# Patient Record
Sex: FEMALE | Race: WHITE | Employment: OTHER | URBAN - METROPOLITAN AREA
[De-identification: names, ages, dates, MRNs, and addresses within clinical notes are randomized per-mention and may not be internally consistent; named-entity substitution may affect disease eponyms.]

---

## 2017-01-24 ENCOUNTER — ANTICOAGULATION THERAPY VISIT (OUTPATIENT)
Dept: ANTICOAGULATION | Facility: CLINIC | Age: 77
End: 2017-01-24
Payer: MEDICARE

## 2017-01-24 DIAGNOSIS — I48.91 ATRIAL FIBRILLATION AND FLUTTER (H): ICD-10-CM

## 2017-01-24 DIAGNOSIS — I48.92 ATRIAL FIBRILLATION AND FLUTTER (H): ICD-10-CM

## 2017-01-24 DIAGNOSIS — Z79.01 LONG-TERM (CURRENT) USE OF ANTICOAGULANTS: Primary | ICD-10-CM

## 2017-01-24 LAB — INR POINT OF CARE: 2.7 (ref 0.86–1.14)

## 2017-01-24 PROCEDURE — 36416 COLLJ CAPILLARY BLOOD SPEC: CPT

## 2017-01-24 PROCEDURE — 99207 ZZC NO CHARGE NURSE ONLY: CPT

## 2017-01-24 PROCEDURE — 85610 PROTHROMBIN TIME: CPT | Mod: QW

## 2017-01-24 NOTE — MR AVS SNAPSHOT
Zunilda Alicea May   1/24/2017 2:00 PM   Anticoagulation Therapy Visit    Description:  76 year old female   Provider:  [facility]   Department:  [facility]           INR as of 1/24/2017     Selected INR 2.7 (1/24/2017)      Anticoagulation Summary as of 1/24/2017     INR goal 2.0-3.0   Selected INR 2.7 (1/24/2017)   Full instructions 5 mg on Tue, Thu, Sat; 2.5 mg all other days   Next INR check 3/7/2017    Indications   Long-term (current) use of anticoagulants [Z79.01] [Z79.01]  Atrial fibrillation and flutter (H) [I48.91  I48.92]         Your next Anticoagulation Clinic appointment(s)     Mar 07, 2017 10:30 AM   [facility]   [facility]    [address] [facility] [address]  [facility] [address] [phone]              Contact Numbers     [facility] Phone Numbers:  Anticoagulation Clinic Appointments : [phone]  Anticoagulation Nurse: [phone]         January 2017 Details    Sun Mon Tue Wed Thu Fri Sat     1               2               3               4               5               6               7                 8               9               10               11               12               13               14                 15               16               17               18               19               20               21                 22               23               24      5 mg   See details      25      2.5 mg         26      5 mg         27      2.5 mg         28      5 mg           29      2.5 mg         30      2.5 mg         31      5 mg              Date Details   01/24 This INR check               How to take your warfarin dose     To take:  2.5 mg Take 1 of the 2.5 mg tablets.    To take:  5 mg Take 2 of the 2.5 mg tablets.           February 2017 Details    Sun Mon Tue Wed Thu Fri Sat        1      2.5 mg         2      5 mg         3      2.5 mg         4       5 mg           5      2.5 mg         6      2.5 mg         7      5 mg         8      2.5 mg         9      5 mg         10      2.5 mg         11      5 mg           12      2.5 mg         13      2.5 mg         14      5 mg         15      2.5 mg         16      5 mg         17      2.5 mg         18      5 mg           19      2.5 mg         20      2.5 mg         21      5 mg         22      2.5 mg         23      5 mg         24      2.5 mg         25      5 mg           26      2.5 mg         27      2.5 mg         28      5 mg              Date Details   No additional details            How to take your warfarin dose     To take:  2.5 mg Take 1 of the 2.5 mg tablets.    To take:  5 mg Take 2 of the 2.5 mg tablets.           March 2017 Details    Sun Mon Tue Wed Thu Fri Sat        1      2.5 mg         2      5 mg         3      2.5 mg         4      5 mg           5      2.5 mg         6      2.5 mg         7            8               9               10               11                 12               13               14               15               16               17               18                 19               20               21               22               23               24               25                 26               27               28               29               30               31                 Date Details   No additional details    Date of next INR:  3/7/2017         How to take your warfarin dose     To take:  2.5 mg Take 1 of the 2.5 mg tablets.    To take:  5 mg Take 2 of the 2.5 mg tablets.

## 2017-01-24 NOTE — PROGRESS NOTES
ANTICOAGULATION FOLLOW-UP CLINIC VISIT    Patient Name:  Zunilda Alicea May  Date:  1/24/2017  Contact Type:  Face to Face    SUBJECTIVE:     Patient Findings     Positives No Problem Findings           OBJECTIVE    INR PROTIME   Date Value Ref Range Status   01/24/2017 2.7* 0.86 - 1.14 Final       ASSESSMENT / PLAN  INR assessment THER    Recheck INR In: 6 WEEKS    INR Location Clinic      Anticoagulation Summary as of 1/24/2017     INR goal 2.0-3.0   Selected INR 2.7 (1/24/2017)   Maintenance plan 5 mg (2.5 mg x 2) on Tue, Thu, Sat; 2.5 mg (2.5 mg x 1) all other days   Full instructions 5 mg on Tue, Thu, Sat; 2.5 mg all other days   Weekly total 25 mg   No change documented Екатерина Mahan RN   Plan last modified Екатерина Mahan RN (4/25/2016)   Next INR check 3/7/2017   Priority INR   Target end date     Indications   Long-term (current) use of anticoagulants [Z79.01] [Z79.01]  Atrial fibrillation and flutter (H) [I48.91  I48.92]         Anticoagulation Episode Summary     INR check location     Preferred lab     Send INR reminders to [facility]    Comments       Anticoagulation Care Providers     Provider Role Specialty Phone number    Yunior Huang MD [facility] Internal Medicine [phone]            See the Encounter Report to view Anticoagulation Flowsheet and Dosing Calendar (Go to Encounters tab in chart review, and find the Anticoagulation Therapy Visit)        Екатерина Mahan RN

## 2017-02-16 DIAGNOSIS — I10 ESSENTIAL HYPERTENSION: ICD-10-CM

## 2017-02-16 RX ORDER — AMLODIPINE AND VALSARTAN 10; 320 MG/1; MG/1
1 TABLET ORAL DAILY
Qty: 90 TABLET | Refills: 3 | Status: SHIPPED | OUTPATIENT
Start: 2017-02-16 | End: 2018-02-19

## 2017-02-16 NOTE — TELEPHONE ENCOUNTER
Received refill request for:  Amlodipine-valsartan  Last OV was: 8/3/2016 with Dr. Das  Labs/EKG: last BMP 11/4/2016  F/U scheduled: orders in [facility] for 8/2017  New script sent to: Yah[facility]a

## 2017-03-06 ENCOUNTER — TRANSFERRED RECORDS (OUTPATIENT)
Dept: HEALTH INFORMATION MANAGEMENT | Facility: CLINIC | Age: 77
End: 2017-03-06

## 2017-03-07 ENCOUNTER — ANTICOAGULATION THERAPY VISIT (OUTPATIENT)
Dept: ANTICOAGULATION | Facility: CLINIC | Age: 77
End: 2017-03-07
Payer: MEDICARE

## 2017-03-07 DIAGNOSIS — I48.91 ATRIAL FIBRILLATION AND FLUTTER (H): ICD-10-CM

## 2017-03-07 DIAGNOSIS — I48.92 ATRIAL FIBRILLATION AND FLUTTER (H): ICD-10-CM

## 2017-03-07 DIAGNOSIS — Z79.01 LONG-TERM (CURRENT) USE OF ANTICOAGULANTS: ICD-10-CM

## 2017-03-07 LAB — INR POINT OF CARE: 2.9 (ref 0.86–1.14)

## 2017-03-07 PROCEDURE — 36416 COLLJ CAPILLARY BLOOD SPEC: CPT

## 2017-03-07 PROCEDURE — 99207 ZZC NO CHARGE NURSE ONLY: CPT

## 2017-03-07 PROCEDURE — 85610 PROTHROMBIN TIME: CPT | Mod: QW

## 2017-03-07 NOTE — MR AVS SNAPSHOT
Zunilda Alicea May   3/7/2017 10:30 AM   Anticoagulation Therapy Visit    Description:  76 year old female   Provider[facility]  [facility]   Department:  [facility]           INR as of 3/7/2017     Today's INR 2.9      Anticoagulation Summary as of 3/7/2017     INR goal 2.0-3.0   Today's INR 2.9   Full instructions 5 mg on Tue, Thu, Sat; 2.5 mg all other days   Next INR check 4/18/2017    Indications   Long-term (current) use of anticoagulants [Z79.01] [Z79.01]  Atrial fibrillation and flutter (H) [I48.91  I48.92]         Your next Anticoagulation Clinic appointment(s)     Apr 18, 2017 10:30 AM CDT   Anticoagulation Visit with [facility]   [facility]    [address] [facility] [address]  [facility] [id number] [phone]              Contact Numbers     [facility] Phone Numbers:  Anticoagulation Clinic Appointments : [phone]  Anticoagulation Nurse: [phone]         March 2017 Details    Sun Mon Tue Wed Thu Fri Sat        1               2               3               4                 5               6               7      5 mg   See details      8      2.5 mg         9      5 mg         10      2.5 mg         11      5 mg           12      2.5 mg         13      2.5 mg         14      5 mg         15      2.5 mg         16      5 mg         17      2.5 mg         18      5 mg           19      2.5 mg         20      2.5 mg         21      5 mg         22      2.5 mg         23      5 mg         24      2.5 mg         25      5 mg           26      2.5 mg         27      2.5 mg         28      5 mg         29      2.5 mg         30      5 mg         31      2.5 mg           Date Details   03/07 This INR check               How to take your warfarin dose     To take:  2.5 mg Take 0.5 of a 5 mg tablet.    To take:  5 mg Take 1 of the 5 mg tablets.           April 2017 Details    Sun Mon Tue Wed Thu Fri Sat           1      5  mg           2      2.5 mg         3      2.5 mg         4      5 mg         5      2.5 mg         6      5 mg         7      2.5 mg         8      5 mg           9      2.5 mg         10      2.5 mg         11      5 mg         12      2.5 mg         13      5 mg         14      2.5 mg         15      5 mg           16      2.5 mg         17      2.5 mg         18            19               20               21               22                 23               24               25               26               27               28               29                 30                      Date Details   No additional details    Date of next INR:  4/18/2017         How to take your warfarin dose     To take:  2.5 mg Take 0.5 of a 5 mg tablet.    To take:  5 mg Take 1 of the 5 mg tablets.

## 2017-03-07 NOTE — PROGRESS NOTES
ANTICOAGULATION FOLLOW-UP CLINIC VISIT    Patient Name:  Zunilda Alicea May  Date:  3/7/2017  Contact Type:  Face to Face    SUBJECTIVE:     Patient Findings     Positives No Problem Findings           OBJECTIVE    INR Protime   Date Value Ref Range Status   03/07/2017 2.9 (A) 0.86 - 1.14 Final       ASSESSMENT / PLAN  INR assessment THER    Recheck INR In: 6 WEEKS    INR Location Clinic      Anticoagulation Summary as of 3/7/2017     INR goal 2.0-3.0   Today's INR 2.9   Maintenance plan 5 mg (5 mg x 1) on Tue, Thu, Sat; 2.5 mg (5 mg x 0.5) all other days   Full instructions 5 mg on Tue, Thu, Sat; 2.5 mg all other days   Weekly total 25 mg   No change documented Simona Shrestha, RN   Plan last modified Екатерина Mahan RN (1/24/2017)   Next INR check 4/18/2017   Priority INR   Target end date     Indications   Long-term (current) use of anticoagulants [Z79.01] [Z79.01]  Atrial fibrillation and flutter (H) [I48.91  I48.92]         Anticoagulation Episode Summary     INR check location     Preferred lab     Send INR reminders to [facility]    Comments       Anticoagulation Care Providers     Provider Role Specialty Phone number    Yunior Huang MD Responsible Internal Medicine [phone]            See the Encounter Report to view Anticoagulation Flowsheet and Dosing Calendar (Go to Encounters tab in chart review, and find the Anticoagulation Therapy Visit)    Dosage adjustment made based on physician directed care plan.    Simona Shrestha RN

## 2017-03-27 ENCOUNTER — ALLIED HEALTH/NURSE VISIT (OUTPATIENT)
Dept: CARDIOLOGY | Facility: CLINIC | Age: 77
End: 2017-03-27
Payer: MEDICARE

## 2017-03-27 DIAGNOSIS — Z95.0 CARDIAC PACEMAKER IN SITU: Primary | ICD-10-CM

## 2017-03-27 PROCEDURE — 93296 REM INTERROG EVL PM/IDS: CPT | Performed by: INTERNAL MEDICINE

## 2017-03-27 PROCEDURE — 93294 REM INTERROG EVL PM/LDLS PM: CPT | Performed by: INTERNAL MEDICINE

## 2017-03-27 NOTE — MR AVS SNAPSHOT
After Visit Summary   3/27/2017    Zunilda Clark    MRN: [id number]           Patient Information     Date Of Birth          1940        Visit Information        Provider Department      3/27/2017 2:30 PM WILLIAMS ANDREA [facility] HEART [facility]        Today's Diagnoses     Cardiac pacemaker in situ    -  1       Follow-ups after your visit        Your next 10 appointments already scheduled     Mar 27, 2017  2:30 PM CDT   Remote PPM Check with KRAMER TECH1   [facility] ([facility])    [address]  [facility] [id number] [phone]           This appointment is for a remote check of your pacemaker.  This is not an appointment at the office.            Apr 05, 2017  3:15 PM CDT   Return Visit with Timothy Das MD   [facility] ([facility])    [address]  [facility] [address] [phone]            Apr 18, 2017 10:30 AM CDT   Anticoagulation Visit with [facility]   [facility]    [address]  [facility] [address] [phone]              Who to contact     If you have questions or need follow up information about today's clinic visit or your schedule please contact [facility] directly at [phone].  Normal or non-critical lab and imaging results will be communicated to you by MyChart, letter or phone within 4 business days after the clinic has received the results. If you do not hear from us within 7 days, please contact the clinic through MyChart or phone. If you have a critical or abnormal lab result, we will notify you by phone as soon as possible.  Submit refill requests through [employer] or call your pharmacy and they will forward the refill request to us. Please allow 3 business days for your refill to  be completed.          Additional Information About Your Visit        [employer] Information     [employer] gives you secure access to your electronic health record. If you see a primary care provider, you can also send messages to your care team and make appointments. If you have questions, please call your primary care clinic.  If you do not have a primary care provider, please call [phone] and they will assist you.        Care EveryWhere ID     This is your Care EveryWhere ID. This could be used by other organizations to access your [facility] medical records  [id number]         Blood Pressure from Last 3 Encounters:   11/15/16 122/60   11/01/16 120/50   08/03/16 125/55    Weight from Last 3 Encounters:   11/15/16 72.6 kg (160 lb)   11/01/16 73.9 kg (163 lb)   08/03/16 73 kg (161 lb)              We Performed the Following     INTERROGATION DEVICE EVAL REMOTE, PACER/ICD (04416)     PM DEVICE INTERROGATE REMOTE (19807)          Today's Medication Changes          These changes are accurate as of: 3/27/17  1:45 PM.  If you have any questions, ask your nurse or doctor.               These medicines have changed or have updated prescriptions.        Dose/Directions    allopurinol 100 MG tablet   Commonly known as:  ZYLOPRIM   This may have changed:  how much to take   Used for:  RA (rheumatoid arthritis) (H), Gout        Dose:  100 mg   Take 1 tablet (100 mg) by mouth daily   Quantity:  90 tablet   Refills:  3                Primary Care Provider Office Phone # Fax #    Yunior Huang [phone]       [place] [address] [place] [address]        Thank you!     Thank you for choosing [facility] PHYSICIANS HEART AT [place]  for your care. Our goal is always to provide you with excellent care. Hearing back from our patients is one way we can continue to improve our services. Please take a few minutes to complete the written survey that you may receive in the  mail after your visit with us. Thank you!             Your Updated Medication List - Protect others around you: Learn how to safely use, store and throw away your medicines at www.disposemymeds.org.          This list is accurate as of: 3/27/17  1:45 PM.  Always use your most recent med list.                   Brand Name Dispense Instructions for use    allopurinol 100 MG tablet    ZYLOPRIM    90 tablet    Take 1 tablet (100 mg) by mouth daily       Amlodipine Besylate-Valsartan  MG Tabs    EXFORGE    90 tablet    Take 1 tablet by mouth daily       chlorthalidone 25 MG tablet    HYGROTON    90 tablet    Take 1 tablet (25 mg) by mouth daily       folic acid 1 MG tablet    FOLVITE     Take 1 mg by mouth daily       levothyroxine 112 MCG tablet    SYNTHROID/LEVOTHROID    90 tablet    Take 1 tablet (112 mcg) by mouth daily       METHOTREXATE SODIUM IJ      Inject 0.8 mLs as directed once a week       metoprolol 100 MG tablet    LOPRESSOR    60 tablet    Take 0.5 tablets (50 mg) by mouth 2 times daily       omeprazole 20 MG CR capsule    priLOSEC    180 capsule    Take 1 capsule (20 mg) by mouth 2 times daily       warfarin 5 MG tablet    COUMADIN    60 tablet    Take 1 tablet (5 mg) Tu, Th, Sat. Take 1/2 tablet (2.5 mg) on Sun, Mon, Wed, Fri

## 2017-03-27 NOTE — PROGRESS NOTES
Medtronic Revo (D) Remote PPM Device Check  AP: 36 % : 43 %  Mode: AAIR<->DDDR        Presenting Rhythm: AP/VS  Heart Rate: Adequate rates per histogram  Sensing: Stable    Pacing Threshold: Stable    Impedance: Stable  Battery Status: 2.97V with OPAL being 2.81V  Atrial Arrhythmia: 153 mode switch episodes comprising 11% of the time. Longest episode lasted >99 hours. Ventricular rates controlled. Taking Warfarin.    Ventricular Arrhythmia: None     Care Plan: F/u PPM Carelink q 3 months. Gave patient results over the phone. JonathanCVT

## 2017-03-31 ENCOUNTER — PRE VISIT (OUTPATIENT)
Dept: CARDIOLOGY | Facility: CLINIC | Age: 77
End: 2017-03-31

## 2017-04-05 ENCOUNTER — OFFICE VISIT (OUTPATIENT)
Dept: CARDIOLOGY | Facility: CLINIC | Age: 77
End: 2017-04-05
Payer: MEDICARE

## 2017-04-05 VITALS
WEIGHT: 160 LBS | DIASTOLIC BLOOD PRESSURE: 62 MMHG | SYSTOLIC BLOOD PRESSURE: 128 MMHG | BODY MASS INDEX: 26.66 KG/M2 | HEART RATE: 76 BPM | HEIGHT: 65 IN

## 2017-04-05 DIAGNOSIS — I48.92 ATRIAL FIBRILLATION AND FLUTTER (H): ICD-10-CM

## 2017-04-05 DIAGNOSIS — I27.20 PULMONARY HTN (H): Primary | ICD-10-CM

## 2017-04-05 DIAGNOSIS — I48.91 ATRIAL FIBRILLATION AND FLUTTER (H): ICD-10-CM

## 2017-04-05 DIAGNOSIS — Z95.0 CARDIAC PACEMAKER IN SITU: ICD-10-CM

## 2017-04-05 DIAGNOSIS — I10 BENIGN ESSENTIAL HYPERTENSION: ICD-10-CM

## 2017-04-05 DIAGNOSIS — I05.9 MITRAL VALVE DISORDER: ICD-10-CM

## 2017-04-05 PROCEDURE — 99214 OFFICE O/P EST MOD 30 MIN: CPT | Performed by: INTERNAL MEDICINE

## 2017-04-05 RX ORDER — IBANDRONATE SODIUM 3 MG/3 ML
3 SYRINGE (ML) INTRAVENOUS
COMMUNITY
End: 2020-01-08

## 2017-04-05 NOTE — MR AVS SNAPSHOT
After Visit Summary   4/5/2017    Zunilda Clark    MRN: [id number]           Patient Information     Date Of Birth          1940        Visit Information        Provider Department      4/5/2017 3:15 PM Timothy Das MD [facility] PHYSICIANS HEART AT [place]        Today's Diagnoses     Pulmonary HTN (H)    -  1    Atrial fibrillation and flutter (H)        Mitral valve disorder        Cardiac pacemaker in situ        Benign essential hypertension           Follow-ups after your visit        Additional Services     Follow-Up with Cardiologist                 Your next 10 appointments already scheduled     Apr 13, 2017  8:30 AM CDT   Ech Complete with [id number] ([facility])    [address]  [facility] [address] [phone]           1.  Please bring or wear a comfortable two-piece outfit. 2.  You may eat, drink and take your normal medicines. 3.  For any questions that cannot be answered, please contact the ordering physician ***Please check-in at the [facility] located in [address] in the [facility] building. When you are finished registering, please go to [address] and have a seat. The technician will call your name for the test.            Apr 18, 2017 10:30 AM CDT   Anticoagulation Visit with [facility]   [facility]    [address]  [facility] [id number] [phone]            Jul 03, 2017 11:30 AM CDT   Remote PPM Check with KRAMER TECH1   [facility] PHYS[facility]ANS HEART AT [place] ([facility])    [address] [place] [address] [address]  [facility] [address] [phone]           This appointment is for a remote check of your pacemaker.  This is not an appointment at the office.              Future tests that were ordered for you today     Open Future Orders         "Priority Expected Expires Ordered    Echocardiogram Routine 4/12/2017 4/5/2018 4/5/2017    Follow-Up with Cardiologist Routine 10/2/2017 4/5/2018 4/5/2017            Who to contact     If you have questions or need follow up information about today's clinic visit or your schedule please contact [facility] PHYSICIANS HEART AT [place] directly at [phone].  Normal or non-critical lab and imaging results will be communicated to you by Telep[employer]art, letter or phone within 4 business days after the clinic has received the results. If you do not hear from us within 7 days, please contact the clinic through [facility] [employer] or phone. If you have a critical or abnormal lab result, we will notify you by phone as soon as possible.  Submit refill requests through [employer] or call your pharmacy and they will forward the refill request to us. Please allow 3 business days for your refill to be completed.          Additional Information About Your Visit        [employer] Information     [employer] gives you secure access to your electronic health record. If you see a primary care provider, you can also send messages to your care team and make appointments. If you have questions, please call your primary care clinic.  If you do not have a primary care provider, please call [phone] and they will assist you.        Care EveryWhere ID     This is your Care EveryWhere ID. This could be used by other organizations to access your [place] medical records  [id number]        Your Vitals Were     Pulse Height BMI (Body Mass Index)             76 1.651 m (5' 5\") 26.63 kg/m2          Blood Pressure from Last 3 Encounters:   04/05/17 128/62   11/15/16 122/60   11/01/16 120/50    Weight from Last 3 Encounters:   04/05/17 72.6 kg (160 lb)   11/15/16 72.6 kg (160 lb)   11/01/16 73.9 kg (163 lb)                 Today's Medication Changes          These changes are accurate as of: 4/5/17  4:08 PM.  If you have any questions, ask your nurse or " doctor.               These medicines have changed or have updated prescriptions.        Dose/Directions    allopurinol 100 MG tablet   Commonly known as:  ZYLOPRIM   This may have changed:  when to take this   Used for:  RA (rheumatoid arthritis) (H), Gout        Dose:  100 mg   Take 1 tablet (100 mg) by mouth daily   Quantity:  90 tablet   Refills:  3                Primary Care Provider Office Phone # Fax #    Yunior Huang [phone]       [facility] [address] [facility] [address] [address]        Thank you!     Thank you for choosing [facility] PHYSICIANS HEART AT [place]  for your care. Our goal is always to provide you with excellent care. Hearing back from our patients is one way we can continue to improve our services. Please take a few minutes to complete the written survey that you may receive in the mail after your visit with us. Thank you!             Your Updated Medication List - Protect others around you: Learn how to safely use, store and throw away your medicines at www.disposemymeds.org.          This list is accurate as of: 4/5/17  4:08 PM.  Always use your most recent med list.                   Brand Name Dispense Instructions for use    allopurinol 100 MG tablet    ZYLOPRIM    90 tablet    Take 1 tablet (100 mg) by mouth daily       Amlodipine Besylate-Valsartan  MG Tabs    EXFORGE    90 tablet    Take 1 tablet by mouth daily       BONIVA 3 MG/3ML   Generic drug:  ibandronate      Inject 3 mg into the vein once       chlorthalidone 25 MG tablet    HYGROTON    90 tablet    Take 1 tablet (25 mg) by mouth daily       folic acid 1 MG tablet    FOLVITE     Take 1 mg by mouth daily       levothyroxine 112 MCG tablet    SYNTHROID/LEVOTHROID    90 tablet    Take 1 tablet (112 mcg) by mouth daily       METHOTREXATE SODIUM IJ      Inject 0.8 mLs as directed once a week       metoprolol 100 MG tablet    LOPRESSOR    60 tablet    Take 0.5 tablets (50  mg) by mouth 2 times daily       omeprazole 20 MG CR capsule    priLOSEC    180 capsule    Take 1 capsule (20 mg) by mouth 2 times daily       warfarin 5 MG tablet    COUMADIN    60 tablet    Take 1 tablet (5 mg) Tu, Th, Sat. Take 1/2 tablet (2.5 mg) on Sun, Mon, Wed, Fri

## 2017-04-05 NOTE — PROGRESS NOTES
HPI and Plan:   See dictation:[id number]    Orders Placed This Encounter   Procedures     Follow-Up with Cardiologist     Echocardiogram       Orders Placed This Encounter   Medications     ibandronate (BONIVA) 3 MG/3ML     Sig: Inject 3 mg into the vein once       There are no discontinued medications.      Encounter Diagnoses   Name Primary?     Pulmonary HTN (H) Yes     Atrial fibrillation and flutter (H)      Mitral valve disorder      Cardiac pacemaker in situ      Benign essential hypertension        CURRENT MEDICATIONS:  Current Outpatient Prescriptions   Medication Sig Dispense Refill     ibandronate (BONIVA) 3 MG/3ML Inject 3 mg into the vein once       Amlodipine Besylate-Valsartan (EXFORGE)  MG TABS Take 1 tablet by mouth daily 90 tablet 3     omeprazole (PRILOSEC) 20 MG CR capsule Take 1 capsule (20 mg) by mouth 2 times daily 180 capsule 3     levothyroxine (SYNTHROID/LEVOTHROID) 112 MCG tablet Take 1 tablet (112 mcg) by mouth daily 90 tablet 3     metoprolol (LOPRESSOR) 100 MG tablet Take 0.5 tablets (50 mg) by mouth 2 times daily 60 tablet 5     warfarin (COUMADIN) 5 MG tablet Take 1 tablet (5 mg) Tu, Th, Sat. Take 1/2 tablet (2.5 mg) on Sun, Mon, Wed, Fri 60 tablet 1     chlorthalidone (HYGROTON) 25 MG tablet Take 1 tablet (25 mg) by mouth daily 90 tablet 2     folic acid (FOLVITE) 1 MG tablet Take 1 mg by mouth daily       METHOTREXATE SODIUM IJ Inject 0.8 mLs as directed once a week       allopurinol (ZYLOPRIM) 100 MG tablet Take 1 tablet (100 mg) by mouth daily (Patient taking differently: Take 100 mg by mouth 2 times daily ) 90 tablet 3       ALLERGIES   No Known Allergies    PAST MEDICAL HISTORY:  Past Medical History:   Diagnosis Date     Acquired hypothyroidism 11/4/2015     Aortic valve insufficiency      Arthritis      Atrial fibrillation (H)      Atrial fibrillation and flutter (H)      Blood clotting disorder (H)      CHF (congestive heart failure) (H)      DVT (deep venous thrombosis)  (H) 2003     Gastro-oesophageal reflux disease      H/O mitral valve replacement with tissue graft june 2014     History of blood transfusion 2014     HTN (hypertension)      Hypothyroidism      Other chronic pain      Pulmonary HTN (H)      Rheumatoid arthritis(714.0)      Seizures (H) 2014     Stroke (H) 2009    left side mild weakness      Stroke (H) 2014     Syncope 2011    [place]     Thrombosis of leg 2003    both legs       PAST SURGICAL HISTORY:  Past Surgical History:   Procedure Laterality Date     ABDOMEN SURGERY      prolapsed bladder     H ABLATION AV NODE  2011    AFlutter with syncope, PPM to follow     IMPLANT PACEMAKER  2011     OPEN REDUCTION INTERNAL FIXATION RODDING INTRAMEDULLARY HUMERUS Right 7/28/2015    Procedure: OPEN REDUCTION INTERNAL FIXATION RODDING INTRAMEDULLARY HUMERUS;  Surgeon: Raymundo Rivera MD;  Location: [facility] OR     REPLACE VALVE MITRAL  2006    Mitral valve replacement with bioprosthetic valve in 2008 for rheumatic disease     SLING BLADDER SUSPENSION WITH FASCIA UMESH         FAMILY HISTORY:  Family History   Problem Relation Age of Onset     Coronary Artery Disease Mother      Coronary Artery Disease Brother      DIABETES Brother      DIABETES Brother      Hypertension Sister      Hyperlipidemia Sister        SOCIAL HISTORY:  Social History     Social History     Marital status:      Spouse name: N/A     Number of children: N/A     Years of education: N/A     Social History Main Topics     Smoking status: Never Smoker     Smokeless tobacco: Never Used     Alcohol use No     Drug use: No     Sexual activity: Yes     Partners: Male     Other Topics Concern     Caffeine Concern Yes     occasionally     Sleep Concern No     Special Diet Yes     lower salt     Back Care No     Exercise Yes     walking ride excercise bike     Social History Narrative       Review of Systems:  Skin:  Positive for bruising;itching itching on occas.  per pt    Eyes:  Positive for  "glasses;visual blurring    ENT:  Negative      Respiratory:  Negative       Cardiovascular:    Positive for;fatigue    Gastroenterology: Positive for reflux;heartburn;diarrhea diarrhea on occasion   Genitourinary:  Positive for nocturia has improved to 1-2 times per pm  Musculoskeletal:  Positive for arthritis;joint pain;joint stiffness;gout Osteo and Rhuematoid , hx of gout ,  both feet feel different - per pt   Neurologic:  Positive for stroke CVA 2009 ,  Mild CVA 2014 - during open heart surgery  Psychiatric:  Positive for sleep disturbances troubles falling asleep  Heme/Lymph/Imm:  Positive for easy bruising    Endocrine:  Positive for thyroid disorder      Physical Exam:  Vitals: /62 (BP Location: Right arm, Cuff Size: Adult Large)  Pulse 76  Ht 1.651 m (5' 5\")  Wt 72.6 kg (160 lb)  BMI 26.63 kg/m2    Constitutional:  cooperative, alert and oriented, well developed, well nourished, in no acute distress        Skin:  warm and dry to the touch, no apparent skin lesions or masses noted        Head:  normocephalic, no masses or lesions        Eyes:  pupils equal and round, conjunctivae and lids unremarkable, sclera white, no xanthalasma, EOMS intact, no nystagmus        ENT:  no pallor or cyanosis, dentition good        Neck:  carotid pulses are full and equal bilaterally, JVP normal, no carotid bruit, no thyromegaly        Chest:  normal breath sounds, clear to auscultation, normal A-P diameter, normal symmetry, normal respiratory excursion, no use of accessory muscles;healed median sternotomy scar   pacemaker incision in the left infraclavicular area was well-healed      Cardiac: regular rhythm;normal S1 and S2;apical impulse not displaced;no S3 or S4 occasional premature beats fixed split S2   systolic murmur;LLSB;grade 1   diastolic murmur;blowing;grade 2;RUSB      Abdomen:  abdomen soft, non-tender, BS normoactive, no mass, no HSM, no bruits        Vascular: pulses full and equal, no bruits " auscultated                                        Extremities and Back:  no edema arthritic deformities of UE            Neurological:  affect appropriate, oriented to time, person and place left sided weakness walks with a walker          CC  No referring provider defined for this encounter.

## 2017-04-05 NOTE — LETTER
4/5/2017    Yunior Huang MD  [facility]   [address] [facility] [address]    RE: Zunilda Clark       Dear Colleague,    I again had the pleasure of seeing your patient, Zunilda Clark, at [facility] for evaluation of pulmonary hypertension and paroxysmal atrial fibrillation.  The patient had been hospitalized in [place] for congestive heart failure in 2014.  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  She has a history of paroxysmal atrial fibrillation and has been treated for essential hypertension.  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  Her mitral valve replacement in 04/2008 was with a bioprosthetic valve.  Unfortunately by 04/2014 she was found to have severe mitral stenosis and the valve was replaced with a magna 27 mm bioprosthetic valve.  She did not have any significant coronary artery disease on either occasion.  Her last echocardiogram performed on 11/10/2015 demonstrated normal left ventricular size and function with moderate to severe concentric left ventricular hypertrophy.  Right ventricular systolic function was moderately reduced and the right ventricle moderately dilated.  There was mild to moderate tricuspid regurgitation.  It should be noted that in 2014 a tricuspid annuloplasty ring was placed.  There was severe pulmonary hypertension.  The bioprosthetic mitral valve was normal with trace mitral regurgitation.  Pulmonary pressures were elevated but lower than previous.  The patient believes she suffered a TIA or stroke at the time of her mitral valve replacement in 05/2014.  She continues to have left-sided weakness.  She fell and broke her right arm which required ORIF of her right humerus last year.  She has not had any  "further significant shortness of breath since her episode of congestion and furosemide use last year.  She rides her stationary bike 5 out of 7 days for 15 minutes.  She is now planning left total knee arthroplasty in the near future.  She needs cardiac surgical clearance.  She denies any bleeding diathesis other than \"occasional vaginal spotting.\"      PHYSICAL EXAMINATION:   VITAL SIGNS:  Current blood pressure is 128/62, pulse is 76 and regular, weight is 160 pounds.   CHEST:  Clear to auscultation.   CARDIAC:  Regular rate and rhythm with occasional ectopic, normal S1 and S2 with fixed splitting of S2.  I do not hear a S3 or S4 gallop.  There is a 1/6 systolic murmur in the left lower sternal border.  There is also a 2/6 diastolic blowing murmur of aortic insufficiency at the right upper sternal border.  There is no JVD or HJR.  Pulses are intact without bruits.  She has a healed midline sternotomy scar.  She has a healed pacemaker scar in the left infraclavicular space.   ABDOMEN:  Benign.   EXTREMITIES:  Show no edema.  She has mild left upper and lower extremity weakness.  She has mild arthritic changes in her hands and uses a walker for ambulation.      Interrogation of her pacemaker has shown 36% atrial pacing and 43% ventricular pacing on 03/27/2017.  She is closing in on end of life for this battery.  She 153 mode switches comprising 11% of the time with the longest greater than 99 hours.  Ventricular rates are controlled.     Outpatient Encounter Prescriptions as of 4/5/2017   Medication Sig Dispense Refill     ibandronate (BONIVA) 3 MG/3ML Inject 3 mg into the vein once       Amlodipine Besylate-Valsartan (EXFORGE)  MG TABS Take 1 tablet by mouth daily 90 tablet 3     omeprazole (PRILOSEC) 20 MG CR capsule Take 1 capsule (20 mg) by mouth 2 times daily 180 capsule 3     levothyroxine (SYNTHROID/LEVOTHROID) 112 MCG tablet Take 1 tablet (112 mcg) by mouth daily 90 tablet 3     metoprolol (LOPRESSOR) " 100 MG tablet Take 0.5 tablets (50 mg) by mouth 2 times daily 60 tablet 5     [DISCONTINUED] warfarin (COUMADIN) 5 MG tablet Take 1 tablet (5 mg) Tu, Th, Sat. Take 1/2 tablet (2.5 mg) on Sun, Mon, Wed, Fri 60 tablet 1     [DISCONTINUED] chlorthalidone (HYGROTON) 25 MG tablet Take 1 tablet (25 mg) by mouth daily 90 tablet 2     folic acid (FOLVITE) 1 MG tablet Take 1 mg by mouth daily       METHOTREXATE SODIUM IJ Inject 0.8 mLs as directed once a week       [DISCONTINUED] allopurinol (ZYLOPRIM) 100 MG tablet Take 1 tablet (100 mg) by mouth daily (Patient taking differently: Take 100 mg by mouth 2 times daily ) 90 tablet 3     No facility-administered encounter medications on file as of 4/5/2017.       ASSESSMENT:   1.  Zunilda Clark is a delightful 76-year-old female status post mitral valve replacement in 05/2014 after previous balloon commissurotomy in 2006 and valve replacement 04/10/2008.  The patient is status post mitral valve replacement and tricuspid annuloplasty ring in 05/2014 while living in [place].  Her echocardiogram shows pulmonary hypertension and we will repeat this to assess the degree of pulmonary hypertension before we consider left total knee arthroplasty.  We will also assess her right heart.  Her neck veins are not distended and hopefully this suggests improved right heart function.   2.  The patient's permanent pacemaker is functioning normally.  We will follow this for possible OPAL in the future.   3.  Paroxysmal atrial fibrillation.  She is anticoagulated and has good control.   4.  Hypertension, currently well controlled.  We will continue to monitor her BMP.                It is my pleasure to assist in the care of Deanne Clark.  I will see her again in 6 months if she remains stable.  We will perform an echocardiogram in the next week.  All of her questions were answered to her satisfaction.      Sincerely,    Timothy Das MD     [facility]

## 2017-04-06 NOTE — PROGRESS NOTES
HISTORY OF PRESENT ILLNESS:  I again had the pleasure of seeing your patient, Zunilda Clark, at [facility] for evaluation of pulmonary hypertension and paroxysmal atrial fibrillation.  The patient had been hospitalized in [place] for congestive heart failure in 2014.  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  She has a history of paroxysmal atrial fibrillation and has been treated for essential hypertension.  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  Her mitral valve replacement in 04/2008 was with a bioprosthetic valve.  Unfortunately by 04/2014 she was found to have severe mitral stenosis and the valve was replaced with a magna 27 mm bioprosthetic valve.  She did not have any significant coronary artery disease on either occasion.  Her last echocardiogram performed on 11/10/2015 demonstrated normal left ventricular size and function with moderate to severe concentric left ventricular hypertrophy.  Right ventricular systolic function was moderately reduced and the right ventricle moderately dilated.  There was mild to moderate tricuspid regurgitation.  It should be noted that in 2014 a tricuspid annuloplasty ring was placed.  There was severe pulmonary hypertension.  The bioprosthetic mitral valve was normal with trace mitral regurgitation.  Pulmonary pressures were elevated but lower than previous.  The patient believes she suffered a TIA or stroke at the time of her mitral valve replacement in 05/2014.  She continues to have left-sided weakness.  She fell and broke her right arm which required ORIF of her right humerus last year.  She has not had any further significant shortness of breath since her episode of congestion and furosemide use last year.  She rides her  "stationary bike 5 out of 7 days for 15 minutes.  She is now planning left total knee arthroplasty in the near future.  She needs cardiac surgical clearance.  She denies any bleeding diathesis other than \"occasional vaginal spotting.\"      PHYSICAL EXAMINATION:   VITAL SIGNS:  Current blood pressure is 128/62, pulse is 76 and regular, weight is 160 pounds.   CHEST:  Clear to auscultation.   CARDIAC:  Regular rate and rhythm with occasional ectopic, normal S1 and S2 with fixed splitting of S2.  I do not hear a S3 or S4 gallop.  There is a 1/6 systolic murmur in the left lower sternal border.  There is also a 2/6 diastolic blowing murmur of aortic insufficiency at the right upper sternal border.  There is no JVD or HJR.  Pulses are intact without bruits.  She has a healed midline sternotomy scar.  She has a healed pacemaker scar in the left infraclavicular space.   ABDOMEN:  Benign.   EXTREMITIES:  Show no edema.  She has mild left upper and lower extremity weakness.  She has mild arthritic changes in her hands and uses a walker for ambulation.      Interrogation of her pacemaker has shown 36% atrial pacing and 43% ventricular pacing on 03/27/2017.  She is closing in on end of life for this battery.  She 153 mode switches comprising 11% of the time with the longest greater than 99 hours.  Ventricular rates are controlled.      ASSESSMENT:   1Meir Clark is a delightful 76-year-old female status post mitral valve replacement in 05/2014 after previous balloon commissurotomy in 2006 and valve replacement 04/10/2008.  The patient is status post mitral valve replacement and tricuspid annuloplasty ring in 05/2014 while living in [place].  Her echocardiogram shows pulmonary hypertension and we will repeat this to assess the degree of pulmonary hypertension before we consider left total knee arthroplasty.  We will also assess her right heart.  Her neck veins are not distended and hopefully this suggests improved right heart " function.   2.  The patient's permanent pacemaker is functioning normally.  We will follow this for possible OPAL in the future.   3.  Paroxysmal atrial fibrillation.  She is anticoagulated and has good control.   4.  Hypertension, currently well controlled.  We will continue to monitor her BMP.      It is my pleasure to assist in the care of Deanne Lewis.  I will see her again in 6 months if she remains stable.  We will perform an echocardiogram in the next week.  All of her questions were answered to her satisfaction.      Marcelino Brooks MD      cc:   Yunior Huang MD   [facility] [address], [address]   [place]  [address]         MARCELINO BROOKS MD, [facility]             D: 2017 16:07   T: 2017 09:33   MT: TY      Name:     NADIYA LEWIS   MRN:      [id number]        Account:      [id number]   :      1940           Service Date: 2017      Document: [id number]

## 2017-04-11 DIAGNOSIS — I48.92 ATRIAL FIBRILLATION AND FLUTTER (H): ICD-10-CM

## 2017-04-11 DIAGNOSIS — I48.91 ATRIAL FIBRILLATION AND FLUTTER (H): ICD-10-CM

## 2017-04-11 RX ORDER — WARFARIN SODIUM 5 MG/1
TABLET ORAL
Qty: 60 TABLET | Refills: 1 | Status: ON HOLD | OUTPATIENT
Start: 2017-04-11 | End: 2017-05-02

## 2017-04-11 NOTE — TELEPHONE ENCOUNTER
Warfarin 5 mg    Last Written Prescription Date: 12/8/16  Last Fill Qty: 60, # refills: 1  Last Office Visit with [facility], [facility] or [facility] prescribing provider: 11/1/16     Date and Result of Last PT/INR:   Lab Results   Component Value Date    INR 2.9 03/07/2017    INR 2.7 01/24/2017    INR 1.22 07/29/2015    INR 1.15 07/28/2015   Prescription approved per [facility] Refill Protocol.  Екатерина Mahan RN

## 2017-04-13 ENCOUNTER — HOSPITAL ENCOUNTER (OUTPATIENT)
Dept: CARDIOLOGY | Facility: CLINIC | Age: 77
Discharge: HOME | End: 2017-04-13
Attending: INTERNAL MEDICINE | Admitting: INTERNAL MEDICINE
Payer: MEDICARE

## 2017-04-13 DIAGNOSIS — I05.9 MITRAL VALVE DISORDER: ICD-10-CM

## 2017-04-13 DIAGNOSIS — I27.20 PULMONARY HTN (H): ICD-10-CM

## 2017-04-13 PROCEDURE — 93306 TTE W/DOPPLER COMPLETE: CPT

## 2017-04-13 PROCEDURE — 93306 TTE W/DOPPLER COMPLETE: CPT | Mod: 26 | Performed by: INTERNAL MEDICINE

## 2017-04-14 ENCOUNTER — TELEPHONE (OUTPATIENT)
Dept: CARDIOLOGY | Facility: CLINIC | Age: 77
End: 2017-04-14

## 2017-04-14 NOTE — TELEPHONE ENCOUNTER
Notes Recorded by Timothy Das MD on 4/13/2017 at 10:41 PM  No significant CAD on heart catheterization in 2014.  Normal left ventricular EF.  Severe pulmonary hypertension.  Mortality risk is likely less than 1% based on heart disease.  Using NSQIP for risk calculation gives a 0.1% risk of death and 2.5% risk of serious complications.  She can move on to her knee surgery with careful management and maintenance of O2 levels and volume by anaesthesiology.  Timothy Das MD, FACC     Spoke to patient about result above. Pt verbalized understanding and thanked writer for calling. No further questions.

## 2017-04-18 ENCOUNTER — ANTICOAGULATION THERAPY VISIT (OUTPATIENT)
Dept: ANTICOAGULATION | Facility: CLINIC | Age: 77
End: 2017-04-18
Payer: MEDICARE

## 2017-04-18 DIAGNOSIS — I48.92 ATRIAL FIBRILLATION AND FLUTTER (H): ICD-10-CM

## 2017-04-18 DIAGNOSIS — I48.91 ATRIAL FIBRILLATION AND FLUTTER (H): ICD-10-CM

## 2017-04-18 DIAGNOSIS — Z79.01 LONG-TERM (CURRENT) USE OF ANTICOAGULANTS: ICD-10-CM

## 2017-04-18 LAB — INR POINT OF CARE: 2.6 (ref 0.86–1.14)

## 2017-04-18 PROCEDURE — 36416 COLLJ CAPILLARY BLOOD SPEC: CPT

## 2017-04-18 PROCEDURE — 85610 PROTHROMBIN TIME: CPT | Mod: QW

## 2017-04-18 PROCEDURE — 99207 ZZC NO CHARGE NURSE ONLY: CPT

## 2017-04-27 ENCOUNTER — APPOINTMENT (OUTPATIENT)
Dept: ULTRASOUND IMAGING | Facility: CLINIC | Age: 77
DRG: 418 | End: 2017-04-27
Attending: EMERGENCY MEDICINE
Payer: MEDICARE

## 2017-04-27 ENCOUNTER — APPOINTMENT (OUTPATIENT)
Dept: CT IMAGING | Facility: CLINIC | Age: 77
DRG: 418 | End: 2017-04-27
Attending: EMERGENCY MEDICINE
Payer: MEDICARE

## 2017-04-27 ENCOUNTER — HOSPITAL ENCOUNTER (INPATIENT)
Facility: CLINIC | Age: 77
LOS: 4 days | Discharge: HOME | DRG: 418 | End: 2017-05-02
Attending: EMERGENCY MEDICINE | Admitting: HOSPITALIST
Payer: MEDICARE

## 2017-04-27 DIAGNOSIS — K81.0 ACUTE CHOLECYSTITIS: ICD-10-CM

## 2017-04-27 DIAGNOSIS — I48.91 ATRIAL FIBRILLATION AND FLUTTER (H): ICD-10-CM

## 2017-04-27 DIAGNOSIS — I48.92 ATRIAL FIBRILLATION AND FLUTTER (H): ICD-10-CM

## 2017-04-27 DIAGNOSIS — Z90.49 S/P LAPAROSCOPIC CHOLECYSTECTOMY: Primary | ICD-10-CM

## 2017-04-27 LAB
ALBUMIN SERPL-MCNC: 3.1 G/DL (ref 3.4–5)
ALBUMIN UR-MCNC: NEGATIVE MG/DL
ALP SERPL-CCNC: 102 U/L (ref 40–150)
ALT SERPL W P-5'-P-CCNC: 19 U/L (ref 0–50)
ANION GAP SERPL CALCULATED.3IONS-SCNC: 8 MMOL/L (ref 3–14)
APPEARANCE UR: CLEAR
AST SERPL W P-5'-P-CCNC: 19 U/L (ref 0–45)
BASOPHILS # BLD AUTO: 0 10E9/L (ref 0–0.2)
BASOPHILS NFR BLD AUTO: 0.2 %
BILIRUB SERPL-MCNC: 0.4 MG/DL (ref 0.2–1.3)
BILIRUB UR QL STRIP: NEGATIVE
BUN SERPL-MCNC: 22 MG/DL (ref 7–30)
CALCIUM SERPL-MCNC: 8.8 MG/DL (ref 8.5–10.1)
CHLORIDE SERPL-SCNC: 101 MMOL/L (ref 94–109)
CO2 BLDCOV-SCNC: 28 MMOL/L (ref 21–28)
CO2 SERPL-SCNC: 28 MMOL/L (ref 20–32)
COLOR UR AUTO: YELLOW
CREAT SERPL-MCNC: 0.85 MG/DL (ref 0.52–1.04)
DIFFERENTIAL METHOD BLD: ABNORMAL
EOSINOPHIL # BLD AUTO: 0 10E9/L (ref 0–0.7)
EOSINOPHIL NFR BLD AUTO: 0.2 %
ERYTHROCYTE [DISTWIDTH] IN BLOOD BY AUTOMATED COUNT: 18.6 % (ref 10–15)
GFR SERPL CREATININE-BSD FRML MDRD: 65 ML/MIN/1.7M2
GLUCOSE SERPL-MCNC: 124 MG/DL (ref 70–99)
GLUCOSE UR STRIP-MCNC: NEGATIVE MG/DL
HCT VFR BLD AUTO: 36.3 % (ref 35–47)
HGB BLD-MCNC: 12.1 G/DL (ref 11.7–15.7)
HGB UR QL STRIP: NEGATIVE
IMM GRANULOCYTES # BLD: 0.1 10E9/L (ref 0–0.4)
IMM GRANULOCYTES NFR BLD: 0.4 %
INR PPP: 2.54 (ref 0.86–1.14)
KETONES UR STRIP-MCNC: NEGATIVE MG/DL
LACTATE BLD-SCNC: 0.9 MMOL/L (ref 0.7–2.1)
LEUKOCYTE ESTERASE UR QL STRIP: ABNORMAL
LIPASE SERPL-CCNC: 183 U/L (ref 73–393)
LYMPHOCYTES # BLD AUTO: 0.3 10E9/L (ref 0.8–5.3)
LYMPHOCYTES NFR BLD AUTO: 2.8 %
MCH RBC QN AUTO: 33 PG (ref 26.5–33)
MCHC RBC AUTO-ENTMCNC: 33.3 G/DL (ref 31.5–36.5)
MCV RBC AUTO: 99 FL (ref 78–100)
MONOCYTES # BLD AUTO: 1.6 10E9/L (ref 0–1.3)
MONOCYTES NFR BLD AUTO: 13.9 %
MUCOUS THREADS #/AREA URNS LPF: PRESENT /LPF
NEUTROPHILS # BLD AUTO: 9.7 10E9/L (ref 1.6–8.3)
NEUTROPHILS NFR BLD AUTO: 82.5 %
NITRATE UR QL: POSITIVE
NRBC # BLD AUTO: 0 10*3/UL
NRBC BLD AUTO-RTO: 0 /100
PCO2 BLDV: 46 MM HG (ref 40–50)
PH BLDV: 7.4 PH (ref 7.32–7.43)
PH UR STRIP: 5 PH (ref 5–7)
PLATELET # BLD AUTO: 284 10E9/L (ref 150–450)
PO2 BLDV: 22 MM HG (ref 25–47)
POTASSIUM SERPL-SCNC: 3.5 MMOL/L (ref 3.4–5.3)
PROT SERPL-MCNC: 7.6 G/DL (ref 6.8–8.8)
RBC # BLD AUTO: 3.67 10E12/L (ref 3.8–5.2)
RBC #/AREA URNS AUTO: 1 /HPF (ref 0–2)
SAO2 % BLDV FROM PO2: 37 %
SODIUM SERPL-SCNC: 137 MMOL/L (ref 133–144)
SP GR UR STRIP: 1.03 (ref 1–1.03)
SQUAMOUS #/AREA URNS AUTO: <1 /HPF (ref 0–1)
URN SPEC COLLECT METH UR: ABNORMAL
UROBILINOGEN UR STRIP-MCNC: 0 MG/DL (ref 0–2)
WBC # BLD AUTO: 11.7 10E9/L (ref 4–11)
WBC #/AREA URNS AUTO: 10 /HPF (ref 0–2)

## 2017-04-27 PROCEDURE — 85610 PROTHROMBIN TIME: CPT | Performed by: EMERGENCY MEDICINE

## 2017-04-27 PROCEDURE — 74177 CT ABD & PELVIS W/CONTRAST: CPT

## 2017-04-27 PROCEDURE — 83690 ASSAY OF LIPASE: CPT | Performed by: EMERGENCY MEDICINE

## 2017-04-27 PROCEDURE — 99285 EMERGENCY DEPT VISIT HI MDM: CPT | Mod: 25

## 2017-04-27 PROCEDURE — 81001 URINALYSIS AUTO W/SCOPE: CPT | Performed by: EMERGENCY MEDICINE

## 2017-04-27 PROCEDURE — 83605 ASSAY OF LACTIC ACID: CPT

## 2017-04-27 PROCEDURE — 96361 HYDRATE IV INFUSION ADD-ON: CPT

## 2017-04-27 PROCEDURE — 25000128 H RX IP 250 OP 636: Performed by: EMERGENCY MEDICINE

## 2017-04-27 PROCEDURE — 82803 BLOOD GASES ANY COMBINATION: CPT

## 2017-04-27 PROCEDURE — 85025 COMPLETE CBC W/AUTO DIFF WBC: CPT | Performed by: EMERGENCY MEDICINE

## 2017-04-27 PROCEDURE — 80053 COMPREHEN METABOLIC PANEL: CPT | Performed by: EMERGENCY MEDICINE

## 2017-04-27 PROCEDURE — 76705 ECHO EXAM OF ABDOMEN: CPT

## 2017-04-27 PROCEDURE — 87088 URINE BACTERIA CULTURE: CPT | Performed by: PHYSICIAN ASSISTANT

## 2017-04-27 PROCEDURE — 25500064 ZZH RX 255 OP 636: Performed by: EMERGENCY MEDICINE

## 2017-04-27 PROCEDURE — 87186 SC STD MICRODIL/AGAR DIL: CPT | Performed by: PHYSICIAN ASSISTANT

## 2017-04-27 PROCEDURE — 87086 URINE CULTURE/COLONY COUNT: CPT | Performed by: PHYSICIAN ASSISTANT

## 2017-04-27 PROCEDURE — 96376 TX/PRO/DX INJ SAME DRUG ADON: CPT

## 2017-04-27 PROCEDURE — 25000125 ZZHC RX 250: Performed by: EMERGENCY MEDICINE

## 2017-04-27 PROCEDURE — 96375 TX/PRO/DX INJ NEW DRUG ADDON: CPT

## 2017-04-27 PROCEDURE — 96365 THER/PROPH/DIAG IV INF INIT: CPT

## 2017-04-27 RX ORDER — MORPHINE SULFATE 2 MG/ML
2 INJECTION, SOLUTION INTRAMUSCULAR; INTRAVENOUS ONCE
Status: COMPLETED | OUTPATIENT
Start: 2017-04-27 | End: 2017-04-27

## 2017-04-27 RX ORDER — MORPHINE SULFATE 4 MG/ML
4 INJECTION, SOLUTION INTRAMUSCULAR; INTRAVENOUS
Status: DISCONTINUED | OUTPATIENT
Start: 2017-04-27 | End: 2017-04-27

## 2017-04-27 RX ORDER — IOPAMIDOL 755 MG/ML
500 INJECTION, SOLUTION INTRAVASCULAR ONCE
Status: COMPLETED | OUTPATIENT
Start: 2017-04-27 | End: 2017-04-27

## 2017-04-27 RX ORDER — MULTIPLE VITAMINS W/ MINERALS TAB 9MG-400MCG
1 TAB ORAL DAILY
COMMUNITY

## 2017-04-27 RX ORDER — IBUPROFEN 200 MG
1 CAPSULE ORAL DAILY
COMMUNITY

## 2017-04-27 RX ORDER — ONDANSETRON 2 MG/ML
4 INJECTION INTRAMUSCULAR; INTRAVENOUS
Status: DISCONTINUED | OUTPATIENT
Start: 2017-04-27 | End: 2017-04-28

## 2017-04-27 RX ORDER — AMPICILLIN AND SULBACTAM 2; 1 G/1; G/1
3 INJECTION, POWDER, FOR SOLUTION INTRAMUSCULAR; INTRAVENOUS EVERY 6 HOURS
Status: DISCONTINUED | OUTPATIENT
Start: 2017-04-27 | End: 2017-04-28

## 2017-04-27 RX ORDER — CHLORAL HYDRATE 500 MG
1 CAPSULE ORAL DAILY
COMMUNITY
End: 2018-11-28

## 2017-04-27 RX ORDER — ALLOPURINOL 100 MG/1
100 TABLET ORAL 2 TIMES DAILY
COMMUNITY
End: 2024-06-12

## 2017-04-27 RX ADMIN — MORPHINE SULFATE 2 MG: 2 INJECTION, SOLUTION INTRAMUSCULAR; INTRAVENOUS at 21:00

## 2017-04-27 RX ADMIN — AMPICILLIN SODIUM AND SULBACTAM SODIUM 3 G: 2; 1 INJECTION, POWDER, FOR SOLUTION INTRAMUSCULAR; INTRAVENOUS at 22:49

## 2017-04-27 RX ADMIN — MORPHINE SULFATE 2 MG: 2 INJECTION, SOLUTION INTRAMUSCULAR; INTRAVENOUS at 23:54

## 2017-04-27 RX ADMIN — ONDANSETRON 4 MG: 2 INJECTION INTRAMUSCULAR; INTRAVENOUS at 20:52

## 2017-04-27 RX ADMIN — SODIUM CHLORIDE 60 ML: 9 INJECTION, SOLUTION INTRAVENOUS at 21:19

## 2017-04-27 RX ADMIN — IOPAMIDOL 81 ML: 755 INJECTION, SOLUTION INTRAVENOUS at 21:19

## 2017-04-27 RX ADMIN — SODIUM CHLORIDE 1000 ML: 9 INJECTION, SOLUTION INTRAVENOUS at 20:52

## 2017-04-27 ASSESSMENT — ENCOUNTER SYMPTOMS
NAUSEA: 1
ABDOMINAL PAIN: 1
VOMITING: 1
FEVER: 0
CHILLS: 0
DIARRHEA: 0

## 2017-04-27 NOTE — IP AVS SNAPSHOT
MRN:[id number]                      After Visit Summary   4/27/2017    Zunilda Clark    MRN: [id number]           Thank you!     Thank you for choosing [facility] for your care. Our goal is always to provide you with excellent care. Hearing back from our patients is one way we can continue to improve our services. Please take a few minutes to complete the written survey that you may receive in the mail after you visit. If you would like to speak to someone directly about your visit please contact Patient Relations at [phone]. Thank you!          Patient Information     Date Of Birth          1940        Designated Caregiver       Most Recent Value    Caregiver    Will someone help with your care after discharge? yes    Name of designated caregiver Sander - spouse    Phone number of caregiver [phone]    Caregiver address [address]  [address]. [place], [place]      About your hospital stay     You were admitted on:  April 28, 2017 You last received care in the:  [facility]    You were discharged on:  May 2, 2017        Reason for your hospital stay       Acute cholecystitis                  Who to Call     For medical emergencies, please call 911.  For non-urgent questions about your medical care, please call your primary care provider or clinic, [phone]  For questions related to your surgery, please call your surgery clinic        Attending Provider     Provider Specialty    Ubaldo Fontanez MD Emergency Medicine    Alex, Jarrett Hanson MD Internal Medicine    Venkat Benton MD Internal Medicine       Primary Care Provider Office Phone # Fax #    Yunior Huang [phone]       [facility] [address] [address]        After Care Instructions     Activity       Your activity upon discharge: activity as tolerated            Diet       Follow this diet upon discharge: Orders  Placed This Encounter      Advance Diet as Tolerated: Regular Diet Adult; Low Saturated Fat Diet            Wound care and dressings       Instructions to care for your wound at home: as directed.                  Follow-up Appointments     Follow-up and recommended labs and tests        Follow up with primary care provider, Yunior Huang, within 7 days for hospital follow- up.    Follow up with Surgery as instructed.  INR check on 05/05 to adjust the coumadin dose, dose decreased as you will be on antibiotics for a week after discharge.                  Your next 10 appointments already scheduled     May 08, 2017  1:40 PM CDT   Office Visit with Yunior Huang MD   [facility]    [address]  [facility] [id number] [phone]           Bring a current list of meds and any records pertaining to this visit.  For Physicals, please bring immunization records and any forms needing to be filled out.  Please arrive 10 minutes early to complete paperwork.            May 23, 2017  2:30 PM CDT   Return Visit with MANJIT Hilliard CNP   [facility] [place] ([facility])    [address]  [facility] [id number] [phone]            Jul 03, 2017 11:30 AM CDT   Remote PPM Check with KRAMER TECH1   [facility] ([facility])    [address]  [facility] [id number] [phone]           This appointment is for a remote check of your pacemaker.  This is not an appointment at the office.              Further instructions from your care team       HOME CARE FOLLOWING LAPAROSCOPIC CHOLECYSTECTOMY  NESTOR Rock E. Gavin, N. Guttormson, D. Maurer, R. O Donnell, L. Thomas    INCISIONAL CARE:  Replace the bandage over your incisions until all drainage stops, or if more comfortable to have in place.  If present, leave the  steri-strips (white paper tapes) in place for 14 days after surgery.  If Dermabond (a type of skin glue) is present, leave in place until it wears/flakes off.     BATHING:  Avoid baths for 1 week after surgery.  Showers are okay.  You may wash your hair at any time.  Gently pat your incisions dry after bathing.    ACTIVITY:  Light Activity -- you may immediately be up and about as tolerated.  Driving -- you may drive when comfortable and off narcotic pain medications.  Light Work -- resume when comfortable off pain medications.  (If you can drive, you probably can work.)  Strenuous Work/Activity -- limit lifting to 20 pounds for 1 week.  Progressively increase with time.  Active Sports (running, biking, etc.) -- cautiously resume after 2 weeks.    DISCOMFORT:  Use pain medications as prescribed by your surgeon.  Take the pain medication with some food, when possible, to minimize side effects.  Intermittent use of ice packs at the incision sites may help during the first 48 hours.  Expect gradual improvement.  You may experience shoulder pain, which is due to the air placed within your abdomen during the procedure.  This is temporary and usually passes within 2 days.    DIET:  Drink plenty of fluids.  While taking pain medications, increase dietary fiber or add a fiber supplementation like Metamucil or Citrucel to help prevent constipation - a possible side effect of pain medications.  It is not uncommon to experience some bowel changes (loose stools or constipation) after surgery.  Your body has to adapt to you no longer having a gall bladder.  To help minimize this side effect, avoid fatty foods for the first week after surgery.  You may then slowly increase the amount of fatty foods in your diet.      NAUSEA:  If nauseated from the anesthetic/pain meds; rest in bed, get up cautiously with assistance, and drink clear liquids (juice, tea, broth).    RETURN APPOINTMENT:  Schedule a follow-up visit 2-3 weeks  post-op.  Office Phone:  [phone]     CONTACT US IF THE FOLLOWING DEVELOPS:   1. A fever that is above 101     2. If there is a large amount of drainage, bleeding, or swelling.   3. Severe pain that is not relieved by your prescription.   4. Drainage that is thick, cloudy, yellow, green or white.   5. Any other questions not answered by  Frequently Asked Questions  sheet.      FREQUENTLY ASKED QUESTIONS:    Q:  How should my incision look?    A:  Normally your incision will appear slightly swollen with light redness directly along the incision itself as it heals.  It may feel like a bump or ridge as the healing/scarring happens, and over time (3-4 months) this bump or ridge feeling should slowly go away.  In general, clear or pink watery drainage can be normal at first as your incision heals, but should decrease over time.    Q:  How do I know if my incision is infected?  A:  Look at your incision for signs of infection, like redness around the incision spreading to surrounding skin, or drainage of cloudy or foul-smelling drainage.  If you feel warm, check your temperature to see if you are running a fever.    **If any of these things occur, please notify the nurse at our office.  We may need you to come into the office for an incision check.      Q:  How do I take care of my incision?  A:  If you have a dressing in place - Starting the day after surgery, replace the dressing 1-2 times a day until there is no further drainage from the incision.  At that time, a dressing is no longer needed.  Try to minimize tape on the skin if irritation is occurring at the tape sites.  If you have significant irritation from tape on the skin, please call the office to discuss other method of dressing your incision.    Small pieces of tape called  steri-strips  may be present directly overlying your incision; these may be removed 10 days after surgery unless otherwise specified by your surgeon.  If these tapes start to loosen at  the ends, you may trim them back until they fall off or are removed.    A:  If you had  Dermabond  tissue glue used as a dressing (this causes your incision to look shiny with a clear covering over it) - This type of dressing wears off with time and does not require more dressings over the top unless it is draining around the glue as it wears off.  Do not apply ointments or lotions over the incisions until the glue has completely worn off.    Q:  There is a piece of tape or a sticky  lead  still on my skin.  Can I remove this?  A:  Sometimes the sticky  leads  used for monitoring during surgery or for evaluation in the emergency department are not all removed while you are in the hospital.  These sometimes have a tab or metal dot on them.  You can easily remove these on your own, like taking off a band-aid.  If there is a gel substance under the  lead , simply wipe/clean it off with a washcloth or paper towel.      Q:  What can I do to minimize constipation (very hard stools, or lack of stools)?  A:  Stay well hydrated.  Increase your dietary fiber intake or take a fiber supplement -with plenty of water.  Walk around frequently.  You may consider an over-the-counter stool-softener.  Your Pharmacist can assist you with choosing one that is stocked at your pharmacy.  Constipation is also one of the most common side effects of pain medication.  If you are using pain medication, be pro-active and try to PREVENT problems with constipation by taking the steps above BEFORE constipation becomes a problem.    Q:  What do I do if I need more pain medications?  A:  Call the office to receive refills.  Be aware that certain pain meds cannot be called into a pharmacy and actually require a paper prescription.  A change may be made in your pain med as you progress thru your recovery period or if you have side effects to certain meds.    --Pain meds are NOT refilled after 5pm on weekdays, and NOT AT ALL on the weekends, so please  look ahead to prevent problems.      Q:  Why am I having a hard time sleeping now that I am at home?  A:  Many medications you receive while you are in the hospital can impact your sleep for a number of days after your surgery/hospitalization.  Decreased level of activity and naps during the day may also make sleeping at night difficult.  Try to minimize day-time naps, and get up frequently during the day to walk around your home during your recovery time.  Sleep aides may be of some help, but are not recommended for long-term use.      Q:  I am having some back discomfort.  What should I do?  A:  This may be related to certain positioning that was required for your surgery, extended periods of time in bed, or other changes in your overall activity level.  You may try ice, heat, acetaminophen, or ibuprofen to treat this temporarily.  Note that many pain medications have acetaminophen in them and would state this on the prescription bottle.  Be sure not to exceed the maximum of 4000mg per day of acetaminophen.     **If the pain you are having does not resolve, is severe, or is a flare of back pain you have had on other occasions prior to surgery, please contact your primary physician for further recommendations or for an appointment to be examined at their office.    Q:  Why am I having headaches?  A:  Headaches can be caused by many things:  caffeine withdrawal, use of pain meds, dehydration, high blood pressure, lack of sleep, over-activity/exhaustion, flare-up of usual migraine headaches.  If you feel this is related to muscle tension (a band-like feeling around the head, or a pressure at the low-back of the head) you may try ice or heat to this area.  You may need to drink more fluids (try electrolyte drink like Gatorade), rest, or take your usual migraine medications.   **If your headaches do not resolve, worsen, are accompanied by other symptoms, or if your blood pressure is high, please call your primary  physician for recommendation and/or examination.    Q:  I am unable to urinate.  What do I do?  A:  A small percentage of people can have difficulty urinating initially after surgery.  This includes being able to urinate only a very small amount at a time and feeling discomfort or pressure in the very low abdomen.  This is called  urinary retention , and is actually an urgent situation.  Proceed to your nearest Emergency department for evaluation (not an Urgent Care Center).  Sometimes the bladder does not work correctly after certain medications you receive during surgery, or related to certain procedures.  You may need to have a catheter placed until your bladder recovers.  When planning to go to an Emergency department, it may help to call the ER to let them know you are coming in for this problem after a surgery.  This may help you get in quicker to be evaluated.  **If you have symptoms of a urinary tract infection, please contact your primary physician for the proper evaluation and treatment.          If you have other questions, please call the office Monday thru Friday between 8am and 5pm to discuss with the nurse or physician assistant.  #[phone]    There is a surgeon ON CALL on weekday evenings and over the weekend in case of urgent need only, and may be contacted at the same number.    If you are having an emergency, call 911 or proceed to your nearest emergency department.          Warfarin Instruction     You have started taking a medicine called warfarin. This is a blood-thinning medicine (anticoagulant). It helps prevent and treat blood clots.      Before leaving the hospital, make sure you know how much to take and how long to take it.      You will need regular blood tests to make sure your blood is clotting safely. It is very important to see your doctor for regular blood tests.    Talk to your doctor before taking any new medicine (this includes over-the-counter drugs and herbal products).  "Many medicines can interact with warfarin. This may cause more bleeding or too much clotting.     Eating a lot of vitamin K--found in green, leafy vegetables--can change the way warfarin works in your body. Do NOT avoid these foods. Instead, try to eat the same amount each day.     Bleeding is the most common side-effect of warfarin. You may notice bleeding gums, a bloody nose, bruises and bleeding longer when you cut yourself. See a doctor at once if:   o You cough up blood  o You find blood in your stool (poop)  o You have a deep cut, or a cut that bleeds longer than 10 minutes   o You have a bad cut, hard fall, accident or hit your head (go to urgent care or the emergency room).    For women who can get pregnant: This medicine can harm an unborn baby. Be very careful not to get pregnant while taking this medicine. If you think you might be pregnant, call your doctor right away.    For more information, read \"Guide to Warfarin Therapy,  the booklet you received in the hospital.        Pending Results     Date and Time Order Name Status Description    4/29/2017 1616 Anaerobic bacterial culture Preliminary             Statement of Approval     Ordered          05/02/17 1332  I have reviewed and agree with all the recommendations and orders detailed in this document.  EFFECTIVE NOW     Approved and electronically signed by:  Venkat Benton MD             Admission Information     Date & Time Provider Department Dept. Phone    4/27/2017 Venkat Benton MD [facility] [phone]      Your Vitals Were     Blood Pressure Pulse Temperature Respirations Pulse Oximetry       145/64 67 97.9  F (36.6  C) (Oral) 18 94%       MyChart Information     [facility] gives you secure access to your electronic health record. If you see a primary care provider, you can also send messages to your care team and make appointments. If you have questions, please call your primary care clinic.  If you do not " have a primary care provider, please call [phone] and they will assist you.        Care EveryWhere ID     This is your Care EveryWhere ID. This could be used by other organizations to access your [facility] medical records  [id number]           Review of your medicines      START taking        Dose / Directions    HYDROcodone-acetaminophen 5-325 MG per tablet   Commonly known as:  NORCO   Used for:  S/P laparoscopic cholecystectomy        Dose:  1-2 tablet   Take 1-2 tablets by mouth every 4 hours as needed for pain   Quantity:  5 tablet   Refills:  0       levofloxacin 500 MG tablet   Commonly known as:  LEVAQUIN   Indication:  Infection Within the Abdomen        Dose:  500 mg   Take 1 tablet (500 mg) by mouth daily for 7 days   Quantity:  7 tablet   Refills:  0         CONTINUE these medicines which may have CHANGED, or have new prescriptions. If we are uncertain of the size of tablets/capsules you have at home, strength may be listed as something that might have changed.        Dose / Directions    warfarin 3 MG tablet   Commonly known as:  COUMADIN   This may have changed:    - medication strength  - how much to take  - how to take this  - when to take this  - additional instructions   Used for:  Atrial fibrillation and flutter (H)        Dose:  3 mg   Take 1 tablet (3 mg) by mouth daily For 3 days, check INR on 05/05 to adjust the dose.   Quantity:  30 tablet   Refills:  0         CONTINUE these medicines which have NOT CHANGED        Dose / Directions    ALLOPURINOL PO        Dose:  100 mg   Take 100 mg by mouth 2 times daily   Refills:  0       Amlodipine Besylate-Valsartan  MG Tabs   Commonly known as:  EXFORGE   Used for:  Essential hypertension        Dose:  1 tablet   Take 1 tablet by mouth daily   Quantity:  90 tablet   Refills:  3       BONIVA 3 MG/3ML   Generic drug:  ibandronate        Dose:  3 mg   Inject 3 mg into the vein once   Refills:  0       calcium citrate 950 MG tablet   Commonly  known as:  CALCITRATE        Dose:  1 tablet   Take 1 tablet by mouth daily   Refills:  0       chlorthalidone 25 MG tablet   Commonly known as:  HYGROTON   Used for:  Essential hypertension with goal blood pressure less than 130/85        Dose:  25 mg   Take 1 tablet (25 mg) by mouth daily   Quantity:  90 tablet   Refills:  2       fish oil-omega-3 fatty acids 1000 MG capsule        Dose:  2 g   Take 2 g by mouth daily   Refills:  0       folic acid 1 MG tablet   Commonly known as:  FOLVITE        Dose:  1 mg   Take 1 mg by mouth daily   Refills:  0       levothyroxine 112 MCG tablet   Commonly known as:  SYNTHROID/LEVOTHROID   Used for:  Hypothyroidism        Dose:  112 mcg   Take 1 tablet (112 mcg) by mouth daily   Quantity:  90 tablet   Refills:  3       METHOTREXATE SODIUM IJ        Dose:  0.8 mL   Inject 0.8 mLs as directed once a week   Refills:  0       metoprolol 100 MG tablet   Commonly known as:  LOPRESSOR   Used for:  Essential hypertension        Dose:  50 mg   Take 0.5 tablets (50 mg) by mouth 2 times daily   Quantity:  60 tablet   Refills:  5       Multi-vitamin Tabs tablet        Dose:  1 tablet   Take 1 tablet by mouth daily   Refills:  0       omeprazole 20 MG CR capsule   Commonly known as:  priLOSEC   Used for:  Esophageal reflux        Dose:  20 mg   Take 1 capsule (20 mg) by mouth 2 times daily   Quantity:  180 capsule   Refills:  3       VITAMIN D (CHOLECALCIFEROL) PO        Dose:  1000 Units   Take 1,000 Units by mouth daily   Refills:  0            Where to get your medicines      These medications [place] [facility] [address] [address] [address]     Phone:  [phone]     levofloxacin 500 MG tablet    warfarin 3 MG tablet         Some of these will need a paper prescription and others can be bought over the counter. Ask your nurse if you have questions.     Bring a paper prescription for each of these  medications     HYDROcodone-acetaminophen 5-325 MG per tablet                Protect others around you: Learn how to safely use, store and throw away your medicines at www.disposemymeds.org.             Medication List: This is a list of all your medications and when to take them. Check marks below indicate your daily home schedule. Keep this list as a reference.      Medications           Morning Afternoon Evening Bedtime As Needed    ALLOPURINOL PO   Take 100 mg by mouth 2 times daily                                Amlodipine Besylate-Valsartan  MG Tabs   Commonly known as:  EXFORGE   Take 1 tablet by mouth daily                                BONIVA 3 MG/3ML   Inject 3 mg into the vein once   Generic drug:  ibandronate                                calcium citrate 950 MG tablet   Commonly known as:  CALCITRATE   Take 1 tablet by mouth daily                                chlorthalidone 25 MG tablet   Commonly known as:  HYGROTON   Take 1 tablet (25 mg) by mouth daily   Last time this was given:  25 mg on 5/2/2017  8:01 AM                                fish oil-omega-3 fatty acids 1000 MG capsule   Take 2 g by mouth daily                                folic acid 1 MG tablet   Commonly known as:  FOLVITE   Take 1 mg by mouth daily                                HYDROcodone-acetaminophen 5-325 MG per tablet   Commonly known as:  NORCO   Take 1-2 tablets by mouth every 4 hours as needed for pain                                levofloxacin 500 MG tablet   Commonly known as:  LEVAQUIN   Take 1 tablet (500 mg) by mouth daily for 7 days   Last time this was given:  500 mg on 5/2/2017  8:00 AM                                levothyroxine 112 MCG tablet   Commonly known as:  SYNTHROID/LEVOTHROID   Take 1 tablet (112 mcg) by mouth daily   Last time this was given:  112 mcg on 5/2/2017  6:13 AM                                METHOTREXATE SODIUM IJ   Inject 0.8 mLs as directed once a week                                 metoprolol 100 MG tablet   Commonly known as:  LOPRESSOR   Take 0.5 tablets (50 mg) by mouth 2 times daily   Last time this was given:  50 mg on 5/2/2017  8:01 AM                                Multi-vitamin Tabs tablet   Take 1 tablet by mouth daily                                omeprazole 20 MG CR capsule   Commonly known as:  priLOSEC   Take 1 capsule (20 mg) by mouth 2 times daily                                VITAMIN D (CHOLECALCIFEROL) PO   Take 1,000 Units by mouth daily                                warfarin 3 MG tablet   Commonly known as:  COUMADIN   Take 1 tablet (3 mg) by mouth daily For 3 days, check INR on 05/05 to adjust the dose.   Last time this was given:  5 mg on 5/1/2017  5:50 PM

## 2017-04-28 PROBLEM — K81.0 ACUTE CHOLECYSTITIS: Status: ACTIVE | Noted: 2017-04-28

## 2017-04-28 LAB
BASOPHILS # BLD AUTO: 0 10E9/L (ref 0–0.2)
BASOPHILS NFR BLD AUTO: 0.1 %
DIFFERENTIAL METHOD BLD: ABNORMAL
EOSINOPHIL # BLD AUTO: 0 10E9/L (ref 0–0.7)
EOSINOPHIL NFR BLD AUTO: 0.1 %
ERYTHROCYTE [DISTWIDTH] IN BLOOD BY AUTOMATED COUNT: 18.6 % (ref 10–15)
HCT VFR BLD AUTO: 36.4 % (ref 35–47)
HGB BLD-MCNC: 11.8 G/DL (ref 11.7–15.7)
IMM GRANULOCYTES # BLD: 0.1 10E9/L (ref 0–0.4)
IMM GRANULOCYTES NFR BLD: 0.5 %
INR PPP: 2.65 (ref 0.86–1.14)
INTERPRETATION ECG - MUSE: NORMAL
LYMPHOCYTES # BLD AUTO: 0.3 10E9/L (ref 0.8–5.3)
LYMPHOCYTES NFR BLD AUTO: 2.4 %
MCH RBC QN AUTO: 32.5 PG (ref 26.5–33)
MCHC RBC AUTO-ENTMCNC: 32.4 G/DL (ref 31.5–36.5)
MCV RBC AUTO: 100 FL (ref 78–100)
MONOCYTES # BLD AUTO: 1.6 10E9/L (ref 0–1.3)
MONOCYTES NFR BLD AUTO: 11.7 %
NEUTROPHILS # BLD AUTO: 11.8 10E9/L (ref 1.6–8.3)
NEUTROPHILS NFR BLD AUTO: 85.2 %
NRBC # BLD AUTO: 0 10*3/UL
NRBC BLD AUTO-RTO: 0 /100
PLATELET # BLD AUTO: 240 10E9/L (ref 150–450)
RBC # BLD AUTO: 3.63 10E12/L (ref 3.8–5.2)
WBC # BLD AUTO: 13.9 10E9/L (ref 4–11)

## 2017-04-28 PROCEDURE — 99221 1ST HOSP IP/OBS SF/LOW 40: CPT | Mod: 57 | Performed by: SURGERY

## 2017-04-28 PROCEDURE — 25000128 H RX IP 250 OP 636: Performed by: PHYSICIAN ASSISTANT

## 2017-04-28 PROCEDURE — 12000000 ZZH R&B MED SURG/OB

## 2017-04-28 PROCEDURE — G0378 HOSPITAL OBSERVATION PER HR: HCPCS

## 2017-04-28 PROCEDURE — A9270 NON-COVERED ITEM OR SERVICE: HCPCS | Performed by: PHYSICIAN ASSISTANT

## 2017-04-28 PROCEDURE — 85610 PROTHROMBIN TIME: CPT | Performed by: HOSPITALIST

## 2017-04-28 PROCEDURE — 25000125 ZZHC RX 250: Performed by: PHYSICIAN ASSISTANT

## 2017-04-28 PROCEDURE — 25000132 ZZH RX MED GY IP 250 OP 250 PS 637: Performed by: PHYSICIAN ASSISTANT

## 2017-04-28 PROCEDURE — 85025 COMPLETE CBC W/AUTO DIFF WBC: CPT | Performed by: HOSPITALIST

## 2017-04-28 PROCEDURE — 99220 ZZC INITIAL OBSERVATION CARE,LEVL III: CPT | Performed by: PHYSICIAN ASSISTANT

## 2017-04-28 PROCEDURE — 40000275 ZZH STATISTIC RCP TIME EA 10 MIN

## 2017-04-28 PROCEDURE — A9270 NON-COVERED ITEM OR SERVICE: HCPCS | Mod: GY | Performed by: HOSPITALIST

## 2017-04-28 PROCEDURE — 93010 ELECTROCARDIOGRAM REPORT: CPT | Performed by: INTERNAL MEDICINE

## 2017-04-28 PROCEDURE — 93005 ELECTROCARDIOGRAM TRACING: CPT

## 2017-04-28 PROCEDURE — 36415 COLL VENOUS BLD VENIPUNCTURE: CPT | Performed by: HOSPITALIST

## 2017-04-28 PROCEDURE — 25000132 ZZH RX MED GY IP 250 OP 250 PS 637: Mod: GY | Performed by: PHYSICIAN ASSISTANT

## 2017-04-28 PROCEDURE — 99233 SBSQ HOSP IP/OBS HIGH 50: CPT | Performed by: HOSPITALIST

## 2017-04-28 PROCEDURE — 25000132 ZZH RX MED GY IP 250 OP 250 PS 637: Mod: GY | Performed by: HOSPITALIST

## 2017-04-28 RX ORDER — CHLORTHALIDONE 25 MG/1
25 TABLET ORAL DAILY
Status: DISCONTINUED | OUTPATIENT
Start: 2017-04-28 | End: 2017-05-02 | Stop reason: HOSPADM

## 2017-04-28 RX ORDER — VALSARTAN 80 MG/1
320 TABLET ORAL DAILY
Status: DISCONTINUED | OUTPATIENT
Start: 2017-04-28 | End: 2017-05-02 | Stop reason: HOSPADM

## 2017-04-28 RX ORDER — AMPICILLIN AND SULBACTAM 2; 1 G/1; G/1
3 INJECTION, POWDER, FOR SOLUTION INTRAMUSCULAR; INTRAVENOUS EVERY 6 HOURS
Status: DISCONTINUED | OUTPATIENT
Start: 2017-04-28 | End: 2017-05-01

## 2017-04-28 RX ORDER — AMLODIPINE BESYLATE 5 MG/1
10 TABLET ORAL DAILY
Status: DISCONTINUED | OUTPATIENT
Start: 2017-04-28 | End: 2017-05-02 | Stop reason: HOSPADM

## 2017-04-28 RX ORDER — AMOXICILLIN 250 MG
1-2 CAPSULE ORAL 2 TIMES DAILY PRN
Status: DISCONTINUED | OUTPATIENT
Start: 2017-04-28 | End: 2017-05-02 | Stop reason: HOSPADM

## 2017-04-28 RX ORDER — LIDOCAINE 40 MG/G
CREAM TOPICAL
Status: DISCONTINUED | OUTPATIENT
Start: 2017-04-28 | End: 2017-05-01 | Stop reason: CLARIF

## 2017-04-28 RX ORDER — SODIUM CHLORIDE 9 MG/ML
INJECTION, SOLUTION INTRAVENOUS CONTINUOUS
Status: DISCONTINUED | OUTPATIENT
Start: 2017-04-28 | End: 2017-04-28

## 2017-04-28 RX ORDER — ACETAMINOPHEN 10 MG/ML
1000 INJECTION, SOLUTION INTRAVENOUS EVERY 6 HOURS PRN
Status: DISCONTINUED | OUTPATIENT
Start: 2017-04-28 | End: 2017-04-30

## 2017-04-28 RX ORDER — SODIUM CHLORIDE AND POTASSIUM CHLORIDE 150; 900 MG/100ML; MG/100ML
INJECTION, SOLUTION INTRAVENOUS CONTINUOUS
Status: DISCONTINUED | OUTPATIENT
Start: 2017-04-28 | End: 2017-04-30

## 2017-04-28 RX ORDER — OXYCODONE AND ACETAMINOPHEN 5; 325 MG/1; MG/1
1-2 TABLET ORAL EVERY 4 HOURS PRN
Status: DISCONTINUED | OUTPATIENT
Start: 2017-04-28 | End: 2017-05-02 | Stop reason: HOSPADM

## 2017-04-28 RX ORDER — PROCHLORPERAZINE 25 MG
12.5 SUPPOSITORY, RECTAL RECTAL EVERY 12 HOURS PRN
Status: DISCONTINUED | OUTPATIENT
Start: 2017-04-28 | End: 2017-05-02 | Stop reason: HOSPADM

## 2017-04-28 RX ORDER — PHYTONADIONE 5 MG/1
5 TABLET ORAL ONCE
Status: DISCONTINUED | OUTPATIENT
Start: 2017-04-28 | End: 2017-04-28

## 2017-04-28 RX ORDER — MORPHINE SULFATE 2 MG/ML
2-4 INJECTION, SOLUTION INTRAMUSCULAR; INTRAVENOUS
Status: DISCONTINUED | OUTPATIENT
Start: 2017-04-28 | End: 2017-05-02 | Stop reason: HOSPADM

## 2017-04-28 RX ORDER — AMLODIPINE AND VALSARTAN 10; 320 MG/1; MG/1
1 TABLET ORAL DAILY
Status: DISCONTINUED | OUTPATIENT
Start: 2017-04-28 | End: 2017-04-28 | Stop reason: CLARIF

## 2017-04-28 RX ORDER — NALOXONE HYDROCHLORIDE 0.4 MG/ML
.1-.4 INJECTION, SOLUTION INTRAMUSCULAR; INTRAVENOUS; SUBCUTANEOUS
Status: DISCONTINUED | OUTPATIENT
Start: 2017-04-28 | End: 2017-04-30 | Stop reason: ALTCHOICE

## 2017-04-28 RX ORDER — METOPROLOL TARTRATE 50 MG
50 TABLET ORAL 2 TIMES DAILY
Status: DISCONTINUED | OUTPATIENT
Start: 2017-04-28 | End: 2017-05-02 | Stop reason: HOSPADM

## 2017-04-28 RX ORDER — PROCHLORPERAZINE MALEATE 5 MG
5 TABLET ORAL EVERY 6 HOURS PRN
Status: DISCONTINUED | OUTPATIENT
Start: 2017-04-28 | End: 2017-05-02 | Stop reason: HOSPADM

## 2017-04-28 RX ORDER — POLYETHYLENE GLYCOL 3350 17 G/17G
17 POWDER, FOR SOLUTION ORAL DAILY PRN
Status: DISCONTINUED | OUTPATIENT
Start: 2017-04-28 | End: 2017-05-02 | Stop reason: HOSPADM

## 2017-04-28 RX ORDER — ONDANSETRON 4 MG/1
4 TABLET, ORALLY DISINTEGRATING ORAL EVERY 6 HOURS PRN
Status: DISCONTINUED | OUTPATIENT
Start: 2017-04-28 | End: 2017-04-30

## 2017-04-28 RX ORDER — LEVOTHYROXINE SODIUM 112 UG/1
112 TABLET ORAL DAILY
Status: DISCONTINUED | OUTPATIENT
Start: 2017-04-28 | End: 2017-05-02 | Stop reason: HOSPADM

## 2017-04-28 RX ORDER — SODIUM CHLORIDE AND POTASSIUM CHLORIDE 150; 900 MG/100ML; MG/100ML
INJECTION, SOLUTION INTRAVENOUS CONTINUOUS
Status: DISCONTINUED | OUTPATIENT
Start: 2017-04-28 | End: 2017-04-28

## 2017-04-28 RX ORDER — ONDANSETRON 2 MG/ML
4 INJECTION INTRAMUSCULAR; INTRAVENOUS EVERY 6 HOURS PRN
Status: DISCONTINUED | OUTPATIENT
Start: 2017-04-28 | End: 2017-04-30

## 2017-04-28 RX ADMIN — PHYTONADIONE 5 MG: 5 TABLET ORAL at 01:48

## 2017-04-28 RX ADMIN — VALSARTAN 320 MG: 80 TABLET, FILM COATED ORAL at 08:48

## 2017-04-28 RX ADMIN — AMPICILLIN SODIUM AND SULBACTAM SODIUM 3 G: 2; 1 INJECTION, POWDER, FOR SOLUTION INTRAMUSCULAR; INTRAVENOUS at 17:06

## 2017-04-28 RX ADMIN — AMPICILLIN SODIUM AND SULBACTAM SODIUM 3 G: 2; 1 INJECTION, POWDER, FOR SOLUTION INTRAMUSCULAR; INTRAVENOUS at 11:30

## 2017-04-28 RX ADMIN — AMLODIPINE BESYLATE 10 MG: 5 TABLET ORAL at 08:48

## 2017-04-28 RX ADMIN — METOPROLOL TARTRATE 50 MG: 50 TABLET, FILM COATED ORAL at 08:48

## 2017-04-28 RX ADMIN — METOPROLOL TARTRATE 50 MG: 50 TABLET, FILM COATED ORAL at 01:48

## 2017-04-28 RX ADMIN — LEVOTHYROXINE SODIUM 112 MCG: 112 TABLET ORAL at 11:30

## 2017-04-28 RX ADMIN — METOPROLOL TARTRATE 50 MG: 50 TABLET, FILM COATED ORAL at 19:46

## 2017-04-28 RX ADMIN — SODIUM CHLORIDE: 9 INJECTION, SOLUTION INTRAVENOUS at 01:48

## 2017-04-28 RX ADMIN — ONDANSETRON 4 MG: 2 INJECTION INTRAMUSCULAR; INTRAVENOUS at 08:56

## 2017-04-28 RX ADMIN — AMPICILLIN SODIUM AND SULBACTAM SODIUM 3 G: 2; 1 INJECTION, POWDER, FOR SOLUTION INTRAMUSCULAR; INTRAVENOUS at 06:05

## 2017-04-28 RX ADMIN — CHLORTHALIDONE 25 MG: 25 TABLET ORAL at 11:30

## 2017-04-28 NOTE — PROGRESS NOTES
ROOM # 207    Living Situation (if not independent, order SW consult): Independent with   Facility name: N/A  : - Norberto or Sister- Agata    Activity level at baseline: Independent with walker  Activity level on admit: SBA with walker      Patient registered to observation; given Patient Bill of Rights; given the opportunity to ask questions about observation status and their plan of care.  Patient has been oriented to the observation room, bathroom and call light is in place.    Discussed discharge goals and expectations with patient/family.

## 2017-04-28 NOTE — PROGRESS NOTES
[facility]    Hospitalist Progress Note    Date of Service (when I saw the patient): 04/28/2017    Assessment & Plan   Zunilda Alicea May is a 76 year old female with parox a fib (on coumadin), bioprosthetic MV replacement in 2008 then replacement again with bioprosthetic valve in 5/2014 (with tricuspid annuloplasty ring) on coumadin, pacemaker, chf, dvt, HTN, stroke with mild left hemiparesis who was admitted on 4/27/2017 with acute cholecystitis    1. Neuro- history of CVA with left hemiparesis. Stable. Pain well controlled with Percocet/morphine  2. CVS- parox a fib on coumadin. Holding for surgery. Has bioprosthetic Mitral valve placed in 2008 then re-done in 2014. Does not needs anticoagulation for this. Is on coumadin for her a fib. I did confirm with Dr. Das (her cardiologist) that she does not need bridging. Her coumadin will be held to let her INR drift down and it can be re-started after surgery. HTN- cont meds  3. Pulm- no issues. Incentive spirometry  4. GI- acute cholecystitis. On IV Unasyn. To be seen by surgery. Plan is to let INR drift down below 1.5 prior to surgery. Will allow clears today. NPO after midnight.  5. ID- acute rey on Unasyn  6. Renal- start NS +KCL when NPO  7. Endo- not diabetic  8. Heme/onc- letting INR drift down. No reversal. Will not need heparin drip. Re-start coumadin after surgery  9. Musculoskel- ambulating in halls. Instructed patient and nurse to ambulate multiple times per day  10. Prophylaxis- currently anticoagulated  11. Dispo- pending surgery  12. Of note, patient is scheduled for knee replacement with Dr. Sheikh on May 17. I did speak to him to let him know patient is here and will be off her coumadin in case he would want to coordinate her knee surgery. I did personally speak with Dr. Nxi, who stated he would be fine with ortho surgery the day after her lap rey as long as it was uncomplicated. Dr. Sheikh will contact medical team if he  decides he can/will coordinate his surgery.  13. Admit to inpatient as patient needs continued IV antibiotics while her INR comes down      Code Status: Full Code    Disposition: Expected discharge unclear.    Jarrett Johnson    Interval History   Patient in room with . No abdo pain currently. No chest pain, sob. Feels well.     -Data reviewed today: I reviewed all new labs and imaging results over the last 24 hours. I personally reviewed no images or EKG's today.    Physical Exam   Temp: 96.9  F (36.1  C) Temp src: Oral BP: 148/59 Pulse: 67 Heart Rate: 63 Resp: 16 SpO2: 97 % O2 Device: Nasal cannula Oxygen Delivery: 2 LPM  There were no vitals filed for this visit.  Vital Signs with Ranges  Temp:  [96.9  F (36.1  C)-99  F (37.2  C)] 96.9  F (36.1  C)  Pulse:  [59-67] 67  Heart Rate:  [63-65] 63  Resp:  [16-18] 16  BP: (133-164)/(37-82) 148/59  SpO2:  [87 %-99 %] 97 %  I/O last 3 completed shifts:  In: -   Out: 200 [Urine:200]    Constitutional: Awake, alert, cooperative, no apparent distress   Respiratory: Clear to auscultation bilaterally, no crackles or wheezing   Cardiovascular: Regular rate and rhythm, normal S1 and S2, and no murmur noted   Abdomen: Normal bowel sounds, soft, non-distended, non-tender   Skin: No rashes, no cyanosis, dry to touch   Neuro: Alert and oriented x3, no weakness, numbness, memory loss   Extremities: No edema, normal range of motion   Other(s):        All other systems: Negative     Medications        metoprolol  50 mg Oral BID     sodium chloride (PF)  3 mL Intracatheter Q8H     ampicillin-sulbactam (UNASYN) IV  3 g Intravenous Q6H     amLODIPine  10 mg Oral Daily    And     valsartan  320 mg Oral Daily     chlorthalidone  25 mg Oral Daily     levothyroxine  112 mcg Oral Daily       Data     Recent Labs  Lab 04/28/17  0910 04/27/17 2012   WBC 13.9* 11.7*   HGB 11.8 12.1    99    284   INR 2.65* 2.54*   NA  --  137   POTASSIUM  --  3.5   CHLORIDE  --  101   CO2   --  28   BUN  --  22   CR  --  0.85   ANIONGAP  --  8   BRICE  --  8.8   GLC  --  124*   ALBUMIN  --  3.1*   PROTTOTAL  --  7.6   BILITOTAL  --  0.4   ALKPHOS  --  102   ALT  --  19   AST  --  19   LIPASE  --  183       Recent Results (from the past 24 hour(s))   CT Abdomen Pelvis w Contrast    Narrative    CT ABDOMEN PELVIS W CONTRAST 4/27/2017 9:28 PM    HISTORY: Diffuse abdominal pain and vomiting.    COMPARISON: None.    TECHNIQUE:  Abdomen and pelvis CT was performed following intravenous  administration of 81 cc Isovue-370.  Radiation dose for this scan was  reduced using automated exposure control, adjustment of the mA and/or  kV according to patient size, or iterative reconstruction technique.    FINDINGS: Moderate cardiomegaly. Mild chronic basilar pulmonary  fibrosis. No pleural effusion.    Large gallstone in the neck of the gallbladder. Gallbladder distention  with surrounding pericholecystic fluid. The appearance suggests  cholecystitis.    Liver, pancreas and adrenal glands are unremarkable. Tiny renal cysts.  Benign splenic calcifications.    Normal caliber bowel. No free air. No free or loculated  intraperitoneal fluid collection. Previous hernia repair. Sigmoid  diverticulosis without evidence of diverticulitis.    Urinary bladder is partially distended with normal wall thickness.  Uterus and adnexa are unremarkable.      Impression    IMPRESSION: Large gallstone in the gallbladder neck with distention of  the gallbladder and surrounding pericholecystic fluid. The appearance  suggests cholecystitis.    ANA ADRIAN MD   US Abdomen Limited    Narrative    US ABDOMEN LIMITED 4/27/2017 10:32 PM    HISTORY: Right upper quadrant pain.    COMPARISON: None.    FINDINGS:    Gallbladder: Large gallstones and sludge. Gallbladder wall is  thickened measuring up to 0.9 cm. There is surrounding pericholecystic  fluid.    Bile ducts:  CHD is normal diameter, measuring 0.6 cm.  No  intrahepatic biliary  dilatation.    Liver:  Normal size and echogenicity.    Pancreas:  Normal where visualized.    Right kidney:  Normal size and echogenicity.    Proximal aorta and IVC are unremarkable.      Impression    IMPRESSION:  Gallstones, gallbladder wall thickening and  pericholecystic fluid. Findings are consistent with cholecystitis.    ANA ADRIAN MD

## 2017-04-28 NOTE — PLAN OF CARE
Problem: Discharge Planning  Goal: Discharge Planning (Adult, OB, Behavioral, Peds)  Outcome: Improving  PRIMARY DIAGNOSIS: BILIARY COLIC/UNCOMPLICATED EARLY ACUTE CHOLECYSTITIS  OUTPATIENT/OBSERVATION GOALS TO BE MET BEFORE DISCHARGE:     1. Pain status: Pain free.  2. Stable vital signs and labs (if performed) at disposition: Yes  3. Tolerating adequate PO diet: NPO  4. Successful cholecystectomy or clear follow up plan with General Surgery team if immediate surgery not performed No- Scheduled for tomorrow  5. ADLs back to baseline?  No- SBA for increased safety  6. Activity and level of assistance: Up with standby assistance.  7. Barriers to discharge noted Yes- Pending surgery consult     A&O x4. VSS. SBA with walker when oob. Pt denies pain/N/V at this time. Surgery consult scheduled for tomorrow. Will continue to monitor.

## 2017-04-28 NOTE — PLAN OF CARE
Problem: Discharge Planning  Goal: Discharge Planning (Adult, OB, Behavioral, Peds)  Outcome: No Change  PRIMARY DIAGNOSIS: BILIARY COLIC/UNCOMPLICATED EARLY ACUTE CHOLECYSTITIS  OUTPATIENT/OBSERVATION GOALS TO BE MET BEFORE DISCHARGE:      1. Pain status: Pain free.  2. Stable vital signs and labs (if performed) at disposition: Yes  3. Tolerating adequate PO diet: NPO, ice and meds  4. Successful cholecystectomy or clear follow up plan with General Surgery team if immediate surgery not performed No- Surgery consult  5. ADLs back to baseline? No- SBA for increased safety  6. Activity and level of assistance: Up with standby assistance.  7. Barriers to discharge noted Yes- Pending surgery consult      VSS, pt up with SBA, A&Ox4 though forgetful at times, NPO w/ice and meds, small emesis after meds given, zofran given, denies pain, switched to inpt, INR 2.65, WBC 13.9,  at bedside, will continue to monitor and provide supportive cares.

## 2017-04-28 NOTE — PHARMACY-ADMISSION MEDICATION HISTORY
Admission medication history interview status for this patient is complete. See [facility] admission navigator for allergy information, prior to admission medications and immunization status.     Medication history interview source(s):Patient and Family  Medication history resources (including written lists, pill bottles, clinic record):written list    Changes made to PTA medication list:  Added: mvi, calcium citrate, vitamin d3, fish oil  Deleted: none  Changed: none    Actions taken by pharmacist (provider contacted, etc):None     Additional medication history information:None    Medication reconciliation/reorder completed by provider prior to medication history? No    For patients on insulin therapy: no (Yes/No)   Lantus/levemir/NPH/Mix 70/30 dose: _____ in AM/PM or twice daily   Sliding scale Novolog Y/N   If Yes, do you have a baseline novolog pre-meal dose: ______units with meals   Patients eat three meals a day: Y/N   Any Barriers to therapy: cost of medications/comfortable with giving injections (if applicable)/ comfortable and confident with current diabetes regimen       Prior to Admission medications    Medication Sig Last Dose Taking? Auth Provider   ALLOPURINOL PO Take 100 mg by mouth 2 times daily 4/27/2017 at am Yes Unknown, Entered By History   multivitamin, therapeutic with minerals (MULTI-VITAMIN) TABS tablet Take 1 tablet by mouth daily 4/27/2017 at am Yes Unknown, Entered By History   calcium citrate (CALCITRATE) 950 MG tablet Take 1 tablet by mouth daily 4/27/2017 at am Yes Unknown, Entered By History   VITAMIN D, CHOLECALCIFEROL, PO Take 1,000 Units by mouth daily 4/27/2017 at am Yes Unknown, Entered By History   fish oil-omega-3 fatty acids 1000 MG capsule Take 2 g by mouth daily 4/27/2017 at am Yes Unknown, Entered By History   warfarin (COUMADIN) 5 MG tablet Take 1 tablet (5 mg) Tu, Th, Sat. Take 1/2 tablet (2.5 mg) on Sun, Mon, Wed, Fri 4/26/2017 at 2.5mg Yes Yunior Huang MD   ibandronate  (BONIVA) 3 MG/3ML Inject 3 mg into the vein once at clinic Yes Reported, Patient   Amlodipine Besylate-Valsartan (EXFORGE)  MG TABS Take 1 tablet by mouth daily 4/27/2017 at am Yes Timothy Das MD   omeprazole (PRILOSEC) 20 MG CR capsule Take 1 capsule (20 mg) by mouth 2 times daily 4/27/2017 at am Yes Yunior Huang MD   levothyroxine (SYNTHROID/LEVOTHROID) 112 MCG tablet Take 1 tablet (112 mcg) by mouth daily 4/27/2017 at am Yes Yunior Huang MD   metoprolol (LOPRESSOR) 100 MG tablet Take 0.5 tablets (50 mg) by mouth 2 times daily 4/27/2017 at am Yes Yunior Huang MD   chlorthalidone (HYGROTON) 25 MG tablet Take 1 tablet (25 mg) by mouth daily 4/27/2017 at am Yes Timothy Das MD   folic acid (FOLVITE) 1 MG tablet Take 1 mg by mouth daily 4/27/2017 at am Yes Reported, Patient   METHOTREXATE SODIUM IJ Inject 0.8 mLs as directed once a week 4/26/2017 at Unknown time Yes Reported, Patient

## 2017-04-28 NOTE — H&P
History and Physical     Zunilda Clark MRN# [id number]   YOB: 1940 Age: 76 year old      Date of Admission:  4/27/2017    Primary care provider: Yunior Huang          Assessment and Plan:   Zunilda Clark is a medically complex 76 year old female with a PMH significant for paroxysmal a fib on warfarin, pacemaker placement, CHF, pulmonary HTN, mitral valve replacement with biprosthetic valve, stroke with mild left hemiparesis, DVT, who presents with RUQ pain and was found to have acute cholecystitis.    ED sign out by Dr. Suarez and chart review performed: Vitals show temp of 99, pulse 67, and pressure 141/78 with spontaneous respirations and hypoxia after narcotics so is on 2 litres. Labs remarkable for Creatinine 0.85, normal electrolytes, WBC 11.7, Hgb 12.1. Normal LFTs, Lipase 183, lactic acid 0.9, glucose 124. INR 2.54. CT scan shows large gallstone in the gallbladder neck with distension of the gallbladder suggesting cholecystitis. RUQ ultrasound shows gallstones and gallbladder wall thickening consistent with cholecystitis.     Chart was reviewed with Dr. Clement who thinks observation is appropriate.    1. Acute cholecystitis  Patient has been struggling with intermittent episodes of RUQ pain but today the pain has lasted all day which is unusual. She was seen by Dr. Nix in 11/2016 and HIDA scan was normal. WBC is elevated but no reports of fever. CT scan shows a stone in the gallbladder neck with distension of the gallbladder suggesting cholecystitis. LFTs are normal. RUQ US is consistent with acute cholecystitis. Patient was given Unasyn in ED.  -Surgery consult  -NPO except blood pressure meds  -Fluids  -IV pain and nausea control  -Continue Unasyn  -Give Vitamin K 5 mg to begin INR reversal which is currently 2.5. She will probably need heparin drip once INR drops below 2 for upcoming surgery (not sure if this will make her inpatient then??)  -Sent UA for culture given  WBC and nitrites    2. Paroxysmal a fib on warfarin  -Hold warfarin given probable surgery  -Obtain ECG    3. Hx of severe pulmonary HTN with some hypoxia here presumably related to narcotics but she did receive 1 fluid bolus  Recent evaluated by Dr. Das from cardiology for clearance for elective knee surgery. Echocardiogram showed normal left ventricular EF and severe pulmonary hypertension. Moderate to severe tricuspid regurgitation, mild aortic regurgitation. He cleared her for surgery with careful mgmt and maintenance of 02 levels which will apply here as well.  -PRN oxygen as needed  -Gentle fluids given NPO status but watch closely to resp status and stop if she appears overloaded. No complaints of cough/SOB and lungs sound clear    4. History of mitral bioprosthetic heart valve replacement x 2, stroke, DVT and paroxysmal a fib  Her mitral valve does not require coumadin but because of hx of stroke, DVT, and a fib she requires lifelong warfarin therapy.   -Will need bridging lovenox therapy after surgery    5. Hx of rheumatoid arthritis  Hold methotrexate for now    6. Hypothyroidism  Plan to resume synthroid after surgery    7. HTN  Stable, continue metoprolol and norvasc. Holding diuretic    8. Hx of gout  Hold allopurinol tonight    9. Hx of pacemaker placement    10. Hx of tricuspid and aortic regurgitation  Recent echo results above.         Social: No concerns  Code: Full code  VTE prophylaxis: On warfarin  Disposition: Observation                    Chief Complaint:   Acute cholecystitis         History of Present Illness:   Zunilda Clark is a 76 year old female who presents with diffuse abdominal pain that developed last night that is worst in the epigastric/RUQ region. The pain has been constant with radiation to her back. This is associated with nausea and vomiting. She has had similar episodes in the past but the pain has never lasted this long. She denies fever, chills, diarrhea, shortness  of breath, cough, recent changes to meds, recent illness, and urinary symptoms.       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 She was admitted to the hospital in 04/2014 with severe shortness of breath and right-sided heart failure. It was found that she had stenosis of her mitral valve and she underwent a second bioprosthetic valve replacement in 06/2014. She has a history of pacemaker insertion in 2011 due to presumably alfredo arrythmia. Also, she has known severe tricuspid regurgitation and aortic insufficiency related to rheumatic heart disease. She has a history of rheumatoid arthritis on methotrexate. She has a history of DVT and probable stroke. She had 2 episodes of stroke-like incidents and seizures after her mitral valve replacement that was felt possibly related to embolic etiology after surgery. Therefore, she has been told she will be on life long coumadin.           Past Medical History:     Past Medical History:   Diagnosis Date     Acquired hypothyroidism 11/4/2015     Aortic valve insufficiency      Arthritis      Atrial fibrillation (H)      Atrial fibrillation and flutter (H)      Blood clotting disorder (H)      CHF (congestive heart failure) (H)      DVT (deep venous thrombosis) (H) 2003     Gastro-oesophageal reflux disease      H/O mitral valve replacement with tissue graft june 2014     History of blood transfusion 2014     HTN (hypertension)      Hypothyroidism      Other chronic pain      Pulmonary HTN (H)      Rheumatoid arthritis(714.0)      Seizures (H) 2014     Stroke (H) 2009    left side mild weakness      Stroke (H) 2014     Syncope 2011    see [place] records     Thrombosis of leg 2003    both legs               Past Surgical History:     Past Surgical History:   Procedure Laterality Date     ABDOMEN SURGERY      prolapsed bladder      H ABLATION AV NODE  2011    AFlutter with syncope, PPM to follow     IMPLANT PACEMAKER  2011     OPEN REDUCTION INTERNAL FIXATION RODDING INTRAMEDULLARY HUMERUS Right 7/28/2015    Procedure: OPEN REDUCTION INTERNAL FIXATION RODDING INTRAMEDULLARY HUMERUS;  Surgeon: Raymundo Rivera MD;  Location: [facility] OR     REPLACE VALVE MITRAL  2006    Mitral valve replacement with bioprosthetic valve in 2008 for rheumatic disease     SLING BLADDER SUSPENSION WITH FASCIA UMESH                 Social History:     Social History     Social History     Marital status:      Spouse name: N/A     Number of children: N/A     Years of education: N/A     Occupational History     Not on file.     Social History Main Topics     Smoking status: Never Smoker     Smokeless tobacco: Never Used     Alcohol use No     Drug use: No     Sexual activity: Yes     Partners: Male     Other Topics Concern     Caffeine Concern Yes     occasionally     Sleep Concern No     Special Diet Yes     lower salt     Back Care No     Exercise Yes     walking ride excercise bike     Social History Narrative               Family History:     Family History   Problem Relation Age of Onset     Coronary Artery Disease Mother      Coronary Artery Disease Brother      DIABETES Brother      DIABETES Brother      Hypertension Sister      Hyperlipidemia Sister               Allergies:    No Known Allergies            Medications:     Prior to Admission medications    Medication Sig Last Dose Taking? Auth Provider   ALLOPURINOL PO Take 100 mg by mouth 2 times daily 4/27/2017 at am Yes Unknown, Entered By History   multivitamin, therapeutic with minerals (MULTI-VITAMIN) TABS tablet Take 1 tablet by mouth daily 4/27/2017 at am Yes Unknown, Entered By History   calcium citrate (CALCITRATE) 950 MG tablet Take 1 tablet by mouth daily 4/27/2017 at am Yes Unknown, Entered By History   VITAMIN D, CHOLECALCIFEROL, PO Take 1,000 Units by mouth daily 4/27/2017 at am Yes Unknown,  Entered By History   fish oil-omega-3 fatty acids 1000 MG capsule Take 2 g by mouth daily 4/27/2017 at am Yes Unknown, Entered By History   warfarin (COUMADIN) 5 MG tablet Take 1 tablet (5 mg) Tu, Th, Sat. Take 1/2 tablet (2.5 mg) on Sun, Mon, Wed, Fri 4/26/2017 at 2.5mg Yes Yunior Huang MD   ibandronate (BONIVA) 3 MG/3ML Inject 3 mg into the vein once at clinic Yes Reported, Patient   Amlodipine Besylate-Valsartan (EXFORGE)  MG TABS Take 1 tablet by mouth daily 4/27/2017 at am Yes Timothy Das MD   omeprazole (PRILOSEC) 20 MG CR capsule Take 1 capsule (20 mg) by mouth 2 times daily 4/27/2017 at am Yes Yunior Huang MD   levothyroxine (SYNTHROID/LEVOTHROID) 112 MCG tablet Take 1 tablet (112 mcg) by mouth daily 4/27/2017 at am Yes Yunior Huang MD   metoprolol (LOPRESSOR) 100 MG tablet Take 0.5 tablets (50 mg) by mouth 2 times daily 4/27/2017 at am Yes Yunior Huang MD   chlorthalidone (HYGROTON) 25 MG tablet Take 1 tablet (25 mg) by mouth daily 4/27/2017 at am Yes Timothy Das MD   folic acid (FOLVITE) 1 MG tablet Take 1 mg by mouth daily 4/27/2017 at am Yes Reported, Patient   METHOTREXATE SODIUM IJ Inject 0.8 mLs as directed once a week 4/26/2017 at Unknown time Yes Reported, Patient              Review of Systems:   A Comprehensive greater than 10 system review of systems was carried out.  Pertinent positives and negatives are noted above.  Otherwise negative for contributory information.            Physical Exam:   Blood pressure 141/62, pulse 67, temperature 99  F (37.2  C), temperature source Oral, resp. rate 16, SpO2 94 %, not currently breastfeeding.  Exam:  GENERAL:  Comfortable.  PSYCH: pleasant, oriented, No acute distress.  HEENT:  PERRLA. Normal conjunctiva, normal hearing, nasal mucosa and Oropharynx are normal. Right sided facial  NECK:  Supple, no neck vein distention, adenopathy or bruits, normal thyroid.  HEART:  Normal S1, S2 with no murmur, no  pericardial rub, gallops or S3 or S4.  LUNGS:  Clear to auscultation, normal Respiratory effort. No wheezing, rales or ronchi.  ABDOMEN:  Soft, no hepatosplenomegaly, normal bowel sounds. Tender all over but worse in RUQ, non distended.   EXTREMITIES:  No pedal edema, +2 pulses bilateral and equal.  SKIN:  Dry to touch, No rash, wound or ulcerations.  NEUROLOGIC:  CN 2-12 grossly intact,  sensation is intact with no focal deficits. Right facial droop.              Data:       Recent Labs  Lab 04/27/17 2012   WBC 11.7*   HGB 12.1   HCT 36.3   MCV 99          Recent Labs  Lab 04/27/17 2012      POTASSIUM 3.5   CHLORIDE 101   CO2 28   ANIONGAP 8   *   BUN 22   CR 0.85   GFRESTIMATED 65   GFRESTBLACK 79   BRICE 8.8   PROTTOTAL 7.6   ALBUMIN 3.1*   BILITOTAL 0.4   ALKPHOS 102   AST 19   ALT 19       Recent Labs  Lab 04/27/17  2210   COLOR Yellow   APPEARANCE Clear   URINEGLC Negative   URINEBILI Negative   URINEKETONE Negative   SG 1.035   UBLD Negative   URINEPH 5.0   PROTEIN Negative   NITRITE Positive*   LEUKEST Trace*   RBCU 1   WBCU 10*         Recent Results (from the past 24 hour(s))   CT Abdomen Pelvis w Contrast    Narrative    CT ABDOMEN PELVIS W CONTRAST 4/27/2017 9:28 PM    HISTORY: Diffuse abdominal pain and vomiting.    COMPARISON: None.    TECHNIQUE:  Abdomen and pelvis CT was performed following intravenous  administration of 81 cc Isovue-370.  Radiation dose for this scan was  reduced using automated exposure control, adjustment of the mA and/or  kV according to patient size, or iterative reconstruction technique.    FINDINGS: Moderate cardiomegaly. Mild chronic basilar pulmonary  fibrosis. No pleural effusion.    Large gallstone in the neck of the gallbladder. Gallbladder distention  with surrounding pericholecystic fluid. The appearance suggests  cholecystitis.    Liver, pancreas and adrenal glands are unremarkable. Tiny renal cysts.  Benign splenic calcifications.    Normal caliber  bowel. No free air. No free or loculated  intraperitoneal fluid collection. Previous hernia repair. Sigmoid  diverticulosis without evidence of diverticulitis.    Urinary bladder is partially distended with normal wall thickness.  Uterus and adnexa are unremarkable.      Impression    IMPRESSION: Large gallstone in the gallbladder neck with distention of  the gallbladder and surrounding pericholecystic fluid. The appearance  suggests cholecystitis.    ANA ADRIAN MD   US Abdomen Limited    Narrative    US ABDOMEN LIMITED 4/27/2017 10:32 PM    HISTORY: Right upper quadrant pain.    COMPARISON: None.    FINDINGS:    Gallbladder: Large gallstones and sludge. Gallbladder wall is  thickened measuring up to 0.9 cm. There is surrounding pericholecystic  fluid.    Bile ducts:  CHD is normal diameter, measuring 0.6 cm.  No  intrahepatic biliary dilatation.    Liver:  Normal size and echogenicity.    Pancreas:  Normal where visualized.    Right kidney:  Normal size and echogenicity.    Proximal aorta and IVC are unremarkable.      Impression    IMPRESSION:  Gallstones, gallbladder wall thickening and  pericholecystic fluid. Findings are consistent with cholecystitis.    MD Sonam SIMPSON PA-C

## 2017-04-28 NOTE — PLAN OF CARE
Problem: Discharge Planning  Goal: Discharge Planning (Adult, OB, Behavioral, Peds)  Outcome: No Change  PRIMARY DIAGNOSIS: BILIARY COLIC/UNCOMPLICATED EARLY ACUTE CHOLECYSTITIS  OUTPATIENT/OBSERVATION GOALS TO BE MET BEFORE DISCHARGE:      1. Pain status: Pain free.  2. Stable vital signs and labs (if performed) at disposition: Yes  3. Tolerating adequate PO diet: clear liquids, NPO at midnight  4. Successful cholecystectomy or clear follow up plan with General Surgery team if immediate surgery not performed No- Surgery consult  5. ADLs back to baseline? No- SBA for increased safety  6. Activity and level of assistance: Up with standby assistance.  7. Barriers to discharge noted Yes- Pending surgery consult      VSS, pt up with SBA, A&Ox4 though forgetful at times, tolerating clears, NPO at midnight, denies pain, ambulated halls x1, IV unasyn, transfer to [facility] today, will continue to monitor and provide supportive cares.

## 2017-04-28 NOTE — ED NOTES
Observation Brochure and Video   Patient and family informed of observation status based on provider's order. Observation Brochure was given and video watched.  Nereida Skinner RN

## 2017-04-28 NOTE — ED NOTES
"Abdominal pain since yesterday. Patient states that she has been \"vomiting a little bit\". Last bm was today, which patient states is normal. Denies chest pain or SOB. ABCDs intact.  "

## 2017-04-28 NOTE — PROGRESS NOTES
Pt transferred from obs at approx 1430. Pt oriented to room, call light and poc. Belongings with pt.

## 2017-04-29 ENCOUNTER — ANESTHESIA EVENT (OUTPATIENT)
Dept: SURGERY | Facility: CLINIC | Age: 77
DRG: 418 | End: 2017-04-29
Payer: MEDICARE

## 2017-04-29 ENCOUNTER — ANESTHESIA (OUTPATIENT)
Dept: SURGERY | Facility: CLINIC | Age: 77
DRG: 418 | End: 2017-04-29
Payer: MEDICARE

## 2017-04-29 ENCOUNTER — APPOINTMENT (OUTPATIENT)
Dept: GENERAL RADIOLOGY | Facility: CLINIC | Age: 77
DRG: 418 | End: 2017-04-29
Attending: HOSPITALIST
Payer: MEDICARE

## 2017-04-29 LAB
ABO + RH BLD: NORMAL
ABO + RH BLD: NORMAL
ALBUMIN SERPL-MCNC: 2.6 G/DL (ref 3.4–5)
ALP SERPL-CCNC: 96 U/L (ref 40–150)
ALT SERPL W P-5'-P-CCNC: 24 U/L (ref 0–50)
ANION GAP SERPL CALCULATED.3IONS-SCNC: 7 MMOL/L (ref 3–14)
AST SERPL W P-5'-P-CCNC: 24 U/L (ref 0–45)
BACTERIA SPEC CULT: ABNORMAL
BILIRUB DIRECT SERPL-MCNC: 0.2 MG/DL (ref 0–0.2)
BILIRUB SERPL-MCNC: 1 MG/DL (ref 0.2–1.3)
BLD GP AB SCN SERPL QL: NORMAL
BLD PROD TYP BPU: NORMAL
BLD PROD TYP BPU: NORMAL
BLD UNIT ID BPU: 0
BLOOD BANK CMNT PATIENT-IMP: NORMAL
BLOOD PRODUCT CODE: NORMAL
BPU ID: NORMAL
BUN SERPL-MCNC: 28 MG/DL (ref 7–30)
CALCIUM SERPL-MCNC: 8.1 MG/DL (ref 8.5–10.1)
CHLORIDE SERPL-SCNC: 104 MMOL/L (ref 94–109)
CO2 SERPL-SCNC: 27 MMOL/L (ref 20–32)
CREAT SERPL-MCNC: 0.95 MG/DL (ref 0.52–1.04)
ERYTHROCYTE [DISTWIDTH] IN BLOOD BY AUTOMATED COUNT: 18.6 % (ref 10–15)
GFR SERPL CREATININE-BSD FRML MDRD: 57 ML/MIN/1.7M2
GLUCOSE SERPL-MCNC: 94 MG/DL (ref 70–99)
GRAM STN SPEC: ABNORMAL
HCT VFR BLD AUTO: 33.2 % (ref 35–47)
HGB BLD-MCNC: 10.9 G/DL (ref 11.7–15.7)
INR PPP: 1.66 (ref 0.86–1.14)
INR PPP: 1.73 (ref 0.86–1.14)
INR PPP: 1.97 (ref 0.86–1.14)
Lab: ABNORMAL
MAGNESIUM SERPL-MCNC: 1.9 MG/DL (ref 1.6–2.3)
MCH RBC QN AUTO: 33 PG (ref 26.5–33)
MCHC RBC AUTO-ENTMCNC: 32.8 G/DL (ref 31.5–36.5)
MCV RBC AUTO: 101 FL (ref 78–100)
MICRO REPORT STATUS: ABNORMAL
MICRO REPORT STATUS: ABNORMAL
MICROORGANISM SPEC CULT: ABNORMAL
NUM BPU REQUESTED: 1
PLATELET # BLD AUTO: 276 10E9/L (ref 150–450)
POTASSIUM SERPL-SCNC: 3.1 MMOL/L (ref 3.4–5.3)
PROT SERPL-MCNC: 6.6 G/DL (ref 6.8–8.8)
RBC # BLD AUTO: 3.3 10E12/L (ref 3.8–5.2)
SODIUM SERPL-SCNC: 138 MMOL/L (ref 133–144)
SPECIMEN EXP DATE BLD: NORMAL
SPECIMEN SOURCE: ABNORMAL
SPECIMEN SOURCE: ABNORMAL
TRANSFUSION STATUS PATIENT QL: NORMAL
TRANSFUSION STATUS PATIENT QL: NORMAL
WBC # BLD AUTO: 20.5 10E9/L (ref 4–11)

## 2017-04-29 PROCEDURE — 25000125 ZZHC RX 250: Performed by: ANESTHESIOLOGY

## 2017-04-29 PROCEDURE — 25000125 ZZHC RX 250: Performed by: INTERNAL MEDICINE

## 2017-04-29 PROCEDURE — 25000125 ZZHC RX 250: Performed by: NURSE ANESTHETIST, CERTIFIED REGISTERED

## 2017-04-29 PROCEDURE — 86901 BLOOD TYPING SEROLOGIC RH(D): CPT | Performed by: SURGERY

## 2017-04-29 PROCEDURE — 71000012 ZZH RECOVERY PHASE 1 LEVEL 1 FIRST HR: Performed by: SURGERY

## 2017-04-29 PROCEDURE — 47563 LAPARO CHOLECYSTECTOMY/GRAPH: CPT | Mod: 22 | Performed by: SURGERY

## 2017-04-29 PROCEDURE — BF131ZZ FLUOROSCOPY OF GALLBLADDER AND BILE DUCTS USING LOW OSMOLAR CONTRAST: ICD-10-PCS | Performed by: SURGERY

## 2017-04-29 PROCEDURE — 25800025 ZZH RX 258: Performed by: ANESTHESIOLOGY

## 2017-04-29 PROCEDURE — 87186 SC STD MICRODIL/AGAR DIL: CPT | Performed by: SURGERY

## 2017-04-29 PROCEDURE — S0020 INJECTION, BUPIVICAINE HYDRO: HCPCS | Performed by: SURGERY

## 2017-04-29 PROCEDURE — 71000013 ZZH RECOVERY PHASE 1 LEVEL 1 EA ADDTL HR: Performed by: SURGERY

## 2017-04-29 PROCEDURE — 40000306 ZZH STATISTIC PRE PROC ASSESS II: Performed by: SURGERY

## 2017-04-29 PROCEDURE — 85610 PROTHROMBIN TIME: CPT | Performed by: SURGERY

## 2017-04-29 PROCEDURE — 25500064 ZZH RX 255 OP 636: Performed by: SURGERY

## 2017-04-29 PROCEDURE — 80053 COMPREHEN METABOLIC PANEL: CPT | Performed by: PHYSICIAN ASSISTANT

## 2017-04-29 PROCEDURE — 25000128 H RX IP 250 OP 636: Performed by: NURSE ANESTHETIST, CERTIFIED REGISTERED

## 2017-04-29 PROCEDURE — 83735 ASSAY OF MAGNESIUM: CPT | Performed by: INTERNAL MEDICINE

## 2017-04-29 PROCEDURE — 47563 LAPARO CHOLECYSTECTOMY/GRAPH: CPT | Mod: AS | Performed by: PHYSICIAN ASSISTANT

## 2017-04-29 PROCEDURE — 85027 COMPLETE CBC AUTOMATED: CPT | Performed by: PHYSICIAN ASSISTANT

## 2017-04-29 PROCEDURE — 25000128 H RX IP 250 OP 636: Performed by: SURGERY

## 2017-04-29 PROCEDURE — 0FT44ZZ RESECTION OF GALLBLADDER, PERCUTANEOUS ENDOSCOPIC APPROACH: ICD-10-PCS | Performed by: SURGERY

## 2017-04-29 PROCEDURE — 36415 COLL VENOUS BLD VENIPUNCTURE: CPT | Performed by: SURGERY

## 2017-04-29 PROCEDURE — 25000125 ZZHC RX 250: Performed by: PHYSICIAN ASSISTANT

## 2017-04-29 PROCEDURE — 37000008 ZZH ANESTHESIA TECHNICAL FEE, 1ST 30 MIN: Performed by: SURGERY

## 2017-04-29 PROCEDURE — 87070 CULTURE OTHR SPECIMN AEROBIC: CPT | Performed by: SURGERY

## 2017-04-29 PROCEDURE — 87205 SMEAR GRAM STAIN: CPT | Performed by: SURGERY

## 2017-04-29 PROCEDURE — 25000132 ZZH RX MED GY IP 250 OP 250 PS 637: Mod: GY | Performed by: PHYSICIAN ASSISTANT

## 2017-04-29 PROCEDURE — 40000344 ZZHCL STATISTIC THAWING COMPONENT: Performed by: HOSPITALIST

## 2017-04-29 PROCEDURE — 25800025 ZZH RX 258: Performed by: SURGERY

## 2017-04-29 PROCEDURE — 87077 CULTURE AEROBIC IDENTIFY: CPT | Performed by: SURGERY

## 2017-04-29 PROCEDURE — 99232 SBSQ HOSP IP/OBS MODERATE 35: CPT | Performed by: INTERNAL MEDICINE

## 2017-04-29 PROCEDURE — 37000009 ZZH ANESTHESIA TECHNICAL FEE, EACH ADDTL 15 MIN: Performed by: SURGERY

## 2017-04-29 PROCEDURE — 36000058 ZZH SURGERY LEVEL 3 EA 15 ADDTL MIN: Performed by: SURGERY

## 2017-04-29 PROCEDURE — 86850 RBC ANTIBODY SCREEN: CPT | Performed by: SURGERY

## 2017-04-29 PROCEDURE — 25000125 ZZHC RX 250: Performed by: HOSPITALIST

## 2017-04-29 PROCEDURE — 25000566 ZZH SEVOFLURANE, EA 15 MIN: Performed by: SURGERY

## 2017-04-29 PROCEDURE — 25000128 H RX IP 250 OP 636: Performed by: ANESTHESIOLOGY

## 2017-04-29 PROCEDURE — 12000000 ZZH R&B MED SURG/OB

## 2017-04-29 PROCEDURE — 82248 BILIRUBIN DIRECT: CPT | Performed by: PHYSICIAN ASSISTANT

## 2017-04-29 PROCEDURE — 40000277 XR SURGERY CARM FLUORO LESS THAN 5 MIN W STILLS

## 2017-04-29 PROCEDURE — 87075 CULTR BACTERIA EXCEPT BLOOD: CPT | Performed by: SURGERY

## 2017-04-29 PROCEDURE — 36415 COLL VENOUS BLD VENIPUNCTURE: CPT | Performed by: PHYSICIAN ASSISTANT

## 2017-04-29 PROCEDURE — 27210995 ZZH RX 272: Performed by: SURGERY

## 2017-04-29 PROCEDURE — 86900 BLOOD TYPING SEROLOGIC ABO: CPT | Performed by: SURGERY

## 2017-04-29 PROCEDURE — 36000060 ZZH SURGERY LEVEL 3 W FLUORO 1ST 30 MIN: Performed by: SURGERY

## 2017-04-29 PROCEDURE — 85610 PROTHROMBIN TIME: CPT | Performed by: PHYSICIAN ASSISTANT

## 2017-04-29 PROCEDURE — 88304 TISSUE EXAM BY PATHOLOGIST: CPT | Performed by: SURGERY

## 2017-04-29 PROCEDURE — 25000125 ZZHC RX 250: Performed by: SURGERY

## 2017-04-29 PROCEDURE — P9059 PLASMA, FRZ BETWEEN 8-24HOUR: HCPCS | Performed by: HOSPITALIST

## 2017-04-29 PROCEDURE — 88304 TISSUE EXAM BY PATHOLOGIST: CPT | Mod: 26 | Performed by: SURGERY

## 2017-04-29 PROCEDURE — 27210794 ZZH OR GENERAL SUPPLY STERILE: Performed by: SURGERY

## 2017-04-29 RX ORDER — SODIUM CHLORIDE 9 MG/ML
INJECTION, SOLUTION INTRAVENOUS CONTINUOUS
Status: DISCONTINUED | OUTPATIENT
Start: 2017-04-29 | End: 2017-05-01 | Stop reason: CLARIF

## 2017-04-29 RX ORDER — POTASSIUM CHLORIDE 29.8 MG/ML
20 INJECTION INTRAVENOUS
Status: DISCONTINUED | OUTPATIENT
Start: 2017-04-29 | End: 2017-05-02 | Stop reason: HOSPADM

## 2017-04-29 RX ORDER — MAGNESIUM SULFATE HEPTAHYDRATE 40 MG/ML
4 INJECTION, SOLUTION INTRAVENOUS EVERY 4 HOURS PRN
Status: DISCONTINUED | OUTPATIENT
Start: 2017-04-29 | End: 2017-05-02 | Stop reason: HOSPADM

## 2017-04-29 RX ORDER — OXYCODONE HYDROCHLORIDE 5 MG/1
5-10 TABLET ORAL EVERY 4 HOURS PRN
Status: DISCONTINUED | OUTPATIENT
Start: 2017-04-29 | End: 2017-05-02 | Stop reason: HOSPADM

## 2017-04-29 RX ORDER — BUPIVACAINE HYDROCHLORIDE 2.5 MG/ML
INJECTION, SOLUTION EPIDURAL; INFILTRATION; INTRACAUDAL PRN
Status: DISCONTINUED | OUTPATIENT
Start: 2017-04-29 | End: 2017-04-29 | Stop reason: HOSPADM

## 2017-04-29 RX ORDER — PROCHLORPERAZINE MALEATE 5 MG
5 TABLET ORAL EVERY 6 HOURS PRN
Status: DISCONTINUED | OUTPATIENT
Start: 2017-04-29 | End: 2017-04-30 | Stop reason: ALTCHOICE

## 2017-04-29 RX ORDER — FENTANYL CITRATE 50 UG/ML
INJECTION, SOLUTION INTRAMUSCULAR; INTRAVENOUS PRN
Status: DISCONTINUED | OUTPATIENT
Start: 2017-04-29 | End: 2017-04-29

## 2017-04-29 RX ORDER — ACETAMINOPHEN 325 MG/1
650 TABLET ORAL EVERY 4 HOURS PRN
Status: DISCONTINUED | OUTPATIENT
Start: 2017-05-02 | End: 2017-05-02 | Stop reason: HOSPADM

## 2017-04-29 RX ORDER — ONDANSETRON 2 MG/ML
4 INJECTION INTRAMUSCULAR; INTRAVENOUS EVERY 30 MIN PRN
Status: DISCONTINUED | OUTPATIENT
Start: 2017-04-29 | End: 2017-04-29 | Stop reason: HOSPADM

## 2017-04-29 RX ORDER — ETOMIDATE 2 MG/ML
INJECTION INTRAVENOUS PRN
Status: DISCONTINUED | OUTPATIENT
Start: 2017-04-29 | End: 2017-04-29

## 2017-04-29 RX ORDER — HYDRALAZINE HYDROCHLORIDE 20 MG/ML
2.5-5 INJECTION INTRAMUSCULAR; INTRAVENOUS EVERY 10 MIN PRN
Status: DISCONTINUED | OUTPATIENT
Start: 2017-04-29 | End: 2017-04-29 | Stop reason: HOSPADM

## 2017-04-29 RX ORDER — NALOXONE HYDROCHLORIDE 0.4 MG/ML
.1-.4 INJECTION, SOLUTION INTRAMUSCULAR; INTRAVENOUS; SUBCUTANEOUS
Status: DISCONTINUED | OUTPATIENT
Start: 2017-04-29 | End: 2017-05-02 | Stop reason: HOSPADM

## 2017-04-29 RX ORDER — LABETALOL HYDROCHLORIDE 5 MG/ML
10 INJECTION, SOLUTION INTRAVENOUS
Status: DISCONTINUED | OUTPATIENT
Start: 2017-04-29 | End: 2017-04-29 | Stop reason: HOSPADM

## 2017-04-29 RX ORDER — ONDANSETRON 4 MG/1
4 TABLET, ORALLY DISINTEGRATING ORAL EVERY 6 HOURS PRN
Status: DISCONTINUED | OUTPATIENT
Start: 2017-04-29 | End: 2017-05-02 | Stop reason: HOSPADM

## 2017-04-29 RX ORDER — DIPHENHYDRAMINE HYDROCHLORIDE 50 MG/ML
12.5 INJECTION INTRAMUSCULAR; INTRAVENOUS EVERY 6 HOURS PRN
Status: DISCONTINUED | OUTPATIENT
Start: 2017-04-29 | End: 2017-05-02 | Stop reason: HOSPADM

## 2017-04-29 RX ORDER — SODIUM CHLORIDE, SODIUM LACTATE, POTASSIUM CHLORIDE, CALCIUM CHLORIDE 600; 310; 30; 20 MG/100ML; MG/100ML; MG/100ML; MG/100ML
INJECTION, SOLUTION INTRAVENOUS CONTINUOUS
Status: DISCONTINUED | OUTPATIENT
Start: 2017-04-29 | End: 2017-04-29 | Stop reason: HOSPADM

## 2017-04-29 RX ORDER — GLYCOPYRROLATE 0.2 MG/ML
INJECTION, SOLUTION INTRAMUSCULAR; INTRAVENOUS PRN
Status: DISCONTINUED | OUTPATIENT
Start: 2017-04-29 | End: 2017-04-29

## 2017-04-29 RX ORDER — ACETAMINOPHEN 10 MG/ML
1000 INJECTION, SOLUTION INTRAVENOUS ONCE
Status: COMPLETED | OUTPATIENT
Start: 2017-04-29 | End: 2017-04-29

## 2017-04-29 RX ORDER — NEOSTIGMINE METHYLSULFATE 1 MG/ML
VIAL (ML) INJECTION PRN
Status: DISCONTINUED | OUTPATIENT
Start: 2017-04-29 | End: 2017-04-29

## 2017-04-29 RX ORDER — LIDOCAINE HYDROCHLORIDE 10 MG/ML
INJECTION, SOLUTION INFILTRATION; PERINEURAL PRN
Status: DISCONTINUED | OUTPATIENT
Start: 2017-04-29 | End: 2017-04-29

## 2017-04-29 RX ORDER — POTASSIUM CL/LIDO/0.9 % NACL 10MEQ/0.1L
10 INTRAVENOUS SOLUTION, PIGGYBACK (ML) INTRAVENOUS
Status: DISCONTINUED | OUTPATIENT
Start: 2017-04-29 | End: 2017-05-02 | Stop reason: HOSPADM

## 2017-04-29 RX ORDER — POTASSIUM CHLORIDE 1500 MG/1
20-40 TABLET, EXTENDED RELEASE ORAL
Status: DISCONTINUED | OUTPATIENT
Start: 2017-04-29 | End: 2017-05-02 | Stop reason: HOSPADM

## 2017-04-29 RX ORDER — DIPHENHYDRAMINE HCL 12.5MG/5ML
12.5 LIQUID (ML) ORAL EVERY 6 HOURS PRN
Status: DISCONTINUED | OUTPATIENT
Start: 2017-04-29 | End: 2017-05-02 | Stop reason: HOSPADM

## 2017-04-29 RX ORDER — FENTANYL CITRATE 50 UG/ML
25-50 INJECTION, SOLUTION INTRAMUSCULAR; INTRAVENOUS
Status: DISCONTINUED | OUTPATIENT
Start: 2017-04-29 | End: 2017-04-29 | Stop reason: HOSPADM

## 2017-04-29 RX ORDER — LIDOCAINE 40 MG/G
CREAM TOPICAL
Status: DISCONTINUED | OUTPATIENT
Start: 2017-04-29 | End: 2017-05-02 | Stop reason: HOSPADM

## 2017-04-29 RX ORDER — ONDANSETRON 2 MG/ML
4 INJECTION INTRAMUSCULAR; INTRAVENOUS EVERY 6 HOURS PRN
Status: DISCONTINUED | OUTPATIENT
Start: 2017-04-29 | End: 2017-05-02 | Stop reason: HOSPADM

## 2017-04-29 RX ORDER — POTASSIUM CHLORIDE 7.45 MG/ML
10 INJECTION INTRAVENOUS
Status: DISCONTINUED | OUTPATIENT
Start: 2017-04-29 | End: 2017-05-02 | Stop reason: HOSPADM

## 2017-04-29 RX ORDER — ONDANSETRON 4 MG/1
4 TABLET, ORALLY DISINTEGRATING ORAL EVERY 30 MIN PRN
Status: DISCONTINUED | OUTPATIENT
Start: 2017-04-29 | End: 2017-04-29 | Stop reason: HOSPADM

## 2017-04-29 RX ORDER — POTASSIUM CHLORIDE 1.5 G/1.58G
20-40 POWDER, FOR SOLUTION ORAL
Status: DISCONTINUED | OUTPATIENT
Start: 2017-04-29 | End: 2017-05-02 | Stop reason: HOSPADM

## 2017-04-29 RX ORDER — ACETAMINOPHEN 325 MG/1
975 TABLET ORAL EVERY 8 HOURS
Status: DISCONTINUED | OUTPATIENT
Start: 2017-04-30 | End: 2017-05-02 | Stop reason: HOSPADM

## 2017-04-29 RX ORDER — HYDROMORPHONE HYDROCHLORIDE 1 MG/ML
.3-.5 INJECTION, SOLUTION INTRAMUSCULAR; INTRAVENOUS; SUBCUTANEOUS EVERY 5 MIN PRN
Status: DISCONTINUED | OUTPATIENT
Start: 2017-04-29 | End: 2017-04-29 | Stop reason: HOSPADM

## 2017-04-29 RX ORDER — LIDOCAINE 40 MG/G
CREAM TOPICAL
Status: DISCONTINUED | OUTPATIENT
Start: 2017-04-29 | End: 2017-04-29 | Stop reason: HOSPADM

## 2017-04-29 RX ORDER — DEXAMETHASONE SODIUM PHOSPHATE 4 MG/ML
INJECTION, SOLUTION INTRA-ARTICULAR; INTRALESIONAL; INTRAMUSCULAR; INTRAVENOUS; SOFT TISSUE PRN
Status: DISCONTINUED | OUTPATIENT
Start: 2017-04-29 | End: 2017-04-29

## 2017-04-29 RX ORDER — HYDROMORPHONE HYDROCHLORIDE 1 MG/ML
.3-.5 INJECTION, SOLUTION INTRAMUSCULAR; INTRAVENOUS; SUBCUTANEOUS
Status: DISCONTINUED | OUTPATIENT
Start: 2017-04-29 | End: 2017-05-02 | Stop reason: HOSPADM

## 2017-04-29 RX ORDER — ONDANSETRON 2 MG/ML
INJECTION INTRAMUSCULAR; INTRAVENOUS PRN
Status: DISCONTINUED | OUTPATIENT
Start: 2017-04-29 | End: 2017-04-29

## 2017-04-29 RX ADMIN — LIDOCAINE HYDROCHLORIDE 20 MG: 10 INJECTION, SOLUTION INFILTRATION; PERINEURAL at 15:52

## 2017-04-29 RX ADMIN — ETOMIDATE 20 MG: 2 INJECTION INTRAVENOUS at 15:52

## 2017-04-29 RX ADMIN — Medication 3 MG: at 17:23

## 2017-04-29 RX ADMIN — FENTANYL CITRATE 50 MCG: 50 INJECTION INTRAMUSCULAR; INTRAVENOUS at 17:53

## 2017-04-29 RX ADMIN — FENTANYL CITRATE 75 MCG: 50 INJECTION, SOLUTION INTRAMUSCULAR; INTRAVENOUS at 15:52

## 2017-04-29 RX ADMIN — ACETAMINOPHEN 1000 MG: 10 INJECTION, SOLUTION INTRAVENOUS at 22:44

## 2017-04-29 RX ADMIN — DEXAMETHASONE SODIUM PHOSPHATE 4 MG: 4 INJECTION, SOLUTION INTRA-ARTICULAR; INTRALESIONAL; INTRAMUSCULAR; INTRAVENOUS; SOFT TISSUE at 15:52

## 2017-04-29 RX ADMIN — AMPICILLIN SODIUM AND SULBACTAM SODIUM 3 G: 2; 1 INJECTION, POWDER, FOR SOLUTION INTRAMUSCULAR; INTRAVENOUS at 17:09

## 2017-04-29 RX ADMIN — PHYTONADIONE 2 MG: 10 INJECTION, EMULSION INTRAMUSCULAR; INTRAVENOUS; SUBCUTANEOUS at 09:15

## 2017-04-29 RX ADMIN — SODIUM CHLORIDE: 9 INJECTION, SOLUTION INTRAVENOUS at 20:38

## 2017-04-29 RX ADMIN — POTASSIUM CHLORIDE 10 MEQ: 14.9 INJECTION, SOLUTION, CONCENTRATE PARENTERAL at 22:16

## 2017-04-29 RX ADMIN — FENTANYL CITRATE 50 MCG: 50 INJECTION INTRAMUSCULAR; INTRAVENOUS at 17:44

## 2017-04-29 RX ADMIN — FENTANYL CITRATE 50 MCG: 50 INJECTION, SOLUTION INTRAMUSCULAR; INTRAVENOUS at 16:19

## 2017-04-29 RX ADMIN — ROCURONIUM BROMIDE 35 MG: 10 INJECTION INTRAVENOUS at 15:52

## 2017-04-29 RX ADMIN — FENTANYL CITRATE 75 MCG: 50 INJECTION, SOLUTION INTRAMUSCULAR; INTRAVENOUS at 16:11

## 2017-04-29 RX ADMIN — ACETAMINOPHEN 1000 MG: 10 INJECTION, SOLUTION INTRAVENOUS at 18:00

## 2017-04-29 RX ADMIN — SODIUM CHLORIDE AND POTASSIUM CHLORIDE: 9; 1.49 INJECTION, SOLUTION INTRAVENOUS at 01:30

## 2017-04-29 RX ADMIN — ETOMIDATE 6 MG: 2 INJECTION INTRAVENOUS at 15:56

## 2017-04-29 RX ADMIN — HYDROMORPHONE HYDROCHLORIDE 0.5 MG: 1 INJECTION, SOLUTION INTRAMUSCULAR; INTRAVENOUS; SUBCUTANEOUS at 18:25

## 2017-04-29 RX ADMIN — SODIUM CHLORIDE AND POTASSIUM CHLORIDE: 9; 1.49 INJECTION, SOLUTION INTRAVENOUS at 13:57

## 2017-04-29 RX ADMIN — GLYCOPYRROLATE 0.2 MG: 0.2 INJECTION, SOLUTION INTRAMUSCULAR; INTRAVENOUS at 15:52

## 2017-04-29 RX ADMIN — POTASSIUM CHLORIDE 10 MEQ: 14.9 INJECTION, SOLUTION, CONCENTRATE PARENTERAL at 14:00

## 2017-04-29 RX ADMIN — ONDANSETRON 4 MG: 2 INJECTION INTRAMUSCULAR; INTRAVENOUS at 17:07

## 2017-04-29 RX ADMIN — POTASSIUM CHLORIDE 10 MEQ: 14.9 INJECTION, SOLUTION, CONCENTRATE PARENTERAL at 20:39

## 2017-04-29 RX ADMIN — METOPROLOL TARTRATE 50 MG: 50 TABLET, FILM COATED ORAL at 08:48

## 2017-04-29 RX ADMIN — FENTANYL CITRATE 50 MCG: 50 INJECTION, SOLUTION INTRAMUSCULAR; INTRAVENOUS at 16:56

## 2017-04-29 RX ADMIN — AMPICILLIN SODIUM AND SULBACTAM SODIUM 3 G: 2; 1 INJECTION, POWDER, FOR SOLUTION INTRAMUSCULAR; INTRAVENOUS at 05:21

## 2017-04-29 RX ADMIN — AMPICILLIN SODIUM AND SULBACTAM SODIUM 3 G: 2; 1 INJECTION, POWDER, FOR SOLUTION INTRAMUSCULAR; INTRAVENOUS at 11:10

## 2017-04-29 RX ADMIN — GLYCOPYRROLATE 0.4 MG: 0.2 INJECTION, SOLUTION INTRAMUSCULAR; INTRAVENOUS at 17:23

## 2017-04-29 RX ADMIN — SODIUM CHLORIDE, POTASSIUM CHLORIDE, SODIUM LACTATE AND CALCIUM CHLORIDE: 600; 310; 30; 20 INJECTION, SOLUTION INTRAVENOUS at 15:47

## 2017-04-29 RX ADMIN — POTASSIUM CHLORIDE 10 MEQ: 14.9 INJECTION, SOLUTION, CONCENTRATE PARENTERAL at 15:09

## 2017-04-29 ASSESSMENT — ENCOUNTER SYMPTOMS
DYSRHYTHMIAS: 1
SEIZURES: 1

## 2017-04-29 NOTE — ANESTHESIA CARE TRANSFER NOTE
Patient: Zunilda Clark    Procedure(s):  LAPAROSCOPIC CHOLECYSTECTOMY WITH CHOLANGIOGRAMS  - Wound Class: IV-Dirty or Infected    Diagnosis: unknown  Diagnosis Additional Information: No value filed.    Anesthesia Type:   General, ETT     Note:  Airway :Face Mask  Patient transferred to:PACU  Comments: To PACU, Awake, VSS, SV, O2, Report to RN      Vitals: (Last set prior to Anesthesia Care Transfer)    CRNA VITALS  4/29/2017 1702 - 4/29/2017 1736      4/29/2017             Pulse: 68    SpO2: 95 %    Resp Rate (observed): 23                Electronically Signed By: Dean Dennis Severson, APRN CRNA  April 29, 2017  5:36 PM

## 2017-04-29 NOTE — PROGRESS NOTES
Gen Surg note    Acute cholecystitis with WBC 20k and fever 101.4.  INR 1.9 this am therefore 2 mg IV vit k ordered per protocol.    abd- soft, mod ttp RUQ, no guarding/rebound    Recommend laparoscopic cholecystectomy with IOC.  She is high risk for open conversion.  We have discussed the indication, alternatives, risks and expected recovery.  Specifically we have discussed incisions, scarring, postoperative infections, anesthesia, bleeding, blood transfusion, open conversion, common bile duct injury, injury to intra-abdominal organs, adhesions that can lead to bowel obstruction, retained common bile duct stone, bile leak, DVT, PE, hernia, post cholecystectomy diarrhea, postoperative dietary restrictions and physical limitations.  We have discussed the recommended interventions and treatments for these complications.  All questions have been answered to the best of my ability.         Recheck INR this afternoon.  If < 1.5 will proceed to OR.    Angeles Mcgill MD

## 2017-04-29 NOTE — PLAN OF CARE
Problem: Goal Outcome Summary  Goal: Goal Outcome Summary  Outcome: No Change  Patient is SBA with walker due to knee pain.  Should have surgery tomorrow.  Per Dr. Nix, it will be Dr. Mcgill but it depends on her INR.  They would like it to be < 1.5.  NPO and fluids to start at midnight.  Voiding spontaneously.  No complaints of pain now.

## 2017-04-29 NOTE — OP NOTE
General Surgery Operative Note      Pre-operative diagnosis: Acute cholecystitis   Post-operative diagnosis: Acute necrotizing cholecystitis and no evidence of choledocholithiasis.    Procedure: laparoscopic cholecystectomy  intraoperative cholangiogram  abdominal lavage  peritoneal drain placement  increased difficulty     Surgeon: Angeles Mcgill MD   Assistant(s): Crystal Cuevas PA-C  The Physician Assistant was medically necessary for their expertise in prepping, camera management, suctioning, suturing and retraction.   Anesthesia: general    Estimated blood loss:   200 cc     Specimens: gallbladder and contents; bile culture     DESCRIPTION OF PROCEDURE: The patient was taken to the operating room and placed on the table in supine position. General endotracheal anesthesia was induced and the abdomen was prepped and draped in standard sterile fashion. An incision was made just above the umbilicus with a blade. The incision was carried down to the fascia. The fascia was incised in the midline with a blade. The peritoneum was entered bluntly with a Carmalt clamp. Two interrupted 0 Vicryl sutures were placed at the extremes of this fascial incision. The Liana trocar was introduced and the abdomen was insufflated with CO2. A 5 mm trocar was placed in the subxiphoid position. A 5 mm trocar was placed in the right upper quadrant, just below the costal margin at the midclavicular line. Another was placed at the anterior axillary line just below the costal margin on the right. The patient was placed in reverse Trendelenburg and right side up. The gallbladder appeared significantly thickened, edematous, erythematous and tensely distended.  We attempted to aspirate the contents and removed only approximately 5 cc due to the thick nature of the bile.  This was sent for culture.  The fundus of the gallbladder was grasped and retracted cephalad.  With grasping the gallbladder tore.  This made manipulating the  gallbladder throughout the case quite challenging.  There was steady oozing from the dissected and torn surfaces.  We therefore elected to give one unit of FFP as the preoperative INR was 1.73.  Thick bile spilled from the lumen and was aspirated.  The infundibulum was grasped and retracted laterally. The peritoneum over the medial and lateral aspects of the triangle of Calot was taken down with the [place] dissector.  The triangle of Calot was skeletonized, thus obtaining the critical view of safety.  The infundibulum of the gallbladder was grasped with the Callahan clamp. Thereby the cholangiogram catheter was introduced and used to canulate the infundibulum of the gallbladder. The cholangiogram revealed a biliary tree without filling defects or abnormal anatomy. The radiologist confirmed these findings. The cholangiogram catheter was removed from the abdomen. The duct was thrice clipped and then transected, leaving two clips on the cystic duct stump. The cystic artery was freed up from surrounding tissues. It was clipped twice proximally, once distally and transected with the hook scissors. A thorough evaluation of the triangle of Calot revealed no aberrant ducts or vessels. The gallbladder was then removed from the liver using the hook electrocautery.  Two additional small vessels were clipped during this process.  There was, again, steady oozing during this portion.  The back wall of the gallbladder was noted to be necrotic.  The gallbladder along with a large stone was passed into an Endocatch bag and removed through the umbilical trocar site in piecemeal fashion. We observed the right upper quadrant carefully for hemostasis. Hemostasis was assured using cautery, FloSeal, and Nu-Knit. We irrigated with copious amounts of sterile saline and aspirated the effluent.  A 15-Luxembourgish Gustavo drain was placed under the liver margin and brought out through the right lateral trocar incision site.  This was secured in place  with a 2-0 nylon suture.  The right upper quadrant trocar sites were anesthetized with local anesthetic. Each of the trocars was removed under direct visualization. There was no bleeding from any of these sites.  The Liana trocar was removed and the abdomen was evacuated of CO2.  Two additional interrupted 0 Vicryl suture was placed in the umbilical trocar site fascia.  Each of the four 0 Vicryl sutures was cinched down and tied. The skin of the umbilical incision was anesthetized with local anesthetic.  All of the incisions were closed with interrupted 4-0 Vicryl subcuticular sutures and Steri-Strips.  The patient tolerated the procedure well.  Sponge and instrument counts were correct.   Operative time was 1 hour 25 minutes, more than 1.5 times average operating time due to the extremely inflamed nature of the gallbladder.  Angeles Mcgill MD

## 2017-04-29 NOTE — PLAN OF CARE
Problem: Goal Outcome Summary  Goal: Goal Outcome Summary  Outcome: No Change  Vss. Low grade temp 101.4 to 99.9 for shift. Lungs clr-O2 2L mid 90s. Hypoactive bs/+gas, no nausea. Luq/Epigastric tender, but soft to palpation. Npo except for ice chips/meds. Last bm 04/27/17. Voiding. Ivf infusing. Skin intact. Denies pain. Pt has pacemaker. Pt is forgetful. Repositioning self. Ambulated to br with sba. No other significant issues noted overnight.

## 2017-04-29 NOTE — PROGRESS NOTES
[facility]    Hospitalist Progress Note    Date of Service (when I saw the patient): 04/29/2017    Assessment & Plan   Zunilda Clark is a 76 year old female with paroxysmal Afib (on coumadin), bioprosthetic MV replacement in 2008 then replacement again with bioprosthetic valve in 5/2014 (with tricuspid annuloplasty ring) on coumadin, pacemaker, CHF, DVT, HTN, CVA with mild left hemiparesis who was admitted on 4/27/2017 with acute cholecystitis.    1.  Acute cholecystitis. WBC increasing, had fever, despite IV antibiotics, Unasyn.   - Surgery consulted, INR elevated and she was given a dose of  Vitamin K 2 mg IV x1. Discussed with Dr. Mcgill, input appreciated.  - Cholecystectomy this afternoon after INR check. NPO for surgery. CBC in AM.    2. Parox Afib- she was on Metoprolol and coumadin. Holding coumadin for surgery. She has a bioprosthetic Mitral valve placed in 2008 then re-done in 2014. Does not needs anticoagulation for this. Is on coumadin for her afib. Resume coumadin without bridging when ok with surgery post op.    DVT Prophylaxis- PCD, Perioperatively, until coumadin is restarted and INR is above 1.8.  Dispo- Inpatient care, pending surgery.  Patient is scheduled for knee replacement with Dr. Sheikh on May 17. Dr. Johnson spoke with him to let him know patient is here and will be off her coumadin in case he would want to coordinate her knee surgery. Dr. Nix, stated he would be fine with ortho surgery the day after her lap rey as long as it was uncomplicated. Dr. Sheikh will contact medical team if he decides he can/will coordinate his surgery.       Code Status: Full Code    Disposition: Expected discharge unclear.    Venkat Benton    Interval History   Patient in room with . No abdo pain currently. No chest pain, sob. Feels well.     -Data reviewed today: I reviewed all new labs and imaging results over the last 24 hours. I personally reviewed no images or EKG's  today.    Physical Exam   Temp: 98  F (36.7  C) Temp src: Oral BP: 123/58   Heart Rate: 64 Resp: 18 SpO2: 98 % O2 Device: Nasal cannula Oxygen Delivery: 2 LPM  There were no vitals filed for this visit.  Vital Signs with Ranges  Temp:  [98  F (36.7  C)-101.6  F (38.7  C)] 98  F (36.7  C)  Heart Rate:  [62-88] 64  Resp:  [16-18] 18  BP: (119-143)/(45-59) 123/58  SpO2:  [88 %-99 %] 98 %  I/O last 3 completed shifts:  In: 1109 [P.O.:450; I.V.:659]  Out: 250 [Urine:250]    Constitutional: Awake, alert, cooperative, no apparent distress   Respiratory: Clear to auscultation bilaterally, no crackles or wheezing   Cardiovascular: Regular rate and rhythm, normal S1 and S2, and no murmur noted   Abdomen: Normal bowel sounds, soft, non-distended, non-tender   Skin: No rashes, no cyanosis, dry to touch   Neuro: Alert and oriented x3, no weakness, numbness, memory loss   Extremities: No edema, normal range of motion   Other(s):        All other systems: Negative     Medications     0.9% sodium chloride + KCl 20 mEq/L 100 mL/hr at 04/29/17 0849       metoprolol  50 mg Oral BID     sodium chloride (PF)  3 mL Intracatheter Q8H     ampicillin-sulbactam (UNASYN) IV  3 g Intravenous Q6H     amLODIPine  10 mg Oral Daily    And     valsartan  320 mg Oral Daily     chlorthalidone  25 mg Oral Daily     levothyroxine  112 mcg Oral Daily       Data     Recent Labs  Lab 04/29/17  0629 04/28/17  0910 04/27/17 2012   WBC 20.5* 13.9* 11.7*   HGB 10.9* 11.8 12.1   * 100 99    240 284   INR 1.97* 2.65* 2.54*     --  137   POTASSIUM 3.1*  --  3.5   CHLORIDE 104  --  101   CO2 27  --  28   BUN 28  --  22   CR 0.95  --  0.85   ANIONGAP 7  --  8   BRICE 8.1*  --  8.8   GLC 94  --  124*   ALBUMIN 2.6*  --  3.1*   PROTTOTAL 6.6*  --  7.6   BILITOTAL 1.0  --  0.4   ALKPHOS 96  --  102   ALT 24  --  19   AST 24  --  19   LIPASE  --   --  183       No results found for this or any previous visit (from the past 24 hour(s)).

## 2017-04-29 NOTE — CONSULTS
The patient is well known to me from previous office consult. Please see this for details. She reports for 2 days she has had steady right upper quadrant abdominal pain along with nausea and vomiting. The pain is moderate. It did not radiate. Pushing made it worse. Deep breaths made it worse. Nothing at home helped but now pain medicine is helping her. She overall reports feeling slightly better. She is anticoagulated for paroxysmal atrial fibrillation as well as a bioprosthetic valve. Her INR today was greater than 2.5. On exam there is no jaundice. The neck is supple. Lungs are clear. Heart is regular. The abdomen is soft and nondistended with mild to moderate right upper quadrant tenderness. Extremities are without edema. The decision was made to allow her INR to slowly drift down and not precipitously reverse her given her underlying medical condition. I will ask my partner on call tomorrow to see the patient and evaluate whether her INR is appropriate for surgery at that time. 30 minutes were spent in this encounter, over 50% in counseling and coordination of care.     Pasquale Nix MD  [phone] (c)  [phone] (p)     This note was created using voice recognition software. Undetected word substitutions or other errors may have occurred.

## 2017-04-29 NOTE — PLAN OF CARE
Problem: Goal Outcome Summary  Goal: Goal Outcome Summary  Outcome: No Change  0930:  Forgetful otherwise oriented. Up SBA to bathroom. Denies pain. Vit K this AM. INR recheck at 1400. NPO for possible OR this afternoon. Only  Metoprolol administered orally this AM per Dr. Mcgill request. K protocol requested from hospitalist.     Received K protocol and mag protocol 1200 awaiting meds from pharmacy and will initiate before OR.     1330  Called and spoke with lab twice about drawing INR right away at 1400. Asked to please process blood STAT.     5951: paged dr mcgill with INR result. Awaiting call back.

## 2017-04-29 NOTE — ANESTHESIA PREPROCEDURE EVALUATION
PAC NOTE:       ANESTHESIA PRE EVALUATION:  Anesthesia Evaluation     . Pt has had prior anesthetic.            ROS/MED HX    ENT/Pulmonary:       Neurologic:     (+)seizures CVA TIA     Cardiovascular:     (+) hypertension----. : . CHF Last EF: 60-65 . fainting (syncope). pacemaker :. dysrhythmias a-fib and a-flutter, valvular problems/murmurs type: AI MV repair:. pulmonary hypertension,       METS/Exercise Tolerance:     Hematologic:     (+) History of blood clots -      Musculoskeletal:   (+) arthritis, , , -       GI/Hepatic:     (+) GERD       Renal/Genitourinary:         Endo:     (+) thyroid problem hypothyroidism, .      Psychiatric:         Infectious Disease:         Malignancy:         Other:    (+) H/O Chronic Pain,                   Physical Exam  Normal systems: cardiovascular and pulmonary    Airway   Mallampati: II  TM distance: >3 FB  Neck ROM: full    Dental     Cardiovascular       Pulmonary              Anesthesia Plan      History & Physical Review  History and physical reviewed and following examination; no interval change.    ASA Status:  4 .    NPO Status:  > 8 hours    Plan for General and ETT with Intravenous and Etomidate induction. Maintenance will be Balanced.    PONV prophylaxis:  Ondansetron (or other 5HT-3) and Dexamethasone or Solumedrol       Postoperative Care  Postoperative pain management:  IV analgesics.      Consents  Anesthetic plan, risks, benefits and alternatives discussed with:  Patient.  Use of blood products discussed: Yes.   Use of blood products discussed with Patient.  Consented to blood products.  .                            .

## 2017-04-30 LAB
ERYTHROCYTE [DISTWIDTH] IN BLOOD BY AUTOMATED COUNT: 18.6 % (ref 10–15)
GLUCOSE SERPL-MCNC: 109 MG/DL (ref 70–99)
HCT VFR BLD AUTO: 32.9 % (ref 35–47)
HGB BLD-MCNC: 10.7 G/DL (ref 11.7–15.7)
HGB BLD-MCNC: 10.7 G/DL (ref 11.7–15.7)
INR PPP: 1.53 (ref 0.86–1.14)
MAGNESIUM SERPL-MCNC: 2.8 MG/DL (ref 1.6–2.3)
MCH RBC QN AUTO: 32.6 PG (ref 26.5–33)
MCHC RBC AUTO-ENTMCNC: 32.5 G/DL (ref 31.5–36.5)
MCV RBC AUTO: 100 FL (ref 78–100)
PLATELET # BLD AUTO: 312 10E9/L (ref 150–450)
POTASSIUM SERPL-SCNC: 3.7 MMOL/L (ref 3.4–5.3)
POTASSIUM SERPL-SCNC: 4.3 MMOL/L (ref 3.4–5.3)
RBC # BLD AUTO: 3.28 10E12/L (ref 3.8–5.2)
WBC # BLD AUTO: 19.9 10E9/L (ref 4–11)

## 2017-04-30 PROCEDURE — 84132 ASSAY OF SERUM POTASSIUM: CPT | Performed by: INTERNAL MEDICINE

## 2017-04-30 PROCEDURE — A9270 NON-COVERED ITEM OR SERVICE: HCPCS | Mod: GY | Performed by: PHYSICIAN ASSISTANT

## 2017-04-30 PROCEDURE — A9270 NON-COVERED ITEM OR SERVICE: HCPCS | Mod: GY

## 2017-04-30 PROCEDURE — A9270 NON-COVERED ITEM OR SERVICE: HCPCS | Mod: GY | Performed by: HOSPITALIST

## 2017-04-30 PROCEDURE — 12000000 ZZH R&B MED SURG/OB

## 2017-04-30 PROCEDURE — 25000132 ZZH RX MED GY IP 250 OP 250 PS 637: Mod: GY | Performed by: SURGERY

## 2017-04-30 PROCEDURE — A9270 NON-COVERED ITEM OR SERVICE: HCPCS | Mod: GY | Performed by: SURGERY

## 2017-04-30 PROCEDURE — 25000132 ZZH RX MED GY IP 250 OP 250 PS 637: Mod: GY

## 2017-04-30 PROCEDURE — 99232 SBSQ HOSP IP/OBS MODERATE 35: CPT | Performed by: INTERNAL MEDICINE

## 2017-04-30 PROCEDURE — 25000125 ZZHC RX 250: Performed by: INTERNAL MEDICINE

## 2017-04-30 PROCEDURE — 36415 COLL VENOUS BLD VENIPUNCTURE: CPT | Performed by: SURGERY

## 2017-04-30 PROCEDURE — 25000128 H RX IP 250 OP 636: Performed by: SURGERY

## 2017-04-30 PROCEDURE — 25000132 ZZH RX MED GY IP 250 OP 250 PS 637: Mod: GY | Performed by: PHYSICIAN ASSISTANT

## 2017-04-30 PROCEDURE — 82947 ASSAY GLUCOSE BLOOD QUANT: CPT | Performed by: SURGERY

## 2017-04-30 PROCEDURE — 83735 ASSAY OF MAGNESIUM: CPT | Performed by: SURGERY

## 2017-04-30 PROCEDURE — 84132 ASSAY OF SERUM POTASSIUM: CPT | Performed by: SURGERY

## 2017-04-30 PROCEDURE — 25000132 ZZH RX MED GY IP 250 OP 250 PS 637: Mod: GY | Performed by: HOSPITALIST

## 2017-04-30 PROCEDURE — 36415 COLL VENOUS BLD VENIPUNCTURE: CPT | Performed by: INTERNAL MEDICINE

## 2017-04-30 PROCEDURE — 85027 COMPLETE CBC AUTOMATED: CPT | Performed by: INTERNAL MEDICINE

## 2017-04-30 PROCEDURE — 25000125 ZZHC RX 250: Performed by: PHYSICIAN ASSISTANT

## 2017-04-30 PROCEDURE — 85610 PROTHROMBIN TIME: CPT | Performed by: INTERNAL MEDICINE

## 2017-04-30 RX ORDER — HEPARIN SODIUM 5000 [USP'U]/.5ML
5000 INJECTION, SOLUTION INTRAVENOUS; SUBCUTANEOUS EVERY 8 HOURS
Status: DISCONTINUED | OUTPATIENT
Start: 2017-04-30 | End: 2017-05-02 | Stop reason: HOSPADM

## 2017-04-30 RX ORDER — WARFARIN SODIUM 3 MG/1
3 TABLET ORAL
Status: COMPLETED | OUTPATIENT
Start: 2017-04-30 | End: 2017-04-30

## 2017-04-30 RX ADMIN — AMPICILLIN SODIUM AND SULBACTAM SODIUM 3 G: 2; 1 INJECTION, POWDER, FOR SOLUTION INTRAMUSCULAR; INTRAVENOUS at 18:37

## 2017-04-30 RX ADMIN — METOPROLOL TARTRATE 50 MG: 50 TABLET, FILM COATED ORAL at 20:34

## 2017-04-30 RX ADMIN — METOPROLOL TARTRATE 50 MG: 50 TABLET, FILM COATED ORAL at 09:36

## 2017-04-30 RX ADMIN — HEPARIN SODIUM 5000 UNITS: 5000 INJECTION, SOLUTION INTRAVENOUS; SUBCUTANEOUS at 18:37

## 2017-04-30 RX ADMIN — RANITIDINE HYDROCHLORIDE 150 MG: 150 TABLET, FILM COATED ORAL at 09:36

## 2017-04-30 RX ADMIN — VALSARTAN 320 MG: 80 TABLET, FILM COATED ORAL at 09:35

## 2017-04-30 RX ADMIN — POTASSIUM CHLORIDE 10 MEQ: 14.9 INJECTION, SOLUTION, CONCENTRATE PARENTERAL at 10:36

## 2017-04-30 RX ADMIN — WARFARIN SODIUM 3 MG: 3 TABLET ORAL at 18:37

## 2017-04-30 RX ADMIN — CHLORTHALIDONE 25 MG: 25 TABLET ORAL at 10:50

## 2017-04-30 RX ADMIN — HEPARIN SODIUM 5000 UNITS: 5000 INJECTION, SOLUTION INTRAVENOUS; SUBCUTANEOUS at 09:30

## 2017-04-30 RX ADMIN — ACETAMINOPHEN 975 MG: 325 TABLET, FILM COATED ORAL at 18:36

## 2017-04-30 RX ADMIN — AMPICILLIN SODIUM AND SULBACTAM SODIUM 3 G: 2; 1 INJECTION, POWDER, FOR SOLUTION INTRAMUSCULAR; INTRAVENOUS at 00:30

## 2017-04-30 RX ADMIN — RANITIDINE HYDROCHLORIDE 150 MG: 150 TABLET, FILM COATED ORAL at 20:34

## 2017-04-30 RX ADMIN — Medication 2 G: at 02:00

## 2017-04-30 RX ADMIN — AMPICILLIN SODIUM AND SULBACTAM SODIUM 3 G: 2; 1 INJECTION, POWDER, FOR SOLUTION INTRAMUSCULAR; INTRAVENOUS at 05:47

## 2017-04-30 RX ADMIN — ACETAMINOPHEN 975 MG: 325 TABLET, FILM COATED ORAL at 09:35

## 2017-04-30 RX ADMIN — AMLODIPINE BESYLATE 10 MG: 5 TABLET ORAL at 09:35

## 2017-04-30 RX ADMIN — AMPICILLIN SODIUM AND SULBACTAM SODIUM 3 G: 2; 1 INJECTION, POWDER, FOR SOLUTION INTRAMUSCULAR; INTRAVENOUS at 12:05

## 2017-04-30 RX ADMIN — POTASSIUM CHLORIDE 10 MEQ: 14.9 INJECTION, SOLUTION, CONCENTRATE PARENTERAL at 08:48

## 2017-04-30 RX ADMIN — LEVOTHYROXINE SODIUM 112 MCG: 112 TABLET ORAL at 09:36

## 2017-04-30 NOTE — PROGRESS NOTES
[facility]    Hospitalist Progress Note    Date of Service (when I saw the patient): 04/30/2017    Assessment & Plan   Zunilda Alicea May is a 76 year old female with paroxysmal Afib (on coumadin), bioprosthetic MV replacement in 2008 then replacement again with bioprosthetic valve in 5/2014 (with tricuspid annuloplasty ring) on coumadin, pacemaker, CHF, DVT, HTN, CVA with mild left hemiparesis who was admitted on 4/27/2017 with acute cholecystitis.    1.  Acute cholecystitis. Post lap rey with drain placement on 04/29.   - Continue IV antibiotics, Unasyn.   - CBC in AM.  - Diet, wound care per surgery.  - IS  - Ambulate     2. Parox Afib- she was on Metoprolol and coumadin. Holding coumadin for surgery. She has a bioprosthetic Mitral valve placed in 2008 then re-done in 2014. Does not needs anticoagulation for this.   Resume coumadin tonight per Pharma D.No need to bridg.    3. Mild Post op hypoxia- on oxygen- wean her off oxygen, continue IS.    DVT Prophylaxis- Heparin Sq.  Dispo- Inpatient care, pending surgery.  Patient is scheduled for knee replacement with Dr. Sheikh on May 17. Dr. Johnson spoke with him to let him know patient is here and will be off her coumadin in case he would want to coordinate her knee surgery. Dr. Nix, stated he would be fine with ortho surgery the day after her lap rey as long as it was uncomplicated. Dr. Sheikh will contact medical team if he decides he can/will coordinate his surgery.       Code Status: Full Code    Disposition: Expected discharge unclear.    Venkat Benton    Interval History   Patient in room with . No abdo pain currently. No chest pain, sob. Feels well.     -Data reviewed today: I reviewed all new labs and imaging results over the last 24 hours. I personally reviewed no images or EKG's today.    Physical Exam   Temp: 97.2  F (36.2  C) Temp src: Oral BP: 133/60   Heart Rate: 65 Resp: 16 SpO2: 98 % O2 Device: Nasal cannula  Oxygen Delivery: 1 LPM  There were no vitals filed for this visit.  Vital Signs with Ranges  Temp:  [96.7  F (35.9  C)-100  F (37.8  C)] 97.2  F (36.2  C)  Heart Rate:  [59-72] 65  Resp:  [11-32] 16  BP: (103-143)/(50-81) 133/60  FiO2 (%):  [100 %] 100 %  SpO2:  [92 %-100 %] 98 %  I/O last 3 completed shifts:  In: 2012 [P.O.:60; I.V.:1625]  Out: 985 [Urine:775; Drains:10; Blood:200]    Constitutional: Awake, alert, cooperative, no apparent distress   Respiratory: Clear to auscultation bilaterally, no crackles or wheezing   Cardiovascular: Regular rate and rhythm, normal S1 and S2, and no murmur noted   Abdomen: Normal bowel sounds, soft, non-distended, non-tender   Skin: No rashes, no cyanosis, dry to touch   Neuro: Alert and oriented x3, no weakness, numbness, memory loss   Extremities: No edema, normal range of motion   Other(s):        All other systems: Negative     Medications     Warfarin Therapy Reminder       NaCl 100 mL/hr at 04/30/17 0842       heparin  5,000 Units Subcutaneous Q8H     sodium chloride (PF)  3 mL Intracatheter Q8H     ranitidine  150 mg Oral BID     acetaminophen  975 mg Oral Q8H     metoprolol  50 mg Oral BID     ampicillin-sulbactam (UNASYN) IV  3 g Intravenous Q6H     amLODIPine  10 mg Oral Daily    And     valsartan  320 mg Oral Daily     chlorthalidone  25 mg Oral Daily     levothyroxine  112 mcg Oral Daily       Data     Recent Labs  Lab 04/30/17  1317 04/30/17  0625 04/29/17  2150 04/29/17  1430 04/29/17  0629 04/28/17  0910 04/27/17 2012   WBC  --  19.9*  --   --  20.5* 13.9* 11.7*   HGB  --  10.7*  10.7*  --   --  10.9* 11.8 12.1   MCV  --  100  --   --  101* 100 99   PLT  --  312  --   --  276 240 284   INR 1.53*  --  1.66* 1.73* 1.97* 2.65* 2.54*   NA  --   --   --   --  138  --  137   POTASSIUM 4.3 3.7  --   --  3.1*  --  3.5   CHLORIDE  --   --   --   --  104  --  101   CO2  --   --   --   --  27  --  28   BUN  --   --   --   --  28  --  22   CR  --   --   --   --  0.95  --   0.85   ANIONGAP  --   --   --   --  7  --  8   BRICE  --   --   --   --  8.1*  --  8.8   GLC  --  109*  --   --  94  --  124*   ALBUMIN  --   --   --   --  2.6*  --  3.1*   PROTTOTAL  --   --   --   --  6.6*  --  7.6   BILITOTAL  --   --   --   --  1.0  --  0.4   ALKPHOS  --   --   --   --  96  --  102   ALT  --   --   --   --  24  --  19   AST  --   --   --   --  24  --  19   LIPASE  --   --   --   --   --   --  183       Recent Results (from the past 24 hour(s))   XR Surgery YONATHAN L/T 5 Min Fluoro w Carmen Abbott    INTRAOPERATIVE CHOLANGIOGRAM 4/29/2017 4:29 PM     HISTORY: Laparoscopic cholecystectomy     FLUOROSCOPY TIME: 11 seconds.    IMAGES: A total of 5 spot fluoro and/or cine loops are obtained during  exam.      Impression    IMPRESSION: C-arm images are performed during intraoperative  cholangiogram following laparoscopic cholecystectomy. Contrast  injection is performed through the cystic duct remnant. Contrast  readily fills the central intrahepatic ducts, common hepatic duct and  common bile duct. No filling defects are present to suggest retained  stones. Contrast readily flows into the duodenum without delay.    Findings relayed to the surgeon at the time of the exam.    NERY RODRIGUEZ MD

## 2017-04-30 NOTE — PLAN OF CARE
Problem: Goal Outcome Summary  Goal: Goal Outcome Summary  Outcome: Improving  A/O. Up A1 w/ walker and belt in art. Voiding. BM today was loose will collect if loose again. HOLDEN intact. BS hypo. Tolerating clears. Reporting minimal pain. Dressings intact. Heparin for anticoag. K replaced today via IV.     Spoke with the pharmacist about the appropriateness of heparin and coumadin. Pharmacist gave the OK.

## 2017-04-30 NOTE — ANESTHESIA POSTPROCEDURE EVALUATION
Patient: Zunilda Clark    Procedure(s):  LAPAROSCOPIC CHOLECYSTECTOMY WITH CHOLANGIOGRAMS  - Wound Class: IV-Dirty or Infected    Diagnosis:unknown  Diagnosis Additional Information: Pre-operative diagnosis: acute cholecystitis  Post-operative diagnosis: Acute necrotizing cholecystitis and no evidence of choledocholithiasis.   Procedure: laparoscopic cholecystectomy  intraoperative cholangiogram  abdominal lavage  peritoneal drai, n placement  increased difficulty   Surgeon: Angeles Mcgill MD  Assistant(s): Crystal Cuevas PA-C  The Physician Assistant was medically necessary for their expertise in prepping, camera management, suctioning, suturing and retraction.  Anesthes, ia: general         Anesthesia Type:  General, ETT    Note:  Anesthesia Post Evaluation    Patient location during evaluation: PACU  Patient participation: Able to fully participate in evaluation  Level of consciousness: awake and alert  Pain management: adequate  Airway patency: patent  Cardiovascular status: acceptable  Respiratory status: acceptable  Hydration status: acceptable  PONV: controlled     Anesthetic complications: None          Last vitals:  Vitals:    04/29/17 1845 04/29/17 1900 04/29/17 1920   BP: 103/56 111/56 111/54   Pulse:      Resp: 12 11 11   Temp:   100  F (37.8  C)   SpO2: 94% 93% 94%         Electronically Signed By: Devan Thompson MD  April 29, 2017  7:50 PM

## 2017-04-30 NOTE — PROGRESS NOTES
Arrived to room (628 ) from PACU at (1955 ) via cart, transferred to bed via hover mat without difficulty, alert and oriented x 4, oriented to room and call system, dressing CDI, CMS intact, IV patent and infusing, HOLDEN bulb compressed. rates pain 2/10.  SCD's on BLE. Amy ice chips well. Frequent VS started.

## 2017-04-30 NOTE — PHARMACY-ANTICOAGULATION SERVICE
Clinical Pharmacy - Warfarin Dosing Consult     Pharmacy has been consulted to manage this patient s warfarin therapy.  Indication: Atrial Fibrillation  Therapy Goal: INR 2-3  Warfarin Prior to Admission: Yes  Warfarin PTA Regimen: 5 mg Tues, Thurs, Saturday  2.5 mg rest of the week  Significant drug interactions: Fish Oil, Levothyroxine, Multivits with minerals, Omeprazole, Vitamin D, Tylenol scheduled and prn, Zantac, Heparin SQ, percocet (contains Tylenol)    INR   Date Value Ref Range Status   04/30/2017 1.53 (H) 0.86 - 1.14 Final   04/29/2017 1.66 (H) 0.86 - 1.14 Final       Recommend warfarin 3 mg today.  Pharmacy will monitor Zunilda Clark daily and order warfarin doses to achieve specified goal.      Please contact pharmacy as soon as possible if the warfarin needs to be held for a procedure or if the warfarin goals change.

## 2017-04-30 NOTE — PROGRESS NOTES
[facility]   General Surgery Progress Note           Assessment and Plan:   Assessment:   POD#1 s/p Procedure(s):  LAPAROSCOPIC CHOLECYSTECTOMY WITH CHOLANGIOGRAMS  - Wound Class: IV-Dirty or Infected  Acute gangrenous cholecystitis.  Zunilda Clark is a 76 year old female with parox a fib (on coumadin), bioprosthetic MV replacement in 2008 then replacement again with bioprosthetic valve in 5/2014 (with tricuspid annuloplasty ring) on coumadin, pacemaker, chf, dvt, HTN, stroke with mild left hemiparesis who was admitted on 4/27/2017.  Hg stable.      Plan:   Subcutaneous heparin 5000 tid initiated.    Ok to restart coumadin per hospitalist svc.  ADAT.  oob and ambulate c assist.  PT consult.  Continue HOLDEN, will remove prior to DC home.  Stress ulcer prophylaxis- zantac         Interval History:   Pain much improved relative to postop.         Physical Exam:   Blood pressure 122/59, pulse 67, temperature 97.2  F (36.2  C), temperature source Oral, resp. rate 16, SpO2 93 %, not currently breastfeeding.    I/O last 3 completed shifts:  In: 2012 [P.O.:60; I.V.:1625]  Out: 985 [Urine:775; Drains:10; Blood:200]    Abdomen: soft, distended, NT  Inc(s) - clean, dry, intact.  Without erythema.  Without subcutaneous fluid or masses.  HOLDEN with brown serous fluid c/w NuKnit             Data:     Recent Labs   Lab Test  04/30/17   0625  04/29/17   0629  04/28/17   0910   HGB  10.7*  10.7*  10.9*  11.8   WBC  19.9*  20.5*  13.9*       Angeles Mcgill MD

## 2017-04-30 NOTE — PLAN OF CARE
Problem: Goal Outcome Summary  Goal: Goal Outcome Summary  Outcome: Improving  Vss. Afebrile. Lungs clr-O2 2L low 90s. Is done to 1000. Hypoactive bs/no gas, no nausea. Npo except few ice chips. Last bm 04/27/17. Voiding. Ivf infusing. Drsg (top)-cdi. Drsg (bottom)-dried drainage. Drain drsg-dried drainage. Mao patent. Minimal pain-no medication through night. Potassium & Magnesium replaced-rechecks this am. Pt is a little forgetful. Ambulated to br with walker/Gb x1 assist. Repositioning self. No other significant issues noted overnight.

## 2017-05-01 ENCOUNTER — APPOINTMENT (OUTPATIENT)
Dept: GENERAL RADIOLOGY | Facility: CLINIC | Age: 77
DRG: 418 | End: 2017-05-01
Attending: INTERNAL MEDICINE
Payer: MEDICARE

## 2017-05-01 ENCOUNTER — APPOINTMENT (OUTPATIENT)
Dept: PHYSICAL THERAPY | Facility: CLINIC | Age: 77
DRG: 418 | End: 2017-05-01
Payer: MEDICARE

## 2017-05-01 LAB
BACTERIA SPEC CULT: ABNORMAL
ERYTHROCYTE [DISTWIDTH] IN BLOOD BY AUTOMATED COUNT: 18.6 % (ref 10–15)
FERRITIN SERPL-MCNC: 400 NG/ML (ref 8–252)
HCT VFR BLD AUTO: 29.8 % (ref 35–47)
HGB BLD-MCNC: 9.9 G/DL (ref 11.7–15.7)
INR PPP: 1.48 (ref 0.86–1.14)
IRON SATN MFR SERPL: 9 % (ref 15–46)
IRON SERPL-MCNC: 20 UG/DL (ref 35–180)
MAGNESIUM SERPL-MCNC: 2.4 MG/DL (ref 1.6–2.3)
MCH RBC QN AUTO: 33.3 PG (ref 26.5–33)
MCHC RBC AUTO-ENTMCNC: 33.2 G/DL (ref 31.5–36.5)
MCV RBC AUTO: 100 FL (ref 78–100)
MICRO REPORT STATUS: ABNORMAL
MICROORGANISM SPEC CULT: ABNORMAL
PLATELET # BLD AUTO: 298 10E9/L (ref 150–450)
POTASSIUM SERPL-SCNC: 3.7 MMOL/L (ref 3.4–5.3)
RBC # BLD AUTO: 2.97 10E12/L (ref 3.8–5.2)
RETICS # AUTO: 40.3 10E9/L (ref 25–95)
RETICS/RBC NFR AUTO: 1.3 % (ref 0.5–2)
SPECIMEN SOURCE: ABNORMAL
TIBC SERPL-MCNC: 225 UG/DL (ref 240–430)
WBC # BLD AUTO: 15.7 10E9/L (ref 4–11)

## 2017-05-01 PROCEDURE — 84132 ASSAY OF SERUM POTASSIUM: CPT | Performed by: INTERNAL MEDICINE

## 2017-05-01 PROCEDURE — A9270 NON-COVERED ITEM OR SERVICE: HCPCS | Mod: GY | Performed by: INTERNAL MEDICINE

## 2017-05-01 PROCEDURE — 85027 COMPLETE CBC AUTOMATED: CPT | Performed by: INTERNAL MEDICINE

## 2017-05-01 PROCEDURE — 85610 PROTHROMBIN TIME: CPT | Performed by: INTERNAL MEDICINE

## 2017-05-01 PROCEDURE — 97530 THERAPEUTIC ACTIVITIES: CPT | Mod: GP | Performed by: PHYSICAL THERAPIST

## 2017-05-01 PROCEDURE — 25000125 ZZHC RX 250: Performed by: PHYSICIAN ASSISTANT

## 2017-05-01 PROCEDURE — 25000132 ZZH RX MED GY IP 250 OP 250 PS 637: Mod: GY | Performed by: SURGERY

## 2017-05-01 PROCEDURE — 25000132 ZZH RX MED GY IP 250 OP 250 PS 637: Mod: GY | Performed by: INTERNAL MEDICINE

## 2017-05-01 PROCEDURE — 25000132 ZZH RX MED GY IP 250 OP 250 PS 637: Mod: GY | Performed by: HOSPITALIST

## 2017-05-01 PROCEDURE — 99232 SBSQ HOSP IP/OBS MODERATE 35: CPT | Performed by: INTERNAL MEDICINE

## 2017-05-01 PROCEDURE — A9270 NON-COVERED ITEM OR SERVICE: HCPCS | Mod: GY | Performed by: SURGERY

## 2017-05-01 PROCEDURE — 25000132 ZZH RX MED GY IP 250 OP 250 PS 637: Mod: GY | Performed by: PHYSICIAN ASSISTANT

## 2017-05-01 PROCEDURE — 36415 COLL VENOUS BLD VENIPUNCTURE: CPT | Performed by: INTERNAL MEDICINE

## 2017-05-01 PROCEDURE — 25000128 H RX IP 250 OP 636: Performed by: INTERNAL MEDICINE

## 2017-05-01 PROCEDURE — A9270 NON-COVERED ITEM OR SERVICE: HCPCS | Mod: GY | Performed by: PHYSICIAN ASSISTANT

## 2017-05-01 PROCEDURE — 97161 PT EVAL LOW COMPLEX 20 MIN: CPT | Mod: GP | Performed by: PHYSICAL THERAPIST

## 2017-05-01 PROCEDURE — A9270 NON-COVERED ITEM OR SERVICE: HCPCS | Mod: GY | Performed by: HOSPITALIST

## 2017-05-01 PROCEDURE — 40000193 ZZH STATISTIC PT WARD VISIT: Performed by: PHYSICAL THERAPIST

## 2017-05-01 PROCEDURE — 97116 GAIT TRAINING THERAPY: CPT | Mod: GP | Performed by: PHYSICAL THERAPIST

## 2017-05-01 PROCEDURE — 83735 ASSAY OF MAGNESIUM: CPT | Performed by: INTERNAL MEDICINE

## 2017-05-01 PROCEDURE — 71010 XR CHEST PORT 1 VW: CPT

## 2017-05-01 PROCEDURE — 12000000 ZZH R&B MED SURG/OB

## 2017-05-01 PROCEDURE — 25000128 H RX IP 250 OP 636: Performed by: SURGERY

## 2017-05-01 RX ORDER — WARFARIN SODIUM 5 MG/1
5 TABLET ORAL
Status: COMPLETED | OUTPATIENT
Start: 2017-05-01 | End: 2017-05-01

## 2017-05-01 RX ORDER — LEVOFLOXACIN 500 MG/1
500 TABLET, FILM COATED ORAL DAILY
Status: DISCONTINUED | OUTPATIENT
Start: 2017-05-01 | End: 2017-05-02 | Stop reason: HOSPADM

## 2017-05-01 RX ADMIN — METOPROLOL TARTRATE 50 MG: 50 TABLET, FILM COATED ORAL at 20:04

## 2017-05-01 RX ADMIN — LEVOTHYROXINE SODIUM 112 MCG: 112 TABLET ORAL at 06:32

## 2017-05-01 RX ADMIN — AMLODIPINE BESYLATE 10 MG: 5 TABLET ORAL at 10:57

## 2017-05-01 RX ADMIN — RANITIDINE HYDROCHLORIDE 150 MG: 150 TABLET, FILM COATED ORAL at 10:58

## 2017-05-01 RX ADMIN — RANITIDINE HYDROCHLORIDE 150 MG: 150 TABLET, FILM COATED ORAL at 20:04

## 2017-05-01 RX ADMIN — SODIUM CHLORIDE: 9 INJECTION, SOLUTION INTRAVENOUS at 02:14

## 2017-05-01 RX ADMIN — METOPROLOL TARTRATE 50 MG: 50 TABLET, FILM COATED ORAL at 10:58

## 2017-05-01 RX ADMIN — WARFARIN SODIUM 5 MG: 5 TABLET ORAL at 17:50

## 2017-05-01 RX ADMIN — ACETAMINOPHEN 975 MG: 325 TABLET, FILM COATED ORAL at 10:53

## 2017-05-01 RX ADMIN — LEVOFLOXACIN 500 MG: 500 TABLET, FILM COATED ORAL at 17:47

## 2017-05-01 RX ADMIN — HEPARIN SODIUM 5000 UNITS: 5000 INJECTION, SOLUTION INTRAVENOUS; SUBCUTANEOUS at 18:27

## 2017-05-01 RX ADMIN — AMPICILLIN SODIUM AND SULBACTAM SODIUM 3 G: 2; 1 INJECTION, POWDER, FOR SOLUTION INTRAMUSCULAR; INTRAVENOUS at 00:11

## 2017-05-01 RX ADMIN — POTASSIUM CHLORIDE 20 MEQ: 1500 TABLET, EXTENDED RELEASE ORAL at 17:47

## 2017-05-01 RX ADMIN — ACETAMINOPHEN 975 MG: 325 TABLET, FILM COATED ORAL at 02:11

## 2017-05-01 RX ADMIN — CHLORTHALIDONE 25 MG: 25 TABLET ORAL at 10:58

## 2017-05-01 RX ADMIN — HEPARIN SODIUM 5000 UNITS: 5000 INJECTION, SOLUTION INTRAVENOUS; SUBCUTANEOUS at 02:11

## 2017-05-01 RX ADMIN — ACETAMINOPHEN 975 MG: 325 TABLET, FILM COATED ORAL at 20:04

## 2017-05-01 RX ADMIN — VALSARTAN 320 MG: 80 TABLET, FILM COATED ORAL at 10:54

## 2017-05-01 RX ADMIN — AMPICILLIN SODIUM AND SULBACTAM SODIUM 3 G: 2; 1 INJECTION, POWDER, FOR SOLUTION INTRAMUSCULAR; INTRAVENOUS at 06:32

## 2017-05-01 RX ADMIN — TAZOBACTAM SODIUM AND PIPERACILLIN SODIUM 3.38 G: 375; 3 INJECTION, SOLUTION INTRAVENOUS at 10:52

## 2017-05-01 RX ADMIN — HEPARIN SODIUM 5000 UNITS: 5000 INJECTION, SOLUTION INTRAVENOUS; SUBCUTANEOUS at 10:58

## 2017-05-01 NOTE — PLAN OF CARE
Problem: Goal Outcome Summary  Goal: Goal Outcome Summary  PT: Evaluation completed and treatment initiated. At baseline, pt ambulates with a 4WW. Pt lives with spouse in an apartment and has no stairs to entry. Utilizes a shower chair, walk in shower, grab bars as needed. Today, pt demonstrates mild deconditioning. Pt able to complete sit<>supine with CGA/SBA-pt requests assist, but this writer anticipates pt would have been able to complete without help. Pt completes sit<>stand with SBA from bed height, but requires Gemma from toilet height. Pt ambulates 400' with use of FWW and CGA-taking 1 standing rest break. Pt would benefit from walking with nursing 3x/day. Pt and nursing agreeable. Rec pt discharge home once cleared by primary team.

## 2017-05-01 NOTE — CONSULTS
ID consult dictated IMP 1 75 yo gangrenous rey, SP op, enterobacter GB cx    REC Ok to 1 week po lev alone and disposition

## 2017-05-01 NOTE — PROGRESS NOTES
[facility]   General Surgery Progress Note         Assessment and Plan:   Assessment:   POD#2 s/p Procedure(s):  LAPAROSCOPIC CHOLECYSTECTOMY WITH CHOLANGIOGRAMS  - Wound Class: IV-Dirty or Infected  Acute gangrenous cholecystitis.  Zunilda Clark is a 76 year old female with parox a fib (on coumadin), bioprosthetic MV replacement in 2008 then replacement again with bioprosthetic valve in 5/2014 (with tricuspid annuloplasty ring) on coumadin, pacemaker, chf, dvt, HTN, stroke with mild left hemiparesis who was admitted on 4/27/2017.  Hg stable.      Plan:   Subcutaneous heparin 5000 tid initiated.    ADAT, low-fat.  Continue ABX (Zosyn)  Will consult ID for home ABX recommendation  Increase activity as tolerated.  PT consulted and following  Continue HOLDEN, will remove prior to DC home.  Stress ulcer prophylaxis- zantac  Dispo: Doing well from surgical perspective. Home later today vs tomorrow if continues to do well.         Interval History:   Sitting up in chair, comfortable. Denies pain. Has not needed any pain medications this morning. She has been up walking, tolerating clear liquids, voiding independently and had a loose BM this morning.         Physical Exam:   Blood pressure 136/64, pulse 67, temperature 96.3  F (35.7  C), temperature source Oral, resp. rate 16, SpO2 91 %, not currently breastfeeding.    I/O last 3 completed shifts:  In: 4295 [P.O.:960; I.V.:3335]  Out: 951 [Urine:950; Drains:1]    Abdomen: soft, mildly distended, mild RUQ tenderness  Inc(s) - clean, dry, intact with steristrips.  No erythema.    HOLDEN scant brown serous fluid c/w NuKnit             Data:     Recent Labs  Lab 05/01/17  0650 04/30/17  0625 04/29/17  0629   WBC 15.7* 19.9* 20.5*   HGB 9.9* 10.7*  10.7* 10.9*   HCT 29.8* 32.9* 33.2*    100 101*    312 276         Kj Solis PA-C     The patient has been seen and examined by me.  I agree with the above assessment and plan.  Still bloated.   Advance to fulls today.  Possible DC tomorrow if tolerates diet advancement.  Angeles Mcgill MD

## 2017-05-01 NOTE — PLAN OF CARE
Problem: Goal Outcome Summary  Goal: Goal Outcome Summary  Outcome: Improving  Patient assist of one with walker.  Still on clear liquids due to very rare bowel sounds.  Voiding appropriate amounts.  Tylenol for pain.  Plans to discharge tomorrow to home.

## 2017-05-01 NOTE — PLAN OF CARE
Problem: Goal Outcome Summary  Goal: Goal Outcome Summary  Outcome: Improving  Fatigued today per pt, ita PO slowly but well, up with SBA, gait belt and walker, ambulating in room and art, sat up in chair for meals, HOLDEN patent, steri strips x 2 intact and open to air, scheduled Tylenol managing pain, voiding in good amts, small loose BM with formed pieces this am, plans to dc to tomorrow

## 2017-05-01 NOTE — PROGRESS NOTES
[facility]    Hospitalist Progress Note    Date of Service (when I saw the patient): 05/01/2017    Assessment & Plan   Zunilda Clark is a 76 year old female with paroxysmal Afib (on coumadin), bioprosthetic MV replacement in 2008 then replacement again with bioprosthetic valve in 5/2014 (with tricuspid annuloplasty ring) on coumadin, pacemaker, CHF, DVT, HTN, CVA with mild left hemiparesis who was admitted on 4/27/2017 with acute cholecystitis.    1.  Acute cholecystitis. Post lap rey with drain placement on 04/29.   - Continue IV antibiotics, Unasyn changed to Zosyn based on culture sensitivity result.  - ID consulted pending input.   - CBC in AM.  - Diet, wound care per surgery.  - IS  - Ambulate.  - Heavy growth Enterobacter aerogenes from the rey culture.    2. Parox Afib- she was on Metoprolol and coumadin. Holding coumadin for surgery. She has a bioprosthetic Mitral valve placed in 2008 then re-done in 2014. Does not needs anticoagulation for this.   Resume coumadin tonight per Pharma D.No need to bridg.    3. Mild Post op hypoxia- on oxygen- Weaned her off oxygen, continue IS.    DVT Prophylaxis - Heparin Sq.  Dispo- Inpatient care for now likely in 1-2 days.  Patient is scheduled for knee replacement with Dr. Sheikh on May 17. Dr. Johnson spoke with him to let him know patient is here and will be off her coumadin in case he would want to coordinate her knee surgery. Dr. Nix, stated he would be fine with ortho surgery the day after her lap rey as long as it was uncomplicated. Dr. Sheikh will contact medical team if he decides he can/will coordinate his surgery.       Code Status: Full Code    Disposition: Expected discharge unclear.    Venkat Benton    Interval History   Patient seen and examined, her  at bediside. No abdo pain currently. No chest pain, sob. Feels well.     -Data reviewed today: I reviewed all new labs and imaging results over the last 24 hours.  I personally reviewed no images or EKG's today.    Physical Exam   Temp: 97.8  F (36.6  C) Temp src: Axillary BP: 119/64   Heart Rate: 74 Resp: 16 SpO2: 94 % O2 Device: None (Room air) Oxygen Delivery: 2 LPM  There were no vitals filed for this visit.  Vital Signs with Ranges  Temp:  [96.3  F (35.7  C)-98.6  F (37  C)] 97.8  F (36.6  C)  Heart Rate:  [63-74] 74  Resp:  [16] 16  BP: (118-136)/(55-64) 119/64  SpO2:  [89 %-94 %] 94 %  I/O last 3 completed shifts:  In: 4295 [P.O.:960; I.V.:3335]  Out: 951 [Urine:950; Drains:1]    Constitutional: Awake, alert, cooperative, no apparent distress   Respiratory: Clear to auscultation bilaterally, no crackles or wheezing   Cardiovascular: Regular rate and rhythm, normal S1 and S2, and no murmur noted   Abdomen: Normal bowel sounds, soft, non-distended, non-tender   Skin: No rashes, no cyanosis, dry to touch   Neuro: Alert and oriented x3, no weakness, numbness, memory loss   Extremities: No edema, normal range of motion   Other(s):        All other systems: Negative     Medications     Warfarin Therapy Reminder         piperacillin-tazobactam  3.375 g Intravenous Q6H     warfarin  5 mg Oral ONCE at 18:00     heparin  5,000 Units Subcutaneous Q8H     sodium chloride (PF)  3 mL Intracatheter Q8H     ranitidine  150 mg Oral BID     acetaminophen  975 mg Oral Q8H     metoprolol  50 mg Oral BID     amLODIPine  10 mg Oral Daily    And     valsartan  320 mg Oral Daily     chlorthalidone  25 mg Oral Daily     levothyroxine  112 mcg Oral Daily       Data     Recent Labs  Lab 05/01/17  0650 04/30/17  1317 04/30/17  0625 04/29/17  2150  04/29/17  0629  04/27/17 2012   WBC 15.7*  --  19.9*  --   --  20.5*  < > 11.7*   HGB 9.9*  --  10.7*  10.7*  --   --  10.9*  < > 12.1     --  100  --   --  101*  < > 99     --  312  --   --  276  < > 284   INR 1.48* 1.53*  --  1.66*  < > 1.97*  < > 2.54*   NA  --   --   --   --   --  138  --  137   POTASSIUM 3.7 4.3 3.7  --   --  3.1*   --  3.5   CHLORIDE  --   --   --   --   --  104  --  101   CO2  --   --   --   --   --  27  --  28   BUN  --   --   --   --   --  28  --  22   CR  --   --   --   --   --  0.95  --  0.85   ANIONGAP  --   --   --   --   --  7  --  8   BRICE  --   --   --   --   --  8.1*  --  8.8   GLC  --   --  109*  --   --  94  --  124*   ALBUMIN  --   --   --   --   --  2.6*  --  3.1*   PROTTOTAL  --   --   --   --   --  6.6*  --  7.6   BILITOTAL  --   --   --   --   --  1.0  --  0.4   ALKPHOS  --   --   --   --   --  96  --  102   ALT  --   --   --   --   --  24  --  19   AST  --   --   --   --   --  24  --  19   LIPASE  --   --   --   --   --   --   --  183   < > = values in this interval not displayed.    No results found for this or any previous visit (from the past 24 hour(s)).

## 2017-05-01 NOTE — DISCHARGE INSTRUCTIONS
HOME CARE FOLLOWING LAPAROSCOPIC CHOLECYSTECTOMY  NESTOR Rock E. Gavin, N. Guttormson, D. Maurer, BAYRON Casey, BERYL Allen    INCISIONAL CARE:  Replace the bandage over your incisions until all drainage stops, or if more comfortable to have in place.  If present, leave the steri-strips (white paper tapes) in place for 14 days after surgery.  If Dermabond (a type of skin glue) is present, leave in place until it wears/flakes off.     BATHING:  Avoid baths for 1 week after surgery.  Showers are okay.  You may wash your hair at any time.  Gently pat your incisions dry after bathing.    ACTIVITY:  Light Activity -- you may immediately be up and about as tolerated.  Driving -- you may drive when comfortable and off narcotic pain medications.  Light Work -- resume when comfortable off pain medications.  (If you can drive, you probably can work.)  Strenuous Work/Activity -- limit lifting to 20 pounds for 1 week.  Progressively increase with time.  Active Sports (running, biking, etc.) -- cautiously resume after 2 weeks.    DISCOMFORT:  Use pain medications as prescribed by your surgeon.  Take the pain medication with some food, when possible, to minimize side effects.  Intermittent use of ice packs at the incision sites may help during the first 48 hours.  Expect gradual improvement.  You may experience shoulder pain, which is due to the air placed within your abdomen during the procedure.  This is temporary and usually passes within 2 days.    DIET:  Drink plenty of fluids.  While taking pain medications, increase dietary fiber or add a fiber supplementation like Metamucil or Citrucel to help prevent constipation - a possible side effect of pain medications.  It is not uncommon to experience some bowel changes (loose stools or constipation) after surgery.  Your body has to adapt to you no longer having a gall bladder.  To help minimize this side effect, avoid fatty foods for the first week after surgery.   You may then slowly increase the amount of fatty foods in your diet.      NAUSEA:  If nauseated from the anesthetic/pain meds; rest in bed, get up cautiously with assistance, and drink clear liquids (juice, tea, broth).    RETURN APPOINTMENT:  Schedule a follow-up visit 2-3 weeks post-op.  Office Phone:  [phone]     CONTACT US IF THE FOLLOWING DEVELOPS:   1. A fever that is above 101     2. If there is a large amount of drainage, bleeding, or swelling.   3. Severe pain that is not relieved by your prescription.   4. Drainage that is thick, cloudy, yellow, green or white.   5. Any other questions not answered by  Frequently Asked Questions  sheet.      FREQUENTLY ASKED QUESTIONS:    Q:  How should my incision look?    A:  Normally your incision will appear slightly swollen with light redness directly along the incision itself as it heals.  It may feel like a bump or ridge as the healing/scarring happens, and over time (3-4 months) this bump or ridge feeling should slowly go away.  In general, clear or pink watery drainage can be normal at first as your incision heals, but should decrease over time.    Q:  How do I know if my incision is infected?  A:  Look at your incision for signs of infection, like redness around the incision spreading to surrounding skin, or drainage of cloudy or foul-smelling drainage.  If you feel warm, check your temperature to see if you are running a fever.    **If any of these things occur, please notify the nurse at our office.  We may need you to come into the office for an incision check.      Q:  How do I take care of my incision?  A:  If you have a dressing in place - Starting the day after surgery, replace the dressing 1-2 times a day until there is no further drainage from the incision.  At that time, a dressing is no longer needed.  Try to minimize tape on the skin if irritation is occurring at the tape sites.  If you have significant irritation from tape on the skin, please  call the office to discuss other method of dressing your incision.    Small pieces of tape called  steri-strips  may be present directly overlying your incision; these may be removed 10 days after surgery unless otherwise specified by your surgeon.  If these tapes start to loosen at the ends, you may trim them back until they fall off or are removed.    A:  If you had  Dermabond  tissue glue used as a dressing (this causes your incision to look shiny with a clear covering over it) - This type of dressing wears off with time and does not require more dressings over the top unless it is draining around the glue as it wears off.  Do not apply ointments or lotions over the incisions until the glue has completely worn off.    Q:  There is a piece of tape or a sticky  lead  still on my skin.  Can I remove this?  A:  Sometimes the sticky  leads  used for monitoring during surgery or for evaluation in the emergency department are not all removed while you are in the hospital.  These sometimes have a tab or metal dot on them.  You can easily remove these on your own, like taking off a band-aid.  If there is a gel substance under the  lead , simply wipe/clean it off with a washcloth or paper towel.      Q:  What can I do to minimize constipation (very hard stools, or lack of stools)?  A:  Stay well hydrated.  Increase your dietary fiber intake or take a fiber supplement -with plenty of water.  Walk around frequently.  You may consider an over-the-counter stool-softener.  Your Pharmacist can assist you with choosing one that is stocked at your pharmacy.  Constipation is also one of the most common side effects of pain medication.  If you are using pain medication, be pro-active and try to PREVENT problems with constipation by taking the steps above BEFORE constipation becomes a problem.    Q:  What do I do if I need more pain medications?  A:  Call the office to receive refills.  Be aware that certain pain meds cannot be  called into a pharmacy and actually require a paper prescription.  A change may be made in your pain med as you progress thru your recovery period or if you have side effects to certain meds.    --Pain meds are NOT refilled after 5pm on weekdays, and NOT AT ALL on the weekends, so please look ahead to prevent problems.      Q:  Why am I having a hard time sleeping now that I am at home?  A:  Many medications you receive while you are in the hospital can impact your sleep for a number of days after your surgery/hospitalization.  Decreased level of activity and naps during the day may also make sleeping at night difficult.  Try to minimize day-time naps, and get up frequently during the day to walk around your home during your recovery time.  Sleep aides may be of some help, but are not recommended for long-term use.      Q:  I am having some back discomfort.  What should I do?  A:  This may be related to certain positioning that was required for your surgery, extended periods of time in bed, or other changes in your overall activity level.  You may try ice, heat, acetaminophen, or ibuprofen to treat this temporarily.  Note that many pain medications have acetaminophen in them and would state this on the prescription bottle.  Be sure not to exceed the maximum of 4000mg per day of acetaminophen.     **If the pain you are having does not resolve, is severe, or is a flare of back pain you have had on other occasions prior to surgery, please contact your primary physician for further recommendations or for an appointment to be examined at their office.    Q:  Why am I having headaches?  A:  Headaches can be caused by many things:  caffeine withdrawal, use of pain meds, dehydration, high blood pressure, lack of sleep, over-activity/exhaustion, flare-up of usual migraine headaches.  If you feel this is related to muscle tension (a band-like feeling around the head, or a pressure at the low-back of the head) you may try ice  or heat to this area.  You may need to drink more fluids (try electrolyte drink like Gatorade), rest, or take your usual migraine medications.   **If your headaches do not resolve, worsen, are accompanied by other symptoms, or if your blood pressure is high, please call your primary physician for recommendation and/or examination.    Q:  I am unable to urinate.  What do I do?  A:  A small percentage of people can have difficulty urinating initially after surgery.  This includes being able to urinate only a very small amount at a time and feeling discomfort or pressure in the very low abdomen.  This is called  urinary retention , and is actually an urgent situation.  Proceed to your nearest Emergency department for evaluation (not an Urgent Care Center).  Sometimes the bladder does not work correctly after certain medications you receive during surgery, or related to certain procedures.  You may need to have a catheter placed until your bladder recovers.  When planning to go to an Emergency department, it may help to call the ER to let them know you are coming in for this problem after a surgery.  This may help you get in quicker to be evaluated.  **If you have symptoms of a urinary tract infection, please contact your primary physician for the proper evaluation and treatment.          If you have other questions, please call the office Monday thru Friday between 8am and 5pm to discuss with the nurse or physician assistant.  #[phone]    There is a surgeon ON CALL on weekday evenings and over the weekend in case of urgent need only, and may be contacted at the same number.    If you are having an emergency, call 911 or proceed to your nearest emergency department.

## 2017-05-01 NOTE — PLAN OF CARE
Problem: Goal Outcome Summary  Goal: Goal Outcome Summary  Outcome: Improving  Vss. Afebrile. Lungs clr-O2 2L mid 90s. Is done. Hypoactive bs/little gas, no nausea. Tolerating diet. Last bm 04/27/17. Voiding. Ivf infusing. Drsg (top)-cdi. Drsg (bottom)-dried drainage. Drain drsg-dried drainage. Mao patent. Denies pain. Pt is a little forgetful. Ambulated to br with walker/Gb x1 assist. Repositioning self. No other significant issues noted overnight.

## 2017-05-01 NOTE — PROGRESS NOTES
05/01/17 1202   Quick Adds   Type of Visit Initial PT Evaluation   Living Environment   Lives With spouse   Living Arrangements apartment   Home Accessibility no concerns   Number of Stairs to Enter Home 0   Number of Stairs Within Home 0   Stair Railings at Home none   Transportation Available family or friend will provide   Self-Care   Dominant Hand right   Usual Activity Tolerance good   Current Activity Tolerance moderate   Regular Exercise yes   Activity/Exercise Type walking;biking   Exercise Amount/Frequency 3-5 times/wk   Equipment Currently Used at Home walker, rolling;grab bar;shower chair   Functional Level Prior   Ambulation 1-->assistive equipment   Transferring 1-->assistive equipment   Toileting 1-->assistive equipment   Bathing 1-->assistive equipment   Dressing 0-->independent   Eating 0-->independent   Communication 0-->understands/communicates without difficulty   Swallowing 0-->swallows foods/liquids without difficulty   Cognition 0 - no cognition issues reported   Fall history within last six months no   Which of the above functional risks had a recent onset or change? ambulation;transferring   General Information   Onset of Illness/Injury or Date of Surgery - Date 04/27/17   Referring Physician Angeles Mcgill MD   Patient/Family Goals Statement Return home   Pertinent History of Current Problem (include personal factors and/or comorbidities that impact the POC) Zunilda Clark is a 76 year old female with paroxysmal Afib (on coumadin), bioprosthetic MV replacement in 2008 then replacement again with bioprosthetic valve in 5/2014 (with tricuspid annuloplasty ring) on coumadin, pacemaker, CHF, DVT, HTN, CVA with mild left hemiparesis who was admitted on 4/27/2017 with acute cholecystitis.   Precautions/Limitations fall precautions   Weight-Bearing Status - LLE full weight-bearing   Weight-Bearing Status - RLE full weight-bearing   General Observations Pt supine upon initiation of PT.  "Pt agreeable.    Cognitive Status Examination   Orientation orientation to person, place and time   Level of Consciousness alert   Follows Commands and Answers Questions 100% of the time   Personal Safety and Judgment intact   Memory intact   Pain Assessment   Patient Currently in Pain Yes, see Vital Sign flowsheet   Integumentary/Edema   Integumentary/Edema Comments Dressings present on abdomen.    Posture    Posture Forward head position;Protracted shoulders   Range of Motion (ROM)   ROM Comment B knees limited in flexion   Strength   Strength Comments Able to SLR B.    Bed Mobility   Bed Mobility Comments sit<>supine CGA   Transfer Skills   Transfer Comments sit<>stand Gemma   Gait   Gait Comments FWW and CGA   Sensory Examination   Sensory Perception no deficits were identified   Coordination   Coordination no deficits were identified   Muscle Tone   Muscle Tone no deficits were identified   General Therapy Interventions   Planned Therapy Interventions bed mobility training;gait training;strengthening;stretching;transfer training;home program guidelines;progressive activity/exercise   Clinical Impression   Criteria for Skilled Therapeutic Intervention yes, treatment indicated   PT Diagnosis impaired mobility and endurance   Influenced by the following impairments pain, deconditioning.    Functional limitations due to impairments Difficulty with bed mobility, transfers, ambulation   Clinical Presentation Stable/Uncomplicated   Clinical Presentation Rationale medically stable   Clinical Decision Making (Complexity) Low complexity   Therapy Frequency` daily   Predicted Duration of Therapy Intervention (days/wks) 3 days   Anticipated Discharge Disposition Home   Risk & Benefits of therapy have been explained Yes   Patient, Family & other staff in agreement with plan of care Yes   [facility] AM-PAC TM \"6 Clicks\"   2016, Trustees of [facility], under license to [employer].  All rights reserved.   6 " "Clicks Short Forms Basic Mobility Inpatient Short Form   [facility] AM-PAC  \"6 Clicks\" V.2 Basic Mobility Inpatient Short Form   1. Turning from your back to your side while in a flat bed without using bedrails? 4 - None   2. Moving from lying on your back to sitting on the side of a flat bed without using bedrails? 3 - A Little   3. Moving to and from a bed to a chair (including a wheelchair)? 3 - A Little   4. Standing up from a chair using your arms (e.g., wheelchair, or bedside chair)? 3 - A Little   5. To walk in hospital room? 3 - A Little   6. Climbing 3-5 steps with a railing? 2 - A Lot   Basic Mobility Raw Score (Score out of 24.Lower scores equate to lower levels of function) 18   Total Evaluation Time   Total Evaluation Time (Minutes) 10     "

## 2017-05-02 ENCOUNTER — APPOINTMENT (OUTPATIENT)
Dept: PHYSICAL THERAPY | Facility: CLINIC | Age: 77
DRG: 418 | End: 2017-05-02
Payer: MEDICARE

## 2017-05-02 VITALS
TEMPERATURE: 97.9 F | SYSTOLIC BLOOD PRESSURE: 145 MMHG | DIASTOLIC BLOOD PRESSURE: 64 MMHG | HEART RATE: 67 BPM | OXYGEN SATURATION: 94 % | RESPIRATION RATE: 18 BRPM

## 2017-05-02 LAB
COPATH REPORT: NORMAL
INR PPP: 1.65 (ref 0.86–1.14)
PLATELET # BLD AUTO: 358 10E9/L (ref 150–450)
POTASSIUM SERPL-SCNC: 4.4 MMOL/L (ref 3.4–5.3)

## 2017-05-02 PROCEDURE — A9270 NON-COVERED ITEM OR SERVICE: HCPCS | Mod: GY | Performed by: HOSPITALIST

## 2017-05-02 PROCEDURE — A9270 NON-COVERED ITEM OR SERVICE: HCPCS | Mod: GY | Performed by: PHYSICIAN ASSISTANT

## 2017-05-02 PROCEDURE — 25000132 ZZH RX MED GY IP 250 OP 250 PS 637: Mod: GY | Performed by: SURGERY

## 2017-05-02 PROCEDURE — 40000193 ZZH STATISTIC PT WARD VISIT: Performed by: PHYSICAL THERAPY ASSISTANT

## 2017-05-02 PROCEDURE — A9270 NON-COVERED ITEM OR SERVICE: HCPCS | Mod: GY | Performed by: INTERNAL MEDICINE

## 2017-05-02 PROCEDURE — 25000132 ZZH RX MED GY IP 250 OP 250 PS 637: Mod: GY | Performed by: HOSPITALIST

## 2017-05-02 PROCEDURE — 25000132 ZZH RX MED GY IP 250 OP 250 PS 637: Mod: GY | Performed by: PHYSICIAN ASSISTANT

## 2017-05-02 PROCEDURE — 84132 ASSAY OF SERUM POTASSIUM: CPT | Performed by: INTERNAL MEDICINE

## 2017-05-02 PROCEDURE — 97110 THERAPEUTIC EXERCISES: CPT | Mod: GP | Performed by: PHYSICAL THERAPY ASSISTANT

## 2017-05-02 PROCEDURE — 36415 COLL VENOUS BLD VENIPUNCTURE: CPT | Performed by: INTERNAL MEDICINE

## 2017-05-02 PROCEDURE — 25000132 ZZH RX MED GY IP 250 OP 250 PS 637: Mod: GY | Performed by: INTERNAL MEDICINE

## 2017-05-02 PROCEDURE — A9270 NON-COVERED ITEM OR SERVICE: HCPCS | Mod: GY | Performed by: SURGERY

## 2017-05-02 PROCEDURE — 25000128 H RX IP 250 OP 636: Performed by: SURGERY

## 2017-05-02 PROCEDURE — 99239 HOSP IP/OBS DSCHRG MGMT >30: CPT | Performed by: INTERNAL MEDICINE

## 2017-05-02 PROCEDURE — 85610 PROTHROMBIN TIME: CPT | Performed by: INTERNAL MEDICINE

## 2017-05-02 PROCEDURE — 85049 AUTOMATED PLATELET COUNT: CPT | Performed by: INTERNAL MEDICINE

## 2017-05-02 RX ORDER — HYDROCODONE BITARTRATE AND ACETAMINOPHEN 5; 325 MG/1; MG/1
1-2 TABLET ORAL EVERY 4 HOURS PRN
Qty: 5 TABLET | Refills: 0 | Status: SHIPPED | OUTPATIENT
Start: 2017-05-02 | End: 2017-05-15

## 2017-05-02 RX ORDER — WARFARIN SODIUM 3 MG/1
3 TABLET ORAL DAILY
Qty: 30 TABLET | Refills: 0 | Status: SHIPPED | OUTPATIENT
Start: 2017-05-02 | End: 2018-04-23 | Stop reason: DRUGHIGH

## 2017-05-02 RX ORDER — LEVOFLOXACIN 500 MG/1
500 TABLET, FILM COATED ORAL DAILY
Qty: 7 TABLET | Refills: 0 | Status: SHIPPED | OUTPATIENT
Start: 2017-05-02 | End: 2017-05-09

## 2017-05-02 RX ADMIN — CHLORTHALIDONE 25 MG: 25 TABLET ORAL at 08:01

## 2017-05-02 RX ADMIN — LEVOTHYROXINE SODIUM 112 MCG: 112 TABLET ORAL at 06:13

## 2017-05-02 RX ADMIN — LEVOFLOXACIN 500 MG: 500 TABLET, FILM COATED ORAL at 08:00

## 2017-05-02 RX ADMIN — ACETAMINOPHEN 975 MG: 325 TABLET, FILM COATED ORAL at 11:57

## 2017-05-02 RX ADMIN — METOPROLOL TARTRATE 50 MG: 50 TABLET, FILM COATED ORAL at 08:01

## 2017-05-02 RX ADMIN — VALSARTAN 320 MG: 80 TABLET, FILM COATED ORAL at 07:57

## 2017-05-02 RX ADMIN — ACETAMINOPHEN 975 MG: 325 TABLET, FILM COATED ORAL at 04:02

## 2017-05-02 RX ADMIN — HEPARIN SODIUM 5000 UNITS: 5000 INJECTION, SOLUTION INTRAVENOUS; SUBCUTANEOUS at 11:57

## 2017-05-02 RX ADMIN — RANITIDINE HYDROCHLORIDE 150 MG: 150 TABLET, FILM COATED ORAL at 08:01

## 2017-05-02 RX ADMIN — HEPARIN SODIUM 5000 UNITS: 5000 INJECTION, SOLUTION INTRAVENOUS; SUBCUTANEOUS at 02:30

## 2017-05-02 RX ADMIN — AMLODIPINE BESYLATE 10 MG: 5 TABLET ORAL at 07:59

## 2017-05-02 NOTE — PLAN OF CARE
Problem: Goal Outcome Summary  Goal: Goal Outcome Summary  Pt- plans to return home, no DME needs has RW at home plans to walk as able and do TKA protocol pre OP ex as instructed this date. Gait with Rw to 400 feet, no stairs at home. Ind bed mobility slow due to sx but no physical assist.

## 2017-05-02 NOTE — CONSULTS
SWS     D: Per request to assess for discharge needs.. chart reviewed noting pt's admit on 4/27 due to acute abdominal pain, with findings of acute cholecystitis, surgery on 4/29. Noted progress with recovery, noted PT status and progress with anticipation of pt's return home on discharge. Pt lives with her  in their apartment in [place], prior to surgery she had been independent with ambulation (use of walker) and ADLs with use of DME for bathing, no current home care services.       I: Met with pt,  also present in room. Pt affirms planning for her return home and notes not only of the availability of her  to help but also that of her sister who lives in the same apartment complex as they do. Additionally pt has daughters who will be coming from out-state ([place], [place], [place]) to visit, this has been in the planning as pt was scheduled to have TKA on 5/17 which now she has delayed to a future date. Pt has identified no home services or support needs based on family availability, SW has offered consideration of free 60 trial of lifeline services which pt has declined at this time. SW has encouraged that pt/ contact SW should needs arise.     A/P: Good family support, no SW needs identified.

## 2017-05-02 NOTE — CONSULTS
INFECTIOUS DISEASE CONSULTATION      REFERRING PHYSICIAN:  Dr. Mcgill      IMPRESSION:   1.  A 76-year-old female with gangrenous cholecystitis, status post cholecystectomy, Enterobacter in the gallbladder bed some initial sepsis but all resolved, no blood cultures done but doubt bacteremia.   2.  History of prosthetic valve on her mitral and known tricuspid and aortic regurgitation.   3.  Pulmonary hypertension.   4.  Rheumatoid arthritis on chronic methotrexate.   5.  Pacemaker.      RECOMMENDATIONS:     1.  All major infection to be resected here.  Should be able to treat this orally, go to oral Levaquin 500 mg daily for 1 week.   2.  If she has either side effects from this or recurrent symptoms call for further evaluation.  Okay disposition when other issues allow.      HISTORY:  Zunilda Clark is a 76-year-old female seen in consultation due to gangrenous cholecystitis and Enterobacter in the gallbladder bed.  Patient has a history of some gallbladder symptoms going back probably the last year or so and prior known stones, has never previously caused major issue and then developed acute abdominal pain and some degree of sepsis symptoms this admission, never had major fever, did not have blood cultures done, imaging showed the acute gallbladder and gangrenous infection which was surgically intervened on 04/29.  Operative findings included cultures growing Enterobacter.  She has underlying atrial fibrillation on warfarin, has a pacemaker in place and mitral valve replacement with a bioprosthetic valve and pulmonary hypertension.  Now of these have had any major infection issues associated with them historically.  She is not really having any lower urinary tract symptoms and has had occasional UTIs and has E. coli in the urine but doubt significance.  No significant current symptoms.  Feels well, taking oral without difficulty.      PAST MEDICAL HISTORY:  Some recurrent UTIs, although by description sounds like all  asymptomatic bacteriuria.  Prior history of known gallstones, but not gallbladder disease.  History of pulmonary hypertension, mitral valve replacement and tricuspid and aortic valve regurgitation, has a pacemaker in place.  History of rheumatoid arthritis on chronic methotrexate.      ALLERGIES:  No known allergies.      MEDICATIONS:  No recent antibiotics prior to this.      SOCIAL AND FAMILY HISTORY:  No particular recent pathogens.  No family history of note.  No alcohol or tobacco use of note.      MEDICATIONS:  As listed.      REVIEW OF SYSTEMS:  Otherwise unremarkable.  Feels well, eating better.  No significant abdominal symptoms.  N major fevers, chills or sweats.      PHYSICAL EXAMINATION:   GENERAL:  The patient appears her stated age, does not look particularly toxic or ill.   VITAL SIGNS:  Currently within normal limits including being afebrile.   HEENT:  No thrush or intraoral lesions.  Pupils reactive.   NECK:  Supple and nontender, no lymphadenopathy.   HEART:  Slight systolic murmur but not particularly significant, is in regular rhythm currently.  Pacemaker site unremarkable.   LUNGS:  Clear.   ABDOMEN:  Not really tender even to fairly deep palpation in the upper abdomen.   EXTREMITIES:  No major rash or skin lesions.      LABORATORY DATA:  Gallbladder bed culture growing Enterobacter.  Anaerobic culture pending.      Thank you very much for consultation.  I will follow the patient with you.         ANA CASILLAS MD             D: 2017 17:33   T: 2017 22:54   MT: MIAH#[id number]      Name:     NADIYA LEWIS   MRN:      [id number]        Account:       [id number]   :      1940           Consult Date:  2017      Document: [id number]       cc: Yunior Huang MD

## 2017-05-02 NOTE — PROGRESS NOTES
Discussed the plans for her TKA,  Given her recent surgery, I feel that it is best to wait for the patient to recover before planning another major operation.  She is in agreement and will contact my office when she feels ready to proceed.

## 2017-05-02 NOTE — PROGRESS NOTES
[facility]  Infectious Disease Progress Note          Assessment and Plan:   IMPRESSION:   1. A 76-year-old female with gangrenous cholecystitis, status post cholecystectomy, Enterobacter in the gallbladder bed some initial sepsis but all resolved, no blood cultures done but doubt bacteremia.   2. History of prosthetic valve on her mitral and known tricuspid and aortic regurgitation.   3. Pulmonary hypertension.   4. Rheumatoid arthritis on chronic methotrexate.   5. Pacemaker.       RECOMMENDATIONS:   1. All major infection to be resected here. Should be able to treat this orally, go to oral Levaquin 500 mg daily for 1 week.   2. If she has either side effects from this or recurrent symptoms call for further evaluation. Okay disposition when other issues allow. INR Fu on lev in few days,         Interval History:   no new complaints and doing well; no cp, sob, n/v/d, or abd pain. Feels well              Medications:       levofloxacin  500 mg Oral Daily     heparin  5,000 Units Subcutaneous Q8H     sodium chloride (PF)  3 mL Intracatheter Q8H     ranitidine  150 mg Oral BID     acetaminophen  975 mg Oral Q8H     metoprolol  50 mg Oral BID     amLODIPine  10 mg Oral Daily    And     valsartan  320 mg Oral Daily     chlorthalidone  25 mg Oral Daily     levothyroxine  112 mcg Oral Daily                  Physical Exam:   Blood pressure 145/64, pulse 67, temperature 97.9  F (36.6  C), temperature source Oral, resp. rate 18, SpO2 94 %, not currently breastfeeding.  Wt Readings from Last 2 Encounters:   04/05/17 72.6 kg (160 lb)   11/15/16 72.6 kg (160 lb)     Vital Signs with Ranges  Temp:  [96.9  F (36.1  C)-97.9  F (36.6  C)] 97.9  F (36.6  C)  Heart Rate:  [62-74] 74  Resp:  [16-24] 18  BP: (117-145)/(54-64) 145/64  SpO2:  [88 %-97 %] 94 %    Constitutional: Awake, alert, cooperative, no apparent distress   Lungs: Clear to auscultation bilaterally, no crackles or wheezing   Cardiovascular:  Regular rate and rhythm, normal S1 and S2, and no murmur noted   Abdomen: Normal bowel sounds, soft, non-distended, non-tender   Skin: No rashes, no cyanosis, no edema   Other:           Data:   All microbiology laboratory data reviewed.  Recent Labs   Lab Test  05/02/17   0729  05/01/17   0650  04/30/17   0625  04/29/17   0629   WBC   --   15.7*  19.9*  20.5*   HGB   --   9.9*  10.7*  10.7*  10.9*   HCT   --   29.8*  32.9*  33.2*   MCV   --   100  100  101*   PLT  358  298  312  276     Recent Labs   Lab Test  04/29/17   0629  04/27/17 2012 11/04/16   0844   CR  0.95  0.85  1.02     Recent Labs   Lab Test  07/03/15   1037   SED  32*     Recent Labs   Lab Test  04/29/17   1615  04/27/17   2210  05/05/15   1527  04/26/14   0130   CULT  Heavy growth Enterobacter aerogenes*  Culture negative monitoring continues  >100,000 colonies/mL Escherichia coli*  >100,000 colonies/mL Escherichia coli  50,000 to 100,000 colonies/mL mixed urogenital melody  *  No growth

## 2017-05-02 NOTE — PLAN OF CARE
Problem: Goal Outcome Summary  Goal: Goal Outcome Summary  VS /59 (BP Location: Right arm)  Pulse 67  Temp 96.9  F (36.1  C) (Oral)  Resp 16  SpO2 95%  Lung sounds Clear/diminished  O2 2L via nasal cannula  Bowel sounds +bowel sounds, BM x2 overnight  Tolerating regular diet  IVF Saline locked  Drains HOLDEN with 5ml output  Activity up with SBA and walker  Pain Denies  Patient/family centered care Plan of care discussed with patient. Continue to provide supportive care.  Discharge plan Possible d/c today?

## 2017-05-02 NOTE — PLAN OF CARE
Problem: Goal Outcome Summary  Goal: Goal Outcome Summary  Physical Therapy Discharge Summary     Reason for therapy discharge:    All goals and outcomes met, no further needs identified.     Progress towards therapy goal(s). See goals on Care Plan in [facility] electronic health record for goal details.  Goals met     Therapy recommendation(s):    Continued therapy is recommended.  Rationale/Recommendations:  .. Pt is below baseline for functional mobility and strength. Pt would benefit from continued skilled therapy to address these deficits.

## 2017-05-02 NOTE — DISCHARGE SUMMARY
PRIMARY CARE PHYSICIAN:  Dr. Yunior Huang.      DATE OF ADMISSION:  04/27/2017.       DATE OF DISCHARGE:  05/02/2017.       DISCHARGE DIAGNOSES:   1.  Acute cholecystitis, status post laparoscopic cholecystectomy on 04/29/2017.   2.  Paroxysmal atrial fibrillation, now in sinus.   3.  Mild postop hypoxia, resolved.   4.  Rheumatoid arthritis on chronic methotrexate.   5.  Pulmonary hypertension and permanent pacemaker.      DISPOSITION:  Home.      DISCHARGE MEDICATIONS:     Discharge Medication List as of 5/2/2017  1:42 PM      START taking these medications    Details   HYDROcodone-acetaminophen (NORCO) 5-325 MG per tablet Take 1-2 tablets by mouth every 4 hours as needed for pain, Disp-5 tablet, R-0, Local PrintTake with food to minimize nausea/side effects.      levofloxacin (LEVAQUIN) 500 MG tablet Take 1 tablet (500 mg) by mouth daily for 7 days, Disp-7 tablet, R-0, E-Prescribe         CONTINUE these medications which have CHANGED    Details   warfarin (COUMADIN) 3 MG tablet Take 1 tablet (3 mg) by mouth daily For 3 days, check INR on 05/05 to adjust the dose., Disp-30 tablet, R-0, E-Prescribe         CONTINUE these medications which have NOT CHANGED    Details   ALLOPURINOL PO Take 100 mg by mouth 2 times daily, Historical      multivitamin, therapeutic with minerals (MULTI-VITAMIN) TABS tablet Take 1 tablet by mouth daily, Historical      calcium citrate (CALCITRATE) 950 MG tablet Take 1 tablet by mouth daily, Historical      VITAMIN D, CHOLECALCIFEROL, PO Take 1,000 Units by mouth daily, Historical      fish oil-omega-3 fatty acids 1000 MG capsule Take 2 g by mouth daily, Historical      ibandronate (BONIVA) 3 MG/3ML Inject 3 mg into the vein once, Historical      Amlodipine Besylate-Valsartan (EXFORGE)  MG TABS Take 1 tablet by mouth daily, Disp-90 tablet, R-3, E-Prescribe      omeprazole (PRILOSEC) 20 MG CR capsule Take 1 capsule (20 mg) by mouth 2 times daily, Disp-180 capsule, R-3, E-Prescribe       levothyroxine (SYNTHROID/LEVOTHROID) 112 MCG tablet Take 1 tablet (112 mcg) by mouth daily, Disp-90 tablet, R-3, E-Prescribe      metoprolol (LOPRESSOR) 100 MG tablet Take 0.5 tablets (50 mg) by mouth 2 times daily, Disp-60 tablet, R-5, E-Prescribe      chlorthalidone (HYGROTON) 25 MG tablet Take 1 tablet (25 mg) by mouth daily, Disp-90 tablet, R-2, E-Prescribe      folic acid (FOLVITE) 1 MG tablet Take 1 mg by mouth daily, Historical      METHOTREXATE SODIUM IJ Inject 0.8 mLs as directed once a week, Historical           Her Coumadin dose was decreased to 3 mg p.o. daily for 3 days as she is on Levaquin, and she will get INR checked on 05/05/2017 to adjust the dose.      DISCHARGE CONDITION:  Stable.      DISCHARGE VITAL SIGNS:  Blood pressure 145/64, pulse rate 67, temperature 98, oxygen saturation 94% on room air.      PHYSICAL EXAMINATION:    HEENT:  Pink nonicteric.  Extraocular muscle movement intact.   NECK:  Supple, no JVD, no thyromegaly, no lymphadenopathy.   CHEST:  Good air entry bilaterally.  No wheezing, crackles or rales.   CARDIOVASCULAR:  S1, S2 were heard.  No gallop or murmur.  Regular.   ABDOMEN:  Obese, soft, positive bowel sound.  Surgical sites and drain sites dressed, no discharge.   EXTREMITIES:  No edema, cyanosis or clubbing.   NEUROLOGIC:  No focal neurologic deficit.  Cranial nerves grossly intact.     PSYCH:  Normal mood and affect, keeps eye contact, responds to questions appropriately, no agitation or anxiety.      PROCEDURES DONE DURING THIS ADMISSION:  Laparoscopic cholecystectomy.      IMAGING STUDIES:  Include chest x-ray, CT scan of the abdomen and pelvis, and ultrasound of the right upper quadrant.       LABORATORY:  Fluid culture grew E. coli sensitive to common antibiotics.        DISCHARGE LABORATORY:  Potassium 4.4, magnesium 2.4.  WBC 15.7, down from 20.5, hematocrit 29, hemoglobin 9.9, platelets 298, .      DISCHARGE ACTIVITY:  As tolerated.      DISCHARGE  FOLLOWUP:    Primary care provider in 1 week.  General Surgery as instructed.  Wound care per General Surgery.    INR check on 2017.      BRIEF HISTORY AND HOSPITAL COURSE:  Nadiya Clark is a 76-year-old, very pleasant white female with past medical history of paroxysmal atrial fibrillation on anticoagulation, pacemaker placement, congestive heart failure, pulmonary hypertension and mitral valve replacement with bioprosthetic valve, who presented to the hospital on 2017 with right upper quadrant pain and found to have acute cholecystitis.  The patient was evaluated in the emergency room, also by surgeon the same day.  The plan was to help Coumadin drift down by itself, the INR, and the patient was kept on IV antibiotics; the next day her INR was still elevated, but her white count also increased.  Initially the patient was on Unasyn.  She was given vitamin K, INR was reversed and she had surgery done by Dr. Angeles Mcgill on 2017; a laparoscopic cholecystectomy with drain placement.  Antibiotic was changed from Unasyn to Zosyn as the E. coli was resistant to Unasyn.  The patient remained stable.  The drain tube was removed.  She was started on Coumadin; there is no need for bridging.  The risk of supratherapeutic INR is high as the patient is on Levaquin.  I decreased the home dose of Coumadin and she will have her INR check in 3 days.  All her questions and concerns addressed.  Her  also at bedside, agreed with the plan of discharge.      Total time spent coordinating her discharge face-to-face discussing with the patient was over 35 minutes.         KYLE CLARK MD             D: 2017 15:35   T: 2017 16:51   MT: MIAH#[id number]      Name:     NADIYA CLARK   MRN:      [id number]        Account:        [id number]   :      1940           Admit Date:                                       Discharge Date: 2017      Document: [id number]       cc: Yunior  Sal SIMMONS

## 2017-05-02 NOTE — PHARMACY-ANTICOAGULATION SERVICE
Clinical Pharmacy- Warfarin Discharge Note  This patient is currently on warfarin for the treatment of Atrial fibrillation.  INR Goal= 2-3  Expected length of therapy undetermined.  Prior to admit dose: Warfarin 5mg Tues,Thur,Sat & 2.5mg all other days      Anticoagulation Dose History     Recent Dosing and Labs Latest Ref Rng & Units 4/28/2017 4/29/2017 4/29/2017 4/29/2017 4/30/2017 5/1/2017 5/2/2017    Warfarin 3 mg - - - - - 3 mg - -    Warfarin 5 mg - - - - - - 5 mg -    INR 0.86 - 1.14 2.65(H) 1.97(H) 1.73(H) 1.66(H) 1.53(H) 1.48(H) 1.65(H)    INR 0.86 - 1.14 - - - - - - -          Vitamin K doses administered during the last 7 days: 5mg PO on 4/28 & 2mg IV on 4/29   FFP administered during the last 7 days: 4/29  New drug interactions: Levofloxacin    INR had been reversed for surgery, warfarin now resumed. Levofloxacin may increase INR, agree with discharge orders for warfarin 3mg daily with INR check on 5/5.

## 2017-05-02 NOTE — PLAN OF CARE
Problem: Goal Outcome Summary  Goal: Goal Outcome Summary  Up with SBA and walker. Ambulating to BR. Has had a loose stool today. Voiding. O2 94% on RA. Desats with activity but rebounds with deep breathing. Encouraged IS. HOLDEN removed by surgical PA. Dressing changed. Lap sites intact with steri strips. Pain minimal, taking Tylenol.  at bedside. Eating regular low fat diet and tolerating well. Discharge orders received. Prescription being filled and she will then be discharged home.

## 2017-05-02 NOTE — PLAN OF CARE
Problem: Individualization  Goal: Patient Preferences  Outcome: Improving  Vitals stable. Room air. Dysemic on exertion. Was 88% on room air and RR of 24 after going to the bathroom. Rebounded to 92% after resting in bed for a couple of minutes. Lungs clear. No cough. Will monitor resp sats. Plans to go home tomorrow. abd tender right lower quad. Good bowel sounds. BM day shift. No acute distress. No edema voiding. Tylenol for discomfort. HOLDEN dark brown color drainage, small amount. Steri strips and dressing to right quad clean and dry. Resting between cares and following plan of care.

## 2017-05-02 NOTE — PROGRESS NOTES
[facility]   General Surgery Progress Note         Assessment and Plan:   Assessment:   -POD#3 s/p LAPAROSCOPIC CHOLECYSTECTOMY WITH CHOLANGIOGRAMS (no filling defects), placement of intraperitoneal drain.  For Acute gangrenous cholecystitis.  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  INR 1.65  -O2 supplementation prn      Plan:   -ID recommendation is Levaquin for 7days at discharge.  Pt also on warfarin therefore will defer Rx to Hospitalist; unsure if this may impact dosing.  -Low-fat diet for 1-2 weeks.  Increase intake as tolerated.  -Daily dressing change at previous drain site.  -IS hourly  -Dispo: Doing well from surgical perspective. Home later today if cleared by Hospitalist.  -DC Rx: norco (pt will fill only if needed).  OTC bowel program prn.  RTC 2-3 weeks for postop appt.  Discharge instructions were reviewed with the patient in detail.  All her questions/concerns were addressed.  She is aware that a printed copy of instructions will be given to her upon discharge.  -If patient remains inpt for medical issues, will follow peripherally.         Interval History:   Comfortable in bed.  No pain.  Tolerated muffin this morning, taking it slowly.  Voiding well.  +BMs.  Up with walker, feels she is doing well with activity.  Was on O2 overnight again.         Physical Exam:   Blood pressure 145/64, pulse 67, temperature 97.9  F (36.6  C), temperature source Oral, resp. rate 18, SpO2 97 %, not currently breastfeeding.    I/O last 3 completed shifts:  In: 1259 [P.O.:750; I.V.:509]  Out: 425 [Urine:400; Drains:25]    Abdomen: soft, mildly distended  Incs - clean, dry, intact with steristrips.  No erythema.    HOLDEN scant brown serous fluid c/w NuKnit.  Drain removed without issues.   Dressing placed.             Data:     Recent Labs  Lab 05/02/17  0729 05/01/17  0650 04/30/17  0625 04/29/17  0629   WBC  --  15.7* 19.9* 20.5*   HGB  --  9.9* 10.7*  10.7* 10.9*   HCT  --  29.8* 32.9* 33.2*   MCV  --  100 100 101*    [phone]       YOLANDE Wade MD

## 2017-05-03 ENCOUNTER — TELEPHONE (OUTPATIENT)
Dept: INTERNAL MEDICINE | Facility: CLINIC | Age: 77
End: 2017-05-03

## 2017-05-03 ENCOUNTER — TELEPHONE (OUTPATIENT)
Dept: CARDIOLOGY | Facility: CLINIC | Age: 77
End: 2017-05-03

## 2017-05-03 ENCOUNTER — HOSPITAL ENCOUNTER (OUTPATIENT)
Facility: CLINIC | Age: 77
Setting detail: OBSERVATION
Discharge: HOME | End: 2017-05-04
Attending: EMERGENCY MEDICINE | Admitting: INTERNAL MEDICINE
Payer: MEDICARE

## 2017-05-03 ENCOUNTER — APPOINTMENT (OUTPATIENT)
Dept: GENERAL RADIOLOGY | Facility: CLINIC | Age: 77
End: 2017-05-03
Attending: EMERGENCY MEDICINE
Payer: MEDICARE

## 2017-05-03 DIAGNOSIS — Z79.01 CHRONIC ANTICOAGULATION: ICD-10-CM

## 2017-05-03 DIAGNOSIS — I50.31 ACUTE DIASTOLIC CONGESTIVE HEART FAILURE (H): Primary | ICD-10-CM

## 2017-05-03 DIAGNOSIS — I10 ESSENTIAL HYPERTENSION WITH GOAL BLOOD PRESSURE LESS THAN 130/85: ICD-10-CM

## 2017-05-03 DIAGNOSIS — R06.02 SHORTNESS OF BREATH: ICD-10-CM

## 2017-05-03 DIAGNOSIS — I10 ESSENTIAL HYPERTENSION: ICD-10-CM

## 2017-05-03 DIAGNOSIS — I50.21 ACUTE SYSTOLIC CONGESTIVE HEART FAILURE (H): ICD-10-CM

## 2017-05-03 LAB
ALBUMIN SERPL-MCNC: 2.2 G/DL (ref 3.4–5)
ALP SERPL-CCNC: 176 U/L (ref 40–150)
ALT SERPL W P-5'-P-CCNC: 25 U/L (ref 0–50)
ANION GAP SERPL CALCULATED.3IONS-SCNC: 7 MMOL/L (ref 3–14)
ANISOCYTOSIS BLD QL SMEAR: SLIGHT
AST SERPL W P-5'-P-CCNC: 35 U/L (ref 0–45)
BASE EXCESS BLDV CALC-SCNC: 2.3 MMOL/L
BASOPHILS # BLD AUTO: 0 10E9/L (ref 0–0.2)
BASOPHILS NFR BLD AUTO: 0 %
BILIRUB SERPL-MCNC: 0.6 MG/DL (ref 0.2–1.3)
BUN SERPL-MCNC: 19 MG/DL (ref 7–30)
CALCIUM SERPL-MCNC: 9.3 MG/DL (ref 8.5–10.1)
CHLORIDE SERPL-SCNC: 102 MMOL/L (ref 94–109)
CO2 SERPL-SCNC: 27 MMOL/L (ref 20–32)
CREAT SERPL-MCNC: 0.8 MG/DL (ref 0.52–1.04)
DIFFERENTIAL METHOD BLD: ABNORMAL
EOSINOPHIL # BLD AUTO: 0.2 10E9/L (ref 0–0.7)
EOSINOPHIL NFR BLD AUTO: 1 %
ERYTHROCYTE [DISTWIDTH] IN BLOOD BY AUTOMATED COUNT: 18.2 % (ref 10–15)
GFR SERPL CREATININE-BSD FRML MDRD: 70 ML/MIN/1.7M2
GLUCOSE SERPL-MCNC: 98 MG/DL (ref 70–99)
HCO3 BLDV-SCNC: 27 MMOL/L (ref 21–28)
HCT VFR BLD AUTO: 31.5 % (ref 35–47)
HGB BLD-MCNC: 10.3 G/DL (ref 11.7–15.7)
INR PPP: 1.95 (ref 0.86–1.14)
INTERPRETATION ECG - MUSE: NORMAL
LYMPHOCYTES # BLD AUTO: 0.9 10E9/L (ref 0.8–5.3)
LYMPHOCYTES NFR BLD AUTO: 6 %
MACROCYTES BLD QL SMEAR: PRESENT
MCH RBC QN AUTO: 32.1 PG (ref 26.5–33)
MCHC RBC AUTO-ENTMCNC: 32.7 G/DL (ref 31.5–36.5)
MCV RBC AUTO: 98 FL (ref 78–100)
METAMYELOCYTES # BLD: 0.2 10E9/L
METAMYELOCYTES NFR BLD MANUAL: 1 %
MONOCYTES # BLD AUTO: 1.9 10E9/L (ref 0–1.3)
MONOCYTES NFR BLD AUTO: 12 %
MYELOCYTES # BLD: 0.2 10E9/L
MYELOCYTES NFR BLD MANUAL: 1 %
NEUTROPHILS # BLD AUTO: 12.2 10E9/L (ref 1.6–8.3)
NEUTROPHILS NFR BLD AUTO: 79 %
NT-PROBNP SERPL-MCNC: 4812 PG/ML (ref 0–1800)
O2/TOTAL GAS SETTING VFR VENT: NORMAL %
PCO2 BLDV: 43 MM HG (ref 40–50)
PH BLDV: 7.41 PH (ref 7.32–7.43)
PLATELET # BLD AUTO: 456 10E9/L (ref 150–450)
PLATELET # BLD EST: ABNORMAL 10*3/UL
PO2 BLDV: 26 MM HG (ref 25–47)
POTASSIUM SERPL-SCNC: 4.2 MMOL/L (ref 3.4–5.3)
PROT SERPL-MCNC: 6.6 G/DL (ref 6.8–8.8)
RBC # BLD AUTO: 3.21 10E12/L (ref 3.8–5.2)
SODIUM SERPL-SCNC: 136 MMOL/L (ref 133–144)
TROPONIN I SERPL-MCNC: NORMAL UG/L (ref 0–0.04)
WBC # BLD AUTO: 15.5 10E9/L (ref 4–11)

## 2017-05-03 PROCEDURE — A9270 NON-COVERED ITEM OR SERVICE: HCPCS | Mod: GY | Performed by: EMERGENCY MEDICINE

## 2017-05-03 PROCEDURE — G0378 HOSPITAL OBSERVATION PER HR: HCPCS

## 2017-05-03 PROCEDURE — 25000128 H RX IP 250 OP 636: Performed by: EMERGENCY MEDICINE

## 2017-05-03 PROCEDURE — 96374 THER/PROPH/DIAG INJ IV PUSH: CPT

## 2017-05-03 PROCEDURE — 93005 ELECTROCARDIOGRAM TRACING: CPT

## 2017-05-03 PROCEDURE — A9270 NON-COVERED ITEM OR SERVICE: HCPCS | Mod: GY | Performed by: INTERNAL MEDICINE

## 2017-05-03 PROCEDURE — 25000132 ZZH RX MED GY IP 250 OP 250 PS 637: Mod: GY | Performed by: INTERNAL MEDICINE

## 2017-05-03 PROCEDURE — 85025 COMPLETE CBC W/AUTO DIFF WBC: CPT | Performed by: EMERGENCY MEDICINE

## 2017-05-03 PROCEDURE — 85610 PROTHROMBIN TIME: CPT | Performed by: EMERGENCY MEDICINE

## 2017-05-03 PROCEDURE — 83880 ASSAY OF NATRIURETIC PEPTIDE: CPT | Performed by: EMERGENCY MEDICINE

## 2017-05-03 PROCEDURE — 80053 COMPREHEN METABOLIC PANEL: CPT | Performed by: EMERGENCY MEDICINE

## 2017-05-03 PROCEDURE — 99285 EMERGENCY DEPT VISIT HI MDM: CPT | Mod: 25

## 2017-05-03 PROCEDURE — 25000132 ZZH RX MED GY IP 250 OP 250 PS 637: Mod: GY | Performed by: EMERGENCY MEDICINE

## 2017-05-03 PROCEDURE — 71020 XR CHEST 2 VW: CPT

## 2017-05-03 PROCEDURE — 82803 BLOOD GASES ANY COMBINATION: CPT | Performed by: EMERGENCY MEDICINE

## 2017-05-03 PROCEDURE — 99220 ZZC INITIAL OBSERVATION CARE,LEVL III: CPT | Performed by: INTERNAL MEDICINE

## 2017-05-03 PROCEDURE — 84484 ASSAY OF TROPONIN QUANT: CPT | Performed by: EMERGENCY MEDICINE

## 2017-05-03 RX ORDER — FOLIC ACID 1 MG/1
1 TABLET ORAL DAILY
Status: DISCONTINUED | OUTPATIENT
Start: 2017-05-04 | End: 2017-05-04 | Stop reason: HOSPADM

## 2017-05-03 RX ORDER — MULTIPLE VITAMINS W/ MINERALS TAB 9MG-400MCG
1 TAB ORAL DAILY
Status: DISCONTINUED | OUTPATIENT
Start: 2017-05-04 | End: 2017-05-04 | Stop reason: HOSPADM

## 2017-05-03 RX ORDER — ONDANSETRON 4 MG/1
4 TABLET, ORALLY DISINTEGRATING ORAL EVERY 6 HOURS PRN
Status: DISCONTINUED | OUTPATIENT
Start: 2017-05-03 | End: 2017-05-04 | Stop reason: HOSPADM

## 2017-05-03 RX ORDER — WARFARIN SODIUM 3 MG/1
3 TABLET ORAL
Status: COMPLETED | OUTPATIENT
Start: 2017-05-03 | End: 2017-05-03

## 2017-05-03 RX ORDER — METOPROLOL TARTRATE 50 MG
50 TABLET ORAL 2 TIMES DAILY
Status: DISCONTINUED | OUTPATIENT
Start: 2017-05-03 | End: 2017-05-04 | Stop reason: HOSPADM

## 2017-05-03 RX ORDER — CHLORTHALIDONE 25 MG/1
25 TABLET ORAL DAILY
Status: DISCONTINUED | OUTPATIENT
Start: 2017-05-04 | End: 2017-05-04 | Stop reason: HOSPADM

## 2017-05-03 RX ORDER — NITROGLYCERIN 0.4 MG/1
0.4 TABLET SUBLINGUAL EVERY 5 MIN PRN
Status: DISCONTINUED | OUTPATIENT
Start: 2017-05-03 | End: 2017-05-04 | Stop reason: HOSPADM

## 2017-05-03 RX ORDER — NALOXONE HYDROCHLORIDE 0.4 MG/ML
.1-.4 INJECTION, SOLUTION INTRAMUSCULAR; INTRAVENOUS; SUBCUTANEOUS
Status: DISCONTINUED | OUTPATIENT
Start: 2017-05-03 | End: 2017-05-04 | Stop reason: HOSPADM

## 2017-05-03 RX ORDER — LIDOCAINE 40 MG/G
CREAM TOPICAL
Status: DISCONTINUED | OUTPATIENT
Start: 2017-05-03 | End: 2017-05-04 | Stop reason: HOSPADM

## 2017-05-03 RX ORDER — ACETAMINOPHEN 325 MG/1
650 TABLET ORAL EVERY 4 HOURS PRN
Status: DISCONTINUED | OUTPATIENT
Start: 2017-05-03 | End: 2017-05-04 | Stop reason: HOSPADM

## 2017-05-03 RX ORDER — NITROGLYCERIN 80 MG/1
1 PATCH TRANSDERMAL ONCE
Status: COMPLETED | OUTPATIENT
Start: 2017-05-03 | End: 2017-05-03

## 2017-05-03 RX ORDER — AMLODIPINE BESYLATE 10 MG/1
10 TABLET ORAL DAILY
Status: DISCONTINUED | OUTPATIENT
Start: 2017-05-04 | End: 2017-05-04 | Stop reason: HOSPADM

## 2017-05-03 RX ORDER — VALSARTAN 80 MG/1
320 TABLET ORAL DAILY
Status: DISCONTINUED | OUTPATIENT
Start: 2017-05-04 | End: 2017-05-04 | Stop reason: HOSPADM

## 2017-05-03 RX ORDER — PROCHLORPERAZINE 25 MG
12.5 SUPPOSITORY, RECTAL RECTAL EVERY 12 HOURS PRN
Status: DISCONTINUED | OUTPATIENT
Start: 2017-05-03 | End: 2017-05-04 | Stop reason: HOSPADM

## 2017-05-03 RX ORDER — OXYCODONE HYDROCHLORIDE 5 MG/1
5-10 TABLET ORAL
Status: DISCONTINUED | OUTPATIENT
Start: 2017-05-03 | End: 2017-05-04 | Stop reason: HOSPADM

## 2017-05-03 RX ORDER — PROCHLORPERAZINE MALEATE 5 MG
5 TABLET ORAL EVERY 6 HOURS PRN
Status: DISCONTINUED | OUTPATIENT
Start: 2017-05-03 | End: 2017-05-04 | Stop reason: HOSPADM

## 2017-05-03 RX ORDER — ONDANSETRON 2 MG/ML
4 INJECTION INTRAMUSCULAR; INTRAVENOUS EVERY 6 HOURS PRN
Status: DISCONTINUED | OUTPATIENT
Start: 2017-05-03 | End: 2017-05-04 | Stop reason: HOSPADM

## 2017-05-03 RX ORDER — ALLOPURINOL 100 MG/1
100 TABLET ORAL 2 TIMES DAILY
Status: DISCONTINUED | OUTPATIENT
Start: 2017-05-03 | End: 2017-05-04 | Stop reason: HOSPADM

## 2017-05-03 RX ORDER — TEMAZEPAM 7.5 MG/1
7.5 CAPSULE ORAL
Status: DISCONTINUED | OUTPATIENT
Start: 2017-05-03 | End: 2017-05-04 | Stop reason: HOSPADM

## 2017-05-03 RX ORDER — FUROSEMIDE 10 MG/ML
20 INJECTION INTRAMUSCULAR; INTRAVENOUS ONCE
Status: COMPLETED | OUTPATIENT
Start: 2017-05-04 | End: 2017-05-04

## 2017-05-03 RX ORDER — FUROSEMIDE 10 MG/ML
40 INJECTION INTRAMUSCULAR; INTRAVENOUS ONCE
Status: COMPLETED | OUTPATIENT
Start: 2017-05-03 | End: 2017-05-03

## 2017-05-03 RX ORDER — AMLODIPINE AND VALSARTAN 10; 320 MG/1; MG/1
1 TABLET ORAL DAILY
Status: DISCONTINUED | OUTPATIENT
Start: 2017-05-03 | End: 2017-05-03

## 2017-05-03 RX ORDER — LEVOTHYROXINE SODIUM 112 UG/1
112 TABLET ORAL DAILY
Status: DISCONTINUED | OUTPATIENT
Start: 2017-05-04 | End: 2017-05-04 | Stop reason: HOSPADM

## 2017-05-03 RX ORDER — LIDOCAINE 40 MG/G
CREAM TOPICAL
Status: DISCONTINUED | OUTPATIENT
Start: 2017-05-03 | End: 2017-05-03

## 2017-05-03 RX ORDER — LEVOFLOXACIN 500 MG/1
500 TABLET, FILM COATED ORAL DAILY
Status: DISCONTINUED | OUTPATIENT
Start: 2017-05-04 | End: 2017-05-04 | Stop reason: HOSPADM

## 2017-05-03 RX ADMIN — WARFARIN SODIUM 3 MG: 3 TABLET ORAL at 18:56

## 2017-05-03 RX ADMIN — FUROSEMIDE 40 MG: 10 INJECTION, SOLUTION INTRAVENOUS at 13:36

## 2017-05-03 RX ADMIN — METOPROLOL TARTRATE 50 MG: 50 TABLET, FILM COATED ORAL at 20:44

## 2017-05-03 RX ADMIN — OMEPRAZOLE 20 MG: 20 CAPSULE, DELAYED RELEASE ORAL at 20:44

## 2017-05-03 RX ADMIN — NITROGLYCERIN 1 PATCH: 0.4 PATCH TRANSDERMAL at 13:37

## 2017-05-03 RX ADMIN — ALLOPURINOL 100 MG: 100 TABLET ORAL at 20:44

## 2017-05-03 ASSESSMENT — ENCOUNTER SYMPTOMS
UNEXPECTED WEIGHT CHANGE: 1
BLOOD IN STOOL: 0
PALPITATIONS: 0
ANAL BLEEDING: 0
VOMITING: 0
SHORTNESS OF BREATH: 1

## 2017-05-03 NOTE — IP AVS SNAPSHOT
MRN:[id number]                      After Visit Summary   5/3/2017    Zunilda Clark    MRN: [id number]           Thank you!     Thank you for choosing [facility] for your care. Our goal is always to provide you with excellent care. Hearing back from our patients is one way we can continue to improve our services. Please take a few minutes to complete the written survey that you may receive in the mail after you visit. If you would like to speak to someone directly about your visit please contact Patient Relations at [phone]. Thank you!          Patient Information     Date Of Birth          1940        Designated Caregiver       Most Recent Value    Caregiver    Will someone help with your care after discharge? yes    Name of designated caregiver Sander May    Phone number of caregiver [phone]    Caregiver address [address] [place], [place] [id number]      About your hospital stay     You were admitted on:  May 3, 2017 You last received care in the:  [place] [address] [facility]    You were discharged on:  May 4, 2017        Reason for your hospital stay       You were registered to the observation unit for shortness of breath, weight gain and leg swelling. You were found to be in acute on chronic diastolic heart failure. You were given 2 doses of IV lasix (40mg x 1 and 20mg x 1) with resolution of your symptoms. You were diuresed 6 pounds this admission (admission weight was 169, discharge weight was 163). Your heart failure was thought to be secondary to the IV fluids you received during your recent admission ( from 4/27/17-5/2/17) for acute cholecystitis.                  Who to Call     For medical emergencies, please call [phone].  For non-urgent questions about your medical care, please call your primary care provider or clinic, [phone]          Attending Provider     Provider Specialty    Rebel Santos MD Emergency Medicine     Devan Barbosa MD Emergency Medicine    Kp Fuentes,  Internal Medicine       Primary Care Provider Office Phone # Fax #    Yunior Huang [phone]       [facility] [address]  [facility] [address]         When to contact your care team       Call your primary doctor if you have any of the following:  increased shortness of breath, increased swelling or 3 pound weight gain over a 24-48 hour period.                  After Care Instructions     Activity       Your activity upon discharge: activity as tolerated            Diet       Follow this diet upon discharge: cardiac, 2gram sodium            Discharge Instructions           Monitor and record       weight every day                  Follow-up Appointments     Follow-up and recommended labs and tests        1. We will STOP your diuretic, chlorthalidone, for now and you will be started on Lasix 20mg daily until we get you back to your dry weight (155-160).   2. Weigh yourself daily and keep a log of this to take with you to your next doctor appointments.   3. Limit your salt intake to <2 grams per day.  4. Keep your appointment with your family doctor, Dr. Huang, on 5/8. Recheck weight, BMP and CBC at that time. Review meds at that appointment as well.   5. Keep you follow up appointment with your Cardiologist on 5/15/17 as well.                  Your next 10 appointments already scheduled     May 05, 2017  1:45 PM CDT   [facility] with [facility]   [facility]    [address]  [facility] [id number] [phone]            May 08, 2017  1:40 PM CDT   Office Visit with Yunior Huang MD   [facility]    [address]  [facility] [address] [phone]           Bring a current list of meds and any records pertaining to this visit.  For Physicals, please  "bring immunization records and any forms needing to be filled out.  Please arrive 10 minutes early to complete paperwork.            May 15, 2017  3:10 PM CDT   Return Visit with MANJIT Hliliard CNP   [facility] ([facility])    [address]  [facility] [id number] [phone]            May 23, 2017 10:30 AM CDT   Post Op with Crystal Cuevas PA-C   [facility]    [address]  [facility] [id number] [phone]            Jul 03, 2017 11:30 AM CDT   Remote PPM Check with KRAMER TECH1   [facility] ([facility])    [address]  [facility] [id number] [phone]           This appointment is for a remote check of your pacemaker.  This is not an appointment at the office.              General Recommendations To Control Heart Failure When You Get Home     Instructions To Patients and Families: Please read and check off each of these important instructions as you do them when you get home.           Weight and symptoms      ___ Put a scale in your bathroom  ___ Post a weight chart or calendar next to the scale  ___Weigh yourself every day as soon as you you get up in the morning. You should only be wearing your pajamas. Write your weight on the chart/calendar.  ___ Bring your weight chart/calendar with you to all appointments    ___Call your doctor if you gain 2 pounds in 1 day or 5 pounds in 1 week from your \"dry\" weight (baseline weight). Also call your doctor if you have shortness of breath that gets worse over time, leg swelling or fatigue.         Medicines and diet     ___ Make sure to take your medicines as prescribed.    ___Bring a current list of your medicines and all of your medicine bottles with you to all appointments.    ___ Limit fluids if you still have swelling or shortness of breath, " or if your doctor tells you to do so.  ___ Eat less than 2000 mg of sodium (salt) every day. Read food labels, and do not add salt to meals.   ___ Heart healthy diet with low fat and low cholesterol          Activity and suggested lifestyle changes    ___ Stay active. Talk to your doctor about an exercise program that is safe for your heart.    ___ Stop smoking. Reduce alcohol use.      ___ Lose weight if you are overweight. Extra weight puts a lot of stress on the heart.          Control for Leg Swelling   ___ Keep your legs elevated (raised) as needed for swelling. If swelling is uncomfortable or elevation doesn t help, ask your doctor about using ACE wrap or Jobst stockings.          Follow-up appointments   ___ Make a [facility] appointment with a basic metabolic panel lab draw 3 to 5 days after you leave the hospital. Call one of the following locations:   [facility]  [phone],  [facility] [phone],  [facility]  [phone].     ___ Make sure to take your medications as prescribed and bring an accurate list of your medications and your weight chart/calendar to your follow up appointment at the [facility] Clinic for continued education and adjustments          What is the CORE clinic?    The C.O.R.E (Cardiomyopathy, Optimization, Rehabilitation, Education) Clinic is a heart failure specialty clinic within the [facility]. At C.O.R.E., you will work with nurse practitioners to carefully adjust medicines, get education and learn who and when to call if symptoms appear. [facility].O.R.E nurses specialize in helping you:    better understand your disease.    slow the progress of your disease.    improve the length and quality of your life.    detect future heart problems before they become life threatening.    avoid hospital stays.            Pending Results     No orders found for last 3  "day(s).            Statement of Approval     Ordered          05/04/17 0946  I have reviewed and agree with all the recommendations and orders detailed in this document.  EFFECTIVE NOW     Approved and electronically signed by:  Janet Smith PA-C             Admission Information     Date & Time Provider Department Dept. Phone    5/3/2017 Kp Fuentes, DO [place] [address] [facility] [phone]      Your Vitals Were     Blood Pressure Pulse Temperature Respirations Height Weight    116/56 74 97.5  F (36.4  C) (Oral) 16 1.651 m (5' 5\") 74.1 kg (163 lb 4.8 oz)    Pulse Oximetry BMI (Body Mass Index)                95% 27.17 kg/m2          [facility] Information     [employer] gives you secure access to your electronic health record. If you see a primary care provider, you can also send messages to your care team and make appointments. If you have questions, please call your primary care clinic.  If you do not have a primary care provider, please call [phone] and they will assist you.        Care EveryWhere ID     This is your Care EveryWhere ID. This could be used by other organizations to access your [facility] medical records  [id number]           Review of your medicines      START taking        Dose / Directions    furosemide 20 MG tablet   Commonly known as:  LASIX        Dose:  20 mg   Start taking on:  5/5/2017   Take 1 tablet (20 mg) by mouth daily   Quantity:  30 tablet   Refills:  0         CONTINUE these medicines which have NOT CHANGED        Dose / Directions    ACETAMINOPHEN PO        Dose:  650 mg   Take 650 mg by mouth every 6 hours as needed   Refills:  0       ALLOPURINOL PO        Dose:  100 mg   Take 100 mg by mouth 2 times daily   Refills:  0       Amlodipine Besylate-Valsartan  MG Tabs   Commonly known as:  EXFORGE   Used for:  Essential hypertension        Dose:  1 tablet   Take 1 tablet by mouth daily   Quantity:  90 tablet   Refills:  3       BONIVA 3 MG/3ML "   Generic drug:  ibandronate        Dose:  3 mg   Inject 3 mg into the vein once   Refills:  0       calcium citrate 950 MG tablet   Commonly known as:  CALCITRATE        Dose:  1 tablet   Take 1 tablet by mouth daily   Refills:  0       fish oil-omega-3 fatty acids 1000 MG capsule        Dose:  2 g   Take 2 g by mouth daily   Refills:  0       folic acid 1 MG tablet   Commonly known as:  FOLVITE        Dose:  1 mg   Take 1 mg by mouth daily   Refills:  0       HYDROcodone-acetaminophen 5-325 MG per tablet   Commonly known as:  NORCO   Used for:  Acute cholecystitis, S/P laparoscopic cholecystectomy        Dose:  1-2 tablet   Take 1-2 tablets by mouth every 4 hours as needed for pain   Quantity:  5 tablet   Refills:  0       levofloxacin 500 MG tablet   Commonly known as:  LEVAQUIN   Indication:  Infection Within the Abdomen   Used for:  Acute cholecystitis        Dose:  500 mg   Take 1 tablet (500 mg) by mouth daily for 7 days   Quantity:  7 tablet   Refills:  0       levothyroxine 112 MCG tablet   Commonly known as:  SYNTHROID/LEVOTHROID   Used for:  Hypothyroidism        Dose:  112 mcg   Take 1 tablet (112 mcg) by mouth daily   Quantity:  90 tablet   Refills:  3       METHOTREXATE SODIUM IJ        Dose:  0.8 mL   Inject 0.8 mLs as directed once a week   Refills:  0       metoprolol 100 MG tablet   Commonly known as:  LOPRESSOR   Used for:  Essential hypertension        Dose:  50 mg   Take 0.5 tablets (50 mg) by mouth 2 times daily   Quantity:  60 tablet   Refills:  5       Multi-vitamin Tabs tablet        Dose:  1 tablet   Take 1 tablet by mouth daily   Refills:  0       omeprazole 20 MG CR capsule   Commonly known as:  priLOSEC   Used for:  Esophageal reflux        Dose:  20 mg   Take 1 capsule (20 mg) by mouth 2 times daily   Quantity:  180 capsule   Refills:  3       VITAMIN D (CHOLECALCIFEROL) PO        Dose:  1000 Units   Take 1,000 Units by mouth daily   Refills:  0       warfarin 3 MG tablet   Commonly  known as:  COUMADIN   Used for:  Atrial fibrillation and flutter (H)        Dose:  3 mg   Take 1 tablet (3 mg) by mouth daily For 3 days, check INR on 05/05 to adjust the dose.   Quantity:  30 tablet   Refills:  0         STOP taking     chlorthalidone 25 MG tablet   Commonly known as:  HYGROTON                Where to get your medicines      These medications were sent to [id number] - [place], [place] - [address]  [address], [facility] [address]     Phone:  [phone]     furosemide 20 MG tablet                Protect others around you: Learn how to safely use, store and throw away your medicines at www.disposemymeds.org.             Medication List: This is a list of all your medications and when to take them. Check marks below indicate your daily home schedule. Keep this list as a reference.      Medications           Morning Afternoon Evening Bedtime As Needed    ACETAMINOPHEN PO   Take 650 mg by mouth every 6 hours as needed                                   ALLOPURINOL PO   Take 100 mg by mouth 2 times daily   Last time this was given:  100 mg on 5/4/2017  8:43 AM                       Resume tonight               Amlodipine Besylate-Valsartan  MG Tabs   Commonly known as:  EXFORGE   Take 1 tablet by mouth daily            Resume anytime                       BONIVA 3 MG/3ML   Inject 3 mg into the vein once   Generic drug:  ibandronate                                calcium citrate 950 MG tablet   Commonly known as:  CALCITRATE   Take 1 tablet by mouth daily            Resume anytime                       fish oil-omega-3 fatty acids 1000 MG capsule   Take 2 g by mouth daily            Resume anytime                       folic acid 1 MG tablet   Commonly known as:  FOLVITE   Take 1 mg by mouth daily   Last time this was given:  1 mg on 5/4/2017  8:44 AM            Resume anytime                       furosemide 20 MG tablet   Commonly known as:  LASIX   Take 1 tablet (20 mg)  by mouth daily   Start taking on:  5/5/2017            Resume tomorrow                       HYDROcodone-acetaminophen 5-325 MG per tablet   Commonly known as:  NORCO   Take 1-2 tablets by mouth every 4 hours as needed for pain                                   levofloxacin 500 MG tablet   Commonly known as:  LEVAQUIN   Take 1 tablet (500 mg) by mouth daily for 7 days   Last time this was given:  500 mg on 5/4/2017  8:44 AM            Resume tomorrow                       levothyroxine 112 MCG tablet   Commonly known as:  SYNTHROID/LEVOTHROID   Take 1 tablet (112 mcg) by mouth daily   Last time this was given:  112 mcg on 5/4/2017  8:44 AM            Resume tomorrow                       METHOTREXATE SODIUM IJ   Inject 0.8 mLs as directed once a week                                metoprolol 100 MG tablet   Commonly known as:  LOPRESSOR   Take 0.5 tablets (50 mg) by mouth 2 times daily   Last time this was given:  50 mg on 5/4/2017  8:50 AM                       Resume tonight               Multi-vitamin Tabs tablet   Take 1 tablet by mouth daily   Last time this was given:  1 tablet on 5/4/2017  8:43 AM            Resume tomorrow                       omeprazole 20 MG CR capsule   Commonly known as:  priLOSEC   Take 1 capsule (20 mg) by mouth 2 times daily   Last time this was given:  20 mg on 5/4/2017  8:43 AM                       Resume tonight               VITAMIN D (CHOLECALCIFEROL) PO   Take 1,000 Units by mouth daily            Resume anytime                       warfarin 3 MG tablet   Commonly known as:  COUMADIN   Take 1 tablet (3 mg) by mouth daily For 3 days, check INR on 05/05 to adjust the dose.   Last time this was given:  3 mg on 5/3/2017  6:56 PM            Resume tonight                                 More Information        Patient Education    Furosemide Oral solution    Furosemide Oral tablet    Furosemide Solution for injection  Furosemide Oral tablet  What is this medicine?  FUROSEMIDE  (ivone PERLA se mide) is a diuretic. It helps you make more urine and to lose salt and excess water from your body. This medicine is used to treat high blood pressure, and edema or swelling from heart, kidney, or liver disease.  This medicine may be used for other purposes; ask your health care provider or pharmacist if you have questions.  What should I tell my health care provider before I take this medicine?  They need to know if you have any of these conditions:    abnormal blood electrolytes    diarrhea or vomiting    gout    heart disease    kidney disease, small amounts of urine, or difficulty passing urine    liver disease    an unusual or allergic reaction to furosemide, sulfa drugs, other medicines, foods, dyes, or preservatives    pregnant or trying to get pregnant    breast-feeding  How should I use this medicine?  Take this medicine by mouth with a glass of water. Follow the directions on the prescription label. You may take this medicine with or without food. If it upsets your stomach, take it with food or milk. Do not take your medicine more often than directed. Remember that you will need to pass more urine after taking this medicine. Do not take your medicine at a time of day that will cause you problems. Do not take at bedtime.  Talk to your pediatrician regarding the use of this medicine in children. While this drug may be prescribed for selected conditions, precautions do apply.  Overdosage: If you think you have taken too much of this medicine contact a poison control center or emergency room at once.  NOTE: This medicine is only for you. Do not share this medicine with others.  What if I miss a dose?  If you miss a dose, take it as soon as you can. If it is almost time for your next dose, take only that dose. Do not take double or extra doses.  What may interact with this medicine?    aspirin and aspirin-like medicines    certain antibiotics    chloral  hydrate    cisplatin    cyclosporine    digoxin    diuretics    laxatives    lithium    medicines for blood pressure    medicines that relax muscles for surgery    methotrexate    NSAIDs, medicines for pain and inflammation like ibuprofen, naproxen, or indomethacin    phenytoin    steroid medicines like prednisone or cortisone    sucralfate  This list may not describe all possible interactions. Give your health care provider a list of all the medicines, herbs, non-prescription drugs, or dietary supplements you use. Also tell them if you smoke, drink alcohol, or use illegal drugs. Some items may interact with your medicine.  What should I watch for while using this medicine?  Visit your doctor or health care professional for regular checks on your progress. Check your blood pressure regularly. Ask your doctor or health care professional what your blood pressure should be, and when you should contact him or her. If you are a diabetic, check your blood sugar as directed.  You may need to be on a special diet while taking this medicine. Check with your doctor. Also, ask how many glasses of fluid you need to drink a day. You must not get dehydrated.  You may get drowsy or dizzy. Do not drive, use machinery, or do anything that needs mental alertness until you know how this drug affects you. Do not stand or sit up quickly, especially if you are an older patient. This reduces the risk of dizzy or fainting spells. Alcohol can make you more drowsy and dizzy. Avoid alcoholic drinks.  This medicine can make you more sensitive to the sun. Keep out of the sun. If you cannot avoid being in the sun, wear protective clothing and use sunscreen. Do not use sun lamps or tanning beds/booths.  What side effects may I notice from receiving this medicine?  Side effects that you should report to your doctor or health care professional as soon as possible:    blood in urine or stools    dry mouth    fever or chills    hearing loss or  ringing in the ears    irregular heartbeat    muscle pain or weakness, cramps    skin rash    stomach upset, pain, or nausea    tingling or numbness in the hands or feet    unusually weak or tired    vomiting or diarrhea    yellowing of the eyes or skin  Side effects that usually do not require medical attention (report to your doctor or health care professional if they continue or are bothersome):    headache    loss of appetite    unusual bleeding or bruising  This list may not describe all possible side effects. Call your doctor for medical advice about side effects. You may report side effects to FDA at 5-684-FDA-2644.  Where should I keep my medicine?  Keep out of the reach of children.  Store at room temperature between 15 and 30 degrees C (59 and 86 degrees F). Protect from light. Throw away any unused medicine after the expiration date.  NOTE:This sheet is a summary. It may not cover all possible information. If you have questions about this medicine, talk to your doctor, pharmacist, or health care provider. Copyright  2016 [employer]

## 2017-05-03 NOTE — TELEPHONE ENCOUNTER
Patient's  called to report patient has had a 15 pound weight gain. Yesterday she weighed 156 and today she is 169.  Her weight has increased about 15 pounds in the last 4 days.  She also reports shortness of breath.  He added her legs are very swollen.  She has a cough and sat up last night in a chair to sleep and also added a few pillows.  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 Patient takes chlorthalidone daily. He is wanting to know if she can be seen this week by Dr. Das. Informed patient's  that patient should be taken to the ER.  Patient's  agreed to take patient to [facility] now.  Will route to Dr. Das as an FYI.     4/5/17 per Dr. Das:  1. Zunilda Clark is a delightful 76-year-old female status post mitral valve replacement in 05/2014 after previous balloon commissurotomy in 2006 and valve replacement 04/10/2008. The patient is status post mitral valve replacement and tricuspid annuloplasty ring in 05/2014 while living in [place]. Her echocardiogram shows pulmonary hypertension and we will repeat this to assess the degree of pulmonary hypertension before we consider left total knee arthroplasty. We will also assess her right heart. Her neck veins are not distended and hopefully this suggests improved right heart function.     Patient was also d/c 5/2/17 from having a laparoscopic cholecystectomy on 04/29/2017.

## 2017-05-03 NOTE — PHARMACY-ADMISSION MEDICATION HISTORY
Admission medication history interview status for this patient is complete. See [facility] admission navigator for allergy information, prior to admission medications and immunization status.     Medication history interview source(s):Patient  Medication history resources (including written lists, pill bottles, clinic record):None  Primary pharmacy:[facility]    Changes made to PTA medication list:  Added: tylenol  Deleted:   Changed:     Actions taken by pharmacist (provider contacted, etc):None     Additional medication history information:None    Medication reconciliation/reorder completed by provider prior to medication history? No    For patients on insulin therapy: NO (Yes/No)  Lantus/levemir/NPH/Mix 70/30 dose:  _____   in AM/PM  or twice daily   Sliding scale Novolog Y/N  If Yes, do you have a baseline novolog pre-meal dose:  ______units with meals   Patients eat three meals a day:   Y/N     Any Barriers to therapy:  cost of medications/comfortable with giving injections (if applicable)/ comfortable and confident with current diabetes regimen       Prior to Admission medications    Medication Sig Last Dose Taking? Auth Provider   ACETAMINOPHEN PO Take 650 mg by mouth every 6 hours as needed  5/2/2017 at Unknown time Yes Unknown, Entered By History   HYDROcodone-acetaminophen (NORCO) 5-325 MG per tablet Take 1-2 tablets by mouth every 4 hours as needed for pain  Yes Brenda Santos PA-C   warfarin (COUMADIN) 3 MG tablet Take 1 tablet (3 mg) by mouth daily For 3 days, check INR on 05/05 to adjust the dose. 5/2/2017 at Unknown time Yes Venkat Benton MD   levofloxacin (LEVAQUIN) 500 MG tablet Take 1 tablet (500 mg) by mouth daily for 7 days 5/3/2017 at Unknown time Yes Venkat Benton MD   ALLOPURINOL PO Take 100 mg by mouth 2 times daily 5/3/2017 at Unknown time Yes Unknown, Entered By History   multivitamin, therapeutic with minerals (MULTI-VITAMIN) TABS tablet Take 1 tablet by mouth daily  5/3/2017 at Unknown time Yes Unknown, Entered By History   calcium citrate (CALCITRATE) 950 MG tablet Take 1 tablet by mouth daily 5/3/2017 at Unknown time Yes Unknown, Entered By History   VITAMIN D, CHOLECALCIFEROL, PO Take 1,000 Units by mouth daily 5/3/2017 at Unknown time Yes Unknown, Entered By History   fish oil-omega-3 fatty acids 1000 MG capsule Take 2 g by mouth daily Past Week at Unknown time Yes Unknown, Entered By History   ibandronate (BONIVA) 3 MG/3ML Inject 3 mg into the vein once due in August Yes Reported, Patient   Amlodipine Besylate-Valsartan (EXFORGE)  MG TABS Take 1 tablet by mouth daily 5/3/2017 at Unknown time Yes Timothy Das MD   omeprazole (PRILOSEC) 20 MG CR capsule Take 1 capsule (20 mg) by mouth 2 times daily 5/3/2017 at Unknown time Yes Yunior Huang MD   levothyroxine (SYNTHROID/LEVOTHROID) 112 MCG tablet Take 1 tablet (112 mcg) by mouth daily 5/3/2017 at Unknown time Yes Yunior Huang MD   metoprolol (LOPRESSOR) 100 MG tablet Take 0.5 tablets (50 mg) by mouth 2 times daily 5/3/2017 at Unknown time Yes Yunior Huang MD   chlorthalidone (HYGROTON) 25 MG tablet Take 1 tablet (25 mg) by mouth daily 5/3/2017 at Unknown time Yes Timothy Das MD   folic acid (FOLVITE) 1 MG tablet Take 1 mg by mouth daily 5/3/2017 at Unknown time Yes Reported, Patient   METHOTREXATE SODIUM IJ Inject 0.8 mLs as directed once a week 4/26/2017 Yes Reported, Patient

## 2017-05-03 NOTE — ED PROVIDER NOTES
History     Chief Complaint:  Shortness of Breath    HPI   Zunilda Clark is a 76 year old female with a history of DVT, mitral value replacement and CHF 2 days status post cholecystectomy who presents with shortness of breath. Yesterday, she reports that she was discharged from the hospital after a laparoscopy cholecystectomy. Her  reports that she was on oxygen in the hospital and she states that she was short of breath. However, today, she felt significantly more short of breath. After she weight herself and noted that she had gained 11 lbs, they presented to the ED for evaluation. While in the ED, she reports leg swelling and states that she has been sleeping propped up by pillows. She denies current diuretic use. Of note, the patient reports a bloody nose but denies other blood loss including black or bloody stools. She denies chest pain, palpitations, vomiting, or rash.      Allergies:  NKDA    Medications:    Norco  Coumadin  Levaquin  Allopurinol  calcitrate   Vitamin D  Boniva  Exforge  Prilosec  Synthroid  Lopressor  Hygroton   Folvite  Methotrexate      Past Medical History:    Acquired hypothyroidism   Aortic valve insufficiency  Arthritis  Atrial fibrillation and flutter  Blood clotting disorder  CHF  DVT  GERD  History of blood transfusion  Hypertension  Hypothyroidism  Pulmonary hypertension   Rheumatoid arthritis   Seizures  Stroke  Mitral value disorder  Syncope    Past Surgical History:    Prolapse bladder  Cardiac ablation AV node  Implant pacemaker  Laparoscopy cholecystectomy with cholangiograms  Open reduction internal fixation rodding intramedullary humerus  Replace Valve mitral x2  Sling bladder suspension     Family History:  Mother: CAD  Brother: CAD, diabetes  Sister: Hypertension, hyperlipidemia     Social History:  Relationship status:   The patient has never smoked.   The patient does not drink alcohol.   The patient presents with her .      Review of Systems  "  Constitutional: Positive for unexpected weight change (gain).   HENT: Positive for nosebleeds.    Respiratory: Positive for shortness of breath.    Cardiovascular: Positive for leg swelling. Negative for chest pain and palpitations.   Gastrointestinal: Negative for anal bleeding, blood in stool and vomiting.   Skin: Negative for rash.   All other systems reviewed and are negative.      Physical Exam   First Vitals:  BP: 121/82  Pulse: 74  Temp: 97.8  F (36.6  C)  Resp: 20  Height: 165.1 cm (5' 5\")  Weight: 76.7 kg (169 lb)  SpO2: 96 %      Physical Exam  General: The patient is alert, in mild respiratory distress.    HENT: Mucous membranes moist.    Cardiovascular: Regular rate and rhythm. Good pulses in all four extremities. Normal capillary refill and skin turgor.     Respiratory: Faint crackles, tachypnea. No nasal flaring. No retractions. No wheezing.    Gastrointestinal: Abdomen soft. No guarding, no rebound. No palpable hernias.     Musculoskeletal: No gross deformity.     Skin: No rashes or petechiae.     Neurologic: The patient is alert and oriented x3. GCS 15. No testable cranial nerve deficit. Follows commands with clear and appropriate speech. Gives appropriate answers. Good strength in all extremities. No gross neurologic deficit. Gross sensation intact. Pupils are round and reactive. No meningismus.     Lymphology: No cervical adenopathy. No lower extremity swelling.    Psychiatric: The patient is non-tearful.    Emergency Department Course     ECG @ 1202  Indication: Shortness of breath  Rate 68 bpm.   CT interval 164 ms.   QRS duration 88 ms.   QT/QTc 404/429 ms.   P-R-T axes -28.  Notes: Normal sinus rhythm with sinus arrhythmia. Non specific ST and T wave abnormality.   Time read 1208      Imaging:  Radiographic findings were communicated with the patient who voiced understanding of the findings.    Chest XR per radiology:   IMPRESSION: Mild streaky bilateral interstitial prominence, which  could " reflect mild edema or venous congestion. There is linear  opacification of the right lateral lung base, suggestive of focal  atelectasis or scarring. No pneumothorax. Midline sternal wires and  left-sided cardiac device in place as before.      Laboratory:  Blood gas: pH 7.41 (WNL) pCO2 43 (WNL) pO2 26 (WNL)  Rest WNL  CBC: WBC 15.5 (H) HGB 10.3 (L)  (H) RC 3.21 (L) HCT 31.5 (L) Absolute Neutrophil 12.2 (H) Absolute Monocytes 1.9 (H) Absolute metamyelocytes 0.2 (H)  CMP: Cr 0.80 (WNL) Glucose 98 (WNL) Albumin 2.2 (L) Protein 6.6 (L) ALK PHOS 176 (H) Rest WNL  INR: 1.95 (H)  BNP: 4812 (H)    Interventions:  1336: Lasix, 40 mg, IV  1337: Nitrodur, 1 patch, Transdermal    ED Course:  The patient arrived in triage where her vitals were measured and recorded. The patient was then escorted back to the emergency department.  Nursing notes and past medical history reviewed.   I performed a physical examination of the patient as documented above.  I explained the plan with the patient who consents to this.   An ECG was obtained.   Blood was drawn and sent to the laboratory for testing, see above results.   A peripheral IV was established.   The patient received the above interventions.   The patient underwent the above imaging studies.   1424: Discussed the patient with Dr. Fuentes from the hospitalist service.  I personally reviewed the laboratory and imaging results with the Patient and answered all related questions prior to admitting.   Findings and plan explained to the Patient who consents to admission. Discussed the patient with Dr. Fuentes, who will admit the patient to a telemetry bed for further monitoring, evaluation, and treatment.       Impression & Plan      Medical Decision Making:  The patient recently had been in the hospital for cholecystectomy. She is chronically anticoagulated for A-fib, pacemaker, and valve replacement, and the patient was noted to have elevated INR that had to be corrected  prior to the surgery. The patient said that on going home, she had some slight shortness of breath that became much worse last night. She had to sleep propped up and had a hard time getting to sleep. Family noted that the patient had been more short of breath and working to breath, complains and dyspnea and weight gain with lower extremity swelling. At this point, I was highly suspicious about CHF. I did consider pneumonia or PE. However, the patient is chronically anticoagulated and did not have sudden onset. The chest x-ray shows signs of CHF. Her BNP is elevated. The patient is not overtly hypoxic here, and she was admitted to the hospital for further diuresis. I do not feel that cardiac ischemia is the primary cause behind this.     Diagnosis:    ICD-10-CM    1. Shortness of breath R06.02    2. Acute systolic congestive heart failure (H) I50.21    3. Essential hypertension I10    4. Chronic anticoagulation Z79.01        Disposition:   Admit to a telemetry bed under the care of Dr. Fuentes.         I, Sanjana Almanza, am serving as a scribe on 5/3/2017 at 12:11 PM to personally document services performed by Rebel Santos MD, based on my observations and the provider's statements to me.         Rebel Santos MD  05/05/17 0746

## 2017-05-03 NOTE — PLAN OF CARE
Problem: Goal Outcome Summary  Goal: Goal Outcome Summary  Outcome: No Change  PRIMARY DIAGNOSIS: Fluid Overload   Primary Symptoms: Weight gain, BLE edema      OUTPATIENT/OBSERVATION GOALS TO BE MET BEFORE DISCHARGE:     1. Orthostatic symptoms (BP decrease or HR increase with patient upright)?  No  2. Vital Signs stable? Yes  3. Documented urine output: Yes  4. Tolerating PO fluid: Yes  5. Symptoms improved: Yes  6. Labs WNL? No  7. ADLs back to baseline?  No  8. Activity and level of assistance: Up with standby assistance.  9. Pain status: Pain free.  10. Barriers to discharge noted Yes, newly admitted, possible need for further diuresis, PT consulted.     Interpretation of rhythm per telemetry tech: SR

## 2017-05-03 NOTE — IP AVS SNAPSHOT
[place] [address] [facility]    [address]    [facility] [address]    Phone:  [phone]    Fax:  [phone]                                       After Visit Summary   5/3/2017    Zunilda Alicea May    MRN: [id number]           After Visit Summary Signature Page     I have received my discharge instructions, and my questions have been answered. I have discussed any challenges I see with this plan with the nurse or doctor.    ..........................................................................................................................................  Patient/Patient Representative Signature      ..........................................................................................................................................  Patient Representative Print Name and Relationship to Patient    ..................................................               ................................................  Date                                            Time    ..........................................................................................................................................  Reviewed by Signature/Title    ...................................................              ..............................................  Date                                                            Time

## 2017-05-03 NOTE — ED NOTES
Discharged from the hospital yesterday.  This am noticed increased shortness of breath especially with exertion.  Weight up 11lbs since her surgery 04/29/2017.  History of CHF.  Patient alert and oriented x3.  Airway, breathing and circulation intact.

## 2017-05-03 NOTE — PHARMACY-ANTICOAGULATION SERVICE
Clinical Pharmacy - Warfarin Dosing Consult     Pharmacy has been consulted to manage this patient s warfarin therapy.  Indication: Atrial Fibrillation  Therapy Goal: INR 2-3  Provider/Team: Dr Fuentes  Warfarin Prior to Admission: Yes  Warfarin PTA Regimen: 3 mg daily  Significant drug interactions: levaquin, levothyroxine, omeprazole  Recent documented change in oral intake/nutrition: Unknown  Dose Comments: INR 1.95, on levaquin, warfarin 3 mg today    INR   Date Value Ref Range Status   05/03/2017 1.95 (H) 0.86 - 1.14 Final   05/02/2017 1.65 (H) 0.86 - 1.14 Final       Recommend warfarin 3 mg today.  Pharmacy will monitor Zunilda Clark daily and order warfarin doses to achieve specified goal.      Please contact pharmacy as soon as possible if the warfarin needs to be held for a procedure or if the warfarin goals change.

## 2017-05-03 NOTE — TELEPHONE ENCOUNTER
IP F/U    Date: 5/2/17  Diagnosis: S/P Laparoscopic Cholecystectomy, Acute Cholecystitis  Is patient active in care coordination? No  Was patient in TCU? No    Next 5 appointments (look out 90 days)     May 08, 2017  1:40 PM CDT   Office Visit with Yunior Huang MD   [facility]    [address]  [facility] [id number] [phone]            May 15, 2017  3:10 PM CDT   Return Visit with MANJIT Hilliard CNP   [facility] ([facility]    [address]  [facility] [address] [phone]

## 2017-05-03 NOTE — TELEPHONE ENCOUNTER
ED / Discharge Outreach Protocol   Patient Contact   Attempt # 1  Was call answered? No. Left message on voicemail with information to call me back.    Ramon WHITAKER RN

## 2017-05-03 NOTE — ED NOTES
[facility]  ED Nurse Handoff Report    Zunilda Clark is a 76 year old female   ED Chief complaint: Shortness of Breath  . ED Diagnosis:   Final diagnoses:   Shortness of breath   Acute systolic congestive heart failure (H)   Essential hypertension   Chronic anticoagulation     Allergies: No Known Allergies    Code Status: PRIOR  Activity level - Baseline/Home:  INDEPENDENT/ONE PERSON ASSIST. Activity Level - Current:   ONE PERSON ASSIST. Lift room needed: NO. Bariatric: No   Needed: NO  Isolation: No. Infection: Not Applicable.     Vital Signs:   Vitals:    05/03/17 1300 05/03/17 1315 05/03/17 1330 05/03/17 1345   BP: 126/65 124/62 125/67 140/70   Pulse:       Resp:       Temp:       TempSrc:       SpO2:       Weight:       Height:         Cardiac Rhythm:  ,      Pain level: 0-10 Pain Scale: 0  Patient confused: No. Patient Falls Risk: Yes.   Patient Report - Initial Complaint: SOB. Focused Assessment: LUNGS WITH FINE CRACKLES BILAT  Tests Performed: LABS, X-RAY   Abnormal Result -   WBC: 15.5 10e9/L [Ref Range: 4.0 - 11.0]  RBC Count: 3.21 10e12/L [Ref Range: 3.8 - 5.2]  Hemoglobin: 10.3 g/dL [Ref Range: 11.7 - 15.7]  Hematocrit: 31.5 % [Ref Range: 35.0 - 47.0]  MCV: 98 fl [Ref Range: 78 - 100]  MCH: 32.1 pg [Ref Range: 26.5 - 33.0]  MCHC: 32.7 g/dL [Ref Range: 31.5 - 36.5]  RDW: 18.2 % [Ref Range: 10.0 - 15.0]  Platelet Count: 456 10e9/L [Ref Range: 150 - 450]  Diff Method: Manual Differential  % Neutrophils: 79.0 %  % Lymphocytes: 6.0 %  % Monocytes: 12.0 %  % Eosinophils: 1.0 %  % Basophils: 0.0 %  % Metamyelocytes: 1.0 %  % Myelocytes: 1.0 %  Absolute Neutrophil: 12.2 10e9/L [Ref Range: 1.6 - 8.3]  Absolute Lymphocytes: 0.9 10e9/L [Ref Range: 0.8 - 5.3]  Absolute Monocytes: 1.9 10e9/L [Ref Range: 0.0 - 1.3]  Absolute Eosinophils: 0.2 10e9/L [Ref Range: 0.0 - 0.7]  Absolute Basophils: 0.0 10e9/L [Ref Range: 0.0 - 0.2]  Absolute Metamyelocytes: 0.2 10e9/L [Ref Range: 0]  Absolute  Myelocytes: 0.2 10e9/L [Ref Range: 0]  Anisocytosis: Slight  Macrocytes: Present  Platelet Estimate: Increased    N-Terminal Pro BNP Inpatient: 4812 pg/mL     Family Comments:   OBS brochure/video discussed/provided to patient:    ED Medications:  LASIX, NITRODUR  Medications   lidocaine 1 % 1 mL (not administered)   lidocaine (LMX4) kit (not administered)   sodium chloride (PF) 0.9% PF flush 3 mL (not administered)   sodium chloride (PF) 0.9% PF flush 3 mL (not administered)   furosemide (LASIX) injection 40 mg (40 mg Intravenous Given 5/3/17 1336)   nitroglycerin (NITRODUR) 0.4 MG/HR 24 hr patch 1 patch (1 patch Transdermal Given 5/3/17 1337)     Drips infusing:  NO     ED Nurse Name/Phone Number: Sheba Ross,   2:30 PM  RECEIVING UNIT ED HANDOFF REVIEW    Above ED Nurse Handoff Report was reviewed: Yes  Reviewed by: Cheryl Garg on May 3, 2017 at 3:21 PM

## 2017-05-03 NOTE — LETTER
Transition Communication Hand-off for Care Transitions to Next Level of Care Provider    Name: Zunilda Clark  MRN #: [id number]  Primary Care Provider: Yunior Huang     Primary Clinic: [facility] [address] [place] [address]     Reason for Hospitalization:  Shortness of breath [R06.02]  Essential hypertension [I10]  Chronic anticoagulation [Z79.01]  Acute systolic congestive heart failure (H) [I50.21]  Admit Date/Time: 5/3/2017 11:58 AM  Discharge Date: 5/4/17  Payor Source: Payor: MEDICARE / Plan: MEDICARE / Product Type: Medicare /          Reason for Communication Hand-off Referral: Pt was admitted as obs status for CHF and d/c home today.  High risk for readmit due to dx.    Discharge Plan:  D/C to home and to follow up w/Dr. Huang on May 8th as well at [facility].    Concern for non-adherence with plan of care:   No     Already enrolled in Tele-monitoring program and name of program:  No  Follow-up specialty is recommended: Cardiology  Follow-up plan:  Future Appointments  Date Time Provider Department Center   5/5/2017 1:45 PM [facility]UL[facility] CL[facility]IC [facility]   5/8/2017 1:40 PM Yunior Huang MD [facility]   5/15/2017 3:10 PM Lee Ann Colbert APRN CNP [facility]   5/23/2017 10:30 AM Crystal Cuevas PA-C [facility]   7/3/2017 11:30 AM [facility] [address] [place], [place] [address]       Any outstanding tests or procedures:  No        Key Recommendations:  Pt and  appear fairly knowledgeable regarding mgmt of CHF but did go over the heart failure mgmt info and they felt the info was good to have guerline about what symptoms should be followed up w/her PCP.  Possible follow up by the clinic could be beneficial guerline to encourage contacting the [facility].    Tiana Sewell    AVS/Discharge Summary is the source of truth; this is a helpful guide for improved communication of patient story

## 2017-05-04 VITALS
HEIGHT: 65 IN | WEIGHT: 163.3 LBS | BODY MASS INDEX: 27.21 KG/M2 | RESPIRATION RATE: 16 BRPM | HEART RATE: 74 BPM | TEMPERATURE: 97.5 F | DIASTOLIC BLOOD PRESSURE: 56 MMHG | SYSTOLIC BLOOD PRESSURE: 116 MMHG | OXYGEN SATURATION: 95 %

## 2017-05-04 LAB
ANION GAP SERPL CALCULATED.3IONS-SCNC: 7 MMOL/L (ref 3–14)
BUN SERPL-MCNC: 19 MG/DL (ref 7–30)
CALCIUM SERPL-MCNC: 8.8 MG/DL (ref 8.5–10.1)
CHLORIDE SERPL-SCNC: 100 MMOL/L (ref 94–109)
CO2 SERPL-SCNC: 28 MMOL/L (ref 20–32)
CREAT SERPL-MCNC: 1.02 MG/DL (ref 0.52–1.04)
GFR SERPL CREATININE-BSD FRML MDRD: 53 ML/MIN/1.7M2
GLUCOSE SERPL-MCNC: 82 MG/DL (ref 70–99)
INR PPP: 2.04 (ref 0.86–1.14)
POTASSIUM SERPL-SCNC: 3.4 MMOL/L (ref 3.4–5.3)
SODIUM SERPL-SCNC: 135 MMOL/L (ref 133–144)

## 2017-05-04 PROCEDURE — 25000132 ZZH RX MED GY IP 250 OP 250 PS 637: Mod: GY | Performed by: INTERNAL MEDICINE

## 2017-05-04 PROCEDURE — 99217 ZZC OBSERVATION CARE DISCHARGE: CPT | Performed by: PHYSICIAN ASSISTANT

## 2017-05-04 PROCEDURE — 80048 BASIC METABOLIC PNL TOTAL CA: CPT | Performed by: INTERNAL MEDICINE

## 2017-05-04 PROCEDURE — A9270 NON-COVERED ITEM OR SERVICE: HCPCS | Mod: GY | Performed by: INTERNAL MEDICINE

## 2017-05-04 PROCEDURE — 25000128 H RX IP 250 OP 636: Performed by: INTERNAL MEDICINE

## 2017-05-04 PROCEDURE — 40000893 ZZH STATISTIC PT IP EVAL DEFER: Performed by: PHYSICAL THERAPIST

## 2017-05-04 PROCEDURE — G0378 HOSPITAL OBSERVATION PER HR: HCPCS

## 2017-05-04 PROCEDURE — 36415 COLL VENOUS BLD VENIPUNCTURE: CPT | Performed by: INTERNAL MEDICINE

## 2017-05-04 PROCEDURE — 85610 PROTHROMBIN TIME: CPT | Performed by: INTERNAL MEDICINE

## 2017-05-04 PROCEDURE — 96376 TX/PRO/DX INJ SAME DRUG ADON: CPT

## 2017-05-04 RX ORDER — WARFARIN SODIUM 3 MG/1
3 TABLET ORAL
Status: DISCONTINUED | OUTPATIENT
Start: 2017-05-04 | End: 2017-05-04 | Stop reason: HOSPADM

## 2017-05-04 RX ORDER — FUROSEMIDE 20 MG
20 TABLET ORAL DAILY
Qty: 30 TABLET | Refills: 0 | Status: SHIPPED | OUTPATIENT
Start: 2017-05-05 | End: 2017-05-08

## 2017-05-04 RX ADMIN — AMLODIPINE BESYLATE 10 MG: 10 TABLET ORAL at 08:51

## 2017-05-04 RX ADMIN — OMEPRAZOLE 20 MG: 20 CAPSULE, DELAYED RELEASE ORAL at 08:43

## 2017-05-04 RX ADMIN — VALSARTAN 320 MG: 80 TABLET ORAL at 08:50

## 2017-05-04 RX ADMIN — METOPROLOL TARTRATE 50 MG: 50 TABLET, FILM COATED ORAL at 08:50

## 2017-05-04 RX ADMIN — FOLIC ACID 1 MG: 1 TABLET ORAL at 08:44

## 2017-05-04 RX ADMIN — MULTIPLE VITAMINS W/ MINERALS TAB 1 TABLET: TAB at 08:43

## 2017-05-04 RX ADMIN — LEVOFLOXACIN 500 MG: 500 TABLET, FILM COATED ORAL at 08:44

## 2017-05-04 RX ADMIN — FUROSEMIDE 20 MG: 10 INJECTION, SOLUTION INTRAVENOUS at 06:58

## 2017-05-04 RX ADMIN — ALLOPURINOL 100 MG: 100 TABLET ORAL at 08:43

## 2017-05-04 RX ADMIN — CHLORTHALIDONE 25 MG: 25 TABLET ORAL at 08:43

## 2017-05-04 RX ADMIN — LEVOTHYROXINE SODIUM 112 MCG: 112 TABLET ORAL at 08:44

## 2017-05-04 NOTE — DISCHARGE SUMMARY
[facility] Outpatient / Observation Unit  Discharge Summary        Zunilda Clark MRN# [id number]   YOB: 1940 Age: 76 year old     Date of Admission:  5/3/2017  Date of Discharge:  5/4/2017  Admitting Physician:  Kp Fuentes, DO  Discharge Physician: Janet Smith PA-C, YOLANDE  Discharging Service: Hospitalist      Primary Provider: Yunior Huang  Primary Care Physician Phone Number: [phone]         Primary Discharge Diagnoses:    Zunilda Clark is a pleasant 77yo female with a past medical history significant for MVR (tissue), a fib - on coumadin, history of CVA, RA, OA, HTN, and diastolic HF who was recently admitted from 4/27-5/2/17 for acute gangrenous cholecystitis. She underwent cholecystectomy on 4/29/17. Once discharged she did note that she was more shortness of breath and had significant LE edema. She has a history of diastolic CHF so her  weighed her and she was 13 pounds up from her prior to admission. She presented to the ER and was admitted on 5/3/2017 for concerns of acute CHF. The patient was given a dose of IV Lasix 40mg in the ER with improvement and near resolution of her symptoms with a 3 pound weight loss as well. The patient was monitored on observation overnight and given another dose of IV lasix 20mg the AM of discharge. At time of discharge she was down 6 pounds from admission with resolution of her shortness of breath and only trace LE edema.     1. Acute on Chronic Diastolic Heart Failure -   Secondary to IVF received from recent admission for acute gangrenous cholecystitis s/p lap cholecystectomy.   The patient's dry weight is between 155-160, discharge weight is 163. Her PTA chlorthalidone is held for now and she is being discharged on lasix 20mg daily for now. She is scheduled to follow up with her PCP on 5/8 and Cardiology on 5/15. She will continue with daily weights and 2 gram sodium restriction, which she states she has followed as an  outpatient.         Secondary Discharge Diagnoses:     Past Medical History:   Diagnosis Date     Acquired hypothyroidism 11/4/2015     Aortic valve insufficiency      Arthritis      Atrial fibrillation (H)      Atrial fibrillation and flutter (H)      Blood clotting disorder (H)      CHF (congestive heart failure) (H)      DVT (deep venous thrombosis) (H) 2003     Gastro-oesophageal reflux disease      H/O mitral valve replacement with tissue graft june 2014     History of blood transfusion 2014     HTN (hypertension)      Hypothyroidism      Other chronic pain      Pulmonary HTN (H)      Rheumatoid arthritis(714.0)      Seizures (H) 2014     Stroke (H) 2009    left side mild weakness      Stroke (H) 2014     [place]ope 2011    [place]     Thrombosis of leg 2003    both legs                Code Status:      Full Code        Brief Hospital Summary:        Zunilda Clark is a pleasant 75yo female with a past medical history significant for MVR (tissue), a fib - on coumadin, history of CVA, RA, OA, HTN, and diastolic HF who was recently admitted from 4/27-5/2/17 for acute gangrenous cholecystitis. She underwent cholecystectomy on 4/29/17. Once discharged she did note that she was more shortness of breath and had significant LE edema. She has a history of diastolic CHF so her  weighed her and she was 13 pounds up from her prior to admission. She presented to the ER and was admitted on 5/3/2017 for concerns of acute CHF. The patient was given a dose of IV Lasix 40mg in the ER with improvement and near resolution of her symptoms with a 3 pound weight loss as well. The patient was monitored on observation overnight and given another dose of IV lasix 20mg the AM of discharge. At time of discharge she was down 6 pounds from admission with resolution of her shortness of breath and only trace LE edema. On the day of discharge, pt was pain free, with no complaints of pain. Medications were reviewed and  adjustments made as necessary. Pt is instructed to follow up as below.           Significant Lab During Hospitalization:        Recent Labs  Lab 05/03/17  1340 04/27/17 2050   PHV 7.41 7.40   PO2V 26 22*   PCO2V 43 46   HCO3V 27 28       Recent Labs  Lab 05/03/17  1230 05/02/17  0729 05/01/17  0650 04/30/17  0625   WBC 15.5*  --  15.7* 19.9*   HGB 10.3*  --  9.9* 10.7*  10.7*   HCT 31.5*  --  29.8* 32.9*   MCV 98  --  100 100   * 358 298 312       Recent Labs  Lab 05/04/17  0701 05/03/17  1230 05/02/17  0729 05/01/17  0650  04/30/17  0625 04/29/17  0629 04/27/17 2012    136  --   --   --   --  138 137   POTASSIUM 3.4 4.2 4.4 3.7  < > 3.7 3.1* 3.5   CHLORIDE 100 102  --   --   --   --  104 101   CO2 28 27  --   --   --   --  27 28   ANIONGAP 7 7  --   --   --   --  7 8   GLC 82 98  --   --   --  109* 94 124*   BUN 19 19  --   --   --   --  28 22   CR 1.02 0.80  --   --   --   --  0.95 0.85   GFRESTIMATED 53* 70  --   --   --   --  57* 65   GFRESTBLACK 64 85  --   --   --   --  69 79   BRICE 8.8 9.3  --   --   --   --  8.1* 8.8   MAG  --   --   --  2.4*  --  2.8* 1.9  --    PROTTOTAL  --  6.6*  --   --   --   --  6.6* 7.6   ALBUMIN  --  2.2*  --   --   --   --  2.6* 3.1*   BILITOTAL  --  0.6  --   --   --   --  1.0 0.4   ALKPHOS  --  176*  --   --   --   --  96 102   AST  --  35  --   --   --   --  24 19   ALT  --  25  --   --   --   --  24 19   < > = values in this interval not displayed.    Recent Labs  Lab 05/03/17  1230   NTBNPI 4812*       Recent Labs  Lab 05/04/17  0701 05/03/17  1230 05/02/17  0729   INR 2.04* 1.95* 1.65*     No results for input(s): TSH in the last 168 hours.    Recent Labs  Lab 05/03/17  1230   TROPI <0.015The 99th percentile for upper reference range is 0.045 ug/L.  Troponin values in the range of 0.045 - 0.120 ug/L may be associated with risks of adverse clinical events.       Recent Labs  Lab 04/27/17  2210   COLOR Yellow   APPEARANCE Clear   URINEGLC Negative   URINEBILI  Negative   URINEKETONE Negative   SG 1.035   UBLD Negative   URINEPH 5.0   PROTEIN Negative   NITRITE Positive*   LEUKEST Trace*   RBCU 1   WBCU 10*                Significant Imaging During Hospitalization:      Recent Results (from the past 48 hour(s))   XR Chest 2 Views    Narrative    XR CHEST 2 VW 5/3/2017 2:12 PM    HISTORY: Shortness of breath.    COMPARISON: May 1, 2017.      Impression    IMPRESSION: Mild streaky bilateral interstitial prominence, which  could reflect mild edema or venous congestion. There is linear  opacification of the right lateral lung base, suggestive of focal  atelectasis or scarring. No pneumothorax. Midline sternal wires and  left-sided cardiac device in place as before.    COLTON NAZARIO MD              Pending Results:        Unresulted Labs Ordered in the Past 30 Days of this Admission     Date and Time Order Name Status Description    4/29/2017 1616 Anaerobic bacterial culture Preliminary               Consultations This Hospital Stay:      No consultations were requested during this admission         Discharge Instructions and Follow-Up:      Follow-up Appointments     Follow-up and recommended labs and tests        1. We will STOP your diuretic, chlorthalidone, for now and you will be   started on Lasix 20mg daily until we get you back to your dry weight   (155-160).   2. Weigh yourself daily and keep a log of this to take with you to your   next doctor appointments.   3. Limit your salt intake to <2 grams per day.  4. Keep your appointment with your family doctor, Dr. Huang, on 5/8.   Recheck weight, BMP and CBC at that time. Review meds at that appointment   as well.   5. Keep you follow up appointment with your Cardiologist on 5/15/17 as   well.                      Discharge Disposition:      Discharged to home         Discharge Medications:        Current Discharge Medication List      START taking these medications    Details   furosemide (LASIX) 20 MG tablet Take 1  tablet (20 mg) by mouth daily  Qty: 30 tablet, Refills: 0    Associated Diagnoses: Acute diastolic congestive heart failure (H)         CONTINUE these medications which have NOT CHANGED    Details   ACETAMINOPHEN PO Take 650 mg by mouth every 6 hours as needed       HYDROcodone-acetaminophen (NORCO) 5-325 MG per tablet Take 1-2 tablets by mouth every 4 hours as needed for pain  Qty: 5 tablet, Refills: 0    Comments: Take with food to minimize nausea/side effects.  Associated Diagnoses: Acute cholecystitis; S/P laparoscopic cholecystectomy      warfarin (COUMADIN) 3 MG tablet Take 1 tablet (3 mg) by mouth daily For 3 days, check INR on 05/05 to adjust the dose.  Qty: 30 tablet, Refills: 0    Associated Diagnoses: Atrial fibrillation and flutter (H)      levofloxacin (LEVAQUIN) 500 MG tablet Take 1 tablet (500 mg) by mouth daily for 7 days  Qty: 7 tablet, Refills: 0    Associated Diagnoses: Acute cholecystitis      ALLOPURINOL PO Take 100 mg by mouth 2 times daily      multivitamin, therapeutic with minerals (MULTI-VITAMIN) TABS tablet Take 1 tablet by mouth daily      calcium citrate (CALCITRATE) 950 MG tablet Take 1 tablet by mouth daily      VITAMIN D, CHOLECALCIFEROL, PO Take 1,000 Units by mouth daily      fish oil-omega-3 fatty acids 1000 MG capsule Take 2 g by mouth daily      ibandronate (BONIVA) 3 MG/3ML Inject 3 mg into the vein once      Amlodipine Besylate-Valsartan (EXFORGE)  MG TABS Take 1 tablet by mouth daily  Qty: 90 tablet, Refills: 3    Associated Diagnoses: Essential hypertension      omeprazole (PRILOSEC) 20 MG CR capsule Take 1 capsule (20 mg) by mouth 2 times daily  Qty: 180 capsule, Refills: 3    Associated Diagnoses: Esophageal reflux      levothyroxine (SYNTHROID/LEVOTHROID) 112 MCG tablet Take 1 tablet (112 mcg) by mouth daily  Qty: 90 tablet, Refills: 3    Associated Diagnoses: Hypothyroidism      metoprolol (LOPRESSOR) 100 MG tablet Take 0.5 tablets (50 mg) by mouth 2 times  "daily  Qty: 60 tablet, Refills: 5    Associated Diagnoses: Essential hypertension      folic acid (FOLVITE) 1 MG tablet Take 1 mg by mouth daily      METHOTREXATE SODIUM IJ Inject 0.8 mLs as directed once a week         STOP taking these medications       chlorthalidone (HYGROTON) 25 MG tablet Comments:   Reason for Stopping:                   Allergies:       No Known Allergies        Condition and Physical on Discharge:      Discharge condition: Stable   Vitals: Blood pressure 116/56, pulse 74, temperature 97.5  F (36.4  C), temperature source Oral, resp. rate 16, height 1.651 m (5' 5\"), weight 74.1 kg (163 lb 4.8 oz), SpO2 95 %, not currently breastfeeding.  163 lbs 4.8 oz      GENERAL:  Comfortable.  PSYCH: pleasant, oriented, No acute distress.  HEENT:  PERRLA. Normal conjunctiva, normal hearing, nasal mucosa and Oropharynx are normal.  NECK:  Supple, no neck vein distention, adenopathy or bruits, normal thyroid.  HEART:  Normal S1, S2 with no murmur, no pericardial rub, gallops or S3 or S4.  LUNGS:  Clear to auscultation, normal Respiratory effort. No wheezing, rales or ronchi.  ABDOMEN:  Soft, no hepatosplenomegaly, normal bowel sounds. Non-tender, non distended.   EXTREMITIES:  Trace pedal edema, +2 pulses bilateral and equal.  SKIN:  Dry to touch, No rash, wound or ulcerations.  NEUROLOGIC:  CN 2-12 intact, BL 5/5 symmetric upper and lower extremity strength, sensation is intact with no focal deficits.     "

## 2017-05-04 NOTE — PHARMACY-ANTICOAGULATION SERVICE
Clinical Pharmacy- Warfarin Discharge Note  This patient is currently on warfarin for the treatment of Atrial fibrillation.  INR Goal= 2-3  Expected length of therapy lifetime.    Anticoagulation Dose History     Recent Dosing and Labs Latest Ref Rng & Units 4/29/2017 4/29/2017 4/30/2017 5/1/2017 5/2/2017 5/3/2017 5/4/2017    Warfarin 3 mg - - - 3 mg - - 3 mg -    Warfarin 5 mg - - - - 5 mg - - -    INR 0.86 - 1.14 1.73(H) 1.66(H) 1.53(H) 1.48(H) 1.65(H) 1.95(H) 2.04(H)    INR 0.86 - 1.14 - - - - - - -          Vitamin K doses administered during the last 7 days: reversed on last admission  FFP administered during the last 7 days: ---    Agree w/MD plan to cont warfarin 3mg daily while on levaquin.  Spoke to pt, she plans to keep the appt and have INR rechecked tomorrow.  Pt questioned if recheck warranted tomorrow given INR today in range.  Told pt I agreed tomorrow maybe a little soon, but with the weekend coming, felt waiting until Monday was too long.  Pt agreed.  Pt still has 4 of 7 day levaquin course left to take.

## 2017-05-04 NOTE — PLAN OF CARE
Problem: Individualization  Goal: Patient Preferences  PRIMARY DIAGNOSIS: CHF  Primary Symptoms: weight gain     OUTPATIENT/OBSERVATION GOALS TO BE MET BEFORE DISCHARGE:     1. Orthostatic symptoms (BP decrease or HR increase with patient upright)?  No  2. Vital Signs stable? Yes  3. Documented urine output: Yes  4. Tolerating PO fluid: Yes  5. Symptoms improved: Yes  6. Labs WNL? Yes  7. ADLs back to baseline?  Yes  8. Activity and level of assistance: Up with standby assistance.  9. Pain status: Pain free.  10. Barriers to discharge noted No     Interpretation of rhythm per telemetry tech:sinus rhythm

## 2017-05-04 NOTE — PROGRESS NOTES
Handoff completed to the care coordinator at [facility] via in-basket for Dr. Huang--they are to call w/any ?'s.

## 2017-05-04 NOTE — PLAN OF CARE
Problem: Individualization  Goal: Patient Preferences  PRIMARY DIAGNOSIS: CHF  Primary Symptoms: Shortness of breath     OUTPATIENT/OBSERVATION GOALS TO BE MET BEFORE DISCHARGE:     1. Orthostatic symptoms (BP decrease or HR increase with patient upright)?  No  2. Vital Signs stable? Yes  3. Documented urine output: Yes  4. Tolerating PO fluid: Yes  5. Symptoms improved: Yes  6. Labs WNL? Yes  7. ADLs back to baseline?  Yes  8. Activity and level of assistance: Up with standby assistance.  9. Pain status: Pain free.  10. Barriers to discharge noted No     Interpretation of rhythm per telemetry tech:sinus rhythm

## 2017-05-04 NOTE — PLAN OF CARE
Problem: Goal Outcome Summary  Goal: Goal Outcome Summary  Outcome: Improving  PRIMARY DIAGNOSIS: CHF exacerbation   Primary Symptoms: fluid overload      OUTPATIENT/OBSERVATION GOALS TO BE MET BEFORE DISCHARGE:     1. Orthostatic symptoms (BP decrease or HR increase with patient upright)?  N/A  2. Vital Signs stable? Yes  3. Documented urine output: Yes  4. Tolerating PO fluid: Yes  5. Symptoms improved: Yes  6. Labs WNL? No  7. ADLs back to baseline?  No  8. Activity and level of assistance: Up with standby assistance.  9. Pain status: Pain free.  10. Barriers to discharge noted No     Interpretation of rhythm per telemetry tech: SR      Drssg change to right abdomen where HOLDEN drain was from recent lap rey, steri strips intact, bruising. Received coumadin. Ambulated in the halls SBA with walker. Plan for lasix tomorrow then possible DC home. PT consulted.

## 2017-05-04 NOTE — TELEPHONE ENCOUNTER
"Pt's  called-Stated that pt is back in the hosp for \"water retention\".  thinks she will be d/c today. Pt sched to see Sal on 5/8   "

## 2017-05-04 NOTE — H&P
CHIEF COMPLAINT:  Shortness of breath.      HISTORY OF PRESENT ILLNESS:  Zunilda Clark is a 76-year-old female who recently underwent cholecystectomy, which was done on 2017.  She discharged from the hospital yesterday on 2017.  During her time in the hospital, she was noted to have gangrenous cholecystitis and initially was septic.  When she first went home, she was feeling okay, but today became more short of breath, especially if she tried to do any activity.  She does note that she has gained 13 pounds from prior to hospitalization; felt that her legs were also quite swollen.  She was given a dose of IV Lasix in the emergency room.  Prior to presenting to the hospital, nothing she was trying at home was helping with her symptoms.  She has previously had similar symptoms when she was found to be fluid overloaded.  After the dose of IV Lasix given in the emergency room, she has made quite a bit of urine and she actually feels quite a bit better.  Her breathing is back to normal while she is at rest; was much improved when she was up using the bathroom most recently.  Her leg swelling is also significantly improved from earlier today already with just the 1 dose of IV Lasix.  She has not had a cough, has not had any fevers today, no other complaints.      PAST MEDICAL HISTORY:  Significant for:     1.  Mitral valve replacement with a tissue valve.   2.  Previous DVT.   3.  Stroke.   4.  Seizure disorder.   5.  Rheumatoid arthritis.   6.  Osteoarthritis.   7.  Chronic pain syndrome.   8.  Hypothyroidism.   9.  Hypertension.   10.  Gastroesophageal reflux disease.   11.  Congestive heart failure.   12.  Atrial fibrillation.      ALLERGIES:  The patient has no known drug allergies.      MEDICATIONS:  Prior to hospitalization, the patient has been takin.  Tylenol as needed for pain.   2.  Norco as needed for pain.   3.  Warfarin 3 mg a day.   4.  Levaquin 500 mg a day.   5.  Allopurinol 100 mg twice a  day.   6.  Multivitamin once a day.   7.  Calcium citrate once a day.   8.  Cholecalciferol 1000 international units a day.   9.  Fish oil supplement once a day.    10.  Boniva 3 mg injected once.   11.  Norvasc/valsartan 10/320 mg a day.   12.  Omeprazole 20 mg twice a day.   13.  Synthroid 112 mcg a day.   14.  Metoprolol 50 mg twice a day.   15.  Chlorthalidone 25 mg a day.   16.  Folic acid 1 mg a day.      SOCIAL HISTORY:  The patient does not smoke, does not drink alcohol.      FAMILY HISTORY:  Mother, father and brother with coronary artery disease.  One brother with esophageal cancer.      REVIEW OF SYSTEMS:  Positive for shortness of breath, leg swelling and weight gain as noted above.  Otherwise, a 10-point review of systems is negative for acute problems.  The patient has been having some mild abdominal pain since her surgery, but has not changed since her surgery.      PHYSICAL EXAMINATION:   VITAL SIGNS:  Today show a temperature of 98 degrees Fahrenheit, heart rate 79, blood pressure 137/63, respiratory rate 18, oxygen saturation 94% on room air.   IN GENERAL:  The patient is well-developed, well-nourished, no acute distress, awake, alert and oriented x3.   HEENT:  Head is normocephalic, atraumatic.  Eyes:  Pupils equal, round, react to light.  Extraocular muscles are intact.  Sclerae are nonicteric.  Oropharynx has moist mucous membranes, no lesions.   NECK:  Supple, no mass.   HEART:  Fairly regular with occasional what sounds like premature beats, no murmurs are noted.   LUNGS:  Clear to auscultation at this time.  The patient is using normal respiratory effort to breathe, no accessory muscle use.   ABDOMEN:  Has positive bowel sounds, soft, tender to very, very gentle palpation, nondistended.   SKIN:  Warm and dry to touch.  No rash over examined skin.   EXTREMITIES:  1+ pitting edema in the lower extremities bilaterally.  No cyanosis.   NEUROLOGIC:  Cranial nerves II-XII are grossly intact.  The  patient moves all 4 extremities.   PSYCHIATRIC:  The patient has appropriate affect, oriented to person, place and time.      LABORATORY TESTING:  Metabolic panel shows an elevated N-terminal proBNP of 4812, otherwise unremarkable.  Complete blood count shows a white count of 15.5, hemoglobin 10.3, platelets 456, otherwise unremarkable.  INR is 1.95.  Chest x-ray shows mild streaky bilateral interstitial prominence, linear opacification at the right lateral lung base suggestive of focal atelectasis or scarring.      ASSESSMENT AND PLAN:  A 76-year-old female with acute on chronic diastolic congestive heart failure exacerbation.   1.  Acute on chronic diastolic congestive heart failure exacerbation.  The patient did receive a dose of intravenous Lasix in the emergency room, has responded very well to this.  We will have the patient on strict intake and output monitoring, weigh the patient daily, plan to give her another dose of Lasix tomorrow morning.  She has already decreased more than 2 pounds in just a few hours here at the hospital and should likely be able to discharge from the hospital tomorrow.  Continue the patient on her valsartan and metoprolol.   2.  Hypertension.  Continue Norvasc, valsartan and metoprolol.   3.  Hypothyroidism.  Continue Synthroid.   4.  Recent cholecystectomy.  Continue the patient on Levaquin, have pain medications available if needed.   5.  Gastroesophageal reflux disease.  Continue omeprazole.   6.  Atrial fibrillation.  Continue metoprolol and warfarin.   7.  Deep venous thrombosis prophylaxis.  The patient is on warfarin for deep venous thrombosis prophylaxis.  INR is 1.95.         TAM COELLO DO             D: 2017 17:09   T: 2017 20:49   MT: [id number]      Name:     NADIYA LEWIS   MRN:      [id number]        Account:      [id number]   :      1940           Admitted:     [id number]      Document: [id number]

## 2017-05-04 NOTE — PROGRESS NOTES
Met w/pt and spouse at the bedside, pt is admitted under obs status for CHF and already has a d/c order.  They are both fairly well informed regarding mgmt of her CHF-understands weighing everyday is important and states she does do this as well as no salt added and monitoring store bought food as to the sodium content.  When asked if they have received the heart failure information packet in the past, they state no this hasn't been given to them previously.  Did go over the contents of the packet with them and they are interested in contacting the [facility].  Pt does have an appointment May 8th w/her PCP.

## 2017-05-05 ENCOUNTER — ANTICOAGULATION THERAPY VISIT (OUTPATIENT)
Dept: ANTICOAGULATION | Facility: CLINIC | Age: 77
End: 2017-05-05
Payer: MEDICARE

## 2017-05-05 ENCOUNTER — CARE COORDINATION (OUTPATIENT)
Dept: CARE COORDINATION | Facility: CLINIC | Age: 77
End: 2017-05-05

## 2017-05-05 DIAGNOSIS — Z79.01 LONG-TERM (CURRENT) USE OF ANTICOAGULANTS: ICD-10-CM

## 2017-05-05 DIAGNOSIS — I48.91 ATRIAL FIBRILLATION AND FLUTTER (H): ICD-10-CM

## 2017-05-05 DIAGNOSIS — I48.92 ATRIAL FIBRILLATION AND FLUTTER (H): ICD-10-CM

## 2017-05-05 LAB — INR POINT OF CARE: 2.9 (ref 0.86–1.14)

## 2017-05-05 PROCEDURE — 36416 COLLJ CAPILLARY BLOOD SPEC: CPT

## 2017-05-05 PROCEDURE — 85610 PROTHROMBIN TIME: CPT | Mod: QW

## 2017-05-05 NOTE — PROGRESS NOTES
ANTICOAGULATION FOLLOW-UP CLINIC VISIT    Patient Name:  Zunilda Alicea May  Date:  5/5/2017  Contact Type:  Face to Face    SUBJECTIVE:     Patient Findings     Positives Change in medications (pt has 5 days of levaquin left.)           OBJECTIVE    INR Protime   Date Value Ref Range Status   05/05/2017 2.9 (A) 0.86 - 1.14 Final       ASSESSMENT / PLAN  INR assessment THER    Recheck INR In: 3 DAYS    INR Location Clinic      Anticoagulation Summary as of 5/5/2017     INR goal 2.0-3.0   Today's INR 2.9   Maintenance plan 5 mg (5 mg x 1) on Tue, Thu, Sat; 2.5 mg (5 mg x 0.5) all other days   Full instructions 5/6: 2.5 mg; Otherwise 5 mg on Tue, Thu, Sat; 2.5 mg all other days   Weekly total 25 mg   Plan last modified Екатерина Mahan RN (1/24/2017)   Next INR check 5/8/2017   Priority INR   Target end date     Indications   Long-term (current) use of anticoagulants [Z79.01] [Z79.01]  Atrial fibrillation and flutter (H) [I48.91  I48.92]         Anticoagulation Episode Summary     INR check location     Preferred lab     Send INR reminders to [facility]    Comments       Anticoagulation Care Providers     Provider Role Specialty Phone number    Yunior Huang MD Responsible Internal Medicine [phone]            See the Encounter Report to view Anticoagulation Flowsheet and Dosing Calendar (Go to Encounters tab in chart review, and find the Anticoagulation Therapy Visit)    Dosage adjustment made based on physician directed care plan.    Sonam Teixeira RN

## 2017-05-05 NOTE — MR AVS SNAPSHOT
Zunilda Alicea May   5/5/2017 1:45 PM   Anticoagulation Therapy Visit    Description:  76 year old female   Provider:  [facility]   Department:  [facility]           INR as of 5/5/2017     Today's INR 2.9      Anticoagulation Summary as of 5/5/2017     INR goal 2.0-3.0   Today's INR 2.9   Full instructions 5/6: 2.5 mg; Otherwise 5 mg on Tue, Thu, Sat; 2.5 mg all other days   Next INR check 5/8/2017    Indications   Long-term (current) use of anticoagulants [Z79.01] [Z79.01]  Atrial fibrillation and flutter (H) [I48.91  I48.92]         Your next Anticoagulation Clinic appointment(s)     May 08, 2017  3:45 PM CDT   Anticoagulation Visit with [facility]   [facility] ([facility]    [address] [facility] [address]  [facility] [address] [phone]              Contact Numbers     [facility] Phone Numbers:  Anticoagulation Clinic Appointments : [phone]  Anticoagulation Nurse: [phone]         May 2017 Details    Sun Mon Tue Wed Thu Fri Sat      1               2               3               4               5      2.5 mg   See details      6      2.5 mg           7      2.5 mg         8            9               10               11               12               13                 14               15               16               17               18               19               20                 21               22               23               24               25               26               27                 28               29               30               31                   Date Details   05/05 This INR check       Date of next INR:  5/8/2017         How to take your warfarin dose     To take:  2.5 mg Take 0.5 of a 5 mg tablet.

## 2017-05-05 NOTE — PROGRESS NOTES
Clinic Care Coordination Contact  OUTREACH    Referral Information:  Referral Source: [facility]  Reason for Contact: Hospitalization -5/3-5/4/2017--  1. Acute on Chronic Diastolic Heart Failure -   Secondary to IVF received from recent admission for acute gangrenous cholecystitis s/p lap cholecystectomy.   The patient's dry weight is between 155-160, discharge weight is 163. Her PTA chlorthalidone is held for now and she is being discharged on lasix 20mg daily for now. She is scheduled to follow up with her PCP on 5/8 and Cardiology on 5/15. She will continue with daily weights and 2 gram sodium restriction, which she states she has followed as an outpatient.   Care Conference: No     Universal Utilization:   ED Visits in last year: 0  Hospital visits in last year: 2  Last PCP appointment: 11/01/16  Missed Appointments: 0  Concerns: No       Clinic Care Coordination Contact  [facility]/Voicemail    Referral Source: [facility]  Clinical Data: Care Coordinator Outreach  Outreach attempted x 1.  Left message on voicemail with call back information and requested return call.  Plan: . Care Coordinator will try to reach patient again in 1-2 business days.  Aster Dickinson RN  Care Coordinator-[facility]  [email or web address]  [phone]

## 2017-05-06 LAB
BACTERIA SPEC CULT: NORMAL
Lab: NORMAL
MICRO REPORT STATUS: NORMAL
SPECIMEN SOURCE: NORMAL

## 2017-05-08 ENCOUNTER — OFFICE VISIT (OUTPATIENT)
Dept: INTERNAL MEDICINE | Facility: CLINIC | Age: 77
End: 2017-05-08
Payer: MEDICARE

## 2017-05-08 ENCOUNTER — ANTICOAGULATION THERAPY VISIT (OUTPATIENT)
Dept: ANTICOAGULATION | Facility: CLINIC | Age: 77
End: 2017-05-08
Payer: MEDICARE

## 2017-05-08 VITALS
SYSTOLIC BLOOD PRESSURE: 116 MMHG | TEMPERATURE: 97.3 F | HEART RATE: 83 BPM | WEIGHT: 157.1 LBS | DIASTOLIC BLOOD PRESSURE: 68 MMHG | OXYGEN SATURATION: 94 % | HEIGHT: 65 IN | BODY MASS INDEX: 26.17 KG/M2

## 2017-05-08 DIAGNOSIS — E87.6 LOW BLOOD POTASSIUM: Primary | ICD-10-CM

## 2017-05-08 DIAGNOSIS — I48.92 ATRIAL FIBRILLATION AND FLUTTER (H): ICD-10-CM

## 2017-05-08 DIAGNOSIS — Z90.49 STATUS POST CHOLECYSTECTOMY: ICD-10-CM

## 2017-05-08 DIAGNOSIS — I10 ESSENTIAL HYPERTENSION WITH GOAL BLOOD PRESSURE LESS THAN 130/85: ICD-10-CM

## 2017-05-08 DIAGNOSIS — Z09 HOSPITAL DISCHARGE FOLLOW-UP: ICD-10-CM

## 2017-05-08 DIAGNOSIS — D64.9 LOW HEMOGLOBIN: ICD-10-CM

## 2017-05-08 DIAGNOSIS — Z79.01 LONG-TERM (CURRENT) USE OF ANTICOAGULANTS: ICD-10-CM

## 2017-05-08 DIAGNOSIS — I48.91 ATRIAL FIBRILLATION AND FLUTTER (H): ICD-10-CM

## 2017-05-08 DIAGNOSIS — I10 BENIGN ESSENTIAL HYPERTENSION: ICD-10-CM

## 2017-05-08 DIAGNOSIS — L57.0 AK (ACTINIC KERATOSIS): ICD-10-CM

## 2017-05-08 LAB
ALBUMIN SERPL-MCNC: 2.9 G/DL (ref 3.4–5)
ALP SERPL-CCNC: 132 U/L (ref 40–150)
ALT SERPL W P-5'-P-CCNC: 26 U/L (ref 0–50)
ANION GAP SERPL CALCULATED.3IONS-SCNC: 10 MMOL/L (ref 3–14)
AST SERPL W P-5'-P-CCNC: 26 U/L (ref 0–45)
BILIRUB SERPL-MCNC: 0.4 MG/DL (ref 0.2–1.3)
BUN SERPL-MCNC: 21 MG/DL (ref 7–30)
CALCIUM SERPL-MCNC: 9.1 MG/DL (ref 8.5–10.1)
CHLORIDE SERPL-SCNC: 100 MMOL/L (ref 94–109)
CO2 SERPL-SCNC: 28 MMOL/L (ref 20–32)
CREAT SERPL-MCNC: 1.09 MG/DL (ref 0.52–1.04)
ERYTHROCYTE [DISTWIDTH] IN BLOOD BY AUTOMATED COUNT: 18.4 % (ref 10–15)
GFR SERPL CREATININE-BSD FRML MDRD: 49 ML/MIN/1.7M2
GLUCOSE SERPL-MCNC: 87 MG/DL (ref 70–99)
HCT VFR BLD AUTO: 33.6 % (ref 35–47)
HGB BLD-MCNC: 11.2 G/DL (ref 11.7–15.7)
INR POINT OF CARE: 3.4 (ref 0.86–1.14)
MCH RBC QN AUTO: 32.2 PG (ref 26.5–33)
MCHC RBC AUTO-ENTMCNC: 33.3 G/DL (ref 31.5–36.5)
MCV RBC AUTO: 97 FL (ref 78–100)
PLATELET # BLD AUTO: 487 10E9/L (ref 150–450)
POTASSIUM SERPL-SCNC: 3.2 MMOL/L (ref 3.4–5.3)
PROT SERPL-MCNC: 7.6 G/DL (ref 6.8–8.8)
RBC # BLD AUTO: 3.48 10E12/L (ref 3.8–5.2)
SODIUM SERPL-SCNC: 138 MMOL/L (ref 133–144)
WBC # BLD AUTO: 11.3 10E9/L (ref 4–11)

## 2017-05-08 PROCEDURE — 85610 PROTHROMBIN TIME: CPT | Mod: QW

## 2017-05-08 PROCEDURE — 17000 DESTRUCT PREMALG LESION: CPT | Performed by: INTERNAL MEDICINE

## 2017-05-08 PROCEDURE — 99207 ZZC NO CHARGE NURSE ONLY: CPT

## 2017-05-08 PROCEDURE — 36416 COLLJ CAPILLARY BLOOD SPEC: CPT

## 2017-05-08 PROCEDURE — 80053 COMPREHEN METABOLIC PANEL: CPT | Performed by: INTERNAL MEDICINE

## 2017-05-08 PROCEDURE — 85027 COMPLETE CBC AUTOMATED: CPT | Performed by: INTERNAL MEDICINE

## 2017-05-08 PROCEDURE — 99496 TRANSJ CARE MGMT HIGH F2F 7D: CPT | Mod: 25 | Performed by: INTERNAL MEDICINE

## 2017-05-08 RX ORDER — CHLORTHALIDONE 25 MG/1
25 TABLET ORAL DAILY
Qty: 90 TABLET | Refills: 2 | Status: SHIPPED | OUTPATIENT
Start: 2017-05-08 | End: 2018-07-19

## 2017-05-08 NOTE — PROGRESS NOTES
ANTICOAGULATION FOLLOW-UP CLINIC VISIT    Patient Name:  Zunilda Alicea May  Date:  5/8/2017  Contact Type:  Face to Face    SUBJECTIVE:     Patient Findings     Positives Change in medications (Pt will start calthalidone 5/9/17 which can decrease INR), Antibiotic use or infection (has 3 or 4 Levaquin doses left)           OBJECTIVE    INR Protime   Date Value Ref Range Status   05/08/2017 3.4 (A) 0.86 - 1.14 Final       ASSESSMENT / PLAN  INR assessment SUPRA    Recheck INR In: 9 DAYS    INR Location Clinic      Anticoagulation Summary as of 5/8/2017     INR goal 2.0-3.0   Today's INR 3.4!   Maintenance plan 5 mg (5 mg x 1) on Tue, Thu, Sat; 2.5 mg (5 mg x 0.5) all other days   Full instructions 5/9: 2.5 mg; Otherwise 5 mg on Tue, Thu, Sat; 2.5 mg all other days   Weekly total 25 mg   Plan last modified Екатерина Mahan RN (1/24/2017)   Next INR check 5/17/2017   Priority INR   Target end date     Indications   Long-term (current) use of anticoagulants [Z79.01] [Z79.01]  Atrial fibrillation and flutter (H) [I48.91  I48.92]         Anticoagulation Episode Summary     INR check location     Preferred lab     Send INR reminders to [facility]    Comments       Anticoagulation Care Providers     Provider Role Specialty Phone number    Yunior Huang MD [facility] [phone]            See the Encounter Report to view Anticoagulation Flowsheet and Dosing Calendar (Go to Encounters tab in chart review, and find the Anticoagulation Therapy Visit)    Dosage adjustment made based on physician directed care plan.    Екатерина Mahan, RN

## 2017-05-08 NOTE — PROGRESS NOTES
No return call.  Patient had a hospital follow up today.  CC will follow up in 3-5 business days     Aster Dickinson RN  Care Coordinator-[facility]  [email or web address]  [phone]

## 2017-05-08 NOTE — PROGRESS NOTES
SUBJECTIVE:                                                    Zunilda Clark is a 76 year old female who presents to clinic today for the following health issues:          Hospital Follow-up Visit:    Hospital/Nursing Home/IP Rehab Facility: [facility]  Date of 1st Admission: 4/27/17 to 5/2/17  Date of 2nd Admission: 5/3/17 to 5/4/17  Reason(s) for Admission: cholecystitis and CHF            Problems taking medications regularly:  None       Medication changes since discharge: None       Problems adhering to non-medication therapy:  None    Summary of hospitalization:  [facility] discharge summary reviewed  Diagnostic Tests/Treatments reviewed.  Follow up needed: none  Other Healthcare Providers Involved in Patient s Care:         Specialist appointment - cardiology   Update since discharge: improved.     Post Discharge Medication Reconciliation: discharge medications reconciled, continue medications without change.  Plan of care communicated with patient     Coding guidelines for this visit:  Type of Medical   Decision Making Face-to-Face Visit       within 7 Days of discharge Face-to-Face Visit        within 14 days of discharge   Moderate Complexity [id number]   High Complexity [id number]        Patient is seen for a follow up visit.  Recently hospitalized for CHF related to fluid over load post recent cholecystectomy.   Treated with IV Lasix and was discharged on PO lasix. Feels back to normal. Weight is down, edema resolved. No CP, SOB.   Has history of atrial fibrillation. On anticoagulation with Coumadin and rate control medications. Asymptonatic - no chest pains , palpitations,  no side effects from medications.  Has h/o HTN. on medical treatment. BP has been controlled. No side effects from medications. No CP, HA, dizziness. good compliance with medications and low salt diet.  Has h/o knee OA, scheduled for surgery but postponed now.   Concern for a growth on the left medial  knee, warty appearance. No pain.     PROBLEMS TO ADD ON...    Problem list and histories reviewed & adjusted, as indicated.  Additional history: as documented    Patient Active Problem List   Diagnosis     CHF (congestive heart failure) (H)     RA (rheumatoid arthritis) (H)     Benign essential hypertension     Mitral valve disorder     Pulmonary HTN (H)     Atrial fibrillation and flutter (H)     Cardiac pacemaker in situ     Humerus fracture     Fracture, humerus closed, shaft     Advance Care Planning     Anticoagulation management encounter     Acquired hypothyroidism     Essential hypertension     Long-term (current) use of anticoagulants [Z79.01]     Acute cholecystitis     Past Surgical History:   Procedure Laterality Date     ABDOMEN SURGERY      prolapsed bladder     H ABLATION AV NODE  2011    AFlutter with syncope, PPM to follow     IMPLANT PACEMAKER  2011     LAPAROSCOPIC CHOLECYSTECTOMY WITH CHOLANGIOGRAMS N/A 4/29/2017    Procedure: LAPAROSCOPIC CHOLECYSTECTOMY WITH CHOLANGIOGRAMS;  LAPAROSCOPIC CHOLECYSTECTOMY WITH CHOLANGIOGRAMS ;  Surgeon: Angeles Mcgill MD;  Location: [facility] OR     OPEN REDUCTION INTERNAL FIXATION RODDING INTRAMEDULLARY HUMERUS Right 7/28/2015    Procedure: OPEN REDUCTION INTERNAL FIXATION RODDING INTRAMEDULLARY HUMERUS;  Surgeon: Raymundo Rivera MD;  Location: [facility] OR     REPLACE VALVE MITRAL  2006    Mitral valve replacement with bioprosthetic valve in 2008 for rheumatic disease     SLING BLADDER SUSPENSION WITH FASCIA UMESH         Social History   Substance Use Topics     Smoking status: Never Smoker     Smokeless tobacco: Never Used     Alcohol use No     Family History   Problem Relation Age of Onset     Coronary Artery Disease Mother      Coronary Artery Disease Brother      DIABETES Brother      DIABETES Brother      Hypertension Sister      Hyperlipidemia Sister          Current Outpatient Prescriptions   Medication Sig Dispense Refill     chlorthalidone (HYGROTON) 25  MG tablet Take 1 tablet (25 mg) by mouth daily 90 tablet 2     ACETAMINOPHEN PO Take 650 mg by mouth every 6 hours as needed        warfarin (COUMADIN) 3 MG tablet Take 1 tablet (3 mg) by mouth daily For 3 days, check INR on 05/05 to adjust the dose. 30 tablet 0     levofloxacin (LEVAQUIN) 500 MG tablet Take 1 tablet (500 mg) by mouth daily for 7 days 7 tablet 0     ALLOPURINOL PO Take 100 mg by mouth 2 times daily       multivitamin, therapeutic with minerals (MULTI-VITAMIN) TABS tablet Take 1 tablet by mouth daily       calcium citrate (CALCITRATE) 950 MG tablet Take 1 tablet by mouth daily       VITAMIN D, CHOLECALCIFEROL, PO Take 1,000 Units by mouth daily       fish oil-omega-3 fatty acids 1000 MG capsule Take 2 g by mouth daily       ibandronate (BONIVA) 3 MG/3ML Inject 3 mg into the vein once       Amlodipine Besylate-Valsartan (EXFORGE)  MG TABS Take 1 tablet by mouth daily 90 tablet 3     omeprazole (PRILOSEC) 20 MG CR capsule Take 1 capsule (20 mg) by mouth 2 times daily 180 capsule 3     levothyroxine (SYNTHROID/LEVOTHROID) 112 MCG tablet Take 1 tablet (112 mcg) by mouth daily 90 tablet 3     metoprolol (LOPRESSOR) 100 MG tablet Take 0.5 tablets (50 mg) by mouth 2 times daily 60 tablet 5     folic acid (FOLVITE) 1 MG tablet Take 1 mg by mouth daily       METHOTREXATE SODIUM IJ Inject 0.8 mLs as directed once a week       HYDROcodone-acetaminophen (NORCO) 5-325 MG per tablet Take 1-2 tablets by mouth every 4 hours as needed for pain (Patient not taking: Reported on 5/8/2017) 5 tablet 0       Reviewed and updated as needed this visit by clinical staff  Tobacco  Allergies  Meds  Med Hx  Surg Hx  Fam Hx  Soc Hx      Reviewed and updated as needed this visit by Provider         ROS:  Constitutional, HEENT, cardiovascular, pulmonary, GI, , musculoskeletal, neuro, skin, endocrine and psych systems are negative, except as otherwise noted.    OBJECTIVE:                                                  "   /68 (BP Location: Left arm, Patient Position: Chair, Cuff Size: Adult Regular)  Pulse 83  Temp 97.3  F (36.3  C) (Oral)  Ht 5' 5\" (1.651 m)  Wt 157 lb 1.6 oz (71.3 kg)  SpO2 94%  BMI 26.14 kg/m2  Body mass index is 26.14 kg/(m^2).  GENERAL: healthy, alert and no distress  EYES: Eyes grossly normal to inspection, PERRL and conjunctivae and sclerae normal  HENT: ear canals and TM's normal, nose and mouth without ulcers or lesions  NECK: no adenopathy, no asymmetry, masses, or scars and thyroid normal to palpation  RESP: lungs clear to auscultation - no rales, rhonchi or wheezes  CV: regular rate and rhythm, normal S1 S2, no S3 or S4, no murmur, click or rub, no peripheral edema and peripheral pulses strong  ABDOMEN: soft, nontender, no hepatosplenomegaly, no masses and bowel sounds normal  MS: no gross musculoskeletal defects noted, no edema  SKIN: no suspicious lesions or rashes, medial left knee raised, keratotic lesion 5 mm diameter     Diagnostic Test Results:  none      ASSESSMENT/PLAN:                                                      Problem List Items Addressed This Visit     Benign essential hypertension    Relevant Orders    CBC with platelets    Comprehensive metabolic panel    Atrial fibrillation and flutter (H)      Other Visit Diagnoses     Hospital discharge follow-up    -  Primary    Relevant Orders    CBC with platelets    Comprehensive metabolic panel    Status post cholecystectomy        Essential hypertension with goal blood pressure less than 130/85        Relevant Medications    chlorthalidone (HYGROTON) 25 MG tablet    AK (actinic keratosis)        Relevant Orders    DESTRUCT BENIGN LESION, UP TO 14 (Completed)           Assess lab post hospitalization   Stop Lasix, resume chlorthalidone   Follow up with cardiology and surgery   No clinical CHF  Cryotherapy applied to the wart/ keratosis on the left knee  Handicapped form filled in for h/o OA, uses a walker  Postpone knee " surgery for 3 months     Follow-Up:in 3 months     Yunior Huang MD  [facility]

## 2017-05-08 NOTE — MR AVS SNAPSHOT
Zunilda Alicea May   5/8/2017 3:45 PM   Anticoagulation Therapy Visit    Description:  76 year old female   Provider[facility]  [facility]   Department:  [facility]           INR as of 5/8/2017     Today's INR 3.4!      Anticoagulation Summary as of 5/8/2017     INR goal 2.0-3.0   Today's INR 3.4!   Full instructions 5/9: 2.5 mg; Otherwise 5 mg on Tue, Thu, Sat; 2.5 mg all other days   Next INR check 5/17/2017    Indications   Long-term (current) use of anticoagulants [Z79.01] [Z79.01]  Atrial fibrillation and flutter (H) [I48.91  I48.92]         Your next Anticoagulation Clinic appointment(s)     May 08, 2017  3:45 PM CDT   Anticoagulation Visit with [facility]   [facility] ([facility]    [address] [facility] [address]  [facility] [id number] [phone]            May 17, 2017 11:15 AM CDT   Anticoagulation Visit with [facility]   [facility] ([facility])    [address]  [facility] [address] [phone]              Contact Numbers     [facility] Phone Numbers:  Anticoagulation Clinic Appointments : [phone]  Anticoagulation Nurse: [phone]         May 2017 Details    Sun Mon Tue Wed Thu Fri Sat      1               2               3               4               5               6                 7               8      2.5 mg   See details      9      2.5 mg         10      2.5 mg         11      5 mg         12      2.5 mg         13      5 mg           14      2.5 mg         15      2.5 mg         16      5 mg         17            18               19               20                 21               22               23               24               25               26               27                 28               29               30               31                   Date Details   05/08 This INR check       Date of next INR:  5/17/2017         How to take  your warfarin dose     To take:  2.5 mg Take 0.5 of a 5 mg tablet.    To take:  5 mg Take 1 of the 5 mg tablets.

## 2017-05-08 NOTE — NURSING NOTE
"Chief Complaint   Patient presents with     Hospital F/U     4/27/17 to 5/2/17 (1st stay) then 5-3-17 to 5-4-17 (2nd stay)       Initial /68 (BP Location: Left arm, Patient Position: Chair, Cuff Size: Adult Regular)  Pulse 83  Temp 97.3  F (36.3  C) (Oral)  Ht 5' 5\" (1.651 m)  Wt 157 lb 1.6 oz (71.3 kg)  SpO2 94%  BMI 26.14 kg/m2 Estimated body mass index is 26.14 kg/(m^2) as calculated from the following:    Height as of this encounter: 5' 5\" (1.651 m).    Weight as of this encounter: 157 lb 1.6 oz (71.3 kg).  Medication Reconciliation: complete    "

## 2017-05-08 NOTE — MR AVS SNAPSHOT
After Visit Summary   5/8/2017    Zunilda Alicea May    MRN: [id number]           Patient Information     Date Of Birth          1940        Visit Information        Provider Department      5/8/2017 1:40 PM Yunior Huang MD [facility]        Today's Diagnoses     Hospital discharge follow-up    -  1    Benign essential hypertension        Atrial fibrillation and flutter (H)        Status post cholecystectomy        Essential hypertension with goal blood pressure less than 130/85        AK (actinic keratosis)           Follow-ups after your visit        Your next 10 appointments already scheduled     May 08, 2017  3:45 PM CDT   Anticoagulation Visit with [facility]   [facility] ([facility])    [address]  [facility] [address] [phone]            May 15, 2017  3:10 PM CDT   Return Visit with MANJIT Hilliard CNP   [facility] [place] ([facility])    [address]  [facility] [address] [phone]            May 23, 2017 10:30 AM CDT   Post Op with Crystal Cuevas PA-C   [facility] ([facility])    [address]  [facility] [address] [phone]            Jul 03, 2017 11:30 AM CDT   Remote PPM Check with WILLIAMS ANDREA   [facility] [place] ([facility])    [address]  [facility] [address] [phone]           This appointment is for a remote check of your pacemaker.  This is not an appointment at the office.            Aug 14, 2017 11:00 AM CDT   Pre-Op physical with Yunior Huang MD   [facility] ([facility])    [address]  [facility] [address] [phone]              Who to contact     If you have questions or need follow up information about today's  "clinic visit or your schedule please contact [facility] directly at [phone].  Normal or non-critical lab and imaging results will be communicated to you by MyChart, letter or phone within 4 business days after the clinic has received the results. If you do not hear from us within 7 days, please contact the clinic through [facility] or phone. If you have a critical or abnormal lab result, we will notify you by phone as soon as possible.  Submit refill requests through [facility] or call your pharmacy and they will forward the refill request to us. Please allow 3 business days for your refill to be completed.          Additional Information About Your Visit        [facility] Information     [facility] gives you secure access to your electronic health record. If you see a primary care provider, you can also send messages to your care team and make appointments. If you have questions, please call your primary care clinic.  If you do not have a primary care provider, please call [phone] and they will assist you.        Care EveryWhere ID     This is your Care EveryWhere ID. This could be used by other organizations to access your [facility] medical records  [id number]        Your Vitals Were     Pulse Temperature Height Pulse Oximetry BMI (Body Mass Index)       83 97.3  F (36.3  C) (Oral) 5' 5\" (1.651 m) 94% 26.14 kg/m2        Blood Pressure from Last 3 Encounters:   05/08/17 116/68   05/04/17 116/56   05/02/17 145/64    Weight from Last 3 Encounters:   05/08/17 157 lb 1.6 oz (71.3 kg)   05/04/17 163 lb 4.8 oz (74.1 kg)   04/05/17 160 lb (72.6 kg)              We Performed the Following     CBC with platelets     Comprehensive metabolic panel     DESTRUCT BENIGN LESION, UP TO 14          Today's Medication Changes          These changes are accurate as of: 5/8/17  2:34 PM.  If you have any questions, ask your nurse or doctor.               Start taking these medicines.        Dose/Directions    " chlorthalidone 25 MG tablet   Commonly known as:  HYGROTON   Used for:  Essential hypertension with goal blood pressure less than 130/85   Started by:  Yunior Huang MD        Dose:  25 mg   Take 1 tablet (25 mg) by mouth daily   Quantity:  90 tablet   Refills:  2         Stop taking these medicines if you haven't already. Please contact your care team if you have questions.     furosemide 20 MG tablet   Commonly known as:  LASIX   Stopped by:  Yunior Huang MD                Where to get your medicines      These medications were sent to [id number] - [place], [place] - [address] [address]   [address] , [facility] [address]     Phone:  [phone]     chlorthalidone 25 MG tablet                Primary Care Provider Office Phone # Fax #    Yunior Huang [phone]       [facility] [address] [facility] [address]        Thank you!     Thank you for choosing [facility]  for your care. Our goal is always to provide you with excellent care. Hearing back from our patients is one way we can continue to improve our services. Please take a few minutes to complete the written survey that you may receive in the mail after your visit with us. Thank you!             Your Updated Medication List - Protect others around you: Learn how to safely use, store and throw away your medicines at www.disposemymeds.org.          This list is accurate as of: 5/8/17  2:34 PM.  Always use your most recent med list.                   Brand Name Dispense Instructions for use    ACETAMINOPHEN PO      Take 650 mg by mouth every 6 hours as needed       ALLOPURINOL PO      Take 100 mg by mouth 2 times daily       Amlodipine Besylate-Valsartan  MG Tabs    EXFORGE    90 tablet    Take 1 tablet by mouth daily       BONIVA 3 MG/3ML   Generic drug:  ibandronate      Inject 3 mg into the vein once       calcium citrate 950 MG tablet    CALCITRATE     Take 1  tablet by mouth daily       chlorthalidone 25 MG tablet    HYGROTON    90 tablet    Take 1 tablet (25 mg) by mouth daily       fish oil-omega-3 fatty acids 1000 MG capsule      Take 2 g by mouth daily       folic acid 1 MG tablet    FOLVITE     Take 1 mg by mouth daily       HYDROcodone-acetaminophen 5-325 MG per tablet    NORCO    5 tablet    Take 1-2 tablets by mouth every 4 hours as needed for pain       levofloxacin 500 MG tablet    LEVAQUIN    7 tablet    Take 1 tablet (500 mg) by mouth daily for 7 days       levothyroxine 112 MCG tablet    SYNTHROID/LEVOTHROID    90 tablet    Take 1 tablet (112 mcg) by mouth daily       METHOTREXATE SODIUM IJ      Inject 0.8 mLs as directed once a week       metoprolol 100 MG tablet    LOPRESSOR    60 tablet    Take 0.5 tablets (50 mg) by mouth 2 times daily       Multi-vitamin Tabs tablet      Take 1 tablet by mouth daily       omeprazole 20 MG CR capsule    priLOSEC    180 capsule    Take 1 capsule (20 mg) by mouth 2 times daily       VITAMIN D (CHOLECALCIFEROL) PO      Take 1,000 Units by mouth daily       warfarin 3 MG tablet    COUMADIN    30 tablet    Take 1 tablet (3 mg) by mouth daily For 3 days, check INR on 05/05 to adjust the dose.

## 2017-05-12 ENCOUNTER — RADIANT APPOINTMENT (OUTPATIENT)
Dept: GENERAL RADIOLOGY | Facility: CLINIC | Age: 77
End: 2017-05-12
Attending: INTERNAL MEDICINE
Payer: MEDICARE

## 2017-05-12 ENCOUNTER — OFFICE VISIT (OUTPATIENT)
Dept: INTERNAL MEDICINE | Facility: CLINIC | Age: 77
End: 2017-05-12
Payer: MEDICARE

## 2017-05-12 ENCOUNTER — TELEPHONE (OUTPATIENT)
Dept: SURGERY | Facility: CLINIC | Age: 77
End: 2017-05-12

## 2017-05-12 VITALS
BODY MASS INDEX: 25.86 KG/M2 | TEMPERATURE: 97.7 F | SYSTOLIC BLOOD PRESSURE: 112 MMHG | HEART RATE: 71 BPM | WEIGHT: 155.2 LBS | OXYGEN SATURATION: 98 % | HEIGHT: 65 IN | DIASTOLIC BLOOD PRESSURE: 58 MMHG

## 2017-05-12 DIAGNOSIS — E87.6 LOW BLOOD POTASSIUM: ICD-10-CM

## 2017-05-12 DIAGNOSIS — R09.89 LUNG CRACKLES: ICD-10-CM

## 2017-05-12 DIAGNOSIS — M54.6 LEFT-SIDED THORACIC BACK PAIN, UNSPECIFIED CHRONICITY: Primary | ICD-10-CM

## 2017-05-12 DIAGNOSIS — D64.9 LOW HEMOGLOBIN: ICD-10-CM

## 2017-05-12 LAB
ALBUMIN UR-MCNC: NEGATIVE MG/DL
APPEARANCE UR: CLEAR
BACTERIA #/AREA URNS HPF: ABNORMAL /HPF
BASOPHILS # BLD AUTO: 0.1 10E9/L (ref 0–0.2)
BASOPHILS NFR BLD AUTO: 0.7 %
BILIRUB UR QL STRIP: NEGATIVE
COLOR UR AUTO: YELLOW
DIFFERENTIAL METHOD BLD: ABNORMAL
EOSINOPHIL # BLD AUTO: 0.2 10E9/L (ref 0–0.7)
EOSINOPHIL NFR BLD AUTO: 2.1 %
ERYTHROCYTE [DISTWIDTH] IN BLOOD BY AUTOMATED COUNT: 18.1 % (ref 10–15)
GLUCOSE UR STRIP-MCNC: NEGATIVE MG/DL
HCT VFR BLD AUTO: 32.5 % (ref 35–47)
HGB BLD-MCNC: 10.6 G/DL (ref 11.7–15.7)
HGB UR QL STRIP: NEGATIVE
KETONES UR STRIP-MCNC: NEGATIVE MG/DL
LEUKOCYTE ESTERASE UR QL STRIP: NEGATIVE
LYMPHOCYTES # BLD AUTO: 1.6 10E9/L (ref 0.8–5.3)
LYMPHOCYTES NFR BLD AUTO: 22.4 %
MCH RBC QN AUTO: 31.5 PG (ref 26.5–33)
MCHC RBC AUTO-ENTMCNC: 32.6 G/DL (ref 31.5–36.5)
MCV RBC AUTO: 96 FL (ref 78–100)
MONOCYTES # BLD AUTO: 0.8 10E9/L (ref 0–1.3)
MONOCYTES NFR BLD AUTO: 11.4 %
NEUTROPHILS # BLD AUTO: 4.5 10E9/L (ref 1.6–8.3)
NEUTROPHILS NFR BLD AUTO: 63.4 %
NITRATE UR QL: NEGATIVE
NON-SQ EPI CELLS #/AREA URNS LPF: ABNORMAL /LPF
PH UR STRIP: 5 PH (ref 5–7)
PLATELET # BLD AUTO: 503 10E9/L (ref 150–450)
RBC # BLD AUTO: 3.37 10E12/L (ref 3.8–5.2)
RBC #/AREA URNS AUTO: ABNORMAL /HPF (ref 0–2)
SP GR UR STRIP: 1.01 (ref 1–1.03)
URN SPEC COLLECT METH UR: ABNORMAL
UROBILINOGEN UR STRIP-ACNC: 0.2 EU/DL (ref 0.2–1)
WBC # BLD AUTO: 7.1 10E9/L (ref 4–11)
WBC #/AREA URNS AUTO: ABNORMAL /HPF (ref 0–2)

## 2017-05-12 PROCEDURE — 85025 COMPLETE CBC W/AUTO DIFF WBC: CPT | Performed by: INTERNAL MEDICINE

## 2017-05-12 PROCEDURE — 81001 URINALYSIS AUTO W/SCOPE: CPT | Performed by: INTERNAL MEDICINE

## 2017-05-12 PROCEDURE — 99214 OFFICE O/P EST MOD 30 MIN: CPT | Performed by: INTERNAL MEDICINE

## 2017-05-12 PROCEDURE — 36415 COLL VENOUS BLD VENIPUNCTURE: CPT | Performed by: INTERNAL MEDICINE

## 2017-05-12 PROCEDURE — 71020 XR CHEST 2 VW: CPT

## 2017-05-12 PROCEDURE — 80048 BASIC METABOLIC PNL TOTAL CA: CPT | Performed by: INTERNAL MEDICINE

## 2017-05-12 NOTE — NURSING NOTE
"           Chief Complaint   Patient presents with     Musculoskeletal Problem     Constant pain at the Lt side since after D/C       Initial /58 (BP Location: Right arm, Patient Position: Chair, Cuff Size: Adult Large)  Pulse 71  Temp 97.7  F (36.5  C) (Oral)  Ht 5' 5\" (1.651 m)  Wt 155 lb 3.2 oz (70.4 kg)  SpO2 98%  Breastfeeding? No  BMI 25.83 kg/m2 Estimated body mass index is 25.83 kg/(m^2) as calculated from the following:    Height as of this encounter: 5' 5\" (1.651 m).    Weight as of this encounter: 155 lb 3.2 oz (70.4 kg).  Medication Reconciliation: complete   Ligia Barrett MA      "

## 2017-05-12 NOTE — TELEPHONE ENCOUNTER
GENERAL SURGERY NURSE PHONE TRIAGE     Zunilda Clark      MRN# [id number]  AGE:  76 year old     YOB: 1940  [phone] (home)    Surgeon: Dr. Mcgill  Surgical Assist:  Crystal Cuevas PA-C     Surgery type: Laparoscopic Cholecystectomy  intraoperative cholangiogram  abdominal lavage  peritoneal drain placement  increased difficulty     Surgery Date: April / 29 / 2017     POD: 13     CHIEF CONCERN:  Back Pain     HISTORY OF PRESENT ILLNESS:     Back pain    Left lower back-     Consistent, is not affected by eating/foods    Afebrile, no complaints of constipation, or right upper quadrant pain.    Patient is concerned she has a stone in her common bile duct.  We discussed signs and symptoms of those, also bile leak.    Patient has a routine PO appointment scheduled with Dr. Mcgill on 5/23/17. I did offer her an appointment with clinic PA-C and discussed other possibilities for pain, including muscular which is better addressed and followed by PCP.  Patient declined appointment with clinic PA-C.    PLAN:     Plan: Patient will call PCP to discuss back pain.  Pt also recommended to call office at any time if ongoing questions/concerns during surgical recovery. Pt is in agreement with this plan.  Rosalina Colemna RN

## 2017-05-12 NOTE — PROGRESS NOTES
SUBJECTIVE:                                                    Zunilda Clark is a 76 year old female who presents to clinic today for the following health issues:    Pt is a 76 year old female who is seen here to day with c/o Lt mid to lower back pain since 05/4/17. Pt had cholecystectomy and was discharged home on 05/4/17 and lt back pain since. No radiation of pain, no tingling or numbness of LE,. No urinary frequency/urgency or dysuria. No cough ,no fever/chills, no h/o kidney stones, taking OTC Tylenol w/o any relief, cannot take NSAID's due to benign on warfarin. No h/o injury.  Pt also had low Hgb and low Potassium during hospitalization, here to recheck .         Patient Active Problem List   Diagnosis     CHF (congestive heart failure) (H)     RA (rheumatoid arthritis) (H)     Benign essential hypertension     Mitral valve disorder     Pulmonary HTN (H)     Atrial fibrillation and flutter (H)     Cardiac pacemaker in situ     Humerus fracture     Fracture, humerus closed, shaft     Advance Care Planning     Anticoagulation management encounter     Acquired hypothyroidism     Essential hypertension     Long-term (current) use of anticoagulants [Z79.01]     Acute cholecystitis     Past Surgical History:   Procedure Laterality Date     ABDOMEN SURGERY      prolapsed bladder     H ABLATION AV NODE  2011    AFlutter with syncope, PPM to follow     IMPLANT PACEMAKER  2011     LAPAROSCOPIC CHOLECYSTECTOMY WITH CHOLANGIOGRAMS N/A 4/29/2017    Procedure: LAPAROSCOPIC CHOLECYSTECTOMY WITH CHOLANGIOGRAMS;  LAPAROSCOPIC CHOLECYSTECTOMY WITH CHOLANGIOGRAMS ;  Surgeon: Angeles Mcgill MD;  Location: [facility] OR     OPEN REDUCTION INTERNAL FIXATION RODDING INTRAMEDULLARY HUMERUS Right 7/28/2015    Procedure: OPEN REDUCTION INTERNAL FIXATION RODDING INTRAMEDULLARY HUMERUS;  Surgeon: Raymundo Rivera MD;  Location: [facility] OR     REPLACE VALVE MITRAL  2006    Mitral valve replacement with bioprosthetic valve in 2008 for  rheumatic disease     SLING BLADDER SUSPENSION WITH FASCIA UMESH         Social History   Substance Use Topics     Smoking status: Never Smoker     Smokeless tobacco: Never Used     Alcohol use No     Family History   Problem Relation Age of Onset     Coronary Artery Disease Mother      Coronary Artery Disease Brother      DIABETES Brother      DIABETES Brother      Hypertension Sister      Hyperlipidemia Sister          Current Outpatient Prescriptions   Medication Sig Dispense Refill     acetaminophen-codeine (TYLENOL #3) 300-30 MG per tablet Take 1 tablet by mouth 3 times daily as needed for pain 30 tablet 0     chlorthalidone (HYGROTON) 25 MG tablet Take 1 tablet (25 mg) by mouth daily 90 tablet 2     ACETAMINOPHEN PO Take 650 mg by mouth every 6 hours as needed        warfarin (COUMADIN) 3 MG tablet Take 1 tablet (3 mg) by mouth daily For 3 days, check INR on 05/05 to adjust the dose. 30 tablet 0     ALLOPURINOL PO Take 100 mg by mouth 2 times daily       multivitamin, therapeutic with minerals (MULTI-VITAMIN) TABS tablet Take 1 tablet by mouth daily       calcium citrate (CALCITRATE) 950 MG tablet Take 1 tablet by mouth daily       VITAMIN D, CHOLECALCIFEROL, PO Take 1,000 Units by mouth daily       fish oil-omega-3 fatty acids 1000 MG capsule Take 2 g by mouth daily       ibandronate (BONIVA) 3 MG/3ML Inject 3 mg into the vein once       Amlodipine Besylate-Valsartan (EXFORGE)  MG TABS Take 1 tablet by mouth daily 90 tablet 3     omeprazole (PRILOSEC) 20 MG CR capsule Take 1 capsule (20 mg) by mouth 2 times daily 180 capsule 3     levothyroxine (SYNTHROID/LEVOTHROID) 112 MCG tablet Take 1 tablet (112 mcg) by mouth daily 90 tablet 3     metoprolol (LOPRESSOR) 100 MG tablet Take 0.5 tablets (50 mg) by mouth 2 times daily 60 tablet 5     folic acid (FOLVITE) 1 MG tablet Take 1 mg by mouth daily       METHOTREXATE SODIUM IJ Inject 0.8 mLs as directed once a week       HYDROcodone-acetaminophen (NORCO) 5-325  MG per tablet Take 1-2 tablets by mouth every 4 hours as needed for pain (Patient not taking: Reported on 5/8/2017) 5 tablet 0       Reviewed and updated as needed this visit by clinical staff  Tobacco  Allergies  Meds       Reviewed and updated as needed this visit by Provider         ROS:  C: NEGATIVE for fever, chills, change in weight  E/M: NEGATIVE for ear, mouth and throat problems  R: NEGATIVE for significant cough or SOB  CV: NEGATIVE for chest pain, palpitations or peripheral edema  GI: NEGATIVE for nausea, abdominal pain, heartburn, or change in bowel habits  : negative for, dysuria and frequency   MUSCULOSKELETAL: lt mid/lower back pain   NEURO: NEGATIVE for weakness, dizziness or paresthesias    OBJECTIVE:                                                    /58  Pulse 71  Temp 97.7  F (36.5  C) (Oral)  Wt 155 lb 3.2 oz (70.4 kg)  SpO2 98%  Breastfeeding? No  BMI 25.83 kg/m2  Body mass index is 25.83 kg/(m^2).   GENERAL: healthy, alert, well nourished, well hydrated, no distress  EYES: Eyes grossly normal to inspection, extraocular movements - intact, and PERRL  HENT: ear canals- normal; TMs- normal; Nose- normal; Mouth- no ulcers, no lesions  NECK: no tenderness, no adenopathy, no asymmetry, no masses, no stiffness; thyroid- normal to palpation  RESP:crackles heard lt lung base, no wheezing.   CV: irregular rates and rhythm, normal S1 S2,   ABDOMEN: soft, no tenderness, no  hepatosplenomegaly, no masses, normal bowel sounds  MS:no vertebral tenderness, mild tenderness on Lt thoracic paravertebral area, no rash noted.   Extremities- no gross deformities noted, no edema  NEURO: strength and tone- normal, sensory exam- grossly normal, mentation- intact, speech- normal, reflexes- symmetric         ASSESSMENT/PLAN:                                                        1. Left-sided thoracic back pain, unspecified chronicity  -  Musculoskeletal pain vs UTI vs lt lower lung etiology   - started  on acetaminophen-codeine (TYLENOL #3) 300-30 MG per tablet; Take 1 tablet by mouth 3 times daily as needed for pain .explained clearly about the medication,insructions and side effects. Will check - UA with Microscopic reflex to Culture, check chest xray.      2. Lung crackles  -- crackles lt lung base   - XR Chest 2 Views; r/o pneumonia     3. Low blood potassium  - Basic metabolic panel  (Ca, Cl, CO2, Creat, Gluc, K, Na, BUN)    4. Low hemoglobin  - CBC with platelets differential         Rohan Mallory MD  [facility]

## 2017-05-12 NOTE — MR AVS SNAPSHOT
After Visit Summary   5/12/2017    Zunilda Alicea May    MRN: [id number]           Patient Information     Date Of Birth          1940        Visit Information        Provider Department      5/12/2017 11:00 AM Rohan Mallory MD [facility]        Today's Diagnoses     Left-sided thoracic back pain, unspecified chronicity    -  1    Lung crackles        Low blood potassium        Low hemoglobin           Follow-ups after your visit        Your next 10 appointments already scheduled     May 15, 2017  3:10 PM CDT   Return Visit with MANJIT Hilliard CNP   [facility] [place] ([facility])    [address]  [facility] [address] [phone]            May 17, 2017 11:15 AM CDT   Anticoagulation Visit with RI ANTICOAGULATION CLINIC   [facility] ([facility])    [address]  [facility] [address] [phone]            May 17, 2017 11:30 AM CDT   LAB with RI LAB   [facility] ([facility])    [address]  [facility] [id number] [phone]           Patient must bring picture ID.  Patient should be prepared to give a urine specimen  Please do not eat 10-12 hours before your appointment if you are coming in fasting for labs on lipids, cholesterol, or glucose (sugar).  Pregnant women should follow their Care Team instructions. Water with medications is okay. Do not drink coffee or other fluids.   If you have concerns about taking  your medications, please ask at office or if scheduling via [facility], send a message by clicking on Secure Messaging, Message Your Care Team.            May 23, 2017 10:30 AM CDT   Post Op with Crystal Cuevas PA-C   [facility]    [address]  [facility] [phone] [phone]            Jul 03, 2017 11:30  "AM CDT   Remote PPM Check with KRAMER TECH1   [facility] PH[facility] AT [place] ([email or web address])    [address]  [place] [address] [phone]           This appointment is for a remote check of your pacemaker.  This is not an appointment at the office.            Aug 14, 2017 11:00 AM CDT   Pre-Op physical with Yunior Huang MD   [facility]    [address]  [facility] [address] [phone]              Who to contact     If you have questions or need follow up information about today's clinic visit or your schedule please contact [facility] directly at [phone].  Normal or non-critical lab and imaging results will be communicated to you by MyChart, letter or phone within 4 business days after the clinic has received the results. If you do not hear from us within 7 days, please contact the clinic through MyChart or phone. If you have a critical or abnormal lab result, we will notify you by phone as soon as possible.  Submit refill requests through [employer] or call your pharmacy and they will forward the refill request to us. Please allow 3 business days for your refill to be completed.          Additional Information About Your Visit        ZenDay Information     [employer] gives you secure access to your electronic health record. If you see a primary care provider, you can also send messages to your care team and make appointments. If you have questions, please call your primary care clinic.  If you do not have a primary care provider, please call [phone] and they will assist you.        Care EveryWhere ID     This is your Care EveryWhere ID. This could be used by other organizations to access your [facility] medical records  [id number]        Your Vitals Were     Pulse Temperature Height Pulse Oximetry Breastfeeding? BMI (Body Mass Index)    71 97.7  F (36.5  C) (Oral) 5' 5\" " (1.651 m) 98% No 25.83 kg/m2       Blood Pressure from Last 3 Encounters:   05/12/17 112/58   05/08/17 116/68   05/04/17 116/56    Weight from Last 3 Encounters:   05/12/17 155 lb 3.2 oz (70.4 kg)   05/08/17 157 lb 1.6 oz (71.3 kg)   05/04/17 163 lb 4.8 oz (74.1 kg)              We Performed the Following     Basic metabolic panel  (Ca, Cl, CO2, Creat, Gluc, K, Na, BUN)     CBC with platelets differential     UA with Microscopic reflex to Culture          Today's Medication Changes          These changes are accurate as of: 5/12/17 11:59 PM.  If you have any questions, ask your nurse or doctor.               Start taking these medicines.        Dose/Directions    acetaminophen-codeine 300-30 MG per tablet   Commonly known as:  TYLENOL #3   Used for:  Left-sided thoracic back pain, unspecified chronicity   Started by:  Rohan Mallory MD        Dose:  1 tablet   Take 1 tablet by mouth 3 times daily as needed for pain   Quantity:  30 tablet   Refills:  0            Where to get your medicines      Some of these will need a paper prescription and others can be bought over the counter.  Ask your nurse if you have questions.     Bring a paper prescription for each of these medications     acetaminophen-codeine 300-30 MG per tablet                Primary Care Provider Office Phone # Fax #    Yunior Huang [phone]       [facility] [address] [place] [address]        Thank you!     Thank you for choosing [facility]  for your care. Our goal is always to provide you with excellent care. Hearing back from our patients is one way we can continue to improve our services. Please take a few minutes to complete the written survey that you may receive in the mail after your visit with us. Thank you!             Your Updated Medication List - Protect others around you: Learn how to safely use, store and throw away your medicines at www.disposemymeds.org.           This list is accurate as of: 5/12/17 11:59 PM.  Always use your most recent med list.                   Brand Name Dispense Instructions for use    ACETAMINOPHEN PO      Take 650 mg by mouth every 6 hours as needed       acetaminophen-codeine 300-30 MG per tablet    TYLENOL #3    30 tablet    Take 1 tablet by mouth 3 times daily as needed for pain       ALLOPURINOL PO      Take 100 mg by mouth 2 times daily       Amlodipine Besylate-Valsartan  MG Tabs    EXFORGE    90 tablet    Take 1 tablet by mouth daily       BONIVA 3 MG/3ML   Generic drug:  ibandronate      Inject 3 mg into the vein once       calcium citrate 950 MG tablet    CALCITRATE     Take 1 tablet by mouth daily       chlorthalidone 25 MG tablet    HYGROTON    90 tablet    Take 1 tablet (25 mg) by mouth daily       fish oil-omega-3 fatty acids 1000 MG capsule      Take 2 g by mouth daily       folic acid 1 MG tablet    FOLVITE     Take 1 mg by mouth daily       HYDROcodone-acetaminophen 5-325 MG per tablet    NORCO    5 tablet    Take 1-2 tablets by mouth every 4 hours as needed for pain       levothyroxine 112 MCG tablet    SYNTHROID/LEVOTHROID    90 tablet    Take 1 tablet (112 mcg) by mouth daily       METHOTREXATE SODIUM IJ      Inject 0.8 mLs as directed once a week       metoprolol 100 MG tablet    LOPRESSOR    60 tablet    Take 0.5 tablets (50 mg) by mouth 2 times daily       Multi-vitamin Tabs tablet      Take 1 tablet by mouth daily       omeprazole 20 MG CR capsule    priLOSEC    180 capsule    Take 1 capsule (20 mg) by mouth 2 times daily       VITAMIN D (CHOLECALCIFEROL) PO      Take 1,000 Units by mouth daily       warfarin 3 MG tablet    COUMADIN    30 tablet    Take 1 tablet (3 mg) by mouth daily For 3 days, check INR on 05/05 to adjust the dose.

## 2017-05-13 LAB
ANION GAP SERPL CALCULATED.3IONS-SCNC: 11 MMOL/L (ref 3–14)
BUN SERPL-MCNC: 22 MG/DL (ref 7–30)
CALCIUM SERPL-MCNC: 9.1 MG/DL (ref 8.5–10.1)
CHLORIDE SERPL-SCNC: 102 MMOL/L (ref 94–109)
CO2 SERPL-SCNC: 26 MMOL/L (ref 20–32)
CREAT SERPL-MCNC: 1.03 MG/DL (ref 0.52–1.04)
GFR SERPL CREATININE-BSD FRML MDRD: 52 ML/MIN/1.7M2
GLUCOSE SERPL-MCNC: 84 MG/DL (ref 70–99)
POTASSIUM SERPL-SCNC: 4.1 MMOL/L (ref 3.4–5.3)
SODIUM SERPL-SCNC: 139 MMOL/L (ref 133–144)

## 2017-05-15 ENCOUNTER — OFFICE VISIT (OUTPATIENT)
Dept: CARDIOLOGY | Facility: CLINIC | Age: 77
End: 2017-05-15
Payer: MEDICARE

## 2017-05-15 VITALS
HEART RATE: 72 BPM | WEIGHT: 158.5 LBS | DIASTOLIC BLOOD PRESSURE: 66 MMHG | HEIGHT: 65 IN | SYSTOLIC BLOOD PRESSURE: 120 MMHG | BODY MASS INDEX: 26.41 KG/M2

## 2017-05-15 DIAGNOSIS — I27.20 PULMONARY HYPERTENSION (H): ICD-10-CM

## 2017-05-15 DIAGNOSIS — I50.32 CHRONIC DIASTOLIC CONGESTIVE HEART FAILURE (H): Primary | ICD-10-CM

## 2017-05-15 DIAGNOSIS — I27.20 PULMONARY HYPERTENSION (H): Primary | ICD-10-CM

## 2017-05-15 PROCEDURE — 99214 OFFICE O/P EST MOD 30 MIN: CPT | Performed by: NURSE PRACTITIONER

## 2017-05-15 NOTE — MR AVS SNAPSHOT
After Visit Summary   5/15/2017    Zunilda Alicea May    MRN: [id number]           Patient Information     Date Of Birth          1940        Visit Information        Provider Department      5/15/2017 3:10 PM Lee Ann Colbert APRN CNP [facility] AT [place]        Today's Diagnoses     Chronic diastolic congestive heart failure (H)    -  1    Pulmonary hypertension (H)           Follow-ups after your visit        Additional Services     Follow-Up with [facility]                 Your next 10 appointments already scheduled     May 17, 2017 11:15 AM CDT   Anticoagulation Visit with RI ANTICOAGULATION CLINIC   [facility]    [address]  [facility] [address] [phone]            May 17, 2017 11:30 AM CDT   LAB with RI LAB   [facility] ([facility])    [address]  [facility] [address] [phone]           Patient must bring picture ID.  Patient should be prepared to give a urine specimen  Please do not eat 10-12 hours before your appointment if you are coming in fasting for labs on lipids, cholesterol, or glucose (sugar).  Pregnant women should follow their Care Team instructions. Water with medications is okay. Do not drink coffee or other fluids.   If you have concerns about taking  your medications, please ask at office or if scheduling via [employer], send a message by clicking on Secure Messaging, Message Your Care Team.            May 23, 2017 10:30 AM CDT   Post Op with Crystal Cuevas PA-C   [facility] ([facility]    [address]  [facility] [phone]            May 24, 2017  9:30 AM CDT   LAB with RU LAB   [facility] HEART AT [place] ([facility])    [address]  [facility] [address] [phone]            Patient must bring picture ID.  Patient should be prepared to give a urine specimen  Please do not eat 10-12 hours before your appointment if you are coming in fasting for labs on lipids, cholesterol, or glucose (sugar).  Pregnant women should follow their Care Team instructions. Water with medications is okay. Do not drink coffee or other fluids.   If you have concerns about taking  your medications, please ask at office or if scheduling via [employer], send a message by clicking on Secure Messaging, Message Your Care Team.            May 24, 2017 10:10 AM CDT   CORE Enrollment with Augusto Brasher PA-C   [facility] ([facility])    [address]  [facility] [address] [phone]            Jul 03, 2017 11:30 AM CDT   Remote PPM Check with KRAMER TECH1   [facility] ([facility])    [address]  [facility] [address] [phone]           This appointment is for a remote check of your pacemaker.  This is not an appointment at the office.            Aug 14, 2017 11:00 AM CDT   Pre-Op physical with Yunior Huang MD   [facility] ([facility])    [address]  [facility] [address] [phone]              Future tests that were ordered for you today     Open Future Orders        Priority Expected Expires Ordered    General PFT Lab (Please always keep checked) Routine 5/22/2017 5/15/2018 5/15/2017    Follow-Up with [facility] Clinic Routine 5/31/2017 5/15/2018 5/15/2017    Basic metabolic panel Routine 5/22/2017 5/15/2018 5/15/2017    TSH Routine 5/22/2017 5/15/2018 5/15/2017    Hemoglobin Routine 5/22/2017 5/15/2018 5/15/2017            Who to contact     If you have questions or need follow up information about today's clinic visit or your schedule please contact [facility]YS[facility] directly at  "[phone].  Normal or non-critical lab and imaging results will be communicated to you by MyChart, letter or phone within 4 business days after the clinic has received the results. If you do not hear from us within 7 days, please contact the clinic through [employer] or phone. If you have a critical or abnormal lab result, we will notify you by phone as soon as possible.  Submit refill requests through [employer] or call your pharmacy and they will forward the refill request to us. Please allow 3 business days for your refill to be completed.          Additional Information About Your Visit        [employer] Information     [employer] gives you secure access to your electronic health record. If you see a primary care provider, you can also send messages to your care team and make appointments. If you have questions, please call your primary care clinic.  If you do not have a primary care provider, please call [phone] and they will assist you.        Care EveryWhere ID     This is your Care EveryWhere ID. This could be used by other organizations to access your [facility] medical records  [id number]        Your Vitals Were     Pulse Height BMI (Body Mass Index)             72 1.651 m (5' 5\") 26.38 kg/m2          Blood Pressure from Last 3 Encounters:   05/15/17 120/66   05/12/17 112/58   05/08/17 116/68    Weight from Last 3 Encounters:   05/15/17 71.9 kg (158 lb 8 oz)   05/12/17 70.4 kg (155 lb 3.2 oz)   05/08/17 71.3 kg (157 lb 1.6 oz)               Primary Care Provider Office Phone # Fax #    Yunior Huang [phone]       [facility] [address] [address]        Thank you!     Thank you for choosing [facility] AT [place]  for your care. Our goal is always to provide you with excellent care. Hearing back from our patients is one way we can continue to improve our services. Please take a few minutes to complete the written survey that " you may receive in the mail after your visit with us. Thank you!             Your Updated Medication List - Protect others around you: Learn how to safely use, store and throw away your medicines at www.disposemymeds.org.          This list is accurate as of: 5/15/17  3:56 PM.  Always use your most recent med list.                   Brand Name Dispense Instructions for use    ACETAMINOPHEN PO      Take 650 mg by mouth every 6 hours as needed       acetaminophen-codeine 300-30 MG per tablet    TYLENOL #3    30 tablet    Take 1 tablet by mouth 3 times daily as needed for pain       ALLOPURINOL PO      Take 100 mg by mouth 2 times daily       Amlodipine Besylate-Valsartan  MG Tabs    EXFORGE    90 tablet    Take 1 tablet by mouth daily       BONIVA 3 MG/3ML   Generic drug:  ibandronate      Inject 3 mg into the vein once       calcium citrate 950 MG tablet    CALCITRATE     Take by mouth daily 1000 mg daily       chlorthalidone 25 MG tablet    HYGROTON    90 tablet    Take 1 tablet (25 mg) by mouth daily       fish oil-omega-3 fatty acids 1000 MG capsule      Take 2 g by mouth daily       folic acid 1 MG tablet    FOLVITE     Take 1 mg by mouth daily       levothyroxine 112 MCG tablet    SYNTHROID/LEVOTHROID    90 tablet    Take 1 tablet (112 mcg) by mouth daily       METHOTREXATE SODIUM IJ      Inject 0.8 mLs as directed once a week       metoprolol 100 MG tablet    LOPRESSOR    60 tablet    Take 0.5 tablets (50 mg) by mouth 2 times daily       Multi-vitamin Tabs tablet      Take 1 tablet by mouth daily       omeprazole 20 MG CR capsule    priLOSEC    180 capsule    Take 1 capsule (20 mg) by mouth 2 times daily       VITAMIN D (CHOLECALCIFEROL) PO      Take 1,000 Units by mouth daily       warfarin 3 MG tablet    COUMADIN    30 tablet    Take 1 tablet (3 mg) by mouth daily For 3 days, check INR on 05/05 to adjust the dose.

## 2017-05-15 NOTE — LETTER
5/15/2017    Yunior Huang MD  [facility]  [address] [place] [address]    RE: Zunilda Clark       Dear Colleague,    I had the pleasure of seeing Zunilda Clark in the [facility].    This is a 76-year-old female who presents to [facility] today for a followup visit.  She is a patient of Dr. Das seen in our clinic for rheumatic valve disease, paroxysmal atrial fibrillation, hypertension and pulmonary hypertension.      Zunilda has a history of mitral valve stenosis and underwent a mitral valve replacement in 2008.  Due to worsening mitral valve gradients, she underwent a bioprosthetic mitral valve replacement in 2014 in [place].  In addition, she underwent a tricuspid valve annuloplasty ring in 2014.  She has known severe pulmonary hypertension and rheumatoid arthritis.  In addition, she has a history of paroxysmal atrial fibrillation on chronic warfarin.  Due to tachybrady syndrome, she underwent a dual-chamber permanent pacemaker in 2011.  Echocardiograms have shown moderate left ventricular hypertrophy and her blood pressures have been fairly well controlled over the years.      Unfortunately, in 2009, she suffered cerebrovascular accident and does have some mild residual left arm and leg weakness.  Coronary angiography was performed in 2014, which showed no evidence of significant obstructive coronary artery disease.      Deanne last saw Dr. Das in April as she was awaiting a total knee replacement.  He had her undergo echocardiogram which demonstrated left ventricular ejection fraction of 60%-65%, mild to moderate left ventricular hypertrophy, mild to moderate right ventricular dysfunction, right ventricular enlargement, a mean mitral valve gradient of 4.8 mmHg, 2 to 3+ tricuspid regurgitation, severe pulmonary hypertension and 1+ aortic regurgitation.  She was cleared for surgery.        However,  she had a hospitalization for abdominal pain and was found to have acute cholecystitis which was gangrenous and initially was septic.  She successfully underwent a cholecystectomy on 04/29/2017.  She was given quite a bit of fluids and did develop postoperative congestive heart failure after she was experiencing some shortness of breath and weight gain at home.  She was treated with Lasix as opposed to chlorthalidone and did have resolution of her symptoms.  Deanne saw Dr. Mallory last week and a chest x-ray showed no evidence of pulmonary vascular congestion and her weight was back at baseline, which is around 155-160 pounds.  She now is back on her usual medications with chlorthalidone as opposed to Lasix.  She returns today for reassessment.      Deanne comes in with her  today as usual.  She walks slowly with a walker.  She is pleased that after the last couple of weeks she has been stable at home.  Her weight is back to 158 pounds.  She denies any orthopnea or significant peripheral edema.  She has had no chest pain or sensations of palpitations or lightheadedness.  She is asking me today about [facility] as this was previously discussed with her at some point.     PHYSICAL EXAMINATION:  Her blood pressure today is 120/66, heart rate is 72 beats per minute and is regular.  Her lungs are clear.  She has trace ankle edema noted bilaterally.     Outpatient Encounter Prescriptions as of 5/15/2017   Medication Sig Dispense Refill     chlorthalidone (HYGROTON) 25 MG tablet Take 1 tablet (25 mg) by mouth daily 90 tablet 2     ACETAMINOPHEN PO Take 650 mg by mouth every 6 hours as needed        warfarin (COUMADIN) 3 MG tablet Take 1 tablet (3 mg) by mouth daily For 3 days, check INR on 05/05 to adjust the dose. 30 tablet 0     ALLOPURINOL PO Take 100 mg by mouth 2 times daily       multivitamin, therapeutic with minerals (MULTI-VITAMIN) TABS tablet Take 1 tablet by mouth daily       calcium citrate  (CALCITRATE) 950 MG tablet Take by mouth daily 1000 mg daily       VITAMIN D, CHOLECALCIFEROL, PO Take 1,000 Units by mouth daily       fish oil-omega-3 fatty acids 1000 MG capsule Take 2 g by mouth daily       ibandronate (BONIVA) 3 MG/3ML Inject 3 mg into the vein once       Amlodipine Besylate-Valsartan (EXFORGE)  MG TABS Take 1 tablet by mouth daily 90 tablet 3     omeprazole (PRILOSEC) 20 MG CR capsule Take 1 capsule (20 mg) by mouth 2 times daily 180 capsule 3     levothyroxine (SYNTHROID/LEVOTHROID) 112 MCG tablet Take 1 tablet (112 mcg) by mouth daily 90 tablet 3     metoprolol (LOPRESSOR) 100 MG tablet Take 0.5 tablets (50 mg) by mouth 2 times daily 60 tablet 5     folic acid (FOLVITE) 1 MG tablet Take 1 mg by mouth daily       METHOTREXATE SODIUM IJ Inject 0.8 mLs as directed once a week       acetaminophen-codeine (TYLENOL #3) 300-30 MG per tablet Take 1 tablet by mouth 3 times daily as needed for pain (Patient not taking: Reported on 5/15/2017) 30 tablet 0     [DISCONTINUED] HYDROcodone-acetaminophen (NORCO) 5-325 MG per tablet Take 1-2 tablets by mouth every 4 hours as needed for pain (Patient not taking: Reported on 5/8/2017) 5 tablet 0     No facility-administered encounter medications on file as of 5/15/2017.       IMPRESSION AND PLAN:   1.  Rheumatic heart disease, status post bioprosthetic mitral valve replacement in 2008 and a repeat bioprosthetic mitral valve replacement in 2014.  Recent echocardiogram shows normal bioprosthetic mitral valve gradients.  She did undergo a tricuspid annuloplasty ring in 2014.  Echocardiogram shows 2 to 3+ tricuspid regurgitation.   2.  Secondary severe pulmonary hypertension.  Deanne has not undergone formal pulmonary workup.  She does have rheumatoid arthritis.  At this time, she has agreed to undergo pulmonary function tests.  She is interested in the [facility] as she has recently had a hospitalization for diastolic heart failure exacerbation  following emergent cholecystectomy for gangrenous cholecystitis.  During that time she was aggressively treated with fluids.  She is now back on her usual medications and her weight has been stable at home.  I will have her meet with our [facility] here in the next couple weeks.   3.  Paroxysmal atrial fibrillation.  She is asymptomatic.  Due to tachybrady syndrome, she underwent a permanent pacemaker in 2011.  Recent interrogation demonstrates that she is 36% atrial paced, 43% ventricular paced with 153 mode switches compromising 11% of the time, longest episode lasting over 100 hours with controlled ventricular response.  Fortunately, she is on chronic warfarin as she does have a history of cerebrovascular accident.   4.  Hypertension.  Blood pressure controlled.  She has known mild to moderate left ventricular hypertrophy.      Thank you for allowing me to participate in this patient's care.     Sincerely,    MANJIT Chisholm CNP     [facility]

## 2017-05-15 NOTE — PROGRESS NOTES
HPI and Plan: #[id number]  See dictation    Orders Placed This Encounter   Procedures     Basic metabolic panel     TSH     Hemoglobin     Follow-Up with [facility]     General PFT Lab (Please always keep checked)       No orders of the defined types were placed in this encounter.      Medications Discontinued During This Encounter   Medication Reason     HYDROcodone-acetaminophen (NORCO) 5-325 MG per tablet Stopped by Patient         Encounter Diagnoses   Name Primary?     Chronic diastolic congestive heart failure (H) Yes     Pulmonary hypertension (H)        CURRENT MEDICATIONS:  Current Outpatient Prescriptions   Medication Sig Dispense Refill     chlorthalidone (HYGROTON) 25 MG tablet Take 1 tablet (25 mg) by mouth daily 90 tablet 2     ACETAMINOPHEN PO Take 650 mg by mouth every 6 hours as needed        warfarin (COUMADIN) 3 MG tablet Take 1 tablet (3 mg) by mouth daily For 3 days, check INR on 05/05 to adjust the dose. 30 tablet 0     ALLOPURINOL PO Take 100 mg by mouth 2 times daily       multivitamin, therapeutic with minerals (MULTI-VITAMIN) TABS tablet Take 1 tablet by mouth daily       calcium citrate (CALCITRATE) 950 MG tablet Take by mouth daily 1000 mg daily       VITAMIN D, CHOLECALCIFEROL, PO Take 1,000 Units by mouth daily       fish oil-omega-3 fatty acids 1000 MG capsule Take 2 g by mouth daily       ibandronate (BONIVA) 3 MG/3ML Inject 3 mg into the vein once       Amlodipine Besylate-Valsartan (EXFORGE)  MG TABS Take 1 tablet by mouth daily 90 tablet 3     omeprazole (PRILOSEC) 20 MG CR capsule Take 1 capsule (20 mg) by mouth 2 times daily 180 capsule 3     levothyroxine (SYNTHROID/LEVOTHROID) 112 MCG tablet Take 1 tablet (112 mcg) by mouth daily 90 tablet 3     metoprolol (LOPRESSOR) 100 MG tablet Take 0.5 tablets (50 mg) by mouth 2 times daily 60 tablet 5     folic acid (FOLVITE) 1 MG tablet Take 1 mg by mouth daily       METHOTREXATE SODIUM IJ Inject 0.8 mLs as directed once a week        acetaminophen-codeine (TYLENOL #3) 300-30 MG per tablet Take 1 tablet by mouth 3 times daily as needed for pain (Patient not taking: Reported on 5/15/2017) 30 tablet 0       ALLERGIES   No Known Allergies    PAST MEDICAL HISTORY:  Past Medical History:   Diagnosis Date     Acquired hypothyroidism 11/4/2015     Aortic valve insufficiency      Arthritis      Atrial fibrillation (H)      Atrial fibrillation and flutter (H)      Blood clotting disorder (H)      CHF (congestive heart failure) (H)      DVT (deep venous thrombosis) (H) 2003     Gastro-oesophageal reflux disease      H/O mitral valve replacement with tissue graft june 2014     History of blood transfusion 2014     HTN (hypertension)      Hypothyroidism      Other chronic pain      Pulmonary HTN (H)      Rheumatoid arthritis(714.0)      Seizures (H) 2014     Stroke (H) 2009    left side mild weakness      Stroke (H) 2014     Syncope 2011    [place]     Thrombosis of leg 2003    both legs       PAST SURGICAL HISTORY:  Past Surgical History:   Procedure Laterality Date     ABDOMEN SURGERY      prolapsed bladder     H ABLATION AV NODE  2011    AFlutter with syncope, PPM to follow     IMPLANT PACEMAKER  2011     LAPAROSCOPIC CHOLECYSTECTOMY WITH CHOLANGIOGRAMS N/A 4/29/2017    Procedure: LAPAROSCOPIC CHOLECYSTECTOMY WITH CHOLANGIOGRAMS;  LAPAROSCOPIC CHOLECYSTECTOMY WITH CHOLANGIOGRAMS ;  Surgeon: Angeles Mcgill MD;  Location: [facility] OR     OPEN REDUCTION INTERNAL FIXATION RODDING INTRAMEDULLARY HUMERUS Right 7/28/2015    Procedure: OPEN REDUCTION INTERNAL FIXATION RODDING INTRAMEDULLARY HUMERUS;  Surgeon: Raymundo Rivera MD;  Location: [facility] OR     REPLACE VALVE MITRAL  2006    Mitral valve replacement with bioprosthetic valve in 2008 for rheumatic disease     SLING BLADDER SUSPENSION WITH FASCIA UMESH         FAMILY HISTORY:  Family History   Problem Relation Age of Onset     Coronary Artery Disease Mother      Coronary Artery Disease Brother       "DIABETES Brother      DIABETES Brother      Hypertension Sister      Hyperlipidemia Sister        SOCIAL HISTORY:  Social History     Social History     Marital status:      Spouse name: N/A     Number of children: N/A     Years of education: N/A     Social History Main Topics     Smoking status: Never Smoker     Smokeless tobacco: Never Used     Alcohol use No     Drug use: No     Sexual activity: Yes     Partners: Male     Other Topics Concern     Caffeine Concern Yes     occasionally     Sleep Concern No     Special Diet Yes     lower salt     Back Care No     Exercise Yes     walking ride excercise bike     Social History Narrative       Review of Systems:  Skin:  Positive for bruising     Eyes:  Positive for glasses;visual blurring    ENT:  Negative      Respiratory:  Negative       Cardiovascular:    Positive for;fatigue    Gastroenterology: Positive for reflux;heartburn    Genitourinary:  Positive for nocturia 1-2x per night  Musculoskeletal:  Positive for arthritis;joint pain;joint stiffness;gout Osteo and Rhuematoid, hx of gout, both feet feel different - per pt   Neurologic:  Positive for stroke CVA 2009 ,  Mild CVA 2014 - during open heart surgery  Psychiatric:  Positive for sleep disturbances troubles falling asleep  Heme/Lymph/Imm:  Positive for easy bruising    Endocrine:  Positive for thyroid disorder      Physical Exam:  Vitals: /66 (BP Location: Right arm, Patient Position: Chair, Cuff Size: Adult Regular)  Pulse 72  Ht 1.651 m (5' 5\")  Wt 71.9 kg (158 lb 8 oz)  BMI 26.38 kg/m2    Constitutional:  cooperative;in no acute distress        Skin:  warm and dry to the touch        Head:  normocephalic        Eyes:  pupils equal and round        ENT:  no pallor or cyanosis, dentition good        Neck:  JVP normal        Chest:  normal breath sounds, clear to auscultation, normal A-P diameter, normal symmetry, normal respiratory excursion, no use of accessory muscles;healed median " sternotomy scar;clear to auscultation;normal respiratory excursion   pacemaker incision in the left infraclavicular area was well-healed      Cardiac: regular rhythm;normal S1 and S2;apical impulse not displaced;no S3 or S4   fixed split S2   systolic murmur;LLSB;grade 1   diastolic murmur;blowing;grade 2;RUSB      Abdomen:  abdomen soft        Vascular: pulses full and equal                                        Extremities and Back:  no edema arthritic deformities of UE pitting;bilateral LE edema;trace          Neurological:  affect appropriate, oriented to time, person and place left sided weakness walks with a walker          CC  Yunior Huang MD  [facility]  [address] [place] [address]

## 2017-05-16 NOTE — PROGRESS NOTES
HISTORY OF PRESENT ILLNESS:  This is a 76-year-old female who presents to [facility] today for a followup visit.  She is a patient of Dr. Das seen in our clinic for rheumatic valve disease, paroxysmal atrial fibrillation, hypertension and pulmonary hypertension.      Zunilda has a history of mitral valve stenosis and underwent a mitral valve replacement in 2008.  Due to worsening mitral valve gradients, she underwent a bioprosthetic mitral valve replacement in 2014 in [place].  In addition, she underwent a tricuspid valve annuloplasty ring in 2014.  She has known severe pulmonary hypertension and rheumatoid arthritis.  In addition, she has a history of paroxysmal atrial fibrillation on chronic warfarin.  Due to tachybrady syndrome, she underwent a dual-chamber permanent pacemaker in 2011.  Echocardiograms have shown moderate left ventricular hypertrophy and her blood pressures have been fairly well controlled over the years.      Unfortunately, in 2009, she suffered cerebrovascular accident and does have some mild residual left arm and leg weakness.  Coronary angiography was performed in 2014, which showed no evidence of significant obstructive coronary artery disease.      Deanne last saw Dr. Das in April as she was awaiting a total knee replacement.  He had her undergo echocardiogram which demonstrated left ventricular ejection fraction of 60%-65%, mild to moderate left ventricular hypertrophy, mild to moderate right ventricular dysfunction, right ventricular enlargement, a mean mitral valve gradient of 4.8 mmHg, 2 to 3+ tricuspid regurgitation, severe pulmonary hypertension and 1+ aortic regurgitation.  She was cleared for surgery.        However, she had a hospitalization for abdominal pain and was found to have acute cholecystitis which was gangrenous and initially was septic.  She successfully underwent a cholecystectomy on 04/29/2017.  She was given quite a bit  of fluids and did develop postoperative congestive heart failure after she was experiencing some shortness of breath and weight gain at home.  She was treated with Lasix as opposed to chlorthalidone and did have resolution of her symptoms.  Deanne saw Dr. Mallory last week and a chest x-ray showed no evidence of pulmonary vascular congestion and her weight was back at baseline, which is around 155-160 pounds.  She now is back on her usual medications with chlorthalidone as opposed to Lasix.  She returns today for reassessment.      Deanne comes in with her  today as usual.  She walks slowly with a walker.  She is pleased that after the last couple of weeks she has been stable at home.  Her weight is back to 158 pounds.  She denies any orthopnea or significant peripheral edema.  She has had no chest pain or sensations of palpitations or lightheadedness.  She is asking me today about [facility] as this was previously discussed with her at some point.     PHYSICAL EXAMINATION:  Her blood pressure today is 120/66, heart rate is 72 beats per minute and is regular.  Her lungs are clear.  She has trace ankle edema noted bilaterally.      IMPRESSION AND PLAN:   1.  Rheumatic heart disease, status post bioprosthetic mitral valve replacement in 2008 and a repeat bioprosthetic mitral valve replacement in 2014.  Recent echocardiogram shows normal bioprosthetic mitral valve gradients.  She did undergo a tricuspid annuloplasty ring in 2014.  Echocardiogram shows 2 to 3+ tricuspid regurgitation.   2.  Secondary severe pulmonary hypertension.  Deanne has not undergone formal pulmonary workup.  She does have rheumatoid arthritis.  At this time, she has agreed to undergo pulmonary function tests.  She is interested in the [facility] as she has recently had a hospitalization for diastolic heart failure exacerbation following emergent cholecystectomy for gangrenous cholecystitis.  During that time she was aggressively  treated with fluids.  She is now back on her usual medications and her weight has been stable at home.  I will have her meet with our [facility] here in the next couple weeks.   3.  Paroxysmal atrial fibrillation.  She is asymptomatic.  Due to tachybrady syndrome, she underwent a permanent pacemaker in .  Recent interrogation demonstrates that she is 36% atrial paced, 43% ventricular paced with 153 mode switches compromising 11% of the time, longest episode lasting over 100 hours with controlled ventricular response.  Fortunately, she is on chronic warfarin as she does have a history of cerebrovascular accident.   4.  Hypertension.  Blood pressure controlled.  She has known mild to moderate left ventricular hypertrophy.      Thank you for allowing me to participate in this patient's care.         MANJIT PATINO, SVETA             D: 05/15/2017 15:59   T: 2017 09:27   MT: ALESSANDRO      Name:     NADIYA LEWIS   MRN:      [id number]        Account:      [id number]   :      1940           Service Date: 05/15/2017      Document: [id number]

## 2017-05-17 ENCOUNTER — ANTICOAGULATION THERAPY VISIT (OUTPATIENT)
Dept: ANTICOAGULATION | Facility: CLINIC | Age: 77
End: 2017-05-17
Payer: MEDICARE

## 2017-05-17 ENCOUNTER — CARE COORDINATION (OUTPATIENT)
Dept: CARE COORDINATION | Facility: CLINIC | Age: 77
End: 2017-05-17

## 2017-05-17 DIAGNOSIS — I48.91 ATRIAL FIBRILLATION AND FLUTTER (H): ICD-10-CM

## 2017-05-17 DIAGNOSIS — I48.92 ATRIAL FIBRILLATION AND FLUTTER (H): ICD-10-CM

## 2017-05-17 DIAGNOSIS — Z79.01 LONG-TERM (CURRENT) USE OF ANTICOAGULANTS: ICD-10-CM

## 2017-05-17 LAB — INR POINT OF CARE: 3.4 (ref 0.86–1.14)

## 2017-05-17 PROCEDURE — 99207 ZZC NO CHARGE NURSE ONLY: CPT

## 2017-05-17 PROCEDURE — 36416 COLLJ CAPILLARY BLOOD SPEC: CPT

## 2017-05-17 PROCEDURE — 85610 PROTHROMBIN TIME: CPT | Mod: QW

## 2017-05-17 NOTE — PROGRESS NOTES
ANTICOAGULATION FOLLOW-UP CLINIC VISIT    Patient Name:  Zunilda Alicea May  Date:  5/17/2017  Contact Type:  Face to Face    SUBJECTIVE:     Patient Findings     Positives No Problem Findings           OBJECTIVE    INR Protime   Date Value Ref Range Status   05/17/2017 3.4 (A) 0.86 - 1.14 Final       ASSESSMENT / PLAN  INR assessment SUPRA    Recheck INR In: 10 DAYS    INR Location Clinic      Anticoagulation Summary as of 5/17/2017     INR goal 2.0-3.0   Today's INR 3.4!   Maintenance plan 5 mg (5 mg x 1) on Tue, Thu, Sat; 2.5 mg (5 mg x 0.5) all other days   Full instructions 5/18: 2.5 mg; 5/25: 2.5 mg; Otherwise 5 mg on Tue, Thu, Sat; 2.5 mg all other days   Weekly total 25 mg   Plan last modified Екатерина Mahan RN (1/24/2017)   Next INR check 5/26/2017   Priority INR   Target end date     Indications   Long-term (current) use of anticoagulants [Z79.01] [Z79.01]  Atrial fibrillation and flutter (H) [I48.91  I48.92]         Anticoagulation Episode Summary     INR check location     Preferred lab     Send INR reminders to [facility]    Comments       Anticoagulation Care Providers     Provider Role Specialty Phone number    Yunior Huang MD Responsible Internal Medicine [phone]            See the Encounter Report to view Anticoagulation Flowsheet and Dosing Calendar (Go to Encounters tab in chart review, and find the Anticoagulation Therapy Visit)    Dosage adjustment made based on physician directed care plan.    Sonam Teixeira RN

## 2017-05-17 NOTE — PROGRESS NOTES
"Clinic Care Coordination Contact  OUTREACH    Referral Information:  Referral Source: CTS  Reason for Contact: Follow up        Universal Utilization:   ED Visits in last year: 0  Hospital visits in last year: 2  Last PCP appointment: 11/01/16  Missed Appointments: 0  Concerns: No       Clinical Concerns:  Current Medical Concerns: Pt stated she is feeling so much better. Her weight continues to decrease and she is following her lytes.  She stated she is breathing better.  She is going to follow up with an appointment with [facility] next week and at that time determine if she wants to participate win program or would like care coordination.      Current Behavioral Concerns: None at this time    Education Provided to patient: fluid loss and electrolyte balance   Clinical Pathway Name: Heart Failure  Clinical Pathway: Clinic Care Coordination Heart Failure Follow Up Assessment:  Last contacted date:  5/5/17  Home scale available:  Yes  Home scale weight this morning: NA  Following a low sodium diet:    Yes      Heart Failure Zones sheet on refrigerator or available: Yes  What Heart Failure Zone currently in:Green  Any increased SOB since last contacted :No  Any increased edema since last contacted :No  Is your activity more limited since last contact:No  Have you had any chest pain since last contact:No  What number to call for YELLOW zones: Clinic  [phone]   Medications:   \"How many of your current medicines changed (dose, timing, name, etc.)  Since last contacted ?\" 0 - 1  \"Do you have questions about your medications?\"No  When is your next appointment with the CHF clinic: Pt will follow up in [facility] 5/23  When is the appointment with PCP: 8/14      Medication Management:  Pt reports she understands her medication and administers her own.  Is back to her medications prior to her hospitalization     Functional Status:     Equipment Currently Used at Home: walker, rolling, grab bar, shower chair  Transportation: no " concerns        Advanced Care Plans/Directives on file:: Yes       Patient/Caregiver understanding: Pt has a good understanding of her medical management  Frequency of Care Coordination:  (1-2 weeks )  Upcoming appointment: 5/23 [facility]     Plan: Pt will follow up as needed and as scheduled  Pt will reach out to CC if needs present and has contact information  Will follow up as scheduled    Valente Choi RN/CC  Care Coordinator [facility]  [phone]

## 2017-05-17 NOTE — MR AVS SNAPSHOT
Zunilda Alicea May   5/17/2017 11:15 AM   Anticoagulation Therapy Visit    Description:  76 year old female   Provider:  [facility]   Department:  [facility]           INR as of 5/17/2017     Today's INR 3.4!      Anticoagulation Summary as of 5/17/2017     INR goal 2.0-3.0   Today's INR 3.4!   Full instructions 5/18: 2.5 mg; 5/25: 2.5 mg; Otherwise 5 mg on Tue, Thu, Sat; 2.5 mg all other days   Next INR check 5/26/2017    Indications   Long-term (current) use of anticoagulants [Z79.01] [Z79.01]  Atrial fibrillation and flutter (H) [I48.91  I48.92]         Your next Anticoagulation Clinic appointment(s)     May 26, 2017 11:00 AM CDT   Anticoagulation Visit with [facility]   [facility] ([facility]    [address] [facility] [address]  [facility] [id number] [phone]              Contact Numbers     [facility] Phone Numbers:  Anticoagulation Clinic Appointments : [phone]  Anticoagulation Nurse: [phone]         May 2017 Details    Sun Mon Tue Wed Thu Fri Sat      1               2               3               4               5               6                 7               8               9               10               11               12               13                 14               15               16               17      2.5 mg   See details      18      2.5 mg         19      2.5 mg         20      5 mg           21      2.5 mg         22      2.5 mg         23      5 mg         24      2.5 mg         25      2.5 mg         26            27                 28               29               30               31                   Date Details   05/17 This INR check       Date of next INR:  5/26/2017         How to take your warfarin dose     To take:  2.5 mg Take 0.5 of a 5 mg tablet.    To take:  5 mg Take 1 of the 5 mg tablets.

## 2017-05-23 ENCOUNTER — OFFICE VISIT (OUTPATIENT)
Dept: SURGERY | Facility: CLINIC | Age: 77
End: 2017-05-23
Payer: MEDICARE

## 2017-05-23 VITALS
TEMPERATURE: 97.5 F | BODY MASS INDEX: 26.33 KG/M2 | SYSTOLIC BLOOD PRESSURE: 122 MMHG | HEIGHT: 65 IN | HEART RATE: 62 BPM | DIASTOLIC BLOOD PRESSURE: 76 MMHG | OXYGEN SATURATION: 98 % | WEIGHT: 158 LBS

## 2017-05-23 DIAGNOSIS — Z09 SURGICAL FOLLOWUP VISIT: Primary | ICD-10-CM

## 2017-05-23 PROCEDURE — 99024 POSTOP FOLLOW-UP VISIT: CPT | Performed by: PHYSICIAN ASSISTANT

## 2017-05-23 NOTE — MR AVS SNAPSHOT
After Visit Summary   5/23/2017    Zunilda Alicea May    MRN: [id number]           Patient Information     Date Of Birth          1940        Visit Information        Provider Department      5/23/2017 10:30 AM Crystal Cuevas PA-C [facility] General Surgery      Today's Diagnoses     Surgical followup visit    -  1       Follow-ups after your visit        Follow-up notes from your care team     Return if symptoms worsen or fail to improve.      Your next 10 appointments already scheduled     May 24, 2017  9:30 AM CDT   LAB with RU LAB   [facility] ([facility])    [address] [place] [address] [address]  [facility] [address]   [phone]           Patient must bring picture ID.  Patient should be prepared to give a urine specimen  Please do not eat 10-12 hours before your appointment if you are coming in fasting for labs on lipids, cholesterol, or glucose (sugar).  Pregnant women should follow their Care Team instructions. Water with medications is okay. Do not drink coffee or other fluids.   If you have concerns about taking  your medications, please ask at office or if scheduling via Glaxstar, send a message by clicking on Secure Messaging, Message Your Care Team.            May 24, 2017 10:10 AM CDT   CORE Enrollment with Augusto Brasher PA-C   [facility] ([facility])    [address] [place] [address] [address]  [facility] [address] [phone]            May 25, 2017 10:30 AM CDT   Pulmonary Function with  PULMONARY FUNCTION   [facility] ([facility])    [address] [place]  [facility] [address]   [phone]           No Inhalers for 6 hours prior to test No Smoking 2 hours prior to test            May 26, 2017 11:00 AM CDT   Anticoagulation Visit with [facility]   [facility]  ([facility])    [address]  [facility] [id number] [phone]            Jul 03, 2017 11:30 AM CDT   Remote PPM Check with WILLIAMS ANDREA   [facility] [place] ([facility])    [address] [place] [address] [address]  [place] [id number] [phone]           This appointment is for a remote check of your pacemaker.  This is not an appointment at the office.            Aug 14, 2017 11:00 AM CDT   Pre-Op physical with Yunior Huang MD   [facility] ([facility])    [address] [place]  [facility] [address] [phone]              Who to contact     If you have questions or need follow up information about today's clinic visit or your schedule please contact [facility] directly at [phone].  Normal or non-critical lab and imaging results will be communicated to you by Orgdothart, letter or phone within 4 business days after the clinic has received the results. If you do not hear from us within 7 days, please contact the clinic through Orgdothart or phone. If you have a critical or abnormal lab result, we will notify you by phone as soon as possible.  Submit refill requests through Music Kickup or call your pharmacy and they will forward the refill request to us. Please allow 3 business days for your refill to be completed.          Additional Information About Your Visit        Orgdothart Information     Music Kickup gives you secure access to your electronic health record. If you see a primary care provider, you can also send messages to your care team and make appointments. If you have questions, please call your primary care clinic.  If you do not have a primary care provider, please call [phone] and they will assist you.        Care EveryWhere ID     This is your Care EveryWhere ID. This could be used by other organizations to access your [facility] medical records  [id number]       "  Your Vitals Were     Pulse Temperature Height Pulse Oximetry BMI (Body Mass Index)       62 97.5  F (36.4  C) (Oral) 1.651 m (5' 5\") 98% 26.29 kg/m2        Blood Pressure from Last 3 Encounters:   05/23/17 122/76   05/15/17 120/66   05/12/17 112/58    Weight from Last 3 Encounters:   05/23/17 71.7 kg (158 lb)   05/15/17 71.9 kg (158 lb 8 oz)   05/12/17 70.4 kg (155 lb 3.2 oz)              Today, you had the following     No orders found for display       Primary Care Provider Office Phone # Fax #    Yunior Huang [phone]       [facility] [address] [facility] [address]        Thank you!     Thank you for choosing [facility]  for your care. Our goal is always to provide you with excellent care. Hearing back from our patients is one way we can continue to improve our services. Please take a few minutes to complete the written survey that you may receive in the mail after your visit with us. Thank you!             Your Updated Medication List - Protect others around you: Learn how to safely use, store and throw away your medicines at www.disposemymeds.org.          This list is accurate as of: 5/23/17 11:12 AM.  Always use your most recent med list.                   Brand Name Dispense Instructions for use    ACETAMINOPHEN PO      Take 650 mg by mouth every 6 hours as needed       acetaminophen-codeine 300-30 MG per tablet    TYLENOL #3    30 tablet    Take 1 tablet by mouth 3 times daily as needed for pain       ALLOPURINOL PO      Take 100 mg by mouth 2 times daily       Amlodipine Besylate-Valsartan  MG Tabs    EXFORGE    90 tablet    Take 1 tablet by mouth daily       BONIVA 3 MG/3ML   Generic drug:  ibandronate      Inject 3 mg into the vein once       calcium citrate 950 MG tablet    CALCITRATE     Take by mouth daily 1000 mg daily       chlorthalidone 25 MG tablet    HYGROTON    90 tablet    Take 1 tablet (25 mg) by mouth daily       " fish oil-omega-3 fatty acids 1000 MG capsule      Take 2 g by mouth daily       folic acid 1 MG tablet    FOLVITE     Take 1 mg by mouth daily       levothyroxine 112 MCG tablet    SYNTHROID/LEVOTHROID    90 tablet    Take 1 tablet (112 mcg) by mouth daily       METHOTREXATE SODIUM IJ      Inject 0.8 mLs as directed once a week       metoprolol 100 MG tablet    LOPRESSOR    60 tablet    Take 0.5 tablets (50 mg) by mouth 2 times daily       Multi-vitamin Tabs tablet      Take 1 tablet by mouth daily       omeprazole 20 MG CR capsule    priLOSEC    180 capsule    Take 1 capsule (20 mg) by mouth 2 times daily       VITAMIN D (CHOLECALCIFEROL) PO      Take 1,000 Units by mouth daily       warfarin 3 MG tablet    COUMADIN    30 tablet    Take 1 tablet (3 mg) by mouth daily For 3 days, check INR on 05/05 to adjust the dose.

## 2017-05-23 NOTE — PROGRESS NOTES
5/23/2017    [facility] Note     Subjective:  Zunilda Clark is here for her first postoperative visit. She underwent a laparoscopic cholecystectomy by Dr. Mcgill on 4/29/2017.  Today she tells me that her preoperative symptoms are completely resolved and she denies abdominal pain.  However, she complains of left back pain in the past 2 weeks.  She was seen by her PCP and received Tylenol #3, which is helpful.  She is tolerating a regular diet and having regular bowel movements.    Objective:  Abd - Abdomen soft, non-tender.  Inc - Healing well, well approximated and without signs of infection    Assessment:  S/p laparoscopic cholecystectomy. The pathology confirms chronic cholecystitis with pyloric metaplasia and superimposed acute cholecystitis with extensive necrosis.  Left back muscle strain, likely from favoring right abdomen/side following lap rey.    Plan:  Recommend alternating ice and heat to back pain, anticipate improvement within 1 week.  RTC PRN      Crystal Cuevas PA-C      Please route or send letter to:  Primary Care Provider (PCP)

## 2017-05-24 ENCOUNTER — OFFICE VISIT (OUTPATIENT)
Dept: CARDIOLOGY | Facility: CLINIC | Age: 77
End: 2017-05-24
Attending: NURSE PRACTITIONER
Payer: MEDICARE

## 2017-05-24 VITALS
DIASTOLIC BLOOD PRESSURE: 60 MMHG | OXYGEN SATURATION: 98 % | SYSTOLIC BLOOD PRESSURE: 120 MMHG | WEIGHT: 155.8 LBS | HEIGHT: 65 IN | BODY MASS INDEX: 25.96 KG/M2 | HEART RATE: 61 BPM

## 2017-05-24 DIAGNOSIS — I38 VALVULAR HEART DISEASE: Primary | ICD-10-CM

## 2017-05-24 DIAGNOSIS — I50.32 CHRONIC DIASTOLIC CONGESTIVE HEART FAILURE (H): ICD-10-CM

## 2017-05-24 LAB
ANION GAP SERPL CALCULATED.3IONS-SCNC: 6 MMOL/L (ref 3–14)
BUN SERPL-MCNC: 29 MG/DL (ref 7–30)
CALCIUM SERPL-MCNC: 9.2 MG/DL (ref 8.5–10.1)
CHLORIDE SERPL-SCNC: 104 MMOL/L (ref 94–109)
CO2 SERPL-SCNC: 30 MMOL/L (ref 20–32)
CREAT SERPL-MCNC: 0.88 MG/DL (ref 0.52–1.04)
GFR SERPL CREATININE-BSD FRML MDRD: 63 ML/MIN/1.7M2
GLUCOSE SERPL-MCNC: 86 MG/DL (ref 70–99)
HGB BLD-MCNC: 10.8 G/DL (ref 11.7–15.7)
POTASSIUM SERPL-SCNC: 3.7 MMOL/L (ref 3.4–5.3)
SODIUM SERPL-SCNC: 140 MMOL/L (ref 133–144)
TSH SERPL DL<=0.05 MIU/L-ACNC: 1.4 MU/L (ref 0.4–4)

## 2017-05-24 PROCEDURE — 84443 ASSAY THYROID STIM HORMONE: CPT | Performed by: NURSE PRACTITIONER

## 2017-05-24 PROCEDURE — 85018 HEMOGLOBIN: CPT | Performed by: NURSE PRACTITIONER

## 2017-05-24 PROCEDURE — 80048 BASIC METABOLIC PNL TOTAL CA: CPT | Performed by: NURSE PRACTITIONER

## 2017-05-24 PROCEDURE — 99214 OFFICE O/P EST MOD 30 MIN: CPT | Performed by: PHYSICIAN ASSISTANT

## 2017-05-24 PROCEDURE — 36415 COLL VENOUS BLD VENIPUNCTURE: CPT | Performed by: NURSE PRACTITIONER

## 2017-05-24 NOTE — PROGRESS NOTES
HPI and Plan:   See dictation  [id number]    Orders this Visit:  Orders Placed This Encounter   Procedures     Follow-Up with Cardiologist     No orders of the defined types were placed in this encounter.    There are no discontinued medications.      Encounter Diagnoses   Name Primary?     Chronic diastolic congestive heart failure (H)      Valvular heart disease Yes       CURRENT MEDICATIONS:  Current Outpatient Prescriptions   Medication Sig Dispense Refill     chlorthalidone (HYGROTON) 25 MG tablet Take 1 tablet (25 mg) by mouth daily 90 tablet 2     ACETAMINOPHEN PO Take 650 mg by mouth every 6 hours as needed        warfarin (COUMADIN) 3 MG tablet Take 1 tablet (3 mg) by mouth daily For 3 days, check INR on 05/05 to adjust the dose. 30 tablet 0     ALLOPURINOL PO Take 100 mg by mouth 2 times daily       multivitamin, therapeutic with minerals (MULTI-VITAMIN) TABS tablet Take 1 tablet by mouth daily       VITAMIN D, CHOLECALCIFEROL, PO Take 1,000 Units by mouth daily       Amlodipine Besylate-Valsartan (EXFORGE)  MG TABS Take 1 tablet by mouth daily 90 tablet 3     omeprazole (PRILOSEC) 20 MG CR capsule Take 1 capsule (20 mg) by mouth 2 times daily 180 capsule 3     levothyroxine (SYNTHROID/LEVOTHROID) 112 MCG tablet Take 1 tablet (112 mcg) by mouth daily 90 tablet 3     metoprolol (LOPRESSOR) 100 MG tablet Take 0.5 tablets (50 mg) by mouth 2 times daily 60 tablet 5     acetaminophen-codeine (TYLENOL #3) 300-30 MG per tablet Take 1 tablet by mouth 3 times daily as needed for pain 30 tablet 0     calcium citrate (CALCITRATE) 950 MG tablet Take by mouth daily 1000 mg daily       fish oil-omega-3 fatty acids 1000 MG capsule Take 2 g by mouth daily       ibandronate (BONIVA) 3 MG/3ML Inject 3 mg into the vein once       folic acid (FOLVITE) 1 MG tablet Take 1 mg by mouth daily       METHOTREXATE SODIUM IJ Inject 0.8 mLs as directed once a week         ALLERGIES  No Known Allergies    PAST MEDICAL HISTORY:  Past  Medical History:   Diagnosis Date     Acquired hypothyroidism 11/4/2015     Aortic valve insufficiency      Arthritis      Atrial fibrillation (H)      Atrial fibrillation and flutter (H)      Blood clotting disorder (H)      CHF (congestive heart failure) (H)      DVT (deep venous thrombosis) (H) 2003     Gastro-oesophageal reflux disease      H/O mitral valve replacement with tissue graft june 2014     History of blood transfusion 2014     HTN (hypertension)      Hypothyroidism      Other chronic pain      Pulmonary HTN (H)      Rheumatoid arthritis(714.0)      Seizures (H) 2014     Stroke (H) 2009    left side mild weakness      Stroke (H) 2014     Syncope 2011    [place]     Thrombosis of leg 2003    both legs       PAST SURGICAL HISTORY:  Past Surgical History:   Procedure Laterality Date     ABDOMEN SURGERY      prolapsed bladder     H ABLATION AV NODE  2011    AFlutter with syncope, PPM to follow     IMPLANT PACEMAKER  2011     LAPAROSCOPIC CHOLECYSTECTOMY WITH CHOLANGIOGRAMS N/A 4/29/2017    Procedure: LAPAROSCOPIC CHOLECYSTECTOMY WITH CHOLANGIOGRAMS;  LAPAROSCOPIC CHOLECYSTECTOMY WITH CHOLANGIOGRAMS ;  Surgeon: Angeles Mcgill MD;  Location: [facility] OR     OPEN REDUCTION INTERNAL FIXATION RODDING INTRAMEDULLARY HUMERUS Right 7/28/2015    Procedure: OPEN REDUCTION INTERNAL FIXATION RODDING INTRAMEDULLARY HUMERUS;  Surgeon: Raymundo Rivera MD;  Location: [facility] OR     REPLACE VALVE MITRAL  2006    Mitral valve replacement with bioprosthetic valve in 2008 for rheumatic disease     SLING BLADDER SUSPENSION WITH FASCIA UMESH         FAMILY HISTORY:  Family History   Problem Relation Age of Onset     Coronary Artery Disease Mother      Coronary Artery Disease Brother      DIABETES Brother      DIABETES Brother      Hypertension Sister      Hyperlipidemia Sister        SOCIAL HISTORY:  Social History     Social History     Marital status:      Spouse name: N/A     Number of children: N/A     Years of  "education: N/A     Social History Main Topics     Smoking status: Never Smoker     Smokeless tobacco: Never Used     Alcohol use No     Drug use: No     Sexual activity: Yes     Partners: Male     Other Topics Concern     Caffeine Concern Yes     occasionally     Sleep Concern No     Special Diet Yes     lower salt     Back Care No     Exercise Yes     walking ride excercise bike     Social History Narrative       Review of Systems:  Skin:  Negative     Eyes:  Positive for glasses;visual blurring  ENT:  Negative    Respiratory:  Negative    Cardiovascular:  Negative    Gastroenterology: Negative    Genitourinary:  Negative    Musculoskeletal:  Positive for back pain  Neurologic:  Negative    Psychiatric:  Negative    Heme/Lymph/Imm:       Endocrine:           Physical Exam:  Vitals: /60 (BP Location: Right arm, Patient Position: Chair, Cuff Size: Adult Regular)  Pulse 61  Ht 1.651 m (5' 5\")  Wt 70.7 kg (155 lb 12.8 oz)  SpO2 98%  BMI 25.93 kg/m2    Constitutional:  cooperative;in no acute distress;alert and oriented;well developed        Skin:  warm and dry to the touch        Head:  normocephalic        Eyes:  pupils equal and round;conjunctivae and lids unremarkable;sclera white        ENT:  no pallor or cyanosis, dentition good        Neck:  JVP normal        Chest:  normal breath sounds, clear to auscultation, normal A-P diameter, normal symmetry, normal respiratory excursion, no use of accessory muscles;healed median sternotomy scar;clear to auscultation;normal respiratory excursion        Cardiac: regular rhythm;normal S1 and S2;no S3 or S4;apical impulse not displaced   fixed split S2   systolic murmur;LLSB;grade 1   diastolic murmur;grade 1      Abdomen:  abdomen soft;BS normoactive        Vascular: not assessed this visit                                      Extremities and Back:  no edema arthritic deformities of UE      Neurological:  affect appropriate, oriented to time, person and place left " sided weakness walks with a walker      Recent Lab Results:  LIPID RESULTS:  Lab Results   Component Value Date    CHOL 196 11/04/2016    HDL 39 (L) 11/04/2016     (H) 11/04/2016    TRIG 119 11/04/2016    CHOLHDLRATIO 3.6 11/13/2015       LIVER ENZYME RESULTS:  Lab Results   Component Value Date    AST 26 05/08/2017    ALT 26 05/08/2017       CBC RESULTS:  Lab Results   Component Value Date    WBC 7.1 05/12/2017    RBC 3.37 (L) 05/12/2017    HGB 10.8 (L) 05/24/2017    HCT 32.5 (L) 05/12/2017    MCV 96 05/12/2017    MCH 31.5 05/12/2017    MCHC 32.6 05/12/2017    RDW 18.1 (H) 05/12/2017     (H) 05/12/2017       BMP RESULTS:  Lab Results   Component Value Date     05/24/2017    POTASSIUM 3.7 05/24/2017    CHLORIDE 104 05/24/2017    CO2 30 05/24/2017    ANIONGAP 6 05/24/2017    GLC 86 05/24/2017    BUN 29 05/24/2017    CR 0.88 05/24/2017    GFRESTIMATED 63 05/24/2017    GFRESTBLACK 76 05/24/2017    BRICE 9.2 05/24/2017        A1C RESULTS:  Lab Results   Component Value Date    A1C 5.4 05/04/2016       INR RESULTS:  Lab Results   Component Value Date    INR 3.4 (A) 05/17/2017    INR 3.4 (A) 05/08/2017    INR 2.04 (H) 05/04/2017    INR 1.95 (H) 05/03/2017           CC  MANJIT Hilliard CNP   [facility]  [address]  [place] [id number]

## 2017-05-24 NOTE — PATIENT INSTRUCTIONS
Call the C.O.R.E. nurse for any questions or concerns:  [phone]    1. Medication changes from today:         * We are making NO med changes.     2. Continue to weigh yourself daily and write it down.      3. Call CORE nurse if your weight is up more than 2 pounds in one day or 5 pounds in one week.    4. Call CORE nurse if you feel more short of breath, have more abdominal bloating, or leg swelling.    5. Continue low sodium diet (less than 2000 mg daily). If you eat less salt, you will retain less fluid.    6. Alcohol can weaken your heart further. You should avoid alcohol or limit its use to special times, such as a holiday or birthday.    7. Do NOT take Aleve or ibuprofen without talking to your doctor first.     8. Lab Results:     Component      Latest Ref Rng & Units 5/24/2017   Sodium      133 - 144 mmol/L 140   Potassium      3.4 - 5.3 mmol/L 3.7   Chloride      94 - 109 mmol/L 104   Carbon Dioxide      20 - 32 mmol/L 30   Anion Gap      3 - 14 mmol/L 6   Glucose      70 - 99 mg/dL 86   Urea Nitrogen      7 - 30 mg/dL 29   Creatinine      0.52 - 1.04 mg/dL 0.88   GFR Estimate      >60 mL/min/1.7m2 63   GFR Estimate If Black      >60 mL/min/1.7m2 76   Calcium      8.5 - 10.1 mg/dL 9.2   TSH      0.40 - 4.00 mU/L 1.40   Hemoglobin      11.7 - 15.7 g/dL 10.8 (L)       8.   Follow-Up:    * Follow up with Dr. Das in 4 months (likely for preop assessment).    * Follow up with CORE clinic as needed until then.     CORE Clinic: Cardiomyopathy, Optimization, Rehabilitation, Education  The CORE Clinic is a heart failure specialty clinic within the [facility] where you will work with specialized nurse practitioners, physician assistants, doctors, and registered nurses. They are dedicated to helping patients with heart failure to carefully adjust medications, receive education, and learn who and when to call if symptoms develop. They specialize in helping you better understand your condition, slow  the progression of your disease, improve the length and quality of your life, help you detect future heart problems before they become life threatening, and avoid hospitalizations.

## 2017-05-24 NOTE — MR AVS SNAPSHOT
After Visit Summary   5/24/2017    Zunilda Clark    MRN: [id number]           Patient Information     Date Of Birth          1940        Visit Information        Provider Department      5/24/2017 10:10 AM Augusto Brasher PA-C [facility] HEART AT [place]        Today's Diagnoses     Valvular heart disease    -  1    Chronic diastolic congestive heart failure (H)          Care Instructions    Call the C.O.R.E. nurse for any questions or concerns:  [phone]    1. Medication changes from today:         * We are making NO med changes.     2. Continue to weigh yourself daily and write it down.      3. Call CORE nurse if your weight is up more than 2 pounds in one day or 5 pounds in one week.    4. Call CORE nurse if you feel more short of breath, have more abdominal bloating, or leg swelling.    5. Continue low sodium diet (less than 2000 mg daily). If you eat less salt, you will retain less fluid.    6. Alcohol can weaken your heart further. You should avoid alcohol or limit its use to special times, such as a holiday or birthday.    7. Do NOT take Aleve or ibuprofen without talking to your doctor first.     8. Lab Results:     Component      Latest Ref Rng & Units 5/24/2017   Sodium      133 - 144 mmol/L 140   Potassium      3.4 - 5.3 mmol/L 3.7   Chloride      94 - 109 mmol/L 104   Carbon Dioxide      20 - 32 mmol/L 30   Anion Gap      3 - 14 mmol/L 6   Glucose      70 - 99 mg/dL 86   Urea Nitrogen      7 - 30 mg/dL 29   Creatinine      0.52 - 1.04 mg/dL 0.88   GFR Estimate      >60 mL/min/1.7m2 63   GFR Estimate If Black      >60 mL/min/1.7m2 76   Calcium      8.5 - 10.1 mg/dL 9.2   TSH      0.40 - 4.00 mU/L 1.40   Hemoglobin      11.7 - 15.7 g/dL 10.8 (L)       8.   Follow-Up:    * Follow up with Dr. Das in 4 months (likely for preop assessment).    * Follow up with C[facility] clinic as needed until then.     [facility] Clinic: Cardiomyopathy, Optimization,  Rehabilitation, Education  The [facility] Clinic is a heart failure specialty clinic within the [facility] where you will work with specialized nurse practitioners, physician assistants, doctors, and registered nurses. They are dedicated to helping patients with heart failure to carefully adjust medications, receive education, and learn who and when to call if symptoms develop. They specialize in helping you better understand your condition, slow the progression of your disease, improve the length and quality of your life, help you detect future heart problems before they become life threatening, and avoid hospitalizations.            Follow-ups after your visit        Additional Services     Follow-Up with Cardiologist       Likely will be preop assessment for TKA (was postponed due to gallbladder surgery)                  Your next 10 appointments already scheduled     May 25, 2017 10:30 AM CDT   Pulmonary Function with RH PULMONARY FUNCTION   [facility] Res[facility] Therapy ([facility])    [address]  [facility] [address]   [phone]           No Inhalers for 6 hours prior to test No Smoking 2 hours prior to test            May 26, 2017 11:00 AM CDT   Anticoagulation Visit with RI ANTICOAGULATION CLINIC   [facility]    [address]  [facility] [id number] [phone]            Jul 03, 2017 11:30 AM CDT   Remote PPM Check with KRAMER TECH1   [facility] PHYSICIANS HEART AT [place] ([facility])    [address] [place], [place] [id number]  [facility] [id number] [phone]           This appointment is for a remote check of your pacemaker.  This is not an appointment at the office.            Aug 14, 2017 11:00 AM CDT   Pre-Op physical with Yunior Huang MD   [facility] ([facility]    [address]  [facility] [id number] [phone]             "  Future tests that were ordered for you today     Open Future Orders        Priority Expected Expires Ordered    Follow-Up with Cardiologist Routine 9/21/2017 5/24/2018 5/24/2017            Who to contact     If you have questions or need follow up information about today's clinic visit or your schedule please contact [facility]YS[facility] HEART AT [place] directly at [phone].  Normal or non-critical lab and imaging results will be communicated to you by Intellistreamhart, letter or phone within 4 business days after the clinic has received the results. If you do not hear from us within 7 days, please contact the clinic through Intellistreamhart or phone. If you have a critical or abnormal lab result, we will notify you by phone as soon as possible.  Submit refill requests through [employer] or call your pharmacy and they will forward the refill request to us. Please allow 3 business days for your refill to be completed.          Additional Information About Your Visit        Intellistreamhart Information     [employer] gives you secure access to your electronic health record. If you see a primary care provider, you can also send messages to your care team and make appointments. If you have questions, please call your primary care clinic.  If you do not have a primary care provider, please call [phone] and they will assist you.        Care EveryWhere ID     This is your Care EveryWhere ID. This could be used by other organizations to access your [place] medical records  [id number]        Your Vitals Were     Pulse Height Pulse Oximetry BMI (Body Mass Index)          61 1.651 m (5' 5\") 98% 25.93 kg/m2         Blood Pressure from Last 3 Encounters:   05/24/17 120/60   05/23/17 122/76   05/15/17 120/66    Weight from Last 3 Encounters:   05/24/17 70.7 kg (155 lb 12.8 oz)   05/23/17 71.7 kg (158 lb)   05/15/17 71.9 kg (158 lb 8 oz)              We Performed the Following     Follow-Up with [facility] Clinic        Primary Care " Provider Office Phone # Fax #    Yunior Huang [phone]       [facility] [address]  [facility] [address]        Thank you!     Thank you for choosing [facility]YSIC[facility]ANS HEART AT [place]  for your care. Our goal is always to provide you with excellent care. Hearing back from our patients is one way we can continue to improve our services. Please take a few minutes to complete the written survey that you may receive in the mail after your visit with us. Thank you!             Your Updated Medication List - Protect others around you: Learn how to safely use, store and throw away your medicines at www.disposemymeds.org.          This list is accurate as of: 5/24/17 11:01 AM.  Always use your most recent med list.                   Brand Name Dispense Instructions for use    ACETAMINOPHEN PO      Take 650 mg by mouth every 6 hours as needed       acetaminophen-codeine 300-30 MG per tablet    TYLENOL #3    30 tablet    Take 1 tablet by mouth 3 times daily as needed for pain       ALLOPURINOL PO      Take 100 mg by mouth 2 times daily       Amlodipine Besylate-Valsartan  MG Tabs    EXFORGE    90 tablet    Take 1 tablet by mouth daily       BONIVA 3 MG/3ML   Generic drug:  ibandronate      Inject 3 mg into the vein once       calcium citrate 950 MG tablet    CALCITRATE     Take by mouth daily 1000 mg daily       chlorthalidone 25 MG tablet    HYGROTON    90 tablet    Take 1 tablet (25 mg) by mouth daily       fish oil-omega-3 fatty acids 1000 MG capsule      Take 2 g by mouth daily       folic acid 1 MG tablet    FOLVITE     Take 1 mg by mouth daily       levothyroxine 112 MCG tablet    SYNTHROID/LEVOTHROID    90 tablet    Take 1 tablet (112 mcg) by mouth daily       METHOTREXATE SODIUM IJ      Inject 0.8 mLs as directed once a week       metoprolol 100 MG tablet    LOPRESSOR    60 tablet    Take 0.5 tablets (50 mg) by mouth 2 times daily        Multi-vitamin Tabs tablet      Take 1 tablet by mouth daily       omeprazole 20 MG CR capsule    priLOSEC    180 capsule    Take 1 capsule (20 mg) by mouth 2 times daily       VITAMIN D (CHOLECALCIFEROL) PO      Take 1,000 Units by mouth daily       warfarin 3 MG tablet    COUMADIN    30 tablet    Take 1 tablet (3 mg) by mouth daily For 3 days, check INR on 05/05 to adjust the dose.

## 2017-05-24 NOTE — LETTER
5/24/2017    Yunior Huang MD  [address] [facility] [address]    RE: Zunilda Clark       Dear Colleague,    I had the pleasure of seeing Zunilda Clark in the [facility].    I had the pleasure of meeting Deanne Clark along with her , Sander, today in the Cardiology Clinic for enrollment in the C.O.R.E. Clinic.  Deanne is a very pleasant 76-year-old woman with a past medical history significant for mitral stenosis, status post MVR in 2008, repeat sternotomy, status post tissue MVR in 2014 along with tricuspid valve annuloplasty ring in 2014, known severe pulmonary hypertension, rheumatoid arthritis, paroxysmal atrial fibrillation on chronic warfarin, tachybrady syndrome, status post dual-chamber permanent pacemaker implantation in 2011, CVA in 2009 with some residual mild left arm and leg weakness, mild nonobstructive coronary artery disease seen on coronary angiogram in 2014.      Deanne follows with Dr. Das and last saw him in 04/2017 as she was awaiting a total knee replacement.  She had her undergo an echocardiogram, which demonstrated LVEF 60%-65%, mild to moderate LVH, mild to moderate right ventricular dysfunction, right ventricular enlargement, mean mitral valve gradient of 4.8 mmHg, 2-3+ tricuspid regurgitation, severe pulmonary hypertension with estimated RVSP of 61 mmHg plus right atrial pressure, 1+ aortic regurgitation.  She was cleared for knee replacement surgery.  However, she ended up being admitted for abdominal pain and was found to have acute cholecystitis which was gangrenosum and she initially was septic.  She underwent cholecystectomy on 04/29/2017.  She was given IV fluid resuscitation in the setting of sepsis and her surgery.  She was discharged to home, but shortly after she got home, she noted her weight was up 14 pounds and she had some shortness of breath.  She was readmitted and treated with IV diuretics (home medication is  chlorthalidone), and her symptoms resolved.  Following that admission, she was seen in followup and a chest x-ray was performed that showed no evidence of pulmonary vascular congestion and her weight was back to baseline, which is around 155-160 pounds.  She followed up with yahaira Colbert, nurse practitioner, 05/15/2017, and at that time, she remained quite stable with a weight of 158 pounds.  The patient was interested in C.O.R.E. Clinic, and therefore, she was referred for enrollment.      Deanne presents today.  She tells me she continues to do well from a cardiac standpoint with a stable weight at home.  She and her  are mostly concerned that she underwent the surgery and was discharged home without having been weighed in the hospital.  She has not yet had her knee replacement surgery and she is not quite ready to undergo this at this time.  She is supposed to follow up with her primary care provider in August who will determine if she appears strong enough to undergo another surgery.  Deanne says she is a little bit weak from this admission, but overall, she is doing better.  She denies any significant shortness of breath, orthopnea, PND or edema.  She has no significant palpitations or chest pain.      PHYSICAL EXAMINATION:   VITAL SIGNS:  Blood pressure 120/60, pulse 61, weight 155 pounds 12.8 ounces here in the clinic, 153 pounds at home.  BMI 25.98.   GENERAL:  A 76-year-old woman in no apparent distress.   NECK:  JVP is normal.   LUNGS:  Clear.   CARDIAC:  Regular.  She does not have a RV heave.  There is a fixed split S2.  There is a soft diastolic murmur, soft systolic murmur.   ABDOMEN:  Soft.  Bowel sounds positive.   EXTREMITIES:  Warm without edema.  She does have some arthritic deformities of her upper extremities.   NEUROLOGIC:  She is alert, oriented x3.  She does have some persistent left-sided weakness from her CVA and walks with a walker.      DIAGNOSTICS:  05/24/2017 BMP:  Sodium  140, potassium 3.7, BUN 29, creatinine 0.88, GFR 63.   05/24/2017 TSH 1.40   05/24/2017:  Hemoglobin 10.8.     Outpatient Encounter Prescriptions as of 5/24/2017   Medication Sig Dispense Refill     chlorthalidone (HYGROTON) 25 MG tablet Take 1 tablet (25 mg) by mouth daily 90 tablet 2     ACETAMINOPHEN PO Take 650 mg by mouth every 6 hours as needed        warfarin (COUMADIN) 3 MG tablet Take 1 tablet (3 mg) by mouth daily For 3 days, check INR on 05/05 to adjust the dose. (Patient taking differently: Take 5 mg by mouth daily For 3 days, check INR on 05/05 to adjust the dose.) 30 tablet 0     ALLOPURINOL PO Take 100 mg by mouth 2 times daily       multivitamin, therapeutic with minerals (MULTI-VITAMIN) TABS tablet Take 1 tablet by mouth daily       VITAMIN D, CHOLECALCIFEROL, PO Take 1,000 Units by mouth daily       Amlodipine Besylate-Valsartan (EXFORGE)  MG TABS Take 1 tablet by mouth daily 90 tablet 3     omeprazole (PRILOSEC) 20 MG CR capsule Take 1 capsule (20 mg) by mouth 2 times daily 180 capsule 3     levothyroxine (SYNTHROID/LEVOTHROID) 112 MCG tablet Take 1 tablet (112 mcg) by mouth daily 90 tablet 3     metoprolol (LOPRESSOR) 100 MG tablet Take 0.5 tablets (50 mg) by mouth 2 times daily 60 tablet 5     acetaminophen-codeine (TYLENOL #3) 300-30 MG per tablet Take 1 tablet by mouth 3 times daily as needed for pain 30 tablet 0     calcium citrate (CALCITRATE) 950 MG tablet Take by mouth daily 1000 mg daily       fish oil-omega-3 fatty acids 1000 MG capsule Take 2 g by mouth daily       ibandronate (BONIVA) 3 MG/3ML Inject 3 mg into the vein once       folic acid (FOLVITE) 1 MG tablet Take 1 mg by mouth daily       METHOTREXATE SODIUM IJ Inject 0.8 mLs as directed once a week       No facility-administered encounter medications on file as of 5/24/2017.       ASSESSMENT/PLAN:   1.  Heart failure with preserved ejection fraction.  This was secondary to IV fluid resuscitation in a septic patient and a  surgery.  She has since been diuresed and is back to baseline with a weight of 155 pounds.  In the future if she has other surgeries, we should monitor her volume status closely and she may require IV diuretics prior to discharge.  Deanne will continue to follow a low-sodium diet and she will aim to weigh herself daily.   2.  Rheumatic heart disease, status post bioprosthetic mitral valve replacement in 2008 and repeat sternotomy undergoing a second bioprosthetic mitral valve replacement in 2014.  She also underwent a tricuspid annuloplasty ring in 2014.  Most recent echocardiogram in 04/2017 shows normal bioprosthetic mitral valve gradients.  The echocardiogram did show 2-3+ tricuspid regurgitation.  This will need to be followed.   3.  Severe pulmonary hypertension.  I suspect this is a mixed etiology secondary to left-sided heart disease and possibly due to her lung dysfunction.  She also has rheumatoid arthritis.  She has not undergone a formal pulmonary hypertension evaluation at this time.  She will be undergoing PFTs next week.  She will be seeing Dr. Das in 09/2017 or 10/2017.  I defer further evaluation of her pulmonary hypertension to Dr. Das.   4.  Paroxysmal atrial fibrillation.  Asymptomatic.  Due to tachybrady syndrome, she underwent a permanent pacemaker placement in 2011.  She is on chronic warfarin (has a history of cerebrovascular accident).   5.  Hypertension.  Blood pressure is controlled today.  She has known mild to moderate left ventricular hypertrophy.   6.  Need for total knee replacement.  This has been put on hold while she recovers from her cholecystectomy.  She plans to follow up with her primary care provider in August to determine if she is strong enough to undergo another surgery.  She would need to be seen by Dr. Das for cardiac evaluation given that it will have been 5-6 months.   7.  Anemia.  Her hemoglobin today is 10.8.  She presented to the hospital with a  hemoglobin of 12.1.  This should be followed by her primary care provider.  We do note that she is on warfarin for her paroxysmal atrial fibrillation and history of CVA.      Thank you very much for allowing me to participate in the care of Deanne Clark.  She will follow with the [facility]O.R[facility] Clinic as needed given her stability and per her wishes.  She will continue to follow closely with Dr. Das, seeing him in September or October prior to knee replacement surgery.      Sincerely,    Augusto Brasher PA-C     [facility]

## 2017-05-25 ENCOUNTER — HOSPITAL ENCOUNTER (OUTPATIENT)
Dept: RESPIRATORY THERAPY | Facility: CLINIC | Age: 77
Discharge: HOME | End: 2017-05-25
Attending: NURSE PRACTITIONER | Admitting: NURSE PRACTITIONER
Payer: MEDICARE

## 2017-05-25 DIAGNOSIS — I27.20 PULMONARY HYPERTENSION (H): ICD-10-CM

## 2017-05-25 LAB
DLCOCOR-%PRED-PRE: 62 %
DLCOCOR-PRE: 12.84 ML/MIN/MMHG
DLCOUNC-%PRED-PRE: 56 %
DLCOUNC-PRE: 11.68 ML/MIN/MMHG
DLCOUNC-PRED: 20.68 ML/MIN/MMHG
ERV-%PRED-PRE: 38 %
ERV-PRE: 0.28 L
ERV-PRED: 0.72 L
EXPTIME-PRE: 5.94 SEC
FEF2575-%PRED-POST: 122 %
FEF2575-%PRED-PRE: 106 %
FEF2575-POST: 2.15 L/SEC
FEF2575-PRE: 1.86 L/SEC
FEF2575-PRED: 1.75 L/SEC
FEFMAX-%PRED-PRE: 91 %
FEFMAX-PRE: 5.01 L/SEC
FEFMAX-PRED: 5.48 L/SEC
FEV1-%PRED-PRE: 76 %
FEV1-PRE: 1.67 L
FEV1FEV6-PRE: 83 %
FEV1FEV6-PRED: 78 %
FEV1FVC-PRE: 83 %
FEV1FVC-PRED: 77 %
FEV1SVC-PRE: 82 %
FEV1SVC-PRED: 68 %
FIFMAX-PRE: 3.2 L/SEC
FRCPLETH-%PRED-PRE: 79 %
FRCPLETH-PRE: 2.26 L
FRCPLETH-PRED: 2.83 L
FVC-%PRED-PRE: 70 %
FVC-PRE: 2.01 L
FVC-PRED: 2.86 L
GAW-%PRED-PRE: 33 %
GAW-PRE: 0.35 L/S/CMH2O
GAW-PRED: 1.03 L/S/CMH2O
IC-%PRED-PRE: 71 %
IC-PRE: 1.77 L
IC-PRED: 2.47 L
RVPLETH-%PRED-PRE: 88 %
RVPLETH-PRE: 1.98 L
RVPLETH-PRED: 2.25 L
SGAW-%PRED-PRE: 134 %
SGAW-PRE: 0.13 1/CMH2O*S
SGAW-PRED: 0.1 1/CMH2O*S
SRAW-%PRED-PRE: 156 %
SRAW-PRE: 7.45 CMH2O*S
SRAW-PRED: 4.76 CMH2O*S
TLCPLETH-%PRED-PRE: 76 %
TLCPLETH-PRE: 4.03 L
TLCPLETH-PRED: 5.27 L
VA-%PRED-PRE: 65 %
VA-PRE: 3.51 L
VC-%PRED-PRE: 64 %
VC-PRE: 2.04 L
VC-PRED: 3.19 L

## 2017-05-25 PROCEDURE — 94010 BREATHING CAPACITY TEST: CPT

## 2017-05-25 PROCEDURE — 94729 DIFFUSING CAPACITY: CPT

## 2017-05-25 PROCEDURE — 40000809 ZZH STATISTIC NO DOCUMENTATION TO SUPPORT CHARGE

## 2017-05-25 PROCEDURE — 25000125 ZZHC RX 250

## 2017-05-25 PROCEDURE — 94060 EVALUATION OF WHEEZING: CPT

## 2017-05-25 PROCEDURE — 94726 PLETHYSMOGRAPHY LUNG VOLUMES: CPT

## 2017-05-25 RX ORDER — ALBUTEROL SULFATE 0.83 MG/ML
SOLUTION RESPIRATORY (INHALATION)
Status: COMPLETED
Start: 2017-05-25 | End: 2017-05-25

## 2017-05-25 RX ADMIN — ALBUTEROL SULFATE: 2.5 SOLUTION RESPIRATORY (INHALATION) at 10:43

## 2017-05-25 NOTE — PROGRESS NOTES
HISTORY OF PRESENT ILLNESS:  I had the pleasure of meeting Deanne Clark along with her , Sander, today in the Cardiology Clinic for enrollment in the [facility].O.R.E. Clinic.  Deanne is a very pleasant 76-year-old woman with a past medical history significant for mitral stenosis, status post MVR in 2008, repeat sternotomy, status post tissue MVR in 2014 along with tricuspid valve annuloplasty ring in 2014, known severe pulmonary hypertension, rheumatoid arthritis, paroxysmal atrial fibrillation on chronic warfarin, tachybrady syndrome, status post dual-chamber permanent pacemaker implantation in 2011, CVA in 2009 with some residual mild left arm and leg weakness, mild nonobstructive coronary artery disease seen on coronary angiogram in 2014.      Deanne follows with Dr. Das and last saw him in 04/2017 as she was awaiting a total knee replacement.  She had her undergo an echocardiogram, which demonstrated LVEF 60%-65%, mild to moderate LVH, mild to moderate right ventricular dysfunction, right ventricular enlargement, mean mitral valve gradient of 4.8 mmHg, 2-3+ tricuspid regurgitation, severe pulmonary hypertension with estimated RVSP of 61 mmHg plus right atrial pressure, 1+ aortic regurgitation.  She was cleared for knee replacement surgery.  However, she ended up being admitted for abdominal pain and was found to have acute cholecystitis which was gangrenosum and she initially was septic.  She underwent cholecystectomy on 04/29/2017.  She was given IV fluid resuscitation in the setting of sepsis and her surgery.  She was discharged to home, but shortly after she got home, she noted her weight was up 14 pounds and she had some shortness of breath.  She was readmitted and treated with IV diuretics (home medication is chlorthalidone), and her symptoms resolved.  Following that admission, she was seen in followup and a chest x-ray was performed that showed no evidence of pulmonary vascular congestion and her weight  was back to baseline, which is around 155-160 pounds.  She followed up with yahaira Colbert, nurse practitioner, 05/15/2017, and at that time, she remained quite stable with a weight of 158 pounds.  The patient was interested in C.O.R.E. Clinic, and therefore, she was referred for enrollment.      Deanne presents today.  She tells me she continues to do well from a cardiac standpoint with a stable weight at home.  She and her  are mostly concerned that she underwent the surgery and was discharged home without having been weighed in the hospital.  She has not yet had her knee replacement surgery and she is not quite ready to undergo this at this time.  She is supposed to follow up with her primary care provider in August who will determine if she appears strong enough to undergo another surgery.  Deanne says she is a little bit weak from this admission, but overall, she is doing better.  She denies any significant shortness of breath, orthopnea, PND or edema.  She has no significant palpitations or chest pain.      PHYSICAL EXAMINATION:   VITAL SIGNS:  Blood pressure 120/60, pulse 61, weight 155 pounds 12.8 ounces here in the clinic, 153 pounds at home.  BMI 25.98.   GENERAL:  A 76-year-old woman in no apparent distress.   NECK:  JVP is normal.   LUNGS:  Clear.   CARDIAC:  Regular.  She does not have a RV heave.  There is a fixed split S2.  There is a soft diastolic murmur, soft systolic murmur.   ABDOMEN:  Soft.  Bowel sounds positive.   EXTREMITIES:  Warm without edema.  She does have some arthritic deformities of her upper extremities.   NEUROLOGIC:  She is alert, oriented x3.  She does have some persistent left-sided weakness from her CVA and walks with a walker.      DIAGNOSTICS:  05/24/2017 BMP:  Sodium 140, potassium 3.7, BUN 29, creatinine 0.88, GFR 63.   05/24/2017 TSH 1.40   05/24/2017:  Hemoglobin 10.8.      ASSESSMENT/PLAN:   1.  Heart failure with preserved ejection fraction.  This was secondary  to IV fluid resuscitation in a septic patient and a surgery.  She has since been diuresed and is back to baseline with a weight of 155 pounds.  In the future if she has other surgeries, we should monitor her volume status closely and she may require IV diuretics prior to discharge.  Deanne will continue to follow a low-sodium diet and she will aim to weigh herself daily.   2.  Rheumatic heart disease, status post bioprosthetic mitral valve replacement in 2008 and repeat sternotomy undergoing a second bioprosthetic mitral valve replacement in 2014.  She also underwent a tricuspid annuloplasty ring in 2014.  Most recent echocardiogram in 04/2017 shows normal bioprosthetic mitral valve gradients.  The echocardiogram did show 2-3+ tricuspid regurgitation.  This will need to be followed.   3.  Severe pulmonary hypertension.  I suspect this is a mixed etiology secondary to left-sided heart disease and possibly due to her lung dysfunction.  She also has rheumatoid arthritis.  She has not undergone a formal pulmonary hypertension evaluation at this time.  She will be undergoing PFTs next week.  She will be seeing Dr. Das in 09/2017 or 10/2017.  I defer further evaluation of her pulmonary hypertension to Dr. Das.   4.  Paroxysmal atrial fibrillation.  Asymptomatic.  Due to tachybrady syndrome, she underwent a permanent pacemaker placement in 2011.  She is on chronic warfarin (has a history of cerebrovascular accident).   5.  Hypertension.  Blood pressure is controlled today.  She has known mild to moderate left ventricular hypertrophy.   6.  Need for total knee replacement.  This has been put on hold while she recovers from her cholecystectomy.  She plans to follow up with her primary care provider in August to determine if she is strong enough to undergo another surgery.  She would need to be seen by Dr. Das for cardiac evaluation given that it will have been 5-6 months.   7.  Anemia.  Her hemoglobin today is  10.8.  She presented to the hospital with a hemoglobin of 12.1.  This should be followed by her primary care provider.  We do note that she is on warfarin for her paroxysmal atrial fibrillation and history of CVA.      Thank you very much for allowing me to participate in the care of Deanne Lewis.  She will follow with the [facility]O.R.E. Clinic as needed given her stability and per her wishes.  She will continue to follow closely with Dr. Das, seeing him in September or October prior to knee replacement surgery.         GRACE BHATIA PA-C             D: 2017 12:38   T: 2017 12:34   MT: sage      Name:     NADIYA LEWIS   MRN:      [id number]        Account:      [id number]   :      1940           Service Date: 2017      Document: [id number]

## 2017-05-25 NOTE — PROGRESS NOTES
PFT Note: Patient completed pulmonary function testing with pre/post spirometry, lung volumes and diffusion. Good patient effort and cooperation. The results of this test met the ATS standards for acceptability and repeatability. Albuterol neb 2.5 mg given for bronchodilation. Hgb result of 10.8 used from 5/24/17 for DLCO.

## 2017-05-26 ENCOUNTER — HOSPITAL ENCOUNTER (OUTPATIENT)
Dept: MAMMOGRAPHY | Facility: CLINIC | Age: 77
Discharge: HOME | End: 2017-05-26
Attending: INTERNAL MEDICINE | Admitting: INTERNAL MEDICINE
Payer: MEDICARE

## 2017-05-26 ENCOUNTER — ANTICOAGULATION THERAPY VISIT (OUTPATIENT)
Dept: ANTICOAGULATION | Facility: CLINIC | Age: 77
End: 2017-05-26
Payer: MEDICARE

## 2017-05-26 DIAGNOSIS — Z12.31 VISIT FOR SCREENING MAMMOGRAM: ICD-10-CM

## 2017-05-26 DIAGNOSIS — I48.92 ATRIAL FIBRILLATION AND FLUTTER (H): ICD-10-CM

## 2017-05-26 DIAGNOSIS — Z79.01 LONG-TERM (CURRENT) USE OF ANTICOAGULANTS: ICD-10-CM

## 2017-05-26 DIAGNOSIS — I48.91 ATRIAL FIBRILLATION AND FLUTTER (H): ICD-10-CM

## 2017-05-26 LAB — INR POINT OF CARE: 4 (ref 0.86–1.14)

## 2017-05-26 PROCEDURE — G0202 SCR MAMMO BI INCL CAD: HCPCS

## 2017-05-26 PROCEDURE — 99207 ZZC NO CHARGE NURSE ONLY: CPT

## 2017-05-26 PROCEDURE — 36416 COLLJ CAPILLARY BLOOD SPEC: CPT

## 2017-05-26 PROCEDURE — 85610 PROTHROMBIN TIME: CPT | Mod: QW

## 2017-05-26 NOTE — MR AVS SNAPSHOT
Zunilda Alicea May   5/26/2017 11:00 AM   Anticoagulation Therapy Visit    Description:  76 year old female   Provider[facility]  [facility]   Department:  [facility]           INR as of 5/26/2017     Today's INR 4.0!      Anticoagulation Summary as of 5/26/2017     INR goal 2.0-3.0   Today's INR 4.0!   Full instructions 5/26: Hold; 5/27: 2.5 mg; Otherwise 5 mg on Tue, Thu, Sat; 2.5 mg all other days   Next INR check 6/2/2017    Indications   Long-term (current) use of anticoagulants [Z79.01] [Z79.01]  Atrial fibrillation and flutter (H) [I48.91  I48.92]         Your next Anticoagulation Clinic appointment(s)     Jun 02, 2017 10:45 AM CDT   [facility] with [facility]   [facility]    [address] [facility] [address]  [facility] [id number] [phone]              Contact Numbers     [facility] Phone Numbers:  Anticoagulation Clinic Appointments : [phone]  Anticoagulation Nurse: [phone]         May 2017 Details    Sun Mon Tue Wed Thu Fri Sat      1               2               3               4               5               6                 7               8               9               10               11               12               13                 14               15               16               17               18               19               20                 21               22               23               24               25               26      Hold   See details      27      2.5 mg           28      2.5 mg         29      2.5 mg         30      5 mg         31      2.5 mg             Date Details   05/26 This INR check               How to take your warfarin dose     To take:  2.5 mg Take 0.5 of a 5 mg tablet.    To take:  5 mg Take 1 of the 5 mg tablets.    Hold Do not take your warfarin dose. See the Details table to the right for additional instructions.                June 2017 Details     Sun Mon Tue Wed Thu Fri Sat         1      5 mg         2            3                 4               5               6               7               8               9               10                 11               12               13               14               15               16               17                 18               19               20               21               22               23               24                 25               26               27               28               29               30                 Date Details   No additional details    Date of next INR:  6/2/2017         How to take your warfarin dose     To take:  2.5 mg Take 0.5 of a 5 mg tablet.    To take:  5 mg Take 1 of the 5 mg tablets.

## 2017-05-26 NOTE — PROGRESS NOTES
ANTICOAGULATION FOLLOW-UP CLINIC VISIT    Patient Name:  Zunilda Alicea May  Date:  5/26/2017  Contact Type:  Face to Face    SUBJECTIVE:     Patient Findings     Positives Other complaints (Increased pain in her left mid back. taking Tylenol #3. )           OBJECTIVE    INR Protime   Date Value Ref Range Status   05/26/2017 4.0 (A) 0.86 - 1.14 Final       ASSESSMENT / PLAN  INR assessment SUPRA    Recheck INR In: 1 WEEK    INR Location Clinic      Anticoagulation Summary as of 5/26/2017     INR goal 2.0-3.0   Today's INR 4.0!   Maintenance plan 5 mg (5 mg x 1) on Tue, Thu, Sat; 2.5 mg (5 mg x 0.5) all other days   Full instructions 5/26: Hold; 5/27: 2.5 mg; Otherwise 5 mg on Tue, Thu, Sat; 2.5 mg all other days   Weekly total 25 mg   Plan last modified Екатерина Mahan RN (1/24/2017)   Next INR check 6/2/2017   Priority INR   Target end date     Indications   Long-term (current) use of anticoagulants [Z79.01] [Z79.01]  Atrial fibrillation and flutter (H) [I48.91  I48.92]         Anticoagulation Episode Summary     INR check location     Preferred lab     Send INR reminders to [facility]    Comments       Anticoagulation Care Providers     Provider Role Specialty Phone number    Yunior Huang MD [facility] [phone]            See the Encounter Report to view Anticoagulation Flowsheet and Dosing Calendar (Go to Encounters tab in chart review, and find the Anticoagulation Therapy Visit)    Dosage adjustment made based on physician directed care plan.    Simona Shrestha RN

## 2017-05-26 NOTE — PROCEDURES
"Please see medical chart for graphs and statistics related to this report.       REFERRING PHYSICIAN: Lee Ann Colbert            TECHNICIAN: Simona Reid   DIAGNOSIS: CHF   HEIGHT: 66\"              WEIGHT: 152 lbs   DYSPNEA: after any exertion                COUGH: non-product   WHEEZE: no wheeze             TOBACCO PROD: never smoked   MEDICATIONS:         POST-TEST COMMENTS: Good patient effort and cooperation. This test meets ATS criteria for acceptability and repeat-ability. Patient was given 2.5mg Albuterol neb as bronchodilator. Hgb results of 10.8 was used from 2017.       INTERPRETATION:   1. Mild restriction   2. No obstruction   3. Normal F-V loop   4. Moderately reduced diffusion         PADMINI ASHLEY MD             D: 2017 09:17   T: 2017 09:25   MT:       Name:     NADIYA LEWIS   MRN:      [id number]        Account:        [id number]   :      1940           Procedure Date: 2017      Document: [id number]       cc: LEE ANN SARAVIA, CNP       Yunior Huang MD   "

## 2017-06-02 ENCOUNTER — CARE COORDINATION (OUTPATIENT)
Dept: CARE COORDINATION | Facility: CLINIC | Age: 77
End: 2017-06-02

## 2017-06-02 ENCOUNTER — ANTICOAGULATION THERAPY VISIT (OUTPATIENT)
Dept: ANTICOAGULATION | Facility: CLINIC | Age: 77
End: 2017-06-02
Payer: MEDICARE

## 2017-06-02 DIAGNOSIS — I48.92 ATRIAL FIBRILLATION AND FLUTTER (H): ICD-10-CM

## 2017-06-02 DIAGNOSIS — Z79.01 LONG-TERM (CURRENT) USE OF ANTICOAGULANTS: ICD-10-CM

## 2017-06-02 DIAGNOSIS — I48.91 ATRIAL FIBRILLATION AND FLUTTER (H): ICD-10-CM

## 2017-06-02 LAB — INR POINT OF CARE: 2.5 (ref 0.86–1.14)

## 2017-06-02 PROCEDURE — 85610 PROTHROMBIN TIME: CPT | Mod: QW

## 2017-06-02 PROCEDURE — 36416 COLLJ CAPILLARY BLOOD SPEC: CPT

## 2017-06-02 PROCEDURE — 99207 ZZC NO CHARGE NURSE ONLY: CPT

## 2017-06-02 NOTE — PROGRESS NOTES
Clinic Care Coordination Contact  Care Team Conversations    Reviewed chart and pt enrolled in [facility] on 5/24.  Pt will be followed and monitored by this clinic.  At this time pt is not in need of clinic care coordination.    Valente Choi RN/CC  Care Coordinator [facility]  [phone]

## 2017-06-02 NOTE — MR AVS SNAPSHOT
Zunilda Alicea May   6/2/2017 10:45 AM   Anticoagulation Therapy Visit    Description:  76 year old female   Provider[facility]   Department:  [facility]           INR as of 6/2/2017     Today's INR 2.5      Anticoagulation Summary as of 6/2/2017     INR goal 2.0-3.0   Today's INR 2.5   Full instructions 6/8: 2.5 mg; Otherwise 5 mg on Tue, Thu, Sat; 2.5 mg all other days   Next INR check 6/13/2017    Indications   Long-term (current) use of anticoagulants [Z79.01] [Z79.01]  Atrial fibrillation and flutter (H) [I48.91  I48.92]         Your next Anticoagulation Clinic appointment(s)     Jun 13, 2017 11:45 AM CDT   [facility] with [facility]   [facility]    [address] [facility] [address]  [facility] [address] [phone]              Contact Numbers     [facility] Phone Numbers:  Anticoagulation Clinic Appointments : [phone]  Anticoagulation Nurse: [phone]         June 2017 Details    Sun Mon Tue Wed Thu Fri Sat         1               2      2.5 mg   See details      3      5 mg           4      2.5 mg         5      2.5 mg         6      5 mg         7      2.5 mg         8      2.5 mg         9      2.5 mg         10      5 mg           11      2.5 mg         12      2.5 mg         13            14               15               16               17                 18               19               20               21               22               23               24                 25               26               27               28               29               30                 Date Details   06/02 This INR check       Date of next INR:  6/13/2017         How to take your warfarin dose     To take:  2.5 mg Take 0.5 of a 5 mg tablet.    To take:  5 mg Take 1 of the 5 mg tablets.

## 2017-06-02 NOTE — PROGRESS NOTES
ANTICOAGULATION FOLLOW-UP CLINIC VISIT    Patient Name:  Zunilda Alicea May  Date:  6/2/2017  Contact Type:  Face to Face    SUBJECTIVE:     Patient Findings     Positives No Problem Findings           OBJECTIVE    INR Protime   Date Value Ref Range Status   06/02/2017 2.5 (A) 0.86 - 1.14 Final       ASSESSMENT / PLAN  INR assessment THER    Recheck INR In: 10 DAYS    INR Location Clinic      Anticoagulation Summary as of 6/2/2017     INR goal 2.0-3.0   Today's INR 2.5   Maintenance plan 5 mg (5 mg x 1) on Tue, Thu, Sat; 2.5 mg (5 mg x 0.5) all other days   Full instructions 6/8: 2.5 mg; Otherwise 5 mg on Tue, Thu, Sat; 2.5 mg all other days   Weekly total 25 mg   Plan last modified Екатерина Mahan RN (1/24/2017)   Next INR check 6/13/2017   Priority INR   Target end date     Indications   Long-term (current) use of anticoagulants [Z79.01] [Z79.01]  Atrial fibrillation and flutter (H) [I48.91  I48.92]         Anticoagulation Episode Summary     INR check location     Preferred lab     Send INR reminders to [facility]    Comments       Anticoagulation Care Providers     Provider Role Specialty Phone number    Yunior Huang MD Responsible Internal Medicine [phone]            See the Encounter Report to view Anticoagulation Flowsheet and Dosing Calendar (Go to Encounters tab in chart review, and find the Anticoagulation Therapy Visit)    Dosage adjustment made based on physician directed care plan.    Simona Shrestha RN

## 2017-06-05 ENCOUNTER — TELEPHONE (OUTPATIENT)
Dept: CARDIOLOGY | Facility: CLINIC | Age: 77
End: 2017-06-05

## 2017-06-05 DIAGNOSIS — R06.00 DYSPNEA, UNSPECIFIED TYPE: Primary | ICD-10-CM

## 2017-06-07 NOTE — TELEPHONE ENCOUNTER
I called patient to review her results and recommendations. PFT noted, mild restriction with moderately reduced diffusion. Patient was not available. I left patient a message. Awaiting call back.     TGarbers RN  [facility]

## 2017-06-12 ENCOUNTER — TRANSFERRED RECORDS (OUTPATIENT)
Dept: HEALTH INFORMATION MANAGEMENT | Facility: CLINIC | Age: 77
End: 2017-06-12

## 2017-06-12 LAB
ALT SERPL-CCNC: 11 IU/L (ref 5–35)
AST SERPL-CCNC: 18 U/L (ref 5–34)
CREAT SERPL-MCNC: 0.69 MG/DL (ref 0.5–1.3)
GFR SERPL CREATININE-BSD FRML MDRD: 88.2 ML/MIN/1.73M2

## 2017-06-13 ENCOUNTER — ANTICOAGULATION THERAPY VISIT (OUTPATIENT)
Dept: ANTICOAGULATION | Facility: CLINIC | Age: 77
End: 2017-06-13
Payer: MEDICARE

## 2017-06-13 ENCOUNTER — OFFICE VISIT (OUTPATIENT)
Dept: INTERNAL MEDICINE | Facility: CLINIC | Age: 77
End: 2017-06-13
Payer: MEDICARE

## 2017-06-13 VITALS
OXYGEN SATURATION: 98 % | SYSTOLIC BLOOD PRESSURE: 112 MMHG | TEMPERATURE: 98.4 F | BODY MASS INDEX: 25.66 KG/M2 | DIASTOLIC BLOOD PRESSURE: 70 MMHG | WEIGHT: 154.2 LBS | HEART RATE: 76 BPM

## 2017-06-13 DIAGNOSIS — Z79.01 LONG-TERM (CURRENT) USE OF ANTICOAGULANTS: ICD-10-CM

## 2017-06-13 DIAGNOSIS — I50.31 ACUTE DIASTOLIC CONGESTIVE HEART FAILURE (H): Primary | ICD-10-CM

## 2017-06-13 DIAGNOSIS — I10 ESSENTIAL HYPERTENSION: ICD-10-CM

## 2017-06-13 DIAGNOSIS — E03.9 ACQUIRED HYPOTHYROIDISM: ICD-10-CM

## 2017-06-13 DIAGNOSIS — I48.92 ATRIAL FIBRILLATION AND FLUTTER (H): ICD-10-CM

## 2017-06-13 DIAGNOSIS — Z23 NEED FOR VACCINATION WITH 13-POLYVALENT PNEUMOCOCCAL CONJUGATE VACCINE: ICD-10-CM

## 2017-06-13 DIAGNOSIS — M06.9 RHEUMATOID ARTHRITIS, INVOLVING UNSPECIFIED SITE, UNSPECIFIED RHEUMATOID FACTOR PRESENCE: ICD-10-CM

## 2017-06-13 DIAGNOSIS — I48.91 ATRIAL FIBRILLATION AND FLUTTER (H): ICD-10-CM

## 2017-06-13 LAB — INR POINT OF CARE: 2.4 (ref 0.86–1.14)

## 2017-06-13 PROCEDURE — 99207 ZZC NO CHARGE NURSE ONLY: CPT

## 2017-06-13 PROCEDURE — 36416 COLLJ CAPILLARY BLOOD SPEC: CPT

## 2017-06-13 PROCEDURE — G0009 ADMIN PNEUMOCOCCAL VACCINE: HCPCS | Performed by: INTERNAL MEDICINE

## 2017-06-13 PROCEDURE — 90670 PCV13 VACCINE IM: CPT | Performed by: INTERNAL MEDICINE

## 2017-06-13 PROCEDURE — 99214 OFFICE O/P EST MOD 30 MIN: CPT | Mod: 25 | Performed by: INTERNAL MEDICINE

## 2017-06-13 PROCEDURE — 85610 PROTHROMBIN TIME: CPT | Mod: QW

## 2017-06-13 RX ORDER — WARFARIN SODIUM 2.5 MG/1
5 TABLET ORAL DAILY
Qty: 90 TABLET | Refills: 3 | Status: SHIPPED | OUTPATIENT
Start: 2017-06-13 | End: 2017-10-03

## 2017-06-13 RX ORDER — WARFARIN SODIUM 3 MG/1
3 TABLET ORAL DAILY
Qty: 30 TABLET | Refills: 0 | Status: CANCELLED | OUTPATIENT
Start: 2017-06-13

## 2017-06-13 NOTE — PROGRESS NOTES
SUBJECTIVE:                                                    Zunilda Clark is a 76 year old female who presents to clinic today for the following health issues:      Patient is seen for a follow up visit.  Feels well. No acute problems.   Has had episode of left lateral chest pain, a month ago. Resolved, normal CXR. No recurrence.   Has h/o HTN. on medical treatment. BP has been controlled. No side effects from medications. No CP, HA, dizziness. good compliance with medications and low salt diet.  Has history of atrial fibrillation. On anticoagulation with Coumadin and rate control medications. Asymptonatic - no chest pains , palpitations,  no side effects from medications.  Has h/o CHF, resolved with diuretics. No edema, SOB, CP.   Has h/o RA. Follows with rheumatology. No flare ups.   Discussed preventive measures, needs Prevnar 13 immunization.       Amount of exercise or physical activity: 1 day/week for an average of 15-30 minutes    Problems taking medications regularly: No    Medication side effects: none    Diet: low salt          Problem list and histories reviewed & adjusted, as indicated.  Additional history: as documented    Patient Active Problem List   Diagnosis     CHF (congestive heart failure) (H)     RA (rheumatoid arthritis) (H)     Benign essential hypertension     Mitral valve disorder     Pulmonary HTN (H)     Atrial fibrillation and flutter (H)     Cardiac pacemaker in situ     Humerus fracture     Fracture, humerus closed, shaft     Advance Care Planning     Anticoagulation management encounter     Acquired hypothyroidism     Essential hypertension     Long-term (current) use of anticoagulants [Z79.01]     Acute cholecystitis     Past Surgical History:   Procedure Laterality Date     ABDOMEN SURGERY      prolapsed bladder     H ABLATION AV NODE  2011    AFlutter with syncope, PPM to follow     IMPLANT PACEMAKER  2011     LAPAROSCOPIC CHOLECYSTECTOMY WITH CHOLANGIOGRAMS N/A 4/29/2017     Procedure: LAPAROSCOPIC CHOLECYSTECTOMY WITH CHOLANGIOGRAMS;  LAPAROSCOPIC CHOLECYSTECTOMY WITH CHOLANGIOGRAMS ;  Surgeon: Angeles Mcgill MD;  Location: [facility] OR     OPEN REDUCTION INTERNAL FIXATION RODDING INTRAMEDULLARY HUMERUS Right 7/28/2015    Procedure: OPEN REDUCTION INTERNAL FIXATION RODDING INTRAMEDULLARY HUMERUS;  Surgeon: Raymundo Rivera MD;  Location: [facility] OR     REPLACE VALVE MITRAL  2006    Mitral valve replacement with bioprosthetic valve in 2008 for rheumatic disease     SLING BLADDER SUSPENSION WITH FASCIA UMESH         Social History   Substance Use Topics     Smoking status: Never Smoker     Smokeless tobacco: Never Used     Alcohol use No     Family History   Problem Relation Age of Onset     Coronary Artery Disease Mother      Coronary Artery Disease Brother      DIABETES Brother      DIABETES Brother      Hypertension Sister      Hyperlipidemia Sister          Current Outpatient Prescriptions   Medication Sig Dispense Refill     warfarin (COUMADIN) 2.5 MG tablet Take 2 tablets (5 mg) by mouth daily 90 tablet 3     acetaminophen-codeine (TYLENOL #3) 300-30 MG per tablet Take 1 tablet by mouth 3 times daily as needed for pain 30 tablet 0     chlorthalidone (HYGROTON) 25 MG tablet Take 1 tablet (25 mg) by mouth daily 90 tablet 2     ACETAMINOPHEN PO Take 650 mg by mouth every 6 hours as needed        warfarin (COUMADIN) 3 MG tablet Take 1 tablet (3 mg) by mouth daily For 3 days, check INR on 05/05 to adjust the dose. 30 tablet 0     ALLOPURINOL PO Take 100 mg by mouth 2 times daily       multivitamin, therapeutic with minerals (MULTI-VITAMIN) TABS tablet Take 1 tablet by mouth daily       calcium citrate (CALCITRATE) 950 MG tablet Take by mouth daily 1000 mg daily       VITAMIN D, CHOLECALCIFEROL, PO Take 1,000 Units by mouth daily       fish oil-omega-3 fatty acids 1000 MG capsule Take 2 g by mouth daily       ibandronate (BONIVA) 3 MG/3ML Inject 3 mg into the vein once       Amlodipine  Besylate-Valsartan (EXFORGE)  MG TABS Take 1 tablet by mouth daily 90 tablet 3     omeprazole (PRILOSEC) 20 MG CR capsule Take 1 capsule (20 mg) by mouth 2 times daily 180 capsule 3     levothyroxine (SYNTHROID/LEVOTHROID) 112 MCG tablet Take 1 tablet (112 mcg) by mouth daily 90 tablet 3     metoprolol (LOPRESSOR) 100 MG tablet Take 0.5 tablets (50 mg) by mouth 2 times daily 60 tablet 5     folic acid (FOLVITE) 1 MG tablet Take 1 mg by mouth daily       METHOTREXATE SODIUM IJ Inject 0.8 mLs as directed once a week         Reviewed and updated as needed this visit by clinical staff  Tobacco  Allergies  Meds  Med Hx  Surg Hx  Fam Hx  Soc Hx      Reviewed and updated as needed this visit by Provider         ROS:  Constitutional, HEENT, cardiovascular, pulmonary, gi and gu systems are negative, except as otherwise noted.    OBJECTIVE:                                                    /70 (BP Location: Left arm, Patient Position: Chair, Cuff Size: Adult Regular)  Pulse 76  Temp 98.4  F (36.9  C) (Oral)  Wt 154 lb 3.2 oz (69.9 kg)  SpO2 98%  BMI 25.66 kg/m2  Body mass index is 25.66 kg/(m^2).  GENERAL: healthy, alert and no distress  NECK: no adenopathy, no asymmetry, masses, or scars and thyroid normal to palpation  RESP: lungs clear to auscultation - no rales, rhonchi or wheezes, left base crackles   CV: regular rate and rhythm, normal S1 S2, no S3 or S4, no murmur, click or rub, no peripheral edema and peripheral pulses strong  ABDOMEN: soft, nontender, no hepatosplenomegaly, no masses and bowel sounds normal  MS: no gross musculoskeletal defects noted, no edema    Diagnostic Test Results:  none      ASSESSMENT/PLAN:                                                      Problem List Items Addressed This Visit     CHF (congestive heart failure) (H) - Primary    RA (rheumatoid arthritis) (H)    Atrial fibrillation and flutter (H)    Relevant Medications    warfarin (COUMADIN) 2.5 MG tablet     Acquired hypothyroidism    Essential hypertension           Cont treatment   Monitor lab work  Immunized       Follow-Up:in 2 months, assess if OK for knee surgery     Yunior Huang MD  [facility]

## 2017-06-13 NOTE — NURSING NOTE
"Chief Complaint   Patient presents with     RECHECK     OV 5/12/17 labs/chest xray       Initial /70 (BP Location: Left arm, Patient Position: Chair, Cuff Size: Adult Regular)  Pulse 76  Temp 98.4  F (36.9  C) (Oral)  Wt 154 lb 3.2 oz (69.9 kg)  SpO2 98%  BMI 25.66 kg/m2 Estimated body mass index is 25.66 kg/(m^2) as calculated from the following:    Height as of 5/24/17: 5' 5\" (1.651 m).    Weight as of this encounter: 154 lb 3.2 oz (69.9 kg).  Medication Reconciliation: complete     Katalina Gomez CMA      "

## 2017-06-13 NOTE — PROGRESS NOTES
ANTICOAGULATION FOLLOW-UP CLINIC VISIT    Patient Name:  Zunilda Alicea May  Date:  6/13/2017  Contact Type:  Face to Face    SUBJECTIVE:     Patient Findings     Positives No Problem Findings           OBJECTIVE    INR Protime   Date Value Ref Range Status   06/13/2017 2.4 (A) 0.86 - 1.14 Final       ASSESSMENT / PLAN  INR assessment THER    Recheck INR In: 2 WEEKS    INR Location Clinic      Anticoagulation Summary as of 6/13/2017     INR goal 2.0-3.0   Today's INR 2.4   Maintenance plan 5 mg (5 mg x 1) on Tue, Sat; 2.5 mg (5 mg x 0.5) all other days   Full instructions 5 mg on Tue, Sat; 2.5 mg all other days   Weekly total 22.5 mg   Plan last modified Sonam Teixeira RN (6/13/2017)   Next INR check 6/26/2017   Priority INR   Target end date     Indications   Long-term (current) use of anticoagulants [Z79.01] [Z79.01]  Atrial fibrillation and flutter (H) [I48.91  I48.92]         Anticoagulation Episode Summary     INR check location     Preferred lab     Send INR reminders to [facility]    Comments       Anticoagulation Care Providers     Provider Role Specialty Phone number    Yunior Huang MD Responsible Internal Medicine [phone]            See the Encounter Report to view Anticoagulation Flowsheet and Dosing Calendar (Go to Encounters tab in chart review, and find the Anticoagulation Therapy Visit)    Dosage adjustment made based on physician directed care plan.    Sonam Teixeira RN

## 2017-06-13 NOTE — MR AVS SNAPSHOT
Zunilda Alicea May   6/13/2017 11:45 AM   Anticoagulation Therapy Visit    Description:  76 year old female   [facility]   Department:  [facility]           INR as of 6/13/2017     Today's INR 2.4      Anticoagulation Summary as of 6/13/2017     INR goal 2.0-3.0   Today's INR 2.4   Full instructions 5 mg on Tue, Sat; 2.5 mg all other days   Next INR check 6/26/2017    Indications   Long-term (current) use of anticoagulants [Z79.01] [Z79.01]  Atrial fibrillation and flutter (H) [I48.91  I48.92]         Your next Anticoagulation Clinic appointment(s)     Jun 26, 2017 10:30 AM CDT   [facility]   [facility]    [address] [facility] [address]  [facility] [id number] [phone]              Contact Numbers     [facility] Phone Numbers:  Anticoagulation Clinic Appointments : [phone]  Anticoagulation Nurse: [phone]         June 2017 Details    Sun Mon Tue Wed Thu Fri Sat         1               2               3                 4               5               6               7               8               9               10                 11               12               13      5 mg   See details      14      2.5 mg         15      2.5 mg         16      2.5 mg         17      5 mg           18      2.5 mg         19      2.5 mg         20      5 mg         21      2.5 mg         22      2.5 mg         23      2.5 mg         24      5 mg           25      2.5 mg         26            27               28               29               30                 Date Details   06/13 This INR check       Date of next INR:  6/26/2017         How to take your warfarin dose     To take:  2.5 mg Take 0.5 of a 5 mg tablet.    To take:  5 mg Take 1 of the 5 mg tablets.

## 2017-06-13 NOTE — MR AVS SNAPSHOT
After Visit Summary   6/13/2017    Zunilda Clark    MRN: [id number]           Patient Information     Date Of Birth          1940        Visit Information        Provider Department      6/13/2017 11:20 AM Yunior Huang MD [facility]        Today's Diagnoses     Acute diastolic congestive heart failure (H)    -  1    Atrial fibrillation and flutter (H)        Rheumatoid arthritis, involving unspecified site, unspecified rheumatoid factor presence (H)        Acquired hypothyroidism        Essential hypertension           Follow-ups after your visit        Your next 10 appointments already scheduled     Jul 03, 2017 11:30 AM CDT   Remote PPM Check with KRAMER TECH1   [facility] [place] ([facility])    [address]  [facility] [id number] [phone]           This appointment is for a remote check of your pacemaker.  This is not an appointment at the office.            Aug 14, 2017 11:00 AM CDT   Pre-Op physical with Yunior Huang MD   [facility] ([facility])    [address] [facility] [address] [phone]            Sep 25, 2017 10:45 AM CDT   Return Visit with Timothy Das MD   [facility] HEART [facility] ([facility])    [address]  [facility] [address] [phone]              Who to contact     If you have questions or need follow up information about today's clinic visit or your schedule please contact [facility] directly at [phone].  Normal or non-critical lab and imaging results will be communicated to you by MyChart, letter or phone within 4 business days after the clinic has received the results. If you do not hear from us within 7 days, please contact the clinic through MyChart or phone. If you have a critical or abnormal lab result, we will notify you by phone as  soon as possible.  Submit refill requests through [employer] or call your pharmacy and they will forward the refill request to us. Please allow 3 business days for your refill to be completed.          Additional Information About Your Visit        Ake[employer] Information     [employer] gives you secure access to your electronic health record. If you see a primary care provider, you can also send messages to your care team and make appointments. If you have questions, please call your primary care clinic.  If you do not have a primary care provider, please call [phone] and they will assist you.        Care EveryWhere ID     This is your Care EveryWhere ID. This could be used by other organizations to access your [place] medical records  [id number]        Your Vitals Were     Pulse Temperature Pulse Oximetry BMI (Body Mass Index)          76 98.4  F (36.9  C) (Oral) 98% 25.66 kg/m2         Blood Pressure from Last 3 Encounters:   06/13/17 112/70   05/24/17 120/60   05/23/17 122/76    Weight from Last 3 Encounters:   06/13/17 154 lb 3.2 oz (69.9 kg)   05/24/17 155 lb 12.8 oz (70.7 kg)   05/23/17 158 lb (71.7 kg)              Today, you had the following     No orders found for display         Today's Medication Changes          These changes are accurate as of: 6/13/17 11:52 AM.  If you have any questions, ask your nurse or doctor.               These medicines have changed or have updated prescriptions.        Dose/Directions    * warfarin 3 MG tablet   Commonly known as:  COUMADIN   This may have changed:  Another medication with the same name was added. Make sure you understand how and when to take each.   Used for:  Atrial fibrillation and flutter (H)        Dose:  3 mg   Take 1 tablet (3 mg) by mouth daily For 3 days, check INR on 05/05 to adjust the dose.   Quantity:  30 tablet   Refills:  0       * warfarin 2.5 MG tablet   Commonly known as:  COUMADIN   This may have changed:  You were already taking a medication  with the same name, and this prescription was added. Make sure you understand how and when to take each.   Used for:  Atrial fibrillation and flutter (H)   Changed by:  Yunior Huang MD        Dose:  5 mg   Take 2 tablets (5 mg) by mouth daily   Quantity:  90 tablet   Refills:  3       * Notice:  This list has 2 medication(s) that are the same as other medications prescribed for you. Read the directions carefully, and ask your doctor or other care provider to review them with you.         Where to get your medicines      These medications were sent to [id number] - [place], [place] - [address] [address]  [address], [facility] [address]     Phone:  [phone]     warfarin 2.5 MG tablet                Primary Care Provider Office Phone # Fax #    Yunior Huang [phone]       [facility] [address] [facility] [address]        Thank you!     Thank you for choosing [facility]  for your care. Our goal is always to provide you with excellent care. Hearing back from our patients is one way we can continue to improve our services. Please take a few minutes to complete the written survey that you may receive in the mail after your visit with us. Thank you!             Your Updated Medication List - Protect others around you: Learn how to safely use, store and throw away your medicines at www.disposemymeds.org.          This list is accurate as of: 6/13/17 11:52 AM.  Always use your most recent med list.                   Brand Name Dispense Instructions for use    ACETAMINOPHEN PO      Take 650 mg by mouth every 6 hours as needed       acetaminophen-codeine 300-30 MG per tablet    TYLENOL #3    30 tablet    Take 1 tablet by mouth 3 times daily as needed for pain       ALLOPURINOL PO      Take 100 mg by mouth 2 times daily       Amlodipine Besylate-Valsartan  MG Tabs    EXFORGE    90 tablet    Take 1 tablet by mouth daily       BONIVA 3  MG/3ML   Generic drug:  ibandronate      Inject 3 mg into the vein once       calcium citrate 950 MG tablet    CALCITRATE     Take by mouth daily 1000 mg daily       chlorthalidone 25 MG tablet    HYGROTON    90 tablet    Take 1 tablet (25 mg) by mouth daily       fish oil-omega-3 fatty acids 1000 MG capsule      Take 2 g by mouth daily       folic acid 1 MG tablet    FOLVITE     Take 1 mg by mouth daily       levothyroxine 112 MCG tablet    SYNTHROID/LEVOTHROID    90 tablet    Take 1 tablet (112 mcg) by mouth daily       METHOTREXATE SODIUM IJ      Inject 0.8 mLs as directed once a week       metoprolol 100 MG tablet    LOPRESSOR    60 tablet    Take 0.5 tablets (50 mg) by mouth 2 times daily       Multi-vitamin Tabs tablet      Take 1 tablet by mouth daily       omeprazole 20 MG CR capsule    priLOSEC    180 capsule    Take 1 capsule (20 mg) by mouth 2 times daily       VITAMIN D (CHOLECALCIFEROL) PO      Take 1,000 Units by mouth daily       * warfarin 3 MG tablet    COUMADIN    30 tablet    Take 1 tablet (3 mg) by mouth daily For 3 days, check INR on 05/05 to adjust the dose.       * warfarin 2.5 MG tablet    COUMADIN    90 tablet    Take 2 tablets (5 mg) by mouth daily       * Notice:  This list has 2 medication(s) that are the same as other medications prescribed for you. Read the directions carefully, and ask your doctor or other care provider to review them with you.

## 2017-06-15 ENCOUNTER — MYC MEDICAL ADVICE (OUTPATIENT)
Dept: CARDIOLOGY | Facility: CLINIC | Age: 77
End: 2017-06-15

## 2017-06-15 NOTE — TELEPHONE ENCOUNTER
Results and recommendations were reviewed with patient over the phone. Patient was given the number for [place] [facility] to schedule her appt. Patient had no questions.     Nargis SUMMERS  [facility]

## 2017-06-26 ENCOUNTER — ANTICOAGULATION THERAPY VISIT (OUTPATIENT)
Dept: ANTICOAGULATION | Facility: CLINIC | Age: 77
End: 2017-06-26
Payer: MEDICARE

## 2017-06-26 DIAGNOSIS — I48.92 ATRIAL FIBRILLATION AND FLUTTER (H): ICD-10-CM

## 2017-06-26 DIAGNOSIS — Z79.01 LONG-TERM (CURRENT) USE OF ANTICOAGULANTS: ICD-10-CM

## 2017-06-26 DIAGNOSIS — I48.91 ATRIAL FIBRILLATION AND FLUTTER (H): ICD-10-CM

## 2017-06-26 LAB — INR POINT OF CARE: 1.8 (ref 0.86–1.14)

## 2017-06-26 PROCEDURE — 36416 COLLJ CAPILLARY BLOOD SPEC: CPT

## 2017-06-26 PROCEDURE — 85610 PROTHROMBIN TIME: CPT | Mod: QW

## 2017-06-26 NOTE — MR AVS SNAPSHOT
Zunilda Alicea May   6/26/2017 10:30 AM   Anticoagulation Therapy Visit    Description:  76 year old female   Provider[facility]  [facility]   Department:  [facility]           INR as of 6/26/2017     Today's INR 1.8!      Anticoagulation Summary as of 6/26/2017     INR goal 2.0-3.0   Today's INR 1.8!   Full instructions 6/26: 5 mg; Otherwise 5 mg on Tue, Sat; 2.5 mg all other days   Next INR check 7/5/2017    Indications   Long-term (current) use of anticoagulants [Z79.01] [Z79.01]  Atrial fibrillation and flutter (H) [I48.91  I48.92]         Your next Anticoagulation Clinic appointment(s)     Jul 05, 2017 10:30 AM CDT   Anticoagulation Visit with [facility]   [facility]    [address]  [facility] [address] [phone]              Contact Numbers     [facility] Phone Numbers:  Anticoagulation Clinic Appointments : [phone]  Anticoagulation Nurse: [phone]         June 2017 Details    Sun Mon Tue Wed Thu Fri Sat         1               2               3                 4               5               6               7               8               9               10                 11               12               13               14               15               16               17                 18               19               20               21               22               23               24                 25               26      5 mg   See details      27      5 mg         28      2.5 mg         29      2.5 mg         30      2.5 mg           Date Details   06/26 This INR check               How to take your warfarin dose     To take:  2.5 mg Take 0.5 of a 5 mg tablet.    To take:  5 mg Take 1 of the 5 mg tablets.           July 2017 Details    Sun Mon Tue Wed Thu Fri Sat           1      5 mg           2      2.5 mg         3      2.5 mg         4      5 mg         5            6                7               8                 9               10               11               12               13               14               15                 16               17               18               19               20               21               22                 23               24               25               26               27               28               29                 30               31                     Date Details   No additional details    Date of next INR:  7/5/2017         How to take your warfarin dose     To take:  2.5 mg Take 0.5 of a 5 mg tablet.    To take:  5 mg Take 1 of the 5 mg tablets.

## 2017-06-26 NOTE — PROGRESS NOTES
ANTICOAGULATION FOLLOW-UP CLINIC VISIT    Patient Name:  Zunilda Alicea May  Date:  6/26/2017  Contact Type:  Face to Face    SUBJECTIVE:     Patient Findings     Positives Unexplained INR or factor level change           OBJECTIVE    INR Protime   Date Value Ref Range Status   06/26/2017 1.8 (A) 0.86 - 1.14 Final       ASSESSMENT / PLAN  INR assessment SUB    Recheck INR In: 9 DAYS    INR Location Clinic      Anticoagulation Summary as of 6/26/2017     INR goal 2.0-3.0   Today's INR 1.8!   Maintenance plan 5 mg (5 mg x 1) on Tue, Sat; 2.5 mg (5 mg x 0.5) all other days   Full instructions 6/26: 5 mg; Otherwise 5 mg on Tue, Sat; 2.5 mg all other days   Weekly total 22.5 mg   Plan last modified Sonam Teixeira RN (6/13/2017)   Next INR check 7/5/2017   Priority INR   Target end date     Indications   Long-term (current) use of anticoagulants [Z79.01] [Z79.01]  Atrial fibrillation and flutter (H) [I48.91  I48.92]         Anticoagulation Episode Summary     INR check location     Preferred lab     Send INR reminders to [facility]    Comments       Anticoagulation Care Providers     Provider Role Specialty Phone number    Yunior Huang MD Responsible Internal Medicine [phone]            See the Encounter Report to view Anticoagulation Flowsheet and Dosing Calendar (Go to Encounters tab in chart review, and find the Anticoagulation Therapy Visit)    Dosage adjustment made based on physician directed care plan.    Екатерина Mahan RN

## 2017-07-03 ENCOUNTER — ALLIED HEALTH/NURSE VISIT (OUTPATIENT)
Dept: CARDIOLOGY | Facility: CLINIC | Age: 77
End: 2017-07-03
Payer: MEDICARE

## 2017-07-03 DIAGNOSIS — Z95.0 CARDIAC PACEMAKER IN SITU: Primary | ICD-10-CM

## 2017-07-03 PROCEDURE — 93296 REM INTERROG EVL PM/IDS: CPT | Performed by: INTERNAL MEDICINE

## 2017-07-03 PROCEDURE — 93294 REM INTERROG EVL PM/LDLS PM: CPT | Performed by: INTERNAL MEDICINE

## 2017-07-03 NOTE — MR AVS SNAPSHOT
After Visit Summary   7/3/2017    Zunilda Clark    MRN: [id number]           Patient Information     Date Of Birth          1940        Visit Information        Provider Department      7/3/2017 11:30 AM KRAMER TECH1 [facility] HEART [facility]        Today's Diagnoses     Cardiac pacemaker in situ    -  1       Follow-ups after your visit        Additional Services     Follow-Up with Device Clinic                 Your next 10 appointments already scheduled     Jul 05, 2017 10:30 AM CDT   [facility] with [facility]    [address]  [facility] [address] [phone]            Aug 14, 2017 11:00 AM CDT   Pre-Op physical with Yunior Huang MD   [facility]    [address] [place]  [facility] [address] [phone]            Sep 25, 2017 10:45 AM CDT   Return Visit with Timothy Das MD   [facility] ([facility])    [address]  [facility] [address] [phone]              Future tests that were ordered for you today     Open Future Orders        Priority Expected Expires Ordered    Follow-Up with Device Clinic Routine 10/3/2017 8/7/2018 7/3/2017            Who to contact     If you have questions or need follow up information about today's clinic visit or your schedule please contact [facility] directly at [phone].  Normal or non-critical lab and imaging results will be communicated to you by MyChart, letter or phone within 4 business days after the clinic has received the results. If you do not hear from us within 7 days, please contact the clinic through MyChart or phone. If you have a critical or abnormal lab result, we will notify you by phone as soon as possible.  Submit refill  requests through [employer] or call your pharmacy and they will forward the refill request to us. Please allow 3 business days for your refill to be completed.          Additional Information About Your Visit        Biomimedicahart Information     [employer] gives you secure access to your electronic health record. If you see a primary care provider, you can also send messages to your care team and make appointments. If you have questions, please call your primary care clinic.  If you do not have a primary care provider, please call [phone] and they will assist you.        Care EveryWhere ID     This is your Care EveryWhere ID. This could be used by other organizations to access your [place] medical records  [id number]         Blood Pressure from Last 3 Encounters:   06/13/17 112/70   05/24/17 120/60   05/23/17 122/76    Weight from Last 3 Encounters:   06/13/17 69.9 kg (154 lb 3.2 oz)   05/24/17 70.7 kg (155 lb 12.8 oz)   05/23/17 71.7 kg (158 lb)              We Performed the Following     INTERROGATION DEVICE EVAL REMOTE, PACER/ICD (63718)     PM DEVICE INTERROGATE REMOTE (59221)        Primary Care Provider Office Phone # Fax #    Yunior Huang [phone]       [facility] [address] [place] [address]        Equal Access to Services     SAM UGALDE : Hadii aad ku hadasho Soomaali, waaxda luqadaha, qaybta kaalmada adeegyada, lucía britton. So [facility] [phone].    ATENCIÓN: Si habla español, tiene a morales disposición servicios gratuitos de asistencia lingüística. Llame al [phone].    We comply with applicable federal civil rights laws and [place] laws. We do not discriminate on the basis of race, color, national origin, age, disability sex, sexual orientation or gender identity.            Thank you!     Thank you for choosing [facility] AT [place]  for your care. Our goal is always to provide you with  excellent care. Hearing back from our patients is one way we can continue to improve our services. Please take a few minutes to complete the written survey that you may receive in the mail after your visit with us. Thank you!             Your Updated Medication List - Protect others around you: Learn how to safely use, store and throw away your medicines at www.disposemymeds.org.          This list is accurate as of: 7/3/17  4:03 PM.  Always use your most recent med list.                   Brand Name Dispense Instructions for use Diagnosis    ACETAMINOPHEN PO      Take 650 mg by mouth every 6 hours as needed        acetaminophen-codeine 300-30 MG per tablet    TYLENOL #3    30 tablet    Take 1 tablet by mouth 3 times daily as needed for pain    Left-sided thoracic back pain, unspecified chronicity       ALLOPURINOL PO      Take 100 mg by mouth 2 times daily        Amlodipine Besylate-Valsartan  MG Tabs    EXFORGE    90 tablet    Take 1 tablet by mouth daily    Essential hypertension       BONIVA 3 MG/3ML   Generic drug:  ibandronate      Inject 3 mg into the vein once        calcium citrate 950 MG tablet    CALCITRATE     Take by mouth daily 1000 mg daily        chlorthalidone 25 MG tablet    HYGROTON    90 tablet    Take 1 tablet (25 mg) by mouth daily    Essential hypertension with goal blood pressure less than 130/85       fish oil-omega-3 fatty acids 1000 MG capsule      Take 2 g by mouth daily        folic acid 1 MG tablet    FOLVITE     Take 1 mg by mouth daily        levothyroxine 112 MCG tablet    SYNTHROID/LEVOTHROID    90 tablet    Take 1 tablet (112 mcg) by mouth daily    Hypothyroidism       METHOTREXATE SODIUM IJ      Inject 0.8 mLs as directed once a week        metoprolol 100 MG tablet    LOPRESSOR    60 tablet    Take 0.5 tablets (50 mg) by mouth 2 times daily    Essential hypertension       Multi-vitamin Tabs tablet      Take 1 tablet by mouth daily        omeprazole 20 MG CR capsule     priLOSEC    180 capsule    Take 1 capsule (20 mg) by mouth 2 times daily    Esophageal reflux       VITAMIN D (CHOLECALCIFEROL) PO      Take 1,000 Units by mouth daily        * warfarin 3 MG tablet    COUMADIN    30 tablet    Take 1 tablet (3 mg) by mouth daily For 3 days, check INR on 05/05 to adjust the dose.    Atrial fibrillation and flutter (H)       * warfarin 2.5 MG tablet    COUMADIN    90 tablet    Take 2 tablets (5 mg) by mouth daily    Atrial fibrillation and flutter (H)       * Notice:  This list has 2 medication(s) that are the same as other medications prescribed for you. Read the directions carefully, and ask your doctor or other care provider to review them with you.

## 2017-07-03 NOTE — PROGRESS NOTES
Medtronic Revo MRI (D)  Remote PPM Device Check  AP: 37% : 16%  Mode: AAIR <=> DDDR        Presenting Rhythm: SR, vent rate 63bpm  Heart Rate: adequate heart rates per histogram  Sensing: stable    Pacing Threshold: not preformed    Impedance: stable  Battery Status: 2.96V with OPAL being 2.81V  Atrial Arrhythmia: 29 mode switch episodes comprising 2.3% of the time. Longest episode lasted 31 hours. Controlled vent response while in mode switch. Taking Warfarin  Ventricular Arrhythmia: 1 vent high rate. EGM shows Vs > As lasting 7 beats, rate 170bpm. EF 60 - 65%. Reviewed findings with SLangenbrunner RN.      Care Plan: Order placed for annual threshold to be scheduled in October. Gave results over the phone to beatriz PEÑA

## 2017-07-05 ENCOUNTER — ANTICOAGULATION THERAPY VISIT (OUTPATIENT)
Dept: ANTICOAGULATION | Facility: CLINIC | Age: 77
End: 2017-07-05
Payer: MEDICARE

## 2017-07-05 DIAGNOSIS — Z79.01 LONG-TERM (CURRENT) USE OF ANTICOAGULANTS: ICD-10-CM

## 2017-07-05 DIAGNOSIS — I48.92 ATRIAL FIBRILLATION AND FLUTTER (H): ICD-10-CM

## 2017-07-05 DIAGNOSIS — I48.91 ATRIAL FIBRILLATION AND FLUTTER (H): ICD-10-CM

## 2017-07-05 LAB — INR POINT OF CARE: 2.1 (ref 0.86–1.14)

## 2017-07-05 PROCEDURE — 85610 PROTHROMBIN TIME: CPT | Mod: QW

## 2017-07-05 PROCEDURE — 36416 COLLJ CAPILLARY BLOOD SPEC: CPT

## 2017-07-05 PROCEDURE — 99207 ZZC NO CHARGE NURSE ONLY: CPT

## 2017-07-05 NOTE — MR AVS SNAPSHOT
Zunilda Alicea May   7/5/2017 10:30 AM   Anticoagulation Therapy Visit    Description:  77 year old female   Provider[facility]  [facility]   Department:  [facility]           INR as of 7/5/2017     Today's INR 2.1      Anticoagulation Summary as of 7/5/2017     INR goal 2.0-3.0   Today's INR 2.1   Full instructions 5 mg on Tue, Sat; 2.5 mg all other days   Next INR check 7/20/2017    Indications   Long-term (current) use of anticoagulants [Z79.01] [Z79.01]  Atrial fibrillation and flutter (H) [I48.91  I48.92]         Your next Anticoagulation Clinic appointment(s)     Jul 20, 2017 10:30 AM CDT   Anticoagulation Visit with [facility]   [facility]    [address] [facility] [address]  [facility] [id number] [phone]              Contact Numbers     [facility] Phone Numbers:  Anticoagulation Clinic Appointments : [phone]  Anticoagulation Nurse: [phone]         July 2017 Details    Sun Mon Tue Wed Thu Fri Sat           1                 2               3               4               5      2.5 mg   See details      6      2.5 mg         7      2.5 mg         8      5 mg           9      2.5 mg         10      2.5 mg         11      5 mg         12      2.5 mg         13      2.5 mg         14      2.5 mg         15      5 mg           16      2.5 mg         17      2.5 mg         18      5 mg         19      2.5 mg         20            21               22                 23               24               25               26               27               28               29                 30               31                     Date Details   07/05 This INR check       Date of next INR:  7/20/2017         How to take your warfarin dose     To take:  2.5 mg Take 0.5 of a 5 mg tablet.    To take:  5 mg Take 1 of the 5 mg tablets.

## 2017-07-05 NOTE — PROGRESS NOTES
ANTICOAGULATION FOLLOW-UP CLINIC VISIT    Patient Name:  Zunilda Alicea May  Date:  7/5/2017  Contact Type:  Face to Face    SUBJECTIVE:     Patient Findings     Positives No Problem Findings           OBJECTIVE    INR Protime   Date Value Ref Range Status   07/05/2017 2.1 (A) 0.86 - 1.14 Final       ASSESSMENT / PLAN  INR assessment THER    Recheck INR In: 2 WEEKS    INR Location Clinic      Anticoagulation Summary as of 7/5/2017     INR goal 2.0-3.0   Today's INR 2.1   Maintenance plan 5 mg (5 mg x 1) on Tue, Sat; 2.5 mg (5 mg x 0.5) all other days   Full instructions 5 mg on Tue, Sat; 2.5 mg all other days   Weekly total 22.5 mg   No change documented Simona Shrestha RN   Plan last modified Sonam Teixeira RN (6/13/2017)   Next INR check 7/20/2017   Priority INR   Target end date     Indications   Long-term (current) use of anticoagulants [Z79.01] [Z79.01]  Atrial fibrillation and flutter (H) [I48.91  I48.92]         Anticoagulation Episode Summary     INR check location     Preferred lab     Send INR reminders to [facility]    Comments       Anticoagulation Care Providers     Provider Role Specialty Phone number    Yunior Huang MD Responsible Internal Medicine [phone]            See the Encounter Report to view Anticoagulation Flowsheet and Dosing Calendar (Go to Encounters tab in chart review, and find the Anticoagulation Therapy Visit)    Dosage adjustment made based on physician directed care plan.    Simoan Shrestha RN

## 2017-07-19 ENCOUNTER — TRANSFERRED RECORDS (OUTPATIENT)
Dept: HEALTH INFORMATION MANAGEMENT | Facility: CLINIC | Age: 77
End: 2017-07-19

## 2017-07-20 ENCOUNTER — ANTICOAGULATION THERAPY VISIT (OUTPATIENT)
Dept: ANTICOAGULATION | Facility: CLINIC | Age: 77
End: 2017-07-20
Payer: MEDICARE

## 2017-07-20 DIAGNOSIS — I48.91 ATRIAL FIBRILLATION AND FLUTTER (H): ICD-10-CM

## 2017-07-20 DIAGNOSIS — I48.92 ATRIAL FIBRILLATION AND FLUTTER (H): ICD-10-CM

## 2017-07-20 DIAGNOSIS — Z79.01 LONG-TERM (CURRENT) USE OF ANTICOAGULANTS: ICD-10-CM

## 2017-07-20 LAB — INR POINT OF CARE: 2 (ref 0.86–1.14)

## 2017-07-20 PROCEDURE — 36416 COLLJ CAPILLARY BLOOD SPEC: CPT

## 2017-07-20 PROCEDURE — 85610 PROTHROMBIN TIME: CPT | Mod: QW

## 2017-07-20 PROCEDURE — 99207 ZZC NO CHARGE NURSE ONLY: CPT

## 2017-07-20 NOTE — PROGRESS NOTES
ANTICOAGULATION FOLLOW-UP CLINIC VISIT    Patient Name:  Zunilda Alicea May  Date:  7/20/2017  Contact Type:  Face to Face    SUBJECTIVE:     Patient Findings     Positives No Problem Findings    Comments Pt denies any changes           OBJECTIVE    INR Protime   Date Value Ref Range Status   07/20/2017 2.0 (A) 0.86 - 1.14 Final       ASSESSMENT / PLAN  INR assessment THER    Recheck INR In: 3 WEEKS    INR Location Clinic      Anticoagulation Summary as of 7/20/2017     INR goal 2.0-3.0   Today's INR 2.0   Maintenance plan 5 mg (5 mg x 1) on Tue, Sat; 2.5 mg (5 mg x 0.5) all other days   Full instructions 5 mg on Tue, Sat; 2.5 mg all other days   Weekly total 22.5 mg   Plan last modified Sonam Teixeira RN (6/13/2017)   Next INR check 8/7/2017   Priority INR   Target end date     Indications   Long-term (current) use of anticoagulants [Z79.01] [Z79.01]  Atrial fibrillation and flutter (H) [I48.91  I48.92]         Anticoagulation Episode Summary     INR check location     Preferred lab     Send INR reminders to [facility]    Comments       Anticoagulation Care Providers     Provider Role Specialty Phone number    Yunior Huang MD Responsible Internal Medicine [phone]            See the Encounter Report to view Anticoagulation Flowsheet and Dosing Calendar (Go to Encounters tab in chart review, and find the Anticoagulation Therapy Visit)    Dosage adjustment made based on physician directed care plan.    Екатерина Mahan RN

## 2017-07-20 NOTE — MR AVS SNAPSHOT
Zunilda Alicea May   7/20/2017 10:30 AM   Anticoagulation Therapy Visit    Description:  77 year old female   Provider[facility]  [facility]   Department:  [facility]           INR as of 7/20/2017     Today's INR 2.0      Anticoagulation Summary as of 7/20/2017     INR goal 2.0-3.0   Today's INR 2.0   Full instructions 5 mg on Tue, Sat; 2.5 mg all other days   Next INR check 8/7/2017    Indications   Long-term (current) use of anticoagulants [Z79.01] [Z79.01]  Atrial fibrillation and flutter (H) [I48.91  I48.92]         Your next Anticoagulation Clinic appointment(s)     Aug 07, 2017 10:45 AM CDT   [facility]    [address] [facility] [address]  [facility] [id number] [phone]              Contact Numbers     [facility] Phone Numbers:  Anticoagulation Clinic Appointments : [phone]  Anticoagulation Nurse: [phone]         July 2017 Details    Sun Mon Tue Wed Thu Fri Sat           1                 2               3               4               5               6               7               8                 9               10               11               12               13               14               15                 16               17               18               19               20      2.5 mg   See details      21      2.5 mg         22      5 mg           23      2.5 mg         24      2.5 mg         25      5 mg         26      2.5 mg         27      2.5 mg         28      2.5 mg         29      5 mg           30      2.5 mg         31      2.5 mg               Date Details   07/20 This INR check               How to take your warfarin dose     To take:  2.5 mg Take 0.5 of a 5 mg tablet.    To take:  5 mg Take 1 of the 5 mg tablets.           August 2017 Details    Sun Mon Tue Wed Thu Fri Sat       1      5 mg         2      2.5 mg         3      2.5 mg         4       2.5 mg         5      5 mg           6      2.5 mg         7            8               9               10               11               12                 13               14               15               16               17               18               19                 20               21               22               23               24               25               26                 27               28               29               30               31                  Date Details   No additional details    Date of next INR:  8/7/2017         How to take your warfarin dose     To take:  2.5 mg Take 0.5 of a 5 mg tablet.    To take:  5 mg Take 1 of the 5 mg tablets.

## 2017-08-07 ENCOUNTER — ANTICOAGULATION THERAPY VISIT (OUTPATIENT)
Dept: ANTICOAGULATION | Facility: CLINIC | Age: 77
End: 2017-08-07
Payer: MEDICARE

## 2017-08-07 DIAGNOSIS — I48.92 ATRIAL FIBRILLATION AND FLUTTER (H): ICD-10-CM

## 2017-08-07 DIAGNOSIS — Z79.01 LONG-TERM (CURRENT) USE OF ANTICOAGULANTS: ICD-10-CM

## 2017-08-07 DIAGNOSIS — I48.91 ATRIAL FIBRILLATION AND FLUTTER (H): ICD-10-CM

## 2017-08-07 LAB — INR POINT OF CARE: 2 (ref 0.86–1.14)

## 2017-08-07 PROCEDURE — 99207 ZZC NO CHARGE NURSE ONLY: CPT

## 2017-08-07 PROCEDURE — 85610 PROTHROMBIN TIME: CPT | Mod: QW

## 2017-08-07 PROCEDURE — 36416 COLLJ CAPILLARY BLOOD SPEC: CPT

## 2017-08-07 NOTE — MR AVS SNAPSHOT
Zunilda Alicea May   8/7/2017 10:45 AM   Anticoagulation Therapy Visit    Description:  77 year old female   Provider[facility]  [facility]   Department:  [facility]           INR as of 8/7/2017     Today's INR 2.0      Anticoagulation Summary as of 8/7/2017     INR goal 2.0-3.0   Today's INR 2.0   Full instructions 8/7: 5 mg; 8/8: 2.5 mg; 8/9: 5 mg; Otherwise 5 mg on Tue, Sat; 2.5 mg all other days   Next INR check 8/14/2017    Indications   Long-term (current) use of anticoagulants [Z79.01] [Z79.01]  Atrial fibrillation and flutter (H) [I48.91  I48.92]         Your next Anticoagulation Clinic appointment(s)     Aug 14, 2017 10:30 AM CDT   [facility]    [address] [facility] [address]  [facility] [id number] [phone]              Contact Numbers     [facility] Phone Numbers:  Anticoagulation Clinic Appointments : [phone]  Anticoagulation Nurse: [phone]         August 2017 Details    Sun Mon Tue Wed Thu Fri Sat       1               2               3               4               5                 6               7      5 mg   See details      8      2.5 mg         9      5 mg         10      2.5 mg         11      2.5 mg         12      5 mg           13      2.5 mg         14            15               16               17               18               19                 20               21               22               23               24               25               26                 27               28               29               30               31                  Date Details   08/07 This INR check       Date of next INR:  8/14/2017         How to take your warfarin dose     To take:  2.5 mg Take 1 of the 2.5 mg tablets.    To take:  5 mg Take 2 of the 2.5 mg tablets.

## 2017-08-07 NOTE — PROGRESS NOTES
ANTICOAGULATION FOLLOW-UP CLINIC VISIT    Patient Name:  Zunilda Alicea May  Date:  8/7/2017  Contact Type:  Face to Face  Pt requesting to increase warfarin dose as the reading has been at 2.0.     SUBJECTIVE:     Patient Findings     Positives No Problem Findings           OBJECTIVE    INR Protime   Date Value Ref Range Status   08/07/2017 2.0 (A) 0.86 - 1.14 Final       ASSESSMENT / PLAN  INR assessment THER    Recheck INR In: 1 WEEK    INR Location Clinic      Anticoagulation Summary as of 8/7/2017     INR goal 2.0-3.0   Today's INR 2.0   Maintenance plan 5 mg (2.5 mg x 2) on Tue, Sat; 2.5 mg (2.5 mg x 1) all other days   Full instructions 8/7: 5 mg; 8/8: 2.5 mg; 8/9: 5 mg; Otherwise 5 mg on Tue, Sat; 2.5 mg all other days   Weekly total 22.5 mg   Plan last modified Екатерина Mahan RN (8/7/2017)   Next INR check 8/14/2017   Priority INR   Target end date     Indications   Long-term (current) use of anticoagulants [Z79.01] [Z79.01]  Atrial fibrillation and flutter (H) [I48.91  I48.92]         Anticoagulation Episode Summary     INR check location     Preferred lab     Send INR reminders to [facility]    Comments       Anticoagulation Care Providers     Provider Role Specialty Phone number    Yunior Huang MD [facility] Internal Medicine [phone]            See the Encounter Report to view Anticoagulation Flowsheet and Dosing Calendar (Go to Encounters tab in chart review, and find the Anticoagulation Therapy Visit)    Dosage adjustment made based on physician directed care plan.    Екатерина Mahan RN

## 2017-08-14 ENCOUNTER — OFFICE VISIT (OUTPATIENT)
Dept: INTERNAL MEDICINE | Facility: CLINIC | Age: 77
End: 2017-08-14
Payer: MEDICARE

## 2017-08-14 ENCOUNTER — ANTICOAGULATION THERAPY VISIT (OUTPATIENT)
Dept: ANTICOAGULATION | Facility: CLINIC | Age: 77
End: 2017-08-14
Payer: MEDICARE

## 2017-08-14 VITALS
HEART RATE: 70 BPM | HEIGHT: 65 IN | TEMPERATURE: 98.2 F | BODY MASS INDEX: 25.83 KG/M2 | DIASTOLIC BLOOD PRESSURE: 60 MMHG | WEIGHT: 155 LBS | SYSTOLIC BLOOD PRESSURE: 130 MMHG | OXYGEN SATURATION: 97 %

## 2017-08-14 DIAGNOSIS — I48.91 ATRIAL FIBRILLATION AND FLUTTER (H): ICD-10-CM

## 2017-08-14 DIAGNOSIS — Z79.01 LONG-TERM (CURRENT) USE OF ANTICOAGULANTS: ICD-10-CM

## 2017-08-14 DIAGNOSIS — M06.9 RHEUMATOID ARTHRITIS, INVOLVING UNSPECIFIED SITE, UNSPECIFIED RHEUMATOID FACTOR PRESENCE: ICD-10-CM

## 2017-08-14 DIAGNOSIS — Z00.00 ROUTINE GENERAL MEDICAL EXAMINATION AT A HEALTH CARE FACILITY: Primary | ICD-10-CM

## 2017-08-14 DIAGNOSIS — I10 ESSENTIAL HYPERTENSION: ICD-10-CM

## 2017-08-14 DIAGNOSIS — E03.9 ACQUIRED HYPOTHYROIDISM: ICD-10-CM

## 2017-08-14 DIAGNOSIS — I48.92 ATRIAL FIBRILLATION AND FLUTTER (H): ICD-10-CM

## 2017-08-14 LAB — INR POINT OF CARE: 2.1 (ref 0.86–1.14)

## 2017-08-14 PROCEDURE — 99207 ZZC NO CHARGE NURSE ONLY: CPT

## 2017-08-14 PROCEDURE — 85610 PROTHROMBIN TIME: CPT | Mod: QW

## 2017-08-14 PROCEDURE — 36416 COLLJ CAPILLARY BLOOD SPEC: CPT

## 2017-08-14 PROCEDURE — G0439 PPPS, SUBSEQ VISIT: HCPCS | Performed by: INTERNAL MEDICINE

## 2017-08-14 NOTE — MR AVS SNAPSHOT
Zunilda Alicea May   8/14/2017 10:30 AM   Anticoagulation Therapy Visit    Description:  77 year old female   Provider[facility]  [facility]   Department:  [facility]           INR as of 8/14/2017     Today's INR 2.1      Anticoagulation Summary as of 8/14/2017     INR goal 2.0-3.0   Today's INR 2.1   Full instructions 5 mg on Mon, Wed, Sat; 2.5 mg all other days   Next INR check 9/5/2017    Indications   Long-term (current) use of anticoagulants [Z79.01] [Z79.01]  Atrial fibrillation and flutter (H) [I48.91  I48.92]         Your next Anticoagulation Clinic appointment(s)     Sep 05, 2017 10:30 AM CDT   [facility]ticoagulation [facility] with [facility]   [facility]    [address] [facility] [address]  [facility] [phone]              Contact Numbers     [facility] Phone Numbers:  Anticoagulation Clinic Appointments : [phone]  Anticoagulation Nurse: [phone]         August 2017 Details    Sun Mon Tue Wed Thu Fri Sat       1               2               3               4               5                 6               7               8               9               10               11               12                 13               14      5 mg   See details      15      2.5 mg         16      5 mg         17      2.5 mg         18      2.5 mg         19      5 mg           20      2.5 mg         21      5 mg         22      2.5 mg         23      5 mg         24      2.5 mg         25      2.5 mg         26      5 mg           27      2.5 mg         28      5 mg         29      2.5 mg         30      5 mg         31      2.5 mg            Date Details   08/14 This INR check               How to take your warfarin dose     To take:  2.5 mg Take 1 of the 2.5 mg tablets.    To take:  5 mg Take 2 of the 2.5 mg tablets.           September 2017 Details    Sun Mon Tue Wed Thu Fri Sat          1      2.5 mg         2      5 mg            3      2.5 mg         4      5 mg         5            6               7               8               9                 10               11               12               13               14               15               16                 17               18               19               20               21               22               23                 24               25               26               27               28               29               30                Date Details   No additional details    Date of next INR:  9/5/2017         How to take your warfarin dose     To take:  2.5 mg Take 1 of the 2.5 mg tablets.    To take:  5 mg Take 2 of the 2.5 mg tablets.

## 2017-08-14 NOTE — PROGRESS NOTES
SUBJECTIVE:   Zunilda Clark is a 77 year old female who presents for Preventive Visit.      Are you in the first 12 months of your Medicare Part B coverage?  No    Healthy Habits:    Do you get at least three servings of calcium containing foods daily (dairy, green leafy vegetables, etc.)? 2 servings    Amount of exercise or daily activities, outside of work: 6 day(s) per week    Problems taking medications regularly No    Medication side effects: No    Have you had an eye exam in the past two years? yes    Do you see a dentist twice per year? yes  Do you have sleep apnea, excessive snoring or daytime drowsiness?yes, daytime      COGNITIVE SCREEN  1) Repeat 3 items (Banana, Sunrise, Chair)    2) Clock draw: NORMAL  3) 3 item recall: Recalls 3 objects  Results: 3 items recalled: COGNITIVE IMPAIRMENT LESS LIKELY    Mini-CogTM Copyright S Jason. Licensed by the author for use in [facility]; reprinted with permission (lizzie@[facility]). All rights reserved.              PROBLEMS TO ADD ON...  Has h/o HTN. on medical treatment. BP has been controlled. No side effects from medications. No CP, HA, dizziness. good compliance with medications and low salt diet.  Has history of atrial fibrillation. On anticoagulation with Coumadin and rate control medications. Asymptonatic - no chest pains , palpitations,  no side effects from medications.  Has hypothyroidism. On Synthroid. No heat /cold intolerance, heart palpitations, weight loss/ gain , heart palpitations, change in bowel habits.  Has h/o RA, has pain in the knees with ambulation. Thinking about possible surgery. On Methotrexate, follows with rheumatology   Reviewed and updated as needed this visit by clinical staff         Reviewed and updated as needed this visit by Provider      Social History   Substance Use Topics     Smoking status: Never Smoker     Smokeless tobacco: Never Used     Alcohol use No       The patient does not drink >3 drinks per day nor  >7 drinks per week.    Today's PHQ-2 Score: 0  PHQ-2 ( 1999 Pf[employer]) 6/13/2017 5/8/2017   Q1: Little interest or pleasure in doing things 0 0   Q2: Feeling down, depressed or hopeless 0 0   PHQ-2 Score 0 0         Do you feel safe in your environment - Yes    Do you have a Health Care Directive?: Yes: Advance Directive has been received and scanned.    Current providers sharing in care for this patient include: Patient Care Team:  Yunior Huang MD as PCP - General (Internal Medicine)  Jaycee Cervantes MD as MD (Rheumatology)  Amna Harrison, RN as Registered Nurse      Hearing impairment: No    Ability to successfully perform activities of daily living: Yes, no assistance needed     Fall risk:  Fallen 2 or more times in the past year?: No  Any fall with injury in the past year?: No      Home safety:  none identified      The following health maintenance items are reviewed in Epic and correct as of today:Health Maintenance   Topic Date Due     FALL RISK ASSESSMENT  06/01/2017     INFLUENZA VACCINE (SYSTEM ASSIGNED)  09/01/2017     OP ANNUAL INR REFERRAL  09/20/2017     ADVANCE DIRECTIVE PLANNING Q5 YRS  07/29/2020     LIPID SCREEN Q5 YR FEMALE (SYSTEM ASSIGNED)  11/04/2021     TETANUS IMMUNIZATION (SYSTEM ASSIGNED)  05/04/2026     DEXA SCAN SCREENING (SYSTEM ASSIGNED)  Completed     PNEUMOCOCCAL  Completed     Labs reviewed in EPIC        ROS:  C: NEGATIVE for fever, chills, change in weight  I: NEGATIVE for worrisome rashes, moles or lesions  E: NEGATIVE for vision changes or irritation  E/M: NEGATIVE for ear, mouth and throat problems  R: NEGATIVE for significant cough , mild exertional SOB  B: NEGATIVE for masses, tenderness or discharge  CV: NEGATIVE for chest pain, palpitations or peripheral edema  GI: NEGATIVE for nausea, abdominal pain, heartburn, or change in bowel habits  : NEGATIVE for frequency, dysuria, or hematuria  M: NEGATIVE for significant myalgia, + knee pain   N: NEGATIVE for weakness,  "dizziness or paresthesias  E: NEGATIVE for temperature intolerance, skin/hair changes  H: NEGATIVE for bleeding problems  P: NEGATIVE for changes in mood or affect    OBJECTIVE:   There were no vitals taken for this visit. Estimated body mass index is 25.66 kg/(m^2) as calculated from the following:    Height as of 5/24/17: 5' 5\" (1.651 m).    Weight as of 6/13/17: 154 lb 3.2 oz (69.9 kg).  EXAM:   GENERAL:frail, alert and no distress  EYES: Eyes grossly normal to inspection, PERRL and conjunctivae and sclerae normal  HENT: ear canals and TM's normal, nose and mouth without ulcers or lesions  NECK: no adenopathy, no asymmetry, masses, or scars and thyroid normal to palpation  RESP: lungs clear to auscultation - no rales, rhonchi or wheezes  CV: regular rate and rhythm, normal S1 S2, no S3 or S4, no murmur, click or rub, no peripheral edema and peripheral pulses strong  ABDOMEN: soft, nontender, no hepatosplenomegaly, no masses and bowel sounds normal  MS: no gross musculoskeletal defects noted, no edema, arthritis changes in the knees, uses a walker   SKIN: no suspicious lesions or rashes  NEURO: Normal strength and tone, mentation intact and speech normal  PSYCH: mentation appears normal, affect normal/bright    ASSESSMENT / PLAN:       ICD-10-CM    1. Routine general medical examination at a health care facility Z00.00 Lipid panel reflex to direct LDL     TSH with free T4 reflex     CBC with platelets     Comprehensive metabolic panel     CANCELED: Lipid panel reflex to direct LDL     CANCELED: TSH with free T4 reflex     CANCELED: CBC with platelets     CANCELED: Comprehensive metabolic panel   2. Rheumatoid arthritis, involving unspecified site, unspecified rheumatoid factor presence (H) M06.9    3. Atrial fibrillation and flutter (H) I48.91 TSH with free T4 reflex    I48.92 CBC with platelets     Comprehensive metabolic panel     CANCELED: TSH with free T4 reflex   4. Acquired hypothyroidism E03.9 TSH with free " "T4 reflex     CANCELED: TSH with free T4 reflex   5. Essential hypertension I10 Lipid panel reflex to direct LDL     TSH with free T4 reflex     CBC with platelets     Comprehensive metabolic panel     CANCELED: Lipid panel reflex to direct LDL     CANCELED: CBC with platelets     CANCELED: Comprehensive metabolic panel       End of Life Planning:  Patient currently has an advanced directive: Yes.  Practitioner is supportive of decision.    COUNSELING:  Reviewed preventive health counseling, as reflected in patient instructions       Regular exercise       Healthy diet/nutrition       Vision screening       Hearing screening       Colon cancer screening        Estimated body mass index is 25.66 kg/(m^2) as calculated from the following:    Height as of 5/24/17: 5' 5\" (1.651 m).    Weight as of 6/13/17: 154 lb 3.2 oz (69.9 kg).     reports that she has never smoked. She has never used smokeless tobacco.        Appropriate preventive services were discussed with this patient, including applicable screening as appropriate for cardiovascular disease, diabetes, osteopenia/osteoporosis, and glaucoma.  As appropriate for age/gender, discussed screening for colorectal cancer, prostate cancer, breast cancer, and cervical cancer. Checklist reviewing preventive services available has been given to the patient.    Reviewed patients plan of care and provided an AVS. The Intermediate Care Plan ( asthma action plan, low back pain action plan, and migraine action plan) for Zunilda meets the Care Plan requirement. This Care Plan has been established and reviewed with the Patient.    Counseling Resources:  ATP IV Guidelines  Pooled Cohorts Equation Calculator  Breast Cancer Risk Calculator  FRAX Risk Assessment  ICSI Preventive Guidelines  Dietary Guidelines for Americans, 2010  USDA's MyPlate  ASA Prophylaxis  Lung CA Screening    Yunior Huang MD  [facility]  "

## 2017-08-14 NOTE — PROGRESS NOTES
ANTICOAGULATION FOLLOW-UP CLINIC VISIT    Patient Name:  Zunilda Alicea May  Date:  8/14/2017  Contact Type:  Face to Face    SUBJECTIVE:     Patient Findings     Positives No Problem Findings           OBJECTIVE    INR Protime   Date Value Ref Range Status   08/14/2017 2.1 (A) 0.86 - 1.14 Final       ASSESSMENT / PLAN  INR assessment THER    Recheck INR In: 3 WEEKS    INR Location Clinic      Anticoagulation Summary as of 8/14/2017     INR goal 2.0-3.0   Today's INR 2.1   Maintenance plan 5 mg (2.5 mg x 2) on Mon, Wed, Sat; 2.5 mg (2.5 mg x 1) all other days   Full instructions 5 mg on Mon, Wed, Sat; 2.5 mg all other days   Weekly total 25 mg   Plan last modified Екатерина Mahan RN (8/14/2017)   Next INR check 9/5/2017   Priority INR   Target end date     Indications   Long-term (current) use of anticoagulants [Z79.01] [Z79.01]  Atrial fibrillation and flutter (H) [I48.91  I48.92]         Anticoagulation Episode Summary     INR check location     Preferred lab     Send INR reminders to [facility]    Comments       Anticoagulation Care Providers     Provider Role Specialty Phone number    Yunior Huang MD Responsible Internal Medicine [phone]            See the Encounter Report to view Anticoagulation Flowsheet and Dosing Calendar (Go to Encounters tab in chart review, and find the Anticoagulation Therapy Visit)    Dosage adjustment made based on physician directed care plan.    Екатерина Mahan, MELINA

## 2017-08-14 NOTE — MR AVS SNAPSHOT
After Visit Summary   8/14/2017    Zunilda Clark    MRN: [id number]           Patient Information     Date Of Birth          1940        Visit Information        Provider Department      8/14/2017 11:00 AM Yunior Huang MD [facility]        Today's Diagnoses     Routine general medical examination at a health care facility    -  1    Rheumatoid arthritis, involving unspecified site, unspecified rheumatoid factor presence (H)        Atrial fibrillation and flutter (H)        Acquired hypothyroidism        Essential hypertension          Care Instructions      Preventive Health Recommendations    Female Ages 65 +    Yearly exam:     See your health care provider every year in order to  o Review health changes.   o Discuss preventive care.    o Review your medicines if your doctor has prescribed any.      You no longer need a yearly Pap test unless you've had an abnormal Pap test in the past 10 years. If you have vaginal symptoms, such as bleeding or discharge, be sure to talk with your provider about a Pap test.      Every 1 to 2 years, have a mammogram.  If you are over 69, talk with your health care provider about whether or not you want to continue having screening mammograms.      Every 10 years, have a colonoscopy. Or, have a yearly FIT test (stool test). These exams will check for colon cancer.       Have a cholesterol test every 5 years, or more often if your doctor advises it.       Have a diabetes test (fasting glucose) every three years. If you are at risk for diabetes, you should have this test more often.       At age 65, have a bone density scan (DEXA) to check for osteoporosis (brittle bone disease).    Shots:    Get a flu shot each year.    Get a tetanus shot every 10 years.    Talk to your doctor about your pneumonia vaccines. There are now two you should receive - Pneumovax (PPSV 23) and Prevnar (PCV 13).    Talk to your doctor about the shingles  vaccine.    Talk to your doctor about the hepatitis B vaccine.    Nutrition:     Eat at least 5 servings of fruits and vegetables each day.      Eat whole-grain bread, whole-wheat pasta and brown rice instead of white grains and rice.      Talk to your provider about Calcium and Vitamin D.     Lifestyle    Exercise at least 150 minutes a week (30 minutes a day, 5 days a week). This will help you control your weight and prevent disease.      Limit alcohol to one drink per day.      No smoking.       Wear sunscreen to prevent skin cancer.       See your dentist twice a year for an exam and cleaning.      See your eye doctor every 1 to 2 years to screen for conditions such as glaucoma, macular degeneration and cataracts.          Follow-ups after your visit        Your next 10 appointments already scheduled     Aug 17, 2017  9:00 AM CDT   LAB with [facility]   [facility] ([facility]    [address]  [facility] [id number] [phone]           Patient must bring picture ID. Patient should be prepared to give a urine specimen  Please do not eat 10-12 hours before your appointment if you are coming in fasting for labs on lipids, cholesterol, or glucose (sugar). Pregnant women should follow their Care Team instructions. Water with medications is okay. Do not drink coffee or other fluids. If you have concerns about taking  your medications, please ask at office or if scheduling via [employer], send a message by clicking on Secure Messaging, Message Your Care Team.            Sep 05, 2017 10:30 AM CDT   Anticoagulation Visit with [facility]   [facility] ([facility])    [address]  [facility] [address] [phone]            Sep 25, 2017 10:45 AM CDT   Return Visit with Timothy Das MD   [facility] [place] ([facility])    [address]  [facility]  [address]   [phone]            Oct 04, 2017 10:10 AM CDT   Pacemaker Check with SHERITA NORTH   [facility] [place] ([facility])    [address]  [facility] [address] [phone]              Future tests that were ordered for you today     Open Future Orders        Priority Expected Expires Ordered    Lipid panel reflex to direct LDL Routine  9/14/2017 8/14/2017    TSH with free T4 reflex Routine  9/14/2017 8/14/2017    CBC with platelets Routine  9/14/2017 8/14/2017    Comprehensive metabolic panel Routine  9/14/2017 8/14/2017            Who to contact     If you have questions or need follow up information about today's clinic visit or your schedule please contact [facility] directly at [phone].  Normal or non-critical lab and imaging results will be communicated to you by MyChart, letter or phone within 4 business days after the clinic has received the results. If you do not hear from us within 7 days, please contact the clinic through [employer] or phone. If you have a critical or abnormal lab result, we will notify you by phone as soon as possible.  Submit refill requests through [employer] or call your pharmacy and they will forward the refill request to us. Please allow 3 business days for your refill to be completed.          Additional Information About Your Visit        [employer] Information     [employer] gives you secure access to your electronic health record. If you see a primary care provider, you can also send messages to your care team and make appointments. If you have questions, please call your primary care clinic.  If you do not have a primary care provider, please call [phone] and they will assist you.        Care EveryWhere ID     This is your Care EveryWhere ID. This could be used by other organizations to access your [place] medical records  [id number]        Your Vitals Were     Pulse Temperature Height Pulse  "Oximetry BMI (Body Mass Index)       70 98.2  F (36.8  C) (Oral) 5' 5\" (1.651 m) 97% 25.79 kg/m2        Blood Pressure from Last 3 Encounters:   08/14/17 130/60   06/13/17 112/70   05/24/17 120/60    Weight from Last 3 Encounters:   08/14/17 155 lb (70.3 kg)   06/13/17 154 lb 3.2 oz (69.9 kg)   05/24/17 155 lb 12.8 oz (70.7 kg)                 Today's Medication Changes          These changes are accurate as of: 8/14/17 11:32 AM.  If you have any questions, ask your nurse or doctor.               These medicines have changed or have updated prescriptions.        Dose/Directions    * warfarin 3 MG tablet   Commonly known as:  COUMADIN   This may have changed:    - how much to take  - additional instructions   Used for:  Atrial fibrillation and flutter (H)        Dose:  3 mg   Take 1 tablet (3 mg) by mouth daily For 3 days, check INR on 05/05 to adjust the dose.   Quantity:  30 tablet   Refills:  0       * warfarin 2.5 MG tablet   Commonly known as:  COUMADIN   This may have changed:  how much to take   Used for:  Atrial fibrillation and flutter (H)        Dose:  5 mg   Take 2 tablets (5 mg) by mouth daily   Quantity:  90 tablet   Refills:  3       * Notice:  This list has 2 medication(s) that are the same as other medications prescribed for you. Read the directions carefully, and ask your doctor or other care provider to review them with you.             Primary Care Provider Office Phone # Fax #    Yunior Huang [phone]       [facility] [address] [facility] [address]        Equal Access to Services     [facility] CenterTORIN : Annmarie Boogie, junior hutchins, qalucía palmer. So [facility] [phone].    ATENCIÓN: Si habla español, tiene a morales disposición servicios gratuitos de asistencia lingüística. Jenna al [phone].    We comply with applicable federal civil rights laws and [place] laws. We do not discriminate on the basis of " race, color, national origin, age, disability sex, sexual orientation or gender identity.            Thank you!     Thank you for choosing [facility]  for your care. Our goal is always to provide you with excellent care. Hearing back from our patients is one way we can continue to improve our services. Please take a few minutes to complete the written survey that you may receive in the mail after your visit with us. Thank you!             Your Updated Medication List - Protect others around you: Learn how to safely use, store and throw away your medicines at www.disposemymeds.org.          This list is accurate as of: 8/14/17 11:32 AM.  Always use your most recent med list.                   Brand Name Dispense Instructions for use Diagnosis    ACETAMINOPHEN PO      Take 650 mg by mouth every 6 hours as needed        acetaminophen-codeine 300-30 MG per tablet    TYLENOL #3    30 tablet    Take 1 tablet by mouth 3 times daily as needed for pain    Left-sided thoracic back pain, unspecified chronicity       ALLOPURINOL PO      Take 100 mg by mouth 2 times daily        Amlodipine Besylate-Valsartan  MG Tabs    EXFORGE    90 tablet    Take 1 tablet by mouth daily    Essential hypertension       BONIVA 3 MG/3ML   Generic drug:  ibandronate      Inject 3 mg into the vein once        calcium citrate 950 MG tablet    CALCITRATE     Take by mouth daily 1000 mg daily        chlorthalidone 25 MG tablet    HYGROTON    90 tablet    Take 1 tablet (25 mg) by mouth daily    Essential hypertension with goal blood pressure less than 130/85       fish oil-omega-3 fatty acids 1000 MG capsule      Take 2 g by mouth daily        folic acid 1 MG tablet    FOLVITE     Take 1 mg by mouth daily        levothyroxine 112 MCG tablet    SYNTHROID/LEVOTHROID    90 tablet    Take 1 tablet (112 mcg) by mouth daily    Hypothyroidism       METHOTREXATE SODIUM IJ      Inject 0.8 mLs as directed once a week        metoprolol 100  MG tablet    LOPRESSOR    60 tablet    Take 0.5 tablets (50 mg) by mouth 2 times daily    Essential hypertension       Multi-vitamin Tabs tablet      Take 1 tablet by mouth daily        omeprazole 20 MG CR capsule    priLOSEC    180 capsule    Take 1 capsule (20 mg) by mouth 2 times daily    Esophageal reflux       VITAMIN D (CHOLECALCIFEROL) PO      Take 1,000 Units by mouth daily        * warfarin 3 MG tablet    COUMADIN    30 tablet    Take 1 tablet (3 mg) by mouth daily For 3 days, check INR on 05/05 to adjust the dose.    Atrial fibrillation and flutter (H)       * warfarin 2.5 MG tablet    COUMADIN    90 tablet    Take 2 tablets (5 mg) by mouth daily    Atrial fibrillation and flutter (H)       * Notice:  This list has 2 medication(s) that are the same as other medications prescribed for you. Read the directions carefully, and ask your doctor or other care provider to review them with you.

## 2017-08-14 NOTE — NURSING NOTE
"Chief Complaint   Patient presents with     Wellness Visit     Fasting Px       Initial /60  Pulse 70  Temp 98.2  F (36.8  C) (Oral)  Ht 5' 5\" (1.651 m)  Wt 155 lb (70.3 kg)  SpO2 97%  BMI 25.79 kg/m2 Estimated body mass index is 25.79 kg/(m^2) as calculated from the following:    Height as of this encounter: 5' 5\" (1.651 m).    Weight as of this encounter: 155 lb (70.3 kg).  Medication Reconciliation: complete   Earnestine George MA      "

## 2017-08-17 DIAGNOSIS — I10 ESSENTIAL HYPERTENSION: ICD-10-CM

## 2017-08-17 DIAGNOSIS — I48.92 ATRIAL FIBRILLATION AND FLUTTER (H): ICD-10-CM

## 2017-08-17 DIAGNOSIS — Z00.00 ROUTINE GENERAL MEDICAL EXAMINATION AT A HEALTH CARE FACILITY: ICD-10-CM

## 2017-08-17 DIAGNOSIS — E03.9 ACQUIRED HYPOTHYROIDISM: ICD-10-CM

## 2017-08-17 DIAGNOSIS — I48.91 ATRIAL FIBRILLATION AND FLUTTER (H): ICD-10-CM

## 2017-08-17 LAB
ALBUMIN SERPL-MCNC: 3.6 G/DL (ref 3.4–5)
ALP SERPL-CCNC: 119 U/L (ref 40–150)
ALT SERPL W P-5'-P-CCNC: 25 U/L (ref 0–50)
ANION GAP SERPL CALCULATED.3IONS-SCNC: 11 MMOL/L (ref 3–14)
AST SERPL W P-5'-P-CCNC: 19 U/L (ref 0–45)
BILIRUB SERPL-MCNC: 0.5 MG/DL (ref 0.2–1.3)
BUN SERPL-MCNC: 22 MG/DL (ref 7–30)
CALCIUM SERPL-MCNC: 9.7 MG/DL (ref 8.5–10.1)
CHLORIDE SERPL-SCNC: 106 MMOL/L (ref 94–109)
CHOLEST SERPL-MCNC: 179 MG/DL
CO2 SERPL-SCNC: 25 MMOL/L (ref 20–32)
CREAT SERPL-MCNC: 0.96 MG/DL (ref 0.52–1.04)
ERYTHROCYTE [DISTWIDTH] IN BLOOD BY AUTOMATED COUNT: 19.4 % (ref 10–15)
GFR SERPL CREATININE-BSD FRML MDRD: 57 ML/MIN/1.7M2
GLUCOSE SERPL-MCNC: 95 MG/DL (ref 70–99)
HCT VFR BLD AUTO: 36.3 % (ref 35–47)
HDLC SERPL-MCNC: 41 MG/DL
HGB BLD-MCNC: 11.6 G/DL (ref 11.7–15.7)
LDLC SERPL CALC-MCNC: 117 MG/DL
MCH RBC QN AUTO: 31.2 PG (ref 26.5–33)
MCHC RBC AUTO-ENTMCNC: 32 G/DL (ref 31.5–36.5)
MCV RBC AUTO: 98 FL (ref 78–100)
NONHDLC SERPL-MCNC: 138 MG/DL
PLATELET # BLD AUTO: 329 10E9/L (ref 150–450)
POTASSIUM SERPL-SCNC: 3.7 MMOL/L (ref 3.4–5.3)
PROT SERPL-MCNC: 7.9 G/DL (ref 6.8–8.8)
RBC # BLD AUTO: 3.72 10E12/L (ref 3.8–5.2)
SODIUM SERPL-SCNC: 142 MMOL/L (ref 133–144)
TRIGL SERPL-MCNC: 105 MG/DL
TSH SERPL DL<=0.005 MIU/L-ACNC: 2.59 MU/L (ref 0.4–4)
WBC # BLD AUTO: 8.1 10E9/L (ref 4–11)

## 2017-08-17 PROCEDURE — 36415 COLL VENOUS BLD VENIPUNCTURE: CPT | Performed by: INTERNAL MEDICINE

## 2017-08-17 PROCEDURE — 80053 COMPREHEN METABOLIC PANEL: CPT | Performed by: INTERNAL MEDICINE

## 2017-08-17 PROCEDURE — 85027 COMPLETE CBC AUTOMATED: CPT | Performed by: INTERNAL MEDICINE

## 2017-08-17 PROCEDURE — 80061 LIPID PANEL: CPT | Performed by: INTERNAL MEDICINE

## 2017-08-17 PROCEDURE — 84443 ASSAY THYROID STIM HORMONE: CPT | Performed by: INTERNAL MEDICINE

## 2017-09-05 ENCOUNTER — ANTICOAGULATION THERAPY VISIT (OUTPATIENT)
Dept: ANTICOAGULATION | Facility: CLINIC | Age: 77
End: 2017-09-05
Payer: MEDICARE

## 2017-09-05 DIAGNOSIS — I48.92 ATRIAL FIBRILLATION AND FLUTTER (H): ICD-10-CM

## 2017-09-05 DIAGNOSIS — I48.91 ATRIAL FIBRILLATION AND FLUTTER (H): ICD-10-CM

## 2017-09-05 DIAGNOSIS — Z79.01 LONG-TERM (CURRENT) USE OF ANTICOAGULANTS: ICD-10-CM

## 2017-09-05 LAB — INR POINT OF CARE: 2.3 (ref 0.86–1.14)

## 2017-09-05 PROCEDURE — 99207 ZZC NO CHARGE NURSE ONLY: CPT

## 2017-09-05 PROCEDURE — 36416 COLLJ CAPILLARY BLOOD SPEC: CPT

## 2017-09-05 PROCEDURE — 85610 PROTHROMBIN TIME: CPT | Mod: QW

## 2017-09-05 NOTE — MR AVS SNAPSHOT
Zunilda Alicea May   9/5/2017 10:30 AM   Anticoagulation Therapy Visit    Description:  77 year old female   Provider:  [facility]   Department:  [facility] Coagulation           INR as of 9/5/2017     Today's INR 2.3      Anticoagulation Summary as of 9/5/2017     INR goal 2.0-3.0   Today's INR 2.3   Full instructions 5 mg on Mon, Wed, Sat; 2.5 mg all other days   Next INR check 10/3/2017    Indications   Long-term (current) use of anticoagulants [Z79.01] [Z79.01]  Atrial fibrillation and flutter (H) [I48.91  I48.92]         Your next Anticoagulation Clinic appointment(s)     Oct 03, 2017 10:30 AM CDT   Anticoagulation [facility] with [facility]   [facility]    [address] [facility] [address]  [facility] [id number] [phone]              Contact Numbers     [facility] Phone Numbers:  Anticoagulation Clinic Appointments : [phone]  Anticoagulation Nurse: [phone]         September 2017 Details    Sun Mon Tue Wed Thu Fri Sat          1               2                 3               4               5      2.5 mg   See details      6      5 mg         7      2.5 mg         8      2.5 mg         9      5 mg           10      2.5 mg         11      5 mg         12      2.5 mg         13      5 mg         14      2.5 mg         15      2.5 mg         16      5 mg           17      2.5 mg         18      5 mg         19      2.5 mg         20      5 mg         21      2.5 mg         22      2.5 mg         23      5 mg           24      2.5 mg         25      5 mg         26      2.5 mg         27      5 mg         28      2.5 mg         29      2.5 mg         30      5 mg          Date Details   09/05 This INR check               How to take your warfarin dose     To take:  2.5 mg Take 1 of the 2.5 mg tablets.    To take:  5 mg Take 2 of the 2.5 mg tablets.           October 2017 Details    Sun Mon Tue Wed Thu Fri Sat     1      2.5 mg          2      5 mg         3            4               5               6               7                 8               9               10               11               12               13               14                 15               16               17               18               19               20               21                 22               23               24               25               26               27               28                 29               30               31                    Date Details   No additional details    Date of next INR:  10/3/2017         How to take your warfarin dose     To take:  2.5 mg Take 1 of the 2.5 mg tablets.    To take:  5 mg Take 2 of the 2.5 mg tablets.

## 2017-09-05 NOTE — PROGRESS NOTES
ANTICOAGULATION FOLLOW-UP CLINIC VISIT    Patient Name:  Zunilda Alicea May  Date:  9/5/2017  Contact Type:  Face to Face    SUBJECTIVE:     Patient Findings     Positives No Problem Findings           OBJECTIVE    INR Protime   Date Value Ref Range Status   09/05/2017 2.3 (A) 0.86 - 1.14 Final       ASSESSMENT / PLAN  INR assessment THER    Recheck INR In: 4 WEEKS    INR Location Clinic      Anticoagulation Summary as of 9/5/2017     INR goal 2.0-3.0   Today's INR 2.3   Maintenance plan 5 mg (2.5 mg x 2) on Mon, Wed, Sat; 2.5 mg (2.5 mg x 1) all other days   Full instructions 5 mg on Mon, Wed, Sat; 2.5 mg all other days   Weekly total 25 mg   No change documented Екатерина Mahan RN   Plan last modified Екатерина Mahan RN (8/14/2017)   Next INR check 10/3/2017   Priority INR   Target end date     Indications   Long-term (current) use of anticoagulants [Z79.01] [Z79.01]  Atrial fibrillation and flutter (H) [I48.91  I48.92]         Anticoagulation Episode Summary     INR check location     Preferred lab     Send INR reminders to [facility]    Comments       Anticoagulation Care Providers     Provider Role Specialty Phone number    Yunior Huang MD [facility] Internal Medicine [phone]            See the Encounter Report to view Anticoagulation Flowsheet and Dosing Calendar (Go to Encounters tab in chart review, and find the Anticoagulation Therapy Visit)    Dosage adjustment made based on physician directed care plan.    Екатерина Mahan RN

## 2017-09-11 ENCOUNTER — TRANSFERRED RECORDS (OUTPATIENT)
Dept: HEALTH INFORMATION MANAGEMENT | Facility: CLINIC | Age: 77
End: 2017-09-11

## 2017-09-29 ENCOUNTER — OFFICE VISIT (OUTPATIENT)
Dept: CARDIOLOGY | Facility: CLINIC | Age: 77
End: 2017-09-29
Attending: PHYSICIAN ASSISTANT
Payer: MEDICARE

## 2017-09-29 VITALS
DIASTOLIC BLOOD PRESSURE: 58 MMHG | HEIGHT: 65 IN | BODY MASS INDEX: 26.52 KG/M2 | WEIGHT: 159.2 LBS | HEART RATE: 72 BPM | SYSTOLIC BLOOD PRESSURE: 118 MMHG

## 2017-09-29 DIAGNOSIS — I27.20 PULMONARY HYPERTENSION (H): ICD-10-CM

## 2017-09-29 DIAGNOSIS — I48.0 PAROXYSMAL ATRIAL FIBRILLATION (H): ICD-10-CM

## 2017-09-29 DIAGNOSIS — I38 VALVULAR HEART DISEASE: ICD-10-CM

## 2017-09-29 DIAGNOSIS — Z95.0 CARDIAC PACEMAKER IN SITU: ICD-10-CM

## 2017-09-29 DIAGNOSIS — I50.32 CHRONIC DIASTOLIC CONGESTIVE HEART FAILURE (H): ICD-10-CM

## 2017-09-29 DIAGNOSIS — I08.0 RHEUMATIC DISORDER OF BOTH MITRAL AND AORTIC VALVES: Primary | ICD-10-CM

## 2017-09-29 PROBLEM — Z95.3 S/P MITRAL VALVE REPLACEMENT WITH BIOPROSTHETIC VALVE: Status: ACTIVE | Noted: 2017-09-29

## 2017-09-29 PROCEDURE — 99214 OFFICE O/P EST MOD 30 MIN: CPT | Performed by: INTERNAL MEDICINE

## 2017-09-29 NOTE — PROGRESS NOTES
HPI and Plan:   See dictation:[id number]    Orders Placed This Encounter   Procedures     Follow-Up with Cardiologist     Echocardiogram       No orders of the defined types were placed in this encounter.      Medications Discontinued During This Encounter   Medication Reason     acetaminophen-codeine (TYLENOL #3) 300-30 MG per tablet Not filled/taken by Patient         Encounter Diagnoses   Name Primary?     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 Yes     Valvular heart disease      Chronic diastolic congestive heart failure (H)      Cardiac pacemaker in situ      Paroxysmal atrial fibrillation (H)      Pulmonary hypertension (H)        CURRENT MEDICATIONS:  Current Outpatient Prescriptions   Medication Sig Dispense Refill     warfarin (COUMADIN) 2.5 MG tablet Take 2 tablets (5 mg) by mouth daily (Patient taking differently: Take 2.5 mg by mouth daily ) 90 tablet 3     chlorthalidone (HYGROTON) 25 MG tablet Take 1 tablet (25 mg) by mouth daily 90 tablet 2     ACETAMINOPHEN PO Take 650 mg by mouth every 6 hours as needed        warfarin (COUMADIN) 3 MG tablet Take 1 tablet (3 mg) by mouth daily For 3 days, check INR on 05/05 to adjust the dose. (Patient taking differently: Take 5 mg by mouth daily For 3 days, check INR on 05/05 to adjust the dose.) 30 tablet 0     ALLOPURINOL PO Take 100 mg by mouth 2 times daily       multivitamin, therapeutic with minerals (MULTI-VITAMIN) TABS tablet Take 1 tablet by mouth daily       calcium citrate (CALCITRATE) 950 MG tablet Take by mouth daily 1000 mg daily       VITAMIN D, CHOLECALCIFEROL, PO Take 1,000 Units by mouth daily       fish oil-omega-3 fatty acids 1000 MG capsule Take 2 g by mouth daily       ibandronate (BONIVA) 3 MG/3ML Inject 3 mg into the vein once       Amlodipine Besylate-Valsartan (EXFORGE)  MG TABS Take 1 tablet by mouth daily 90 tablet 3     omeprazole (PRILOSEC) 20 MG CR capsule Take 1 capsule (20 mg) by mouth 2 times daily 180 capsule 3      levothyroxine (SYNTHROID/LEVOTHROID) 112 MCG tablet Take 1 tablet (112 mcg) by mouth daily 90 tablet 3     metoprolol (LOPRESSOR) 100 MG tablet Take 0.5 tablets (50 mg) by mouth 2 times daily 60 tablet 5     folic acid (FOLVITE) 1 MG tablet Take 1 mg by mouth daily       METHOTREXATE SODIUM IJ Inject 0.8 mLs as directed once a week         ALLERGIES   No Known Allergies    PAST MEDICAL HISTORY:  Past Medical History:   Diagnosis Date     Acquired hypothyroidism 11/4/2015     Aortic valve insufficiency      Arthritis      Atrial fibrillation (H)      Atrial fibrillation and flutter (H)      Blood clotting disorder (H)      CHF (congestive heart failure) (H)      DVT (deep venous thrombosis) (H) 2003     Gastro-oesophageal reflux disease      H/O mitral valve replacement with tissue graft june 2014     History of blood transfusion 2014     HTN (hypertension)      Hypothyroidism      Other chronic pain      Pulmonary HTN (H)      Rheumatoid arthritis(714.0)      Seizures (H) 2014     Stroke (H) 2009    left side mild weakness      Stroke (H) 2014     Syncope 2011    [place]     Thrombosis of leg 2003    both legs       PAST SURGICAL HISTORY:  Past Surgical History:   Procedure Laterality Date     ABDOMEN SURGERY      prolapsed bladder     H ABLATION AV NODE  2011    AFlutter with syncope, PPM to follow     IMPLANT PACEMAKER  2011     LAPAROSCOPIC CHOLECYSTECTOMY WITH CHOLANGIOGRAMS N/A 4/29/2017    Procedure: LAPAROSCOPIC CHOLECYSTECTOMY WITH CHOLANGIOGRAMS;  LAPAROSCOPIC CHOLECYSTECTOMY WITH CHOLANGIOGRAMS ;  Surgeon: Angeles Mcgill MD;  Location: [facility] OR     OPEN REDUCTION INTERNAL FIXATION RODDING INTRAMEDULLARY HUMERUS Right 7/28/2015    Procedure: OPEN REDUCTION INTERNAL FIXATION RODDING INTRAMEDULLARY HUMERUS;  Surgeon: Raymundo Rivera MD;  Location: [facility] OR     REPLACE VALVE MITRAL  2006    Mitral valve replacement with bioprosthetic valve in 2008 for rheumatic disease     SLING BLADDER SUSPENSION  "WITH FASCIA UMESH         FAMILY HISTORY:  Family History   Problem Relation Age of Onset     Coronary Artery Disease Mother      Coronary Artery Disease Brother      DIABETES Brother      DIABETES Brother      Hypertension Sister      Hyperlipidemia Sister        SOCIAL HISTORY:  Social History     Social History     Marital status:      Spouse name: N/A     Number of children: N/A     Years of education: N/A     Social History Main Topics     Smoking status: Never Smoker     Smokeless tobacco: Never Used     Alcohol use No     Drug use: No     Sexual activity: Yes     Partners: Male     Other Topics Concern     Caffeine Concern Yes     occasionally     Sleep Concern No     Special Diet Yes     lower salt     Back Care No     Exercise Yes     walking ride excercise bike     Social History Narrative       Review of Systems:  Skin:  Positive for itching itching on occas.  per pt    Eyes:  Positive for glasses;visual blurring    ENT:  Negative      Respiratory:  Negative       Cardiovascular:    Positive for;fatigue    Gastroenterology: not assessed      Genitourinary:  not assessed      Musculoskeletal:  Positive for arthritis    Neurologic:  Negative      Psychiatric:  not assessed      Heme/Lymph/Imm:  Positive for easy bruising    Endocrine:  Positive for thyroid disorder      Physical Exam:  Vitals: /58 (BP Location: Right arm, Patient Position: Sitting, Cuff Size: Adult Regular)  Pulse 72  Ht 1.651 m (5' 5\")  Wt 72.2 kg (159 lb 3.2 oz)  Breastfeeding? No  BMI 26.49 kg/m2    Constitutional:  cooperative;in no acute distress;alert and oriented;well developed        Skin:  warm and dry to the touch        Head:  normocephalic        Eyes:  pupils equal and round;conjunctivae and lids unremarkable;sclera white        ENT:  no pallor or cyanosis, dentition good        Neck:  carotid pulses are full and equal bilaterally, JVP normal, no carotid bruit, no thyromegaly        Chest:  normal breath " sounds, clear to auscultation, normal A-P diameter, normal symmetry, normal respiratory excursion, no use of accessory muscles;healed median sternotomy scar;clear to auscultation;normal respiratory excursion   pacemaker incision in the left infraclavicular area was well-healed      Cardiac: regular rhythm;normal S1 and S2;no S3 or S4;apical impulse not displaced   fixed split S2   systolic murmur;LLSB;grade 1   diastolic murmur;grade 1      Abdomen:  abdomen soft, non-tender, BS normoactive, no mass, no HSM, no bruits        Vascular: pulses full and equal, no bruits auscultated                                        Extremities and Back:  no edema arthritic deformities of UE            Neurological:  affect appropriate, oriented to time, person and place left sided weakness walks with a walker          CC  Augusto Brasher PA-C   [facility]  [address] [place] [address]

## 2017-09-29 NOTE — PROGRESS NOTES
PRIMARY CARE PHYSICIAN:  Yunior Huang MD      HISTORY OF PRESENT ILLNESS:  I again had the pleasure of seeing your patient, Zunilda Clark, at [facility] for evaluation of pulmonary hypertension and paroxysmal atrial fibrillation.  The patient has been hospitalized in [place] for congestive heart failure in 2014.  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  She has a history of paroxysmal atrial fibrillation and has been treated for essential hypertension.  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  Her mitral valve was replaced in 04/2008 with a bioprosthetic valve.  Unfortunately, by 04/2014, she was found to have severe mitral stenosis and the valve was replaced with a Magna 27 mm bioprosthetic valve.  She did not have any significant coronary artery disease on either occasion.      Echocardiography continues to show pulmonary hypertension with RVSP between 50 and 60 mmHg.  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  Right ventricular systolic function has been moderately reduced and the right ventricle moderately dilated.  There was mild to moderate tricuspid regurgitation and she is status post tricuspid annuloplasty with a ring in 2014.  The bioprosthetic mitral valve gradients have been fairly normal around 4 or 5 mmHg.  The patient remains on warfarin therapy.  She fell and broke her arm which required ORIF of her right humerus in 2016.  She has been seen by Pulmonary and they suggested that she perform a sleep study.  To date, she has been unwilling to do so.  The patient has rheumatoid arthritis and has been taking methotrexate.  She has bilateral knee arthritis which makes it  difficult to walk.  She has decided to not perform total knee arthroplasty on either leg for the time being.      PHYSICAL EXAMINATION:  As listed below.      Her most recent pacemaker interrogation on 07/03 demonstrated 29 mode switches, comprising 2.3% of the time.  The longest episode occurred over 31 hours.  She has controlled ventricular rate while in mode switch; 37% atrial pacing and 16% ventricular pacing.  No significant ventricular arrhythmias.      ASSESSMENT:   1.  Nadiya Lewis is a delightful 77-year-old female status post mitral valve replacement in 05/2014 after previous balloon commissurotomy in 2006 and valve replacement on 04/10/2008.  The patient had both mitral valve replacement with a bioprosthetic valve and a tricuspid annuloplasty ring in 05/2014, while living in [place].  Her echocardiogram earlier this year showed pulmonary hypertension.  We are going to repeat this in 05/2018, and I will see her at that time.  I would like her evaluated for other causes of pulmonary hypertension including sleep apnea.  She will consider this.  I suspect with LVH and diastolic dysfunction, as well as atrial fibrillation, her pulmonary pressure is multifactorial.   2.  The patient's permanent pacemaker is functioning normally.  We will follow for battery life and mode switches.   3.  Paroxysmal atrial fibrillation.  She is anticoagulated and has good rate control.   4.  Hypertension, currently well-controlled.      It is my pleasure to assist in the care of Nadiya Lewis.  I will see her again in 9 months or earlier on a p.r.n. basis.  An echocardiogram will be performed at that time.  All her questions were answered to her satisfaction.      Marcelino Brooks MD      cc:   Yunior Huang MD   [facility] [address] [place] [address]         MARCELINO BROOKS MD, [facility]             D: 09/29/2017 11:36   T: 09/29/2017 13:49   MT: al      Name:     NADIYA LEWIS   MRN:       [id number]        Account:      [id number]   :      1940           Service Date: 2017      Document: [id number]

## 2017-09-29 NOTE — LETTER
9/29/2017    Yunior Huang MD  [address] [facility] [address]    RE: Zunilda Clark       Dear Colleague,    PRIMARY CARE PHYSICIAN:  Yunior Huang MD      I again had the pleasure of seeing your patient, Zunilda Clark, at [facility] for evaluation of pulmonary hypertension and paroxysmal atrial fibrillation.  The patient has been hospitalized in [place] for congestive heart failure in 2014.  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  She has a history of paroxysmal atrial fibrillation and has been treated for essential hypertension.  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  Her mitral valve was replaced in 04/2008 with a bioprosthetic valve.  Unfortunately, by 04/2014, she was found to have severe mitral stenosis and the valve was replaced with a Magna 27 mm bioprosthetic valve.  She did not have any significant coronary artery disease on either occasion.      Echocardiography continues to show pulmonary hypertension with RVSP between 50 and 60 mmHg.  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  Right ventricular systolic function has been moderately reduced and the right ventricle moderately dilated.  There was mild to moderate tricuspid regurgitation and she is status post tricuspid annuloplasty with a ring in 2014.  The bioprosthetic mitral valve gradients have been fairly normal around 4 or 5 mmHg.  The patient remains on warfarin therapy.  She fell and broke her arm which required ORIF of her right humerus in 2016.  She has been seen by Pulmonary and they suggested that she perform a sleep study.  To date, she has been unwilling to do so.  The patient has rheumatoid  arthritis and has been taking methotrexate.  She has bilateral knee arthritis which makes it difficult to walk.  She has decided to not perform total knee arthroplasty on either leg for the time being.      PHYSICAL EXAMINATION:  As listed below.      Her most recent pacemaker interrogation on 07/03 demonstrated 29 mode switches, comprising 2.3% of the time.  The longest episode occurred over 31 hours.  She has controlled ventricular rate while in mode switch; 37% atrial pacing and 16% ventricular pacing.  No significant ventricular arrhythmias.     Outpatient Encounter Prescriptions as of 9/29/2017   Medication Sig Dispense Refill     warfarin (COUMADIN) 2.5 MG tablet Take 2 tablets (5 mg) by mouth daily (Patient taking differently: Take 2.5 mg by mouth daily ) 90 tablet 3     chlorthalidone (HYGROTON) 25 MG tablet Take 1 tablet (25 mg) by mouth daily 90 tablet 2     ACETAMINOPHEN PO Take 650 mg by mouth every 6 hours as needed        warfarin (COUMADIN) 3 MG tablet Take 1 tablet (3 mg) by mouth daily For 3 days, check INR on 05/05 to adjust the dose. (Patient taking differently: Take 5 mg by mouth daily For 3 days, check INR on 05/05 to adjust the dose.) 30 tablet 0     ALLOPURINOL PO Take 100 mg by mouth 2 times daily       multivitamin, therapeutic with minerals (MULTI-VITAMIN) TABS tablet Take 1 tablet by mouth daily       calcium citrate (CALCITRATE) 950 MG tablet Take by mouth daily 1000 mg daily       VITAMIN D, CHOLECALCIFEROL, PO Take 1,000 Units by mouth daily       fish oil-omega-3 fatty acids 1000 MG capsule Take 2 g by mouth daily       ibandronate (BONIVA) 3 MG/3ML Inject 3 mg into the vein once       Amlodipine Besylate-Valsartan (EXFORGE)  MG TABS Take 1 tablet by mouth daily 90 tablet 3     omeprazole (PRILOSEC) 20 MG CR capsule Take 1 capsule (20 mg) by mouth 2 times daily 180 capsule 3     levothyroxine (SYNTHROID/LEVOTHROID) 112 MCG tablet Take 1 tablet (112 mcg) by mouth daily 90 tablet 3      metoprolol (LOPRESSOR) 100 MG tablet Take 0.5 tablets (50 mg) by mouth 2 times daily 60 tablet 5     folic acid (FOLVITE) 1 MG tablet Take 1 mg by mouth daily       METHOTREXATE SODIUM IJ Inject 0.8 mLs as directed once a week       [DISCONTINUED] acetaminophen-codeine (TYLENOL #3) 300-30 MG per tablet Take 1 tablet by mouth 3 times daily as needed for pain 30 tablet 0     No facility-administered encounter medications on file as of 9/29/2017.       ASSESSMENT:   1.  Zunilda Clark is a delightful 77-year-old female status post mitral valve replacement in 05/2014 after previous balloon commissurotomy in 2006 and valve replacement on 04/10/2008.  The patient had both mitral valve replacement with a bioprosthetic valve and a tricuspid annuloplasty ring in 05/2014, while living in [place].  Her echocardiogram earlier this year showed pulmonary hypertension.  We are going to repeat this in 05/2018, and I will see her at that time.  I would like her evaluated for other causes of pulmonary hypertension including sleep apnea.  She will consider this.  I suspect with LVH and diastolic dysfunction, as well as atrial fibrillation, her pulmonary pressure is multifactorial.   2.  The patient's permanent pacemaker is functioning normally.  We will follow for battery life and mode switches.   3.  Paroxysmal atrial fibrillation.  She is anticoagulated and has good rate control.   4.  Hypertension, currently well-controlled.      It is my pleasure to assist in the care of Zunilda Clark.  I will see her again in 9 months or earlier on a p.r.n. basis.  An echocardiogram will be performed at that time.  All her questions were answered to her satisfaction.     Sincerely,    Timothy Das MD     [facility]

## 2017-09-29 NOTE — MR AVS SNAPSHOT
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    -  1    Valvular heart disease        Chronic diastolic congestive heart failure (H)        Cardiac pacemaker in situ        Paroxysmal atrial fibrillation (H)        Pulmonary hypertension (H)           Follow-ups after your visit        Additional Services     Follow-Up with Cardiologist                 Your next 10 appointments already scheduled     Oct 03, 2017 10:30 AM CDT   Anticoagulation Visit with [facility]   [facility]    [address]  [facility] [id number] [phone]            Oct 04, 2017 10:10 AM CDT   Pacemaker Check with SHERITA [facility]   [facility] ([facility])    [address]  [facility] [address] [phone]              Future tests that were ordered for you today     Open Future Orders        Priority Expected Expires Ordered    Follow-Up with Cardiologist Routine 6/26/2018 9/29/2018 9/29/2017    Echocardiogram Routine 6/26/2018 9/29/2018 9/29/2017            Who to contact     If you have questions or need follow up information about today's clinic visit or your schedule please contact [facility] [place] directly at [phone].  Normal or non-critical lab and imaging results will be communicated to you by MyChart, letter or phone within 4 business days after the clinic has received the results. If you do not hear from us within 7 days, please contact the clinic through MyChart or phone. If you have a critical or abnormal lab result,  "we will notify you by phone as soon as possible.  Submit refill requests through [employer] or call your pharmacy and they will forward the refill request to us. Please allow 3 business days for your refill to be completed.          Additional Information About Your Visit        [facility] Information     [employer] gives you secure access to your electronic health record. If you see a primary care provider, you can also send messages to your care team and make appointments. If you have questions, please call your primary care clinic.  If you do not have a primary care provider, please call [phone] and they will assist you.        Care EveryWhere ID     This is your Care EveryWhere ID. This could be used by other organizations to access your [facility] medical records  [id number]        Your Vitals Were     Pulse Height Breastfeeding? BMI (Body Mass Index)          72 1.651 m (5' 5\") No 26.49 kg/m2         Blood Pressure from Last 3 Encounters:   09/29/17 118/58   08/14/17 130/60   06/13/17 112/70    Weight from Last 3 Encounters:   09/29/17 72.2 kg (159 lb 3.2 oz)   08/14/17 70.3 kg (155 lb)   06/13/17 69.9 kg (154 lb 3.2 oz)              We Performed the Following     Follow-Up with Cardiologist          Today's Medication Changes          These changes are accurate as of: 9/29/17 11:26 AM.  If you have any questions, ask your nurse or doctor.               These medicines have changed or have updated prescriptions.        Dose/Directions    * warfarin 3 MG tablet   Commonly known as:  COUMADIN   This may have changed:    - how much to take  - additional instructions   Used for:  Atrial fibrillation and flutter (H)        Dose:  3 mg   Take 1 tablet (3 mg) by mouth daily For 3 days, check INR on 05/05 to adjust the dose.   Quantity:  30 tablet   Refills:  0       * warfarin 2.5 MG tablet   Commonly known as:  COUMADIN   This may have changed:  how much to take   Used for:  Atrial fibrillation and flutter (H)        " Dose:  5 mg   Take 2 tablets (5 mg) by mouth daily   Quantity:  90 tablet   Refills:  3       * Notice:  This list has 2 medication(s) that are the same as other medications prescribed for you. Read the directions carefully, and ask your doctor or other care provider to review them with you.             Primary Care Provider Office Phone # Fax #    Yunior Huang [phone]       [address] [facility] [address]        Equal Access to Services     SAM UGALDE AH: Hadii aad ku hadasho Soomaali, waaxda luqadaha, qaybta kaalmada adeegyada, waxay idiin hayaan adeeg kharash la'anthonyn . So [facility] [phone].    ATENCIÓN: Si habla español, tiene a morales disposición servicios gratuitos de asistencia lingüística. [facility] [phone].    We comply with applicable federal civil rights laws and [place] laws. We do not discriminate on the basis of race, color, national origin, age, disability sex, sexual orientation or gender identity.            Thank you!     Thank you for choosing [facility] [place]  for your care. Our goal is always to provide you with excellent care. Hearing back from our patients is one way we can continue to improve our services. Please take a few minutes to complete the written survey that you may receive in the mail after your visit with us. Thank you!             Your Updated Medication List - Protect others around you: Learn how to safely use, store and throw away your medicines at www.disposemymeds.org.          This list is accurate as of: 9/29/17 11:26 AM.  Always use your most recent med list.                   Brand Name Dispense Instructions for use Diagnosis    ACETAMINOPHEN PO      Take 650 mg by mouth every 6 hours as needed        ALLOPURINOL PO      Take 100 mg by mouth 2 times daily        Amlodipine Besylate-Valsartan  MG Tabs    EXFORGE    90 tablet    Take 1 tablet by mouth daily    Essential hypertension       BONIVA 3  MG/3ML   Generic drug:  ibandronate      Inject 3 mg into the vein once        calcium citrate 950 MG tablet    CALCITRATE     Take by mouth daily 1000 mg daily        chlorthalidone 25 MG tablet    HYGROTON    90 tablet    Take 1 tablet (25 mg) by mouth daily    Essential hypertension with goal blood pressure less than 130/85       fish oil-omega-3 fatty acids 1000 MG capsule      Take 2 g by mouth daily        folic acid 1 MG tablet    FOLVITE     Take 1 mg by mouth daily        levothyroxine 112 MCG tablet    SYNTHROID/LEVOTHROID    90 tablet    Take 1 tablet (112 mcg) by mouth daily    Hypothyroidism       METHOTREXATE SODIUM IJ      Inject 0.8 mLs as directed once a week        metoprolol 100 MG tablet    LOPRESSOR    60 tablet    Take 0.5 tablets (50 mg) by mouth 2 times daily    Essential hypertension       Multi-vitamin Tabs tablet      Take 1 tablet by mouth daily        omeprazole 20 MG CR capsule    priLOSEC    180 capsule    Take 1 capsule (20 mg) by mouth 2 times daily    Esophageal reflux       VITAMIN D (CHOLECALCIFEROL) PO      Take 1,000 Units by mouth daily        * warfarin 3 MG tablet    COUMADIN    30 tablet    Take 1 tablet (3 mg) by mouth daily For 3 days, check INR on 05/05 to adjust the dose.    Atrial fibrillation and flutter (H)       * warfarin 2.5 MG tablet    COUMADIN    90 tablet    Take 2 tablets (5 mg) by mouth daily    Atrial fibrillation and flutter (H)       * Notice:  This list has 2 medication(s) that are the same as other medications prescribed for you. Read the directions carefully, and ask your doctor or other care provider to review them with you.

## 2017-10-03 ENCOUNTER — TELEPHONE (OUTPATIENT)
Dept: ANTICOAGULATION | Facility: CLINIC | Age: 77
End: 2017-10-03

## 2017-10-03 ENCOUNTER — ANTICOAGULATION THERAPY VISIT (OUTPATIENT)
Dept: ANTICOAGULATION | Facility: CLINIC | Age: 77
End: 2017-10-03
Payer: MEDICARE

## 2017-10-03 DIAGNOSIS — Z79.01 LONG-TERM (CURRENT) USE OF ANTICOAGULANTS: ICD-10-CM

## 2017-10-03 DIAGNOSIS — I48.0 PAROXYSMAL ATRIAL FIBRILLATION (H): Primary | ICD-10-CM

## 2017-10-03 DIAGNOSIS — I48.92 ATRIAL FIBRILLATION AND FLUTTER (H): ICD-10-CM

## 2017-10-03 DIAGNOSIS — I48.91 ATRIAL FIBRILLATION AND FLUTTER (H): ICD-10-CM

## 2017-10-03 LAB — INR POINT OF CARE: 2.6 (ref 0.86–1.14)

## 2017-10-03 PROCEDURE — 85610 PROTHROMBIN TIME: CPT | Mod: QW

## 2017-10-03 PROCEDURE — 99207 ZZC NO CHARGE NURSE ONLY: CPT

## 2017-10-03 PROCEDURE — 36416 COLLJ CAPILLARY BLOOD SPEC: CPT

## 2017-10-03 RX ORDER — WARFARIN SODIUM 2.5 MG/1
TABLET ORAL
Qty: 120 TABLET | Refills: 1 | Status: SHIPPED | OUTPATIENT
Start: 2017-10-03 | End: 2018-03-29

## 2017-10-03 NOTE — TELEPHONE ENCOUNTER
For insurance purposes, an annual INR referral is required. Please sign the order and route back to the INR Clinic. Екатерина Mahan RN

## 2017-10-03 NOTE — PROGRESS NOTES
ANTICOAGULATION FOLLOW-UP CLINIC VISIT    Patient Name:  Zunilda Alicea May  Date:  10/3/2017  Contact Type:  Face to Face    SUBJECTIVE:     Patient Findings     Positives No Problem Findings           OBJECTIVE    INR Protime   Date Value Ref Range Status   10/03/2017 2.6 (A) 0.86 - 1.14 Final       ASSESSMENT / PLAN  INR assessment THER    Recheck INR In: 4 WEEKS    INR Location Clinic      Anticoagulation Summary as of 10/3/2017     INR goal 2.0-3.0   Today's INR 2.6   Maintenance plan 5 mg (2.5 mg x 2) on Mon, Wed, Sat; 2.5 mg (2.5 mg x 1) all other days   Full instructions 5 mg on Mon, Wed, Sat; 2.5 mg all other days   Weekly total 25 mg   No change documented Екатерина Mahan RN   Plan last modified Екатерина Mahan RN (8/14/2017)   Next INR check 10/31/2017   Priority INR   Target end date     Indications   Long-term (current) use of anticoagulants [Z79.01] [Z79.01]  Atrial fibrillation and flutter (H) [I48.91  I48.92]         Anticoagulation Episode Summary     INR check location     Preferred lab     Send INR reminders to [facility]    Comments       Anticoagulation Care Providers     Provider Role Specialty Phone number    Yunior Huang MD Responsible Internal Medicine [phone]            See the Encounter Report to view Anticoagulation Flowsheet and Dosing Calendar (Go to Encounters tab in chart review, and find the Anticoagulation Therapy Visit)    Dosage adjustment made based on physician directed care plan.    Екатерина Mahan RN

## 2017-10-03 NOTE — MR AVS SNAPSHOT
Zunilda Alicea May   10/3/2017 10:30 AM   Anticoagulation Therapy Visit    Description:  77 year old female   Provider:  [facility]   Department:  [facility]           INR as of 10/3/2017     Today's INR 2.6      Anticoagulation Summary as of 10/3/2017     INR goal 2.0-3.0   Today's INR 2.6   Full instructions 5 mg on Mon, Wed, Sat; 2.5 mg all other days   Next INR check 10/31/2017    Indications   Long-term (current) use of anticoagulants [Z79.01] [Z79.01]  Atrial fibrillation and flutter (H) [I48.91  I48.92]         Your next Anticoagulation Clinic appointment(s)     Oct 31, 2017 10:30 AM CDT   Anticoagulation Visit with [facility]   [facility] ([facility])    [address]  [facility] [id number] [phone]              Contact Numbers     [facility] Phone Numbers:  Anticoagulation Clinic Appointments : [phone]  Anticoagulation Nurse: [phone]         October 2017 Details    Sun Mon Tue Wed Thu Fri Sat     1               2               3      2.5 mg   See details      4      5 mg         5      2.5 mg         6      2.5 mg         7      5 mg           8      2.5 mg         9      5 mg         10      2.5 mg         11      5 mg         12      2.5 mg         13      2.5 mg         14      5 mg           15      2.5 mg         16      5 mg         17      2.5 mg         18      5 mg         19      2.5 mg         20      2.5 mg         21      5 mg           22      2.5 mg         23      5 mg         24      2.5 mg         25      5 mg         26      2.5 mg         27      2.5 mg         28      5 mg           29      2.5 mg         30      5 mg         31                 Date Details   10/03 This INR check       Date of next INR:  10/31/2017         How to take your warfarin dose     To take:  2.5 mg Take 1 of the 2.5 mg tablets.    To take:  5 mg Take 2 of the 2.5 mg tablets.

## 2017-10-04 ENCOUNTER — ALLIED HEALTH/NURSE VISIT (OUTPATIENT)
Dept: CARDIOLOGY | Facility: CLINIC | Age: 77
End: 2017-10-04
Attending: INTERNAL MEDICINE
Payer: MEDICARE

## 2017-10-04 DIAGNOSIS — Z95.0 CARDIAC PACEMAKER IN SITU: ICD-10-CM

## 2017-10-04 PROCEDURE — 93280 PM DEVICE PROGR EVAL DUAL: CPT | Performed by: INTERNAL MEDICINE

## 2017-10-04 NOTE — MR AVS SNAPSHOT
After Visit Summary   10/4/2017    Zunilda Clark    MRN: [id number]           Patient Information     Date Of Birth          1940        Visit Information        Provider Department      10/4/2017 10:10 AM RU DCRN [facility]        Today's Diagnoses     Cardiac pacemaker in situ           Follow-ups after your visit        Your next 10 appointments already scheduled     Oct 31, 2017 10:30 AM CDT   Anticoagulation Visit with [facility]   [facility] ([facility])    [address]  [facility] [id number] [phone]            Valente 10, 2018  4:15 PM CST   Remote PPM Check with KRAMER TECH1   [facility] ([facility])    [address]  [facility] [address] [phone]           This appointment is for a remote check of your pacemaker.  This is not an appointment at the office.              Who to contact     If you have questions or need follow up information about today's clinic visit or your schedule please contact [facility] directly at [phone].  Normal or non-critical lab and imaging results will be communicated to you by MobileTaghart, letter or phone within 4 business days after the clinic has received the results. If you do not hear from us within 7 days, please contact the clinic through MobileTaghart or phone. If you have a critical or abnormal lab result, we will notify you by phone as soon as possible.  Submit refill requests through MinuteBuzz or call your pharmacy and they will forward the refill request to us. Please allow 3 business days for your refill to be completed.          Additional Information About Your Visit        MyChart Information     MinuteBuzz gives you secure access to your electronic health record. If you see a primary care provider, you can also send messages to your  care team and make appointments. If you have questions, please call your primary care clinic.  If you do not have a primary care provider, please call [phone] and they will assist you.        Care EveryWhere ID     This is your Care EveryWhere ID. This could be used by other organizations to access your [place] medical records  [id number]         Blood Pressure from Last 3 Encounters:   09/29/17 118/58   08/14/17 130/60   06/13/17 112/70    Weight from Last 3 Encounters:   09/29/17 72.2 kg (159 lb 3.2 oz)   08/14/17 70.3 kg (155 lb)   06/13/17 69.9 kg (154 lb 3.2 oz)              We Performed the Following     Follow-Up with Device Clinic     PM DEVICE PROGRAMMING EVAL, DUAL LEAD PACER (47750)          Today's Medication Changes          These changes are accurate as of: 10/4/17 10:45 AM.  If you have any questions, ask your nurse or doctor.               These medicines have changed or have updated prescriptions.        Dose/Directions    * warfarin 3 MG tablet   Commonly known as:  COUMADIN   This may have changed:    - how much to take  - additional instructions   Used for:  Atrial fibrillation and flutter (H)        Dose:  3 mg   Take 1 tablet (3 mg) by mouth daily For 3 days, check INR on 05/05 to adjust the dose.   Quantity:  30 tablet   Refills:  0       * warfarin 2.5 MG tablet   Commonly known as:  COUMADIN   This may have changed:  Another medication with the same name was changed. Make sure you understand how and when to take each.   Used for:  Atrial fibrillation and flutter (H)        Take 1 tablet (2.5 mg) by mouth Sun, Tue, Thur, Fri. Take 2 tablets (5mg) by mouth Mon, wed, Sat or as instructed by INR clinic.   Quantity:  120 tablet   Refills:  1       * Notice:  This list has 2 medication(s) that are the same as other medications prescribed for you. Read the directions carefully, and ask your doctor or other care provider to review them with you.             Primary Care Provider Office Phone  # Fax #    Ynuior Huang [phone]       [address] [facility] [address]        Equal Access to Services     CLAIRROQUE TRAM : Hadrob rosalie hightower jaswantroe Sonelson, wagrantda luqadaha, qaybta kaalmada salomón, lucía machuca eltonlaureen sanchez lanatashasergio britton. So [facility] [phone].    ATENCIÓN: Si habla español, tiene a morales disposición servicios gratuitos de asistencia lingüística. Llame al [phone].    We comply with applicable federal civil rights laws and [place] laws. We do not discriminate on the basis of race, color, national origin, age, disability, sex, sexual orientation, or gender identity.            Thank you!     Thank you for choosing [facility] [place]  for your care. Our goal is always to provide you with excellent care. Hearing back from our patients is one way we can continue to improve our services. Please take a few minutes to complete the written survey that you may receive in the mail after your visit with us. Thank you!             Your Updated Medication List - Protect others around you: Learn how to safely use, store and throw away your medicines at www.disposemymeds.org.          This list is accurate as of: 10/4/17 10:45 AM.  Always use your most recent med list.                   Brand Name Dispense Instructions for use Diagnosis    ACETAMINOPHEN PO      Take 650 mg by mouth every 6 hours as needed        ALLOPURINOL PO      Take 100 mg by mouth 2 times daily        Amlodipine Besylate-Valsartan  MG Tabs    EXFORGE    90 tablet    Take 1 tablet by mouth daily    Essential hypertension       BONIVA 3 MG/3ML   Generic drug:  ibandronate      Inject 3 mg into the vein once        calcium citrate 950 MG tablet    CALCITRATE     Take by mouth daily 1000 mg daily        chlorthalidone 25 MG tablet    HYGROTON    90 tablet    Take 1 tablet (25 mg) by mouth daily    Essential hypertension with goal blood pressure less than 130/85        fish oil-omega-3 fatty acids 1000 MG capsule      Take 2 g by mouth daily        folic acid 1 MG tablet    FOLVITE     Take 1 mg by mouth daily        levothyroxine 112 MCG tablet    SYNTHROID/LEVOTHROID    90 tablet    Take 1 tablet (112 mcg) by mouth daily    Hypothyroidism       METHOTREXATE SODIUM IJ      Inject 0.8 mLs as directed once a week        metoprolol 100 MG tablet    LOPRESSOR    60 tablet    Take 0.5 tablets (50 mg) by mouth 2 times daily    Essential hypertension       Multi-vitamin Tabs tablet      Take 1 tablet by mouth daily        omeprazole 20 MG CR capsule    priLOSEC    180 capsule    Take 1 capsule (20 mg) by mouth 2 times daily    Esophageal reflux       VITAMIN D (CHOLECALCIFEROL) PO      Take 1,000 Units by mouth daily        * warfarin 3 MG tablet    COUMADIN    30 tablet    Take 1 tablet (3 mg) by mouth daily For 3 days, check INR on 05/05 to adjust the dose.    Atrial fibrillation and flutter (H)       * warfarin 2.5 MG tablet    COUMADIN    120 tablet    Take 1 tablet (2.5 mg) by mouth Sun, Tue, Thur, Fri. Take 2 tablets (5mg) by mouth Mon, wed, Sat or as instructed by INR clinic.    Atrial fibrillation and flutter (H)       * Notice:  This list has 2 medication(s) that are the same as other medications prescribed for you. Read the directions carefully, and ask your doctor or other care provider to review them with you.

## 2017-10-04 NOTE — PROGRESS NOTES
Medtronic Revo MRI RVDR01 Pacemaker Device Check  AP: 39 % : 29 %  Mode: AAIR-DDDR  bpm        Underlying Rhythm: NSR  Heart Rate: Heart rate stable with good variability.  Sensing: WNL    Pacing Threshold: WNL   Impedance: WNL  Battery Status: Approximately 2.96V(RRT=2.81V)  Atrial Arrhythmia: Since last reset 9/14/2016, 340 mode switches comprising of 8.1% of the time, EGMs showed PAF with a controlled VR, longest lasting 15 days. Patient is on warfarin.  Ventricular Arrhythmia: 0  Setting Change: 0    Care Plan: Follow up with Q 3 month remote checks. MELINA Tello

## 2017-10-31 ENCOUNTER — ANTICOAGULATION THERAPY VISIT (OUTPATIENT)
Dept: ANTICOAGULATION | Facility: CLINIC | Age: 77
End: 2017-10-31
Payer: MEDICARE

## 2017-10-31 DIAGNOSIS — I48.92 ATRIAL FIBRILLATION AND FLUTTER (H): ICD-10-CM

## 2017-10-31 DIAGNOSIS — I48.91 ATRIAL FIBRILLATION AND FLUTTER (H): ICD-10-CM

## 2017-10-31 DIAGNOSIS — Z79.01 LONG-TERM (CURRENT) USE OF ANTICOAGULANTS: ICD-10-CM

## 2017-10-31 LAB — INR POINT OF CARE: 2.6 (ref 0.86–1.14)

## 2017-10-31 PROCEDURE — 36416 COLLJ CAPILLARY BLOOD SPEC: CPT

## 2017-10-31 PROCEDURE — 99207 ZZC NO CHARGE NURSE ONLY: CPT

## 2017-10-31 PROCEDURE — 85610 PROTHROMBIN TIME: CPT | Mod: QW

## 2017-10-31 NOTE — MR AVS SNAPSHOT
Zunilda Alicea May   10/31/2017 10:30 AM   Anticoagulation Therapy Visit    Description:  77 year old female   Provider:  [facility]   Department:  [facility] Anti Coagulation           INR as of 10/31/2017     Today's INR 2.6      Anticoagulation Summary as of 10/31/2017     INR goal 2.0-3.0   Today's INR 2.6   Full instructions 5 mg on Mon, Wed, Sat; 2.5 mg all other days   Next INR check 12/5/2017    Indications   Long-term (current) use of anticoagulants [Z79.01] [Z79.01]  Atrial fibrillation and flutter (H) [I48.91  I48.92]         Your next Anticoagulation Clinic appointment(s)     Dec 05, 2017 10:45 AM CST   Anticoagulation Visit with [facility]   [facility] ([facility])    [address] [facility] [address]  [facility] [address] [phone]              Contact Numbers     [facility] Phone Numbers:  Anticoagulation Clinic Appointments : [phone]  Anticoagulation Nurse: [phone]         October 2017 Details    Sun Mon Tue Wed Thu Fri Sat     1               2               3               4               5               6               7                 8               9               10               11               12               13               14                 15               16               17               18               19               20               21                 22               23               24               25               26               27               28                 29               30               31      2.5 mg   See details           Date Details   10/31 This INR check               How to take your warfarin dose     To take:  2.5 mg Take 1 of the 2.5 mg tablets.           November 2017 Details    Sun Mon Tue Wed Thu Fri Sat        1      5 mg         2      2.5 mg         3      2.5 mg         4      5 mg           5      2.5 mg         6      5 mg         7      2.5 mg         8      5 mg          9      2.5 mg         10      2.5 mg         11      5 mg           12      2.5 mg         13      5 mg         14      2.5 mg         15      5 mg         16      2.5 mg         17      2.5 mg         18      5 mg           19      2.5 mg         20      5 mg         21      2.5 mg         22      5 mg         23      2.5 mg         24      2.5 mg         25      5 mg           26      2.5 mg         27      5 mg         28      2.5 mg         29      5 mg         30      2.5 mg            Date Details   No additional details            How to take your warfarin dose     To take:  2.5 mg Take 1 of the 2.5 mg tablets.    To take:  5 mg Take 2 of the 2.5 mg tablets.           December 2017 Details    Sun Mon Tue Wed Thu Fri Sat          1      2.5 mg         2      5 mg           3      2.5 mg         4      5 mg         5            6               7               8               9                 10               11               12               13               14               15               16                 17               18               19               20               21               22               23                 24               25               26               27               28               29               30                 31                      Date Details   No additional details    Date of next INR:  12/5/2017         How to take your warfarin dose     To take:  2.5 mg Take 1 of the 2.5 mg tablets.    To take:  5 mg Take 2 of the 2.5 mg tablets.

## 2017-10-31 NOTE — PROGRESS NOTES
ANTICOAGULATION FOLLOW-UP CLINIC VISIT    Patient Name:  Zunilda Alicea May  Date:  10/31/2017  Contact Type:  Face to Face    SUBJECTIVE:     Patient Findings     Positives No Problem Findings           OBJECTIVE    INR Protime   Date Value Ref Range Status   10/31/2017 2.6 (A) 0.86 - 1.14 Final       ASSESSMENT / PLAN  INR assessment THER    Recheck INR In: 5 WEEKS    INR Location Clinic      Anticoagulation Summary as of 10/31/2017     INR goal 2.0-3.0   Today's INR 2.6   Maintenance plan 5 mg (2.5 mg x 2) on Mon, Wed, Sat; 2.5 mg (2.5 mg x 1) all other days   Full instructions 5 mg on Mon, Wed, Sat; 2.5 mg all other days   Weekly total 25 mg   No change documented Екатерина Mahan RN   Plan last modified Екатерина Mahan RN (8/14/2017)   Next INR check 12/5/2017   Priority INR   Target end date     Indications   Long-term (current) use of anticoagulants [Z79.01] [Z79.01]  Atrial fibrillation and flutter (H) [I48.91  I48.92]         Anticoagulation Episode Summary     INR check location     Preferred lab     Send INR reminders to [facility]    Comments       Anticoagulation Care Providers     Provider Role Specialty Phone number    Yunior Huang MD [facility] Internal Medicine [phone]            See the Encounter Report to view Anticoagulation Flowsheet and Dosing Calendar (Go to Encounters tab in chart review, and find the Anticoagulation Therapy Visit)    Dosage adjustment made based on physician directed care plan.    Екатерина Mahan RN

## 2017-12-05 ENCOUNTER — ANTICOAGULATION THERAPY VISIT (OUTPATIENT)
Dept: ANTICOAGULATION | Facility: CLINIC | Age: 77
End: 2017-12-05
Payer: MEDICARE

## 2017-12-05 DIAGNOSIS — I48.91 ATRIAL FIBRILLATION AND FLUTTER (H): ICD-10-CM

## 2017-12-05 DIAGNOSIS — Z79.01 LONG-TERM (CURRENT) USE OF ANTICOAGULANTS: ICD-10-CM

## 2017-12-05 DIAGNOSIS — I48.92 ATRIAL FIBRILLATION AND FLUTTER (H): ICD-10-CM

## 2017-12-05 LAB — INR POINT OF CARE: 2.4 (ref 0.86–1.14)

## 2017-12-05 PROCEDURE — 36416 COLLJ CAPILLARY BLOOD SPEC: CPT

## 2017-12-05 PROCEDURE — 99207 ZZC NO CHARGE NURSE ONLY: CPT

## 2017-12-05 PROCEDURE — 85610 PROTHROMBIN TIME: CPT | Mod: QW

## 2017-12-05 NOTE — PROGRESS NOTES
ANTICOAGULATION FOLLOW-UP CLINIC VISIT    Patient Name:  Zunilda Alicea May  Date:  12/5/2017  Contact Type:  Face to Face    SUBJECTIVE:     Patient Findings     Positives No Problem Findings           OBJECTIVE    INR Protime   Date Value Ref Range Status   12/05/2017 2.4 (A) 0.86 - 1.14 Final       ASSESSMENT / PLAN  INR assessment THER    Recheck INR In: 6 WEEKS    INR Location Clinic      Anticoagulation Summary as of 12/5/2017     INR goal 2.0-3.0   Today's INR 2.4   Maintenance plan 5 mg (2.5 mg x 2) on Mon, Wed, Sat; 2.5 mg (2.5 mg x 1) all other days   Full instructions 5 mg on Mon, Wed, Sat; 2.5 mg all other days   Weekly total 25 mg   No change documented Екатерина Mahan RN   Plan last modified Екатерина Mahan RN (8/14/2017)   Next INR check 1/16/2018   Priority INR   Target end date     Indications   Long-term (current) use of anticoagulants [Z79.01] [Z79.01]  Atrial fibrillation and flutter (H) [I48.91  I48.92]         Anticoagulation Episode Summary     INR check location     Preferred lab     Send INR reminders to [facility]    Comments       Anticoagulation Care Providers     Provider Role Specialty Phone number    Yunior Huang MD Responsible Internal Medicine [phone]            See the Encounter Report to view Anticoagulation Flowsheet and Dosing Calendar (Go to Encounters tab in chart review, and find the Anticoagulation Therapy Visit)    Dosage adjustment made based on physician directed care plan.    Екатерина Mahan RN

## 2017-12-05 NOTE — MR AVS SNAPSHOT
Zunilda Alicea May   12/5/2017 10:45 AM   Anticoagulation Therapy Visit    Description:  77 year old female   Provider:  [facility]   Department:  [facility]           INR as of 12/5/2017     Today's INR 2.4      Anticoagulation Summary as of 12/5/2017     INR goal 2.0-3.0   Today's INR 2.4   Full instructions 5 mg on Mon, Wed, Sat; 2.5 mg all other days   Next INR check 1/16/2018    Indications   Long-term (current) use of anticoagulants [Z79.01] [Z79.01]  Atrial fibrillation and flutter (H) [I48.91  I48.92]         Your next Anticoagulation Clinic appointment(s)     Jan 16, 2018 10:45 AM CST   Anticoagulation Visit with [facility]   [facility] ([facility])    [address] [facility] [address]  [facility] [id number] [phone]              Contact Numbers     [facility] Phone Numbers:  Anticoagulation Clinic Appointments : [phone]  Anticoagulation Nurse: [phone]         December 2017 Details    Sun Mon Tue Wed Thu Fri Sat          1               2                 3               4               5      2.5 mg   See details      6      5 mg         7      2.5 mg         8      2.5 mg         9      5 mg           10      2.5 mg         11      5 mg         12      2.5 mg         13      5 mg         14      2.5 mg         15      2.5 mg         16      5 mg           17      2.5 mg         18      5 mg         19      2.5 mg         20      5 mg         21      2.5 mg         22      2.5 mg         23      5 mg           24      2.5 mg         25      5 mg         26      2.5 mg         27      5 mg         28      2.5 mg         29      2.5 mg         30      5 mg           31      2.5 mg                Date Details   12/05 This INR check               How to take your warfarin dose     To take:  2.5 mg Take 1 of the 2.5 mg tablets.    To take:  5 mg Take 2 of the 2.5 mg tablets.           January 2018 Details    Sun Mon Tue  Wed Thu Fri Sat      1      5 mg         2      2.5 mg         3      5 mg         4      2.5 mg         5      2.5 mg         6      5 mg           7      2.5 mg         8      5 mg         9      2.5 mg         10      5 mg         11      2.5 mg         12      2.5 mg         13      5 mg           14      2.5 mg         15      5 mg         16            17               18               19               20                 21               22               23               24               25               26               27                 28               29               30               31                   Date Details   No additional details    Date of next INR:  1/16/2018         How to take your warfarin dose     To take:  2.5 mg Take 1 of the 2.5 mg tablets.    To take:  5 mg Take 2 of the 2.5 mg tablets.

## 2017-12-19 DIAGNOSIS — I10 ESSENTIAL HYPERTENSION: ICD-10-CM

## 2017-12-20 RX ORDER — METOPROLOL TARTRATE 100 MG
50 TABLET ORAL 2 TIMES DAILY
Qty: 90 TABLET | Refills: 0 | Status: SHIPPED | OUTPATIENT
Start: 2017-12-20 | End: 2018-03-29

## 2018-01-10 ENCOUNTER — ALLIED HEALTH/NURSE VISIT (OUTPATIENT)
Dept: CARDIOLOGY | Facility: CLINIC | Age: 78
End: 2018-01-10
Payer: MEDICARE

## 2018-01-10 DIAGNOSIS — Z95.0 CARDIAC PACEMAKER IN SITU: Primary | ICD-10-CM

## 2018-01-10 PROCEDURE — 93296 REM INTERROG EVL PM/IDS: CPT | Performed by: INTERNAL MEDICINE

## 2018-01-10 PROCEDURE — 93294 REM INTERROG EVL PM/LDLS PM: CPT | Performed by: INTERNAL MEDICINE

## 2018-01-10 NOTE — MR AVS SNAPSHOT
After Visit Summary   1/10/2018    Zunilda Clark    MRN: [id number]           Patient Information     Date Of Birth          1940        Visit Information        Provider Department      1/10/2018 4:15 PM [facility] AND[facility]        Today's Diagnoses     Cardiac pacemaker in situ    -  1       Follow-ups after your visit        Your next 10 appointments already scheduled     Valente 15, 2018 10:45 AM CST   Anticoagulation Visit with [facility]   [facility]    [address]  [facility] [id number] [phone]            Apr 19, 2018  3:15 PM CDT   Remote PPM Check with KRAMER TECH1   [facility] ([facility])    [address]  [facility] [address] [phone]           This appointment is for a remote check of your pacemaker.  This is not an appointment at the office.              Who to contact     If you have questions or need follow up information about today's clinic visit or your schedule please contact [facility] directly at [phone].  Normal or non-critical lab and imaging results will be communicated to you by New.nethart, letter or phone within 4 business days after the clinic has received the results. If you do not hear from us within 7 days, please contact the clinic through New.nethart or phone. If you have a critical or abnormal lab result, we will notify you by phone as soon as possible.  Submit refill requests through [employer] or call your pharmacy and they will forward the refill request to us. Please allow 3 business days for your refill to be completed.          Additional Information About Your Visit        MyChart Information     [employer] gives you secure access to your electronic health record. If you see a primary care provider, you can also send messages to your care  team and make appointments. If you have questions, please call your primary care clinic.  If you do not have a primary care provider, please call [phone] and they will assist you.        Care EveryWhere ID     This is your Care EveryWhere ID. This could be used by other organizations to access your [facility] medical records  [id number]         Blood Pressure from Last 3 Encounters:   09/29/17 118/58   08/14/17 130/60   06/13/17 112/70    Weight from Last 3 Encounters:   09/29/17 72.2 kg (159 lb 3.2 oz)   08/14/17 70.3 kg (155 lb)   06/13/17 69.9 kg (154 lb 3.2 oz)              We Performed the Following     INTERROGATION DEVICE EVAL REMOTE, PACER/ICD (63838)     PM DEVICE INTERROGATE REMOTE (35514)          Today's Medication Changes          These changes are accurate as of: 1/10/18 11:59 PM.  If you have any questions, ask your nurse or doctor.               These medicines have changed or have updated prescriptions.        Dose/Directions    * warfarin 3 MG tablet   Commonly known as:  COUMADIN   This may have changed:    - how much to take  - additional instructions   Used for:  Atrial fibrillation and flutter (H)        Dose:  3 mg   Take 1 tablet (3 mg) by mouth daily For 3 days, check INR on 05/05 to adjust the dose.   Quantity:  30 tablet   Refills:  0       * warfarin 2.5 MG tablet   Commonly known as:  COUMADIN   This may have changed:  Another medication with the same name was changed. Make sure you understand how and when to take each.   Used for:  Atrial fibrillation and flutter (H)        Take 1 tablet (2.5 mg) by mouth Sun, Tue, Thur, Fri. Take 2 tablets (5mg) by mouth Mon, wed, Sat or as instructed by INR clinic.   Quantity:  120 tablet   Refills:  1       * Notice:  This list has 2 medication(s) that are the same as other medications prescribed for you. Read the directions carefully, and ask your doctor or other care provider to review them with you.             Primary Care Provider Office  Phone # Fax #    Yunior Huang [phone]       [address] [facility] [address]        Equal Access to Services     SAM UGALDE : Annmarie rosalie hightower jaswantroe Soromanali, wagrantda luqadaha, qaybta kadmitryda salomón, lucía machuca eltonlaureen sanchez lanatashasergio britton. So [facility] [phone].    ATENCIÓN: Si habla español, tiene a morales disposición servicios gratuitos de asistencia lingüística. Llame al [phone].    We comply with applicable federal civil rights laws and [place] laws. We do not discriminate on the basis of race, color, national origin, age, disability, sex, sexual orientation, or gender identity.            Thank you!     Thank you for choosing [facility]  for your care. Our goal is always to provide you with excellent care. Hearing back from our patients is one way we can continue to improve our services. Please take a few minutes to complete the written survey that you may receive in the mail after your visit with us. Thank you!             Your Updated Medication List - Protect others around you: Learn how to safely use, store and throw away your medicines at www.disposemymeds.org.          This list is accurate as of: 1/10/18 11:59 PM.  Always use your most recent med list.                   Brand Name Dispense Instructions for use Diagnosis    ACETAMINOPHEN PO      Take 650 mg by mouth every 6 hours as needed        ALLOPURINOL PO      Take 100 mg by mouth 2 times daily        Amlodipine Besylate-Valsartan  MG Tabs    EXFORGE    90 tablet    Take 1 tablet by mouth daily    Essential hypertension       BONIVA 3 MG/3ML   Generic drug:  ibandronate      Inject 3 mg into the vein once        calcium citrate 950 MG tablet    CALCITRATE     Take by mouth daily 1000 mg daily        chlorthalidone 25 MG tablet    HYGROTON    90 tablet    Take 1 tablet (25 mg) by mouth daily    Essential hypertension with goal blood pressure less than 130/85        fish oil-omega-3 fatty acids 1000 MG capsule      Take 2 g by mouth daily        folic acid 1 MG tablet    FOLVITE     Take 1 mg by mouth daily        levothyroxine 112 MCG tablet    SYNTHROID/LEVOTHROID    90 tablet    Take 1 tablet (112 mcg) by mouth daily    Hypothyroidism       METHOTREXATE SODIUM IJ      Inject 0.8 mLs as directed once a week        metoprolol 100 MG tablet    LOPRESSOR    90 tablet    Take 0.5 tablets (50 mg) by mouth 2 times daily    Essential hypertension       Multi-vitamin Tabs tablet      Take 1 tablet by mouth daily        omeprazole 20 MG CR capsule    priLOSEC    180 capsule    Take 1 capsule (20 mg) by mouth 2 times daily    Esophageal reflux       VITAMIN D (CHOLECALCIFEROL) PO      Take 1,000 Units by mouth daily        * warfarin 3 MG tablet    COUMADIN    30 tablet    Take 1 tablet (3 mg) by mouth daily For 3 days, check INR on 05/05 to adjust the dose.    Atrial fibrillation and flutter (H)       * warfarin 2.5 MG tablet    COUMADIN    120 tablet    Take 1 tablet (2.5 mg) by mouth Sun, Tue, Thur, Fri. Take 2 tablets (5mg) by mouth Mon, wed, Sat or as instructed by INR clinic.    Atrial fibrillation and flutter (H)       * Notice:  This list has 2 medication(s) that are the same as other medications prescribed for you. Read the directions carefully, and ask your doctor or other care provider to review them with you.

## 2018-01-11 NOTE — PROGRESS NOTES
Medtronic Revo (D) Remote PPM Device Check  AP: 31 % : 39 %  Mode: AAIR<->DDDR        Presenting Rhythm: AS/VS, rate 73bpm  Heart Rate: Adequate rates per histogram  Sensing: Stable    Pacing Threshold: Stable    Impedance: Stable  Battery Status: 2.95V with OPAL being 2.81V  Atrial Arrhythmia: 55 mode switch episodes comprising 21.5% of the time. Longest episode lasted >100 hours. Ventricular rates controlled. Taking Warfarin.    Ventricular Arrhythmia: 1 ventricular high rate. EGM shows Vs>As for NSVT lasting 10 beats, rates 170-185bpm. EF 60-65% (2017). Reviewed with MELINA Pratt.        Care Plan: F/u PPM Carelink q 3 months. Gave patient results over the phone. CASEY Castillo

## 2018-01-15 ENCOUNTER — ANTICOAGULATION THERAPY VISIT (OUTPATIENT)
Dept: ANTICOAGULATION | Facility: CLINIC | Age: 78
End: 2018-01-15
Payer: MEDICARE

## 2018-01-15 DIAGNOSIS — I48.92 ATRIAL FIBRILLATION AND FLUTTER (H): ICD-10-CM

## 2018-01-15 DIAGNOSIS — I48.91 ATRIAL FIBRILLATION AND FLUTTER (H): ICD-10-CM

## 2018-01-15 DIAGNOSIS — Z79.01 LONG-TERM (CURRENT) USE OF ANTICOAGULANTS: ICD-10-CM

## 2018-01-15 LAB — INR POINT OF CARE: 2.5 (ref 0.86–1.14)

## 2018-01-15 PROCEDURE — 85610 PROTHROMBIN TIME: CPT | Mod: QW

## 2018-01-15 PROCEDURE — 99207 ZZC NO CHARGE NURSE ONLY: CPT

## 2018-01-15 PROCEDURE — 36416 COLLJ CAPILLARY BLOOD SPEC: CPT

## 2018-01-15 NOTE — PROGRESS NOTES
ANTICOAGULATION FOLLOW-UP CLINIC VISIT    Patient Name:  Zunilda Alicea May  Date:  1/15/2018  Contact Type:  Face to Face    SUBJECTIVE:     Patient Findings     Positives No Problem Findings           OBJECTIVE    INR Protime   Date Value Ref Range Status   01/15/2018 2.5 (A) 0.86 - 1.14 Final       ASSESSMENT / PLAN  INR assessment THER    Recheck INR In: 6 WEEKS    INR Location Clinic      Anticoagulation Summary as of 1/15/2018     INR goal 2.0-3.0   Today's INR 2.5   Maintenance plan 5 mg (2.5 mg x 2) on Mon, Wed, Sat; 2.5 mg (2.5 mg x 1) all other days   Full instructions 5 mg on Mon, Wed, Sat; 2.5 mg all other days   Weekly total 25 mg   No change documented Sonam Teixeira RN   Plan last modified Екатерина Mahan RN (8/14/2017)   Next INR check 2/26/2018   Priority INR   Target end date     Indications   Long-term (current) use of anticoagulants [Z79.01] [Z79.01]  Atrial fibrillation and flutter (H) [I48.91  I48.92]         Anticoagulation Episode Summary     INR check location     Preferred lab     Send INR reminders to [facility]    Comments likes printed AVS      Anticoagulation Care Providers     Provider Role Specialty Phone number    Yunior Huang MD Responsible Internal Medicine [phone]            See the Encounter Report to view Anticoagulation Flowsheet and Dosing Calendar (Go to Encounters tab in chart review, and find the Anticoagulation Therapy Visit)    Dosage adjustment made based on physician directed care plan.    Sonam Teixeira RN

## 2018-01-16 ENCOUNTER — TRANSFERRED RECORDS (OUTPATIENT)
Dept: HEALTH INFORMATION MANAGEMENT | Facility: CLINIC | Age: 78
End: 2018-01-16

## 2018-01-29 DIAGNOSIS — E03.9 ACQUIRED HYPOTHYROIDISM: Primary | ICD-10-CM

## 2018-02-02 DIAGNOSIS — K21.9 ESOPHAGEAL REFLUX: ICD-10-CM

## 2018-02-02 RX ORDER — LEVOTHYROXINE SODIUM 112 UG/1
112 TABLET ORAL DAILY
Qty: 90 TABLET | Refills: 1 | Status: SHIPPED | OUTPATIENT
Start: 2018-02-02 | End: 2018-07-11

## 2018-02-02 NOTE — TELEPHONE ENCOUNTER
Zunilda says she also needs a refill on Omeprazole.  .  Requested Prescriptions   Pending Prescriptions Disp Refills     omeprazole (PRILOSEC) 20 MG CR capsule 180 capsule 3     Sig: Take 1 capsule (20 mg) by mouth 2 times daily    There is no refill protocol information for this order      Last OV 8/14/17.  Authorized RF's per [facility] protocol

## 2018-02-02 NOTE — TELEPHONE ENCOUNTER
"Requested Prescriptions   Pending Prescriptions Disp Refills     levothyroxine (SYNTHROID/LEVOTHROID) 112 MCG tablet 90 tablet 3     Sig: Take 1 tablet (112 mcg) by mouth daily    Thyroid Protocol Passed    1/29/2018  9:40 AM       Passed - Patient is 12 years or older       Passed - Recent or future visit with authorizing provider's specialty    Patient had office visit in the last year or has a visit in the next 30 days with authorizing provider.  See \"Patient Info\" tab in inbasket, or \"Choose Columns\" in Meds & Orders section of the refill encounter.            Passed - Normal TSH on file in past 12 months    Recent Labs   Lab Test  08/17/17   0843   TSH  2.59             Passed - No active pregnancy on record    If patient is pregnant or has had a positive pregnancy test, please check TSH.         Passed - No positive pregnancy test in past 12 months    If patient is pregnant or has had a positive pregnancy test, please check TSH.          Prescription approved per [facility] Refill Protocol.    "

## 2018-02-19 DIAGNOSIS — I10 ESSENTIAL HYPERTENSION: ICD-10-CM

## 2018-02-19 RX ORDER — AMLODIPINE AND VALSARTAN 10; 320 MG/1; MG/1
1 TABLET ORAL DAILY
Qty: 90 TABLET | Refills: 1 | Status: SHIPPED | OUTPATIENT
Start: 2018-02-19 | End: 2018-08-16

## 2018-02-19 NOTE — TELEPHONE ENCOUNTER
Received refill request for:  Amlodipine-Valsartan  Last OV was: 9/29/2017 with Dr. Das  Labs/EKG: last BMP 8/17/2017  F/U scheduled: orders in Epic for 6/2018  New script sent to: [facility]

## 2018-02-26 ENCOUNTER — ANTICOAGULATION THERAPY VISIT (OUTPATIENT)
Dept: ANTICOAGULATION | Facility: CLINIC | Age: 78
End: 2018-02-26
Payer: MEDICARE

## 2018-02-26 DIAGNOSIS — I48.91 ATRIAL FIBRILLATION AND FLUTTER (H): ICD-10-CM

## 2018-02-26 DIAGNOSIS — I48.92 ATRIAL FIBRILLATION AND FLUTTER (H): ICD-10-CM

## 2018-02-26 DIAGNOSIS — Z79.01 LONG-TERM (CURRENT) USE OF ANTICOAGULANTS: ICD-10-CM

## 2018-02-26 LAB — INR POINT OF CARE: 2.4 (ref 0.86–1.14)

## 2018-02-26 PROCEDURE — 85610 PROTHROMBIN TIME: CPT | Mod: QW

## 2018-02-26 PROCEDURE — 99207 ZZC NO CHARGE NURSE ONLY: CPT

## 2018-02-26 PROCEDURE — 36416 COLLJ CAPILLARY BLOOD SPEC: CPT

## 2018-02-26 NOTE — PROGRESS NOTES
ANTICOAGULATION FOLLOW-UP CLINIC VISIT    Patient Name:  Zunilda Alicea May  Date:  2/26/2018  Contact Type:  Face to Face    SUBJECTIVE:     Patient Findings     Positives No Problem Findings           OBJECTIVE    INR Protime   Date Value Ref Range Status   02/26/2018 2.4 (A) 0.86 - 1.14 Final       ASSESSMENT / PLAN  INR assessment THER    Recheck INR In: 6 WEEKS    INR Location Clinic      Anticoagulation Summary as of 2/26/2018     INR goal 2.0-3.0   Today's INR 2.4   Maintenance plan 5 mg (2.5 mg x 2) on Mon, Wed, Sat; 2.5 mg (2.5 mg x 1) all other days   Full instructions 5 mg on Mon, Wed, Sat; 2.5 mg all other days   Weekly total 25 mg   No change documented Екатерина Mahan RN   Plan last modified Екатерина Mahan RN (8/14/2017)   Next INR check 4/9/2018   Priority INR   Target end date     Indications   Long-term (current) use of anticoagulants [Z79.01] [Z79.01]  Atrial fibrillation and flutter (H) [I48.91  I48.92]         Anticoagulation Episode Summary     INR check location     Preferred lab     Send INR reminders to [facility]    Comments likes printed AVS      Anticoagulation Care Providers     Provider Role Specialty Phone number    Yunior Huang MD Responsible Internal Medicine [phone]            See the Encounter Report to view Anticoagulation Flowsheet and Dosing Calendar (Go to Encounters tab in chart review, and find the Anticoagulation Therapy Visit)    Dosage adjustment made based on physician directed care plan.    Екатерина Mahan RN

## 2018-02-26 NOTE — MR AVS SNAPSHOT
Zunilda Alicea May   2/26/2018 10:45 AM   Anticoagulation Therapy Visit    Description:  77 year old female   Provider:  [facility]   Department:  [facility]           INR as of 2/26/2018     Today's INR 2.4      Anticoagulation Summary as of 2/26/2018     INR goal 2.0-3.0   Today's INR 2.4   Full instructions 5 mg on Mon, Wed, Sat; 2.5 mg all other days   Next INR check 4/9/2018    Indications   Long-term (current) use of anticoagulants [Z79.01] [Z79.01]  Atrial fibrillation and flutter (H) [I48.91  I48.92]         Your next Anticoagulation Clinic appointment(s)     Apr 09, 2018 10:45 AM CDT   Anticoagulation Visit with [facility]   [facility]    [address] [facility] [address]  [facility] [id number] [phone]              Contact Numbers     [facility] Phone Numbers:  Anticoagulation Clinic Appointments : [phone]  Anticoagulation Nurse: [phone]         February 2018 Details    Sun Mon Tue Wed Thu Fri Sat         1               2               3                 4               5               6               7               8               9               10                 11               12               13               14               15               16               17                 18               19               20               21               22               23               24                 25               26      5 mg   See details      27      2.5 mg         28      5 mg             Date Details   02/26 This INR check               How to take your warfarin dose     To take:  2.5 mg Take 1 of the 2.5 mg tablets.    To take:  5 mg Take 2 of the 2.5 mg tablets.           March 2018 Details    Sun Mon Tue Wed Thu Fri Sat         1      2.5 mg         2      2.5 mg         3      5 mg           4      2.5 mg         5      5 mg         6      2.5 mg         7      5 mg         8      2.5  mg         9      2.5 mg         10      5 mg           11      2.5 mg         12      5 mg         13      2.5 mg         14      5 mg         15      2.5 mg         16      2.5 mg         17      5 mg           18      2.5 mg         19      5 mg         20      2.5 mg         21      5 mg         22      2.5 mg         23      2.5 mg         24      5 mg           25      2.5 mg         26      5 mg         27      2.5 mg         28      5 mg         29      2.5 mg         30      2.5 mg         31      5 mg          Date Details   No additional details            How to take your warfarin dose     To take:  2.5 mg Take 1 of the 2.5 mg tablets.    To take:  5 mg Take 2 of the 2.5 mg tablets.           April 2018 Details    Sun Mon Tue Wed Thu Fri Sat     1      2.5 mg         2      5 mg         3      2.5 mg         4      5 mg         5      2.5 mg         6      2.5 mg         7      5 mg           8      2.5 mg         9            10               11               12               13               14                 15               16               17               18               19               20               21                 22               23               24               25               26               27               28                 29               30                     Date Details   No additional details    Date of next INR:  4/9/2018         How to take your warfarin dose     To take:  2.5 mg Take 1 of the 2.5 mg tablets.    To take:  5 mg Take 2 of the 2.5 mg tablets.

## 2018-03-01 ENCOUNTER — TELEPHONE (OUTPATIENT)
Dept: CARDIOLOGY | Facility: CLINIC | Age: 78
End: 2018-03-01

## 2018-03-01 NOTE — TELEPHONE ENCOUNTER
Patient's  called and left a message stating it's in regards to a sleep study.  Called them back but no answer. Left a message to call team 4 with the direct number.

## 2018-03-02 NOTE — TELEPHONE ENCOUNTER
Spoke to patient and she stated that at her last OV with Dr. Das he had mentioned getting a sleep study done. Pt was wondering how she should go about that. Pt had seen [facility] 07/2017 and they had recommended a sleep study and follow up tests in 1 year. Writer informed patient that she should give them a call and follow up. [facility] number given to patient. Pt verbalized understanding. No further questions or concerns.

## 2018-03-12 ENCOUNTER — TRANSFERRED RECORDS (OUTPATIENT)
Dept: HEALTH INFORMATION MANAGEMENT | Facility: CLINIC | Age: 78
End: 2018-03-12

## 2018-03-29 DIAGNOSIS — I48.92 ATRIAL FIBRILLATION AND FLUTTER (H): ICD-10-CM

## 2018-03-29 DIAGNOSIS — I10 ESSENTIAL HYPERTENSION: ICD-10-CM

## 2018-03-29 DIAGNOSIS — I48.91 ATRIAL FIBRILLATION AND FLUTTER (H): ICD-10-CM

## 2018-03-29 RX ORDER — WARFARIN SODIUM 2.5 MG/1
TABLET ORAL
Qty: 120 TABLET | Refills: 0 | Status: SHIPPED | OUTPATIENT
Start: 2018-03-29 | End: 2018-06-19

## 2018-03-29 NOTE — TELEPHONE ENCOUNTER
"Warfarin 2.5 mg      Last Written Prescription Date:  10/3/17  Last Fill Quantity: 120,   # refills: 1  Last Office Visit: 8/14/17  Future Office visit:    Next 5 appointments (look out 90 days)     Jun 25, 2018 10:45 AM CDT   Return Visit with Timothy Das MD   [facility]    [address]  [facility] [address] [phone]                Requested Prescriptions   Pending Prescriptions Disp Refills     warfarin (COUMADIN) 2.5 MG tablet 120 tablet 0     Sig: Take 1 tablet (2.5 mg) by mouth Sun, Tue, Thur, Fri. Take 2 tablets (5mg) by mouth Mon, wed, Sat or as instructed by INR clinic.    Vitamin K Antagonists Failed    3/29/2018 10:22 AM       Failed - INR is within goal in the past 6 weeks    Confirm INR is within goal in the past 6 weeks.     Recent Labs   Lab Test 02/26/18   INR  2.4*                      Passed - Recent (12 mo) or future (30 days) visit within the authorizing provider's specialty    Patient had office visit in the last 12 months or has a visit in the next 30 days with authorizing provider or within the authorizing provider's specialty.  See \"Patient Info\" tab in inbasket, or \"Choose Columns\" in Meds & Orders section of the refill encounter.           Passed - Patient is 18 years of age or older       Passed - Patient is not pregnant       Passed - No positive pregnancy on file in past 12 months        Prescription approved per [facility] [place] Refill Protocol.  Екатерина Mahan RN      "

## 2018-03-30 RX ORDER — METOPROLOL TARTRATE 100 MG
50 TABLET ORAL 2 TIMES DAILY
Qty: 90 TABLET | Refills: 0 | Status: SHIPPED | OUTPATIENT
Start: 2018-03-30 | End: 2018-06-19

## 2018-03-30 NOTE — TELEPHONE ENCOUNTER
"Requested Prescriptions   Pending Prescriptions Disp Refills     metoprolol tartrate (LOPRESSOR) 100 MG tablet 90 tablet 0     Sig: Take 0.5 tablets (50 mg) by mouth 2 times daily    Beta-Blockers Protocol Passed    3/29/2018  8:57 AM       Passed - Blood pressure under 140/90 in past 12 months    BP Readings from Last 3 Encounters:   09/29/17 118/58   08/14/17 130/60   06/13/17 112/70                Passed - Patient is age 6 or older       Passed - Recent (12 mo) or future (30 days) visit within the authorizing provider's specialty    Patient had office visit in the last 12 months or has a visit in the next 30 days with authorizing provider or within the authorizing provider's specialty.  See \"Patient Info\" tab in inbasket, or \"Choose Columns\" in Meds & Orders section of the refill encounter.            Prescription approved per [facility] Refill Protocol.    "

## 2018-04-03 ENCOUNTER — TRANSFERRED RECORDS (OUTPATIENT)
Dept: HEALTH INFORMATION MANAGEMENT | Facility: CLINIC | Age: 78
End: 2018-04-03

## 2018-04-09 ENCOUNTER — TRANSFERRED RECORDS (OUTPATIENT)
Dept: HEALTH INFORMATION MANAGEMENT | Facility: CLINIC | Age: 78
End: 2018-04-09

## 2018-04-09 ENCOUNTER — ANTICOAGULATION THERAPY VISIT (OUTPATIENT)
Dept: ANTICOAGULATION | Facility: CLINIC | Age: 78
End: 2018-04-09
Payer: MEDICARE

## 2018-04-09 DIAGNOSIS — G47.61 PLMD (PERIODIC LIMB MOVEMENT DISORDER): Primary | ICD-10-CM

## 2018-04-09 DIAGNOSIS — I48.92 ATRIAL FIBRILLATION AND FLUTTER (H): ICD-10-CM

## 2018-04-09 DIAGNOSIS — I48.91 ATRIAL FIBRILLATION AND FLUTTER (H): ICD-10-CM

## 2018-04-09 DIAGNOSIS — Z79.01 LONG-TERM (CURRENT) USE OF ANTICOAGULANTS: ICD-10-CM

## 2018-04-09 LAB — INR POINT OF CARE: 1.9 (ref 0.86–1.14)

## 2018-04-09 PROCEDURE — 85610 PROTHROMBIN TIME: CPT | Mod: QW

## 2018-04-09 PROCEDURE — 99207 ZZC NO CHARGE NURSE ONLY: CPT

## 2018-04-09 PROCEDURE — 36416 COLLJ CAPILLARY BLOOD SPEC: CPT

## 2018-04-09 NOTE — PROGRESS NOTES
ANTICOAGULATION FOLLOW-UP CLINIC VISIT    Patient Name:  Zunilda Alicea May  Date:  4/9/2018  Contact Type:  Face to Face    SUBJECTIVE:     Patient Findings     Positives Change in diet/appetite (Pt was eating a lot of broccoli/califlower salad in the last few days )           OBJECTIVE    INR Protime   Date Value Ref Range Status   04/09/2018 1.9 (A) 0.86 - 1.14 Final       ASSESSMENT / PLAN  INR assessment THER    Recheck INR In: 6 WEEKS    INR Location Clinic      Anticoagulation Summary as of 4/9/2018     INR goal 2.0-3.0   Today's INR 1.9!   Maintenance plan 5 mg (2.5 mg x 2) on Mon, Wed, Sat; 2.5 mg (2.5 mg x 1) all other days   Full instructions 5 mg on Mon, Wed, Sat; 2.5 mg all other days   Weekly total 25 mg   No change documented Simona Shrestha, RN   Plan last modified Екатерина Mahan RN (8/14/2017)   Next INR check 5/21/2018   Priority INR   Target end date     Indications   Long-term (current) use of anticoagulants [Z79.01] [Z79.01]  Atrial fibrillation and flutter (H) [I48.91  I48.92]         Anticoagulation Episode Summary     INR check location     Preferred lab     Send INR reminders to [facility]    Comments likes printed AVS      Anticoagulation Care Providers     Provider Role Specialty Phone number    Yunior Huang MD [facility] Internal Medicine [phone]            See the Encounter Report to view Anticoagulation Flowsheet and Dosing Calendar (Go to Encounters tab in chart review, and find the Anticoagulation Therapy Visit)    Dosage adjustment made based on physician directed care plan.    Simona Shrestha, RN

## 2018-04-09 NOTE — MR AVS SNAPSHOT
Zunilda Alicea May   4/9/2018 10:45 AM   Anticoagulation Therapy Visit    Description:  77 year old female   Provider:  [facility]   Department:  [facility]           INR as of 4/9/2018     Today's INR 1.9!      Anticoagulation Summary as of 4/9/2018     INR goal 2.0-3.0   Today's INR 1.9!   Full instructions 5 mg on Mon, Wed, Sat; 2.5 mg all other days   Next INR check 5/21/2018    Indications   Long-term (current) use of anticoagulants [Z79.01] [Z79.01]  Atrial fibrillation and flutter (H) [I48.91  I48.92]         Your next Anticoagulation Clinic appointment(s)     May 21, 2018 11:00 AM CDT   [facility]tico[facility] with [facility]   [facility]    [address] [facility] [address]  [facility] [id number] [phone]              Contact Numbers     [facility] Phone Numbers:  Anticoagulation Clinic Appointments : [phone]  Anticoagulation Nurse: [phone]         April 2018 Details    Sun Mon Tue Wed Thu Fri Sat     1               2               3               4               5               6               7                 8               9      5 mg   See details      10      2.5 mg         11      5 mg         12      2.5 mg         13      2.5 mg         14      5 mg           15      2.5 mg         16      5 mg         17      2.5 mg         18      5 mg         19      2.5 mg         20      2.5 mg         21      5 mg           22      2.5 mg         23      5 mg         24      2.5 mg         25      5 mg         26      2.5 mg         27      2.5 mg         28      5 mg           29      2.5 mg         30      5 mg               Date Details   04/09 This INR check               How to take your warfarin dose     To take:  2.5 mg Take 1 of the 2.5 mg tablets.    To take:  5 mg Take 2 of the 2.5 mg tablets.           May 2018 Details    Sun Mon Tue Wed Thu Fri Sat       1      2.5 mg         2      5 mg          3      2.5 mg         4      2.5 mg         5      5 mg           6      2.5 mg         7      5 mg         8      2.5 mg         9      5 mg         10      2.5 mg         11      2.5 mg         12      5 mg           13      2.5 mg         14      5 mg         15      2.5 mg         16      5 mg         17      2.5 mg         18      2.5 mg         19      5 mg           20      2.5 mg         21            22               23               24               25               26                 27               28               29               30               31                  Date Details   No additional details    Date of next INR:  5/21/2018         How to take your warfarin dose     To take:  2.5 mg Take 1 of the 2.5 mg tablets.    To take:  5 mg Take 2 of the 2.5 mg tablets.

## 2018-04-17 ENCOUNTER — TRANSFERRED RECORDS (OUTPATIENT)
Dept: HEALTH INFORMATION MANAGEMENT | Facility: CLINIC | Age: 78
End: 2018-04-17

## 2018-04-19 ENCOUNTER — ALLIED HEALTH/NURSE VISIT (OUTPATIENT)
Dept: CARDIOLOGY | Facility: CLINIC | Age: 78
End: 2018-04-19
Payer: MEDICARE

## 2018-04-19 DIAGNOSIS — Z95.0 CARDIAC PACEMAKER IN SITU: Primary | ICD-10-CM

## 2018-04-19 PROCEDURE — 93296 REM INTERROG EVL PM/IDS: CPT | Performed by: INTERNAL MEDICINE

## 2018-04-19 PROCEDURE — 93294 REM INTERROG EVL PM/LDLS PM: CPT | Performed by: INTERNAL MEDICINE

## 2018-04-19 NOTE — MR AVS SNAPSHOT
After Visit Summary   4/19/2018    Zunilda Alicea May    MRN: [id number]           Patient Information     Date Of Birth          1940        Visit Information        Provider Department      4/19/2018 3:15 PM [facility] [address]        Today's Diagnoses     Cardiac pacemaker in situ    -  1       Follow-ups after your visit        Your next 10 appointments already scheduled     Apr 19, 2018  3:15 PM CDT   Remote PPM Check with KRAMER TECH1   [facility]   [place] ([facility])    [facility] [facility] [address]  [facility] [id number] [phone] OPT 2           This appointment is for a remote check of your pacemaker.  This is not an appointment at the office.            Apr 23, 2018 11:00 AM CDT   Pre-Op physical with Yunior Huang MD   [facility]    [address] [place]  [facility] [id number] [phone]            May 21, 2018 11:00 AM CDT   Anticoagulation Visit with [facility]   [facility] ([facility]    [address] [place] [address]  [facility] [phone]            Jun 21, 2018 10:45 AM CDT   Ech Complete with [facility] ([facility])    [address] [facility] [address] [facility] [address]  [facility] [phone]           1. Please bring or wear a comfortable two-piece outfit. 2. You may eat, drink and take your normal medicines. 3. For any questions that cannot be answered, please contact the ordering physician ***Please check-in at the [place] Registration Office located in [address] in the [facility] building. When you are finished registering, please go to [address] and have a seat. The technician will call your name for the test.            Jun 25, 2018 10:45 AM CDT   Return Visit with Timothy Das MD   [facility]  [facility] [place] [facility]    [address]  [facility] [id number] [phone]            Aug 17, 2018  9:40 AM CDT   PHYSICAL with Yunior Huang MD   [facility]    [address]  [facility] [phone]              Who to contact     If you have questions or need follow up information about today's clinic visit or your schedule please contact [facility]   [place] directly at [phone].  Normal or non-critical lab and imaging results will be communicated to you by MyChart, letter or phone within 4 business days after the clinic has received the results. If you do not hear from us within 7 days, please contact the clinic through [employer] or phone. If you have a critical or abnormal lab result, we will notify you by phone as soon as possible.  Submit refill requests through [employer] or call your pharmacy and they will forward the refill request to us. Please allow 3 business days for your refill to be completed.          Additional Information About Your Visit        [employer] Information     [employer] gives you secure access to your electronic health record. If you see a primary care provider, you can also send messages to your care team and make appointments. If you have questions, please call your primary care clinic.  If you do not have a primary care provider, please call [phone] and they will assist you.        Care EveryWhere ID     This is your Care EveryWhere ID. This could be used by other organizations to access your [place] medical records  [id number]         Blood Pressure from Last 3 Encounters:   09/29/17 118/58   08/14/17 130/60   06/13/17 112/70    Weight from Last 3 Encounters:   09/29/17 72.2 kg (159 lb 3.2 oz)   08/14/17 70.3 kg (155 lb)   06/13/17 69.9 kg (154 lb 3.2 oz)              We Performed the Following     INTERROGATION  DEVICE EVAL REMOTE, PACER/ICD (33605)     PM DEVICE INTERROGATE REMOTE (94547)          Today's Medication Changes          These changes are accurate as of 4/19/18  9:36 AM.  If you have any questions, ask your nurse or doctor.               These medicines have changed or have updated prescriptions.        Dose/Directions    * warfarin 3 MG tablet   Commonly known as:  COUMADIN   This may have changed:    - how much to take  - additional instructions   Used for:  Atrial fibrillation and flutter (H)        Dose:  3 mg   Take 1 tablet (3 mg) by mouth daily For 3 days, check INR on 05/05 to adjust the dose.   Quantity:  30 tablet   Refills:  0       * warfarin 2.5 MG tablet   Commonly known as:  COUMADIN   This may have changed:  Another medication with the same name was changed. Make sure you understand how and when to take each.   Used for:  Atrial fibrillation and flutter (H)        Take 1 tablet (2.5 mg) by mouth Sun, Tue, Thur, Fri. Take 2 tablets (5mg) by mouth Mon, wed, Sat or as instructed by INR clinic.   Quantity:  120 tablet   Refills:  0       * Notice:  This list has 2 medication(s) that are the same as other medications prescribed for you. Read the directions carefully, and ask your doctor or other care provider to review them with you.             Primary Care Provider Office Phone # Fax #    Yunior Huang [phone]       [address] [facility] [address]        Equal Access to Services     [facility]: Hadii rosalie ku hadasho Soomaali, waaxda luqadaha, qaybta kaalmada adeegyada, waxay rafaela hayenrike xiao . So [facility] [phone].    ATENCIÓN: Si habla español, tiene a morales disposición servicios gratuitos de asistencia lingüística. Llame al [phone].    We comply with applicable federal civil rights laws and [place] laws. We do not discriminate on the basis of race, color, national origin, age, disability, sex, sexual orientation, or gender identity.             Thank you!     Thank you for choosing [facility]  for your care. Our goal is always to provide you with excellent care. Hearing back from our patients is one way we can continue to improve our services. Please take a few minutes to complete the written survey that you may receive in the mail after your visit with us. Thank you!             Your Updated Medication List - Protect others around you: Learn how to safely use, store and throw away your medicines at www.disposemymeds.org.          This list is accurate as of 4/19/18  9:36 AM.  Always use your most recent med list.                   Brand Name Dispense Instructions for use Diagnosis    ACETAMINOPHEN PO      Take 650 mg by mouth every 6 hours as needed        ALLOPURINOL PO      Take 100 mg by mouth 2 times daily        Amlodipine Besylate-Valsartan  MG Tabs    EXFORGE    90 tablet    Take 1 tablet by mouth daily    Essential hypertension       BONIVA 3 MG/3ML   Generic drug:  ibandronate      Inject 3 mg into the vein once        calcium citrate 950 MG tablet    CALCITRATE     Take by mouth daily 1000 mg daily        chlorthalidone 25 MG tablet    HYGROTON    90 tablet    Take 1 tablet (25 mg) by mouth daily    Essential hypertension with goal blood pressure less than 130/85       fish oil-omega-3 fatty acids 1000 MG capsule      Take 2 g by mouth daily        folic acid 1 MG tablet    FOLVITE     Take 1 mg by mouth daily        levothyroxine 112 MCG tablet    SYNTHROID/LEVOTHROID    90 tablet    Take 1 tablet (112 mcg) by mouth daily    Acquired hypothyroidism       METHOTREXATE SODIUM IJ      Inject 0.8 mLs as directed once a week        metoprolol tartrate 100 MG tablet    LOPRESSOR    90 tablet    Take 0.5 tablets (50 mg) by mouth 2 times daily    Essential hypertension       Multi-vitamin Tabs tablet      Take 1 tablet by mouth daily        omeprazole 20 MG CR capsule    priLOSEC    180 capsule    Take  1 capsule (20 mg) by mouth 2 times daily    Esophageal reflux       VITAMIN D (CHOLECALCIFEROL) PO      Take 1,000 Units by mouth daily        * warfarin 3 MG tablet    COUMADIN    30 tablet    Take 1 tablet (3 mg) by mouth daily For 3 days, check INR on 05/05 to adjust the dose.    Atrial fibrillation and flutter (H)       * warfarin 2.5 MG tablet    COUMADIN    120 tablet    Take 1 tablet (2.5 mg) by mouth Sun, Tue, Thur, Fri. Take 2 tablets (5mg) by mouth Mon, wed, Sat or as instructed by INR clinic.    Atrial fibrillation and flutter (H)       * Notice:  This list has 2 medication(s) that are the same as other medications prescribed for you. Read the directions carefully, and ask your doctor or other care provider to review them with you.

## 2018-04-19 NOTE — PROGRESS NOTES
Medtronic Revo MRI (D) Remote PPM Device Check  AP: 45 % : 32 %  Mode: AAIR<->DDDR        Presenting Rhythm: AP/VS  Heart Rate: Adequate rates per histogram  Sensing: Stable    Pacing Threshold: Stable    Impedance: Stable  Battery Status: 2.95V with OPAL being 2.81V  Atrial Arrhythmia: 134 mode switch episodes comprising 4.7% of the time. Longest episode lasted 34 hours 23 minutes, ventricular rates controlled. Taking Warfarin.    Ventricular Arrhythmia: 2 ventricular high rates. EGMs show Vs>As for NSVT lasting 7-10 beats, rates 145-180bpm. EF 60-65% (2017). Reviewed with MELINA Pratt.       Care Plan: F/u PPM [place] q 3 months. Gave patient results over the phone.  XAVI CastilloT

## 2018-04-23 ENCOUNTER — OFFICE VISIT (OUTPATIENT)
Dept: INTERNAL MEDICINE | Facility: CLINIC | Age: 78
End: 2018-04-23
Payer: MEDICARE

## 2018-04-23 VITALS
HEIGHT: 65 IN | SYSTOLIC BLOOD PRESSURE: 122 MMHG | WEIGHT: 157 LBS | OXYGEN SATURATION: 96 % | TEMPERATURE: 97.9 F | BODY MASS INDEX: 26.16 KG/M2 | HEART RATE: 78 BPM | DIASTOLIC BLOOD PRESSURE: 60 MMHG

## 2018-04-23 DIAGNOSIS — M06.9 RHEUMATOID ARTHRITIS, INVOLVING UNSPECIFIED SITE, UNSPECIFIED RHEUMATOID FACTOR PRESENCE: ICD-10-CM

## 2018-04-23 DIAGNOSIS — I48.91 ATRIAL FIBRILLATION AND FLUTTER (H): ICD-10-CM

## 2018-04-23 DIAGNOSIS — I48.92 ATRIAL FIBRILLATION AND FLUTTER (H): ICD-10-CM

## 2018-04-23 DIAGNOSIS — Z01.818 PREOP GENERAL PHYSICAL EXAM: Primary | ICD-10-CM

## 2018-04-23 DIAGNOSIS — E03.9 ACQUIRED HYPOTHYROIDISM: ICD-10-CM

## 2018-04-23 DIAGNOSIS — I10 BENIGN ESSENTIAL HYPERTENSION: ICD-10-CM

## 2018-04-23 DIAGNOSIS — H25.9 AGE-RELATED CATARACT OF BOTH EYES, UNSPECIFIED AGE-RELATED CATARACT TYPE: ICD-10-CM

## 2018-04-23 LAB
ERYTHROCYTE [DISTWIDTH] IN BLOOD BY AUTOMATED COUNT: 17.9 % (ref 10–15)
HCT VFR BLD AUTO: 36.2 % (ref 35–47)
HGB BLD-MCNC: 11.7 G/DL (ref 11.7–15.7)
MCH RBC QN AUTO: 32.6 PG (ref 26.5–33)
MCHC RBC AUTO-ENTMCNC: 32.3 G/DL (ref 31.5–36.5)
MCV RBC AUTO: 101 FL (ref 78–100)
PLATELET # BLD AUTO: 343 10E9/L (ref 150–450)
RBC # BLD AUTO: 3.59 10E12/L (ref 3.8–5.2)
WBC # BLD AUTO: 8.4 10E9/L (ref 4–11)

## 2018-04-23 PROCEDURE — 84443 ASSAY THYROID STIM HORMONE: CPT | Performed by: INTERNAL MEDICINE

## 2018-04-23 PROCEDURE — 99214 OFFICE O/P EST MOD 30 MIN: CPT | Performed by: INTERNAL MEDICINE

## 2018-04-23 PROCEDURE — 85027 COMPLETE CBC AUTOMATED: CPT | Performed by: INTERNAL MEDICINE

## 2018-04-23 PROCEDURE — 36415 COLL VENOUS BLD VENIPUNCTURE: CPT | Performed by: INTERNAL MEDICINE

## 2018-04-23 PROCEDURE — 80053 COMPREHEN METABOLIC PANEL: CPT | Performed by: INTERNAL MEDICINE

## 2018-04-23 NOTE — PROGRESS NOTES
[place] [address]  [facility] [address]  [phone]  Dept: [phone]    PRE-OP EVALUATION:  Today's date: 2018    Zunilda Clark (: 1940) presents for pre-operative evaluation assessment as requested by Dr. Latham.  She requires evaluation and anesthesia risk assessment prior to undergoing surgery/procedure for treatment of bilateral eyes cataracts .    Proposed Surgery/ Procedure: Cataract Removal  Date of Surgery/ Procedure: 2018 and 05/15/18  Time of Surgery/ Procedure: [facility]/Surgical Facility: [facility]  Fax number for surgical facility: [phone]  Primary Physician: Yunior Huang  Type of Anesthesia Anticipated: to be determined    Patient has a Health Care Directive or Living Will:  YES     1. YES - DO YOU HAVE A HISTORY OF HEART ATTACK, STROKE, STENT, BYPASS OR SURGERY ON AN ARTERY IN THE HEAD, NECK, HEART OR LEG?   2. NO - Do you ever have any pain or discomfort in your chest?  3. YES - DO YOU HAVE A HISTORY OF HEART FAILURE   4. YES - ARE YOUR TROUBLED BY SHORTNESS OF BREATH WHEN WALKING ON THE LEVEL, UP A SLIGHT HILL OR AT NIGHT?   5. NO - Do you currently have a cold, bronchitis or other respiratory infection?  6. NO - Do you have a cough, shortness of breath or wheezing?  7. NO - Do you sometimes get pains in the calves of your legs when you walk?  8. YES - DO YOU OR ANYONE IN YOUR FAMILY HAVE PREVIOUS HISTORY OF BLOOD CLOTS?   9. NO - Do you or does anyone in your family have a serious bleeding problem such as prolonged bleeding following surgeries or cuts?  10. YES - HAVE YOU EVER HAD PROBLEMS WITH ANEMIA OR BEEN TOLD TO TAKE IRON PILLS?   11. NO - Have you had any abnormal blood loss such as black, tarry or bloody stools, or abnormal vaginal bleeding?  12. NO - Have you ever had a blood transfusion?  13. YES - HAVE YOU OR ANY OF YOUR RELATIVES EVER HAD PROBLEMS WITH ANESTHESIA?   14. NO - Do you have sleep  apnea, excessive snoring or daytime drowsiness?  15. YES - DO YOU HAVE ANY PROSTHETIC HEART VALVES?   16. NO - Do you have prosthetic joints?  17. NO - Is there any chance that you may be pregnant?      HPI:     HPI related to upcoming procedure: scheduled for bilateral eyes cataract surgery.   No acute medical concerns.   Chronic conditions are controlled.   Has h/o HTN. on medical treatment. BP has been controlled. No side effects from medications. No CP, HA, dizziness. good compliance with medications and low salt diet.  Has history of atrial fibrillation. On anticoagulation with Coumadin and rate control medications. Asymptonatic - no chest pains , palpitations,  no side effects from medications.  Has hypothyroidism. On Synthroid. No heat /cold intolerance, weight loss/ gain , heart palpitations, change in bowel habits.  Has h/o RA. On immunosuppressive treatment. No flare ups of joint pain , no swelling.         See problem list for active medical problems.  Problems all longstanding and stable, except as noted/documented.  See ROS for pertinent symptoms related to these conditions.                                                                                                                                                          .    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 09/29/2017     Priority: Medium     Valvular heart disease 09/29/2017     Priority: Medium     Paroxysmal atrial fibrillation (H) 09/29/2017     Priority: Medium     S/P mitral valve replacement with bioprosthetic valve 09/29/2017     Priority: Medium     Pulmonary hypertension 09/29/2017     Priority: Medium     Acute cholecystitis 04/28/2017     Priority: Medium     Long-term (current) use of anticoagulants [Z79.01] 04/25/2016     Priority: Medium     Essential hypertension 11/10/2015     Priority: Medium     Acquired hypothyroidism 11/04/2015      Priority: Medium     Anticoagulation management encounter 07/30/2015     Priority: Medium     Advance Care Planning 07/29/2015     Priority: Medium     Advance Care Planning 7/29/2015: Receipt of ACP document:  Received: Health Care Directive which was witnessed or notarized on 9/5/13.  Document not previously scanned.  Validation form completed and sent with document to be scanned.  Code Status reflects choices in most recent ACP document.  Confirmed/documented designated decision maker(s): Norberto Clark is primary health care agent, and Kyara Mayorga and Tenisha Moeller are alternate agents.  Added by Jose Castañeda           Fracture, humerus closed, shaft 07/28/2015     Priority: Medium     Humerus fracture 07/22/2015     Priority: Medium     Cardiac pacemaker in situ 06/05/2015     Priority: Medium     Mitral valve disorder 06/01/2015     Priority: Medium     mitral valve replacement in 2008 following attempted balloon valvuloplasty in 2006 for mitral stenosis related to presumed rheumatic valvular heart disease        Pulmonary HTN      Priority: Medium     Atrial fibrillation and flutter (H)      Priority: Medium     Ablation and PPM 2011       RA (rheumatoid arthritis) (H) 05/05/2015     Priority: Medium     Benign essential hypertension 05/05/2015     Priority: Medium     CHF (congestive heart failure) (H) 04/26/2014     Priority: Medium      Past Medical History:   Diagnosis Date     Acquired hypothyroidism 11/4/2015     Aortic valve insufficiency      Arthritis      Atrial fibrillation (H)      Atrial fibrillation and flutter (H)      Blood clotting disorder (H)      CHF (congestive heart failure) (H)      DVT (deep venous thrombosis) (H) 2003     Gastro-oesophageal reflux disease      H/O mitral valve replacement with tissue graft june 2014     History of blood transfusion 2014     HTN (hypertension)      Hypothyroidism      Other chronic pain      Pulmonary HTN      Rheumatoid arthritis(714.0)       Seizures (H) 2014     Stroke (H) 2009    left side mild weakness      Stroke (H) 2014     [place]ope 2011    [place]     Thrombosis of leg 2003    both legs     Past Surgical History:   Procedure Laterality Date     ABDOMEN SURGERY      prolapsed bladder     H ABLATION AV NODE  2011    AFlutter with syncope, PPM to follow     IMPLANT PACEMAKER  2011     LAPAROSCOPIC CHOLECYSTECTOMY WITH CHOLANGIOGRAMS N/A 4/29/2017    Procedure: LAPAROSCOPIC CHOLECYSTECTOMY WITH CHOLANGIOGRAMS;  LAPAROSCOPIC CHOLECYSTECTOMY WITH CHOLANGIOGRAMS ;  Surgeon: Angeles Mcgill MD;  Location: [facility] OR     OPEN REDUCTION INTERNAL FIXATION RODDING INTRAMEDULLARY HUMERUS Right 7/28/2015    Procedure: OPEN REDUCTION INTERNAL FIXATION RODDING INTRAMEDULLARY HUMERUS;  Surgeon: Raymundo Rivera MD;  Location: [facility] OR     REPLACE VALVE MITRAL  2006    Mitral valve replacement with bioprosthetic valve in 2008 for rheumatic disease     SLING BLADDER SUSPENSION WITH FASCIA UMESH       Current Outpatient Prescriptions   Medication Sig Dispense Refill     ACETAMINOPHEN PO Take 650 mg by mouth every 6 hours as needed        ALLOPURINOL PO Take 100 mg by mouth 2 times daily       Amlodipine Besylate-Valsartan (EXFORGE)  MG TABS Take 1 tablet by mouth daily 90 tablet 1     calcium citrate (CALCITRATE) 950 MG tablet Take by mouth daily 1000 mg daily       chlorthalidone (HYGROTON) 25 MG tablet Take 1 tablet (25 mg) by mouth daily 90 tablet 2     fish oil-omega-3 fatty acids 1000 MG capsule Take 2 g by mouth daily       folic acid (FOLVITE) 1 MG tablet Take 1 mg by mouth daily       ibandronate (BONIVA) 3 MG/3ML Inject 3 mg into the vein once       levothyroxine (SYNTHROID/LEVOTHROID) 112 MCG tablet Take 1 tablet (112 mcg) by mouth daily 90 tablet 1     METHOTREXATE SODIUM IJ Inject 0.8 mLs as directed once a week       metoprolol tartrate (LOPRESSOR) 100 MG tablet Take 0.5 tablets (50 mg) by mouth 2 times daily 90 tablet 0     multivitamin,  "therapeutic with minerals (MULTI-VITAMIN) TABS tablet Take 1 tablet by mouth daily       omeprazole (PRILOSEC) 20 MG CR capsule Take 1 capsule (20 mg) by mouth 2 times daily 180 capsule 1     VITAMIN D, CHOLECALCIFEROL, PO Take 1,000 Units by mouth daily       warfarin (COUMADIN) 2.5 MG tablet Take 1 tablet (2.5 mg) by mouth Sun, Tue, Thur, Fri. Take 2 tablets (5mg) by mouth Mon, wed, Sat or as instructed by INR clinic. 120 tablet 0     warfarin (COUMADIN) 3 MG tablet Take 1 tablet (3 mg) by mouth daily For 3 days, check INR on 05/05 to adjust the dose. (Patient taking differently: Take 5 mg by mouth daily For 3 days, check INR on 05/05 to adjust the dose.) 30 tablet 0     OTC products: None, except as noted above    No Known Allergies   Latex Allergy: NO    Social History   Substance Use Topics     Smoking status: Never Smoker     Smokeless tobacco: Never Used     Alcohol use No     History   Drug Use No       REVIEW OF SYSTEMS:   CONSTITUTIONAL: NEGATIVE for fever, chills, change in weight  INTEGUMENTARY/SKIN: NEGATIVE for worrisome rashes, moles or lesions  EYES: NEGATIVE for vision changes or irritation  ENT/MOUTH: NEGATIVE for ear, mouth and throat problems  RESP: NEGATIVE for significant cough or SOB  BREAST: NEGATIVE for masses, tenderness or discharge  CV: NEGATIVE for chest pain, palpitations or peripheral edema  GI: NEGATIVE for nausea, abdominal pain, heartburn, or change in bowel habits  : NEGATIVE for frequency, dysuria, or hematuria  MUSCULOSKELETAL: NEGATIVE for significant arthralgias or myalgia  NEURO: NEGATIVE for weakness, dizziness or paresthesias  ENDOCRINE: NEGATIVE for temperature intolerance, skin/hair changes  HEME: NEGATIVE for bleeding problems  PSYCHIATRIC: NEGATIVE for changes in mood or affect    EXAM:   Ht 5' 5\" (1.651 m)    GENERAL APPEARANCE: healthy, alert and no distress     EYES: EOMI, PERRL     HENT: ear canals and TM's normal and nose and mouth without ulcers or lesions     " NECK: no adenopathy, no asymmetry, masses, or scars and thyroid normal to palpation     RESP: lungs clear to auscultation - no rales, rhonchi or wheezes     CV: regular rates and rhythm, normal S1 S2, no S3 or S4 and no murmur, click or rub     ABDOMEN:  soft, nontender, no HSM or masses and bowel sounds normal     MS: extremities normal- no gross deformities noted, no evidence of inflammation in joints, FROM in all extremities. OA changes of the knees      SKIN: no suspicious lesions or rashes     NEURO: Normal strength and tone, sensory exam grossly normal, mentation intact and speech normal     PSYCH: mentation appears normal. and affect normal/bright     LYMPHATICS: No cervical adenopathy    DIAGNOSTICS:     Labs Drawn and in Process:   Unresulted Labs Ordered in the Past 30 Days of this Admission     No orders found from 2/22/2018 to 4/24/2018.          Recent Labs   Lab Test 04/09/18 02/26/18 08/17/17   0843  06/12/17 05/24/17   0927   05/12/17   1120   05/04/16   1141   HGB   --    --    --   11.6*   --    --    --   10.8*   --   10.6*   < >   --    PLT   --    --    --   329   --    --    --    --    --   503*   < >   --    INR  1.9*  2.4*   < >   --    < >   --    < >   --    < >   --    < >   --    NA   --    --    --   142   --    --    --   140   --   139   < >   --    POTASSIUM   --    --    --   3.7   --    --    --   3.7   --   4.1   < >   --    CR   --    --    --   0.96   --   0.690   --   0.88   --   1.03   < >   --    A1C   --    --    --    --    --    --    --    --    --    --    --   5.4    < > = values in this interval not displayed.        IMPRESSION:   Reason for surgery/procedure: bilateral eyes cataracts   Diagnosis/reason for consult: preoperative evaluation/ clearance      The proposed surgical procedure is considered LOW risk.    REVISED CARDIAC RISK INDEX  The patient has the following serious cardiovascular risks for perioperative complications such as (MI, PE, VFib and 3  AV  Block):  No serious cardiac risks  INTERPRETATION: 0 risks: Class I (very low risk - 0.4% complication rate)    The patient has the following additional risks for perioperative complications:  No identified additional risks      ICD-10-CM    1. Preop general physical exam Z01.818        RECOMMENDATIONS:         --Patient is to take all scheduled medications on the day of surgery .    APPROVAL GIVEN to proceed with proposed procedure, without further diagnostic evaluation       Signed Electronically by: Yunior Huang MD    Copy of this evaluation report is provided to requesting physician.    [facility] Preop Guidelines    Revised Cardiac Risk Index

## 2018-04-23 NOTE — NURSING NOTE
"Chief Complaint   Patient presents with     Pre-Op Exam     05/01/18 and 05/15/18       Initial /60  Pulse 78  Temp 97.9  F (36.6  C) (Oral)  Ht 5' 5\" (1.651 m)  Wt 157 lb (71.2 kg)  SpO2 96%  BMI 26.13 kg/m2 Estimated body mass index is 26.13 kg/(m^2) as calculated from the following:    Height as of this encounter: 5' 5\" (1.651 m).    Weight as of this encounter: 157 lb (71.2 kg).  Medication Reconciliation: complete   Earnestine George MA      "

## 2018-04-23 NOTE — LETTER
[facility]  [address], [address]  [place] [address]  [phone]        April 24, 2018    Zunilda Alicea May  [address]   [facility] [address]            Dear Ms. Zunilda Alicea May:      The results of your recent labs were NORMAL.      If you have any further questions or problems, please contact our office.      Sincerely,        Yunior Huang M.D.

## 2018-04-23 NOTE — MR AVS SNAPSHOT
After Visit Summary   4/23/2018    Zunilda [place]a May    MRN: [id number]           Patient Information     Date Of Birth          1940        Visit Information        Provider Department      4/23/2018 11:00 AM Yunior Huang MD [facility]        Today's Diagnoses     Preop general physical exam    -  1    Age-related cataract of both eyes, unspecified age-related cataract type        Rheumatoid arthritis, involving unspecified site, unspecified rheumatoid factor presence (H)        Benign essential hypertension        Atrial fibrillation and flutter (H)        Acquired hypothyroidism          Care Instructions      Before Your Surgery      Call your surgeon if there is any change in your health. This includes signs of a cold or flu (such as a sore throat, runny nose, cough, rash or fever).    Do not smoke, drink alcohol or take over the counter medicine (unless your surgeon or primary care doctor tells you to) for the 24 hours before and after surgery.    If you take prescribed drugs: Follow your doctor s orders about which medicines to take and which to stop until after surgery.    Eating and drinking prior to surgery: follow the instructions from your surgeon    Take a shower or bath the night before surgery. Use the soap your surgeon gave you to gently clean your skin. If you do not have soap from your surgeon, use your regular soap. Do not shave or scrub the surgery site.  Wear clean pajamas and have clean sheets on your bed.           Follow-ups after your visit        Your next 10 appointments already scheduled     May 21, 2018 11:00 AM CDT   Anticoagulation Visit with [facility]   [facility] ([facility])    [address] [facility] [address]  [facility] [address]   [phone]            Jun 21, 2018 10:45 AM CDT   Ech Complete with [id number] [facility] [place] ([facility])    [address]  [facility] [id number] [phone]           1. Please bring or wear a comfortable two-piece outfit. 2. You may eat, drink and take your normal medicines. 3. For any questions that cannot be answered, please contact the ordering physician ***Please check-in at the [facility] Registration Office located in [address] in the [facility] building. When you are finished registering, please go to [address] and have a seat. The technician will call your name for the test.            Jun 25, 2018 10:45 AM CDT   Return Visit with Timothy Das MD   [facility] ([facility])    [address]  [facility] [address] [phone]            Jul 26, 2018  3:15 PM CDT   Remote PPM Check with KRAMER TECH1   [facility] ([facility])    [address]  [facility] [address] [phone] OPT 2           This appointment is for a remote check of your pacemaker.  This is not an appointment at the office.            Aug 17, 2018  9:40 AM CDT   PHYSICAL with Yunior Huang MD   [facility] ([facility])    [address]  [facility] [address] [phone]              Who to contact     If you have questions or need follow up information about today's clinic visit or your schedule please contact [facility] directly at [phone].  Normal or non-critical lab and imaging results will be communicated to you by MyChart, letter or phone within 4 business days after the clinic has received the results. If you do not hear from us within 7 days, please contact the clinic through [employer] or phone. If you have a critical or abnormal lab result, we will notify you by phone as soon as possible.  Submit refill requests through [employer] or call your pharmacy and they will forward the refill request to us. Please  "allow 3 business days for your refill to be completed.          Additional Information About Your Visit        [facility] Information     [facility] [employer] gives you secure access to your electronic health record. If you see a primary care provider, you can also send messages to your care team and make appointments. If you have questions, please call your primary care clinic.  If you do not have a primary care provider, please call [phone] and they will assist you.        Care EveryWhere ID     This is your Care EveryWhere ID. This could be used by other organizations to access your [facility] medical records  [id number]        Your Vitals Were     Pulse Temperature Height Pulse Oximetry BMI (Body Mass Index)       78 97.9  F (36.6  C) (Oral) 5' 5\" (1.651 m) 96% 26.13 kg/m2        Blood Pressure from Last 3 Encounters:   04/23/18 122/60   09/29/17 118/58   08/14/17 130/60    Weight from Last 3 Encounters:   04/23/18 157 lb (71.2 kg)   09/29/17 159 lb 3.2 oz (72.2 kg)   08/14/17 155 lb (70.3 kg)              We Performed the Following     **TSH with free T4 reflex FUTURE anytime     CBC with platelets     Comprehensive metabolic panel          Today's Medication Changes          These changes are accurate as of 4/23/18 11:26 AM.  If you have any questions, ask your nurse or doctor.               These medicines have changed or have updated prescriptions.        Dose/Directions    warfarin 2.5 MG tablet   Commonly known as:  COUMADIN   This may have changed:    - additional instructions  - Another medication with the same name was removed. Continue taking this medication, and follow the directions you see here.   Used for:  Atrial fibrillation and flutter (H)        Take 1 tablet (2.5 mg) by mouth Sun, Tue, Thur, Fri. Take 2 tablets (5mg) by mouth Mon, wed, Sat or as instructed by INR clinic.   Quantity:  120 tablet   Refills:  0                Primary Care Provider Office Phone # Fax #    Yunior Huang [phone] " [phone]       [address] [facility] [address]        Equal Access to Services     SAM UGALDE : Hadii rosalie hightower hadjesseo Sonelson, waaxda luqadaha, qaybta kaalmada adefrench, lucía rafaela magosergio condelaureen johannynikunjgerald britton. So [facility] [phone].    ATENCIÓN: Si habla español, tiene a morales disposición servicios gratuitos de asistencia lingüística. Llame al [phone].    We comply with applicable federal civil rights laws and [place] laws. We do not discriminate on the basis of race, color, national origin, age, disability, sex, sexual orientation, or gender identity.            Thank you!     Thank you for choosing [facility]  for your care. Our goal is always to provide you with excellent care. Hearing back from our patients is one way we can continue to improve our services. Please take a few minutes to complete the written survey that you may receive in the mail after your visit with us. Thank you!             Your Updated Medication List - Protect others around you: Learn how to safely use, store and throw away your medicines at www.disposemymeds.org.          This list is accurate as of 4/23/18 11:26 AM.  Always use your most recent med list.                   Brand Name Dispense Instructions for use Diagnosis    ACETAMINOPHEN PO      Take 650 mg by mouth every 6 hours as needed        ALLOPURINOL PO      Take 100 mg by mouth 2 times daily        Amlodipine Besylate-Valsartan  MG Tabs    EXFORGE    90 tablet    Take 1 tablet by mouth daily    Essential hypertension       BONIVA 3 MG/3ML   Generic drug:  ibandronate      Inject 3 mg into the vein once        calcium citrate 950 MG tablet    CALCITRATE     Take by mouth daily 1000 mg daily        chlorthalidone 25 MG tablet    HYGROTON    90 tablet    Take 1 tablet (25 mg) by mouth daily    Essential hypertension with goal blood pressure less than 130/85       fish oil-omega-3 fatty acids 1000 MG capsule      Take 2 g by mouth daily         folic acid 1 MG tablet    FOLVITE     Take 1 mg by mouth daily        levothyroxine 112 MCG tablet    SYNTHROID/LEVOTHROID    90 tablet    Take 1 tablet (112 mcg) by mouth daily    Acquired hypothyroidism       METHOTREXATE SODIUM IJ      Inject 0.8 mLs as directed once a week        metoprolol tartrate 100 MG tablet    LOPRESSOR    90 tablet    Take 0.5 tablets (50 mg) by mouth 2 times daily    Essential hypertension       Multi-vitamin Tabs tablet      Take 1 tablet by mouth daily        omeprazole 20 MG CR capsule    priLOSEC    180 capsule    Take 1 capsule (20 mg) by mouth 2 times daily    Esophageal reflux       VITAMIN D (CHOLECALCIFEROL) PO      Take 1,000 Units by mouth daily        warfarin 2.5 MG tablet    COUMADIN    120 tablet    Take 1 tablet (2.5 mg) by mouth Sun, Tue, Thur, Fri. Take 2 tablets (5mg) by mouth Mon, wed, Sat or as instructed by INR clinic.    Atrial fibrillation and flutter (H)

## 2018-04-24 LAB
ALBUMIN SERPL-MCNC: 3.4 G/DL (ref 3.4–5)
ALP SERPL-CCNC: 88 U/L (ref 40–150)
ALT SERPL W P-5'-P-CCNC: 25 U/L (ref 0–50)
ANION GAP SERPL CALCULATED.3IONS-SCNC: 7 MMOL/L (ref 3–14)
AST SERPL W P-5'-P-CCNC: 18 U/L (ref 0–45)
BILIRUB SERPL-MCNC: 0.5 MG/DL (ref 0.2–1.3)
BUN SERPL-MCNC: 21 MG/DL (ref 7–30)
CALCIUM SERPL-MCNC: 9.3 MG/DL (ref 8.5–10.1)
CHLORIDE SERPL-SCNC: 108 MMOL/L (ref 94–109)
CO2 SERPL-SCNC: 27 MMOL/L (ref 20–32)
CREAT SERPL-MCNC: 0.77 MG/DL (ref 0.52–1.04)
GFR SERPL CREATININE-BSD FRML MDRD: 72 ML/MIN/1.7M2
GLUCOSE SERPL-MCNC: 88 MG/DL (ref 70–99)
POTASSIUM SERPL-SCNC: 3.9 MMOL/L (ref 3.4–5.3)
PROT SERPL-MCNC: 7.7 G/DL (ref 6.8–8.8)
SODIUM SERPL-SCNC: 142 MMOL/L (ref 133–144)
TSH SERPL DL<=0.005 MIU/L-ACNC: 1.88 MU/L (ref 0.4–4)

## 2018-05-15 ENCOUNTER — TRANSFERRED RECORDS (OUTPATIENT)
Dept: HEALTH INFORMATION MANAGEMENT | Facility: CLINIC | Age: 78
End: 2018-05-15

## 2018-05-21 ENCOUNTER — ANTICOAGULATION THERAPY VISIT (OUTPATIENT)
Dept: ANTICOAGULATION | Facility: CLINIC | Age: 78
End: 2018-05-21
Payer: MEDICARE

## 2018-05-21 DIAGNOSIS — I48.92 ATRIAL FIBRILLATION AND FLUTTER (H): ICD-10-CM

## 2018-05-21 DIAGNOSIS — I48.91 ATRIAL FIBRILLATION AND FLUTTER (H): ICD-10-CM

## 2018-05-21 DIAGNOSIS — Z79.01 LONG-TERM (CURRENT) USE OF ANTICOAGULANTS: ICD-10-CM

## 2018-05-21 LAB — INR POINT OF CARE: 2.6 (ref 0.86–1.14)

## 2018-05-21 PROCEDURE — 36416 COLLJ CAPILLARY BLOOD SPEC: CPT

## 2018-05-21 PROCEDURE — 85610 PROTHROMBIN TIME: CPT | Mod: QW

## 2018-05-21 PROCEDURE — 99207 ZZC NO CHARGE NURSE ONLY: CPT

## 2018-05-21 NOTE — MR AVS SNAPSHOT
Zunilda Alicea May   5/21/2018 11:00 AM   Anticoagulation Therapy Visit    Description:  77 year old female   Provider:  [facility]   Department:  [facility]           INR as of 5/21/2018     Today's INR 2.6      Anticoagulation Summary as of 5/21/2018     INR goal 2.0-3.0   Today's INR 2.6   Full instructions 5 mg on Mon, Wed, Sat; 2.5 mg all other days   Next INR check 7/9/2018    Indications   Long-term (current) use of anticoagulants [Z79.01] [Z79.01]  Atrial fibrillation and flutter (H) [I48.91  I48.92]         Your next Anticoagulation Clinic appointment(s)     Jul 09, 2018 11:00 AM CDT   Anticoagulation Visit with [facility]   [facility]    [address] [facility] [address]  [facility] [id number] [phone]              Contact Numbers     [facility] Phone Numbers:  Anticoagulation Clinic Appointments : [phone]  Anticoagulation Nurse: [phone]         May 2018 Details    Sun Mon Tue Wed Thu Fri Sat       1               2               3               4               5                 6               7               8               9               10               11               12                 13               14               15               16               17               18               19                 20               21      5 mg   See details      22      2.5 mg         23      5 mg         24      2.5 mg         25      2.5 mg         26      5 mg           27      2.5 mg         28      5 mg         29      2.5 mg         30      5 mg         31      2.5 mg            Date Details   05/21 This INR check               How to take your warfarin dose     To take:  2.5 mg Take 1 of the 2.5 mg tablets.    To take:  5 mg Take 2 of the 2.5 mg tablets.           June 2018 Details    Sun Mon Tue Wed Thu Fri Sat          1      2.5 mg         2      5 mg           3      2.5 mg         4      5 mg          5      2.5 mg         6      5 mg         7      2.5 mg         8      2.5 mg         9      5 mg           10      2.5 mg         11      5 mg         12      2.5 mg         13      5 mg         14      2.5 mg         15      2.5 mg         16      5 mg           17      2.5 mg         18      5 mg         19      2.5 mg         20      5 mg         21      2.5 mg         22      2.5 mg         23      5 mg           24      2.5 mg         25      5 mg         26      2.5 mg         27      5 mg         28      2.5 mg         29      2.5 mg         30      5 mg          Date Details   No additional details            How to take your warfarin dose     To take:  2.5 mg Take 1 of the 2.5 mg tablets.    To take:  5 mg Take 2 of the 2.5 mg tablets.           July 2018 Details    Sun Mon Tue Wed Thu Fri Sat     1      2.5 mg         2      5 mg         3      2.5 mg         4      5 mg         5      2.5 mg         6      2.5 mg         7      5 mg           8      2.5 mg         9            10               11               12               13               14                 15               16               17               18               19               20               21                 22               23               24               25               26               27               28                 29               30               31                    Date Details   No additional details    Date of next INR:  7/9/2018         How to take your warfarin dose     To take:  2.5 mg Take 1 of the 2.5 mg tablets.    To take:  5 mg Take 2 of the 2.5 mg tablets.

## 2018-05-21 NOTE — PROGRESS NOTES
ANTICOAGULATION FOLLOW-UP CLINIC VISIT    Patient Name:  Zunilda Alicea May  Date:  5/21/2018  Contact Type:  Face to Face    SUBJECTIVE:     Patient Findings     Positives No Problem Findings           OBJECTIVE    INR Protime   Date Value Ref Range Status   05/21/2018 2.6 (A) 0.86 - 1.14 Final       ASSESSMENT / PLAN  INR assessment THER    Recheck INR In: 6 WEEKS    INR Location Clinic      Anticoagulation Summary as of 5/21/2018     INR goal 2.0-3.0   Today's INR 2.6   Maintenance plan 5 mg (2.5 mg x 2) on Mon, Wed, Sat; 2.5 mg (2.5 mg x 1) all other days   Full instructions 5 mg on Mon, Wed, Sat; 2.5 mg all other days   Weekly total 25 mg   No change documented Sonam Teixeira RN   Plan last modified Екатерина Mahan RN (8/14/2017)   Next INR check 7/9/2018   Priority INR   Target end date     Indications   Long-term (current) use of anticoagulants [Z79.01] [Z79.01]  Atrial fibrillation and flutter (H) [I48.91  I48.92]         Anticoagulation Episode Summary     INR check location     Preferred lab     Send INR reminders to [facility]    Comments likes printed AVS      Anticoagulation Care Providers     Provider Role Specialty Phone number    Yunior Huang MD Responsible Internal Medicine [phone]            See the Encounter Report to view Anticoagulation Flowsheet and Dosing Calendar (Go to Encounters tab in chart review, and find the Anticoagulation Therapy Visit)    Dosage adjustment made based on physician directed care plan.    Sonam Teixeira RN

## 2018-06-01 ENCOUNTER — APPOINTMENT (OUTPATIENT)
Dept: GENERAL RADIOLOGY | Facility: CLINIC | Age: 78
End: 2018-06-01
Attending: EMERGENCY MEDICINE
Payer: MEDICARE

## 2018-06-01 ENCOUNTER — HOSPITAL ENCOUNTER (EMERGENCY)
Facility: CLINIC | Age: 78
Discharge: HOME | End: 2018-06-01
Attending: EMERGENCY MEDICINE | Admitting: EMERGENCY MEDICINE
Payer: MEDICARE

## 2018-06-01 VITALS
HEIGHT: 64 IN | OXYGEN SATURATION: 99 % | WEIGHT: 154 LBS | DIASTOLIC BLOOD PRESSURE: 82 MMHG | HEART RATE: 69 BPM | SYSTOLIC BLOOD PRESSURE: 169 MMHG | BODY MASS INDEX: 26.29 KG/M2 | RESPIRATION RATE: 16 BRPM | TEMPERATURE: 98.8 F

## 2018-06-01 DIAGNOSIS — S92.502A CLOSED FRACTURE OF PHALANX OF LEFT SECOND TOE, INITIAL ENCOUNTER: ICD-10-CM

## 2018-06-01 PROCEDURE — 73630 X-RAY EXAM OF FOOT: CPT | Mod: RT

## 2018-06-01 PROCEDURE — 28510 TREATMENT OF TOE FRACTURE: CPT | Mod: T6

## 2018-06-01 PROCEDURE — 99284 EMERGENCY DEPT VISIT MOD MDM: CPT | Mod: 25

## 2018-06-01 NOTE — ED AVS SNAPSHOT
[facility] Emergency Department    [address]    [facility] [address]    Phone:  [phone]    Fax:  [phone]                                       Zunilda Alicea May   MRN: [id number]    Department:  [facility] Emergency Department   Date of Visit:  6/1/2018           After Visit Summary Signature Page     I have received my discharge instructions, and my questions have been answered. I have discussed any challenges I see with this plan with the nurse or doctor.    ..........................................................................................................................................  Patient/Patient Representative Signature      ..........................................................................................................................................  Patient Representative Print Name and Relationship to Patient    ..................................................               ................................................  Date                                            Time    ..........................................................................................................................................  Reviewed by Signature/Title    ...................................................              ..............................................  Date                                                            Time

## 2018-06-01 NOTE — ED AVS SNAPSHOT
[facility] Emergency Department    [address] [place] [address]    Phone:  [phone]    Fax:  [phone]                                       Zunilda Clark   MRN: [id number]    Department:  [facility] Emergency Department   Date of Visit:  6/1/2018           Patient Information     Date Of Birth          1940        Your diagnoses for this visit were:     Closed fracture of phalanx of left second toe, initial encounter        You were seen by Donaldo Corral MD.        Discharge Instructions       Please make an appointment to follow up with your primary care provider in 14 days if not improving.          Closed Toe Fracture  Your toe is broken (fractured). This causes local pain, swelling, and sometimes bruising. This injury usually takes about 4 to 6 weeks to heal, but can sometimes take longer. Toe injuries are often treated by taping the injured toe to the next one (terri taping). This protects the injured toe and holds it in position.     If the toenail has been severely injured, it may fall off in 1 to 2 weeks. It takes up to 12 months for a new toenail to grow back.  Home care  Follow these guidelines when caring for yourself at home:    You may be given a cast shoe to wear to keep your toe from moving. If not, you can use a sandal or any shoe that doesn t put pressure on the injured toe until the swelling and pain go away. If using a sandal, be careful not to strike your foot against anything. Another injury could make the fracture worse. If you were given crutches, don t put full weight on the injured foot until you can do so without pain, or as directed by your healthcare provider.    Keep your foot elevated to reduce pain and swelling. When sleeping, put a pillow under the injured leg. When sitting, support the injured leg so it is above your waist. This is very important during the first 2 days (48 hours).    Put an ice pack on the  injured area. Do this for 20 minutes every 1 to 2 hours the first day for pain relief. You can make an ice pack by wrapping a plastic bag of ice cubes in a thin towel. As the ice melts, be careful that any cloth or paper tape doesn t get wet. Continue using the ice pack 3 to 4 times a day for the next 2 days. Then use the ice pack as needed to ease pain and swelling.    If buddy tape was used and it becomes wet or dirty, change it. You may replace it with paper, plastic, or cloth tape. Cloth tape and paper tapes must be kept dry.    You may use acetaminophen or ibuprofen to control pain, unless another pain medicine was prescribed. If you have chronic liver or kidney disease, talk with your healthcare provider before using these medicines. Also talk with your provider if you ve had a stomach ulcer or gastrointestinal bleeding.    You may return to sports or physical education activities after 4 weeks when you can run without pain, or as directed by your healthcare provider.  Follow-up care  Follow up with your healthcare provider in 1 week, or as advised. This is to make sure the bone is healing the way it should.  X-rays may be taken. You will be told of any new findings that may affect your care.  When to seek medical advice  Call your healthcare provider right away if any of these occur:    Pain or swelling gets worse    The cast/splint cracks    The cast and padding get wet and stays wet more than 24 hours    Bad odor from the cast/splint or wound fluid stains the cast    Tightness or pressure under the cast/splint gets worse    Toe becomes cold, blue, numb, or tingly    You can t move the toe    Signs of infection: fever, redness, warmth, swelling, or drainage from the wound or cast    Fever of 100.4 F (38 C) or higher, or as directed by your healthcare provider  Date Last Reviewed: 2/1/2017 2000-2017 [employer]. [address] [place] [address], [place] [address]. All rights reserved. This information  is not intended as a substitute for professional medical care. Always follow your healthcare professional's instructions.          Your next 10 appointments already scheduled     Jun 12, 2018  4:40 PM CDT   [facility]    [address]  [facility] [id number] [phone]            Jun 21, 2018 10:45 AM CDT   Ech Complete with [id number] ([facility] [place])    [address]  [facility] [id number] [phone]           1. Please bring or wear a comfortable two-piece outfit. 2. You may eat, drink and take your normal medicines. 3. For any questions that cannot be answered, please contact the ordering physician ***Please check-in at the [facility] located in [address] in the [facility] building. When you are finished registering, please go to [address] and have a seat. The technician will call your name for the test.            Jun 25, 2018 10:45 AM CDT   Return Visit with Timothy Das MD   [facility] ([facility])    [address]  [facility] [id number] [phone]            Jul 09, 2018 11:00 AM CDT   [facility] with [facility]    [address]  [facility] [id number] [phone]            Jul 26, 2018  3:15 PM CDT   Remote PPM Check with KRAMER TECH1   [facility] ([facility])    [address] [place] [address] [address]  [facility] [address] [phone] OPT 2           This appointment is for a remote check of your pacemaker.  This is not an appointment at the office.            Aug 17, 2018  9:40 AM CDT   PHYSICAL with [facility] MD   [facility]     [address]  [facility] [address]   [phone]              24 Hour Appointment Hotline       To make an appointment at any [facility], call [phone] ([phone]). If you don't have a family doctor or clinic, we will help you find one. [facility] clinics are conveniently located to serve the needs of you and your family.             Review of your medicines      Our records show that you are taking the medicines listed below. If these are incorrect, please call your family doctor or clinic.        Dose / Directions Last dose taken    ACETAMINOPHEN PO   Dose:  650 mg        Take 650 mg by mouth every 6 hours as needed   Refills:  0        ALLOPURINOL PO   Dose:  100 mg        Take 100 mg by mouth 2 times daily   Refills:  0        Amlodipine Besylate-Valsartan  MG Tabs   Commonly known as:  EXFORGE   Dose:  1 tablet   Quantity:  90 tablet        Take 1 tablet by mouth daily   Refills:  1        BONIVA 3 MG/3ML   Dose:  3 mg   Generic drug:  ibandronate        Inject 3 mg into the vein once   Refills:  0        calcium citrate 950 MG tablet   Commonly known as:  CALCITRATE        Take by mouth daily 1000 mg daily   Refills:  0        chlorthalidone 25 MG tablet   Commonly known as:  HYGROTON   Dose:  25 mg   Quantity:  90 tablet        Take 1 tablet (25 mg) by mouth daily   Refills:  2        fish oil-omega-3 fatty acids 1000 MG capsule   Dose:  2 g        Take 2 g by mouth daily   Refills:  0        folic acid 1 MG tablet   Commonly known as:  FOLVITE   Dose:  1 mg        Take 1 mg by mouth daily   Refills:  0        levothyroxine 112 MCG tablet   Commonly known as:  SYNTHROID/LEVOTHROID   Dose:  112 mcg   Quantity:  90 tablet        Take 1 tablet (112 mcg) by mouth daily   Refills:  1        METHOTREXATE SODIUM IJ   Dose:  0.8 mL        Inject 0.8 mLs as directed once a week   Refills:  0        metoprolol tartrate 100 MG tablet   Commonly known as:  LOPRESSOR   Dose:  50 mg    Quantity:  90 tablet        Take 0.5 tablets (50 mg) by mouth 2 times daily   Refills:  0        Multi-vitamin Tabs tablet   Dose:  1 tablet        Take 1 tablet by mouth daily   Refills:  0        omeprazole 20 MG CR capsule   Commonly known as:  priLOSEC   Dose:  20 mg   Quantity:  180 capsule        Take 1 capsule (20 mg) by mouth 2 times daily   Refills:  1        VITAMIN D (CHOLECALCIFEROL) PO   Dose:  1000 Units        Take 1,000 Units by mouth daily   Refills:  0        warfarin 2.5 MG tablet   Commonly known as:  COUMADIN   Quantity:  120 tablet        Take 1 tablet (2.5 mg) by mouth Sun, Tue, Thur, Fri. Take 2 tablets (5mg) by mouth Mon, wed, Sat or as instructed by INR clinic.   Refills:  0                Procedures and tests performed during your visit     Foot  XR, G/E 3 views, right      Orders Needing Specimen Collection     None      Pending Results     No orders found from 5/30/2018 to 6/2/2018.            Pending Culture Results     No orders found from 5/30/2018 to 6/2/2018.            Pending Results Instructions     If you had any lab results that were not finalized at the time of your Discharge, you can call the ED Lab Result RN at [phone]. You will be contacted by this team for any positive Lab results or changes in treatment. The nurses are available 7 days a week from 10A to 6:30P.  You can leave a message 24 hours per day and they will return your call.        Test Results From Your Hospital Stay        6/1/2018 10:22 PM      Narrative     FOOT RIGHT THREE OR MORE VIEWS June 1, 2018 8:59 PM     HISTORY: Pain and swelling around second toe. Injury to the second  toe.    COMPARISON: None.        Impression     IMPRESSION: There is a moderately displaced intra-articular fracture  distal aspect proximal phalanx second toe. Very mild dorsal  subluxation middle and distal phalanx of the second toe in relation to  the proximal phalanx.    Moderate hallux valgus. Surgical clips near the  anterior ankle.  Vascular calcifications typical of a diabetic patient.    ANA DEL VALLE MD                Clinical Quality Measure: Blood Pressure Screening     Your blood pressure was checked while you were in the emergency department today. The last reading we obtained was  BP: 169/82 . Please read the guidelines below about what these numbers mean and what you should do about them.  If your systolic blood pressure (the top number) is less than 120 and your diastolic blood pressure (the bottom number) is less than 80, then your blood pressure is normal. There is nothing more that you need to do about it.  If your systolic blood pressure (the top number) is 120-139 or your diastolic blood pressure (the bottom number) is 80-89, your blood pressure may be higher than it should be. You should have your blood pressure rechecked within a year by a primary care provider.  If your systolic blood pressure (the top number) is 140 or greater or your diastolic blood pressure (the bottom number) is 90 or greater, you may have high blood pressure. High blood pressure is treatable, but if left untreated over time it can put you at risk for heart attack, stroke, or kidney failure. You should have your blood pressure rechecked by a primary care provider within the next 4 weeks.  If your provider in the emergency department today gave you specific instructions to follow-up with your doctor or provider even sooner than that, you should follow that instruction and not wait for up to 4 weeks for your follow-up visit.        Thank you for choosing [facility]       Thank you for choosing [facility] for your care. Our goal is always to provide you with excellent care. Hearing back from our patients is one way we can continue to improve our services. Please take a few minutes to complete the written survey that you may receive in the mail after you visit with us. Thank you!        [facility] Information     [facility] gives you secure access to  your electronic health record. If you see a primary care provider, you can also send messages to your care team and make appointments. If you have questions, please call your primary care clinic.  If you do not have a primary care provider, please call [phone] and they will assist you.        Care EveryWhere ID     This is your Care EveryWhere ID. This could be used by other organizations to access your [facility] medical records  [id number]        Equal Access to Services     SAM UGALDE : Annmarie Boogie, junior hutchins, pamela lorenzana, lucía britton. So [facility] [phone].    ATENCIÓN: Si habla español, tiene a morales disposición servicios gratuitos de asistencia lingüística. Llame al [phone].    We comply with applicable federal civil rights laws and [place] laws. We do not discriminate on the basis of race, color, national origin, age, disability, sex, sexual orientation, or gender identity.            After Visit Summary       This is your record. Keep this with you and show to your community pharmacist(s) and doctor(s) at your next visit.

## 2018-06-02 NOTE — ED TRIAGE NOTES
Patient presents for complaint of injury and pain to the right 2nd toe. States that x1 week ago she stubbed it on her walker. States it has been swollen and painful since. Bruising and swelling noted around the 2nd toe. CMS intact, ABC intact, A&Ox4.

## 2018-06-02 NOTE — DISCHARGE INSTRUCTIONS
Please make an appointment to follow up with your primary care provider in 14 days if not improving.          Closed Toe Fracture  Your toe is broken (fractured). This causes local pain, swelling, and sometimes bruising. This injury usually takes about 4 to 6 weeks to heal, but can sometimes take longer. Toe injuries are often treated by taping the injured toe to the next one (buddy taping). This protects the injured toe and holds it in position.     If the toenail has been severely injured, it may fall off in 1 to 2 weeks. It takes up to 12 months for a new toenail to grow back.  Home care  Follow these guidelines when caring for yourself at home:    You may be given a cast shoe to wear to keep your toe from moving. If not, you can use a sandal or any shoe that doesn t put pressure on the injured toe until the swelling and pain go away. If using a sandal, be careful not to strike your foot against anything. Another injury could make the fracture worse. If you were given crutches, don t put full weight on the injured foot until you can do so without pain, or as directed by your healthcare provider.    Keep your foot elevated to reduce pain and swelling. When sleeping, put a pillow under the injured leg. When sitting, support the injured leg so it is above your waist. This is very important during the first 2 days (48 hours).    Put an ice pack on the injured area. Do this for 20 minutes every 1 to 2 hours the first day for pain relief. You can make an ice pack by wrapping a plastic bag of ice cubes in a thin towel. As the ice melts, be careful that any cloth or paper tape doesn t get wet. Continue using the ice pack 3 to 4 times a day for the next 2 days. Then use the ice pack as needed to ease pain and swelling.    If buddy tape was used and it becomes wet or dirty, change it. You may replace it with paper, plastic, or cloth tape. Cloth tape and paper tapes must be kept dry.    You may use acetaminophen or  ibuprofen to control pain, unless another pain medicine was prescribed. If you have chronic liver or kidney disease, talk with your healthcare provider before using these medicines. Also talk with your provider if you ve had a stomach ulcer or gastrointestinal bleeding.    You may return to sports or physical education activities after 4 weeks when you can run without pain, or as directed by your healthcare provider.  Follow-up care  Follow up with your healthcare provider in 1 week, or as advised. This is to make sure the bone is healing the way it should.  X-rays may be taken. You will be told of any new findings that may affect your care.  When to seek medical advice  Call your healthcare provider right away if any of these occur:    Pain or swelling gets worse    The cast/splint cracks    The cast and padding get wet and stays wet more than 24 hours    Bad odor from the cast/splint or wound fluid stains the cast    Tightness or pressure under the cast/splint gets worse    Toe becomes cold, blue, numb, or tingly    You can t move the toe    Signs of infection: fever, redness, warmth, swelling, or drainage from the wound or cast    Fever of 100.4 F (38 C) or higher, or as directed by your healthcare provider  Date Last Reviewed: 2/1/2017 2000-2017 [employer]. [address] [place] [address], [place] [address]. All rights reserved. This information is not intended as a substitute for professional medical care. Always follow your healthcare professional's instructions.

## 2018-06-02 NOTE — ED PROVIDER NOTES
"  History     Chief Complaint:  Toe Pain      HPI   Zunilda Clark is a 77 year old female who presents with toe pain. The patient states that 6 days ago she was wearing sandals when she hit her right second toe with her walker. She had the sudden onset of pain along with subsequent swelling and bruising. The patient was not seen initially, however, pain persisted. She was only able to get a primary care appointment for 6/12, so she presented to the ED today. She denies any ankle pain and notes that she has been able to ambulate normally when wearing tennis shoes. The patient denies any other injuries.     Allergies:  No known drug allergies.     Medications:    Acetaminophen  Allopurinol  Exforge  Calcitrate  Hygroton   Fish oil  Folvite   Boniva  Levothyroxine  Methotrexate Sodium  Lopressor  Multivitamin  Prilosec  Vitamin D   Warfarin      Past Medical History:    Acquired hypothyroidism  Aortic valve insufficiency   Arthritis   Atrial fibrillation   Blood clotting disorder  CHF  DVT  GERD  Mitral valve replacement   History of blood transfusion  HTN  Seizures  Pulmonary HTN  Rheumatoid arthritis   Stroke     Past Surgical History:    Prolapsed bladder  Ablation AV node  Implant pacemaker  Cholecystectomy   Open reduction internal fixation   Replace valve mitral    Family History:    CAD-Mother, Brother  Diabetes-Brother  Hypertension-Sister  Hyperlipidemia-Sister    Social History:  Marital Status:   Presents to the ED with   Tobacco Use: Never Used  Alcohol Use: No  PCP: Yunior Huang      Review of Systems   Musculoskeletal:        Positive for toe pain.    All other systems reviewed and are negative.        Physical Exam   First Vitals:  BP: 169/82  Pulse: 69  Temp: 98.8  F (37.1  C)  Resp: 16  Height: 162.6 cm (5' 4\")  Weight: 69.9 kg (154 lb)  SpO2: 99 %      Physical Exam   HENT:   Right Ear: External ear normal.   Left Ear: External ear normal.   Nose: Nose normal.   Eyes: Right eye " exhibits no discharge. Left eye exhibits no discharge. No scleral icterus.   Cardiovascular: Intact distal pulses.    Pulmonary/Chest: Effort normal.   Speaking in full sentences   Musculoskeletal:   + moderate STS and ecchymosis  R second toe, able to flex/ext toe. + ttp over PIP joint, remainder foot unremarkable.     Neurological:   Alert    No gross motor or sensory deficits   Skin: Skin is warm.   Psychiatric: She has a normal mood and affect. Judgment normal.   Nursing note and vitals reviewed.        Emergency Department Course   Imaging:  Foot x-ray, right, 3 views:   There is a moderately displaced intra-articular fracture  distal aspect proximal phalanx second toe. Very mild dorsal  subluxation middle and distal phalanx of the second toe in relation to  the proximal phalanx.  Report per radiology.   Radiographic findings were communicated with the patient who voiced understanding of the findings.     Emergency Department Course:  Nursing notes and vitals reviewed.  (2207) I performed an exam of the patient as documented above.    The patient was sent for a foot x-ray while in the emergency department, findings above.    Findings and plan explained to the patient. Patient discharged home with instructions regarding supportive care, medications, and reasons to return. The importance of close follow-up was reviewed.     Impression & Plan    Medical Decision Making:  Zunilda Clark is a 77 year old female who presents with toe pain after trauma.  XR reveals fx as noted above.  Sensation intact, cap refill intact, no need for reduction as bones are in adequate alignment.  No tarsal-metatarsal pain or evidence of fx.  No significant edema or erythema.  Analgesia was provided and toe was terri-taped.   Elevation and ice x 48 hours.    Diagnosis:    ICD-10-CM    1. Closed fracture of phalanx of left second toe, initial encounter S92.502A        Disposition:  discharged to home        ITenisha am  serving as a scribe on 6/1/2018 at 10:07 PM to personally document services performed by Dr. Corral based on my observations and the provider's statements to me.   6/1/2018   [facility] EMERGENCY DEPARTMENT       Donaldo Corral MD  06/02/18 0829

## 2018-06-12 ENCOUNTER — OFFICE VISIT (OUTPATIENT)
Dept: INTERNAL MEDICINE | Facility: CLINIC | Age: 78
End: 2018-06-12
Payer: MEDICARE

## 2018-06-12 VITALS
OXYGEN SATURATION: 95 % | HEIGHT: 64 IN | TEMPERATURE: 98.7 F | BODY MASS INDEX: 26.65 KG/M2 | DIASTOLIC BLOOD PRESSURE: 60 MMHG | SYSTOLIC BLOOD PRESSURE: 140 MMHG | HEART RATE: 85 BPM | WEIGHT: 156.1 LBS

## 2018-06-12 DIAGNOSIS — I48.91 ATRIAL FIBRILLATION AND FLUTTER (H): ICD-10-CM

## 2018-06-12 DIAGNOSIS — M06.9 RHEUMATOID ARTHRITIS, INVOLVING UNSPECIFIED SITE, UNSPECIFIED RHEUMATOID FACTOR PRESENCE: ICD-10-CM

## 2018-06-12 DIAGNOSIS — I10 BENIGN ESSENTIAL HYPERTENSION: ICD-10-CM

## 2018-06-12 DIAGNOSIS — S92.911D CLOSED DISPLACED FRACTURE OF PHALANX OF TOE OF RIGHT FOOT WITH ROUTINE HEALING, UNSPECIFIED TOE, SUBSEQUENT ENCOUNTER: Primary | ICD-10-CM

## 2018-06-12 DIAGNOSIS — I48.92 ATRIAL FIBRILLATION AND FLUTTER (H): ICD-10-CM

## 2018-06-12 PROCEDURE — 99214 OFFICE O/P EST MOD 30 MIN: CPT | Performed by: INTERNAL MEDICINE

## 2018-06-12 NOTE — MR AVS SNAPSHOT
After Visit Summary   6/12/2018    Zunilda Clark    MRN: [id number]           Patient Information     Date Of Birth          1940        Visit Information        Provider Department      6/12/2018 4:40 PM Yunior Huang MD [facility]        Today's Diagnoses     Closed displaced fracture of phalanx of toe of right foot with routine healing, unspecified toe, subsequent encounter    -  1    Atrial fibrillation and flutter (H)        Benign essential hypertension        Rheumatoid arthritis, involving unspecified site, unspecified rheumatoid factor presence (H)           Follow-ups after your visit        Your next 10 appointments already scheduled     Jun 21, 2018 10:45 AM CDT   Ech Complete with [email or web address]   [facility] ([facility] [place] 2[facility]    [address]  [place] [address] [phone]           1. Please bring or wear a comfortable two-piece outfit. 2. You may eat, drink and take your normal medicines. 3. For any questions that cannot be answered, please contact the ordering physician ***Please check-in at the [facility] located in [address] in the [facility] building. When you are finished registering, please go to [address] and have a seat. The technician will call your name for the test.            Jun 25, 2018 10:45 AM CDT   Return Visit with Timothy Das MD   [facility] ([facility])    [address]  [place] [id number] [phone]            Jul 09, 2018 11:00 AM CDT   Anticoagulation Visit with [facility]   [facility]    [address]  [place] [id number] [phone]            Jul 26, 2018  3:15 PM CDT   Remote PPM Check with KRAMER TECH1   [facility]   [place] ([facility])    [address]  [place] [address] [phone] OPT 2           This appointment is for a remote check of your pacemaker.  This is not an appointment at the office.            Aug 17, 2018  9:40 AM CDT   [facility]YSICAL with [facility]ior Huang MD   [facility]    [address]  [facility] [id number] [phone]              Who to contact     If you have questions or need follow up information about today's clinic visit or your schedule please contact [facility] directly at [phone].  Normal or non-critical lab and imaging results will be communicated to you by MyChart, letter or phone within 4 business days after the clinic has received the results. If you do not hear from us within 7 days, please contact the clinic through [employer] or phone. If you have a critical or abnormal lab result, we will notify you by phone as soon as possible.  Submit refill requests through [employer] or call your pharmacy and they will forward the refill request to us. Please allow 3 business days for your refill to be completed.          Additional Information About Your Visit        [employer] Information     [employer] gives you secure access to your electronic health record. If you see a primary care provider, you can also send messages to your care team and make appointments. If you have questions, please call your primary care clinic.  If you do not have a primary care provider, please call [phone] and they will assist you.        Care EveryWhere ID     This is your Care EveryWhere ID. This could be used by other organizations to access your [place] medical records  [id number]        Your Vitals Were     Pulse Temperature Height Pulse Oximetry Breastfeeding? BMI (Body Mass Index)    85 98.7  F (37.1  C) (Oral) 5' 4\" (1.626 m) 95% No 26.79 kg/m2       Blood Pressure from Last 3 Encounters:   06/12/18 140/60   06/01/18 169/82   04/23/18 " 122/60    Weight from Last 3 Encounters:   06/12/18 156 lb 1.6 oz (70.8 kg)   06/01/18 154 lb (69.9 kg)   04/23/18 157 lb (71.2 kg)              Today, you had the following     No orders found for display         Today's Medication Changes          These changes are accurate as of 6/12/18 11:59 PM.  If you have any questions, ask your nurse or doctor.               These medicines have changed or have updated prescriptions.        Dose/Directions    warfarin 2.5 MG tablet   Commonly known as:  COUMADIN   This may have changed:  additional instructions   Used for:  Atrial fibrillation and flutter (H)        Take 1 tablet (2.5 mg) by mouth Sun, Tue, Thur, Fri. Take 2 tablets (5mg) by mouth Mon, wed, Sat or as instructed by INR clinic.   Quantity:  120 tablet   Refills:  0                Primary Care Provider Office Phone # Fax #    Yunior Huang [phone]       [address] [facility] [address]        Equal Access to Services     [facility] AH: Hadii aad ku hadasho Soomaali, waaxda luqadaha, qaybta kaalmada adeegyada, waxay idiin hayaan avel khtalisha xiao . So [facility] [phone].    ATENCIÓN: Si habla español, tiene a morales disposición servicios gratuitos de asistencia lingüística. LlFort [facility] [phone].    We comply with applicable federal civil rights laws and [place] laws. We do not discriminate on the basis of race, color, national origin, age, disability, sex, sexual orientation, or gender identity.            Thank you!     Thank you for choosing [facility]  for your care. Our goal is always to provide you with excellent care. Hearing back from our patients is one way we can continue to improve our services. Please take a few minutes to complete the written survey that you may receive in the mail after your visit with us. Thank you!             Your Updated Medication List - Protect others around you: Learn how to safely use, store and throw away your medicines  at www.disposemymeds.org.          This list is accurate as of 6/12/18 11:59 PM.  Always use your most recent med list.                   Brand Name Dispense Instructions for use Diagnosis    ACETAMINOPHEN PO      Take 650 mg by mouth every 6 hours as needed        ALLOPURINOL PO      Take 100 mg by mouth 2 times daily        Amlodipine Besylate-Valsartan  MG Tabs    EXFORGE    90 tablet    Take 1 tablet by mouth daily    Essential hypertension       BONIVA 3 MG/3ML   Generic drug:  ibandronate      Inject 3 mg into the vein once        calcium citrate 950 MG tablet    CALCITRATE     Take by mouth daily 1000 mg daily        chlorthalidone 25 MG tablet    HYGROTON    90 tablet    Take 1 tablet (25 mg) by mouth daily    Essential hypertension with goal blood pressure less than 130/85       fish oil-omega-3 fatty acids 1000 MG capsule      Take 2 g by mouth daily        folic acid 1 MG tablet    FOLVITE     Take 1 mg by mouth daily        levothyroxine 112 MCG tablet    SYNTHROID/LEVOTHROID    90 tablet    Take 1 tablet (112 mcg) by mouth daily    Acquired hypothyroidism       METHOTREXATE SODIUM IJ      Inject 0.8 mLs as directed once a week        metoprolol tartrate 100 MG tablet    LOPRESSOR    90 tablet    Take 0.5 tablets (50 mg) by mouth 2 times daily    Essential hypertension       Multi-vitamin Tabs tablet      Take 1 tablet by mouth daily        omeprazole 20 MG CR capsule    priLOSEC    180 capsule    Take 1 capsule (20 mg) by mouth 2 times daily    Esophageal reflux       VITAMIN D (CHOLECALCIFEROL) PO      Take 1,000 Units by mouth daily        warfarin 2.5 MG tablet    COUMADIN    120 tablet    Take 1 tablet (2.5 mg) by mouth Sun, Tue, Thur, Fri. Take 2 tablets (5mg) by mouth Mon, wed, Sat or as instructed by INR clinic.    Atrial fibrillation and flutter (H)

## 2018-06-12 NOTE — PROGRESS NOTES
SUBJECTIVE:   Zunilda Clark is a 77 year old female who presents to clinic today for the following health issues:      Toe Injury      Duration: 2 weeks    Description (location/character/radiation): The right 2nd toe    Intensity:  mild    Accompanying signs and symptoms: None    History (similar episodes/previous evaluation): None    Precipitating or alleviating factors: None    Therapies tried and outcome: None, but patient had x ray done.       Has right 2 d toe fracture. Has injured it against the walker. Seen in ED, X rays showed mildly displaced fracture in the proximal IP joint. Treated conservatively, improved pain. Still toe is swollen. Able to ambulate.   Has history of atrial fibrillation. On anticoagulation with Coumadin and rate control medications. Asymptonatic - no chest pains , palpitations,  no side effects from medications.  Has h/o HTN. on medical treatment. BP has been controlled. No side effects from medications. No CP, HA, dizziness. good compliance with medications and low salt diet.  Has h/o RA, on treatment. Not well controlled. Needs walker for ambulation. Has knees OA. Has pain with short distances.         Problem list and histories reviewed & adjusted, as indicated.  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     Valvular heart disease     Paroxysmal atrial fibrillation (H)     S/P mitral valve replacement with  bioprosthetic valve     Pulmonary hypertension     Past Surgical History:   Procedure Laterality Date     ABDOMEN SURGERY      prolapsed bladder     H ABLATION AV NODE  2011    AFlutter with syncope, PPM to follow     IMPLANT PACEMAKER  2011     LAPAROSCOPIC CHOLECYSTECTOMY WITH CHOLANGIOGRAMS N/A 4/29/2017    Procedure: LAPAROSCOPIC CHOLECYSTECTOMY WITH CHOLANGIOGRAMS;  LAPAROSCOPIC CHOLECYSTECTOMY WITH CHOLANGIOGRAMS ;  Surgeon: Angeles Mcgill MD;  Location: [facility] OR     OPEN REDUCTION INTERNAL FIXATION RODDING INTRAMEDULLARY HUMERUS Right 7/28/2015    Procedure: OPEN REDUCTION INTERNAL FIXATION RODDING INTRAMEDULLARY HUMERUS;  Surgeon: Raymundo Rivera MD;  Location: [facility] OR     REPLACE VALVE MITRAL  2006    Mitral valve replacement with bioprosthetic valve in 2008 for rheumatic disease     SLING BLADDER SUSPENSION WITH FASCIA UMESH         Social History   Substance Use Topics     Smoking status: Never Smoker     Smokeless tobacco: Never Used     Alcohol use No     Family History   Problem Relation Age of Onset     Coronary Artery Disease Mother      Coronary Artery Disease Brother      DIABETES Brother      DIABETES Brother      Hypertension Sister      Hyperlipidemia Sister          Current Outpatient Prescriptions   Medication Sig Dispense Refill     ACETAMINOPHEN PO Take 650 mg by mouth every 6 hours as needed        ALLOPURINOL PO Take 100 mg by mouth 2 times daily       Amlodipine Besylate-Valsartan (EXFORGE)  MG TABS Take 1 tablet by mouth daily 90 tablet 1     calcium citrate (CALCITRATE) 950 MG tablet Take by mouth daily 1000 mg daily       chlorthalidone (HYGROTON) 25 MG tablet Take 1 tablet (25 mg) by mouth daily 90 tablet 2     fish oil-omega-3 fatty acids 1000 MG capsule Take 2 g by mouth daily       folic acid (FOLVITE) 1 MG tablet Take 1 mg by mouth daily       ibandronate (BONIVA) 3 MG/3ML Inject 3 mg into the vein once       levothyroxine (SYNTHROID/LEVOTHROID) 112 MCG tablet Take 1  "tablet (112 mcg) by mouth daily 90 tablet 1     METHOTREXATE SODIUM IJ Inject 0.8 mLs as directed once a week       metoprolol tartrate (LOPRESSOR) 100 MG tablet Take 0.5 tablets (50 mg) by mouth 2 times daily 90 tablet 0     multivitamin, therapeutic with minerals (MULTI-VITAMIN) TABS tablet Take 1 tablet by mouth daily       omeprazole (PRILOSEC) 20 MG CR capsule Take 1 capsule (20 mg) by mouth 2 times daily 180 capsule 1     VITAMIN D, CHOLECALCIFEROL, PO Take 1,000 Units by mouth daily       warfarin (COUMADIN) 2.5 MG tablet Take 1 tablet (2.5 mg) by mouth Sun, Tue, Thur, Fri. Take 2 tablets (5mg) by mouth Mon, wed, Sat or as instructed by INR clinic. (Patient taking differently: Take 1 tablet (2.5 mg) by mouth Sun, Wed, Sat. Take 2 tablets (5mg) by mouth Mon, Tues, Thurs, or as instructed by INR clinic.) 120 tablet 0       Reviewed and updated as needed this visit by clinical staff  Tobacco  Allergies  Meds  Med Hx  Surg Hx  Fam Hx  Soc Hx      Reviewed and updated as needed this visit by Provider         ROS:  Constitutional, HEENT, cardiovascular, pulmonary, gi and gu systems are negative, except as otherwise noted.    OBJECTIVE:     /60 (BP Location: Left arm, Patient Position: Chair, Cuff Size: Adult Regular)  Pulse 85  Temp 98.7  F (37.1  C) (Oral)  Ht 5' 4\" (1.626 m)  Wt 156 lb 1.6 oz (70.8 kg)  SpO2 95%  Breastfeeding? No  BMI 26.79 kg/m2  Body mass index is 26.79 kg/(m^2).   GENERAL: healthy, alert and no distress  NECK: no adenopathy, no asymmetry, masses, or scars and thyroid normal to palpation  RESP: lungs clear to auscultation - no rales, rhonchi or wheezes  CV: regular rate and rhythm, normal S1 S2, no S3 or S4, no murmur, click or rub, no peripheral edema and peripheral pulses strong  ABDOMEN: soft, nontender, no hepatosplenomegaly, no masses and bowel sounds normal  MS: no gross musculoskeletal defects noted, no edema, knees with OA changes  Right 2 d toe swelling and purplish " discoloration, mild pain with palpation, no numbness, normal skin temperature     Diagnostic Test Results:  none     ASSESSMENT/PLAN:     Problem List Items Addressed This Visit     RA (rheumatoid arthritis) (H)    Benign essential hypertension    Atrial fibrillation and flutter (H)      Other Visit Diagnoses     Closed displaced fracture of phalanx of toe of right foot with routine healing, unspecified toe, subsequent encounter    -  Primary           Cont treatment   Improved fracture on the toe, conservative treatment   Handicapped form filled in for 5 years     Follow-Up:in 6 months or as needed     Yunior Huang MD  [facility]

## 2018-06-19 DIAGNOSIS — I48.91 ATRIAL FIBRILLATION AND FLUTTER (H): ICD-10-CM

## 2018-06-19 DIAGNOSIS — I48.92 ATRIAL FIBRILLATION AND FLUTTER (H): ICD-10-CM

## 2018-06-19 RX ORDER — WARFARIN SODIUM 2.5 MG/1
TABLET ORAL
Qty: 120 TABLET | Refills: 0 | Status: SHIPPED | OUTPATIENT
Start: 2018-06-19 | End: 2018-10-01

## 2018-06-21 ENCOUNTER — HOSPITAL ENCOUNTER (OUTPATIENT)
Dept: CARDIOLOGY | Facility: CLINIC | Age: 78
Discharge: HOME | End: 2018-06-21
Attending: INTERNAL MEDICINE | Admitting: INTERNAL MEDICINE
Payer: MEDICARE

## 2018-06-21 DIAGNOSIS — I08.0 RHEUMATIC DISORDER OF BOTH MITRAL AND AORTIC VALVES: ICD-10-CM

## 2018-06-21 DIAGNOSIS — I27.20 PULMONARY HYPERTENSION (H): ICD-10-CM

## 2018-06-21 PROCEDURE — 93306 TTE W/DOPPLER COMPLETE: CPT

## 2018-06-21 PROCEDURE — 93306 TTE W/DOPPLER COMPLETE: CPT | Mod: 26 | Performed by: INTERNAL MEDICINE

## 2018-06-22 DIAGNOSIS — I10 ESSENTIAL HYPERTENSION: ICD-10-CM

## 2018-06-22 NOTE — TELEPHONE ENCOUNTER
"Requested Prescriptions   Pending Prescriptions Disp Refills     metoprolol tartrate (LOPRESSOR) Last Written Prescription Date:  3/30/18  Last Fill Quantity: 90,  # refills: 0   Last office visit: 6/12/2018 with prescribing provider: Sal    Future Office Visit:   Next 5 appointments (look out 90 days)     Jun 25, 2018 10:45 AM CDT   Return Visit with Timothy Das MD   [facility] ([facility])    [address]  [place] [address] [phone]            Aug 17, 2018  9:40 AM CDT   PHYSICAL with Yunior Huang MD   [facility] ([facility]    [address]  [facility] [address] [phone]                 100 MG tablet [Pharmacy Med Name: Metoprolol Tartrate Oral Tablet 100 MG] 90 tablet 0     Sig: TAKE 0.5 TABLETS (50 MG) BY MOUTH 2 TIMES DAILY    Beta-Blockers Protocol Failed    6/22/2018  8:43 AM       Failed - Blood pressure under 140/90 in past 12 months    BP Readings from Last 3 Encounters:   06/12/18 140/60   06/01/18 169/82   04/23/18 122/60                Passed - Patient is age 6 or older       Passed - Recent (12 mo) or future (30 days) visit within the authorizing provider's specialty    Patient had office visit in the last 12 months or has a visit in the next 30 days with authorizing provider or within the authorizing provider's specialty.  See \"Patient Info\" tab in inbasket, or \"Choose Columns\" in Meds & Orders section of the refill encounter.              "

## 2018-06-25 ENCOUNTER — OFFICE VISIT (OUTPATIENT)
Dept: CARDIOLOGY | Facility: CLINIC | Age: 78
End: 2018-06-25
Attending: INTERNAL MEDICINE
Payer: MEDICARE

## 2018-06-25 ENCOUNTER — HOSPITAL ENCOUNTER (OUTPATIENT)
Dept: LAB | Facility: CLINIC | Age: 78
Discharge: HOME | End: 2018-06-25
Attending: INTERNAL MEDICINE | Admitting: INTERNAL MEDICINE
Payer: MEDICARE

## 2018-06-25 VITALS
HEIGHT: 64 IN | HEART RATE: 64 BPM | SYSTOLIC BLOOD PRESSURE: 128 MMHG | WEIGHT: 157.3 LBS | DIASTOLIC BLOOD PRESSURE: 60 MMHG | BODY MASS INDEX: 26.85 KG/M2

## 2018-06-25 DIAGNOSIS — I48.0 PAROXYSMAL ATRIAL FIBRILLATION (H): ICD-10-CM

## 2018-06-25 DIAGNOSIS — I27.20 PULMONARY HYPERTENSION (H): ICD-10-CM

## 2018-06-25 DIAGNOSIS — G47.61 PLMD (PERIODIC LIMB MOVEMENT DISORDER): ICD-10-CM

## 2018-06-25 DIAGNOSIS — I50.32 CHRONIC DIASTOLIC CONGESTIVE HEART FAILURE (H): ICD-10-CM

## 2018-06-25 DIAGNOSIS — Z95.0 CARDIAC PACEMAKER IN SITU: ICD-10-CM

## 2018-06-25 DIAGNOSIS — I08.0 RHEUMATIC DISORDER OF BOTH MITRAL AND AORTIC VALVES: ICD-10-CM

## 2018-06-25 LAB — FERRITIN SERPL-MCNC: 62 NG/ML (ref 8–252)

## 2018-06-25 PROCEDURE — 36415 COLL VENOUS BLD VENIPUNCTURE: CPT | Performed by: PHYSICIAN ASSISTANT

## 2018-06-25 PROCEDURE — 82728 ASSAY OF FERRITIN: CPT | Performed by: PHYSICIAN ASSISTANT

## 2018-06-25 PROCEDURE — 99214 OFFICE O/P EST MOD 30 MIN: CPT | Performed by: INTERNAL MEDICINE

## 2018-06-25 NOTE — PROGRESS NOTES
HPI and Plan:   See dictation:[id number]    Orders Placed This Encounter   Procedures     Follow-Up with Cardiologist     Follow-Up with Cardiologist       No orders of the defined types were placed in this encounter.      There are no discontinued medications.      Encounter Diagnoses   Name Primary?     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      Chronic diastolic congestive heart failure (H)      Cardiac pacemaker in situ      Paroxysmal atrial fibrillation (H)      Pulmonary hypertension        CURRENT MEDICATIONS:  Current Outpatient Prescriptions   Medication Sig Dispense Refill     ACETAMINOPHEN PO Take 650 mg by mouth every 6 hours as needed        ALLOPURINOL PO Take 100 mg by mouth 2 times daily       Amlodipine Besylate-Valsartan (EXFORGE)  MG TABS Take 1 tablet by mouth daily 90 tablet 1     calcium citrate (CALCITRATE) 950 MG tablet Take by mouth daily 1200 mg daily       chlorthalidone (HYGROTON) 25 MG tablet Take 1 tablet (25 mg) by mouth daily 90 tablet 2     fish oil-omega-3 fatty acids 1000 MG capsule Take 2 g by mouth daily       folic acid (FOLVITE) 1 MG tablet Take 1 mg by mouth daily       ibandronate (BONIVA) 3 MG/3ML Inject 3 mg into the vein every 3 months        levothyroxine (SYNTHROID/LEVOTHROID) 112 MCG tablet Take 1 tablet (112 mcg) by mouth daily 90 tablet 1     METHOTREXATE SODIUM IJ Inject 0.8 mLs as directed once a week       metoprolol tartrate (LOPRESSOR) 100 MG tablet Take 0.5 tablets (50 mg) by mouth 2 times daily 90 tablet 0     multivitamin, therapeutic with minerals (MULTI-VITAMIN) TABS tablet Take 1 tablet by mouth daily       omeprazole (PRILOSEC) 20 MG CR capsule Take 1 capsule (20 mg) by mouth 2 times daily 180 capsule 1     VITAMIN D, CHOLECALCIFEROL, PO Take 1,000 Units by mouth daily       warfarin (COUMADIN) 2.5 MG tablet Take 1 tablet (2.5 mg) by mouth Sun, Tue, Thur, Fri. Take 2 tablets (5mg) by mouth Mon, wed, Sat or as instructed by INR clinic. 120  tablet 0       ALLERGIES   No Known Allergies    PAST MEDICAL HISTORY:  Past Medical History:   Diagnosis Date     Acquired hypothyroidism 11/4/2015     Aortic valve insufficiency      Arthritis      Atrial fibrillation (H)      Atrial fibrillation and flutter (H)      Blood clotting disorder (H)      CHF (congestive heart failure) (H)      DVT (deep venous thrombosis) (H) 2003     Gastro-oesophageal reflux disease      H/O mitral valve replacement with tissue graft june 2014     History of blood transfusion 2014     HTN (hypertension)      Hypothyroidism      Other chronic pain      Pulmonary HTN      Rheumatoid arthritis(714.0)      Seizures (H) 2014     Stroke (H) 2009    left side mild weakness      Stroke (H) 2014     [place] 2011    [place]     Thrombosis of leg 2003    both legs       PAST SURGICAL HISTORY:  Past Surgical History:   Procedure Laterality Date     ABDOMEN SURGERY      prolapsed bladder     H ABLATION AV NODE  2011    AFlutter with syncope, PPM to follow     IMPLANT PACEMAKER  2011     LAPAROSCOPIC CHOLECYSTECTOMY WITH CHOLANGIOGRAMS N/A 4/29/2017    Procedure: LAPAROSCOPIC CHOLECYSTECTOMY WITH CHOLANGIOGRAMS;  LAPAROSCOPIC CHOLECYSTECTOMY WITH CHOLANGIOGRAMS ;  Surgeon: Angeles Mcgill MD;  Location: [facility] OR     OPEN REDUCTION INTERNAL FIXATION RODDING INTRAMEDULLARY HUMERUS Right 7/28/2015    Procedure: OPEN REDUCTION INTERNAL FIXATION RODDING INTRAMEDULLARY HUMERUS;  Surgeon: Raymundo Rivera MD;  Location: [facility] OR     REPLACE VALVE MITRAL  2006    Mitral valve replacement with bioprosthetic valve in 2008 for rheumatic disease     SLING BLADDER SUSPENSION WITH FASCIA UMESH         FAMILY HISTORY:  Family History   Problem Relation Age of Onset     Coronary Artery Disease Mother      Coronary Artery Disease Brother      Diabetes Brother      Diabetes Brother      Hypertension Sister      Hyperlipidemia Sister        SOCIAL HISTORY:  Social History     Social History     Marital  "status:      Spouse name: N/A     Number of children: N/A     Years of education: N/A     Social History Main Topics     Smoking status: Never Smoker     Smokeless tobacco: Never Used     Alcohol use No     Drug use: No     Sexual activity: Yes     Partners: Male     Other Topics Concern     Caffeine Concern Yes     occasionally     Sleep Concern No     Special Diet Yes     lower salt     Back Care No     Exercise Yes     walking ride excercise bike     Social History Narrative       Review of Systems:  Skin:  Positive for bruising     Eyes:  Positive for glasses;visual blurring    ENT:  Negative      Respiratory:  Negative       Cardiovascular:  Negative      Gastroenterology: Positive for heartburn    Genitourinary:  Positive for urinary frequency    Musculoskeletal:  Positive for arthritis;back pain    Neurologic:  Negative      Psychiatric:  Positive for sleep disturbances troubles falling asleep  Heme/Lymph/Imm:  Positive for easy bruising    Endocrine:  Positive for thyroid disorder      Physical Exam:  Vitals: /60 (BP Location: Right arm, Patient Position: Sitting, Cuff Size: Adult Large)  Pulse 64  Ht 1.626 m (5' 4\")  Wt 71.4 kg (157 lb 4.8 oz)  Breastfeeding? No  BMI 27 kg/m2    Constitutional:  cooperative, alert and oriented, well developed, well nourished, in no acute distress        Skin:  warm and dry to the touch, no apparent skin lesions or masses noted   pacemaker incision in the left infraclavicular area was well-healed      Head:  normocephalic, no masses or lesions        Eyes:  pupils equal and round;conjunctivae and lids unremarkable;sclera white;no xanthalasma;EOMS intact        Lymph:      ENT:  no pallor or cyanosis, dentition good        Neck:  carotid pulses are full and equal bilaterally, JVP normal, no carotid bruit        Respiratory:  normal breath sounds, clear to auscultation, normal A-P diameter, normal symmetry, normal respiratory excursion, no use of accessory " muscles;healed median sternotomy scar;clear to auscultation;normal respiratory excursion         Cardiac: regular rhythm;normal S1 and S2;no S3 or S4;apical impulse not displaced   fixed split S2   systolic murmur;LLSB;grade 1   diastolic murmur;grade 1    pulses full and equal, no bruits auscultated                                        GI:  abdomen soft, non-tender, BS normoactive, no mass, no HSM, no bruits        Extremities and Muscular Skeletal:  no edema arthritic deformities of UE            Neurological:  affect appropriate left sided weakness walks with a walker    Psych:  Alert and Oriented x 3        CC  Timothy Das MD  [id number]  [place] [id number]

## 2018-06-25 NOTE — TELEPHONE ENCOUNTER
"Requested Prescriptions   Pending Prescriptions Disp Refills     metoprolol tartrate (LOPRESSOR) 100 MG tablet [Pharmacy Med Name: Metoprolol Tartrate Oral Tablet 100 MG]  Last Written Prescription Date:  3/30/2018  Last Fill Quantity: 90,  # refills: 0   Last office visit: 6/12/2018 with prescribing provider:     Future Office Visit:   Next 5 appointments (look out 90 days)     Aug 17, 2018  9:40 AM CDT   PHYSICAL with Yunior Huang MD   [facility]    [address]  [facility] [address]   [phone]                90 tablet 0     Sig: TAKE 0.5 TABLETS (50 MG) BY MOUTH 2 TIMES DAILY    Beta-Blockers Protocol Failed    6/22/2018 12:49 PM       Failed - Blood pressure under 140/90 in past 12 months    BP Readings from Last 3 Encounters:   06/25/18 128/60   06/12/18 140/60   06/01/18 169/82                Passed - Patient is age 6 or older       Passed - Recent (12 mo) or future (30 days) visit within the authorizing provider's specialty    Patient had office visit in the last 12 months or has a visit in the next 30 days with authorizing provider or within the authorizing provider's specialty.  See \"Patient Info\" tab in inbasket, or \"Choose Columns\" in Meds & Orders section of the refill encounter.            "

## 2018-06-25 NOTE — LETTER
6/25/2018      Yunior Huang MD  [address] [facility] [address]      RE: Zunilda Clark       Dear Colleague,    I had the pleasure of seeing Zunilda Clark in the [facility].    Service Date: 06/25/2018      PRIMARY CARE PHYSICIAN:  Dr. Yunior Huang.      HISTORY OF PRESENT ILLNESS:  I again had the pleasure of seeing your patient, Zunilda Clark, at [facility] for evaluation of pulmonary hypertension and paroxysmal atrial fibrillation.  The patient has been hospitalized in [place] for congestive heart failure in 2014.  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  She has a history of paroxysmal atrial fibrillation and has been treated for essential hypertension.  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  Her mitral valve was replaced with a bioprosthetic valve in 04/2008.  Unfortunately by 04/2014 she was found to have severe mitral stenosis and the valve was replaced with a Magna 27 mm bioprosthetic valve.  She did not have any significant coronary artery disease on either occasion.  Echocardiography has continued to show pulmonary hypertension with her most recent RVSP= 71 mmHg on 06/21/2018.  On that echocardiogram there was normal bioprosthetic mitral valve gradients.  She was in atrial fibrillation.  There was moderately severe biatrial enlargement, mild aortic regurgitation and the right ventricle was moderately dilated with mildly depressed RV systolic function.  She is status post tricuspid annuloplasty ring placement in 2014 as well.  She remains on warfarin therapy.  She fell and broke her arm in 2016 requiring ORIF of her right humerus.  She has been seen by Pulmonary and they suggested that she does not have  obstructive sleep apnea but does have restless leg syndrome.  They wanted to test her ferritin level since this has been related to restless legs syndrome.  The patient felt this test was too expensive and has not performed it to date.  She denies any bleeding diathesis.  She was given some samples of pramipexole to use for her restless legs syndrome.  She has bilateral knee arthritis which makes it difficult to walk.  She asks me 3 questions at this visit:   1.  What were the results of her echocardiogram?   2.  Can she proceed with left total knee arthroplasty?   3.  Should she go on to have her ferritin level tested?      PHYSICAL EXAMINATION:  As listed below.      The patient's most recent pacemaker interrogation on 04/19/2018 showed 45% atrial pacing, 32% ventricular pacing.  134 mode switches, comprising 4.7% of the time, the longest 34 hours and 23 minutes.  Her ventricular rates are well controlled and she remains on warfarin.  Two ventricular high rates were seen for 7-10 beats.      ASSESSMENT:   1.  Zunilda Clark is a delightful 77-year-old female status post mitral valve replacement 05/2014 after previous balloon commissurotomy in 2006 and valve replacement 04/10/2008.  In 05/2014 while living in [place], the patient had both mitral valve replacement with a bioprosthetic valve and a tricuspid annuloplasty ring implanted.  Her echocardiogram now demonstrates worsening pulmonary hypertension.  I will now arrange evaluation in our Pulmonary Hypertension department with Dr. Tamie Monroy.  Sleep apnea testing did not show apnea and obstruction but instead just restless leg syndrome.  This is being evaluated and treated.  As to her second question regarding her left total knee arthroplasty, I believe she will have to wait until her pulmonary hypertension is evaluated and a decision on treatment is made.  She is at high risk for anesthesia with her pulmonary pressures this high.  The third question was regarding  testing her ferritin which I think should go forward.  We would like to understand her restless legs syndrome and rule out any ferritin deficiency.  It is doubtful that she has hemochromatosis.  .  Etiology of the pulmonary hypertension may be secondary to pulmonary disease although her pulmonary function tests in the past have shown some restriction but no severe disease.  She does not have sleep apnea.  This could be due to systemic hypertension over time or even from her previous significant mitral stenosis.  I suspect that after evaluation Dr. Monroy may decide to proceed with a right heart catheterization to better understand both the etiology of her pulmonary hypertension and possible treatment options.  This has all been explained to the patient at length.   2.  Hypertension currently well controlled.   3.  The patient remains on amlodipine and valsartan.  No further ACE inhibitor or ARB is necessary.   4.  Paroxysmal atrial fibrillation.  She is currently anticoagulated with good rate control.      It is my pleasure to assist in the care of Nadiya Lewis.  I will see her again in 6 months or earlier on a p.r.n. basis.  All her questions were answered to her satisfaction.      Marcelino Brooks MD      cc:   Yunior Huang MD    [facility]    [address], [address]    [place] [address]         MARCELINO BROOKS MD, [facility]             D: 2018   T: 2018   MT: AMY      Name:     NADIYA LEWIS   MRN:      [id number]        Account:      [id number]   :      1940           Service Date: 2018      Document: [id number]           Outpatient Encounter Prescriptions as of 2018   Medication Sig Dispense Refill     ACETAMINOPHEN PO Take 650 mg by mouth every 6 hours as needed        ALLOPURINOL PO Take 100 mg by mouth 2 times daily       Amlodipine Besylate-Valsartan (EXFORGE)  MG TABS Take 1 tablet by mouth daily 90 tablet 1     calcium citrate (CALCITRATE)  950 MG tablet Take by mouth daily 1200 mg daily       chlorthalidone (HYGROTON) 25 MG tablet Take 1 tablet (25 mg) by mouth daily 90 tablet 2     fish oil-omega-3 fatty acids 1000 MG capsule Take 2 g by mouth daily       folic acid (FOLVITE) 1 MG tablet Take 1 mg by mouth daily       ibandronate (BONIVA) 3 MG/3ML Inject 3 mg into the vein every 3 months        levothyroxine (SYNTHROID/LEVOTHROID) 112 MCG tablet Take 1 tablet (112 mcg) by mouth daily 90 tablet 1     METHOTREXATE SODIUM IJ Inject 0.8 mLs as directed once a week       multivitamin, therapeutic with minerals (MULTI-VITAMIN) TABS tablet Take 1 tablet by mouth daily       omeprazole (PRILOSEC) 20 MG CR capsule Take 1 capsule (20 mg) by mouth 2 times daily 180 capsule 1     VITAMIN D, CHOLECALCIFEROL, PO Take 1,000 Units by mouth daily       warfarin (COUMADIN) 2.5 MG tablet Take 1 tablet (2.5 mg) by mouth Sun, Tue, Thur, Fri. Take 2 tablets (5mg) by mouth Mon, wed, Sat or as instructed by INR clinic. 120 tablet 0     [DISCONTINUED] metoprolol tartrate (LOPRESSOR) 100 MG tablet Take 0.5 tablets (50 mg) by mouth 2 times daily 90 tablet 0     No facility-administered encounter medications on file as of 6/25/2018.        Again, thank you for allowing me to participate in the care of your patient.      Sincerely,    Timothy Das MD     [facility]

## 2018-06-25 NOTE — LETTER
6/25/2018    Yunior Huang MD  [address] [facility] [address]    RE: Zunilda Clark       Dear Colleague,    I had the pleasure of seeing Zunilda Deanne May in the [facility].    HPI and Plan:   See dictation:[id number]    Orders Placed This Encounter   Procedures     Follow-Up with Cardiologist     Follow-Up with Cardiologist       No orders of the defined types were placed in this encounter.      There are no discontinued medications.      Encounter Diagnoses   Name Primary?     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      Chronic diastolic congestive heart failure (H)      Cardiac pacemaker in situ      Paroxysmal atrial fibrillation (H)      Pulmonary hypertension        CURRENT MEDICATIONS:  Current Outpatient Prescriptions   Medication Sig Dispense Refill     ACETAMINOPHEN PO Take 650 mg by mouth every 6 hours as needed        ALLOPURINOL PO Take 100 mg by mouth 2 times daily       Amlodipine Besylate-Valsartan (EXFORGE)  MG TABS Take 1 tablet by mouth daily 90 tablet 1     calcium citrate (CALCITRATE) 950 MG tablet Take by mouth daily 1200 mg daily       chlorthalidone (HYGROTON) 25 MG tablet Take 1 tablet (25 mg) by mouth daily 90 tablet 2     fish oil-omega-3 fatty acids 1000 MG capsule Take 2 g by mouth daily       folic acid (FOLVITE) 1 MG tablet Take 1 mg by mouth daily       ibandronate (BONIVA) 3 MG/3ML Inject 3 mg into the vein every 3 months        levothyroxine (SYNTHROID/LEVOTHROID) 112 MCG tablet Take 1 tablet (112 mcg) by mouth daily 90 tablet 1     METHOTREXATE SODIUM IJ Inject 0.8 mLs as directed once a week       metoprolol tartrate (LOPRESSOR) 100 MG tablet Take 0.5 tablets (50 mg) by mouth 2 times daily 90 tablet 0     multivitamin, therapeutic with minerals (MULTI-VITAMIN) TABS tablet Take 1 tablet by mouth daily       omeprazole (PRILOSEC) 20 MG CR capsule Take 1 capsule (20 mg) by mouth 2 times daily 180 capsule 1     VITAMIN  D, CHOLECALCIFEROL, PO Take 1,000 Units by mouth daily       warfarin (COUMADIN) 2.5 MG tablet Take 1 tablet (2.5 mg) by mouth Sun, Tue, Thur, Fri. Take 2 tablets (5mg) by mouth Mon, wed, Sat or as instructed by INR clinic. 120 tablet 0       ALLERGIES   No Known Allergies    PAST MEDICAL HISTORY:  Past Medical History:   Diagnosis Date     Acquired hypothyroidism 11/4/2015     Aortic valve insufficiency      Arthritis      Atrial fibrillation (H)      Atrial fibrillation and flutter (H)      Blood clotting disorder (H)      CHF (congestive heart failure) (H)      DVT (deep venous thrombosis) (H) 2003     Gastro-oesophageal reflux disease      H/O mitral valve replacement with tissue graft june 2014     History of blood transfusion 2014     HTN (hypertension)      Hypothyroidism      Other chronic pain      Pulmonary HTN      Rheumatoid arthritis(714.0)      Seizures (H) 2014     Stroke (H) 2009    left side mild weakness      Stroke (H) 2014     [place] 2011    [place]     Thrombosis of leg 2003    both legs       PAST SURGICAL HISTORY:  Past Surgical History:   Procedure Laterality Date     ABDOMEN SURGERY      prolapsed bladder     H ABLATION AV NODE  2011    AFlutter with syncope, PPM to follow     IMPLANT PACEMAKER  2011     LAPAROSCOPIC CHOLECYSTECTOMY WITH CHOLANGIOGRAMS N/A 4/29/2017    Procedure: LAPAROSCOPIC CHOLECYSTECTOMY WITH CHOLANGIOGRAMS;  LAPAROSCOPIC CHOLECYSTECTOMY WITH CHOLANGIOGRAMS ;  Surgeon: Angeles Mcgill MD;  Location: [facility] OR     OPEN REDUCTION INTERNAL FIXATION RODDING INTRAMEDULLARY HUMERUS Right 7/28/2015    Procedure: OPEN REDUCTION INTERNAL FIXATION RODDING INTRAMEDULLARY HUMERUS;  Surgeon: Raymundo Rivera MD;  Location: [facility] OR     REPLACE VALVE MITRAL  2006    Mitral valve replacement with bioprosthetic valve in 2008 for rheumatic disease     SLING BLADDER SUSPENSION WITH FASCIA UMESH         FAMILY HISTORY:  Family History   Problem Relation Age of Onset     Coronary  "Artery Disease Mother      Coronary Artery Disease Brother      Diabetes Brother      Diabetes Brother      Hypertension Sister      Hyperlipidemia Sister        SOCIAL HISTORY:  Social History     Social History     Marital status:      Spouse name: N/A     Number of children: N/A     Years of education: N/A     Social History Main Topics     Smoking status: Never Smoker     Smokeless tobacco: Never Used     Alcohol use No     Drug use: No     Sexual activity: Yes     Partners: Male     Other Topics Concern     Caffeine Concern Yes     occasionally     Sleep Concern No     Special Diet Yes     lower salt     Back Care No     Exercise Yes     walking ride excercise bike     Social History Narrative       Review of Systems:  Skin:  Positive for bruising     Eyes:  Positive for glasses;visual blurring    ENT:  Negative      Respiratory:  Negative       Cardiovascular:  Negative      Gastroenterology: Positive for heartburn    Genitourinary:  Positive for urinary frequency    Musculoskeletal:  Positive for arthritis;back pain    Neurologic:  Negative      Psychiatric:  Positive for sleep disturbances troubles falling asleep  Heme/Lymph/Imm:  Positive for easy bruising    Endocrine:  Positive for thyroid disorder      Physical Exam:  Vitals: /60 (BP Location: Right arm, Patient Position: Sitting, Cuff Size: Adult Large)  Pulse 64  Ht 1.626 m (5' 4\")  Wt 71.4 kg (157 lb 4.8 oz)  Breastfeeding? No  BMI 27 kg/m2    Constitutional:  cooperative, alert and oriented, well developed, well nourished, in no acute distress        Skin:  warm and dry to the touch, no apparent skin lesions or masses noted   pacemaker incision in the left infraclavicular area was well-healed      Head:  normocephalic, no masses or lesions        Eyes:  pupils equal and round;conjunctivae and lids unremarkable;sclera white;no xanthalasma;EOMS intact        Lymph:      ENT:  no pallor or cyanosis, dentition good        Neck:  carotid " pulses are full and equal bilaterally, JVP normal, no carotid bruit        Respiratory:  normal breath sounds, clear to auscultation, normal A-P diameter, normal symmetry, normal respiratory excursion, no use of accessory muscles;healed median sternotomy scar;clear to auscultation;normal respiratory excursion         Cardiac: regular rhythm;normal S1 and S2;no S3 or S4;apical impulse not displaced   fixed split S2   systolic murmur;LLSB;grade 1   diastolic murmur;grade 1    pulses full and equal, no bruits auscultated                                        GI:  abdomen soft, non-tender, BS normoactive, no mass, no HSM, no bruits        Extremities and Muscular Skeletal:  no edema arthritic deformities of UE            Neurological:  affect appropriate left sided weakness walks with a walker    Psych:  Alert and Oriented x 3        CC  Timothy Das MD  [address]  [place] [address]                Thank you for allowing me to participate in the care of your patient.      Sincerely,     Timothy Das MD     [facility]    cc:   Timothy Das MD  [address]  [place] [address]

## 2018-06-25 NOTE — MR AVS SNAPSHOT
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        Chronic diastolic congestive heart failure (H)        Cardiac pacemaker in situ        Paroxysmal atrial fibrillation (H)        Pulmonary hypertension           Follow-ups after your visit        Additional Services     Follow-Up with Cardiologist           Follow-Up with Cardiologist                 Your next 10 appointments already scheduled     Jul 09, 2018 11:00 AM CDT   Anticoagulation Visit with [facility]   [facility]    [address]  [facility] [id number] [phone]            Jul 19, 2018 10:45 AM CDT   Pulmonary Hypertension New with Jluis Monroy MD   [facility] ([facility])    [address]  [facility] [id number] [phone]            Jul 26, 2018  3:15 PM CDT   Remote PPM Check with KRAMER TECH1   [facility] ([facility])    [address]  [facility] [id number] [phone] OPT 2           This appointment is for a remote check of your pacemaker.  This is not an appointment at the office.            Aug 17, 2018  9:40 AM CDT   PHYSICAL with Yunior Huang MD   [facility] ([facility]    [address]  [facility] [id number] [phone]              Future tests that were ordered for you today     Open Future Orders        Priority Expected Expires Ordered    Follow-Up with Cardiologist Routine 6/25/2019 6/26/2019 6/25/2018    Follow-Up with  "Cardiologist Routine 6/25/2019 6/26/2019 6/25/2018            Who to contact     If you have questions or need follow up information about today's clinic visit or your schedule please contact [facility] directly at [phone].  Normal or non-critical lab and imaging results will be communicated to you by MyChart, letter or phone within 4 business days after the clinic has received the results. If you do not hear from us within 7 days, please contact the clinic through [employer] or phone. If you have a critical or abnormal lab result, we will notify you by phone as soon as possible.  Submit refill requests through [employer] or call your pharmacy and they will forward the refill request to us. Please allow 3 business days for your refill to be completed.          Additional Information About Your Visit        MyChart Information     [employer] gives you secure access to your electronic health record. If you see a primary care provider, you can also send messages to your care team and make appointments. If you have questions, please call your primary care clinic.  If you do not have a primary care provider, please call [phone] and they will assist you.        Care EveryWhere ID     This is your Care EveryWhere ID. This could be used by other organizations to access your [place] medical records  [id number]        Your Vitals Were     Pulse Height Breastfeeding? BMI (Body Mass Index)          64 1.626 m (5' 4\") No 27 kg/m2         Blood Pressure from Last 3 Encounters:   06/25/18 128/60   06/12/18 140/60   06/01/18 169/82    Weight from Last 3 Encounters:   06/25/18 71.4 kg (157 lb 4.8 oz)   06/12/18 70.8 kg (156 lb 1.6 oz)   06/01/18 69.9 kg (154 lb)              We Performed the Following     Follow-Up with Cardiologist        Primary Care Provider Office Phone # Fax #    Yunior Huang [phone]       [address]  [facility] [address]      "   Equal Access to Services     [facility] : Hadii aad ku hadjesseroe Jamarcusali, wagrantda luqadaha, qaybta kadmitrylucía faith. So [facility] [phone].    ATENCIÓN: Si habla español, tiene a morales disposición servicios gratuitos de asistencia lingüística. Dodieame al [phone].    We comply with applicable federal civil rights laws and [place] laws. We do not discriminate on the basis of race, color, national origin, age, disability, sex, sexual orientation, or gender identity.            Thank you!     Thank you for choosing [facility]  for your care. Our goal is always to provide you with excellent care. Hearing back from our patients is one way we can continue to improve our services. Please take a few minutes to complete the written survey that you may receive in the mail after your visit with us. Thank you!             Your Updated Medication List - Protect others around you: Learn how to safely use, store and throw away your medicines at www.disposemymeds.org.          This list is accurate as of 6/25/18 11:35 AM.  Always use your most recent med list.                   Brand Name Dispense Instructions for use Diagnosis    ACETAMINOPHEN PO      Take 650 mg by mouth every 6 hours as needed        ALLOPURINOL PO      Take 100 mg by mouth 2 times daily        Amlodipine Besylate-Valsartan  MG Tabs    EXFORGE    90 tablet    Take 1 tablet by mouth daily    Essential hypertension       BONIVA 3 MG/3ML   Generic drug:  ibandronate      Inject 3 mg into the vein every 3 months        calcium citrate 950 MG tablet    CALCITRATE     Take by mouth daily 1200 mg daily        chlorthalidone 25 MG tablet    HYGROTON    90 tablet    Take 1 tablet (25 mg) by mouth daily    Essential hypertension with goal blood pressure less than 130/85       fish oil-omega-3 fatty acids 1000 MG capsule      Take 2 g by mouth daily        folic acid 1 MG  tablet    FOLVITE     Take 1 mg by mouth daily        levothyroxine 112 MCG tablet    SYNTHROID/LEVOTHROID    90 tablet    Take 1 tablet (112 mcg) by mouth daily    Acquired hypothyroidism       METHOTREXATE SODIUM IJ      Inject 0.8 mLs as directed once a week        metoprolol tartrate 100 MG tablet    LOPRESSOR    90 tablet    Take 0.5 tablets (50 mg) by mouth 2 times daily    Essential hypertension       Multi-vitamin Tabs tablet      Take 1 tablet by mouth daily        omeprazole 20 MG CR capsule    priLOSEC    180 capsule    Take 1 capsule (20 mg) by mouth 2 times daily    Esophageal reflux       VITAMIN D (CHOLECALCIFEROL) PO      Take 1,000 Units by mouth daily        warfarin 2.5 MG tablet    COUMADIN    120 tablet    Take 1 tablet (2.5 mg) by mouth Sun, Tue, Thur, Fri. Take 2 tablets (5mg) by mouth Mon, wed, Sat or as instructed by INR clinic.    Atrial fibrillation and flutter (H)

## 2018-06-26 NOTE — PROGRESS NOTES
Service Date: 06/25/2018      PRIMARY CARE PHYSICIAN:  Dr. Yunior Huang.      HISTORY OF PRESENT ILLNESS:  I again had the pleasure of seeing your patient, Zunilda Clark, at [facility] for evaluation of pulmonary hypertension and paroxysmal atrial fibrillation.  The patient has been hospitalized in [place] for congestive heart failure in 2014.  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  She has a history of paroxysmal atrial fibrillation and has been treated for essential hypertension.  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  Her mitral valve was replaced with a bioprosthetic valve in 04/2008.  Unfortunately by 04/2014 she was found to have severe mitral stenosis and the valve was replaced with a Magna 27 mm bioprosthetic valve.  She did not have any significant coronary artery disease on either occasion.  Echocardiography has continued to show pulmonary hypertension with her most recent RVSP= 71 mmHg on 06/21/2018.  On that echocardiogram there was normal bioprosthetic mitral valve gradients.  She was in atrial fibrillation.  There was moderately severe biatrial enlargement, mild aortic regurgitation and the right ventricle was moderately dilated with mildly depressed RV systolic function.  She is status post tricuspid annuloplasty ring placement in 2014 as well.  She remains on warfarin therapy.  She fell and broke her arm in 2016 requiring ORIF of her right humerus.  She has been seen by Pulmonary and they suggested that she does not have obstructive sleep apnea but does have restless leg syndrome.  They wanted to test her ferritin level since this has been related to restless legs syndrome.  The patient felt this test was too expensive and has not performed it to date.   She denies any bleeding diathesis.  She was given some samples of pramipexole to use for her restless legs syndrome.  She has bilateral knee arthritis which makes it difficult to walk.  She asks me 3 questions at this visit:   1.  What were the results of her echocardiogram?   2.  Can she proceed with left total knee arthroplasty?   3.  Should she go on to have her ferritin level tested?      PHYSICAL EXAMINATION:  As listed below.      The patient's most recent pacemaker interrogation on 04/19/2018 showed 45% atrial pacing, 32% ventricular pacing.  134 mode switches, comprising 4.7% of the time, the longest 34 hours and 23 minutes.  Her ventricular rates are well controlled and she remains on warfarin.  Two ventricular high rates were seen for 7-10 beats.      ASSESSMENT:   1.  Zunilda Clark is a delightful 77-year-old female status post mitral valve replacement 05/2014 after previous balloon commissurotomy in 2006 and valve replacement 04/10/2008.  In 05/2014 while living in [place], the patient had both mitral valve replacement with a bioprosthetic valve and a tricuspid annuloplasty ring implanted.  Her echocardiogram now demonstrates worsening pulmonary hypertension.  I will now arrange evaluation in our Pulmonary Hypertension department with Dr. Tamie Monroy.  Sleep apnea testing did not show apnea and obstruction but instead just restless leg syndrome.  This is being evaluated and treated.  As to her second question regarding her left total knee arthroplasty, I believe she will have to wait until her pulmonary hypertension is evaluated and a decision on treatment is made.  She is at high risk for anesthesia with her pulmonary pressures this high.  The third question was regarding testing her ferritin which I think should go forward.  We would like to understand her restless legs syndrome and rule out any ferritin deficiency.  It is doubtful that she has hemochromatosis.  .  Etiology of the pulmonary hypertension may  be secondary to pulmonary disease although her pulmonary function tests in the past have shown some restriction but no severe disease.  She does not have sleep apnea.  This could be due to systemic hypertension over time or even from her previous significant mitral stenosis.  I suspect that after evaluation Dr. Monroy may decide to proceed with a right heart catheterization to better understand both the etiology of her pulmonary hypertension and possible treatment options.  This has all been explained to the patient at length.   2.  Hypertension currently well controlled.   3.  The patient remains on amlodipine and valsartan.  No further ACE inhibitor or ARB is necessary.   4.  Paroxysmal atrial fibrillation.  She is currently anticoagulated with good rate control.      It is my pleasure to assist in the care of Nadiya Lewis.  I will see her again in 6 months or earlier on a p.r.n. basis.  All her questions were answered to her satisfaction.      Marcelino Brooks MD      cc:   Yunior Huang MD    [facility]    [address]    [place]  [address]         MARCEL[facility] BROOKS MD[facility]             D: 2018   T: 2018   MT: AMY      Name:     NADIYA LEWIS   MRN:      [id number]        Account:      [id number]   :      1940           Service Date: 2018      Document: [id number]

## 2018-06-27 NOTE — TELEPHONE ENCOUNTER
Routing refill request to provider for review/approval because:  Labs out of range:  BP readings

## 2018-06-28 RX ORDER — METOPROLOL TARTRATE 100 MG
TABLET ORAL
Qty: 90 TABLET | Refills: 0 | Status: SHIPPED | OUTPATIENT
Start: 2018-06-28 | End: 2018-07-19

## 2018-07-09 ENCOUNTER — ANTICOAGULATION THERAPY VISIT (OUTPATIENT)
Dept: ANTICOAGULATION | Facility: CLINIC | Age: 78
End: 2018-07-09
Payer: MEDICARE

## 2018-07-09 DIAGNOSIS — I48.91 ATRIAL FIBRILLATION AND FLUTTER (H): ICD-10-CM

## 2018-07-09 DIAGNOSIS — I48.92 ATRIAL FIBRILLATION AND FLUTTER (H): ICD-10-CM

## 2018-07-09 DIAGNOSIS — Z79.01 LONG-TERM (CURRENT) USE OF ANTICOAGULANTS: ICD-10-CM

## 2018-07-09 LAB — INR POINT OF CARE: 2.1 (ref 0.86–1.14)

## 2018-07-09 PROCEDURE — 36416 COLLJ CAPILLARY BLOOD SPEC: CPT

## 2018-07-09 PROCEDURE — 85610 PROTHROMBIN TIME: CPT | Mod: QW

## 2018-07-09 PROCEDURE — 99207 ZZC NO CHARGE NURSE ONLY: CPT

## 2018-07-09 NOTE — MR AVS SNAPSHOT
Zunilda Alicea May   7/9/2018 11:00 AM   Anticoagulation Therapy Visit    Description:  78 year old female   Provider:  [facility]   Department:  [facility]           INR as of 7/9/2018     Today's INR 2.1      Anticoagulation Summary as of 7/9/2018     INR goal 2.0-3.0   Today's INR 2.1   Full warfarin instructions 5 mg on Mon, Wed, Sat; 2.5 mg all other days   Next INR check 8/17/2018    Indications   Long-term (current) use of anticoagulants [Z79.01] [Z79.01]  Atrial fibrillation and flutter (H) [I48.91  I48.92]         Your next Anticoagulation Clinic appointment(s)     Aug 17, 2018  9:15 AM CDT   Anticoagulation Visit with [facility]   [facility] ([facility]    [address] [facility] [address]  [facility] [id number] [phone]              Contact Numbers     [facility] Phone Numbers:  Anticoagulation Clinic Appointments : [phone]  Anticoagulation Nurse: [phone]         July 2018 Details    Sun Mon Tue Wed Thu Fri Sat     1               2               3               4               5               6               7                 8               9      5 mg   See details      10      2.5 mg         11      5 mg         12      2.5 mg         13      2.5 mg         14      5 mg           15      2.5 mg         16      5 mg         17      2.5 mg         18      5 mg         19      2.5 mg         20      2.5 mg         21      5 mg           22      2.5 mg         23      5 mg         24      2.5 mg         25      5 mg         26      2.5 mg         27      2.5 mg         28      5 mg           29      2.5 mg         30      5 mg         31      2.5 mg              Date Details   07/09 This INR check               How to take your warfarin dose     To take:  2.5 mg Take 1 of the 2.5 mg tablets.    To take:  5 mg Take 2 of the 2.5 mg tablets.           August 2018 Details    Sun Mon Tue Wed Thu Fri Sat        1       5 mg         2      2.5 mg         3      2.5 mg         4      5 mg           5      2.5 mg         6      5 mg         7      2.5 mg         8      5 mg         9      2.5 mg         10      2.5 mg         11      5 mg           12      2.5 mg         13      5 mg         14      2.5 mg         15      5 mg         16      2.5 mg         17            18                 19               20               21               22               23               24               25                 26               27               28               29               30               31                 Date Details   No additional details    Date of next INR:  8/17/2018         How to take your warfarin dose     To take:  2.5 mg Take 1 of the 2.5 mg tablets.    To take:  5 mg Take 2 of the 2.5 mg tablets.

## 2018-07-09 NOTE — PROGRESS NOTES
ANTICOAGULATION FOLLOW-UP CLINIC VISIT    Patient Name:  Zunilda Alicea May  Date:  7/9/2018  Contact Type:  Face to Face    SUBJECTIVE:     Patient Findings     Positives No Problem Findings           OBJECTIVE    INR Protime   Date Value Ref Range Status   07/09/2018 2.1 (A) 0.86 - 1.14 Final       ASSESSMENT / PLAN  INR assessment THER    Recheck INR In: 6 WEEKS    INR Location Clinic      Anticoagulation Summary as of 7/9/2018     INR goal 2.0-3.0   Today's INR 2.1   Warfarin maintenance plan 5 mg (2.5 mg x 2) on Mon, Wed, Sat; 2.5 mg (2.5 mg x 1) all other days   Full warfarin instructions 5 mg on Mon, Wed, Sat; 2.5 mg all other days   Weekly warfarin total 25 mg   No change documented Sonam Teixeira RN   Plan last modified Екатерина Mahan RN (8/14/2017)   Next INR check 8/17/2018   Priority INR   Target end date     Indications   Long-term (current) use of anticoagulants [Z79.01] [Z79.01]  Atrial fibrillation and flutter (H) [I48.91  I48.92]         Anticoagulation Episode Summary     INR check location     Preferred lab     Send INR reminders to [facility]    Comments likes printed AVS      Anticoagulation Care Providers     Provider Role Specialty Phone number    Yunior Huang MD Responsible Internal Medicine [phone]            See the Encounter Report to view Anticoagulation Flowsheet and Dosing Calendar (Go to Encounters tab in chart review, and find the Anticoagulation Therapy Visit)    Dosage adjustment made based on physician directed care plan.    Sonma Teixeira, RN

## 2018-07-11 DIAGNOSIS — E03.9 ACQUIRED HYPOTHYROIDISM: ICD-10-CM

## 2018-07-11 RX ORDER — LEVOTHYROXINE SODIUM 112 UG/1
TABLET ORAL
Qty: 90 TABLET | Refills: 0 | Status: SHIPPED | OUTPATIENT
Start: 2018-07-11 | End: 2018-11-05

## 2018-07-11 NOTE — TELEPHONE ENCOUNTER
"Requested Prescriptions   Pending Prescriptions Disp Refills     levothyroxine (SYNTHROID/LEVOTHROID) 112 MCG tablet [Pharmacy Med Name: Levothyroxine Sodium Oral Tablet 112 MCG] 90 tablet 0    Last Written Prescription Date:  02/02/2018  Last Fill Quantity: 90,  # refills: 1   Last office visit: 6/12/2018 with prescribing provider:     Future Office Visit:   Next 5 appointments (look out 90 days)     Aug 17, 2018  9:40 AM CDT   PHYSICAL with Yunior Huang MD   [facility] ([facility])    [address]  [facility] [address]   [phone]                Sig: TAKE 1 TABLET (112 MCG) BY MOUTH DAILY    Thyroid Protocol Passed    7/11/2018 10:02 AM       Passed - Patient is 12 years or older       Passed - Recent (12 mo) or future (30 days) visit within the authorizing provider's specialty    Patient had office visit in the last 12 months or has a visit in the next 30 days with authorizing provider or within the authorizing provider's specialty.  See \"Patient Info\" tab in inbasket, or \"Choose Columns\" in Meds & Orders section of the refill encounter.           Passed - Normal TSH on file in past 12 months    Recent Labs   Lab Test  04/23/18   1120   TSH  1.88             Passed - No active pregnancy on record    If patient is pregnant or has had a positive pregnancy test, please check TSH.         Passed - No positive pregnancy test in past 12 months    If patient is pregnant or has had a positive pregnancy test, please check TSH.          "

## 2018-07-19 ENCOUNTER — OFFICE VISIT (OUTPATIENT)
Dept: CARDIOLOGY | Facility: CLINIC | Age: 78
End: 2018-07-19
Payer: MEDICARE

## 2018-07-19 VITALS
DIASTOLIC BLOOD PRESSURE: 58 MMHG | BODY MASS INDEX: 26.7 KG/M2 | SYSTOLIC BLOOD PRESSURE: 110 MMHG | HEART RATE: 60 BPM | HEIGHT: 64 IN | OXYGEN SATURATION: 96 % | WEIGHT: 156.4 LBS

## 2018-07-19 DIAGNOSIS — I27.20 PULMONARY HTN (H): Primary | ICD-10-CM

## 2018-07-19 DIAGNOSIS — Z98.890 HISTORY OF TRICUSPID VALVE ANNULOPLASTY: ICD-10-CM

## 2018-07-19 DIAGNOSIS — I09.9 RHEUMATIC HEART DISEASE: ICD-10-CM

## 2018-07-19 DIAGNOSIS — I38 PRE-OPERATIVE CARDIOVASCULAR EXAMINATION, VALVULAR HEART DISEASE: ICD-10-CM

## 2018-07-19 DIAGNOSIS — I50.33 ACUTE ON CHRONIC DIASTOLIC CONGESTIVE HEART FAILURE (H): ICD-10-CM

## 2018-07-19 DIAGNOSIS — Z01.810 PRE-OPERATIVE CARDIOVASCULAR EXAMINATION, VALVULAR HEART DISEASE: ICD-10-CM

## 2018-07-19 DIAGNOSIS — Z95.3 STATUS POST MITRAL VALVE REPLACEMENT WITH BIOPROSTHETIC VALVE: ICD-10-CM

## 2018-07-19 DIAGNOSIS — I06.0 RHEUMATIC AORTIC STENOSIS: ICD-10-CM

## 2018-07-19 DIAGNOSIS — Z86.73 HISTORY OF STROKE: ICD-10-CM

## 2018-07-19 DIAGNOSIS — Z79.01 ANTICOAGULATED ON WARFARIN: ICD-10-CM

## 2018-07-19 DIAGNOSIS — I48.20 CHRONIC ATRIAL FIBRILLATION (H): ICD-10-CM

## 2018-07-19 DIAGNOSIS — Z95.0 CARDIAC PACEMAKER IN SITU: ICD-10-CM

## 2018-07-19 PROCEDURE — 93000 ELECTROCARDIOGRAM COMPLETE: CPT | Performed by: INTERNAL MEDICINE

## 2018-07-19 PROCEDURE — 99215 OFFICE O/P EST HI 40 MIN: CPT | Mod: 25 | Performed by: INTERNAL MEDICINE

## 2018-07-19 RX ORDER — TORSEMIDE 10 MG/1
10 TABLET ORAL
Qty: 30 TABLET | Refills: 3 | Status: SHIPPED | OUTPATIENT
Start: 2018-07-19 | End: 2018-11-20

## 2018-07-19 NOTE — MR AVS SNAPSHOT
After Visit Summary   2018    Zunilda Clark    MRN: [id number]           Patient Information     Date Of Birth          1940        Visit Information        Provider Department      2018 10:45 AM Jluis Monroy MD [facility]        Today's Diagnoses     Pulmonary HTN    -  1    Pre-operative cardiovascular examination, valvular heart disease        Rheumatic heart disease        Status post mitral valve replacement with bioprosthetic valve        Chronic atrial fibrillation (H)        History of tricuspid valve annuloplasty        Rheumatic aortic stenosis        Cardiac pacemaker in situ        History of stroke        Anticoagulated on warfarin          Care Instructions    MEDICATION CHANGES:  1. Stop chlorthalidone.  2. Instead, start torsemide 10 mg. Take one pill in the morning. This is a diuretic and will make her urinate more.    ADDITIONAL TESTIN. 24-hour Holter.  2. Cardiac MRI with contrast.   3. Chest x-ray 2 views.  5. Additional labs (to be done in 7 days) - basic metabolic panel, NT proBNP, serum ferritin, transferrin saturation, serum iron, total iron-binding capacity, vitamin B12, folate.    FOLLOW-UP:  After completion of above testing. In the pulmonary hypertension clinic in [place].    If you have any questions or concerns, please call my nurse Maranda Garcia at [phone].            Follow-ups after your visit        Additional Services     Follow-Up with Pulmonary Hypertension Clinic                 Your next 10 appointments already scheduled     2018  3:15 PM CDT   Remote PPM Check with KRAMER TECH1   [facility] ([facility])    [address] [place] [address] [phone] OPT 2           This appointment is for a remote check of your pacemaker.  This is not an appointment at the office.            Aug 17, 2018  9:15 AM CDT    Anticoagulation Visit with [facility]    [address]  [facility] [id number] [phone]            Aug 17, 2018  9:40 AM CDT   PHYSICAL with [facility]unior Huang MD   [facility]    [address]  [facility] [id number] [phone]            Aug 30, 2018 10:00 AM CDT   XR CHEST 2 VIEWS with RSCCXR1   [facility] ([facility])    [address]  [facility] [address] [phone]           Please bring a list of your current medicines to your exam. (Include vitamins, minerals and over-thecounter medicines.) Leave your valuables at home.  Tell your doctor if there is a chance you may be pregnant.  You do not need to do anything special for this exam.            Aug 30, 2018 10:30 AM CDT   Holter Monitor with RUST HOLTER PROC [facility] ([facility])    [address]  [facility] [address] [phone]           LOCATION - [address], [address] [place] [address] **Please check-in at the [place] Registration Office, [address], in the [facility] building. When you are finished registering, please go to [address] and have a seat.            Aug 30, 2018 10:45 AM CDT   LAB with RU LAB   [facility] [place] ([facility])    [address]  [facility] [address] [phone]           Please do not eat 10-12 hours before your appointment if you are coming in fasting for labs on lipids, cholesterol, or glucose (sugar). This does not apply to pregnant women. Water, hot tea and black coffee (with nothing added) are okay. Do not drink other fluids, diet soda or chew gum.            Sep 27, 2018 10:45 AM CDT   Pulmonary Hypertension Return with Jluis Willett  MD Porfirio   [facility] ([facility])    [address]  [facility] [id number] [phone]              Future tests that were ordered for you today     Open Future Orders        Priority Expected Expires Ordered    Iron and iron binding capacity Routine 8/31/2018 7/19/2019 7/19/2018    Transferrin Routine 8/31/2018 7/19/2019 7/19/2018    Vitamin B12 Routine 8/31/2018 7/19/2019 7/19/2018    Folate Routine 8/31/2018 7/19/2019 7/19/2018    Follow-Up with Pulmonary Hypertension Clinic Routine 9/18/2018 7/19/2019 7/19/2018    Holter Monitor 24 hour - Adult Routine 8/31/2018 9/2/2018 7/19/2018    XR Chest 2 Views Routine 8/31/2018 7/19/2019 7/19/2018    Basic metabolic panel Routine 8/31/2018 7/19/2019 7/19/2018    N terminal pro BNP outpatient Routine 8/31/2018 7/19/2019 7/19/2018    MRI Cardiac w/contrast Routine 7/20/2018 7/19/2019 7/19/2018            Who to contact     If you have questions or need follow up information about today's clinic visit or your schedule please contact [facility] directly at [phone].  Normal or non-critical lab and imaging results will be communicated to you by [employer], letter or phone within 4 business days after the clinic has received the results. If you do not hear from us within 7 days, please contact the clinic through [employer] or phone. If you have a critical or abnormal lab result, we will notify you by phone as soon as possible.  Submit refill requests through [employer] or call your pharmacy and they will forward the refill request to us. Please allow 3 business days for your refill to be completed.          Additional Information About Your Visit        XIFIN Information     [employer] gives you secure access to your electronic health record. If you see a primary care provider, you can also send messages to your care team and make appointments. If you have questions,  "please call your primary care clinic.  If you do not have a primary care provider, please call [phone] and they will assist you.        Care EveryWhere ID     This is your Care EveryWhere ID. This could be used by other organizations to access your [place] medical records  [id number]        Your Vitals Were     Pulse Height Pulse Oximetry BMI (Body Mass Index)          60 1.626 m (5' 4\") 96% 26.85 kg/m2         Blood Pressure from Last 3 Encounters:   07/19/18 110/58   06/25/18 128/60   06/12/18 140/60    Weight from Last 3 Encounters:   07/19/18 70.9 kg (156 lb 6.4 oz)   06/25/18 71.4 kg (157 lb 4.8 oz)   06/12/18 70.8 kg (156 lb 1.6 oz)              We Performed the Following     EKG 12-lead complete w/read - Clinics     Iron, TIBC and Ferritin ([facility])          Today's Medication Changes          These changes are accurate as of 7/19/18 12:41 PM.  If you have any questions, ask your nurse or doctor.               Start taking these medicines.        Dose/Directions    torsemide 10 MG tablet   Commonly known as:  DEMADEX   Used for:  Pulmonary HTN   Started by:  Jluis Monroy MD        Dose:  10 mg   Take 1 tablet (10 mg) by mouth daily (with breakfast)   Quantity:  30 tablet   Refills:  3         Stop taking these medicines if you haven't already. Please contact your care team if you have questions.     chlorthalidone 25 MG tablet   Commonly known as:  HYGROTON   Stopped by:  Jluis Monroy MD                Where to get your medicines      These medications were sent to [id number] - [place], [place] - [address]   [address] , [facility] [address]     Phone:  [phone]     torsemide 10 MG tablet                Primary Care Provider Office Phone # Fax #    Yunior Huang [phone]       [address]  [facility] [address]        Equal Access to Services     SAM UGALDE AH: Hadii rosalie larios Sonelson, tylerda liest, qaybluana velasco " lucía lorenzanalaureen johannytalisha palominoaan ah. So [facility] [phone].    ATENCIÓN: Si usmanla marilyn, tiene a morales disposición servicios gratuitos de asistencia lingüística. Jenna al [phone].    We comply with applicable federal civil rights laws and [place] laws. We do not discriminate on the basis of race, color, national origin, age, disability, sex, sexual orientation, or gender identity.            Thank you!     Thank you for choosing [facility]  for your care. Our goal is always to provide you with excellent care. Hearing back from our patients is one way we can continue to improve our services. Please take a few minutes to complete the written survey that you may receive in the mail after your visit with us. Thank you!             Your Updated Medication List - Protect others around you: Learn how to safely use, store and throw away your medicines at www.disposemymeds.org.          This list is accurate as of 7/19/18 12:41 PM.  Always use your most recent med list.                   Brand Name Dispense Instructions for use Diagnosis    ACETAMINOPHEN PO      Take 650 mg by mouth every 6 hours as needed        ALLOPURINOL PO      Take 100 mg by mouth 2 times daily        Amlodipine Besylate-Valsartan  MG Tabs    EXFORGE    90 tablet    Take 1 tablet by mouth daily    Essential hypertension       BONIVA 3 MG/3ML   Generic drug:  ibandronate      Inject 3 mg into the vein every 3 months        calcium citrate 950 MG tablet    CALCITRATE     Take by mouth daily 1200 mg daily        fish oil-omega-3 fatty acids 1000 MG capsule      Take 1 g by mouth daily        folic acid 1 MG tablet    FOLVITE     Take 1 mg by mouth daily        levothyroxine 112 MCG tablet    SYNTHROID/LEVOTHROID    90 tablet    TAKE 1 TABLET (112 MCG) BY MOUTH DAILY    Acquired hypothyroidism       METHOTREXATE SODIUM IJ      Inject 0.8 mLs as directed once a week        metoprolol  tartrate 100 MG tablet    LOPRESSOR    90 tablet    Take 0.5 tablets (50 mg) by mouth 2 times daily    Essential hypertension       Multi-vitamin Tabs tablet      Take 1 tablet by mouth daily        omeprazole 20 MG CR capsule    priLOSEC    180 capsule    Take 1 capsule (20 mg) by mouth 2 times daily    Esophageal reflux       torsemide 10 MG tablet    DEMADEX    30 tablet    Take 1 tablet (10 mg) by mouth daily (with breakfast)    Pulmonary HTN       VITAMIN D (CHOLECALCIFEROL) PO      Take 1,000 Units by mouth daily        warfarin 2.5 MG tablet    COUMADIN    120 tablet    Take 1 tablet (2.5 mg) by mouth Sun, Tue, Thur, Fri. Take 2 tablets (5mg) by mouth Mon, wed, Sat or as instructed by INR clinic.    Atrial fibrillation and flutter (H)

## 2018-07-19 NOTE — PROGRESS NOTES
Service Date: 07/19/2018      PRIMARY CARDIOLOGIST AND REFERRING PROVIDER:  Dr. Timothy Das, [facility].       PRIMARY CARE PROVIDER:  Dr. Yunior Huang      REASON FOR VISIT:    Second opinion regarding rheumatic heart disease, tricuspid regurgitation with right ventricular dysfunction, and pulmonary hypertension.      HISTORY OF PRESENT ILLNESS:    The patient is new to my practice.  She has established care with my cardiologist colleague, Dr. Das.  The patient was accompanied by her .  They are a delightful couple.  Deanne is 78 years old,  and has been a 's wife for most of her life and has also done teaching and music lessons.  She is a lifelong never-smoker, has a very intelligent understanding of her complex medical history, BMI is 26.8 kg/m2 and her primary comorbidities are cardiovascular (as detailed below) and chronic rheumatoid arthritis (for which she is on methotrexate).      I did a review of Deanne's extensive cardiac history including outside medical records from [place].  The temporal course is as below:    -  Age 8 years.  Although she was not diagnosed formally with it, it appears that she had rheumatic fever.  She reports having to miss school for several weeks and had multiple hospitalizations with chest pain and feeling unwell.  She did not undergo any antibiotic therapy.   -  In her 20s, she had 4 pregnancies.  All were normal without any CV complications.   -  In 2003, she developed sudden embolic peripheral complications in both lower extremities requiring surgical embolectomy.  At that time, she was diagnosed for the first time with atrial fibrillation and rheumatic heart disease.  She was started on warfarin.   -  In 2006, failed attempt at balloon valvuloplasty for mitral valve stenosis at [facility] in [place].   -  In 2008, patient underwent a bioprosthetic mitral valve replacement and surgical maze procedure at [facility]  [place].   -  Following her surgery in 2008, because she underwent the maze, warfarin was discontinued.  Unfortunately, a few months later in 01/2009, she developed sudden onset of left-sided weakness and was found to be a stroke.  Note she was not anticoagulated at that time.  Since then, she has been on chronic warfarin anticoagulation without recurrent stroke/thromboembolism.   -  In 2010, dual-chamber pacemaker implantation.   -  In 2014, she had bioprosthetic mitral valve degeneration and restenosis and underwent redo sternotomy with a 27 mm Magna bioprosthetic valve replacement.  At the same time, she had a tricuspid valve annuloplasty and her pacemaker lead traverses her native tricuspid valve.  That hospital stay was complicated by a TIA and 2 seizures.  She underwent prolonged rehabilitation after that.   -  Prior to both surgeries, she underwent coronary angiogram and was not found to have any CAD.     -  In 2015, she moved from [place] to [place] to live closer to her sister.  Since then, she has followed with Dr. Das and Augusto Brasher at Cardiology here.   -  In 2016, she had a mechanical fall and fractured her right humerus and underwent ORIF under general anesthesia without any complications.   -  In 04/2017, had necrotizing cholecystitis and underwent emergent laparoscopic cholecystectomy.  The surgery itself was uncomplicated and she was discharged home but a few days later, she developed symptoms of right heart failure with fluid overload and lower extremity edema and had to be rehospitalized.      More lately, patient has been having significant problems with arthritis of both knees.  In fact, last year, she had been cleared for her knee surgery and before she could have it done, she had the cholecystitis and therefore, it was postponed.  She can barely walk.  She is able to ambulate in the hallways where she lives and has to stop due to knee discomfort before shortness of breath.  No lower  extremity edema, orthopnea, PND, palpitations or chest pain.      In terms of other comorbidities to explain her pulmonary hypertension, she has few.  Atrial fibrillation appears to be well rate controlled.  She is not pacer dependent (approximately 32% V paced), screening for sleep apnea is negative, no previous venous thromboembolic disease, lifelong never-tobacco user, no known liver disease.  She does have rheumatoid arthritis but thus far, there has been no evidence of extra-articular involvement, specifically pulmonary.      DIAGNOSTIC DATA:  Transthoracic echocardiogram.  I personally reviewed the images with the patient.  It is consistent with her known diagnosis of rheumatic heart disease.  Her left atrium is severely enlarged.  The bioprosthetic mitral valve leaflets open well with trivial prosthetic regurgitation and no periprosthetic regurgitation.  The mean diastolic gradient is approximately 4 mmHg (heart rate 60 BPM).  The left ventricle has moderate concentric hypertrophy with normal systolic function and a visual LVEF of 65%-70%.  The left atrium is severely enlarged.  On the right side, the right atrium is moderately enlarged, right ventricle is mildly dilated with mildly reduced systolic function (TAPSE 18 mm).  There is a pacemaker lead traversing the tricuspid valve.  There is moderate tricuspid regurgitation.  The IVC is borderline dilated at 2.2 cm but collapses well with inspiration.  No pericardial effusion.  The aortic valve is trileaflet, calcified and has at least moderate stenosis with mild regurgitation.  The hemodynamics of aortic valve stenosis are likely underestimated at the mean gradient of 8 mmHg.  I suspect the valve area is much lower than 1.5 cm2.  The ascending aortic dimension is normal.      Chest x-ray from 2017 shows biatrial enlargement, prominent pulmonary artery, normal lungs.  No pleural effusion.      lABS:  Largely satisfactory with a creatinine of 0.7 and estimated  GFR of 72.  Normal liver enzymes.  Normal TSH of 1.8.  Mild anemia of 11.7 hemoglobin with macrocytosis () and her serial INRs are therapeutic.      CURRENT MEDICATIONS:     1.  Exforge (amlodipine 10, valsartan 320 mg) 1 tablet daily.   2.  Chlorthalidone 25 mg daily.   3.  Levothyroxine 112 mcg daily.   4.  Metoprolol tartrate 50 mg b.i.d.   5.  Omeprazole 20 mg daily.   6.  Vitamin D supplement.    7.  Calcium supplement.    8.  Over-the-counter fish oil   9.  Folic acid 1 mg daily.   10.  Allopurinol 100 mg b.i.d.   11.  Warfarin for stroke prophylaxis for atrial fibrillation, goal INR 2.0-3.0.      ALLERGIES:  No known allergies.      PHYSICAL EXAMINATION:   VITAL SIGNS:  /58, pulse 60 per minute, irregularly irregular, height 1.626 meters (5 feet 4 inches), weight 70.9 kilograms (156 pounds), respiratory rate 16 per minute, sats 96% on room air, BMI 26.9 kg/m2.   CONSTITUTIONAL:  Comfortable at rest, mentally very sharp, alert, oriented, well developed and nourished.   EYES:  Mild pallor, no icterus.   ENT:  Satisfactory dentition.  No cyanosis.   NECK:  No thyromegaly.   CARDIOVASCULAR:  Jugular venous pressure is not elevated.  Carotid pulse upstroke normal.  No carotid bruit.  Apical impulse undisplaced and prominent.  Midline sternotomy scar well healed.  Pacemaker site satisfactory.  Heart sounds are irregularly irregular.  She has a soft 2/6 pansystolic murmur that augments an end inspiration consistent with moderate tricuspid regurgitation (no diastolic rumble).  No audible murmur of aortic stenosis.  No diastolic murmur.  No S3.   LUNGS:  Bilaterally clear to auscultation.   MUSCULOSKELETAL:  Significant kyphosis and she has a markedly painful gait due to arthritis of both knees.  She requires support to ambulate to the exam couch.    SKIN:  No bruising.   GASTROINTESTINAL:  Soft, nontender, no hepatosplenomegaly.   NEUROPSYCHIATRIC:  Alert, oriented x3.   EXTREMITIES:  No edema or  clubbing.      DIAGNOSES:     1.  Rheumatic valvular heart disease.     2.  Status post bioprosthetic mitral valve regurgitation (27 mm Magna prosthesis) and tricuspid valve annuloplasty 2014   3.  Status post dual-chamber pacemaker implantation, 2009.     4.  Pulmonary hypertension secondary to #1.   5.  Chronic atrial fibrillation with a history of multiple strokes and peripheral arterial embolization during anticoagulation interruption.   6.  Chronic rheumatoid arthritis (on methotrexate).   7.  Chronic mild anemia, etiology unclear.   8.  Preoperative cardiovascular evaluation prior to elective knee arthroplasty.      ASSESSMENT:    This is a complex patient but overall, her temporal history and clinical presentation is consistent with rheumatic heart disease.  Per review of the echocardiogram and physical examination, I think the patient has moderate rather than moderately severe/severe tricuspid regurgitation.  Her right ventricle is mildly dilated with reduced systolic function.  She certainly has evidence of pulmonary hypertension, both on echocardiogram and physical exam (P2 is prominent).  However, I do not think she has clinical decompensation of the right ventricle at this stage.  Her mitral valve prosthesis is well functioning.  Atrial fibrillation is appropriately rate controlled.  She is tolerating warfarin anticoagulation with stable INRs and no bleeding issues and has never had known coronary artery disease.  Her systemic hypertension is well controlled.  Sleep apnea has been ruled out.      I think her pulmonary hypertension should be managed conservatively.  A right heart catheterization is unlikely to be beneficial.  For one, it is technically challenging, as they have to traverse the annuloplasty ring with the risk of displacement of the pacemaker leads.  More pertinently, she likely has group 2 pulmonary hypertension from her extensive valve disease and atrial fibrillation and PH-specific drugs  are unlikely to help and will potentially even make her worse.  Overall, I think she is tolerating her CV disease well.  Tellingly, when she had surgery last year, she did develop right heart failure in the postoperative period.  Therefore, I think we should optimize her diuretic therapy.  I will order some noninvasive tests but overall, I am fairly optimistic that I will be able to clear her for her upcoming surgery.        I watched her walk in clinic today.  She is severely limited by her arthritis.  She can barely take a few steps before stopping due to excruciating pain.  Therefore, her knee surgery will be very vital in maintaining her quality of life.      PLAN:   1.  Medication changes.  Stop chlorthalidone.  Start torsemide 10 mg daily.     2.  Additional testing.  A 24-hour Holter to assess rate control of atrial fibrillation, chest x-ray, cardiac MRI with contrast, followup labs in 7 days including basic metabolic panel and workup of her anemia   3.  Follow up in my Pulmonary Hypertension Clinic after completion of the above.      It was my pleasure to visit with this very delightful and knowledgeable couple.  I spent 60 minutes with them, greater than 50% of the time was spent in counseling and coordination of care.      cc:   Timothy Das MD, [facility] at [address], [address]   [place] [address]      Augusto Brasher PA-C    [facility]    [address] [place] [address]      Yunior Huang MD    [facility]    [address]   [place] [id number] [place] [address] YANETH GRAMAJO MD             D: 2018   T: 2018   MT: LEOBARDO      Name:     NADIYA LEWIS   MRN:      [id number]        Account:      [id number]   :      1940           Service Date: 2018      Document: [id number]

## 2018-07-19 NOTE — PROGRESS NOTES
Office note dictated.      REVIEW OF SYSTEMS:  A comprehensive 10-point review of systems was completed and the pertinent positives are documented in the history of present illness.    Skin:  Positive for bruising   Eyes:  Positive for glasses  ENT:  Negative    Respiratory:  Positive for dyspnea on exertion  Cardiovascular:    Positive for;fatigue  Gastroenterology: Positive for heartburn;diarrhea  Genitourinary:  Positive for urinary frequency  Musculoskeletal:  Positive for joint pain;joint stiffness  Neurologic:  Positive for stroke  Psychiatric:  Positive for sleep disturbances  Heme/Lymph/Imm:  Positive for easy bruising  Endocrine:  Positive for thyroid disorder

## 2018-07-19 NOTE — PATIENT INSTRUCTIONS
MEDICATION CHANGES:  1. Stop chlorthalidone.  2. Instead, start torsemide 10 mg. Take one pill in the morning. This is a diuretic and will make her urinate more.    ADDITIONAL TESTIN. 24-hour Holter.  2. Cardiac MRI with contrast.   3. Chest x-ray 2 views.  5. Additional labs (to be done in 7 days) - basic metabolic panel, NT proBNP, serum ferritin, transferrin saturation, serum iron, total iron-binding capacity, vitamin B12, folate.    FOLLOW-UP:  After completion of above testing. In the pulmonary hypertension clinic in [place].    If you have any questions or concerns, please call my nurse Maranda Garcia at [phone].

## 2018-07-19 NOTE — LETTER
7/19/2018    Yunior Huang MD  [address] [facility] [id number]    RE: Zunilda Clark       Dear Colleague,    I had the pleasure of seeing Zunilda Clark in the [facility].      Office note dictated.      REVIEW OF SYSTEMS:  A comprehensive 10-point review of systems was completed and the pertinent positives are documented in the history of present illness.    Skin:  Positive for bruising   Eyes:  Positive for glasses  ENT:  Negative    Respiratory:  Positive for dyspnea on exertion  Cardiovascular:    Positive for;fatigue  Gastroenterology: Positive for heartburn;diarrhea  Genitourinary:  Positive for urinary frequency  Musculoskeletal:  Positive for joint pain;joint stiffness  Neurologic:  Positive for stroke  Psychiatric:  Positive for sleep disturbances  Heme/Lymph/Imm:  Positive for easy bruising  Endocrine:  Positive for thyroid disorder        Service Date: 07/19/2018      PRIMARY CARDIOLOGIST AND REFERRING PROVIDER:  Dr. Timothy Das, [facility].       PRIMARY CARE PROVIDER:  Dr. Yunior Huang      REASON FOR VISIT:  Second opinion regarding rheumatic heart disease, tricuspid regurgitation with right ventricular dysfunction, pulmonary hypertension.      HISTORY OF PRESENT ILLNESS:  The patient is new to my practice.  She has established cardiology care with my cardiologist colleague, Dr. Das.  The patient was accompanied by her .  They are a delightful couple.  Deanne is 78 years old,  and has been a 's wife for most of her life and has also done teaching and music lessons.  She is a lifelong never-smoker, has a very intelligent understanding of her complex medical history, BMI is 26.8 kg/m2 and her primary comorbidities are cardiovascular (as detailed below) and chronic rheumatoid arthritis (for which she is on methotrexate).      I did a review of Deanne's extensive cardiac history including outside medical records  from [place].  The temporal course is as below:    1.  Age 8 years.  Although she was not diagnosed formally with it, it appears that she had rheumatic fever.  She reports having to miss school for several weeks and had multiple hospitalizations with chest pain and feeling unwell.  She did not undergo any antibiotic therapy.   2.  In her 20s, she had 4 pregnancies.  All were normal without any CV complications.   3.  In 2003, she developed sudden embolic peripheral complications in both lower extremities requiring surgical embolectomy.  At that time, she was diagnosed for the first time with atrial fibrillation and rheumatic heart disease.  She was started on warfarin.   4.  In 2006, failed attempt at balloon valvuloplasty for mitral valve stenosis at [facility] in [place].   5.  In 2008, patient underwent a bioprosthetic mitral valve replacement and surgical maze procedure at [facility].   6.  Following her surgery in 2008, because she underwent the maze, warfarin was discontinued.  Unfortunately, a few months later in 01/2009, she developed sudden onset of left-sided weakness and was found to be a stroke.  Note she was not anticoagulated at that time.  Since then, she has been on chronic warfarin anticoagulation.   7.  In 2010, dual-chamber pacemaker implantation.   8.  In 2014, she had bioprosthetic mitral valve degeneration and restenosis and underwent redo sternotomy with a 27 mm Magna bioprosthetic valve replacement.  At the same time, she had a tricuspid valve annuloplasty and her pacemaker lead traverses her native tricuspid valve.  That hospital stay was complicated by a TIA and 2 seizures.  She underwent prolonged rehabilitation after that.   9.  Prior to both surgeries, she underwent coronary angiogram and was not found to have any CAD.     10.  In 2015, she moved from [place] to [place] to live closer to her sister.  Since then, she has followed with Dr. Das and Augusto  Anshu at Cardiology here.   11.  In 2016, she had a mechanical fall and fractured her right humerus and underwent ORIF under general anesthesia without any complications.   12.  In 04/2017, had necrotizing cholecystitis and underwent emergent laparoscopic cholecystectomy.  The surgery itself was uncomplicated and she was discharged home but a few days later, she developed symptoms of right heart failure with fluid overload and lower extremity edema and had to be rehospitalized.      More lately, patient has been having significant problems with arthritis of both knees.  In fact, last year, she had been cleared for her knee surgery and before she could have it done, she had the cholecystitis and therefore, it was postponed.  She can barely walk.  She is able to ambulate in the hallways where she lives and has to stop due to knee discomfort before shortness of breath.  No lower extremity edema, orthopnea, PND, palpitations or chest pain.      In terms of other comorbidities to explain her pulmonary hypertension, she has few.  Atrial fibrillation appears to be well rate controlled.  She is not pacer dependent (approximately 32% V paced), a screening for sleep apnea is negative, no previous venous thromboembolic disease, lifelong never-tobacco user, no known liver disease.  She does have rheumatoid arthritis but thus far, there has been no evidence of extra-articular involvement, specifically pulmonary.      Transthoracic echocardiogram.  I personally reviewed the images with the patient.  It is consistent with her known diagnosis of rheumatic heart disease.  Her left atrium is severely enlarged.  The bioprosthetic mitral valve leaflets open well with trivial prosthetic regurgitation and no periprosthetic regurgitation.  The mean diastolic gradient is approximately 4 mmHg (heart rate 60 BPM).  The left ventricle has moderate concentric hypertrophy with normal systolic function and a visual LVEF of 65%-70%.  The left  atrium is severely enlarged.  On the right side, the right atrium is moderately enlarged, right ventricle is mildly dilated with mildly reduced systolic function (TAPSE 18 mm).  There is a pacemaker lead traversing the tricuspid valve.  There is moderate tricuspid regurgitation.  The IVC is borderline dilated at 2.2 cm but collapses well with inspiration.  No pericardial effusion.  The aortic valve is trileaflet, calcified and has at least moderate stenosis with mild regurgitation.  The hemodynamics of aortic valve stenosis are likely underestimated at the mean gradient of 8 mmHg.  I suspect the valve area is much lower than 1.5 cm2.  The ascending aortic dimension is normal.      Chest x-ray from 2017 shows biatrial enlargement, prominent pulmonary artery, normal lungs.  No pleural effusion.      LABORATORIES:  Largely satisfactory with a creatinine of 0.7 and estimated GFR of 72.  Normal liver enzymes.  Normal TSH of 1.8.  Mild anemia of 11.7 hemoglobin with macrocytosis () and her serial INRs are therapeutic.      CURRENT MEDICATIONS:     1.  Exforge (amlodipine 10, valsartan 320 mg) 1 tablet daily.   2.  Chlorthalidone 25 mg daily.   3.  Levothyroxine 112 mcg daily.   4.  Metoprolol tartrate 50 mg b.i.d.   5.  Omeprazole 20 mg daily.   6.  Vitamin D supplement.    7.  Calcium supplement.    8.  Over-the-counter fish oil   9.  Folic acid 1 mg daily.   10.  Allopurinol 100 mg b.i.d.   11.  Warfarin for stroke prophylaxis for atrial fibrillation, goal INR 2.0-3.0.      ALLERGIES:  No known allergies.      PHYSICAL EXAMINATION:   VITAL SIGNS:  /58, pulse 60 per minute, irregularly irregular, height 1.626 meters (5 feet 4 inches), weight 70.9 kilograms (156 pounds), respiratory rate 16 per minute, sats 96% on room air, BMI 26.9 kg/m2.   CONSTITUTIONAL:  Comfortable at rest, mentally very sharp, alert, oriented, well developed and nourished.   EYES:  Mild pallor, no icterus.   ENT:  Satisfactory  dentition.  No cyanosis.   NECK:  No thyromegaly.   CARDIOVASCULAR:  Jugular venous pressure is not elevated.  Carotid pulse upstroke normal.  No carotid bruit.  Apical impulse undisplaced and prominent.  Midline sternotomy scar well healed.  Pacemaker site satisfactory.  Heart sounds are irregularly irregular.  She has a soft 2/6 pansystolic murmur that augments an end inspiration consistent with moderate tricuspid regurgitation (no diastolic rumble).  No audible murmur of aortic stenosis.  No diastolic murmur.  No S3.   LUNGS:  Bilaterally clear to auscultation.   MUSCULOSKELETAL:  Significant kyphosis and she has a markedly painful gait due to arthritis of both knees.  She requires support to ambulate to the exam couch.    SKIN:  No bruising.   GASTROINTESTINAL:  Soft, nontender, no hepatosplenomegaly.   NEUROPSYCHIATRIC:  Alert, oriented x3.   EXTREMITIES:  No edema or clubbing.      DIAGNOSES:     1.  Rheumatic valvular heart disease.     2.  Status post bioprosthetic mitral valve regurgitation (27 mm Magna prosthesis) and tricuspid valve annuloplasty 2014   3.  Status post dual-chamber pacemaker implantation, 2009.     4.  Pulmonary hypertension secondary to #1.   5.  Chronic atrial fibrillation with a history of multiple strokes and peripheral arterial embolization during anticoagulation interruption.   6.  Chronic rheumatoid arthritis (on methotrexate).   7.  Chronic mild anemia, etiology unclear.   8.  Preoperative cardiovascular evaluation prior to elective knee arthroplasty.      ASSESSMENT:  This is a complex patient but overall, her temporal history and clinical presentation is consistent with rheumatic heart disease.  Per review of the echocardiogram and physical examination, I think the patient has moderate rather than moderately severe/severe tricuspid regurgitation.  Her right ventricle is mildly dilated with reduced systolic function.  She certainly has evidence of pulmonary hypertension, both on  echocardiogram and physical exam (P2 is prominent).  However, I do not think she has decompensation of the right ventricle that is clinically overt at this stage.  Her mitral valve prosthesis is well functioning.  Atrial fibrillation is appropriately rate controlled.  She is tolerating warfarin anticoagulation with stable INRs and no bleeding issues and has never had known coronary artery disease.  Her systemic hypertension is well controlled.  Sleep apnea has been ruled out.      I think her pulmonary hypertension should be managed conservatively.  Doing a right heart catheterization, it is unlikely to be beneficial.  For one, it is technically challenging, as they have to traverse the annuloplasty ring with the risk of displacement of the pacemaker leads.  More pertinently, she likely has group 2 pulmonary hypertension from her extensive valve disease and atrial fibrillation and PH-specific drugs are unlikely to help and will potentially even make her worse.  Overall, I think she is tolerating her CV disease well.  Tellingly, when she had surgery last year, she did develop right heart failure in the postoperative period.  Therefore, I think we should optimize her diuretic therapy.  I will order some noninvasive tests but overall, I am fairly optimistic that I will be able to clear her for her upcoming surgery.        I watched her walk in clinic today.  She is severely limited by her arthritis.  She can barely take a few steps before stopping due to excruciating pain.  Therefore, her knee surgery will be very vital in maintaining her quality of life.      PLAN:   1.  Medication changes.  Stop chlorthalidone.  Start torsemide 10 mg daily.     2.  Additional testing.  A 24-hour Holter to assess rate control of atrial fibrillation, chest x-ray, cardiac MRI with contrast, followup labs in 7 days including basic metabolic panel and workup of her anemia   3.  Follow up in my Pulmonary Hypertension Clinic after  completion of the above.      It was my pleasure to visit with this very delightful and knowledgeable couple.  I spent 60 minutes with them, greater than 50% of the time was spent in counseling and coordination of care.      cc:   Timothy Das MD, [facility] at [address], [address]   [place] [address]      Augusto Brasher PA-C    [facility]    [address] [id number]      Yunior Huang MD    [facility]    [address], [address]   [place] [id number]         MIGUEL GRAMAJO MD             D: 2018   T: 2018   MT: LEOBARDO      Name:     NADIYA LEWIS   MRN:      [id number]        Account:      [id number]   :      1940           Service Date: 2018      Document: [id number]         Thank you for allowing me to participate in the care of your patient.      Sincerely,     Miguel Gramajo MD     [facility]    cc:   No referring provider defined for this encounter.

## 2018-07-19 NOTE — LETTER
7/19/2018      Yunior Huang MD  [address] [facility] [address]      RE: Zunilda Clark       Dear Colleague,    I had the pleasure of seeing Zunilda Clark in the [facility].    Service Date: 07/19/2018      PRIMARY CARDIOLOGIST AND REFERRING PROVIDER:  Dr. Timothy Das, [facility].       PRIMARY CARE PROVIDER:  Dr. Yunior Huang      REASON FOR VISIT:  Second opinion regarding rheumatic heart disease, tricuspid regurgitation with right ventricular dysfunction, pulmonary hypertension.      HISTORY OF PRESENT ILLNESS:  The patient is new to my practice.  She has established cardiology care with my cardiologist colleague, Dr. Das.  The patient was accompanied by her .  They are a delightful couple.  Deanne is 78 years old,  and has been a 's wife for most of her life and has also done teaching and music lessons.  She is a lifelong never-smoker, has a very intelligent understanding of her complex medical history, BMI is 26.8 kg/m2 and her primary comorbidities are cardiovascular (as detailed below) and chronic rheumatoid arthritis (for which she is on methotrexate).      I did a review of Deanne's extensive cardiac history including outside medical records from [place].  The temporal course is as below:    1.  Age 8 years.  Although she was not diagnosed formally with it, it appears that she had rheumatic fever.  She reports having to miss school for several weeks and had multiple hospitalizations with chest pain and feeling unwell.  She did not undergo any antibiotic therapy.   2.  In her 20s, she had 4 pregnancies.  All were normal without any CV complications.   3.  In 2003, she developed sudden embolic peripheral complications in both lower extremities requiring surgical embolectomy.  At that time, she was diagnosed for the first time with atrial fibrillation and rheumatic heart disease.  She was started on warfarin.   4.   In 2006, failed attempt at balloon valvuloplasty for mitral valve stenosis at [facility] in [place].   5.  In 2008, patient underwent a bioprosthetic mitral valve replacement and surgical maze procedure at [facility].   6.  Following her surgery in 2008, because she underwent the maze, warfarin was discontinued.  Unfortunately, a few months later in 01/2009, she developed sudden onset of left-sided weakness and was found to be a stroke.  Note she was not anticoagulated at that time.  Since then, she has been on chronic warfarin anticoagulation.   7.  In 2010, dual-chamber pacemaker implantation.   8.  In 2014, she had bioprosthetic mitral valve degeneration and restenosis and underwent redo sternotomy with a 27 mm Magna bioprosthetic valve replacement.  At the same time, she had a tricuspid valve annuloplasty and her pacemaker lead traverses her native tricuspid valve.  That hospital stay was complicated by a TIA and 2 seizures.  She underwent prolonged rehabilitation after that.   9.  Prior to both surgeries, she underwent coronary angiogram and was not found to have any CAD.     10.  In 2015, she moved from [place] to [place] to live closer to her sister.  Since then, she has followed with Dr. Das and Augusto Brasher at Cardiology here.   11.  In 2016, she had a mechanical fall and fractured her right humerus and underwent ORIF under general anesthesia without any complications.   12.  In 04/2017, had necrotizing cholecystitis and underwent emergent laparoscopic cholecystectomy.  The surgery itself was uncomplicated and she was discharged home but a few days later, she developed symptoms of right heart failure with fluid overload and lower extremity edema and had to be rehospitalized.      More lately, patient has been having significant problems with arthritis of both knees.  In fact, last year, she had been cleared for her knee surgery and before she could have it done, she had the  cholecystitis and therefore, it was postponed.  She can barely walk.  She is able to ambulate in the hallways where she lives and has to stop due to knee discomfort before shortness of breath.  No lower extremity edema, orthopnea, PND, palpitations or chest pain.      In terms of other comorbidities to explain her pulmonary hypertension, she has few.  Atrial fibrillation appears to be well rate controlled.  She is not pacer dependent (approximately 32% V paced), a screening for sleep apnea is negative, no previous venous thromboembolic disease, lifelong never-tobacco user, no known liver disease.  She does have rheumatoid arthritis but thus far, there has been no evidence of extra-articular involvement, specifically pulmonary.      Transthoracic echocardiogram.  I personally reviewed the images with the patient.  It is consistent with her known diagnosis of rheumatic heart disease.  Her left atrium is severely enlarged.  The bioprosthetic mitral valve leaflets open well with trivial prosthetic regurgitation and no periprosthetic regurgitation.  The mean diastolic gradient is approximately 4 mmHg (heart rate 60 BPM).  The left ventricle has moderate concentric hypertrophy with normal systolic function and a visual LVEF of 65%-70%.  The left atrium is severely enlarged.  On the right side, the right atrium is moderately enlarged, right ventricle is mildly dilated with mildly reduced systolic function (TAPSE 18 mm).  There is a pacemaker lead traversing the tricuspid valve.  There is moderate tricuspid regurgitation.  The IVC is borderline dilated at 2.2 cm but collapses well with inspiration.  No pericardial effusion.  The aortic valve is trileaflet, calcified and has at least moderate stenosis with mild regurgitation.  The hemodynamics of aortic valve stenosis are likely underestimated at the mean gradient of 8 mmHg.  I suspect the valve area is much lower than 1.5 cm2.  The ascending aortic dimension is normal.       Chest x-ray from 2017 shows biatrial enlargement, prominent pulmonary artery, normal lungs.  No pleural effusion.      LABORATORIES:  Largely satisfactory with a creatinine of 0.7 and estimated GFR of 72.  Normal liver enzymes.  Normal TSH of 1.8.  Mild anemia of 11.7 hemoglobin with macrocytosis () and her serial INRs are therapeutic.      CURRENT MEDICATIONS:     1.  Exforge (amlodipine 10, valsartan 320 mg) 1 tablet daily.   2.  Chlorthalidone 25 mg daily.   3.  Levothyroxine 112 mcg daily.   4.  Metoprolol tartrate 50 mg b.i.d.   5.  Omeprazole 20 mg daily.   6.  Vitamin D supplement.    7.  Calcium supplement.    8.  Over-the-counter fish oil   9.  Folic acid 1 mg daily.   10.  Allopurinol 100 mg b.i.d.   11.  Warfarin for stroke prophylaxis for atrial fibrillation, goal INR 2.0-3.0.      ALLERGIES:  No known allergies.      PHYSICAL EXAMINATION:   VITAL SIGNS:  /58, pulse 60 per minute, irregularly irregular, height 1.626 meters (5 feet 4 inches), weight 70.9 kilograms (156 pounds), respiratory rate 16 per minute, sats 96% on room air, BMI 26.9 kg/m2.   CONSTITUTIONAL:  Comfortable at rest, mentally very sharp, alert, oriented, well developed and nourished.   EYES:  Mild pallor, no icterus.   ENT:  Satisfactory dentition.  No cyanosis.   NECK:  No thyromegaly.   CARDIOVASCULAR:  Jugular venous pressure is not elevated.  Carotid pulse upstroke normal.  No carotid bruit.  Apical impulse undisplaced and prominent.  Midline sternotomy scar well healed.  Pacemaker site satisfactory.  Heart sounds are irregularly irregular.  She has a soft 2/6 pansystolic murmur that augments an end inspiration consistent with moderate tricuspid regurgitation (no diastolic rumble).  No audible murmur of aortic stenosis.  No diastolic murmur.  No S3.   LUNGS:  Bilaterally clear to auscultation.   MUSCULOSKELETAL:  Significant kyphosis and she has a markedly painful gait due to arthritis of both knees.  She requires  support to ambulate to the exam couch.    SKIN:  No bruising.   GASTROINTESTINAL:  Soft, nontender, no hepatosplenomegaly.   NEUROPSYCHIATRIC:  Alert, oriented x3.   EXTREMITIES:  No edema or clubbing.      DIAGNOSES:     1.  Rheumatic valvular heart disease.     2.  Status post bioprosthetic mitral valve regurgitation (27 mm Magna prosthesis) and tricuspid valve annuloplasty 2014   3.  Status post dual-chamber pacemaker implantation, 2009.     4.  Pulmonary hypertension secondary to #1.   5.  Chronic atrial fibrillation with a history of multiple strokes and peripheral arterial embolization during anticoagulation interruption.   6.  Chronic rheumatoid arthritis (on methotrexate).   7.  Chronic mild anemia, etiology unclear.   8.  Preoperative cardiovascular evaluation prior to elective knee arthroplasty.      ASSESSMENT:  This is a complex patient but overall, her temporal history and clinical presentation is consistent with rheumatic heart disease.  Per review of the echocardiogram and physical examination, I think the patient has moderate rather than moderately severe/severe tricuspid regurgitation.  Her right ventricle is mildly dilated with reduced systolic function.  She certainly has evidence of pulmonary hypertension, both on echocardiogram and physical exam (P2 is prominent).  However, I do not think she has decompensation of the right ventricle that is clinically overt at this stage.  Her mitral valve prosthesis is well functioning.  Atrial fibrillation is appropriately rate controlled.  She is tolerating warfarin anticoagulation with stable INRs and no bleeding issues and has never had known coronary artery disease.  Her systemic hypertension is well controlled.  Sleep apnea has been ruled out.      I think her pulmonary hypertension should be managed conservatively.  Doing a right heart catheterization, it is unlikely to be beneficial.  For one, it is technically challenging, as they have to traverse the  annuloplasty ring with the risk of displacement of the pacemaker leads.  More pertinently, she likely has group 2 pulmonary hypertension from her extensive valve disease and atrial fibrillation and PH-specific drugs are unlikely to help and will potentially even make her worse.  Overall, I think she is tolerating her CV disease well.  Tellingly, when she had surgery last year, she did develop right heart failure in the postoperative period.  Therefore, I think we should optimize her diuretic therapy.  I will order some noninvasive tests but overall, I am fairly optimistic that I will be able to clear her for her upcoming surgery.        I watched her walk in clinic today.  She is severely limited by her arthritis.  She can barely take a few steps before stopping due to excruciating pain.  Therefore, her knee surgery will be very vital in maintaining her quality of life.      PLAN:   1.  Medication changes.  Stop chlorthalidone.  Start torsemide 10 mg daily.     2.  Additional testing.  A 24-hour Holter to assess rate control of atrial fibrillation, chest x-ray, cardiac MRI with contrast, followup labs in 7 days including basic metabolic panel and workup of her anemia   3.  Follow up in my Pulmonary Hypertension Clinic after completion of the above.      It was my pleasure to visit with this very delightful and knowledgeable couple.  I spent 60 minutes with them, greater than 50% of the time was spent in counseling and coordination of care.      cc:   Timothy Das MD, [facility] at [address], [address]   [place] [address]      Augusto Brasher PA-C    [facility]    [address] [place] [address]      Yunior Huang MD    [facility]    [address]   [place] [id number] [place] [address] YANETH GRAMAJO MD             D: 07/19/2018   T: 07/19/2018   MT: LEOBARDO      Name:     NADIYA LEWIS   MRN:      [id number]         Account:      [id number]   :      1940           Service Date: 2018      Document: [id number]         Outpatient Encounter Prescriptions as of 2018   Medication Sig Dispense Refill     ACETAMINOPHEN PO Take 650 mg by mouth every 6 hours as needed        ALLOPURINOL PO Take 100 mg by mouth 2 times daily       Amlodipine Besylate-Valsartan (EXFORGE)  MG TABS Take 1 tablet by mouth daily 90 tablet 1     calcium citrate (CALCITRATE) 950 MG tablet Take by mouth daily 1200 mg daily       fish oil-omega-3 fatty acids 1000 MG capsule Take 1 g by mouth daily        folic acid (FOLVITE) 1 MG tablet Take 1 mg by mouth daily       ibandronate (BONIVA) 3 MG/3ML Inject 3 mg into the vein every 3 months        levothyroxine (SYNTHROID/LEVOTHROID) 112 MCG tablet TAKE 1 TABLET (112 MCG) BY MOUTH DAILY 90 tablet 0     METHOTREXATE SODIUM IJ Inject 0.8 mLs as directed once a week       metoprolol tartrate (LOPRESSOR) 100 MG tablet Take 0.5 tablets (50 mg) by mouth 2 times daily 90 tablet 3     multivitamin, therapeutic with minerals (MULTI-VITAMIN) TABS tablet Take 1 tablet by mouth daily       omeprazole (PRILOSEC) 20 MG CR capsule Take 1 capsule (20 mg) by mouth 2 times daily 180 capsule 1     torsemide (DEMADEX) 10 MG tablet Take 1 tablet (10 mg) by mouth daily (with breakfast) 30 tablet 3     VITAMIN D, CHOLECALCIFEROL, PO Take 1,000 Units by mouth daily       warfarin (COUMADIN) 2.5 MG tablet Take 1 tablet (2.5 mg) by mouth Sun, Tue, Thur, Fri. Take 2 tablets (5mg) by mouth Mon, wed, Sat or as instructed by INR clinic. 120 tablet 0     [DISCONTINUED] chlorthalidone (HYGROTON) 25 MG tablet Take 1 tablet (25 mg) by mouth daily 90 tablet 2     [DISCONTINUED] metoprolol tartrate (LOPRESSOR) 100 MG tablet TAKE 0.5 TABLETS (50 MG) BY MOUTH 2 TIMES DAILY 90 tablet 0     No facility-administered encounter medications on file as of 2018.        Again, thank you for allowing me to participate in the care of  your patient.      Sincerely,    Jluis Monroy MD     [facility]

## 2018-07-23 ENCOUNTER — TELEPHONE (OUTPATIENT)
Dept: CARDIOLOGY | Facility: CLINIC | Age: 78
End: 2018-07-23

## 2018-07-23 ENCOUNTER — TRANSFERRED RECORDS (OUTPATIENT)
Dept: HEALTH INFORMATION MANAGEMENT | Facility: CLINIC | Age: 78
End: 2018-07-23

## 2018-07-23 NOTE — TELEPHONE ENCOUNTER
Received Cardiology Clearance form for MRI Scan because patient has a PPM. PPM is compatible. PPM was implanted for SSS, pt had syncope prior to implant. Pt is 40% A paced when in sinus. She has 5-20% AF burden, and when she is in AF she V paces 100%.     Discussed with Dr. Cowan and he recommended VOO 50 for pacing mode during MRI in case patient goes into AF during MRI and could become bradycardic. Form filled out, signed by Dr. Cowan, and faxed back to MRI at [phone]

## 2018-07-26 ENCOUNTER — HOSPITAL ENCOUNTER (OUTPATIENT)
Dept: GENERAL RADIOLOGY | Facility: CLINIC | Age: 78
End: 2018-07-26
Attending: INTERNAL MEDICINE
Payer: MEDICARE

## 2018-07-26 ENCOUNTER — HOSPITAL ENCOUNTER (OUTPATIENT)
Dept: CARDIOLOGY | Facility: CLINIC | Age: 78
Discharge: HOME | End: 2018-07-26
Attending: INTERNAL MEDICINE | Admitting: INTERNAL MEDICINE
Payer: MEDICARE

## 2018-07-26 ENCOUNTER — ALLIED HEALTH/NURSE VISIT (OUTPATIENT)
Dept: CARDIOLOGY | Facility: CLINIC | Age: 78
End: 2018-07-26
Payer: MEDICARE

## 2018-07-26 DIAGNOSIS — I38 PRE-OPERATIVE CARDIOVASCULAR EXAMINATION, VALVULAR HEART DISEASE: ICD-10-CM

## 2018-07-26 DIAGNOSIS — I48.20 CHRONIC ATRIAL FIBRILLATION (H): ICD-10-CM

## 2018-07-26 DIAGNOSIS — I27.20 PULMONARY HTN (H): ICD-10-CM

## 2018-07-26 DIAGNOSIS — Z95.0 CARDIAC PACEMAKER IN SITU: Primary | ICD-10-CM

## 2018-07-26 DIAGNOSIS — I09.9 RHEUMATIC HEART DISEASE: ICD-10-CM

## 2018-07-26 DIAGNOSIS — Z01.810 PRE-OPERATIVE CARDIOVASCULAR EXAMINATION, VALVULAR HEART DISEASE: ICD-10-CM

## 2018-07-26 DIAGNOSIS — I50.33 ACUTE ON CHRONIC DIASTOLIC CONGESTIVE HEART FAILURE (H): ICD-10-CM

## 2018-07-26 LAB
ANION GAP SERPL CALCULATED.3IONS-SCNC: 5 MMOL/L (ref 3–14)
BUN SERPL-MCNC: 38 MG/DL (ref 7–30)
CALCIUM SERPL-MCNC: 9.8 MG/DL (ref 8.5–10.1)
CHLORIDE SERPL-SCNC: 105 MMOL/L (ref 94–109)
CO2 SERPL-SCNC: 31 MMOL/L (ref 20–32)
CREAT SERPL-MCNC: 1.13 MG/DL (ref 0.52–1.04)
FERRITIN SERPL-MCNC: 92 NG/ML (ref 8–252)
FOLATE SERPL-MCNC: >100 NG/ML
GFR SERPL CREATININE-BSD FRML MDRD: 47 ML/MIN/1.7M2
GLUCOSE SERPL-MCNC: 82 MG/DL (ref 70–99)
IRON SATN MFR SERPL: 19 % (ref 15–46)
IRON SERPL-MCNC: 55 UG/DL (ref 35–180)
NT-PROBNP SERPL-MCNC: 423 PG/ML (ref 0–450)
POTASSIUM SERPL-SCNC: 3.7 MMOL/L (ref 3.4–5.3)
SODIUM SERPL-SCNC: 141 MMOL/L (ref 133–144)
TIBC SERPL-MCNC: 297 UG/DL (ref 240–430)
TRANSFERRIN SERPL-MCNC: 251 MG/DL (ref 210–360)
VIT B12 SERPL-MCNC: 713 PG/ML (ref 193–986)

## 2018-07-26 PROCEDURE — 84466 ASSAY OF TRANSFERRIN: CPT | Performed by: INTERNAL MEDICINE

## 2018-07-26 PROCEDURE — 93225 XTRNL ECG REC<48 HRS REC: CPT | Performed by: INTERNAL MEDICINE

## 2018-07-26 PROCEDURE — 80048 BASIC METABOLIC PNL TOTAL CA: CPT | Performed by: INTERNAL MEDICINE

## 2018-07-26 PROCEDURE — 83550 IRON BINDING TEST: CPT | Performed by: INTERNAL MEDICINE

## 2018-07-26 PROCEDURE — 83880 ASSAY OF NATRIURETIC PEPTIDE: CPT | Performed by: INTERNAL MEDICINE

## 2018-07-26 PROCEDURE — 93294 REM INTERROG EVL PM/LDLS PM: CPT | Performed by: INTERNAL MEDICINE

## 2018-07-26 PROCEDURE — 82728 ASSAY OF FERRITIN: CPT | Performed by: INTERNAL MEDICINE

## 2018-07-26 PROCEDURE — 36415 COLL VENOUS BLD VENIPUNCTURE: CPT | Performed by: INTERNAL MEDICINE

## 2018-07-26 PROCEDURE — 82746 ASSAY OF FOLIC ACID SERUM: CPT | Performed by: INTERNAL MEDICINE

## 2018-07-26 PROCEDURE — 83540 ASSAY OF IRON: CPT | Performed by: INTERNAL MEDICINE

## 2018-07-26 PROCEDURE — 71046 X-RAY EXAM CHEST 2 VIEWS: CPT

## 2018-07-26 PROCEDURE — 93227 XTRNL ECG REC<48 HR R&I: CPT | Performed by: INTERNAL MEDICINE

## 2018-07-26 PROCEDURE — 93296 REM INTERROG EVL PM/IDS: CPT | Performed by: INTERNAL MEDICINE

## 2018-07-26 PROCEDURE — 82607 VITAMIN B-12: CPT | Performed by: INTERNAL MEDICINE

## 2018-07-26 NOTE — MR AVS SNAPSHOT
After Visit Summary   7/26/2018    Zunilda Clark    MRN: [id number]           Patient Information     Date Of Birth          1940        Visit Information        Provider Department      7/26/2018 3:15 PM KRAMER TECH1 [facility]        Today's Diagnoses     Cardiac pacemaker in situ    -  1       Follow-ups after your visit        Your next 10 appointments already scheduled     Jul 26, 2018  3:15 PM CDT   Remote PPM Check with KRAMER TECH1   [facility]   [place] ([facility])    [address]  [facility] [id number] [phone] OPT 2           This appointment is for a remote check of your pacemaker.  This is not an appointment at the office.            Jul 30, 2018  9:30 AM CDT   MR MYOCARDIUM W CONTRAST with SCIMR1   [facility] ([facility])    [address]  [address]  [place] [id number] [phone]           Take your medicines as usual, unless your doctor tells you not to. Bring a list of your current medicines to your exam (including vitamins, minerals and over-the-counter drugs).  You may or may not receive intravenous (IV) contrast for this exam pending the discretion of the Radiologist.  You do not need to do anything special to prepare.  The MRI machine uses a strong magnet. Please wear clothes without metal (snaps, zippers). A sweatsuit works well, or we may give you a hospital gown.  Please remove any body piercings and hair extensions before you arrive. You will also remove watches, jewelry, hairpins, wallets, dentures, partial dental plates and hearing aids. You may wear contact lenses, and you may be able to wear your rings. We have a safe place to keep your personal items, but it is safer to leave them at home.  **IMPORTANT** THE INSTRUCTIONS BELOW ARE ONLY FOR THOSE PATIENTS WHO HAVE BEEN PRESCRIBED SEDATION OR GENERAL  ANESTHESIA DURING THEIR MRI PROCEDURE:  IF YOUR DOCTOR PRESCRIBED ORAL SEDATION (take medicine to help you relax during your exam):   You must get the medicine from your doctor (oral medication) before you arrive. Bring the medicine to the exam. Do not take it at home. You ll be told when to take it upon arriving for your exam.   Arrive one hour early. Bring someone who can take you home after the test. Your medicine will make you sleepy. After the exam, you may not drive, take a bus or take a taxi by yourself.  IF YOUR DOCTOR PRESCRIBED IV SEDATION:   Arrive one hour early. Bring someone who can take you home after the test. Your medicine will make you sleepy. After the exam, you may not drive, take a bus or take a taxi by yourself.   No eating 6 hours before your exam. You may have clear liquids up until 4 hours before your exam. (Clear liquids include water, clear tea, black coffee and fruit juice without pulp.)  IF YOUR DOCTOR PRESCRIBED ANESTHESIA (be asleep for your exam):   Arrive 1 1/2 hours early. Bring someone who can take you home after the test. You may not drive, take a bus or take a taxi by yourself.   No eating 8 hours before your exam. You may have clear liquids up until 4 hours before your exam. (Clear liquids include water, clear tea, black coffee and fruit juice without pulp.)   You will spend four to five hours in the recovery room.  Please call the Imaging Department at your exam site with any questions.            Aug 16, 2018  8:45 AM CDT   Pulmonary Hypertension Return with Jluis Monroy MD   [facility] ([facility])    [address]  [facility] [place] [address] [phone]            Aug 17, 2018  9:15 AM CDT   Anticoagulation Visit with [facility]   [facility]    [address]  [facility] [id number] [phone]            Aug 17, 2018   9:40 AM CDT   PHYSICAL with Yunior Huang MD   [facility] ([facility])    [address]  [facility] [id number] [phone]            Oct 31, 2018  1:40 PM CDT   Pacemaker Check with RU DCR2   [facility] ([facility])    [address]  [facility] [address] [phone]              Who to contact     If you have questions or need follow up information about today's clinic visit or your schedule please contact [facility]   [facility] directly at [phone].  Normal or non-critical lab and imaging results will be communicated to you by SwingShothart, letter or phone within 4 business days after the clinic has received the results. If you do not hear from us within 7 days, please contact the clinic through COINTERRAt or phone. If you have a critical or abnormal lab result, we will notify you by phone as soon as possible.  Submit refill requests through Pricelock or call your pharmacy and they will forward the refill request to us. Please allow 3 business days for your refill to be completed.          Additional Information About Your Visit        SwingShotharHuddle Information     Pricelock gives you secure access to your electronic health record. If you see a primary care provider, you can also send messages to your care team and make appointments. If you have questions, please call your primary care clinic.  If you do not have a primary care provider, please call [phone] and they will assist you.        Care EveryWhere ID     This is your Care EveryWhere ID. This could be used by other organizations to access your [facility] medical records  [id number]         Blood Pressure from Last 3 Encounters:   07/19/18 110/58   06/25/18 128/60   06/12/18 140/60    Weight from Last 3 Encounters:   07/19/18 70.9 kg (156 lb 6.4 oz)   06/25/18 71.4 kg (157 lb 4.8 oz)   06/12/18 70.8 kg (156 lb 1.6  oz)              We Performed the Following     INTERROGATION DEVICE EVAL REMOTE, PACER/ICD (40387)     PM DEVICE INTERROGATE REMOTE (11880)        Primary Care Provider Office Phone # Fax #    Yunior Huang [phone]       [address] [facility] [address]        Equal Access to Services     [facility]: Hadii aad ku hadasho Soomaali, waaxda luqadaha, qaybta kaalmada adeegyada, waxay idiin hayaan adeeg kharash la'aan . So [facility] [phone].    ATENCIÓN: Si habla español, tiene a morales disposición servicios gratuitos de asistencia lingüística. Llame al [phone].    We comply with applicable federal civil rights laws and [place] laws. We do not discriminate on the basis of race, color, national origin, age, disability, sex, sexual orientation, or gender identity.            Thank you!     Thank you for choosing [facility]  for your care. Our goal is always to provide you with excellent care. Hearing back from our patients is one way we can continue to improve our services. Please take a few minutes to complete the written survey that you may receive in the mail after your visit with us. Thank you!             Your Updated Medication List - Protect others around you: Learn how to safely use, store and throw away your medicines at www.disposemymeds.org.          This list is accurate as of 7/26/18 11:55 AM.  Always use your most recent med list.                   Brand Name Dispense Instructions for use Diagnosis    ACETAMINOPHEN PO      Take 650 mg by mouth every 6 hours as needed        ALLOPURINOL PO      Take 100 mg by mouth 2 times daily        Amlodipine Besylate-Valsartan  MG Tabs    EXFORGE    90 tablet    Take 1 tablet by mouth daily    Essential hypertension       BONIVA 3 MG/3ML   Generic drug:  ibandronate      Inject 3 mg into the vein every 3 months        calcium citrate 950 MG tablet    CALCITRATE     Take by mouth daily  1200 mg daily        fish oil-omega-3 fatty acids 1000 MG capsule      Take 1 g by mouth daily        folic acid 1 MG tablet    FOLVITE     Take 1 mg by mouth daily        levothyroxine 112 MCG tablet    SYNTHROID/LEVOTHROID    90 tablet    TAKE 1 TABLET (112 MCG) BY MOUTH DAILY    Acquired hypothyroidism       METHOTREXATE SODIUM IJ      Inject 0.8 mLs as directed once a week        metoprolol tartrate 100 MG tablet    LOPRESSOR    90 tablet    Take 0.5 tablets (50 mg) by mouth 2 times daily    Essential hypertension       Multi-vitamin Tabs tablet      Take 1 tablet by mouth daily        omeprazole 20 MG CR capsule    priLOSEC    180 capsule    Take 1 capsule (20 mg) by mouth 2 times daily    Esophageal reflux       torsemide 10 MG tablet    DEMADEX    30 tablet    Take 1 tablet (10 mg) by mouth daily (with breakfast)    Pulmonary HTN       VITAMIN D (CHOLECALCIFEROL) PO      Take 1,000 Units by mouth daily        warfarin 2.5 MG tablet    COUMADIN    120 tablet    Take 1 tablet (2.5 mg) by mouth Sun, Tue, Thur, Fri. Take 2 tablets (5mg) by mouth Mon, wed, Sat or as instructed by INR clinic.    Atrial fibrillation and flutter (H)

## 2018-07-26 NOTE — PROGRESS NOTES
Medtronic Revo (D) Remote PPM Device Check  AP: 52 % : 24 %  Mode: AAIR<->DDDR        Presenting Rhythm:AS/VS, rate 67bpm  Heart Rate: Adequate rates per histogram  Sensing: Stable    Pacing Threshold: Stable    Impedance: Stable  Battery Status: 2.95V with OPAL being 2.81V  Atrial Arrhythmia: 45 mode switch episodes comprising 3% of the time. Longest episode lasted 28 hours 38 minutes, rates controlled. Taking Warfarin.    Ventricular Arrhythmia: 2 ventricular high rates. EGMs show Vs>As for NSVT lasting 6->6 seconds, rates 150-200bpm. EF 65-70% (2018). Reviewed with MELINA Pratt.        Care Plan: F/u annual threshold in 3 months. Gave patient results over the phone. CASEY Castillo

## 2018-07-30 ENCOUNTER — HOSPITAL ENCOUNTER (OUTPATIENT)
Dept: CARDIOLOGY | Facility: CLINIC | Age: 78
Discharge: HOME | End: 2018-07-30
Attending: INTERNAL MEDICINE | Admitting: INTERNAL MEDICINE
Payer: MEDICARE

## 2018-07-30 VITALS — SYSTOLIC BLOOD PRESSURE: 129 MMHG | DIASTOLIC BLOOD PRESSURE: 56 MMHG | OXYGEN SATURATION: 93 % | HEART RATE: 60 BPM

## 2018-07-30 DIAGNOSIS — I27.20 PULMONARY HTN (H): ICD-10-CM

## 2018-07-30 LAB
CREAT BLD-MCNC: 1.1 MG/DL (ref 0.52–1.04)
GFR SERPL CREATININE-BSD FRML MDRD: 48 ML/MIN/1.7M2

## 2018-07-30 PROCEDURE — 75565 CARD MRI VELOC FLOW MAPPING: CPT | Mod: 26 | Performed by: INTERNAL MEDICINE

## 2018-07-30 PROCEDURE — 25000128 H RX IP 250 OP 636: Performed by: INTERNAL MEDICINE

## 2018-07-30 PROCEDURE — 75561 CARDIAC MRI FOR MORPH W/DYE: CPT | Mod: 26 | Performed by: INTERNAL MEDICINE

## 2018-07-30 PROCEDURE — A9585 GADOBUTROL INJECTION: HCPCS | Performed by: INTERNAL MEDICINE

## 2018-07-30 PROCEDURE — 82565 ASSAY OF CREATININE: CPT

## 2018-07-30 PROCEDURE — 75561 CARDIAC MRI FOR MORPH W/DYE: CPT

## 2018-07-30 RX ORDER — DIPHENHYDRAMINE HCL 25 MG
25 CAPSULE ORAL
Status: DISCONTINUED | OUTPATIENT
Start: 2018-07-30 | End: 2018-07-31 | Stop reason: HOSPADM

## 2018-07-30 RX ORDER — ONDANSETRON 2 MG/ML
4 INJECTION INTRAMUSCULAR; INTRAVENOUS
Status: DISCONTINUED | OUTPATIENT
Start: 2018-07-30 | End: 2018-07-31 | Stop reason: HOSPADM

## 2018-07-30 RX ORDER — DIPHENHYDRAMINE HYDROCHLORIDE 50 MG/ML
25-50 INJECTION INTRAMUSCULAR; INTRAVENOUS
Status: DISCONTINUED | OUTPATIENT
Start: 2018-07-30 | End: 2018-07-31 | Stop reason: HOSPADM

## 2018-07-30 RX ORDER — ACYCLOVIR 200 MG/1
0-1 CAPSULE ORAL
Status: DISCONTINUED | OUTPATIENT
Start: 2018-07-30 | End: 2018-07-31 | Stop reason: HOSPADM

## 2018-07-30 RX ORDER — GADOBUTROL 604.72 MG/ML
5-65 INJECTION INTRAVENOUS ONCE
Status: COMPLETED | OUTPATIENT
Start: 2018-07-30 | End: 2018-07-30

## 2018-07-30 RX ORDER — DIAZEPAM 5 MG
5 TABLET ORAL EVERY 30 MIN PRN
Status: DISCONTINUED | OUTPATIENT
Start: 2018-07-30 | End: 2018-07-31 | Stop reason: HOSPADM

## 2018-07-30 RX ORDER — METHYLPREDNISOLONE SODIUM SUCCINATE 125 MG/2ML
125 INJECTION, POWDER, LYOPHILIZED, FOR SOLUTION INTRAMUSCULAR; INTRAVENOUS
Status: DISCONTINUED | OUTPATIENT
Start: 2018-07-30 | End: 2018-07-31 | Stop reason: HOSPADM

## 2018-07-30 RX ADMIN — GADOBUTROL 10 ML: 604.72 INJECTION INTRAVENOUS at 11:52

## 2018-07-30 NOTE — PROGRESS NOTES
Cardiac core protocol with 4 ch stack and Op/QS  Contrast administered per weight based protocol.  Per Dr Contreras

## 2018-07-31 LAB — INTERPRETATION MONITOR -MUSE: NORMAL

## 2018-08-16 ENCOUNTER — OFFICE VISIT (OUTPATIENT)
Dept: CARDIOLOGY | Facility: CLINIC | Age: 78
End: 2018-08-16
Attending: INTERNAL MEDICINE
Payer: MEDICARE

## 2018-08-16 ENCOUNTER — TRANSFERRED RECORDS (OUTPATIENT)
Dept: HEALTH INFORMATION MANAGEMENT | Facility: CLINIC | Age: 78
End: 2018-08-16

## 2018-08-16 ENCOUNTER — TELEPHONE (OUTPATIENT)
Dept: INTERNAL MEDICINE | Facility: CLINIC | Age: 78
End: 2018-08-16

## 2018-08-16 VITALS
BODY MASS INDEX: 27.01 KG/M2 | WEIGHT: 158.2 LBS | HEART RATE: 78 BPM | HEIGHT: 64 IN | DIASTOLIC BLOOD PRESSURE: 60 MMHG | SYSTOLIC BLOOD PRESSURE: 92 MMHG | OXYGEN SATURATION: 98 %

## 2018-08-16 DIAGNOSIS — I51.9 RIGHT VENTRICULAR DYSFUNCTION: ICD-10-CM

## 2018-08-16 DIAGNOSIS — Z01.810 PRE-OPERATIVE CARDIOVASCULAR EXAMINATION: Primary | ICD-10-CM

## 2018-08-16 DIAGNOSIS — Z95.3 STATUS POST MITRAL VALVE REPLACEMENT WITH BIOPROSTHETIC VALVE: ICD-10-CM

## 2018-08-16 DIAGNOSIS — I48.20 CHRONIC ATRIAL FIBRILLATION (H): ICD-10-CM

## 2018-08-16 DIAGNOSIS — Z95.0 CARDIAC PACEMAKER IN SITU: ICD-10-CM

## 2018-08-16 DIAGNOSIS — I27.20 PULMONARY HTN (H): ICD-10-CM

## 2018-08-16 DIAGNOSIS — I50.810 RIGHT HEART FAILURE (H): ICD-10-CM

## 2018-08-16 DIAGNOSIS — I07.1 FUNCTIONAL TRICUSPID REGURGITATION: ICD-10-CM

## 2018-08-16 PROCEDURE — 99215 OFFICE O/P EST HI 40 MIN: CPT | Performed by: INTERNAL MEDICINE

## 2018-08-16 RX ORDER — AMLODIPINE AND VALSARTAN 10; 320 MG/1; MG/1
0.5 TABLET ORAL EVERY MORNING
Qty: 30 TABLET | Refills: 3 | Status: SHIPPED | OUTPATIENT
Start: 2018-08-16 | End: 2018-08-17

## 2018-08-16 RX ORDER — METOPROLOL TARTRATE 50 MG
50 TABLET ORAL 2 TIMES DAILY
Qty: 200 TABLET | Refills: 3 | Status: SHIPPED | OUTPATIENT
Start: 2018-08-16 | End: 2019-10-07

## 2018-08-16 RX ORDER — METOPROLOL TARTRATE 50 MG
50 TABLET ORAL 2 TIMES DAILY
COMMUNITY
End: 2018-08-16

## 2018-08-16 NOTE — PATIENT INSTRUCTIONS
MEDICATION CHANGES:  1.  Continue torsemide 10 mg. Take 1 tablet daily in the morning.  2.  I have renewed your prescription for metoprolol 50 mg.  Take 1 tablet 2 times daily.  3.  Take half of your combination pill (amlodipine-valsartan) instead of full.    ADDITIONAL RECOMMENDATIONS:  1.  Please institute the fluid restriction and bladder training techniques that we discussed.  This is because you will need to be on a diuretic long-term.  2.  I am clearing you for your knee surgery.  3.  You will require heparin bridging perioperatively.  This will be supervised by your INR clinic/primary provider.  4.  During the surgery, the [employer] representative will need to be contacted for pacemaker management.    FOLLOW-UP:  1.  Follow-up in my pulmonary hypertension clinic at [facility] in approximately 4 months.  If you have any concerns/complications after your surgery, please contact my office to expedite your appointment.  2.  Pre-visit labs (CBC, comprehensive metabolic panel, NT proBNP) and ECG.    If you have any questions or concerns, please call my nurse Maranda Garcia at [phone].

## 2018-08-16 NOTE — TELEPHONE ENCOUNTER
Cardiology nurse calls stating Dr Monroy just saw the pt today and Cleared her for upcoming surgery.     Dr Monroy is recommending that she have Lovenox bridging prior to surgery.   The note is being dictated now.     Pt has appt tomorrow with Dr Huang for the Wellness exam and Dr Monroy is recommending pt keep this. She also has INR appt prior.   Message put in her appt notes that she needs Lovenox bridging.

## 2018-08-16 NOTE — PROGRESS NOTES
Service Date: 08/16/2018      PRIMARY CARE PROVIDER:  Yunior Huang MD      PRIMARY CARDIOLOGIST AND REFERRING PROVIDER:  Timothy Das MD      REASON FOR VISIT:  Followup of rheumatic heart disease, status post bioprosthetic mitral valve replacement in 2014, tricuspid regurgitation and pulmonary hypertension.      HISTORY OF PRESENT ILLNESS:    Zunilda was accompanied by her , Sander, today.  Please see my initial note dated 07/19/2018 for a comprehensive review of the patient's complex valvular heart disease.  Zunilda is 78 years old, has a history of rheumatic valvular heart disease for which she underwent 2 bioprosthetic mitral valve replacements (the most recent in 2014 was 27 mm Magna bioprosthetic valve) along with a tricuspid valve annuloplasty and pacemaker implantation for postoperative high-grade AV block.  She has had chronic atrial fibrillation and previous history of ischemic stroke when she was not anticoagulated on warfarin.  Her most recent preoperative coronary angiogram in 2014 did not show any coronary disease.      I saw her because her recent echocardiogram had suggested pulmonary hypertension with mild right ventricular dysfunction and an estimated right ventricular systolic pressure of 85 mmHg and worsening tricuspid regurgitation.  However, on clinical examination, the patient had moderate tricuspid regurgitation.  She was also here for preoperative cardiac evaluation as she has severely limiting knee arthritis and is awaiting surgery.  She has rheumatoid arthritis and is on chronic methotrexate therapy.      On her last visit, I had started her on torsemide 10 mg daily.  Her urinary incontinence has a predictably gotten worse with the Torsemide.  She has pelvic muscle dysfunction and incontinence and feels she has to urinate several times.  She also drinks water throughout the day.  Her blood pressure today is 92/60 mmHg, with a resting pulse of 78 BPM.  Overall, she remained stable  without any symptoms of heart failure, atrial fibrillation or angina.  Her INR is managed by the INR clinic and has remained therapeutic between 2.0 to 3.0.      She has completed interval testing.  Her cardiac MRI confirms that the tricuspid regurgitation is of moderate severity with a regurgitant fraction of 45 and the right ventricle is mildly dilated with mildly decreased systolic function, RVEF of 50%.  The left ventricle is normal in size with mild concentric hypertrophy.  Hyperdynamic systolic function with an LVEF of 69% and no regional wall motion abnormalities.  No evidence of previous myocardial infarction or fibrosis.      Chest x-ray shows normal pulmonary vasculature and a stable dense nodule and on the right which is suggestive of a benign granuloma.      Her pacemaker device interrogation (Medtronic Revo device) showed she is A paced 52%, V paced 24% and her underlying rhythm is sinus with 67 BPM, but she has had mode switch 3% of the time consistent with paroxysmal atrial fibrillation and she had 2 episodes of nonsustained ventricular tachycardia lasting about 6 seconds at rates of 158-200 BPM.        A 24-hour Holter showed sinus with intermittent atrial and ventricular pacing without any patient reported symptoms.      Labs:  Show a sodium of 141, potassium 3.7, creatinine 1.1, estimated GFR 47.  Iron, B12 and folate studies are satisfactory.  TSH is therapeutic (on replacement).  NT-proBNP is only mildly elevated at 423.      CURRENT MEDICATIONS:   1.  Amlodipine/valsartan combination pill, 10/320 mg (Exforge) 1 tablet daily.   2.  Allopurinol 100 mg b.i.d.   3.  Torsemide 10 mg daily.   4.  Warfarin, variable dose goal INR of 2.0 to 3.0 for chronic atrial fibrillation.   5.  Omeprazole 20 mg daily.   6.  Daily multivitamins.   7.  Metoprolol tartrate 50 mg b.i.d.   8.  Daily multivitamin.   9.  Folic acid.   10.  Weekly methotrexate.   11.  Calcium and vitamin D supplements.   12.  Levothyroxine  112 mcg daily.      ALLERGIES:  No known allergies.      PHYSICAL EXAMINATION:   VITAL SIGNS:  Blood pressure 92/60, pulse 78 per minute and regular, height 1.626 meters, 5 feet 4 inches, weight 71.8 kg (158 pounds), respiratory rate 16 per minute, sats 97% on room air, BMI 27.2 kg/m2.   CONSTITUTIONAL:  Slim, comfortable at rest, no conversational dyspnea, alert, oriented x3.   HEENT:  Mild pallor, no icterus.   ENT:  No cyanosis.   CARDIOVASCULAR:  Jugular venous pressure is elevated up to 4 cm in the neck and she does have prominent V waves.  Carotid pulse is normal.  Apical impulse is undisplaced.  No RV heave.  Heart sounds are regular.  There is an audible 3/6 pansystolic murmur consistent with tricuspid regurgitation (moderate), no S3.  Pacemaker site is satisfactory.  Midline healed sternotomy scar.   RESPIRATORY:  Bilateral normal breath sounds, no rales or wheeze.   ABDOMEN:  Soft, nontender.  There is no hepatomegaly, no free fluid.  Bowel sounds are active.   EXTREMITIES:  Show 1+ bilateral pitting ankle edema.  No clubbing.      DIAGNOSES:     1.  Successful preoperative cardiac clearance prior to elective knee surgery.   2.  Rheumatic valvular heart disease, status post redo sternotomy with bioprosthetic mitral valve replacement (27 mm Magna prosthesis) and tricuspid valve annuloplasty in 2014.   3.  Moderate tricuspid regurgitation.   4.  Status post dual-chamber pacemaker implantation in 2009, not pacemaker dependent.   5.  Pulmonary hypertension, likely secondary to valvular heart disease and chronic atrial fibrillation (group).     6.  Chronic atrial fibrillation with a history of multiple strokes and peripheral arterial embolization during anticoagulation interruption.  On chronic warfarin therapy with a goal INR of 2.0 to 3.0   7.  Chronic rheumatoid arthritis on methotrexate therapy.   8.  Chronic mild anemia, likely secondary to #6.   9.  History of hypertension, low blood pressure on current  therapy.      ASSESSMENT AND PLAN:   1.  Cardiac MRI is consistent with clinical exam findings of moderate tricuspid regurgitation with compensated mild right ventricular dysfunction.  The pulmonary hypertension is likely secondary to valvular heart disease.  As detailed in my initial clinic visit note, I do not recommend right heart catheterization at this stage.  She is not a candidate for pulmonary hypertension-specific drugs.  We will manage her tricuspid regurgitation with diuretic therapy.  Testing confirms that her paroxysmal atrial fibrillation is well rate controlled.   2.  Continue torsemide 10 mg daily.  I counseled the patient about various bladder training techniques and cutting back on fluid intake and wearing Depends underwear.  I do not think we have the luxury of discontinuing/intermittent dosing of diuretic therapy.   3.  Continue metoprolol tartrate 50 mg b.i.d., new prescription.   4.  Her blood pressure is low at 92/60.  Following the addition of torsemide.  We should cut back on her amlodipine/valsartan combination pill to 1/2 daily.  She will see how she does.  Downstream, we could consider just putting her on losartan monotherapy.   5.  I am clearing her for her knee surgery.  Her recent angiogram in 2014 showed normal coronaries.  Stress testing not indicated.  Perioperatively, she will require heparin bridging (either unfractionated or low molecular weight) as she has had strokes during previous warfarin discontinuation. This will be coordinated through the INR clinic.   6.  Intraoperative pacemaker device management.  The Hospitalist Service will have to call the [employer] representative.     7.  Followup.  I will see her back in 4 months to see how she is doing.  If she has any concerns.  I will be happy to see her sooner.  Her long-term cardiology followup will continue with Dr. Cisco Das.      It was my pleasure to visit with this patient.  I spent 60 minutes with her, greater than  50% of the time was spent in counseling and coordination of care.      cc:      Timothy Das MD   [facility]   [address]   [place] [address]      uYnior Huang MD   [facility]   [address]   [place] [address]         [facility] YANETH GRAMAJO MD             D: 2018   T: 2018   MT: ALESSANDRO      Name:     NADIYA LEWIS   MRN:      [id number]        Account:      [id number]   :      1940           Service Date: 2018      Document: [id number]

## 2018-08-16 NOTE — NURSING NOTE
Reviewed the AVS (After Visit Summary) with patient in detail following their office visit with Dr. Monroy. The patient was educated on the outlined plan of care including ordered tests, labs, medication changes, and follow up. Patient verbalized understanding of the information and agrees to call with any questions or concerns.      Reviewed recent valsartan recall - patient states she will check with her pharmacy and call us for substitution if  is on recall ist.    Called to Dr Schwartz office with update that patient is cleared for knee surgery per DR Monroy and recommendation for bridging for warfarin and keep appointment with Primary MD as scheduled. Patient states she is not yet scheduled for surgery.    Primary Physician: Yunior Huang Primary Address: [address], [place] [address]   Primary Phone: [phone] Primary Fax: [phone]     Return appointment: Patient was given instructions on when and how to schedule their next office visit with the  clinic.     Maranda PIERCE, MA, RN  RN Care Coordinator  [facility]  [phone]

## 2018-08-16 NOTE — LETTER
8/16/2018      Yunior Huang MD  [address] [facility] [address]      RE: Zunilda Clark       Dear Colleague,    I had the pleasure of seeing Zunilda Clark in the [facility].    Service Date: 08/16/2018      PRIMARY CARE PROVIDER:  Yunior Huang MD      PRIMARY CARDIOLOGIST AND REFERRING PROVIDER:  Timothy Das MD      REASON FOR VISIT:  Followup of rheumatic heart disease, status post bioprosthetic mitral valve replacement in 2014, tricuspid regurgitation and pulmonary hypertension.      HISTORY OF PRESENT ILLNESS:    Zunilda was accompanied by her , Sander, today.  Please see my initial note dated 07/19/2018 for a comprehensive review of the patient's complex valvular heart disease.  Zunilda is 78 years old, has a history of rheumatic valvular heart disease for which she underwent 2 bioprosthetic mitral valve replacements (the most recent in 2014 was 27 mm Magna bioprosthetic valve) along with a tricuspid valve annuloplasty and pacemaker implantation for postoperative high-grade AV block.  She has had chronic atrial fibrillation and previous history of ischemic stroke when she was not anticoagulated on warfarin.  Her most recent preoperative coronary angiogram in 2014 did not show any coronary disease.      I saw her because her recent echocardiogram had suggested pulmonary hypertension with mild right ventricular dysfunction and an estimated right ventricular systolic pressure of 85 mmHg and worsening tricuspid regurgitation.  However, on clinical examination, the patient had moderate tricuspid regurgitation.  She was also here for preoperative cardiac evaluation as she has severely limiting knee arthritis and is awaiting surgery.  She has rheumatoid arthritis and is on chronic methotrexate therapy.      On her last visit, I had started her on torsemide 10 mg daily.  Her urinary incontinence has a predictably gotten worse with the Torsemide.  She has  pelvic muscle dysfunction and incontinence and feels she has to urinate several times.  She also drinks water throughout the day.  Her blood pressure today is 92/60 mmHg, with a resting pulse of 78 BPM.  Overall, she remained stable without any symptoms of heart failure, atrial fibrillation or angina.  Her INR is managed by the INR clinic and has remained therapeutic between 2.0 to 3.0.      She has completed interval testing.  Her cardiac MRI confirms that the tricuspid regurgitation is of moderate severity with a regurgitant fraction of 45 and the right ventricle is mildly dilated with mildly decreased systolic function, RVEF of 50%.  The left ventricle is normal in size with mild concentric hypertrophy.  Hyperdynamic systolic function with an LVEF of 69% and no regional wall motion abnormalities.  No evidence of previous myocardial infarction or fibrosis.      Chest x-ray shows normal pulmonary vasculature and a stable dense nodule and on the right which is suggestive of a benign granuloma.      Her pacemaker device interrogation (Medtronic Revo device) showed she is A paced 52%, V paced 24% and her underlying rhythm is sinus with 67 BPM, but she has had mode switch 3% of the time consistent with paroxysmal atrial fibrillation and she had 2 episodes of nonsustained ventricular tachycardia lasting about 6 seconds at rates of 158-200 BPM.        A 24-hour Holter showed sinus with intermittent atrial and ventricular pacing without any patient reported symptoms.      Labs:  Show a sodium of 141, potassium 3.7, creatinine 1.1, estimated GFR 47.  Iron, B12 and folate studies are satisfactory.  TSH is therapeutic (on replacement).  NT-proBNP is only mildly elevated at 423.      CURRENT MEDICATIONS:   1.  Amlodipine/valsartan combination pill, 10/320 mg (Exforge) 1 tablet daily.   2.  Allopurinol 100 mg b.i.d.   3.  Torsemide 10 mg daily.   4.  Warfarin, variable dose goal INR of 2.0 to 3.0 for chronic atrial  fibrillation.   5.  Omeprazole 20 mg daily.   6.  Daily multivitamins.   7.  Metoprolol tartrate 50 mg b.i.d.   8.  Daily multivitamin.   9.  Folic acid.   10.  Weekly methotrexate.   11.  Calcium and vitamin D supplements.   12.  Levothyroxine 112 mcg daily.      ALLERGIES:  No known allergies.      PHYSICAL EXAMINATION:   VITAL SIGNS:  Blood pressure 92/60, pulse 78 per minute and regular, height 1.626 meters, 5 feet 4 inches, weight 71.8 kg (158 pounds), respiratory rate 16 per minute, sats 97% on room air, BMI 27.2 kg/m2.   CONSTITUTIONAL:  Slim, comfortable at rest, no conversational dyspnea, alert, oriented x3.   HEENT:  Mild pallor, no icterus.   ENT:  No cyanosis.   CARDIOVASCULAR:  Jugular venous pressure is elevated up to 4 cm in the neck and she does have prominent V waves.  Carotid pulse is normal.  Apical impulse is undisplaced.  No RV heave.  Heart sounds are regular.  There is an audible 3/6 pansystolic murmur consistent with tricuspid regurgitation (moderate), no S3.  Pacemaker site is satisfactory.  Midline healed sternotomy scar.   RESPIRATORY:  Bilateral normal breath sounds, no rales or wheeze.   ABDOMEN:  Soft, nontender.  There is no hepatomegaly, no free fluid.  Bowel sounds are active.   EXTREMITIES:  Show 1+ bilateral pitting ankle edema.  No clubbing.      DIAGNOSES:     1.  Successful preoperative cardiac clearance prior to elective knee surgery.   2.  Rheumatic valvular heart disease, status post redo sternotomy with bioprosthetic mitral valve replacement (27 mm Magna prosthesis) and tricuspid valve annuloplasty in 2014.   3.  Moderate tricuspid regurgitation.   4.  Status post dual-chamber pacemaker implantation in 2009, not pacemaker dependent.   5.  Pulmonary hypertension, likely secondary to valvular heart disease and chronic atrial fibrillation (group).     6.  Chronic atrial fibrillation with a history of multiple strokes and peripheral arterial embolization during anticoagulation  interruption.  On chronic warfarin therapy with a goal INR of 2.0 to 3.0   7.  Chronic rheumatoid arthritis on methotrexate therapy.   8.  Chronic mild anemia, likely secondary to #6.   9.  History of hypertension, low blood pressure on current therapy.      ASSESSMENT AND PLAN:   1.  Cardiac MRI is consistent with clinical exam findings of moderate tricuspid regurgitation with compensated mild right ventricular dysfunction.  The pulmonary hypertension is likely secondary to valvular heart disease.  As detailed in my initial clinic visit note, I do not recommend right heart catheterization at this stage.  She is not a candidate for pulmonary hypertension-specific drugs.  We will manage her tricuspid regurgitation with diuretic therapy.  Testing confirms that her paroxysmal atrial fibrillation is well rate controlled.   2.  Continue torsemide 10 mg daily.  I counseled the patient about various bladder training techniques and cutting back on fluid intake and wearing Depends underwear.  I do not think we have the luxury of discontinuing/intermittent dosing of diuretic therapy.   3.  Continue metoprolol tartrate 50 mg b.i.d., new prescription.   4.  Her blood pressure is low at 92/60.  Following the addition of torsemide.  We should cut back on her amlodipine/valsartan combination pill to 1/2 daily.  She will see how she does.  Downstream, we could consider just putting her on losartan monotherapy.   5.  I am clearing her for her knee surgery.  Her recent angiogram in 2014 showed normal coronaries.  Stress testing not indicated.  Perioperatively, she will require heparin bridging (either unfractionated or low molecular weight) as she has had strokes during previous warfarin discontinuation. This will be coordinated through the INR clinic.   6.  Intraoperative pacemaker device management.  The Hospitalist Service will have to call the [employer] representative.     7.  Followup.  I will see her back in 4 months to see how  she is doing.  If she has any concerns.  I will be happy to see her sooner.  Her long-term cardiology followup will continue with Dr. Cisco Das.      It was my pleasure to visit with this patient.  I spent 60 minutes with her, greater than 50% of the time was spent in counseling and coordination of care.      cc:      Timothy Das MD   [facility]   [address]   [place] [address]      Yunior Huang MD   [facility]   [address] [facility]   [place] [address]         [facility] YANETH GRAMAJO MD             D: 2018   T: 2018   MT: ALESSANDRO      Name:     NADIYA LEWIS   MRN:      [id number]        Account:      [id number]   :      1940           Service Date: 2018      Document: [id number]           Outpatient Encounter Prescriptions as of 2018   Medication Sig Dispense Refill     ACETAMINOPHEN PO Take 650 mg by mouth every 6 hours as needed        ALLOPURINOL PO Take 100 mg by mouth 2 times daily       calcium citrate (CALCITRATE) 950 MG tablet Take by mouth daily 1200 mg daily       fish oil-omega-3 fatty acids 1000 MG capsule Take 1 g by mouth daily Pt takes as remembers       folic acid (FOLVITE) 1 MG tablet Take 1 mg by mouth daily       ibandronate (BONIVA) 3 MG/3ML Inject 3 mg into the vein every 3 months        levothyroxine (SYNTHROID/LEVOTHROID) 112 MCG tablet TAKE 1 TABLET (112 MCG) BY MOUTH DAILY 90 tablet 0     METHOTREXATE SODIUM IJ Inject 0.8 mLs as directed once a week       metoprolol tartrate (LOPRESSOR) 50 MG tablet Take 1 tablet (50 mg) by mouth 2 times daily 200 tablet 3     multivitamin, therapeutic with minerals (MULTI-VITAMIN) TABS tablet Take 1 tablet by mouth daily       omeprazole (PRILOSEC) 20 MG CR capsule Take 1 capsule (20 mg) by mouth 2 times daily 180 capsule 1     torsemide (DEMADEX) 10 MG tablet Take 1 tablet (10 mg) by mouth daily (with breakfast) 30 tablet 3     VITAMIN D, CHOLECALCIFEROL, PO  Take 1,000 Units by mouth daily       warfarin (COUMADIN) 2.5 MG tablet Take 1 tablet (2.5 mg) by mouth Sun, Tue, Thur, Fri. Take 2 tablets (5mg) by mouth Mon, wed, Sat or as instructed by INR clinic. 120 tablet 0     [DISCONTINUED] Amlodipine Besylate-Valsartan (EXFORGE)  MG TABS Take 0.5 tablets by mouth every morning 30 tablet 3     [DISCONTINUED] Amlodipine Besylate-Valsartan (EXFORGE)  MG TABS Take 1 tablet by mouth daily 90 tablet 1     [DISCONTINUED] metoprolol tartrate (LOPRESSOR) 100 MG tablet Take 0.5 tablets (50 mg) by mouth 2 times daily 90 tablet 3     [DISCONTINUED] metoprolol tartrate (LOPRESSOR) 50 MG tablet Take 50 mg by mouth 2 times daily       No facility-administered encounter medications on file as of 8/16/2018.        Again, thank you for allowing me to participate in the care of your patient.      Sincerely,    Jluis Monroy MD     [facility]

## 2018-08-16 NOTE — PROGRESS NOTES
"Clinic visit note dictated. Dictation reference number - [id number]          Vitals: BP 92/60 (BP Location: Right arm, Patient Position: Sitting, Cuff Size: Adult Regular)  Pulse 78  Ht 1.626 m (5' 4\")  Wt 71.8 kg (158 lb 3.2 oz)  SpO2 98%  BMI 27.15 kg/m2  Wt Readings from Last 5 Encounters:   08/16/18 71.8 kg (158 lb 3.2 oz)   07/19/18 70.9 kg (156 lb 6.4 oz)   06/25/18 71.4 kg (157 lb 4.8 oz)   06/12/18 70.8 kg (156 lb 1.6 oz)   06/01/18 69.9 kg (154 lb)           "

## 2018-08-16 NOTE — LETTER
"8/16/2018    Yunior Huang MD  [address] [facility] [address]    RE: Zunilda Clark       Dear Colleague,    I had the pleasure of seeing Zunilda Clark in the [facility].    Clinic visit note dictated. Dictation reference number - [id number]          Vitals: BP 92/60 (BP Location: Right arm, Patient Position: Sitting, Cuff Size: Adult Regular)  Pulse 78  Ht 1.626 m (5' 4\")  Wt 71.8 kg (158 lb 3.2 oz)  SpO2 98%  BMI 27.15 kg/m2  Wt Readings from Last 5 Encounters:   08/16/18 71.8 kg (158 lb 3.2 oz)   07/19/18 70.9 kg (156 lb 6.4 oz)   06/25/18 71.4 kg (157 lb 4.8 oz)   06/12/18 70.8 kg (156 lb 1.6 oz)   06/01/18 69.9 kg (154 lb)             Service Date: 08/16/2018      PRIMARY CARE PROVIDER:  Yunior Huang MD      PRIMARY CARDIOLOGIST AND REFERRING PROVIDER:  Timothy Das MD      REASON FOR VISIT:  Followup of rheumatic heart disease, status post bioprosthetic mitral valve replacement in 2014, tricuspid regurgitation and pulmonary hypertension.      HISTORY OF PRESENT ILLNESS:    Zunilda was accompanied by her , Sander, today.  Please see my initial note dated 07/19/2018 for a comprehensive review of the patient's complex valvular heart disease.  Zunilda is 78 years old, has a history of rheumatic valvular heart disease for which she underwent 2 bioprosthetic mitral valve replacements (the most recent in 2014 was 27 mm Magna bioprosthetic valve) along with a tricuspid valve annuloplasty and pacemaker implantation for postoperative high-grade AV block.  She has had chronic atrial fibrillation and previous history of ischemic stroke when she was not anticoagulated on warfarin.  Her most recent preoperative coronary angiogram in 2014 did not show any coronary disease.      I saw her because her recent echocardiogram had suggested pulmonary hypertension with mild right ventricular dysfunction and an estimated right ventricular systolic pressure of 85 mmHg " and worsening tricuspid regurgitation.  However, on clinical examination, the patient had moderate tricuspid regurgitation.  She was also here for preoperative cardiac evaluation as she has severely limiting knee arthritis and is awaiting surgery.  She has rheumatoid arthritis and is on chronic methotrexate therapy.      On her last visit, I had started her on torsemide 10 mg daily.  Her urinary incontinence has a predictably gotten worse with the Torsemide.  She has pelvic muscle dysfunction and incontinence and feels she has to urinate several times.  She also drinks water throughout the day.  Her blood pressure today is 92/60 mmHg, with a resting pulse of 78 BPM.  Overall, she remained stable without any symptoms of heart failure, atrial fibrillation or angina.  Her INR is managed by the INR clinic and has remained therapeutic between 2.0 to 3.0.      She has completed interval testing.  Her cardiac MRI confirms that the tricuspid regurgitation is of moderate severity with a regurgitant fraction of 45 and the right ventricle is mildly dilated with mildly decreased systolic function, RVEF of 50%.  The left ventricle is normal in size with mild concentric hypertrophy.  Hyperdynamic systolic function with an LVEF of 69% and no regional wall motion abnormalities.  No evidence of previous myocardial infarction or fibrosis.      Chest x-ray shows normal pulmonary vasculature and a stable dense nodule and on the right which is suggestive of a benign granuloma.      Her pacemaker device interrogation (Medtronic Revo device) showed she is A paced 52%, V paced 24% and her underlying rhythm is sinus with 67 BPM, but she has had mode switch 3% of the time consistent with paroxysmal atrial fibrillation and she had 2 episodes of nonsustained ventricular tachycardia lasting about 6 seconds at rates of 158-200 BPM.        A 24-hour Holter showed sinus with intermittent atrial and ventricular pacing without any patient reported  symptoms.      Labs:  Show a sodium of 141, potassium 3.7, creatinine 1.1, estimated GFR 47.  Iron, B12 and folate studies are satisfactory.  TSH is therapeutic (on replacement).  NT-proBNP is only mildly elevated at 423.      CURRENT MEDICATIONS:   1.  Amlodipine/valsartan combination pill, 10/320 mg (Exforge) 1 tablet daily.   2.  Allopurinol 100 mg b.i.d.   3.  Torsemide 10 mg daily.   4.  Warfarin, variable dose goal INR of 2.0 to 3.0 for chronic atrial fibrillation.   5.  Omeprazole 20 mg daily.   6.  Daily multivitamins.   7.  Metoprolol tartrate 50 mg b.i.d.   8.  Daily multivitamin.   9.  Folic acid.   10.  Weekly methotrexate.   11.  Calcium and vitamin D supplements.   12.  Levothyroxine 112 mcg daily.      ALLERGIES:  No known allergies.      PHYSICAL EXAMINATION:   VITAL SIGNS:  Blood pressure 92/60, pulse 78 per minute and regular, height 1.626 meters, 5 feet 4 inches, weight 71.8 kg (158 pounds), respiratory rate 16 per minute, sats 97% on room air, BMI 27.2 kg/m2.   CONSTITUTIONAL:  Slim, comfortable at rest, no conversational dyspnea, alert, oriented x3.   HEENT:  Mild pallor, no icterus.   ENT:  No cyanosis.   CARDIOVASCULAR:  Jugular venous pressure is elevated up to 4 cm in the neck and she does have prominent V waves.  Carotid pulse is normal.  Apical impulse is undisplaced.  No RV heave.  Heart sounds are regular.  There is an audible 3/6 pansystolic murmur consistent with tricuspid regurgitation (moderate), no S3.  Pacemaker site is satisfactory.  Midline healed sternotomy scar.   RESPIRATORY:  Bilateral normal breath sounds, no rales or wheeze.   ABDOMEN:  Soft, nontender.  There is no hepatomegaly, no free fluid.  Bowel sounds are active.   EXTREMITIES:  Show 1+ bilateral pitting ankle edema.  No clubbing.      DIAGNOSES:     1.  Successful preoperative cardiac clearance prior to elective knee surgery.   2.  Rheumatic valvular heart disease, status post redo sternotomy with bioprosthetic  mitral valve replacement (27 mm Magna prosthesis) and tricuspid valve annuloplasty in 2014.   3.  Moderate tricuspid regurgitation.   4.  Status post dual-chamber pacemaker implantation in 2009, not pacemaker dependent.   5.  Pulmonary hypertension, likely secondary to valvular heart disease and chronic atrial fibrillation (group).     6.  Chronic atrial fibrillation with a history of multiple strokes and peripheral arterial embolization during anticoagulation interruption.  On chronic warfarin therapy with a goal INR of 2.0 to 3.0   7.  Chronic rheumatoid arthritis on methotrexate therapy.   8.  Chronic mild anemia, likely secondary to #6.   9.  History of hypertension, low blood pressure on current therapy.      ASSESSMENT AND PLAN:   1.  Cardiac MRI is consistent with clinical exam findings of moderate tricuspid regurgitation with compensated mild right ventricular dysfunction.  The pulmonary hypertension is likely secondary to valvular heart disease.  As detailed in my initial clinic visit note, I do not recommend right heart catheterization at this stage.  She is not a candidate for pulmonary hypertension-specific drugs.  We will manage her tricuspid regurgitation with diuretic therapy.  Testing confirms that her paroxysmal atrial fibrillation is well rate controlled.   2.  Continue torsemide 10 mg daily.  I counseled the patient about various bladder training techniques and cutting back on fluid intake and wearing Depends underwear.  I do not think we have the luxury of discontinuing/intermittent dosing of diuretic therapy.   3.  Continue metoprolol tartrate 50 mg b.i.d., new prescription.   4.  Her blood pressure is low at 92/60.  Following the addition of torsemide.  We should cut back on her amlodipine/valsartan combination pill to 1/2 daily.  She will see how she does.  Downstream, we could consider just putting her on losartan monotherapy.   5.  I am clearing her for her knee surgery.  Her recent  angiogram in  showed normal coronaries.  Stress testing not indicated.  Perioperatively, she will require heparin bridging (either unfractionated or low molecular weight) as she has had strokes during previous warfarin discontinuation. This will be coordinated through the INR clinic.   6.  Intraoperative pacemaker device management.  The Hospitalist Service will have to call the [employer] representative.     7.  Followup.  I will see her back in 4 months to see how she is doing.  If she has any concerns.  I will be happy to see her sooner.  Her long-term cardiology followup will continue with Dr. Cisco Das.      It was my pleasure to visit with this patient.  I spent 60 minutes with her, greater than 50% of the time was spent in counseling and coordination of care.      cc:      Timothy Das MD   [facility]   [facility] [facility]   [place] [address]      Yunior Huang MD   [facility]   [address]   [place] [address]         MIGUEL GRAMAJO MD             D: 2018   T: 2018   MT: ALESSANDRO      Name:     NADIYA LEWIS   MRN:      [id number]        Account:      [id number]   :      1940           Service Date: 2018      Document: [id number]        Thank you for allowing me to participate in the care of your patient.      Sincerely,     Miguel Gramajo MD     [facility]    cc:   Miguel Gramajo MD  [address] [place] [id number]

## 2018-08-16 NOTE — MR AVS SNAPSHOT
After Visit Summary   8/16/2018    Zunilda Clark    MRN: [id number]           Patient Information     Date Of Birth          1940        Visit Information        Provider Department      8/16/2018 8:45 AM Jluis Monroy MD [facility]        Today's Diagnoses     Pre-operative cardiovascular examination    -  1    Pulmonary HTN        Functional tricuspid regurgitation        Chronic atrial fibrillation (H)        Status post mitral valve replacement with bioprosthetic valve        Cardiac pacemaker in situ        Right ventricular dysfunction        Right heart failure          Care Instructions    MEDICATION CHANGES:  1.  Continue torsemide 10 mg. Take 1 tablet daily in the morning.  2.  I have renewed your prescription for metoprolol 50 mg.  Take 1 tablet 2 times daily.  3.  Take half of your combination pill (amlodipine-valsartan) instead of full.    ADDITIONAL RECOMMENDATIONS:  1.  Please institute the fluid restriction and bladder training techniques that we discussed.  This is because you will need to be on a diuretic long-term.  2.  I am clearing you for your knee surgery.  3.  You will require heparin bridging perioperatively.  This will be supervised by your INR clinic/primary provider.  4.  During the surgery, the [employer] representative will need to be contacted for pacemaker management.    FOLLOW-UP:  1.  Follow-up in my pulmonary hypertension clinic at [facility] in [place] in approximately 4 months.  If you have any concerns/complications after your surgery, please contact my office to expedite your appointment.  2.  Pre-visit labs (CBC, comprehensive metabolic panel, NT proBNP) and ECG.    If you have any questions or concerns, please call my nurse Maranda Garcia at [phone].            Follow-ups after your visit        Additional Services     Follow-Up with Pulmonary Hypertension Clinic       Labs and ECG prior                   Your next 10 appointments already scheduled     Aug 17, 2018  9:15 AM CDT   Anticoagulation Visit with [facility]   [facility]    [address]  [facility] [id number] [phone]            Aug 17, 2018  9:40 AM CDT   PHYSICAL with Yunior Huang MD   [facility]    [address]  [facility] [id number] [phone]            Oct 31, 2018  1:40 PM CDT   Pacemaker Check with RU DCR2   [facility] ([facility])    [address]  [facility] [id number] [phone]              Future tests that were ordered for you today     Open Future Orders        Priority Expected Expires Ordered    Follow-Up with Pulmonary Hypertension Clinic Routine 12/15/2018 8/16/2019 8/16/2018    N terminal pro BNP outpatient Routine 12/16/2018 8/16/2019 8/16/2018    Comprehensive metabolic panel Routine 12/16/2018 8/16/2019 8/16/2018    CBC with platelets differential Routine 12/16/2018 8/16/2019 8/16/2018    EKG 12-lead complete w/read - Clinics Routine 12/16/2018 8/16/2019 8/16/2018            Who to contact     If you have questions or need follow up information about today's clinic visit or your schedule please contact [facility] directly at [phone].  Normal or non-critical lab and imaging results will be communicated to you by MyChart, letter or phone within 4 business days after the clinic has received the results. If you do not hear from us within 7 days, please contact the clinic through MyChart or phone. If you have a critical or abnormal lab result, we will notify you by phone as soon as possible.  Submit refill requests through goOutMap or call your pharmacy and they will forward the refill request to us. Please allow 3 business days for your refill to be completed.        "   Additional Information About Your Visit        [employer] Information     [employer] gives you secure access to your electronic health record. If you see a primary care provider, you can also send messages to your care team and make appointments. If you have questions, please call your primary care clinic.  If you do not have a primary care provider, please call [phone] and they will assist you.        Care EveryWhere ID     This is your Care EveryWhere ID. This could be used by other organizations to access your [facility] medical records  [id number]        Your Vitals Were     Pulse Height Pulse Oximetry BMI (Body Mass Index)          78 1.626 m (5' 4\") 98% 27.15 kg/m2         Blood Pressure from Last 3 Encounters:   08/16/18 92/60   07/30/18 129/56   07/19/18 110/58    Weight from Last 3 Encounters:   08/16/18 71.8 kg (158 lb 3.2 oz)   07/19/18 70.9 kg (156 lb 6.4 oz)   06/25/18 71.4 kg (157 lb 4.8 oz)              We Performed the Following     Follow-Up with Pulmonary Hypertension Clinic          Today's Medication Changes          These changes are accurate as of 8/16/18 10:16 AM.  If you have any questions, ask your nurse or doctor.               These medicines have changed or have updated prescriptions.        Dose/Directions    Amlodipine Besylate-Valsartan  MG Tabs   Commonly known as:  EXFORGE   This may have changed:    - how much to take  - when to take this   Changed by:  Jluis Monroy MD        Dose:  0.5 tablet   Take 0.5 tablets by mouth every morning   Quantity:  30 tablet   Refills:  3       metoprolol tartrate 50 MG tablet   Commonly known as:  LOPRESSOR   This may have changed:  Another medication with the same name was removed. Continue taking this medication, and follow the directions you see here.   Used for:  Chronic atrial fibrillation (H)   Changed by:  Jluis Monroy MD        Dose:  50 mg   Take 1 tablet (50 mg) by mouth 2 times daily   Quantity:  200 tablet "   Refills:  3            Where to get your medicines      These medications were sent to [id number] - [place], [place] - [address]  [address] [facility] [address]     Phone:  [phone]     Amlodipine Besylate-Valsartan  MG Tabs    metoprolol tartrate 50 MG tablet                Primary Care Provider Office Phone # Fax #    Yunior Huang [phone]       [address]  [facility] [address]        Equal Access to Services     [facility]: Hadii aad ku hadasho Soomaali, waaxda luqadaha, qaybta kaalmada adeegyada, waxay idiin hayaan adeeg kharash laabad . So [facility] [phone].    ATENCIÓN: Si habla español, tiene a morales disposición servicios gratuitos de asistencia lingüística. Ll[facility] [phone].    We comply with applicable federal civil rights laws and [place] laws. We do not discriminate on the basis of race, color, national origin, age, disability, sex, sexual orientation, or gender identity.            Thank you!     Thank you for choosing [facility]  for your care. Our goal is always to provide you with excellent care. Hearing back from our patients is one way we can continue to improve our services. Please take a few minutes to complete the written survey that you may receive in the mail after your visit with us. Thank you!             Your Updated Medication List - Protect others around you: Learn how to safely use, store and throw away your medicines at www.disposemymeds.org.          This list is accurate as of 8/16/18 10:16 AM.  Always use your most recent med list.                   Brand Name Dispense Instructions for use Diagnosis    ACETAMINOPHEN PO      Take 650 mg by mouth every 6 hours as needed        ALLOPURINOL PO      Take 100 mg by mouth 2 times daily        Amlodipine Besylate-Valsartan  MG Tabs    EXFORGE    30 tablet    Take 0.5 tablets by mouth every morning        BONIVA 3  MG/3ML   Generic drug:  ibandronate      Inject 3 mg into the vein every 3 months        calcium citrate 950 MG tablet    CALCITRATE     Take by mouth daily 1200 mg daily        fish oil-omega-3 fatty acids 1000 MG capsule      Take 1 g by mouth daily Pt takes as remembers        folic acid 1 MG tablet    FOLVITE     Take 1 mg by mouth daily        levothyroxine 112 MCG tablet    SYNTHROID/LEVOTHROID    90 tablet    TAKE 1 TABLET (112 MCG) BY MOUTH DAILY    Acquired hypothyroidism       METHOTREXATE SODIUM IJ      Inject 0.8 mLs as directed once a week        metoprolol tartrate 50 MG tablet    LOPRESSOR    200 tablet    Take 1 tablet (50 mg) by mouth 2 times daily    Chronic atrial fibrillation (H)       Multi-vitamin Tabs tablet      Take 1 tablet by mouth daily        omeprazole 20 MG CR capsule    priLOSEC    180 capsule    Take 1 capsule (20 mg) by mouth 2 times daily    Esophageal reflux       torsemide 10 MG tablet    DEMADEX    30 tablet    Take 1 tablet (10 mg) by mouth daily (with breakfast)    Pulmonary HTN       VITAMIN D (CHOLECALCIFEROL) PO      Take 1,000 Units by mouth daily        warfarin 2.5 MG tablet    COUMADIN    120 tablet    Take 1 tablet (2.5 mg) by mouth Sun, Tue, Thur, Fri. Take 2 tablets (5mg) by mouth Mon, wed, Sat or as instructed by INR clinic.    Atrial fibrillation and flutter (H)

## 2018-08-17 ENCOUNTER — ANTICOAGULATION THERAPY VISIT (OUTPATIENT)
Dept: ANTICOAGULATION | Facility: CLINIC | Age: 78
End: 2018-08-17
Payer: MEDICARE

## 2018-08-17 ENCOUNTER — OFFICE VISIT (OUTPATIENT)
Dept: INTERNAL MEDICINE | Facility: CLINIC | Age: 78
End: 2018-08-17
Payer: MEDICARE

## 2018-08-17 VITALS
HEART RATE: 76 BPM | OXYGEN SATURATION: 97 % | TEMPERATURE: 98 F | WEIGHT: 156 LBS | DIASTOLIC BLOOD PRESSURE: 50 MMHG | HEIGHT: 64 IN | SYSTOLIC BLOOD PRESSURE: 122 MMHG | BODY MASS INDEX: 26.63 KG/M2 | RESPIRATION RATE: 18 BRPM

## 2018-08-17 DIAGNOSIS — I48.92 ATRIAL FIBRILLATION AND FLUTTER (H): ICD-10-CM

## 2018-08-17 DIAGNOSIS — I10 BENIGN ESSENTIAL HYPERTENSION: ICD-10-CM

## 2018-08-17 DIAGNOSIS — Z95.3 S/P MITRAL VALVE REPLACEMENT WITH BIOPROSTHETIC VALVE: ICD-10-CM

## 2018-08-17 DIAGNOSIS — I48.91 ATRIAL FIBRILLATION AND FLUTTER (H): ICD-10-CM

## 2018-08-17 DIAGNOSIS — M06.9 RHEUMATOID ARTHRITIS, INVOLVING UNSPECIFIED SITE, UNSPECIFIED RHEUMATOID FACTOR PRESENCE: ICD-10-CM

## 2018-08-17 DIAGNOSIS — Z01.818 PREOP GENERAL PHYSICAL EXAM: Primary | ICD-10-CM

## 2018-08-17 DIAGNOSIS — M17.12 PRIMARY OSTEOARTHRITIS OF LEFT KNEE: ICD-10-CM

## 2018-08-17 DIAGNOSIS — Z79.01 LONG-TERM (CURRENT) USE OF ANTICOAGULANTS: ICD-10-CM

## 2018-08-17 LAB
ERYTHROCYTE [DISTWIDTH] IN BLOOD BY AUTOMATED COUNT: 19.2 % (ref 10–15)
HCT VFR BLD AUTO: 36.3 % (ref 35–47)
HGB BLD-MCNC: 11.7 G/DL (ref 11.7–15.7)
INR POINT OF CARE: 1.8 (ref 0.86–1.14)
MCH RBC QN AUTO: 33.5 PG (ref 26.5–33)
MCHC RBC AUTO-ENTMCNC: 32.2 G/DL (ref 31.5–36.5)
MCV RBC AUTO: 104 FL (ref 78–100)
PLATELET # BLD AUTO: 321 10E9/L (ref 150–450)
RBC # BLD AUTO: 3.49 10E12/L (ref 3.8–5.2)
WBC # BLD AUTO: 7.5 10E9/L (ref 4–11)

## 2018-08-17 PROCEDURE — 85610 PROTHROMBIN TIME: CPT | Mod: QW

## 2018-08-17 PROCEDURE — 99207 ZZC NO CHARGE NURSE ONLY: CPT

## 2018-08-17 PROCEDURE — 99215 OFFICE O/P EST HI 40 MIN: CPT | Performed by: INTERNAL MEDICINE

## 2018-08-17 PROCEDURE — 36416 COLLJ CAPILLARY BLOOD SPEC: CPT

## 2018-08-17 PROCEDURE — 85027 COMPLETE CBC AUTOMATED: CPT | Performed by: INTERNAL MEDICINE

## 2018-08-17 PROCEDURE — 93000 ELECTROCARDIOGRAM COMPLETE: CPT | Performed by: INTERNAL MEDICINE

## 2018-08-17 PROCEDURE — 80053 COMPREHEN METABOLIC PANEL: CPT | Performed by: INTERNAL MEDICINE

## 2018-08-17 RX ORDER — AMLODIPINE AND VALSARTAN 5; 160 MG/1; MG/1
1 TABLET ORAL DAILY
Qty: 90 TABLET | Refills: 3 | Status: SHIPPED | OUTPATIENT
Start: 2018-08-17 | End: 2019-08-07

## 2018-08-17 NOTE — LETTER
[facility]  [address], [address]  [place] [address]  [phone]        August 21, 2018    Zunilda Alicea May  [address]   [facility] [address]            Dear Ms. Zunilda Alicea May:      The results of your recent labs were NORMAL.      If you have any further questions or problems, please contact our office.      Sincerely,        Yunior Huang M.D.

## 2018-08-17 NOTE — PATIENT INSTRUCTIONS
Before Your Surgery      Call your surgeon if there is any change in your health. This includes signs of a cold or flu (such as a sore throat, runny nose, cough, rash or fever).    Do not smoke, drink alcohol or take over the counter medicine (unless your surgeon or primary care doctor tells you to) for the 24 hours before and after surgery.    If you take prescribed drugs: Follow your doctor s orders about which medicines to take and which to stop until after surgery.    Eating and drinking prior to surgery: follow the instructions from your surgeon    Take a shower or bath the night before surgery. Use the soap your surgeon gave you to gently clean your skin. If you do not have soap from your surgeon, use your regular soap. Do not shave or scrub the surgery site.  Wear clean pajamas and have clean sheets on your bed.     Coumadin - hold for 5 days before surgery and resume after surgery next day     Hold the Torsemide on the day of surgery     Stop Fish oil and vitamins 5 days prior to surgery

## 2018-08-17 NOTE — MR AVS SNAPSHOT
After Visit Summary   8/17/2018    Zunilda Clark    MRN: [id number]           Patient Information     Date Of Birth          1940        Visit Information        Provider Department      8/17/2018 9:40 AM Yunior Huang MD [facility]        Today's Diagnoses     Preop general physical exam    -  1    Benign essential hypertension          Care Instructions      Before Your Surgery      Call your surgeon if there is any change in your health. This includes signs of a cold or flu (such as a sore throat, runny nose, cough, rash or fever).    Do not smoke, drink alcohol or take over the counter medicine (unless your surgeon or primary care doctor tells you to) for the 24 hours before and after surgery.    If you take prescribed drugs: Follow your doctor s orders about which medicines to take and which to stop until after surgery.    Eating and drinking prior to surgery: follow the instructions from your surgeon    Take a shower or bath the night before surgery. Use the soap your surgeon gave you to gently clean your skin. If you do not have soap from your surgeon, use your regular soap. Do not shave or scrub the surgery site.  Wear clean pajamas and have clean sheets on your bed.     Coumadin - hold for 5 days before surgery and resume after surgery next day     Hold the Torsemide on the day of surgery     Stop Fish oil and vitamins 5 days prior to surgery               Follow-ups after your visit        Your next 10 appointments already scheduled     Sep 05, 2018   Procedure with Pardeep Sheikh MD   [facility] (--)    [address]  [facility] [address]   [phone]            Sep 12, 2018  2:45 PM CDT   Anticoagulation Visit with [facility]   [facility] ([facility])    [address]  [facility] [address]   [phone]            Oct 31, 2018  1:40 PM CDT   Pacemaker  Check with RU DCR2   [facility] ([facility])    [address]  [facility] [address] [phone]              Future tests that were ordered for you today     Open Future Orders        Priority Expected Expires Ordered    Follow-Up with Pulmonary Hypertension Clinic Routine 12/15/2018 8/16/2019 8/16/2018    N terminal pro BNP outpatient Routine 12/16/2018 8/16/2019 8/16/2018    Comprehensive metabolic panel Routine 12/16/2018 8/16/2019 8/16/2018    CBC with platelets differential Routine 12/16/2018 8/16/2019 8/16/2018    EKG 12-lead complete w/read - Clinics Routine 12/16/2018 8/16/2019 8/16/2018            Who to contact     If you have questions or need follow up information about today's clinic visit or your schedule please contact [facility] directly at [phone].  Normal or non-critical lab and imaging results will be communicated to you by MyChart, letter or phone within 4 business days after the clinic has received the results. If you do not hear from us within 7 days, please contact the clinic through [facility] or phone. If you have a critical or abnormal lab result, we will notify you by phone as soon as possible.  Submit refill requests through [facility] or call your pharmacy and they will forward the refill request to us. Please allow 3 business days for your refill to be completed.          Additional Information About Your Visit        [facility] Information     [facility] gives you secure access to your electronic health record. If you see a primary care provider, you can also send messages to your care team and make appointments. If you have questions, please call your primary care clinic.  If you do not have a primary care provider, please call [phone] and they will assist you.        Care EveryWhere ID     This is your Care EveryWhere ID. This could be used by other organizations to access your [facility]  "records  [id number]        Your Vitals Were     Pulse Temperature Respirations Height Pulse Oximetry BMI (Body Mass Index)    76 98  F (36.7  C) (Oral) 18 5' 4\" (1.626 m) 97% 26.78 kg/m2       Blood Pressure from Last 3 Encounters:   08/17/18 122/50   08/16/18 92/60   07/30/18 129/56    Weight from Last 3 Encounters:   08/17/18 156 lb (70.8 kg)   08/16/18 158 lb 3.2 oz (71.8 kg)   07/19/18 156 lb 6.4 oz (70.9 kg)              We Performed the Following     CBC with platelets     Comprehensive metabolic panel     EKG 12-lead complete w/read - Clinics          Today's Medication Changes          These changes are accurate as of 8/17/18 10:23 AM.  If you have any questions, ask your nurse or doctor.               Start taking these medicines.        Dose/Directions    Amlodipine Besylate-Valsartan 5-160 MG Tabs   Used for:  Benign essential hypertension   Replaces:  Amlodipine Besylate-Valsartan  MG Tabs   Started by:  Yunior Huang MD        Dose:  1 tablet   Take 1 tablet by mouth daily   Quantity:  90 tablet   Refills:  3         Stop taking these medicines if you haven't already. Please contact your care team if you have questions.     Amlodipine Besylate-Valsartan  MG Tabs   Commonly known as:  EXFORGE   Replaced by:  Amlodipine Besylate-Valsartan 5-160 MG Tabs   Stopped by:  Yunior Huang MD                Where to get your medicines      These medications were sent to [facility] [id number] - [place], [place] - [address]  [address] [facility] [address]     Phone:  [phone]     Amlodipine Besylate-Valsartan 5-160 MG Tabs                Primary Care Provider Office Phone # Fax #    Yunior Huang [phone]       [address]  [facility] [address]        Equal Access to Services     SAM UGALDE AH: Hadii rosalie larios Soomaali, waaxda luqadaha, qaybta rod vallecilloyatalya, lucía britton. So [facility] " [phone].    ATENCIÓN: Si tiny sanders, tiene a morales disposición servicios gratuitos de asistencia lingüística. Jenna baird [phone].    We comply with applicable federal civil rights laws and [place] laws. We do not discriminate on the basis of race, color, national origin, age, disability, sex, sexual orientation, or gender identity.            Thank you!     Thank you for choosing [facility]  for your care. Our goal is always to provide you with excellent care. Hearing back from our patients is one way we can continue to improve our services. Please take a few minutes to complete the written survey that you may receive in the mail after your visit with us. Thank you!             Your Updated Medication List - Protect others around you: Learn how to safely use, store and throw away your medicines at www.disposemymeds.org.          This list is accurate as of 8/17/18 10:23 AM.  Always use your most recent med list.                   Brand Name Dispense Instructions for use Diagnosis    ACETAMINOPHEN PO      Take 650 mg by mouth every 6 hours as needed        ALLOPURINOL PO      Take 100 mg by mouth 2 times daily        Amlodipine Besylate-Valsartan 5-160 MG Tabs     90 tablet    Take 1 tablet by mouth daily    Benign essential hypertension       BONIVA 3 MG/3ML   Generic drug:  ibandronate      Inject 3 mg into the vein every 3 months        calcium citrate 950 MG tablet    CALCITRATE     Take by mouth daily 1200 mg daily        fish oil-omega-3 fatty acids 1000 MG capsule      Take 1 g by mouth daily Pt takes as remembers        folic acid 1 MG tablet    FOLVITE     Take 1 mg by mouth daily        levothyroxine 112 MCG tablet    SYNTHROID/LEVOTHROID    90 tablet    TAKE 1 TABLET (112 MCG) BY MOUTH DAILY    Acquired hypothyroidism       METHOTREXATE SODIUM IJ      Inject 0.8 mLs as directed once a week        metoprolol tartrate 50 MG tablet    LOPRESSOR    200 tablet    Take 1 tablet (50 mg)  by mouth 2 times daily    Chronic atrial fibrillation (H)       Multi-vitamin Tabs tablet      Take 1 tablet by mouth daily        omeprazole 20 MG CR capsule    priLOSEC    180 capsule    Take 1 capsule (20 mg) by mouth 2 times daily    Esophageal reflux       torsemide 10 MG tablet    DEMADEX    30 tablet    Take 1 tablet (10 mg) by mouth daily (with breakfast)    Pulmonary HTN       VITAMIN D (CHOLECALCIFEROL) PO      Take 1,000 Units by mouth daily        warfarin 2.5 MG tablet    COUMADIN    120 tablet    Take 1 tablet (2.5 mg) by mouth Sun, Tue, Thur, Fri. Take 2 tablets (5mg) by mouth Mon, wed, Sat or as instructed by INR clinic.    Atrial fibrillation and flutter (H)

## 2018-08-17 NOTE — PROGRESS NOTES
ANTICOAGULATION FOLLOW-UP CLINIC VISIT    Patient Name:  Zunilda Alicea May  Date:  8/17/2018  Contact Type:  Face to Face    SUBJECTIVE:     Patient Findings     Positives Intentional hold of therapy (Patient is scheduled or surgery on 9/5.  Per surgeon, she will hold warfarin for 5 days.  Per Cardiology, she needs lovenox bridging.), Missed doses (Missed one dose last night.)    Comments Patient states that she has used lovenox in the past and did not need additional teaching today.  Patient is scheduled for pre-op today and will be discussing the bridging again with MD at visit.           OBJECTIVE    INR Protime   Date Value Ref Range Status   08/17/2018 1.8 (A) 0.86 - 1.14 Final       ASSESSMENT / PLAN  INR assessment SUB    Recheck INR In: 3 WEEKS    INR Location Clinic      Anticoagulation Summary as of 8/17/2018     INR goal 2.0-3.0   Today's INR 1.8!   Warfarin maintenance plan 5 mg (2.5 mg x 2) on Mon, Wed, Sat; 2.5 mg (2.5 mg x 1) all other days   Full warfarin instructions 8/17: 5 mg; 8/31: Hold; 9/1: Hold; 9/2: Hold; 9/3: Hold; 9/4: Hold; Otherwise 5 mg on Mon, Wed, Sat; 2.5 mg all other days   Weekly warfarin total 25 mg   Plan last modified Екатерина Mahan RN (8/14/2017)   Next INR check 9/12/2018   Priority INR   Target end date     Indications   Long-term (current) use of anticoagulants [Z79.01] [Z79.01]  Atrial fibrillation and flutter (H) [I48.91  I48.92]         Anticoagulation Episode Summary     INR check location     Preferred lab     Send INR reminders to [facility]    Comments likes printed AVS      Anticoagulation Care Providers     Provider Role Specialty Phone number    Yunior Huang MD Responsible Internal Medicine [phone]            See the Encounter Report to view Anticoagulation Flowsheet and Dosing Calendar (Go to Encounters tab in chart review, and find the Anticoagulation Therapy Visit)    Dosage adjustment made based on physician directed care plan.    Sonam Teixeira,  RN

## 2018-08-17 NOTE — MR AVS SNAPSHOT
Zunilda Alicea May   8/17/2018 9:15 AM   Anticoagulation Therapy Visit    Description:  78 year old female   Provider:  [facility]   Department:  [facility] Anti Coagulation           INR as of 8/17/2018     Today's INR 1.8!      Anticoagulation Summary as of 8/17/2018     INR goal 2.0-3.0   Today's INR 1.8!   Full warfarin instructions 8/17: 5 mg; 8/31: Hold; 9/1: Hold; 9/2: Hold; 9/3: Hold; 9/4: Hold; Otherwise 5 mg on Mon, Wed, Sat; 2.5 mg all other days   Next INR check 9/12/2018    Indications   Long-term (current) use of anticoagulants [Z79.01] [Z79.01]  Atrial fibrillation and flutter (H) [I48.91  I48.92]         Your next Anticoagulation Clinic appointment(s)     Sep 12, 2018  2:45 PM CDT   Anticoagulation Visit with [facility]   [facility]    [address]  [facility] [address] [phone]              Contact Numbers     [facility] Phone Numbers:  Anticoagulation Clinic Appointments : [phone]  Anticoagulation Nurse: [phone]         August 2018 Details    Sun Mon Tue Wed Thu Fri Sat        1               2               3               4                 5               6               7               8               9               10               11                 12               13               14               15               16               17      5 mg   See details      18      5 mg           19      2.5 mg         20      5 mg         21      2.5 mg         22      5 mg         23      2.5 mg         24      2.5 mg         25      5 mg           26      2.5 mg         27      5 mg         28      2.5 mg         29      5 mg         30      2.5 mg         31      Hold           Date Details   08/17 This INR check               How to take your warfarin dose     To take:  2.5 mg Take 1 of the 2.5 mg tablets.    To take:  5 mg Take 2 of the 2.5 mg tablets.    Hold Do not take your warfarin  dose. See the Details table to the right for additional instructions.                September 2018 Details    Sun Mon Tue Wed Thu Fri Sat           1      Hold   See details        2      Hold   See details      3      Hold   See details      4      Hold   See details      5      5 mg         6      2.5 mg         7      2.5 mg         8      5 mg           9      2.5 mg         10      5 mg         11      2.5 mg         12            13               14               15                 16               17               18               19               20               21               22                 23               24               25               26               27               28               29                 30                      Date Details   09/01 Hold dose   Lovenox AM and PM      09/02 Hold dose   Lovenox AM and PM      09/03 Hold dose   lovenox AM and PM      09/04 Hold dose   Lovenox AM       Date of next INR:  9/12/2018         How to take your warfarin dose     To take:  2.5 mg Take 1 of the 2.5 mg tablets.    To take:  5 mg Take 2 of the 2.5 mg tablets.    Hold Do not take your warfarin dose. See the Details table to the right for additional instructions.

## 2018-08-17 NOTE — NURSING NOTE
"Chief Complaint   Patient presents with     Pre-Op Exam     L knee replacement 9/5 [facility] Dr Sheikh      initial /50 (BP Location: Left arm, Patient Position: Chair, Cuff Size: Adult Regular)  Pulse 76  Temp 98  F (36.7  C) (Oral)  Resp 18  Ht 5' 4\" (1.626 m)  Wt 156 lb (70.8 kg)  SpO2 97%  BMI 26.78 kg/m2 Estimated body mass index is 26.78 kg/(m^2) as calculated from the following:    Height as of this encounter: 5' 4\" (1.626 m).    Weight as of this encounter: 156 lb (70.8 kg)..  bp completed using cuff size regular    "

## 2018-08-18 LAB
ALBUMIN SERPL-MCNC: 3.6 G/DL (ref 3.4–5)
ALP SERPL-CCNC: 98 U/L (ref 40–150)
ALT SERPL W P-5'-P-CCNC: 21 U/L (ref 0–50)
ANION GAP SERPL CALCULATED.3IONS-SCNC: 7 MMOL/L (ref 3–14)
AST SERPL W P-5'-P-CCNC: 21 U/L (ref 0–45)
BILIRUB SERPL-MCNC: 0.6 MG/DL (ref 0.2–1.3)
BUN SERPL-MCNC: 25 MG/DL (ref 7–30)
CALCIUM SERPL-MCNC: 9.2 MG/DL (ref 8.5–10.1)
CHLORIDE SERPL-SCNC: 106 MMOL/L (ref 94–109)
CO2 SERPL-SCNC: 29 MMOL/L (ref 20–32)
CREAT SERPL-MCNC: 0.96 MG/DL (ref 0.52–1.04)
GFR SERPL CREATININE-BSD FRML MDRD: 56 ML/MIN/1.7M2
GLUCOSE SERPL-MCNC: 86 MG/DL (ref 70–99)
POTASSIUM SERPL-SCNC: 3.7 MMOL/L (ref 3.4–5.3)
PROT SERPL-MCNC: 8.1 G/DL (ref 6.8–8.8)
SODIUM SERPL-SCNC: 142 MMOL/L (ref 133–144)

## 2018-08-20 ENCOUNTER — CARE COORDINATION (OUTPATIENT)
Dept: CARDIOLOGY | Facility: CLINIC | Age: 78
End: 2018-08-20

## 2018-08-20 NOTE — PROGRESS NOTES
Pt called, she saw Dr. Monroy last week on 8/16/18 for pre-op clearance for knee surgery. Pt was given clearance to proceed with surgery but told she required heparin bridging perioperatively and this can be supervised/arranged through her INR clinic/primary care provider. Pt states she saw Dr. Huang her PCP last week on 8/17/18 and told him about the Heparin, per pt, Dr. Huang told her she did not need bridging and could just hold her Coumadin 5 days before surgery and resume the day after surgery. Pt reviewed this with her surgeon's nurse and she requested pt clarify with Dr. Monroy, since she got 2 different recommendations. Pt asking what to do. Told pt I am not comfortable telling her to follow Dr. Huang's instructions, since Dr. Monroy specifically wanted her to have bridging. Pt is scheduled for surgery on 9/5/18 at [facility]. Told pt Dr. Monroy out of the office the next few days but back later this week. I will send message to her to clarify what she would like and call her back. Pt stated understanding.       Pt also stated Dr. Monroy had said someone from [employer] will need to be present during surgery since she has a pacemaker. Pt asked how they will know. Told pt I will message device nurses to make sure they are aware of surgery and make sure a [employer] rep is notified. She stated understanding.     Messaged to Dr. Monroy for review. MELINA Morrissey 10:09 AM 08/20/18

## 2018-08-22 ENCOUNTER — TELEPHONE (OUTPATIENT)
Dept: INTERNAL MEDICINE | Facility: CLINIC | Age: 78
End: 2018-08-22

## 2018-08-22 DIAGNOSIS — I48.91 ATRIAL FIBRILLATION AND FLUTTER (H): ICD-10-CM

## 2018-08-22 DIAGNOSIS — I48.92 ATRIAL FIBRILLATION AND FLUTTER (H): ICD-10-CM

## 2018-08-22 DIAGNOSIS — Z95.0 CARDIAC PACEMAKER IN SITU: Primary | ICD-10-CM

## 2018-08-22 NOTE — PROGRESS NOTES
Pt LVM stating she received message and her surgeon is DR. Jurgen Sheikh with [facility]. Pt wondering who will be prescribing Lovenox, Dr. Huang or Dr. Monroy.     Returned call to pt, no answer. LVM letting her know that Dr. Huang will be prescribing Lovenox, left call back number for questions. Did review chart, note from Dr. Huang and his nurse and Lovenox Rx sent to pharmacy with orders.     Called [facility], transferred to Dr. Sheikh's nurse Roxi. No answer, LVM letting her know that Dr. Monroy has recommended Lovenox bridging prior to surgery. Left call back number for questions. MELINA Morrissey 2:41 PM 08/22/18

## 2018-08-22 NOTE — TELEPHONE ENCOUNTER
Received voicemail from [facility].  Cardiology is advising that she use lovenox bridging for her knee surgery in September.  Rx needs to be sent to the pharmacy for this.  Sonam Teixeira RN

## 2018-08-22 NOTE — PROGRESS NOTES
Patient should definitely be bridged. She had strokes before. Please directly let the surgeon, INR clinic and her PCP know. If they have questions, they can contact me directly on my pager.     Thanks.   FER (Routing comment)

## 2018-08-22 NOTE — PROGRESS NOTES
Called PCP office/INR clinic, no answer. LVM with instructions per Dr. Monroy that pt does need to be bridged with Lovenox for knee surgery, as she has history of stroke. Asked nurse to call me back to discuss.     Called pt, no answer. LVM for pt letting her know Dr. Monroy in fact does want pt bridged with Lovenox for surgery due to stroke history. Told pt I have left message for Dr. Huang's office and INR nurse. Asked pt to call me back with surgeon's information so I can call them as well. MELINA Morrissey 11:48 AM 08/22/18

## 2018-08-22 NOTE — TELEPHONE ENCOUNTER
Spoke with patient regarding lovenox bridging per MD's instructions below.  Her  has given her lovenox in the past and they do feel comfortable administering it again now.  Patient denied any further question about bridging.  Sonam Teixeira RN

## 2018-08-23 ENCOUNTER — HOSPITAL ENCOUNTER (OUTPATIENT)
Dept: LAB | Facility: CLINIC | Age: 78
Discharge: HOME | End: 2018-08-23
Attending: ORTHOPAEDIC SURGERY | Admitting: ORTHOPAEDIC SURGERY
Payer: MEDICARE

## 2018-08-23 DIAGNOSIS — Z01.812 PRE-OPERATIVE LABORATORY EXAMINATION: ICD-10-CM

## 2018-08-23 LAB
MRSA DNA SPEC QL NAA+PROBE: NEGATIVE
SPECIMEN SOURCE: NORMAL

## 2018-08-23 PROCEDURE — 87641 MR-STAPH DNA AMP PROBE: CPT | Performed by: ORTHOPAEDIC SURGERY

## 2018-08-23 PROCEDURE — 40000829 ZZHCL STATISTIC STAPH AUREUS SUSCEPT SCREEN PCR: Performed by: ORTHOPAEDIC SURGERY

## 2018-08-23 PROCEDURE — 40000830 ZZHCL STATISTIC STAPH AUREUS METH RESIST SCREEN PCR: Performed by: ORTHOPAEDIC SURGERY

## 2018-08-23 PROCEDURE — 87640 STAPH A DNA AMP PROBE: CPT | Performed by: ORTHOPAEDIC SURGERY

## 2018-08-24 NOTE — PROGRESS NOTES
Pt called wanting to verify device rep is notified for knee surgery on 9/4/18. Called pt back, reviewed that I did received message back from device nurse and there are processes in place, so the device rep will be notified. Pt stated understanding. MELINA Morrissey 10:22 AM 08/24/18

## 2018-08-28 NOTE — PHARMACY-ADMISSION MEDICATION HISTORY
Admission medication history interview status for this patient is complete. See [facility] admission navigator for allergy information, prior to admission medications and immunization status.     PTA med list completed by pre-admitting nurse,   Yarelis Nazario RN Fri Aug 17, 2018  4:00 PM       Prior to Admission medications    Medication Sig Last Dose Taking? Auth Provider   ACETAMINOPHEN PO Take 650 mg by mouth every 6 hours as needed   Yes Unknown, Entered By History   ALLOPURINOL PO Take 100 mg by mouth 2 times daily  Yes Unknown, Entered By History   Amlodipine Besylate-Valsartan 5-160 MG TABS Take 1 tablet by mouth daily  Yes Yunior Huang MD   calcium citrate (CALCITRATE) 950 MG tablet Take by mouth daily 1200 mg daily  Yes Unknown, Entered By History   fish oil-omega-3 fatty acids 1000 MG capsule Take 1 g by mouth daily Pt takes as remembers  Yes Unknown, Entered By History   folic acid (FOLVITE) 1 MG tablet Take 1 mg by mouth daily  Yes Reported, Patient   ibandronate (BONIVA) 3 MG/3ML Inject 3 mg into the vein every 3 months   Yes Reported, Patient   levothyroxine (SYNTHROID/LEVOTHROID) 112 MCG tablet TAKE 1 TABLET (112 MCG) BY MOUTH DAILY  Yes Yunior Huang MD   METHOTREXATE SODIUM IJ Inject 0.8 mLs as directed once a week  Yes Reported, Patient   metoprolol tartrate (LOPRESSOR) 50 MG tablet Take 1 tablet (50 mg) by mouth 2 times daily  Yes Jluis Monroy MD   multivitamin, therapeutic with minerals (MULTI-VITAMIN) TABS tablet Take 1 tablet by mouth daily  Yes Unknown, Entered By History   omeprazole (PRILOSEC) 20 MG CR capsule Take 1 capsule (20 mg) by mouth 2 times daily  Yes Yunior Huang MD   torsemide (DEMADEX) 10 MG tablet Take 1 tablet (10 mg) by mouth daily (with breakfast)  Yes Jluis Monroy MD   VITAMIN D, CHOLECALCIFEROL, PO Take 1,000 Units by mouth daily  Yes Unknown, Entered By History   warfarin (COUMADIN) 2.5 MG tablet Take 1 tablet (2.5 mg) by mouth Sun, Tue,  Thur, Fri. Take 2 tablets (5mg) by mouth Mon, wed, Sat or as instructed by INR clinic.  Yes Yunior Huang MD   enoxaparin (LOVENOX) 60 MG/0.6ML injection Inject 0.6 mLs (60 mg) Subcutaneous 2 times daily   Yunior Huang MD

## 2018-08-30 NOTE — H&P (VIEW-ONLY)
[place] [address]  [facility] [address]  [phone]  Dept: [phone]    PRE-OP EVALUATION:  Today's date: 2018    Zunilda Clark (: 1940) presents for pre-operative evaluation assessment as requested by Dr. Sheikh.  She requires evaluation and anesthesia risk assessment prior to undergoing surgery/procedure for treatment of L knee replacement .    Fax number for surgical facility: [facility]  Primary Physician: Yunior Huang  Type of Anesthesia Anticipated: General    Patient has a Health Care Directive or Living Will:  YES     Preop Questions 2018   Who is doing your surgery? Dr Jurgen Sheikh   What are you having done? left knee replacement   Date of Surgery/Procedure: 2018   Facility or Hospital where procedure/surgery will be performed: [facility]   1.  Do you have a history of Heart attack, stroke, stent, coronary bypass surgery, or other heart surgery? YES  -    2.  Do you ever have any pain or discomfort in your chest? No   3.  Do you have a history of  Heart Failure? YES -    4.   Are you troubled by shortness of breath when:  walking on a level surface, or up a slight hill, or at night? UNKNOWN -    5.  Do you currently have a cold, bronchitis or other respiratory infection? No   6.  Do you have a cough, shortness of breath, or wheezing? No   7.  Do you sometimes get pains in the calves of your legs when you walk? YES -    8. Do you or anyone in your family have previous history of blood clots? YES -    9.  Do you or does anyone in your family have a serious bleeding problem such as prolonged bleeding following surgeries or cuts? UNKNOWN -    10. Have you ever had problems with anemia or been told to take iron pills? No   11. Have you had any abnormal blood loss such as black, tarry or bloody stools, or abnormal vaginal bleeding? No   12. Have you ever had a blood transfusion? No   13. Have you or any of your relatives ever had  problems with anesthesia? YES -    14. Do you have sleep apnea, excessive snoring or daytime drowsiness? No   15. Do you have any prosthetic heart valves? No   16. Do you have prosthetic joints? No   17. Is there any chance that you may be pregnant? No         HPI:     HPI related to upcoming procedure: scheduled for left TKA for history of RA, OA.   No acute complaints, no medication change or new medical conditions.  Has h/o HTN. on medical treatment. BP has been controlled. No side effects from medications. No CP, HA, dizziness. good compliance with medications and low salt diet.  Has history of paroxysmal atrial fibrillation. On anticoagulation with Coumadin and rate control medications. Asymptonatic - no chest pains , palpitations,  no side effects from medications.  Has RA. On Methotrexate treatment. Controlled symptoms. No infections.       See problem list for active medical problems.  Problems all longstanding and stable, except as noted/documented.  See ROS for pertinent symptoms related to these conditions.                                                                                                                                                          .    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 09/29/2017     Priority: Medium     Valvular heart disease 09/29/2017     Priority: Medium     Paroxysmal atrial fibrillation (H) 09/29/2017     Priority: Medium     S/P mitral valve replacement with bioprosthetic valve 09/29/2017     Priority: Medium     Pulmonary hypertension 09/29/2017     Priority: Medium     Acute cholecystitis 04/28/2017     Priority: Medium     Long-term (current) use of anticoagulants [Z79.01] 04/25/2016     Priority: Medium     Essential hypertension 11/10/2015     Priority: Medium     Acquired hypothyroidism 11/04/2015     Priority: Medium     Anticoagulation management encounter 07/30/2015      Priority: Medium     Advance Care Planning 07/29/2015     Priority: Medium     Advance Care Planning 7/29/2015: Receipt of ACP document:  Received: Health Care Directive which was witnessed or notarized on 9/5/13.  Document not previously scanned.  Validation form completed and sent with document to be scanned.  Code Status reflects choices in most recent ACP document.  Confirmed/documented designated decision maker(s): Norberto Clark is primary health care agent, and Kyara Mayorga and Tenisha Moeller are alternate agents.  Added by Jose Castañeda           Fracture, humerus closed, shaft 07/28/2015     Priority: Medium     Humerus fracture 07/22/2015     Priority: Medium     Cardiac pacemaker in situ 06/05/2015     Priority: Medium     Mitral valve disorder 06/01/2015     Priority: Medium     mitral valve replacement in 2008 following attempted balloon valvuloplasty in 2006 for mitral stenosis related to presumed rheumatic valvular heart disease        Pulmonary HTN      Priority: Medium     Atrial fibrillation and flutter (H)      Priority: Medium     Ablation and PPM 2011       RA (rheumatoid arthritis) (H) 05/05/2015     Priority: Medium     Benign essential hypertension 05/05/2015     Priority: Medium     CHF (congestive heart failure) (H) 04/26/2014     Priority: Medium      Past Medical History:   Diagnosis Date     Acquired hypothyroidism 11/4/2015     Aortic valve insufficiency      Arthritis      Atrial fibrillation (H)      Atrial fibrillation and flutter (H)      Blood clotting disorder (H)      CHF (congestive heart failure) (H)      DVT (deep venous thrombosis) (H) 2003     Gastro-oesophageal reflux disease      H/O mitral valve replacement with tissue graft june 2014     History of blood transfusion 2014     HTN (hypertension)      Hypothyroidism      Other chronic pain      Pulmonary HTN      Rheumatoid arthritis(714.0)      Seizures (H) 2014     Stroke (H) 2009    left side mild weakness      Stroke  (H) 2014     [place]ope 2011    [place]     Thrombosis of leg 2003    both legs     Past Surgical History:   Procedure Laterality Date     ABDOMEN SURGERY      prolapsed bladder     H ABLATION AV NODE  2011    AFlutter with syncope, PPM to follow     IMPLANT PACEMAKER  2011     LAPAROSCOPIC CHOLECYSTECTOMY WITH CHOLANGIOGRAMS N/A 4/29/2017    Procedure: LAPAROSCOPIC CHOLECYSTECTOMY WITH CHOLANGIOGRAMS;  LAPAROSCOPIC CHOLECYSTECTOMY WITH CHOLANGIOGRAMS ;  Surgeon: Angeles Mcgill MD;  Location: [facility] OR     OPEN REDUCTION INTERNAL FIXATION RODDING INTRAMEDULLARY HUMERUS Right 7/28/2015    Procedure: OPEN REDUCTION INTERNAL FIXATION RODDING INTRAMEDULLARY HUMERUS;  Surgeon: Raymundo Rivera MD;  Location: [facility] OR     REPLACE VALVE MITRAL  2006    Mitral valve replacement with bioprosthetic valve in 2008 for rheumatic disease     SLING BLADDER SUSPENSION WITH FASCIA UMESH       Current Outpatient Prescriptions   Medication Sig Dispense Refill     ACETAMINOPHEN PO Take 650 mg by mouth every 6 hours as needed        ALLOPURINOL PO Take 100 mg by mouth 2 times daily       Amlodipine Besylate-Valsartan (EXFORGE)  MG TABS Take 0.5 tablets by mouth every morning 30 tablet 3     calcium citrate (CALCITRATE) 950 MG tablet Take by mouth daily 1200 mg daily       fish oil-omega-3 fatty acids 1000 MG capsule Take 1 g by mouth daily Pt takes as remembers       folic acid (FOLVITE) 1 MG tablet Take 1 mg by mouth daily       ibandronate (BONIVA) 3 MG/3ML Inject 3 mg into the vein every 3 months        levothyroxine (SYNTHROID/LEVOTHROID) 112 MCG tablet TAKE 1 TABLET (112 MCG) BY MOUTH DAILY 90 tablet 0     METHOTREXATE SODIUM IJ Inject 0.8 mLs as directed once a week       metoprolol tartrate (LOPRESSOR) 50 MG tablet Take 1 tablet (50 mg) by mouth 2 times daily 200 tablet 3     multivitamin, therapeutic with minerals (MULTI-VITAMIN) TABS tablet Take 1 tablet by mouth daily       omeprazole (PRILOSEC) 20 MG CR capsule  Take 1 capsule (20 mg) by mouth 2 times daily 180 capsule 1     torsemide (DEMADEX) 10 MG tablet Take 1 tablet (10 mg) by mouth daily (with breakfast) 30 tablet 3     VITAMIN D, CHOLECALCIFEROL, PO Take 1,000 Units by mouth daily       warfarin (COUMADIN) 2.5 MG tablet Take 1 tablet (2.5 mg) by mouth Sun, Tue, Thur, Fri. Take 2 tablets (5mg) by mouth Mon, wed, Sat or as instructed by INR clinic. 120 tablet 0     OTC products: None, except as noted above    No Known Allergies   Latex Allergy: NO    Social History   Substance Use Topics     Smoking status: Never Smoker     Smokeless tobacco: Never Used     Alcohol use No     History   Drug Use No       REVIEW OF SYSTEMS:   CONSTITUTIONAL: NEGATIVE for fever, chills, change in weight  INTEGUMENTARY/SKIN: NEGATIVE for worrisome rashes, moles or lesions  EYES: NEGATIVE for vision changes or irritation  ENT/MOUTH: NEGATIVE for ear, mouth and throat problems  RESP: NEGATIVE for significant cough or SOB  BREAST: NEGATIVE for masses, tenderness or discharge  CV: NEGATIVE for chest pain, palpitations or peripheral edema  GI: NEGATIVE for nausea, abdominal pain, heartburn, or change in bowel habits  : NEGATIVE for frequency, dysuria, or hematuria  MUSCULOSKELETAL: NEGATIVE for significant arthralgias or myalgia, + for bilateral knees pains   NEURO: NEGATIVE for weakness, dizziness or paresthesias  ENDOCRINE: NEGATIVE for temperature intolerance, skin/hair changes  HEME: NEGATIVE for bleeding problems  PSYCHIATRIC: NEGATIVE for changes in mood or affect    EXAM:   There were no vitals taken for this visit.    GENERAL APPEARANCE: healthy, alert and no distress     EYES: EOMI, PERRL     HENT: ear canals and TM's normal and nose and mouth without ulcers or lesions     NECK: no adenopathy, no asymmetry, masses, or scars and thyroid normal to palpation     RESP: lungs clear to auscultation - no rales, rhonchi or wheezes     CV: regular rates and rhythm, normal S1 S2, no S3 or S4  and no murmur, click or rub     ABDOMEN:  soft, nontender, no HSM or masses and bowel sounds normal     MS: extremities normal- no gross deformities noted, no evidence of inflammation in joints, FROM in all extremities.     SKIN: no suspicious lesions or rashes     NEURO: Normal strength and tone, sensory exam grossly normal, mentation intact and speech normal     PSYCH: mentation appears normal. and affect normal/bright     LYMPHATICS: No cervical adenopathy    DIAGNOSTICS:     EKG: appears normal, NSR, normal axis, normal intervals, no acute ST/T changes c/w ischemia, no LVH by voltage criteria, unchanged from previous tracings  Labs Drawn and in Process:   Unresulted Labs Ordered in the Past 30 Days of this Admission     Date and Time Order Name Status Description    8/17/2018 1022 COMPREHENSIVE METABOLIC PANEL In process     8/17/2018 1022 CBC WITH PLATELETS In process           Recent Labs   Lab Test 08/17/18 07/26/18   1035 07/09/18 04/23/18   1120   08/17/17   0843   05/04/16   1141   HGB   --    --    --    --   11.7   --   11.6*   < >   --    PLT   --    --    --    --   343   --   329   < >   --    INR  1.8*   --   2.1*   < >   --    < >   --    < >   --    NA   --   141   --    --   142   --   142   < >   --    POTASSIUM   --   3.7   --    --   3.9   --   3.7   < >   --    CR   --   1.13*   --    --   0.77   --   0.96   < >   --    A1C   --    --    --    --    --    --    --    --   5.4    < > = values in this interval not displayed.        IMPRESSION:   Reason for surgery/procedure: left knee OA, RA   Diagnosis/reason for consult: preoperative evaluation/ clearance      The proposed surgical procedure is considered INTERMEDIATE risk.    REVISED CARDIAC RISK INDEX  The patient has the following serious cardiovascular risks for perioperative complications such as (MI, PE, VFib and 3  AV Block):  No serious cardiac risks  INTERPRETATION: 0 risks: Class I (very low risk - 0.4% complication rate)    The  patient has the following additional risks for perioperative complications:  No identified additional risks      ICD-10-CM    1. Preop general physical exam Z01.818        RECOMMENDATIONS:         --Patient is to take all scheduled medications on the day of surgery EXCEPT for modifications listed below.  Hold Coumadin , Fish oil, MVI for 5 days prior to surgery.   Resume Coumadin after surgery   Hold Diuretic on the day of surgery.   Decrease dose of Amlodipine/ Valsartan , BP is low -normal.     APPROVAL GIVEN to proceed with proposed procedure, without further diagnostic evaluation       Signed Electronically by: Yunior Huang MD    Copy of this evaluation report is provided to requesting physician.    [place] Preop Guidelines    Revised Cardiac Risk Index

## 2018-09-05 ENCOUNTER — SURGERY (OUTPATIENT)
Age: 78
End: 2018-09-05

## 2018-09-05 ENCOUNTER — OFFICE VISIT (OUTPATIENT)
Dept: INTERNAL MEDICINE | Facility: CLINIC | Age: 78
End: 2018-09-05
Payer: MEDICARE

## 2018-09-05 ENCOUNTER — TELEPHONE (OUTPATIENT)
Dept: ANTICOAGULATION | Facility: CLINIC | Age: 78
End: 2018-09-05

## 2018-09-05 ENCOUNTER — ANESTHESIA (OUTPATIENT)
Dept: SURGERY | Facility: CLINIC | Age: 78
End: 2018-09-05

## 2018-09-05 ENCOUNTER — ANESTHESIA EVENT (OUTPATIENT)
Dept: SURGERY | Facility: CLINIC | Age: 78
End: 2018-09-05

## 2018-09-05 ENCOUNTER — HOSPITAL ENCOUNTER (OUTPATIENT)
Facility: CLINIC | Age: 78
Discharge: HOME | End: 2018-09-05
Attending: ORTHOPAEDIC SURGERY | Admitting: ORTHOPAEDIC SURGERY
Payer: MEDICARE

## 2018-09-05 VITALS
HEART RATE: 71 BPM | TEMPERATURE: 97.5 F | OXYGEN SATURATION: 96 % | WEIGHT: 156 LBS | SYSTOLIC BLOOD PRESSURE: 148 MMHG | BODY MASS INDEX: 26.63 KG/M2 | HEIGHT: 64 IN | DIASTOLIC BLOOD PRESSURE: 76 MMHG

## 2018-09-05 VITALS
DIASTOLIC BLOOD PRESSURE: 84 MMHG | TEMPERATURE: 98.2 F | BODY MASS INDEX: 26.95 KG/M2 | SYSTOLIC BLOOD PRESSURE: 148 MMHG | RESPIRATION RATE: 18 BRPM | WEIGHT: 157 LBS | OXYGEN SATURATION: 98 %

## 2018-09-05 DIAGNOSIS — I48.91 ATRIAL FIBRILLATION AND FLUTTER (H): ICD-10-CM

## 2018-09-05 DIAGNOSIS — I48.92 ATRIAL FIBRILLATION AND FLUTTER (H): ICD-10-CM

## 2018-09-05 DIAGNOSIS — B02.9 HERPES ZOSTER WITHOUT COMPLICATION: Primary | ICD-10-CM

## 2018-09-05 PROCEDURE — 40000881 ZZH CANCELLED SURGERY UP TO 31-45 MINS: Performed by: ORTHOPAEDIC SURGERY

## 2018-09-05 PROCEDURE — 99213 OFFICE O/P EST LOW 20 MIN: CPT | Performed by: INTERNAL MEDICINE

## 2018-09-05 RX ORDER — NALOXONE HYDROCHLORIDE 0.4 MG/ML
.1-.4 INJECTION, SOLUTION INTRAMUSCULAR; INTRAVENOUS; SUBCUTANEOUS
Status: CANCELLED | OUTPATIENT
Start: 2018-09-05 | End: 2018-09-06

## 2018-09-05 RX ORDER — ALBUTEROL SULFATE 0.83 MG/ML
2.5 SOLUTION RESPIRATORY (INHALATION) EVERY 4 HOURS PRN
Status: CANCELLED | OUTPATIENT
Start: 2018-09-05

## 2018-09-05 RX ORDER — SODIUM CHLORIDE, SODIUM LACTATE, POTASSIUM CHLORIDE, CALCIUM CHLORIDE 600; 310; 30; 20 MG/100ML; MG/100ML; MG/100ML; MG/100ML
INJECTION, SOLUTION INTRAVENOUS CONTINUOUS
Status: DISCONTINUED | OUTPATIENT
Start: 2018-09-05 | End: 2018-09-05 | Stop reason: HOSPADM

## 2018-09-05 RX ORDER — ONDANSETRON 4 MG/1
4 TABLET, ORALLY DISINTEGRATING ORAL EVERY 30 MIN PRN
Status: CANCELLED | OUTPATIENT
Start: 2018-09-05

## 2018-09-05 RX ORDER — LIDOCAINE 40 MG/G
CREAM TOPICAL
Status: DISCONTINUED | OUTPATIENT
Start: 2018-09-05 | End: 2018-09-05 | Stop reason: HOSPADM

## 2018-09-05 RX ORDER — FENTANYL CITRATE 50 UG/ML
25-50 INJECTION, SOLUTION INTRAMUSCULAR; INTRAVENOUS
Status: CANCELLED | OUTPATIENT
Start: 2018-09-05

## 2018-09-05 RX ORDER — DIAZEPAM 10 MG/2ML
2.5 INJECTION, SOLUTION INTRAMUSCULAR; INTRAVENOUS
Status: CANCELLED | OUTPATIENT
Start: 2018-09-05

## 2018-09-05 RX ORDER — DIMENHYDRINATE 50 MG/ML
25 INJECTION, SOLUTION INTRAMUSCULAR; INTRAVENOUS
Status: CANCELLED | OUTPATIENT
Start: 2018-09-05

## 2018-09-05 RX ORDER — MEPERIDINE HYDROCHLORIDE 50 MG/ML
12.5 INJECTION INTRAMUSCULAR; INTRAVENOUS; SUBCUTANEOUS EVERY 5 MIN PRN
Status: CANCELLED | OUTPATIENT
Start: 2018-09-05

## 2018-09-05 RX ORDER — SODIUM CHLORIDE, SODIUM LACTATE, POTASSIUM CHLORIDE, CALCIUM CHLORIDE 600; 310; 30; 20 MG/100ML; MG/100ML; MG/100ML; MG/100ML
INJECTION, SOLUTION INTRAVENOUS CONTINUOUS
Status: CANCELLED | OUTPATIENT
Start: 2018-09-05

## 2018-09-05 RX ORDER — VALACYCLOVIR HYDROCHLORIDE 1 G/1
1000 TABLET, FILM COATED ORAL 2 TIMES DAILY
Qty: 14 TABLET | Refills: 0 | Status: SHIPPED | OUTPATIENT
Start: 2018-09-05 | End: 2018-10-25

## 2018-09-05 RX ORDER — CELECOXIB 200 MG/1
400 CAPSULE ORAL ONCE
Status: DISCONTINUED | OUTPATIENT
Start: 2018-09-05 | End: 2018-09-05 | Stop reason: HOSPADM

## 2018-09-05 RX ORDER — PANTOPRAZOLE SODIUM 40 MG/1
40 TABLET, DELAYED RELEASE ORAL ONCE
Status: DISCONTINUED | OUTPATIENT
Start: 2018-09-05 | End: 2018-09-05 | Stop reason: HOSPADM

## 2018-09-05 RX ORDER — CEFAZOLIN SODIUM 1 G/3ML
1 INJECTION, POWDER, FOR SOLUTION INTRAMUSCULAR; INTRAVENOUS SEE ADMIN INSTRUCTIONS
Status: DISCONTINUED | OUTPATIENT
Start: 2018-09-05 | End: 2018-09-05 | Stop reason: HOSPADM

## 2018-09-05 RX ORDER — LABETALOL HYDROCHLORIDE 5 MG/ML
10 INJECTION, SOLUTION INTRAVENOUS
Status: CANCELLED | OUTPATIENT
Start: 2018-09-05

## 2018-09-05 RX ORDER — CEFAZOLIN SODIUM 2 G/100ML
2 INJECTION, SOLUTION INTRAVENOUS
Status: DISCONTINUED | OUTPATIENT
Start: 2018-09-05 | End: 2018-09-05 | Stop reason: HOSPADM

## 2018-09-05 RX ORDER — ONDANSETRON 2 MG/ML
4 INJECTION INTRAMUSCULAR; INTRAVENOUS EVERY 30 MIN PRN
Status: CANCELLED | OUTPATIENT
Start: 2018-09-05

## 2018-09-05 ASSESSMENT — NEW YORK HEART ASSOCIATION (NYHA) CLASSIFICATION: NYHA FUNCTIONAL CLASS: I

## 2018-09-05 ASSESSMENT — ENCOUNTER SYMPTOMS: DYSRHYTHMIAS: 1

## 2018-09-05 NOTE — MR AVS SNAPSHOT
After Visit Summary   9/5/2018    Zunilda Clark    MRN: [id number]           Patient Information     Date Of Birth          1940        Visit Information        Provider Department      9/5/2018 9:40 AM Yunior Huang MD [facility]        Today's Diagnoses     Herpes zoster without complication    -  1       Follow-ups after your visit        Your next 10 appointments already scheduled     Sep 12, 2018  2:45 PM CDT   Anticoagulation Visit with [facility]   [facility]    [address]  [facility] [address] [phone]            Oct 31, 2018  1:40 PM CDT   Pacemaker Check with RU DCR2   [facility] ([facility])    [address]  [facility] [address] [phone]              Who to contact     If you have questions or need follow up information about today's clinic visit or your schedule please contact [facility] directly at [phone].  Normal or non-critical lab and imaging results will be communicated to you by [employer], letter or phone within 4 business days after the clinic has received the results. If you do not hear from us within 7 days, please contact the clinic through [employer] or phone. If you have a critical or abnormal lab result, we will notify you by phone as soon as possible.  Submit refill requests through [employer] or call your pharmacy and they will forward the refill request to us. Please allow 3 business days for your refill to be completed.          Additional Information About Your Visit        [employer] Information     [employer] gives you secure access to your electronic health record. If you see a primary care provider, you can also send messages to your care team and make appointments. If you have questions, please call your primary care clinic.  If you do not have a primary care  "provider, please call [phone] and they will assist you.        Care EveryWhere ID     This is your Care EveryWhere ID. This could be used by other organizations to access your [place] medical records  [id number]        Your Vitals Were     Pulse Temperature Height Pulse Oximetry BMI (Body Mass Index)       71 97.5  F (36.4  C) (Oral) 5' 4\" (1.626 m) 96% 26.78 kg/m2        Blood Pressure from Last 3 Encounters:   09/05/18 148/76   09/05/18 148/84   08/17/18 122/50    Weight from Last 3 Encounters:   09/05/18 156 lb (70.8 kg)   09/05/18 157 lb (71.2 kg)   08/17/18 156 lb (70.8 kg)              Today, you had the following     No orders found for display         Today's Medication Changes          These changes are accurate as of 9/5/18 10:19 AM.  If you have any questions, ask your nurse or doctor.               Start taking these medicines.        Dose/Directions    valACYclovir 1000 mg tablet   Commonly known as:  VALTREX   Used for:  Herpes zoster without complication   Started by:  Yunior Huang MD        Dose:  1000 mg   Take 1 tablet (1,000 mg) by mouth 2 times daily   Quantity:  14 tablet   Refills:  0            Where to get your medicines      These medications were sent to [id number] - [place], [place] - [address]  [address], [facility] [address]     Phone:  [phone]     valACYclovir 1000 mg tablet                Primary Care Provider Office Phone # Fax #    Yunior Huang [phone]       [address] [facility] [address]        Equal Access to Services     ROQUE UGALDE : Annmarie Boogie, junior hutchins, qaybta lucía holland. So [facility] [phone].    ATENCIÓN: Si habla español, tiene a morales disposición servicios gratuitos de asistencia lingüística. Llame al [phone].    We comply with applicable federal civil rights laws and [place] laws. We do not discriminate on the " basis of race, color, national origin, age, disability, sex, sexual orientation, or gender identity.            Thank you!     Thank you for choosing [facility]  for your care. Our goal is always to provide you with excellent care. Hearing back from our patients is one way we can continue to improve our services. Please take a few minutes to complete the written survey that you may receive in the mail after your visit with us. Thank you!             Your Updated Medication List - Protect others around you: Learn how to safely use, store and throw away your medicines at www.disposemymeds.org.          This list is accurate as of 9/5/18 10:19 AM.  Always use your most recent med list.                   Brand Name Dispense Instructions for use Diagnosis    ACETAMINOPHEN PO      Take 650 mg by mouth every 6 hours as needed        ALLOPURINOL PO      Take 100 mg by mouth 2 times daily        Amlodipine Besylate-Valsartan 5-160 MG Tabs     90 tablet    Take 1 tablet by mouth daily    Benign essential hypertension       BONIVA 3 MG/3ML   Generic drug:  ibandronate      Inject 3 mg into the vein every 3 months        calcium citrate 950 MG tablet    CALCITRATE     Take by mouth daily 1200 mg daily        enoxaparin 60 MG/0.6ML injection    LOVENOX    12 Syringe    Inject 0.6 mLs (60 mg) Subcutaneous 2 times daily    Atrial fibrillation and flutter (H)       fish oil-omega-3 fatty acids 1000 MG capsule      Take 1 g by mouth daily Pt takes as remembers        folic acid 1 MG tablet    FOLVITE     Take 1 mg by mouth daily        levothyroxine 112 MCG tablet    SYNTHROID/LEVOTHROID    90 tablet    TAKE 1 TABLET (112 MCG) BY MOUTH DAILY    Acquired hypothyroidism       METHOTREXATE SODIUM IJ      Inject 0.8 mLs as directed once a week        metoprolol tartrate 50 MG tablet    LOPRESSOR    200 tablet    Take 1 tablet (50 mg) by mouth 2 times daily    Chronic atrial fibrillation (H)       Multi-vitamin Tabs  tablet      Take 1 tablet by mouth daily        omeprazole 20 MG CR capsule    priLOSEC    180 capsule    Take 1 capsule (20 mg) by mouth 2 times daily    Esophageal reflux       torsemide 10 MG tablet    DEMADEX    30 tablet    Take 1 tablet (10 mg) by mouth daily (with breakfast)    Pulmonary HTN       valACYclovir 1000 mg tablet    VALTREX    14 tablet    Take 1 tablet (1,000 mg) by mouth 2 times daily    Herpes zoster without complication       VITAMIN D (CHOLECALCIFEROL) PO      Take 1,000 Units by mouth daily        warfarin 2.5 MG tablet    COUMADIN    120 tablet    Take 1 tablet (2.5 mg) by mouth Sun, Tue, Thur, Fri. Take 2 tablets (5mg) by mouth Mon, wed, Sat or as instructed by INR clinic.    Atrial fibrillation and flutter (H)

## 2018-09-05 NOTE — NURSING NOTE
"Vital signs:  Temp: 97.5  F (36.4  C) Temp src: Oral BP: 148/76 Pulse: 71     SpO2: 96 %     Height: 5' 4\" (162.6 cm) Weight: 156 lb (70.8 kg)  Estimated body mass index is 26.78 kg/(m^2) as calculated from the following:    Height as of this encounter: 5' 4\" (1.626 m).    Weight as of this encounter: 156 lb (70.8 kg).          "

## 2018-09-05 NOTE — TELEPHONE ENCOUNTER
Patient calling--surgery was canceled since patient has shingles.  She was started on valacyclovir today (no interaction with warfarin).  She was advised by MD to resume lovenox and warfarin.  Sent in Rx for additional lovenox and advised patient to resume her usual dosing.  Patient has an appointment next week for INR check.  Sonam Teixeira RN

## 2018-09-05 NOTE — ANESTHESIA PREPROCEDURE EVALUATION
Anesthesia Evaluation     . Pt has had prior anesthetic. Type: General    History of anesthetic complications   - PONV        ROS/MED HX    ENT/Pulmonary:  - neg pulmonary ROS     Neurologic:     (+)CVA with deficits- left sided weakness,     Cardiovascular:     (+) Dyslipidemia, hypertension----. Taking blood thinners Pt has received instructions: Instructions Given to patient: stopped. CHF etiology: valvular obstructive disease NYHA classification: I. fainting (syncope). :. dysrhythmias a-fib, valvular problems/murmurs type: MR S/P replacement:. pulmonary hypertension, Previous cardiac testing Echodate:2018results:There is a bioprosthetic mitral valve.  Right ventricular systolic pressure is elevated, consistent with severe  pulmonary hypertension.  Mildly decreased right ventricular systolic function  The right ventricle is moderately dilated.  There is a pacemaker lead in the right ventricle.  Left ventricular systolic function is normal.  The visual ejection fraction is estimated at 65-70%.  The left ventricle is normal in size.  There is normal left ventricular wall thickness.  There is mod-severe biatrial enlargement.  There is mild (1+) aortic regurgitation.  The rhythm was atrial fibrillation.      Estimated PA systolic pressures are slgihtly higher than those observed during  the last study 4/13/2017. There has otherwise been no signifcant change.date: results: date: results: date: results:          METS/Exercise Tolerance:     Hematologic:     (+) History of blood clots pt is anticoagulated, -      Musculoskeletal: Comment: RA  (+) arthritis, , , -       GI/Hepatic:     (+) GERD Asymptomatic on medication,       Renal/Genitourinary:  - ROS Renal section negative       Endo:     (+) thyroid problem hypothyroidism, .      Psychiatric:  - neg psychiatric ROS       Infectious Disease:  - neg infectious disease ROS       Malignancy:      - no malignancy   Other:    - neg other ROS                 Physical  Exam  Normal systems: cardiovascular, pulmonary and dental    Airway   Mallampati: II  TM distance: >3 FB  Neck ROM: full    Dental     Cardiovascular   Rhythm and rate: regular and normal      Pulmonary    breath sounds clear to auscultation    Other findings: Lab Test        08/17/18 08/17/18 07/09/18 05/21/18 04/23/18      --          08/17/17                       1037                                                                1120           --           0843          WBC          7.5           --           --           --          8.4           --          8.1           HGB          11.7          --           --           --          11.7          --          11.6*         MCV          104*          --           --           --          101*          --          98            PLT          321           --           --           --          343           --          329           INR           --          1.8*         2.1*         2.6*          --            < >         --            < > = values in this interval not displayed.                  Lab Test        08/17/18 07/26/18 04/23/18                       1037          1035          1120          NA           142          141          142           POTASSIUM    3.7          3.7          3.9           CHLORIDE     106          105          108           CO2          29           31           27            BUN          25           38*          21            CR           0.96         1.13*        0.77          ANIONGAP     7            5            7             BRICE          9.2          9.8          9.3           GLC          86           82           88                     ECG  Sinus  Rhythm   -Nonspecific ST depression  -Nondiagnostic.     ABNORMAL                 Anesthesia Plan      History & Physical Review  History and physical reviewed and following examination; no interval change.    ASA Status:  3 .    NPO Status:  > 8  hours    Plan for General, ETT, LMA and Periph. Nerve Block for postop pain with Intravenous induction. Maintenance will be Balanced.    PONV prophylaxis:  Ondansetron (or other 5HT-3) and Dexamethasone or Solumedrol       Postoperative Care  Postoperative pain management:  IV analgesics, Oral pain medications and Multi-modal analgesia.      Consents  Anesthetic plan, risks, benefits and alternatives discussed with:  Patient.  Use of blood products discussed: No .   .                          .

## 2018-09-05 NOTE — OR NURSING
Patient has new on set of red raised rash right chest and right upper back. States has had shingles in the past  And looks and feels the same. Dr. Spencer and Dr. Sheikh aware. Surgery cxd. Patient to follow up with primary today for follow up and treatment and plan. Infection control contacted. Pt dcd per wheelchair with family.

## 2018-09-05 NOTE — PROGRESS NOTES
SUBJECTIVE:   Zunilda Clark is a 78 year old female who presents to clinic today for the following health issues:      Shingles:  Present with a rash on the right breast and side of the chest, for several days with new rash appearing yesterday. Painful , achy, with small blisters and skin erythema.   Has been scheduled for knee surgery that was cancelled because of the rash.       Problem list and histories reviewed & adjusted, as indicated.  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     Valvular heart disease     Paroxysmal atrial fibrillation (H)     S/P mitral valve replacement with bioprosthetic valve     Pulmonary hypertension     Past Surgical History:   Procedure Laterality Date     ABDOMEN SURGERY      prolapsed bladder     H ABLATION AV NODE  2011    AFlutter with syncope, PPM to follow     IMPLANT PACEMAKER  2011     LAPAROSCOPIC CHOLECYSTECTOMY WITH CHOLANGIOGRAMS N/A 4/29/2017    Procedure: LAPAROSCOPIC CHOLECYSTECTOMY WITH CHOLANGIOGRAMS;  LAPAROSCOPIC CHOLECYSTECTOMY WITH CHOLANGIOGRAMS ;  Surgeon: Angeles Mcgill MD;  Location: [facility] OR     OPEN REDUCTION INTERNAL FIXATION RODDING INTRAMEDULLARY HUMERUS Right 7/28/2015    Procedure: OPEN REDUCTION INTERNAL FIXATION RODDING INTRAMEDULLARY HUMERUS;  Surgeon: Raymundo Rivera MD;  Location: [facility] OR     REPLACE VALVE MITRAL  2006    Mitral valve replacement with bioprosthetic valve in 2008 for  rheumatic disease     SLING BLADDER SUSPENSION WITH FASCIA UMESH         Social History   Substance Use Topics     Smoking status: Never Smoker     Smokeless tobacco: Never Used     Alcohol use No     Family History   Problem Relation Age of Onset     Coronary Artery Disease Mother      Coronary Artery Disease Brother      Diabetes Brother      Cancer Brother       at age 52      Hypertension Sister      Hyperlipidemia Sister          Current Outpatient Prescriptions   Medication Sig Dispense Refill     ACETAMINOPHEN PO Take 650 mg by mouth every 6 hours as needed        ALLOPURINOL PO Take 100 mg by mouth 2 times daily       Amlodipine Besylate-Valsartan 5-160 MG TABS Take 1 tablet by mouth daily 90 tablet 3     calcium citrate (CALCITRATE) 950 MG tablet Take by mouth daily 1200 mg daily       enoxaparin (LOVENOX) 60 MG/0.6ML injection Inject 0.6 mLs (60 mg) Subcutaneous 2 times daily 12 Syringe 0     fish oil-omega-3 fatty acids 1000 MG capsule Take 1 g by mouth daily Pt takes as remembers       folic acid (FOLVITE) 1 MG tablet Take 1 mg by mouth daily       ibandronate (BONIVA) 3 MG/3ML Inject 3 mg into the vein every 3 months        levothyroxine (SYNTHROID/LEVOTHROID) 112 MCG tablet TAKE 1 TABLET (112 MCG) BY MOUTH DAILY 90 tablet 0     METHOTREXATE SODIUM IJ Inject 0.8 mLs as directed once a week       metoprolol tartrate (LOPRESSOR) 50 MG tablet Take 1 tablet (50 mg) by mouth 2 times daily 200 tablet 3     multivitamin, therapeutic with minerals (MULTI-VITAMIN) TABS tablet Take 1 tablet by mouth daily       omeprazole (PRILOSEC) 20 MG CR capsule Take 1 capsule (20 mg) by mouth 2 times daily 180 capsule 1     valACYclovir (VALTREX) 1000 mg tablet Take 1 tablet (1,000 mg) by mouth 2 times daily 14 tablet 0     VITAMIN D, CHOLECALCIFEROL, PO Take 1,000 Units by mouth daily       warfarin (COUMADIN) 2.5 MG tablet Take 1 tablet (2.5 mg) by mouth Sun, Tue, Thur, Fri. Take 2 tablets (5mg) by mouth Mon, wed, Sat  "or as instructed by INR clinic. 120 tablet 0     torsemide (DEMADEX) 10 MG tablet Take 1 tablet (10 mg) by mouth daily (with breakfast) (Patient not taking: Reported on 9/5/2018) 30 tablet 3       Reviewed and updated as needed this visit by clinical staff       Reviewed and updated as needed this visit by Provider         ROS:  Constitutional, HEENT, cardiovascular, pulmonary, gi and gu systems are negative, except as otherwise noted.    OBJECTIVE:     /76  Pulse 71  Temp 97.5  F (36.4  C) (Oral)  Ht 5' 4\" (1.626 m)  Wt 156 lb (70.8 kg)  SpO2 96%  BMI 26.78 kg/m2  Body mass index is 26.78 kg/(m^2).   GENERAL: healthy, alert and no distress  MS: no gross musculoskeletal defects noted, no edema  SKIN: right upper breast and lateral chest ana lilia skin erythema with multiple blisters 1-2 mm each     Diagnostic Test Results:  none     ASSESSMENT/PLAN:     Problem List Items Addressed This Visit     None      Visit Diagnoses     Herpes zoster without complication    -  Primary    Relevant Medications    valACYclovir (VALTREX) 1000 mg tablet           Start on treatment   Advised for side effects  Postpone surgery until rash healed     Follow-Up:as needed     Yunior Huang MD  [facility]    "

## 2018-09-12 ENCOUNTER — ANTICOAGULATION THERAPY VISIT (OUTPATIENT)
Dept: ANTICOAGULATION | Facility: CLINIC | Age: 78
End: 2018-09-12
Payer: MEDICARE

## 2018-09-12 DIAGNOSIS — I48.91 ATRIAL FIBRILLATION AND FLUTTER (H): ICD-10-CM

## 2018-09-12 DIAGNOSIS — I48.92 ATRIAL FIBRILLATION AND FLUTTER (H): ICD-10-CM

## 2018-09-12 DIAGNOSIS — Z79.01 LONG-TERM (CURRENT) USE OF ANTICOAGULANTS: ICD-10-CM

## 2018-09-12 LAB — INR POINT OF CARE: 1.7 (ref 0.86–1.14)

## 2018-09-12 PROCEDURE — 85610 PROTHROMBIN TIME: CPT | Mod: QW

## 2018-09-12 PROCEDURE — 36416 COLLJ CAPILLARY BLOOD SPEC: CPT

## 2018-09-12 PROCEDURE — 99207 ZZC NO CHARGE NURSE ONLY: CPT

## 2018-09-12 NOTE — PROGRESS NOTES
ANTICOAGULATION FOLLOW-UP CLINIC VISIT    Patient Name:  Zunilda Alicea May  Date:  9/12/2018  Contact Type:  Face to Face    SUBJECTIVE:     Patient Findings     Positives Antibiotic use or infection (valacyclovir for 1 more week), Intentional hold of therapy (warfarin was intentionally held for knee surgery which was canceled due to shingles)    Comments Patient has been using lovenox bridging.           OBJECTIVE    INR Protime   Date Value Ref Range Status   09/12/2018 1.7 (A) 0.86 - 1.14 Final       ASSESSMENT / PLAN  INR assessment SUB    Recheck INR In: 2 DAYS    INR Location Clinic      Anticoagulation Summary as of 9/12/2018     INR goal 2.0-3.0   Today's INR 1.7!   Warfarin maintenance plan 5 mg (2.5 mg x 2) on Mon, Wed, Sat; 2.5 mg (2.5 mg x 1) all other days   Full warfarin instructions 9/12: 7.5 mg; 9/13: 7.5 mg; Otherwise 5 mg on Mon, Wed, Sat; 2.5 mg all other days   Weekly warfarin total 25 mg   Plan last modified Екатерина Mahan RN (8/14/2017)   Next INR check 9/14/2018   Priority INR   Target end date     Indications   Long-term (current) use of anticoagulants [Z79.01] [Z79.01]  Atrial fibrillation and flutter (H) [I48.91  I48.92]         Anticoagulation Episode Summary     INR check location     Preferred lab     Send INR reminders to [facility]    Comments likes printed AVS      Anticoagulation Care Providers     Provider Role Specialty Phone number    Yunior Huang MD [facility] Internal Medicine [phone]            See the Encounter Report to view Anticoagulation Flowsheet and Dosing Calendar (Go to Encounters tab in chart review, and find the Anticoagulation Therapy Visit)    Dosage adjustment made based on physician directed care plan.    Sonam Teixeira, RN

## 2018-09-12 NOTE — MR AVS SNAPSHOT
Zunilda Alicea May   9/12/2018 2:45 PM   Anticoagulation Therapy Visit    Description:  78 year old female   Provider[facility]  [facility]   Department:  [facility]           INR as of 9/12/2018     Today's INR 1.7!      Anticoagulation Summary as of 9/12/2018     INR goal 2.0-3.0   Today's INR 1.7!   Full warfarin instructions 9/12: 7.5 mg; 9/13: 7.5 mg; Otherwise 5 mg on Mon, Wed, Sat; 2.5 mg all other days   Next INR check 9/14/2018    Indications   Long-term (current) use of anticoagulants [Z79.01] [Z79.01]  Atrial fibrillation and flutter (H) [I48.91  I48.92]         Your next Anticoagulation Clinic appointment(s)     Sep 14, 2018  3:15 PM CDT   [facility]    [address] [facility] [address]  [facility] [id number] [phone]              Contact Numbers     [facility] Phone Numbers:  Anticoagulation Clinic Appointments : [phone]  Anticoagulation Nurse: [phone]         September 2018 Details    Sun Mon Tue Wed Thu Fri Sat           1                 2               3               4               5               6               7               8                 9               10               11               12      7.5 mg   See details      13      7.5 mg   See details      14            15                 16               17               18               19               20               21               22                 23               24               25               26               27               28               29                 30                      Date Details   09/12 This INR check   Take 7.5 mg (2.5 mg tablets x 3)   Lovenox in pm      09/13 Take 7.5 mg (2.5 mg tablets x 3)   Lovenox in AM and PM       Date of next INR:  9/14/2018         How to take your warfarin dose     To take:  2.5 mg Take 1 of the 2.5 mg tablets.    To take:  7.5 mg Take 3 of the  2.5 mg tablets.

## 2018-09-14 ENCOUNTER — ANTICOAGULATION THERAPY VISIT (OUTPATIENT)
Dept: ANTICOAGULATION | Facility: CLINIC | Age: 78
End: 2018-09-14
Payer: MEDICARE

## 2018-09-14 DIAGNOSIS — Z79.01 LONG-TERM (CURRENT) USE OF ANTICOAGULANTS: ICD-10-CM

## 2018-09-14 DIAGNOSIS — I48.92 ATRIAL FIBRILLATION AND FLUTTER (H): ICD-10-CM

## 2018-09-14 DIAGNOSIS — I48.91 ATRIAL FIBRILLATION AND FLUTTER (H): ICD-10-CM

## 2018-09-14 LAB — INR POINT OF CARE: 2.5 (ref 0.86–1.14)

## 2018-09-14 PROCEDURE — 36416 COLLJ CAPILLARY BLOOD SPEC: CPT

## 2018-09-14 PROCEDURE — 85610 PROTHROMBIN TIME: CPT | Mod: QW

## 2018-09-14 PROCEDURE — 99207 ZZC NO CHARGE NURSE ONLY: CPT

## 2018-09-14 NOTE — PROGRESS NOTES
ANTICOAGULATION FOLLOW-UP CLINIC VISIT    Patient Name:  Zunilda Alicea May  Date:  9/14/2018  Contact Type:  Face to Face    SUBJECTIVE:     Patient Findings     Positives No Problem Findings (Pt stopped her Lovenox. Surgery was cancelled due to Shingles. Rescheduled for November. )           OBJECTIVE    INR Protime   Date Value Ref Range Status   09/14/2018 2.5 (A) 0.86 - 1.14 Final       ASSESSMENT / PLAN  INR assessment THER    Recheck INR In: 1 WEEK    INR Location Clinic      Anticoagulation Summary as of 9/14/2018     INR goal 2.0-3.0   Today's INR 2.5   Warfarin maintenance plan 5 mg (2.5 mg x 2) on Mon, Wed, Sat; 2.5 mg (2.5 mg x 1) all other days   Full warfarin instructions 5 mg on Mon, Wed, Sat; 2.5 mg all other days   Weekly warfarin total 25 mg   Plan last modified Екатерина Mahan RN (8/14/2017)   Next INR check 9/21/2018   Priority INR   Target end date     Indications   Long-term (current) use of anticoagulants [Z79.01] [Z79.01]  Atrial fibrillation and flutter (H) [I48.91  I48.92]         Anticoagulation Episode Summary     INR check location     Preferred lab     Send INR reminders to [facility]    Comments likes printed AVS      Anticoagulation Care Providers     Provider Role Specialty Phone number    Yunior Huang MD [facility] Internal Medicine [phone]            See the Encounter Report to view Anticoagulation Flowsheet and Dosing Calendar (Go to Encounters tab in chart review, and find the Anticoagulation Therapy Visit)    Dosage adjustment made based on physician directed care plan.    Simona Shrestha RN

## 2018-09-14 NOTE — MR AVS SNAPSHOT
Zunilda Alicea May   9/14/2018 3:15 PM   Anticoagulation Therapy Visit    Description:  78 year old female   Provider[facility]  [facility]   Department:  [facility]           INR as of 9/14/2018     Today's INR 2.5      Anticoagulation Summary as of 9/14/2018     INR goal 2.0-3.0   Today's INR 2.5   Full warfarin instructions 5 mg on Mon, Wed, Sat; 2.5 mg all other days   Next INR check 9/21/2018    Indications   Long-term (current) use of anticoagulants [Z79.01] [Z79.01]  Atrial fibrillation and flutter (H) [I48.91  I48.92]         Your next Anticoagulation Clinic appointment(s)     Sep 21, 2018  2:45 PM CDT   [facility] with [facility]   [facility]    [address] [facility] [address]  [facility] [id number] [phone]              Contact Numbers     [facility] Phone Numbers:  Anticoagulation Clinic Appointments : [phone]  Anticoagulation Nurse: [phone]         September 2018 Details    Sun Mon Tue Wed Thu Fri Sat           1                 2               3               4               5               6               7               8                 9               10               11               12               13               14      2.5 mg   See details      15      5 mg           16      2.5 mg         17      5 mg         18      2.5 mg         19      5 mg         20      2.5 mg         21            22                 23               24               25               26               27               28               29                 30                      Date Details   09/14 This INR check       Date of next INR:  9/21/2018         How to take your warfarin dose     To take:  2.5 mg Take 1 of the 2.5 mg tablets.    To take:  5 mg Take 2 of the 2.5 mg tablets.

## 2018-09-21 ENCOUNTER — ANTICOAGULATION THERAPY VISIT (OUTPATIENT)
Dept: ANTICOAGULATION | Facility: CLINIC | Age: 78
End: 2018-09-21
Payer: MEDICARE

## 2018-09-21 DIAGNOSIS — I48.92 ATRIAL FIBRILLATION AND FLUTTER (H): ICD-10-CM

## 2018-09-21 DIAGNOSIS — Z79.01 LONG-TERM (CURRENT) USE OF ANTICOAGULANTS: ICD-10-CM

## 2018-09-21 DIAGNOSIS — I48.91 ATRIAL FIBRILLATION AND FLUTTER (H): ICD-10-CM

## 2018-09-21 LAB — INR POINT OF CARE: 2.2 (ref 0.86–1.14)

## 2018-09-21 PROCEDURE — 99207 ZZC NO CHARGE NURSE ONLY: CPT

## 2018-09-21 PROCEDURE — 85610 PROTHROMBIN TIME: CPT | Mod: QW

## 2018-09-21 PROCEDURE — 36416 COLLJ CAPILLARY BLOOD SPEC: CPT

## 2018-09-21 NOTE — MR AVS SNAPSHOT
Zunilda Alicea May   9/21/2018 2:45 PM   Anticoagulation Therapy Visit    Description:  78 year old female   Provider[facility]   Department:  [facility]           INR as of 9/21/2018     Today's INR 2.2      Anticoagulation Summary as of 9/21/2018     INR goal 2.0-3.0   Today's INR 2.2   Full warfarin instructions 5 mg on Mon, Wed, Sat; 2.5 mg all other days   Next INR check 11/5/2018    Indications   Long-term (current) use of anticoagulants [Z79.01] [Z79.01]  Atrial fibrillation and flutter (H) [I48.91  I48.92]         Your next Anticoagulation Clinic appointment(s)     Nov 05, 2018 10:00 AM CST   [facility]ticoagulation Visit with [facility]   [facility]    [address] [facility] [address]  [facility] [id number] [phone]              Contact Numbers     [facility] Phone Numbers:  Anticoagulation Clinic Appointments : [phone]  Anticoagulation Nurse: [phone]         September 2018 Details    Sun Mon Tue Wed Thu Fri Sat           1                 2               3               4               5               6               7               8                 9               10               11               12               13               14               15                 16               17               18               19               20               21      2.5 mg   See details      22      5 mg           23      2.5 mg         24      5 mg         25      2.5 mg         26      5 mg         27      2.5 mg         28      2.5 mg         29      5 mg           30      2.5 mg                Date Details   09/21 This INR check               How to take your warfarin dose     To take:  2.5 mg Take 1 of the 2.5 mg tablets.    To take:  5 mg Take 2 of the 2.5 mg tablets.           October 2018 Details    Sun Mon Tue Wed Thu Fri Sat      1      5 mg         2      2.5 mg         3      5 mg         4      2.5  mg         5      2.5 mg         6      5 mg           7      2.5 mg         8      5 mg         9      2.5 mg         10      5 mg         11      2.5 mg         12      2.5 mg         13      5 mg           14      2.5 mg         15      5 mg         16      2.5 mg         17      5 mg         18      2.5 mg         19      2.5 mg         20      5 mg           21      2.5 mg         22      5 mg         23      2.5 mg         24      5 mg         25      2.5 mg         26      2.5 mg         27      5 mg           28      2.5 mg         29      5 mg         30      2.5 mg         31      5 mg             Date Details   No additional details            How to take your warfarin dose     To take:  2.5 mg Take 1 of the 2.5 mg tablets.    To take:  5 mg Take 2 of the 2.5 mg tablets.           November 2018 Details    Sun Mon Tue Wed Thu Fri Sat         1      2.5 mg         2      2.5 mg         3      5 mg           4      2.5 mg         5            6               7               8               9               10                 11               12               13               14               15               16               17                 18               19               20               21               22               23               24                 25               26               27               28               29               30                 Date Details   No additional details    Date of next INR:  11/5/2018         How to take your warfarin dose     To take:  2.5 mg Take 1 of the 2.5 mg tablets.    To take:  5 mg Take 2 of the 2.5 mg tablets.

## 2018-09-21 NOTE — PROGRESS NOTES
ANTICOAGULATION FOLLOW-UP CLINIC VISIT    Patient Name:  Zunilda Alicea May  Date:  9/21/2018  Contact Type:  Face to Face    SUBJECTIVE:     Patient Findings     Positives Antibiotic use or infection (recently had shingles.  This has cleared up.)    Comments Surgery rescheduled for November 12th.           OBJECTIVE    INR Protime   Date Value Ref Range Status   09/21/2018 2.2 (A) 0.86 - 1.14 Final       ASSESSMENT / PLAN  INR assessment THER    Recheck INR In: 6 WEEKS    INR Location Clinic      Anticoagulation Summary as of 9/21/2018     INR goal 2.0-3.0   Today's INR 2.2   Warfarin maintenance plan 5 mg (2.5 mg x 2) on Mon, Wed, Sat; 2.5 mg (2.5 mg x 1) all other days   Full warfarin instructions 5 mg on Mon, Wed, Sat; 2.5 mg all other days   Weekly warfarin total 25 mg   No change documented Sonam Teixeira RN   Plan last modified Екатерина Mahan RN (8/14/2017)   Next INR check 11/5/2018   Priority INR   Target end date     Indications   Long-term (current) use of anticoagulants [Z79.01] [Z79.01]  Atrial fibrillation and flutter (H) [I48.91  I48.92]         Anticoagulation Episode Summary     INR check location     Preferred lab     Send INR reminders to [facility]    Comments likes printed AVS      Anticoagulation Care Providers     Provider Role Specialty Phone number    Yunior Huang MD [facility] Internal Medicine [phone]            See the Encounter Report to view Anticoagulation Flowsheet and Dosing Calendar (Go to Encounters tab in chart review, and find the Anticoagulation Therapy Visit)    Dosage adjustment made based on physician directed care plan.    Sonam Teixeira, RN

## 2018-10-01 ENCOUNTER — TELEPHONE (OUTPATIENT)
Dept: CARDIOLOGY | Facility: CLINIC | Age: 78
End: 2018-10-01

## 2018-10-01 ENCOUNTER — CARE COORDINATION (OUTPATIENT)
Dept: CARDIOLOGY | Facility: CLINIC | Age: 78
End: 2018-10-01

## 2018-10-01 DIAGNOSIS — I48.92 ATRIAL FIBRILLATION AND FLUTTER (H): ICD-10-CM

## 2018-10-01 DIAGNOSIS — I48.91 ATRIAL FIBRILLATION AND FLUTTER (H): ICD-10-CM

## 2018-10-01 NOTE — TELEPHONE ENCOUNTER
"Requested Prescriptions   Pending Prescriptions Disp Refills     JANTOVEN 2.5 MG tablet [Pharmacy Med Name: Jantoven Oral Tablet 2.5 MG]  Last Written Prescription Date:  historical  Last Fill Quantity:   # refills:    Last office visit: 9/5/2018 with prescribing provider:     Future Office Visit:   Next 5 appointments (look out 90 days)     Nov 05, 2018 10:20 AM CST   Pre-Op physical with Yunior Huang MD   [facility] ([facility])    [address]  [facility] [id number] [phone]                120 tablet 0     Sig: TAKE 1 TABLET ON SUN, TUES, THUR, FRI & THEN 2 TABLETS ON MON, WED, SAT. OR AS INSTRUCTED BY INR CLINIC.    Vitamin K Antagonists Failed    10/1/2018 11:37 AM       Failed - INR is within goal in the past 6 weeks    Confirm INR is within goal in the past 6 weeks.     Recent Labs   Lab Test 09/21/18   INR  2.2*                      Passed - Recent (12 mo) or future (30 days) visit within the authorizing provider's specialty    Patient had office visit in the last 12 months or has a visit in the next 30 days with authorizing provider or within the authorizing provider's specialty.  See \"Patient Info\" tab in inbasket, or \"Choose Columns\" in Meds & Orders section of the refill encounter.           Passed - Patient is 18 years of age or older       Passed - Patient is not pregnant       Passed - No positive pregnancy on file in past 12 months        "

## 2018-10-01 NOTE — PROGRESS NOTES
Call from patient stating her surgery was delayed due to shingles and rescheduled to 11/12/2018 - she will be seeing her Primary MD for preop and will call if they would like patient seen again by cardiology preoperatively; was cleared for surgery by Dr Monroy on 8/16/2018 with instructed in office note including bridging with lovenox. Messaged to DR Monroy and to device nurses to arrange medtronic to be present for surgery. Transferred to scheduling to arrange visit due with DR Monroy in December.  Maranda Fan RN 10/01/18 1:38 PM    No need to see me again.  Cleared for surgery.  She will require perioperative heparin bridging and device management, as detailed in my note.   Thank you.   Dr. Monroy     Called to patient with Dr Monroy comments/recommendations - she states understanding and agreement.  Maranda Fan RN 10/01/18 4:50 PM

## 2018-10-02 RX ORDER — WARFARIN SODIUM 2.5 MG/1
TABLET ORAL
Qty: 120 TABLET | Refills: 0 | Status: SHIPPED | OUTPATIENT
Start: 2018-10-02 | End: 2018-10-25

## 2018-10-02 NOTE — TELEPHONE ENCOUNTER
Warfarin dosing is managed by INR Clinic.  Prescription approved per [facility] Refill Protocol.  Sonam Teixeira RN

## 2018-10-16 ENCOUNTER — TRANSFERRED RECORDS (OUTPATIENT)
Dept: HEALTH INFORMATION MANAGEMENT | Facility: CLINIC | Age: 78
End: 2018-10-16

## 2018-10-18 ENCOUNTER — TELEPHONE (OUTPATIENT)
Dept: INTERNAL MEDICINE | Facility: CLINIC | Age: 78
End: 2018-10-18

## 2018-10-18 NOTE — TELEPHONE ENCOUNTER
Patient calling.  Having a L total knee replacement 11-12-18 and coming in for a preop appointment 11-5-18.    She spoke with an ortho nurse 10-17-18 who was concerned her hgb was on the low end of normal.  Does patient need to build up her hgb prior to surgery?    Patient states she is not currently taking an iron tab.    Please advise, thanks.

## 2018-10-22 NOTE — TELEPHONE ENCOUNTER
Call to pt and advised. She states she started taking a few days of iron pills already. She will stop a few days before her appt just before her lab work.

## 2018-10-25 ENCOUNTER — TELEPHONE (OUTPATIENT)
Dept: INTERNAL MEDICINE | Facility: CLINIC | Age: 78
End: 2018-10-25

## 2018-10-25 RX ORDER — FERROUS SULFATE 325(65) MG
325 TABLET ORAL EVERY OTHER DAY
COMMUNITY
End: 2018-11-28

## 2018-10-25 NOTE — TELEPHONE ENCOUNTER
Yarelis from [facility] preadmitting calling.  Patient will be seeing primary care provider for preop physical 11/5/18.  Yarelis has entered order for MRSA/MSSA nasal swab to be done by clinic staff , please make sure this gets done at preop appt.  MARINA Fountain R.N.

## 2018-10-31 ENCOUNTER — ALLIED HEALTH/NURSE VISIT (OUTPATIENT)
Dept: CARDIOLOGY | Facility: CLINIC | Age: 78
End: 2018-10-31
Payer: MEDICARE

## 2018-10-31 DIAGNOSIS — Z95.0 CARDIAC PACEMAKER IN SITU: Primary | ICD-10-CM

## 2018-10-31 PROCEDURE — 93280 PM DEVICE PROGR EVAL DUAL: CPT | Performed by: INTERNAL MEDICINE

## 2018-10-31 NOTE — PROGRESS NOTES
Medtronic Revo MRI Pacemaker Device Check  AP: 41 % : 28 %  Mode: DDDR        Underlying Rhythm: SR  Heart Rate: Adequate variation per histogram   Sensing: stable    Pacing Threshold: stable   Impedance: stable  Battery Status: OK  Device Site: well healed  Atrial Arrhythmia: 34 mode switches for PAT/PAF. Longest was 27 hours. . Total time in PAF was 3.7%. She is on warfarin  Ventricular Arrhythmia: none  Setting Change: none    Care Plan: f/u 3 months remote PPM check

## 2018-10-31 NOTE — MR AVS SNAPSHOT
After Visit Summary   10/31/2018    Zunilda Clark    MRN: [id number]           Patient Information     Date Of Birth          1940        Visit Information        Provider Department      10/31/2018 1:40 PM RU DCR2 [facility]        Today's Diagnoses     Cardiac pacemaker in situ    -  1       Follow-ups after your visit        Your next 10 appointments already scheduled     Nov 05, 2018 10:00 AM CST   Anticoagulation Visit with [facility]   [facility]    [address]  [facility] [address] [phone]            Nov 05, 2018 10:20 AM CST   Pre-Op physical with Yunior Huang MD   [facility]    [address]  [facility] [id number] [phone]            Nov 12, 2018   Procedure with Pardeep Sheikh MD   [facility] (--)    [address]  [facility] [address]   [phone]            Feb 07, 2019  3:45 PM CST   Remote PPM Check with KRAMER TECH1   [facility] ([facility])    [address]  [facility] [id number] [phone] OPT 2           This appointment is for a remote check of your pacemaker.  This is not an appointment at the office.              Who to contact     If you have questions or need follow up information about today's clinic visit or your schedule please contact [facility] directly at [phone].  Normal or non-critical lab and imaging results will be communicated to you by MyChart, letter or phone within 4 business days after the clinic has received the results. If you do not hear from us within 7 days, please contact the clinic through MyChart or phone. If you have a critical or abnormal lab result, we will notify you by phone as soon as  possible.  Submit refill requests through [employer] or call your pharmacy and they will forward the refill request to us. Please allow 3 business days for your refill to be completed.          Additional Information About Your Visit        [employer]hart Information     [employer] gives you secure access to your electronic health record. If you see a primary care provider, you can also send messages to your care team and make appointments. If you have questions, please call your primary care clinic.  If you do not have a primary care provider, please call [phone] and they will assist you.        Care EveryWhere ID     This is your Care EveryWhere ID. This could be used by other organizations to access your [facility] medical records  [id number]         Blood Pressure from Last 3 Encounters:   09/05/18 148/76   09/05/18 148/84   08/17/18 122/50    Weight from Last 3 Encounters:   10/25/18 69.9 kg (154 lb)   09/05/18 70.8 kg (156 lb)   09/05/18 71.2 kg (157 lb)              We Performed the Following     PM DEVICE PROGRAMMING EVAL, DUAL LEAD PACER ([id number])        Primary Care Provider Office Phone # Fax #    Yunior Huang [phone]       [address] [facility] [address]        Equal Access to Services     SAM UGALDE : Hadii aad ku hadasho Soomaali, waaxda luqadaha, qaybta kaalmada adeegyada, waxay idiin hayanthonyn avel xiao . So [facility] [phone].    ATENCIÓN: Si habla español, tiene a morales disposición servicios gratuitos de asistencia lingüística. Llame al [phone].    We comply with applicable federal civil rights laws and [place] laws. We do not discriminate on the basis of race, color, national origin, age, disability, sex, sexual orientation, or gender identity.            Thank you!     Thank you for choosing [facility]  for your care. Our goal is always to provide you with excellent care. Hearing back from our patients is one way  we can continue to improve our services. Please take a few minutes to complete the written survey that you may receive in the mail after your visit with us. Thank you!             Your Updated Medication List - Protect others around you: Learn how to safely use, store and throw away your medicines at www.disposemymeds.org.          This list is accurate as of 10/31/18  2:07 PM.  Always use your most recent med list.                   Brand Name Dispense Instructions for use Diagnosis    ACETAMINOPHEN PO      Take 650 mg by mouth every 6 hours as needed        ALLOPURINOL PO      Take 100 mg by mouth 2 times daily        Amlodipine Besylate-Valsartan 5-160 MG Tabs     90 tablet    Take 1 tablet by mouth daily    Benign essential hypertension       BONIVA 3 MG/3ML   Generic drug:  ibandronate      Inject 3 mg into the vein every 3 months        calcium citrate 950 MG tablet    CALCITRATE     Take by mouth daily 1200 mg daily        enoxaparin 60 MG/0.6ML injection    LOVENOX    12 Syringe    Inject 0.6 mLs (60 mg) Subcutaneous 2 times daily    Atrial fibrillation and flutter (H)       ferrous sulfate 325 (65 Fe) MG tablet    IRON     Take 325 mg by mouth every other day        fish oil-omega-3 fatty acids 1000 MG capsule      Take 1 g by mouth daily Pt takes as remembers        folic acid 1 MG tablet    FOLVITE     Take 1 mg by mouth daily        levothyroxine 112 MCG tablet    SYNTHROID/LEVOTHROID    90 tablet    TAKE 1 TABLET (112 MCG) BY MOUTH DAILY    Acquired hypothyroidism       METHOTREXATE SODIUM IJ      Inject 0.8 mLs as directed once a week Thursdays        metoprolol tartrate 50 MG tablet    LOPRESSOR    200 tablet    Take 1 tablet (50 mg) by mouth 2 times daily    Chronic atrial fibrillation (H)       Multi-vitamin Tabs tablet      Take 1 tablet by mouth daily Without iron        omeprazole 20 MG CR capsule    priLOSEC    180 capsule    Take 1 capsule (20 mg) by mouth 2 times daily    Esophageal reflux        torsemide 10 MG tablet    DEMADEX    30 tablet    Take 1 tablet (10 mg) by mouth daily (with breakfast)    Pulmonary HTN (H)       VITAMIN D (CHOLECALCIFEROL) PO      Take 1,000 Units by mouth daily        * WARFARIN SODIUM PO      Take 5 mg by mouth MON, WED and SAT        * WARFARIN SODIUM PO      Take 2.5 mg by mouth SUN, TUES,THURS, FRI        * Notice:  This list has 2 medication(s) that are the same as other medications prescribed for you. Read the directions carefully, and ask your doctor or other care provider to review them with you.

## 2018-11-05 ENCOUNTER — ANTICOAGULATION THERAPY VISIT (OUTPATIENT)
Dept: ANTICOAGULATION | Facility: CLINIC | Age: 78
End: 2018-11-05
Payer: MEDICARE

## 2018-11-05 ENCOUNTER — OFFICE VISIT (OUTPATIENT)
Dept: INTERNAL MEDICINE | Facility: CLINIC | Age: 78
End: 2018-11-05
Payer: MEDICARE

## 2018-11-05 VITALS
RESPIRATION RATE: 17 BRPM | OXYGEN SATURATION: 94 % | DIASTOLIC BLOOD PRESSURE: 80 MMHG | SYSTOLIC BLOOD PRESSURE: 130 MMHG | TEMPERATURE: 98 F | HEIGHT: 64 IN | BODY MASS INDEX: 26.82 KG/M2 | WEIGHT: 157.1 LBS | HEART RATE: 75 BPM

## 2018-11-05 DIAGNOSIS — I48.91 ATRIAL FIBRILLATION AND FLUTTER (H): ICD-10-CM

## 2018-11-05 DIAGNOSIS — I48.92 ATRIAL FIBRILLATION AND FLUTTER (H): ICD-10-CM

## 2018-11-05 DIAGNOSIS — M17.0 PRIMARY OSTEOARTHRITIS OF BOTH KNEES: ICD-10-CM

## 2018-11-05 DIAGNOSIS — E03.9 ACQUIRED HYPOTHYROIDISM: ICD-10-CM

## 2018-11-05 DIAGNOSIS — Z01.818 PREOP GENERAL PHYSICAL EXAM: Primary | ICD-10-CM

## 2018-11-05 DIAGNOSIS — I10 BENIGN ESSENTIAL HYPERTENSION: ICD-10-CM

## 2018-11-05 DIAGNOSIS — M06.9 RHEUMATOID ARTHRITIS, INVOLVING UNSPECIFIED SITE, UNSPECIFIED RHEUMATOID FACTOR PRESENCE: ICD-10-CM

## 2018-11-05 LAB
HGB BLD-MCNC: 12.3 G/DL (ref 11.7–15.7)
INR POINT OF CARE: 2.5 (ref 0.86–1.14)
MRSA DNA SPEC QL NAA+PROBE: NEGATIVE
SPECIMEN SOURCE: NORMAL

## 2018-11-05 PROCEDURE — 99207 ZZC NO CHARGE NURSE ONLY: CPT

## 2018-11-05 PROCEDURE — 85018 HEMOGLOBIN: CPT | Performed by: INTERNAL MEDICINE

## 2018-11-05 PROCEDURE — 87640 STAPH A DNA AMP PROBE: CPT | Performed by: INTERNAL MEDICINE

## 2018-11-05 PROCEDURE — 36416 COLLJ CAPILLARY BLOOD SPEC: CPT

## 2018-11-05 PROCEDURE — 99215 OFFICE O/P EST HI 40 MIN: CPT | Performed by: INTERNAL MEDICINE

## 2018-11-05 PROCEDURE — 85610 PROTHROMBIN TIME: CPT | Mod: QW

## 2018-11-05 PROCEDURE — 80048 BASIC METABOLIC PNL TOTAL CA: CPT | Performed by: INTERNAL MEDICINE

## 2018-11-05 PROCEDURE — 87641 MR-STAPH DNA AMP PROBE: CPT | Performed by: INTERNAL MEDICINE

## 2018-11-05 RX ORDER — LEVOTHYROXINE SODIUM 112 UG/1
TABLET ORAL
Qty: 90 TABLET | Refills: 1 | Status: SHIPPED | OUTPATIENT
Start: 2018-11-05 | End: 2019-04-22

## 2018-11-05 NOTE — PATIENT INSTRUCTIONS
Before Your Surgery      Call your surgeon if there is any change in your health. This includes signs of a cold or flu (such as a sore throat, runny nose, cough, rash or fever).    Do not smoke, drink alcohol or take over the counter medicine (unless your surgeon or primary care doctor tells you to) for the 24 hours before and after surgery.    If you take prescribed drugs: Follow your doctor s orders about which medicines to take and which to stop until after surgery.    Eating and drinking prior to surgery: follow the instructions from your surgeon    Take a shower or bath the night before surgery. Use the soap your surgeon gave you to gently clean your skin. If you do not have soap from your surgeon, use your regular soap. Do not shave or scrub the surgery site.  Wear clean pajamas and have clean sheets on your bed.     Stop vitamins, fish oil for one week   Hold the Demadex on the day of surgery

## 2018-11-05 NOTE — PROGRESS NOTES
ANTICOAGULATION FOLLOW-UP CLINIC VISIT    Patient Name:  Zunilda Alicea May  Date:  11/5/2018  Contact Type:  Face to Face    SUBJECTIVE:     Patient Findings     Positives No Problem Findings    Comments Pt denies any changes or missed warfarin doses since last INR visit. Pt denies any bleeding or bruising problems. Pt reports will have left total knee 11/12/18. Has pre-op today, will discuss Lovenox injections. Pt will be in the hospital for 3 days, undecided about HC services. Pt reports will take the Lovenox at 7 AM and 7 PM           OBJECTIVE    INR Protime   Date Value Ref Range Status   11/05/2018 2.5 (A) 0.86 - 1.14 Final       ASSESSMENT / PLAN  INR assessment THER    Recheck INR In: 2 WEEKS    INR Location Clinic      Anticoagulation Summary as of 11/5/2018     INR goal 2.0-3.0   Today's INR 2.5   Warfarin maintenance plan 5 mg (2.5 mg x 2) on Mon, Wed, Sat; 2.5 mg (2.5 mg x 1) all other days   Full warfarin instructions 11/7: Hold; 11/8: Hold; 11/9: Hold; 11/10: Hold; 11/11: Hold; Otherwise 5 mg on Mon, Wed, Sat; 2.5 mg all other days   Weekly warfarin total 25 mg   Plan last modified Екатерина Mahan RN (8/14/2017)   Next INR check 11/21/2018   Priority INR   Target end date     Indications   Long-term (current) use of anticoagulants [Z79.01] [Z79.01]  Atrial fibrillation and flutter (H) [I48.91  I48.92]         Anticoagulation Episode Summary     INR check location     Preferred lab     Send INR reminders to [facility]    Comments likes printed AVS      Anticoagulation Care Providers     Provider Role Specialty Phone number    Yunior Huang MD Responsible Internal Medicine [phone]            See the Encounter Report to view Anticoagulation Flowsheet and Dosing Calendar (Go to Encounters tab in chart review, and find the Anticoagulation Therapy Visit)    Dosage adjustment made based on physician directed care plan.    Екатерина Mahan, RN

## 2018-11-05 NOTE — PROGRESS NOTES
[place] [address]  [facility] [address]  [phone]  Dept: [phone]    PRE-OP EVALUATION:  Today's date: 2018    Zunilda Clark (: 1940) presents for pre-operative evaluation assessment as requested by Dr. Jurgen Sheikh.  She requires evaluation and anesthesia risk assessment prior to undergoing surgery/procedure for treatment of left Knee replacement  .    Fax number for surgical facility: [facility]  Primary Physician: Yunior Huang  Type of Anesthesia Anticipated: General    Patient has a Health Care Directive or Living Will:  YES scanned into chart     Preop Questions 2018   Who is doing your surgery? Dr Jurgen Sheikh   What are you having done? left knee replacement   Date of Surgery/Procedure:    Facility or Hospital where procedure/surgery will be performed: [facility]   1.  Do you have a history of Heart attack, stroke, stent, coronary bypass surgery, or other heart surgery? YES  -    2.  Do you ever have any pain or discomfort in your chest? No   3.  Do you have a history of  Heart Failure? YES -    4.   Are you troubled by shortness of breath when:  walking on a level surface, or up a slight hill, or at night? No   5.  Do you currently have a cold, bronchitis or other respiratory infection? No   6.  Do you have a cough, shortness of breath, or wheezing? No   7.  Do you sometimes get pains in the calves of your legs when you walk? YES -    8. Do you or anyone in your family have previous history of blood clots? YES -    9.  Do you or does anyone in your family have a serious bleeding problem such as prolonged bleeding following surgeries or cuts? No   10. Have you ever had problems with anemia or been told to take iron pills? YES -    11. Have you had any abnormal blood loss such as black, tarry or bloody stools, or abnormal vaginal bleeding? No   12. Have you ever had a blood transfusion? UNKNOWN -    13. Have you or  any of your relatives ever had problems with anesthesia? YES -    14. Do you have sleep apnea, excessive snoring or daytime drowsiness? No   15. Do you have any prosthetic heart valves? YES -    16. Do you have prosthetic joints? No   17. Is there any chance that you may be pregnant? No         HPI:     HPI related to upcoming procedure: scheduled for knee arthroplasty for symptomatic knee osteoarthritis and h/o RA.   No acute complaints. Had recent shingles , treated and symptoms resolved.   Has h/o HTN. on medical treatment. BP has been controlled. No side effects from medications. No CP, HA, dizziness. good compliance with medications and low salt diet.  Has history of atrial fibrillation. On anticoagulation with Coumadin and rate control medications. Asymptonatic - no chest pains , palpitations,  no side effects from medications.   Has history of hypothyroidism. On replacement treatment with Synthroid. No heat /cold intolerance, heart palpitations, weight loss/ gain ,  change in bowel habits.        See problem list for active medical problems.  Problems all longstanding and stable, except as noted/documented.  See ROS for pertinent symptoms related to these conditions.                                                                                                                                                          .    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 09/29/2017     Priority: Medium     Valvular heart disease 09/29/2017     Priority: Medium     Paroxysmal atrial fibrillation (H) 09/29/2017     Priority: Medium     S/P mitral valve replacement with bioprosthetic valve 09/29/2017     Priority: Medium     Pulmonary hypertension (H) 09/29/2017     Priority: Medium     Acute cholecystitis 04/28/2017     Priority: Medium     Long-term (current) use of anticoagulants [Z79.01] 04/25/2016     Priority: Medium      Essential hypertension 11/10/2015     Priority: Medium     Acquired hypothyroidism 11/04/2015     Priority: Medium     Anticoagulation management encounter 07/30/2015     Priority: Medium     Advance Care Planning 07/29/2015     Priority: Medium     Advance Care Planning 7/29/2015: Receipt of ACP document:  Received: Health Care Directive which was witnessed or notarized on 9/5/13.  Document not previously scanned.  Validation form completed and sent with document to be scanned.  Code Status reflects choices in most recent ACP document.  Confirmed/documented designated decision maker(s): Norberto Clark is primary health care agent, and Kyara Mayorga and Tenisha Moeller are alternate agents.  Added by Jose Castañeda           Fracture, humerus closed, shaft 07/28/2015     Priority: Medium     Humerus fracture 07/22/2015     Priority: Medium     Cardiac pacemaker in situ 06/05/2015     Priority: Medium     Mitral valve disorder 06/01/2015     Priority: Medium     mitral valve replacement in 2008 following attempted balloon valvuloplasty in 2006 for mitral stenosis related to presumed rheumatic valvular heart disease        Pulmonary HTN (H)      Priority: Medium     Atrial fibrillation and flutter (H)      Priority: Medium     Ablation and PPM 2011       RA (rheumatoid arthritis) (H) 05/05/2015     Priority: Medium     Benign essential hypertension 05/05/2015     Priority: Medium     CHF (congestive heart failure) (H) 04/26/2014     Priority: Medium      Past Medical History:   Diagnosis Date     Acquired hypothyroidism 11/4/2015     Aortic valve insufficiency      Arrhythmia     A fib     Arthritis      Atrial fibrillation (H)      Atrial fibrillation and flutter (H)      Blood clotting disorder (H)      CHF (congestive heart failure) (H)      DVT (deep venous thrombosis) (H) 2003     Gastro-oesophageal reflux disease      H/O mitral valve replacement with tissue graft june 2014     History of blood transfusion 2014      HTN (hypertension)      Hypothyroidism      Other chronic pain      PONV (postoperative nausea and vomiting)      Pulmonary HTN (H)      Rheumatoid arthritis (H)      Rheumatoid arthritis(714.0)      Seizures (H) 2014     Shingles 08/2018     Stroke (H) 2009    left side mild weakness      Stroke (H) 2014     [place]ope 2011    [place]     Thrombosis of leg 2003    both legs     Past Surgical History:   Procedure Laterality Date     ABDOMEN SURGERY      prolapsed bladder     COLONOSCOPY  3/15/2012     EYE SURGERY  2018    Both eyes/cataract surgery     H ABLATION AV NODE  2011    AFlutter with syncope, PPM to follow     HERNIA REPAIR      3 hernies/right and left & umbilical     IMPLANT PACEMAKER  2011     LAPAROSCOPIC CHOLECYSTECTOMY WITH CHOLANGIOGRAMS N/A 4/29/2017    Procedure: LAPAROSCOPIC CHOLECYSTECTOMY WITH CHOLANGIOGRAMS;  LAPAROSCOPIC CHOLECYSTECTOMY WITH CHOLANGIOGRAMS ;  Surgeon: Angeles Mcgill MD;  Location: [facility] OR     OPEN REDUCTION INTERNAL FIXATION RODDING INTRAMEDULLARY HUMERUS Right 7/28/2015    Procedure: OPEN REDUCTION INTERNAL FIXATION RODDING INTRAMEDULLARY HUMERUS;  Surgeon: Raymundo Rivera MD;  Location: [facility] OR     REPLACE VALVE MITRAL  2006    Mitral valve replacement with bioprosthetic valve in 2008 for rheumatic disease     SLING BLADDER SUSPENSION WITH FASCIA UMESH       VASCULAR SURGERY  2003    Blood clots in both legs     Current Outpatient Prescriptions   Medication Sig Dispense Refill     ACETAMINOPHEN PO Take 650 mg by mouth every 6 hours as needed        ALLOPURINOL PO Take 100 mg by mouth 2 times daily       Amlodipine Besylate-Valsartan 5-160 MG TABS Take 1 tablet by mouth daily 90 tablet 3     calcium citrate (CALCITRATE) 950 MG tablet Take by mouth daily 1200 mg daily       ferrous sulfate (IRON) 325 (65 Fe) MG tablet Take 325 mg by mouth every other day       fish oil-omega-3 fatty acids 1000 MG capsule Take 1 g by mouth daily Pt takes as remembers       folic acid  (FOLVITE) 1 MG tablet Take 1 mg by mouth daily       ibandronate (BONIVA) 3 MG/3ML Inject 3 mg into the vein every 3 months        levothyroxine (SYNTHROID/LEVOTHROID) 112 MCG tablet TAKE 1 TABLET (112 MCG) BY MOUTH DAILY 90 tablet 0     METHOTREXATE SODIUM IJ Inject 0.8 mLs as directed once a week Thursdays       metoprolol tartrate (LOPRESSOR) 50 MG tablet Take 1 tablet (50 mg) by mouth 2 times daily 200 tablet 3     multivitamin, therapeutic with minerals (MULTI-VITAMIN) TABS tablet Take 1 tablet by mouth daily Without iron       omeprazole (PRILOSEC) 20 MG CR capsule Take 1 capsule (20 mg) by mouth 2 times daily 180 capsule 1     torsemide (DEMADEX) 10 MG tablet Take 1 tablet (10 mg) by mouth daily (with breakfast) 30 tablet 3     VITAMIN D, CHOLECALCIFEROL, PO Take 1,000 Units by mouth daily       WARFARIN SODIUM PO Take 5 mg by mouth MON, WED and SAT       WARFARIN SODIUM PO Take 2.5 mg by mouth SUN, TUES,THURS, FRI       enoxaparin (LOVENOX) 60 MG/0.6ML injection Inject 0.6 mLs (60 mg) Subcutaneous 2 times daily (Patient not taking: Reported on 11/5/2018) 12 Syringe 1     OTC products: None, except as noted above    No Known Allergies   Latex Allergy: NO    Social History   Substance Use Topics     Smoking status: Never Smoker     Smokeless tobacco: Never Used     Alcohol use No     History   Drug Use No       REVIEW OF SYSTEMS:   CONSTITUTIONAL: NEGATIVE for fever, chills, change in weight  INTEGUMENTARY/SKIN: NEGATIVE for worrisome rashes, moles or lesions  EYES: NEGATIVE for vision changes or irritation  ENT/MOUTH: NEGATIVE for ear, mouth and throat problems  RESP: NEGATIVE for significant cough or SOB  BREAST: NEGATIVE for masses, tenderness or discharge  CV: NEGATIVE for chest pain, palpitations or peripheral edema  GI: NEGATIVE for nausea, abdominal pain, heartburn, or change in bowel habits  : NEGATIVE for frequency, dysuria, or hematuria  MUSCULOSKELETAL: NEGATIVE for significant arthralgias or  "myalgia, + for knee pain with ambulation   NEURO: NEGATIVE for weakness, dizziness or paresthesias  ENDOCRINE: NEGATIVE for temperature intolerance, skin/hair changes  HEME: NEGATIVE for bleeding problems  PSYCHIATRIC: NEGATIVE for changes in mood or affect    EXAM:   /80 (BP Location: Left arm, Patient Position: Chair, Cuff Size: Adult Regular)  Pulse 75  Temp 98  F (36.7  C) (Oral)  Resp 17  Ht 5' 4\" (1.626 m)  Wt 157 lb 1.6 oz (71.3 kg)  SpO2 94%  BMI 26.97 kg/m2    GENERAL APPEARANCE: weak appearing, alert and no distress     EYES: EOMI, PERRL     HENT: ear canals and TM's normal and nose and mouth without ulcers or lesions     NECK: no adenopathy, no asymmetry, masses, or scars and thyroid normal to palpation     RESP: lungs clear to auscultation - no rales, rhonchi or wheezes, mild base crackles      CV: regular rates and rhythm, normal S1 S2, no S3 or S4 and 3/6 systolic murmur, 1 + click      ABDOMEN:  soft, nontender, no HSM or masses and bowel sounds normal     MS: extremities normal- no gross deformities noted, no evidence of inflammation in joints, FROM in all extremities. OA changes of the knees. Uses a walker      SKIN: no suspicious lesions or rashes     NEURO: Normal strength and tone, sensory exam grossly normal, mentation intact and speech normal     PSYCH: mentation appears normal. and affect normal/bright     LYMPHATICS: No cervical adenopathy    DIAGNOSTICS:     Labs Drawn and in Process:   Unresulted Labs Ordered in the Past 30 Days of this Admission     No orders found from 9/6/2018 to 11/6/2018.          Recent Labs   Lab Test 11/05/18 09/21/18 08/17/18   1037   07/26/18   1035   04/23/18   1120   05/04/16   1141   HGB   --    --    --   11.7   --    --    --   11.7   < >   --    PLT   --    --    --   321   --    --    --   343   < >   --    INR  2.5*  2.2*   < >   --    < >   --    < >   --    < >   --    NA   --    --    --   142   --   141   --   142   < >   --    POTASSIUM "   --    --    --   3.7   --   3.7   --   3.9   < >   --    CR   --    --    --   0.96   --   1.13*   --   0.77   < >   --    A1C   --    --    --    --    --    --    --    --    --   5.4    < > = values in this interval not displayed.        IMPRESSION:   Reason for surgery/procedure: knee OA, TKA   Diagnosis/reason for consult: preoperative evaluation/ clearance      The proposed surgical procedure is considered INTERMEDIATE risk.    REVISED CARDIAC RISK INDEX  The patient has the following serious cardiovascular risks for perioperative complications such as (MI, PE, VFib and 3  AV Block):  No serious cardiac risks  INTERPRETATION: 0 risks: Class I (very low risk - 0.4% complication rate)    The patient has the following additional risks for perioperative complications:  History of a fib, CHF, MVR, on coumadin AC       ICD-10-CM    1. Preop general physical exam Z01.818    2. Acquired hypothyroidism E03.9 levothyroxine (SYNTHROID/LEVOTHROID) 112 MCG tablet       RECOMMENDATIONS:         --Patient is to take all scheduled medications on the day of surgery EXCEPT for modifications listed below.    Hold coumadin for 5 days and bridge with Lovenox injections   Hold diuretic on the day of surgery     APPROVAL GIVEN to proceed with proposed procedure, without further diagnostic evaluation       Signed Electronically by: Yunior Huang MD    Copy of this evaluation report is provided to requesting physician.    [facility] Preop Guidelines    Revised Cardiac Risk Index

## 2018-11-05 NOTE — MR AVS SNAPSHOT
After Visit Summary   11/5/2018    Zunilda Clark    MRN: [id number]           Patient Information     Date Of Birth          1940        Visit Information        Provider Department      11/5/2018 10:20 AM Yunior Huang MD [facility]        Today's Diagnoses     Preop general physical exam    -  1    Acquired hypothyroidism        Atrial fibrillation and flutter (H)          Care Instructions      Before Your Surgery      Call your surgeon if there is any change in your health. This includes signs of a cold or flu (such as a sore throat, runny nose, cough, rash or fever).    Do not smoke, drink alcohol or take over the counter medicine (unless your surgeon or primary care doctor tells you to) for the 24 hours before and after surgery.    If you take prescribed drugs: Follow your doctor s orders about which medicines to take and which to stop until after surgery.    Eating and drinking prior to surgery: follow the instructions from your surgeon    Take a shower or bath the night before surgery. Use the soap your surgeon gave you to gently clean your skin. If you do not have soap from your surgeon, use your regular soap. Do not shave or scrub the surgery site.  Wear clean pajamas and have clean sheets on your bed.     Stop vitamins, fish oil for one week   Hold the Demadex on the day of surgery           Follow-ups after your visit        Your next 10 appointments already scheduled     Nov 12, 2018   Procedure with Pardeep Sheikh MD   [facility] (--)    [address]  [facility] [address]   [phone]            Nov 21, 2018 11:45 AM CST   Anticoagulation Visit with [facility]   [facility] ([facility]    [address]  [facility] [address]   [phone]            Feb 07, 2019  3:45 PM CST   Remote PPM Check with KRAMER TECH1   [facility]  "Care   [place] ([facility])    [address]  [place] [id number] [phone] OPT 2           This appointment is for a remote check of your pacemaker.  This is not an appointment at the office.              Who to contact     If you have questions or need follow up information about today's clinic visit or your schedule please contact [facility] directly at [phone].  Normal or non-critical lab and imaging results will be communicated to you by Seeohart, letter or phone within 4 business days after the clinic has received the results. If you do not hear from us within 7 days, please contact the clinic through [employer] or phone. If you have a critical or abnormal lab result, we will notify you by phone as soon as possible.  Submit refill requests through gokit or call your pharmacy and they will forward the refill request to us. Please allow 3 business days for your refill to be completed.          Additional Information About Your Visit        gokit Information     gokit gives you secure access to your electronic health record. If you see a primary care provider, you can also send messages to your care team and make appointments. If you have questions, please call your primary care clinic.  If you do not have a primary care provider, please call [phone] and they will assist you.        Care EveryWhere ID     This is your Care EveryWhere ID. This could be used by other organizations to access your [facility] medical records  [id number]        Your Vitals Were     Pulse Temperature Respirations Height Pulse Oximetry BMI (Body Mass Index)    75 98  F (36.7  C) (Oral) 17 5' 4\" (1.626 m) 94% 26.97 kg/m2       Blood Pressure from Last 3 Encounters:   11/05/18 130/80   09/05/18 148/76   09/05/18 148/84    Weight from Last 3 Encounters:   11/05/18 157 lb 1.6 oz (71.3 kg)   10/25/18 154 lb (69.9 kg)   09/05/18 156 lb (70.8 kg)              We Performed the Following  "    Basic metabolic panel     Hemoglobin     Methicillin Resist/Sens S. aureus PCR          Where to get your medicines      These medications were sent to [id number] - [place], [place] - [address]  [address], [facility] [address]     Phone:  [phone]     enoxaparin 60 MG/0.6ML injection    levothyroxine 112 MCG tablet          Primary Care Provider Office Phone # Fax #    Yunior Huang [phone]       [address]  [facility] [address]        Equal Access to Services     [facility]: Hadii aad ku hadasho Soomaali, waaxda luqadaha, qaybta kaalmada adeegyada, waxay sarthakin hayanthonyn adelaureen xiao . [facility] [phone].    ATENCIÓN: Si habla español, tiene a morales disposición servicios gratuitos de asistencia lingüística. [facility] [phone].    We comply with applicable federal civil rights laws and [place] laws. We do not discriminate on the basis of race, color, national origin, age, disability, sex, sexual orientation, or gender identity.            Thank you!     Thank you for choosing [facility]  for your care. Our goal is always to provide you with excellent care. Hearing back from our patients is one way we can continue to improve our services. Please take a few minutes to complete the written survey that you may receive in the mail after your visit with us. Thank you!             Your Updated Medication List - Protect others around you: Learn how to safely use, store and throw away your medicines at www.disposemymeds.org.          This list is accurate as of 11/5/18 10:56 AM.  Always use your most recent med list.                   Brand Name Dispense Instructions for use Diagnosis    ACETAMINOPHEN PO      Take 650 mg by mouth every 6 hours as needed        ALLOPURINOL PO      Take 100 mg by mouth 2 times daily        Amlodipine Besylate-Valsartan 5-160 MG Tabs     90 tablet    Take 1 tablet by mouth daily    Benign essential  hypertension       BONIVA 3 MG/3ML   Generic drug:  ibandronate      Inject 3 mg into the vein every 3 months        calcium citrate 950 MG tablet    CALCITRATE     Take by mouth daily 1200 mg daily        enoxaparin 60 MG/0.6ML injection    LOVENOX    7 Syringe    Inject 0.6 mLs (60 mg) Subcutaneous 2 times daily    Atrial fibrillation and flutter (H)       ferrous sulfate 325 (65 Fe) MG tablet    IRON     Take 325 mg by mouth every other day        fish oil-omega-3 fatty acids 1000 MG capsule      Take 1 g by mouth daily Pt takes as remembers        folic acid 1 MG tablet    FOLVITE     Take 1 mg by mouth daily        levothyroxine 112 MCG tablet    SYNTHROID/LEVOTHROID    90 tablet    TAKE 1 TABLET (112 MCG) BY MOUTH DAILY    Acquired hypothyroidism       METHOTREXATE SODIUM IJ      Inject 0.8 mLs as directed once a week Thursdays        metoprolol tartrate 50 MG tablet    LOPRESSOR    200 tablet    Take 1 tablet (50 mg) by mouth 2 times daily    Chronic atrial fibrillation (H)       Multi-vitamin Tabs tablet      Take 1 tablet by mouth daily Without iron        omeprazole 20 MG CR capsule    priLOSEC    180 capsule    Take 1 capsule (20 mg) by mouth 2 times daily    Esophageal reflux       torsemide 10 MG tablet    DEMADEX    30 tablet    Take 1 tablet (10 mg) by mouth daily (with breakfast)    Pulmonary HTN (H)       VITAMIN D (CHOLECALCIFEROL) PO      Take 1,000 Units by mouth daily        * WARFARIN SODIUM PO      Take 5 mg by mouth MON, WED and SAT        * WARFARIN SODIUM PO      Take 2.5 mg by mouth SUN, TUES,THURS, FRI        * Notice:  This list has 2 medication(s) that are the same as other medications prescribed for you. Read the directions carefully, and ask your doctor or other care provider to review them with you.

## 2018-11-05 NOTE — MR AVS SNAPSHOT
Zunilda Alicea May   11/5/2018 10:00 AM   Anticoagulation Therapy Visit    Description:  78 year old female   Provider:  [facility]   Department:  [facility]           INR as of 11/5/2018     Today's INR 2.5      Anticoagulation Summary as of 11/5/2018     INR goal 2.0-3.0   Today's INR 2.5   Full warfarin instructions 11/7: Hold; 11/8: Hold; 11/9: Hold; 11/10: Hold; 11/11: Hold; Otherwise 5 mg on Mon, Wed, Sat; 2.5 mg all other days   Next INR check 11/21/2018    Indications   Long-term (current) use of anticoagulants [Z79.01] [Z79.01]  Atrial fibrillation and flutter (H) [I48.91  I48.92]         Your next Anticoagulation Clinic appointment(s)     Nov 21, 2018 11:45 AM CST   Anticoagulation Visit with [facility]   [facility]    [address] [facility] [address]  [facility] [address] [phone]              Contact Numbers     [facility] Phone Numbers:  Anticoagulation Clinic Appointments : [phone]  Anticoagulation Nurse: [phone]         November 2018 Details    Sun Mon Tue Wed Thu Fri Sat         1               2               3                 4               5      5 mg   See details      6      2.5 mg         7      Hold         8      Hold         9      Hold         10      Hold           11      Hold         12      5 mg         13      2.5 mg         14      5 mg         15      2.5 mg         16      2.5 mg         17      5 mg           18      2.5 mg         19      5 mg         20      2.5 mg         21            22               23               24                 25               26               27               28               29               30                 Date Details   11/05 This INR check       Date of next INR:  11/21/2018         How to take your warfarin dose     To take:  2.5 mg Take 1 of the 2.5 mg tablets.    To take:  5 mg Take 2 of the 2.5 mg tablets.    Hold Do not take your  warfarin dose. See the Details table to the right for additional instructions.

## 2018-11-05 NOTE — LETTER
[facility]  [address], [address]  [place] [address]  [phone]        November 7, 2018    Zunilda Alicea May  [address]   [facility] [address]            Dear Ms. Zunilda Alicea May:      The results of your recent labs were NORMAL.      If you have any further questions or problems, please contact our office.      Sincerely,        Yunior Huang M.D.

## 2018-11-06 LAB
ANION GAP SERPL CALCULATED.3IONS-SCNC: 10 MMOL/L (ref 3–14)
BUN SERPL-MCNC: 27 MG/DL (ref 7–30)
CALCIUM SERPL-MCNC: 9.8 MG/DL (ref 8.5–10.1)
CHLORIDE SERPL-SCNC: 104 MMOL/L (ref 94–109)
CO2 SERPL-SCNC: 27 MMOL/L (ref 20–32)
CREAT SERPL-MCNC: 0.98 MG/DL (ref 0.52–1.04)
GFR SERPL CREATININE-BSD FRML MDRD: 55 ML/MIN/1.7M2
GLUCOSE SERPL-MCNC: 88 MG/DL (ref 70–99)
POTASSIUM SERPL-SCNC: 4.3 MMOL/L (ref 3.4–5.3)
SODIUM SERPL-SCNC: 141 MMOL/L (ref 133–144)

## 2018-11-08 NOTE — H&P (VIEW-ONLY)
[place] [address]  [facility] [address]  [phone]  Dept: [phone]    PRE-OP EVALUATION:  Today's date: 2018    Zunilda Clark (: 1940) presents for pre-operative evaluation assessment as requested by Dr. Jurgen Sheikh.  She requires evaluation and anesthesia risk assessment prior to undergoing surgery/procedure for treatment of left Knee replacement  .    Fax number for surgical facility: [facility]  Primary Physician: Yunior Huang  Type of Anesthesia Anticipated: General    Patient has a Health Care Directive or Living Will:  YES scanned into chart     Preop Questions 2018   Who is doing your surgery? Dr Jurgen Sheikh   What are you having done? left knee replacement   Date of Surgery/Procedure:    Facility or Hospital where procedure/surgery will be performed: [facility]   1.  Do you have a history of Heart attack, stroke, stent, coronary bypass surgery, or other heart surgery? YES  -    2.  Do you ever have any pain or discomfort in your chest? No   3.  Do you have a history of  Heart Failure? YES -    4.   Are you troubled by shortness of breath when:  walking on a level surface, or up a slight hill, or at night? No   5.  Do you currently have a cold, bronchitis or other respiratory infection? No   6.  Do you have a cough, shortness of breath, or wheezing? No   7.  Do you sometimes get pains in the calves of your legs when you walk? YES -    8. Do you or anyone in your family have previous history of blood clots? YES -    9.  Do you or does anyone in your family have a serious bleeding problem such as prolonged bleeding following surgeries or cuts? No   10. Have you ever had problems with anemia or been told to take iron pills? YES -    11. Have you had any abnormal blood loss such as black, tarry or bloody stools, or abnormal vaginal bleeding? No   12. Have you ever had a blood transfusion? UNKNOWN -    13. Have you or  any of your relatives ever had problems with anesthesia? YES -    14. Do you have sleep apnea, excessive snoring or daytime drowsiness? No   15. Do you have any prosthetic heart valves? YES -    16. Do you have prosthetic joints? No   17. Is there any chance that you may be pregnant? No         HPI:     HPI related to upcoming procedure: scheduled for knee arthroplasty for symptomatic knee osteoarthritis and h/o RA.   No acute complaints. Had recent shingles , treated and symptoms resolved.   Has h/o HTN. on medical treatment. BP has been controlled. No side effects from medications. No CP, HA, dizziness. good compliance with medications and low salt diet.  Has history of atrial fibrillation. On anticoagulation with Coumadin and rate control medications. Asymptonatic - no chest pains , palpitations,  no side effects from medications.   Has history of hypothyroidism. On replacement treatment with Synthroid. No heat /cold intolerance, heart palpitations, weight loss/ gain ,  change in bowel habits.        See problem list for active medical problems.  Problems all longstanding and stable, except as noted/documented.  See ROS for pertinent symptoms related to these conditions.                                                                                                                                                          .    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 09/29/2017     Priority: Medium     Valvular heart disease 09/29/2017     Priority: Medium     Paroxysmal atrial fibrillation (H) 09/29/2017     Priority: Medium     S/P mitral valve replacement with bioprosthetic valve 09/29/2017     Priority: Medium     Pulmonary hypertension (H) 09/29/2017     Priority: Medium     Acute cholecystitis 04/28/2017     Priority: Medium     Long-term (current) use of anticoagulants [Z79.01] 04/25/2016     Priority: Medium      Essential hypertension 11/10/2015     Priority: Medium     Acquired hypothyroidism 11/04/2015     Priority: Medium     Anticoagulation management encounter 07/30/2015     Priority: Medium     Advance Care Planning 07/29/2015     Priority: Medium     Advance Care Planning 7/29/2015: Receipt of ACP document:  Received: Health Care Directive which was witnessed or notarized on 9/5/13.  Document not previously scanned.  Validation form completed and sent with document to be scanned.  Code Status reflects choices in most recent ACP document.  Confirmed/documented designated decision maker(s): Norberto Clark is primary health care agent, and Kyara Mayorga and Tenisha Moeller are alternate agents.  Added by Jose Castañeda           Fracture, humerus closed, shaft 07/28/2015     Priority: Medium     Humerus fracture 07/22/2015     Priority: Medium     Cardiac pacemaker in situ 06/05/2015     Priority: Medium     Mitral valve disorder 06/01/2015     Priority: Medium     mitral valve replacement in 2008 following attempted balloon valvuloplasty in 2006 for mitral stenosis related to presumed rheumatic valvular heart disease        Pulmonary HTN (H)      Priority: Medium     Atrial fibrillation and flutter (H)      Priority: Medium     Ablation and PPM 2011       RA (rheumatoid arthritis) (H) 05/05/2015     Priority: Medium     Benign essential hypertension 05/05/2015     Priority: Medium     CHF (congestive heart failure) (H) 04/26/2014     Priority: Medium      Past Medical History:   Diagnosis Date     Acquired hypothyroidism 11/4/2015     Aortic valve insufficiency      Arrhythmia     A fib     Arthritis      Atrial fibrillation (H)      Atrial fibrillation and flutter (H)      Blood clotting disorder (H)      CHF (congestive heart failure) (H)      DVT (deep venous thrombosis) (H) 2003     Gastro-oesophageal reflux disease      H/O mitral valve replacement with tissue graft june 2014     History of blood transfusion 2014      HTN (hypertension)      Hypothyroidism      Other chronic pain      PONV (postoperative nausea and vomiting)      Pulmonary HTN (H)      Rheumatoid arthritis (H)      Rheumatoid arthritis(714.0)      Seizures (H) 2014     Shingles 08/2018     Stroke (H) 2009    left side mild weakness      Stroke (H) 2014     [place]ope 2011    [place]     Thrombosis of leg 2003    both legs     Past Surgical History:   Procedure Laterality Date     ABDOMEN SURGERY      prolapsed bladder     COLONOSCOPY  3/15/2012     EYE SURGERY  2018    Both eyes/cataract surgery     H ABLATION AV NODE  2011    AFlutter with syncope, PPM to follow     HERNIA REPAIR      3 hernies/right and left & umbilical     IMPLANT PACEMAKER  2011     LAPAROSCOPIC CHOLECYSTECTOMY WITH CHOLANGIOGRAMS N/A 4/29/2017    Procedure: LAPAROSCOPIC CHOLECYSTECTOMY WITH CHOLANGIOGRAMS;  LAPAROSCOPIC CHOLECYSTECTOMY WITH CHOLANGIOGRAMS ;  Surgeon: Angeles Mcgill MD;  Location: [facility] OR     OPEN REDUCTION INTERNAL FIXATION RODDING INTRAMEDULLARY HUMERUS Right 7/28/2015    Procedure: OPEN REDUCTION INTERNAL FIXATION RODDING INTRAMEDULLARY HUMERUS;  Surgeon: Raymundo Rivera MD;  Location: [facility] OR     REPLACE VALVE MITRAL  2006    Mitral valve replacement with bioprosthetic valve in 2008 for rheumatic disease     SLING BLADDER SUSPENSION WITH FASCIA UMESH       VASCULAR SURGERY  2003    Blood clots in both legs     Current Outpatient Prescriptions   Medication Sig Dispense Refill     ACETAMINOPHEN PO Take 650 mg by mouth every 6 hours as needed        ALLOPURINOL PO Take 100 mg by mouth 2 times daily       Amlodipine Besylate-Valsartan 5-160 MG TABS Take 1 tablet by mouth daily 90 tablet 3     calcium citrate (CALCITRATE) 950 MG tablet Take by mouth daily 1200 mg daily       ferrous sulfate (IRON) 325 (65 Fe) MG tablet Take 325 mg by mouth every other day       fish oil-omega-3 fatty acids 1000 MG capsule Take 1 g by mouth daily Pt takes as remembers       folic acid  (FOLVITE) 1 MG tablet Take 1 mg by mouth daily       ibandronate (BONIVA) 3 MG/3ML Inject 3 mg into the vein every 3 months        levothyroxine (SYNTHROID/LEVOTHROID) 112 MCG tablet TAKE 1 TABLET (112 MCG) BY MOUTH DAILY 90 tablet 0     METHOTREXATE SODIUM IJ Inject 0.8 mLs as directed once a week Thursdays       metoprolol tartrate (LOPRESSOR) 50 MG tablet Take 1 tablet (50 mg) by mouth 2 times daily 200 tablet 3     multivitamin, therapeutic with minerals (MULTI-VITAMIN) TABS tablet Take 1 tablet by mouth daily Without iron       omeprazole (PRILOSEC) 20 MG CR capsule Take 1 capsule (20 mg) by mouth 2 times daily 180 capsule 1     torsemide (DEMADEX) 10 MG tablet Take 1 tablet (10 mg) by mouth daily (with breakfast) 30 tablet 3     VITAMIN D, CHOLECALCIFEROL, PO Take 1,000 Units by mouth daily       WARFARIN SODIUM PO Take 5 mg by mouth MON, WED and SAT       WARFARIN SODIUM PO Take 2.5 mg by mouth SUN, TUES,THURS, FRI       enoxaparin (LOVENOX) 60 MG/0.6ML injection Inject 0.6 mLs (60 mg) Subcutaneous 2 times daily (Patient not taking: Reported on 11/5/2018) 12 Syringe 1     OTC products: None, except as noted above    No Known Allergies   Latex Allergy: NO    Social History   Substance Use Topics     Smoking status: Never Smoker     Smokeless tobacco: Never Used     Alcohol use No     History   Drug Use No       REVIEW OF SYSTEMS:   CONSTITUTIONAL: NEGATIVE for fever, chills, change in weight  INTEGUMENTARY/SKIN: NEGATIVE for worrisome rashes, moles or lesions  EYES: NEGATIVE for vision changes or irritation  ENT/MOUTH: NEGATIVE for ear, mouth and throat problems  RESP: NEGATIVE for significant cough or SOB  BREAST: NEGATIVE for masses, tenderness or discharge  CV: NEGATIVE for chest pain, palpitations or peripheral edema  GI: NEGATIVE for nausea, abdominal pain, heartburn, or change in bowel habits  : NEGATIVE for frequency, dysuria, or hematuria  MUSCULOSKELETAL: NEGATIVE for significant arthralgias or  "myalgia, + for knee pain with ambulation   NEURO: NEGATIVE for weakness, dizziness or paresthesias  ENDOCRINE: NEGATIVE for temperature intolerance, skin/hair changes  HEME: NEGATIVE for bleeding problems  PSYCHIATRIC: NEGATIVE for changes in mood or affect    EXAM:   /80 (BP Location: Left arm, Patient Position: Chair, Cuff Size: Adult Regular)  Pulse 75  Temp 98  F (36.7  C) (Oral)  Resp 17  Ht 5' 4\" (1.626 m)  Wt 157 lb 1.6 oz (71.3 kg)  SpO2 94%  BMI 26.97 kg/m2    GENERAL APPEARANCE: weak appearing, alert and no distress     EYES: EOMI, PERRL     HENT: ear canals and TM's normal and nose and mouth without ulcers or lesions     NECK: no adenopathy, no asymmetry, masses, or scars and thyroid normal to palpation     RESP: lungs clear to auscultation - no rales, rhonchi or wheezes, mild base crackles      CV: regular rates and rhythm, normal S1 S2, no S3 or S4 and 3/6 systolic murmur, 1 + click      ABDOMEN:  soft, nontender, no HSM or masses and bowel sounds normal     MS: extremities normal- no gross deformities noted, no evidence of inflammation in joints, FROM in all extremities. OA changes of the knees. Uses a walker      SKIN: no suspicious lesions or rashes     NEURO: Normal strength and tone, sensory exam grossly normal, mentation intact and speech normal     PSYCH: mentation appears normal. and affect normal/bright     LYMPHATICS: No cervical adenopathy    DIAGNOSTICS:     Labs Drawn and in Process:   Unresulted Labs Ordered in the Past 30 Days of this Admission     No orders found from 9/6/2018 to 11/6/2018.          Recent Labs   Lab Test 11/05/18 09/21/18 08/17/18   1037   07/26/18   1035   04/23/18   1120   05/04/16   1141   HGB   --    --    --   11.7   --    --    --   11.7   < >   --    PLT   --    --    --   321   --    --    --   343   < >   --    INR  2.5*  2.2*   < >   --    < >   --    < >   --    < >   --    NA   --    --    --   142   --   141   --   142   < >   --    POTASSIUM "   --    --    --   3.7   --   3.7   --   3.9   < >   --    CR   --    --    --   0.96   --   1.13*   --   0.77   < >   --    A1C   --    --    --    --    --    --    --    --    --   5.4    < > = values in this interval not displayed.        IMPRESSION:   Reason for surgery/procedure: knee OA, TKA   Diagnosis/reason for consult: preoperative evaluation/ clearance      The proposed surgical procedure is considered INTERMEDIATE risk.    REVISED CARDIAC RISK INDEX  The patient has the following serious cardiovascular risks for perioperative complications such as (MI, PE, VFib and 3  AV Block):  No serious cardiac risks  INTERPRETATION: 0 risks: Class I (very low risk - 0.4% complication rate)    The patient has the following additional risks for perioperative complications:  History of a fib, CHF, MVR, on coumadin AC       ICD-10-CM    1. Preop general physical exam Z01.818    2. Acquired hypothyroidism E03.9 levothyroxine (SYNTHROID/LEVOTHROID) 112 MCG tablet       RECOMMENDATIONS:         --Patient is to take all scheduled medications on the day of surgery EXCEPT for modifications listed below.    Hold coumadin for 5 days and bridge with Lovenox injections   Hold diuretic on the day of surgery     APPROVAL GIVEN to proceed with proposed procedure, without further diagnostic evaluation       Signed Electronically by: Yunior Huang MD    Copy of this evaluation report is provided to requesting physician.    [facility] Preop Guidelines    Revised Cardiac Risk Index

## 2018-11-12 ENCOUNTER — APPOINTMENT (OUTPATIENT)
Dept: GENERAL RADIOLOGY | Facility: CLINIC | Age: 78
DRG: 470 | End: 2018-11-12
Attending: ORTHOPAEDIC SURGERY
Payer: MEDICARE

## 2018-11-12 ENCOUNTER — ANESTHESIA (OUTPATIENT)
Dept: SURGERY | Facility: CLINIC | Age: 78
DRG: 470 | End: 2018-11-12
Payer: MEDICARE

## 2018-11-12 ENCOUNTER — HOSPITAL ENCOUNTER (INPATIENT)
Facility: CLINIC | Age: 78
LOS: 3 days | Discharge: HOME HEALTH | DRG: 470 | End: 2018-11-15
Attending: ORTHOPAEDIC SURGERY | Admitting: ORTHOPAEDIC SURGERY
Payer: MEDICARE

## 2018-11-12 ENCOUNTER — ANESTHESIA EVENT (OUTPATIENT)
Dept: SURGERY | Facility: CLINIC | Age: 78
DRG: 470 | End: 2018-11-12
Payer: MEDICARE

## 2018-11-12 ENCOUNTER — APPOINTMENT (OUTPATIENT)
Dept: PHYSICAL THERAPY | Facility: CLINIC | Age: 78
DRG: 470 | End: 2018-11-12
Attending: ORTHOPAEDIC SURGERY
Payer: MEDICARE

## 2018-11-12 DIAGNOSIS — Z96.652 STATUS POST TOTAL LEFT KNEE REPLACEMENT: Primary | ICD-10-CM

## 2018-11-12 PROBLEM — Z96.659 S/P TOTAL KNEE ARTHROPLASTY: Status: ACTIVE | Noted: 2018-11-12

## 2018-11-12 LAB
CREAT SERPL-MCNC: 1.02 MG/DL (ref 0.52–1.04)
GFR SERPL CREATININE-BSD FRML MDRD: 53 ML/MIN/1.7M2
INR PPP: 1.16 (ref 0.86–1.14)
PLATELET # BLD AUTO: 267 10E9/L (ref 150–450)

## 2018-11-12 PROCEDURE — 97110 THERAPEUTIC EXERCISES: CPT | Mod: GP

## 2018-11-12 PROCEDURE — 27210794 ZZH OR GENERAL SUPPLY STERILE: Performed by: ORTHOPAEDIC SURGERY

## 2018-11-12 PROCEDURE — 40000171 ZZH STATISTIC PRE-PROCEDURE ASSESSMENT III: Performed by: ORTHOPAEDIC SURGERY

## 2018-11-12 PROCEDURE — 85049 AUTOMATED PLATELET COUNT: CPT | Performed by: ORTHOPAEDIC SURGERY

## 2018-11-12 PROCEDURE — 99222 1ST HOSP IP/OBS MODERATE 55: CPT | Performed by: INTERNAL MEDICINE

## 2018-11-12 PROCEDURE — 25000128 H RX IP 250 OP 636: Performed by: ORTHOPAEDIC SURGERY

## 2018-11-12 PROCEDURE — 25000132 ZZH RX MED GY IP 250 OP 250 PS 637: Mod: GY | Performed by: ORTHOPAEDIC SURGERY

## 2018-11-12 PROCEDURE — A9270 NON-COVERED ITEM OR SERVICE: HCPCS | Mod: GY | Performed by: ORTHOPAEDIC SURGERY

## 2018-11-12 PROCEDURE — 12000007 ZZH R&B INTERMEDIATE

## 2018-11-12 PROCEDURE — 99207 ZZC CONSULT E&M CHANGED TO INITIAL LEVEL: CPT | Performed by: INTERNAL MEDICINE

## 2018-11-12 PROCEDURE — 25000128 H RX IP 250 OP 636: Performed by: PHYSICIAN ASSISTANT

## 2018-11-12 PROCEDURE — 25000132 ZZH RX MED GY IP 250 OP 250 PS 637: Mod: GY | Performed by: PHYSICIAN ASSISTANT

## 2018-11-12 PROCEDURE — 37000009 ZZH ANESTHESIA TECHNICAL FEE, EACH ADDTL 15 MIN: Performed by: ORTHOPAEDIC SURGERY

## 2018-11-12 PROCEDURE — 71000012 ZZH RECOVERY PHASE 1 LEVEL 1 FIRST HR: Performed by: ORTHOPAEDIC SURGERY

## 2018-11-12 PROCEDURE — 85610 PROTHROMBIN TIME: CPT | Performed by: ANESTHESIOLOGY

## 2018-11-12 PROCEDURE — 36000093 ZZH SURGERY LEVEL 4 1ST 30 MIN: Performed by: ORTHOPAEDIC SURGERY

## 2018-11-12 PROCEDURE — A9270 NON-COVERED ITEM OR SERVICE: HCPCS | Mod: GY | Performed by: PHYSICIAN ASSISTANT

## 2018-11-12 PROCEDURE — 25000125 ZZHC RX 250: Performed by: NURSE ANESTHETIST, CERTIFIED REGISTERED

## 2018-11-12 PROCEDURE — 25000128 H RX IP 250 OP 636: Performed by: NURSE ANESTHETIST, CERTIFIED REGISTERED

## 2018-11-12 PROCEDURE — 37000008 ZZH ANESTHESIA TECHNICAL FEE, 1ST 30 MIN: Performed by: ORTHOPAEDIC SURGERY

## 2018-11-12 PROCEDURE — 27810169 ZZH OR IMPLANT GENERAL: Performed by: ORTHOPAEDIC SURGERY

## 2018-11-12 PROCEDURE — 82565 ASSAY OF CREATININE: CPT | Performed by: ORTHOPAEDIC SURGERY

## 2018-11-12 PROCEDURE — C1776 JOINT DEVICE (IMPLANTABLE): HCPCS | Performed by: ORTHOPAEDIC SURGERY

## 2018-11-12 PROCEDURE — 25800025 ZZH RX 258: Performed by: ORTHOPAEDIC SURGERY

## 2018-11-12 PROCEDURE — 97161 PT EVAL LOW COMPLEX 20 MIN: CPT | Mod: GP

## 2018-11-12 PROCEDURE — 36000063 ZZH SURGERY LEVEL 4 EA 15 ADDTL MIN: Performed by: ORTHOPAEDIC SURGERY

## 2018-11-12 PROCEDURE — C1713 ANCHOR/SCREW BN/BN,TIS/BN: HCPCS | Performed by: ORTHOPAEDIC SURGERY

## 2018-11-12 PROCEDURE — 40000986 XR KNEE PORT LT 1/2 VW: Mod: LT

## 2018-11-12 PROCEDURE — 36415 COLL VENOUS BLD VENIPUNCTURE: CPT | Performed by: ANESTHESIOLOGY

## 2018-11-12 PROCEDURE — 71000013 ZZH RECOVERY PHASE 1 LEVEL 1 EA ADDTL HR: Performed by: ORTHOPAEDIC SURGERY

## 2018-11-12 PROCEDURE — 97530 THERAPEUTIC ACTIVITIES: CPT | Mod: GP

## 2018-11-12 PROCEDURE — 25000125 ZZHC RX 250: Performed by: ANESTHESIOLOGY

## 2018-11-12 PROCEDURE — 36415 COLL VENOUS BLD VENIPUNCTURE: CPT | Performed by: ORTHOPAEDIC SURGERY

## 2018-11-12 PROCEDURE — 40000193 ZZH STATISTIC PT WARD VISIT

## 2018-11-12 PROCEDURE — 0SRD0J9 REPLACEMENT OF LEFT KNEE JOINT WITH SYNTHETIC SUBSTITUTE, CEMENTED, OPEN APPROACH: ICD-10-PCS | Performed by: ORTHOPAEDIC SURGERY

## 2018-11-12 PROCEDURE — 25000566 ZZH SEVOFLURANE, EA 15 MIN: Performed by: ORTHOPAEDIC SURGERY

## 2018-11-12 PROCEDURE — 25000128 H RX IP 250 OP 636: Performed by: INTERNAL MEDICINE

## 2018-11-12 PROCEDURE — 25000128 H RX IP 250 OP 636: Performed by: ANESTHESIOLOGY

## 2018-11-12 DEVICE — IMP COMP PATELLA SNR GENESIS II 9X32MM 71420576: Type: IMPLANTABLE DEVICE | Site: KNEE | Status: FUNCTIONAL

## 2018-11-12 DEVICE — IMP BASEPLATE TIBIAL GENESIS II SZ 5 LT TI 71420168: Type: IMPLANTABLE DEVICE | Site: KNEE | Status: FUNCTIONAL

## 2018-11-12 DEVICE — IMPLANTABLE DEVICE: Type: IMPLANTABLE DEVICE | Site: KNEE | Status: FUNCTIONAL

## 2018-11-12 DEVICE — BONE CEMENT STRK SIMPLEX P SPEEDSET 6192-1-001: Type: IMPLANTABLE DEVICE | Site: KNEE | Status: FUNCTIONAL

## 2018-11-12 RX ORDER — ONDANSETRON 2 MG/ML
4 INJECTION INTRAMUSCULAR; INTRAVENOUS EVERY 6 HOURS PRN
Status: DISCONTINUED | OUTPATIENT
Start: 2018-11-12 | End: 2018-11-15 | Stop reason: HOSPADM

## 2018-11-12 RX ORDER — AMOXICILLIN 250 MG
1 CAPSULE ORAL 2 TIMES DAILY
Status: DISCONTINUED | OUTPATIENT
Start: 2018-11-12 | End: 2018-11-15 | Stop reason: HOSPADM

## 2018-11-12 RX ORDER — WARFARIN SODIUM 5 MG/1
5 TABLET ORAL
Status: COMPLETED | OUTPATIENT
Start: 2018-11-12 | End: 2018-11-12

## 2018-11-12 RX ORDER — ALLOPURINOL 100 MG/1
100 TABLET ORAL 2 TIMES DAILY
Status: DISCONTINUED | OUTPATIENT
Start: 2018-11-12 | End: 2018-11-15 | Stop reason: HOSPADM

## 2018-11-12 RX ORDER — ONDANSETRON 2 MG/ML
INJECTION INTRAMUSCULAR; INTRAVENOUS PRN
Status: DISCONTINUED | OUTPATIENT
Start: 2018-11-12 | End: 2018-11-12

## 2018-11-12 RX ORDER — LIDOCAINE HYDROCHLORIDE 10 MG/ML
INJECTION, SOLUTION INFILTRATION; PERINEURAL PRN
Status: DISCONTINUED | OUTPATIENT
Start: 2018-11-12 | End: 2018-11-12

## 2018-11-12 RX ORDER — HYDROMORPHONE HYDROCHLORIDE 1 MG/ML
.3-.5 INJECTION, SOLUTION INTRAMUSCULAR; INTRAVENOUS; SUBCUTANEOUS EVERY 5 MIN PRN
Status: DISCONTINUED | OUTPATIENT
Start: 2018-11-12 | End: 2018-11-12 | Stop reason: HOSPADM

## 2018-11-12 RX ORDER — FERROUS SULFATE 325(65) MG
325 TABLET ORAL EVERY OTHER DAY
Status: DISCONTINUED | OUTPATIENT
Start: 2018-11-12 | End: 2018-11-15 | Stop reason: HOSPADM

## 2018-11-12 RX ORDER — PROCHLORPERAZINE MALEATE 5 MG
5 TABLET ORAL EVERY 6 HOURS PRN
Status: DISCONTINUED | OUTPATIENT
Start: 2018-11-12 | End: 2018-11-15 | Stop reason: HOSPADM

## 2018-11-12 RX ORDER — SODIUM CHLORIDE, SODIUM LACTATE, POTASSIUM CHLORIDE, CALCIUM CHLORIDE 600; 310; 30; 20 MG/100ML; MG/100ML; MG/100ML; MG/100ML
INJECTION, SOLUTION INTRAVENOUS CONTINUOUS
Status: DISCONTINUED | OUTPATIENT
Start: 2018-11-12 | End: 2018-11-12 | Stop reason: HOSPADM

## 2018-11-12 RX ORDER — MULTIPLE VITAMINS W/ MINERALS TAB 9MG-400MCG
1 TAB ORAL DAILY
Status: DISCONTINUED | OUTPATIENT
Start: 2018-11-12 | End: 2018-11-15 | Stop reason: HOSPADM

## 2018-11-12 RX ORDER — HYDRALAZINE HYDROCHLORIDE 20 MG/ML
2.5-5 INJECTION INTRAMUSCULAR; INTRAVENOUS EVERY 10 MIN PRN
Status: DISCONTINUED | OUTPATIENT
Start: 2018-11-12 | End: 2018-11-12 | Stop reason: HOSPADM

## 2018-11-12 RX ORDER — TORSEMIDE 10 MG/1
10 TABLET ORAL
Status: DISCONTINUED | OUTPATIENT
Start: 2018-11-13 | End: 2018-11-15 | Stop reason: HOSPADM

## 2018-11-12 RX ORDER — DEXAMETHASONE SODIUM PHOSPHATE 4 MG/ML
INJECTION, SOLUTION INTRA-ARTICULAR; INTRALESIONAL; INTRAMUSCULAR; INTRAVENOUS; SOFT TISSUE PRN
Status: DISCONTINUED | OUTPATIENT
Start: 2018-11-12 | End: 2018-11-12

## 2018-11-12 RX ORDER — ACETAMINOPHEN 325 MG/1
975 TABLET ORAL EVERY 8 HOURS
Status: COMPLETED | OUTPATIENT
Start: 2018-11-12 | End: 2018-11-15

## 2018-11-12 RX ORDER — LIDOCAINE 40 MG/G
CREAM TOPICAL
Status: DISCONTINUED | OUTPATIENT
Start: 2018-11-12 | End: 2018-11-15 | Stop reason: HOSPADM

## 2018-11-12 RX ORDER — HYDROMORPHONE HYDROCHLORIDE 1 MG/ML
0.2 INJECTION, SOLUTION INTRAMUSCULAR; INTRAVENOUS; SUBCUTANEOUS
Status: DISCONTINUED | OUTPATIENT
Start: 2018-11-12 | End: 2018-11-15 | Stop reason: HOSPADM

## 2018-11-12 RX ORDER — LABETALOL HYDROCHLORIDE 5 MG/ML
10 INJECTION, SOLUTION INTRAVENOUS
Status: DISCONTINUED | OUTPATIENT
Start: 2018-11-12 | End: 2018-11-12 | Stop reason: HOSPADM

## 2018-11-12 RX ORDER — AMLODIPINE BESYLATE 5 MG/1
5 TABLET ORAL DAILY
Status: DISCONTINUED | OUTPATIENT
Start: 2018-11-13 | End: 2018-11-15 | Stop reason: HOSPADM

## 2018-11-12 RX ORDER — WARFARIN SODIUM 5 MG/1
5 TABLET ORAL
Status: DISCONTINUED | OUTPATIENT
Start: 2018-11-12 | End: 2018-11-12

## 2018-11-12 RX ORDER — AMLODIPINE AND VALSARTAN 5; 160 MG/1; MG/1
1 TABLET ORAL DAILY
Status: DISCONTINUED | OUTPATIENT
Start: 2018-11-12 | End: 2018-11-12 | Stop reason: RX

## 2018-11-12 RX ORDER — FENTANYL CITRATE 50 UG/ML
INJECTION, SOLUTION INTRAMUSCULAR; INTRAVENOUS PRN
Status: DISCONTINUED | OUTPATIENT
Start: 2018-11-12 | End: 2018-11-12

## 2018-11-12 RX ORDER — CEFAZOLIN SODIUM 1 G/50ML
1 INJECTION, SOLUTION INTRAVENOUS EVERY 8 HOURS
Status: COMPLETED | OUTPATIENT
Start: 2018-11-12 | End: 2018-11-13

## 2018-11-12 RX ORDER — GLYCINE 1.5 G/100ML
SOLUTION IRRIGATION PRN
Status: DISCONTINUED | OUTPATIENT
Start: 2018-11-12 | End: 2018-11-12 | Stop reason: HOSPADM

## 2018-11-12 RX ORDER — PROPOFOL 10 MG/ML
INJECTION, EMULSION INTRAVENOUS PRN
Status: DISCONTINUED | OUTPATIENT
Start: 2018-11-12 | End: 2018-11-12

## 2018-11-12 RX ORDER — CEFAZOLIN SODIUM 1 G/3ML
1 INJECTION, POWDER, FOR SOLUTION INTRAMUSCULAR; INTRAVENOUS SEE ADMIN INSTRUCTIONS
Status: DISCONTINUED | OUTPATIENT
Start: 2018-11-12 | End: 2018-11-12 | Stop reason: HOSPADM

## 2018-11-12 RX ORDER — BUPIVACAINE HYDROCHLORIDE AND EPINEPHRINE 5; 5 MG/ML; UG/ML
INJECTION, SOLUTION PERINEURAL PRN
Status: DISCONTINUED | OUTPATIENT
Start: 2018-11-12 | End: 2018-11-12

## 2018-11-12 RX ORDER — NALOXONE HYDROCHLORIDE 0.4 MG/ML
.1-.4 INJECTION, SOLUTION INTRAMUSCULAR; INTRAVENOUS; SUBCUTANEOUS
Status: ACTIVE | OUTPATIENT
Start: 2018-11-12 | End: 2018-11-13

## 2018-11-12 RX ORDER — DIMENHYDRINATE 50 MG/ML
25 INJECTION, SOLUTION INTRAMUSCULAR; INTRAVENOUS
Status: DISCONTINUED | OUTPATIENT
Start: 2018-11-12 | End: 2018-11-12 | Stop reason: HOSPADM

## 2018-11-12 RX ORDER — ACETAMINOPHEN 500 MG
1000 TABLET ORAL ONCE
Status: COMPLETED | OUTPATIENT
Start: 2018-11-12 | End: 2018-11-12

## 2018-11-12 RX ORDER — CHLORAL HYDRATE 500 MG
1 CAPSULE ORAL DAILY
Status: DISCONTINUED | OUTPATIENT
Start: 2018-11-12 | End: 2018-11-15 | Stop reason: HOSPADM

## 2018-11-12 RX ORDER — ACETAMINOPHEN 325 MG/1
650 TABLET ORAL EVERY 4 HOURS PRN
Status: DISCONTINUED | OUTPATIENT
Start: 2018-11-15 | End: 2018-11-15 | Stop reason: HOSPADM

## 2018-11-12 RX ORDER — LIDOCAINE 40 MG/G
CREAM TOPICAL
Status: DISCONTINUED | OUTPATIENT
Start: 2018-11-12 | End: 2018-11-12 | Stop reason: HOSPADM

## 2018-11-12 RX ORDER — SODIUM CHLORIDE 9 MG/ML
INJECTION, SOLUTION INTRAVENOUS CONTINUOUS
Status: DISCONTINUED | OUTPATIENT
Start: 2018-11-12 | End: 2018-11-13 | Stop reason: CLARIF

## 2018-11-12 RX ORDER — ONDANSETRON 4 MG/1
4 TABLET, ORALLY DISINTEGRATING ORAL EVERY 30 MIN PRN
Status: DISCONTINUED | OUTPATIENT
Start: 2018-11-12 | End: 2018-11-12 | Stop reason: HOSPADM

## 2018-11-12 RX ORDER — LEVOTHYROXINE SODIUM 112 UG/1
112 TABLET ORAL DAILY
Status: DISCONTINUED | OUTPATIENT
Start: 2018-11-13 | End: 2018-11-15 | Stop reason: HOSPADM

## 2018-11-12 RX ORDER — FOLIC ACID 1 MG/1
1 TABLET ORAL DAILY
Status: DISCONTINUED | OUTPATIENT
Start: 2018-11-12 | End: 2018-11-15 | Stop reason: HOSPADM

## 2018-11-12 RX ORDER — IBANDRONATE SODIUM 3 MG/3 ML
3 SYRINGE (ML) INTRAVENOUS
Status: DISCONTINUED | OUTPATIENT
Start: 2018-11-12 | End: 2018-11-12

## 2018-11-12 RX ORDER — LOSARTAN POTASSIUM 100 MG/1
100 TABLET ORAL DAILY
Status: DISCONTINUED | OUTPATIENT
Start: 2018-11-13 | End: 2018-11-15 | Stop reason: HOSPADM

## 2018-11-12 RX ORDER — LEVOTHYROXINE SODIUM 125 UG/1
125 TABLET ORAL DAILY
Status: DISCONTINUED | OUTPATIENT
Start: 2018-11-12 | End: 2018-11-12

## 2018-11-12 RX ORDER — ONDANSETRON 4 MG/1
4 TABLET, ORALLY DISINTEGRATING ORAL EVERY 6 HOURS PRN
Status: DISCONTINUED | OUTPATIENT
Start: 2018-11-12 | End: 2018-11-15 | Stop reason: HOSPADM

## 2018-11-12 RX ORDER — NEOSTIGMINE METHYLSULFATE 1 MG/ML
VIAL (ML) INJECTION PRN
Status: DISCONTINUED | OUTPATIENT
Start: 2018-11-12 | End: 2018-11-12

## 2018-11-12 RX ORDER — OXYCODONE HYDROCHLORIDE 5 MG/1
5 TABLET ORAL
Status: DISCONTINUED | OUTPATIENT
Start: 2018-11-12 | End: 2018-11-15 | Stop reason: HOSPADM

## 2018-11-12 RX ORDER — ZOLPIDEM TARTRATE 5 MG/1
5 TABLET ORAL
Status: DISCONTINUED | OUTPATIENT
Start: 2018-11-13 | End: 2018-11-15 | Stop reason: HOSPADM

## 2018-11-12 RX ORDER — FENTANYL CITRATE 50 UG/ML
25-50 INJECTION, SOLUTION INTRAMUSCULAR; INTRAVENOUS
Status: DISCONTINUED | OUTPATIENT
Start: 2018-11-12 | End: 2018-11-12 | Stop reason: HOSPADM

## 2018-11-12 RX ORDER — GLYCOPYRROLATE 0.2 MG/ML
INJECTION, SOLUTION INTRAMUSCULAR; INTRAVENOUS PRN
Status: DISCONTINUED | OUTPATIENT
Start: 2018-11-12 | End: 2018-11-12

## 2018-11-12 RX ORDER — KETAMINE HYDROCHLORIDE 10 MG/ML
INJECTION INTRAMUSCULAR; INTRAVENOUS PRN
Status: DISCONTINUED | OUTPATIENT
Start: 2018-11-12 | End: 2018-11-12

## 2018-11-12 RX ORDER — PANTOPRAZOLE SODIUM 40 MG/1
40 TABLET, DELAYED RELEASE ORAL ONCE
Status: COMPLETED | OUTPATIENT
Start: 2018-11-12 | End: 2018-11-12

## 2018-11-12 RX ORDER — CEFAZOLIN SODIUM 2 G/100ML
2 INJECTION, SOLUTION INTRAVENOUS
Status: COMPLETED | OUTPATIENT
Start: 2018-11-12 | End: 2018-11-12

## 2018-11-12 RX ORDER — HYDROXYZINE HYDROCHLORIDE 25 MG/1
25 TABLET, FILM COATED ORAL 3 TIMES DAILY PRN
Status: DISCONTINUED | OUTPATIENT
Start: 2018-11-12 | End: 2018-11-15 | Stop reason: HOSPADM

## 2018-11-12 RX ORDER — NALOXONE HYDROCHLORIDE 0.4 MG/ML
.1-.4 INJECTION, SOLUTION INTRAMUSCULAR; INTRAVENOUS; SUBCUTANEOUS
Status: DISCONTINUED | OUTPATIENT
Start: 2018-11-12 | End: 2018-11-15 | Stop reason: HOSPADM

## 2018-11-12 RX ORDER — ONDANSETRON 2 MG/ML
4 INJECTION INTRAMUSCULAR; INTRAVENOUS EVERY 6 HOURS
Status: DISCONTINUED | OUTPATIENT
Start: 2018-11-12 | End: 2018-11-13

## 2018-11-12 RX ORDER — ONDANSETRON 2 MG/ML
4 INJECTION INTRAMUSCULAR; INTRAVENOUS EVERY 30 MIN PRN
Status: DISCONTINUED | OUTPATIENT
Start: 2018-11-12 | End: 2018-11-12 | Stop reason: HOSPADM

## 2018-11-12 RX ORDER — AMOXICILLIN 250 MG
2 CAPSULE ORAL 2 TIMES DAILY
Status: DISCONTINUED | OUTPATIENT
Start: 2018-11-12 | End: 2018-11-15 | Stop reason: HOSPADM

## 2018-11-12 RX ORDER — METOPROLOL TARTRATE 50 MG
50 TABLET ORAL 2 TIMES DAILY
Status: DISCONTINUED | OUTPATIENT
Start: 2018-11-12 | End: 2018-11-15 | Stop reason: HOSPADM

## 2018-11-12 RX ADMIN — GLYCOPYRROLATE 0.3 MG: 0.2 INJECTION, SOLUTION INTRAMUSCULAR; INTRAVENOUS at 12:17

## 2018-11-12 RX ADMIN — ACETAMINOPHEN 1000 MG: 500 TABLET, FILM COATED ORAL at 08:58

## 2018-11-12 RX ADMIN — ROCURONIUM BROMIDE 20 MG: 10 INJECTION INTRAVENOUS at 10:29

## 2018-11-12 RX ADMIN — PANTOPRAZOLE SODIUM 40 MG: 40 TABLET, DELAYED RELEASE ORAL at 08:58

## 2018-11-12 RX ADMIN — ROCURONIUM BROMIDE 10 MG: 10 INJECTION INTRAVENOUS at 10:43

## 2018-11-12 RX ADMIN — WARFARIN SODIUM 5 MG: 5 TABLET ORAL at 17:29

## 2018-11-12 RX ADMIN — ONDANSETRON 4 MG: 2 INJECTION INTRAMUSCULAR; INTRAVENOUS at 15:48

## 2018-11-12 RX ADMIN — METOPROLOL TARTRATE 50 MG: 50 TABLET, FILM COATED ORAL at 20:09

## 2018-11-12 RX ADMIN — SODIUM CHLORIDE: 9 INJECTION, SOLUTION INTRAVENOUS at 17:28

## 2018-11-12 RX ADMIN — DEXAMETHASONE SODIUM PHOSPHATE 4 MG: 4 INJECTION, SOLUTION INTRA-ARTICULAR; INTRALESIONAL; INTRAMUSCULAR; INTRAVENOUS; SOFT TISSUE at 10:29

## 2018-11-12 RX ADMIN — SENNOSIDES AND DOCUSATE SODIUM 1 TABLET: 8.6; 5 TABLET ORAL at 19:50

## 2018-11-12 RX ADMIN — KETAMINE HCL-NACL SOLN PREF SY 50 MG/5ML-0.9% (10MG/ML) 30 MG: 10 SOLUTION PREFILLED SYRINGE at 10:34

## 2018-11-12 RX ADMIN — ONDANSETRON 4 MG: 2 INJECTION INTRAMUSCULAR; INTRAVENOUS at 10:29

## 2018-11-12 RX ADMIN — DEXMEDETOMIDINE HYDROCHLORIDE 0.4 MCG/KG/HR: 100 INJECTION, SOLUTION INTRAVENOUS at 10:35

## 2018-11-12 RX ADMIN — GLYCOPYRROLATE 0.1 MG: 0.2 INJECTION, SOLUTION INTRAMUSCULAR; INTRAVENOUS at 10:29

## 2018-11-12 RX ADMIN — SODIUM CHLORIDE, POTASSIUM CHLORIDE, SODIUM LACTATE AND CALCIUM CHLORIDE: 600; 310; 30; 20 INJECTION, SOLUTION INTRAVENOUS at 11:12

## 2018-11-12 RX ADMIN — FENTANYL CITRATE 100 MCG: 50 INJECTION, SOLUTION INTRAMUSCULAR; INTRAVENOUS at 10:28

## 2018-11-12 RX ADMIN — CEFAZOLIN SODIUM 2 G: 2 INJECTION, SOLUTION INTRAVENOUS at 10:34

## 2018-11-12 RX ADMIN — PROPOFOL 100 MG: 10 INJECTION, EMULSION INTRAVENOUS at 10:29

## 2018-11-12 RX ADMIN — RANITIDINE 150 MG: 150 TABLET ORAL at 19:51

## 2018-11-12 RX ADMIN — FENTANYL CITRATE 25 MCG: 50 INJECTION, SOLUTION INTRAMUSCULAR; INTRAVENOUS at 12:07

## 2018-11-12 RX ADMIN — SODIUM CHLORIDE 1000 ML: 9 INJECTION, SOLUTION INTRAVENOUS at 14:53

## 2018-11-12 RX ADMIN — ALLOPURINOL 100 MG: 100 TABLET ORAL at 19:52

## 2018-11-12 RX ADMIN — ACETAMINOPHEN 975 MG: 325 TABLET, FILM COATED ORAL at 17:28

## 2018-11-12 RX ADMIN — HYDROMORPHONE HYDROCHLORIDE 0.5 MG: 1 INJECTION, SOLUTION INTRAMUSCULAR; INTRAVENOUS; SUBCUTANEOUS at 10:51

## 2018-11-12 RX ADMIN — MIDAZOLAM 2 MG: 1 INJECTION INTRAMUSCULAR; INTRAVENOUS at 10:04

## 2018-11-12 RX ADMIN — SODIUM CHLORIDE, POTASSIUM CHLORIDE, SODIUM LACTATE AND CALCIUM CHLORIDE: 600; 310; 30; 20 INJECTION, SOLUTION INTRAVENOUS at 10:00

## 2018-11-12 RX ADMIN — BUPIVACAINE HYDROCHLORIDE AND EPINEPHRINE BITARTRATE 20 ML: 5; .005 INJECTION, SOLUTION EPIDURAL; INTRACAUDAL; PERINEURAL at 10:13

## 2018-11-12 RX ADMIN — LIDOCAINE HYDROCHLORIDE 50 MG: 10 INJECTION, SOLUTION INFILTRATION; PERINEURAL at 10:29

## 2018-11-12 RX ADMIN — OXYCODONE HYDROCHLORIDE 5 MG: 5 TABLET ORAL at 20:09

## 2018-11-12 RX ADMIN — OMEPRAZOLE 20 MG: 20 CAPSULE, DELAYED RELEASE ORAL at 19:52

## 2018-11-12 RX ADMIN — Medication 1.5 MG: at 12:17

## 2018-11-12 RX ADMIN — CEFAZOLIN SODIUM 1 G: 1 INJECTION, SOLUTION INTRAVENOUS at 17:34

## 2018-11-12 RX ADMIN — HYDROMORPHONE HYDROCHLORIDE 0.5 MG: 1 INJECTION, SOLUTION INTRAMUSCULAR; INTRAVENOUS; SUBCUTANEOUS at 10:45

## 2018-11-12 ASSESSMENT — LIFESTYLE VARIABLES: TOBACCO_USE: 0

## 2018-11-12 ASSESSMENT — ACTIVITIES OF DAILY LIVING (ADL)
ADLS_ACUITY_SCORE: 10
ADLS_ACUITY_SCORE: 11

## 2018-11-12 ASSESSMENT — COPD QUESTIONNAIRES: COPD: 0

## 2018-11-12 ASSESSMENT — ENCOUNTER SYMPTOMS
STRIDOR: 0
DYSRHYTHMIAS: 1

## 2018-11-12 NOTE — PROGRESS NOTES
Patient arrived to her room at 1430 AM via transport cart.  No complaints of pain on NV upon arrival. Patient oriented to room, call light, safe use of equipment. Will keep monitoring.

## 2018-11-12 NOTE — ANESTHESIA CARE TRANSFER NOTE
Patient: Zunilda Clark    Procedure(s):  left total knee arthroplasty    Diagnosis: DJD  Diagnosis Additional Information: No value filed.    Anesthesia Type:   General, ETT, Periph. Nerve Block for postop pain     Note:  Airway :Face Mask  Patient transferred to:PACU  Comments: Report and sign off to RN in PACU.  Good resps, skin pink, monitors on, VSS,  O2 via Face Tent.Handoff Report: Identifed the Patient, Identified the Reponsible Provider, Reviewed the pertinent medical history, Discussed the surgical course, Reviewed Intra-OP anesthesia mangement and issues during anesthesia, Set expectations for post-procedure period and Allowed opportunity for questions and acknowledgement of understanding      Vitals: (Last set prior to Anesthesia Care Transfer)    CRNA VITALS  11/12/2018 1203 - 11/12/2018 1240      11/12/2018             Pulse: 64    SpO2: 98 %    Resp Rate (observed): 20    EKG: Sinus rhythm;A Paced                Electronically Signed By: MANJIT Majano CRNA  November 12, 2018  12:40 PM

## 2018-11-12 NOTE — ANESTHESIA PROCEDURE NOTES
Peripheral nerve/Neuraxial procedure note : Femoral (adductor canal)  Pre-Procedure  Performed by JANN RAMIRES  Referred by [facility]  Location: pre-op    Procedure Times:11/12/2018 10:04 AM and 11/12/2018 10:13 AM  Pre-Anesthestic Checklist: patient identified, IV checked, site marked, risks and benefits discussed, informed consent, monitors and equipment checked, pre-op evaluation, at physician/surgeon's request and post-op pain management    Timeout  Correct Patient: Yes   Correct Procedure: Yes   Correct Site: Yes   Correct Laterality: Yes   Correct Position: Yes   Site Marked: Yes   .   Procedure Documentation    .    Procedure:  left  Femoral (adductor canal).     Ultrasound used to identify targeted nerve, plexus, or vascular marker and placed a needle adjacent to it., Ultrasound was used to visualize the spread of the anesthetic in close proximity to the above stated nerve.   Patient Prep;mask, sterile gloves, povidone-iodine 7.5% surgical scrub.  .  Needle: insulated Needle Gauge: 22.    Needle Length (Inches) 3.13  Insertion Method: Single Shot (incrementally).       Assessment/Narrative  Paresthesias: No.  .  The placement was negative for: blood aspirated, painful injection and site bleeding.  Bolus given via..   Secured via.   Complications: none. Comments:  The surgeon has given a verbal order transferring care of this patient to me for the performance of a regional analgesia block for post-op pain control. It is requested of me because I am uniquely trained and qualified to perform this block and the surgeon is neither trained nor qualified to perform this procedure.

## 2018-11-12 NOTE — ANESTHESIA PREPROCEDURE EVALUATION
PAC NOTE:       ANESTHESIA PRE EVALUATION:  Anesthesia Evaluation     . Pt has had prior anesthetic. Type: General    History of anesthetic complications   - PONV        ROS/MED HX    ENT/Pulmonary:     (+)PARAM risk factors hypertension, , . .   (-) tobacco use, asthma, COPD and recent URI   Neurologic:  - neg neurologic ROS   (+)CVA with deficits- left side weakness,     Cardiovascular:     (+) Dyslipidemia, hypertension----. : . CHF . fainting (syncope). pacemaker :. dysrhythmias a-fib, valvular problems/murmurs type: MR s/p MVR:. pulmonary hypertension, Previous cardiac testing Echodate:7/18results:1. There is moderate tricuspid insufficiency with a regurgitant fraction of 45% (RVSV is 71 ml and net  forward pulmonic flow is 49 ml).   2. The RV is upper limit of normal in cavity size. The global systolic function is mildly decreased. The  RVEF is 50%  3. The LV is normal in cavity size with a mild increase in wall thickness. The global systolic function is  hyperdynamic. The LVEF is 69%. There are no regional wall motion abnormalities.  4. The planimetered aortic valve area is 2 cm2 and there is trace aortic insufficiency.date: results:ECG reviewed date:8/18 results:NSR.  Nonspecific ST changes. date: results:         (-) CAD   METS/Exercise Tolerance:     Hematologic: Comments: Lab Test        11/05/18 11/05/18 09/21/18 09/14/18      --          08/17/18      --          04/23/18      --          08/17/17                       1112                                                                 --           1037           --           1120           --           0843          WBC           --           --           --           --           --          7.5           --          8.4           --          8.1           HGB          12.3          --           --           --           --          11.7          --          11.7          --          11.6*         MCV           --           --           --            --           --          104*          --          101*          --          98            PLT           --           --           --           --           --          321           --          343           --          329           INR           --          2.5*         2.2*         2.5*           < >         --            < >         --            < >         --            < > = values in this interval not displayed.                  Lab Test        11/05/18 08/17/18 07/26/18                       1112          1037          1035          NA           141          142          141           POTASSIUM    4.3          3.7          3.7           CHLORIDE     104          106          105           CO2          27           29           31            BUN          27           25           38*           CR           0.98         0.96         1.13*         ANIONGAP     10           7            5             BRICE          9.8          9.2          9.8           GLC          88           86           82              (+) History of blood clots -      Musculoskeletal:   (+) arthritis, , , -       GI/Hepatic:     (+) GERD      (-) liver disease   Renal/Genitourinary:     (+) chronic renal disease, type: CRI, Pt does not require dialysis, Pt has no history of transplant,       Endo:     (+) thyroid problem hypothyroidism, .   (-) Type I DM, Type II DM, chronic steroid usage and obesity   Psychiatric:  - neg psychiatric ROS       Infectious Disease:  - neg infectious disease ROS       Malignancy:      - no malignancy   Other:    (+) H/O Chronic Pain,                   Physical Exam  Normal systems: cardiovascular, pulmonary and dental    Airway   Mallampati: II  TM distance: >3 FB  Neck ROM: full    Dental     Cardiovascular   Rhythm and rate: regular and normal  (-) no friction rub, no systolic click and no murmur    Pulmonary    breath sounds clear to auscultation(-) no rhonchi, no decreased breath sounds,  no wheezes, no rales and no stridor             Anesthesia Plan      History & Physical Review  History and physical reviewed and following examination; no interval change.    ASA Status:  3 .    NPO Status:  > 8 hours    Plan for General, ETT and Periph. Nerve Block for postop pain with Intravenous induction. Maintenance will be Balanced.    PONV prophylaxis:  Ondansetron (or other 5HT-3) and Dexamethasone or Solumedrol       Postoperative Care  Postoperative pain management:  IV analgesics and Peripheral nerve block (Single Shot).      Consents  Anesthetic plan, risks, benefits and alternatives discussed with:  Patient or representative, Patient, Spouse and Sibling..                            .

## 2018-11-12 NOTE — OP NOTE
Procedure Date: 11/12/2018      PREOPERATIVE DIAGNOSIS:  Varus primary osteoarthritis, left knee.      POSTOPERATIVE DIAGNOSIS:  Varus primary osteoarthritis, left knee.      PROCEDURE:  Left total knee arthroplasty using a Smith and Nephew Melissa II knee system.      SURGEON:  Jurgen Sheikh MD      ASSISTANT:  Amna Porter PA-C      INDICATIONS FOR SURGERY:  Mrs. Clark is a very pleasant 78-year-old female, who has had ongoing left knee pain secondary to osteoarthritis, with a varus deformity for some time now.  She has failed conservative management with oral analgesics, activity modifications, and injections, and now wishes to proceed with operative intervention.  We had a long discussion of risks, benefits, and alternatives of total knee arthroplasty, understanding and wished to proceed with surgery.      NARRATIVE EVENTS:  After thorough evaluation and proper identification of the appropriate extremity to be operated on, Mrs. Clark was taken to the operating room where she underwent a general anesthetic as well as femoral nerve block.  She was placed supine on the operating table and her left leg was prepped and draped in the usual sterile manner.  After appropriate surgical pause to confirm the patient and the extremity to be operated on, 30 minutes after the patient received 2 g of Ancef, her left leg was exsanguinated and the tourniquet was raised to 300 mmHg on left upper thigh.  We approached her left knee through a midline incision centered over the patella.  Skin and soft sharply dissected down to the patella, where a median parapatellar arthrotomy was performed.  We performed a moderate medial release according to our preoperative templating, everted the patella, removed the infrapatellar fat pad, flexed the knee, removed the osteophytes from the distal femur, and drilled and placed our intramedullary guide for our distal femoral resection.  We resected the distal femur at 5 degrees physiologic  valgus to the femur, resecting 9 mm of distal femoral bone.  Once this was done, we then sized the distal femur, sized to fit a size 6 Smith & Nephew Melissa II femoral component.  We pinned our 4-in-1 cutting block in 3 degrees of external rotation to the posterior condyles in line with the epicondylar axis perpendicular to Whitesides line.  We made our anterior, posterior, and chamfer resections.  We then proceeded to the tibia.  He resected this perpendicular to the long axis of the tibia using extramedullary guides.  Once this was done, we then removed the excess soft tissue from the knee, mainly both cruciate ligaments and both meniscus.  We made a box cut for a posterior stabilized femoral component and placed our trial implants into position.  A size 6 femur and a size 5 tibia, and we found that with a 15 mm PS polyethylene insert, that she had good stability and full range of motion, and the patella tracked nicely.  We then paid our attention to the patella, which measured 23 mm in thickness prior to resection.  We resected this down to 14 mm and placed an 8 mm thick x 32 mm diameter round tri-peg patellar button in position.  With patella and button in position, this was measured to 23.5 mm and tracked quite nicely.  We then marked our tibial rotation and we punched  for our tibial keel, thoroughly irrigated the knee and prepared to cement in our final components.  Using one batch of Simplex Speedset cement, we cemented in a size 5 tibia, 6 femur, and a 32 round tri-peg patellar button.  Once the cement had cured, we retrialed the polyethylene insert and found that a 50 mm PS insert gave the best stability and full range of motion.  This was then impacted into position.  The knee was thoroughly irrigated.  All of the excess cement was removed from the knee.  The knee was thoroughly irrigated with both saline and Irrisept solution.  We closed the arthrotomy with number 1 and 0 Vicryl sutures, placing one  drain deep to the arthrotomy.  We closed the skin and soft tissue with absorbable sutures and staples in the skin.  The patient was placed in a well-padded postoperative dressing, taken to recovery room in stable condition.  She tolerated the procedure without difficulty.         NOLBERTO CASTRO MD             D: 2018   T: 2018   MT: ADARSH      Name:     NADIYA LEWIS   MRN:      [id number]        Account:        [id number]   :      1940           Procedure Date: 2018      Document: [id number]

## 2018-11-12 NOTE — PROGRESS NOTES
11/12/18 1600   Quick Adds   Type of Visit Initial PT Evaluation   Living Environment   Lives With spouse   Living Arrangements apartment   Home Accessibility tub/shower is not walk in   Number of Stairs to Enter Home 0   Transportation Available family or friend will provide   Living Environment Comment Spouse can help as needed on DC.    Self-Care   Usual Activity Tolerance good   Current Activity Tolerance poor   Equipment Currently Used at Home walker, rolling   Functional Level Prior   Ambulation 1-->assistive equipment  (FWW in the apartment, 4WW outside the apartment)   Transferring 0-->independent   Toileting 0-->independent   Bathing 0-->independent   Dressing 0-->independent   Fall history within last six months no   General Information   Onset of Illness/Injury or Date of Surgery - Date 11/12/18   Referring Physician Aguilar SIMMONS   Patient/Family Goals Statement Return home with HHPT   Pertinent History of Current Problem (include personal factors and/or comorbidities that impact the POC) 78 year old s/p L TKA.    Precautions/Limitations fall precautions   Weight-Bearing Status - LLE weight-bearing as tolerated   General Info Comments KI until SLR   Cognitive Status Examination   Orientation orientation to person, place and time   Level of Consciousness alert   Follows Commands and Answers Questions able to follow single-step instructions   Pain Assessment   Patient Currently in Pain No   Integumentary/Edema   Integumentary/Edema Comments Dressing clean, dry and intact to L LE.    Range of Motion (ROM)   ROM Comment Decreased L knee AROM secondary to pain, weakness.    Strength   Strength Comments IND SLR on the L LE.    Bed Mobility   Bed Mobility Comments Supine to sit with SBA.    Transfer Skills   Transfer Comments Sit to/from stand with CGA.    Gait   Gait Comments Able to take one step in place with each LE.    Balance   Balance Comments Requires bilateral UE support on FWW for safe static balance  "this session.    Sensory Examination   Sensory Perception Comments Denies burning, numbness and tingling.    Modality Interventions   Planned Modality Interventions Cryotherapy   Planned Modality Interventions Comments PRN   General Therapy Interventions   Planned Therapy Interventions bed mobility training;gait training;ROM;strengthening;transfer training;home program guidelines;progressive activity/exercise   Clinical Impression   Criteria for Skilled Therapeutic Intervention yes, treatment indicated   PT Diagnosis Impaired gait   Influenced by the following impairments Pain, decreased LLE AROM/strength, impaired balance   Functional limitations due to impairments Decreased independence with bed mobility, transfers and ambulation   Clinical Presentation Stable/Uncomplicated   Clinical Presentation Rationale Stable.    Clinical Decision Making (Complexity) Low complexity   Therapy Frequency` 2 times/day   Predicted Duration of Therapy Intervention (days/wks) 3 days   Anticipated Discharge Disposition Home with Assist;Home with Home Therapy;Home with Outpatient Therapy   Risk & Benefits of therapy have been explained Yes   Patient, Family & other staff in agreement with plan of care Yes   [facility] TM \"6 Clicks\"   2016, Trustees of [facility], under license to [employer].  All rights reserved.   6 Clicks Short Forms Basic Mobility Inpatient Short Form   [facility]  \"6 Clicks\" V.2 Basic Mobility Inpatient Short Form   1. Turning from your back to your side while in a flat bed without using bedrails? 4 - None   2. Moving from lying on your back to sitting on the side of a flat bed without using bedrails? 3 - A Little   3. Moving to and from a bed to a chair (including a wheelchair)? 3 - A Little   4. Standing up from a chair using your arms (e.g., wheelchair, or bedside chair)? 3 - A Little   5. To walk in hospital room? 2 - A Lot   6. Climbing 3-5 steps with a railing? 2 - A Lot "   Basic Mobility Raw Score (Score out of 24.Lower scores equate to lower levels of function) 17   Total Evaluation Time   Total Evaluation Time (Minutes) 8

## 2018-11-12 NOTE — ANESTHESIA POSTPROCEDURE EVALUATION
Patient: Zunilda Clark    Procedure(s):  left total knee arthroplasty    Diagnosis:DJD  Diagnosis Additional Information: DJD        Anesthesia Type:  General, ETT, Periph. Nerve Block for postop pain    Note:  Anesthesia Post Evaluation    Patient location during evaluation: PACU  Patient participation: Able to participate in evaluation but full recovery from regional anesthesia has not yet ocurrred but is anticipated to occur within 48 hours  Level of consciousness: awake  Pain management: adequate  Airway patency: patent  Cardiovascular status: acceptable  Respiratory status: acceptable  Hydration status: acceptable  PONV: controlled     Anesthetic complications: None          Last vitals:  Vitals:    11/12/18 1240 11/12/18 1245 11/12/18 1250   BP: 160/70 150/75 155/73   Resp: 12 20 18   Temp: 99.3  F (37.4  C)     SpO2: 99% 99% 100%         Electronically Signed By: Luis E Strickland MD  November 12, 2018  1:08 PM

## 2018-11-12 NOTE — IP AVS SNAPSHOT
[facility]    [address] [place] [address]    Phone:  [phone]    Fax:  [phone]                                       After Visit Summary   11/12/2018    Zunilda Alicea May    MRN: [id number]           After Visit Summary Signature Page     I have received my discharge instructions, and my questions have been answered. I have discussed any challenges I see with this plan with the nurse or doctor.    ..........................................................................................................................................  Patient/Patient Representative Signature      ..........................................................................................................................................  Patient Representative Print Name and Relationship to Patient    ..................................................               ................................................  Date                                   Time    ..........................................................................................................................................  Reviewed by Signature/Title    ...................................................              ..............................................  Date                                               Time          22EPIC Rev 08/18

## 2018-11-12 NOTE — CONSULTS
[facility]  Hospitalist Consult Note  Name: Zunilda Clark    MRN: [id number]  YOB: 1940    Age: 78 year old  Date of admission: 11/12/2018  Primary care provider: Yunior Huang     Requesting Physician:  Dr. Sheikh  Reason for consult:  Post-operative medical management          Assessment and Plan:   Summary of stay:  Zunilda Clark is a 78 year old female with a history of bioprosthetic valve replacement due to rheumatic heart disease, A. fib/A flutter S/P AV node ablation with pacemaker placement on warfarin, pulmonary HTN, remote DVT, RA, CVA with residual left-sided weakness, HTN, anemia, and hypothyroidism who was admitted on 11/12/18 following a left TKA by Dr. Sheikh.    Osteoarthritis:  s/p left TKA on 11/12/18 by Dr. Sheikh. The patient is doing well.  Currently has well controlled pain and is hemodynamically stable. Will defer diet, activity, and pain control to the primary team.  Capnography. Continue physical and occupational therapy. Social work consulted for possible placement needs. Continue incentive spirometry and follow hemoglobin to evaluate for surgical blood loss and potential need for transfusion.     A. fib, a flutter, s/p bioprosthetic mitral valve: History of bioprosthetic mitral valve replacement in 2008 with redo in 2014 for rheumatic heart disease.  Also has history of A. fib, a flutter and has had a maze procedure in the past and then AV kika ablation in 2011 with pacemaker placement.  Chronically on warfarin that was held for surgery.  Has been on bridging Lovenox.  -Primary team has ordered prophylactic Lovenox to start tonight.  Will ask orthopedics if okay to resume therapeutic twice daily Lovenox bridge starting tomorrow.    -Start warfarin tonight, Pharm.D. to dose  -Daily INR    History CVA: Stroke in perioperative period in the past when off of warfarin without bridging.  Residual left-sided weakness.  -Prophylaxis Lovenox dose  tonight and ideally can start therapeutic dosing tomorrow, will discuss with orthopedics team    Pulmonary hypertension: Severe based on last echocardiogram.  Did have right-sided heart failure postoperatively after last surgery.  Currently has no peripheral edema and torsemide was held this morning.  -Continue 10 mg daily torsemide p.o. tomorrow morning  -Stop IV fluids now    HTN: Continue PTA metoprolol tartrate 50 mg twice daily in combination pill amlodipine-valsartan.  Recent valsartan recall, not available in the hospital.  Pharm.D. has substituted for amlodipine 5 mg-losartan 100 mg daily which we will continue.    Remote history DVT: Lovenox as above.    RA: PTA on weekly IM methotrexate on Thursdays.  Last dose last Thursday.  Given her rheumatic heart disease risk benefit favors continuing methotrexate in perioperative period.  She will take when she returns home either Thursday or Friday depending on postop recovery goes.    Hypothyroidism: Continue PTA levothyroxine.    Gout: Continue allopurinol 100 mg twice daily.    Code status: Full code  Prophylaxis: Primary team has ordered prophylactic Lovenox to start tonight.  Will ask orthopedics if okay to resume therapeutic twice daily Lovenox bridge starting tomorrow.  Warfarin also started tonight  Disposition: per primary team. PT/OT    Thank you for the consultation, we will continue to follow along during the hospitalization. Please page with any questions or concerns.         History of Present Illness:   Zunilda Clark is a 78 year old female with a history of bioprosthetic valve replacement due to rheumatic heart disease, A. fib/A flutter S/P AV node ablation with pacemaker placement on warfarin, pulmonary HTN, remote DVT, RA, gout, CVA with residual left-sided weakness, HTN, anemia, and hypothyroidism who was admitted on 11/12/18 following a left TKA by Dr. Sheikh.  Prior to hospitalization she was on Lovenox bridge and warfarin has been held.   Last dose of Lovenox morning day before surgery.  Denies any recent fevers, chills, cough, shortness of breath, leg swelling.  Pre-operative note was fully reviewed and recommendations acknowledged. Op note and anesthesia notes and flow sheets reviewed.     Currently pain is adequately controlled.  Mild nausea resolved.  No vomiting.  No fevers, chills, diaphoresis. No chest pain, palpitations, dyspnea. Leonard catheter still in place.             Past Medical History reviewed:     Past Medical History:   Diagnosis Date     Acquired hypothyroidism 11/4/2015     Aortic valve insufficiency      Arrhythmia     A fib     Arthritis      Atrial fibrillation (H)      Atrial fibrillation and flutter (H)      Blood clotting disorder (H)      CHF (congestive heart failure) (H)      DVT (deep venous thrombosis) (H) 2003     Gastro-oesophageal reflux disease      H/O mitral valve replacement with tissue graft june 2014     History of blood transfusion 2014     HTN (hypertension)      Hypothyroidism      Other chronic pain      PONV (postoperative nausea and vomiting)      Pulmonary HTN (H)      Rheumatoid arthritis (H)      Rheumatoid arthritis(714.0)      Seizures (H) 2014     Shingles 08/2018     Stroke (H) 2009    left side mild weakness      Stroke (H) 2014     [place]ope 2011    [place]     Thrombosis of leg 2003    both legs             Past Surgical History reviewed:     Past Surgical History:   Procedure Laterality Date     ABDOMEN SURGERY      prolapsed bladder     COLONOSCOPY  3/15/2012     EYE SURGERY  2018    Both eyes/cataract surgery     H ABLATION AV NODE  2011    AFlutter with syncope, PPM to follow     HERNIA REPAIR      3 hernies/right and left & umbilical     IMPLANT PACEMAKER  2011     LAPAROSCOPIC CHOLECYSTECTOMY WITH CHOLANGIOGRAMS N/A 4/29/2017    Procedure: LAPAROSCOPIC CHOLECYSTECTOMY WITH CHOLANGIOGRAMS;  LAPAROSCOPIC CHOLECYSTECTOMY WITH CHOLANGIOGRAMS ;  Surgeon: Angeles Mcgill MD;   Location: [facility] OR     OPEN REDUCTION INTERNAL FIXATION RODDING INTRAMEDULLARY HUMERUS Right 2015    Procedure: OPEN REDUCTION INTERNAL FIXATION RODDING INTRAMEDULLARY HUMERUS;  Surgeon: Raymundo Rivera MD;  Location: [facility] OR     REPLACE VALVE MITRAL  2006    Mitral valve replacement with bioprosthetic valve in  for rheumatic disease     SLING BLADDER SUSPENSION WITH FASCIA UMESH       VASCULAR SURGERY      Blood clots in both legs               Social History reviewed:     Social History   Substance Use Topics     Smoking status: Never Smoker     Smokeless tobacco: Never Used     Alcohol use No             Family History reviewed:     Family History   Problem Relation Age of Onset     Coronary Artery Disease Mother      Coronary Artery Disease Brother      Diabetes Brother      Cancer Brother       at age 52      Other Cancer Brother      Hypertension Sister      Hyperlipidemia Sister              Allergies:   No Known Allergies          Medications:     Prior to Admission medications    Medication Sig Last Dose Taking? Auth Provider   ACETAMINOPHEN PO Take 650 mg by mouth every 6 hours as needed  Past Month at Unknown time Yes Unknown, Entered By History   ALLOPURINOL PO Take 100 mg by mouth 2 times daily 2018 at 0700 Yes Unknown, Entered By History   Amlodipine Besylate-Valsartan 5-160 MG TABS Take 1 tablet by mouth daily 2018 at 0700 Yes Yunior Huang MD   calcium citrate (CALCITRATE) 950 MG tablet Take by mouth daily 1200 mg daily Past Week at Unknown time Yes Unknown, Entered By History   enoxaparin (LOVENOX) 60 MG/0.6ML injection Inject 0.6 mLs (60 mg) Subcutaneous 2 times daily 2018 at 0700 Yes Yunior Huang MD   ferrous sulfate (IRON) 325 (65 Fe) MG tablet Take 325 mg by mouth every other day Past Month at Unknown time Yes Reported, Patient   fish oil-omega-3 fatty acids 1000 MG capsule Take 1 g by mouth daily Pt takes as remembers Past Month at Unknown time Yes  "Unknown, Entered By History   folic acid (FOLVITE) 1 MG tablet Take 1 mg by mouth daily Past Week at Unknown time Yes Reported, Patient   levothyroxine (SYNTHROID/LEVOTHROID) 112 MCG tablet TAKE 1 TABLET (112 MCG) BY MOUTH DAILY 11/12/2018 at 0700 Yes Yunior Huang MD   METHOTREXATE SODIUM IJ Inject 0.8 mLs as directed once a week Thursdays Past Month at Unknown time Yes Reported, Patient   metoprolol tartrate (LOPRESSOR) 50 MG tablet Take 1 tablet (50 mg) by mouth 2 times daily 11/12/2018 at 0700 Yes Jluis Monroy MD   multivitamin, therapeutic with minerals (MULTI-VITAMIN) TABS tablet Take 1 tablet by mouth daily Without iron Past Week at Unknown time Yes Unknown, Entered By History   omeprazole (PRILOSEC) 20 MG CR capsule Take 1 capsule (20 mg) by mouth 2 times daily Past Week at Unknown time Yes Yunior Huang MD   torsemide (DEMADEX) 10 MG tablet Take 1 tablet (10 mg) by mouth daily (with breakfast) 11/11/2018 at 0700 Yes Jluis Monroy MD   VITAMIN D, CHOLECALCIFEROL, PO Take 1,000 Units by mouth daily Past Month at Unknown time Yes Unknown, Entered By History   WARFARIN SODIUM PO Take 5 mg by mouth MON, WED and SAT Past Week at Unknown time Yes Reported, Patient   WARFARIN SODIUM PO Take 2.5 mg by mouth SUN, TUES,THURS, FRI Past Week at Unknown time Yes Reported, Patient   ibandronate (BONIVA) 3 MG/3ML Inject 3 mg into the vein every 3 months  More than a month at Unknown time  Reported, Patient       Current hospital administered medication list (MAR) also reviewed.          Review of Systems:   A comprehensive greater than 10 system review of systems was carried out.  Pertinent positives and negatives are noted above.  Otherwise negative for contributory info.            Physical Exam:   Blood pressure 145/69, temperature 96.5  F (35.8  C), temperature source Axillary, resp. rate 16, height 1.626 m (5' 4\"), weight 70.8 kg (156 lb), SpO2 95 %, not currently " breastfeeding.    Exam:  Constitutional: Awake, NAD  Eyes: sclera white   HEENT: atraumatic, MMM, capnography on  Respiratory: no respiratory distress, lungs cta bilaterally, no crackles or wheeze  Cardiovascular: RRR.  Loud S2.  No murmur  GI: non-tender, not distended, bowel sounds present  Skin: no rash   Musculoskeletal/extremities: Left knee wrapped with wound VAC no peripheral edema  Neurologic: A&O, speech clear   Psychiatric: calm, cooperative, normal affect           Data:   Imaging:  Reviewed.    EKG/Telemetry:  Reviewed.    Preoperative labs: Reviewed.   Globin 12.3, creatinine 0.98, INR 1.16    Jose C Brown MD  [facility] Hospitalist  November 12, 2018

## 2018-11-12 NOTE — PROGRESS NOTES
"/65  Temp 96.5  F (35.8  C) (Axillary)  Resp 12  Ht 1.626 m (5' 4\")  Wt 70.8 kg (156 lb)  SpO2 100%  BMI 26.78 kg/m2  Lungs: clear, encouraged CDB and IS  BS: hypoactive clear liquid diet  Urine: adequate amount of urine in mcnair   CMS: some sensation block wearing off, able to plantar flex and dorsi flex foot.   Dressing: dressing CDI, hemovac patent.   Pain: no complaints of pain noted. ice to operative exrtremity  Activity: Due for therapy @ 1600  Plan: Will continue to monitor.   "

## 2018-11-12 NOTE — PHARMACY-ADMISSION MEDICATION HISTORY
Medication reconciliation interview completed by pre-admitting nurse, reviewed by pharmacy. No further clarifications needed.     Prior to Admission medications    Medication Sig Last Dose Taking? Auth Provider   ACETAMINOPHEN PO Take 650 mg by mouth every 6 hours as needed  Past Month at Unknown time Yes Unknown, Entered By History   ALLOPURINOL PO Take 100 mg by mouth 2 times daily 11/12/2018 at 0700 Yes Unknown, Entered By History   Amlodipine Besylate-Valsartan 5-160 MG TABS Take 1 tablet by mouth daily 11/12/2018 at 0700 Yes Yunior Huang MD   calcium citrate (CALCITRATE) 950 MG tablet Take by mouth daily 1200 mg daily Past Week at Unknown time Yes Unknown, Entered By History   enoxaparin (LOVENOX) 60 MG/0.6ML injection Inject 0.6 mLs (60 mg) Subcutaneous 2 times daily 11/11/2018 at 0700 Yes Yunior Huang MD   ferrous sulfate (IRON) 325 (65 Fe) MG tablet Take 325 mg by mouth every other day Past Month at Unknown time Yes Reported, Patient   fish oil-omega-3 fatty acids 1000 MG capsule Take 1 g by mouth daily Pt takes as remembers Past Month at Unknown time Yes Unknown, Entered By History   folic acid (FOLVITE) 1 MG tablet Take 1 mg by mouth daily Past Week at Unknown time Yes Reported, Patient   levothyroxine (SYNTHROID/LEVOTHROID) 112 MCG tablet TAKE 1 TABLET (112 MCG) BY MOUTH DAILY 11/12/2018 at 0700 Yes Yunior Huang MD   METHOTREXATE SODIUM IJ Inject 0.8 mLs as directed once a week Thursdays Past Month at Unknown time Yes Reported, Patient   metoprolol tartrate (LOPRESSOR) 50 MG tablet Take 1 tablet (50 mg) by mouth 2 times daily 11/12/2018 at 0700 Yes Jluis Monroy MD   multivitamin, therapeutic with minerals (MULTI-VITAMIN) TABS tablet Take 1 tablet by mouth daily Without iron Past Week at Unknown time Yes Unknown, Entered By History   omeprazole (PRILOSEC) 20 MG CR capsule Take 1 capsule (20 mg) by mouth 2 times daily Past Week at Unknown time Yes Yunior Huang MD    torsemide (DEMADEX) 10 MG tablet Take 1 tablet (10 mg) by mouth daily (with breakfast) 11/11/2018 at 0700 Yes Jluis Monroy MD   VITAMIN D, CHOLECALCIFEROL, PO Take 1,000 Units by mouth daily Past Month at Unknown time Yes Unknown, Entered By History   WARFARIN SODIUM PO Take 5 mg by mouth MON, WED and SAT Past Week at Unknown time Yes Reported, Patient   WARFARIN SODIUM PO Take 2.5 mg by mouth SUN, TUES,THURS, FRI Past Week at Unknown time Yes Reported, Patient   ibandronate (BONIVA) 3 MG/3ML Inject 3 mg into the vein every 3 months  More than a month at Unknown time  Reported, Patient

## 2018-11-12 NOTE — PLAN OF CARE
Problem: Patient Care Overview  Goal: Plan of Care/Patient Progress Review  PT: Orders received, evaluation completed and treatment initiated. 78 year old female s/p L TKA. Pt reports mod I with FWW at baseline. Lives in a condo with no stairs to manage. Spouse able to assist as needed on discharge.     Discharge Planner PT   Patient plan for discharge: Per BPCI plan, home with home PT and transition to OP PT  Current status: Supine to sit with SBA. Sit to/from stand with CGA. Able to take 4 steps with each LE, FWW and CGA. Distance limited by need for O2 this PM. Sit to supine with Gemma.   Barriers to return to prior living situation: Current activity tolerance.   Recommendations for discharge: Anticipate per BPCI plan.   Rationale for recommendations: Good L LE strength this session. Anticipate continued improvement with IP PT.        Entered by: Fredi Clayton 11/12/2018 4:50 PM

## 2018-11-12 NOTE — IP AVS SNAPSHOT
MRN:[id number]                      After Visit Summary   11/12/2018    Zunilda Clark    MRN: [id number]           Thank you!     Thank you for choosing [facility] for your care. Our goal is always to provide you with excellent care. Hearing back from our patients is one way we can continue to improve our services. Please take a few minutes to complete the written survey that you may receive in the mail after you visit. If you would like to speak to someone directly about your visit please contact Patient Relations at [phone]. Thank you!          Patient Information     Date Of Birth          1940        Designated Caregiver       Most Recent Value    Caregiver    Will someone help with your care after discharge? yes    Name of designated caregiver  Sander    Phone number of caregiver [phone]    Caregiver address [address]      About your hospital stay     You were admitted on:  November 12, 2018 You last received care in the:  [facility]    You were discharged on:  November 15, 2018        Reason for your hospital stay       Following total knee arthroplasty                  Who to Call     For medical emergencies, please call 911.  For non-urgent questions about your medical care, please call your primary care provider or clinic, [phone]  For questions related to your surgery, please call your surgery clinic        Attending Provider     Provider Specialty    Pardeep Sheikh MD Orthopaedic Surgery       Primary Care Provider Office Phone # Fax #    Yunior Huang [phone]      After Care Instructions     Activity       Your activity upon discharge: Activity as tolerated, but no driving until off of daytime narcotics            Diet       Follow this diet upon discharge: regular            Wound care and dressings       Instructions to care for your wound at home: Leave aquacel dressing in  place until post-op follow-up.  If it becomes loose or soiled then remove and replace with daily dry dressings.  .                  Follow-up Appointments     Follow-up and recommended labs and tests        Follow up with Amna Porter PA-C within 12-16 days from surgery.  If you do not already have a follow up appointment scheduled, please call our [place] office: [phone].  No follow up labs or test are needed.            Follow-up and recommended labs and tests        Follow up with primary care provider, Yunior Huang, within 3-5days for hospital follow- up.  The following labs/tests are recommended: cbc.            Follow-up and recommended labs and tests        Follow up with primary care provider, Yunior Huang, within 3 days for hospital follow- up.  The following labs/tests are recommended: cbc and INR.    PCP will need to adjust lovenox and warfarin dose  Based on INR results                  Your next 10 appointments already scheduled     Nov 21, 2018 11:45 AM CST   Anticoagulation Visit with [facility]   [facility]    [address]  [facility] [address] [phone]            Feb 07, 2019  3:45 PM CST   Remote PPM Check with KRAMER TECH1   [facility] ([facility])    [address]  [facility] [address] [phone] OPT 2           This appointment is for a remote check of your pacemaker.  This is not an appointment at the office.              Additional Services     Home Care PT Referral for Hospital Discharge       PT to eval and treat    Your provider has ordered home care - physical therapy. If you have not been contacted within 2 days of your discharge please call the department phone number listed on the top of this document.            Home care nursing referral       Eval and treat    Your provider has ordered home care nursing services.  If you have not been contacted within 2 days of your discharge please call the inpatient department phone number at [phone] .                  Further instructions from your care team       Return to clinic in 10-14 days.  Call [phone] to schedule or if you experience any problems before your scheduled appointment.      See general discharge instruction sheet for knees  1. Do exercises at home as instructed by therapist twice a day. Continue outpatient therapy as ordered by your doctor.  2. Ice knee after exercises and therapy.  3. Examine dressing daily for problems.  4. May shower, can get dressing wet, no soaking (no bathtubs, pools or hot tubs)  5. Notify your dentist or physician of your implant so you can get antibiotics before any dental work or before any invasive procedure (ie: colonoscopy)  6. Remove anti-embolism stockings (Bunny hose) for 30 minutes twice daily.      Aquacel dressing:  DO NOT CHANGE DRESSING DAILY.  Leave this dressing on until follow-up appointment with Orthopedic Surgeon.  Dressing is waterproof, can shower with it on, pat dry when done.  No soaking such as in tub baths, pools or hot tubs        While on narcotic pain medication, to prevent constipation:  1. Drink plenty of water to keep well hydrated   2. May take over the counter Colace or Senna (follow instructions on label)        Call your physician if: ([phone])   1. Persistent fever greater than 100 degrees with body chills or excessive sweating.  2. Increased/persistent redness, localized warmth, tenderness, drainage or swelling at incision site. Greater than 50% drainage on dressing.   3. Persistent pain not controlled with oral pain medications, ice and rest.   4. No bowel movement in 3 days (may use Milk of Magnesia or other over the counter remedy).  5. Chest pain, shortness of breath, and/or calf pain with excessive swelling.  6. Generalized feeling of illness.  7. Any other questions or concerns related to  "your surgery/recovery.    Thank you for allowing [facility] to participate in your cares!!          Warfarin Instruction     You have started taking a medicine called warfarin. This is a blood-thinning medicine (anticoagulant). It helps prevent and treat blood clots.      Before leaving the hospital, make sure you know how much to take and how long to take it.      You will need regular blood tests to make sure your blood is clotting safely. It is very important to see your doctor for regular blood tests.    Talk to your doctor before taking any new medicine (this includes over-the-counter drugs and herbal products). Many medicines can interact with warfarin. This may cause more bleeding or too much clotting.     Eating a lot of vitamin K--found in green, leafy vegetables--can change the way warfarin works in your body. Do NOT avoid these foods. Instead, try to eat the same amount each day.     Bleeding is the most common side-effect of warfarin. You may notice bleeding gums, a bloody nose, bruises and bleeding longer when you cut yourself. See a doctor at once if:   o You cough up blood  o You find blood in your stool (poop)  o You have a deep cut, or a cut that bleeds longer than 10 minutes   o You have a bad cut, hard fall, accident or hit your head (go to urgent care or the emergency room).    For women who can get pregnant: This medicine can harm an unborn baby. Be very careful not to get pregnant while taking this medicine. If you think you might be pregnant, call your doctor right away.    For more information, read \"Guide to Warfarin Therapy,  the booklet you received in the hospital.        Pending Results     No orders found from 11/10/2018 to 11/13/2018.            Statement of Approval     Ordered          11/15/18 1317  I have reviewed and agree with all the recommendations and orders detailed in this document.  EFFECTIVE NOW     Approved and electronically signed by:  Pardeep Sheikh " "MD Roger           11/13/18 0346  I have reviewed and agree with all the recommendations and orders detailed in this document.  EFFECTIVE NOW     Approved and electronically signed by:  Amna Porter PA-C             Admission Information     Date & Time Provider Department Dept. Phone    11/12/2018 Pardeep Sheikh MD [facility] [phone]      Your Vitals Were     Blood Pressure Pulse Temperature Respirations Height Weight    112/58 76 98.5  F (36.9  C) 16 1.626 m (5' 4\") 70.8 kg (156 lb)    Pulse Oximetry BMI (Body Mass Index)                93% 26.78 kg/m2          MyChart Information     [facility] gives you secure access to your electronic health record. If you see a primary care provider, you can also send messages to your care team and make appointments. If you have questions, please call your primary care clinic.  If you do not have a primary care provider, please call [phone] and they will assist you.        Care EveryWhere ID     This is your Care EveryWhere ID. This could be used by other organizations to access your [place] medical records  [id number]        Equal Access to Services     SAM UGLADE AH: Hadii rosalie larios Sonelson, wagrantda liset, qaybta kaaleliza lorenzana, lucía xiao . So [facility] [phone].    ATENCIÓN: Si habla español, tiene a morales disposición servicios gratuitos de asistencia lingüística. [facility] [phone].    We comply with applicable federal civil rights laws and [place] laws. We do not discriminate on the basis of race, color, national origin, age, disability, sex, sexual orientation, or gender identity.               Review of your medicines      START taking        Dose / Directions    oxyCODONE-acetaminophen 5-325 MG per tablet   Commonly known as:  PERCOCET        Dose:  1-2 tablet   Take 1-2 tablets by mouth every 4 hours as needed for severe pain   Quantity:  60 tablet   Refills:  0       " senna-docusate 8.6-50 MG per tablet   Commonly known as:  SENOKOT-S;PERICOLACE        Dose:  1 tablet   Take 1 tablet by mouth 2 times daily   Quantity:  30 tablet   Refills:  0         CONTINUE these medicines which may have CHANGED, or have new prescriptions. If we are uncertain of the size of tablets/capsules you have at home, strength may be listed as something that might have changed.        Dose / Directions    * WARFARIN SODIUM PO   This may have changed:  Another medication with the same name was added. Make sure you understand how and when to take each.        Dose:  5 mg   Take 5 mg by mouth MON, WED and SAT   Refills:  0       * WARFARIN SODIUM PO   This may have changed:  Another medication with the same name was added. Make sure you understand how and when to take each.        Dose:  2.5 mg   Take 2.5 mg by mouth SUN, TUES,THURS, FRI   Refills:  0       * warfarin 5 MG tablet   Commonly known as:  COUMADIN   This may have changed:  You were already taking a medication with the same name, and this prescription was added. Make sure you understand how and when to take each.        Dose:  5 mg   Take 1 tablet (5 mg) by mouth once for 1 dose   Quantity:  1 tablet   Refills:  0       * Notice:  This list has 3 medication(s) that are the same as other medications prescribed for you. Read the directions carefully, and ask your doctor or other care provider to review them with you.      CONTINUE these medicines which have NOT CHANGED        Dose / Directions    ALLOPURINOL PO        Dose:  100 mg   Take 100 mg by mouth 2 times daily   Refills:  0       Amlodipine Besylate-Valsartan 5-160 MG Tabs   Used for:  Benign essential hypertension        Dose:  1 tablet   Take 1 tablet by mouth daily   Quantity:  90 tablet   Refills:  3       BONIVA 3 MG/3ML   Generic drug:  ibandronate        Dose:  3 mg   Inject 3 mg into the vein every 3 months   Refills:  0       calcium citrate 950 MG tablet   Commonly known as:   CALCITRATE        Take by mouth daily 1200 mg daily   Refills:  0       enoxaparin 60 MG/0.6ML injection   Commonly known as:  LOVENOX        Dose:  60 mg   Inject 0.6 mLs (60 mg) Subcutaneous 2 times daily   Quantity:  10 Syringe   Refills:  0       ferrous sulfate 325 (65 Fe) MG tablet   Commonly known as:  IRON        Dose:  325 mg   Take 325 mg by mouth every other day   Refills:  0       fish oil-omega-3 fatty acids 1000 MG capsule        Dose:  1 g   Take 1 g by mouth daily Pt takes as remembers   Refills:  0       folic acid 1 MG tablet   Commonly known as:  FOLVITE        Dose:  1 mg   Take 1 mg by mouth daily   Refills:  0       levothyroxine 112 MCG tablet   Commonly known as:  SYNTHROID/LEVOTHROID   Used for:  Acquired hypothyroidism        TAKE 1 TABLET (112 MCG) BY MOUTH DAILY   Quantity:  90 tablet   Refills:  1       METHOTREXATE SODIUM IJ        Dose:  0.8 mL   Inject 0.8 mLs as directed once a week Thursdays   Refills:  0       metoprolol tartrate 50 MG tablet   Commonly known as:  LOPRESSOR   Used for:  Chronic atrial fibrillation (H)        Dose:  50 mg   Take 1 tablet (50 mg) by mouth 2 times daily   Quantity:  200 tablet   Refills:  3       Multi-vitamin Tabs tablet        Dose:  1 tablet   Take 1 tablet by mouth daily Without iron   Refills:  0       omeprazole 20 MG CR capsule   Commonly known as:  priLOSEC   Used for:  Esophageal reflux        Dose:  20 mg   Take 1 capsule (20 mg) by mouth 2 times daily   Quantity:  180 capsule   Refills:  1       torsemide 10 MG tablet   Commonly known as:  DEMADEX   Used for:  Pulmonary HTN (H)        Dose:  10 mg   Take 1 tablet (10 mg) by mouth daily (with breakfast)   Quantity:  30 tablet   Refills:  3       VITAMIN D (CHOLECALCIFEROL) PO        Dose:  1000 Units   Take 1,000 Units by mouth daily   Refills:  0         STOP taking     ACETAMINOPHEN PO                Where to get your medicines      These medications were sent to [facility]  [place], [place] - [address] [address]  [address] [address]     Phone:  [phone]     enoxaparin 60 MG/0.6ML injection    senna-docusate 8.6-50 MG per tablet    warfarin 5 MG tablet         Some of these will need a paper prescription and others can be bought over the counter. Ask your nurse if you have questions.     Bring a paper prescription for each of these medications     oxyCODONE-acetaminophen 5-325 MG per tablet                Protect others around you: Learn how to safely use, store and throw away your medicines at www.disposemymeds.org.        Information about OPIOIDS     PRESCRIPTION OPIOIDS: WHAT YOU NEED TO KNOW   We gave you an opioid (narcotic) pain medicine. It is important to manage your pain, but opioids are not always the best choice. You should first try all the other options your care team gave you. Take this medicine for as short a time (and as few doses) as possible.    Some activities can increase your pain, such as bandage changes or therapy sessions. It may help to take your pain medicine 30 to 60 minutes before these activities. Reduce your stress by getting enough sleep, working on hobbies you enjoy and practicing relaxation or meditation. Talk to your care team about ways to manage your pain beyond prescription opioids.    These medicines have risks:    DO NOT drive when on new or higher doses of pain medicine. These medicines can affect your alertness and reaction times, and you could be arrested for driving under the influence (DUI). If you need to use opioids long-term, talk to your care team about driving.    DO NOT operate heavy machinery    DO NOT do any other dangerous activities while taking these medicines.    DO NOT drink any alcohol while taking these medicines.     If the opioid prescribed includes acetaminophen, DO NOT take with any other medicines that contain acetaminophen. Read all labels carefully. Look for the word   acetaminophen  or  Tylenol.  Ask your pharmacist if you have questions or are unsure.    You can get addicted to pain medicines, especially if you have a history of addiction (chemical, alcohol or substance dependence). Talk to your care team about ways to reduce this risk.    All opioids tend to cause constipation. Drink plenty of water and eat foods that have a lot of fiber, such as fruits, vegetables, prune juice, apple juice and high-fiber cereal. Take a laxative (Miralax, milk of magnesia, Colace, Senna) if you don t move your bowels at least every other day. Other side effects include upset stomach, sleepiness, dizziness, throwing up, tolerance (needing more of the medicine to have the same effect), physical dependence and slowed breathing.    Store your pills in a secure place, locked if possible. We will not replace any lost or stolen medicine. If you don t finish your medicine, please throw away (dispose) as directed by your pharmacist. The [place] [employer]ution [employer] has more information about safe disposal: https://www.pca.[facility].[place].us/living-green/managing-unwanted-medications             Medication List: This is a list of all your medications and when to take them. Check marks below indicate your daily home schedule. Keep this list as a reference.      Medications           Morning Afternoon Evening Bedtime As Needed    ALLOPURINOL PO   Take 100 mg by mouth 2 times daily   Last time this was given:  100 mg on 11/15/2018  8:54 AM                                Amlodipine Besylate-Valsartan 5-160 MG Tabs   Take 1 tablet by mouth daily                                BONIVA 3 MG/3ML   Inject 3 mg into the vein every 3 months   Generic drug:  ibandronate                                calcium citrate 950 MG tablet   Commonly known as:  CALCITRATE   Take by mouth daily 1200 mg daily                                enoxaparin 60 MG/0.6ML injection   Commonly known as:  LOVENOX   Inject 0.6 mLs (60 mg)  Subcutaneous 2 times daily   Last time this was given:  60 mg on 11/15/2018  8:58 AM                                ferrous sulfate 325 (65 Fe) MG tablet   Commonly known as:  IRON   Take 325 mg by mouth every other day                                fish oil-omega-3 fatty acids 1000 MG capsule   Take 1 g by mouth daily Pt takes as remembers   Last time this was given:  1 g on 11/13/2018  9:12 AM                                folic acid 1 MG tablet   Commonly known as:  FOLVITE   Take 1 mg by mouth daily   Last time this was given:  1 mg on 11/15/2018  8:57 AM                                levothyroxine 112 MCG tablet   Commonly known as:  SYNTHROID/LEVOTHROID   TAKE 1 TABLET (112 MCG) BY MOUTH DAILY   Last time this was given:  112 mcg on 11/15/2018  8:57 AM                                METHOTREXATE SODIUM IJ   Inject 0.8 mLs as directed once a week Thursdays                                metoprolol tartrate 50 MG tablet   Commonly known as:  LOPRESSOR   Take 1 tablet (50 mg) by mouth 2 times daily   Last time this was given:  50 mg on 11/15/2018  8:55 AM                                Multi-vitamin Tabs tablet   Take 1 tablet by mouth daily Without iron   Last time this was given:  1 tablet on 11/15/2018  8:55 AM                                omeprazole 20 MG CR capsule   Commonly known as:  priLOSEC   Take 1 capsule (20 mg) by mouth 2 times daily   Last time this was given:  20 mg on 11/15/2018  8:57 AM                                oxyCODONE-acetaminophen 5-325 MG per tablet   Commonly known as:  PERCOCET   Take 1-2 tablets by mouth every 4 hours as needed for severe pain                                senna-docusate 8.6-50 MG per tablet   Commonly known as:  SENOKOT-S;PERICOLACE   Take 1 tablet by mouth 2 times daily   Last time this was given:  1 tablet on 11/15/2018  8:57 AM                                torsemide 10 MG tablet   Commonly known as:  DEMADEX   Take 1 tablet (10 mg) by mouth daily (with  breakfast)   Last time this was given:  10 mg on 11/15/2018  8:54 AM                                VITAMIN D (CHOLECALCIFEROL) PO   Take 1,000 Units by mouth daily   Last time this was given:  1,000 Units on 11/15/2018  8:54 AM                                * WARFARIN SODIUM PO   Take 5 mg by mouth MON, WED and SAT   Last time this was given:  5 mg on 11/15/2018  1:53 PM                                * WARFARIN SODIUM PO   Take 2.5 mg by mouth SUN, TUES,THURS, FRI   Last time this was given:  5 mg on 11/15/2018  1:53 PM                                * warfarin 5 MG tablet   Commonly known as:  COUMADIN   Take 1 tablet (5 mg) by mouth once for 1 dose   Last time this was given:  5 mg on 11/15/2018  1:53 PM                                * Notice:  This list has 3 medication(s) that are the same as other medications prescribed for you. Read the directions carefully, and ask your doctor or other care provider to review them with you.

## 2018-11-12 NOTE — PHARMACY-ANTICOAGULATION SERVICE
Clinical Pharmacy - Warfarin Dosing Consult     Pharmacy has been consulted to manage this patient s warfarin therapy.  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  Pharmacy will monitor Zunilda Clark daily and order warfarin doses to achieve specified goal.      Please contact pharmacy as soon as possible if the warfarin needs to be held for a procedure or if the warfarin goals change.      Sherron Romero, PharmD  November 12, 2018

## 2018-11-13 ENCOUNTER — APPOINTMENT (OUTPATIENT)
Dept: PHYSICAL THERAPY | Facility: CLINIC | Age: 78
DRG: 470 | End: 2018-11-13
Attending: ORTHOPAEDIC SURGERY
Payer: MEDICARE

## 2018-11-13 ENCOUNTER — APPOINTMENT (OUTPATIENT)
Dept: OCCUPATIONAL THERAPY | Facility: CLINIC | Age: 78
DRG: 470 | End: 2018-11-13
Attending: ORTHOPAEDIC SURGERY
Payer: MEDICARE

## 2018-11-13 ENCOUNTER — TELEPHONE (OUTPATIENT)
Dept: ANTICOAGULATION | Facility: CLINIC | Age: 78
End: 2018-11-13

## 2018-11-13 DIAGNOSIS — Z79.01 LONG TERM CURRENT USE OF ANTICOAGULANTS WITH INR GOAL OF 2.0-3.0: Primary | ICD-10-CM

## 2018-11-13 DIAGNOSIS — I48.92 ATRIAL FIBRILLATION AND FLUTTER (H): ICD-10-CM

## 2018-11-13 DIAGNOSIS — I48.0 PAROXYSMAL ATRIAL FIBRILLATION (H): ICD-10-CM

## 2018-11-13 DIAGNOSIS — I48.91 ATRIAL FIBRILLATION AND FLUTTER (H): ICD-10-CM

## 2018-11-13 LAB
HGB BLD-MCNC: 9.2 G/DL (ref 11.7–15.7)
INR PPP: 1.31 (ref 0.86–1.14)
INR PPP: 1.45 (ref 0.86–1.14)

## 2018-11-13 PROCEDURE — 36415 COLL VENOUS BLD VENIPUNCTURE: CPT | Performed by: INTERNAL MEDICINE

## 2018-11-13 PROCEDURE — 97530 THERAPEUTIC ACTIVITIES: CPT | Mod: GO

## 2018-11-13 PROCEDURE — 97530 THERAPEUTIC ACTIVITIES: CPT | Mod: GP | Performed by: PHYSICAL THERAPIST

## 2018-11-13 PROCEDURE — 25000128 H RX IP 250 OP 636: Performed by: ORTHOPAEDIC SURGERY

## 2018-11-13 PROCEDURE — 85018 HEMOGLOBIN: CPT | Performed by: ORTHOPAEDIC SURGERY

## 2018-11-13 PROCEDURE — 97116 GAIT TRAINING THERAPY: CPT | Mod: GP | Performed by: PHYSICAL THERAPIST

## 2018-11-13 PROCEDURE — 85610 PROTHROMBIN TIME: CPT | Performed by: INTERNAL MEDICINE

## 2018-11-13 PROCEDURE — 40000133 ZZH STATISTIC OT WARD VISIT

## 2018-11-13 PROCEDURE — 36415 COLL VENOUS BLD VENIPUNCTURE: CPT | Performed by: ORTHOPAEDIC SURGERY

## 2018-11-13 PROCEDURE — 40000193 ZZH STATISTIC PT WARD VISIT: Performed by: PHYSICAL THERAPIST

## 2018-11-13 PROCEDURE — 99232 SBSQ HOSP IP/OBS MODERATE 35: CPT | Performed by: INTERNAL MEDICINE

## 2018-11-13 PROCEDURE — 97165 OT EVAL LOW COMPLEX 30 MIN: CPT | Mod: GO

## 2018-11-13 PROCEDURE — 97535 SELF CARE MNGMENT TRAINING: CPT | Mod: GO

## 2018-11-13 PROCEDURE — 12000000 ZZH R&B MED SURG/OB

## 2018-11-13 PROCEDURE — 25000132 ZZH RX MED GY IP 250 OP 250 PS 637: Mod: GY | Performed by: ORTHOPAEDIC SURGERY

## 2018-11-13 PROCEDURE — A9270 NON-COVERED ITEM OR SERVICE: HCPCS | Mod: GY | Performed by: ORTHOPAEDIC SURGERY

## 2018-11-13 PROCEDURE — 85610 PROTHROMBIN TIME: CPT | Performed by: ORTHOPAEDIC SURGERY

## 2018-11-13 PROCEDURE — 97110 THERAPEUTIC EXERCISES: CPT | Mod: GP | Performed by: PHYSICAL THERAPIST

## 2018-11-13 RX ORDER — WARFARIN SODIUM 3 MG/1
3 TABLET ORAL
Status: COMPLETED | OUTPATIENT
Start: 2018-11-13 | End: 2018-11-13

## 2018-11-13 RX ORDER — OXYCODONE AND ACETAMINOPHEN 5; 325 MG/1; MG/1
1-2 TABLET ORAL EVERY 4 HOURS PRN
Qty: 60 TABLET | Refills: 0 | Status: SHIPPED | OUTPATIENT
Start: 2018-11-13 | End: 2018-12-17

## 2018-11-13 RX ORDER — AMOXICILLIN 250 MG
1 CAPSULE ORAL 2 TIMES DAILY
Qty: 30 TABLET | Refills: 0 | Status: SHIPPED | OUTPATIENT
Start: 2018-11-13 | End: 2018-11-28

## 2018-11-13 RX ADMIN — ONDANSETRON 4 MG: 2 INJECTION INTRAMUSCULAR; INTRAVENOUS at 02:55

## 2018-11-13 RX ADMIN — OXYCODONE HYDROCHLORIDE 5 MG: 5 TABLET ORAL at 17:46

## 2018-11-13 RX ADMIN — METOPROLOL TARTRATE 50 MG: 50 TABLET, FILM COATED ORAL at 19:50

## 2018-11-13 RX ADMIN — OXYCODONE HYDROCHLORIDE 5 MG: 5 TABLET ORAL at 23:47

## 2018-11-13 RX ADMIN — SENNOSIDES AND DOCUSATE SODIUM 2 TABLET: 8.6; 5 TABLET ORAL at 19:51

## 2018-11-13 RX ADMIN — ACETAMINOPHEN 975 MG: 325 TABLET, FILM COATED ORAL at 23:47

## 2018-11-13 RX ADMIN — ONDANSETRON 4 MG: 2 INJECTION INTRAMUSCULAR; INTRAVENOUS at 09:13

## 2018-11-13 RX ADMIN — ALLOPURINOL 100 MG: 100 TABLET ORAL at 19:50

## 2018-11-13 RX ADMIN — RANITIDINE 150 MG: 150 TABLET ORAL at 19:51

## 2018-11-13 RX ADMIN — OXYCODONE HYDROCHLORIDE 5 MG: 5 TABLET ORAL at 09:17

## 2018-11-13 RX ADMIN — ENOXAPARIN SODIUM 40 MG: 40 INJECTION SUBCUTANEOUS at 09:13

## 2018-11-13 RX ADMIN — Medication 2 LOZENGE: at 00:48

## 2018-11-13 RX ADMIN — VITAMIN D, TAB 1000IU (100/BT) 1000 UNITS: 25 TAB at 09:12

## 2018-11-13 RX ADMIN — OXYCODONE HYDROCHLORIDE 5 MG: 5 TABLET ORAL at 13:14

## 2018-11-13 RX ADMIN — ACETAMINOPHEN 975 MG: 325 TABLET, FILM COATED ORAL at 00:48

## 2018-11-13 RX ADMIN — ALLOPURINOL 100 MG: 100 TABLET ORAL at 09:11

## 2018-11-13 RX ADMIN — LOSARTAN POTASSIUM 100 MG: 100 TABLET ORAL at 09:11

## 2018-11-13 RX ADMIN — ACETAMINOPHEN 975 MG: 325 TABLET, FILM COATED ORAL at 09:09

## 2018-11-13 RX ADMIN — OMEPRAZOLE 20 MG: 20 CAPSULE, DELAYED RELEASE ORAL at 19:50

## 2018-11-13 RX ADMIN — SENNOSIDES AND DOCUSATE SODIUM 2 TABLET: 8.6; 5 TABLET ORAL at 09:10

## 2018-11-13 RX ADMIN — OXYCODONE HYDROCHLORIDE 5 MG: 5 TABLET ORAL at 06:11

## 2018-11-13 RX ADMIN — ENOXAPARIN SODIUM 60 MG: 60 INJECTION SUBCUTANEOUS at 21:11

## 2018-11-13 RX ADMIN — RANITIDINE 150 MG: 150 TABLET ORAL at 09:11

## 2018-11-13 RX ADMIN — TORSEMIDE 10 MG: 10 TABLET ORAL at 09:12

## 2018-11-13 RX ADMIN — AMLODIPINE BESYLATE 5 MG: 5 TABLET ORAL at 09:12

## 2018-11-13 RX ADMIN — METOPROLOL TARTRATE 50 MG: 50 TABLET, FILM COATED ORAL at 09:10

## 2018-11-13 RX ADMIN — LEVOTHYROXINE SODIUM 112 MCG: 112 TABLET ORAL at 09:10

## 2018-11-13 RX ADMIN — OMEGA-3 FATTY ACIDS CAP 1000 MG 1 G: 1000 CAP at 09:12

## 2018-11-13 RX ADMIN — ACETAMINOPHEN 975 MG: 325 TABLET, FILM COATED ORAL at 16:16

## 2018-11-13 RX ADMIN — FOLIC ACID 1 MG: 1 TABLET ORAL at 09:12

## 2018-11-13 RX ADMIN — OMEPRAZOLE 20 MG: 20 CAPSULE, DELAYED RELEASE ORAL at 09:10

## 2018-11-13 RX ADMIN — WARFARIN SODIUM 3 MG: 3 TABLET ORAL at 17:46

## 2018-11-13 RX ADMIN — CEFAZOLIN SODIUM 1 G: 1 INJECTION, SOLUTION INTRAVENOUS at 02:55

## 2018-11-13 RX ADMIN — MULTIPLE VITAMINS W/ MINERALS TAB 1 TABLET: TAB at 09:11

## 2018-11-13 RX ADMIN — Medication 2 LOZENGE: at 19:55

## 2018-11-13 ASSESSMENT — ACTIVITIES OF DAILY LIVING (ADL)
PREVIOUS_RESPONSIBILITIES: MEAL PREP;HOUSEKEEPING;LAUNDRY;SHOPPING;MEDICATION MANAGEMENT;FINANCES;DRIVING
ADLS_ACUITY_SCORE: 10
ADLS_ACUITY_SCORE: 13
ADLS_ACUITY_SCORE: 10
ADLS_ACUITY_SCORE: 13
ADLS_ACUITY_SCORE: 10
ADLS_ACUITY_SCORE: 10

## 2018-11-13 NOTE — PROGRESS NOTES
SPIRITUAL HEALTH SERVICES Progress Note  [facility]  Ortho/Spine Unit    Attempted twice this afternoon to see pt Zunilda per her request for  support.  Upon second attempt Zunilda declined a visit because she wanted to take a nap before her next therapy appointment.  She welcomed a  visit tomorrow.    Plan: Unit  will follow up tomorrow.    Moy Lara M.Div., [facility]  Staff   Pager [phone]

## 2018-11-13 NOTE — PLAN OF CARE
Problem: Knee Arthroplasty (Total, Partial) (Adult)  Goal: Signs and Symptoms of Listed Potential Problems Will be Absent, Minimized or Managed (Knee Arthroplasty)  Signs and symptoms of listed potential problems will be absent, minimized or managed by discharge/transition of care (reference Knee Arthroplasty (Total, Partial) (Adult) CPG).   Outcome: Improving  Pm shift  Pt alert and orientated. Pt complains of min pain and had one oxycodone for pain. LS clear. Nausea at the beginning of the shift. Zofran given with relief. Pt refused Zofran at hs stating no longer nausea. Pt tolerated clears. BS hypo. Pt belching but no flatus. Pt had PT around 1600 and was up and than pt stood at the side of the bed again around 2100. Pt complained of increased pain and did not want to walk to the door in her room. pts bilat feet are cool with weak pulses. Pt has pacemaker. Plan will be home with her  on discharge.

## 2018-11-13 NOTE — CONSULTS
"Care Transitions Team: Following for CC, discharge planning, and disposition.      Per chart review pt   \"Patient lives with supportive  in home senior living apartment, no stairs to enter, elevator to 3rd  floor. Bathroom, Tub shower with cut out to step in, grab bars, raised toilet and commode for bedroom with bar to assist with getting  off. Currently walking with walker for over a year, Denied SOB, Rides stationery bike 15 min daily and doing post-op exercise. 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 HX Seizure after valve replacement surgery, but  none since.DX: CHF & pulmonary hypertension, negative for PARAM, hospital readmission r/t fluid over load and CHF after gallbladder  surgery 2017. DX: RA - Manged with Methotrexate. Goals is to walk without walker or cane and be strong enough to walk upstairs\"    Pt is being followed by Renee Hodgson  B[facility] RN   [facility] for discharge planning. T: [phone]     DC plan per B[facility] : Home with [facility] RN/PT.   Not anticapting any barriers    Will follow along continuing to assess for any additional needs/PCP follow up handoffs    Will fax orders to [facility] at  on Discharge.    Andreea Rich RN BSN CTS  Care Transitions Team  [phone]        "

## 2018-11-13 NOTE — PROGRESS NOTES
11/13/18 1352   Quick Adds   Type of Visit Initial Occupational Therapy Evaluation   Living Environment   Lives With spouse   Living Arrangements apartment   Home Accessibility tub/shower is not walk in  (but has a cut out with 5 inch step)   Number of Stairs to Enter Home 0   Transportation Available car;family or friend will provide   Living Environment Comment Resides in a co-op with her . They have a RTS with safety frame, a tub shower, but has a cut out so it serves more like a walk-in shower, and one grab bar available. Pt has a shower chair. They also have a w/c available to use if needed   Self-Care   Dominant Hand right   Usual Activity Tolerance good   Regular Exercise yes   Activity/Exercise Type strength training   Equipment Currently Used at Home walker, rolling   Activity/Exercise/Self-Care Comment ind- mod ind with ADL's/IADl's   Functional Level Prior   Ambulation 1-->assistive equipment   Transferring 1-->assistive equipment   Toileting 1-->assistive equipment   Bathing 1-->assistive equipment   Dressing 0-->independent   Eating 0-->independent   Communication 0-->understands/communicates without difficulty   Swallowing 0-->swallows foods/liquids without difficulty   Cognition 0 - no cognition issues reported   Fall history within last six months no   Which of the above functional risks had a recent onset or change? ambulation;transferring   Prior Functional Level Comment Pt uses AE for showering, toileting uses walker with seat at baseline. Pt enjoys reading, piano playing and is active in the co-op community. Pt does most of the cooking, pt does drive,    General Information   Onset of Illness/Injury or Date of Surgery - Date 11/12/18   Referring Physician Pardeep Sheikh MD    Patient/Family Goals Statement To return home to [facility]   Precautions/Limitations fall precautions   Weight-Bearing Status - LLE weight-bearing as tolerated   Cognitive Status Examination   Orientation  orientation to person, place and time   Level of Consciousness alert   Able to Follow Commands WNL/WFL   Visual Perception   Visual Perception Wears glasses   Sensory Examination   Sensory Comments denies any changes   Pain Assessment   Patient Currently in Pain Yes, see Vital Sign flowsheet   Range of Motion (ROM)   ROM Comment BUE WNL   Strength   Strength Comments BUE WNL, although has hx of previous CVA with left sided weakness   Mobility   Bed Mobility Comments ind   Transfer Skill: Bed to Chair/Chair to Bed   Level of [place]: Bed to Chair contact guard   Physical Assist/Nonphysical Assist: Bed to Chair supervision;verbal cues   Transfer Skill: Sit to Stand   Level of [place]: Sit/Stand stand-by assist   Physical Assist/Nonphysical Assist: Sit/Stand supervision;verbal cues   Transfer Skill: Toilet Transfer   Level of [place]: Toilet minimum assist (75% patients effort)   Physical Assist/Nonphysical Assist: Toilet supervision;verbal cues;1 person assist   Assistive Device standard walker   Lower Body Dressing   Level of [place]: Dress Lower Body stand-by assist   Physical Assist/Nonphysical Assist: Dress Lower Body verbal cues   Toileting   Level of [place]: Toilet independent   Grooming   Level of [place]: Grooming stand-by assist   Physical Assist/Nonphysical Assist: Grooming supervision;verbal cues   Instrumental Activities of Daily Living (IADL)   Previous Responsibilities meal prep;housekeeping;laundry;shopping;medication management;finances;driving   Activities of Daily Living Analysis   Impairments Contributing to Impaired Activities of Daily Living balance impaired;pain;ROM decreased   General Therapy Interventions   Planned Therapy Interventions ADL retraining;transfer training   Clinical Impression   Criteria for Skilled Therapeutic Interventions Met yes, treatment indicated   OT Diagnosis decrased ind with ADls   Influenced by the following impairments pain, decreased  "ROM,    Assessment of Occupational Performance 3-5 Performance Deficits   Identified Performance Deficits dressing, bathing, toileting, g/h, household management   Clinical Decision Making (Complexity) Low complexity   Therapy Frequency daily   Predicted Duration of Therapy Intervention (days/wks) 2   Anticipated Discharge Disposition Home with Assist   Risks and Benefits of Treatment have been explained. Yes   Patient, Family & other staff in agreement with plan of care Yes   [employer] AM-PAC  \"6 Clicks\" Daily Activity Inpatient Short Form   1. Putting on and taking off regular lower body clothing? 4 - None   2. Bathing (including washing, rinsing, drying)? 3 - A Little   3. Toileting, which includes using toilet, bedpan or urinal? 3 - A Little   4. Putting on and taking off regular upper body clothing? 4 - None   5. Taking care of personal grooming such as brushing teeth? 4 - None   6. Eating meals? 4 - None   Daily Activity Raw Score (Score out of 24.Lower scores equate to lower levels of function) 22   Total Evaluation Time   Total Evaluation Time (Minutes) 8     "

## 2018-11-13 NOTE — PLAN OF CARE
Problem: Patient Care Overview  Goal: Plan of Care/Patient Progress Review    Discharge Planner PT   Patient plan for discharge: home with home PT per BPCI plan  Current status: Progressing well, pain rated 3-4/10 with gait skills. Amb x 80ft with FWW and CGA, no KI needed.  Good/fair tolerance with TKA exercises, emphasized need to avoid knee flexion and external rotation of LE when resting in bed.  AAROM 11-70 deg.  BP stable when /72.  Barriers to return to prior living situation: mobility status, will progress with PT  Recommendations for discharge: home with home PT per BPCI plan  Rationale for recommendations: Progressing well, pt will continue to benefit from skilled PT to improve safety/tolerance with mobility skills prior to discharge.       Entered by: Tyrel San 11/13/2018 11:56 AM

## 2018-11-13 NOTE — PLAN OF CARE
Problem: Patient Care Overview  Goal: Plan of Care/Patient Progress Review  Refused capnography continuous pulse oximeter on.

## 2018-11-13 NOTE — TELEPHONE ENCOUNTER
For insurance purposes, an annual INR referral is required. Please complete and sign the order then route back to [facility]. Екатерина Mahan RN      Pt has had a bioprosthetic mitral valve since 2008. Goal range is 2.0-3.0. Pt is not taking ASA. Should goal range be changed?

## 2018-11-13 NOTE — PLAN OF CARE
Problem: Patient Care Overview  Goal: Plan of Care/Patient Progress Review  Outcome: Improving  ita PO well, up with assist of 1 with gait belt and walker, PO pain meds managing pain, voiding in good amts

## 2018-11-13 NOTE — PLAN OF CARE
"OT orders received, eval completed and treatment initiated. Pt is POD #1 LTKA. Pt resides in a co-op apt with her . She has a commode overlay with safety frame, a tub with a \"cut-out\" so it functions more of a walk in shower with a grab bar. Pt has family in her building who can offer other assist. At baseline pt used a shower-chair, commode overlay.  Previously drove but not since move to [place]. Was otherwise ind with ADLs/IADLs.   Discharge Planner OT   Patient plan for discharge: home with home PT per BPCI plan  Current status: Pt demo's LE dressing mod I. Completes toilet transfer min A for sit>stand as using CHTS, not RTS with safety frame similar to home set-up (Will trial this tomorrow). Pt completes shower transfer with CGA. 2 g/h tasks completed in stance at sink. Educated on energy conservation and safe walker use during ADLs  Barriers to return to prior living situation: none anticipated  Recommendations for discharge:home with home PT per BPCI plan, spouse supervision for shower transfer  Rationale for recommendations: pt progressing well, has appropriate AE/spousal support at home to increase safety and ind with ADLs.        Entered by: Loretta Damon 11/13/2018 3:35 PM       "

## 2018-11-13 NOTE — PROGRESS NOTES
[facility]  Hospitalist Consult Progress Note  Jose C Brown MD 11/13/18    Reason for Stay (Diagnosis): L TKA         Assessment and Plan:      Summary of Stay:  Zunilda Clark is a 78 year old female with a history of bioprosthetic valve replacement due to rheumatic heart disease, A. fib/A flutter S/P AV node ablation with pacemaker placement on warfarin, pulmonary HTN, remote DVT, RA, CVA with residual left-sided weakness, HTN, anemia, and hypothyroidism who was admitted on 11/12/18 following a left TKA by Dr. Sheikh.  Doing very well postoperatively with minimal pain.     Osteoarthritis:  s/p left TKA on 11/12/18 by Dr. Sheikh. The patient is doing well.  Currently has excellent pain control and is hemodynamically stable. Will defer diet, activity, and pain control to the primary team.  Capnography. Continue physical and occupational therapy. Social work consulted for possible placement needs. Continue incentive spirometry and follow hemoglobin to evaluate for surgical blood loss and potential need for transfusion especially while on warfarin and Lovenox.      A. fib, a flutter, s/p bioprosthetic mitral valve: History of bioprosthetic mitral valve replacement in 2008 with redo in 2014 for rheumatic heart disease.  Also has history of A. fib, a flutter and has had a maze procedure in the past and then AV kika ablation in 2011 with pacemaker placement.  Chronically on warfarin that was held for surgery.  Has been on bridging Lovenox up until the morning before surgery.  -Warfarin restarted, Pharm.D. to dose  -Given history of perioperative stroke when not being bridged certainly at risk for this again.  However risk for postoperative hematoma as well especially in the first 2 weeks.  Discussed with Dr. Sheikh today.  Weighing risk and benefit of anticoagulation will continue with warfarin and check INR twice daily.  Will change Lovenox dosing to therapeutic 60 mg twice daily for this  evening's dose and when INR reaches 1.75 will drop back to prophylactic 40 mg once daily dosing of Lovenox until > INR 2 then can stop Lovenox altogether.     History CVA: Stroke in perioperative period in the past when off of warfarin without bridging.  Residual left-sided weakness.  -Anticoagulation plan as above    Acute blood loss anemia: Expected postsurgical drop in hemoglobin.  Hemoglobin 9.2 down from 12.3 at preop.  Also received IV fluids.  Continue monitoring with anticoagulation as above.  -CBC tomorrow     Pulmonary hypertension: Severe based on last echocardiogram.  Did have right-sided heart failure postoperatively after last surgery.  Currently has no peripheral edema and torsemide was held this morning.  -Continue 10 mg daily torsemide p.o. tomorrow morning  -Stop IV fluids now     HTN: Continue PTA metoprolol tartrate 50 mg twice daily in combination pill amlodipine-valsartan.  Recent valsartan recall, not available in the hospital.  Pharm.D. has substituted for amlodipine 5 mg-losartan 100 mg daily which we will continue.     Remote history DVT: Lovenox as above.     RA: PTA on weekly IM methotrexate on Thursdays.  Last dose last Thursday.  Given her rheumatic heart disease risk benefit favors continuing methotrexate in perioperative period.  She will take when she returns home either Thursday or Friday depending on postop recovery goes.     Hypothyroidism: Continue PTA levothyroxine.     Gout: Continue allopurinol 100 mg twice daily.     Code status: Full code  Prophylaxis: Anticoagulation as above.  Warfarin.  Lovenox.  Disposition: per primary team. PT/OT        Interval History (Subjective):      Very minimal pain when working with therapy so far.  No fevers or chills.  No chest pain.  No shortness of breath.  Did not sleep well last night due to capnography alarming.                  Physical Exam:      Last Vital Signs:  /56 (BP Location: Left arm)  Temp 96.9  F (36.1  C) (Oral)   "Resp 18  Ht 1.626 m (5' 4\")  Wt 70.8 kg (156 lb)  SpO2 98%  BMI 26.78 kg/m2      Intake/Output Summary (Last 24 hours) at 11/13/18 1123  Last data filed at 11/13/18 1046   Gross per 24 hour   Intake             2089 ml   Output             1895 ml   Net              194 ml       Constitutional: Awake, NAD  Eyes: sclera white  HEENT: MMM  Respiratory: lungs cta bilaterally, no crackles or wheeze  Cardiovascular: RRR.  Loud S2.  No murmur  GI: non-tender, not distended, bowel sounds present  Skin: no rash   Musculoskeletal/extremities: Trace left lower extremity edema.  Left knee wrapped  Neurologic: A&O   Psychiatric: calm, cooperative          Medications:      All current medications were reviewed with changes reflected in problem list.         Data:      All new lab and imaging data was reviewed.   Labs:    Recent Labs  Lab 11/13/18  0645 11/12/18  1529   HGB 9.2*  --    PLT  --  267       Recent Labs  Lab 11/13/18  0645 11/12/18  0848   INR 1.31* 1.16*      Imaging:   None today      Jose C Brown MD        "

## 2018-11-13 NOTE — PROGRESS NOTES
"Orthopedic Surgery  11/13/2018    S: Patient voices no complaints today.     O: Blood pressure 125/56, temperature 96.9  F (36.1  C), temperature source Oral, resp. rate 18, height 1.626 m (5' 4\"), weight 70.8 kg (156 lb), SpO2 99 %, not currently breastfeeding.  Lab Results   Component Value Date    HGB 9.2 11/13/2018     Lab Results   Component Value Date    INR 1.31 11/13/2018        Neurovascularly intact.  Calves are negative bilaterally, both soft and nontender.  The wound is C/D/I.  The wound looks good with minimal erythema of the surrounding skin.    A: Ms. Clark is doing well status post Procedure(s):  Left total knee arthroplasty using a Smith and Nephew Melissa II knee system.    P: Continue physical therapy.   Anticoagulation   Pain management  Discharge planning    Pardeep Sheikh  [phone]    "

## 2018-11-14 ENCOUNTER — APPOINTMENT (OUTPATIENT)
Dept: PHYSICAL THERAPY | Facility: CLINIC | Age: 78
DRG: 470 | End: 2018-11-14
Attending: ORTHOPAEDIC SURGERY
Payer: MEDICARE

## 2018-11-14 ENCOUNTER — APPOINTMENT (OUTPATIENT)
Dept: OCCUPATIONAL THERAPY | Facility: CLINIC | Age: 78
DRG: 470 | End: 2018-11-14
Attending: ORTHOPAEDIC SURGERY
Payer: MEDICARE

## 2018-11-14 LAB
ERYTHROCYTE [DISTWIDTH] IN BLOOD BY AUTOMATED COUNT: 15.4 % (ref 10–15)
GLUCOSE SERPL-MCNC: 102 MG/DL (ref 70–99)
HCT VFR BLD AUTO: 29 % (ref 35–47)
HGB BLD-MCNC: 9.3 G/DL (ref 11.7–15.7)
INR PPP: 1.52 (ref 0.86–1.14)
MCH RBC QN AUTO: 33.8 PG (ref 26.5–33)
MCHC RBC AUTO-ENTMCNC: 32.1 G/DL (ref 31.5–36.5)
MCV RBC AUTO: 106 FL (ref 78–100)
PLATELET # BLD AUTO: 260 10E9/L (ref 150–450)
RBC # BLD AUTO: 2.75 10E12/L (ref 3.8–5.2)
WBC # BLD AUTO: 13 10E9/L (ref 4–11)

## 2018-11-14 PROCEDURE — 85027 COMPLETE CBC AUTOMATED: CPT | Performed by: INTERNAL MEDICINE

## 2018-11-14 PROCEDURE — 40000133 ZZH STATISTIC OT WARD VISIT

## 2018-11-14 PROCEDURE — 99232 SBSQ HOSP IP/OBS MODERATE 35: CPT | Performed by: INTERNAL MEDICINE

## 2018-11-14 PROCEDURE — 97530 THERAPEUTIC ACTIVITIES: CPT | Mod: GP | Performed by: PHYSICAL THERAPY ASSISTANT

## 2018-11-14 PROCEDURE — 97110 THERAPEUTIC EXERCISES: CPT | Mod: GP | Performed by: PHYSICAL THERAPIST

## 2018-11-14 PROCEDURE — 36415 COLL VENOUS BLD VENIPUNCTURE: CPT | Performed by: INTERNAL MEDICINE

## 2018-11-14 PROCEDURE — 97530 THERAPEUTIC ACTIVITIES: CPT | Mod: GP | Performed by: PHYSICAL THERAPIST

## 2018-11-14 PROCEDURE — 25000128 H RX IP 250 OP 636: Performed by: ORTHOPAEDIC SURGERY

## 2018-11-14 PROCEDURE — 12000000 ZZH R&B MED SURG/OB

## 2018-11-14 PROCEDURE — 97535 SELF CARE MNGMENT TRAINING: CPT | Mod: GO

## 2018-11-14 PROCEDURE — 40000193 ZZH STATISTIC PT WARD VISIT: Performed by: PHYSICAL THERAPY ASSISTANT

## 2018-11-14 PROCEDURE — 40000193 ZZH STATISTIC PT WARD VISIT: Performed by: PHYSICAL THERAPIST

## 2018-11-14 PROCEDURE — 97116 GAIT TRAINING THERAPY: CPT | Mod: GP | Performed by: PHYSICAL THERAPY ASSISTANT

## 2018-11-14 PROCEDURE — 25000132 ZZH RX MED GY IP 250 OP 250 PS 637: Mod: GY | Performed by: ORTHOPAEDIC SURGERY

## 2018-11-14 PROCEDURE — A9270 NON-COVERED ITEM OR SERVICE: HCPCS | Mod: GY | Performed by: ORTHOPAEDIC SURGERY

## 2018-11-14 PROCEDURE — 97110 THERAPEUTIC EXERCISES: CPT | Mod: GP | Performed by: PHYSICAL THERAPY ASSISTANT

## 2018-11-14 PROCEDURE — 82947 ASSAY GLUCOSE BLOOD QUANT: CPT | Performed by: INTERNAL MEDICINE

## 2018-11-14 PROCEDURE — 85610 PROTHROMBIN TIME: CPT | Performed by: INTERNAL MEDICINE

## 2018-11-14 PROCEDURE — 97116 GAIT TRAINING THERAPY: CPT | Mod: GP | Performed by: PHYSICAL THERAPIST

## 2018-11-14 RX ORDER — WARFARIN SODIUM 5 MG/1
5 TABLET ORAL
Status: COMPLETED | OUTPATIENT
Start: 2018-11-14 | End: 2018-11-14

## 2018-11-14 RX ADMIN — WARFARIN SODIUM 5 MG: 5 TABLET ORAL at 18:25

## 2018-11-14 RX ADMIN — METOPROLOL TARTRATE 50 MG: 50 TABLET, FILM COATED ORAL at 22:10

## 2018-11-14 RX ADMIN — MULTIPLE VITAMINS W/ MINERALS TAB 1 TABLET: TAB at 08:29

## 2018-11-14 RX ADMIN — AMLODIPINE BESYLATE 5 MG: 5 TABLET ORAL at 08:30

## 2018-11-14 RX ADMIN — VITAMIN D, TAB 1000IU (100/BT) 1000 UNITS: 25 TAB at 08:29

## 2018-11-14 RX ADMIN — ALLOPURINOL 100 MG: 100 TABLET ORAL at 22:10

## 2018-11-14 RX ADMIN — ACETAMINOPHEN 975 MG: 325 TABLET, FILM COATED ORAL at 08:29

## 2018-11-14 RX ADMIN — FOLIC ACID 1 MG: 1 TABLET ORAL at 08:30

## 2018-11-14 RX ADMIN — ACETAMINOPHEN 975 MG: 325 TABLET, FILM COATED ORAL at 17:24

## 2018-11-14 RX ADMIN — OXYCODONE HYDROCHLORIDE 5 MG: 5 TABLET ORAL at 08:30

## 2018-11-14 RX ADMIN — METOPROLOL TARTRATE 50 MG: 50 TABLET, FILM COATED ORAL at 08:30

## 2018-11-14 RX ADMIN — TORSEMIDE 10 MG: 10 TABLET ORAL at 08:30

## 2018-11-14 RX ADMIN — LEVOTHYROXINE SODIUM 112 MCG: 112 TABLET ORAL at 08:29

## 2018-11-14 RX ADMIN — OMEPRAZOLE 20 MG: 20 CAPSULE, DELAYED RELEASE ORAL at 08:30

## 2018-11-14 RX ADMIN — SENNOSIDES AND DOCUSATE SODIUM 1 TABLET: 8.6; 5 TABLET ORAL at 22:12

## 2018-11-14 RX ADMIN — OMEPRAZOLE 20 MG: 20 CAPSULE, DELAYED RELEASE ORAL at 22:10

## 2018-11-14 RX ADMIN — ENOXAPARIN SODIUM 60 MG: 60 INJECTION SUBCUTANEOUS at 22:11

## 2018-11-14 RX ADMIN — SENNOSIDES AND DOCUSATE SODIUM 1 TABLET: 8.6; 5 TABLET ORAL at 08:30

## 2018-11-14 RX ADMIN — RANITIDINE 150 MG: 150 TABLET ORAL at 22:10

## 2018-11-14 RX ADMIN — OXYCODONE HYDROCHLORIDE 5 MG: 5 TABLET ORAL at 17:24

## 2018-11-14 RX ADMIN — RANITIDINE 150 MG: 150 TABLET ORAL at 08:29

## 2018-11-14 RX ADMIN — ALLOPURINOL 100 MG: 100 TABLET ORAL at 08:30

## 2018-11-14 RX ADMIN — OXYCODONE HYDROCHLORIDE 5 MG: 5 TABLET ORAL at 14:14

## 2018-11-14 RX ADMIN — ENOXAPARIN SODIUM 60 MG: 60 INJECTION SUBCUTANEOUS at 08:29

## 2018-11-14 RX ADMIN — LOSARTAN POTASSIUM 100 MG: 100 TABLET ORAL at 08:30

## 2018-11-14 ASSESSMENT — ACTIVITIES OF DAILY LIVING (ADL)
ADLS_ACUITY_SCORE: 14
ADLS_ACUITY_SCORE: 13
ADLS_ACUITY_SCORE: 13
ADLS_ACUITY_SCORE: 14
ADLS_ACUITY_SCORE: 13
ADLS_ACUITY_SCORE: 14

## 2018-11-14 NOTE — PROGRESS NOTES
SPIRITUAL HEALTH SERVICES Progress Note  [facility] Ortho Spine Unit    Pt alert and conversant.  Noted gratitude for cares provided by clinical team.  Prayer for healing requested and provided.  Plan:  [facility] remains available for any further needs or requests.    Zachary Fernandez MA  Staff   Pager: [phone]  Phone: [phone]

## 2018-11-14 NOTE — PLAN OF CARE
Problem: Patient Care Overview  Goal: Plan of Care/Patient Progress Review  Outcome: Improving  A/O, VSS confused at  Night reorients  Easily. Pain 5/10 taking  Oxycodone for  Pain control.  Up with assist of 1 voiding  adequate plans on  Discharging home with home care.

## 2018-11-14 NOTE — DISCHARGE INSTRUCTIONS
Return to clinic in 10-14 days.  Call [phone] to schedule or if you experience any problems before your scheduled appointment.      See general discharge instruction sheet for knees  1. Do exercises at home as instructed by therapist twice a day. Continue outpatient therapy as ordered by your doctor.  2. Ice knee after exercises and therapy.  3. Examine dressing daily for problems.  4. May shower, can get dressing wet, no soaking (no bathtubs, pools or hot tubs)  5. Notify your dentist or physician of your implant so you can get antibiotics before any dental work or before any invasive procedure (ie: colonoscopy)  6. Remove anti-embolism stockings (Bunny hose) for 30 minutes twice daily.      Aquacel dressing:  DO NOT CHANGE DRESSING DAILY.  Leave this dressing on until follow-up appointment with Orthopedic Surgeon.  Dressing is waterproof, can shower with it on, pat dry when done.  No soaking such as in tub baths, pools or hot tubs        While on narcotic pain medication, to prevent constipation:  1. Drink plenty of water to keep well hydrated   2. May take over the counter Colace or Senna (follow instructions on label)        Call your physician if: ([phone])   1. Persistent fever greater than 100 degrees with body chills or excessive sweating.  2. Increased/persistent redness, localized warmth, tenderness, drainage or swelling at incision site. Greater than 50% drainage on dressing.   3. Persistent pain not controlled with oral pain medications, ice and rest.   4. No bowel movement in 3 days (may use Milk of Magnesia or other over the counter remedy).  5. Chest pain, shortness of breath, and/or calf pain with excessive swelling.  6. Generalized feeling of illness.  7. Any other questions or concerns related to your surgery/recovery.    Thank you for allowing [facility] to participate in your cares!!

## 2018-11-14 NOTE — PROGRESS NOTES
"Orthopedic Surgery  11/14/2018  POD#: 2    S: Patient voices no complaints today.     O: Blood pressure 154/71, pulse 77, temperature 95.9  F (35.5  C), temperature source Oral, resp. rate 18, height 1.626 m (5' 4\"), weight 70.8 kg (156 lb), SpO2 94 %, not currently breastfeeding.  Lab Results   Component Value Date    HGB 9.3 11/14/2018     Lab Results   Component Value Date    INR 1.52 11/14/2018        I/O last 3 completed shifts:  In: 500 [P.O.:500]  Out: 450 [Urine:450]    Neurovascularly intact.  Calves are negative bilaterally, both soft and nontender.  The wound is C/D/I.  The wound looks good with minimal erythema of the surrounding skin.    A: Ms. Clark is doing well status post Procedure(s):  Left total knee arthroplasty using a Smith [employer] Nephew Melissa II knee system.    P:   1. Mobilize and continue physical therapy.   2. Anticoagulation - resumed coumadin  3. Pain management - controlled   4. Anticipate discharge to home with [facility] tomorrow      Amna Porter PA-C  O: [phone]  ]  "

## 2018-11-14 NOTE — PROGRESS NOTES
[facility]  Hospitalist Consult Progress Note  Jose C Brown MD 11/14/18    Reason for Stay (Diagnosis): L TKA         Assessment and Plan:      Summary of Stay:  Zunilda Clark is a 78 year old female with a history of bioprosthetic valve replacement due to rheumatic heart disease, A. fib/A flutter S/P AV node ablation with pacemaker placement on warfarin, pulmonary HTN, remote DVT, RA, CVA with residual left-sided weakness, HTN, anemia, and hypothyroidism who was admitted on 11/12/18 following a left TKA by Dr. Sheikh.  Doing very well postoperatively with well-controlled pain.  Working with PT and OT.     Assessment and plan:  Osteoarthritis:  s/p left TKA on 11/12/18 by Dr. Sheikh. The patient is doing well.  Currently has excellent pain control and is hemodynamically stable. Will defer diet, activity, and pain control to the primary team.  Capnography. Continue physical and occupational therapy. Social work consulted for possible placement needs. Continue incentive spirometry and follow hemoglobin to evaluate for surgical blood loss and potential need for transfusion especially while on warfarin and Lovenox.      A. fib, a flutter, s/p bioprosthetic mitral valve: History of bioprosthetic mitral valve replacement in 2008 with redo in 2014 for rheumatic heart disease.  Also has history of A. fib, a flutter and has had a maze procedure in the past and then AV kika ablation in 2011 with pacemaker placement.  Chronically on warfarin that was held for surgery.  Has been on bridging Lovenox up until the morning before surgery.  -Warfarin restarted, Pharm.D. to dose  -Given history of perioperative stroke when not being bridged certainly at risk for this again.  However risk for postoperative hematoma as well especially in the first 2 weeks.  Discussed with Dr. Sheikh.  Weighing risk and benefit of anticoagulation will continue with warfarin and check INR twice daily.  Will change Lovenox  dosing to therapeutic 60 mg twice daily and when INR reaches 1.75 will drop back to prophylactic 40 mg once daily dosing of Lovenox until > INR 2 then can stop Lovenox altogether.  -Hemoglobin not dropping, reassuring so far that no significant hematoma has developed to this point     History CVA: Stroke in perioperative period in the past when off of warfarin without bridging.  Residual left-sided weakness.  -Anticoagulation plan as above    Acute blood loss anemia: Expected postsurgical drop in hemoglobin.  Hemoglobin 9.2 the day after surgery down from 12.3 at preop.  Now 9.3 today after starting therapeutic Lovenox and warfarin.  Also received IV fluids.  Continue monitoring with anticoagulation as above.  -CBC tomorrow     Pulmonary hypertension: Severe based on last echocardiogram.  Did have right-sided heart failure postoperatively after last surgery.  Currently has no peripheral edema.  -Continue 10 mg daily torsemide po     HTN: Continue PTA metoprolol tartrate 50 mg twice daily in combination pill amlodipine-valsartan.  Recent valsartan recall, not available in the hospital.  Pharm.D. has substituted for amlodipine 5 mg-losartan 100 mg daily which we will continue.     Remote history DVT: Lovenox as above.     RA: PTA on weekly IM methotrexate on Thursdays.  Last dose last Thursday.  Given her rheumatic heart disease risk benefit favors continuing methotrexate in perioperative period.  She will take when she returns home either Thursday or Friday depending on postop recovery goes.     Hypothyroidism: Continue PTA levothyroxine.     Gout: Continue allopurinol 100 mg twice daily.     Code status: Full code  Prophylaxis: Anticoagulation as above.  Warfarin.  Lovenox.  Disposition: per primary team. PT/OT        Interval History (Subjective):      Continues to do quite well.  Some increase in pain when walking.  No fevers or chills.  Tolerating a diet.  No shortness of breath.                  Physical Exam:    "   Last Vital Signs:  /60 (BP Location: Left arm)  Pulse 77  Temp 99  F (37.2  C) (Oral)  Resp 18  Ht 1.626 m (5' 4\")  Wt 70.8 kg (156 lb)  SpO2 95%  BMI 26.78 kg/m2      Intake/Output Summary (Last 24 hours) at 11/13/18 1123  Last data filed at 11/13/18 1046   Gross per 24 hour   Intake             2089 ml   Output             1895 ml   Net              194 ml       Constitutional: Awake, NAD  Eyes: sclera white  HEENT: MMM  Respiratory: lungs cta bilaterally, no crackles or wheeze  Cardiovascular: RRR.  Loud S2.  No murmur  GI: non-tender, not distended, bowel sounds present  Skin: no rash   Musculoskeletal/extremities: Trace left lower extremity edema.  Left knee wrapped  Neurologic: A&O   Psychiatric: calm, cooperative          Medications:      All current medications were reviewed with changes reflected in problem list.         Data:      All new lab and imaging data was reviewed.   Labs:    Recent Labs  Lab 11/14/18  0603 11/13/18  0645 11/12/18  1529   WBC 13.0*  --   --    HGB 9.3* 9.2*  --    HCT 29.0*  --   --    *  --   --      --  267       Recent Labs  Lab 11/14/18  0603 11/13/18  1530 11/13/18  0645   INR 1.52* 1.45* 1.31*      Imaging:   None today      Jose C Brown MD        "

## 2018-11-14 NOTE — PLAN OF CARE
Discharge Planner OT   Patient plan for discharge: home with assist  Current status: . Pt completes sit<>stand with FWW with SBA. Ambulates chair>bathroom>bed with SBA and FWW.  Completes toilet tranfser mod I following vc's with commode overlay simulating home set-up and FWW. Cues during g/h task ast sink (2) to stand closer to sink. Demo'ed shower transfer, pt re-trialed progressing to SBA simulated to home set-up and use of FWW. Pt and spouse agree pt's OT goals are met.   Barriers to return to prior living situation: none anticipated  Recommendations for discharge: home with assist from spouse, per BPCI care plan. Assist from spouse for LE dressing, higher demand household tasks and supervision for bathing/shower transfer  Rationale for recommendations: pt progressing well, no further OT needs identified       Entered by: Loretta Damon 11/14/2018 12:34 PM

## 2018-11-14 NOTE — PLAN OF CARE
Problem: Patient Care Overview  Goal: Plan of Care/Patient Progress Review    Discharge Planner PT   Patient plan for discharge: home with home PT per BPCI plan  Current status:Pt amb 40' with ww with SBA with step to gait pattern. Pt transfers sit to/from stand with CGA with vcs for hand placement and for advancing L LE. Pt transfers sit to/ from supine with SBA. Pt transfers bed to/from chair with ww with SBA.   Barriers to return to prior living situation: mobility status, will progress with PT  Recommendations for discharge: home with home PT per BPCI plan  Rationale for recommendations: Progressing well, pt will continue to benefit from skilled PT to improve safety/tolerance with mobility skills prior to discharge.       Entered by: Danna Marin 11/14/2018 11:16 AM

## 2018-11-14 NOTE — PROGRESS NOTES
Discharge Planner   Discharge Plans in progress: Home with supportive  and [facility] PT/RN  Barriers to discharge plan: Medically instability,pending labs   Follow up plan: Following, will fax C orders and documentation, as well as continue to assess for PCP/HO/FU needs        Entered by: Andreea Rich 11/14/2018 2:10 PM

## 2018-11-14 NOTE — PLAN OF CARE
Problem: Patient Care Overview  Goal: Individualization & Mutuality  Outcome: Improving  Day RN  Vss, CMS+ +2 swelling in L knee and moderate bruising is present. Up with A g belt and walker moving well. Eating and drinking well. Voiding well. No post op bm. Tolerated therapy well and sitting up after pt and for meals. Minimal pain taking one oxy before pt and scheduled tylenol. discharge to home Thursday.

## 2018-11-15 ENCOUNTER — APPOINTMENT (OUTPATIENT)
Dept: PHYSICAL THERAPY | Facility: CLINIC | Age: 78
DRG: 470 | End: 2018-11-15
Attending: ORTHOPAEDIC SURGERY
Payer: MEDICARE

## 2018-11-15 VITALS
RESPIRATION RATE: 16 BRPM | OXYGEN SATURATION: 93 % | HEART RATE: 76 BPM | HEIGHT: 64 IN | DIASTOLIC BLOOD PRESSURE: 58 MMHG | WEIGHT: 156 LBS | BODY MASS INDEX: 26.63 KG/M2 | SYSTOLIC BLOOD PRESSURE: 112 MMHG | TEMPERATURE: 98.5 F

## 2018-11-15 LAB
GLUCOSE SERPL-MCNC: 102 MG/DL (ref 70–99)
HGB BLD-MCNC: 8.3 G/DL (ref 11.7–15.7)
HGB BLD-MCNC: 8.5 G/DL (ref 11.7–15.7)
INR PPP: 1.38 (ref 0.86–1.14)
PLATELET # BLD AUTO: 229 10E9/L (ref 150–450)

## 2018-11-15 PROCEDURE — 85018 HEMOGLOBIN: CPT | Performed by: INTERNAL MEDICINE

## 2018-11-15 PROCEDURE — 82947 ASSAY GLUCOSE BLOOD QUANT: CPT | Performed by: ORTHOPAEDIC SURGERY

## 2018-11-15 PROCEDURE — 25000132 ZZH RX MED GY IP 250 OP 250 PS 637: Mod: GY | Performed by: ORTHOPAEDIC SURGERY

## 2018-11-15 PROCEDURE — 99232 SBSQ HOSP IP/OBS MODERATE 35: CPT | Performed by: INTERNAL MEDICINE

## 2018-11-15 PROCEDURE — 97530 THERAPEUTIC ACTIVITIES: CPT | Mod: GP | Performed by: PHYSICAL THERAPY ASSISTANT

## 2018-11-15 PROCEDURE — 85049 AUTOMATED PLATELET COUNT: CPT | Performed by: ORTHOPAEDIC SURGERY

## 2018-11-15 PROCEDURE — 40000193 ZZH STATISTIC PT WARD VISIT: Performed by: PHYSICAL THERAPY ASSISTANT

## 2018-11-15 PROCEDURE — 85018 HEMOGLOBIN: CPT | Performed by: ORTHOPAEDIC SURGERY

## 2018-11-15 PROCEDURE — A9270 NON-COVERED ITEM OR SERVICE: HCPCS | Mod: GY | Performed by: ORTHOPAEDIC SURGERY

## 2018-11-15 PROCEDURE — 85610 PROTHROMBIN TIME: CPT | Performed by: ORTHOPAEDIC SURGERY

## 2018-11-15 PROCEDURE — 36415 COLL VENOUS BLD VENIPUNCTURE: CPT | Performed by: ORTHOPAEDIC SURGERY

## 2018-11-15 PROCEDURE — 97110 THERAPEUTIC EXERCISES: CPT | Mod: GP | Performed by: PHYSICAL THERAPY ASSISTANT

## 2018-11-15 PROCEDURE — 36415 COLL VENOUS BLD VENIPUNCTURE: CPT | Performed by: INTERNAL MEDICINE

## 2018-11-15 PROCEDURE — 25000128 H RX IP 250 OP 636: Performed by: ORTHOPAEDIC SURGERY

## 2018-11-15 PROCEDURE — 97116 GAIT TRAINING THERAPY: CPT | Mod: GP | Performed by: PHYSICAL THERAPY ASSISTANT

## 2018-11-15 RX ORDER — WARFARIN SODIUM 5 MG/1
5 TABLET ORAL ONCE
Qty: 1 TABLET | Refills: 0 | Status: ON HOLD | OUTPATIENT
Start: 2018-11-15 | End: 2018-12-18

## 2018-11-15 RX ORDER — WARFARIN SODIUM 5 MG/1
5 TABLET ORAL ONCE
Status: COMPLETED | OUTPATIENT
Start: 2018-11-15 | End: 2018-11-15

## 2018-11-15 RX ORDER — WARFARIN SODIUM 5 MG/1
5 TABLET ORAL
Status: DISCONTINUED | OUTPATIENT
Start: 2018-11-15 | End: 2018-11-15 | Stop reason: DRUGHIGH

## 2018-11-15 RX ADMIN — ALLOPURINOL 100 MG: 100 TABLET ORAL at 08:54

## 2018-11-15 RX ADMIN — OMEPRAZOLE 20 MG: 20 CAPSULE, DELAYED RELEASE ORAL at 08:57

## 2018-11-15 RX ADMIN — TORSEMIDE 10 MG: 10 TABLET ORAL at 08:54

## 2018-11-15 RX ADMIN — ENOXAPARIN SODIUM 60 MG: 60 INJECTION SUBCUTANEOUS at 08:58

## 2018-11-15 RX ADMIN — SENNOSIDES AND DOCUSATE SODIUM 1 TABLET: 8.6; 5 TABLET ORAL at 08:57

## 2018-11-15 RX ADMIN — MULTIPLE VITAMINS W/ MINERALS TAB 1 TABLET: TAB at 08:55

## 2018-11-15 RX ADMIN — LEVOTHYROXINE SODIUM 112 MCG: 112 TABLET ORAL at 08:57

## 2018-11-15 RX ADMIN — WARFARIN SODIUM 5 MG: 5 TABLET ORAL at 13:53

## 2018-11-15 RX ADMIN — RANITIDINE 150 MG: 150 TABLET ORAL at 08:55

## 2018-11-15 RX ADMIN — VITAMIN D, TAB 1000IU (100/BT) 1000 UNITS: 25 TAB at 08:54

## 2018-11-15 RX ADMIN — METOPROLOL TARTRATE 50 MG: 50 TABLET, FILM COATED ORAL at 08:55

## 2018-11-15 RX ADMIN — ACETAMINOPHEN 975 MG: 325 TABLET, FILM COATED ORAL at 08:57

## 2018-11-15 RX ADMIN — LOSARTAN POTASSIUM 100 MG: 100 TABLET ORAL at 08:54

## 2018-11-15 RX ADMIN — AMLODIPINE BESYLATE 5 MG: 5 TABLET ORAL at 08:54

## 2018-11-15 RX ADMIN — FOLIC ACID 1 MG: 1 TABLET ORAL at 08:57

## 2018-11-15 RX ADMIN — ACETAMINOPHEN 975 MG: 325 TABLET, FILM COATED ORAL at 00:59

## 2018-11-15 ASSESSMENT — ACTIVITIES OF DAILY LIVING (ADL)
ADLS_ACUITY_SCORE: 13

## 2018-11-15 NOTE — PHARMACY-ANTICOAGULATION SERVICE
Clinical Pharmacy- Warfarin Discharge Note  Warfarin PTA Regimen: 5mg Mon, Wed, Sat & 2.5mg ROW      Anticoagulation Dose History     Recent Dosing and Labs Latest Ref Rng & Units 11/5/2018 11/12/2018 11/13/2018 11/13/2018 11/13/2018 11/14/2018 11/15/2018    Warfarin 3 mg - - - - - 3 mg - -    Warfarin 5 mg - - 5 mg - - - 5 mg -    INR 0.86 - 1.14 - 1.16(H) 1.31(H) 1.45(H) - 1.52(H) 1.38(H)    INR 0.86 - 1.14 2.5(A) - - - - - -        Patient admitted for surgery- has warfarin as pta med but held for surgery.   Agree to go home with usual home regime after extra 5 mg dose today.   Plan is to bridge with Lovenox at therapetutic dosing until INR 1.75 and then go to 40 mg daily until INR over 2.  Ortho has lower  Range for therapeutic INR so goal for now should be 2-2.5.   Notified care coordinator to assist patient with setting up INR checks and monitoring for this transtition    The patient should have an INR checked Saturday.

## 2018-11-15 NOTE — PHARMACY - DISCHARGE MEDICATION RECONCILIATION AND EDUCATION
Discharge medication review for this patient is complete. Face-to-face medication education was provided by the pharmacist.  See [facility] for allergy information and immunization status.   Pharmacist assisted with medication reconciliation of discharge medications with PTA medications.    Patient was educated on taking Lovenox and warfarin and INR goals.     Patient was educated on the following for each discharge medication:   Rationale for therapy  Duration of treatment  Dosing and or monitoring drug levels  Common side effects  Importance of compliance  Drug/food interactions  Missed doses  Self monitoring parameters    Left written materials and instructions (Clinical notes from [facility]) for new medications: No    OUTCOMES: Patient verbalized understanding    IMPORTANT FOLLOW UP:  INR daily    Discharge Medication List     Review of your medicines      START taking       Dose / Directions    oxyCODONE-acetaminophen 5-325 MG per tablet   Commonly known as:  PERCOCET        Dose:  1-2 tablet   Take 1-2 tablets by mouth every 4 hours as needed for severe pain   Quantity:  60 tablet   Refills:  0       senna-docusate 8.6-50 MG per tablet   Commonly known as:  SENOKOT-S;PERICOLACE        Dose:  1 tablet   Take 1 tablet by mouth 2 times daily   Quantity:  30 tablet   Refills:  0         CONTINUE these medicines which may have CHANGED, or have new prescriptions. If we are uncertain of the size of tablets/capsules you have at home, strength may be listed as something that might have changed.       Dose / Directions    * WARFARIN SODIUM PO   This may have changed:  Another medication with the same name was added. Make sure you understand how and when to take each.        Dose:  5 mg   Take 5 mg by mouth MON, WED and SAT   Refills:  0       * WARFARIN SODIUM PO   This may have changed:  Another medication with the same name was added. Make sure you understand how and when to take each.        Dose:  2.5 mg   Take 2.5 mg by mouth  SUN, TUES,THURS, FRI   Refills:  0       * warfarin 5 MG tablet   Commonly known as:  COUMADIN   This may have changed:  You were already taking a medication with the same name, and this prescription was added. Make sure you understand how and when to take each.        Dose:  5 mg   Take 1 tablet (5 mg) by mouth once for 1 dose   Quantity:  1 tablet   Refills:  0       * Notice:  This list has 3 medication(s) that are the same as other medications prescribed for you. Read the directions carefully, and ask your doctor or other care provider to review them with you.      CONTINUE these medicines which have NOT CHANGED       Dose / Directions    ALLOPURINOL PO        Dose:  100 mg   Take 100 mg by mouth 2 times daily   Refills:  0       Amlodipine Besylate-Valsartan 5-160 MG Tabs   Used for:  Benign essential hypertension        Dose:  1 tablet   Take 1 tablet by mouth daily   Quantity:  90 tablet   Refills:  3       BONIVA 3 MG/3ML   Generic drug:  ibandronate        Dose:  3 mg   Inject 3 mg into the vein every 3 months   Refills:  0       calcium citrate 950 MG tablet   Commonly known as:  CALCITRATE        Take by mouth daily 1200 mg daily   Refills:  0       enoxaparin 60 MG/0.6ML injection   Commonly known as:  LOVENOX        Dose:  60 mg   Inject 0.6 mLs (60 mg) Subcutaneous 2 times daily   Quantity:  10 Syringe   Refills:  0       ferrous sulfate 325 (65 Fe) MG tablet   Commonly known as:  IRON        Dose:  325 mg   Take 325 mg by mouth every other day   Refills:  0       fish oil-omega-3 fatty acids 1000 MG capsule        Dose:  1 g   Take 1 g by mouth daily Pt takes as remembers   Refills:  0       folic acid 1 MG tablet   Commonly known as:  FOLVITE        Dose:  1 mg   Take 1 mg by mouth daily   Refills:  0       levothyroxine 112 MCG tablet   Commonly known as:  SYNTHROID/LEVOTHROID   Used for:  Acquired hypothyroidism        TAKE 1 TABLET (112 MCG) BY MOUTH DAILY   Quantity:  90 tablet   Refills:  1        METHOTREXATE SODIUM IJ        Dose:  0.8 mL   Inject 0.8 mLs as directed once a week Thursdays   Refills:  0       metoprolol tartrate 50 MG tablet   Commonly known as:  LOPRESSOR   Used for:  Chronic atrial fibrillation (H)        Dose:  50 mg   Take 1 tablet (50 mg) by mouth 2 times daily   Quantity:  200 tablet   Refills:  3       Multi-vitamin Tabs tablet        Dose:  1 tablet   Take 1 tablet by mouth daily Without iron   Refills:  0       omeprazole 20 MG CR capsule   Commonly known as:  priLOSEC   Used for:  Esophageal reflux        Dose:  20 mg   Take 1 capsule (20 mg) by mouth 2 times daily   Quantity:  180 capsule   Refills:  1       torsemide 10 MG tablet   Commonly known as:  DEMADEX   Used for:  Pulmonary HTN (H)        Dose:  10 mg   Take 1 tablet (10 mg) by mouth daily (with breakfast)   Quantity:  30 tablet   Refills:  3       VITAMIN D (CHOLECALCIFEROL) PO        Dose:  1000 Units   Take 1,000 Units by mouth daily   Refills:  0         STOP taking          ACETAMINOPHEN PO                Where to get your medicines      These medications were sent to [facility] [address] [id number]     Phone:  [phone]      enoxaparin 60 MG/0.6ML injection     senna-docusate 8.6-50 MG per tablet     warfarin 5 MG tablet         Some of these will need a paper prescription and others can be bought over the counter. Ask your nurse if you have questions.     Bring a paper prescription for each of these medications      oxyCODONE-acetaminophen 5-325 MG per tablet

## 2018-11-15 NOTE — PROGRESS NOTES
[facility]  Hospitalist Consult Progress Note  Nydia Carver MD 11/15/2018    Reason for Stay (Diagnosis): L TKA         Assessment and Plan:      Summary of Stay:  Zunilda Clark is a 78 year old female with a history of bioprosthetic valve replacement due to rheumatic heart disease, A. fib/A flutter S/P AV node ablation with pacemaker placement on warfarin, pulmonary HTN, remote DVT, RA, CVA with residual left-sided weakness, HTN, anemia, and hypothyroidism who was admitted on 11/12/18 following a left TKA by Dr. Sheikh.  Doing very well postoperatively with well-controlled pain.  Working with PT and OT.  Patient will be discharged today.     Assessment and plan:  Osteoarthritis:  s/p left TKA on 11/12/18 by Dr. Sheikh. The patient is doing well.  Currently has excellent pain control and is hemodynamically stable.   --Hemoglobin down to 8.3.  No signs of active bleeding.  --She has hematoma which is not increasing in size.  --Patient will need follow-up hemoglobin in 3-5 days.  --Patient follow with primary care provider with repeat hemoglobin     A. fib, a flutter, s/p bioprosthetic mitral valve: History of bioprosthetic mitral valve replacement in 2008 with redo in 2014 for rheumatic heart disease.  Also has history of A. fib, a flutter and has had a maze procedure in the past and then AV kika ablation in 2011 with pacemaker placement.  Chronically on warfarin that was held for surgery.  Has been on bridging Lovenox up until the morning before surgery.  -Warfarin restarted, Pharm.D. to dose  -Given history of perioperative stroke when not being bridged certainly at risk for this again.  However risk for postoperative hematoma as well especially in the first 2 weeks.  Discussed with Dr. Sheikh.  Weighing risk and benefit of anticoagulation will continue with warfarin and check INR twice daily.  Will change Lovenox dosing to therapeutic 60 mg twice daily and when INR reaches 1.75  will drop back to prophylactic 40 mg once daily dosing of Lovenox until > INR 2 then can stop Lovenox altogether.  Hemoglobin lower down to 8.3 today no signs of active bleeding.  Will need to closely monitor hemoglobin on discharge    History CVA: Stroke in perioperative period in the past when off of warfarin without bridging.  Residual left-sided weakness.  -Anticoagulation plan as above    Acute blood loss anemia: Expected postsurgical drop in hemoglobin.  Hemoglobin 9.2 the day after surgery down from 12.3 at preop.  Now 9.3 today after starting therapeutic Lovenox and warfarin.  Also received IV fluids.  Continue monitoring with anticoagulation as above.  -CBC with hemoglobin down to 8.3.  --if Patient will discharge today and need close follow-up and repeat check in 3-5 days     Pulmonary hypertension: Severe based on last echocardiogram.  Did have right-sided heart failure postoperatively after last surgery.  Currently has no peripheral edema.  -Continue 10 mg daily torsemide po     HTN: Continue PTA metoprolol tartrate 50 mg twice daily in combination pill amlodipine-valsartan.  Recent valsartan recall, not available in the hospital.  Pharm.D. has substituted for amlodipine 5 mg-losartan 100 mg daily which we will continue.     Remote history DVT: Lovenox as above.     RA: PTA on weekly IM methotrexate on Thursdays.  Last dose last Thursday.  Given her rheumatic heart disease risk benefit favors continuing methotrexate in perioperative period.     Hypothyroidism: Continue PTA levothyroxine.     Gout: Continue allopurinol 100 mg twice daily.     Code status: Full code  Prophylaxis: Anticoagulation as above.  Warfarin.  Lovenox.  Disposition: per primary team. PT/OT        Interval History (Subjective):      Assumed care reviewed chart.  Patient doing well.  Hemoglobin down to 8.3 however the swelling of the leg and hematoma seems to be better than before per patient.  Pain is well controlled.  Tolerated  "physical therapy.No chest pain no palpitations no lightheadedness no dizziness.  Review of all the other systems negative       Physical Exam:      Last Vital Signs:  /47  Pulse 76  Temp 98.5  F (36.9  C)  Resp 16  Ht 1.626 m (5' 4\")  Wt 70.8 kg (156 lb)  SpO2 93%  BMI 26.78 kg/m2      Intake/Output Summary (Last 24 hours) at 11/13/18 1123  Last data filed at 11/13/18 1046   Gross per 24 hour   Intake             2089 ml   Output             1895 ml   Net              194 ml       Constitutional: Awake, NAD  Eyes: sclera white  HEENT: MMM  Respiratory: lungs cta bilaterally, no crackles or wheeze  Cardiovascular: RRR.  Loud S2.  No murmur  GI: non-tender, not distended, bowel sounds present  Skin: no rash   Musculoskeletal/extremities: Trace left lower extremity edema.  Left knee wrapped  Neurologic: A&O   Psychiatric: calm, cooperative          Medications:      All current medications were reviewed with changes reflected in problem list.         Data:      All new lab and imaging data was reviewed.   Labs:    Recent Labs  Lab 11/15/18  0740 11/14/18  0603 11/13/18  0645 11/12/18  1529   WBC  --  13.0*  --   --    HGB 8.5* 9.3* 9.2*  --    HCT  --  29.0*  --   --    MCV  --  106*  --   --     260  --  267       Recent Labs  Lab 11/15/18  0740 11/14/18  0603 11/13/18  1530   INR 1.38* 1.52* 1.45*      Imaging:   None today      Nydia Carver MD        "

## 2018-11-15 NOTE — PROGRESS NOTES
"Orthopedic Surgery  11/15/2018  POD#: 3    S: Patient voices no complaints today. Denies calf pain, dizziness, SOB.    O: Blood pressure 112/58, pulse 76, temperature 98.5  F (36.9  C), resp. rate 16, height 1.626 m (5' 4\"), weight 70.8 kg (156 lb), SpO2 93 %, not currently breastfeeding.  Lab Results   Component Value Date    HGB 8.3 11/15/2018     Lab Results   Component Value Date    INR 1.38 11/15/2018        I/O last 3 completed shifts:  In: 660 [P.O.:660]  Out: -     Neurovascularly intact.  Calves are negative bilaterally, both soft and nontender.  The wound is C/D/I.  The wound looks good with minimal erythema of the surrounding skin.    A: Ms. Clark is doing well status post Procedure(s):  Left total knee arthroplasty using a [employer] knee system.    P:   1. Mobilize and continue physical therapy.   2. Anticoagulation - on coumadin  3. Pain management - controlled  4. Anticipate discharge to home with home PT today.      Amna Porter PA-C  O: [phone]  "

## 2018-11-15 NOTE — PROGRESS NOTES
Discussed with the situation regarding anticoagulation.  Will plan to discharge on Lovenox 60mg BID until the INR is >1.75.  Then I would like her to stop the lovenox and just use coumadin for anticoagulation.

## 2018-11-15 NOTE — PLAN OF CARE
Problem: Patient Care Overview  Goal: Plan of Care/Patient Progress Review  Outcome: Adequate for Discharge Date Met: 11/15/18  Pt up with A1 and walker. VSS. Pain controlled with tylenol. Voiding and drinking adequate amounts. Dressing has moderate dried drainage, changed by LPN. Showered this shift.  and sister present in room. Hmg was 8.5, will recheck. Paged MD regarding recheck which was 8.3. Per sx and MD okay to go home. No signs of bleeding present. Plan is to go home with home care. Warfarin given. Educated  Pt on plan to do lovenox at home until INR is 1.75 then discontinue the lovenox. SX also told pt plan with lovenox.   Patient's After Visit Summary was reviewed with patient and/or significant other.   Patient verbalized understanding of After Visit Summary, recommended follow up and was given an opportunity to ask questions.   Discharge medications sent home with patient/family: YES, oxycodone, senna stool softner and lovenox.    Discharged with spouse. Home care will follow up with her tomorrow and manage INR.

## 2018-11-15 NOTE — PLAN OF CARE
Problem: Patient Care Overview  Goal: Plan of Care/Patient Progress Review  Discharge Planner PT   Patient plan for discharge: home with homecare  Current status: ROM 8-82 this AM basic transfers with S only gait with RW to 100 feet with S and cues for pattern  Barriers to return to prior living situation: none  Recommendations for discharge: home with home PT per BPCI plan  andper plan established by the PT.    Rationale for recommendations: goals are met recommend to PT for discharge to home no DME needs       Entered by: Dayana Washburn 11/15/2018 10:59 AM

## 2018-11-15 NOTE — PROGRESS NOTES
Care Transitions Team: Following for CC, discharge planning, and disposition.        Per conversation with Surgeon and chart review pt will discharged to home today with [facility] RN/PT, specific orders for Lovenox and INR was addressed with verbal conversation to have [facility] RN draw INR tomorrow and daily until INR is greater than 1.75 at which time she can stop her Lovenox injections and just use her coumadin as bridging will be complete.     Also noting on discharge AVS mx orders for PCP follow up for CBC and INR - can Home care just draw the CBC as well. Inquired this with Leonidas who will direct to Home Care RN.     This information was provided via e-mail  To Renee . [facility] CM + Phone call conversation to Leonidas, intake at [facility], explaining daily INR request and concerns. Directing him to contact [facility] for any questions or concerns as this was not specifically ordered that way but was verbalized per this writer and Surgeon, Dr. Sheikh. with the understanding PCP to direct INR and Coumadin dosing following reaching INR of  1.75.     Leonidas of [facility] will have an RN to pt home start of care 11/16.   Met with pt at bedside as well to update on plan.       No further questions    Andreea Rich RN, BSN, Care Transitions Specialist   Care Transitions Team  [phone]

## 2018-11-15 NOTE — PLAN OF CARE
Problem: Patient Care Overview  Goal: Plan of Care/Patient Progress Review  A/O, VSS denies pain scheduled tylenol given. Up with  Assist of 1 with walker voiding adequate plans on discharging home with home care.

## 2018-11-16 ENCOUNTER — TELEPHONE (OUTPATIENT)
Dept: INTERNAL MEDICINE | Facility: CLINIC | Age: 78
End: 2018-11-16

## 2018-11-16 NOTE — TELEPHONE ENCOUNTER
IP F/U    Date: 11/15/18  Diagnosis: Djd, S/P Total Knee Arthroplasty  Is patient active in care coordination? No  Was patient in TCU? No

## 2018-11-19 ENCOUNTER — TRANSFERRED RECORDS (OUTPATIENT)
Dept: HEALTH INFORMATION MANAGEMENT | Facility: CLINIC | Age: 78
End: 2018-11-19

## 2018-11-19 ENCOUNTER — ANTICOAGULATION THERAPY VISIT (OUTPATIENT)
Dept: ANTICOAGULATION | Facility: CLINIC | Age: 78
End: 2018-11-19
Payer: MEDICARE

## 2018-11-19 DIAGNOSIS — Z79.01 LONG TERM CURRENT USE OF ANTICOAGULANTS WITH INR GOAL OF 2.0-3.0: ICD-10-CM

## 2018-11-19 DIAGNOSIS — I48.91 ATRIAL FIBRILLATION AND FLUTTER (H): ICD-10-CM

## 2018-11-19 DIAGNOSIS — I48.92 ATRIAL FIBRILLATION AND FLUTTER (H): ICD-10-CM

## 2018-11-19 LAB — INR PPP: 2.13 (ref 0.8–1.1)

## 2018-11-19 PROCEDURE — 99207 ZZC NO CHARGE NURSE ONLY: CPT | Performed by: INTERNAL MEDICINE

## 2018-11-19 NOTE — PROGRESS NOTES
ANTICOAGULATION FOLLOW-UP CLINIC VISIT    Patient Name:  Zunilda Alicea May  Date:  11/19/2018  Contact Type:  Telephone/ Maranda, [facility] nurse reporting INR result, no changes. order discussed with Maranda.     SUBJECTIVE:     Patient Findings     Positives No Problem Findings    Comments Per HC nurse, pt denies any changes or missed warfarin doses since last INR visit. Pt denies any bleeding or bruising problems. Pt had knee surgery 11/12/18, last dose of lovenox was 11/17/18. Per Maranda HC nurse, INR result today was 2.13 via venous draw, Maranda will also do venous draw 11/27/18.              OBJECTIVE    INR   Date Value Ref Range Status   11/19/2018 2.13 (A) 0.8 - 1.1 Final     Comment:     [facility]       ASSESSMENT / PLAN  INR assessment THER    Recheck INR In: 8 DAYS    INR Location [facility] INR      Anticoagulation Summary as of 11/19/2018     INR goal 2.0-3.0   Today's INR 2.13   Warfarin maintenance plan 5 mg (2.5 mg x 2) on Mon, Wed, Sat; 2.5 mg (2.5 mg x 1) all other days   Full warfarin instructions 5 mg on Mon, Wed, Sat; 2.5 mg all other days   Weekly warfarin total 25 mg   No change documented Екатерина Mahan, RN   Plan last modified Екатерина Mahan, RN (8/14/2017)   Next INR check 11/27/2018   Priority INR   Target end date     Indications   Long term current use of anticoagulants with INR goal of 2.0-3.0 [Z79.01]  Atrial fibrillation and flutter (H) [I48.91  I48.92]         Anticoagulation Episode Summary     INR check location     Preferred lab     Send INR reminders to [facility]    Comments likes printed AVS      Anticoagulation Care Providers     Provider Role Specialty Phone number    Yunior Huang MD Responsible Internal Medicine [phone]            See the Encounter Report to view Anticoagulation Flowsheet and Dosing Calendar (Go to Encounters tab in chart review, and find the Anticoagulation Therapy Visit)    Dosage adjustment made based on physician directed care  plan.    Екатерина Mahan RN

## 2018-11-19 NOTE — TELEPHONE ENCOUNTER
"ED/Discharge Protocol    \"Hi, my name is Brandon, a registered nurse, and I am calling on behalf of Dr. Huang's office at [place].  I am calling to follow up and see how things are going for you after your recent visit.\"    \"I see that you were in the (ER/UC/IP) on 11/12/18.    How are you doing now that you are home?\" I'm doing well as far as pain is concerned, progressing. I have a lot of swelling but I'm working on it. I saw surgeon today and had dressing replaced. I have physical therapy 3 times a week. I have an appointment with surgeon for follow up on 11/26/18 for suture removal.    Is patient experiencing symptoms that may require a hospital visit?  No    Discharge Instructions    \"Let's review your discharge instructions.  What is/are the follow-up recommendations?  Pt. Response: Follow up with surgeon and physical therapy    \"Were you instructed to make a follow-up appointment?\"  Pt. Response: No.     Scheduled hospital follow up with Dr. Huang for 11/28/18 at 1:40pm.      Medications    \"Do you have questions about your medications?\"   No    Call Summary    \"Do you have any questions or concerns about your condition or care plan at the moment?\"    No  Triage nurse advice given: Follow up as scheduled    Patient was in ER once in the past year (assess appropriateness of ER visits.)      \"If you have questions or things don't continue to improve, we encourage you contact us through the main clinic number,  [phone].  Even if the clinic is not open, triage nurses are available 24/7 to help you.     We would like you to know that our clinic has extended hours (provide information).  We also have urgent care (provide details on closest location and hours/contact info)\"      \"Thank you for your time and take care!\"        "

## 2018-11-20 ENCOUNTER — TELEPHONE (OUTPATIENT)
Dept: INTERNAL MEDICINE | Facility: CLINIC | Age: 78
End: 2018-11-20

## 2018-11-20 DIAGNOSIS — I27.20 PULMONARY HTN (H): ICD-10-CM

## 2018-11-20 RX ORDER — TORSEMIDE 10 MG/1
10 TABLET ORAL
Qty: 90 TABLET | Refills: 0 | Status: SHIPPED | OUTPATIENT
Start: 2018-11-20 | End: 2019-02-21

## 2018-11-20 NOTE — TELEPHONE ENCOUNTER
Received refill request for:  Torsemide  Last OV was: 8/16/2018 with Dr. Monroy  Labs/EKG: last BMP 11/5/2018  F/U scheduled: orders in Epic for 12/2018, not yet scheduled  New script sent to: [facility]

## 2018-11-21 ENCOUNTER — TELEPHONE (OUTPATIENT)
Dept: INTERNAL MEDICINE | Facility: CLINIC | Age: 78
End: 2018-11-21

## 2018-11-26 ENCOUNTER — TRANSFERRED RECORDS (OUTPATIENT)
Dept: HEALTH INFORMATION MANAGEMENT | Facility: CLINIC | Age: 78
End: 2018-11-26

## 2018-11-27 ENCOUNTER — ANTICOAGULATION THERAPY VISIT (OUTPATIENT)
Dept: ANTICOAGULATION | Facility: CLINIC | Age: 78
End: 2018-11-27
Payer: MEDICARE

## 2018-11-27 DIAGNOSIS — I48.91 ATRIAL FIBRILLATION AND FLUTTER (H): ICD-10-CM

## 2018-11-27 DIAGNOSIS — I48.92 ATRIAL FIBRILLATION AND FLUTTER (H): ICD-10-CM

## 2018-11-27 DIAGNOSIS — Z79.01 LONG TERM CURRENT USE OF ANTICOAGULANTS WITH INR GOAL OF 2.0-3.0: ICD-10-CM

## 2018-11-27 LAB — INR PPP: 1.38 (ref 0.8–1.1)

## 2018-11-27 PROCEDURE — 99207 ZZC NO CHARGE NURSE ONLY: CPT | Performed by: INTERNAL MEDICINE

## 2018-11-27 NOTE — PROGRESS NOTES
ANTICOAGULATION FOLLOW-UP CLINIC VISIT    Patient Name:  Zunilda Alicea May  Date:  11/27/2018  Contact Type:  Telephone/ Sonja Breann @ [facility] nurse reporting INR result, discharge from HC, pt denies any changes or missed warfarin doses. Orders discussed with Sonja.     SUBJECTIVE:     Patient Findings     Positives Unexplained INR or factor level change    Comments Per HC nurse, pt denies any changes or missed warfarin doses since last INR check. Pt denies any bleeding or bruising problems. Maxwell @ [facility] is doing venous INR's.   OT was at pt's house when this writer called to schedule in-clinic INR because HC nurse discharged pt from services today. OT told pt that she would call HC nurse and see if she could do 1 more INR at home on 11/30/18. Pt will call to schedule appt if not able to have HC do this.            OBJECTIVE    INR   Date Value Ref Range Status   11/27/2018 1.38 (A) 0.8 - 1.1 Final     Comment:     [facility]       ASSESSMENT / PLAN  INR assessment SUB    Recheck INR In: 4 DAYS    INR Location [facility]R      Anticoagulation Summary as of 11/27/2018     INR goal 2.0-3.0   Today's INR 1.38!   Warfarin maintenance plan 5 mg (2.5 mg x 2) on Mon, Wed, Sat; 2.5 mg (2.5 mg x 1) all other days   Full warfarin instructions 11/27: 5 mg; 11/29: 5 mg; Otherwise 5 mg on Mon, Wed, Sat; 2.5 mg all other days   Weekly warfarin total 25 mg   Plan last modified Екатерина Mahan RN (8/14/2017)   Next INR check 11/30/2018   Priority INR   Target end date     Indications   Long term current use of anticoagulants with INR goal of 2.0-3.0 [Z79.01]  Atrial fibrillation and flutter (H) [I48.91  I48.92]         Anticoagulation Episode Summary     INR check location     Preferred lab     Send INR reminders to [facility]    Comments likes printed AVS      Anticoagulation Care Providers     Provider Role Specialty Phone number    Yunior Huang MD [facility] Internal Medicine [phone]            See the  Encounter Report to view Anticoagulation Flowsheet and Dosing Calendar (Go to Encounters tab in chart review, and find the Anticoagulation Therapy Visit)    Dosage adjustment made based on physician directed care plan.    Екатерина Mahan RN

## 2018-11-27 NOTE — MR AVS SNAPSHOT
Zunilda Alicea May   11/27/2018   Anticoagulation Therapy Visit    Description:  78 year old female   Provider:  Yunior Huang MD   Department:  [facility]           INR as of 11/27/2018     Today's INR 1.38!      Anticoagulation Summary as of 11/27/2018     INR goal 2.0-3.0   Today's INR 1.38!   Full warfarin instructions 11/27: 5 mg; 11/29: 5 mg; Otherwise 5 mg on Mon, Wed, Sat; 2.5 mg all other days   Next INR check 11/30/2018    Indications   Long term current use of anticoagulants with INR goal of 2.0-3.0 [Z79.01]  Atrial fibrillation and flutter (H) [I48.91  I48.92]         Contact Numbers     [facility] Phone Numbers:  Anticoagulation Clinic Appointments : [phone]  Anticoagulation Nurse: [phone]         November 2018 Details    Sun Mon Tue Wed Thu Fri Sat         1               2               3                 4               5               6               7               8               9               10                 11               12               13               14               15               16               17                 18               19               20               21               22               23               24                 25               26               27      5 mg   See details      28      5 mg         29      5 mg         30              Date Details   11/27 This INR check       Date of next INR:  11/30/2018         How to take your warfarin dose     To take:  2.5 mg Take 1 of the 2.5 mg tablets.    To take:  5 mg Take 2 of the 2.5 mg tablets.

## 2018-11-28 ENCOUNTER — OFFICE VISIT (OUTPATIENT)
Dept: INTERNAL MEDICINE | Facility: CLINIC | Age: 78
End: 2018-11-28
Payer: MEDICARE

## 2018-11-28 ENCOUNTER — TELEPHONE (OUTPATIENT)
Dept: INTERNAL MEDICINE | Facility: CLINIC | Age: 78
End: 2018-11-28

## 2018-11-28 VITALS
DIASTOLIC BLOOD PRESSURE: 60 MMHG | TEMPERATURE: 98.5 F | WEIGHT: 158 LBS | BODY MASS INDEX: 26.98 KG/M2 | HEART RATE: 80 BPM | HEIGHT: 64 IN | RESPIRATION RATE: 12 BRPM | SYSTOLIC BLOOD PRESSURE: 130 MMHG | OXYGEN SATURATION: 95 %

## 2018-11-28 DIAGNOSIS — I10 BENIGN ESSENTIAL HYPERTENSION: ICD-10-CM

## 2018-11-28 DIAGNOSIS — Z09 HOSPITAL DISCHARGE FOLLOW-UP: Primary | ICD-10-CM

## 2018-11-28 DIAGNOSIS — I48.92 ATRIAL FIBRILLATION AND FLUTTER (H): ICD-10-CM

## 2018-11-28 DIAGNOSIS — I48.91 ATRIAL FIBRILLATION AND FLUTTER (H): ICD-10-CM

## 2018-11-28 DIAGNOSIS — D62 ACUTE POSTHEMORRHAGIC ANEMIA: ICD-10-CM

## 2018-11-28 DIAGNOSIS — Z96.652 STATUS POST TOTAL LEFT KNEE REPLACEMENT: ICD-10-CM

## 2018-11-28 LAB
ERYTHROCYTE [DISTWIDTH] IN BLOOD BY AUTOMATED COUNT: 16.7 % (ref 10–15)
HCT VFR BLD AUTO: 27.7 % (ref 35–47)
HGB BLD-MCNC: 8.5 G/DL (ref 11.7–15.7)
MCH RBC QN AUTO: 31.7 PG (ref 26.5–33)
MCHC RBC AUTO-ENTMCNC: 30.7 G/DL (ref 31.5–36.5)
MCV RBC AUTO: 103 FL (ref 78–100)
PLATELET # BLD AUTO: 835 10E9/L (ref 150–450)
RBC # BLD AUTO: 2.68 10E12/L (ref 3.8–5.2)
WBC # BLD AUTO: 9.8 10E9/L (ref 4–11)

## 2018-11-28 PROCEDURE — 85027 COMPLETE CBC AUTOMATED: CPT | Performed by: INTERNAL MEDICINE

## 2018-11-28 PROCEDURE — 93000 ELECTROCARDIOGRAM COMPLETE: CPT | Performed by: INTERNAL MEDICINE

## 2018-11-28 PROCEDURE — 36415 COLL VENOUS BLD VENIPUNCTURE: CPT | Performed by: INTERNAL MEDICINE

## 2018-11-28 PROCEDURE — 99495 TRANSJ CARE MGMT MOD F2F 14D: CPT | Performed by: INTERNAL MEDICINE

## 2018-11-28 PROCEDURE — 80053 COMPREHEN METABOLIC PANEL: CPT | Performed by: INTERNAL MEDICINE

## 2018-11-28 NOTE — PROGRESS NOTES
SUBJECTIVE:   Zunilda Clark is a 78 year old female who presents to clinic today for the following health issues:          Hospital Follow-up Visit:    Hospital/Nursing Home/IP Rehab Facility: [facility]  Date of Admission: 11/12/18  Date of Discharge: 11/15/18  Reason(s) for Admission: Left Total Knee            Problems taking medications regularly:  None       Medication changes since discharge: None       Problems adhering to non-medication therapy:  None    Summary of hospitalization:  [facility] discharge summary reviewed  Diagnostic Tests/Treatments reviewed.  Follow up needed: none  Other Healthcare Providers Involved in Patient s Care:         Specialist appointment - ortho  and Physical Therapy  Update since discharge: improved.       Post Discharge Medication Reconciliation: discharge medications reconciled, continue medications without change.  Plan of care communicated with patient     Coding guidelines for this visit:  Type of Medical   Decision Making Face-to-Face Visit       within 7 Days of discharge Face-to-Face Visit        within 14 days of discharge   Moderate Complexity [id number]   High Complexity [id number] [phone]            Patient is seen for a follow up visit.  Recently hospitalized for left knee replacement surgery for osteoarthritis.   Surgery went well. Had post surgical anemia, did not require transfusion. Now feels weak and occasionally is dizzy.   Has pain in the knee, still is swollen. Takes Tylenol during the day and narcotic at night. No side effects.   Has started home rehab, able to extend the knee to 180 degrees.   Has history of paroxysmal atrial fibrillation. On anticoagulation with Coumadin- resumed after surgery and rate control medications. Asymptonatic - no chest pains , palpitations,  no side effects from medications.  Has h/o HTN. on medical treatment. BP has been controlled. No side effects from medications. No CP, HA, dizziness. good compliance  with medications and low salt diet.              Problem list and histories reviewed & adjusted, as indicated.  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     Valvular heart disease     Paroxysmal atrial fibrillation (H)     S/P mitral valve replacement with bioprosthetic valve     Pulmonary hypertension (H)     S/P total knee arthroplasty     Past Surgical History:   Procedure Laterality Date     ABDOMEN SURGERY      prolapsed bladder     ARTHROPLASTY KNEE Left 11/12/2018    Procedure: Left total knee arthroplasty using a [employer] II knee system;  Surgeon: Pardeep Sheikh MD;  Location: [facility] OR     COLONOSCOPY  3/15/2012     EYE SURGERY  2018    Both eyes/cataract surgery     H ABLATION AV NODE  2011    AFlutter with syncope, PPM to follow     HERNIA REPAIR      3 hernies/right and left & umbilical     IMPLANT PACEMAKER  2011     LAPAROSCOPIC CHOLECYSTECTOMY WITH CHOLANGIOGRAMS N/A 4/29/2017    Procedure: LAPAROSCOPIC CHOLECYSTECTOMY WITH CHOLANGIOGRAMS;  LAPAROSCOPIC CHOLECYSTECTOMY WITH CHOLANGIOGRAMS ;  Surgeon: Angeles Mcgill MD;  Location: [facility] OR     OPEN REDUCTION INTERNAL FIXATION RODDING INTRAMEDULLARY HUMERUS Right 7/28/2015    Procedure: OPEN REDUCTION INTERNAL FIXATION RODDING INTRAMEDULLARY HUMERUS;  Surgeon: Raymundo Rivera MD;  Location: [facility] OR     REPLACE VALVE MITRAL  2006    Mitral valve replacement with  "bioprosthetic valve in 2008 for rheumatic disease     SLING BLADDER SUSPENSION WITH FASCIA UMESH       VASCULAR SURGERY      Blood clots in both legs       Social History   Substance Use Topics     Smoking status: Never Smoker     Smokeless tobacco: Never Used     Alcohol use No     Family History   Problem Relation Age of Onset     Coronary Artery Disease Mother      Coronary Artery Disease Brother      Diabetes Brother      Cancer Brother       at age 52      Other Cancer Brother      Hypertension Sister      Hyperlipidemia Sister          No Known Allergies    Reviewed and updated as needed this visit by clinical staff       Reviewed and updated as needed this visit by Provider         ROS:  Constitutional, HEENT, cardiovascular, pulmonary, gi and gu systems are negative, except as otherwise noted.    OBJECTIVE:     /60  Pulse 80  Temp 98.5  F (36.9  C) (Oral)  Resp 12  Ht 5' 4\" (1.626 m)  Wt 158 lb (71.7 kg)  SpO2 (!) 86%  BMI 27.12 kg/m2  Body mass index is 27.12 kg/(m^2).   GENERAL: healthy, alert and no distress  EYES: Eyes grossly normal to inspection, PERRL and conjunctivae and sclerae normal  HENT: ear canals and TM's normal, nose and mouth without ulcers or lesions  NECK: no adenopathy, no asymmetry, masses, or scars and thyroid normal to palpation  RESP: lungs clear to auscultation - no rales, rhonchi or wheezes  CV: irregular rate and rhythm, normal S1 S2, no S3 or S4, no murmur, click or rub, no peripheral edema and peripheral pulses strong  ABDOMEN: soft, nontender, no hepatosplenomegaly, no masses and bowel sounds normal  MS: no gross musculoskeletal defects noted, no edema  Left knee post TKA is with edema, skin is hyperpigmented, incision is healed, mild palpatory tenderness   SKIN: no suspicious lesions or rashes  NEURO: Normal strength and tone, mentation intact and speech normal    Diagnostic Test Results:  none     ASSESSMENT/PLAN:     Problem List Items Addressed This Visit "     Benign essential hypertension    Atrial fibrillation and flutter (H)    Relevant Orders    EKG 12-lead complete w/read - Clinics (Completed)    Comprehensive metabolic panel      Other Visit Diagnoses     Hospital discharge follow-up    -  Primary    Acute posthemorrhagic anemia        Relevant Orders    CBC with platelets    Comprehensive metabolic panel    Status post total left knee replacement               Assess EKG - possible recurrent A fib, feeling dizzy   Assess lab work for anemia   Cont rehab, pain management   Cont medications     Follow-Up:with results     Yunior Huang MD  [facility]

## 2018-11-28 NOTE — MR AVS SNAPSHOT
After Visit Summary   11/28/2018    Zunilda Clark    MRN: [id number]           Patient Information     Date Of Birth          1940        Visit Information        Provider Department      11/28/2018 1:40 PM Yunior Huang MD [facility]        Today's Diagnoses     Hospital discharge follow-up    -  1    Atrial fibrillation and flutter (H)        Acute posthemorrhagic anemia        Status post total left knee replacement        Benign essential hypertension           Follow-ups after your visit        Your next 10 appointments already scheduled     Feb 07, 2019  3:45 PM CST   Remote PPM Check with KRAMER TECH1   [facility] ([facility])    [address]  [place] [address] [phone]           This appointment is for a remote check of your pacemaker.  This is not an appointment at the office.              Future tests that were ordered for you today     Open Future Orders        Priority Expected Expires Ordered    Cardiac Device Check - Remote Routine  12/1/2020 12/1/2018            Who to contact     If you have questions or need follow up information about today's clinic visit or your schedule please contact [facility] directly at [phone].  Normal or non-critical lab and imaging results will be communicated to you by MyChart, letter or phone within 4 business days after the clinic has received the results. If you do not hear from us within 7 days, please contact the clinic through [email or web address] or phone. If you have a critical or abnormal lab result, we will notify you by phone as soon as possible.  Submit refill requests through [employer] or call your pharmacy and they will forward the refill request to us. Please allow 3 business days for your refill to be completed.          Additional Information About Your Visit        MyChart Information     [employer] gives you secure access to your  "electronic health record. If you see a primary care provider, you can also send messages to your care team and make appointments. If you have questions, please call your primary care clinic.  If you do not have a primary care provider, please call [phone] and they will assist you.        Care EveryWhere ID     This is your Care EveryWhere ID. This could be used by other organizations to access your [place] medical records  [id number]        Your Vitals Were     Pulse Temperature Respirations Height Pulse Oximetry BMI (Body Mass Index)    80 98.5  F (36.9  C) (Oral) 12 5' 4\" (1.626 m) 95% 27.12 kg/m2       Blood Pressure from Last 3 Encounters:   11/28/18 130/60   11/15/18 112/58   11/05/18 130/80    Weight from Last 3 Encounters:   11/28/18 158 lb (71.7 kg)   11/12/18 156 lb (70.8 kg)   11/05/18 157 lb 1.6 oz (71.3 kg)              We Performed the Following     CBC with platelets     Comprehensive metabolic panel     EKG 12-lead complete w/read - Clinics        Primary Care Provider Office Phone # Fax #    Yunior Huang [phone]       [address] [facility] [address]        Equal Access to Services     ROQUE UGALDE : Hadii aad ku hadasho Soomaali, waaxda luqadaha, qaybta kaalmada adeegyada, waxay idiin hayaan adelaureen sanchez laabad . So [facility] [phone].    ATENCIÓN: Si habla español, tiene a morales disposición servicios gratuitos de asistencia lingüística. Llame al [phone].    We comply with applicable federal civil rights laws and [place] laws. We do not discriminate on the basis of race, color, national origin, age, disability, sex, sexual orientation, or gender identity.            Thank you!     Thank you for choosing [facility]  for your care. Our goal is always to provide you with excellent care. Hearing back from our patients is one way we can continue to improve our services. Please take a few minutes to complete the written survey that you may " receive in the mail after your visit with us. Thank you!             Your Updated Medication List - Protect others around you: Learn how to safely use, store and throw away your medicines at www.disposemymeds.org.          This list is accurate as of 11/28/18 11:59 PM.  Always use your most recent med list.                   Brand Name Dispense Instructions for use Diagnosis    ALLOPURINOL PO      Take 100 mg by mouth 2 times daily        amLODIPine-valsartan 5-160 MG tablet    EXFORGE    90 tablet    Take 1 tablet by mouth daily    Benign essential hypertension       BONIVA 3 MG/3ML   Generic drug:  ibandronate      Inject 3 mg into the vein every 3 months        calcium citrate 950 MG tablet    CITRACAL     Take by mouth daily 1200 mg daily        enoxaparin 60 MG/0.6ML syringe    LOVENOX    10 Syringe    Inject 0.6 mLs (60 mg) Subcutaneous 2 times daily    Status post total left knee replacement       folic acid 1 MG tablet    FOLVITE     Take 1 mg by mouth daily        levothyroxine 112 MCG tablet    SYNTHROID/LEVOTHROID    90 tablet    TAKE 1 TABLET (112 MCG) BY MOUTH DAILY    Acquired hypothyroidism       METHOTREXATE SODIUM IJ      Inject 0.8 mLs as directed once a week Thursdays        metoprolol tartrate 50 MG tablet    LOPRESSOR    200 tablet    Take 1 tablet (50 mg) by mouth 2 times daily    Chronic atrial fibrillation (H)       Multi-vitamin tablet      Take 1 tablet by mouth daily Without iron        omeprazole 20 MG DR capsule    priLOSEC    180 capsule    Take 1 capsule (20 mg) by mouth 2 times daily    Esophageal reflux       oxyCODONE-acetaminophen 5-325 MG tablet    PERCOCET    60 tablet    Take 1-2 tablets by mouth every 4 hours as needed for severe pain    Status post total left knee replacement       torsemide 10 MG tablet    DEMADEX    90 tablet    Take 1 tablet (10 mg) by mouth daily (with breakfast)    Pulmonary HTN (H)       VITAMIN D (CHOLECALCIFEROL) PO      Take 1,000 Units by mouth daily         * WARFARIN SODIUM PO      Take 5 mg by mouth MON, WED and SAT        * WARFARIN SODIUM PO      Take 2.5 mg by mouth SUN, TUES,THURS, FRI        * Notice:  This list has 2 medication(s) that are the same as other medications prescribed for you. Read the directions carefully, and ask your doctor or other care provider to review them with you.       yes

## 2018-11-28 NOTE — DISCHARGE SUMMARY
"Discharge Summary    Zunilda Clark MRN# [id number]   YOB: 1940 Age: 78 year old     Date of Admission: 11/12/2018    Date of Discharge: 11/15/2018    Reason for Admission: Zunilda Clark is an 78 year old female who was admitted to the hospital following surgery with Dr. Pardeep Sheikh.    Preoperative Diagnosis: DJD    Postoperative Diagnosis: DJD    Procedure Completed: left total knee arthroplasty     Hospital Course:  Ms. Clark was admitted and underwent the above procedure. The patient tolerated the procedure well. There were no complications. Following surgery she was admitted to the floor.  During her stay she did not require any blood transfusions.  Her last hemoglobin was noted to be   Lab Results   Component Value Date    HGB 8.3 11/15/2018   .  Her pain was controlled with oral pain medication.  During her stay she progressed well in physical therapy and all the therapy goals were met.     Discharge Physical Exam:  /58  Pulse 76  Temp 98.5  F (36.9  C)  Resp 16  Ht 1.626 m (5' 4\")  Wt 70.8 kg (156 lb)  SpO2 93%  BMI 26.78 kg/m2  Neurovascularly intact, distal pulses present bilaterally.  Calves are negative bilaterally, both soft and nontender.  The wound looks good with minimal erythema of the surrounding skin.    Assessment: Ms. Clark is stable and doing well status post left total knee arthroplasty on POD #3.    Plan: We will discharge her home on analgesics and deep venous thrombosis prophylaxis.  She will follow-up with Amna Porter PA-C or Dr. Pardeep Sheikh approximately 2 weeks from surgery.  She may call [phone] to schedule an appointment.    Discharge Instructions:  Discharge diet: Regular   Discharge activity: Weight bear as tolerated.  Advance from the walker to a cane as tolerated.  Discontinue the use of cane when comfortable.   Physical therapy: Work on therapy exercises given in the hospital.    Discharge follow-up: Follow up with Amna Porter PA-C in " 10-14 days.   Anticoagulation Resume regular dosing of warfarin.  Goal INR 2.0-2.5   Wound care: Leave aquacel dressing in place until post-op follow-up.  If it becomes loose or soiled then remove and replace with daily dry dressings.  Keep wound clean and dry.  May get incision area wet in shower but do not soak or scrub.         Meds:   Zunilda Clark   Home Medication Instructions SOHA:[id number]    Printed on:11/28/18 6840   Medication Information                      ALLOPURINOL PO  Take 100 mg by mouth 2 times daily             Amlodipine Besylate-Valsartan 5-160 MG TABS  Take 1 tablet by mouth daily             calcium citrate (CALCITRATE) 950 MG tablet  Take by mouth daily 1200 mg daily             enoxaparin (LOVENOX) 60 MG/0.6ML injection  Inject 0.6 mLs (60 mg) Subcutaneous 2 times daily             ferrous sulfate (IRON) 325 (65 Fe) MG tablet  Take 325 mg by mouth every other day             fish oil-omega-3 fatty acids 1000 MG capsule  Take 1 g by mouth daily Pt takes as remembers             folic acid (FOLVITE) 1 MG tablet  Take 1 mg by mouth daily             ibandronate (BONIVA) 3 MG/3ML  Inject 3 mg into the vein every 3 months              levothyroxine (SYNTHROID/LEVOTHROID) 112 MCG tablet  TAKE 1 TABLET (112 MCG) BY MOUTH DAILY             METHOTREXATE SODIUM IJ  Inject 0.8 mLs as directed once a week Thursdays             metoprolol tartrate (LOPRESSOR) 50 MG tablet  Take 1 tablet (50 mg) by mouth 2 times daily             multivitamin, therapeutic with minerals (MULTI-VITAMIN) TABS tablet  Take 1 tablet by mouth daily Without iron             omeprazole (PRILOSEC) 20 MG CR capsule  Take 1 capsule (20 mg) by mouth 2 times daily             oxyCODONE-acetaminophen (PERCOCET) 5-325 MG per tablet  Take 1-2 tablets by mouth every 4 hours as needed for severe pain             senna-docusate (SENOKOT-S;PERICOLACE) 8.6-50 MG per tablet  Take 1 tablet by mouth 2 times daily             VITAMIN D,  CHOLECALCIFEROL, PO  Take 1,000 Units by mouth daily             warfarin (COUMADIN) 5 MG tablet  Take 1 tablet (5 mg) by mouth once for 1 dose             WARFARIN SODIUM PO  Take 5 mg by mouth MON, WED and SAT             WARFARIN SODIUM PO  Take 2.5 mg by mouth PAOLA LAWSON THURS, FRI Ashley Brower, PA-C  O: [phone]

## 2018-11-29 LAB
ALBUMIN SERPL-MCNC: 3 G/DL (ref 3.4–5)
ALP SERPL-CCNC: 170 U/L (ref 40–150)
ALT SERPL W P-5'-P-CCNC: 36 U/L (ref 0–50)
ANION GAP SERPL CALCULATED.3IONS-SCNC: 11 MMOL/L (ref 3–14)
AST SERPL W P-5'-P-CCNC: 43 U/L (ref 0–45)
BILIRUB SERPL-MCNC: 0.6 MG/DL (ref 0.2–1.3)
BUN SERPL-MCNC: 25 MG/DL (ref 7–30)
CALCIUM SERPL-MCNC: 9.2 MG/DL (ref 8.5–10.1)
CHLORIDE SERPL-SCNC: 101 MMOL/L (ref 94–109)
CO2 SERPL-SCNC: 25 MMOL/L (ref 20–32)
CREAT SERPL-MCNC: 1.02 MG/DL (ref 0.52–1.04)
GFR SERPL CREATININE-BSD FRML MDRD: 52 ML/MIN/1.7M2
GLUCOSE SERPL-MCNC: 93 MG/DL (ref 70–99)
POTASSIUM SERPL-SCNC: 3.8 MMOL/L (ref 3.4–5.3)
PROT SERPL-MCNC: 8.1 G/DL (ref 6.8–8.8)
SODIUM SERPL-SCNC: 137 MMOL/L (ref 133–144)

## 2018-11-30 ENCOUNTER — ANTICOAGULATION THERAPY VISIT (OUTPATIENT)
Dept: ANTICOAGULATION | Facility: CLINIC | Age: 78
End: 2018-11-30
Payer: MEDICARE

## 2018-11-30 DIAGNOSIS — Z79.01 LONG TERM CURRENT USE OF ANTICOAGULANTS WITH INR GOAL OF 2.0-3.0: ICD-10-CM

## 2018-11-30 DIAGNOSIS — I48.91 ATRIAL FIBRILLATION AND FLUTTER (H): ICD-10-CM

## 2018-11-30 DIAGNOSIS — I48.92 ATRIAL FIBRILLATION AND FLUTTER (H): ICD-10-CM

## 2018-11-30 LAB — INR PPP: 3.34 (ref 0.8–1.1)

## 2018-11-30 PROCEDURE — 99207 ZZC NO CHARGE NURSE ONLY: CPT | Performed by: INTERNAL MEDICINE

## 2018-11-30 NOTE — PROGRESS NOTES
ANTICOAGULATION FOLLOW-UP CLINIC VISIT    Patient Name:  Zunilda Alicea May  Date:  11/30/2018  Contact Type:  Telephone/ orders given to Leilani with [place] [facility]    SUBJECTIVE:     Patient Findings     Positives Change in medications (Methotrexate started yesterday.  Concurrent use of METHOTREXATE and WARFARIN may result in increased risk for elevated INR and subsequent bleeding.)    Comments Patient will be discharged from home care on 12/3/18.  She starts outpatient therapy on 12/4/18 and will no longer be home bound.           OBJECTIVE    INR   Date Value Ref Range Status   11/30/2018 3.34 (A) 0.8 - 1.1 Final       ASSESSMENT / PLAN  INR assessment SUPRA    Recheck INR In: 3 DAYS    INR Location [facility]R      Anticoagulation Summary as of 11/30/2018     INR goal 2.0-3.0   Today's INR 3.34!   Warfarin maintenance plan 5 mg (2.5 mg x 2) on Mon, Wed, Sat; 2.5 mg (2.5 mg x 1) all other days   Full warfarin instructions 5 mg on Mon, Wed, Sat; 2.5 mg all other days   Weekly warfarin total 25 mg   Plan last modified Екатерина Mahan RN (8/14/2017)   Next INR check 12/3/2018   Priority INR   Target end date     Indications   Long term current use of anticoagulants with INR goal of 2.0-3.0 [Z79.01]  Atrial fibrillation and flutter (H) [I48.91  I48.92]         Anticoagulation Episode Summary     INR check location     Preferred lab     Send INR reminders to [facility]    Comments likes printed AVS      Anticoagulation Care Providers     Provider Role Specialty Phone number    Yunior Huang MD Responsible Internal Medicine [phone]            See the Encounter Report to view Anticoagulation Flowsheet and Dosing Calendar (Go to Encounters tab in chart review, and find the Anticoagulation Therapy Visit)    Dosage adjustment made based on physician directed care plan.    Sonam Teixeira RN

## 2018-11-30 NOTE — MR AVS SNAPSHOT
Zunilda Alicea May   11/30/2018   Anticoagulation Therapy Visit    Description:  78 year old female   Provider:  Yunior Huang MD   Department:  [facility]           INR as of 11/30/2018     Today's INR 3.34!      Anticoagulation Summary as of 11/30/2018     INR goal 2.0-3.0   Today's INR 3.34!   Full warfarin instructions 5 mg on Mon, Wed, Sat; 2.5 mg all other days   Next INR check 12/3/2018    Indications   Long term current use of anticoagulants with INR goal of 2.0-3.0 [Z79.01]  Atrial fibrillation and flutter (H) [I48.91  I48.92]         Contact Numbers     [facility] Phone Numbers:  Anticoagulation Clinic Appointments : [phone]  Anticoagulation Nurse: [phone]         November 2018 Details    Sun Mon Tue Wed Thu Fri Sat         1               2               3                 4               5               6               7               8               9               10                 11               12               13               14               15               16               17                 18               19               20               21               22               23               24                 25               26               27               28               29               30      2.5 mg   See details        Date Details   11/30 This INR check               How to take your warfarin dose     To take:  2.5 mg Take 1 of the 2.5 mg tablets.           December 2018 Details    Sun Mon Tue Wed Thu Fri Sat           1      5 mg           2      2.5 mg         3            4               5               6               7               8                 9               10               11               12               13               14               15                 16               17               18               19               20               21               22                 23               24               25               26                27               28               29                 30               31                     Date Details   No additional details    Date of next INR:  12/3/2018         How to take your warfarin dose     To take:  2.5 mg Take 1 of the 2.5 mg tablets.    To take:  5 mg Take 2 of the 2.5 mg tablets.

## 2018-12-03 ENCOUNTER — ANTICOAGULATION THERAPY VISIT (OUTPATIENT)
Dept: ANTICOAGULATION | Facility: CLINIC | Age: 78
End: 2018-12-03
Payer: MEDICARE

## 2018-12-03 DIAGNOSIS — I48.92 ATRIAL FIBRILLATION AND FLUTTER (H): ICD-10-CM

## 2018-12-03 DIAGNOSIS — Z79.01 LONG TERM CURRENT USE OF ANTICOAGULANTS WITH INR GOAL OF 2.0-3.0: ICD-10-CM

## 2018-12-03 DIAGNOSIS — I48.91 ATRIAL FIBRILLATION AND FLUTTER (H): ICD-10-CM

## 2018-12-03 LAB — INR PPP: 3.62 (ref 0.8–1.1)

## 2018-12-03 PROCEDURE — 99207 ZZC NO CHARGE NURSE ONLY: CPT | Performed by: INTERNAL MEDICINE

## 2018-12-03 NOTE — MR AVS SNAPSHOT
Zunilda Alicea May   12/3/2018   Anticoagulation Therapy Visit    Description:  78 year old female   Provider:  Yunior Huang MD   Department:  [facility]           INR as of 12/3/2018     Today's INR 3.62!      Anticoagulation Summary as of 12/3/2018     INR goal 2.0-3.0   Today's INR 3.62!   Full warfarin instructions 12/3: 2.5 mg; Otherwise 5 mg on Mon, Wed, Sat; 2.5 mg all other days   Next INR check 12/7/2018    Indications   Long term current use of anticoagulants with INR goal of 2.0-3.0 [Z79.01]  Atrial fibrillation and flutter (H) [I48.91  I48.92]         Contact Numbers     [facility] Phone Numbers:  Anticoagulation Clinic Appointments : [phone]  Anticoagulation Nurse: [phone]         December 2018 Details    Sun Mon Tue Wed Thu Fri Sat           1                 2               3      2.5 mg   See details      4      2.5 mg         5      5 mg         6      2.5 mg         7            8                 9               10               11               12               13               14               15                 16               17               18               19               20               21               22                 23               24               25               26               27               28               29                 30               31                     Date Details   12/03 This INR check       Date of next INR:  12/7/2018         How to take your warfarin dose     To take:  2.5 mg Take 1 of the 2.5 mg tablets.    To take:  5 mg Take 2 of the 2.5 mg tablets.

## 2018-12-03 NOTE — PROGRESS NOTES
ANTICOAGULATION FOLLOW-UP CLINIC VISIT    Patient Name:  Zunilda Alicea May  Date:  12/3/2018  Contact Type:  Telephone/ Breann [email or web address] nurse reporting INR result, discharge from  services today, no other changes. Orders discussed with Osnja.     SUBJECTIVE:     Patient Findings     Positives Extra doses (Pt had extra warfarin on 11/27/18 and 11/29/18 d/t low INR), No Problem Findings    Comments Per [facility] nurse, pt denies any changes or missed warfarin doses since last INR visit. Pt denies any bleeding or bruising problems. Breann @ [facility] does venous INR's until they receive a new batch of test strips. Pt is discharged from  today.              OBJECTIVE    INR   Date Value Ref Range Status   12/03/2018 3.62 (A) 0.8 - 1.1 Final     Comment:     [facility]        ASSESSMENT / PLAN  INR assessment SUPRA    Recheck INR In: 4 DAYS    INR Location [facility]R      Anticoagulation Summary as of 12/3/2018     INR goal 2.0-3.0   Today's INR 3.62!   Warfarin maintenance plan 5 mg (2.5 mg x 2) on Mon, Wed, Sat; 2.5 mg (2.5 mg x 1) all other days   Full warfarin instructions 12/3: 2.5 mg; Otherwise 5 mg on Mon, Wed, Sat; 2.5 mg all other days   Weekly warfarin total 25 mg   Plan last modified Екатерина Mahan RN (8/14/2017)   Next INR check 12/7/2018   Priority INR   Target end date     Indications   Long term current use of anticoagulants with INR goal of 2.0-3.0 [Z79.01]  Atrial fibrillation and flutter (H) [I48.91  I48.92]         Anticoagulation Episode Summary     INR check location     Preferred lab     Send INR reminders to [facility]    Comments likes printed AVS      Anticoagulation Care Providers     Provider Role Specialty Phone number    Yunior Huang MD Responsible Internal Medicine [phone]            See the Encounter Report to view Anticoagulation Flowsheet and Dosing Calendar (Go to Encounters tab in chart review, and find the Anticoagulation Therapy Visit)    Dosage adjustment made  based on physician directed care plan.    Екатерина Mahan RN

## 2018-12-07 ENCOUNTER — ANTICOAGULATION THERAPY VISIT (OUTPATIENT)
Dept: ANTICOAGULATION | Facility: CLINIC | Age: 78
End: 2018-12-07
Payer: MEDICARE

## 2018-12-07 DIAGNOSIS — I48.92 ATRIAL FIBRILLATION AND FLUTTER (H): ICD-10-CM

## 2018-12-07 DIAGNOSIS — I48.91 ATRIAL FIBRILLATION AND FLUTTER (H): ICD-10-CM

## 2018-12-07 DIAGNOSIS — Z79.01 LONG TERM CURRENT USE OF ANTICOAGULANTS WITH INR GOAL OF 2.0-3.0: ICD-10-CM

## 2018-12-07 LAB — INR POINT OF CARE: 3.5 (ref 0.86–1.14)

## 2018-12-07 PROCEDURE — 99207 ZZC NO CHARGE NURSE ONLY: CPT

## 2018-12-07 PROCEDURE — 85610 PROTHROMBIN TIME: CPT | Mod: QW

## 2018-12-07 PROCEDURE — 36416 COLLJ CAPILLARY BLOOD SPEC: CPT

## 2018-12-07 NOTE — MR AVS SNAPSHOT
Zunilda Alicea May   12/7/2018 2:30 PM   Anticoagulation Therapy Visit    Description:  78 year old female   Provider:  [facility]   Department:  [facility]           INR as of 12/7/2018     Today's INR 3.5!      Anticoagulation Summary as of 12/7/2018     INR goal 2.0-3.0   Today's INR 3.5!   Full warfarin instructions 12/8: 2.5 mg; 12/10: 2.5 mg; 12/12: 2.5 mg; Otherwise 5 mg on Mon, Wed, Sat; 2.5 mg all other days   Next INR check 12/13/2018    Indications   Long term current use of anticoagulants with INR goal of 2.0-3.0 [Z79.01]  Atrial fibrillation and flutter (H) [I48.91  I48.92]         Your next Anticoagulation Clinic appointment(s)     Dec 13, 2018  9:30 AM CST   [facility]ticoagulation Visit with [facility]   [facility]    [address]  [facility] [address] [phone]              Contact Numbers     [facility] Phone Numbers:  Anticoagulation Clinic Appointments : [phone]  Anticoagulation Nurse: [phone]         December 2018 Details    Sun Mon Tue Wed Thu Fri Sat           1                 2               3               4               5               6               7      2.5 mg   See details      8      2.5 mg           9      2.5 mg         10      2.5 mg         11      2.5 mg         12      2.5 mg         13            14               15                 16               17               18               19               20               21               22                 23               24               25               26               27               28               29                 30               31                     Date Details   12/07 This INR check       Date of next INR:  12/13/2018         How to take your warfarin dose     To take:  2.5 mg Take 1 of the 2.5 mg tablets.

## 2018-12-07 NOTE — PROGRESS NOTES
ANTICOAGULATION FOLLOW-UP CLINIC VISIT    Patient Name:  Zunilda Alicea May  Date:  12/7/2018  Contact Type:  Face to Face    SUBJECTIVE:     Patient Findings     Positives Unexplained INR or factor level change    Comments Patient denies any changes in diet or medications since last INR visit.  Denies any bleeding or bruising problems             OBJECTIVE    INR Protime   Date Value Ref Range Status   12/07/2018 3.5 (A) 0.86 - 1.14 Final       ASSESSMENT / PLAN  INR assessment SUPRA    Recheck INR In: 6 DAYS    INR Location Clinic      Anticoagulation Summary as of 12/7/2018     INR goal 2.0-3.0   Today's INR 3.5!   Warfarin maintenance plan 5 mg (2.5 mg x 2) on Mon, Wed, Sat; 2.5 mg (2.5 mg x 1) all other days   Full warfarin instructions 12/8: 2.5 mg; 12/10: 2.5 mg; 12/12: 2.5 mg; Otherwise 5 mg on Mon, Wed, Sat; 2.5 mg all other days   Weekly warfarin total 25 mg   Plan last modified Екатерина Mahan RN (8/14/2017)   Next INR check 12/13/2018   Priority INR   Target end date     Indications   Long term current use of anticoagulants with INR goal of 2.0-3.0 [Z79.01]  Atrial fibrillation and flutter (H) [I48.91  I48.92]         Anticoagulation Episode Summary     INR check location     Preferred lab     Send INR reminders to [facility]    Comments likes printed AVS      Anticoagulation Care Providers     Provider Role Specialty Phone number    Yunior Huang MD Responsible Internal Medicine [phone]            See the Encounter Report to view Anticoagulation Flowsheet and Dosing Calendar (Go to Encounters tab in chart review, and find the Anticoagulation Therapy Visit)    Dosage adjustment made based on physician directed care plan.    Sonam Teixeira, MELINA

## 2018-12-10 ENCOUNTER — TRANSFERRED RECORDS (OUTPATIENT)
Dept: HEALTH INFORMATION MANAGEMENT | Facility: CLINIC | Age: 78
End: 2018-12-10

## 2018-12-13 ENCOUNTER — ANTICOAGULATION THERAPY VISIT (OUTPATIENT)
Dept: ANTICOAGULATION | Facility: CLINIC | Age: 78
End: 2018-12-13
Payer: MEDICARE

## 2018-12-13 DIAGNOSIS — Z79.01 LONG TERM CURRENT USE OF ANTICOAGULANTS WITH INR GOAL OF 2.0-3.0: ICD-10-CM

## 2018-12-13 DIAGNOSIS — I48.91 ATRIAL FIBRILLATION AND FLUTTER (H): ICD-10-CM

## 2018-12-13 DIAGNOSIS — I48.92 ATRIAL FIBRILLATION AND FLUTTER (H): ICD-10-CM

## 2018-12-13 LAB — INR POINT OF CARE: 2.4 (ref 0.86–1.14)

## 2018-12-13 PROCEDURE — 85610 PROTHROMBIN TIME: CPT | Mod: QW

## 2018-12-13 PROCEDURE — 36416 COLLJ CAPILLARY BLOOD SPEC: CPT

## 2018-12-13 PROCEDURE — 99207 ZZC NO CHARGE NURSE ONLY: CPT

## 2018-12-13 NOTE — PROGRESS NOTES
ANTICOAGULATION FOLLOW-UP CLINIC VISIT    Patient Name:  Zunilda Alicea May  Date:  2018  Contact Type:  Face to Face    SUBJECTIVE:     Patient Findings     Positives:   No Problem Findings    Comments:   Pt denies any changes or missed warfarin doses since last INR visit. Pt denies any bleeding or bruising problems.              OBJECTIVE    INR Protime   Date Value Ref Range Status   2018 2.4 (A) 0.86 - 1.14 Final       ASSESSMENT / PLAN  INR assessment THER    Recheck INR In: 8 DAYS    INR Location Clinic      Anticoagulation Summary  As of 2018    INR goal:   2.0-3.0   TTR:   87.2 % (2.6 y)   INR used for dosin.4 (2018)   Warfarin maintenance plan:   5 mg (2.5 mg x 2) every Mon, Wed, Sat; 2.5 mg (2.5 mg x 1) all other days   Full warfarin instructions:   : 2.5 mg; : 2.5 mg; Otherwise 5 mg every Mon, Wed, Sat; 2.5 mg all other days   Weekly warfarin total:   25 mg   Plan last modified:   Екатерина Mahan RN (2017)   Next INR check:   2018   Priority:   INR   Target end date:       Indications    Long term current use of anticoagulants with INR goal of 2.0-3.0 [Z79.01]  Atrial fibrillation and flutter (H) [I48.91  I48.92]             Anticoagulation Episode Summary     INR check location:       Preferred lab:       Send INR reminders to:   [facility]    Comments:   likes printed AVS      Anticoagulation Care Providers     Provider Role Specialty Phone number    Yunior Huang MD [facility] Internal Medicine [phone]            See the Encounter Report to view Anticoagulation Flowsheet and Dosing Calendar (Go to Encounters tab in chart review, and find the Anticoagulation Therapy Visit)    Dosage adjustment made based on physician directed care plan.    Екатерина Mahan RN

## 2018-12-17 ENCOUNTER — TRANSFERRED RECORDS (OUTPATIENT)
Dept: HEALTH INFORMATION MANAGEMENT | Facility: CLINIC | Age: 78
End: 2018-12-17

## 2018-12-17 RX ORDER — CELECOXIB 200 MG/1
400 CAPSULE ORAL ONCE
Status: CANCELLED | OUTPATIENT
Start: 2018-12-17 | End: 2018-12-17

## 2018-12-18 ENCOUNTER — SURGERY (OUTPATIENT)
Age: 78
End: 2018-12-18
Payer: MEDICARE

## 2018-12-18 ENCOUNTER — ANESTHESIA (OUTPATIENT)
Dept: SURGERY | Facility: CLINIC | Age: 78
End: 2018-12-18
Payer: MEDICARE

## 2018-12-18 ENCOUNTER — ANESTHESIA EVENT (OUTPATIENT)
Dept: SURGERY | Facility: CLINIC | Age: 78
End: 2018-12-18
Payer: MEDICARE

## 2018-12-18 ENCOUNTER — HOSPITAL ENCOUNTER (OUTPATIENT)
Facility: CLINIC | Age: 78
Discharge: HOME | End: 2018-12-20
Attending: ORTHOPAEDIC SURGERY | Admitting: ORTHOPAEDIC SURGERY
Payer: MEDICARE

## 2018-12-18 DIAGNOSIS — Z96.652 STATUS POST TOTAL LEFT KNEE REPLACEMENT: Primary | ICD-10-CM

## 2018-12-18 LAB
ABO + RH BLD: NORMAL
ABO + RH BLD: NORMAL
BLD GP AB SCN SERPL QL: NORMAL
BLOOD BANK CMNT PATIENT-IMP: NORMAL
CREAT SERPL-MCNC: 0.8 MG/DL (ref 0.52–1.04)
GFR SERPL CREATININE-BSD FRML MDRD: 71 ML/MIN/1.7M2
HGB BLD-MCNC: 10.1 G/DL (ref 11.7–15.7)
INR PPP: 1.78 (ref 0.86–1.14)
PLATELET # BLD AUTO: 355 10E9/L (ref 150–450)
SPECIMEN EXP DATE BLD: NORMAL

## 2018-12-18 PROCEDURE — 25000125 ZZHC RX 250: Performed by: NURSE ANESTHETIST, CERTIFIED REGISTERED

## 2018-12-18 PROCEDURE — 82565 ASSAY OF CREATININE: CPT | Performed by: ORTHOPAEDIC SURGERY

## 2018-12-18 PROCEDURE — A9270 NON-COVERED ITEM OR SERVICE: HCPCS | Mod: GY | Performed by: ORTHOPAEDIC SURGERY

## 2018-12-18 PROCEDURE — 36415 COLL VENOUS BLD VENIPUNCTURE: CPT | Performed by: ANESTHESIOLOGY

## 2018-12-18 PROCEDURE — 86850 RBC ANTIBODY SCREEN: CPT | Performed by: ANESTHESIOLOGY

## 2018-12-18 PROCEDURE — 71000012 ZZH RECOVERY PHASE 1 LEVEL 1 FIRST HR: Performed by: ORTHOPAEDIC SURGERY

## 2018-12-18 PROCEDURE — 86900 BLOOD TYPING SEROLOGIC ABO: CPT | Performed by: ANESTHESIOLOGY

## 2018-12-18 PROCEDURE — 25000128 H RX IP 250 OP 636: Performed by: NURSE ANESTHETIST, CERTIFIED REGISTERED

## 2018-12-18 PROCEDURE — 37000009 ZZH ANESTHESIA TECHNICAL FEE, EACH ADDTL 15 MIN: Performed by: ORTHOPAEDIC SURGERY

## 2018-12-18 PROCEDURE — 85018 HEMOGLOBIN: CPT | Performed by: ANESTHESIOLOGY

## 2018-12-18 PROCEDURE — 36000056 ZZH SURGERY LEVEL 3 1ST 30 MIN: Performed by: ORTHOPAEDIC SURGERY

## 2018-12-18 PROCEDURE — 36415 COLL VENOUS BLD VENIPUNCTURE: CPT | Performed by: ORTHOPAEDIC SURGERY

## 2018-12-18 PROCEDURE — E2402 NEG PRESS WOUND THERAPY PUMP: HCPCS

## 2018-12-18 PROCEDURE — 85610 PROTHROMBIN TIME: CPT | Performed by: ANESTHESIOLOGY

## 2018-12-18 PROCEDURE — 71000013 ZZH RECOVERY PHASE 1 LEVEL 1 EA ADDTL HR: Performed by: ORTHOPAEDIC SURGERY

## 2018-12-18 PROCEDURE — 40000306 ZZH STATISTIC PRE PROC ASSESS II: Performed by: ORTHOPAEDIC SURGERY

## 2018-12-18 PROCEDURE — 25000132 ZZH RX MED GY IP 250 OP 250 PS 637: Mod: GY | Performed by: ORTHOPAEDIC SURGERY

## 2018-12-18 PROCEDURE — 85049 AUTOMATED PLATELET COUNT: CPT | Performed by: ORTHOPAEDIC SURGERY

## 2018-12-18 PROCEDURE — 25000132 ZZH RX MED GY IP 250 OP 250 PS 637: Mod: GY | Performed by: PHYSICIAN ASSISTANT

## 2018-12-18 PROCEDURE — A9270 NON-COVERED ITEM OR SERVICE: HCPCS | Mod: GY | Performed by: PHYSICIAN ASSISTANT

## 2018-12-18 PROCEDURE — 27210794 ZZH OR GENERAL SUPPLY STERILE: Performed by: ORTHOPAEDIC SURGERY

## 2018-12-18 PROCEDURE — 25000128 H RX IP 250 OP 636: Performed by: PHYSICIAN ASSISTANT

## 2018-12-18 PROCEDURE — 36000058 ZZH SURGERY LEVEL 3 EA 15 ADDTL MIN: Performed by: ORTHOPAEDIC SURGERY

## 2018-12-18 PROCEDURE — 25000128 H RX IP 250 OP 636: Performed by: ORTHOPAEDIC SURGERY

## 2018-12-18 PROCEDURE — 12000007 ZZH R&B INTERMEDIATE

## 2018-12-18 PROCEDURE — 25000128 H RX IP 250 OP 636: Performed by: ANESTHESIOLOGY

## 2018-12-18 PROCEDURE — 86901 BLOOD TYPING SEROLOGIC RH(D): CPT | Performed by: ANESTHESIOLOGY

## 2018-12-18 PROCEDURE — 37000008 ZZH ANESTHESIA TECHNICAL FEE, 1ST 30 MIN: Performed by: ORTHOPAEDIC SURGERY

## 2018-12-18 RX ORDER — DIMENHYDRINATE 50 MG/ML
25 INJECTION, SOLUTION INTRAMUSCULAR; INTRAVENOUS
Status: DISCONTINUED | OUTPATIENT
Start: 2018-12-18 | End: 2018-12-18 | Stop reason: HOSPADM

## 2018-12-18 RX ORDER — PROPOFOL 10 MG/ML
INJECTION, EMULSION INTRAVENOUS PRN
Status: DISCONTINUED | OUTPATIENT
Start: 2018-12-18 | End: 2018-12-18

## 2018-12-18 RX ORDER — CEFAZOLIN SODIUM 1 G/50ML
1 INJECTION, SOLUTION INTRAVENOUS EVERY 8 HOURS
Status: COMPLETED | OUTPATIENT
Start: 2018-12-19 | End: 2018-12-19

## 2018-12-18 RX ORDER — LABETALOL HYDROCHLORIDE 5 MG/ML
10 INJECTION, SOLUTION INTRAVENOUS
Status: DISCONTINUED | OUTPATIENT
Start: 2018-12-18 | End: 2018-12-18 | Stop reason: HOSPADM

## 2018-12-18 RX ORDER — ONDANSETRON 4 MG/1
4 TABLET, ORALLY DISINTEGRATING ORAL EVERY 6 HOURS PRN
Status: DISCONTINUED | OUTPATIENT
Start: 2018-12-18 | End: 2018-12-20 | Stop reason: HOSPADM

## 2018-12-18 RX ORDER — ACETAMINOPHEN 325 MG/1
650 TABLET ORAL EVERY 4 HOURS PRN
Status: DISCONTINUED | OUTPATIENT
Start: 2018-12-21 | End: 2018-12-20 | Stop reason: HOSPADM

## 2018-12-18 RX ORDER — NALOXONE HYDROCHLORIDE 0.4 MG/ML
.1-.4 INJECTION, SOLUTION INTRAMUSCULAR; INTRAVENOUS; SUBCUTANEOUS
Status: DISCONTINUED | OUTPATIENT
Start: 2018-12-18 | End: 2018-12-20 | Stop reason: HOSPADM

## 2018-12-18 RX ORDER — CEFAZOLIN SODIUM 1 G/3ML
1 INJECTION, POWDER, FOR SOLUTION INTRAMUSCULAR; INTRAVENOUS SEE ADMIN INSTRUCTIONS
Status: DISCONTINUED | OUTPATIENT
Start: 2018-12-18 | End: 2018-12-18 | Stop reason: HOSPADM

## 2018-12-18 RX ORDER — LIDOCAINE HYDROCHLORIDE 10 MG/ML
INJECTION, SOLUTION INFILTRATION; PERINEURAL PRN
Status: DISCONTINUED | OUTPATIENT
Start: 2018-12-18 | End: 2018-12-18

## 2018-12-18 RX ORDER — AMOXICILLIN 250 MG
2 CAPSULE ORAL 2 TIMES DAILY
Status: DISCONTINUED | OUTPATIENT
Start: 2018-12-18 | End: 2018-12-20 | Stop reason: HOSPADM

## 2018-12-18 RX ORDER — AMLODIPINE BESYLATE 5 MG/1
5 TABLET ORAL DAILY
Status: DISCONTINUED | OUTPATIENT
Start: 2018-12-19 | End: 2018-12-20 | Stop reason: HOSPADM

## 2018-12-18 RX ORDER — NEOSTIGMINE METHYLSULFATE 1 MG/ML
VIAL (ML) INJECTION PRN
Status: DISCONTINUED | OUTPATIENT
Start: 2018-12-18 | End: 2018-12-18

## 2018-12-18 RX ORDER — ACETAMINOPHEN 325 MG/1
975 TABLET ORAL EVERY 8 HOURS
Status: DISCONTINUED | OUTPATIENT
Start: 2018-12-18 | End: 2018-12-20 | Stop reason: HOSPADM

## 2018-12-18 RX ORDER — CEFAZOLIN SODIUM 2 G/100ML
2 INJECTION, SOLUTION INTRAVENOUS
Status: COMPLETED | OUTPATIENT
Start: 2018-12-18 | End: 2018-12-18

## 2018-12-18 RX ORDER — LIDOCAINE 40 MG/G
CREAM TOPICAL
Status: DISCONTINUED | OUTPATIENT
Start: 2018-12-18 | End: 2018-12-18 | Stop reason: HOSPADM

## 2018-12-18 RX ORDER — ONDANSETRON 2 MG/ML
INJECTION INTRAMUSCULAR; INTRAVENOUS PRN
Status: DISCONTINUED | OUTPATIENT
Start: 2018-12-18 | End: 2018-12-18

## 2018-12-18 RX ORDER — HYDRALAZINE HYDROCHLORIDE 20 MG/ML
2.5-5 INJECTION INTRAMUSCULAR; INTRAVENOUS EVERY 10 MIN PRN
Status: DISCONTINUED | OUTPATIENT
Start: 2018-12-18 | End: 2018-12-18 | Stop reason: HOSPADM

## 2018-12-18 RX ORDER — SODIUM CHLORIDE 9 MG/ML
INJECTION, SOLUTION INTRAVENOUS CONTINUOUS
Status: DISCONTINUED | OUTPATIENT
Start: 2018-12-18 | End: 2018-12-19

## 2018-12-18 RX ORDER — SODIUM CHLORIDE, SODIUM LACTATE, POTASSIUM CHLORIDE, CALCIUM CHLORIDE 600; 310; 30; 20 MG/100ML; MG/100ML; MG/100ML; MG/100ML
INJECTION, SOLUTION INTRAVENOUS CONTINUOUS
Status: DISCONTINUED | OUTPATIENT
Start: 2018-12-18 | End: 2018-12-18 | Stop reason: HOSPADM

## 2018-12-18 RX ORDER — FENTANYL CITRATE 50 UG/ML
INJECTION, SOLUTION INTRAMUSCULAR; INTRAVENOUS PRN
Status: DISCONTINUED | OUTPATIENT
Start: 2018-12-18 | End: 2018-12-18

## 2018-12-18 RX ORDER — GLYCOPYRROLATE 0.2 MG/ML
INJECTION, SOLUTION INTRAMUSCULAR; INTRAVENOUS PRN
Status: DISCONTINUED | OUTPATIENT
Start: 2018-12-18 | End: 2018-12-18

## 2018-12-18 RX ORDER — HYDROMORPHONE HYDROCHLORIDE 1 MG/ML
.3-.5 INJECTION, SOLUTION INTRAMUSCULAR; INTRAVENOUS; SUBCUTANEOUS EVERY 5 MIN PRN
Status: DISCONTINUED | OUTPATIENT
Start: 2018-12-18 | End: 2018-12-18 | Stop reason: HOSPADM

## 2018-12-18 RX ORDER — METOPROLOL TARTRATE 50 MG
50 TABLET ORAL 2 TIMES DAILY
Status: DISCONTINUED | OUTPATIENT
Start: 2018-12-18 | End: 2018-12-20 | Stop reason: HOSPADM

## 2018-12-18 RX ORDER — ACETAMINOPHEN 325 MG/1
975 TABLET ORAL ONCE
Status: COMPLETED | OUTPATIENT
Start: 2018-12-18 | End: 2018-12-18

## 2018-12-18 RX ORDER — FENTANYL CITRATE 50 UG/ML
25-50 INJECTION, SOLUTION INTRAMUSCULAR; INTRAVENOUS
Status: DISCONTINUED | OUTPATIENT
Start: 2018-12-18 | End: 2018-12-18 | Stop reason: HOSPADM

## 2018-12-18 RX ORDER — OXYCODONE HYDROCHLORIDE 5 MG/1
5 TABLET ORAL
Status: DISCONTINUED | OUTPATIENT
Start: 2018-12-18 | End: 2018-12-20 | Stop reason: HOSPADM

## 2018-12-18 RX ORDER — IBUPROFEN 200 MG
950 CAPSULE ORAL DAILY
Status: DISCONTINUED | OUTPATIENT
Start: 2018-12-18 | End: 2018-12-20 | Stop reason: HOSPADM

## 2018-12-18 RX ORDER — HYDROMORPHONE HYDROCHLORIDE 1 MG/ML
0.2 INJECTION, SOLUTION INTRAMUSCULAR; INTRAVENOUS; SUBCUTANEOUS
Status: DISCONTINUED | OUTPATIENT
Start: 2018-12-18 | End: 2018-12-20 | Stop reason: HOSPADM

## 2018-12-18 RX ORDER — TORSEMIDE 10 MG/1
10 TABLET ORAL
Status: DISCONTINUED | OUTPATIENT
Start: 2018-12-19 | End: 2018-12-20 | Stop reason: HOSPADM

## 2018-12-18 RX ORDER — ONDANSETRON 2 MG/ML
4 INJECTION INTRAMUSCULAR; INTRAVENOUS EVERY 30 MIN PRN
Status: DISCONTINUED | OUTPATIENT
Start: 2018-12-18 | End: 2018-12-18 | Stop reason: HOSPADM

## 2018-12-18 RX ORDER — NALOXONE HYDROCHLORIDE 0.4 MG/ML
.1-.4 INJECTION, SOLUTION INTRAMUSCULAR; INTRAVENOUS; SUBCUTANEOUS
Status: DISCONTINUED | OUTPATIENT
Start: 2018-12-18 | End: 2018-12-18

## 2018-12-18 RX ORDER — ONDANSETRON 4 MG/1
4 TABLET, ORALLY DISINTEGRATING ORAL EVERY 30 MIN PRN
Status: DISCONTINUED | OUTPATIENT
Start: 2018-12-18 | End: 2018-12-18 | Stop reason: HOSPADM

## 2018-12-18 RX ORDER — LIDOCAINE 40 MG/G
CREAM TOPICAL
Status: DISCONTINUED | OUTPATIENT
Start: 2018-12-18 | End: 2018-12-19

## 2018-12-18 RX ORDER — ONDANSETRON 2 MG/ML
4 INJECTION INTRAMUSCULAR; INTRAVENOUS EVERY 6 HOURS PRN
Status: DISCONTINUED | OUTPATIENT
Start: 2018-12-18 | End: 2018-12-20 | Stop reason: HOSPADM

## 2018-12-18 RX ORDER — MULTIPLE VITAMINS W/ MINERALS TAB 9MG-400MCG
1 TAB ORAL DAILY
Status: DISCONTINUED | OUTPATIENT
Start: 2018-12-18 | End: 2018-12-20 | Stop reason: HOSPADM

## 2018-12-18 RX ORDER — FOLIC ACID 1 MG/1
1 TABLET ORAL DAILY
Status: DISCONTINUED | OUTPATIENT
Start: 2018-12-19 | End: 2018-12-20 | Stop reason: HOSPADM

## 2018-12-18 RX ORDER — KETAMINE HYDROCHLORIDE 10 MG/ML
INJECTION INTRAMUSCULAR; INTRAVENOUS PRN
Status: DISCONTINUED | OUTPATIENT
Start: 2018-12-18 | End: 2018-12-18

## 2018-12-18 RX ORDER — DEXAMETHASONE SODIUM PHOSPHATE 4 MG/ML
INJECTION, SOLUTION INTRA-ARTICULAR; INTRALESIONAL; INTRAMUSCULAR; INTRAVENOUS; SOFT TISSUE PRN
Status: DISCONTINUED | OUTPATIENT
Start: 2018-12-18 | End: 2018-12-18

## 2018-12-18 RX ORDER — ZOLPIDEM TARTRATE 5 MG/1
5 TABLET ORAL
Status: DISCONTINUED | OUTPATIENT
Start: 2018-12-19 | End: 2018-12-20 | Stop reason: HOSPADM

## 2018-12-18 RX ORDER — IBANDRONATE SODIUM 3 MG/3 ML
3 SYRINGE (ML) INTRAVENOUS
Status: DISCONTINUED | OUTPATIENT
Start: 2018-12-30 | End: 2018-12-20 | Stop reason: HOSPADM

## 2018-12-18 RX ORDER — AMOXICILLIN 250 MG
1 CAPSULE ORAL 2 TIMES DAILY
Status: DISCONTINUED | OUTPATIENT
Start: 2018-12-18 | End: 2018-12-20 | Stop reason: HOSPADM

## 2018-12-18 RX ORDER — LOSARTAN POTASSIUM 100 MG/1
100 TABLET ORAL DAILY
Status: DISCONTINUED | OUTPATIENT
Start: 2018-12-19 | End: 2018-12-20 | Stop reason: HOSPADM

## 2018-12-18 RX ORDER — AMLODIPINE AND VALSARTAN 5; 160 MG/1; MG/1
1 TABLET ORAL DAILY
Status: DISCONTINUED | OUTPATIENT
Start: 2018-12-18 | End: 2018-12-18

## 2018-12-18 RX ADMIN — GLYCOPYRROLATE 0.2 MG: 0.2 INJECTION, SOLUTION INTRAMUSCULAR; INTRAVENOUS at 16:24

## 2018-12-18 RX ADMIN — OMEPRAZOLE 20 MG: 20 CAPSULE, DELAYED RELEASE ORAL at 21:58

## 2018-12-18 RX ADMIN — Medication 3 MG: at 16:51

## 2018-12-18 RX ADMIN — SODIUM CHLORIDE, POTASSIUM CHLORIDE, SODIUM LACTATE AND CALCIUM CHLORIDE: 600; 310; 30; 20 INJECTION, SOLUTION INTRAVENOUS at 16:20

## 2018-12-18 RX ADMIN — DEXAMETHASONE SODIUM PHOSPHATE 4 MG: 4 INJECTION, SOLUTION INTRA-ARTICULAR; INTRALESIONAL; INTRAMUSCULAR; INTRAVENOUS; SOFT TISSUE at 16:24

## 2018-12-18 RX ADMIN — SODIUM CHLORIDE: 9 INJECTION, SOLUTION INTRAVENOUS at 20:23

## 2018-12-18 RX ADMIN — ONDANSETRON 4 MG: 2 INJECTION INTRAMUSCULAR; INTRAVENOUS at 16:24

## 2018-12-18 RX ADMIN — PROPOFOL 140 MG: 10 INJECTION, EMULSION INTRAVENOUS at 16:24

## 2018-12-18 RX ADMIN — METOPROLOL TARTRATE 50 MG: 50 TABLET ORAL at 22:01

## 2018-12-18 RX ADMIN — ACETAMINOPHEN 975 MG: 325 TABLET, FILM COATED ORAL at 22:10

## 2018-12-18 RX ADMIN — ROCURONIUM BROMIDE 25 MG: 10 INJECTION INTRAVENOUS at 16:24

## 2018-12-18 RX ADMIN — ACETAMINOPHEN 975 MG: 325 TABLET, FILM COATED ORAL at 15:07

## 2018-12-18 RX ADMIN — GLYCOPYRROLATE 0.4 MG: 0.2 INJECTION, SOLUTION INTRAMUSCULAR; INTRAVENOUS at 16:51

## 2018-12-18 RX ADMIN — KETAMINE HCL-NACL SOLN PREF SY 50 MG/5ML-0.9% (10MG/ML) 50 MG: 10 SOLUTION PREFILLED SYRINGE at 16:28

## 2018-12-18 RX ADMIN — LIDOCAINE HYDROCHLORIDE 50 MG: 10 INJECTION, SOLUTION INFILTRATION; PERINEURAL at 16:24

## 2018-12-18 RX ADMIN — FENTANYL CITRATE 100 MCG: 50 INJECTION, SOLUTION INTRAMUSCULAR; INTRAVENOUS at 16:24

## 2018-12-18 RX ADMIN — CEFAZOLIN SODIUM 2 G: 2 INJECTION, SOLUTION INTRAVENOUS at 16:20

## 2018-12-18 RX ADMIN — HYDROMORPHONE HYDROCHLORIDE 0.5 MG: 1 INJECTION, SOLUTION INTRAMUSCULAR; INTRAVENOUS; SUBCUTANEOUS at 17:01

## 2018-12-18 ASSESSMENT — ENCOUNTER SYMPTOMS
DYSRHYTHMIAS: 1
STRIDOR: 0

## 2018-12-18 ASSESSMENT — ACTIVITIES OF DAILY LIVING (ADL)
BATHING: 0-->INDEPENDENT
RETIRED_EATING: 0-->INDEPENDENT
SWALLOWING: 0-->SWALLOWS FOODS/LIQUIDS WITHOUT DIFFICULTY
TOILETING: 0-->INDEPENDENT
COGNITION: 0 - NO COGNITION ISSUES REPORTED
ADLS_ACUITY_SCORE: 16
DRESS: 0-->INDEPENDENT
FALL_HISTORY_WITHIN_LAST_SIX_MONTHS: NO
AMBULATION: 1-->ASSISTIVE EQUIPMENT
TRANSFERRING: 0-->INDEPENDENT
WHICH_OF_THE_ABOVE_FUNCTIONAL_RISKS_HAD_A_RECENT_ONSET_OR_CHANGE?: AMBULATION
RETIRED_COMMUNICATION: 0-->UNDERSTANDS/COMMUNICATES WITHOUT DIFFICULTY

## 2018-12-18 ASSESSMENT — COPD QUESTIONNAIRES: COPD: 0

## 2018-12-18 ASSESSMENT — MIFFLIN-ST. JEOR: SCORE: 1153.57

## 2018-12-18 NOTE — ANESTHESIA POSTPROCEDURE EVALUATION
Patient: Zunilda Clark    Procedure(s):  Left knee debridement and placement of wound vac  APPLY WOUND VAC    Diagnosis:Left knee dehiscence  Diagnosis Additional Information: Left knee dehiscence    Anesthesia Type:  General, ETT    Note:  Anesthesia Post Evaluation    Patient location during evaluation: PACU  Patient participation: Able to fully participate in evaluation  Level of consciousness: awake and alert  Pain management: adequate  Airway patency: patent  Cardiovascular status: acceptable  Respiratory status: acceptable  Hydration status: acceptable  PONV: none     Anesthetic complications: None          Last vitals:  Vitals:    12/18/18 1502 12/18/18 1715 12/18/18 1720   BP:  143/71 149/67   Pulse:  66 61   Resp:  12 15   Temp:  98  F (36.7  C)    SpO2: 99% 97% 100%         Electronically Signed By: Michael Bernabe MD  December 18, 2018  5:47 PM

## 2018-12-18 NOTE — ANESTHESIA PREPROCEDURE EVALUATION
Anesthesia Pre-Procedure Evaluation    Patient: Zunilda Alicea May   MRN: [id number] : 1940          Preoperative Diagnosis: Left knee dehiscence    Procedure(s):  Left knee debridement and placement of wound vac  APPLY WOUND VAC    Past Medical History:   Diagnosis Date     Acquired hypothyroidism 2015     Aortic valve insufficiency      Arrhythmia     A fib     Arthritis      Atrial fibrillation (H)      Atrial fibrillation and flutter (H)      Blood clotting disorder (H)      CHF (congestive heart failure) (H)      DVT (deep venous thrombosis) (H)      Gastro-oesophageal reflux disease      H/O mitral valve replacement with tissue graft 2014     History of blood transfusion      HTN (hypertension)      Hypothyroidism      Other chronic pain      PONV (postoperative nausea and vomiting)      Pulmonary HTN (H)      Rheumatoid arthritis (H)      Rheumatoid arthritis(714.0)      Seizures (H) 2014     Shingles 2018     Stroke (H) 2009    left side mild weakness      Stroke (H)      [place]ope     [place]     Thrombosis of leg     both legs     Past Surgical History:   Procedure Laterality Date     ABDOMEN SURGERY      prolapsed bladder     ARTHROPLASTY KNEE Left 2018    Procedure: Left total knee arthroplasty using a [employer] knee system;  Surgeon: Pardeep Sheikh MD;  Location:  OR     COLONOSCOPY  3/15/2012     EYE SURGERY  2018    Both eyes/cataract surgery     H ABLATION AV NODE      AFlutter with syncope, PPM to follow     HERNIA REPAIR      3 hernies/right and left & umbilical     IMPLANT PACEMAKER       LAPAROSCOPIC CHOLECYSTECTOMY WITH CHOLANGIOGRAMS N/A 2017    Procedure: LAPAROSCOPIC CHOLECYSTECTOMY WITH CHOLANGIOGRAMS;  LAPAROSCOPIC CHOLECYSTECTOMY WITH CHOLANGIOGRAMS ;  Surgeon: Angeles Mcgill MD;  Location:  OR     OPEN REDUCTION INTERNAL FIXATION RODDING INTRAMEDULLARY HUMERUS Right 2015     Procedure: OPEN REDUCTION INTERNAL FIXATION RODDING INTRAMEDULLARY HUMERUS;  Surgeon: Raymundo Rivera MD;  Location: [facility] OR     REPLACE VALVE MITRAL  2006    Mitral valve replacement with bioprosthetic valve in 2008 for rheumatic disease     SLING BLADDER SUSPENSION WITH FASCIA UMESH       VASCULAR SURGERY  2003    Blood clots in both legs     Anesthesia Evaluation     . Pt has had prior anesthetic. Type: General    History of anesthetic complications   - PONV        ROS/MED HX    ENT/Pulmonary:     (+)PARAM risk factors hypertension, , . .   (-) asthma and COPD   Neurologic:     (+)CVA with deficits- left side weakness, TIA (sidual left side weakness)     Cardiovascular:     (+) Dyslipidemia, hypertension----. : . CHF . fainting (syncope). pacemaker :. dysrhythmias a-fib, valvular problems/murmurs s/p MVR:. pulmonary hypertension, Previous cardiac testing date:results:date: results:ECG reviewed date:11/18 results:NSR date: results:          METS/Exercise Tolerance:     Hematologic: Comments: Lab Test        12/18/18 12/13/18 12/07/18      --          11/28/18      --          11/15/18     11/15/18     11/14/18      --          08/17/18                       1440                                               --           1443           --           1150          0740          0603           --           1037          WBC           --           --           --           --          9.8           --           --           --          13.0*         --          7.5           HGB          10.1*         --           --           --          8.5*          --          8.3*         8.5*         9.3*           < >        11.7          MCV           --           --           --           --          103*          --           --           --          106*          --          104*          PLT           --           --           --           --          835*          --           --          229          260            <  >        321           INR          1.78*        2.4*         3.5*           < >         --            < >         --          1.38*        1.52*          < >         --            < > = values in this interval not displayed.                  Lab Test        11/28/18     11/15/18     11/14/18     11/12/18     11/05/18     08/17/18                       1443          0740          0603          1529          1112          1037          NA           137           --           --           --          141          142           POTASSIUM    3.8           --           --           --          4.3          3.7           CHLORIDE     101           --           --           --          104          106           CO2          25            --           --           --          27           29            BUN          25            --           --           --          27           25            CR           1.02          --           --          1.02         0.98         0.96          ANIONGAP     11            --           --           --          10           7             BRICE          9.2           --           --           --          9.8          9.2           GLC          93           102*         102*          --          88           86               (+) History of blood clots -      Musculoskeletal:   (+) arthritis, , , -       GI/Hepatic:     (+) GERD Asymptomatic on medication,       Renal/Genitourinary:  - ROS Renal section negative       Endo:     (+) thyroid problem hypothyroidism, .   (-) Type I DM, Type II DM, chronic steroid usage and obesity   Psychiatric:  - neg psychiatric ROS       Infectious Disease:  - neg infectious disease ROS       Malignancy:      - no malignancy   Other:    (+) H/O Chronic Pain,                        Physical Exam  Normal systems: cardiovascular, pulmonary and dental    Airway   Mallampati: II  TM distance: >3 FB  Neck ROM: full    Dental     Cardiovascular   Rhythm and rate:  "regular and normal  (-) no friction rub, no systolic click and no murmur    Pulmonary    breath sounds clear to auscultation(-) no rhonchi, no decreased breath sounds, no wheezes, no rales and no stridor            Lab Results   Component Value Date    WBC 9.8 11/28/2018    HGB 10.1 (L) 12/18/2018    HCT 27.7 (L) 11/28/2018     (H) 11/28/2018    SED 32 (H) 07/03/2015     11/28/2018    POTASSIUM 3.8 11/28/2018    CHLORIDE 101 11/28/2018    CO2 25 11/28/2018    BUN 25 11/28/2018    CR 1.02 11/28/2018    GLC 93 11/28/2018    BRICE 9.2 11/28/2018    MAG 2.4 (H) 05/01/2017    ALBUMIN 3.0 (L) 11/28/2018    PROTTOTAL 8.1 11/28/2018    ALT 36 11/28/2018    AST 43 11/28/2018    ALKPHOS 170 (H) 11/28/2018    BILITOTAL 0.6 11/28/2018    LIPASE 183 04/27/2017    PTT 31 07/28/2015    INR 1.78 (H) 12/18/2018    TSH 1.88 04/23/2018    T4 2.07 (H) 09/08/2015       Preop Vitals  BP Readings from Last 3 Encounters:   12/18/18 157/83   11/28/18 130/60   11/15/18 112/58    Pulse Readings from Last 3 Encounters:   12/18/18 72   11/28/18 80   11/15/18 76      Resp Readings from Last 3 Encounters:   12/18/18 12   11/28/18 12   11/15/18 16    SpO2 Readings from Last 3 Encounters:   12/18/18 99%   11/28/18 95%   11/15/18 93%      Temp Readings from Last 1 Encounters:   12/18/18 97.6  F (36.4  C) (Temporal)    Ht Readings from Last 1 Encounters:   12/18/18 1.626 m (5' 4\")      Wt Readings from Last 1 Encounters:   12/18/18 68.4 kg (150 lb 11.2 oz)    Estimated body mass index is 25.87 kg/m  as calculated from the following:    Height as of this encounter: 1.626 m (5' 4\").    Weight as of this encounter: 68.4 kg (150 lb 11.2 oz).       Anesthesia Plan      History & Physical Review  History and physical reviewed and following examination; no interval change.    ASA Status:  3 .    NPO Status:  > 8 hours    Plan for General and ETT with Intravenous induction. Maintenance will be Balanced.    PONV prophylaxis:  Ondansetron (or other " 5HT-3) and Dexamethasone or Solumedrol       Postoperative Care  Postoperative pain management:  IV analgesics.      Consents  Anesthetic plan, risks, benefits and alternatives discussed with:  Patient or representative, Patient and Spouse..                 Luis E Strickland MD                    .

## 2018-12-18 NOTE — PLAN OF CARE
Precall done with patient and she states that she did not stop her Coumadin because she was just added on today. INR 2.4 on 12/13. Dr. Sheikh notified and is ok with this and said she can even take her evening dose today 12/17. Patient informed of above.

## 2018-12-18 NOTE — ANESTHESIA CARE TRANSFER NOTE
Patient: Zunilda Clark    Procedure(s):  Left knee debridement and placement of wound vac  APPLY WOUND VAC    Diagnosis: Left knee dehiscence  Diagnosis Additional Information: No value filed.    Anesthesia Type:   General, ETT     Note:  Airway :Face Mask  Patient transferred to:PACU  Comments: To PACU, adequate gas exchange, report to RN.Handoff Report: Identifed the Patient, Identified the Reponsible Provider, Reviewed the pertinent medical history, Discussed the surgical course, Reviewed Intra-OP anesthesia mangement and issues during anesthesia, Set expectations for post-procedure period and Allowed opportunity for questions and acknowledgement of understanding      Vitals: (Last set prior to Anesthesia Care Transfer)    CRNA VITALS  12/18/2018 1645 - 12/18/2018 1720      12/18/2018             Pulse:  63    SpO2:  100 %    EKG:  Sinus rhythm                Electronically Signed By: MANJIT Lima CRNA  December 18, 2018  5:20 PM

## 2018-12-18 NOTE — PHARMACY-ADMISSION MEDICATION HISTORY
Pharmacy reviewed prior to admission med list from pre-admitting rn, GISSELLE Underwood.    Deleted duplicate warfarin entry.    Prior to Admission medications    Medication Sig Last Dose Taking? Auth Provider   ALLOPURINOL PO Take 100 mg by mouth 2 times daily  Yes Unknown, Entered By History   Amlodipine Besylate-Valsartan 5-160 MG TABS Take 1 tablet by mouth daily  Yes Yunior Huang MD   calcium citrate (CALCITRATE) 950 MG tablet Take 1 tablet by mouth daily   Yes Unknown, Entered By History   folic acid (FOLVITE) 1 MG tablet Take 1 mg by mouth daily  Yes Reported, Patient   ibandronate (BONIVA) 3 MG/3ML Inject 3 mg into the vein every 3 months   Yes Reported, Patient   levothyroxine (SYNTHROID/LEVOTHROID) 112 MCG tablet TAKE 1 TABLET (112 MCG) BY MOUTH DAILY  Yes Yunior Huang MD   METHOTREXATE SODIUM IJ Inject 0.8 mLs as directed once a week Thursdays  Yes Reported, Patient   metoprolol tartrate (LOPRESSOR) 50 MG tablet Take 1 tablet (50 mg) by mouth 2 times daily  Yes Jluis Monroy MD   multivitamin, therapeutic with minerals (MULTI-VITAMIN) TABS tablet Take 1 tablet by mouth daily Without iron  Yes Unknown, Entered By History   omeprazole (PRILOSEC) 20 MG CR capsule Take 1 capsule (20 mg) by mouth 2 times daily  Yes Yunior Huang MD   torsemide (DEMADEX) 10 MG tablet Take 1 tablet (10 mg) by mouth daily (with breakfast)  Yes Jluis Monroy MD   VITAMIN D, CHOLECALCIFEROL, PO Take 1,000 Units by mouth daily  Yes Unknown, Entered By History   WARFARIN SODIUM PO Take 2.5 mg by mouth SUN, TUES,THURS, FRI  Yes Reported, Patient   WARFARIN SODIUM PO Take 5 mg by mouth MON, WED and SAT   Reported, Patient

## 2018-12-19 ENCOUNTER — APPOINTMENT (OUTPATIENT)
Dept: PHYSICAL THERAPY | Facility: CLINIC | Age: 78
End: 2018-12-19
Attending: ORTHOPAEDIC SURGERY
Payer: MEDICARE

## 2018-12-19 ENCOUNTER — APPOINTMENT (OUTPATIENT)
Dept: OCCUPATIONAL THERAPY | Facility: CLINIC | Age: 78
End: 2018-12-19
Attending: ORTHOPAEDIC SURGERY
Payer: MEDICARE

## 2018-12-19 PROBLEM — T81.30XA WOUND DEHISCENCE: Status: ACTIVE | Noted: 2018-12-19

## 2018-12-19 LAB
GLUCOSE SERPL-MCNC: 85 MG/DL (ref 70–99)
HGB BLD-MCNC: 8.9 G/DL (ref 11.7–15.7)
INR PPP: 2.12 (ref 0.86–1.14)

## 2018-12-19 PROCEDURE — 85610 PROTHROMBIN TIME: CPT | Performed by: ORTHOPAEDIC SURGERY

## 2018-12-19 PROCEDURE — 25000132 ZZH RX MED GY IP 250 OP 250 PS 637: Mod: GY | Performed by: PHYSICIAN ASSISTANT

## 2018-12-19 PROCEDURE — 97535 SELF CARE MNGMENT TRAINING: CPT | Mod: GO

## 2018-12-19 PROCEDURE — 25000131 ZZH RX MED GY IP 250 OP 636 PS 637: Mod: GY | Performed by: ORTHOPAEDIC SURGERY

## 2018-12-19 PROCEDURE — 36415 COLL VENOUS BLD VENIPUNCTURE: CPT | Performed by: ORTHOPAEDIC SURGERY

## 2018-12-19 PROCEDURE — 25000128 H RX IP 250 OP 636: Performed by: ORTHOPAEDIC SURGERY

## 2018-12-19 PROCEDURE — 40000193 ZZH STATISTIC PT WARD VISIT

## 2018-12-19 PROCEDURE — 97161 PT EVAL LOW COMPLEX 20 MIN: CPT | Mod: GP

## 2018-12-19 PROCEDURE — A9270 NON-COVERED ITEM OR SERVICE: HCPCS | Mod: GY | Performed by: ORTHOPAEDIC SURGERY

## 2018-12-19 PROCEDURE — 25000132 ZZH RX MED GY IP 250 OP 250 PS 637: Mod: GY | Performed by: ORTHOPAEDIC SURGERY

## 2018-12-19 PROCEDURE — 85018 HEMOGLOBIN: CPT | Performed by: ORTHOPAEDIC SURGERY

## 2018-12-19 PROCEDURE — 97165 OT EVAL LOW COMPLEX 30 MIN: CPT | Mod: GO

## 2018-12-19 PROCEDURE — 82947 ASSAY GLUCOSE BLOOD QUANT: CPT | Performed by: ORTHOPAEDIC SURGERY

## 2018-12-19 PROCEDURE — A9270 NON-COVERED ITEM OR SERVICE: HCPCS | Mod: GY | Performed by: PHYSICIAN ASSISTANT

## 2018-12-19 PROCEDURE — 40000133 ZZH STATISTIC OT WARD VISIT

## 2018-12-19 PROCEDURE — E2402 NEG PRESS WOUND THERAPY PUMP: HCPCS

## 2018-12-19 PROCEDURE — 40000902 ZZH STATISTIC WOC PT EDUCATION, 16-30 MIN

## 2018-12-19 RX ORDER — OXYCODONE HYDROCHLORIDE 5 MG/1
5 TABLET ORAL EVERY 6 HOURS PRN
Qty: 40 TABLET | Refills: 0 | Status: SHIPPED | OUTPATIENT
Start: 2018-12-19 | End: 2019-04-10

## 2018-12-19 RX ORDER — WARFARIN SODIUM 5 MG/1
5 TABLET ORAL
Status: COMPLETED | OUTPATIENT
Start: 2018-12-19 | End: 2018-12-19

## 2018-12-19 RX ORDER — CEPHALEXIN 500 MG/1
500 CAPSULE ORAL 4 TIMES DAILY
Qty: 56 CAPSULE | Refills: 0 | Status: SHIPPED | OUTPATIENT
Start: 2018-12-19 | End: 2019-04-10

## 2018-12-19 RX ADMIN — CALCIUM CITRATE 200 MG (950 MG) TABLET 950 MG: at 09:15

## 2018-12-19 RX ADMIN — AMLODIPINE BESYLATE 5 MG: 5 TABLET ORAL at 09:15

## 2018-12-19 RX ADMIN — ACETAMINOPHEN 975 MG: 325 TABLET, FILM COATED ORAL at 22:28

## 2018-12-19 RX ADMIN — SENNOSIDES AND DOCUSATE SODIUM 1 TABLET: 8.6; 5 TABLET ORAL at 20:30

## 2018-12-19 RX ADMIN — OMEPRAZOLE 20 MG: 20 CAPSULE, DELAYED RELEASE ORAL at 20:30

## 2018-12-19 RX ADMIN — FOLIC ACID 1 MG: 1 TABLET ORAL at 09:15

## 2018-12-19 RX ADMIN — SENNOSIDES AND DOCUSATE SODIUM 1 TABLET: 8.6; 5 TABLET ORAL at 09:15

## 2018-12-19 RX ADMIN — ONDANSETRON 4 MG: 4 TABLET, ORALLY DISINTEGRATING ORAL at 10:46

## 2018-12-19 RX ADMIN — TORSEMIDE 10 MG: 10 TABLET ORAL at 09:15

## 2018-12-19 RX ADMIN — VITAMIN D, TAB 1000IU (100/BT) 1000 UNITS: 25 TAB at 09:15

## 2018-12-19 RX ADMIN — OMEPRAZOLE 20 MG: 20 CAPSULE, DELAYED RELEASE ORAL at 09:15

## 2018-12-19 RX ADMIN — METOPROLOL TARTRATE 50 MG: 50 TABLET ORAL at 09:15

## 2018-12-19 RX ADMIN — MULTIPLE VITAMINS W/ MINERALS TAB 1 TABLET: TAB at 09:15

## 2018-12-19 RX ADMIN — ACETAMINOPHEN 975 MG: 325 TABLET, FILM COATED ORAL at 15:57

## 2018-12-19 RX ADMIN — METOPROLOL TARTRATE 50 MG: 50 TABLET ORAL at 20:31

## 2018-12-19 RX ADMIN — LOSARTAN POTASSIUM 100 MG: 100 TABLET ORAL at 09:15

## 2018-12-19 RX ADMIN — WARFARIN SODIUM 5 MG: 5 TABLET ORAL at 18:15

## 2018-12-19 RX ADMIN — ACETAMINOPHEN 975 MG: 325 TABLET, FILM COATED ORAL at 06:08

## 2018-12-19 RX ADMIN — CEFAZOLIN SODIUM 1 G: 1 INJECTION, SOLUTION INTRAVENOUS at 09:16

## 2018-12-19 RX ADMIN — CEFAZOLIN SODIUM 1 G: 1 INJECTION, SOLUTION INTRAVENOUS at 00:31

## 2018-12-19 ASSESSMENT — ACTIVITIES OF DAILY LIVING (ADL)
ADLS_ACUITY_SCORE: 16
PREVIOUS_RESPONSIBILITIES: MEAL PREP;HOUSEKEEPING;LAUNDRY
ADLS_ACUITY_SCORE: 16
ADLS_ACUITY_SCORE: 16

## 2018-12-19 NOTE — PLAN OF CARE
"PT: Orders received, evaluation completed. 78 year old female s/p L knee debridement and placement of wound vac. Pt reports she lives on the third floor apartment building with elevator access. Spouse is in good health and can assist as needed on DC. Pt reports she needs to be able to walk about 1/16th of a mile to be able to manage to get within her apartment. Was working with OP PT PTA.     Discharge Planner PT   Patient plan for discharge: Home with assist from   Current status: Supine to/from sit with SBA. Sit to/from stand with SBA from bed and standard height toilet. Pt does need toilet rail when in bathroom; has a RTS at home. Pt does need assist to don briefs in sitting due to wound vac. Ambulates 200' with FWW and CGA progressing to supervision. Good gait speed and balance. At this time, uncertain of orthos wishes on pt performing AROM; page placed to them to clarify.   Barriers to return to prior living situation: None anticipated.   Recommendations for discharge: Home with assist from ; resumption of OP PT when cleared by ortho  Rationale for recommendations: Pt moving well. No further IP PT needs identified. Will complete order. Recommend pt to ambulate 3x/day in art with RN staff for duration of stay.        Entered by: Fredi Clayton 12/19/2018 8:52 AM       Addendum: Updated orders from ortho stating \"Knee ROM as tolerated, quad strengtening and gait training\". Discussed with pt. Provided HO to perform exercises. Pt reports she feels confident performing on her own. Will complete IP PT order at this time.   "

## 2018-12-19 NOTE — PHARMACY-ANTICOAGULATION SERVICE
Clinical Pharmacy- Warfarin Discharge Note  This patient is currently on warfarin for the treatment of Atrial fibrillation.  INR Goal= 1.8-2.5  Expected length of therapy per PCP.    Warfarin PTA Regimen: 5mg MWSat, 2.5mg SunTThF      Anticoagulation Dose History     Recent Dosing and Labs Latest Ref Rng & Units 11/27/2018 11/30/2018 12/3/2018 12/7/2018 12/13/2018 12/18/2018 12/19/2018    INR 0.86 - 1.14 1.38(A) 3.34(A) 3.62(A) - - 1.78(H) 2.12(H)    INR 0.86 - 1.14 - - - 3.5(A) 2.4(A) - -            Recommend discharging the patient on PTA warfarin regimen of 5 mg Mon Wed Sat & 2.5 mg ROW.    The patient should have an INR checked in 2 days.    Sherron Romero, PharmD  December 19, 2018

## 2018-12-19 NOTE — CONSULTS
Care Transition Initial Assessment - RN         Met with: Patient.  DATA   Active Problems:    S/P total knee arthroplasty    Wound dehiscence       Cognitive Status: awake, alert and oriented.        Contact information and PCP information verified: Yes  Lives With: spouse   Living Arrangements: apartment                 Insurance concerns: No Insurance issues identified, pt will need wound vac on discharge and insurance authorization is pending through [employer] as pt is medicare and per [employer] they turffed to [employer] aero josefina number , will work in collaboration with [employer] in obtaining wound vac. Intake was not able to give an estimate of turn around time,they have the appropriate documents   ASSESSMENT  Patient currently receives the following services:       Identified issues/concerns regarding health management: Pt is recent TKA,  11/12 discharged home with [facility] which has since been closed. Pt is now Left knee debridement and placement of wound vac with identified needs for Home Care RN and Wound Vac for Home.     Pt reports she lives on the third floor apartment building with elevator access. Spouse is in good health and can assist as needed on DC. She explains that she is no longer active with Home Care services but would like to use [facility] again.     [facility] was contacted at pt request and they do have openings and are able to accept pt. Referrals have been sent along with required documents.     Explained Wound Vac process and that the documents have been submitted for insurance authorization, pending.     Pt is hopeful for discharge to home with support of her spouse today and [facility].     Will continue to follow, anticipating discharge today after wound vac delivery     PLAN  Financial costs for the patient include Pending Wound Vac Insurance Auth.  .  Patient given options and choices for discharge YES   Patient/family is agreeable to the plan?  Yes:      Patient anticipates needs for home equipment: Yes  Plan/Disposition: Home   Care  (CTS) will continue to follow as needed.

## 2018-12-19 NOTE — PROGRESS NOTES
Care Transitions Team: Following for CC, discharge planning, and disposition.      Contacted by [facility] , requesting additional details, obtained per Amna Porter PA-C and faxed to [facility] at .    CM following

## 2018-12-19 NOTE — PLAN OF CARE
PM shift:  Pt A&Ox4. VSS. Afebrile. IV infusing. Capnography WNL IPI 10, Etco2 40. History of A fib with pacemaker 100% paced. Cms intact. Compression ace wrap c/d/I. Wound vac in place with nothing out at end of shift. Pt turned with assist of 2 but did not get up to dangle due to nausea. Pt will be wbat with immobilizer on when up. Pt declined Zofran but nausea did improve. Cms intact. Leonard in place and patent with 275 ml out. Pt stated she may discharge home tomorrow but  said maybe the following.  Will continue to monitor status

## 2018-12-19 NOTE — PROGRESS NOTES
Wound Vac order additional details - based on today's exam and surgery on 12/18/18:  Nutritional status is adequate/good for wound healing.  Wound measurements at the time of surgery are 3cm wide x 4cm long x 0.4cm deep.    Amna Porter PA-C  [phone]

## 2018-12-19 NOTE — PLAN OF CARE
A&O. VSS.  Pain is managed with oral medication. On IV ancef. Wound vac in place. Dressing intact.

## 2018-12-19 NOTE — PROGRESS NOTES
"Orthopedic Surgery  12/19/2018    S: Patient voices no complaints today.     O: Blood pressure 134/61, pulse 56, temperature 95.9  F (35.5  C), temperature source Oral, resp. rate 18, height 1.626 m (5' 4\"), weight 68.4 kg (150 lb 11.2 oz), SpO2 99 %, not currently breastfeeding.  Lab Results   Component Value Date    HGB 10.1 12/18/2018     Lab Results   Component Value Date    INR 1.78 12/18/2018        Neurovascularly intact.  Calves are negative bilaterally, both soft and nontender.  The wound is C/D/I.  The wound looks good with minimal erythema of the surrounding skin.    A: Ms. Clark is doing well status post Procedure(s):  Left knee debridement and placement of wound vac  APPLY WOUND VAC.    P: Continue physical therapy.   Anticoagulation on coumadin   Pain management  Discharge planning, with wound vac for home.    Abx: discharge on keflex for 14 days    Pardeep Sheikh  [phone]    "

## 2018-12-19 NOTE — PROGRESS NOTES
12/19/18 0800   Quick Adds   Type of Visit Initial PT Evaluation   Living Environment   Lives With spouse   Living Arrangements apartment   Home Accessibility no concerns   Living Environment Comment Pt reports she lives on the third floor apartment building with elevator access. Spouse is in good health and can assist as needed on DC. Pt reports she needs to be able to walk about 1/16th of a mile to be able to manage to get within her apartment.    Self-Care   Usual Activity Tolerance good   Current Activity Tolerance moderate   Equipment Currently Used at Home walker, rolling;wheelchair, manual;raised toilet;grab bar, toilet  (Has a FWW and 4WW)   Functional Level Prior   Ambulation 1-->assistive equipment  (FWW)   Transferring 0-->independent   Toileting 1-->assistive equipment  (RTS)   Fall history within last six months no   General Information   Onset of Illness/Injury or Date of Surgery - Date 12/18/18   Referring Physician Aguilar SIMMONS   Patient/Family Goals Statement Return home   Pertinent History of Current Problem (include personal factors and/or comorbidities that impact the POC) 78 year old female s/p L knee debridement and placement of wound vac.    Precautions/Limitations fall precautions   Weight-Bearing Status - LLE weight-bearing as tolerated   General Info Comments KI until SLR   Cognitive Status Examination   Orientation orientation to person, place and time   Level of Consciousness alert   Follows Commands and Answers Questions 100% of the time   Pain Assessment   Patient Currently in Pain No   Integumentary/Edema   Integumentary/Edema Comments Dressing to L LE intact. +Wound vac   Range of Motion (ROM)   ROM Comment R LE and bilateral UE AROM WFLs.    Strength   Strength Comments IND SLR on the L LE.    Bed Mobility   Bed Mobility Comments Supine to/from sit with SBA   Transfer Skills   Transfer Comments Sit to/from stand from bed and toilet with SBA. Does need grab bar for toilet transfer.   "  Gait   Gait Comments Ambulates 200' with FWW and supervision. No LOB, good gait speed.    Balance   Balance Comments Requires bilateral UE support on FWW for safe dynamic mobility   Coordination   Coordination Comments Pt does need min A to don brief due to wound vac tube.    Clinical Impression   Criteria for Skilled Therapeutic Intervention evaluation only;no problems identified which require skilled intervention   Clinical Presentation Stable/Uncomplicated   Clinical Presentation Rationale Stable.    Clinical Decision Making (Complexity) Low complexity   Anticipated Discharge Disposition Home with Assist  (Resumption of OP PT when cleared from ortho)   Risk & Benefits of therapy have been explained Yes   Patient, Family & other staff in agreement with plan of care Yes   [facility] TM \"6 Clicks\"   2016, Trustees of [facility], under license to [employer].  All rights reserved.   6 Clicks Short Forms Basic Mobility Inpatient Short Form   [facility]  \"6 Clicks\" V.2 Basic Mobility Inpatient Short Form   1. Turning from your back to your side while in a flat bed without using bedrails? 4 - None   2. Moving from lying on your back to sitting on the side of a flat bed without using bedrails? 4 - None   3. Moving to and from a bed to a chair (including a wheelchair)? 4 - None   4. Standing up from a chair using your arms (e.g., wheelchair, or bedside chair)? 4 - None   5. To walk in hospital room? 4 - None   6. Climbing 3-5 steps with a railing? 3 - A Little   Basic Mobility Raw Score (Score out of 24.Lower scores equate to lower levels of function) 23   Total Evaluation Time   Total Evaluation Time (Minutes) 19     "

## 2018-12-19 NOTE — PROGRESS NOTES
12/19/18 0851   Quick Adds   Type of Visit Initial Occupational Therapy Evaluation   Living Environment   Lives With spouse   Living Arrangements apartment   Home Accessibility no concerns   Living Environment Comment has tub shower with cut out and shower chair. spouse can A as needed.    Self-Care   Usual Activity Tolerance good   Current Activity Tolerance moderate   Equipment Currently Used at Home raised toilet;shower chair;grab bar, tub/shower;walker, rolling;commode   Activity/Exercise/Self-Care Comment was using BSC at night    Functional Level   Ambulation 1-->assistive equipment   Transferring 1-->assistive equipment   Toileting 1-->assistive equipment   Bathing 1-->assistive equipment   Dressing 0-->independent   Eating 0-->independent   Communication 0-->understands/communicates without difficulty   Swallowing 0-->swallows foods/liquids without difficulty   Cognition 0 - no cognition issues reported   Fall history within last six months no   Which of the above functional risks had a recent onset or change? ambulation   Prior Functional Level Comment uses fww for mobility.    General Information   Onset of Illness/Injury or Date of Surgery - Date 12/18/18   Referring Physician Pardeep Sheikh MD   Patient/Family Goals Statement return home with spouse   Additional Occupational Profile Info/Pertinent History of Current Problem L knee debridement with wound vac    Precautions/Limitations no known precautions/limitations   Weight-Bearing Status - LLE weight-bearing as tolerated   Cognitive Status Examination   Orientation orientation to person, place and time   Level of Consciousness alert   Follows Commands (Cognition) WNL   Memory intact   Visual Perception   Visual Perception Wears glasses;No deficits were identified   Sensory Examination   Sensory Comments burning sensation to L knee    Pain Assessment   Patient Currently in Pain No   Mobility   Bed Mobility Bed mobility skill: Sit to  supine;Bed mobility skill: Supine to sit   Bed Mobility Skill: Sit to Supine   Level of [place]: Sit/Supine stand-by assist   Bed Mobility Skill: Supine to Sit   Level of [place]: Supine/Sit stand-by assist   Transfer Skill: Sit to Stand   Level of [place]: Sit/Stand stand-by assist   Transfer Skill: Sit to Stand weight-bearing as tolerated   Assistive Device for Transfer: Sit/Stand standard walker   Transfer Skill: Toilet Transfer   Level of [place]: Toilet stand-by assist   Weight-Bearing Restrictions: Toilet weight-bearing as tolerated   Assistive Device standard walker   Lower Body Dressing   Level of [place]: Dress Lower Body stand-by assist   Toileting   Level of [place]: Toilet independent   Grooming   Level of [place]: Grooming stand-by assist   Eating/Self Feeding   Level of [place]: Eating independent   Instrumental Activities of Daily Living (IADL)   Previous Responsibilities meal prep;housekeeping;laundry   IADL Comments spouse can A at d/c with IADL's and driving   Activities of Daily Living Analysis   Impairments Contributing to Impaired Activities of Daily Living ROM decreased   General Therapy Interventions   Planned Therapy Interventions ADL retraining;transfer training   Clinical Impression   Criteria for Skilled Therapeutic Interventions Met yes, treatment indicated   OT Diagnosis dec IND with ADL's and transfers   Influenced by the following impairments dec ROM L knee    Assessment of Occupational Performance 1-3 Performance Deficits   Identified Performance Deficits LE dressing, toilet transfer, shower transfer   Clinical Decision Making (Complexity) Low complexity   Therapy Frequency (1 tx only)   Predicted Duration of Therapy Intervention (days/wks) 1 tx only   Anticipated Discharge Disposition Home with Assist   Risks and Benefits of Treatment have been explained. Yes   Patient, Family & other staff in agreement with plan of care Yes   [facility]  "AM-PAC TM \"6 Clicks\"   2016, Trustees of [facility], under license to [employer].  All rights reserved.   6 Clicks Short Forms Daily Activity Inpatient Short Form   [facility]  \"6 Clicks\" Daily Activity Inpatient Short Form   1. Putting on and taking off regular lower body clothing? 3 - A Little   2. Bathing (including washing, rinsing, drying)? 3 - A Little   3. Toileting, which includes using toilet, bedpan or urinal? 4 - None   4. Putting on and taking off regular upper body clothing? 4 - None   5. Taking care of personal grooming such as brushing teeth? 4 - None   6. Eating meals? 4 - None   Daily Activity Raw Score (Score out of 24.Lower scores equate to lower levels of function) 22   Total Evaluation Time   Total Evaluation Time (Minutes) 8     "

## 2018-12-19 NOTE — CONSULTS
Hospitalist Consultation      Zunilda lCark MRN# [id number]   YOB: 1940 Age: 78 year old   Date of Admission: 12/18/2018     Requesting Physician:  Aguilar  Reason for consult: Post-op medical management           Assessment and Plan:   This patient is a 78 year old female with a PMH significant for HTN, CHF, pAfib on warfarin and CVA who recently had LTKA on 11/12/18. She was discharged home and 3 weeks postop she noticed an open wound/drainage. She was initially placed on wet to dry dressing change but now has been recommended for wound I&D in the OR with subsequent continuation of oral antibiotics and placement of wound vac. She is p who is POD 1 s/p her surgery.     1. S/p Left knee debridement and placement of wound vac: doing well, cont PT/OT and pain control as per primary. Plan for outpt Keflex for 14 days as per primary.     2. PAfib: pt currently in sinus. Resume BB and warfarin dosing as per pharmacy.   3. Hx of bioprosthetic MV with severe pulm HTN: maintained on warfarin which has been resumed as per primary. Cont home meds including Metoprolol, losartan and norvasc as well as home dose Torsemide for BP.   Overall doing very well medically.     4. DVT Prophylaxis: on warfarin as per primary, INR 2.12 today.   5. D/C planning: To home as per primary once SW sets up home services for wound vac and wound care.              History of Present Illness:   This patient is a 78 year old female who is POD 1 s/p left knee Incision and drainage with placement of wound vac due to non healing wound after recent TKA.   Intra-op report reviewed and showed no intra-op complications.   I/o's reviewed, Currently net +648 with good UOP since OR. Hgb stable this am at 8.9  Overnight did pretty well, no complaints, VSS.   Currently, today ita diet, pain controlled, no CP, SOB, no n/v.   O/w other medical problems have been stable, with no recent c/o illness.               Past Medical History:     Past  Medical History:   Diagnosis Date     Acquired hypothyroidism 11/4/2015     Aortic valve insufficiency      Arrhythmia     A fib     Arthritis      Atrial fibrillation (H)      Atrial fibrillation and flutter (H)      Blood clotting disorder (H)      CHF (congestive heart failure) (H)      DVT (deep venous thrombosis) (H) 2003     Gastro-oesophageal reflux disease      H/O mitral valve replacement with tissue graft june 2014     History of blood transfusion 2014     HTN (hypertension)      Hypothyroidism      Other chronic pain      PONV (postoperative nausea and vomiting)      Pulmonary HTN (H)      Rheumatoid arthritis (H)      Rheumatoid arthritis(714.0)      Seizures (H) 2014     Shingles 08/2018     Stroke (H) 2009    left side mild weakness      Stroke (H) 2014     [place]ope 2011    [place]     Thrombosis of leg 2003    both legs               Past Surgical History:     Past Surgical History:   Procedure Laterality Date     ABDOMEN SURGERY      prolapsed bladder     ARTHROPLASTY KNEE Left 11/12/2018    Procedure: Left total knee arthroplasty using a Smith and [employer]ph[employer] Melissa II knee system;  Surgeon: Pardeep Sheikh MD;  Location: [facility] OR     COLONOSCOPY  3/15/2012     EYE SURGERY  2018    Both eyes/cataract surgery     H ABLATION AV NODE  2011    AFlutter with syncope, PPM to follow     HERNIA REPAIR      3 hernies/right and left & umbilical     IMPLANT PACEMAKER  2011     LAPAROSCOPIC CHOLECYSTECTOMY WITH CHOLANGIOGRAMS N/A 4/29/2017    Procedure: LAPAROSCOPIC CHOLECYSTECTOMY WITH CHOLANGIOGRAMS;  LAPAROSCOPIC CHOLECYSTECTOMY WITH CHOLANGIOGRAMS ;  Surgeon: Angeles Mcgill MD;  Location: [facility] OR     OPEN REDUCTION INTERNAL FIXATION RODDING INTRAMEDULLARY HUMERUS Right 7/28/2015    Procedure: OPEN REDUCTION INTERNAL FIXATION RODDING INTRAMEDULLARY HUMERUS;  Surgeon: Raymundo Rivera MD;  Location: [facility] OR     REPLACE VALVE MITRAL  2006    Mitral valve replacement with bioprosthetic valve in  2008 for rheumatic disease     SLING BLADDER SUSPENSION WITH FASCIA UMESH       VASCULAR SURGERY  2003    Blood clots in both legs                 Social History:     Social History     Tobacco Use     Smoking status: Never Smoker     Smokeless tobacco: Never Used   Substance Use Topics     Alcohol use: No     Alcohol/week: 0.0 oz     Drug use: No                 Family History:     family history includes Cancer in her brother; Coronary Artery Disease in her brother and mother; Diabetes in her brother; Hyperlipidemia in her sister; Hypertension in her sister; Other Cancer in her brother.  Family Hx fully reviewed and is non contributory to this admission.             Allergies:   No Known Allergies          Medications:     Prior to Admission medications    Medication Sig Last Dose Taking? Auth Provider   ALLOPURINOL PO Take 100 mg by mouth 2 times daily 12/18/2018 at Unknown time Yes Unknown, Entered By History   Amlodipine Besylate-Valsartan 5-160 MG TABS Take 1 tablet by mouth daily 12/18/2018 at Unknown time Yes Yunior Huang MD   calcium citrate (CALCITRATE) 950 MG tablet Take 1 tablet by mouth daily  Past Week at Unknown time Yes Unknown, Entered By History   cephALEXin (KEFLEX) 500 MG capsule Take 1 capsule (500 mg) by mouth 4 times daily for 14 days  Yes Pardeep Sheikh MD   folic acid (FOLVITE) 1 MG tablet Take 1 mg by mouth daily 12/18/2018 at Unknown time Yes Reported, Patient   levothyroxine (SYNTHROID/LEVOTHROID) 112 MCG tablet TAKE 1 TABLET (112 MCG) BY MOUTH DAILY 12/18/2018 at Unknown time Yes Yunior Huang MD   METHOTREXATE SODIUM IJ Inject 0.8 mLs as directed once a week Thursdays Past Week at Unknown time Yes Reported, Patient   metoprolol tartrate (LOPRESSOR) 50 MG tablet Take 1 tablet (50 mg) by mouth 2 times daily 12/18/2018 at Unknown time Yes Jluis Monroy MD   multivitamin, therapeutic with minerals (MULTI-VITAMIN) TABS tablet Take 1 tablet by mouth daily  "Without iron Past Week at Unknown time Yes Unknown, Entered By History   omeprazole (PRILOSEC) 20 MG CR capsule Take 1 capsule (20 mg) by mouth 2 times daily 12/17/2018 at Unknown time Yes Yunior Huang MD   oxyCODONE (ROXICODONE) 5 MG tablet Take 1 tablet (5 mg) by mouth every 6 hours as needed for severe pain  Yes Pardeep Sheikh MD   torsemide (DEMADEX) 10 MG tablet Take 1 tablet (10 mg) by mouth daily (with breakfast) 12/17/2018 at Unknown time Yes Jluis Monroy MD   VITAMIN D, CHOLECALCIFEROL, PO Take 1,000 Units by mouth daily Past Week at Unknown time Yes Unknown, Entered By History   WARFARIN SODIUM PO Take 5 mg by mouth MON, WED and SAT Past Week at Unknown time Yes Reported, Patient   WARFARIN SODIUM PO Take 2.5 mg by mouth SUN, TUES,THURS, FRI 12/17/2018 at Unknown time Yes Reported, Patient   ibandronate (BONIVA) 3 MG/3ML Inject 3 mg into the vein every 3 months  More than a month at Unknown time  Reported, Patient               Review of Systems:   A comprehensive greater than 10 system review of systems was carried out.  Pertinent positives and negatives are noted above.  Otherwise negative for contributory info.            Physical Exam:   Vitals were reviewed  Blood pressure 134/58, pulse 56, temperature 96.2  F (35.7  C), temperature source Oral, resp. rate 16, height 1.626 m (5' 4\"), weight 68.4 kg (150 lb 11.2 oz), SpO2 96 %, not currently breastfeeding.  Exam:    GENERAL:  Comfortable.  PSYCH: pleasant, oriented, No acute distress.  HEENT:  PERRLA. Normal conjunctiva, normal hearing, nasal mucosa and Oropharynx are normal.  NECK:  Supple, no neck vein distention, adenopathy or bruits, normal thyroid.  HEART:  Normal S1, S2 with no murmur, no pericardial rub, S3 or S4.  LUNGS:  Clear to auscultation, normal Respiratory effort.  ABDOMEN:  Soft, no hepatosplenomegaly, normal bowel sounds.  EXTREMITIES:  No pedal edema, +2 pulses bilateral and equal.  Left leg ace dressing " c/d/i  SKIN:  Dry to touch, No rash, wound or ulcerations.  NEUROLOGIC:  Nonfocal with normal cranial nerve and motor power and sensation.            Data:   Past 24 hours labs, studies, and imaging were reviewed.  Recent Labs   Lab 12/19/18 0720 12/18/18 2052 12/18/18  1440   HGB 8.9*  --  10.1*   PLT  --  355  --      Recent Labs   Lab 12/19/18 0720 12/18/18  2052   GLC 85  --    CR  --  0.80   GFRESTIMATED  --  71   GFRESTBLACK  --  82       Rebeka Sherwood PA-C

## 2018-12-19 NOTE — ADDENDUM NOTE
Addendum  created 12/18/18 1805 by Devan Thompson MD    Intraprocedure Attestations deleted, Intraprocedure Attestations filed

## 2018-12-19 NOTE — PHARMACY-ANTICOAGULATION SERVICE
Clinical Pharmacy - Warfarin Dosing Consult     Pharmacy has been consulted to manage this patient s warfarin therapy.  Indication: Atrial Fibrillation  Therapy Goal: Other - see comments(1.8-2.5)  Warfarin Prior to Admission: Yes  Warfarin PTA Regimen: 5mg MWSat, 2.5mg SunTThF  Dose Comments: 5mg today    INR   Date Value Ref Range Status   12/19/2018 2.12 (H) 0.86 - 1.14 Final   12/18/2018 1.78 (H) 0.86 - 1.14 Final       Recommend warfarin 5 mg today.  Pharmacy will monitor Zunilda Clark daily and order warfarin doses to achieve specified goal.      Please contact pharmacy as soon as possible if the warfarin needs to be held for a procedure or if the warfarin goals change.      Sherron Romero, PharmD  December 19, 2018

## 2018-12-19 NOTE — UTILIZATION REVIEW
[facility]  Admission Status; Secondary Review Determination   Admission Date: 12/18/2018    Under the authority of the Utilization Management Committee, the utilization review process indicated a secondary review on the above patient. The review outcome is based on review of the medical records, discussions with staff, and applying clinical experience noted on the date of the review.   (x) Outpatient Status Appropriate - This patient does not meet hospital inpatient criteria. If this patient's primary payer is Medicare and was admitted as an inpatient, Condition Code 44 should be used and patient status changed to outpatient recovery.    RATIONALE FOR DETERMINATION   78 year old year old female had a left knee debridement and placement of wound vac. Patient was admitted to the hospital after the procedure. There were no complications reported related to the procedure. Patient has Medicare and the procedure is not on the inpatient list. Dr Sheikh is in surgery and cannot take the call so I have updated his staff to request an outpatient order on this patient. They will contact Dr Sheikh and his team.   The severity of illness, intensity of service provided, expected LOS and risk for adverse outcome doesn't meet inpatient hospital admission.     The information on this document is developed by the utilization review team in order for the business office to ensure compliance. This only denotes the appropriateness of proper admission status and does not reflect the quality of care rendered.   The definitions of Inpatient Status and Observation Status used in making the determination above are those provided in the CMS Coverage Manual, Chapter 1 and Chapter 6, section 70.4.     Sincerely,     Ubaldo Alejandre DO MPH   Physician Advisor  Utilization Management   [facility].

## 2018-12-19 NOTE — PROGRESS NOTES
Discharge Planner   Discharge Plans in progress: Home with Support of spouse   Barriers to discharge plan: DME Insurance Auth  Follow up plan: Following        Entered by: Andreea Rich 12/19/2018 12:54 PM

## 2018-12-19 NOTE — PLAN OF CARE
Pt is up with standby assist and walker.pt has a wound vac. Pt is voiding and tolerating diet. Pt denies pain.  Pt is planning to discharge to home on the PM shift.

## 2018-12-19 NOTE — PROGRESS NOTES
Contact with [facility] they have received all necessary documentation and will be contacting [facility] for delivery.  [facility] will have  number for contact and WOC RN to follow up with charge prior to discharge.  This update has been provided to pt .    NO further needs

## 2018-12-19 NOTE — PLAN OF CARE
Discharge Planner OT   Patient plan for discharge: home with spouse   Current status: order received, chart reviewed, eval and tx completed. Pt seen following L knee debridement and wound vac placement. Pt had L TKA done 5 weeks ago. Pt lives in senior independent living apartment with spouse with no steps. Pt reports IND with ADL's and most IADL's. Spouse can provide A at discharge. Pt was using FWW for mobility prior to admission.   Pt had OT about 5 weeks ago when she initially had L TKA. Pt completed bed mobility IND supine <> sit. SBA for sit <> stand transfer with FWW. SBA for functional ambulation. SBA for toilet transfer with FWW. IND with toileting. SBA for grooming at sink. Pt able to don/doff socks and pants with SBA and verbal cues for technique. Pt reports she has shower cut out with shower chair and spouse will be home to A as needed. No further OT concerns at this time as pt has appropriate AE and spouse A at home for safety.   Barriers to return to prior living situation: none   Recommendations for discharge: home with spouse A with bathing and IADL's as needed  Rationale for recommendations: pt has appropriate AE at home for inc safety including shower seat and RTS. Pt reports spouse will be home to A as needed. Pt moving with SBA and FWW, good tolerance. No further OT concerns. discharge acute OT.     Occupational Therapy Discharge Summary    Reason for therapy discharge:    Goals adequate for discharge with spouse A      Progress towards therapy goal(s). See goals on Care Plan in [facility] electronic health record for goal details.  Goals partially met.  Barriers to achieving goals:   goals adequate for d/c .    Therapy recommendation(s):    No further therapy is recommended.           Entered by: Andreea To 12/19/2018 9:21 AM

## 2018-12-19 NOTE — PROGRESS NOTES
Focus: wound VAC paperwork  D: Ortho MD performed surgery 12/18 Left knee debridement and placement of wound vac.   WOC consulted for assistance with home VAC paperwork, pt has medicare and will need to go thru hard copy of paperwork to [facility], ready care not available for use.  I: discussion and education with pt on wound healing and VAC dressing POC.  Assistance with home paperwork and faxed to [facility] for approval.   A/P: appropriate left knee dehisced wound and recent debridement with VAC placement.   Will await approval and discharge when able.

## 2018-12-20 VITALS
HEART RATE: 66 BPM | WEIGHT: 150.7 LBS | DIASTOLIC BLOOD PRESSURE: 58 MMHG | SYSTOLIC BLOOD PRESSURE: 138 MMHG | BODY MASS INDEX: 25.73 KG/M2 | RESPIRATION RATE: 16 BRPM | TEMPERATURE: 96.9 F | HEIGHT: 64 IN | OXYGEN SATURATION: 98 %

## 2018-12-20 LAB
GLUCOSE SERPL-MCNC: 83 MG/DL (ref 70–99)
HGB BLD-MCNC: 9.3 G/DL (ref 11.7–15.7)
INR PPP: 2.33 (ref 0.86–1.14)

## 2018-12-20 PROCEDURE — G8989 SELF CARE D/C STATUS: HCPCS | Mod: CI

## 2018-12-20 PROCEDURE — 40000902 ZZH STATISTIC WOC PT EDUCATION, 16-30 MIN

## 2018-12-20 PROCEDURE — G8988 SELF CARE GOAL STATUS: HCPCS | Mod: CI

## 2018-12-20 PROCEDURE — 82947 ASSAY GLUCOSE BLOOD QUANT: CPT | Performed by: ORTHOPAEDIC SURGERY

## 2018-12-20 PROCEDURE — E2402 NEG PRESS WOUND THERAPY PUMP: HCPCS

## 2018-12-20 PROCEDURE — 85610 PROTHROMBIN TIME: CPT | Performed by: ORTHOPAEDIC SURGERY

## 2018-12-20 PROCEDURE — 25000132 ZZH RX MED GY IP 250 OP 250 PS 637: Mod: GY | Performed by: ORTHOPAEDIC SURGERY

## 2018-12-20 PROCEDURE — G8987 SELF CARE CURRENT STATUS: HCPCS | Mod: CI

## 2018-12-20 PROCEDURE — 85018 HEMOGLOBIN: CPT | Performed by: ORTHOPAEDIC SURGERY

## 2018-12-20 PROCEDURE — A9270 NON-COVERED ITEM OR SERVICE: HCPCS | Mod: GY | Performed by: ORTHOPAEDIC SURGERY

## 2018-12-20 PROCEDURE — 36415 COLL VENOUS BLD VENIPUNCTURE: CPT | Performed by: ORTHOPAEDIC SURGERY

## 2018-12-20 PROCEDURE — A9270 NON-COVERED ITEM OR SERVICE: HCPCS | Mod: GY | Performed by: PHYSICIAN ASSISTANT

## 2018-12-20 PROCEDURE — 25000132 ZZH RX MED GY IP 250 OP 250 PS 637: Mod: GY | Performed by: PHYSICIAN ASSISTANT

## 2018-12-20 RX ADMIN — LOSARTAN POTASSIUM 100 MG: 100 TABLET ORAL at 08:32

## 2018-12-20 RX ADMIN — AMLODIPINE BESYLATE 5 MG: 5 TABLET ORAL at 08:31

## 2018-12-20 RX ADMIN — MULTIPLE VITAMINS W/ MINERALS TAB 1 TABLET: TAB at 08:30

## 2018-12-20 RX ADMIN — METOPROLOL TARTRATE 50 MG: 50 TABLET ORAL at 08:31

## 2018-12-20 RX ADMIN — ACETAMINOPHEN 975 MG: 325 TABLET, FILM COATED ORAL at 06:05

## 2018-12-20 RX ADMIN — FOLIC ACID 1 MG: 1 TABLET ORAL at 08:30

## 2018-12-20 RX ADMIN — CALCIUM CITRATE 200 MG (950 MG) TABLET 950 MG: at 08:31

## 2018-12-20 RX ADMIN — OMEPRAZOLE 20 MG: 20 CAPSULE, DELAYED RELEASE ORAL at 08:31

## 2018-12-20 RX ADMIN — VITAMIN D, TAB 1000IU (100/BT) 1000 UNITS: 25 TAB at 08:31

## 2018-12-20 RX ADMIN — TORSEMIDE 10 MG: 10 TABLET ORAL at 08:30

## 2018-12-20 RX ADMIN — SENNOSIDES AND DOCUSATE SODIUM 1 TABLET: 8.6; 5 TABLET ORAL at 08:31

## 2018-12-20 NOTE — PLAN OF CARE
Pt A&Ox4. VSS afebrile. Saline locked. RA. Pt c/o of no pain. Declined pain medication. Ice applied to LLE and scheduled Tylenol managing pain. Wound vac in place nothing out this shift.. Tolerated regular diet. Cms intact. Ace wrap c/d/I. Up with SBA of 1 with gait belt and walker. Pt did not discharge this evening due to home wound vac not delivered until 2110. Amna TEMPLETON aware of patient staying and discharging tomorrow. Discharge medication filled and in lock box. No nausea this evening. 5 mg of Coumadin given with INR 2.12. Voiding adequate amounts. Tolerated ambulating in the halls X2. Will continue to monitor.

## 2018-12-20 NOTE — PROVIDER NOTIFICATION
Amna TEMPLETON called: Pt requested to stay tonight and discharge tomorrow due to the wound vac not arriving yet.  Dilma was ok with pt staying until tomorrow.

## 2018-12-20 NOTE — OP NOTE
Procedure Date: 12/18/2018      PREOPERATIVE DIAGNOSIS:  Distal wound dehiscence, left total knee arthroplasty.      POSTOPERATIVE DIAGNOSIS:  Distal wound dehiscence, left total knee arthroplasty.      PROCEDURE:  Irrigation and debridement with placement of wound VAC of left superficial wound dehiscence.      SURGEON:  Jurgen Sheikh MD.      FIRST ASSISTANT:  Amna Porter PA-C.      INDICATIONS FOR SURGERY:  Ms. Clark is a very pleasant 78-year-old female who had a left total knee arthroplasty done by me 6 weeks ago.  She has done extremely well postoperatively, but the distal end of her incision has a somewhat necrotic area and is healing somewhat slowly.  The decision was made to proceed with a debridement of this wound and placement of a wound VAC.  She has normal range of motion of the knee.  No evidence for deep infection.  At this point in time, this appears to be more of a dysvascular wound healing issue.  She understands the risks, benefits, alternatives and wishes to proceed with the procedure.      NARRATIVE OF EVENTS:  After thorough evaluation and proper identification of the patient and limb to be operated on, Ms. Clark was taken to the operating room where she underwent a general anesthetic, placed supine on the operating table and left leg was prepped and draped in the usual sterile manner.  After appropriate surgical pause to confirm the patient and extremity to be operated on, the patient was administered 2 grams of Ancef . Her left leg was approached.  We debrided the left leg wound through the skin and subcutaneous fat down to the underlying fascia.  All necrotic debris was removed.  We then thoroughly irrigated the wound with a liter of saline.  We did perform a manipulation of the knee under anesthesia which showed the knee flexed nicely to 135 degrees.  The remainder of her wound was well-healed and intact.  There was no evidence of any purulence.  The wound edges were nice, pink, healing  tissue and viable tissue at the end of the case.  The wound measured 4.2 cm from superior to inferior with its greatest depth of 4 mm and from medial lateral at its greatest 2.1 cm.  At this point, we then placed a wound VAC set to 125 mm of continuous suction.  We had good seal of our wound VAC and then the patient was placed in a well-padded postoperative dressing and taken to the recovery room in stable condition.  She tolerated the procedure without difficulty.         NOLBERTO CASTRO MD             D: 2018   T: 2018   MT: VANESSA      Name:     NADIYA LEWIS   MRN:      [id number]        Account:        [id number]   :      1940           Procedure Date: 2018      Document: [id number]

## 2018-12-20 NOTE — PLAN OF CARE
A&O. VSS. LS clear. Dressing wnl. CMS intact. Wound vac in place. Assist of 1 with a walker and gait belt. Voiding.

## 2018-12-20 NOTE — PROGRESS NOTES
Pt was connected to Home VAC unit; education provided to pt on use of pump and care of dressing. Pt became very overwhelmed and nauseated. Encouraged that she would be fine once she returned home and she improved.  VAC dressing connected to new Home pump with new canister; NPWT -125mmHg without leaks or alarms. ACE wrap in place from surgery.  Education provided to pt on how to avoid falling on hose/ tubing with good understanding and teach back completed.    RN took pump to dirty utility room for SPD.

## 2018-12-21 ENCOUNTER — ANTICOAGULATION THERAPY VISIT (OUTPATIENT)
Dept: ANTICOAGULATION | Facility: CLINIC | Age: 78
End: 2018-12-21
Payer: MEDICARE

## 2018-12-21 DIAGNOSIS — I48.92 ATRIAL FIBRILLATION AND FLUTTER (H): ICD-10-CM

## 2018-12-21 DIAGNOSIS — I48.91 ATRIAL FIBRILLATION AND FLUTTER (H): ICD-10-CM

## 2018-12-21 DIAGNOSIS — Z79.01 LONG TERM CURRENT USE OF ANTICOAGULANTS WITH INR GOAL OF 2.0-3.0: ICD-10-CM

## 2018-12-21 PROCEDURE — 99207 ZZC NO CHARGE NURSE ONLY: CPT | Performed by: INTERNAL MEDICINE

## 2018-12-24 ENCOUNTER — ANTICOAGULATION THERAPY VISIT (OUTPATIENT)
Dept: ANTICOAGULATION | Facility: CLINIC | Age: 78
End: 2018-12-24
Payer: MEDICARE

## 2018-12-24 DIAGNOSIS — I48.92 ATRIAL FIBRILLATION AND FLUTTER (H): ICD-10-CM

## 2018-12-24 DIAGNOSIS — Z79.01 LONG TERM CURRENT USE OF ANTICOAGULANTS WITH INR GOAL OF 2.0-3.0: ICD-10-CM

## 2018-12-24 DIAGNOSIS — I48.91 ATRIAL FIBRILLATION AND FLUTTER (H): ICD-10-CM

## 2018-12-24 LAB — INR PPP: 2.3 (ref 0.8–1.1)

## 2018-12-24 PROCEDURE — 99207 ZZC NO CHARGE NURSE ONLY: CPT | Performed by: INTERNAL MEDICINE

## 2018-12-24 NOTE — PROGRESS NOTES
ANTICOAGULATION FOLLOW-UP CLINIC VISIT    Patient Name:  Zunilda Alicea May  Date:  2018  Contact Type:  Telephone/ Linette, [facility] nurse reporting INR result, abx for wound infection, no other changes. Orders discussed with Linette    SUBJECTIVE:     Patient Findings     Positives:   Antibiotic use or infection (Started Keflex 18 for left knee surgical wound infecttion. Keflex can increase INR. ), No Problem Findings    Comments:   Per HC nurse, pt denies any missed warfarin doses since last INR visit. Pt denies any bleeding or bruising problems.              OBJECTIVE    INR   Date Value Ref Range Status   2018 2.3 (A) 0.8 - 1.1 Final     Comment:     [facility]       ASSESSMENT / PLAN  INR assessment THER    Recheck INR In: 2 DAYS    INR Location [facility] INR      Anticoagulation Summary  As of 2018    INR goal:   2.0-3.0   TTR:   87.1 % (2.6 y)   INR used for dosin.3 (2018)   Warfarin maintenance plan:   5 mg (2.5 mg x 2) every Mon, Wed, Sat; 2.5 mg (2.5 mg x 1) all other days   Full warfarin instructions:   : 2.5 mg; Otherwise 5 mg every Mon, Wed, Sat; 2.5 mg all other days   Weekly warfarin total:   25 mg   Plan last modified:   Екатерина Mahan RN (2017)   Next INR check:   2018   Priority:   INR   Target end date:       Indications    Long term current use of anticoagulants with INR goal of 2.0-3.0 [Z79.01]  Atrial fibrillation and flutter (H) [I48.91  I48.92]             Anticoagulation Episode Summary     INR check location:       Preferred lab:       Send INR reminders to:   [facility]    Comments:   likes printed AVS      Anticoagulation Care Providers     Provider Role Specialty Phone number    Yunior Huang MD Responsible Internal Medicine [phone]            See the Encounter Report to view Anticoagulation Flowsheet and Dosing Calendar (Go to Encounters tab in chart review, and find the Anticoagulation Therapy Visit)    Dosage  adjustment made based on physician directed care plan.    Екатерина Mahan RN

## 2018-12-26 ENCOUNTER — ANTICOAGULATION THERAPY VISIT (OUTPATIENT)
Dept: ANTICOAGULATION | Facility: CLINIC | Age: 78
End: 2018-12-26
Payer: MEDICARE

## 2018-12-26 DIAGNOSIS — Z79.01 LONG TERM CURRENT USE OF ANTICOAGULANTS WITH INR GOAL OF 2.0-3.0: ICD-10-CM

## 2018-12-26 DIAGNOSIS — I48.92 ATRIAL FIBRILLATION AND FLUTTER (H): ICD-10-CM

## 2018-12-26 DIAGNOSIS — I48.91 ATRIAL FIBRILLATION AND FLUTTER (H): ICD-10-CM

## 2018-12-26 LAB — INR PPP: 2.2 (ref 0.8–1.1)

## 2018-12-26 PROCEDURE — 99207 ZZC NO CHARGE NURSE ONLY: CPT | Performed by: INTERNAL MEDICINE

## 2018-12-26 NOTE — PROGRESS NOTES
ANTICOAGULATION FOLLOW-UP CLINIC VISIT    Patient Name:  Zunilda Alicea May  Date:  2018  Contact Type:  Telephone/ Linette [facility] nurse reporting INR result, no changes. Orders discussed with Linette.     SUBJECTIVE:     Patient Findings     Positives:   Antibiotic use or infection (Continues with Keflex), No Problem Findings    Comments:   Per HC nurse, Pt denies any changes or missed warfarin doses since last INR visit. Pt denies any bleeding or bruising problems.              OBJECTIVE    INR   Date Value Ref Range Status   2018 2.2 (A) 0.8 - 1.1 Final     Comment:     [facility]       ASSESSMENT / PLAN  INR assessment THER    Recheck INR In: 5 DAYS    INR Location [facility]R      Anticoagulation Summary  As of 2018    INR goal:   2.0-3.0   TTR:   87.1 % (2.6 y)   INR used for dosin.2 (2018)   Warfarin maintenance plan:   5 mg (2.5 mg x 2) every Mon, Wed, Sat; 2.5 mg (2.5 mg x 1) all other days   Full warfarin instructions:   : 2.5 mg; Otherwise 5 mg every Mon, Wed, Sat; 2.5 mg all other days   Weekly warfarin total:   25 mg   Plan last modified:   Екатерина Mahan RN (2017)   Next INR check:   2018   Priority:   INR   Target end date:       Indications    Long term current use of anticoagulants with INR goal of 2.0-3.0 [Z79.01]  Atrial fibrillation and flutter (H) [I48.91  I48.92]             Anticoagulation Episode Summary     INR check location:       Preferred lab:       Send INR reminders to:   [facility]    Comments:   likes printed AVS      Anticoagulation Care Providers     Provider Role Specialty Phone number    Yunior Huang MD Responsible Internal Medicine [phone]            See the Encounter Report to view Anticoagulation Flowsheet and Dosing Calendar (Go to Encounters tab in chart review, and find the Anticoagulation Therapy Visit)    Dosage adjustment made based on physician directed care plan.    Екатерина Mahan, RN

## 2018-12-27 ENCOUNTER — TELEPHONE (OUTPATIENT)
Dept: INTERNAL MEDICINE | Facility: CLINIC | Age: 78
End: 2018-12-27

## 2018-12-27 NOTE — TELEPHONE ENCOUNTER
Call received from [facility] requesting orders for skilled nursing 1x/week for 1 week, 3x/week for 1 week. 2x/week for 1 week and 1 x/month x 1 for wound vac maintenance, medication management and fall prevention. Please advise.

## 2018-12-31 ENCOUNTER — ANTICOAGULATION THERAPY VISIT (OUTPATIENT)
Dept: ANTICOAGULATION | Facility: CLINIC | Age: 78
End: 2018-12-31
Payer: MEDICARE

## 2018-12-31 ENCOUNTER — TRANSFERRED RECORDS (OUTPATIENT)
Dept: HEALTH INFORMATION MANAGEMENT | Facility: CLINIC | Age: 78
End: 2018-12-31

## 2018-12-31 DIAGNOSIS — Z79.01 LONG TERM CURRENT USE OF ANTICOAGULANTS WITH INR GOAL OF 2.0-3.0: ICD-10-CM

## 2018-12-31 DIAGNOSIS — I48.91 ATRIAL FIBRILLATION AND FLUTTER (H): ICD-10-CM

## 2018-12-31 DIAGNOSIS — I48.92 ATRIAL FIBRILLATION AND FLUTTER (H): ICD-10-CM

## 2018-12-31 LAB — INR PPP: 2.1 (ref 0.8–1.1)

## 2018-12-31 PROCEDURE — 99207 ZZC NO CHARGE NURSE ONLY: CPT | Performed by: INTERNAL MEDICINE

## 2018-12-31 NOTE — PROGRESS NOTES
ANTICOAGULATION FOLLOW-UP CLINIC VISIT    Patient Name:  Zunilda Alicea May  Date:  2018  Contact Type:  Telephone/ Linette, [facility] nurse reporting INR result, no changes. Orders discussed with Linette.     SUBJECTIVE:     Patient Findings     Positives:   No Problem Findings    Comments:   Per HC nurse, pt denies any changes or missed warfarin doses since last INR check. Pt denies any bleeding or bruising problems.              OBJECTIVE    INR   Date Value Ref Range Status   2018 2.1 (A) 0.8 - 1.1 Final     Comment:     [facility]       ASSESSMENT / PLAN  INR assessment THER    Recheck INR In: 1 WEEK    INR Location [facility] INR      Anticoagulation Summary  As of 2018    INR goal:   2.0-3.0   TTR:   87.2 % (2.6 y)   INR used for dosin.1 (2018)   Warfarin maintenance plan:   5 mg (2.5 mg x 2) every Mon, Wed, Sat; 2.5 mg (2.5 mg x 1) all other days   Full warfarin instructions:   : 2.5 mg; Otherwise 5 mg every Mon, Wed, Sat; 2.5 mg all other days   Weekly warfarin total:   25 mg   Plan last modified:   Екатерина Mahan RN (2017)   Next INR check:   2019   Priority:   INR   Target end date:       Indications    Long term current use of anticoagulants with INR goal of 2.0-3.0 [Z79.01]  Atrial fibrillation and flutter (H) [I48.91  I48.92]             Anticoagulation Episode Summary     INR check location:       Preferred lab:       Send INR reminders to:   [facility]    Comments:   likes printed AVS      Anticoagulation Care Providers     Provider Role Specialty Phone number    Yunior Huang MD [facility] Internal Medicine [phone]            See the Encounter Report to view Anticoagulation Flowsheet and Dosing Calendar (Go to Encounters tab in chart review, and find the Anticoagulation Therapy Visit)    Dosage adjustment made based on physician directed care plan.    Екатерина Mahan, RN

## 2018-12-31 NOTE — TELEPHONE ENCOUNTER
Call to Keisha and lit.   Verbal order given to approve visits until certification received for MD signature.

## 2019-01-03 ENCOUNTER — TELEPHONE (OUTPATIENT)
Dept: INTERNAL MEDICINE | Facility: CLINIC | Age: 79
End: 2019-01-03

## 2019-01-04 ENCOUNTER — TELEPHONE (OUTPATIENT)
Dept: INTERNAL MEDICINE | Facility: CLINIC | Age: 79
End: 2019-01-04

## 2019-01-07 ENCOUNTER — ANTICOAGULATION THERAPY VISIT (OUTPATIENT)
Dept: ANTICOAGULATION | Facility: CLINIC | Age: 79
End: 2019-01-07
Payer: MEDICARE

## 2019-01-07 DIAGNOSIS — I48.92 ATRIAL FIBRILLATION AND FLUTTER (H): ICD-10-CM

## 2019-01-07 DIAGNOSIS — Z79.01 LONG TERM CURRENT USE OF ANTICOAGULANTS WITH INR GOAL OF 2.0-3.0: ICD-10-CM

## 2019-01-07 DIAGNOSIS — I48.91 ATRIAL FIBRILLATION AND FLUTTER (H): ICD-10-CM

## 2019-01-07 LAB — INR PPP: 2.1 (ref 0.8–1.1)

## 2019-01-07 PROCEDURE — 99207 ZZC NO CHARGE NURSE ONLY: CPT | Performed by: INTERNAL MEDICINE

## 2019-01-07 NOTE — PROGRESS NOTES
ANTICOAGULATION FOLLOW-UP CLINIC VISIT    Patient Name:  Zunilda Alicea May  Date:  2019  Contact Type:  Telephone/ orders given to Linette with [facility]    SUBJECTIVE:     Patient Findings     Positives:   Antibiotic use or infection (completed dose of keflex last Wednesday.)           OBJECTIVE    INR   Date Value Ref Range Status   2019 2.1 (A) 0.8 - 1.1 Final       ASSESSMENT / PLAN  INR assessment THER    Recheck INR In: 2 WEEKS    INR Location [facility]R      Anticoagulation Summary  As of 2019    INR goal:   2.0-3.0   TTR:   87.3 % (2.6 y)   INR used for dosin.1 (2019)   Warfarin maintenance plan:   5 mg (2.5 mg x 2) every Wed, Sat; 2.5 mg (2.5 mg x 1) all other days   Full warfarin instructions:   5 mg every Wed, Sat; 2.5 mg all other days   Weekly warfarin total:   22.5 mg   Plan last modified:   Sonam Teixeira RN (2019)   Next INR check:   2019   Priority:   INR   Target end date:       Indications    Long term current use of anticoagulants with INR goal of 2.0-3.0 [Z79.01]  Atrial fibrillation and flutter (H) [I48.91  I48.92]             Anticoagulation Episode Summary     INR check location:       Preferred lab:       Send INR reminders to:   [facility]    Comments:   likes printed AVS      Anticoagulation Care Providers     Provider Role Specialty Phone number    Yunior Huang MD Responsible Internal Medicine [phone]            See the Encounter Report to view Anticoagulation Flowsheet and Dosing Calendar (Go to Encounters tab in chart review, and find the Anticoagulation Therapy Visit)    Dosage adjustment made based on physician directed care plan.    Sonam Teixeira RN

## 2019-01-09 DIAGNOSIS — D62 ANEMIA DUE TO ACUTE BLOOD LOSS: Primary | ICD-10-CM

## 2019-01-09 DIAGNOSIS — Z95.0 CARDIAC PACEMAKER IN SITU: ICD-10-CM

## 2019-01-09 DIAGNOSIS — D62 ANEMIA DUE TO BLOOD LOSS, ACUTE: Primary | ICD-10-CM

## 2019-01-09 DIAGNOSIS — I48.20 CHRONIC ATRIAL FIBRILLATION (H): ICD-10-CM

## 2019-01-09 DIAGNOSIS — I51.9 RIGHT VENTRICULAR DYSFUNCTION: ICD-10-CM

## 2019-01-09 DIAGNOSIS — I27.20 PULMONARY HTN (H): ICD-10-CM

## 2019-01-09 DIAGNOSIS — Z95.3 STATUS POST MITRAL VALVE REPLACEMENT WITH BIOPROSTHETIC VALVE: ICD-10-CM

## 2019-01-09 DIAGNOSIS — Z01.810 PRE-OPERATIVE CARDIOVASCULAR EXAMINATION: ICD-10-CM

## 2019-01-09 DIAGNOSIS — I07.1 FUNCTIONAL TRICUSPID REGURGITATION: ICD-10-CM

## 2019-01-09 DIAGNOSIS — I50.810 RIGHT HEART FAILURE (H): ICD-10-CM

## 2019-01-09 LAB
ERYTHROCYTE [DISTWIDTH] IN BLOOD BY AUTOMATED COUNT: 19.3 % (ref 10–15)
HCT VFR BLD AUTO: 35.3 % (ref 35–47)
HGB BLD-MCNC: 10.6 G/DL (ref 11.7–15.7)
MCH RBC QN AUTO: 29.7 PG (ref 26.5–33)
MCHC RBC AUTO-ENTMCNC: 30 G/DL (ref 31.5–36.5)
MCV RBC AUTO: 99 FL (ref 78–100)
PLATELET # BLD AUTO: 402 10E9/L (ref 150–450)
RBC # BLD AUTO: 3.57 10E12/L (ref 3.8–5.2)
WBC # BLD AUTO: 5.4 10E9/L (ref 4–11)

## 2019-01-09 PROCEDURE — 85027 COMPLETE CBC AUTOMATED: CPT | Performed by: INTERNAL MEDICINE

## 2019-01-09 PROCEDURE — 36415 COLL VENOUS BLD VENIPUNCTURE: CPT | Performed by: INTERNAL MEDICINE

## 2019-01-09 NOTE — LETTER
January 11, 2019      Zunilda Alicea May  [address]   [place] [facility] [place] [address]        Dear MsMeirMay,    We are writing to inform you of your test results.    Improving anemia, follow up in 3 months.    Resulted Orders   CBC with platelets   Result Value Ref Range    WBC 5.4 4.0 - 11.0 10e9/L    RBC Count 3.57 (L) 3.8 - 5.2 10e12/L    Hemoglobin 10.6 (L) 11.7 - 15.7 g/dL    Hematocrit 35.3 35.0 - 47.0 %    MCV 99 78 - 100 fl    MCH 29.7 26.5 - 33.0 pg    MCHC 30.0 (L) 31.5 - 36.5 g/dL    RDW 19.3 (H) 10.0 - 15.0 %    Platelet Count 402 150 - 450 10e9/L      Comment:      Results confirmed by repeat test       If you have any questions or concerns, please call the clinic at the number listed above.       Sincerely,        Dr Sal SIMMONS

## 2019-01-10 ENCOUNTER — TELEPHONE (OUTPATIENT)
Dept: INTERNAL MEDICINE | Facility: CLINIC | Age: 79
End: 2019-01-10

## 2019-01-14 ENCOUNTER — TELEPHONE (OUTPATIENT)
Dept: INTERNAL MEDICINE | Facility: CLINIC | Age: 79
End: 2019-01-14

## 2019-01-15 ENCOUNTER — TRANSFERRED RECORDS (OUTPATIENT)
Dept: HEALTH INFORMATION MANAGEMENT | Facility: CLINIC | Age: 79
End: 2019-01-15

## 2019-01-16 ENCOUNTER — TELEPHONE (OUTPATIENT)
Dept: INTERNAL MEDICINE | Facility: CLINIC | Age: 79
End: 2019-01-16

## 2019-01-21 ENCOUNTER — TELEPHONE (OUTPATIENT)
Dept: INTERNAL MEDICINE | Facility: CLINIC | Age: 79
End: 2019-01-21

## 2019-01-21 ENCOUNTER — TRANSFERRED RECORDS (OUTPATIENT)
Dept: HEALTH INFORMATION MANAGEMENT | Facility: CLINIC | Age: 79
End: 2019-01-21

## 2019-01-21 ENCOUNTER — ANTICOAGULATION THERAPY VISIT (OUTPATIENT)
Dept: ANTICOAGULATION | Facility: CLINIC | Age: 79
End: 2019-01-21
Payer: MEDICARE

## 2019-01-21 DIAGNOSIS — I48.91 ATRIAL FIBRILLATION AND FLUTTER (H): ICD-10-CM

## 2019-01-21 DIAGNOSIS — I48.92 ATRIAL FIBRILLATION AND FLUTTER (H): ICD-10-CM

## 2019-01-21 DIAGNOSIS — Z79.01 LONG TERM CURRENT USE OF ANTICOAGULANTS WITH INR GOAL OF 2.0-3.0: ICD-10-CM

## 2019-01-21 LAB — INR PPP: 2.4 (ref 0.8–1.1)

## 2019-01-21 PROCEDURE — 99207 ZZC NO CHARGE NURSE ONLY: CPT | Performed by: INTERNAL MEDICINE

## 2019-01-21 NOTE — PROGRESS NOTES
ANTICOAGULATION FOLLOW-UP CLINIC VISIT    Patient Name:  Zunilda Alicea May  Date:  2019  Contact Type:  Telephone/ [facility] nurse reporting INR result, no changes. Orders discussed with Cherise.     SUBJECTIVE:     Patient Findings     Positives:   No Problem Findings    Comments:   Per HC nurse, pt denies any changes or missed warfarin doses since last INR check. Pt denies any bleeding or bruising problems.              OBJECTIVE    INR   Date Value Ref Range Status   2019 2.4 (A) 0.8 - 1.1 Final     Comment:     [facility]       ASSESSMENT / PLAN  INR assessment THER    Recheck INR In: 3 WEEKS    INR Location [facility]R      Anticoagulation Summary  As of 2019    INR goal:   2.0-3.0   TTR:   87.4 % (2.7 y)   INR used for dosin.4 (2019)   Warfarin maintenance plan:   5 mg (2.5 mg x 2) every Wed, Sat; 2.5 mg (2.5 mg x 1) all other days   Full warfarin instructions:   5 mg every Wed, Sat; 2.5 mg all other days   Weekly warfarin total:   22.5 mg   No change documented:   Екатерина Mahan RN   Plan last modified:   Sonam Teixeira RN (2019)   Next INR check:   2019   Priority:   INR   Target end date:       Indications    Long term current use of anticoagulants with INR goal of 2.0-3.0 [Z79.01]  Atrial fibrillation and flutter (H) [I48.91  I48.92]             Anticoagulation Episode Summary     INR check location:       Preferred lab:       Send INR reminders to:   [facility]    Comments:   likes printed AVS      Anticoagulation Care Providers     Provider Role Specialty Phone number    Yunior Huang MD [facility] Internal Medicine [phone]            See the Encounter Report to view Anticoagulation Flowsheet and Dosing Calendar (Go to Encounters tab in chart review, and find the Anticoagulation Therapy Visit)    Dosage adjustment made based on physician directed care plan.    Екатерина Mahan, RN

## 2019-01-22 ENCOUNTER — TELEPHONE (OUTPATIENT)
Dept: INTERNAL MEDICINE | Facility: CLINIC | Age: 79
End: 2019-01-22

## 2019-01-25 ENCOUNTER — TELEPHONE (OUTPATIENT)
Dept: INTERNAL MEDICINE | Facility: CLINIC | Age: 79
End: 2019-01-25

## 2019-01-25 NOTE — TELEPHONE ENCOUNTER
CHUY received call from Sharon Kiser at [facility] and they are ready to receive pt back. Sharon provided call report information [phone] or [phone], going to room 208B.     CHUY provided attending nurse Stephanie, Rn via phone call     CHUY called  [facility] at ext. [phone], spoke to dispatcher who arranged pt's transport by stretcher within the hour.   Fax received from [facility] for review and signature.  Put in Dr. Huang's in basket.

## 2019-01-29 NOTE — TELEPHONE ENCOUNTER
[facility] is calling back and need the following results done asap.  INR - dec 24-26 and 22   Plan of care Eve Mariscal  Physician comm and inernment 1-23,1-25

## 2019-01-30 ENCOUNTER — ANTICOAGULATION THERAPY VISIT (OUTPATIENT)
Dept: ANTICOAGULATION | Facility: CLINIC | Age: 79
End: 2019-01-30
Payer: MEDICARE

## 2019-01-30 DIAGNOSIS — Z79.01 LONG TERM CURRENT USE OF ANTICOAGULANTS WITH INR GOAL OF 2.0-3.0: ICD-10-CM

## 2019-01-30 DIAGNOSIS — I48.92 ATRIAL FIBRILLATION AND FLUTTER (H): ICD-10-CM

## 2019-01-30 DIAGNOSIS — I48.91 ATRIAL FIBRILLATION AND FLUTTER (H): ICD-10-CM

## 2019-01-30 LAB — INR PPP: 1.6 (ref 0.8–1.1)

## 2019-01-30 PROCEDURE — 99207 ZZC NO CHARGE NURSE ONLY: CPT | Performed by: INTERNAL MEDICINE

## 2019-01-30 RX ORDER — WARFARIN SODIUM 2.5 MG/1
TABLET ORAL
Qty: 45 TABLET | Refills: 1 | Status: SHIPPED | OUTPATIENT
Start: 2019-01-30 | End: 2019-04-22

## 2019-01-30 NOTE — PROGRESS NOTES
ANTICOAGULATION FOLLOW-UP CLINIC VISIT    Patient Name:  Zunilda Alicea May  Date:  2019  Contact Type:  Telephone/ orders given to Sonja with [facility]     SUBJECTIVE:     Patient Findings     Positives:   Change in diet/appetite (Patient started boost 1 can daily last week.  She has been drinking this for a week and plans to continue.)           OBJECTIVE    INR   Date Value Ref Range Status   2019 1.6 (A) 0.8 - 1.1 Final       ASSESSMENT / PLAN  INR assessment SUB    Recheck INR In: 1 WEEK    INR Location [facility] INR      Anticoagulation Summary  As of 2019    INR goal:   2.0-3.0   TTR:   87.1 % (2.7 y)   INR used for dosin.6! (2019)   Warfarin maintenance plan:   5 mg (2.5 mg x 2) every Wed, Sat; 2.5 mg (2.5 mg x 1) all other days   Full warfarin instructions:   : 7.5 mg; : 5 mg; Otherwise 5 mg every Wed, Sat; 2.5 mg all other days   Weekly warfarin total:   22.5 mg   Plan last modified:   Sonam Teixeira RN (2019)   Next INR check:   2019   Priority:   INR   Target end date:       Indications    Long term current use of anticoagulants with INR goal of 2.0-3.0 [Z79.01]  Atrial fibrillation and flutter (H) [I48.91  I48.92]             Anticoagulation Episode Summary     INR check location:       Preferred lab:       Send INR reminders to:   [facility]    Comments:   likes printed AVS      Anticoagulation Care Providers     Provider Role Specialty Phone number    Yunior Huang MD [facility] Internal Medicine [phone]            See the Encounter Report to view Anticoagulation Flowsheet and Dosing Calendar (Go to Encounters tab in chart review, and find the Anticoagulation Therapy Visit)    Dosage adjustment made based on physician directed care plan.    Sonam Teixeira RN

## 2019-01-31 ENCOUNTER — TELEPHONE (OUTPATIENT)
Dept: INTERNAL MEDICINE | Facility: CLINIC | Age: 79
End: 2019-01-31

## 2019-01-31 NOTE — TELEPHONE ENCOUNTER
SKILLED NURSING for wound vac/ anticoagulation orders received via fax. Form in your mailbox to be signed.

## 2019-02-02 NOTE — PROGRESS NOTES
[facility]      OUTPATIENT OCCUPATIONAL THERAPY  EVALUATION  PLAN OF TREATMENT FOR OUTPATIENT REHABILITATION  (COMPLETE FOR INITIAL CLAIMS ONLY)  Patient's Last Name, First Name, M.I.  YOB: 1940  May,Zunilda Alicea                          Provider's Name  [facility] Medical Record No.  [id number]                               Onset Date:  12/18/18   Start of Care Date:  12/19/18     Type:     ___PT   _X_OT   ___SLP Medical Diagnosis:  L knee debridement with wound vac                         OT Diagnosis:  dec IND with ADL's and transfers   Visits from SOC:  1   _________________________________________________________________________________  Plan of Treatment/Functional Goals    Planned Interventions: ADL retraining, transfer training,       Goals: See Occupational Therapy Goals on Care Plan in [employer] electronic health record.    Therapy Frequency: (1 tx only)  Predicted Duration of Therapy Intervention: 1 tx only  _________________________________________________________________________________    I CERTIFY THE NEED FOR THESE SERVICES FURNISHED UNDER        THIS PLAN OF TREATMENT AND WHILE UNDER MY CARE     (Physician co-signature of this document indicates review and certification of the therapy plan).                Certification date from: 12/19/18, Certification date to: 12/19/18    Referring Physician: Pardeep Sheikh MD            Initial Assessment        See Occupational Therapy evaluation dated 12/19/18 in Epic electronic health record.

## 2019-02-06 ENCOUNTER — ANTICOAGULATION THERAPY VISIT (OUTPATIENT)
Dept: ANTICOAGULATION | Facility: CLINIC | Age: 79
End: 2019-02-06
Payer: MEDICARE

## 2019-02-06 DIAGNOSIS — I48.91 ATRIAL FIBRILLATION AND FLUTTER (H): ICD-10-CM

## 2019-02-06 DIAGNOSIS — I48.92 ATRIAL FIBRILLATION AND FLUTTER (H): ICD-10-CM

## 2019-02-06 DIAGNOSIS — Z79.01 LONG TERM CURRENT USE OF ANTICOAGULANTS WITH INR GOAL OF 2.0-3.0: ICD-10-CM

## 2019-02-06 LAB — INR PPP: 1.9 (ref 0.8–1.1)

## 2019-02-06 PROCEDURE — 99207 ZZC NO CHARGE NURSE ONLY: CPT | Performed by: INTERNAL MEDICINE

## 2019-02-07 ENCOUNTER — ANCILLARY PROCEDURE (OUTPATIENT)
Dept: CARDIOLOGY | Facility: CLINIC | Age: 79
End: 2019-02-07
Attending: INTERNAL MEDICINE
Payer: MEDICARE

## 2019-02-07 DIAGNOSIS — Z95.0 PACEMAKER: ICD-10-CM

## 2019-02-07 LAB
MDC_IDC_EPISODE_DTM: NORMAL
MDC_IDC_EPISODE_DURATION: 1378 S
MDC_IDC_EPISODE_DURATION: 141 S
MDC_IDC_EPISODE_DURATION: 1493 S
MDC_IDC_EPISODE_DURATION: 151 S
MDC_IDC_EPISODE_DURATION: 177 S
MDC_IDC_EPISODE_DURATION: 1841 S
MDC_IDC_EPISODE_DURATION: 2 S
MDC_IDC_EPISODE_DURATION: 2030 S
MDC_IDC_EPISODE_DURATION: 2117 S
MDC_IDC_EPISODE_DURATION: 214 S
MDC_IDC_EPISODE_DURATION: 216 S
MDC_IDC_EPISODE_DURATION: 243 S
MDC_IDC_EPISODE_DURATION: 2587 S
MDC_IDC_EPISODE_DURATION: 2940 S
MDC_IDC_EPISODE_DURATION: 307 S
MDC_IDC_EPISODE_DURATION: 3518 S
MDC_IDC_EPISODE_DURATION: 36 S
MDC_IDC_EPISODE_DURATION: 3752 S
MDC_IDC_EPISODE_DURATION: 378 S
MDC_IDC_EPISODE_DURATION: 4164 S
MDC_IDC_EPISODE_DURATION: 472 S
MDC_IDC_EPISODE_DURATION: 4747 S
MDC_IDC_EPISODE_DURATION: 4799 S
MDC_IDC_EPISODE_DURATION: 5080 S
MDC_IDC_EPISODE_DURATION: 51 S
MDC_IDC_EPISODE_DURATION: 534 S
MDC_IDC_EPISODE_DURATION: 589 S
MDC_IDC_EPISODE_DURATION: 609 S
MDC_IDC_EPISODE_DURATION: 64 S
MDC_IDC_EPISODE_DURATION: 6459 S
MDC_IDC_EPISODE_DURATION: 713 S
MDC_IDC_EPISODE_DURATION: 7214 S
MDC_IDC_EPISODE_DURATION: 73 S
MDC_IDC_EPISODE_DURATION: 7325 S
MDC_IDC_EPISODE_DURATION: 768 S
MDC_IDC_EPISODE_DURATION: 7751 S
MDC_IDC_EPISODE_DURATION: 781 S
MDC_IDC_EPISODE_DURATION: 8789 S
MDC_IDC_EPISODE_DURATION: NORMAL S
MDC_IDC_EPISODE_ID: 175
MDC_IDC_EPISODE_ID: 176
MDC_IDC_EPISODE_ID: 177
MDC_IDC_EPISODE_ID: 178
MDC_IDC_EPISODE_ID: 179
MDC_IDC_EPISODE_ID: 180
MDC_IDC_EPISODE_ID: 181
MDC_IDC_EPISODE_ID: 182
MDC_IDC_EPISODE_ID: 183
MDC_IDC_EPISODE_ID: 184
MDC_IDC_EPISODE_ID: 185
MDC_IDC_EPISODE_ID: 186
MDC_IDC_EPISODE_ID: 187
MDC_IDC_EPISODE_ID: 188
MDC_IDC_EPISODE_ID: 189
MDC_IDC_EPISODE_ID: 190
MDC_IDC_EPISODE_ID: 191
MDC_IDC_EPISODE_ID: 192
MDC_IDC_EPISODE_ID: 193
MDC_IDC_EPISODE_ID: 194
MDC_IDC_EPISODE_ID: 195
MDC_IDC_EPISODE_ID: 196
MDC_IDC_EPISODE_ID: 197
MDC_IDC_EPISODE_ID: 198
MDC_IDC_EPISODE_ID: 199
MDC_IDC_EPISODE_ID: 200
MDC_IDC_EPISODE_ID: 201
MDC_IDC_EPISODE_ID: 202
MDC_IDC_EPISODE_ID: 203
MDC_IDC_EPISODE_ID: 204
MDC_IDC_EPISODE_ID: 205
MDC_IDC_EPISODE_ID: 206
MDC_IDC_EPISODE_ID: 207
MDC_IDC_EPISODE_ID: 208
MDC_IDC_EPISODE_ID: 209
MDC_IDC_EPISODE_ID: 210
MDC_IDC_EPISODE_ID: 211
MDC_IDC_EPISODE_ID: 212
MDC_IDC_EPISODE_ID: 213
MDC_IDC_EPISODE_ID: 214
MDC_IDC_EPISODE_ID: 215
MDC_IDC_EPISODE_ID: 216
MDC_IDC_EPISODE_ID: 217
MDC_IDC_EPISODE_ID: 218
MDC_IDC_EPISODE_ID: 219
MDC_IDC_EPISODE_ID: 220
MDC_IDC_EPISODE_ID: 221
MDC_IDC_EPISODE_ID: 222
MDC_IDC_EPISODE_TYPE: NORMAL
MDC_IDC_LEAD_IMPLANT_DT: NORMAL
MDC_IDC_LEAD_IMPLANT_DT: NORMAL
MDC_IDC_LEAD_LOCATION: NORMAL
MDC_IDC_LEAD_LOCATION: NORMAL
MDC_IDC_LEAD_MFG: NORMAL
MDC_IDC_LEAD_MFG: NORMAL
MDC_IDC_LEAD_MODEL: NORMAL
MDC_IDC_LEAD_MODEL: NORMAL
MDC_IDC_LEAD_POLARITY_TYPE: NORMAL
MDC_IDC_LEAD_POLARITY_TYPE: NORMAL
MDC_IDC_LEAD_SERIAL: NORMAL
MDC_IDC_LEAD_SERIAL: NORMAL
MDC_IDC_MSMT_BATTERY_DTM: NORMAL
MDC_IDC_MSMT_BATTERY_STATUS: NORMAL
MDC_IDC_MSMT_BATTERY_VOLTAGE: 2.93 V
MDC_IDC_MSMT_LEADCHNL_RA_IMPEDANCE_VALUE: 424 OHM
MDC_IDC_MSMT_LEADCHNL_RA_SENSING_INTR_AMPL: 0.94 MV
MDC_IDC_MSMT_LEADCHNL_RV_IMPEDANCE_VALUE: 400 OHM
MDC_IDC_MSMT_LEADCHNL_RV_SENSING_INTR_AMPL: 11.6 MV
MDC_IDC_PG_IMPLANT_DTM: NORMAL
MDC_IDC_PG_MFG: NORMAL
MDC_IDC_PG_MODEL: NORMAL
MDC_IDC_PG_SERIAL: NORMAL
MDC_IDC_PG_TYPE: NORMAL
MDC_IDC_SESS_CLINIC_NAME: NORMAL
MDC_IDC_SESS_DTM: NORMAL
MDC_IDC_SESS_TYPE: NORMAL
MDC_IDC_SET_BRADY_AT_MODE_SWITCH_RATE: 171 {BEATS}/MIN
MDC_IDC_SET_BRADY_HYSTRATE: NORMAL
MDC_IDC_SET_BRADY_LOWRATE: 60 {BEATS}/MIN
MDC_IDC_SET_BRADY_MAX_SENSOR_RATE: 130 {BEATS}/MIN
MDC_IDC_SET_BRADY_MAX_TRACKING_RATE: 130 {BEATS}/MIN
MDC_IDC_SET_BRADY_MODE: NORMAL
MDC_IDC_SET_BRADY_PAV_DELAY_LOW: 180 MS
MDC_IDC_SET_BRADY_SAV_DELAY_LOW: 150 MS
MDC_IDC_SET_LEADCHNL_RA_PACING_AMPLITUDE: 2 V
MDC_IDC_SET_LEADCHNL_RA_PACING_ANODE_ELECTRODE_1: NORMAL
MDC_IDC_SET_LEADCHNL_RA_PACING_ANODE_LOCATION_1: NORMAL
MDC_IDC_SET_LEADCHNL_RA_PACING_CATHODE_ELECTRODE_1: NORMAL
MDC_IDC_SET_LEADCHNL_RA_PACING_CATHODE_LOCATION_1: NORMAL
MDC_IDC_SET_LEADCHNL_RA_PACING_POLARITY: NORMAL
MDC_IDC_SET_LEADCHNL_RA_PACING_PULSEWIDTH: 0.4 MS
MDC_IDC_SET_LEADCHNL_RA_SENSING_ANODE_ELECTRODE_1: NORMAL
MDC_IDC_SET_LEADCHNL_RA_SENSING_ANODE_LOCATION_1: NORMAL
MDC_IDC_SET_LEADCHNL_RA_SENSING_CATHODE_ELECTRODE_1: NORMAL
MDC_IDC_SET_LEADCHNL_RA_SENSING_CATHODE_LOCATION_1: NORMAL
MDC_IDC_SET_LEADCHNL_RA_SENSING_POLARITY: NORMAL
MDC_IDC_SET_LEADCHNL_RA_SENSING_SENSITIVITY: 0.45 MV
MDC_IDC_SET_LEADCHNL_RV_PACING_AMPLITUDE: 2 V
MDC_IDC_SET_LEADCHNL_RV_PACING_ANODE_ELECTRODE_1: NORMAL
MDC_IDC_SET_LEADCHNL_RV_PACING_ANODE_LOCATION_1: NORMAL
MDC_IDC_SET_LEADCHNL_RV_PACING_CATHODE_ELECTRODE_1: NORMAL
MDC_IDC_SET_LEADCHNL_RV_PACING_CATHODE_LOCATION_1: NORMAL
MDC_IDC_SET_LEADCHNL_RV_PACING_POLARITY: NORMAL
MDC_IDC_SET_LEADCHNL_RV_PACING_PULSEWIDTH: 0.4 MS
MDC_IDC_SET_LEADCHNL_RV_SENSING_ANODE_ELECTRODE_1: NORMAL
MDC_IDC_SET_LEADCHNL_RV_SENSING_ANODE_LOCATION_1: NORMAL
MDC_IDC_SET_LEADCHNL_RV_SENSING_CATHODE_ELECTRODE_1: NORMAL
MDC_IDC_SET_LEADCHNL_RV_SENSING_CATHODE_LOCATION_1: NORMAL
MDC_IDC_SET_LEADCHNL_RV_SENSING_POLARITY: NORMAL
MDC_IDC_SET_LEADCHNL_RV_SENSING_SENSITIVITY: 2.1 MV
MDC_IDC_SET_ZONE_DETECTION_INTERVAL: 350 MS
MDC_IDC_SET_ZONE_DETECTION_INTERVAL: 400 MS
MDC_IDC_SET_ZONE_TYPE: NORMAL
MDC_IDC_STAT_AT_BURDEN_PERCENT: 5.3 %
MDC_IDC_STAT_AT_DTM_END: NORMAL
MDC_IDC_STAT_AT_DTM_START: NORMAL
MDC_IDC_STAT_BRADY_AP_VP_PERCENT: 2.02 %
MDC_IDC_STAT_BRADY_AP_VS_PERCENT: 11.35 %
MDC_IDC_STAT_BRADY_AS_VP_PERCENT: 15.38 %
MDC_IDC_STAT_BRADY_AS_VS_PERCENT: 71.25 %
MDC_IDC_STAT_BRADY_DTM_END: NORMAL
MDC_IDC_STAT_BRADY_DTM_START: NORMAL
MDC_IDC_STAT_BRADY_RA_PERCENT_PACED: 13.12 %
MDC_IDC_STAT_BRADY_RV_PERCENT_PACED: 17.63 %
MDC_IDC_STAT_EPISODE_RECENT_COUNT: 0
MDC_IDC_STAT_EPISODE_RECENT_COUNT: 0
MDC_IDC_STAT_EPISODE_RECENT_COUNT: 1
MDC_IDC_STAT_EPISODE_RECENT_COUNT: 47
MDC_IDC_STAT_EPISODE_RECENT_COUNT_DTM_END: NORMAL
MDC_IDC_STAT_EPISODE_RECENT_COUNT_DTM_START: NORMAL
MDC_IDC_STAT_EPISODE_TOTAL_COUNT: 1
MDC_IDC_STAT_EPISODE_TOTAL_COUNT: 13
MDC_IDC_STAT_EPISODE_TOTAL_COUNT: 203
MDC_IDC_STAT_EPISODE_TOTAL_COUNT: 3
MDC_IDC_STAT_EPISODE_TOTAL_COUNT_DTM_END: NORMAL
MDC_IDC_STAT_EPISODE_TYPE: NORMAL

## 2019-02-07 PROCEDURE — 93294 REM INTERROG EVL PM/LDLS PM: CPT | Performed by: INTERNAL MEDICINE

## 2019-02-07 PROCEDURE — 93296 REM INTERROG EVL PM/IDS: CPT | Performed by: INTERNAL MEDICINE

## 2019-02-08 ENCOUNTER — TELEPHONE (OUTPATIENT)
Dept: INTERNAL MEDICINE | Facility: CLINIC | Age: 79
End: 2019-02-08

## 2019-02-13 ENCOUNTER — ANTICOAGULATION THERAPY VISIT (OUTPATIENT)
Dept: ANTICOAGULATION | Facility: CLINIC | Age: 79
End: 2019-02-13
Payer: MEDICARE

## 2019-02-13 DIAGNOSIS — I48.92 ATRIAL FIBRILLATION AND FLUTTER (H): ICD-10-CM

## 2019-02-13 DIAGNOSIS — Z79.01 LONG TERM CURRENT USE OF ANTICOAGULANTS WITH INR GOAL OF 2.0-3.0: ICD-10-CM

## 2019-02-13 DIAGNOSIS — I48.91 ATRIAL FIBRILLATION AND FLUTTER (H): ICD-10-CM

## 2019-02-13 LAB — INR PPP: 2 (ref 0.8–1.1)

## 2019-02-13 PROCEDURE — 99207 ZZC NO CHARGE NURSE ONLY: CPT | Performed by: INTERNAL MEDICINE

## 2019-02-13 NOTE — PROGRESS NOTES
ANTICOAGULATION FOLLOW-UP CLINIC VISIT    Patient Name:  Zunilda Alicea May  Date:  2019  Contact Type:  Telephone/ orders given to Martha with [facility]    SUBJECTIVE:     Patient Findings     Positives:   No Problem Findings           OBJECTIVE    INR   Date Value Ref Range Status   2019 2.0 (A) 0.8 - 1.1 Final       ASSESSMENT / PLAN  INR assessment THER    Recheck INR In: 1 WEEK    INR Location [facility] INR      Anticoagulation Summary  As of 2019    INR goal:   2.0-3.0   TTR:   85.9 % (2.7 y)   INR used for dosin.0 (2019)   Warfarin maintenance plan:   2.5 mg (2.5 mg x 1) every Sun, Fri; 5 mg (2.5 mg x 2) all other days   Full warfarin instructions:   : 5 mg; Otherwise 2.5 mg every Sun, Fri; 5 mg all other days   Weekly warfarin total:   30 mg   Plan last modified:   Sonam Teixeira RN (2019)   Next INR check:   2019   Priority:   INR   Target end date:       Indications    Long term current use of anticoagulants with INR goal of 2.0-3.0 [Z79.01]  Atrial fibrillation and flutter (H) [I48.91  I48.92]             Anticoagulation Episode Summary     INR check location:       Preferred lab:       Send INR reminders to:   [facility]    Comments:   likes printed AVS      Anticoagulation Care Providers     Provider Role Specialty Phone number    Yunior Huang MD Responsible Internal Medicine [phone]            See the Encounter Report to view Anticoagulation Flowsheet and Dosing Calendar (Go to Encounters tab in chart review, and find the Anticoagulation Therapy Visit)    Dosage adjustment made based on physician directed care plan.    Sonam Teixeira RN

## 2019-02-18 ENCOUNTER — TRANSFERRED RECORDS (OUTPATIENT)
Dept: HEALTH INFORMATION MANAGEMENT | Facility: CLINIC | Age: 79
End: 2019-02-18

## 2019-02-20 ENCOUNTER — ANTICOAGULATION THERAPY VISIT (OUTPATIENT)
Dept: ANTICOAGULATION | Facility: CLINIC | Age: 79
End: 2019-02-20
Payer: MEDICARE

## 2019-02-20 ENCOUNTER — TELEPHONE (OUTPATIENT)
Dept: INTERNAL MEDICINE | Facility: CLINIC | Age: 79
End: 2019-02-20

## 2019-02-20 DIAGNOSIS — I48.91 ATRIAL FIBRILLATION AND FLUTTER (H): ICD-10-CM

## 2019-02-20 DIAGNOSIS — Z79.01 LONG TERM CURRENT USE OF ANTICOAGULANTS WITH INR GOAL OF 2.0-3.0: ICD-10-CM

## 2019-02-20 DIAGNOSIS — I48.92 ATRIAL FIBRILLATION AND FLUTTER (H): ICD-10-CM

## 2019-02-20 LAB — INR PPP: 2.2 (ref 0.8–1.1)

## 2019-02-20 PROCEDURE — 99207 ZZC NO CHARGE NURSE ONLY: CPT | Performed by: INTERNAL MEDICINE

## 2019-02-20 NOTE — PROGRESS NOTES
ANTICOAGULATION FOLLOW-UP CLINIC VISIT    Patient Name:  Zunilda Alicea May  Date:  2019  Contact Type:  Telephone/ orders given to Martha with [facility]    SUBJECTIVE:     Patient Findings     Positives:   No Problem Findings    Comments:   Patient will be discharged from home care.  Next INR will be done in clinic.  Patient denies any changes in diet or medications since last INR visit.  Denies any bleeding or bruising problems.           OBJECTIVE    INR   Date Value Ref Range Status   2019 2.2 (A) 0.8 - 1.1 Final       ASSESSMENT / PLAN  INR assessment THER    Recheck INR In: 2 WEEKS    INR Location [facility] INR      Anticoagulation Summary  As of 2019    INR goal:   2.0-3.0   TTR:   85.8 % (2.7 y)   INR used for dosin.2 (2019)   Warfarin maintenance plan:   2.5 mg (2.5 mg x 1) every Sun, Fri; 5 mg (2.5 mg x 2) all other days   Full warfarin instructions:   2.5 mg every Sun, Fri; 5 mg all other days   Weekly warfarin total:   30 mg   No change documented:   Sonam Teixeira RN   Plan last modified:   Sonam Teixeira RN (2019)   Next INR check:   3/6/2019   Priority:   INR   Target end date:       Indications    Long term current use of anticoagulants with INR goal of 2.0-3.0 [Z79.01]  Atrial fibrillation and flutter (H) [I48.91  I48.92]             Anticoagulation Episode Summary     INR check location:       Preferred lab:       Send INR reminders to:   [facility]    Comments:   likes printed AVS      Anticoagulation Care Providers     Provider Role Specialty Phone number    Yunior Huang MD [facility] Internal Medicine [phone]            See the Encounter Report to view Anticoagulation Flowsheet and Dosing Calendar (Go to Encounters tab in chart review, and find the Anticoagulation Therapy Visit)    Dosage adjustment made based on physician directed care plan.    Sonam Teixeira RN

## 2019-02-21 DIAGNOSIS — I27.20 PULMONARY HTN (H): ICD-10-CM

## 2019-02-21 RX ORDER — TORSEMIDE 10 MG/1
10 TABLET ORAL
Qty: 90 TABLET | Refills: 0 | Status: SHIPPED | OUTPATIENT
Start: 2019-02-21 | End: 2019-06-12

## 2019-02-21 NOTE — TELEPHONE ENCOUNTER
Received refill request for:  Torsemide  Last OV was: 8/16/2018 with Dr. Monroy  Labs/EKG: last BMP 11/28/2018  F/U scheduled: orders in Epic for 12/2018. Scheduling to contact to set up  New script sent to: [facility]

## 2019-02-22 ENCOUNTER — TELEPHONE (OUTPATIENT)
Dept: INTERNAL MEDICINE | Facility: CLINIC | Age: 79
End: 2019-02-22

## 2019-02-26 ENCOUNTER — TELEPHONE (OUTPATIENT)
Dept: INTERNAL MEDICINE | Facility: CLINIC | Age: 79
End: 2019-02-26

## 2019-02-26 NOTE — TELEPHONE ENCOUNTER
Fax received from [facility] 02/13/19 for review and signature.  Put in Dr. Huang's in basket.

## 2019-02-26 NOTE — TELEPHONE ENCOUNTER
Fax received from [facility] - Physician Orders 02/25/19 for review and signature.  Put in Dr. Sal ya in basket.

## 2019-03-01 ENCOUNTER — TELEPHONE (OUTPATIENT)
Dept: INTERNAL MEDICINE | Facility: CLINIC | Age: 79
End: 2019-03-01

## 2019-03-01 NOTE — TELEPHONE ENCOUNTER
Fax received from [facility] - Test Results for review and signature.  Put in Dr. Huang's in basket.

## 2019-03-05 ENCOUNTER — TELEPHONE (OUTPATIENT)
Dept: INTERNAL MEDICINE | Facility: CLINIC | Age: 79
End: 2019-03-05

## 2019-03-05 ENCOUNTER — MEDICAL CORRESPONDENCE (OUTPATIENT)
Dept: HEALTH INFORMATION MANAGEMENT | Facility: CLINIC | Age: 79
End: 2019-03-05

## 2019-03-06 ENCOUNTER — ANTICOAGULATION THERAPY VISIT (OUTPATIENT)
Dept: ANTICOAGULATION | Facility: CLINIC | Age: 79
End: 2019-03-06
Payer: MEDICARE

## 2019-03-06 ENCOUNTER — TELEPHONE (OUTPATIENT)
Dept: INTERNAL MEDICINE | Facility: CLINIC | Age: 79
End: 2019-03-06

## 2019-03-06 DIAGNOSIS — Z79.01 LONG TERM CURRENT USE OF ANTICOAGULANTS WITH INR GOAL OF 2.0-3.0: ICD-10-CM

## 2019-03-06 DIAGNOSIS — I48.91 ATRIAL FIBRILLATION AND FLUTTER (H): ICD-10-CM

## 2019-03-06 DIAGNOSIS — I48.92 ATRIAL FIBRILLATION AND FLUTTER (H): ICD-10-CM

## 2019-03-06 LAB — INR POINT OF CARE: 2.7 (ref 0.86–1.14)

## 2019-03-06 PROCEDURE — 99207 ZZC NO CHARGE NURSE ONLY: CPT

## 2019-03-06 PROCEDURE — 36416 COLLJ CAPILLARY BLOOD SPEC: CPT

## 2019-03-06 PROCEDURE — 85610 PROTHROMBIN TIME: CPT | Mod: QW

## 2019-03-06 NOTE — PROGRESS NOTES
ANTICOAGULATION FOLLOW-UP CLINIC VISIT    Patient Name:  Zunilda Alicea May  Date:  3/6/2019  Contact Type:  Face to Face    SUBJECTIVE:     Patient Findings     Positives:   Change in diet/appetite (Pt would like to cut back on Boost to be every other day.)    Comments:   Pt denies any changes or missed warfarin doses since last INR visit. Pt denies any bleeding or bruising problems.              OBJECTIVE    INR Protime   Date Value Ref Range Status   2019 2.7 (A) 0.86 - 1.14 Final       ASSESSMENT / PLAN  INR assessment THER    Recheck INR In: 2 WEEKS    INR Location Clinic      Anticoagulation Summary  As of 3/6/2019    INR goal:   2.0-3.0   TTR:   86.0 % (2.8 y)   INR used for dosin.7 (3/6/2019)   Warfarin maintenance plan:   2.5 mg (2.5 mg x 1) every Sun, Fri; 5 mg (2.5 mg x 2) all other days   Full warfarin instructions:   3/6: 2.5 mg; 3/13: 2.5 mg; Otherwise 2.5 mg every Sun, Fri; 5 mg all other days   Weekly warfarin total:   30 mg   Plan last modified:   Sonam Teixeira RN (2019)   Next INR check:   3/20/2019   Priority:   INR   Target end date:       Indications    Long term current use of anticoagulants with INR goal of 2.0-3.0 [Z79.01]  Atrial fibrillation and flutter (H) [I48.91  I48.92]             Anticoagulation Episode Summary     INR check location:       Preferred lab:       Send INR reminders to:   [facility]    Comments:   likes printed AVS      Anticoagulation Care Providers     Provider Role Specialty Phone number    Yunior Huang MD [facility] Internal Medicine [phone]            See the Encounter Report to view Anticoagulation Flowsheet and Dosing Calendar (Go to Encounters tab in chart review, and find the Anticoagulation Therapy Visit)    Dosage adjustment made based on physician directed care plan.    Екатерина Mahan RN

## 2019-03-06 NOTE — TELEPHONE ENCOUNTER
Fax received from [facility] - Test Results 02/13/19 for review and signature.  Put in Dr. Huang's in basket.

## 2019-03-07 ENCOUNTER — TRANSFERRED RECORDS (OUTPATIENT)
Dept: HEALTH INFORMATION MANAGEMENT | Facility: CLINIC | Age: 79
End: 2019-03-07

## 2019-03-13 ENCOUNTER — TELEPHONE (OUTPATIENT)
Dept: INTERNAL MEDICINE | Facility: CLINIC | Age: 79
End: 2019-03-13

## 2019-03-13 NOTE — TELEPHONE ENCOUNTER
Fax received from [facility] - Additional Discipline Recertification Order for review and signature.  Put in Dr. Huang's in basket.

## 2019-03-14 ENCOUNTER — TELEPHONE (OUTPATIENT)
Dept: INTERNAL MEDICINE | Facility: CLINIC | Age: 79
End: 2019-03-14

## 2019-03-14 NOTE — TELEPHONE ENCOUNTER
Fax received from [facility] - Discharge Summary 02/22/19 for review and signature.  Put in Dr. Huang's in basket.

## 2019-03-15 ENCOUNTER — MEDICAL CORRESPONDENCE (OUTPATIENT)
Dept: HEALTH INFORMATION MANAGEMENT | Facility: CLINIC | Age: 79
End: 2019-03-15

## 2019-03-20 ENCOUNTER — ANTICOAGULATION THERAPY VISIT (OUTPATIENT)
Dept: ANTICOAGULATION | Facility: CLINIC | Age: 79
End: 2019-03-20
Payer: MEDICARE

## 2019-03-20 DIAGNOSIS — Z79.01 LONG TERM CURRENT USE OF ANTICOAGULANTS WITH INR GOAL OF 2.0-3.0: ICD-10-CM

## 2019-03-20 DIAGNOSIS — I48.91 ATRIAL FIBRILLATION AND FLUTTER (H): ICD-10-CM

## 2019-03-20 DIAGNOSIS — I48.92 ATRIAL FIBRILLATION AND FLUTTER (H): ICD-10-CM

## 2019-03-20 LAB — INR POINT OF CARE: 1.9 (ref 0.86–1.14)

## 2019-03-20 PROCEDURE — 36416 COLLJ CAPILLARY BLOOD SPEC: CPT

## 2019-03-20 PROCEDURE — 99207 ZZC NO CHARGE NURSE ONLY: CPT

## 2019-03-20 PROCEDURE — 85610 PROTHROMBIN TIME: CPT | Mod: QW

## 2019-03-20 NOTE — PROGRESS NOTES
ANTICOAGULATION FOLLOW-UP CLINIC VISIT    Patient Name:  Zunilda Alicea May  Date:  3/20/2019  Contact Type:  Face to Face    SUBJECTIVE:     Patient Findings     Positives:   Missed doses (Missed dose on Thursday last week.), Change in diet/appetite (Boost on Monday, Wed, Fri not daily as previously taking.)           OBJECTIVE    INR Protime   Date Value Ref Range Status   2019 1.9 (A) 0.86 - 1.14 Final       ASSESSMENT / PLAN  INR assessment THER    Recheck INR In: 2 WEEKS    INR Location Clinic      Anticoagulation Summary  As of 3/20/2019    INR goal:   2.0-3.0   TTR:   86.1 % (2.8 y)   INR used for dosin.9! (3/20/2019)   Warfarin maintenance plan:   2.5 mg (2.5 mg x 1) every Sun, Wed, Fri; 5 mg (2.5 mg x 2) all other days   Full warfarin instructions:   2.5 mg every Sun, Wed, Fri; 5 mg all other days   Weekly warfarin total:   27.5 mg   Plan last modified:   Sonam Teixeira RN (3/20/2019)   Next INR check:   2019   Priority:   INR   Target end date:       Indications    Long term current use of anticoagulants with INR goal of 2.0-3.0 [Z79.01]  Atrial fibrillation and flutter (H) [I48.91  I48.92]             Anticoagulation Episode Summary     INR check location:       Preferred lab:       Send INR reminders to:   [facility]    Comments:   likes printed AVS      Anticoagulation Care Providers     Provider Role Specialty Phone number    Yunior Huang MD Responsible Internal Medicine [phone]            See the Encounter Report to view Anticoagulation Flowsheet and Dosing Calendar (Go to Encounters tab in chart review, and find the Anticoagulation Therapy Visit)    Dosage adjustment made based on physician directed care plan.    Sonam Teixeira RN

## 2019-03-21 ENCOUNTER — TELEPHONE (OUTPATIENT)
Dept: INTERNAL MEDICINE | Facility: CLINIC | Age: 79
End: 2019-03-21

## 2019-03-28 ENCOUNTER — TRANSFERRED RECORDS (OUTPATIENT)
Dept: HEALTH INFORMATION MANAGEMENT | Facility: CLINIC | Age: 79
End: 2019-03-28

## 2019-04-02 ENCOUNTER — ANTICOAGULATION THERAPY VISIT (OUTPATIENT)
Dept: ANTICOAGULATION | Facility: CLINIC | Age: 79
End: 2019-04-02
Payer: MEDICARE

## 2019-04-02 DIAGNOSIS — Z79.01 LONG TERM CURRENT USE OF ANTICOAGULANTS WITH INR GOAL OF 2.0-3.0: ICD-10-CM

## 2019-04-02 DIAGNOSIS — I48.92 ATRIAL FIBRILLATION AND FLUTTER (H): ICD-10-CM

## 2019-04-02 DIAGNOSIS — I48.91 ATRIAL FIBRILLATION AND FLUTTER (H): ICD-10-CM

## 2019-04-02 LAB — INR POINT OF CARE: 2.4 (ref 0.86–1.14)

## 2019-04-02 PROCEDURE — 99207 ZZC NO CHARGE NURSE ONLY: CPT

## 2019-04-02 PROCEDURE — 36416 COLLJ CAPILLARY BLOOD SPEC: CPT

## 2019-04-02 PROCEDURE — 85610 PROTHROMBIN TIME: CPT | Mod: QW

## 2019-04-02 NOTE — PROGRESS NOTES
ANTICOAGULATION FOLLOW-UP CLINIC VISIT    Patient Name:  Zunilda Alicea May  Date:  2019  Contact Type:  Face to Face    SUBJECTIVE:     Patient Findings     Comments:   The patient was assessed for diet, medication, and activity level changes, missed or extra doses, bruising or bleeding, with no problem findings.             OBJECTIVE    INR Protime   Date Value Ref Range Status   2019 2.4 (A) 0.86 - 1.14 Final       ASSESSMENT / PLAN  INR assessment THER    Recheck INR In: 4 WEEKS    INR Location Clinic      Anticoagulation Summary  As of 2019    INR goal:   2.0-3.0   TTR:   86.0 % (2.9 y)   INR used for dosin.4 (2019)   Warfarin maintenance plan:   2.5 mg (2.5 mg x 1) every Sun, Wed, Fri; 5 mg (2.5 mg x 2) all other days   Full warfarin instructions:   2.5 mg every Sun, Wed, Fri; 5 mg all other days   Weekly warfarin total:   27.5 mg   Plan last modified:   Sonam Teixeira RN (3/20/2019)   Next INR check:   2019   Priority:   INR   Target end date:       Indications    Long term current use of anticoagulants with INR goal of 2.0-3.0 [Z79.01]  Atrial fibrillation and flutter (H) [I48.91  I48.92]             Anticoagulation Episode Summary     INR check location:       Preferred lab:       Send INR reminders to:   [facility]    Comments:   likes printed AVS      Anticoagulation Care Providers     Provider Role Specialty Phone number    Yunior Huang MD [facility] Internal Medicine [phone]            See the Encounter Report to view Anticoagulation Flowsheet and Dosing Calendar (Go to Encounters tab in chart review, and find the Anticoagulation Therapy Visit)    Dosage adjustment made based on physician directed care plan.    Екатерина Mahan RN

## 2019-04-10 ENCOUNTER — OFFICE VISIT (OUTPATIENT)
Dept: INTERNAL MEDICINE | Facility: CLINIC | Age: 79
End: 2019-04-10
Payer: MEDICARE

## 2019-04-10 VITALS
WEIGHT: 157 LBS | RESPIRATION RATE: 12 BRPM | HEIGHT: 64 IN | SYSTOLIC BLOOD PRESSURE: 140 MMHG | HEART RATE: 87 BPM | BODY MASS INDEX: 26.8 KG/M2 | DIASTOLIC BLOOD PRESSURE: 62 MMHG | OXYGEN SATURATION: 93 % | TEMPERATURE: 98.1 F

## 2019-04-10 DIAGNOSIS — I10 BENIGN ESSENTIAL HYPERTENSION: ICD-10-CM

## 2019-04-10 DIAGNOSIS — D64.9 ANEMIA, UNSPECIFIED TYPE: Primary | ICD-10-CM

## 2019-04-10 DIAGNOSIS — Z96.652 STATUS POST TOTAL LEFT KNEE REPLACEMENT: ICD-10-CM

## 2019-04-10 DIAGNOSIS — M06.9 RHEUMATOID ARTHRITIS, INVOLVING UNSPECIFIED SITE, UNSPECIFIED RHEUMATOID FACTOR PRESENCE: ICD-10-CM

## 2019-04-10 DIAGNOSIS — E03.9 ACQUIRED HYPOTHYROIDISM: ICD-10-CM

## 2019-04-10 LAB
ERYTHROCYTE [DISTWIDTH] IN BLOOD BY AUTOMATED COUNT: 21.5 % (ref 10–15)
HCT VFR BLD AUTO: 37.8 % (ref 35–47)
HGB BLD-MCNC: 11.9 G/DL (ref 11.7–15.7)
MCH RBC QN AUTO: 29.4 PG (ref 26.5–33)
MCHC RBC AUTO-ENTMCNC: 31.5 G/DL (ref 31.5–36.5)
MCV RBC AUTO: 93 FL (ref 78–100)
PLATELET # BLD AUTO: 330 10E9/L (ref 150–450)
RBC # BLD AUTO: 4.05 10E12/L (ref 3.8–5.2)
WBC # BLD AUTO: 6.1 10E9/L (ref 4–11)

## 2019-04-10 PROCEDURE — 99214 OFFICE O/P EST MOD 30 MIN: CPT | Performed by: INTERNAL MEDICINE

## 2019-04-10 PROCEDURE — 85027 COMPLETE CBC AUTOMATED: CPT | Performed by: INTERNAL MEDICINE

## 2019-04-10 PROCEDURE — 36415 COLL VENOUS BLD VENIPUNCTURE: CPT | Performed by: INTERNAL MEDICINE

## 2019-04-10 PROCEDURE — 84443 ASSAY THYROID STIM HORMONE: CPT | Performed by: INTERNAL MEDICINE

## 2019-04-10 PROCEDURE — 80061 LIPID PANEL: CPT | Performed by: INTERNAL MEDICINE

## 2019-04-10 ASSESSMENT — MIFFLIN-ST. JEOR: SCORE: 1182.15

## 2019-04-10 NOTE — NURSING NOTE
"Vital signs:  Temp: 98.1  F (36.7  C) Temp src: Oral BP: 140/62 Pulse: 87   Resp: 12 SpO2: 93 %     Height: 162.6 cm (5' 4\") Weight: 71.2 kg (157 lb)  Estimated body mass index is 26.95 kg/m  as calculated from the following:    Height as of this encounter: 1.626 m (5' 4\").    Weight as of this encounter: 71.2 kg (157 lb).          "

## 2019-04-10 NOTE — LETTER
April 12, 2019      Zunilda Alicea May  [address]   [facility] [address]        Dear ,    We are writing to inform you of your test results.    Slightly elevated LDL cholesterol. Try to improve diet and exercise.   Rest of the results are normal.    Resulted Orders   Lipid panel reflex to direct LDL Fasting   Result Value Ref Range    Cholesterol 200 (H) <200 mg/dL      Comment:      Desirable:       <200 mg/dl    Triglycerides 94 <150 mg/dL      Comment:      Fasting specimen    HDL Cholesterol 39 (L) >49 mg/dL    LDL Cholesterol Calculated 142 (H) <100 mg/dL      Comment:      Above desirable:  100-129 mg/dl  Borderline High:  130-159 mg/dL  High:             160-189 mg/dL  Very high:       >189 mg/dl      Non HDL Cholesterol 161 (H) <130 mg/dL      Comment:      Above Desirable:  130-159 mg/dl  Borderline high:  160-189 mg/dl  High:             190-219 mg/dl  Very high:       >219 mg/dl     TSH with free T4 reflex   Result Value Ref Range    TSH 1.68 0.40 - 4.00 mU/L   CBC with platelets   Result Value Ref Range    WBC 6.1 4.0 - 11.0 10e9/L    RBC Count 4.05 3.8 - 5.2 10e12/L    Hemoglobin 11.9 11.7 - 15.7 g/dL    Hematocrit 37.8 35.0 - 47.0 %    MCV 93 78 - 100 fl    MCH 29.4 26.5 - 33.0 pg    MCHC 31.5 31.5 - 36.5 g/dL    RDW 21.5 (H) 10.0 - 15.0 %    Platelet Count 330 150 - 450 10e9/L       If you have any questions or concerns, please call the clinic at the number listed above.       Sincerely,        Yunior Huang MD

## 2019-04-10 NOTE — PROGRESS NOTES
SUBJECTIVE:   Zunilda Clark is a 78 year old female who presents to clinic today for the following   health issues:    Patient is seen for a follow up visit.  No acute complaints, no medication change or new medical conditions.    Hypertension Follow-up  Has h/o HTN. on medical treatment. BP has been controlled. No side effects from medications. No CP, HA, dizziness. good compliance with medications and low salt diet.      Outpatient blood pressures are not being checked.    Low Salt Diet: no added salt      Amount of exercise or physical activity: 6 days a week    Problems taking medications regularly: No    Medication side effects: none    Diet: regular, low sodium    Has history of atrial fibrillation. On anticoagulation with Coumadin and rate control medications. Asymptonatic - no chest pains , palpitations,  no side effects from medications.    Patient with RA. On treatment with Methotrexate, controlled symptoms.     Has had left TKA, had post surgical anemia. Improved . Needs recheck.       Preventive measures reviewed.     Additional history: as documented    Reviewed  and updated as needed this visit by clinical staff  Tobacco  Allergies  Meds         Reviewed and updated as needed this visit by Provider         Patient Active Problem List   Diagnosis     CHF (congestive heart failure) (H)     RA (rheumatoid arthritis) (H)     Benign essential hypertension     Mitral valve disorder     Pulmonary HTN (H)     Atrial fibrillation and flutter (H)     Cardiac pacemaker in situ     Humerus fracture     Fracture, humerus closed, shaft     Anticoagulation management encounter     Acquired hypothyroidism     Essential hypertension     Long term current use of anticoagulants with INR goal of 2.0-3.0     Acute cholecystitis     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     Valvular heart disease     Paroxysmal atrial fibrillation (H)     S/P mitral valve replacement with bioprosthetic valve      Pulmonary hypertension (H)     S/P total knee arthroplasty     Wound dehiscence     Past Surgical History:   Procedure Laterality Date     ABDOMEN SURGERY      prolapsed bladder     APPLY WOUND VAC Left 2018    Procedure: Wound vac application;  Surgeon: Pardeep Sheikh MD;  Location: [facility] OR     ARTHROPLASTY KNEE Left 2018    Procedure: Left total knee arthroplasty using a [employer] Melissa II knee system;  Surgeon: Pardeep Sheikh MD;  Location: [facility]     ARTHROSCOPY KNEE WITH DEBRIDEMENT JOINT, COMBINED Left 2018    Procedure: Left knee debridement and placement of wound vac;  Surgeon: Pardeep Sheikh MD;  Location: [facility] OR     COLONOSCOPY  3/15/2012     EYE SURGERY  2018    Both eyes/cataract surgery     H ABLATION AV NODE      AFlutter with syncope, PPM to follow     HERNIA REPAIR      3 hernies/right and left & umbilical     IMPLANT PACEMAKER       LAPAROSCOPIC CHOLECYSTECTOMY WITH CHOLANGIOGRAMS N/A 2017    Procedure: LAPAROSCOPIC CHOLECYSTECTOMY WITH CHOLANGIOGRAMS;  LAPAROSCOPIC CHOLECYSTECTOMY WITH CHOLANGIOGRAMS ;  Surgeon: Angeles Mcgill MD;  Location: [facility] OR     OPEN REDUCTION INTERNAL FIXATION RODDING INTRAMEDULLARY HUMERUS Right 2015    Procedure: OPEN REDUCTION INTERNAL FIXATION RODDING INTRAMEDULLARY HUMERUS;  Surgeon: Raymundo Rivera MD;  Location: [facility] OR     REPLACE VALVE MITRAL  2006    Mitral valve replacement with bioprosthetic valve in  for rheumatic disease     SLING BLADDER SUSPENSION WITH FASCIA UMESH       VASCULAR SURGERY      Blood clots in both legs       Social History     Tobacco Use     Smoking status: Never Smoker     Smokeless tobacco: Never Used   Substance Use Topics     Alcohol use: No     Alcohol/week: 0.0 oz     Family History   Problem Relation Age of Onset     Coronary Artery Disease Mother      Coronary Artery Disease Brother      Diabetes Brother      Cancer Brother          at age 52   "    Other Cancer Brother      Hypertension Sister      Hyperlipidemia Sister          Current Outpatient Medications   Medication Sig Dispense Refill     ALLOPURINOL PO Take 100 mg by mouth 2 times daily       Amlodipine Besylate-Valsartan 5-160 MG TABS Take 1 tablet by mouth daily 90 tablet 3     calcium citrate (CALCITRATE) 950 MG tablet Take 1 tablet by mouth daily        folic acid (FOLVITE) 1 MG tablet Take 1 mg by mouth daily       latanoprostene bunod (VYZULTA) 0.024 % SOLN ophthalmic solution Place 1 drop Into the left eye At Bedtime       levothyroxine (SYNTHROID/LEVOTHROID) 112 MCG tablet TAKE 1 TABLET (112 MCG) BY MOUTH DAILY 90 tablet 1     METHOTREXATE SODIUM IJ Inject 0.8 mLs as directed once a week Thursdays       metoprolol tartrate (LOPRESSOR) 50 MG tablet Take 1 tablet (50 mg) by mouth 2 times daily 200 tablet 3     multivitamin, therapeutic with minerals (MULTI-VITAMIN) TABS tablet Take 1 tablet by mouth daily Without iron       omeprazole (PRILOSEC) 20 MG CR capsule Take 1 capsule (20 mg) by mouth 2 times daily 180 capsule 1     torsemide (DEMADEX) 10 MG tablet Take 1 tablet (10 mg) by mouth daily (with breakfast) 90 tablet 0     VITAMIN D, CHOLECALCIFEROL, PO Take 1,000 Units by mouth daily       warfarin (COUMADIN) 2.5 MG tablet Take one tablet (2.5 mg) Sun, Tu, Th, Fri and take two tablets (5 mg) Mon, Wed, Sat or as directed by INR Clinic 45 tablet 1     WARFARIN SODIUM PO Take 5 mg by mouth MON, WED and SAT       ibandronate (BONIVA) 3 MG/3ML Inject 3 mg into the vein every 3 months          ROS:  Constitutional, HEENT, cardiovascular, pulmonary, GI, , musculoskeletal, neuro, skin, endocrine and psych systems are negative, except as otherwise noted.    OBJECTIVE:     /62   Pulse 87   Temp 98.1  F (36.7  C) (Oral)   Resp 12   Ht 1.626 m (5' 4\")   Wt 71.2 kg (157 lb)   SpO2 93%   BMI 26.95 kg/m    Body mass index is 26.95 kg/m .   GENERAL: healthy, alert and no distress  NECK: no " adenopathy, no asymmetry, masses, or scars and thyroid normal to palpation  RESP: lungs clear to auscultation - no rales, rhonchi or wheezes  CV: regular rate and rhythm, normal S1 S2, no S3 or S4, no murmur, click or rub, no peripheral edema and peripheral pulses strong  ABDOMEN: soft, nontender, no hepatosplenomegaly, no masses and bowel sounds normal  MS: no gross musculoskeletal defects noted, no edema, arthritis changes in the hands, knees. Left knee TKA     Diagnostic Test Results:  none     ASSESSMENT/PLAN:     Problem List Items Addressed This Visit     RA (rheumatoid arthritis) (H)    Benign essential hypertension    Relevant Orders    Lipid panel reflex to direct LDL Fasting    Acquired hypothyroidism    Relevant Orders    TSH with free T4 reflex    S/P total knee arthroplasty      Other Visit Diagnoses     Anemia, unspecified type    -  Primary    Relevant Orders    CBC with platelets           Assess lab work   Cont treatment       Follow-Up:yearly     Yunior Huang MD  [facility]

## 2019-04-11 LAB
CHOLEST SERPL-MCNC: 200 MG/DL
HDLC SERPL-MCNC: 39 MG/DL
LDLC SERPL CALC-MCNC: 142 MG/DL
NONHDLC SERPL-MCNC: 161 MG/DL
TRIGL SERPL-MCNC: 94 MG/DL
TSH SERPL DL<=0.005 MIU/L-ACNC: 1.68 MU/L (ref 0.4–4)

## 2019-04-22 DIAGNOSIS — Z79.01 LONG TERM CURRENT USE OF ANTICOAGULANTS WITH INR GOAL OF 2.0-3.0: ICD-10-CM

## 2019-04-22 DIAGNOSIS — I48.91 ATRIAL FIBRILLATION AND FLUTTER (H): ICD-10-CM

## 2019-04-22 DIAGNOSIS — I48.92 ATRIAL FIBRILLATION AND FLUTTER (H): ICD-10-CM

## 2019-04-22 DIAGNOSIS — E03.9 ACQUIRED HYPOTHYROIDISM: ICD-10-CM

## 2019-04-24 RX ORDER — LEVOTHYROXINE SODIUM 112 UG/1
TABLET ORAL
Qty: 90 TABLET | Refills: 3 | Status: SHIPPED | OUTPATIENT
Start: 2019-04-24 | End: 2020-06-02

## 2019-04-24 RX ORDER — WARFARIN SODIUM 2.5 MG/1
TABLET ORAL
Qty: 45 TABLET | Refills: 5 | Status: SHIPPED | OUTPATIENT
Start: 2019-04-24 | End: 2019-05-23

## 2019-04-24 NOTE — TELEPHONE ENCOUNTER
"Requested Prescriptions   Pending Prescriptions Disp Refills     levothyroxine (SYNTHROID/LEVOTHROID) 112 MCG tablet [Pharmacy Med Name:  Last Written Prescription Date:  11/5/2018  Last Fill Quantity: 90,  # refills: 1   Last office visit: 4/10/2019 with prescribing provider:     Future Office Visit:   Levothyroxine Sodium Oral Tablet 112 MCG] 90 tablet 0     Sig: TAKE ONE TABLET BY MOUTH ONE TIME DAILY       Thyroid Protocol Passed - 4/22/2019 12:46 PM        Passed - Patient is 12 years or older        Passed - Recent (12 mo) or future (30 days) visit within the authorizing provider's specialty     Patient had office visit in the last 12 months or has a visit in the next 30 days with authorizing provider or within the authorizing provider's specialty.  See \"Patient Info\" tab in inbasket, or \"Choose Columns\" in Meds & Orders section of the refill encounter.              Passed - Medication is active on med list        Passed - Normal TSH on file in past 12 months     Recent Labs   Lab Test 04/10/19  1054   TSH 1.68              Passed - No active pregnancy on record     If patient is pregnant or has had a positive pregnancy test, please check TSH.          Passed - No positive pregnancy test in past 12 months     If patient is pregnant or has had a positive pregnancy test, please check TSH.          warfarin (JANTOVEN) 2.5 MG tablet [Pharmacy Med Name: Jantoven Oral Tablet  Last Written Prescription Date:  1/30/2019  Last Fill Quantity: 45,  # refills: 1   Last office visit: 4/10/2019 with prescribing provider:     Future Office Visit:   2.5 MG] 45 tablet 0     Sig: TAKE ONE TABLET BY MOUTH ON SUN,TUE,THURS, FRI AND TAKE 2 TABLETS ON MON,WED,SAT, OR AS DIRECTED BY INR CLINIC       Vitamin K Antagonists Failed - 4/22/2019 12:46 PM        Failed - INR is within goal in the past 6 weeks     Confirm INR is within goal in the past 6 weeks.     Recent Labs   Lab Test 04/02/19   INR 2.4*                       Passed - " "Recent (12 mo) or future (30 days) visit within the authorizing provider's specialty     Patient had office visit in the last 12 months or has a visit in the next 30 days with authorizing provider or within the authorizing provider's specialty.  See \"Patient Info\" tab in inbasket, or \"Choose Columns\" in Meds & Orders section of the refill encounter.              Passed - Medication is active on med list        Passed - Patient is 18 years of age or older        Passed - Patient is not pregnant        Passed - No positive pregnancy on file in past 12 months        "

## 2019-04-24 NOTE — TELEPHONE ENCOUNTER
Prescription approved per [facility] Refill Protocol.  Sonam Teixeira RN

## 2019-04-30 ENCOUNTER — ANTICOAGULATION THERAPY VISIT (OUTPATIENT)
Dept: ANTICOAGULATION | Facility: CLINIC | Age: 79
End: 2019-04-30
Payer: MEDICARE

## 2019-04-30 DIAGNOSIS — I48.91 ATRIAL FIBRILLATION AND FLUTTER (H): ICD-10-CM

## 2019-04-30 DIAGNOSIS — I48.92 ATRIAL FIBRILLATION AND FLUTTER (H): ICD-10-CM

## 2019-04-30 DIAGNOSIS — Z79.01 LONG TERM CURRENT USE OF ANTICOAGULANTS WITH INR GOAL OF 2.0-3.0: ICD-10-CM

## 2019-04-30 LAB — INR POINT OF CARE: 2.4 (ref 0.86–1.14)

## 2019-04-30 PROCEDURE — 36416 COLLJ CAPILLARY BLOOD SPEC: CPT

## 2019-04-30 PROCEDURE — 99207 ZZC NO CHARGE NURSE ONLY: CPT

## 2019-04-30 PROCEDURE — 85610 PROTHROMBIN TIME: CPT | Mod: QW

## 2019-04-30 NOTE — PROGRESS NOTES
ANTICOAGULATION FOLLOW-UP CLINIC VISIT    Patient Name:  Zunilda Alicea May  Date:  2019  Contact Type:  Face to Face    SUBJECTIVE:     Patient Findings     Positives:   Change in diet/appetite (Patient reports she stopped drinking the supplemental shake 3 times a week since last INR visit on 19. )    Comments:   The patient was assessed for medication and activity level changes, missed or extra doses, bruising or bleeding, with no problem findings.             OBJECTIVE    INR Protime   Date Value Ref Range Status   2019 2.4 (A) 0.86 - 1.14 Final       ASSESSMENT / PLAN  INR assessment THER    Recheck INR In: 4 WEEKS    INR Location Clinic      Anticoagulation Summary  As of 2019    INR goal:   2.0-3.0   TTR:   86.4 % (2.9 y)   INR used for dosin.4 (2019)   Warfarin maintenance plan:   2.5 mg (2.5 mg x 1) every Sun, Wed, Fri; 5 mg (2.5 mg x 2) all other days   Full warfarin instructions:   2.5 mg every Sun, Wed, Fri; 5 mg all other days   Weekly warfarin total:   27.5 mg   No change documented:   Екатерина Mahan RN   Plan last modified:   Sonam Teixeira RN (3/20/2019)   Next INR check:   2019   Priority:   INR   Target end date:       Indications    Long term current use of anticoagulants with INR goal of 2.0-3.0 [Z79.01]  Atrial fibrillation and flutter (H) [I48.91  I48.92]             Anticoagulation Episode Summary     INR check location:       Preferred lab:       Send INR reminders to:   [facility]    Comments:   likes printed AVS      Anticoagulation Care Providers     Provider Role Specialty Phone number    Yunior Huang MD [facility] Internal Medicine [phone]            See the Encounter Report to view Anticoagulation Flowsheet and Dosing Calendar (Go to Encounters tab in chart review, and find the Anticoagulation Therapy Visit)    Dosage adjustment made based on physician directed care plan.    Екатерина Mahan, RN

## 2019-05-08 DIAGNOSIS — I10 BENIGN ESSENTIAL HYPERTENSION: ICD-10-CM

## 2019-05-08 NOTE — TELEPHONE ENCOUNTER
"Requested Prescriptions   Pending Prescriptions Disp Refills     amLODIPine-valsartan (EXFORGE) 5-160 MG tablet [Pharmacy Med Name: amLODIPine Besylate-Valsartan Oral Tablet 5-160 MG] 90 tablet 2     Sig: TAKE ONE TABLET BY MOUTH ONE TIME DAILY   Last Written Prescription Date:  08/17/2018  Last Fill Quantity: 90,  # refills: 3   Last office visit: 4/10/2019 with prescribing provider:     Future Office Visit:   Next 5 appointments (look out 90 days)    Jul 18, 2019 10:15 AM CDT  Return Visit with Timothy Das MD  [facility] ([facility]) [address]  [facility] [address] [phone]           Angiotensin-II Receptors Failed - 5/8/2019  2:51 PM        Failed - Blood pressure under 140/90 in past 12 months     BP Readings from Last 3 Encounters:   04/10/19 140/62   12/20/18 138/58   11/28/18 130/60                 Passed - Recent (12 mo) or future (30 days) visit within the authorizing provider's specialty     Patient had office visit in the last 12 months or has a visit in the next 30 days with authorizing provider or within the authorizing provider's specialty.  See \"Patient Info\" tab in inbasket, or \"Choose Columns\" in Meds & Orders section of the refill encounter.              Passed - Medication is active on med list        Passed - Patient is age 18 or older        Passed - No active pregnancy on record        Passed - Normal serum creatinine on file in past 12 months     Recent Labs   Lab Test 12/18/18 2052 07/30/18  0933   CR 0.80   < >  --    CREAT  --   --  1.1*    < > = values in this interval not displayed.             Passed - Normal serum potassium on file in past 12 months     Recent Labs   Lab Test 11/28/18  1443   POTASSIUM 3.8                    Passed - No positive pregnancy test in past 12 months        "

## 2019-05-09 RX ORDER — AMLODIPINE AND VALSARTAN 5; 160 MG/1; MG/1
TABLET ORAL
Qty: 90 TABLET | Refills: 2 | OUTPATIENT
Start: 2019-05-09

## 2019-05-16 ENCOUNTER — ANCILLARY PROCEDURE (OUTPATIENT)
Dept: CARDIOLOGY | Facility: CLINIC | Age: 79
End: 2019-05-16
Attending: INTERNAL MEDICINE
Payer: MEDICARE

## 2019-05-16 DIAGNOSIS — Z95.0 PACEMAKER: ICD-10-CM

## 2019-05-16 PROCEDURE — 93296 REM INTERROG EVL PM/IDS: CPT | Performed by: INTERNAL MEDICINE

## 2019-05-16 PROCEDURE — 93294 REM INTERROG EVL PM/LDLS PM: CPT | Performed by: INTERNAL MEDICINE

## 2019-05-17 LAB
MDC_IDC_EPISODE_DTM: NORMAL
MDC_IDC_EPISODE_DURATION: 2157 S
MDC_IDC_EPISODE_DURATION: 264 S
MDC_IDC_EPISODE_DURATION: 334 S
MDC_IDC_EPISODE_ID: 223
MDC_IDC_EPISODE_ID: 224
MDC_IDC_EPISODE_ID: 225
MDC_IDC_EPISODE_TYPE: NORMAL
MDC_IDC_LEAD_IMPLANT_DT: NORMAL
MDC_IDC_LEAD_IMPLANT_DT: NORMAL
MDC_IDC_LEAD_LOCATION: NORMAL
MDC_IDC_LEAD_LOCATION: NORMAL
MDC_IDC_LEAD_MFG: NORMAL
MDC_IDC_LEAD_MFG: NORMAL
MDC_IDC_LEAD_MODEL: NORMAL
MDC_IDC_LEAD_MODEL: NORMAL
MDC_IDC_LEAD_POLARITY_TYPE: NORMAL
MDC_IDC_LEAD_POLARITY_TYPE: NORMAL
MDC_IDC_LEAD_SERIAL: NORMAL
MDC_IDC_LEAD_SERIAL: NORMAL
MDC_IDC_MSMT_BATTERY_DTM: NORMAL
MDC_IDC_MSMT_BATTERY_STATUS: NORMAL
MDC_IDC_MSMT_BATTERY_VOLTAGE: 2.92 V
MDC_IDC_MSMT_LEADCHNL_RA_IMPEDANCE_VALUE: 480 OHM
MDC_IDC_MSMT_LEADCHNL_RA_SENSING_INTR_AMPL: 0.81 MV
MDC_IDC_MSMT_LEADCHNL_RV_IMPEDANCE_VALUE: 408 OHM
MDC_IDC_MSMT_LEADCHNL_RV_SENSING_INTR_AMPL: 11.6 MV
MDC_IDC_PG_IMPLANT_DTM: NORMAL
MDC_IDC_PG_MFG: NORMAL
MDC_IDC_PG_MODEL: NORMAL
MDC_IDC_PG_SERIAL: NORMAL
MDC_IDC_PG_TYPE: NORMAL
MDC_IDC_SESS_CLINIC_NAME: NORMAL
MDC_IDC_SESS_DTM: NORMAL
MDC_IDC_SESS_TYPE: NORMAL
MDC_IDC_SET_BRADY_AT_MODE_SWITCH_RATE: 171 {BEATS}/MIN
MDC_IDC_SET_BRADY_HYSTRATE: NORMAL
MDC_IDC_SET_BRADY_LOWRATE: 60 {BEATS}/MIN
MDC_IDC_SET_BRADY_MAX_SENSOR_RATE: 130 {BEATS}/MIN
MDC_IDC_SET_BRADY_MAX_TRACKING_RATE: 130 {BEATS}/MIN
MDC_IDC_SET_BRADY_MODE: NORMAL
MDC_IDC_SET_BRADY_PAV_DELAY_LOW: 180 MS
MDC_IDC_SET_BRADY_SAV_DELAY_LOW: 150 MS
MDC_IDC_SET_LEADCHNL_RA_PACING_AMPLITUDE: 2 V
MDC_IDC_SET_LEADCHNL_RA_PACING_ANODE_ELECTRODE_1: NORMAL
MDC_IDC_SET_LEADCHNL_RA_PACING_ANODE_LOCATION_1: NORMAL
MDC_IDC_SET_LEADCHNL_RA_PACING_CATHODE_ELECTRODE_1: NORMAL
MDC_IDC_SET_LEADCHNL_RA_PACING_CATHODE_LOCATION_1: NORMAL
MDC_IDC_SET_LEADCHNL_RA_PACING_POLARITY: NORMAL
MDC_IDC_SET_LEADCHNL_RA_PACING_PULSEWIDTH: 0.4 MS
MDC_IDC_SET_LEADCHNL_RA_SENSING_ANODE_ELECTRODE_1: NORMAL
MDC_IDC_SET_LEADCHNL_RA_SENSING_ANODE_LOCATION_1: NORMAL
MDC_IDC_SET_LEADCHNL_RA_SENSING_CATHODE_ELECTRODE_1: NORMAL
MDC_IDC_SET_LEADCHNL_RA_SENSING_CATHODE_LOCATION_1: NORMAL
MDC_IDC_SET_LEADCHNL_RA_SENSING_POLARITY: NORMAL
MDC_IDC_SET_LEADCHNL_RA_SENSING_SENSITIVITY: 0.45 MV
MDC_IDC_SET_LEADCHNL_RV_PACING_AMPLITUDE: 2 V
MDC_IDC_SET_LEADCHNL_RV_PACING_ANODE_ELECTRODE_1: NORMAL
MDC_IDC_SET_LEADCHNL_RV_PACING_ANODE_LOCATION_1: NORMAL
MDC_IDC_SET_LEADCHNL_RV_PACING_CATHODE_ELECTRODE_1: NORMAL
MDC_IDC_SET_LEADCHNL_RV_PACING_CATHODE_LOCATION_1: NORMAL
MDC_IDC_SET_LEADCHNL_RV_PACING_POLARITY: NORMAL
MDC_IDC_SET_LEADCHNL_RV_PACING_PULSEWIDTH: 0.4 MS
MDC_IDC_SET_LEADCHNL_RV_SENSING_ANODE_ELECTRODE_1: NORMAL
MDC_IDC_SET_LEADCHNL_RV_SENSING_ANODE_LOCATION_1: NORMAL
MDC_IDC_SET_LEADCHNL_RV_SENSING_CATHODE_ELECTRODE_1: NORMAL
MDC_IDC_SET_LEADCHNL_RV_SENSING_CATHODE_LOCATION_1: NORMAL
MDC_IDC_SET_LEADCHNL_RV_SENSING_POLARITY: NORMAL
MDC_IDC_SET_LEADCHNL_RV_SENSING_SENSITIVITY: 2.1 MV
MDC_IDC_SET_ZONE_DETECTION_INTERVAL: 350 MS
MDC_IDC_SET_ZONE_DETECTION_INTERVAL: 400 MS
MDC_IDC_SET_ZONE_TYPE: NORMAL
MDC_IDC_STAT_AT_BURDEN_PERCENT: 0 %
MDC_IDC_STAT_AT_DTM_END: NORMAL
MDC_IDC_STAT_AT_DTM_START: NORMAL
MDC_IDC_STAT_BRADY_AP_VP_PERCENT: 4.29 %
MDC_IDC_STAT_BRADY_AP_VS_PERCENT: 22.27 %
MDC_IDC_STAT_BRADY_AS_VP_PERCENT: 9.49 %
MDC_IDC_STAT_BRADY_AS_VS_PERCENT: 63.96 %
MDC_IDC_STAT_BRADY_DTM_END: NORMAL
MDC_IDC_STAT_BRADY_DTM_START: NORMAL
MDC_IDC_STAT_BRADY_RA_PERCENT_PACED: 26.41 %
MDC_IDC_STAT_BRADY_RV_PERCENT_PACED: 14.08 %
MDC_IDC_STAT_EPISODE_RECENT_COUNT: 0
MDC_IDC_STAT_EPISODE_RECENT_COUNT: 3
MDC_IDC_STAT_EPISODE_RECENT_COUNT_DTM_END: NORMAL
MDC_IDC_STAT_EPISODE_RECENT_COUNT_DTM_START: NORMAL
MDC_IDC_STAT_EPISODE_TOTAL_COUNT: 1
MDC_IDC_STAT_EPISODE_TOTAL_COUNT: 13
MDC_IDC_STAT_EPISODE_TOTAL_COUNT: 206
MDC_IDC_STAT_EPISODE_TOTAL_COUNT: 3
MDC_IDC_STAT_EPISODE_TOTAL_COUNT_DTM_END: NORMAL
MDC_IDC_STAT_EPISODE_TYPE: NORMAL

## 2019-05-23 ENCOUNTER — OFFICE VISIT (OUTPATIENT)
Dept: CARDIOLOGY | Facility: CLINIC | Age: 79
End: 2019-05-23
Attending: INTERNAL MEDICINE
Payer: MEDICARE

## 2019-05-23 ENCOUNTER — CARE COORDINATION (OUTPATIENT)
Dept: CARDIOLOGY | Facility: CLINIC | Age: 79
End: 2019-05-23

## 2019-05-23 VITALS
HEART RATE: 60 BPM | DIASTOLIC BLOOD PRESSURE: 60 MMHG | BODY MASS INDEX: 26.98 KG/M2 | SYSTOLIC BLOOD PRESSURE: 120 MMHG | WEIGHT: 158 LBS | HEIGHT: 64 IN

## 2019-05-23 DIAGNOSIS — I27.20 PULMONARY HYPERTENSION (H): Primary | ICD-10-CM

## 2019-05-23 DIAGNOSIS — Z79.01 WARFARIN ANTICOAGULATION: ICD-10-CM

## 2019-05-23 DIAGNOSIS — I07.1 MODERATE TRICUSPID REGURGITATION: ICD-10-CM

## 2019-05-23 DIAGNOSIS — Z95.0 CARDIAC PACEMAKER IN SITU: ICD-10-CM

## 2019-05-23 DIAGNOSIS — Z95.3 STATUS POST MITRAL VALVE REPLACEMENT WITH BIOPROSTHETIC VALVE: ICD-10-CM

## 2019-05-23 DIAGNOSIS — M05.761 RHEUMATOID ARTHRITIS INVOLVING BOTH KNEES WITH POSITIVE RHEUMATOID FACTOR (H): ICD-10-CM

## 2019-05-23 DIAGNOSIS — I05.9 RHEUMATIC MITRAL VALVE DISEASE: ICD-10-CM

## 2019-05-23 DIAGNOSIS — M05.762 RHEUMATOID ARTHRITIS INVOLVING BOTH KNEES WITH POSITIVE RHEUMATOID FACTOR (H): ICD-10-CM

## 2019-05-23 DIAGNOSIS — I48.20 CHRONIC ATRIAL FIBRILLATION (H): ICD-10-CM

## 2019-05-23 PROBLEM — I48.0 PAROXYSMAL ATRIAL FIBRILLATION (H): Status: RESOLVED | Noted: 2017-09-29 | Resolved: 2019-05-23

## 2019-05-23 PROBLEM — I38 VALVULAR HEART DISEASE: Status: RESOLVED | Noted: 2017-09-29 | Resolved: 2019-05-23

## 2019-05-23 PROBLEM — K81.0 ACUTE CHOLECYSTITIS: Status: RESOLVED | Noted: 2017-04-28 | Resolved: 2019-05-23

## 2019-05-23 PROCEDURE — 99215 OFFICE O/P EST HI 40 MIN: CPT | Performed by: INTERNAL MEDICINE

## 2019-05-23 ASSESSMENT — MIFFLIN-ST. JEOR: SCORE: 1186.68

## 2019-05-23 NOTE — PROGRESS NOTES
Clinic visit note dictated. Dictation reference number - [id number]        REVIEW OF SYSTEMS:  A comprehensive 10-point review of systems was completed and the pertinent positives are documented in the history of present illness.    Skin:  Negative     Eyes:  Positive for glasses;glaucoma  ENT:  Negative    Respiratory:  Negative    Cardiovascular:  Negative    Gastroenterology: Positive for reflux  Genitourinary:  Positive for urinary frequency  Musculoskeletal:  Positive for joint pain;arthritis;joint swelling;back pain  Neurologic:  Positive for numbness or tingling of feet  Psychiatric:  Negative    Heme/Lymph/Imm:  Positive for easy bruising  Endocrine:  Positive for thyroid disorder    CURRENT MEDICATIONS:  Current Outpatient Medications   Medication Sig Dispense Refill     ALLOPURINOL PO Take 100 mg by mouth 2 times daily       Amlodipine Besylate-Valsartan 5-160 MG TABS Take 1 tablet by mouth daily 90 tablet 3     calcium citrate (CALCITRATE) 950 MG tablet Take 1 tablet by mouth daily        folic acid (FOLVITE) 1 MG tablet Take 1 mg by mouth daily       ibandronate (BONIVA) 3 MG/3ML Inject 3 mg into the vein every 3 months        latanoprostene bunod (VYZULTA) 0.024 % SOLN ophthalmic solution Place 1 drop Into the left eye At Bedtime       levothyroxine (SYNTHROID/LEVOTHROID) 112 MCG tablet TAKE ONE TABLET BY MOUTH ONE TIME DAILY 90 tablet 3     METHOTREXATE SODIUM IJ Inject 0.8 mLs as directed once a week Thursdays       metoprolol tartrate (LOPRESSOR) 50 MG tablet Take 1 tablet (50 mg) by mouth 2 times daily 200 tablet 3     multivitamin, therapeutic with minerals (MULTI-VITAMIN) TABS tablet Take 1 tablet by mouth daily Without iron       omeprazole (PRILOSEC) 20 MG CR capsule Take 1 capsule (20 mg) by mouth 2 times daily 180 capsule 1     torsemide (DEMADEX) 10 MG tablet Take 1 tablet (10 mg) by mouth daily (with breakfast) 90 tablet 0     VITAMIN D, CHOLECALCIFEROL, PO Take 1,000 Units by mouth daily        WARFARIN SODIUM PO Take 5 mg by mouth MON, WED and SAT           ALLERGIES:  No Known Allergies    PAST MEDICAL HISTORY:    Past Medical History:   Diagnosis Date     Acquired hypothyroidism 11/4/2015     Aortic valve insufficiency      Arrhythmia     A fib     Arthritis      Atrial fibrillation (H)      Atrial fibrillation and flutter (H)      Blood clotting disorder (H)      CHF (congestive heart failure) (H)      DVT (deep venous thrombosis) (H) 2003     Gastro-oesophageal reflux disease      H/O mitral valve replacement with tissue graft june 2014     History of blood transfusion 2014     HTN (hypertension)      Hypothyroidism      Other chronic pain      PONV (postoperative nausea and vomiting)      Pulmonary HTN (H)      Rheumatoid arthritis (H)      Rheumatoid arthritis(714.0)      Seizures (H) 2014     Shingles 08/2018     Stroke (H) 2009    left side mild weakness      Stroke (H) 2014     [place] 2011    [place]     Thrombosis of leg 2003    both legs       PAST SURGICAL HISTORY:    Past Surgical History:   Procedure Laterality Date     ABDOMEN SURGERY      prolapsed bladder     APPLY WOUND VAC Left 12/18/2018    Procedure: Wound vac application;  Surgeon: Pardeep Sheikh MD;  Location: [facility] OR     ARTHROPLASTY KNEE Left 11/12/2018    Procedure: Left total knee arthroplasty using a [employer] Melissa II knee system;  Surgeon: Pardeep Sheikh MD;  Location: [facility] OR     ARTHROSCOPY KNEE WITH DEBRIDEMENT JOINT, COMBINED Left 12/18/2018    Procedure: Left knee debridement and placement of wound vac;  Surgeon: Pardeep Sheikh MD;  Location: [facility] OR     COLONOSCOPY  3/15/2012     EYE SURGERY  2018    Both eyes/cataract surgery     H ABLATION AV NODE  2011    AFlutter with syncope, PPM to follow     HERNIA REPAIR      3 hernies/right and left & umbilical     IMPLANT PACEMAKER  2011     LAPAROSCOPIC CHOLECYSTECTOMY WITH CHOLANGIOGRAMS N/A 4/29/2017    Procedure: LAPAROSCOPIC  CHOLECYSTECTOMY WITH CHOLANGIOGRAMS;  LAPAROSCOPIC CHOLECYSTECTOMY WITH CHOLANGIOGRAMS ;  Surgeon: Angeles Mcgill MD;  Location: [facility] OR     OPEN REDUCTION INTERNAL FIXATION RODDING INTRAMEDULLARY HUMERUS Right 2015    Procedure: OPEN REDUCTION INTERNAL FIXATION RODDING INTRAMEDULLARY HUMERUS;  Surgeon: Raymundo Rivera MD;  Location: [facility] OR     REPLACE VALVE MITRAL  2006    Mitral valve replacement with bioprosthetic valve in  for rheumatic disease     SLING BLADDER SUSPENSION WITH FASCIA UMESH       VASCULAR SURGERY      Blood clots in both legs       FAMILY HISTORY:    Family History   Problem Relation Age of Onset     Coronary Artery Disease Mother      Coronary Artery Disease Brother      Diabetes Brother      Cancer Brother          at age 52      Other Cancer Brother      Hypertension Sister      Hyperlipidemia Sister        SOCIAL HISTORY:    Social History     Socioeconomic History     Marital status:      Spouse name: None     Number of children: None     Years of education: None     Highest education level: None   Occupational History     None   Social Needs     Financial resource strain: None     Food insecurity:     Worry: None     Inability: None     Transportation needs:     Medical: None     Non-medical: None   Tobacco Use     Smoking status: Never Smoker     Smokeless tobacco: Never Used   Substance and Sexual Activity     Alcohol use: No     Alcohol/week: 0.0 oz     Drug use: No     Sexual activity: Yes     Partners: Male   Lifestyle     Physical activity:     Days per week: None     Minutes per session: None     Stress: None   Relationships     Social connections:     Talks on phone: None     Gets together: None     Attends Hoahaoism service: None     Active member of club or organization: None     Attends meetings of clubs or organizations: None     Relationship status: None     Intimate partner violence:     Fear of current or ex partner: None     Emotionally abused:  "None     Physically abused: None     Forced sexual activity: None   Other Topics Concern      Service Not Asked     Blood Transfusions Not Asked     Caffeine Concern Yes     Comment: occasionally     Occupational Exposure Not Asked     Hobby Hazards Not Asked     Sleep Concern No     Stress Concern Not Asked     Weight Concern Not Asked     Special Diet Yes     Comment: lower salt     Back Care No     Exercise Yes     Comment: walking ride excercise bike     Bike Helmet Not Asked     Seat Belt Not Asked     Self-Exams Not Asked     Parent/sibling w/ CABG, MI or angioplasty before 65F 55M? Yes     Comment: Brother   Social History Narrative     None         Vitals: /60 (BP Location: Right arm, Patient Position: Sitting, Cuff Size: Adult Regular)   Pulse 60   Ht 1.626 m (5' 4\")   Wt 71.7 kg (158 lb)   BMI 27.12 kg/m    Wt Readings from Last 5 Encounters:   05/23/19 71.7 kg (158 lb)   04/10/19 71.2 kg (157 lb)   12/18/18 68.4 kg (150 lb 11.2 oz)   11/28/18 71.7 kg (158 lb)   11/12/18 70.8 kg (156 lb)       "

## 2019-05-23 NOTE — LETTER
5/23/2019      Yunior Huang MD  [address] [facility] [address]      RE: Zunilda Clark       Dear Colleague,    I had the pleasure of seeing Zunilda Clark in the [facility].    Service Date: 05/23/2019      LOCATION:  Pulmonary Hypertension Clinic, [facility].       PRIMARY CARE PROVIDER:  Dr. Yunior Huang, [place] [address].      PRIMARY CARDIOLOGIST:  Dr. Timothy Das.      REASON FOR VISIT:   1.  Followup of group 2 pulmonary hypertension secondary to valvular heart disease.      HISTORY OF PRESENT ILLNESS:    As usual, Zunilda was accompanied by her , Sander.  They are both known to me from previous visits.  Zunilda has historically followed with my partner, Dr. Timothy Das, and was referred to me for a second opinion for pulmonary hypertension.  My opinion was this was likely group 2 due to her rheumatic valvular heart disease/valve replacement and chronic atrial fibrillation.  I did not think pulmonary vasodilators were appropriate for her.      Her medical history is significant for:   1.  Group 2 pulmonary hypertension.    Estimated RVSP as high as 85 mmHg on echocardiogram.   2.  Rheumatic mitral valve disease.  She had her first bioprosthetic mitral valve replacement in 2007, and a redo 27 mm Magna valve bioprosthetic valve replacement in 2014.   3.  Status post tricuspid valve annuloplasty with residual moderately severe tricuspid regurgitation with mildly dilated right ventricle with normal systolic function on cardiac MRI.   4.  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  Recent pacemaker check satisfactory (26% A paced, 14% V paced.  Adequate battery life).   5.  Chronic atrial fibrillation on long-term warfarin anticoagulation.   6.  History of ischemic stroke prior to warfarin.  None since she has been on it.   7.  No coronary artery disease on angiogram in  2014.   8.  Chronic rheumatoid arthritis.  On methotrexate therapy.      Zunilda is clinically stable.  Her primary limiting symptom is knee arthritis.  A few months ago she had her left knee replaced and feels that this has been very successful in managing her pain.  She still walks with a cane due to right knee problems and is hoping to get an arthroplasty in fall this year.  She has NYHA class II dyspnea, walks for about a third of a mile in their apartment complex, no orthopnea (2 pillows), no lower extremity edema, palpitations or chest pain.  She takes 10 mg of torsemide in the morning.  BP is 120/60, pulse 60 BPM (paced).      INR managed by INR Clinic and the goal is between 2.0 and 3.0.      DIAGNOSTIC DATA:     Labs -- She has not had any recent electrolyte panel.  But historically her creatinine has been normal at 1.0.  Total cholesterol 200, HDL 39, LDL elevated at 142 (not on lipid-lowering therapy), triglycerides 94.  TSH 1.6.  Serial INRs therapeutic.      Cardiac MRI -- 07/2018.  Normal LV size.  LVEF 69%.  Mildly dilated right ventricle with mildly decreased systolic function.  RVEF 50%.  Moderate biatrial enlargement.  Moderately severe tricuspid regurgitation, regurgitant fraction 45%.  No previous myocardial infarction.      PHYSICAL EXAMINATION:   VITAL SIGNS:  /60, pulse 60 per minute.  Height 5 feet 4 inches, weight 158 pounds, BMI 27.1.     CARDIOVASCULAR:  Normal jugular venous pressure, midline well-healed sternotomy scar, pacemaker site satisfactory.  Regular heart sounds, 3/6 pansystolic murmur of tricuspid regurgitation without diastolic rumble.  No S3 or S4.  Lungs are clear.  Minimal bilateral lower extremity edema.  Abdomen soft without hepatomegaly.      ASSESSMENT AND PLAN:    Zunilda is clinically stable -- NYHA class III symptoms, no edema, improved exercise ability after having a left knee arthroplasty.  INRs are stable.  Clinically, she has moderate tricuspid valve  regurgitation.      1.  Nadiya has not had cardiac imaging for a year.  This is needed to follow up on her tricuspid regurgitation, right heart function and bioprosthetic mitral valve.  She is due to see her primary cardiologist, Dr. Das, in a couple of months.  I recommend repeat transthoracic echocardiogram, metabolic panel and NT-proBNP prior to that.   2.  I am not making any changes to medications.   3.  As long as her TR remains and right heart function remains stable, she is cardiovascularly cleared for repeat knee arthroplasty later this year.  Preoperative stress testing not indicated as she recently underwent a successful surgery without mishap.   4.  Perioperatively, heparin bridging is indicated as she has suffered a stroke when not on warfarin previously.   5.  If Dr. Das feels she needs to continue to see me, I will be happy to do so.  Otherwise, pulmonary vasodilators are not indicated.  The mainstay of therapy will be diuretic management.  If her tricuspid regurgitation becomes more severe or she starts to develop RV dysfunction, she should be considered for redo tricuspid valve repair/replacement through a right-sided mini thoracotomy.      It was my pleasure to visit with this nice couple.  I spent 60 minutes with them.  Greater than 50% of the time was spent in counseling and coordination of care.         MIGUEL GRAMAJO MD             D: 2019   T: 2019   MT: AMY      Name:     NADIYA LEWIS   MRN:      [id number]        Account:      [id number]   :      1940           Service Date: 2019      Document: [id number]         Outpatient Encounter Medications as of 2019   Medication Sig Dispense Refill     ALLOPURINOL PO Take 100 mg by mouth 2 times daily       Amlodipine Besylate-Valsartan 5-160 MG TABS Take 1 tablet by mouth daily 90 tablet 3     calcium citrate (CALCITRATE) 950 MG tablet Take 1 tablet by mouth daily        folic acid (FOLVITE) 1 MG tablet  Take 1 mg by mouth daily       ibandronate (BONIVA) 3 MG/3ML Inject 3 mg into the vein every 3 months        latanoprostene bunod (VYZULTA) 0.024 % SOLN ophthalmic solution Place 1 drop Into the left eye At Bedtime       levothyroxine (SYNTHROID/LEVOTHROID) 112 MCG tablet TAKE ONE TABLET BY MOUTH ONE TIME DAILY 90 tablet 3     METHOTREXATE SODIUM IJ Inject 0.8 mLs as directed once a week Thursdays       metoprolol tartrate (LOPRESSOR) 50 MG tablet Take 1 tablet (50 mg) by mouth 2 times daily 200 tablet 3     multivitamin, therapeutic with minerals (MULTI-VITAMIN) TABS tablet Take 1 tablet by mouth daily Without iron       omeprazole (PRILOSEC) 20 MG CR capsule Take 1 capsule (20 mg) by mouth 2 times daily 180 capsule 1     torsemide (DEMADEX) 10 MG tablet Take 1 tablet (10 mg) by mouth daily (with breakfast) 90 tablet 0     VITAMIN D, CHOLECALCIFEROL, PO Take 1,000 Units by mouth daily       WARFARIN SODIUM PO Take 5 mg by mouth MON, WED and SAT       [DISCONTINUED] warfarin (JANTOVEN) 2.5 MG tablet Take one tablet (2.5 mg) Sun, Wed, Fri and take two tablets (5 mg) Mon, Tue, Th, Sa or as directed by INR clinic 45 tablet 5     No facility-administered encounter medications on file as of 5/23/2019.        Again, thank you for allowing me to participate in the care of your patient.      Sincerely,    Jluis Monroy MD     [facility]

## 2019-05-23 NOTE — PROGRESS NOTES
Reviewed the AVS (After Visit Summary) with patient in detail following their office visit with Dr. Monroy. The patient was educated on the outlined plan of care including ordered tests, labs, medication changes, and follow up. Patient verbalized understanding of the information and agrees to call with any questions or concerns.   Return appointment: Patient was given instructions on when and how to schedule their next office visit with with Dr Momo Colbert who will help decide on further need for return to the PH clinic.  Maranda RANGELN, [place], RN  RN Care Coordinator  [facility]  [phone]

## 2019-05-23 NOTE — PROGRESS NOTES
Service Date: 05/23/2019      LOCATION:  Pulmonary Hypertension Clinic, [facility].       PRIMARY CARE PROVIDER:  Dr. Yunior Huang, [place] [address].      PRIMARY CARDIOLOGIST:  Dr. Timothy Das.      REASON FOR VISIT:   1.  Followup of group 2 pulmonary hypertension secondary to valvular heart disease.      HISTORY OF PRESENT ILLNESS:    As usual, Zunilda was accompanied by her , Sander.  They are both known to me from previous visits.  Zunilda has historically followed with my partner, Dr. Timothy Das, and was referred to me for a second opinion for pulmonary hypertension.  My opinion was this was likely group 2 due to her rheumatic valvular heart disease/valve replacement and chronic atrial fibrillation.  I did not think pulmonary vasodilators were appropriate for her.      Her medical history is significant for:   1.  Group 2 pulmonary hypertension.    Estimated RVSP as high as 85 mmHg on echocardiogram.   2.  Rheumatic mitral valve disease.  She had her first bioprosthetic mitral valve replacement in 2007, and a redo 27 mm Magna valve bioprosthetic valve replacement in 2014.   3.  Status post tricuspid valve annuloplasty with residual moderately severe tricuspid regurgitation with mildly dilated right ventricle with normal systolic function on cardiac MRI.   4.  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  Recent pacemaker check satisfactory (26% A paced, 14% V paced.  Adequate battery life).   5.  Chronic atrial fibrillation on long-term warfarin anticoagulation.   6.  History of ischemic stroke prior to warfarin.  None since she has been on it.   7.  No coronary artery disease on angiogram in 2014.   8.  Chronic rheumatoid arthritis.  On methotrexate therapy.      Zunilda is clinically stable.  Her primary limiting symptom is knee arthritis.  A few months ago she had her left knee replaced and feels that this has been  very successful in managing her pain.  She still walks with a cane due to right knee problems and is hoping to get an arthroplasty in fall this year.  She has NYHA class II dyspnea, walks for about a third of a mile in their apartment complex, no orthopnea (2 pillows), no lower extremity edema, palpitations or chest pain.  She takes 10 mg of torsemide in the morning.  BP is 120/60, pulse 60 BPM (paced).      INR managed by INR Clinic and the goal is between 2.0 and 3.0.      DIAGNOSTIC DATA:     Labs -- She has not had any recent electrolyte panel.  But historically her creatinine has been normal at 1.0.  Total cholesterol 200, HDL 39, LDL elevated at 142 (not on lipid-lowering therapy), triglycerides 94.  TSH 1.6.  Serial INRs therapeutic.      Cardiac MRI -- 07/2018.  Normal LV size.  LVEF 69%.  Mildly dilated right ventricle with mildly decreased systolic function.  RVEF 50%.  Moderate biatrial enlargement.  Moderately severe tricuspid regurgitation, regurgitant fraction 45%.  No previous myocardial infarction.      PHYSICAL EXAMINATION:   VITAL SIGNS:  /60, pulse 60 per minute.  Height 5 feet 4 inches, weight 158 pounds, BMI 27.1.     CARDIOVASCULAR:  Normal jugular venous pressure, midline well-healed sternotomy scar, pacemaker site satisfactory.  Regular heart sounds, 3/6 pansystolic murmur of tricuspid regurgitation without diastolic rumble.  No S3 or S4.  Lungs are clear.  Minimal bilateral lower extremity edema.  Abdomen soft without hepatomegaly.      ASSESSMENT AND PLAN:    Zunilda is clinically stable -- NYHA class III symptoms, no edema, improved exercise ability after having a left knee arthroplasty.  INRs are stable.  Clinically, she has moderate tricuspid valve regurgitation.      1.  Zunilda has not had cardiac imaging for a year.  This is needed to follow up on her tricuspid regurgitation, right heart function and bioprosthetic mitral valve.  She is due to see her primary cardiologist,   Pippa, in a couple of months.  I recommend repeat transthoracic echocardiogram, metabolic panel and NT-proBNP prior to that.   2.  I am not making any changes to medications.   3.  As long as her TR remains and right heart function remains stable, she is cardiovascularly cleared for repeat knee arthroplasty later this year.  Preoperative stress testing not indicated as she recently underwent a successful surgery without mishap.   4.  Perioperatively, heparin bridging is indicated as she has suffered a stroke when not on warfarin previously.   5.  If Dr. Das feels she needs to continue to see me, I will be happy to do so.  Otherwise, pulmonary vasodilators are not indicated.  The mainstay of therapy will be diuretic management.  If her tricuspid regurgitation becomes more severe or she starts to develop RV dysfunction, she should be considered for redo tricuspid valve repair/replacement through a right-sided mini thoracotomy.      It was my pleasure to visit with this nice couple.  I spent 60 minutes with them.  Greater than 50% of the time was spent in counseling and coordination of care.         MIGUEL GRAMAJO MD             D: 2019   T: 2019   MT: AMY      Name:     NADIYA LEWIS   MRN:      [id number]        Account:      [id number]   :      1940           Service Date: 2019      Document: [id number]

## 2019-05-23 NOTE — LETTER
5/23/2019    Yunior Huang MD  [address] [facility] [address]    RE: Zunilda Clark       Dear Colleague,    I had the pleasure of seeing Zunilda Clark in the [facility].    Clinic visit note dictated. Dictation reference number - [id number]        REVIEW OF SYSTEMS:  A comprehensive 10-point review of systems was completed and the pertinent positives are documented in the history of present illness.    Skin:  Negative     Eyes:  Positive for glasses;glaucoma  ENT:  Negative    Respiratory:  Negative    Cardiovascular:  Negative    Gastroenterology: Positive for reflux  Genitourinary:  Positive for urinary frequency  Musculoskeletal:  Positive for joint pain;arthritis;joint swelling;back pain  Neurologic:  Positive for numbness or tingling of feet  Psychiatric:  Negative    Heme/Lymph/Imm:  Positive for easy bruising  Endocrine:  Positive for thyroid disorder    CURRENT MEDICATIONS:  Current Outpatient Medications   Medication Sig Dispense Refill     ALLOPURINOL PO Take 100 mg by mouth 2 times daily       Amlodipine Besylate-Valsartan 5-160 MG TABS Take 1 tablet by mouth daily 90 tablet 3     calcium citrate (CALCITRATE) 950 MG tablet Take 1 tablet by mouth daily        folic acid (FOLVITE) 1 MG tablet Take 1 mg by mouth daily       ibandronate (BONIVA) 3 MG/3ML Inject 3 mg into the vein every 3 months        latanoprostene bunod (VYZULTA) 0.024 % SOLN ophthalmic solution Place 1 drop Into the left eye At Bedtime       levothyroxine (SYNTHROID/LEVOTHROID) 112 MCG tablet TAKE ONE TABLET BY MOUTH ONE TIME DAILY 90 tablet 3     METHOTREXATE SODIUM IJ Inject 0.8 mLs as directed once a week Thursdays       metoprolol tartrate (LOPRESSOR) 50 MG tablet Take 1 tablet (50 mg) by mouth 2 times daily 200 tablet 3     multivitamin, therapeutic with minerals (MULTI-VITAMIN) TABS tablet Take 1 tablet by mouth daily Without iron       omeprazole (PRILOSEC) 20 MG CR capsule Take 1  capsule (20 mg) by mouth 2 times daily 180 capsule 1     torsemide (DEMADEX) 10 MG tablet Take 1 tablet (10 mg) by mouth daily (with breakfast) 90 tablet 0     VITAMIN D, CHOLECALCIFEROL, PO Take 1,000 Units by mouth daily       WARFARIN SODIUM PO Take 5 mg by mouth MON, WED and SAT           ALLERGIES:  No Known Allergies    PAST MEDICAL HISTORY:    Past Medical History:   Diagnosis Date     Acquired hypothyroidism 11/4/2015     Aortic valve insufficiency      Arrhythmia     A fib     Arthritis      Atrial fibrillation (H)      Atrial fibrillation and flutter (H)      Blood clotting disorder (H)      CHF (congestive heart failure) (H)      DVT (deep venous thrombosis) (H) 2003     Gastro-oesophageal reflux disease      H/O mitral valve replacement with tissue graft june 2014     History of blood transfusion 2014     HTN (hypertension)      Hypothyroidism      Other chronic pain      PONV (postoperative nausea and vomiting)      Pulmonary HTN (H)      Rheumatoid arthritis (H)      Rheumatoid arthritis(714.0)      Seizures (H) 2014     Shingles 08/2018     Stroke (H) 2009    left side mild weakness      Stroke (H) 2014     [place]ope 2011    [place]     Thrombosis of leg 2003    both legs       PAST SURGICAL HISTORY:    Past Surgical History:   Procedure Laterality Date     ABDOMEN SURGERY      prolapsed bladder     APPLY WOUND VAC Left 12/18/2018    Procedure: Wound vac application;  Surgeon: Pardeep Sheikh MD;  Location: [facility] OR     ARTHROPLASTY KNEE Left 11/12/2018    Procedure: Left total knee arthroplasty using a [employer] II knee system;  Surgeon: Pardeep Sheikh MD;  Location:  OR     ARTHROSCOPY KNEE WITH DEBRIDEMENT JOINT, COMBINED Left 12/18/2018    Procedure: Left knee debridement and placement of wound vac;  Surgeon: Pardeep Sheikh MD;  Location:  OR     COLONOSCOPY  3/15/2012     EYE SURGERY  2018    Both eyes/cataract surgery     H ABLATION AV NODE       AFlutter with syncope, PPM to follow     HERNIA REPAIR      3 hernies/right and left & umbilical     IMPLANT PACEMAKER  2011     LAPAROSCOPIC CHOLECYSTECTOMY WITH CHOLANGIOGRAMS N/A 2017    Procedure: LAPAROSCOPIC CHOLECYSTECTOMY WITH CHOLANGIOGRAMS;  LAPAROSCOPIC CHOLECYSTECTOMY WITH CHOLANGIOGRAMS ;  Surgeon: Angeles Mcgill MD;  Location: [facility] OR     OPEN REDUCTION INTERNAL FIXATION RODDING INTRAMEDULLARY HUMERUS Right 2015    Procedure: OPEN REDUCTION INTERNAL FIXATION RODDING INTRAMEDULLARY HUMERUS;  Surgeon: Raymundo Rivera MD;  Location: [facility] OR     REPLACE VALVE MITRAL  2006    Mitral valve replacement with bioprosthetic valve in  for rheumatic disease     SLING BLADDER SUSPENSION WITH FASCIA UMESH       VASCULAR SURGERY      Blood clots in both legs       FAMILY HISTORY:    Family History   Problem Relation Age of Onset     Coronary Artery Disease Mother      Coronary Artery Disease Brother      Diabetes Brother      Cancer Brother          at age 52      Other Cancer Brother      Hypertension Sister      Hyperlipidemia Sister        SOCIAL HISTORY:    Social History     Socioeconomic History     Marital status:      Spouse name: None     Number of children: None     Years of education: None     Highest education level: None   Occupational History     None   Social Needs     Financial resource strain: None     Food insecurity:     Worry: None     Inability: None     Transportation needs:     Medical: None     Non-medical: None   Tobacco Use     Smoking status: Never Smoker     Smokeless tobacco: Never Used   Substance and Sexual Activity     Alcohol use: No     Alcohol/week: 0.0 oz     Drug use: No     Sexual activity: Yes     Partners: Male   Lifestyle     Physical activity:     Days per week: None     Minutes per session: None     Stress: None   Relationships     Social connections:     Talks on phone: None     Gets together: None     Attends [employer] service:  "None     Active member of club or organization: None     Attends meetings of clubs or organizations: None     Relationship status: None     Intimate partner violence:     Fear of current or ex partner: None     Emotionally abused: None     Physically abused: None     Forced sexual activity: None   Other Topics Concern      Service Not Asked     Blood Transfusions Not Asked     Caffeine Concern Yes     Comment: occasionally     Occupational Exposure Not Asked     Hobby Hazards Not Asked     Sleep Concern No     Stress Concern Not Asked     Weight Concern Not Asked     Special Diet Yes     Comment: lower salt     Back Care No     Exercise Yes     Comment: walking ride excercise bike     Bike Helmet Not Asked     Seat Belt Not Asked     Self-Exams Not Asked     Parent/sibling w/ CABG, MI or angioplasty before 65F 55M? Yes     Comment: Brother   Social History Narrative     None         Vitals: /60 (BP Location: Right arm, Patient Position: Sitting, Cuff Size: Adult Regular)   Pulse 60   Ht 1.626 m (5' 4\")   Wt 71.7 kg (158 lb)   BMI 27.12 kg/m     Wt Readings from Last 5 Encounters:   05/23/19 71.7 kg (158 lb)   04/10/19 71.2 kg (157 lb)   12/18/18 68.4 kg (150 lb 11.2 oz)   11/28/18 71.7 kg (158 lb)   11/12/18 70.8 kg (156 lb)       Thank you for allowing me to participate in the care of your patient.      Sincerely,     Jluis Monroy MD     [facility]    cc:   Jluis Monroy MD  [address] [place] [id number]        "

## 2019-05-23 NOTE — PATIENT INSTRUCTIONS
1.  No changes to medications.    2.  As discussed, please keep your scheduled follow-up with Dr. Das in July 2019.    - Prior to that, you will need a complete echocardiogram (heart ultrasound), up-to-date labs (comprehensive metabolic panel, NT proBNP).  You already had a recent lipid panel and CBC and these do not have to be repeated.    3.  If Dr. Das feels you need to follow in the pulmonary hypertension clinic, I will be happy to see you in approximately a year.    If you have any questions or concerns, please call my nurse Maranda Garcia at [phone].

## 2019-05-28 ENCOUNTER — ANTICOAGULATION THERAPY VISIT (OUTPATIENT)
Dept: ANTICOAGULATION | Facility: CLINIC | Age: 79
End: 2019-05-28
Payer: MEDICARE

## 2019-05-28 DIAGNOSIS — I48.92 ATRIAL FIBRILLATION AND FLUTTER (H): ICD-10-CM

## 2019-05-28 DIAGNOSIS — Z79.01 LONG TERM CURRENT USE OF ANTICOAGULANTS WITH INR GOAL OF 2.0-3.0: ICD-10-CM

## 2019-05-28 DIAGNOSIS — I48.91 ATRIAL FIBRILLATION AND FLUTTER (H): ICD-10-CM

## 2019-05-28 LAB — INR POINT OF CARE: 1.9 (ref 0.86–1.14)

## 2019-05-28 PROCEDURE — 85610 PROTHROMBIN TIME: CPT | Mod: QW

## 2019-05-28 PROCEDURE — 36416 COLLJ CAPILLARY BLOOD SPEC: CPT

## 2019-05-28 PROCEDURE — 99207 ZZC NO CHARGE NURSE ONLY: CPT

## 2019-05-28 NOTE — PROGRESS NOTES
ANTICOAGULATION FOLLOW-UP CLINIC VISIT    Patient Name:  Zunilda Alicea May  Date:  2019  Contact Type:  Face to Face    SUBJECTIVE:  Patient Findings     Positives:   Change in diet/appetite (Patient reports has been eating green veggies, usually does not, will resume usual diet. )    Comments:   The patient was assessed for medication and activity level changes, missed or extra doses, bruising or bleeding, with no problem findings.          Clinical Outcomes     Comments:   The patient was assessed for medication and activity level changes, missed or extra doses, bruising or bleeding, with no problem findings.             OBJECTIVE    INR Protime   Date Value Ref Range Status   2019 1.9 (A) 0.86 - 1.14 Final       ASSESSMENT / PLAN  INR assessment SUB    Recheck INR In: 2 WEEKS    INR Location Clinic      Anticoagulation Summary  As of 2019    INR goal:   2.0-3.0   TTR:   86.2 % (3 y)   INR used for dosin.9! (2019)   Warfarin maintenance plan:   2.5 mg (2.5 mg x 1) every Sun, Wed, Fri; 5 mg (2.5 mg x 2) all other days   Full warfarin instructions:   2.5 mg every Sun, Wed, Fri; 5 mg all other days   Weekly warfarin total:   27.5 mg   No change documented:   Екатерина Mahan RN   Plan last modified:   Sonam Teixeira RN (3/20/2019)   Next INR check:   6/10/2019   Priority:   INR   Target end date:       Indications    Long term current use of anticoagulants with INR goal of 2.0-3.0 [Z79.01]  Atrial fibrillation and flutter (H) [I48.91  I48.92]             Anticoagulation Episode Summary     INR check location:       Preferred lab:       Send INR reminders to:   [facility]    Comments:   likes printed AVS      Anticoagulation Care Providers     Provider Role Specialty Phone number    Yunior Huang MD [facility] Internal Medicine [phone]            See the Encounter Report to view Anticoagulation Flowsheet and Dosing Calendar (Go to Encounters tab in chart review, and find the  Anticoagulation Therapy Visit)    Dosage adjustment made based on physician directed care plan.    Екатерина Mahan RN

## 2019-06-10 ENCOUNTER — ANTICOAGULATION THERAPY VISIT (OUTPATIENT)
Dept: ANTICOAGULATION | Facility: CLINIC | Age: 79
End: 2019-06-10
Payer: MEDICARE

## 2019-06-10 DIAGNOSIS — I48.92 ATRIAL FIBRILLATION AND FLUTTER (H): ICD-10-CM

## 2019-06-10 DIAGNOSIS — Z79.01 LONG TERM CURRENT USE OF ANTICOAGULANTS WITH INR GOAL OF 2.0-3.0: ICD-10-CM

## 2019-06-10 DIAGNOSIS — I48.91 ATRIAL FIBRILLATION AND FLUTTER (H): ICD-10-CM

## 2019-06-10 LAB — INR POINT OF CARE: 2.5 (ref 0.86–1.14)

## 2019-06-10 PROCEDURE — 99207 ZZC NO CHARGE NURSE ONLY: CPT

## 2019-06-10 PROCEDURE — 85610 PROTHROMBIN TIME: CPT | Mod: QW

## 2019-06-10 PROCEDURE — 36416 COLLJ CAPILLARY BLOOD SPEC: CPT

## 2019-06-10 NOTE — PROGRESS NOTES
ANTICOAGULATION FOLLOW-UP CLINIC VISIT    Patient Name:  Zunilda Alicea May  Date:  6/10/2019  Contact Type:  Face to Face    SUBJECTIVE:  Patient Findings     Comments:   The patient was assessed for diet, medication, and activity level changes, missed or extra doses, bruising or bleeding, with no problem findings.          Clinical Outcomes     Negatives:   Major bleeding event, Thromboembolic event, Anticoagulation-related hospital admission, Anticoagulation-related ED visit, Anticoagulation-related fatality    Comments:   The patient was assessed for diet, medication, and activity level changes, missed or extra doses, bruising or bleeding, with no problem findings.             OBJECTIVE    INR Protime   Date Value Ref Range Status   06/10/2019 2.5 (A) 0.86 - 1.14 Final       ASSESSMENT / PLAN  INR assessment THER    Recheck INR In: 5 WEEKS    INR Location Clinic      Anticoagulation Summary  As of 6/10/2019    INR goal:   2.0-3.0   TTR:   86.2 % (3 y)   INR used for dosin.5 (6/10/2019)   Warfarin maintenance plan:   2.5 mg (2.5 mg x 1) every Sun, Wed, Fri; 5 mg (2.5 mg x 2) all other days   Full warfarin instructions:   2.5 mg every Sun, Wed, Fri; 5 mg all other days   Weekly warfarin total:   27.5 mg   No change documented:   Екатерина Mahan RN   Plan last modified:   Sonam Teixeira RN (3/20/2019)   Next INR check:   7/15/2019   Priority:   INR   Target end date:       Indications    Long term current use of anticoagulants with INR goal of 2.0-3.0 [Z79.01]  Atrial fibrillation and flutter (H) [I48.91  I48.92]             Anticoagulation Episode Summary     INR check location:       Preferred lab:       Send INR reminders to:   [facility]    Comments:   likes printed AVS      Anticoagulation Care Providers     Provider Role Specialty Phone number    Yunior Huang MD [facility] Internal Medicine [phone]            See the Encounter Report to view Anticoagulation Flowsheet and Dosing Calendar (Go to  Encounters tab in chart review, and find the Anticoagulation Therapy Visit)    Dosage adjustment made based on physician directed care plan.    Екатерина Mahan RN

## 2019-06-12 DIAGNOSIS — I27.20 PULMONARY HTN (H): ICD-10-CM

## 2019-06-12 DIAGNOSIS — K21.9 ESOPHAGEAL REFLUX: ICD-10-CM

## 2019-06-12 DIAGNOSIS — K21.9 GASTROESOPHAGEAL REFLUX DISEASE WITHOUT ESOPHAGITIS: Primary | ICD-10-CM

## 2019-06-12 RX ORDER — TORSEMIDE 10 MG/1
10 TABLET ORAL
Qty: 90 TABLET | Refills: 3 | Status: SHIPPED | OUTPATIENT
Start: 2019-06-12 | End: 2020-08-26

## 2019-06-12 NOTE — TELEPHONE ENCOUNTER
"Requested Prescriptions   Pending Prescriptions Disp Refills     omeprazole (PRILOSEC) 20 MG DR capsule  Last Written Prescription Date:  2/2/2018  Last Fill Quantity: 180,  # refills: 1   Last office visit: 4/10/2019 with prescribing provider:     Future Office Visit:   Next 5 appointments (look out 90 days)    Jul 18, 2019 10:15 AM CDT  Return Visit with Timothy Das MD  [facility] ([facility] [address]  [facility] [address] [phone]        180 capsule 1     Sig: Take 1 capsule (20 mg) by mouth 2 times daily       PPI Protocol Passed - 6/12/2019 12:32 PM        Passed - Not on Clopidogrel (unless Pantoprazole ordered)        Passed - No diagnosis of osteoporosis on record        Passed - Recent (12 mo) or future (30 days) visit within the authorizing provider's specialty     Patient had office visit in the last 12 months or has a visit in the next 30 days with authorizing provider or within the authorizing provider's specialty.  See \"Patient Info\" tab in inbasket, or \"Choose Columns\" in Meds & Orders section of the refill encounter.              Passed - Medication is active on med list        Passed - Patient is age 18 or older        Passed - No active pregnacy on record        Passed - No positive pregnancy test in past 12 months        "

## 2019-06-13 NOTE — TELEPHONE ENCOUNTER
Routing refill request to provider for review/approval because:  A break in medication    Please advise, thanks.

## 2019-06-18 ENCOUNTER — TELEPHONE (OUTPATIENT)
Dept: INTERNAL MEDICINE | Facility: CLINIC | Age: 79
End: 2019-06-18

## 2019-06-18 NOTE — TELEPHONE ENCOUNTER
Prior Authorization Retail Medication Request    Medication/Dose: Omeprazole  ICD code (if different than what is on RX):  Gastroesophageal reflux disease without esophagitis [K21.9]  Previously Tried and Failed:  unknown  Rationale:  Unknown    covermymeds Key [id number]    Insurance Name:  unknown  Insurance ID:  unknown      Pharmacy Information (if different than what is on RX)  Name:  [facility]  Phone:  [phone]

## 2019-06-19 NOTE — TELEPHONE ENCOUNTER
PA Initiation    Medication: Omeprazole 20MG -   Insurance Company: [employer] - Phone [phone] Fax [phone]  Pharmacy Filling the Rx: [facility] # [id number] - [place], [place] - [address]  Filling Pharmacy Phone: [phone]  Filling Pharmacy Fax:    Start Date: 6/19/2019

## 2019-06-28 NOTE — TELEPHONE ENCOUNTER
Prior Authorization Approval    Authorization Effective Date: 1/1/2019  Authorization Expiration Date: 12/31/2019  Medication: Omeprazole 20MG - APPROVED  Approved Dose/Quantity: 180/90days  Reference #:     Insurance Company: [employer] - Phone [phone] Fax [phone]  Expected CoPay:       CoPay Card Available:      Foundation Assistance Needed:    Which Pharmacy is filling the prescription (Not needed for infusion/clinic administered): [facility] # [id number] - [place], [place] - [address] [address]   Pharmacy Notified: Yes  Patient Notified: Comment:  **Instructed pharmacy to notify patient when script is ready to /ship.**

## 2019-07-08 ENCOUNTER — MEDICAL CORRESPONDENCE (OUTPATIENT)
Dept: HEALTH INFORMATION MANAGEMENT | Facility: CLINIC | Age: 79
End: 2019-07-08

## 2019-07-08 ENCOUNTER — TRANSFERRED RECORDS (OUTPATIENT)
Dept: HEALTH INFORMATION MANAGEMENT | Facility: CLINIC | Age: 79
End: 2019-07-08

## 2019-07-09 ENCOUNTER — HOSPITAL ENCOUNTER (OUTPATIENT)
Dept: CARDIOLOGY | Facility: CLINIC | Age: 79
Discharge: HOME | End: 2019-07-09
Attending: INTERNAL MEDICINE | Admitting: INTERNAL MEDICINE
Payer: MEDICARE

## 2019-07-09 ENCOUNTER — HOSPITAL ENCOUNTER (OUTPATIENT)
Dept: LAB | Facility: CLINIC | Age: 79
End: 2019-07-09
Attending: INTERNAL MEDICINE
Payer: MEDICARE

## 2019-07-09 DIAGNOSIS — I07.1 MODERATE TRICUSPID REGURGITATION: ICD-10-CM

## 2019-07-09 DIAGNOSIS — I27.20 PULMONARY HYPERTENSION (H): ICD-10-CM

## 2019-07-09 DIAGNOSIS — Z95.3 STATUS POST MITRAL VALVE REPLACEMENT WITH BIOPROSTHETIC VALVE: ICD-10-CM

## 2019-07-09 DIAGNOSIS — I48.20 CHRONIC ATRIAL FIBRILLATION (H): ICD-10-CM

## 2019-07-09 DIAGNOSIS — Z79.899 ENCOUNTER FOR LONG-TERM (CURRENT) USE OF OTHER MEDICATIONS: Primary | ICD-10-CM

## 2019-07-09 DIAGNOSIS — Z79.01 WARFARIN ANTICOAGULATION: ICD-10-CM

## 2019-07-09 DIAGNOSIS — M05.762 RHEUMATOID ARTHRITIS INVOLVING BOTH KNEES WITH POSITIVE RHEUMATOID FACTOR (H): ICD-10-CM

## 2019-07-09 DIAGNOSIS — Z95.0 CARDIAC PACEMAKER IN SITU: ICD-10-CM

## 2019-07-09 DIAGNOSIS — I05.9 RHEUMATIC MITRAL VALVE DISEASE: ICD-10-CM

## 2019-07-09 DIAGNOSIS — M05.761 RHEUMATOID ARTHRITIS INVOLVING BOTH KNEES WITH POSITIVE RHEUMATOID FACTOR (H): ICD-10-CM

## 2019-07-09 DIAGNOSIS — M05.79 RHEUMATOID ARTHRITIS INVOLVING MULTIPLE SITES WITH POSITIVE RHEUMATOID FACTOR (H): ICD-10-CM

## 2019-07-09 LAB
ALBUMIN SERPL-MCNC: 3.4 G/DL (ref 3.4–5)
ALP SERPL-CCNC: 104 U/L (ref 40–150)
ALT SERPL W P-5'-P-CCNC: 20 U/L (ref 0–50)
ALT SERPL W P-5'-P-CCNC: 22 U/L (ref 0–50)
ANION GAP SERPL CALCULATED.3IONS-SCNC: 4 MMOL/L (ref 3–14)
AST SERPL W P-5'-P-CCNC: 23 U/L (ref 0–45)
AST SERPL W P-5'-P-CCNC: 24 U/L (ref 0–45)
BASOPHILS # BLD AUTO: 0.1 10E9/L (ref 0–0.2)
BASOPHILS NFR BLD AUTO: 0.8 %
BILIRUB SERPL-MCNC: 0.3 MG/DL (ref 0.2–1.3)
BUN SERPL-MCNC: 36 MG/DL (ref 7–30)
CALCIUM SERPL-MCNC: 9.2 MG/DL (ref 8.5–10.1)
CHLORIDE SERPL-SCNC: 106 MMOL/L (ref 94–109)
CO2 SERPL-SCNC: 30 MMOL/L (ref 20–32)
CREAT SERPL-MCNC: 1.37 MG/DL (ref 0.52–1.04)
CREAT SERPL-MCNC: 1.4 MG/DL (ref 0.52–1.04)
DIFFERENTIAL METHOD BLD: ABNORMAL
EOSINOPHIL # BLD AUTO: 0.3 10E9/L (ref 0–0.7)
EOSINOPHIL NFR BLD AUTO: 4 %
ERYTHROCYTE [DISTWIDTH] IN BLOOD BY AUTOMATED COUNT: 18.3 % (ref 10–15)
GFR SERPL CREATININE-BSD FRML MDRD: 36 ML/MIN/{1.73_M2}
GFR SERPL CREATININE-BSD FRML MDRD: 37 ML/MIN/{1.73_M2}
GLUCOSE SERPL-MCNC: 87 MG/DL (ref 70–99)
HCT VFR BLD AUTO: 35.8 % (ref 35–47)
HGB BLD-MCNC: 11.6 G/DL (ref 11.7–15.7)
IMM GRANULOCYTES # BLD: 0 10E9/L (ref 0–0.4)
IMM GRANULOCYTES NFR BLD: 0.6 %
LYMPHOCYTES # BLD AUTO: 0.6 10E9/L (ref 0.8–5.3)
LYMPHOCYTES NFR BLD AUTO: 9.2 %
MCH RBC QN AUTO: 33 PG (ref 26.5–33)
MCHC RBC AUTO-ENTMCNC: 32.4 G/DL (ref 31.5–36.5)
MCV RBC AUTO: 102 FL (ref 78–100)
MONOCYTES # BLD AUTO: 0.7 10E9/L (ref 0–1.3)
MONOCYTES NFR BLD AUTO: 10.3 %
NEUTROPHILS # BLD AUTO: 4.9 10E9/L (ref 1.6–8.3)
NEUTROPHILS NFR BLD AUTO: 75.1 %
NRBC # BLD AUTO: 0 10*3/UL
NRBC BLD AUTO-RTO: 0 /100
NT-PROBNP SERPL-MCNC: 664 PG/ML (ref 0–450)
PLATELET # BLD AUTO: 372 10E9/L (ref 150–450)
POTASSIUM SERPL-SCNC: 3.9 MMOL/L (ref 3.4–5.3)
PROT SERPL-MCNC: 7.7 G/DL (ref 6.8–8.8)
RBC # BLD AUTO: 3.52 10E12/L (ref 3.8–5.2)
SODIUM SERPL-SCNC: 140 MMOL/L (ref 133–144)
URATE SERPL-MCNC: 5.8 MG/DL (ref 2.6–6)
WBC # BLD AUTO: 6.5 10E9/L (ref 4–11)

## 2019-07-09 PROCEDURE — 85025 COMPLETE CBC W/AUTO DIFF WBC: CPT | Performed by: INTERNAL MEDICINE

## 2019-07-09 PROCEDURE — 84550 ASSAY OF BLOOD/URIC ACID: CPT | Performed by: INTERNAL MEDICINE

## 2019-07-09 PROCEDURE — 40000264 ECHOCARDIOGRAM COMPLETE

## 2019-07-09 PROCEDURE — 80053 COMPREHEN METABOLIC PANEL: CPT | Performed by: INTERNAL MEDICINE

## 2019-07-09 PROCEDURE — 84450 TRANSFERASE (AST) (SGOT): CPT | Performed by: INTERNAL MEDICINE

## 2019-07-09 PROCEDURE — 25800025 ZZH RX 258: Performed by: INTERNAL MEDICINE

## 2019-07-09 PROCEDURE — 93306 TTE W/DOPPLER COMPLETE: CPT | Mod: 26 | Performed by: INTERNAL MEDICINE

## 2019-07-09 PROCEDURE — 82565 ASSAY OF CREATININE: CPT | Performed by: INTERNAL MEDICINE

## 2019-07-09 PROCEDURE — 25500064 ZZH RX 255 OP 636: Performed by: INTERNAL MEDICINE

## 2019-07-09 PROCEDURE — 83880 ASSAY OF NATRIURETIC PEPTIDE: CPT | Performed by: INTERNAL MEDICINE

## 2019-07-09 PROCEDURE — 36415 COLL VENOUS BLD VENIPUNCTURE: CPT | Performed by: INTERNAL MEDICINE

## 2019-07-09 PROCEDURE — 84460 ALANINE AMINO (ALT) (SGPT): CPT | Performed by: INTERNAL MEDICINE

## 2019-07-09 RX ORDER — ACYCLOVIR 200 MG/1
30 CAPSULE ORAL ONCE
Status: COMPLETED | OUTPATIENT
Start: 2019-07-09 | End: 2019-07-09

## 2019-07-09 RX ADMIN — SODIUM CHLORIDE 30 ML: 9 INJECTION, SOLUTION INTRAMUSCULAR; INTRAVENOUS; SUBCUTANEOUS at 15:09

## 2019-07-09 RX ADMIN — HUMAN ALBUMIN MICROSPHERES AND PERFLUTREN 3 ML: 10; .22 INJECTION, SOLUTION INTRAVENOUS at 15:09

## 2019-07-11 ENCOUNTER — HOSPITAL ENCOUNTER (INPATIENT)
Facility: CLINIC | Age: 79
Setting detail: SURGERY ADMIT
End: 2019-07-11
Attending: ORTHOPAEDIC SURGERY | Admitting: ORTHOPAEDIC SURGERY
Payer: MEDICARE

## 2019-07-11 ENCOUNTER — TRANSFERRED RECORDS (OUTPATIENT)
Dept: HEALTH INFORMATION MANAGEMENT | Facility: CLINIC | Age: 79
End: 2019-07-11

## 2019-07-15 ENCOUNTER — CARE COORDINATION (OUTPATIENT)
Dept: CARDIOLOGY | Facility: CLINIC | Age: 79
End: 2019-07-15

## 2019-07-15 ENCOUNTER — ANTICOAGULATION THERAPY VISIT (OUTPATIENT)
Dept: ANTICOAGULATION | Facility: CLINIC | Age: 79
End: 2019-07-15
Payer: MEDICARE

## 2019-07-15 DIAGNOSIS — I48.91 ATRIAL FIBRILLATION AND FLUTTER (H): ICD-10-CM

## 2019-07-15 DIAGNOSIS — I48.92 ATRIAL FIBRILLATION AND FLUTTER (H): ICD-10-CM

## 2019-07-15 DIAGNOSIS — Z79.01 LONG TERM CURRENT USE OF ANTICOAGULANTS WITH INR GOAL OF 2.0-3.0: ICD-10-CM

## 2019-07-15 LAB — INR POINT OF CARE: 2.3 (ref 0.86–1.14)

## 2019-07-15 PROCEDURE — 36416 COLLJ CAPILLARY BLOOD SPEC: CPT

## 2019-07-15 PROCEDURE — 85610 PROTHROMBIN TIME: CPT | Mod: QW

## 2019-07-15 PROCEDURE — 99207 ZZC NO CHARGE NURSE ONLY: CPT

## 2019-07-15 NOTE — PROGRESS NOTES
Forwarded labs and echocardiogram to Dr Gramajo for review.  Seeing Dr Das on 2019 for review.        Echo Complete with Bubble Study   Order: [id number]   Status:  Edited Result - FINAL   Visible to patient:  No (Not Released) Next appt:  2019 at 10:15 AM in Cardiology (Timothy Das MD) Dx:  Cardiac pacemaker in situ; Pulmonary ...   Details     Reading Physician Reading Date Result Priority   Timothy Das MD 2019       Narrative     [id number]  [id number]  [id number]  [id number]^ROX^MIGUEL^YANETH           [facility]  Echocardiography Laboratory  [address] [place] [address]        Name: NADIYA LEWIS  MRN: [id number]  : 1940  Study Date: 2019 02:37 PM  Age: 79 yrs  Gender: Female  Patient Location: [facility]  Reason For Study: Pulmonary hypertension, Status post mitral valve replacement  Ordering Physician: MIGUEL GRAMAJO  Referring Physician: MIGUEL GRAMAJO  Performed By: Megan Reveles     BSA: 1.7 m2  Height: 64 in  Weight: 153 lb  HR: 58  BP: 148/82 mmHg  _____________________________________________________________________________  __        Procedure  Complete Bubble Echo Adult. Optison (NDC #5737-4574) given intravenously.  _____________________________________________________________________________  __        Interpretation Summary     There is a bioprosthetic mitral valve.  Normal prosthetic mitral valve gradients.  There is trace mitral regurgitation.  There is mod-severe to severe (3-4+) tricuspid regurgitation.  The right ventricular systolic pressure is approximated at 49mmHg plus the  right atrial pressure.  Right ventricular systolic pressure is elevated, consistent with severe  pulmonary hypertension.  There is sclerosis, calcification, and restriction of the aortic valve opening  compatible with mild aortic stenosis.  The mean AoV pressure gradient is 8mmHg.  Rhythm is atrial fibrillation with paced ventricle.  Compared  to the previous echo of 6/21/2018, severe pulmonary hypertension  persists but RVSP is much lower (71 mmHg-->49 mmHg). No other changes are  noted.  _____________________________________________________________________________  __        Left Ventricle  The left ventricle is normal in size. There is mild concentric left  ventricular hypertrophy. Left ventricular systolic function is normal. The  visual ejection fraction is estimated at 55-60%. Diastolic function not  assessed due to atrial fibrillation. No regional wall motion abnormalities  noted.     Right Ventricle  The right ventricle is normal size. There is a catheter/pacemaker lead seen in  the right ventricle. Mildly decreased right ventricular systolic function.     Atria  The left atrium is moderate to severely dilated. The right atrium is mildly  dilated. Pacer wire in right atrium. There is no color Doppler evidence of an  atrial shunt.     Mitral Valve  No evidence of endocarditis, TTE does not rule out vegetations. There is trace  mitral regurgitation. The mean mitral valve gradient is 3.4mmHg. There is a  bioprosthetic mitral valve. Normal prosthetic mitral valve gradients. The  prosthetic mitral valve is not well visualized.        Tricuspid Valve  Normal tricuspid valve. There is mod-severe to severe (3-4+) tricuspid  regurgitation. The right ventricular systolic pressure is approximated at  49mmHg plus the right atrial pressure. Right ventricular systolic pressure is  elevated, consistent with severe pulmonary hypertension.     Aortic Valve  There is moderate trileaflet aortic sclerosis. There is mild (1+) aortic  regurgitation. There is sclerosis, calcification, and restriction of the  aortic valve opening compatible with mild aortic stenosis. The mean AoV  pressure gradient is 8mmHg. The calculated aortic valve area is 1.3cm2.     Pulmonic Valve  The pulmonic valve is not well visualized. There is no pulmonic  valvular  regurgitation.     Vessels  The aortic root is normal size. The ascending aorta is Borderline dilated. The  inferior vena cava was normal in size with preserved respiratory variability.     Pericardium  There is no pericardial effusion.        Rhythm  Rhythm is atrial fibrillation with paced ventricle.  _____________________________________________________________________________  __  MMode/2D Measurements & Calculations  IVSd: 1.4 cm     LVIDd: 3.4 cm  LVIDs: 2.3 cm  LVPWd: 1.5 cm  FS: 33.8 %  LV mass(C)d: 183.3 grams  LV mass(C)dI: 105.0 grams/m2  Ao root diam: 3.4 cm  LA dimension: 5.1 cm  asc Aorta Diam: 3.7 cm  LA/Ao: 1.5  LVOT diam: 1.8 cm  LVOT area: 2.5 cm2  LA Volume (BP): 62.8 ml  LA Volume Index (BP): 35.9 ml/m2  RWT: 0.88  TAPSE: 0.94 cm           Doppler Measurements & Calculations  MV E max moises: 160.5 cm/sec  MV max P.6 mmHg  MV mean PG: 3.4 mmHg  MV V2 VTI: 44.2 cm  MVA(VTI): 1.2 cm2  MV P1/2t max moises: 177.8 cm/sec  MV P1/2t: 73.4 msec  MVA(P1/2t): 3.0 cm2  MV dec slope: 709.5 cm/sec2  MV dec time: 0.20 sec  Ao V2 max: 189.4 cm/sec  Ao max P.0 mmHg  Ao V2 mean: 136.6 cm/sec  Ao mean P.4 mmHg  Ao V2 VTI: 41.1 cm  SILVESTRE(I,D): 1.3 cm2  SILVESTRE(V,D): 1.3 cm2  LV V1 max PG: 3.7 mmHg  LV V1 max: 95.9 cm/sec  LV V1 VTI: 21.6 cm  SV(LVOT): 53.7 ml  SI(LVOT): 30.8 ml/m2  PA acc time: 0.09 sec  TR max moises: 348.2 cm/sec  TR max P.5 mmHg  AV Moises Ratio (DI): 0.51  SILVESTRE Index (cm2/m2): 0.75  E/E' av.8  Lateral E/e': 20.8  Medial E/e': 30.7              _____________________________________________________________________________  __        Report approved by: Bishnu Rabago 2019 04:53 PM

## 2019-07-15 NOTE — PROGRESS NOTES
ANTICOAGULATION FOLLOW-UP CLINIC VISIT    Patient Name:  Zunilda Alicea May  Date:  7/15/2019  Contact Type:  Face to Face    SUBJECTIVE:  Patient Findings     Positives:   Upcoming invasive procedure (Patient will have right knee surgery on 19 ), Change in diet/appetite (Patient reports began drinking Ensure last week, will drink this on Mon, Wed, Fri and Saturday are the days patient will drink Ensure, will continue until after right knee surgery. )    Comments:   The patient was assessed for medication and activity level changes, missed or extra doses, bruising or bleeding, with no problem findings.          Clinical Outcomes     Comments:   The patient was assessed for medication and activity level changes, missed or extra doses, bruising or bleeding, with no problem findings.             OBJECTIVE    INR Protime   Date Value Ref Range Status   07/15/2019 2.3 (A) 0.86 - 1.14 Final       ASSESSMENT / PLAN  INR assessment THER    Recheck INR In: 9 DAYS    INR Location Clinic      Anticoagulation Summary  As of 7/15/2019    INR goal:   2.0-3.0   TTR:   86.6 % (3.1 y)   INR used for dosin.3 (7/15/2019)   Warfarin maintenance plan:   2.5 mg (2.5 mg x 1) every Sun, Wed, Fri; 5 mg (2.5 mg x 2) all other days   Full warfarin instructions:   2.5 mg every Sun, Wed, Fri; 5 mg all other days   Weekly warfarin total:   27.5 mg   No change documented:   Екатерина Mahan RN   Plan last modified:   Sonam Teixeira RN (3/20/2019)   Next INR check:   2019   Priority:   INR   Target end date:       Indications    Long term current use of anticoagulants with INR goal of 2.0-3.0 [Z79.01]  Atrial fibrillation and flutter (H) [I48.91  I48.92]             Anticoagulation Episode Summary     INR check location:       Preferred lab:       Send INR reminders to:   RANJIT COOK    Comments:   likes printed AVS      Anticoagulation Care Providers     Provider Role Specialty Phone number    Yunior Huang MD  [facility] Internal Medicine [phone]            See the Encounter Report to view Anticoagulation Flowsheet and Dosing Calendar (Go to Encounters tab in chart review, and find the Anticoagulation Therapy Visit)    Dosage adjustment made based on physician directed care plan.    Екатерина Mahan RN

## 2019-07-18 ENCOUNTER — OFFICE VISIT (OUTPATIENT)
Dept: CARDIOLOGY | Facility: CLINIC | Age: 79
End: 2019-07-18
Payer: MEDICARE

## 2019-07-18 VITALS
SYSTOLIC BLOOD PRESSURE: 104 MMHG | BODY MASS INDEX: 26.7 KG/M2 | HEART RATE: 68 BPM | HEIGHT: 64 IN | WEIGHT: 156.4 LBS | DIASTOLIC BLOOD PRESSURE: 56 MMHG

## 2019-07-18 DIAGNOSIS — Z95.3 S/P MITRAL VALVE REPLACEMENT WITH BIOPROSTHETIC VALVE: ICD-10-CM

## 2019-07-18 DIAGNOSIS — I10 BENIGN ESSENTIAL HYPERTENSION: ICD-10-CM

## 2019-07-18 DIAGNOSIS — I27.20 PULMONARY HYPERTENSION (H): ICD-10-CM

## 2019-07-18 DIAGNOSIS — I08.0 RHEUMATIC DISORDER OF BOTH MITRAL AND AORTIC VALVES: Primary | ICD-10-CM

## 2019-07-18 DIAGNOSIS — Z95.0 CARDIAC PACEMAKER IN SITU: ICD-10-CM

## 2019-07-18 DIAGNOSIS — I48.91 ATRIAL FIBRILLATION AND FLUTTER (H): ICD-10-CM

## 2019-07-18 DIAGNOSIS — I48.92 ATRIAL FIBRILLATION AND FLUTTER (H): ICD-10-CM

## 2019-07-18 PROCEDURE — 99214 OFFICE O/P EST MOD 30 MIN: CPT | Performed by: INTERNAL MEDICINE

## 2019-07-18 ASSESSMENT — MIFFLIN-ST. JEOR: SCORE: 1174.43

## 2019-07-18 NOTE — LETTER
2019      Yunior Huang MD  [address] [place] [address]      RE: Zunilda Clark       Dear Colleague,    I had the pleasure of seeing Zunilda Clark in the [facility].    Service Date: 2019      Yunior Huang MD   [facility]    [address] [place] [address]      RE: Zunilda Clark    MRN: [id number]   : 1940      Dear Dr. Huang:       It is my pleasure to again see Zunilda Clark for evaluation of pulmonary hypertension secondary to heart valve disease.  Unfortunately, Zunilda's  passed away unexpectedly this past week.  The patient was due to have a right total knee arthroplasty .  I have suggested that she put this off given her current grieving.  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  Her first bioprosthetic mitral valve was replaced in  and a redo 27 mm Magna bioprosthetic valve replaced in .  She is status post tricuspid valve annuloplasty with residual moderate severe tricuspid regurgitation with mildly dilated right ventricle and normal systolic function on cardiac MRI.  She has had an estimated RVSP as high as 85 mm in the past.  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  She had 14 mode switches, the longest of 35 minutes' duration.  She has adequate battery life left.  She remains on long-term warfarin anticoagulation for prophylaxis.  Her atrial fibrillation is paroxysmal.  She had a history of an ischemic stroke prior to warfarin therapy.  No known coronary artery disease on angiography in .  She has chronic rheumatoid arthritis on methotrexate therapy.  She was considering a right total knee arthroplasty in September.  She currently walks with a cane because of this.  She has New York Heart Association class II dyspnea and can walk up 2  flights of stairs.  She denies peripheral edema.  INRs are being managed by the INR Clinic, and the goal is between 2-3.  Dr. Tamie Monroy saw this patient for pulmonary hypertension, and an echocardiogram complete with a bubble study was ordered for 07/09.  The bubble study, however, was never completed.  She had moderately severe to severe tricuspid regurgitation and normal mitral valve prosthetic valve gradients.  RVSP was elevated to 49 mmHg, which is a marked improvement from previous.  Mild aortic stenosis was present with a mean gradient of 8 mmHg.  She was in atrial fibrillation with paced ventricle.      PHYSICAL EXAMINATION:  As listed below.      LABORATORY:  The patient's creatinine has bumped up to 1.37 and BUN of 36.  Potassium 3.9 and sodium 140, all from 07/09/2019.      ASSESSMENT:   1.  Zunilda Clark is a delightful, 79-year-old female with rheumatic valvular heart disease, status post mitral valve replacement.  She has pulmonary hypertension secondary to previous mitral stenosis.  Her pulmonary pressures have improved on current therapy.  This is at the expense of mild renal failure.  Her kidney dysfunction is not severe enough for us to discontinue the low-dose torsemide.  The patient remains on amlodipine and valsartan as well with blood pressure control.  She does not show any significant shortness of breath or peripheral edema.  We will continue with her current medications.   2.  We will retest an echocardiogram in 6 months and this time actually do a bubble study.   3.  We will recheck a BMP in 1 month.   4.  I have asked the patient to delay her right total knee arthroplasty until she has had time to grieve after her 's death.      It is my pleasure to assist in the care of Zunilda Clark.  All her questions were answered to her satisfaction.  Her daughter was in attendance for this visit.      Sincerely,      Timotyh Das MD, FACC         TIMOTHY DAS MD, FACC              D: 2019   T: 2019   MT: [id number]      Name:     NADIYA LEWIS   MRN:      [id number]        Account:      [id number]   :      1940           Service Date: 2019      Document: [id number]           Outpatient Encounter Medications as of 2019   Medication Sig Dispense Refill     ALLOPURINOL PO Take 100 mg by mouth 2 times daily       Amlodipine Besylate-Valsartan 5-160 MG TABS Take 1 tablet by mouth daily 90 tablet 3     calcium citrate (CALCITRATE) 950 MG tablet Take 1 tablet by mouth daily        folic acid (FOLVITE) 1 MG tablet Take 1 mg by mouth daily       ibandronate (BONIVA) 3 MG/3ML Inject 3 mg into the vein every 3 months        latanoprostene bunod (VYZULTA) 0.024 % SOLN ophthalmic solution Place 1 drop Into the left eye At Bedtime       levothyroxine (SYNTHROID/LEVOTHROID) 112 MCG tablet TAKE ONE TABLET BY MOUTH ONE TIME DAILY 90 tablet 3     METHOTREXATE SODIUM IJ Inject 0.8 mLs as directed once a week        metoprolol tartrate (LOPRESSOR) 50 MG tablet Take 1 tablet (50 mg) by mouth 2 times daily 200 tablet 3     multivitamin, therapeutic with minerals (MULTI-VITAMIN) TABS tablet Take 1 tablet by mouth daily Without iron       omeprazole (PRILOSEC) 20 MG DR capsule Take 1 capsule (20 mg) by mouth 2 times daily 180 capsule 1     torsemide (DEMADEX) 10 MG tablet Take 1 tablet (10 mg) by mouth daily (with breakfast) 90 tablet 3     VITAMIN D, CHOLECALCIFEROL, PO Take 1,000 Units by mouth daily       WARFARIN SODIUM PO Take 5 mg by mouth MON, WED and SAT or take as directed       No facility-administered encounter medications on file as of 2019.        Again, thank you for allowing me to participate in the care of your patient.      Sincerely,    Timothy Das MD     [facility]

## 2019-07-18 NOTE — PROGRESS NOTES
HPI and Plan:   See dictation:[id number]    Orders Placed This Encounter   Procedures     Basic metabolic panel     Follow-Up with Cardiologist     Echocardiogram Complete       No orders of the defined types were placed in this encounter.      There are no discontinued medications.      Encounter Diagnoses   Name Primary?     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 Yes     Pulmonary hypertension (H)      S/P mitral valve replacement with bioprosthetic valve      Atrial fibrillation and flutter (H)      Cardiac pacemaker in situ      Benign essential hypertension        CURRENT MEDICATIONS:  Current Outpatient Medications   Medication Sig Dispense Refill     ALLOPURINOL PO Take 100 mg by mouth 2 times daily       Amlodipine Besylate-Valsartan 5-160 MG TABS Take 1 tablet by mouth daily 90 tablet 3     calcium citrate (CALCITRATE) 950 MG tablet Take 1 tablet by mouth daily        folic acid (FOLVITE) 1 MG tablet Take 1 mg by mouth daily       ibandronate (BONIVA) 3 MG/3ML Inject 3 mg into the vein every 3 months        latanoprostene bunod (VYZULTA) 0.024 % SOLN ophthalmic solution Place 1 drop Into the left eye At Bedtime       levothyroxine (SYNTHROID/LEVOTHROID) 112 MCG tablet TAKE ONE TABLET BY MOUTH ONE TIME DAILY 90 tablet 3     METHOTREXATE SODIUM IJ Inject 0.8 mLs as directed once a week Thursdays       metoprolol tartrate (LOPRESSOR) 50 MG tablet Take 1 tablet (50 mg) by mouth 2 times daily 200 tablet 3     multivitamin, therapeutic with minerals (MULTI-VITAMIN) TABS tablet Take 1 tablet by mouth daily Without iron       omeprazole (PRILOSEC) 20 MG DR capsule Take 1 capsule (20 mg) by mouth 2 times daily 180 capsule 1     torsemide (DEMADEX) 10 MG tablet Take 1 tablet (10 mg) by mouth daily (with breakfast) 90 tablet 3     VITAMIN D, CHOLECALCIFEROL, PO Take 1,000 Units by mouth daily       WARFARIN SODIUM PO Take 5 mg by mouth MON, WED and SAT or take as directed         ALLERGIES   No Known  Allergies    PAST MEDICAL HISTORY:  Past Medical History:   Diagnosis Date     Acquired hypothyroidism 11/4/2015     Aortic valve insufficiency      Arrhythmia     A fib     Arthritis      Atrial fibrillation (H)      Atrial fibrillation and flutter (H)      Blood clotting disorder (H)      CHF (congestive heart failure) (H)      DVT (deep venous thrombosis) (H) 2003     Gastro-oesophageal reflux disease      H/O mitral valve replacement with tissue graft june 2014     History of blood transfusion 2014     HTN (hypertension)      Hypothyroidism      Other chronic pain      PONV (postoperative nausea and vomiting)      Pulmonary HTN (H)      Rheumatoid arthritis (H)      Rheumatoid arthritis(714.0)      Seizures (H) 2014     Shingles 08/2018     Stroke (H) 2009    left side mild weakness      Stroke (H) 2014     [place]     Thrombosis of leg 2003    both legs       PAST SURGICAL HISTORY:  Past Surgical History:   Procedure Laterality Date     ABDOMEN SURGERY      prolapsed bladder     APPLY WOUND VAC Left 12/18/2018    Procedure: Wound vac application;  Surgeon: Pardeep Sheikh MD;  Location: [facility]     ARTHROPLASTY KNEE Left 11/12/2018    Procedure: Left total knee arthroplasty using a [employer] Melissa II knee system;  Surgeon: Pardeep Sheikh MD;  Location: [facility]     ARTHROSCOPY KNEE WITH DEBRIDEMENT JOINT, COMBINED Left 12/18/2018    Procedure: Left knee debridement and placement of wound vac;  Surgeon: Pardeep Sheikh MD;  Location: [facility]     COLONOSCOPY  3/15/2012     EYE SURGERY  2018    Both eyes/cataract surgery     H ABLATION AV NODE  2011    AFlutter with syncope, PPM to follow     HERNIA REPAIR      3 hernies/right and left & umbilical     IMPLANT PACEMAKER  2011     LAPAROSCOPIC CHOLECYSTECTOMY WITH CHOLANGIOGRAMS N/A 4/29/2017    Procedure: LAPAROSCOPIC CHOLECYSTECTOMY WITH CHOLANGIOGRAMS;  LAPAROSCOPIC CHOLECYSTECTOMY WITH CHOLANGIOGRAMS ;   Surgeon: Angeles Mcgill MD;  Location: [facility]     OPEN REDUCTION INTERNAL FIXATION RODDING INTRAMEDULLARY HUMERUS Right 2015    Procedure: OPEN REDUCTION INTERNAL FIXATION RODDING INTRAMEDULLARY HUMERUS;  Surgeon: Raymundo Rivera MD;  Location: [facility]     REPLACE VALVE MITRAL  2006    Mitral valve replacement with bioprosthetic valve in  for rheumatic disease     SLING BLADDER SUSPENSION WITH FASCIA UMESH       VASCULAR SURGERY      Blood clots in both legs       FAMILY HISTORY:  Family History   Problem Relation Age of Onset     Coronary Artery Disease Mother      Coronary Artery Disease Brother      Diabetes Brother      Cancer Brother          at age 52      Other Cancer Brother      Hypertension Sister      Hyperlipidemia Sister        SOCIAL HISTORY:  Social History     Socioeconomic History     Marital status:      Spouse name: None     Number of children: None     Years of education: None     Highest education level: None   Occupational History     None   Social Needs     Financial resource strain: None     Food insecurity:     Worry: None     Inability: None     Transportation needs:     Medical: None     Non-medical: None   Tobacco Use     Smoking status: Never Smoker     Smokeless tobacco: Never Used   Substance and Sexual Activity     Alcohol use: No     Alcohol/week: 0.0 oz     Drug use: No     Sexual activity: Yes     Partners: Male   Lifestyle     Physical activity:     Days per week: None     Minutes per session: None     Stress: None   Relationships     Social connections:     Talks on phone: None     Gets together: None     Attends [employer] service: None     Active member of club or organization: None     Attends meetings of clubs or organizations: None     Relationship status: None     Intimate partner violence:     Fear of current or ex partner: None     Emotionally abused: None     Physically abused: None     Forced sexual activity: None   Other Topics Concern      " Service Not Asked     Blood Transfusions Not Asked     Caffeine Concern Yes     Comment: occasionally     Occupational Exposure Not Asked     Hobby Hazards Not Asked     Sleep Concern No     Stress Concern Not Asked     Weight Concern Not Asked     Special Diet Yes     Comment: lower salt     Back Care No     Exercise Yes     Comment: walking ride excercise bike     Bike Helmet Not Asked     Seat Belt Not Asked     Self-Exams Not Asked     Parent/sibling w/ CABG, MI or angioplasty before 65F 55M? Yes     Comment: Brother   Social History Narrative     None       Review of Systems:  Skin:  Negative       Eyes:  Positive for glasses had cataracts surgery - both eyes - improved vision since; they are watching for glaucoma  ENT:  Negative      Respiratory:  Positive for cough productive cough   Cardiovascular:  Negative      Gastroenterology: Positive for reflux controlled with meds  Genitourinary:  Positive for urinary frequency;nocturia 1+ times per night; due to meds  Musculoskeletal:  Positive for back pain;arthritis left side back ache uses heat and it goes away; left knee replacement; will need a right knee replacement; hands lock up on her  Neurologic:  Positive for numbness or tingling of feet    Psychiatric:  Positive for    recently passed away on Monday  Heme/Lymph/Imm:  Positive for easy bruising Warfarin  (INR managed by PCP)  Endocrine:  Positive for thyroid disorder      Physical Exam:  Vitals: /56 (BP Location: Right arm, Patient Position: Chair, Cuff Size: Adult Large)   Pulse 68   Ht 1.626 m (5' 4\")   Wt 70.9 kg (156 lb 6.4 oz)   BMI 26.85 kg/m      Constitutional:  cooperative, alert and oriented, well developed, well nourished, in no acute distress        Skin:  warm and dry to the touch, no apparent skin lesions or masses noted   pacemaker incision in the left infraclavicular area was well-healed      Head:  normocephalic, no masses or lesions        Eyes:  pupils equal and " round;conjunctivae and lids unremarkable;sclera white;no xanthalasma;EOMS intact        Lymph:      ENT:  no pallor or cyanosis, dentition good        Neck:  carotid pulses are full and equal bilaterally, JVP normal, no carotid bruit        Respiratory:  normal breath sounds, clear to auscultation, normal A-P diameter, normal symmetry, normal respiratory excursion, no use of accessory muscles;healed median sternotomy scar;clear to auscultation;normal respiratory excursion         Cardiac: regular rhythm;normal S1 and S2;no S3 or S4;apical impulse not displaced   fixed split S2   LLSB;systolic ejection murmur;grade 2;radiation to the RUSB   diastolic murmur;grade 1    pulses full and equal, no bruits auscultated                                        GI:  abdomen soft, non-tender, BS normoactive, no mass, no HSM, no bruits        Extremities and Muscular Skeletal:  no edema arthritic deformities of UE            Neurological:  affect appropriate left sided weakness walks with a cane    Psych:  Alert and Oriented x 3        CC  Yunior Huang MD  [address] [place] [address]

## 2019-07-18 NOTE — PROGRESS NOTES
Service Date: 2019      Yunior Huang MD   [facility] [address] [place] [address]      RE: Zunilda lCark    MRN: [id number]   : 1940      Dear Dr. Huang:       It is my pleasure to again see Zunilda Clark for evaluation of pulmonary hypertension secondary to heart valve disease.  Unfortunately, Zunilda's  passed away unexpectedly this past week.  The patient was due to have a right total knee arthroplasty .  I have suggested that she put this off given her current grieving.  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  Her first bioprosthetic mitral valve was replaced in  and a redo 27 mm Magna bioprosthetic valve replaced in .  She is status post tricuspid valve annuloplasty with residual moderate severe tricuspid regurgitation with mildly dilated right ventricle and normal systolic function on cardiac MRI.  She has had an estimated RVSP as high as 85 mm in the past.  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  She had 14 mode switches, the longest of 35 minutes' duration.  She has adequate battery life left.  She remains on long-term warfarin anticoagulation for prophylaxis.  Her atrial fibrillation is paroxysmal.  She had a history of an ischemic stroke prior to warfarin therapy.  No known coronary artery disease on angiography in .  She has chronic rheumatoid arthritis on methotrexate therapy.  She was considering a right total knee arthroplasty in September.  She currently walks with a cane because of this.  She has New York Heart Association class II dyspnea and can walk up 2 flights of stairs.  She denies peripheral edema.  INRs are being managed by the [facility], and the goal is between 2-3.  Dr. Tamie Monroy saw this patient for pulmonary hypertension, and an echocardiogram complete with a bubble study  was ordered for .  The bubble study, however, was never completed.  She had moderately severe to severe tricuspid regurgitation and normal mitral valve prosthetic valve gradients.  RVSP was elevated to 49 mmHg, which is a marked improvement from previous.  Mild aortic stenosis was present with a mean gradient of 8 mmHg.  She was in atrial fibrillation with paced ventricle.      PHYSICAL EXAMINATION:  As listed below.      LABORATORY:  The patient's creatinine has bumped up to 1.37 and BUN of 36.  Potassium 3.9 and sodium 140, all from 2019.      ASSESSMENT:   1.  Nadiya Clark is a delightful, 79-year-old female with rheumatic valvular heart disease, status post mitral valve replacement.  She has pulmonary hypertension secondary to previous mitral stenosis.  Her pulmonary pressures have improved on current therapy.  This is at the expense of mild renal failure.  Her kidney dysfunction is not severe enough for us to discontinue the low-dose torsemide.  The patient remains on amlodipine and valsartan as well with blood pressure control.  She does not show any significant shortness of breath or peripheral edema.  We will continue with her current medications.   2.  We will retest an echocardiogram in 6 months and this time actually do a bubble study.   3.  We will recheck a BMP in 1 month.   4.  I have asked the patient to delay her right total knee arthroplasty until she has had time to grieve after her 's death.      It is my pleasure to assist in the care of Nadiya Clark.  All her questions were answered to her satisfaction.  Her daughter was in attendance for this visit.      Sincerely,      Marcelino Das MD, FACC         MARCELINO DAS MD, FACC             D: 2019   T: 2019   MT: rodri      Name:     NADIYA CLARK   MRN:      [id number]        Account:      [id number]   :      1940           Service Date: 2019      Document: [id number]

## 2019-07-18 NOTE — LETTER
7/18/2019    Yunior Huang MD  [address] [facility] [place] [address]    RE: Zunilda Clark       Dear Colleague,    I had the pleasure of seeing Zunilda Clark in the [facility].    HPI and Plan:   See dictation:[id number]    Orders Placed This Encounter   Procedures     Basic metabolic panel     Follow-Up with Cardiologist     Echocardiogram Complete       No orders of the defined types were placed in this encounter.      There are no discontinued medications.      Encounter Diagnoses   Name Primary?     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 Yes     Pulmonary hypertension (H)      S/P mitral valve replacement with bioprosthetic valve      Atrial fibrillation and flutter (H)      Cardiac pacemaker in situ      Benign essential hypertension        CURRENT MEDICATIONS:  Current Outpatient Medications   Medication Sig Dispense Refill     ALLOPURINOL PO Take 100 mg by mouth 2 times daily       Amlodipine Besylate-Valsartan 5-160 MG TABS Take 1 tablet by mouth daily 90 tablet 3     calcium citrate (CALCITRATE) 950 MG tablet Take 1 tablet by mouth daily        folic acid (FOLVITE) 1 MG tablet Take 1 mg by mouth daily       ibandronate (BONIVA) 3 MG/3ML Inject 3 mg into the vein every 3 months        latanoprostene bunod (VYZULTA) 0.024 % SOLN ophthalmic solution Place 1 drop Into the left eye At Bedtime       levothyroxine (SYNTHROID/LEVOTHROID) 112 MCG tablet TAKE ONE TABLET BY MOUTH ONE TIME DAILY 90 tablet 3     METHOTREXATE SODIUM IJ Inject 0.8 mLs as directed once a week Thursdays       metoprolol tartrate (LOPRESSOR) 50 MG tablet Take 1 tablet (50 mg) by mouth 2 times daily 200 tablet 3     multivitamin, therapeutic with minerals (MULTI-VITAMIN) TABS tablet Take 1 tablet by mouth daily Without iron       omeprazole (PRILOSEC) 20 MG DR capsule Take 1 capsule (20 mg) by mouth 2 times daily 180 capsule 1     torsemide (DEMADEX) 10 MG tablet Take 1 tablet (10 mg)  by mouth daily (with breakfast) 90 tablet 3     VITAMIN D, CHOLECALCIFEROL, PO Take 1,000 Units by mouth daily       WARFARIN SODIUM PO Take 5 mg by mouth MON, WED and SAT or take as directed         ALLERGIES   No Known Allergies    PAST MEDICAL HISTORY:  Past Medical History:   Diagnosis Date     Acquired hypothyroidism 11/4/2015     Aortic valve insufficiency      Arrhythmia     A fib     Arthritis      Atrial fibrillation (H)      Atrial fibrillation and flutter (H)      Blood clotting disorder (H)      CHF (congestive heart failure) (H)      DVT (deep venous thrombosis) (H) 2003     Gastro-oesophageal reflux disease      H/O mitral valve replacement with tissue graft june 2014     History of blood transfusion 2014     HTN (hypertension)      Hypothyroidism      Other chronic pain      PONV (postoperative nausea and vomiting)      Pulmonary HTN (H)      Rheumatoid arthritis (H)      Rheumatoid arthritis(714.0)      Seizures (H) 2014     Shingles 08/2018     Stroke (H) 2009    left side mild weakness      Stroke (H) 2014     [place]     Thrombosis of leg 2003    both legs       PAST SURGICAL HISTORY:  Past Surgical History:   Procedure Laterality Date     ABDOMEN SURGERY      prolapsed bladder     APPLY WOUND VAC Left 12/18/2018    Procedure: Wound vac application;  Surgeon: Pardeep Sheikh MD;  Location: [facility] OR     ARTHROPLASTY KNEE Left 11/12/2018    Procedure: Left total knee arthroplasty using a [employer] Melissa II knee system;  Surgeon: Pardeep Sheikh MD;  Location: [facility]     ARTHROSCOPY KNEE WITH DEBRIDEMENT JOINT, COMBINED Left 12/18/2018    Procedure: Left knee debridement and placement of wound vac;  Surgeon: Pardeep Sheikh MD;  Location: [facility]     COLONOSCOPY  3/15/2012     EYE SURGERY  2018    Both eyes/cataract surgery     H ABLATION AV NODE  2011    AFlutter with syncope, PPM to follow     HERNIA REPAIR      3 hernies/right and left &  umbilical     IMPLANT PACEMAKER       LAPAROSCOPIC CHOLECYSTECTOMY WITH CHOLANGIOGRAMS N/A 2017    Procedure: LAPAROSCOPIC CHOLECYSTECTOMY WITH CHOLANGIOGRAMS;  LAPAROSCOPIC CHOLECYSTECTOMY WITH CHOLANGIOGRAMS ;  Surgeon: Angeles Mcgill MD;  Location: [facility]     OPEN REDUCTION INTERNAL FIXATION RODDING INTRAMEDULLARY HUMERUS Right 2015    Procedure: OPEN REDUCTION INTERNAL FIXATION RODDING INTRAMEDULLARY HUMERUS;  Surgeon: Raymundo Rivera MD;  Location: [facility]     REPLACE VALVE MITRAL  2006    Mitral valve replacement with bioprosthetic valve in  for rheumatic disease     SLING BLADDER SUSPENSION WITH FASCIA UMESH       VASCULAR SURGERY      Blood clots in both legs       FAMILY HISTORY:  Family History   Problem Relation Age of Onset     Coronary Artery Disease Mother      Coronary Artery Disease Brother      Diabetes Brother      Cancer Brother          at age 52      Other Cancer Brother      Hypertension Sister      Hyperlipidemia Sister        SOCIAL HISTORY:  Social History     Socioeconomic History     Marital status:      Spouse name: None     Number of children: None     Years of education: None     Highest education level: None   Occupational History     None   Social Needs     Financial resource strain: None     Food insecurity:     Worry: None     Inability: None     Transportation needs:     Medical: None     Non-medical: None   Tobacco Use     Smoking status: Never Smoker     Smokeless tobacco: Never Used   Substance and Sexual Activity     Alcohol use: No     Alcohol/week: 0.0 oz     Drug use: No     Sexual activity: Yes     Partners: Male   Lifestyle     Physical activity:     Days per week: None     Minutes per session: None     Stress: None   Relationships     Social connections:     Talks on phone: None     Gets together: None     Attends Pentecostalism service: None     Active member of club or organization: None     Attends meetings of clubs or organizations:  "None     Relationship status: None     Intimate partner violence:     Fear of current or ex partner: None     Emotionally abused: None     Physically abused: None     Forced sexual activity: None   Other Topics Concern      Service Not Asked     Blood Transfusions Not Asked     Caffeine Concern Yes     Comment: occasionally     Occupational Exposure Not Asked     Hobby Hazards Not Asked     Sleep Concern No     Stress Concern Not Asked     Weight Concern Not Asked     Special Diet Yes     Comment: lower salt     Back Care No     Exercise Yes     Comment: walking ride excercise bike     Bike Helmet Not Asked     Seat Belt Not Asked     Self-Exams Not Asked     Parent/sibling w/ CABG, MI or angioplasty before 65F 55M? Yes     Comment: Brother   Social History Narrative     None       Review of Systems:  Skin:  Negative       Eyes:  Positive for glasses had cataracts surgery - both eyes - improved vision since; they are watching for glaucoma  ENT:  Negative      Respiratory:  Positive for cough productive cough   Cardiovascular:  Negative      Gastroenterology: Positive for reflux controlled with meds  Genitourinary:  Positive for urinary frequency;nocturia 1+ times per night; due to meds  Musculoskeletal:  Positive for back pain;arthritis left side back ache uses heat and it goes away; left knee replacement; will need a right knee replacement; hands lock up on her  Neurologic:  Positive for numbness or tingling of feet    Psychiatric:  Positive for    recently passed away on Monday  Heme/Lymph/Imm:  Positive for easy bruising Warfarin  (INR managed by PCP)  Endocrine:  Positive for thyroid disorder      Physical Exam:  Vitals: /56 (BP Location: Right arm, Patient Position: Chair, Cuff Size: Adult Large)   Pulse 68   Ht 1.626 m (5' 4\")   Wt 70.9 kg (156 lb 6.4 oz)   BMI 26.85 kg/m       Constitutional:  cooperative, alert and oriented, well developed, well nourished, in no acute distress    "     Skin:  warm and dry to the touch, no apparent skin lesions or masses noted   pacemaker incision in the left infraclavicular area was well-healed      Head:  normocephalic, no masses or lesions        Eyes:  pupils equal and round;conjunctivae and lids unremarkable;sclera white;no xanthalasma;EOMS intact        Lymph:      ENT:  no pallor or cyanosis, dentition good        Neck:  carotid pulses are full and equal bilaterally, JVP normal, no carotid bruit        Respiratory:  normal breath sounds, clear to auscultation, normal A-P diameter, normal symmetry, normal respiratory excursion, no use of accessory muscles;healed median sternotomy scar;clear to auscultation;normal respiratory excursion         Cardiac: regular rhythm;normal S1 and S2;no S3 or S4;apical impulse not displaced   fixed split S2   LLSB;systolic ejection murmur;grade 2;radiation to the RUSB   diastolic murmur;grade 1    pulses full and equal, no bruits auscultated                                        GI:  abdomen soft, non-tender, BS normoactive, no mass, no HSM, no bruits        Extremities and Muscular Skeletal:  no edema arthritic deformities of UE            Neurological:  affect appropriate left sided weakness walks with a cane    Psych:  Alert and Oriented x 3        CC  Yunior Huang MD  [address] [place] [address]                Thank you for allowing me to participate in the care of your patient.      Sincerely,     Timothy Das MD     [facility]    cc:   Yunior Huang MD  [address]  [place] [phone]

## 2019-07-25 ENCOUNTER — ANTICOAGULATION THERAPY VISIT (OUTPATIENT)
Dept: ANTICOAGULATION | Facility: CLINIC | Age: 79
End: 2019-07-25
Payer: MEDICARE

## 2019-07-25 DIAGNOSIS — Z79.01 LONG TERM CURRENT USE OF ANTICOAGULANTS WITH INR GOAL OF 2.0-3.0: ICD-10-CM

## 2019-07-25 DIAGNOSIS — I48.91 ATRIAL FIBRILLATION AND FLUTTER (H): ICD-10-CM

## 2019-07-25 DIAGNOSIS — I48.92 ATRIAL FIBRILLATION AND FLUTTER (H): ICD-10-CM

## 2019-07-25 LAB — INR POINT OF CARE: 2.2 (ref 0.86–1.14)

## 2019-07-25 PROCEDURE — 99207 ZZC NO CHARGE NURSE ONLY: CPT

## 2019-07-25 PROCEDURE — 85610 PROTHROMBIN TIME: CPT | Mod: QW

## 2019-07-25 PROCEDURE — 36416 COLLJ CAPILLARY BLOOD SPEC: CPT

## 2019-07-25 NOTE — PROGRESS NOTES
ANTICOAGULATION FOLLOW-UP CLINIC VISIT    Patient Name:  Zunilda Alicea May  Date:  2019  Contact Type:  Face to Face    SUBJECTIVE:  Patient Findings     Comments:   The patient was assessed for diet, medication, and activity level changes, missed or extra doses, bruising or bleeding, with no problem findings.  Patient's  passed away last week.  She is not sure what days she drank the Ensure, but will try to continue with this.  She is postponing her knee surgery for now.          Clinical Outcomes     Negatives:   Major bleeding event, Thromboembolic event, Anticoagulation-related hospital admission, Anticoagulation-related ED visit, Anticoagulation-related fatality    Comments:   The patient was assessed for diet, medication, and activity level changes, missed or extra doses, bruising or bleeding, with no problem findings.  Patient's  passed away last week.  She is not sure what days she drank the Ensure, but will try to continue with this.  She is postponing her knee surgery for now.             OBJECTIVE    INR Protime   Date Value Ref Range Status   2019 2.2 (A) 0.86 - 1.14 Final       ASSESSMENT / PLAN  INR assessment THER    Recheck INR In: 4 WEEKS    INR Location Clinic      Anticoagulation Summary  As of 2019    INR goal:   2.0-3.0   TTR:   86.7 % (3.2 y)   INR used for dosin.2 (2019)   Warfarin maintenance plan:   2.5 mg (2.5 mg x 1) every Sun, Wed, Fri; 5 mg (2.5 mg x 2) all other days   Full warfarin instructions:   2.5 mg every Sun, Wed, Fri; 5 mg all other days   Weekly warfarin total:   27.5 mg   Plan last modified:   Sonam Teixeira RN (3/20/2019)   Next INR check:   2019   Priority:   INR   Target end date:       Indications    Long term current use of anticoagulants with INR goal of 2.0-3.0 [Z79.01]  Atrial fibrillation and flutter (H) [I48.91  I48.92]             Anticoagulation Episode Summary     INR check location:       Preferred lab:       Send INR  reminders to:   RANJIT COOK    Comments:   likes printed AVS      Anticoagulation Care Providers     Provider Role Specialty Phone number    Yunior Huang MD Responsible Internal Medicine [phone]            See the Encounter Report to view Anticoagulation Flowsheet and Dosing Calendar (Go to Encounters tab in chart review, and find the Anticoagulation Therapy Visit)    Dosage adjustment made based on physician directed care plan.    Sonam Teixiera RN

## 2019-08-07 DIAGNOSIS — I10 BENIGN ESSENTIAL HYPERTENSION: ICD-10-CM

## 2019-08-08 NOTE — TELEPHONE ENCOUNTER
"Requested Prescriptions   Pending Prescriptions Disp Refills     amLODIPine-valsartan (EXFORGE) 5-160 MG tablet [Pharmacy Med Name: amLODIPine Besylate-Valsartan Oral Tablet 5-160 MG] 90 tablet 2     Sig: TAKE ONE TABLET BY MOUTH ONE TIME DAILY   Last Written Prescription Date:  08/17/2018  Last Fill Quantity: 90,  # refills: 03   Last office visit: 4/10/2019 with prescribing provider:     Future Office Visit:      Angiotensin-II Receptors Failed - 8/7/2019  5:38 PM        Failed - Normal serum creatinine on file in past 12 months     Recent Labs   Lab Test 07/09/19  1535  07/30/18  0933   CR 1.37*  1.40*   < >  --    CREAT  --   --  1.1*    < > = values in this interval not displayed.             Passed - Last blood pressure under 140/90 in past 12 months     BP Readings from Last 3 Encounters:   07/18/19 104/56   05/23/19 120/60   04/10/19 140/62                 Passed - Recent (12 mo) or future (30 days) visit within the authorizing provider's specialty     Patient had office visit in the last 12 months or has a visit in the next 30 days with authorizing provider or within the authorizing provider's specialty.  See \"Patient Info\" tab in inbasket, or \"Choose Columns\" in Meds & Orders section of the refill encounter.              Passed - Medication is active on med list        Passed - Patient is age 18 or older        Passed - No active pregnancy on record        Passed - Normal serum potassium on file in past 12 months     Recent Labs   Lab Test 07/09/19  1535   POTASSIUM 3.9                    Passed - No positive pregnancy test in past 12 months        "

## 2019-08-08 NOTE — TELEPHONE ENCOUNTER
Routing refill request to provider for review/approval because:  Labs out of range:  CR    Per office visit note on 4/10/19,  follow up yearly.

## 2019-08-12 RX ORDER — AMLODIPINE AND VALSARTAN 5; 160 MG/1; MG/1
TABLET ORAL
Qty: 90 TABLET | Refills: 2 | Status: SHIPPED | OUTPATIENT
Start: 2019-08-12 | End: 2020-04-24

## 2019-08-19 DIAGNOSIS — I10 BENIGN ESSENTIAL HYPERTENSION: ICD-10-CM

## 2019-08-19 LAB
ANION GAP SERPL CALCULATED.3IONS-SCNC: 3 MMOL/L (ref 3–14)
BUN SERPL-MCNC: 26 MG/DL (ref 7–30)
CALCIUM SERPL-MCNC: 9.5 MG/DL (ref 8.5–10.1)
CHLORIDE SERPL-SCNC: 106 MMOL/L (ref 94–109)
CO2 SERPL-SCNC: 32 MMOL/L (ref 20–32)
CREAT SERPL-MCNC: 0.84 MG/DL (ref 0.52–1.04)
GFR SERPL CREATININE-BSD FRML MDRD: 67 ML/MIN/{1.73_M2}
GLUCOSE SERPL-MCNC: 74 MG/DL (ref 70–99)
POTASSIUM SERPL-SCNC: 3.9 MMOL/L (ref 3.4–5.3)
SODIUM SERPL-SCNC: 141 MMOL/L (ref 133–144)

## 2019-08-19 PROCEDURE — 36415 COLL VENOUS BLD VENIPUNCTURE: CPT | Performed by: INTERNAL MEDICINE

## 2019-08-19 PROCEDURE — 80048 BASIC METABOLIC PNL TOTAL CA: CPT | Performed by: INTERNAL MEDICINE

## 2019-08-20 ENCOUNTER — TELEPHONE (OUTPATIENT)
Dept: CARDIOLOGY | Facility: CLINIC | Age: 79
End: 2019-08-20

## 2019-08-20 NOTE — TELEPHONE ENCOUNTER
Notes recorded by Timothy Das MD on 8/19/2019 at 10:16 PM CDT  Normalization of the patient's renal function over the last one month.  Continue with the current medication.  Timothy Das MD, [facility]  August 19, 2019 10:16 PM    Contacted patient to review results and Dr. Das's comments. Patient did not answer. Left detailed message with results and Dr. Das's comments. Instructed patient to call back with any further questions.

## 2019-08-23 ENCOUNTER — ANCILLARY PROCEDURE (OUTPATIENT)
Dept: CARDIOLOGY | Facility: CLINIC | Age: 79
End: 2019-08-23
Attending: INTERNAL MEDICINE
Payer: MEDICARE

## 2019-08-23 DIAGNOSIS — Z95.0 PACEMAKER: ICD-10-CM

## 2019-08-23 PROCEDURE — 93294 REM INTERROG EVL PM/LDLS PM: CPT | Performed by: INTERNAL MEDICINE

## 2019-08-23 PROCEDURE — 93296 REM INTERROG EVL PM/IDS: CPT | Performed by: INTERNAL MEDICINE

## 2019-08-24 LAB
MDC_IDC_EPISODE_DTM: NORMAL
MDC_IDC_EPISODE_DURATION: 100 S
MDC_IDC_EPISODE_DURATION: 107 S
MDC_IDC_EPISODE_DURATION: 139 S
MDC_IDC_EPISODE_DURATION: 177 S
MDC_IDC_EPISODE_DURATION: 213 S
MDC_IDC_EPISODE_DURATION: 55 S
MDC_IDC_EPISODE_ID: 226
MDC_IDC_EPISODE_ID: 227
MDC_IDC_EPISODE_ID: 228
MDC_IDC_EPISODE_ID: 229
MDC_IDC_EPISODE_ID: 230
MDC_IDC_EPISODE_ID: 231
MDC_IDC_EPISODE_TYPE: NORMAL
MDC_IDC_LEAD_IMPLANT_DT: NORMAL
MDC_IDC_LEAD_IMPLANT_DT: NORMAL
MDC_IDC_LEAD_LOCATION: NORMAL
MDC_IDC_LEAD_LOCATION: NORMAL
MDC_IDC_LEAD_MFG: NORMAL
MDC_IDC_LEAD_MFG: NORMAL
MDC_IDC_LEAD_MODEL: NORMAL
MDC_IDC_LEAD_MODEL: NORMAL
MDC_IDC_LEAD_POLARITY_TYPE: NORMAL
MDC_IDC_LEAD_POLARITY_TYPE: NORMAL
MDC_IDC_LEAD_SERIAL: NORMAL
MDC_IDC_LEAD_SERIAL: NORMAL
MDC_IDC_MSMT_BATTERY_DTM: NORMAL
MDC_IDC_MSMT_BATTERY_STATUS: NORMAL
MDC_IDC_MSMT_BATTERY_VOLTAGE: 2.9 V
MDC_IDC_MSMT_LEADCHNL_RA_IMPEDANCE_VALUE: 504 OHM
MDC_IDC_MSMT_LEADCHNL_RA_SENSING_INTR_AMPL: 0.81 MV
MDC_IDC_MSMT_LEADCHNL_RV_IMPEDANCE_VALUE: 392 OHM
MDC_IDC_MSMT_LEADCHNL_RV_SENSING_INTR_AMPL: 11.6 MV
MDC_IDC_PG_IMPLANT_DTM: NORMAL
MDC_IDC_PG_MFG: NORMAL
MDC_IDC_PG_MODEL: NORMAL
MDC_IDC_PG_SERIAL: NORMAL
MDC_IDC_PG_TYPE: NORMAL
MDC_IDC_SESS_CLINIC_NAME: NORMAL
MDC_IDC_SESS_DTM: NORMAL
MDC_IDC_SESS_TYPE: NORMAL
MDC_IDC_SET_BRADY_AT_MODE_SWITCH_RATE: 171 {BEATS}/MIN
MDC_IDC_SET_BRADY_HYSTRATE: NORMAL
MDC_IDC_SET_BRADY_LOWRATE: 60 {BEATS}/MIN
MDC_IDC_SET_BRADY_MAX_SENSOR_RATE: 130 {BEATS}/MIN
MDC_IDC_SET_BRADY_MAX_TRACKING_RATE: 130 {BEATS}/MIN
MDC_IDC_SET_BRADY_MODE: NORMAL
MDC_IDC_SET_BRADY_PAV_DELAY_LOW: 180 MS
MDC_IDC_SET_BRADY_SAV_DELAY_LOW: 150 MS
MDC_IDC_SET_LEADCHNL_RA_PACING_AMPLITUDE: 2 V
MDC_IDC_SET_LEADCHNL_RA_PACING_ANODE_ELECTRODE_1: NORMAL
MDC_IDC_SET_LEADCHNL_RA_PACING_ANODE_LOCATION_1: NORMAL
MDC_IDC_SET_LEADCHNL_RA_PACING_CATHODE_ELECTRODE_1: NORMAL
MDC_IDC_SET_LEADCHNL_RA_PACING_CATHODE_LOCATION_1: NORMAL
MDC_IDC_SET_LEADCHNL_RA_PACING_POLARITY: NORMAL
MDC_IDC_SET_LEADCHNL_RA_PACING_PULSEWIDTH: 0.4 MS
MDC_IDC_SET_LEADCHNL_RA_SENSING_ANODE_ELECTRODE_1: NORMAL
MDC_IDC_SET_LEADCHNL_RA_SENSING_ANODE_LOCATION_1: NORMAL
MDC_IDC_SET_LEADCHNL_RA_SENSING_CATHODE_ELECTRODE_1: NORMAL
MDC_IDC_SET_LEADCHNL_RA_SENSING_CATHODE_LOCATION_1: NORMAL
MDC_IDC_SET_LEADCHNL_RA_SENSING_POLARITY: NORMAL
MDC_IDC_SET_LEADCHNL_RA_SENSING_SENSITIVITY: 0.45 MV
MDC_IDC_SET_LEADCHNL_RV_PACING_AMPLITUDE: 2 V
MDC_IDC_SET_LEADCHNL_RV_PACING_ANODE_ELECTRODE_1: NORMAL
MDC_IDC_SET_LEADCHNL_RV_PACING_ANODE_LOCATION_1: NORMAL
MDC_IDC_SET_LEADCHNL_RV_PACING_CATHODE_ELECTRODE_1: NORMAL
MDC_IDC_SET_LEADCHNL_RV_PACING_CATHODE_LOCATION_1: NORMAL
MDC_IDC_SET_LEADCHNL_RV_PACING_POLARITY: NORMAL
MDC_IDC_SET_LEADCHNL_RV_PACING_PULSEWIDTH: 0.4 MS
MDC_IDC_SET_LEADCHNL_RV_SENSING_ANODE_ELECTRODE_1: NORMAL
MDC_IDC_SET_LEADCHNL_RV_SENSING_ANODE_LOCATION_1: NORMAL
MDC_IDC_SET_LEADCHNL_RV_SENSING_CATHODE_ELECTRODE_1: NORMAL
MDC_IDC_SET_LEADCHNL_RV_SENSING_CATHODE_LOCATION_1: NORMAL
MDC_IDC_SET_LEADCHNL_RV_SENSING_POLARITY: NORMAL
MDC_IDC_SET_LEADCHNL_RV_SENSING_SENSITIVITY: 2.1 MV
MDC_IDC_SET_ZONE_DETECTION_INTERVAL: 350 MS
MDC_IDC_SET_ZONE_DETECTION_INTERVAL: 400 MS
MDC_IDC_SET_ZONE_TYPE: NORMAL
MDC_IDC_STAT_AT_BURDEN_PERCENT: 0 %
MDC_IDC_STAT_AT_DTM_END: NORMAL
MDC_IDC_STAT_AT_DTM_START: NORMAL
MDC_IDC_STAT_BRADY_AP_VP_PERCENT: 22.58 %
MDC_IDC_STAT_BRADY_AP_VS_PERCENT: 23.82 %
MDC_IDC_STAT_BRADY_AS_VP_PERCENT: 17.6 %
MDC_IDC_STAT_BRADY_AS_VS_PERCENT: 36 %
MDC_IDC_STAT_BRADY_DTM_END: NORMAL
MDC_IDC_STAT_BRADY_DTM_START: NORMAL
MDC_IDC_STAT_BRADY_RA_PERCENT_PACED: 45.64 %
MDC_IDC_STAT_BRADY_RV_PERCENT_PACED: 40.12 %
MDC_IDC_STAT_EPISODE_RECENT_COUNT: 0
MDC_IDC_STAT_EPISODE_RECENT_COUNT: 6
MDC_IDC_STAT_EPISODE_RECENT_COUNT_DTM_END: NORMAL
MDC_IDC_STAT_EPISODE_RECENT_COUNT_DTM_START: NORMAL
MDC_IDC_STAT_EPISODE_TOTAL_COUNT: 1
MDC_IDC_STAT_EPISODE_TOTAL_COUNT: 13
MDC_IDC_STAT_EPISODE_TOTAL_COUNT: 212
MDC_IDC_STAT_EPISODE_TOTAL_COUNT: 3
MDC_IDC_STAT_EPISODE_TOTAL_COUNT_DTM_END: NORMAL
MDC_IDC_STAT_EPISODE_TYPE: NORMAL

## 2019-08-26 ENCOUNTER — ANTICOAGULATION THERAPY VISIT (OUTPATIENT)
Dept: ANTICOAGULATION | Facility: CLINIC | Age: 79
End: 2019-08-26
Payer: MEDICARE

## 2019-08-26 DIAGNOSIS — Z79.01 LONG TERM CURRENT USE OF ANTICOAGULANTS WITH INR GOAL OF 2.0-3.0: ICD-10-CM

## 2019-08-26 DIAGNOSIS — I48.92 ATRIAL FIBRILLATION AND FLUTTER (H): ICD-10-CM

## 2019-08-26 DIAGNOSIS — I48.91 ATRIAL FIBRILLATION AND FLUTTER (H): ICD-10-CM

## 2019-08-26 LAB — INR POINT OF CARE: 2.6 (ref 0.86–1.14)

## 2019-08-26 PROCEDURE — 99207 ZZC NO CHARGE NURSE ONLY: CPT

## 2019-08-26 PROCEDURE — 36416 COLLJ CAPILLARY BLOOD SPEC: CPT

## 2019-08-26 PROCEDURE — 85610 PROTHROMBIN TIME: CPT | Mod: QW

## 2019-08-26 RX ORDER — WARFARIN SODIUM 2.5 MG/1
TABLET ORAL
Qty: 135 TABLET | Refills: 0 | Status: SHIPPED | OUTPATIENT
Start: 2019-08-26 | End: 2019-12-19

## 2019-08-26 NOTE — PROGRESS NOTES
ANTICOAGULATION FOLLOW-UP CLINIC VISIT    Patient Name:  Zunilda Alicea May  Date:  2019  Contact Type:  Face to Face    SUBJECTIVE:  Patient Findings     Positives:   Missed doses (Patient reports missing a dose of warfarin more than 7 days ago. )    Comments:   The patient was assessed for diet, medication, and activity level changes, extra doses, bruising or bleeding, with no problem findings.  Patient requesting Rx for warfarin for 90 day supply.         Clinical Outcomes     Comments:   The patient was assessed for diet, medication, and activity level changes, extra doses, bruising or bleeding, with no problem findings.  Patient requesting Rx for warfarin for 90 day supply.            OBJECTIVE    INR Protime   Date Value Ref Range Status   2019 2.6 (A) 0.86 - 1.14 Final       ASSESSMENT / PLAN  INR assessment THER    Recheck INR In: 4 WEEKS    INR Location Clinic      Anticoagulation Summary  As of 2019    INR goal:   2.0-3.0   TTR:   87.1 % (3.3 y)   INR used for dosin.6 (2019)   Warfarin maintenance plan:   2.5 mg (2.5 mg x 1) every Sun, Wed, Fri; 5 mg (2.5 mg x 2) all other days   Full warfarin instructions:   2.5 mg every Sun, Wed, Fri; 5 mg all other days   Weekly warfarin total:   27.5 mg   No change documented:   Екатерина Mahan RN   Plan last modified:   Sonam Teixeira RN (3/20/2019)   Next INR check:   2019   Priority:   INR   Target end date:       Indications    Long term current use of anticoagulants with INR goal of 2.0-3.0 [Z79.01]  Atrial fibrillation and flutter (H) [I48.91  I48.92]             Anticoagulation Episode Summary     INR check location:       Preferred lab:       Send INR reminders to:   [facility]    Comments:   likes printed AVS      Anticoagulation Care Providers     Provider Role Specialty Phone number    Yunior Huang MD [facility] Internal Medicine [phone]            See the Encounter Report to view Anticoagulation  Flowsheet and Dosing Calendar (Go to Encounters tab in chart review, and find the Anticoagulation Therapy Visit)    Dosage adjustment made based on physician directed care plan.    Екатерина Mahan RN

## 2019-09-23 ENCOUNTER — ANTICOAGULATION THERAPY VISIT (OUTPATIENT)
Dept: ANTICOAGULATION | Facility: CLINIC | Age: 79
End: 2019-09-23
Payer: MEDICARE

## 2019-09-23 DIAGNOSIS — Z79.01 LONG TERM CURRENT USE OF ANTICOAGULANTS WITH INR GOAL OF 2.0-3.0: ICD-10-CM

## 2019-09-23 DIAGNOSIS — I48.92 ATRIAL FIBRILLATION AND FLUTTER (H): ICD-10-CM

## 2019-09-23 DIAGNOSIS — I48.91 ATRIAL FIBRILLATION AND FLUTTER (H): ICD-10-CM

## 2019-09-23 LAB — INR POINT OF CARE: 1.5 (ref 0.86–1.14)

## 2019-09-23 PROCEDURE — 36416 COLLJ CAPILLARY BLOOD SPEC: CPT

## 2019-09-23 PROCEDURE — 85610 PROTHROMBIN TIME: CPT | Mod: QW

## 2019-09-23 PROCEDURE — 99207 ZZC NO CHARGE NURSE ONLY: CPT

## 2019-09-23 NOTE — PROGRESS NOTES
ANTICOAGULATION FOLLOW-UP CLINIC VISIT    Patient Name:  Zunilda Alicea May  Date:  2019  Contact Type:  Face to Face    SUBJECTIVE:  Patient Findings     Comments:   The patient was assessed for diet, medication, and activity level changes, missed or extra doses, bruising or bleeding, with no problem findings.  Patient denies any identifiable changes that caused the subtherapeutic INR.           Clinical Outcomes     Comments:   The patient was assessed for diet, medication, and activity level changes, missed or extra doses, bruising or bleeding, with no problem findings.  Patient denies any identifiable changes that caused the subtherapeutic INR.              OBJECTIVE    INR Protime   Date Value Ref Range Status   2019 1.5 (A) 0.86 - 1.14 Final       ASSESSMENT / PLAN  INR assessment SUB    Recheck INR In: 1 WEEK    INR Location Clinic      Anticoagulation Summary  As of 2019    INR goal:   2.0-3.0   TTR:   86.3 % (3.3 y)   INR used for dosin.5! (2019)   Warfarin maintenance plan:   2.5 mg (2.5 mg x 1) every Sun, Wed, Fri; 5 mg (2.5 mg x 2) all other days   Full warfarin instructions:   : 7.5 mg; : 7.5 mg; Otherwise 2.5 mg every Sun, Wed, Fri; 5 mg all other days   Weekly warfarin total:   27.5 mg   Plan last modified:   Sonam Teixeira RN (3/20/2019)   Next INR check:   2019   Priority:   INR   Target end date:       Indications    Long term current use of anticoagulants with INR goal of 2.0-3.0 [Z79.01]  Atrial fibrillation and flutter (H) [I48.91  I48.92]             Anticoagulation Episode Summary     INR check location:       Preferred lab:       Send INR reminders to:   [facility]    Comments:   likes printed AVS      Anticoagulation Care Providers     Provider Role Specialty Phone number    Yunior Huang MD [facility] Internal Medicine [phone]            See the Encounter Report to view Anticoagulation Flowsheet and Dosing Calendar (Go to  Encounters tab in chart review, and find the Anticoagulation Therapy Visit)    Dosage adjustment made based on physician directed care plan.    Екатерина Mahan RN

## 2019-09-30 ENCOUNTER — ANTICOAGULATION THERAPY VISIT (OUTPATIENT)
Dept: ANTICOAGULATION | Facility: CLINIC | Age: 79
End: 2019-09-30
Payer: MEDICARE

## 2019-09-30 DIAGNOSIS — I48.92 ATRIAL FIBRILLATION AND FLUTTER (H): ICD-10-CM

## 2019-09-30 DIAGNOSIS — Z79.01 LONG TERM CURRENT USE OF ANTICOAGULANTS WITH INR GOAL OF 2.0-3.0: ICD-10-CM

## 2019-09-30 DIAGNOSIS — I48.91 ATRIAL FIBRILLATION AND FLUTTER (H): ICD-10-CM

## 2019-09-30 LAB — INR POINT OF CARE: 2.7 (ref 0.86–1.14)

## 2019-09-30 PROCEDURE — 85610 PROTHROMBIN TIME: CPT | Mod: QW

## 2019-09-30 PROCEDURE — 99207 ZZC NO CHARGE NURSE ONLY: CPT

## 2019-09-30 PROCEDURE — 36416 COLLJ CAPILLARY BLOOD SPEC: CPT

## 2019-09-30 NOTE — PROGRESS NOTES
ANTICOAGULATION FOLLOW-UP CLINIC VISIT    Patient Name:  Zunilda Alicea May  Date:  2019  Contact Type:  Face to Face    SUBJECTIVE:  Patient Findings     Comments:   The patient was assessed for diet, medication, and activity level changes, missed or extra doses, bruising or bleeding, with no problem findings.          Clinical Outcomes     Negatives:   Major bleeding event, Thromboembolic event, Anticoagulation-related hospital admission, Anticoagulation-related ED visit, Anticoagulation-related fatality    Comments:   The patient was assessed for diet, medication, and activity level changes, missed or extra doses, bruising or bleeding, with no problem findings.             OBJECTIVE    INR Protime   Date Value Ref Range Status   2019 2.7 (A) 0.86 - 1.14 Final       ASSESSMENT / PLAN  INR assessment THER    Recheck INR In: 9 DAYS    INR Location Clinic      Anticoagulation Summary  As of 2019    INR goal:   2.0-3.0   TTR:   86.1 % (3.4 y)   INR used for dosin.7 (2019)   Warfarin maintenance plan:   2.5 mg (2.5 mg x 1) every Sun, Wed, Fri; 5 mg (2.5 mg x 2) all other days   Full warfarin instructions:   2.5 mg every Sun, Wed, Fri; 5 mg all other days   Weekly warfarin total:   27.5 mg   Plan last modified:   Sonam Teixeira RN (3/20/2019)   Next INR check:   10/9/2019   Priority:   INR   Target end date:       Indications    Long term current use of anticoagulants with INR goal of 2.0-3.0 [Z79.01]  Atrial fibrillation and flutter (H) [I48.91  I48.92]             Anticoagulation Episode Summary     INR check location:       Preferred lab:       [facility]    Comments:   likes printed AVS      Anticoagulation Care Providers     Provider Role Specialty Phone number    Yunior Huang MD Responsible Internal Medicine [phone]            See the Encounter Report to view Anticoagulation Flowsheet and Dosing Calendar (Go to Encounters tab in chart  review, and find the Anticoagulation Therapy Visit)    Dosage adjustment made based on physician directed care plan.    Екатерина Mahan RN

## 2019-10-07 DIAGNOSIS — I48.20 CHRONIC ATRIAL FIBRILLATION (H): ICD-10-CM

## 2019-10-07 RX ORDER — METOPROLOL TARTRATE 50 MG
50 TABLET ORAL 2 TIMES DAILY
Qty: 180 TABLET | Refills: 3 | Status: SHIPPED | OUTPATIENT
Start: 2019-10-07 | End: 2020-09-24

## 2019-10-07 NOTE — TELEPHONE ENCOUNTER
Medication Refilled: metoprolol   Last office visit: 7/18/19  Next office visit: ordered for 1/2020  Pharmacy sent to: [facility]   Pualie SUMMERS

## 2019-10-09 ENCOUNTER — ANTICOAGULATION THERAPY VISIT (OUTPATIENT)
Dept: ANTICOAGULATION | Facility: CLINIC | Age: 79
End: 2019-10-09
Payer: MEDICARE

## 2019-10-09 DIAGNOSIS — I48.92 ATRIAL FIBRILLATION AND FLUTTER (H): ICD-10-CM

## 2019-10-09 DIAGNOSIS — I48.91 ATRIAL FIBRILLATION AND FLUTTER (H): ICD-10-CM

## 2019-10-09 DIAGNOSIS — Z79.01 LONG TERM CURRENT USE OF ANTICOAGULANTS WITH INR GOAL OF 2.0-3.0: ICD-10-CM

## 2019-10-09 LAB — INR POINT OF CARE: 3.5 (ref 0.86–1.14)

## 2019-10-09 PROCEDURE — 99207 ZZC NO CHARGE NURSE ONLY: CPT

## 2019-10-09 PROCEDURE — 85610 PROTHROMBIN TIME: CPT | Mod: QW

## 2019-10-09 PROCEDURE — 36416 COLLJ CAPILLARY BLOOD SPEC: CPT

## 2019-10-09 NOTE — PROGRESS NOTES
ANTICOAGULATION FOLLOW-UP CLINIC VISIT    Patient Name:  Zunilda Alicea May  Date:  10/9/2019  Contact Type:  Face to Face    SUBJECTIVE:  Patient Findings     Positives:   Change in medications (started on a new vitamin supplement within the last week.  She will stop this for now and bring the bottle in since she is not sure what is in it or what it is called.)    Comments:   The patient was assessed for diet, and activity level changes, missed or extra doses, bruising or bleeding, with no problem findings.          Clinical Outcomes     Negatives:   Major bleeding event, Thromboembolic event, Anticoagulation-related hospital admission, Anticoagulation-related ED visit, Anticoagulation-related fatality    Comments:   The patient was assessed for diet, and activity level changes, missed or extra doses, bruising or bleeding, with no problem findings.             OBJECTIVE    INR Protime   Date Value Ref Range Status   10/09/2019 3.5 (A) 0.86 - 1.14 Final       ASSESSMENT / PLAN  INR assessment SUPRA    Recheck INR In: 10 DAYS    INR Location Clinic      Anticoagulation Summary  As of 10/9/2019    INR goal:   2.0-3.0   TTR:   85.8 % (3.4 y)   INR used for dosing:   3.5! (10/9/2019)   Warfarin maintenance plan:   2.5 mg (2.5 mg x 1) every Sun, Wed, Fri; 5 mg (2.5 mg x 2) all other days   Full warfarin instructions:   10/9: Hold; Otherwise 2.5 mg every Sun, Wed, Fri; 5 mg all other days   Weekly warfarin total:   27.5 mg   Plan last modified:   Sonam Teixeira RN (3/20/2019)   Next INR check:   10/21/2019   Priority:   INR   Target end date:       Indications    Long term current use of anticoagulants with INR goal of 2.0-3.0 [Z79.01]  Atrial fibrillation and flutter (H) [I48.91  I48.92]             Anticoagulation Episode Summary     INR check location:       Preferred lab:       Send INR reminders to:   RANJIT COOK    Comments:   likes printed AVS      Anticoagulation Care Providers     Provider Role  Specialty Phone number    Yunior Huang MD [facility] Internal Medicine [phone]            See the Encounter Report to view Anticoagulation Flowsheet and Dosing Calendar (Go to Encounters tab in chart review, and find the Anticoagulation Therapy Visit)    Dosage adjustment made based on physician directed care plan.    Sonam Teixeira RN

## 2019-10-21 ENCOUNTER — ANTICOAGULATION THERAPY VISIT (OUTPATIENT)
Dept: ANTICOAGULATION | Facility: CLINIC | Age: 79
End: 2019-10-21
Payer: MEDICARE

## 2019-10-21 DIAGNOSIS — I48.91 ATRIAL FIBRILLATION AND FLUTTER (H): ICD-10-CM

## 2019-10-21 DIAGNOSIS — Z79.01 LONG TERM CURRENT USE OF ANTICOAGULANTS WITH INR GOAL OF 2.0-3.0: ICD-10-CM

## 2019-10-21 DIAGNOSIS — I48.92 ATRIAL FIBRILLATION AND FLUTTER (H): ICD-10-CM

## 2019-10-21 LAB — INR POINT OF CARE: 3.5 (ref 0.86–1.14)

## 2019-10-21 PROCEDURE — 85610 PROTHROMBIN TIME: CPT | Mod: QW

## 2019-10-21 PROCEDURE — 99207 ZZC NO CHARGE NURSE ONLY: CPT

## 2019-10-21 PROCEDURE — 36416 COLLJ CAPILLARY BLOOD SPEC: CPT

## 2019-10-21 NOTE — PROGRESS NOTES
ANTICOAGULATION FOLLOW-UP CLINIC VISIT    Patient Name:  Zunilda Alicea May  Date:  10/21/2019  Contact Type:  Face to Face    SUBJECTIVE:  Patient Findings     Comments:   The patient was assessed for diet, medication, and activity level changes, missed or extra doses, bruising or bleeding, with no problem findings.  Patient denies any identifiable changes that caused the supratherapeutic INR.           Clinical Outcomes     Negatives:   Major bleeding event, Thromboembolic event, Anticoagulation-related hospital admission, Anticoagulation-related ED visit, Anticoagulation-related fatality    Comments:   The patient was assessed for diet, medication, and activity level changes, missed or extra doses, bruising or bleeding, with no problem findings.  Patient denies any identifiable changes that caused the supratherapeutic INR.              OBJECTIVE    INR Protime   Date Value Ref Range Status   10/21/2019 3.5 (A) 0.86 - 1.14 Final       ASSESSMENT / PLAN  INR assessment SUPRA    Recheck INR In: 9 DAYS    INR Location Clinic      Anticoagulation Summary  As of 10/21/2019    INR goal:   2.0-3.0   TTR:   85.0 % (3.4 y)   INR used for dosing:   3.5! (10/21/2019)   Warfarin maintenance plan:   2.5 mg (2.5 mg x 1) every Sun, Wed, Fri; 5 mg (2.5 mg x 2) all other days   Full warfarin instructions:   10/21: Hold; 10/22: 2.5 mg; 10/29: 2.5 mg; Otherwise 2.5 mg every Sun, Wed, Fri; 5 mg all other days   Weekly warfarin total:   27.5 mg   Plan last modified:   Sonam Teixeira RN (3/20/2019)   Next INR check:   10/30/2019   Priority:   INR   Target end date:       Indications    Long term current use of anticoagulants with INR goal of 2.0-3.0 [Z79.01]  Atrial fibrillation and flutter (H) [I48.91  I48.92]             Anticoagulation Episode Summary     INR check location:       Preferred lab:       Send INR reminders to:   [facility]    Comments:   likes printed AVS      Anticoagulation Care Providers     Provider  Role Specialty Phone number    Yunior Huang MD Responsible Internal Medicine [phone]            See the Encounter Report to view Anticoagulation Flowsheet and Dosing Calendar (Go to Encounters tab in chart review, and find the Anticoagulation Therapy Visit)    Dosage adjustment made based on physician directed care plan.    Екатерина Mahan RN

## 2019-10-30 ENCOUNTER — ANTICOAGULATION THERAPY VISIT (OUTPATIENT)
Dept: ANTICOAGULATION | Facility: CLINIC | Age: 79
End: 2019-10-30
Payer: MEDICARE

## 2019-10-30 DIAGNOSIS — Z79.01 LONG TERM CURRENT USE OF ANTICOAGULANTS WITH INR GOAL OF 2.0-3.0: ICD-10-CM

## 2019-10-30 DIAGNOSIS — I48.91 ATRIAL FIBRILLATION AND FLUTTER (H): ICD-10-CM

## 2019-10-30 DIAGNOSIS — I48.92 ATRIAL FIBRILLATION AND FLUTTER (H): ICD-10-CM

## 2019-10-30 LAB — INR POINT OF CARE: 2.4 (ref 0.86–1.14)

## 2019-10-30 PROCEDURE — 85610 PROTHROMBIN TIME: CPT | Mod: QW

## 2019-10-30 PROCEDURE — 99207 ZZC NO CHARGE NURSE ONLY: CPT

## 2019-10-30 PROCEDURE — 36416 COLLJ CAPILLARY BLOOD SPEC: CPT

## 2019-10-30 NOTE — PROGRESS NOTES
ANTICOAGULATION FOLLOW-UP CLINIC VISIT    Patient Name:  Zunlida Alicea May  Date:  10/30/2019  Contact Type:  Face to Face    SUBJECTIVE:  Patient Findings     Comments:   The patient was assessed for diet, medication, and activity level changes, missed or extra doses, bruising or bleeding, with no problem findings.          Clinical Outcomes     Negatives:   Major bleeding event, Thromboembolic event, Anticoagulation-related hospital admission, Anticoagulation-related ED visit, Anticoagulation-related fatality    Comments:   The patient was assessed for diet, medication, and activity level changes, missed or extra doses, bruising or bleeding, with no problem findings.             OBJECTIVE    INR Protime   Date Value Ref Range Status   10/30/2019 2.4 (A) 0.86 - 1.14 Final       ASSESSMENT / PLAN  INR assessment THER    Recheck INR In: 2 WEEKS    INR Location Clinic      Anticoagulation Summary  As of 10/30/2019    INR goal:   2.0-3.0   TTR:   84.7 % (3.4 y)   INR used for dosin.4 (10/30/2019)   Warfarin maintenance plan:   5 mg (2.5 mg x 2) every Mon, Thu, Sat; 2.5 mg (2.5 mg x 1) all other days   Full warfarin instructions:   5 mg every Mon, Thu, Sat; 2.5 mg all other days   Weekly warfarin total:   25 mg   Plan last modified:   Sonam Teixeira RN (10/30/2019)   Next INR check:   2019   Priority:   INR   Target end date:       Indications    Long term current use of anticoagulants with INR goal of 2.0-3.0 [Z79.01]  Atrial fibrillation and flutter (H) [I48.91  I48.92]             Anticoagulation Episode Summary     INR check location:       Preferred lab:       [facility]    Comments:   likes printed AVS      Anticoagulation Care Providers     Provider Role Specialty Phone number    Yunior Huang MD Responsible Internal Medicine [phone]            See the Encounter Report to view Anticoagulation Flowsheet and Dosing Calendar (Go to Encounters tab in  chart review, and find the Anticoagulation Therapy Visit)    Dosage adjustment made based on physician directed care plan.    Sonam Teixeira RN

## 2019-11-14 ENCOUNTER — TELEPHONE (OUTPATIENT)
Dept: ANTICOAGULATION | Facility: CLINIC | Age: 79
End: 2019-11-14

## 2019-11-14 ENCOUNTER — ANTICOAGULATION THERAPY VISIT (OUTPATIENT)
Dept: ANTICOAGULATION | Facility: CLINIC | Age: 79
End: 2019-11-14
Payer: MEDICARE

## 2019-11-14 DIAGNOSIS — Z79.01 LONG TERM CURRENT USE OF ANTICOAGULANTS WITH INR GOAL OF 2.0-3.0: ICD-10-CM

## 2019-11-14 DIAGNOSIS — I48.92 ATRIAL FIBRILLATION AND FLUTTER (H): Primary | ICD-10-CM

## 2019-11-14 DIAGNOSIS — I48.91 ATRIAL FIBRILLATION AND FLUTTER (H): ICD-10-CM

## 2019-11-14 DIAGNOSIS — I48.92 ATRIAL FIBRILLATION AND FLUTTER (H): ICD-10-CM

## 2019-11-14 DIAGNOSIS — I48.91 ATRIAL FIBRILLATION AND FLUTTER (H): Primary | ICD-10-CM

## 2019-11-14 LAB — INR POINT OF CARE: 3 (ref 0.86–1.14)

## 2019-11-14 PROCEDURE — 99207 ZZC NO CHARGE NURSE ONLY: CPT

## 2019-11-14 PROCEDURE — 36416 COLLJ CAPILLARY BLOOD SPEC: CPT

## 2019-11-14 PROCEDURE — 85610 PROTHROMBIN TIME: CPT | Mod: QW

## 2019-11-14 NOTE — PROGRESS NOTES
ANTICOAGULATION FOLLOW-UP CLINIC VISIT    Patient Name:  Zunilda Alicea May  Date:  11/14/2019  Contact Type:  Face to Face    SUBJECTIVE:  Patient Findings     Comments:   The patient was assessed for diet, medication, and activity level changes, missed or extra doses, bruising or bleeding, with no problem findings.          Clinical Outcomes     Negatives:   Major bleeding event, Thromboembolic event, Anticoagulation-related hospital admission, Anticoagulation-related ED visit, Anticoagulation-related fatality    Comments:   The patient was assessed for diet, medication, and activity level changes, missed or extra doses, bruising or bleeding, with no problem findings.             OBJECTIVE    INR Protime   Date Value Ref Range Status   11/14/2019 3.0 (A) 0.86 - 1.14 Final       ASSESSMENT / PLAN  INR assessment THER    Recheck INR In: 2 WEEKS    INR Location Clinic      Anticoagulation Summary  As of 11/14/2019    INR goal:   2.0-3.0   TTR:   84.9 % (3.5 y)   INR used for dosing:   3.0 (11/14/2019)   Warfarin maintenance plan:   5 mg (2.5 mg x 2) every Mon, Thu, Sat; 2.5 mg (2.5 mg x 1) all other days   Full warfarin instructions:   5 mg every Mon, Thu, Sat; 2.5 mg all other days   Weekly warfarin total:   25 mg   No change documented:   Екатерина Mahan RN   Plan last modified:   Sonam Teixeira RN (10/30/2019)   Next INR check:   11/27/2019   Priority:   Maintenance   Target end date:       Indications    Long term current use of anticoagulants with INR goal of 2.0-3.0 [Z79.01]  Atrial fibrillation and flutter (H) [I48.91  I48.92]             Anticoagulation Episode Summary     INR check location:       Preferred lab:       Send INR reminders to:   [facility]    Comments:   likes printed AVS      Anticoagulation Care Providers     Provider Role Specialty Phone number    Yunior Huang MD [facility] Internal Medicine [phone]            See the Encounter Report to view Anticoagulation  Flowsheet and Dosing Calendar (Go to Encounters tab in chart review, and find the Anticoagulation Therapy Visit)    Dosage adjustment made based on physician directed care plan.    Екатерина Mahan RN

## 2019-11-14 NOTE — TELEPHONE ENCOUNTER
ANTICOAGULATION MANAGEMENT    Zunilda Clark due for review and annual renewal of referral to anticoagulation monitoring.      Warfarin indication(s) Atrial Fibrillation/Flutter   INR goal 2.0-3.0   Current duration of therapy Indefinite/long term therapy   Date of last office visit 04/10/2019       Please advise if Zunilda Clark's anticoagulation management referral can be renewed for next year.     Please confirm renewal approval in new note within this telephone encounter and route back. No further action is necessary at this time (referral orders,etc).     Екатерина Mahan RN

## 2019-11-14 NOTE — TELEPHONE ENCOUNTER
For insurance purposes, an annual INR referral is required. Please complete and sign the order then route back to [facility]. Екатерина Mahan RN

## 2019-11-20 ENCOUNTER — ANCILLARY PROCEDURE (OUTPATIENT)
Dept: CARDIOLOGY | Facility: CLINIC | Age: 79
End: 2019-11-20
Attending: INTERNAL MEDICINE
Payer: MEDICARE

## 2019-11-20 DIAGNOSIS — Z95.0 PACEMAKER: ICD-10-CM

## 2019-11-20 PROCEDURE — 93280 PM DEVICE PROGR EVAL DUAL: CPT | Mod: 26 | Performed by: INTERNAL MEDICINE

## 2019-11-27 ENCOUNTER — ANTICOAGULATION THERAPY VISIT (OUTPATIENT)
Dept: ANTICOAGULATION | Facility: CLINIC | Age: 79
End: 2019-11-27
Payer: MEDICARE

## 2019-11-27 DIAGNOSIS — I48.91 ATRIAL FIBRILLATION AND FLUTTER (H): ICD-10-CM

## 2019-11-27 DIAGNOSIS — Z79.01 LONG TERM CURRENT USE OF ANTICOAGULANTS WITH INR GOAL OF 2.0-3.0: ICD-10-CM

## 2019-11-27 DIAGNOSIS — I48.92 ATRIAL FIBRILLATION AND FLUTTER (H): ICD-10-CM

## 2019-11-27 LAB
INR POINT OF CARE: 3.8 (ref 0.86–1.14)
MDC_IDC_EPISODE_DTM: NORMAL
MDC_IDC_EPISODE_DURATION: 102 S
MDC_IDC_EPISODE_DURATION: 1033 S
MDC_IDC_EPISODE_DURATION: 108 S
MDC_IDC_EPISODE_DURATION: 112 S
MDC_IDC_EPISODE_DURATION: 118 S
MDC_IDC_EPISODE_DURATION: 129 S
MDC_IDC_EPISODE_DURATION: 173 S
MDC_IDC_EPISODE_DURATION: 178 S
MDC_IDC_EPISODE_DURATION: 182 S
MDC_IDC_EPISODE_DURATION: 212 S
MDC_IDC_EPISODE_DURATION: 217 S
MDC_IDC_EPISODE_DURATION: 218 S
MDC_IDC_EPISODE_DURATION: 233 S
MDC_IDC_EPISODE_DURATION: 2503 S
MDC_IDC_EPISODE_DURATION: 261 S
MDC_IDC_EPISODE_DURATION: 31 S
MDC_IDC_EPISODE_DURATION: 314 S
MDC_IDC_EPISODE_DURATION: 65 S
MDC_IDC_EPISODE_DURATION: 70 S
MDC_IDC_EPISODE_DURATION: 79 S
MDC_IDC_EPISODE_DURATION: 80 S
MDC_IDC_EPISODE_DURATION: 84 S
MDC_IDC_EPISODE_DURATION: 87 S
MDC_IDC_EPISODE_DURATION: NORMAL S
MDC_IDC_EPISODE_ID: 232
MDC_IDC_EPISODE_ID: 233
MDC_IDC_EPISODE_ID: 234
MDC_IDC_EPISODE_ID: 235
MDC_IDC_EPISODE_ID: 236
MDC_IDC_EPISODE_ID: 237
MDC_IDC_EPISODE_ID: 238
MDC_IDC_EPISODE_ID: 239
MDC_IDC_EPISODE_ID: 240
MDC_IDC_EPISODE_ID: 241
MDC_IDC_EPISODE_ID: 242
MDC_IDC_EPISODE_ID: 243
MDC_IDC_EPISODE_ID: 244
MDC_IDC_EPISODE_ID: 245
MDC_IDC_EPISODE_ID: 246
MDC_IDC_EPISODE_ID: 247
MDC_IDC_EPISODE_ID: 248
MDC_IDC_EPISODE_ID: 249
MDC_IDC_EPISODE_ID: 250
MDC_IDC_EPISODE_ID: 251
MDC_IDC_EPISODE_ID: 252
MDC_IDC_EPISODE_ID: 253
MDC_IDC_EPISODE_ID: 254
MDC_IDC_EPISODE_ID: 255
MDC_IDC_EPISODE_TYPE: NORMAL
MDC_IDC_LEAD_IMPLANT_DT: NORMAL
MDC_IDC_LEAD_IMPLANT_DT: NORMAL
MDC_IDC_LEAD_LOCATION: NORMAL
MDC_IDC_LEAD_LOCATION: NORMAL
MDC_IDC_LEAD_MFG: NORMAL
MDC_IDC_LEAD_MFG: NORMAL
MDC_IDC_LEAD_MODEL: NORMAL
MDC_IDC_LEAD_MODEL: NORMAL
MDC_IDC_LEAD_POLARITY_TYPE: NORMAL
MDC_IDC_LEAD_POLARITY_TYPE: NORMAL
MDC_IDC_LEAD_SERIAL: NORMAL
MDC_IDC_LEAD_SERIAL: NORMAL
MDC_IDC_MSMT_BATTERY_DTM: NORMAL
MDC_IDC_MSMT_BATTERY_STATUS: NORMAL
MDC_IDC_MSMT_BATTERY_VOLTAGE: 2.89 V
MDC_IDC_MSMT_LEADCHNL_RA_IMPEDANCE_VALUE: 512 OHM
MDC_IDC_MSMT_LEADCHNL_RA_SENSING_INTR_AMPL: 0.67 MV
MDC_IDC_MSMT_LEADCHNL_RV_IMPEDANCE_VALUE: 408 OHM
MDC_IDC_MSMT_LEADCHNL_RV_SENSING_INTR_AMPL: 13.43 MV
MDC_IDC_PG_IMPLANT_DTM: NORMAL
MDC_IDC_PG_MFG: NORMAL
MDC_IDC_PG_MODEL: NORMAL
MDC_IDC_PG_SERIAL: NORMAL
MDC_IDC_PG_TYPE: NORMAL
MDC_IDC_SESS_CLINIC_NAME: NORMAL
MDC_IDC_SESS_DTM: NORMAL
MDC_IDC_SESS_TYPE: NORMAL
MDC_IDC_SET_BRADY_AT_MODE_SWITCH_RATE: 171 {BEATS}/MIN
MDC_IDC_SET_BRADY_HYSTRATE: NORMAL
MDC_IDC_SET_BRADY_LOWRATE: 60 {BEATS}/MIN
MDC_IDC_SET_BRADY_MAX_SENSOR_RATE: 130 {BEATS}/MIN
MDC_IDC_SET_BRADY_MAX_TRACKING_RATE: 130 {BEATS}/MIN
MDC_IDC_SET_BRADY_MODE: NORMAL
MDC_IDC_SET_BRADY_PAV_DELAY_LOW: 180 MS
MDC_IDC_SET_BRADY_SAV_DELAY_LOW: 150 MS
MDC_IDC_SET_LEADCHNL_RA_PACING_AMPLITUDE: 2 V
MDC_IDC_SET_LEADCHNL_RA_PACING_ANODE_ELECTRODE_1: NORMAL
MDC_IDC_SET_LEADCHNL_RA_PACING_ANODE_LOCATION_1: NORMAL
MDC_IDC_SET_LEADCHNL_RA_PACING_CATHODE_ELECTRODE_1: NORMAL
MDC_IDC_SET_LEADCHNL_RA_PACING_CATHODE_LOCATION_1: NORMAL
MDC_IDC_SET_LEADCHNL_RA_PACING_POLARITY: NORMAL
MDC_IDC_SET_LEADCHNL_RA_PACING_PULSEWIDTH: 0.4 MS
MDC_IDC_SET_LEADCHNL_RA_SENSING_ANODE_ELECTRODE_1: NORMAL
MDC_IDC_SET_LEADCHNL_RA_SENSING_ANODE_LOCATION_1: NORMAL
MDC_IDC_SET_LEADCHNL_RA_SENSING_CATHODE_ELECTRODE_1: NORMAL
MDC_IDC_SET_LEADCHNL_RA_SENSING_CATHODE_LOCATION_1: NORMAL
MDC_IDC_SET_LEADCHNL_RA_SENSING_POLARITY: NORMAL
MDC_IDC_SET_LEADCHNL_RA_SENSING_SENSITIVITY: 0.45 MV
MDC_IDC_SET_LEADCHNL_RV_PACING_AMPLITUDE: 2 V
MDC_IDC_SET_LEADCHNL_RV_PACING_ANODE_ELECTRODE_1: NORMAL
MDC_IDC_SET_LEADCHNL_RV_PACING_ANODE_LOCATION_1: NORMAL
MDC_IDC_SET_LEADCHNL_RV_PACING_CATHODE_ELECTRODE_1: NORMAL
MDC_IDC_SET_LEADCHNL_RV_PACING_CATHODE_LOCATION_1: NORMAL
MDC_IDC_SET_LEADCHNL_RV_PACING_POLARITY: NORMAL
MDC_IDC_SET_LEADCHNL_RV_PACING_PULSEWIDTH: 0.4 MS
MDC_IDC_SET_LEADCHNL_RV_SENSING_ANODE_ELECTRODE_1: NORMAL
MDC_IDC_SET_LEADCHNL_RV_SENSING_ANODE_LOCATION_1: NORMAL
MDC_IDC_SET_LEADCHNL_RV_SENSING_CATHODE_ELECTRODE_1: NORMAL
MDC_IDC_SET_LEADCHNL_RV_SENSING_CATHODE_LOCATION_1: NORMAL
MDC_IDC_SET_LEADCHNL_RV_SENSING_POLARITY: NORMAL
MDC_IDC_SET_LEADCHNL_RV_SENSING_SENSITIVITY: 2.1 MV
MDC_IDC_SET_ZONE_DETECTION_INTERVAL: 350 MS
MDC_IDC_SET_ZONE_DETECTION_INTERVAL: 400 MS
MDC_IDC_SET_ZONE_TYPE: NORMAL
MDC_IDC_STAT_AT_BURDEN_PERCENT: 1.5 %
MDC_IDC_STAT_AT_DTM_END: NORMAL
MDC_IDC_STAT_AT_DTM_START: NORMAL
MDC_IDC_STAT_BRADY_AP_VP_PERCENT: 13.81 %
MDC_IDC_STAT_BRADY_AP_VS_PERCENT: 19.16 %
MDC_IDC_STAT_BRADY_AS_VP_PERCENT: 17.12 %
MDC_IDC_STAT_BRADY_AS_VS_PERCENT: 49.91 %
MDC_IDC_STAT_BRADY_DTM_END: NORMAL
MDC_IDC_STAT_BRADY_DTM_START: NORMAL
MDC_IDC_STAT_BRADY_RA_PERCENT_PACED: 32.48 %
MDC_IDC_STAT_BRADY_RV_PERCENT_PACED: 30.99 %
MDC_IDC_STAT_EPISODE_RECENT_COUNT: 0
MDC_IDC_STAT_EPISODE_RECENT_COUNT: 0
MDC_IDC_STAT_EPISODE_RECENT_COUNT: 1
MDC_IDC_STAT_EPISODE_RECENT_COUNT: 80
MDC_IDC_STAT_EPISODE_RECENT_COUNT_DTM_END: NORMAL
MDC_IDC_STAT_EPISODE_RECENT_COUNT_DTM_START: NORMAL
MDC_IDC_STAT_EPISODE_TOTAL_COUNT: 1
MDC_IDC_STAT_EPISODE_TOTAL_COUNT: 13
MDC_IDC_STAT_EPISODE_TOTAL_COUNT: 236
MDC_IDC_STAT_EPISODE_TOTAL_COUNT: 3
MDC_IDC_STAT_EPISODE_TOTAL_COUNT_DTM_END: NORMAL
MDC_IDC_STAT_EPISODE_TYPE: NORMAL

## 2019-11-27 PROCEDURE — 36416 COLLJ CAPILLARY BLOOD SPEC: CPT

## 2019-11-27 PROCEDURE — 99207 ZZC NO CHARGE NURSE ONLY: CPT

## 2019-11-27 PROCEDURE — 85610 PROTHROMBIN TIME: CPT | Mod: QW

## 2019-11-27 NOTE — PROGRESS NOTES
ANTICOAGULATION FOLLOW-UP CLINIC VISIT    Patient Name:  Zunilda Alicea May  Date:  11/27/2019  Contact Type:  Face to Face    SUBJECTIVE:  Patient Findings     Positives:   Extra doses (Patient reports took warfarin x2 on 11/24/19.)    Comments:   The patient was assessed for diet, medication, and activity level changes, missed doses, bruising or bleeding, with no problem findings.          Clinical Outcomes     Comments:   The patient was assessed for diet, medication, and activity level changes, missed doses, bruising or bleeding, with no problem findings.             OBJECTIVE    INR Protime   Date Value Ref Range Status   11/27/2019 3.8 (A) 0.86 - 1.14 Final       ASSESSMENT / PLAN  INR assessment SUPRA    Recheck INR In: 9 DAYS    INR Location Clinic      Anticoagulation Summary  As of 11/27/2019    INR goal:   2.0-3.0   TTR:   74.1 % (1 y)   INR used for dosing:   3.8! (11/27/2019)   Warfarin maintenance plan:   5 mg (2.5 mg x 2) every Mon, Thu, Sat; 2.5 mg (2.5 mg x 1) all other days   Full warfarin instructions:   11/27: Hold; Otherwise 5 mg every Mon, Thu, Sat; 2.5 mg all other days   Weekly warfarin total:   25 mg   Plan last modified:   Sonam Teixeira RN (10/30/2019)   Next INR check:   12/6/2019   Priority:   High   Target end date:       Indications    Long term current use of anticoagulants with INR goal of 2.0-3.0 [Z79.01]  Atrial fibrillation and flutter (H) [I48.91  I48.92]             Anticoagulation Episode Summary     INR check location:       Preferred lab:       [facility]    Comments:   likes printed AVS      Anticoagulation Care Providers     Provider Role Specialty Phone number    Yunior Huang MD Responsible Internal Medicine [phone]            See the Encounter Report to view Anticoagulation Flowsheet and Dosing Calendar (Go to Encounters tab in chart review, and find the Anticoagulation Therapy Visit)    Dosage adjustment made based on  physician directed care plan.    Екатерина Mahan RN

## 2019-12-06 ENCOUNTER — ANTICOAGULATION THERAPY VISIT (OUTPATIENT)
Dept: ANTICOAGULATION | Facility: CLINIC | Age: 79
End: 2019-12-06
Payer: MEDICARE

## 2019-12-06 DIAGNOSIS — I48.92 ATRIAL FIBRILLATION AND FLUTTER (H): ICD-10-CM

## 2019-12-06 DIAGNOSIS — Z79.01 LONG TERM CURRENT USE OF ANTICOAGULANTS WITH INR GOAL OF 2.0-3.0: ICD-10-CM

## 2019-12-06 DIAGNOSIS — I48.91 ATRIAL FIBRILLATION AND FLUTTER (H): ICD-10-CM

## 2019-12-06 LAB — INR POINT OF CARE: 3.8 (ref 0.86–1.14)

## 2019-12-06 PROCEDURE — 36416 COLLJ CAPILLARY BLOOD SPEC: CPT

## 2019-12-06 PROCEDURE — 99207 ZZC NO CHARGE NURSE ONLY: CPT

## 2019-12-06 PROCEDURE — 85610 PROTHROMBIN TIME: CPT | Mod: QW

## 2019-12-06 NOTE — PROGRESS NOTES
ANTICOAGULATION FOLLOW-UP CLINIC VISIT    Patient Name:  Zunilda Alicea May  Date:  12/6/2019  Contact Type:  Face to Face    SUBJECTIVE:  Patient Findings     Positives:   Change in medications (switched to a different MVI about 1 month ago.  She is unsure of the vitamin K content in either of these, however INR has been elevated for the last month so this may be the reason why.  Recommended that she continue with same MVI at this time.)    Comments:   The patient was assessed for diet, and activity level changes, missed or extra doses, bruising or bleeding, with no problem findings. INR has been consistently elevated so maintenance dose was decreased by 10% today.            Clinical Outcomes     Negatives:   Major bleeding event, Thromboembolic event, Anticoagulation-related hospital admission, Anticoagulation-related ED visit, Anticoagulation-related fatality    Comments:   The patient was assessed for diet, and activity level changes, missed or extra doses, bruising or bleeding, with no problem findings. INR has been consistently elevated so maintenance dose was decreased by 10% today.               OBJECTIVE    INR Protime   Date Value Ref Range Status   12/06/2019 3.8 (A) 0.86 - 1.14 Final       ASSESSMENT / PLAN  INR assessment SUPRA    Recheck INR In: 2 WEEKS    INR Location Clinic      Anticoagulation Summary  As of 12/6/2019    INR goal:   2.0-3.0   TTR:   73.7 % (1 y)   INR used for dosing:   3.8! (12/6/2019)   Warfarin maintenance plan:   5 mg (2.5 mg x 2) every Mon, Thu; 2.5 mg (2.5 mg x 1) all other days   Full warfarin instructions:   12/6: Hold; Otherwise 5 mg every Mon, Thu; 2.5 mg all other days   Weekly warfarin total:   22.5 mg   Plan last modified:   Sonam Teixeira RN (12/6/2019)   Next INR check:   12/18/2019   Priority:   High   Target end date:       Indications    Long term current use of anticoagulants with INR goal of 2.0-3.0 [Z79.01]  Atrial fibrillation and flutter (H)  [I48.91  I48.92]             Anticoagulation Episode Summary     INR check location:       Preferred lab:       Send INR reminders to:   RANJIT COOK    Comments:   likes printed AVS, See 11/14/19 telephone encounter for INR Referral Renewal      Anticoagulation Care Providers     Provider Role Specialty Phone number    Yunior Huang MD [facility] Internal Medicine [phone]            See the Encounter Report to view Anticoagulation Flowsheet and Dosing Calendar (Go to Encounters tab in chart review, and find the Anticoagulation Therapy Visit)    Dosage adjustment made based on physician directed care plan.    Sonam Teixeira RN

## 2019-12-18 ENCOUNTER — ANTICOAGULATION THERAPY VISIT (OUTPATIENT)
Dept: ANTICOAGULATION | Facility: CLINIC | Age: 79
End: 2019-12-18
Payer: MEDICARE

## 2019-12-18 DIAGNOSIS — I48.92 ATRIAL FIBRILLATION AND FLUTTER (H): ICD-10-CM

## 2019-12-18 DIAGNOSIS — Z79.01 LONG TERM CURRENT USE OF ANTICOAGULANTS WITH INR GOAL OF 2.0-3.0: ICD-10-CM

## 2019-12-18 DIAGNOSIS — I48.91 ATRIAL FIBRILLATION AND FLUTTER (H): ICD-10-CM

## 2019-12-18 LAB — INR POINT OF CARE: 2.9 (ref 0.86–1.14)

## 2019-12-18 PROCEDURE — 99207 ZZC NO CHARGE NURSE ONLY: CPT

## 2019-12-18 PROCEDURE — 36416 COLLJ CAPILLARY BLOOD SPEC: CPT

## 2019-12-18 PROCEDURE — 85610 PROTHROMBIN TIME: CPT | Mod: QW

## 2019-12-18 NOTE — PROGRESS NOTES
ANTICOAGULATION FOLLOW-UP CLINIC VISIT    Patient Name:  Zunilda Alicea May  Date:  2019  Contact Type:  Face to Face    SUBJECTIVE:  Patient Findings     Comments:   The patient was assessed for diet, medication, and activity level changes, missed or extra doses, bruising or bleeding, with no problem findings.  Patient reports that her MVI has 30 mcg of vitamin K.  She plans to keep taking her MVI on a regular basis.          Clinical Outcomes     Negatives:   Major bleeding event, Thromboembolic event, Anticoagulation-related hospital admission, Anticoagulation-related ED visit, Anticoagulation-related fatality    Comments:   The patient was assessed for diet, medication, and activity level changes, missed or extra doses, bruising or bleeding, with no problem findings.  Patient reports that her MVI has 30 mcg of vitamin K.  She plans to keep taking her MVI on a regular basis.             OBJECTIVE    INR Protime   Date Value Ref Range Status   2019 2.9 (A) 0.86 - 1.14 Final       ASSESSMENT / PLAN  INR assessment THER    Recheck INR In: 3 WEEKS    INR Location Clinic      Anticoagulation Summary  As of 2019    INR goal:   2.0-3.0   TTR:   72.2 % (1 y)   INR used for dosin.9 (2019)   Warfarin maintenance plan:   5 mg (2.5 mg x 2) every Mon, Thu; 2.5 mg (2.5 mg x 1) all other days   Full warfarin instructions:   5 mg every Mon, Thu; 2.5 mg all other days   Weekly warfarin total:   22.5 mg   No change documented:   Sonam Teixeira RN   Plan last modified:   Sonam Teixeira RN (2019)   Next INR check:   2020   Priority:   High   Target end date:       Indications    Long term current use of anticoagulants with INR goal of 2.0-3.0 [Z79.01]  Atrial fibrillation and flutter (H) [I48.91  I48.92]             Anticoagulation Episode Summary     INR check location:       Preferred lab:       [facility]ers [facility]   [facility]    Comments:   likes printed AVS, See 19  telephone encounter for INR Referral Renewal      Anticoagulation Care Providers     Provider Role Specialty Phone number    Yunior Huang MD Responsible Internal Medicine [phone]            See the Encounter Report to view Anticoagulation Flowsheet and Dosing Calendar (Go to Encounters tab in chart review, and find the Anticoagulation Therapy Visit)    Dosage adjustment made based on physician directed care plan.    Sonam Teixeira RN

## 2020-01-06 ENCOUNTER — TRANSFERRED RECORDS (OUTPATIENT)
Dept: HEALTH INFORMATION MANAGEMENT | Facility: CLINIC | Age: 80
End: 2020-01-06

## 2020-01-07 ENCOUNTER — HOSPITAL ENCOUNTER (OUTPATIENT)
Dept: CARDIOLOGY | Facility: CLINIC | Age: 80
Discharge: HOME | End: 2020-01-07
Attending: INTERNAL MEDICINE | Admitting: INTERNAL MEDICINE
Payer: MEDICARE

## 2020-01-07 DIAGNOSIS — Z95.3 S/P MITRAL VALVE REPLACEMENT WITH BIOPROSTHETIC VALVE: ICD-10-CM

## 2020-01-07 DIAGNOSIS — I08.0 RHEUMATIC DISORDER OF BOTH MITRAL AND AORTIC VALVES: ICD-10-CM

## 2020-01-07 DIAGNOSIS — I27.20 PULMONARY HYPERTENSION (H): ICD-10-CM

## 2020-01-07 PROCEDURE — 93306 TTE W/DOPPLER COMPLETE: CPT

## 2020-01-07 PROCEDURE — 93306 TTE W/DOPPLER COMPLETE: CPT | Mod: 26 | Performed by: INTERNAL MEDICINE

## 2020-01-08 ENCOUNTER — ANTICOAGULATION THERAPY VISIT (OUTPATIENT)
Dept: ANTICOAGULATION | Facility: CLINIC | Age: 80
End: 2020-01-08
Payer: MEDICARE

## 2020-01-08 ENCOUNTER — HOSPITAL ENCOUNTER (OUTPATIENT)
Dept: MAMMOGRAPHY | Facility: CLINIC | Age: 80
Discharge: HOME | End: 2020-01-08
Attending: INTERNAL MEDICINE | Admitting: INTERNAL MEDICINE
Payer: MEDICARE

## 2020-01-08 ENCOUNTER — OFFICE VISIT (OUTPATIENT)
Dept: INTERNAL MEDICINE | Facility: CLINIC | Age: 80
End: 2020-01-08
Payer: MEDICARE

## 2020-01-08 VITALS
SYSTOLIC BLOOD PRESSURE: 130 MMHG | OXYGEN SATURATION: 97 % | DIASTOLIC BLOOD PRESSURE: 68 MMHG | RESPIRATION RATE: 12 BRPM | TEMPERATURE: 97.8 F | WEIGHT: 154 LBS | HEART RATE: 74 BPM | BODY MASS INDEX: 26.29 KG/M2 | HEIGHT: 64 IN

## 2020-01-08 DIAGNOSIS — Z79.01 LONG TERM CURRENT USE OF ANTICOAGULANTS WITH INR GOAL OF 2.0-3.0: ICD-10-CM

## 2020-01-08 DIAGNOSIS — Z12.31 ENCOUNTER FOR SCREENING MAMMOGRAM FOR BREAST CANCER: ICD-10-CM

## 2020-01-08 DIAGNOSIS — Z00.00 ENCOUNTER FOR PREVENTATIVE ADULT HEALTH CARE EXAMINATION: Primary | ICD-10-CM

## 2020-01-08 DIAGNOSIS — E03.9 ACQUIRED HYPOTHYROIDISM: ICD-10-CM

## 2020-01-08 DIAGNOSIS — I48.92 ATRIAL FIBRILLATION AND FLUTTER (H): ICD-10-CM

## 2020-01-08 DIAGNOSIS — K21.9 GASTROESOPHAGEAL REFLUX DISEASE WITHOUT ESOPHAGITIS: ICD-10-CM

## 2020-01-08 DIAGNOSIS — I48.91 ATRIAL FIBRILLATION AND FLUTTER (H): ICD-10-CM

## 2020-01-08 DIAGNOSIS — Z78.0 MENOPAUSE: ICD-10-CM

## 2020-01-08 DIAGNOSIS — I10 BENIGN ESSENTIAL HYPERTENSION: ICD-10-CM

## 2020-01-08 DIAGNOSIS — Z00.00 ENCOUNTER FOR PREVENTATIVE ADULT HEALTH CARE EXAMINATION: ICD-10-CM

## 2020-01-08 LAB
ALBUMIN UR-MCNC: 30 MG/DL
APPEARANCE UR: ABNORMAL
BACTERIA #/AREA URNS HPF: ABNORMAL /HPF
BILIRUB UR QL STRIP: NEGATIVE
COLOR UR AUTO: YELLOW
GLUCOSE UR STRIP-MCNC: NEGATIVE MG/DL
HGB UR QL STRIP: NEGATIVE
INR POINT OF CARE: 2.9 (ref 0.86–1.14)
KETONES UR STRIP-MCNC: ABNORMAL MG/DL
LEUKOCYTE ESTERASE UR QL STRIP: ABNORMAL
NITRATE UR QL: POSITIVE
NON-SQ EPI CELLS #/AREA URNS LPF: ABNORMAL /LPF
PH UR STRIP: 5.5 PH (ref 5–7)
RBC #/AREA URNS AUTO: ABNORMAL /HPF
SOURCE: ABNORMAL
SP GR UR STRIP: 1.02 (ref 1–1.03)
TRANS CELLS #/AREA URNS HPF: ABNORMAL /HPF
UROBILINOGEN UR STRIP-ACNC: 0.2 EU/DL (ref 0.2–1)
WBC #/AREA URNS AUTO: ABNORMAL /HPF

## 2020-01-08 PROCEDURE — 77067 SCR MAMMO BI INCL CAD: CPT

## 2020-01-08 PROCEDURE — 36416 COLLJ CAPILLARY BLOOD SPEC: CPT

## 2020-01-08 PROCEDURE — 84443 ASSAY THYROID STIM HORMONE: CPT | Performed by: INTERNAL MEDICINE

## 2020-01-08 PROCEDURE — 85610 PROTHROMBIN TIME: CPT | Mod: QW

## 2020-01-08 PROCEDURE — G0439 PPPS, SUBSEQ VISIT: HCPCS | Performed by: INTERNAL MEDICINE

## 2020-01-08 PROCEDURE — 80061 LIPID PANEL: CPT | Performed by: INTERNAL MEDICINE

## 2020-01-08 PROCEDURE — 81001 URINALYSIS AUTO W/SCOPE: CPT | Performed by: INTERNAL MEDICINE

## 2020-01-08 PROCEDURE — 99213 OFFICE O/P EST LOW 20 MIN: CPT | Mod: 25 | Performed by: INTERNAL MEDICINE

## 2020-01-08 PROCEDURE — 99207 ZZC NO CHARGE NURSE ONLY: CPT

## 2020-01-08 ASSESSMENT — ENCOUNTER SYMPTOMS
ARTHRALGIAS: 0
HEARTBURN: 0
NERVOUS/ANXIOUS: 0
CONSTIPATION: 0
SHORTNESS OF BREATH: 0
COUGH: 0
NAUSEA: 0
FREQUENCY: 1
BREAST MASS: 0
PARESTHESIAS: 0
JOINT SWELLING: 0
EYE PAIN: 0
FEVER: 0
PALPITATIONS: 0
ABDOMINAL PAIN: 0
HEMATURIA: 0
MYALGIAS: 0
HEADACHES: 0
CHILLS: 0
DIZZINESS: 0
DIARRHEA: 0
WEAKNESS: 0
HEMATOCHEZIA: 0
SORE THROAT: 0
DYSURIA: 0

## 2020-01-08 ASSESSMENT — ACTIVITIES OF DAILY LIVING (ADL): CURRENT_FUNCTION: NO ASSISTANCE NEEDED

## 2020-01-08 ASSESSMENT — MIFFLIN-ST. JEOR: SCORE: 1163.54

## 2020-01-08 NOTE — PROGRESS NOTES
ANTICOAGULATION FOLLOW-UP CLINIC VISIT    Patient Name:  Zunilda Alicea May  Date:  2020  Contact Type:  Face to Face    SUBJECTIVE:  Patient Findings     Comments:   The patient was assessed for diet, medication, and activity level changes, missed or extra doses, bruising or bleeding, with no problem findings.          Clinical Outcomes     Negatives:   Major bleeding event, Thromboembolic event, Anticoagulation-related hospital admission, Anticoagulation-related ED visit, Anticoagulation-related fatality    Comments:   The patient was assessed for diet, medication, and activity level changes, missed or extra doses, bruising or bleeding, with no problem findings.             OBJECTIVE    INR Protime   Date Value Ref Range Status   2020 2.9 (A) 0.86 - 1.14 Final       ASSESSMENT / PLAN  INR assessment THER    Recheck INR In: 4 WEEKS    INR Location Clinic      Anticoagulation Summary  As of 2020    INR goal:   2.0-3.0   TTR:   72.4 % (1 y)   INR used for dosin.9 (2020)   Warfarin maintenance plan:   5 mg (2.5 mg x 2) every Mon, Thu; 2.5 mg (2.5 mg x 1) all other days   Full warfarin instructions:   5 mg every Mon, Thu; 2.5 mg all other days   Weekly warfarin total:   22.5 mg   No change documented:   Sonam Teixeira RN   Plan last modified:   Sonam Teixeira RN (2019)   Next INR check:   2020   Priority:   High   Target end date:   Indefinite    Indications    Long term current use of anticoagulants with INR goal of 2.0-3.0 [Z79.01]  Atrial fibrillation and flutter (H) [I48.91  I48.92]             Anticoagulation Episode Summary     INR check location:       Preferred lab:       Send INR reminders to:   [facility]    Comments:   likes printed AVS      Anticoagulation Care Providers     Provider Role Specialty Phone number    Yunior Huang MD [facility] Internal Medicine [phone]            See the Encounter Report to view Anticoagulation Flowsheet and Dosing  Calendar (Go to Encounters tab in chart review, and find the Anticoagulation Therapy Visit)    Dosage adjustment made based on physician directed care plan.    Sonam Teixeira RN

## 2020-01-08 NOTE — NURSING NOTE
"Vital signs:  Temp: 97.8  F (36.6  C) Temp src: Oral BP: 130/68 Pulse: 74   Resp: 12 SpO2: 97 %     Height: 162.6 cm (5' 4\") Weight: 69.9 kg (154 lb)  Estimated body mass index is 26.43 kg/m  as calculated from the following:    Height as of this encounter: 1.626 m (5' 4\").    Weight as of this encounter: 69.9 kg (154 lb).          "

## 2020-01-08 NOTE — PROGRESS NOTES
"SUBJECTIVE:   Zunilda Clark is a 79 year old female who presents for Preventive Visit.      Are you in the first 12 months of your Medicare coverage?  No    Healthy Habits:     In general, how would you rate your overall health?  Good    Frequency of exercise:  4-5 days/week    Duration of exercise:  30-45 minutes    Do you usually eat at least 4 servings of fruit and vegetables a day, include whole grains    & fiber and avoid regularly eating high fat or \"junk\" foods?  No    Taking medications regularly:  Yes    Medication side effects:  None    Ability to successfully perform activities of daily living:  No assistance needed    Home Safety:  No safety concerns identified    Hearing Impairment:  No hearing concerns    In the past 6 months, have you been bothered by leaking of urine? Yes    In general, how would you rate your overall mental or emotional health?  Good      PHQ-2 Total Score: 0    Additional concerns today:  Yes    Do you feel safe in your environment? Yes    Have you ever done Advance Care Planning? (For example, a Health Directive, POLST, or a discussion with a medical provider or your loved ones about your wishes): Yes, advance care planning is on file.      Fall risk  Fallen 2 or more times in the past year?: No  Any fall with injury in the past year?: No    Cognitive Screening   1) Repeat 3 items (Leader, Season, Table)    2) Clock draw: ABNORMAL   3) 3 item recall: Recalls 3 objects  Results: 3 items recalled: COGNITIVE IMPAIRMENT LESS LIKELY    Mini-CogTM Copyright S Jason. Licensed by the author for use in [facility]; reprinted with permission (lizzie@.[facility]). All rights reserved.      Do you have sleep apnea, excessive snoring or daytime drowsiness?: no    Reviewed and updated as needed this visit by clinical staff  Tobacco  Allergies  Meds  Med Hx  Surg Hx  Fam Hx  Soc Hx        Reviewed and updated as needed this visit by Provider        Social History     Tobacco Use "     Smoking status: Never Smoker     Smokeless tobacco: Never Used   Substance Use Topics     Alcohol use: No     Alcohol/week: 0.0 standard drinks     If you drink alcohol do you typically have >3 drinks per day or >7 drinks per week? No    Alcohol Use 1/8/2020   Prescreen: >3 drinks/day or >7 drinks/week? No   Prescreen: >3 drinks/day or >7 drinks/week? -           PROBLEMS TO ADD ON...    Has h/o HTN. on medical treatment. BP has been controlled. No side effects from medications. No CP, HA, dizziness. good compliance with medications and low salt diet.  Has history of atrial fibrillation. On anticoagulation with Coumadin and rate control medications. Asymptonatic - no chest pains , palpitations,  no side effects from medications.  Has RA. On Methotrexate. Follows with rheumatology. Controlled arthritis.   Has history of hypothyroidism. On replacement treatment with Synthroid. No heat /cold intolerance, heart palpitations, weight loss/ gain ,  change in bowel habits.      Concerns for feeling bloated, burping, belching. For several months . Has had occasional vomiting. Has h/o hiatal hernia. No weight loss. No change in bowel habits.       Current providers sharing in care for this patient include:   Patient Care Team:  Yunior Huang MD as PCP - General (Internal Medicine)  Jaycee Cervantes MD as MD (Rheumatology)  Yunior Huang MD as Assigned PCP    The following health maintenance items are reviewed in Epic and correct as of today:  Health Maintenance   Topic Date Due     HF ACTION PLAN  1940     MEDICARE ANNUAL WELLNESS VISIT  08/14/2018     INFLUENZA VACCINE (1) 09/01/2019     FALL RISK ASSESSMENT  11/05/2019     PHQ-2  01/01/2020     BMP  02/19/2020     LIPID  04/10/2020     ALT  07/09/2020     CBC  07/09/2020     OP ANNUAL INR REFERRAL  11/15/2020     ADVANCE CARE PLANNING  11/13/2023     DTAP/TDAP/TD IMMUNIZATION (2 - Td) 05/04/2026     DEXA  Completed     PNEUMOCOCCAL IMMUNIZATION 65+  "LOW/MEDIUM RISK  Completed     ZOSTER IMMUNIZATION  Completed     IPV IMMUNIZATION  Aged Out     MENINGITIS IMMUNIZATION  Aged Out     Lab work is in process  Labs reviewed in [facility]  Mammogram Screening: Patient over age 75, has elected to continue with mammography screening.    Review of Systems   Constitutional: Negative for chills and fever.   HENT: Negative for congestion, ear pain, hearing loss and sore throat.    Eyes: Negative for pain and visual disturbance.   Respiratory: Negative for cough and shortness of breath.    Cardiovascular: Negative for chest pain, palpitations and peripheral edema.   Gastrointestinal: Negative for abdominal pain, constipation, diarrhea, heartburn, hematochezia and nausea.   Breasts:  Negative for tenderness, breast mass and discharge.   Genitourinary: Positive for frequency and urgency. Negative for dysuria, genital sores, hematuria, pelvic pain, vaginal bleeding and vaginal discharge.   Musculoskeletal: Negative for arthralgias, joint swelling and myalgias.   Skin: Negative for rash.   Neurological: Negative for dizziness, weakness, headaches and paresthesias.   Psychiatric/Behavioral: Negative for mood changes. The patient is not nervous/anxious.          OBJECTIVE:   /68   Pulse 74   Temp 97.8  F (36.6  C) (Oral)   Resp 12   Ht 1.626 m (5' 4\")   Wt 69.9 kg (154 lb)   SpO2 97%   BMI 26.43 kg/m   Estimated body mass index is 26.43 kg/m  as calculated from the following:    Height as of this encounter: 1.626 m (5' 4\").    Weight as of this encounter: 69.9 kg (154 lb).  Physical Exam  GENERAL: frail, alert and no distress  EYES: Eyes grossly normal to inspection, PERRL and conjunctivae and sclerae normal  HENT: ear canals and TM's normal, nose and mouth without ulcers or lesions  NECK: no adenopathy, no asymmetry, masses, or scars and thyroid normal to palpation  RESP: lungs clear to auscultation - no rales, rhonchi or wheezes  CV: regular rate and rhythm, normal S1 " S2, no S3 or S4, no murmur, click or rub, no peripheral edema and peripheral pulses strong  ABDOMEN: soft, nontender, no hepatosplenomegaly, no masses and bowel sounds normal  MS: no gross musculoskeletal defects noted, no edema, mild arthritic changes in the hands, right knee, ankles   SKIN: no suspicious lesions or rashes  NEURO: Normal strength and tone, mentation intact and speech normal  PSYCH: mentation appears normal, affect normal/bright    Diagnostic Test Results:  Labs reviewed in Epic  Results for orders placed or performed in visit on 01/08/20 (from the past 24 hour(s))   INR point of care   Result Value Ref Range    INR Protime 2.9 (A) 0.86 - 1.14       ASSESSMENT / PLAN:       ICD-10-CM    1. Encounter for preventative adult health care examination Z00.00 Lipid panel reflex to direct LDL Fasting     TSH with free T4 reflex     *UA reflex to Microscopic     DX Hip/Pelvis/Spine     *MA Screening Digital Bilateral   2. Gastroesophageal reflux disease without esophagitis K21.9 omeprazole (PRILOSEC) 20 MG DR capsule     GASTROENTEROLOGY ADULT REF PROCEDURE ONLY     GASTROENTEROLOGY ADULT REF PROCEDURE ONLY Bart Wen [phone]; [facility]   3. Menopause Z78.0 DX Hip/Pelvis/Spine   4. Benign essential hypertension I10 Lipid panel reflex to direct LDL Fasting     TSH with free T4 reflex     *UA reflex to Microscopic   5. Atrial fibrillation and flutter (H) I48.91 TSH with free T4 reflex    I48.92    6. Acquired hypothyroidism E03.9    7. Encounter for screening mammogram for breast cancer Z12.31 *MA Screening Digital Bilateral     Assess lab work  Continue treatment   Refer for EGD       COUNSELING:  Reviewed preventive health counseling, as reflected in patient instructions       Regular exercise       Healthy diet/nutrition       Vision screening       Hearing screening       Dental care       Bladder control       Colon cancer screening    Estimated body mass index is 26.43 kg/m  as calculated  "from the following:    Height as of this encounter: 1.626 m (5' 4\").    Weight as of this encounter: 69.9 kg (154 lb).         reports that she has never smoked. She has never used smokeless tobacco.      Appropriate preventive services were discussed with this patient, including applicable screening as appropriate for cardiovascular disease, diabetes, osteopenia/osteoporosis, and glaucoma.  As appropriate for age/gender, discussed screening for colorectal cancer, prostate cancer, breast cancer, and cervical cancer. Checklist reviewing preventive services available has been given to the patient.    Reviewed patients plan of care and provided an AVS. The Intermediate Care Plan ( asthma action plan, low back pain action plan, and migraine action plan) for Zunilda meets the Care Plan requirement. This Care Plan has been established and reviewed with the Patient.    Counseling Resources:  ATP IV Guidelines  Pooled Cohorts Equation Calculator  Breast Cancer Risk Calculator  FRAX Risk Assessment  ICSI Preventive Guidelines  Dietary Guidelines for Americans, 2010  USDA's MyPlate  ASA Prophylaxis  Lung CA Screening    Yunior Huang MD  [facility]    Identified Health Risks:  "

## 2020-01-09 LAB
CHOLEST SERPL-MCNC: 184 MG/DL
HDLC SERPL-MCNC: 44 MG/DL
LDLC SERPL CALC-MCNC: 121 MG/DL
NONHDLC SERPL-MCNC: 140 MG/DL
TRIGL SERPL-MCNC: 95 MG/DL
TSH SERPL DL<=0.005 MIU/L-ACNC: 3.33 MU/L (ref 0.4–4)

## 2020-01-10 ENCOUNTER — OFFICE VISIT (OUTPATIENT)
Dept: CARDIOLOGY | Facility: CLINIC | Age: 80
End: 2020-01-10
Attending: INTERNAL MEDICINE
Payer: MEDICARE

## 2020-01-10 VITALS
WEIGHT: 154.5 LBS | SYSTOLIC BLOOD PRESSURE: 112 MMHG | BODY MASS INDEX: 26.38 KG/M2 | HEART RATE: 68 BPM | DIASTOLIC BLOOD PRESSURE: 62 MMHG | HEIGHT: 64 IN

## 2020-01-10 DIAGNOSIS — I08.0 RHEUMATIC DISORDER OF BOTH MITRAL AND AORTIC VALVES: ICD-10-CM

## 2020-01-10 DIAGNOSIS — I48.92 ATRIAL FIBRILLATION AND FLUTTER (H): ICD-10-CM

## 2020-01-10 DIAGNOSIS — I10 BENIGN ESSENTIAL HYPERTENSION: ICD-10-CM

## 2020-01-10 DIAGNOSIS — I48.91 ATRIAL FIBRILLATION AND FLUTTER (H): ICD-10-CM

## 2020-01-10 DIAGNOSIS — Z95.0 CARDIAC PACEMAKER IN SITU: ICD-10-CM

## 2020-01-10 DIAGNOSIS — I27.20 PULMONARY HYPERTENSION (H): ICD-10-CM

## 2020-01-10 DIAGNOSIS — Z95.3 S/P MITRAL VALVE REPLACEMENT WITH BIOPROSTHETIC VALVE: ICD-10-CM

## 2020-01-10 PROCEDURE — 99214 OFFICE O/P EST MOD 30 MIN: CPT | Performed by: INTERNAL MEDICINE

## 2020-01-10 ASSESSMENT — MIFFLIN-ST. JEOR: SCORE: 1166.06

## 2020-01-10 NOTE — LETTER
1/10/2020    Yunior Huang MD  [address] [facility] [address]    RE: Zunilda Clark       Dear Colleague,    I had the pleasure of seeing Zunilda Clark in the [facility].    HPI and Plan:   See dictation:[id number]    Orders Placed This Encounter   Procedures     Follow-Up with Cardiac Advanced Practice Provider     Follow-Up with Cardiologist       No orders of the defined types were placed in this encounter.      There are no discontinued medications.      Encounter Diagnoses   Name Primary?     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      S/P mitral valve replacement with bioprosthetic valve      Pulmonary hypertension (H)      Atrial fibrillation and flutter (H)      Cardiac pacemaker in situ      Benign essential hypertension        CURRENT MEDICATIONS:  Current Outpatient Medications   Medication Sig Dispense Refill     ALLOPURINOL PO Take 100 mg by mouth 2 times daily       amLODIPine-valsartan (EXFORGE) 5-160 MG tablet TAKE ONE TABLET BY MOUTH ONE TIME DAILY  90 tablet 2     calcium citrate (CALCITRATE) 950 MG tablet Take 1 tablet by mouth daily        folic acid (FOLVITE) 1 MG tablet Take 1 mg by mouth daily       latanoprostene bunod (VYZULTA) 0.024 % SOLN ophthalmic solution Place 1 drop Into the left eye At Bedtime       levothyroxine (SYNTHROID/LEVOTHROID) 112 MCG tablet TAKE ONE TABLET BY MOUTH ONE TIME DAILY 90 tablet 3     METHOTREXATE SODIUM IJ Inject 0.8 mLs as directed once a week Thursdays       metoprolol tartrate (LOPRESSOR) 50 MG tablet Take 1 tablet (50 mg) by mouth 2 times daily 180 tablet 3     multivitamin, therapeutic with minerals (MULTI-VITAMIN) TABS tablet Take 1 tablet by mouth daily Without iron       omeprazole (PRILOSEC) 20 MG DR capsule Take 1 capsule (20 mg) by mouth 2 times daily 180 capsule 1     torsemide (DEMADEX) 10 MG tablet Take 1 tablet (10 mg) by mouth daily (with breakfast) 90 tablet 3     VITAMIN D,  CHOLECALCIFEROL, PO Take 1,000 Units by mouth daily       warfarin ANTICOAGULANT (JANTOVEN ANTICOAGULANT) 2.5 MG tablet TAKE 5 MG ON MON/THURS AND 2.5 MG ALL OTHER DAYS OR AS DIRECTED BY [facility] CL[facility] 110 tablet 0       ALLERGIES   No Known Allergies    PAST MEDICAL HISTORY:  Past Medical History:   Diagnosis Date     Acquired hypothyroidism 11/4/2015     Aortic valve insufficiency      Arrhythmia     A fib     Arthritis      Atrial fibrillation (H)      Atrial fibrillation and flutter (H)      Blood clotting disorder (H)      CHF (congestive heart failure) (H)      DVT (deep venous thrombosis) (H) 2003     Gastro-oesophageal reflux disease      H/O mitral valve replacement with tissue graft june 2014     History of blood transfusion 2014     HTN (hypertension)      Hypothyroidism      Other chronic pain      PONV (postoperative nausea and vomiting)      Pulmonary HTN (H)      Rheumatoid arthritis (H)      Rheumatoid arthritis(714.0)      Seizures (H) 2014     Shingles 08/2018     Stroke (H) 2009    left side mild weakness      Stroke (H) 2014     [place]     Thrombosis of leg 2003    both legs       PAST SURGICAL HISTORY:  Past Surgical History:   Procedure Laterality Date     ABDOMEN SURGERY      prolapsed bladder     APPLY WOUND VAC Left 12/18/2018    Procedure: Wound vac application;  Surgeon: Pardeep Sheikh MD;  Location: [facility]     ARTHROPLASTY KNEE Left 11/12/2018    Procedure: Left total knee arthroplasty using a Smith [employer] Melissa II knee system;  Surgeon: Pardeep Sheikh MD;  Location: [facility]     ARTHROSCOPY KNEE WITH DEBRIDEMENT JOINT, COMBINED Left 12/18/2018    Procedure: Left knee debridement and placement of wound vac;  Surgeon: Pardeep Sheikh MD;  Location: [facility] OR     COLONOSCOPY  3/15/2012     EYE SURGERY  2018    Both eyes/cataract surgery     H ABLATION AV NODE  2011    AFlutter with syncope, PPM to follow     HERNIA REPAIR      3  hernies/right and left & umbilical     IMPLANT PACEMAKER       LAPAROSCOPIC CHOLECYSTECTOMY WITH CHOLANGIOGRAMS N/A 2017    Procedure: LAPAROSCOPIC CHOLECYSTECTOMY WITH CHOLANGIOGRAMS;  LAPAROSCOPIC CHOLECYSTECTOMY WITH CHOLANGIOGRAMS ;  Surgeon: Angeles Mcgill MD;  Location: [facility]     OPEN REDUCTION INTERNAL FIXATION RODDING INTRAMEDULLARY HUMERUS Right 2015    Procedure: OPEN REDUCTION INTERNAL FIXATION RODDING INTRAMEDULLARY HUMERUS;  Surgeon: Raymundo Rivera MD;  Location: [facility]     REPLACE VALVE MITRAL  2006    Mitral valve replacement with bioprosthetic valve in  for rheumatic disease     SLING BLADDER SUSPENSION WITH FASCIA UMESH       VASCULAR SURGERY      Blood clots in both legs       FAMILY HISTORY:  Family History   Problem Relation Age of Onset     Coronary Artery Disease Mother      Coronary Artery Disease Brother      Diabetes Brother      Cancer Brother          at age 52      Other Cancer Brother      Hypertension Sister      Hyperlipidemia Sister        SOCIAL HISTORY:  Social History     Socioeconomic History     Marital status:      Spouse name: None     Number of children: None     Years of education: None     Highest education level: None   Occupational History     None   Social Needs     Financial resource strain: None     Food insecurity:     Worry: None     Inability: None     Transportation needs:     Medical: None     Non-medical: None   Tobacco Use     Smoking status: Never Smoker     Smokeless tobacco: Never Used   Substance and Sexual Activity     Alcohol use: No     Alcohol/week: 0.0 standard drinks     Drug use: No     Sexual activity: Not Currently     Partners: Male   Lifestyle     Physical activity:     Days per week: None     Minutes per session: None     Stress: None   Relationships     Social connections:     Talks on phone: None     Gets together: None     Attends Baptism service: None     Active member of club or organization: None  "    Attends meetings of clubs or organizations: None     Relationship status: None     Intimate partner violence:     Fear of current or ex partner: None     Emotionally abused: None     Physically abused: None     Forced sexual activity: None   Other Topics Concern      Service Not Asked     Blood Transfusions Not Asked     Caffeine Concern Yes     Comment: occasionally     Occupational Exposure Not Asked     Hobby Hazards Not Asked     Sleep Concern No     Stress Concern Not Asked     Weight Concern Not Asked     Special Diet Yes     Comment: lower salt     Back Care No     Exercise Yes     Comment: walking ride excercise bike     Bike Helmet Not Asked     Seat Belt Not Asked     Self-Exams Not Asked     Parent/sibling w/ CABG, MI or angioplasty before 65F 55M? Yes     Comment: Brother   Social History Narrative     None       Review of Systems:  Skin:  Negative bruising     Eyes:  Positive for glasses    ENT:  Negative sinus trouble    Respiratory:  Negative       Cardiovascular:  Negative      Gastroenterology: Positive for reflux takes   Genitourinary:  Positive for nocturia;urinary frequency    Musculoskeletal:  Positive for back pain;arthritis    Neurologic:  Positive for numbness or tingling of feet    Psychiatric:  Negative      Heme/Lymph/Imm:  Positive for easy bruising    Endocrine:  Positive for thyroid disorder      Physical Exam:  Vitals: /62 (BP Location: Right arm, Patient Position: Sitting, Cuff Size: Adult Regular)   Pulse 68   Ht 1.626 m (5' 4.02\")   Wt 70.1 kg (154 lb 8 oz)   BMI 26.51 kg/m       Constitutional:  cooperative, alert and oriented, well developed, well nourished, in no acute distress        Skin:  warm and dry to the touch, no apparent skin lesions or masses noted   pacemaker incision in the left infraclavicular area was well-healed      Head:  normocephalic, no masses or lesions        Eyes:  pupils equal and round;conjunctivae and lids unremarkable;sclera " white;no xanthalasma;EOMS intact        Lymph:      ENT:  no pallor or cyanosis, dentition good        Neck:  carotid pulses are full and equal bilaterally, JVP normal, no carotid bruit        Respiratory:  normal breath sounds, clear to auscultation, normal A-P diameter, normal symmetry, normal respiratory excursion, no use of accessory muscles;healed median sternotomy scar;clear to auscultation         Cardiac: regular rhythm;normal S1 and S2;no S3 or S4;apical impulse not displaced   fixed split S2   LLSB;systolic ejection murmur;grade 2;radiation to the RUSB   diastolic murmur;grade 1    pulses full and equal, no bruits auscultated                                        GI:  abdomen soft, non-tender, BS normoactive, no mass, no HSM, no bruits        Extremities and Muscular Skeletal:  no edema arthritic deformities of UE            Neurological:  affect appropriate left sided weakness walks with a cane    Psych:  Alert and Oriented x 3        CC  Timothy Das MD  [id number]  [place] [id number]                Thank you for allowing me to participate in the care of your patient.      Sincerely,     Timothy Das MD     [facility]    cc:   Timothy Das MD  [id number]  [place] [id number]

## 2020-01-10 NOTE — LETTER
1/10/2020      Yunior Huang MD  [address] [facility] [address]      RE: Zunilda Clark       Dear Colleague,    I had the pleasure of seeing Zunilda Clark in the [facility].    Service Date: 01/10/2020      PRIMARY CARE PHYSICIAN:  Dr. Yunior Huang.      HISTORY OF PRESENT ILLNESS:  I again had the pleasure of seeing your patient, Zunilda Clark (Elaine), at [facility] in [place] for evaluation of pulmonary hypertension and heart valve disease.  Zunilda's  passed away just before I saw her in July of this past year.  The patient was due to have a right total knee arthroplasty in 09/2018 but so far has postponed this.  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  Her first bioprosthetic mitral valve was replaced in 2007 and a redo 27 mm Magna bioprosthetic valve replaced in 2014.  She is status post tricuspid valve annuloplasty with residual moderate-severe tricuspid regurgitation and mildly dilated right ventricle and normal systolic function on cardiac MRI.  She has had estimated RVSP as high as 85 mmHg in the past.  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  She continues to have mode switches lasting up to 4 days indicating paroxysmal atrial fibrillation.  She remains on long-term warfarin anticoagulation for prophylaxis.  Her atrial fibrillation remains paroxysmal.  She had a history of an ischemic stroke prior to warfarin therapy.  No known coronary artery disease on angiography in 2014.  She has chronic rheumatoid arthritis and takes methotrexate therapy along with folic acid.  She currently walks with a cane because of her right knee arthritis.  Her exercise continues to include walking up 1 flight of stairs per day and riding a stationary bicycle for 30 minutes several times per week.  INRs are being  managed through the [facility]R Clinic and her goal is between 2 and 3.  She denies peripheral edema.  Dr. Tamie Monroy saw this patient for pulmonary hypertension on 05/23/2019. An echocardiogram in July was supposed to have a bubble study but this was never completed.  There is no Doppler evidence of intracardiac shunt.  Her last echocardiogram from 07/09 indicated an RVSP of 49 mmHg.  We now have repeated an echocardiogram on 01/07/2020 demonstrating normal mitral bioprosthetic valve with normal gradients.  Left ventricular ejection fraction is normal at 60%-65%.  There is mildly decreased right ventricular systolic function.  Mild to moderate biatrial enlargement.  The right ventricular systolic pressure is elevated to 57 mmHg consistent with moderately severe pulmonary hypertension.  There is mild aortic regurgitation and unfortunately there is no discussion of an estimate of tricuspid regurgitation.  The patient's pacemaker is functioning normally.  No significant aortic stenosis.      PHYSICAL EXAMINATION:  As listed below.  I note that her weight is very stable on her current dose of diuretics.  On 08/19/2019 her BUN was 26, creatinine 0.84, potassium 3.9.      ASSESSMENT:   1.  Deanne Clark is a delightful 79-year-old female with rheumatic valvular heart disease, status post mitral valve replacement.  She has pulmonary hypertension secondary to previous mitral stenosis.  Her pulmonary pressures are currently stable and she denies significant dyspnea, peripheral edema or weight gain.  She is following a low-salt diet.  Her kidney function is currently normal on her current dose of torsemide.  I have not made any medication changes.  Her blood pressure is reasonably normal on her current dose of amlodipine and valsartan.   2.  Her echocardiogram is stable without evidence of worsening mitral stenosis or insufficiency.  Pulmonary pressures are stable.  We will consider repeating an echocardiogram in 1 year.   3.  I will  ask this patient to return to our clinic to see my physician's assistant in 6 months.  I have asked her to call for a 5-pound weight gain or more.  The patient will also call for increasing shortness of breath.   4.  The patient plans to continue without a right total knee arthroplasty until physically she is unable to avoid it.      It is my pleasure to assist in the care of Nadiya Lewis.  All her questions were answered to her satisfaction.  I will see her again in 1 year or earlier on a p.r.n. basis.      Marcelino Brooks MD       cc:   Yunior Huang MD    [facility]    [address], [address]    [place]  [address]         MARCELINO BROOKS MD, [facility]             D: 01/10/2020   T: 01/10/2020   MT: AMY      Name:     NADIYA LEWIS   MRN:      [id number]        Account:      [id number]   :      1940           Service Date: 01/10/2020      Document: [id number]         Outpatient Encounter Medications as of 1/10/2020   Medication Sig Dispense Refill     ALLOPURINOL PO Take 100 mg by mouth 2 times daily       amLODIPine-valsartan (EXFORGE) 5-160 MG tablet TAKE ONE TABLET BY MOUTH ONE TIME DAILY  90 tablet 2     calcium citrate (CALCITRATE) 950 MG tablet Take 1 tablet by mouth daily        folic acid (FOLVITE) 1 MG tablet Take 1 mg by mouth daily       latanoprostene bunod (VYZULTA) 0.024 % SOLN ophthalmic solution Place 1 drop Into the left eye At Bedtime       levothyroxine (SYNTHROID/LEVOTHROID) 112 MCG tablet TAKE ONE TABLET BY MOUTH ONE TIME DAILY 90 tablet 3     METHOTREXATE SODIUM IJ Inject 0.8 mLs as directed once a week        metoprolol tartrate (LOPRESSOR) 50 MG tablet Take 1 tablet (50 mg) by mouth 2 times daily 180 tablet 3     multivitamin, therapeutic with minerals (MULTI-VITAMIN) TABS tablet Take 1 tablet by mouth daily Without iron       omeprazole (PRILOSEC) 20 MG DR capsule Take 1 capsule (20 mg) by mouth 2 times daily 180 capsule 1     torsemide  (DEMADEX) 10 MG tablet Take 1 tablet (10 mg) by mouth daily (with breakfast) 90 tablet 3     VITAMIN D, CHOLECALCIFEROL, PO Take 1,000 Units by mouth daily       warfarin ANTICOAGULANT (JANTOVEN ANTICOAGULANT) 2.5 MG tablet TAKE 5 MG ON MON/THURS AND 2.5 MG ALL OTHER DAYS OR AS DIRECTED BY [facility] 110 tablet 0     No facility-administered encounter medications on file as of 1/10/2020.        Again, thank you for allowing me to participate in the care of your patient.      Sincerely,    Timothy Das MD     [facility]

## 2020-01-10 NOTE — PROGRESS NOTES
HPI and Plan:   See dictation:[id number]    Orders Placed This Encounter   Procedures     Follow-Up with Cardiac Advanced Practice Provider     Follow-Up with Cardiologist       No orders of the defined types were placed in this encounter.      There are no discontinued medications.      Encounter Diagnoses   Name Primary?     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      S/P mitral valve replacement with bioprosthetic valve      Pulmonary hypertension (H)      Atrial fibrillation and flutter (H)      Cardiac pacemaker in situ      Benign essential hypertension        CURRENT MEDICATIONS:  Current Outpatient Medications   Medication Sig Dispense Refill     ALLOPURINOL PO Take 100 mg by mouth 2 times daily       amLODIPine-valsartan (EXFORGE) 5-160 MG tablet TAKE ONE TABLET BY MOUTH ONE TIME DAILY  90 tablet 2     calcium citrate (CALCITRATE) 950 MG tablet Take 1 tablet by mouth daily        folic acid (FOLVITE) 1 MG tablet Take 1 mg by mouth daily       latanoprostene bunod (VYZULTA) 0.024 % SOLN ophthalmic solution Place 1 drop Into the left eye At Bedtime       levothyroxine (SYNTHROID/LEVOTHROID) 112 MCG tablet TAKE ONE TABLET BY MOUTH ONE TIME DAILY 90 tablet 3     METHOTREXATE SODIUM IJ Inject 0.8 mLs as directed once a week Thursdays       metoprolol tartrate (LOPRESSOR) 50 MG tablet Take 1 tablet (50 mg) by mouth 2 times daily 180 tablet 3     multivitamin, therapeutic with minerals (MULTI-VITAMIN) TABS tablet Take 1 tablet by mouth daily Without iron       omeprazole (PRILOSEC) 20 MG DR capsule Take 1 capsule (20 mg) by mouth 2 times daily 180 capsule 1     torsemide (DEMADEX) 10 MG tablet Take 1 tablet (10 mg) by mouth daily (with breakfast) 90 tablet 3     VITAMIN D, CHOLECALCIFEROL, PO Take 1,000 Units by mouth daily       warfarin ANTICOAGULANT (JANTOVEN ANTICOAGULANT) 2.5 MG tablet TAKE 5 MG ON MON/THURS AND 2.5 MG ALL OTHER DAYS OR AS DIRECTED BY [facility] CLINIC 110 tablet 0       ALLERGIES   No  Known Allergies    PAST MEDICAL HISTORY:  Past Medical History:   Diagnosis Date     Acquired hypothyroidism 11/4/2015     Aortic valve insufficiency      Arrhythmia     A fib     Arthritis      Atrial fibrillation (H)      Atrial fibrillation and flutter (H)      Blood clotting disorder (H)      CHF (congestive heart failure) (H)      DVT (deep venous thrombosis) (H) 2003     Gastro-oesophageal reflux disease      H/O mitral valve replacement with tissue graft june 2014     History of blood transfusion 2014     HTN (hypertension)      Hypothyroidism      Other chronic pain      PONV (postoperative nausea and vomiting)      Pulmonary HTN (H)      Rheumatoid arthritis (H)      Rheumatoid arthritis(714.0)      Seizures (H) 2014     Shingles 08/2018     Stroke (H) 2009    left side mild weakness      Stroke (H) 2014     [place]     Thrombosis of leg 2003    both legs       PAST SURGICAL HISTORY:  Past Surgical History:   Procedure Laterality Date     ABDOMEN SURGERY      prolapsed bladder     APPLY WOUND VAC Left 12/18/2018    Procedure: Wound vac application;  Surgeon: Pardeep Sheikh MD;  Location: [facility]     ARTHROPLASTY KNEE Left 11/12/2018    Procedure: Left total knee arthroplasty using a [employer] Melissa II knee system;  Surgeon: Pardeep Sheikh MD;  Location: [facility] OR     ARTHROSCOPY KNEE WITH DEBRIDEMENT JOINT, COMBINED Left 12/18/2018    Procedure: Left knee debridement and placement of wound vac;  Surgeon: Pardeep Sheikh MD;  Location: [facility] OR     COLONOSCOPY  3/15/2012     EYE SURGERY  2018    Both eyes/cataract surgery     H ABLATION AV NODE  2011    AFlutter with syncope, PPM to follow     HERNIA REPAIR      3 hernies/right and left & umbilical     IMPLANT PACEMAKER  2011     LAPAROSCOPIC CHOLECYSTECTOMY WITH CHOLANGIOGRAMS N/A 4/29/2017    Procedure: LAPAROSCOPIC CHOLECYSTECTOMY WITH CHOLANGIOGRAMS;  LAPAROSCOPIC CHOLECYSTECTOMY WITH CHOLANGIOGRAMS  ;  Surgeon: Angeles Mcgill MD;  Location: [facility] OR     OPEN REDUCTION INTERNAL FIXATION RODDING INTRAMEDULLARY HUMERUS Right 2015    Procedure: OPEN REDUCTION INTERNAL FIXATION RODDING INTRAMEDULLARY HUMERUS;  Surgeon: Raymundo Rivera MD;  Location: [facility]     REPLACE VALVE MITRAL  2006    Mitral valve replacement with bioprosthetic valve in  for rheumatic disease     SLING BLADDER SUSPENSION WITH FASCIA UMESH       VASCULAR SURGERY      Blood clots in both legs       FAMILY HISTORY:  Family History   Problem Relation Age of Onset     Coronary Artery Disease Mother      Coronary Artery Disease Brother      Diabetes Brother      Cancer Brother          at age 52      Other Cancer Brother      Hypertension Sister      Hyperlipidemia Sister        SOCIAL HISTORY:  Social History     Socioeconomic History     Marital status:      Spouse name: None     Number of children: None     Years of education: None     Highest education level: None   Occupational History     None   Social Needs     Financial resource strain: None     Food insecurity:     Worry: None     Inability: None     Transportation needs:     Medical: None     Non-medical: None   Tobacco Use     Smoking status: Never Smoker     Smokeless tobacco: Never Used   Substance and Sexual Activity     Alcohol use: No     Alcohol/week: 0.0 standard drinks     Drug use: No     Sexual activity: Not Currently     Partners: Male   Lifestyle     Physical activity:     Days per week: None     Minutes per session: None     Stress: None   Relationships     Social connections:     Talks on phone: None     Gets together: None     Attends Baptism service: None     Active member of club or organization: None     Attends meetings of clubs or organizations: None     Relationship status: None     Intimate partner violence:     Fear of current or ex partner: None     Emotionally abused: None     Physically abused: None     Forced sexual activity: None  "  Other Topics Concern      Service Not Asked     Blood Transfusions Not Asked     Caffeine Concern Yes     Comment: occasionally     Occupational Exposure Not Asked     Hobby Hazards Not Asked     Sleep Concern No     Stress Concern Not Asked     Weight Concern Not Asked     Special Diet Yes     Comment: lower salt     Back Care No     Exercise Yes     Comment: walking ride excercise bike     Bike Helmet Not Asked     Seat Belt Not Asked     Self-Exams Not Asked     Parent/sibling w/ CABG, MI or angioplasty before 65F 55M? Yes     Comment: Brother   Social History Narrative     None       Review of Systems:  Skin:  Negative bruising     Eyes:  Positive for glasses    ENT:  Negative sinus trouble    Respiratory:  Negative       Cardiovascular:  Negative      Gastroenterology: Positive for reflux takes   Genitourinary:  Positive for nocturia;urinary frequency    Musculoskeletal:  Positive for back pain;arthritis    Neurologic:  Positive for numbness or tingling of feet    Psychiatric:  Negative      Heme/Lymph/Imm:  Positive for easy bruising    Endocrine:  Positive for thyroid disorder      Physical Exam:  Vitals: /62 (BP Location: Right arm, Patient Position: Sitting, Cuff Size: Adult Regular)   Pulse 68   Ht 1.626 m (5' 4.02\")   Wt 70.1 kg (154 lb 8 oz)   BMI 26.51 kg/m      Constitutional:  cooperative, alert and oriented, well developed, well nourished, in no acute distress        Skin:  warm and dry to the touch, no apparent skin lesions or masses noted   pacemaker incision in the left infraclavicular area was well-healed      Head:  normocephalic, no masses or lesions        Eyes:  pupils equal and round;conjunctivae and lids unremarkable;sclera white;no xanthalasma;EOMS intact        Lymph:      ENT:  no pallor or cyanosis, dentition good        Neck:  carotid pulses are full and equal bilaterally, JVP normal, no carotid bruit        Respiratory:  normal breath sounds, clear to auscultation, " normal A-P diameter, normal symmetry, normal respiratory excursion, no use of accessory muscles;healed median sternotomy scar;clear to auscultation         Cardiac: regular rhythm;normal S1 and S2;no S3 or S4;apical impulse not displaced   fixed split S2   LLSB;systolic ejection murmur;grade 2;radiation to the RUSB   diastolic murmur;grade 1    pulses full and equal, no bruits auscultated                                        GI:  abdomen soft, non-tender, BS normoactive, no mass, no HSM, no bruits        Extremities and Muscular Skeletal:  no edema arthritic deformities of UE            Neurological:  affect appropriate left sided weakness walks with a cane    Psych:  Alert and Oriented x 3        CC  Timothy Das MD  [id number]  [place] [id number]

## 2020-01-10 NOTE — PROGRESS NOTES
Service Date: 01/10/2020      PRIMARY CARE PHYSICIAN:  Dr. Yunior Huang.      HISTORY OF PRESENT ILLNESS:  I again had the pleasure of seeing your patient, Zunilda Rodriguez) May, at [facility] in [place] for evaluation of pulmonary hypertension and heart valve disease.  Zunilda's  passed away just before I saw her in July of this past year.  The patient was due to have a right total knee arthroplasty in 09/2018 but so far has postponed this.  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  Her first bioprosthetic mitral valve was replaced in 2007 and a redo 27 mm Magna bioprosthetic valve replaced in 2014.  She is status post tricuspid valve annuloplasty with residual moderate-severe tricuspid regurgitation and mildly dilated right ventricle and normal systolic function on cardiac MRI.  She has had estimated RVSP as high as 85 mmHg in the past.  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  She continues to have mode switches lasting up to 4 days indicating paroxysmal atrial fibrillation.  She remains on long-term warfarin anticoagulation for prophylaxis.  Her atrial fibrillation remains paroxysmal.  She had a history of an ischemic stroke prior to warfarin therapy.  No known coronary artery disease on angiography in 2014.  She has chronic rheumatoid arthritis and takes methotrexate therapy along with folic acid.  She currently walks with a cane because of her right knee arthritis.  Her exercise continues to include walking up 1 flight of stairs per day and riding a stationary bicycle for 30 minutes several times per week.  INRs are being managed through the INR Clinic and her goal is between 2 and 3.  She denies peripheral edema.  Dr. Tamie Monroy saw this patient for pulmonary hypertension on 05/23/2019. An echocardiogram in July was supposed to have a bubble study but  this was never completed.  There is no Doppler evidence of intracardiac shunt.  Her last echocardiogram from 07/09 indicated an RVSP of 49 mmHg.  We now have repeated an echocardiogram on 01/07/2020 demonstrating normal mitral bioprosthetic valve with normal gradients.  Left ventricular ejection fraction is normal at 60%-65%.  There is mildly decreased right ventricular systolic function.  Mild to moderate biatrial enlargement.  The right ventricular systolic pressure is elevated to 57 mmHg consistent with moderately severe pulmonary hypertension.  There is mild aortic regurgitation and unfortunately there is no discussion of an estimate of tricuspid regurgitation.  The patient's pacemaker is functioning normally.  No significant aortic stenosis.      PHYSICAL EXAMINATION:  As listed below.  I note that her weight is very stable on her current dose of diuretics.  On 08/19/2019 her BUN was 26, creatinine 0.84, potassium 3.9.      ASSESSMENT:   1.  Deanne Clark is a delightful 79-year-old female with rheumatic valvular heart disease, status post mitral valve replacement.  She has pulmonary hypertension secondary to previous mitral stenosis.  Her pulmonary pressures are currently stable and she denies significant dyspnea, peripheral edema or weight gain.  She is following a low-salt diet.  Her kidney function is currently normal on her current dose of torsemide.  I have not made any medication changes.  Her blood pressure is reasonably normal on her current dose of amlodipine and valsartan.   2.  Her echocardiogram is stable without evidence of worsening mitral stenosis or insufficiency.  Pulmonary pressures are stable.  We will consider repeating an echocardiogram in 1 year.   3.  I will ask this patient to return to our clinic to see my physician's assistant in 6 months.  I have asked her to call for a 5-pound weight gain or more.  The patient will also call for increasing shortness of breath.   4.  The patient plans  to continue without a right total knee arthroplasty until physically she is unable to avoid it.      It is my pleasure to assist in the care of Nadiya Lewis.  All her questions were answered to her satisfaction.  I will see her again in 1 year or earlier on a p.r.n. basis.      Marcelino Brooks MD       cc:   Yunior Huang MD    [facility]    [address], [address]    [place]  [address]         MARCELINO BROOKS MD, [facility]             D: 01/10/2020   T: 01/10/2020   MT: AMY      Name:     NADIYA LEWIS   MRN:      [id number]        Account:      [id number]   :      1940           Service Date: 01/10/2020      Document: [id number]

## 2020-01-27 ENCOUNTER — TELEPHONE (OUTPATIENT)
Dept: INTERNAL MEDICINE | Facility: CLINIC | Age: 80
End: 2020-01-27

## 2020-01-27 NOTE — TELEPHONE ENCOUNTER
See below:    Procedure instructions to hold 5 days prior and no need to bridge with lovenox per PCP for 2/19/20 procedure.    Patient was called and notified of these instructions.        Navya Bowles RN  Anticoagulation clinic

## 2020-01-27 NOTE — TELEPHONE ENCOUNTER
"Anticoagulation Management    Patient called requesting instructions for upcoming procedure on 20. She is questioning if she needs to hold her warfarin prior to her endoscopy.  Patient has an INR nurse visit on 20 and patient reports \"i'll discuss all this with them then.  I don't need a call back but if you can look into all this so I have direction at that appointment.\"    [facility] was called to request information from provider who is doing the procedure (Dr. Mccarthy).  [facility] reports patient is not in their system (had  attempt to locate chart with , both first names of Deanne and Zunilda, phone number) with no success.    Writer called [facility] corona on referral from PCP to attempt to get more info at [phone].   reports that patient is scheduled for procedure and that PCP needs to address all warfarin dosing instruction. Reports that no instruction is given until 2 weeks prior and that [facility] has no access to the scheduling information until 2 weeks prior to procedure which is why she could not be found.   reports that PCP will need to be contacted for all direction.    Navya Bowles RN  "

## 2020-01-27 NOTE — TELEPHONE ENCOUNTER
To PCP:    Patient called requesting instruction for upcoming endoscopy procedure on 2/19/20 in regards to coumadin.  [facility] reports that patient needs to have instruction come from PCP.    Normal protocol is holding warfarin 5 days prior, Are you okay with this plan?    Patient is on warfarin therapy due to A.fib and mitral valve replacement with bioprosthetic valve, will this patient require bridging with lovenox?    Thanks,  Navya Bowles RN  Anticoagulation Clinic

## 2020-02-05 ENCOUNTER — ANTICOAGULATION THERAPY VISIT (OUTPATIENT)
Dept: ANTICOAGULATION | Facility: CLINIC | Age: 80
End: 2020-02-05
Payer: MEDICARE

## 2020-02-05 DIAGNOSIS — I48.92 ATRIAL FIBRILLATION AND FLUTTER (H): ICD-10-CM

## 2020-02-05 DIAGNOSIS — I48.91 ATRIAL FIBRILLATION AND FLUTTER (H): ICD-10-CM

## 2020-02-05 DIAGNOSIS — Z79.01 LONG TERM CURRENT USE OF ANTICOAGULANTS WITH INR GOAL OF 2.0-3.0: ICD-10-CM

## 2020-02-05 LAB — INR POINT OF CARE: 2.3 (ref 0.86–1.14)

## 2020-02-05 PROCEDURE — 99207 ZZC NO CHARGE NURSE ONLY: CPT

## 2020-02-05 PROCEDURE — 85610 PROTHROMBIN TIME: CPT | Mod: QW

## 2020-02-05 PROCEDURE — 36416 COLLJ CAPILLARY BLOOD SPEC: CPT

## 2020-02-05 NOTE — PROGRESS NOTES
ANTICOAGULATION FOLLOW-UP CLINIC VISIT    Patient Name:  Zunilda Alicea May  Date:  2020  Contact Type:  Face to Face    SUBJECTIVE:  Patient Findings     Positives:   Missed doses (Patient reports she missed warfarin dose 20 and took that dose 20.)    Comments:   The patient was assessed for diet, medication and activity level changes, extra doses, bruising or bleeding, with no problem findings.  When printing AVS, saw that patient has EGD scheduled on 20 and was instructed to hold warfarin for 5 days prior.         Clinical Outcomes     Comments:   The patient was assessed for diet, medication and activity level changes, extra doses, bruising or bleeding, with no problem findings.  When printing AVS, saw that patient has EGD scheduled on 20 and was instructed to hold warfarin for 5 days prior.            OBJECTIVE    INR Protime   Date Value Ref Range Status   2020 2.3 (A) 0.86 - 1.14 Final       ASSESSMENT / PLAN  INR assessment THER    Recheck INR In: 4 WEEKS    INR Location Clinic      Anticoagulation Summary  As of 2020    INR goal:   2.0-3.0   TTR:   75.3 % (1 y)   INR used for dosin.3 (2020)   Warfarin maintenance plan:   5 mg (2.5 mg x 2) every Mon, Thu; 2.5 mg (2.5 mg x 1) all other days   Full warfarin instructions:   14: Hold; 215: Hold; 216: Hold; 217: Hold; 218: Hold; Otherwise 5 mg every Mon, Thu; 2.5 mg all other days   Weekly warfarin total:   22.5 mg   Plan last modified:   Sonam Teixeira RN (2019)   Next INR check:   3/4/2020   Priority:   Maintenance   Target end date:   Indefinite    Indications    Long term current use of anticoagulants with INR goal of 2.0-3.0 [Z79.01]  Atrial fibrillation and flutter (H) [I48.91  I48.92]             Anticoagulation Episode Summary     INR check location:       Preferred lab:       Send INR reminders to:   [facility]    Comments:   likes printed AVS      Anticoagulation Care Providers      Provider Role Specialty Phone number    Yunior Huang MD Responsible Internal Medicine [phone]            See the Encounter Report to view Anticoagulation Flowsheet and Dosing Calendar (Go to Encounters tab in chart review, and find the Anticoagulation Therapy Visit)    Dosage adjustment made based on physician directed care plan.    Екатерина Mahan RN

## 2020-02-19 ENCOUNTER — HOSPITAL ENCOUNTER (OUTPATIENT)
Facility: CLINIC | Age: 80
Discharge: HOME | End: 2020-02-19
Attending: INTERNAL MEDICINE | Admitting: INTERNAL MEDICINE
Payer: MEDICARE

## 2020-02-19 VITALS
DIASTOLIC BLOOD PRESSURE: 69 MMHG | RESPIRATION RATE: 16 BRPM | HEART RATE: 63 BPM | OXYGEN SATURATION: 97 % | SYSTOLIC BLOOD PRESSURE: 120 MMHG

## 2020-02-19 DIAGNOSIS — R14.2 FLATULENCE, ERUCTATION AND GAS PAIN: Primary | ICD-10-CM

## 2020-02-19 DIAGNOSIS — R14.1 FLATULENCE, ERUCTATION AND GAS PAIN: Primary | ICD-10-CM

## 2020-02-19 DIAGNOSIS — R14.3 FLATULENCE, ERUCTATION AND GAS PAIN: Primary | ICD-10-CM

## 2020-02-19 LAB
INR PPP: 1.28 (ref 0.86–1.14)
UPPER GI ENDOSCOPY: NORMAL

## 2020-02-19 PROCEDURE — 43235 EGD DIAGNOSTIC BRUSH WASH: CPT | Performed by: INTERNAL MEDICINE

## 2020-02-19 PROCEDURE — 85610 PROTHROMBIN TIME: CPT | Performed by: INTERNAL MEDICINE

## 2020-02-19 PROCEDURE — 25000128 H RX IP 250 OP 636: Performed by: INTERNAL MEDICINE

## 2020-02-19 PROCEDURE — 25000125 ZZHC RX 250: Performed by: INTERNAL MEDICINE

## 2020-02-19 PROCEDURE — 36415 COLL VENOUS BLD VENIPUNCTURE: CPT | Performed by: INTERNAL MEDICINE

## 2020-02-19 PROCEDURE — 40000104 ZZH STATISTIC MODERATE SEDATION < 10 MIN: Performed by: INTERNAL MEDICINE

## 2020-02-19 RX ORDER — LIDOCAINE 40 MG/G
CREAM TOPICAL
Status: DISCONTINUED | OUTPATIENT
Start: 2020-02-19 | End: 2020-02-19 | Stop reason: HOSPADM

## 2020-02-19 RX ORDER — ONDANSETRON 4 MG/1
4 TABLET, ORALLY DISINTEGRATING ORAL EVERY 6 HOURS PRN
Status: DISCONTINUED | OUTPATIENT
Start: 2020-02-19 | End: 2020-02-19 | Stop reason: HOSPADM

## 2020-02-19 RX ORDER — ONDANSETRON 2 MG/ML
4 INJECTION INTRAMUSCULAR; INTRAVENOUS
Status: COMPLETED | OUTPATIENT
Start: 2020-02-19 | End: 2020-02-19

## 2020-02-19 RX ORDER — PANTOPRAZOLE SODIUM 40 MG/1
40 TABLET, DELAYED RELEASE ORAL
Qty: 30 TABLET | Refills: 4 | Status: SHIPPED | OUTPATIENT
Start: 2020-02-19 | End: 2020-11-17 | Stop reason: ALTCHOICE

## 2020-02-19 RX ORDER — NALOXONE HYDROCHLORIDE 0.4 MG/ML
.1-.4 INJECTION, SOLUTION INTRAMUSCULAR; INTRAVENOUS; SUBCUTANEOUS
Status: DISCONTINUED | OUTPATIENT
Start: 2020-02-19 | End: 2020-02-19 | Stop reason: HOSPADM

## 2020-02-19 RX ORDER — ONDANSETRON 2 MG/ML
4 INJECTION INTRAMUSCULAR; INTRAVENOUS EVERY 6 HOURS PRN
Status: DISCONTINUED | OUTPATIENT
Start: 2020-02-19 | End: 2020-02-19 | Stop reason: HOSPADM

## 2020-02-19 RX ORDER — FLUMAZENIL 0.1 MG/ML
0.2 INJECTION, SOLUTION INTRAVENOUS
Status: DISCONTINUED | OUTPATIENT
Start: 2020-02-19 | End: 2020-02-19 | Stop reason: HOSPADM

## 2020-02-19 RX ORDER — FENTANYL CITRATE 50 UG/ML
INJECTION, SOLUTION INTRAMUSCULAR; INTRAVENOUS PRN
Status: DISCONTINUED | OUTPATIENT
Start: 2020-02-19 | End: 2020-02-19 | Stop reason: HOSPADM

## 2020-02-19 NOTE — PROGRESS NOTES
Dr Mccarthy notified at 0949 of one Blood Pressure taken at 0920 was  52/44, pulse 71.  Patient was observed another half hour and is  asymptomatic.  Shari notified all further blood pressures are 101-120/62-69 and patient reports feeling well and asymptomatic.  OK to discharge.  Restart coumadin tonight.

## 2020-02-19 NOTE — PRE-PROCEDURE
Pre-Endoscopy History and Physical     Zunilda Clark MRN# [id number]   YOB: 1940 Age: 79 year old     Date of Procedure: 2/19/2020  Primary care provider: Yuinor Huang  Type of Endoscopy: esophagogastroduodenoscopy (upper GI endoscopy)  Reason for Procedure: belching, gas, vomiting  Type of Anesthesia Anticipated: Moderate (conscious) sedation    HPI:    Zunilda is a 79 year old female who will be undergoing the above procedure.      A history and physical has been performed. The patient's medications and allergies have been reviewed. The risks and benefits of the procedure and the sedation options and risks were discussed with the patient.  All questions were answered and informed consent was obtained.      No Known Allergies     Current Facility-Administered Medications   Medication     0.9% sodium chloride BOLUS     lidocaine (LMX4) kit     lidocaine 1 % 0.1-1 mL     sodium chloride (PF) 0.9% PF flush 3 mL     sodium chloride (PF) 0.9% PF flush 3 mL     sodium chloride (PF) 0.9% PF flush 3 mL       Patient Active Problem List   Diagnosis     RA (rheumatoid arthritis) (H)     Benign essential hypertension     Atrial fibrillation and flutter (H)     Cardiac pacemaker in situ     Humerus fracture     Fracture, humerus closed, shaft     Anticoagulation management encounter     Acquired hypothyroidism     Essential hypertension     Long term current use of anticoagulants with INR goal of 2.0-3.0     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     S/P mitral valve replacement with bioprosthetic valve     Pulmonary hypertension (H)     S/P total knee arthroplasty     Wound dehiscence        Past Medical History:   Diagnosis Date     Acquired hypothyroidism 11/4/2015     Aortic valve insufficiency      Arrhythmia     A fib     Arthritis      Atrial fibrillation (H)      Atrial fibrillation and flutter (H)      Blood clotting disorder (H)      CHF (congestive heart failure) (H)      DVT  (deep venous thrombosis) (H) 2003     Gastro-oesophageal reflux disease      H/O mitral valve replacement with tissue graft june 2014     History of blood transfusion 2014     HTN (hypertension)      Hypothyroidism      Other chronic pain      PONV (postoperative nausea and vomiting)      Pulmonary HTN (H)      Rheumatoid arthritis (H)      Rheumatoid arthritis(714.0)      Seizures (H) 2014     Shingles 08/2018     Stroke (H) 2009    left side mild weakness      Stroke (H) 2014     [place]ope 2011    [place]     Thrombosis of leg 2003    both legs        Past Surgical History:   Procedure Laterality Date     ABDOMEN SURGERY      prolapsed bladder     APPLY WOUND VAC Left 12/18/2018    Procedure: Wound vac application;  Surgeon: Pardeep Sheikh MD;  Location: [facility] OR     ARTHROPLASTY KNEE Left 11/12/2018    Procedure: Left total knee arthroplasty using a [employer]ph[employer] Melissa II knee system;  Surgeon: Pardeep Sheikh MD;  Location: [facility] OR     ARTHROSCOPY KNEE WITH DEBRIDEMENT JOINT, COMBINED Left 12/18/2018    Procedure: Left knee debridement and placement of wound vac;  Surgeon: Pardeep Sheikh MD;  Location: [facility] OR     COLONOSCOPY  3/15/2012     EYE SURGERY  2018    Both eyes/cataract surgery     H ABLATION AV NODE  2011    AFlutter with syncope, PPM to follow     HERNIA REPAIR      3 hernies/right and left & umbilical     IMPLANT PACEMAKER  2011     LAPAROSCOPIC CHOLECYSTECTOMY WITH CHOLANGIOGRAMS N/A 4/29/2017    Procedure: LAPAROSCOPIC CHOLECYSTECTOMY WITH CHOLANGIOGRAMS;  LAPAROSCOPIC CHOLECYSTECTOMY WITH CHOLANGIOGRAMS ;  Surgeon: Angeles Mcgill MD;  Location: [facility] OR     OPEN REDUCTION INTERNAL FIXATION RODDING INTRAMEDULLARY HUMERUS Right 7/28/2015    Procedure: OPEN REDUCTION INTERNAL FIXATION RODDING INTRAMEDULLARY HUMERUS;  Surgeon: Raymundo Rivera MD;  Location: [facility] OR     REPLACE VALVE MITRAL  2006    Mitral valve replacement with bioprosthetic valve in 2008 for  rheumatic disease     SLING BLADDER SUSPENSION WITH FASCIA UMESH       VASCULAR SURGERY      Blood clots in both legs       Social History     Tobacco Use     Smoking status: Never Smoker     Smokeless tobacco: Never Used   Substance Use Topics     Alcohol use: No     Alcohol/week: 0.0 standard drinks       Family History   Problem Relation Age of Onset     Coronary Artery Disease Mother      Coronary Artery Disease Brother      Diabetes Brother      Cancer Brother          at age 52      Other Cancer Brother      Hypertension Sister      Hyperlipidemia Sister             Medications:     Medications Prior to Admission   Medication Sig Dispense Refill Last Dose     ALLOPURINOL PO Take 100 mg by mouth 2 times daily   Taking     amLODIPine-valsartan (EXFORGE) 5-160 MG tablet TAKE ONE TABLET BY MOUTH ONE TIME DAILY  90 tablet 2 Taking     calcium citrate (CALCITRATE) 950 MG tablet Take 1 tablet by mouth daily    Taking     folic acid (FOLVITE) 1 MG tablet Take 1 mg by mouth daily   Taking     latanoprostene bunod (VYZULTA) 0.024 % SOLN ophthalmic solution Place 1 drop Into the left eye At Bedtime   Taking     levothyroxine (SYNTHROID/LEVOTHROID) 112 MCG tablet TAKE ONE TABLET BY MOUTH ONE TIME DAILY 90 tablet 3 Taking     METHOTREXATE SODIUM IJ Inject 0.8 mLs as directed once a week    Taking     metoprolol tartrate (LOPRESSOR) 50 MG tablet Take 1 tablet (50 mg) by mouth 2 times daily 180 tablet 3 Taking     multivitamin, therapeutic with minerals (MULTI-VITAMIN) TABS tablet Take 1 tablet by mouth daily Without iron   Taking     omeprazole (PRILOSEC) 20 MG DR capsule Take 1 capsule (20 mg) by mouth 2 times daily 180 capsule 1 Taking     torsemide (DEMADEX) 10 MG tablet Take 1 tablet (10 mg) by mouth daily (with breakfast) 90 tablet 3 Taking     VITAMIN D, CHOLECALCIFEROL, PO Take 1,000 Units by mouth daily   Taking     warfarin ANTICOAGULANT (JANTOVEN ANTICOAGULANT) 2.5 MG tablet TAKE 5 MG ON MON/THURS  "AND 2.5 MG ALL OTHER DAYS OR AS DIRECTED BY [facility]N CLINIC 110 tablet 0 Taking       Scheduled Medications:    sodium chloride 0.9%  500 mL Intravenous Once     sodium chloride (PF)  3 mL Intracatheter Q8H       PRN:  lidocaine 4%, lidocaine (buffered or not buffered), sodium chloride (PF), sodium chloride (PF)    PHYSICAL EXAM:   /87   Pulse 70   Resp 20   SpO2 93%  Estimated body mass index is 26.51 kg/m  as calculated from the following:    Height as of 1/10/20: 1.626 m (5' 4.02\").    Weight as of 1/10/20: 70.1 kg (154 lb 8 oz).   RESP: lungs clear to auscultation - no rales, rhonchi or wheezes  CV: regular rates and rhythm    IMPRESSION   ASA Class 2 - Mild systemic disease      Signed Electronically by: Michael Mccarthy MD  February 19, 2020    .            "

## 2020-02-19 NOTE — LETTER
January 20, 2020      Zunilda Alicea May  [address]   [facility] [facility] [address]        Dear Zunilda,           Thank you for choosing [facility]. You are scheduled for the following service(s).   Please be aware that coverage of these services is subject to the terms and limitations of your health insurance plan.  Call member services at your health plan with any benefit or coverage questions.  If you take any antiplatelet or anticoagulant medications (such as Coumadin, Lovenox, Plavix, etc.) and have not already discussed this, please call your primary physician for advice on holding this medication.  Date:   2-19-20      Procedure: UPPER ENDOSCOPY-EGD  Doctor: Shari           Arrival Time:  0800    *check in at Emergency/Endoscopy desk*  Procedure Time: 0900       Location:   [facility]        Endoscopy Department, First Floor (Enter through ER Doors) *         [address] [place], [place] [address]      [phone] or [phone] () to reschedule          PRE-PROCEDURE CHECKLIST    If you have diabetes, ask your regular doctor for diet and medication restrictions.    If you take Aspirin, you may continue to do so.  If you are or may be pregnant, please discuss the risks and benefits of this procedure with your doctor.  You must arrange for a ride for the day of your exam. If you fail to arrange transportation with a responsible adult, your procedure will need to be cancelled and rescheduled. Taxi, bus and medical transport are not acceptable unless you have a responsible adult that you know & trust with you. Please arrange for this  to be able to pick you up in our department, approximately one hour after your scheduled procedure, if they are not able to stay with you.      Canceling or rescheduling   If you must cancel or reschedule your appointment, please call [phone] as soon as possible.      Upper Endoscopy or  Esophagogastroduodenoscopy (EGD) is a test performed to evaluate symptoms of persistent abdominal pain, nausea, vomiting and difficulty swallowing. It may also be used to treat various conditions of the upper gastrointestinal tract, such as bleeding, narrowing or abnormal growths.     What happens during an upper endoscopy?  On the day of your procedure, plan to spend up to one and a half hour after your arrival at the endoscopy center. The exam itself takes about 5 to 10 minutes.    Before the exam:  - You will change into a gown.   - Your medical history and medication list will be reviewed with you, unless it has already been done over the phone.   - A nurse will insert an intravenous (IV) line into your hand or arm.  - The doctor will talk to you and give you a consent form to sign.    During the exam:  - Medicine will be given through the IV line to help you relax and feel comfortable.   - Your heart rate and oxygen levels will be monitored. If your blood pressure is low, you may be given fluids through the IV line.   - The doctor will insert a flexible, hollow tube, called an endoscope, into your mouth and will advance it slowly through the esophagus, stomach and duodenum (the first part of your small intestine).   - You may have a feeling of pressure or fullness.   - If you have difficulty swallowing, and the doctor finds a narrowing in your esophagus, it may be possible for the area to be expanded-dilated during the exam.   - If abnormal tissue is found, the doctor may remove it through the endoscope (biopsy it) for closer examination. The tissue removal is painless.    After the exam:  - Any tissue samples removed during the exam will be sent to a lab for evaluation. It may take 5 to 7 working days for you to be notified of the results  - The doctor will prepare a full report for the physician who referred you for the upper endoscopy.   - The doctor will talk with you about the initial results of your exam.    - You may feel bloated after the procedure. That is normal and should not last long.   - Your throat may feel sore for a short time.   - Following the exam, you may resume your normal diet. Avoid alcohol until the next day.   - You may resume your regular activities the day after the procedure.   - Medication given during the exam will prohibit you from driving for the rest of the day.  - A nurse will provide you with complete discharge instructions before you leave the endoscopy center. Be sure to ask the nurse for specific instructions if you take blood thinners such as Aspirin , Coumadin , Lovenox , Plavix , etc.       PREPARATION    To ensure a successful exam, please follow all instructions carefully.      The night before your exam:    STOP eating solid foods at 11:45 pm.     Clear liquids are okay to drink (examples: Gatorade , apple juice, clear broth,coffee or tea without milk or cream, etc.).     DO NOT drink red liquids or alcoholic beverages.    The day of your exam:    STOP drinking clear liquids 4 hours before your exam.     You may take your usual medications with 4 oz. of water, but it needs to be at least 4 hours prior to your procedure.    When you leave for the procedure:    Bring a list of all of your current medications, including any allergy or over-the-counter medications, unless you have already reviewed that with an Endoscopy RN over the phone.     Bring a photo ID as well as up-to-date insurance information, such as your insurance card and any referral forms that might be required by your payer.         DIRECTIONS TO THE ENDOSCOPY DEPARTMENT    From the north ([facility])  Take [address]W south, exit on [address] . Get into the left hand phyllis, turn left (east), go one-half mile to [address] and turn left. Go north to the first stoplight, take a right on [address] and follow it to the Emergency entrance.    From the south ([facility])  Take 35N  to the 35E split and exit on [address] . On [address] , turn left (west) to [address]. Turn right (north) on [address]. Go north to the first stoplight, take a right on [address] and follow it to the Emergency entrance.    From the east via 35E ([facility])  Take 35E south to [place] [address] exit. Turn right on [address] . Go west to [address]. Turn right (north) on [address]. Go to the first stoplight, take a right and follow on [address] to the Emergency entrance.    From the east via [address] ([facility])  Take [address] West to [address]. Turn left (south) on [address] to [address]. Turn left (east) on [address] and follow it to the Emergency entrance.    From the west via [address] ([place])  Take [address] east to [address]. Turn right (south) on [address] to [address]. Turn left (east) on [address] and follow it to the Emergency entrance.

## 2020-02-26 ENCOUNTER — ANCILLARY PROCEDURE (OUTPATIENT)
Dept: CARDIOLOGY | Facility: CLINIC | Age: 80
End: 2020-02-26
Attending: INTERNAL MEDICINE
Payer: MEDICARE

## 2020-02-26 DIAGNOSIS — Z95.0 PACEMAKER: ICD-10-CM

## 2020-02-26 PROCEDURE — 93296 REM INTERROG EVL PM/IDS: CPT | Performed by: INTERNAL MEDICINE

## 2020-02-26 PROCEDURE — 93294 REM INTERROG EVL PM/LDLS PM: CPT | Performed by: INTERNAL MEDICINE

## 2020-03-04 ENCOUNTER — ANCILLARY PROCEDURE (OUTPATIENT)
Dept: BONE DENSITY | Facility: CLINIC | Age: 80
End: 2020-03-04
Attending: INTERNAL MEDICINE
Payer: MEDICARE

## 2020-03-04 ENCOUNTER — ANTICOAGULATION THERAPY VISIT (OUTPATIENT)
Dept: ANTICOAGULATION | Facility: CLINIC | Age: 80
End: 2020-03-04
Payer: MEDICARE

## 2020-03-04 DIAGNOSIS — Z00.00 ENCOUNTER FOR PREVENTATIVE ADULT HEALTH CARE EXAMINATION: ICD-10-CM

## 2020-03-04 DIAGNOSIS — I48.92 ATRIAL FIBRILLATION AND FLUTTER (H): ICD-10-CM

## 2020-03-04 DIAGNOSIS — Z79.01 LONG TERM CURRENT USE OF ANTICOAGULANTS WITH INR GOAL OF 2.0-3.0: ICD-10-CM

## 2020-03-04 DIAGNOSIS — I48.91 ATRIAL FIBRILLATION AND FLUTTER (H): ICD-10-CM

## 2020-03-04 DIAGNOSIS — Z78.0 MENOPAUSE: ICD-10-CM

## 2020-03-04 LAB — INR POINT OF CARE: 2.9 (ref 0.86–1.14)

## 2020-03-04 PROCEDURE — 36416 COLLJ CAPILLARY BLOOD SPEC: CPT

## 2020-03-04 PROCEDURE — 99207 ZZC NO CHARGE NURSE ONLY: CPT

## 2020-03-04 PROCEDURE — 85610 PROTHROMBIN TIME: CPT | Mod: QW

## 2020-03-04 PROCEDURE — 77085 DXA BONE DENSITY AXL VRT FX: CPT | Performed by: INTERNAL MEDICINE

## 2020-03-04 NOTE — PROGRESS NOTES
ANTICOAGULATION FOLLOW-UP CLINIC VISIT    Patient Name:  Zunilda Alicea May  Date:  3/4/2020  Contact Type:  Face to Face    SUBJECTIVE:  Patient Findings     Positives:   Missed doses (intentionally held for EGD.  Resumed warfarin 2 weeks ago.)    Comments:   The patient was assessed for diet, medication, and activity level changes, extra doses, bruising or bleeding, with no problem findings.          Clinical Outcomes     Negatives:   Major bleeding event, Thromboembolic event, Anticoagulation-related hospital admission, Anticoagulation-related ED visit, Anticoagulation-related fatality    Comments:   The patient was assessed for diet, medication, and activity level changes, extra doses, bruising or bleeding, with no problem findings.             OBJECTIVE    INR Protime   Date Value Ref Range Status   2020 2.9 (A) 0.86 - 1.14 Final       ASSESSMENT / PLAN  INR assessment THER    Recheck INR In: 4 WEEKS    INR Location Clinic      Anticoagulation Summary  As of 3/4/2020    INR goal:   2.0-3.0   TTR:   73.1 % (1 y)   INR used for dosin.9 (3/4/2020)   Warfarin maintenance plan:   5 mg (2.5 mg x 2) every Mon, Thu; 2.5 mg (2.5 mg x 1) all other days   Full warfarin instructions:   5 mg every Mon, Thu; 2.5 mg all other days   Weekly warfarin total:   22.5 mg   No change documented:   Sonam Teixeira RN   Plan last modified:   Sonam Teixeira RN (2019)   Next INR check:   3/31/2020   Priority:   Maintenance   Target end date:   Indefinite    Indications    Long term current use of anticoagulants with INR goal of 2.0-3.0 [Z79.01]  Atrial fibrillation and flutter (H) [I48.91  I48.92]             Anticoagulation Episode Summary     INR check location:       Preferred lab:       Send INR reminders to:   [facility]    Comments:   likes printed AVS      Anticoagulation Care Providers     Provider Role Specialty Phone number    Yunior Huang MD [facility] Internal Medicine [phone]             See the Encounter Report to view Anticoagulation Flowsheet and Dosing Calendar (Go to Encounters tab in chart review, and find the Anticoagulation Therapy Visit)    Dosage adjustment made based on physician directed care plan.    Sonam Teixeira RN

## 2020-03-10 LAB
MDC_IDC_EPISODE_DTM: NORMAL
MDC_IDC_EPISODE_DURATION: 104 S
MDC_IDC_EPISODE_DURATION: 112 S
MDC_IDC_EPISODE_DURATION: 166 S
MDC_IDC_EPISODE_DURATION: 166 S
MDC_IDC_EPISODE_DURATION: 1794 S
MDC_IDC_EPISODE_DURATION: 181 S
MDC_IDC_EPISODE_DURATION: 187 S
MDC_IDC_EPISODE_DURATION: 187 S
MDC_IDC_EPISODE_DURATION: 188 S
MDC_IDC_EPISODE_DURATION: 189 S
MDC_IDC_EPISODE_DURATION: 190 S
MDC_IDC_EPISODE_DURATION: 192 S
MDC_IDC_EPISODE_DURATION: 193 S
MDC_IDC_EPISODE_DURATION: 206 S
MDC_IDC_EPISODE_DURATION: 208 S
MDC_IDC_EPISODE_DURATION: 209 S
MDC_IDC_EPISODE_DURATION: 225 S
MDC_IDC_EPISODE_DURATION: 225 S
MDC_IDC_EPISODE_DURATION: 239 S
MDC_IDC_EPISODE_DURATION: 240 S
MDC_IDC_EPISODE_DURATION: 242 S
MDC_IDC_EPISODE_DURATION: 253 S
MDC_IDC_EPISODE_DURATION: 262 S
MDC_IDC_EPISODE_DURATION: 271 S
MDC_IDC_EPISODE_DURATION: 273 S
MDC_IDC_EPISODE_DURATION: 323 S
MDC_IDC_EPISODE_DURATION: 323 S
MDC_IDC_EPISODE_DURATION: 345 S
MDC_IDC_EPISODE_DURATION: 35 S
MDC_IDC_EPISODE_DURATION: 351 S
MDC_IDC_EPISODE_DURATION: 355 S
MDC_IDC_EPISODE_DURATION: 356 S
MDC_IDC_EPISODE_DURATION: 36 S
MDC_IDC_EPISODE_DURATION: 37 S
MDC_IDC_EPISODE_DURATION: 411 S
MDC_IDC_EPISODE_DURATION: 438 S
MDC_IDC_EPISODE_DURATION: 45 S
MDC_IDC_EPISODE_DURATION: 516 S
MDC_IDC_EPISODE_DURATION: 58 S
MDC_IDC_EPISODE_DURATION: 724 S
MDC_IDC_EPISODE_DURATION: 74 S
MDC_IDC_EPISODE_DURATION: 84 S
MDC_IDC_EPISODE_ID: 256
MDC_IDC_EPISODE_ID: 257
MDC_IDC_EPISODE_ID: 258
MDC_IDC_EPISODE_ID: 259
MDC_IDC_EPISODE_ID: 260
MDC_IDC_EPISODE_ID: 261
MDC_IDC_EPISODE_ID: 262
MDC_IDC_EPISODE_ID: 263
MDC_IDC_EPISODE_ID: 264
MDC_IDC_EPISODE_ID: 265
MDC_IDC_EPISODE_ID: 266
MDC_IDC_EPISODE_ID: 267
MDC_IDC_EPISODE_ID: 268
MDC_IDC_EPISODE_ID: 269
MDC_IDC_EPISODE_ID: 270
MDC_IDC_EPISODE_ID: 271
MDC_IDC_EPISODE_ID: 272
MDC_IDC_EPISODE_ID: 273
MDC_IDC_EPISODE_ID: 274
MDC_IDC_EPISODE_ID: 275
MDC_IDC_EPISODE_ID: 276
MDC_IDC_EPISODE_ID: 277
MDC_IDC_EPISODE_ID: 278
MDC_IDC_EPISODE_ID: 279
MDC_IDC_EPISODE_ID: 280
MDC_IDC_EPISODE_ID: 281
MDC_IDC_EPISODE_ID: 282
MDC_IDC_EPISODE_ID: 283
MDC_IDC_EPISODE_ID: 284
MDC_IDC_EPISODE_ID: 285
MDC_IDC_EPISODE_ID: 286
MDC_IDC_EPISODE_ID: 287
MDC_IDC_EPISODE_ID: 288
MDC_IDC_EPISODE_ID: 289
MDC_IDC_EPISODE_ID: 290
MDC_IDC_EPISODE_ID: 291
MDC_IDC_EPISODE_ID: 292
MDC_IDC_EPISODE_ID: 293
MDC_IDC_EPISODE_ID: 294
MDC_IDC_EPISODE_ID: 295
MDC_IDC_EPISODE_ID: 296
MDC_IDC_EPISODE_ID: 297
MDC_IDC_EPISODE_ID: 298
MDC_IDC_EPISODE_ID: 299
MDC_IDC_EPISODE_TYPE: NORMAL
MDC_IDC_LEAD_IMPLANT_DT: NORMAL
MDC_IDC_LEAD_IMPLANT_DT: NORMAL
MDC_IDC_LEAD_LOCATION: NORMAL
MDC_IDC_LEAD_LOCATION: NORMAL
MDC_IDC_LEAD_MFG: NORMAL
MDC_IDC_LEAD_MFG: NORMAL
MDC_IDC_LEAD_MODEL: NORMAL
MDC_IDC_LEAD_MODEL: NORMAL
MDC_IDC_LEAD_POLARITY_TYPE: NORMAL
MDC_IDC_LEAD_POLARITY_TYPE: NORMAL
MDC_IDC_LEAD_SERIAL: NORMAL
MDC_IDC_LEAD_SERIAL: NORMAL
MDC_IDC_MSMT_BATTERY_DTM: NORMAL
MDC_IDC_MSMT_BATTERY_STATUS: NORMAL
MDC_IDC_MSMT_BATTERY_VOLTAGE: 2.89 V
MDC_IDC_MSMT_LEADCHNL_RA_IMPEDANCE_VALUE: 512 OHM
MDC_IDC_MSMT_LEADCHNL_RA_SENSING_INTR_AMPL: 0.67 MV
MDC_IDC_MSMT_LEADCHNL_RV_IMPEDANCE_VALUE: 416 OHM
MDC_IDC_MSMT_LEADCHNL_RV_SENSING_INTR_AMPL: 13.43 MV
MDC_IDC_PG_IMPLANT_DTM: NORMAL
MDC_IDC_PG_MFG: NORMAL
MDC_IDC_PG_MODEL: NORMAL
MDC_IDC_PG_SERIAL: NORMAL
MDC_IDC_PG_TYPE: NORMAL
MDC_IDC_SESS_CLINIC_NAME: NORMAL
MDC_IDC_SESS_DTM: NORMAL
MDC_IDC_SESS_TYPE: NORMAL
MDC_IDC_SET_BRADY_AT_MODE_SWITCH_RATE: 171 {BEATS}/MIN
MDC_IDC_SET_BRADY_HYSTRATE: NORMAL
MDC_IDC_SET_BRADY_LOWRATE: 60 {BEATS}/MIN
MDC_IDC_SET_BRADY_MAX_SENSOR_RATE: 130 {BEATS}/MIN
MDC_IDC_SET_BRADY_MAX_TRACKING_RATE: 130 {BEATS}/MIN
MDC_IDC_SET_BRADY_MODE: NORMAL
MDC_IDC_SET_BRADY_PAV_DELAY_LOW: 180 MS
MDC_IDC_SET_BRADY_SAV_DELAY_LOW: 150 MS
MDC_IDC_SET_LEADCHNL_RA_PACING_AMPLITUDE: 2 V
MDC_IDC_SET_LEADCHNL_RA_PACING_ANODE_ELECTRODE_1: NORMAL
MDC_IDC_SET_LEADCHNL_RA_PACING_ANODE_LOCATION_1: NORMAL
MDC_IDC_SET_LEADCHNL_RA_PACING_CATHODE_ELECTRODE_1: NORMAL
MDC_IDC_SET_LEADCHNL_RA_PACING_CATHODE_LOCATION_1: NORMAL
MDC_IDC_SET_LEADCHNL_RA_PACING_POLARITY: NORMAL
MDC_IDC_SET_LEADCHNL_RA_PACING_PULSEWIDTH: 0.4 MS
MDC_IDC_SET_LEADCHNL_RA_SENSING_ANODE_ELECTRODE_1: NORMAL
MDC_IDC_SET_LEADCHNL_RA_SENSING_ANODE_LOCATION_1: NORMAL
MDC_IDC_SET_LEADCHNL_RA_SENSING_CATHODE_ELECTRODE_1: NORMAL
MDC_IDC_SET_LEADCHNL_RA_SENSING_CATHODE_LOCATION_1: NORMAL
MDC_IDC_SET_LEADCHNL_RA_SENSING_POLARITY: NORMAL
MDC_IDC_SET_LEADCHNL_RA_SENSING_SENSITIVITY: 0.45 MV
MDC_IDC_SET_LEADCHNL_RV_PACING_AMPLITUDE: 2 V
MDC_IDC_SET_LEADCHNL_RV_PACING_ANODE_ELECTRODE_1: NORMAL
MDC_IDC_SET_LEADCHNL_RV_PACING_ANODE_LOCATION_1: NORMAL
MDC_IDC_SET_LEADCHNL_RV_PACING_CATHODE_ELECTRODE_1: NORMAL
MDC_IDC_SET_LEADCHNL_RV_PACING_CATHODE_LOCATION_1: NORMAL
MDC_IDC_SET_LEADCHNL_RV_PACING_POLARITY: NORMAL
MDC_IDC_SET_LEADCHNL_RV_PACING_PULSEWIDTH: 0.4 MS
MDC_IDC_SET_LEADCHNL_RV_SENSING_ANODE_ELECTRODE_1: NORMAL
MDC_IDC_SET_LEADCHNL_RV_SENSING_ANODE_LOCATION_1: NORMAL
MDC_IDC_SET_LEADCHNL_RV_SENSING_CATHODE_ELECTRODE_1: NORMAL
MDC_IDC_SET_LEADCHNL_RV_SENSING_CATHODE_LOCATION_1: NORMAL
MDC_IDC_SET_LEADCHNL_RV_SENSING_POLARITY: NORMAL
MDC_IDC_SET_LEADCHNL_RV_SENSING_SENSITIVITY: 2.1 MV
MDC_IDC_SET_ZONE_DETECTION_INTERVAL: 350 MS
MDC_IDC_SET_ZONE_DETECTION_INTERVAL: 400 MS
MDC_IDC_SET_ZONE_TYPE: NORMAL
MDC_IDC_STAT_AT_BURDEN_PERCENT: 0.1 %
MDC_IDC_STAT_AT_DTM_END: NORMAL
MDC_IDC_STAT_AT_DTM_START: NORMAL
MDC_IDC_STAT_BRADY_AP_VP_PERCENT: 25.3 %
MDC_IDC_STAT_BRADY_AP_VS_PERCENT: 8.8 %
MDC_IDC_STAT_BRADY_AS_VP_PERCENT: 45.1 %
MDC_IDC_STAT_BRADY_AS_VS_PERCENT: 20.8 %
MDC_IDC_STAT_BRADY_DTM_END: NORMAL
MDC_IDC_STAT_BRADY_DTM_START: NORMAL
MDC_IDC_STAT_BRADY_RA_PERCENT_PACED: 33.59 %
MDC_IDC_STAT_BRADY_RV_PERCENT_PACED: 69.83 %
MDC_IDC_STAT_EPISODE_RECENT_COUNT: 0
MDC_IDC_STAT_EPISODE_RECENT_COUNT: 44
MDC_IDC_STAT_EPISODE_RECENT_COUNT_DTM_END: NORMAL
MDC_IDC_STAT_EPISODE_RECENT_COUNT_DTM_START: NORMAL
MDC_IDC_STAT_EPISODE_TOTAL_COUNT: 1
MDC_IDC_STAT_EPISODE_TOTAL_COUNT: 13
MDC_IDC_STAT_EPISODE_TOTAL_COUNT: 280
MDC_IDC_STAT_EPISODE_TOTAL_COUNT: 3
MDC_IDC_STAT_EPISODE_TOTAL_COUNT_DTM_END: NORMAL
MDC_IDC_STAT_EPISODE_TYPE: NORMAL

## 2020-03-18 DIAGNOSIS — I48.92 ATRIAL FIBRILLATION AND FLUTTER (H): ICD-10-CM

## 2020-03-18 DIAGNOSIS — Z79.01 LONG TERM CURRENT USE OF ANTICOAGULANTS WITH INR GOAL OF 2.0-3.0: ICD-10-CM

## 2020-03-18 DIAGNOSIS — I48.91 ATRIAL FIBRILLATION AND FLUTTER (H): ICD-10-CM

## 2020-03-18 NOTE — TELEPHONE ENCOUNTER
"Requested Prescriptions   Pending Prescriptions Disp Refills     warfarin ANTICOAGULANT (COUMADIN) 2.5 MG tablet [Pharmacy Med Name: Warfarin Sodium Oral Tablet 2.5 MG] 110 tablet 0     Sig: TAKE 2 TABLETS (5MG) ON MON, THURS AND 1 TABLET (2.5MG) ON ALL OTHER DAYS OR AS DIRECTED BY INR CLINIC   Last Written Prescription Date:  12/19/19  Last Fill Quantity: 110,  # refills: 0   Last office visit: 3/4/2020 with prescribing provider:     Future Office Visit:        Vitamin K Antagonists Failed - 3/18/2020  2:08 PM        Failed - INR is within goal in the past 6 weeks     Confirm INR is within goal in the past 6 weeks.     Recent Labs   Lab Test 03/04/20   INR 2.9*                       Passed - Recent (12 mo) or future (30 days) visit within the authorizing provider's specialty     Patient has had an office visit with the authorizing provider or a provider within the authorizing providers department within the previous 12 mos or has a future within next 30 days. See \"Patient Info\" tab in inbasket, or \"Choose Columns\" in Meds & Orders section of the refill encounter.              Passed - Medication is active on med list        Passed - Patient is 18 years of age or older        Passed - Patient is not pregnant        Passed - No positive pregnancy on file in past 12 months             " Never smoker

## 2020-03-19 RX ORDER — WARFARIN SODIUM 2.5 MG/1
TABLET ORAL
Qty: 110 TABLET | Refills: 1 | Status: SHIPPED | OUTPATIENT
Start: 2020-03-19 | End: 2020-09-29

## 2020-03-19 NOTE — TELEPHONE ENCOUNTER
Last office visit: 1/8/2020 with prescribing provider    Prescription approved per G Refill Protocol.  Екатерина Mahan RN

## 2020-03-26 NOTE — PROGRESS NOTES
ANTICOAGULATION FOLLOW-UP CLINIC VISIT    Patient Name:  Zunilda Alicea May  Date:  2018  Contact Type:  Telephone/ orders given to Lissette with [facility] [phone].  Advised to recheck INR on 18 in the morning or on 18 depending on patient's schedule.    SUBJECTIVE:     Patient Findings     Positives:   Hospital admission (recent knee surgery.)    Comments:   INR was therapeutic at discharge.           OBJECTIVE    INR   Date Value Ref Range Status   2018 2.33 (H) 0.86 - 1.14 Final       ASSESSMENT / PLAN  INR assessment THER    Recheck INR In: 3 DAYS    INR Location Outside lab      Anticoagulation Summary  As of 2018    INR goal:   2.0-3.0   TTR:   87.0 % (2.6 y)   INR used for dosin.33 (2018)   Warfarin maintenance plan:   5 mg (2.5 mg x 2) every Mon, Wed, Sat; 2.5 mg (2.5 mg x 1) all other days   Full warfarin instructions:   : 2.5 mg; : 2.5 mg; Otherwise 5 mg every Mon, Wed, Sat; 2.5 mg all other days   Weekly warfarin total:   25 mg   Plan last modified:   Екатерина Mahan RN (2017)   Next INR check:   2018   Priority:   INR   Target end date:       Indications    Long term current use of anticoagulants with INR goal of 2.0-3.0 [Z79.01]  Atrial fibrillation and flutter (H) [I48.91  I48.92]             Anticoagulation Episode Summary     INR check location:       Preferred lab:       Send INR reminders to:   [facility]    Comments:   likes printed AVS      Anticoagulation Care Providers     Provider Role Specialty Phone number    Yunior Huang MD Responsible Internal Medicine [phone]            See the Encounter Report to view Anticoagulation Flowsheet and Dosing Calendar (Go to Encounters tab in chart review, and find the Anticoagulation Therapy Visit)    Dosage adjustment made based on physician directed care plan.    Sonam Teixeira RN                 
Altered Elimination/Impaired Gait/Need for Mobility Assisted Device

## 2020-03-27 DIAGNOSIS — Z95.3 S/P MITRAL VALVE REPLACEMENT WITH BIOPROSTHETIC VALVE: ICD-10-CM

## 2020-03-27 DIAGNOSIS — I48.91 ATRIAL FIBRILLATION AND FLUTTER (H): ICD-10-CM

## 2020-03-27 DIAGNOSIS — I48.92 ATRIAL FIBRILLATION AND FLUTTER (H): ICD-10-CM

## 2020-03-27 DIAGNOSIS — Z79.01 LONG TERM CURRENT USE OF ANTICOAGULANTS WITH INR GOAL OF 2.0-3.0: Primary | ICD-10-CM

## 2020-03-31 ENCOUNTER — ANTICOAGULATION THERAPY VISIT (OUTPATIENT)
Dept: ANTICOAGULATION | Facility: CLINIC | Age: 80
End: 2020-03-31

## 2020-03-31 DIAGNOSIS — Z79.01 LONG TERM CURRENT USE OF ANTICOAGULANTS WITH INR GOAL OF 2.0-3.0: ICD-10-CM

## 2020-03-31 DIAGNOSIS — I48.91 ATRIAL FIBRILLATION AND FLUTTER (H): ICD-10-CM

## 2020-03-31 DIAGNOSIS — I48.92 ATRIAL FIBRILLATION AND FLUTTER (H): ICD-10-CM

## 2020-03-31 DIAGNOSIS — Z95.3 S/P MITRAL VALVE REPLACEMENT WITH BIOPROSTHETIC VALVE: ICD-10-CM

## 2020-03-31 LAB
CAPILLARY BLOOD COLLECTION: NORMAL
INR PPP: 2.7 (ref 0.86–1.14)

## 2020-03-31 PROCEDURE — 99207 ZZC NO CHARGE NURSE ONLY: CPT | Performed by: INTERNAL MEDICINE

## 2020-03-31 PROCEDURE — 36416 COLLJ CAPILLARY BLOOD SPEC: CPT | Performed by: INTERNAL MEDICINE

## 2020-03-31 PROCEDURE — 85610 PROTHROMBIN TIME: CPT | Performed by: INTERNAL MEDICINE

## 2020-03-31 NOTE — PROGRESS NOTES
ANTICOAGULATION FOLLOW-UP CLINIC VISIT    Patient Name:  Zunilda Alicea May  Date:  3/31/2020  Contact Type:  Telephone/ talked to patient.      SUBJECTIVE:  Patient Findings     Comments:   The patient was assessed for diet, medication, and activity level changes, missed or extra doses, bruising or bleeding, with no problem findings.          Clinical Outcomes     Negatives:   Major bleeding event, Thromboembolic event, Anticoagulation-related hospital admission, Anticoagulation-related ED visit, Anticoagulation-related fatality    Comments:   The patient was assessed for diet, medication, and activity level changes, missed or extra doses, bruising or bleeding, with no problem findings.             OBJECTIVE    INR   Date Value Ref Range Status   2020 2.70 (H) 0.86 - 1.14 Final     Comment:     This test is intended for monitoring Coumadin therapy.  Results are not   accurate in patients with prolonged INR due to factor deficiency.         ASSESSMENT / PLAN  INR assessment THER    Recheck INR In: 6 WEEKS    INR Location Clinic      Anticoagulation Summary  As of 3/31/2020    INR goal:   2.0-3.0   TTR:   74.3 % (1 y)   INR used for dosin.70 (3/31/2020)   Warfarin maintenance plan:   5 mg (2.5 mg x 2) every Mon, Thu; 2.5 mg (2.5 mg x 1) all other days   Full warfarin instructions:   5 mg every Mon, Thu; 2.5 mg all other days   Weekly warfarin total:   22.5 mg   No change documented:   Sonam Teixeira RN   Plan last modified:   Sonam Teixeira RN (2019)   Next INR check:   2020   Priority:   Maintenance   Target end date:   Indefinite    Indications    Long term current use of anticoagulants with INR goal of 2.0-3.0 [Z79.01]  Atrial fibrillation and flutter (H) [I48.91  I48.92]             Anticoagulation Episode Summary     INR check location:       Preferred lab:       Send INR reminders to:   [facility]    Comments:   likes printed AVS      Anticoagulation Care Providers      Provider Role Specialty Phone number    Yunior Huang MD Responsible Internal Medicine [phone]            See the Encounter Report to view Anticoagulation Flowsheet and Dosing Calendar (Go to Encounters tab in chart review, and find the Anticoagulation Therapy Visit)    Dosage adjustment made based on physician directed care plan.    Sonam Teixeira RN

## 2020-04-07 ENCOUNTER — TRANSFERRED RECORDS (OUTPATIENT)
Dept: HEALTH INFORMATION MANAGEMENT | Facility: CLINIC | Age: 80
End: 2020-04-07

## 2020-04-13 ENCOUNTER — TELEPHONE (OUTPATIENT)
Dept: INTERNAL MEDICINE | Facility: CLINIC | Age: 80
End: 2020-04-13

## 2020-04-13 NOTE — TELEPHONE ENCOUNTER
Patient is going to start on yearly Reclast Infusion with her Rheumatologist and wanted her PCP to know.

## 2020-04-23 DIAGNOSIS — I10 BENIGN ESSENTIAL HYPERTENSION: ICD-10-CM

## 2020-04-24 RX ORDER — AMLODIPINE AND VALSARTAN 5; 160 MG/1; MG/1
1 TABLET ORAL DAILY
Qty: 90 TABLET | Refills: 2 | Status: SHIPPED | OUTPATIENT
Start: 2020-04-24 | End: 2021-01-07

## 2020-05-12 ENCOUNTER — ANTICOAGULATION THERAPY VISIT (OUTPATIENT)
Dept: ANTICOAGULATION | Facility: CLINIC | Age: 80
End: 2020-05-12

## 2020-05-12 DIAGNOSIS — I48.92 ATRIAL FIBRILLATION AND FLUTTER (H): ICD-10-CM

## 2020-05-12 DIAGNOSIS — Z95.3 S/P MITRAL VALVE REPLACEMENT WITH BIOPROSTHETIC VALVE: ICD-10-CM

## 2020-05-12 DIAGNOSIS — I48.91 ATRIAL FIBRILLATION AND FLUTTER (H): ICD-10-CM

## 2020-05-12 DIAGNOSIS — Z79.01 LONG TERM CURRENT USE OF ANTICOAGULANTS WITH INR GOAL OF 2.0-3.0: ICD-10-CM

## 2020-05-12 LAB
CAPILLARY BLOOD COLLECTION: NORMAL
INR PPP: 4.3 (ref 0.86–1.14)

## 2020-05-12 PROCEDURE — 85610 PROTHROMBIN TIME: CPT | Performed by: INTERNAL MEDICINE

## 2020-05-12 PROCEDURE — 36416 COLLJ CAPILLARY BLOOD SPEC: CPT | Performed by: INTERNAL MEDICINE

## 2020-05-12 PROCEDURE — 99207 ZZC NO CHARGE NURSE ONLY: CPT | Performed by: INTERNAL MEDICINE

## 2020-05-12 NOTE — PROGRESS NOTES
ANTICOAGULATION FOLLOW-UP CLINIC VISIT    Patient Name:  Zunilda Alicea May  Date:  2020  Contact Type:  Telephone/ discussed with patient    SUBJECTIVE:  Patient Findings     Positives:   Change in health (nose has been a little more runny recently (seasonal allergies?)), Missed doses (Missed her dose on Saturday, but took this on .  Updated calendar.), Change in diet/appetite (has not had as many green veggies.  She will try to have some green frozen veggies on hand so she can eat these more consistently.)    Comments:   The patient was assessed for medication, and activity level changes, extra doses, bruising or bleeding, with no problem findings.          Clinical Outcomes     Negatives:   Major bleeding event, Thromboembolic event, Anticoagulation-related hospital admission, Anticoagulation-related ED visit, Anticoagulation-related fatality    Comments:   The patient was assessed for medication, and activity level changes, extra doses, bruising or bleeding, with no problem findings.             OBJECTIVE    Recent labs: (last 7 days)     20  1230   INR 4.30*       ASSESSMENT / PLAN  INR assessment SUPRA    Recheck INR In: 10 DAYS    INR Location Clinic      Anticoagulation Summary  As of 2020    INR goal:   2.0-3.0   TTR:   65.0 % (1 y)   INR used for dosin.30! (2020)   Warfarin maintenance plan:   5 mg (2.5 mg x 2) every Mon, Thu; 2.5 mg (2.5 mg x 1) all other days   Full warfarin instructions:   : Hold; : Hold; Otherwise 5 mg every Mon, Thu; 2.5 mg all other days   Weekly warfarin total:   22.5 mg   Plan last modified:   Sonam Teixeira RN (2019)   Next INR check:   2020   Priority:   Maintenance   Target end date:   Indefinite    Indications    Long term current use of anticoagulants with INR goal of 2.0-3.0 [Z79.01]  Atrial fibrillation and flutter (H) [I48.91  I48.92]             Anticoagulation Episode Summary     INR check location:       Preferred lab:        Send INR reminders to:   [facility]    Comments:   likes printed AVS      Anticoagulation Care Providers     Provider Role Specialty Phone number    Yunior Huang MD Responsible Internal Medicine [phone]            See the Encounter Report to view Anticoagulation Flowsheet and Dosing Calendar (Go to Encounters tab in chart review, and find the Anticoagulation Therapy Visit)    Dosage adjustment made based on physician directed care plan.    Sonam Teixeira RN

## 2020-05-22 ENCOUNTER — ANTICOAGULATION THERAPY VISIT (OUTPATIENT)
Dept: NURSING | Facility: CLINIC | Age: 80
End: 2020-05-22
Payer: MEDICARE

## 2020-05-22 DIAGNOSIS — Z95.3 S/P MITRAL VALVE REPLACEMENT WITH BIOPROSTHETIC VALVE: ICD-10-CM

## 2020-05-22 DIAGNOSIS — I48.92 ATRIAL FIBRILLATION AND FLUTTER (H): ICD-10-CM

## 2020-05-22 DIAGNOSIS — I48.91 ATRIAL FIBRILLATION AND FLUTTER (H): ICD-10-CM

## 2020-05-22 DIAGNOSIS — Z79.01 LONG TERM CURRENT USE OF ANTICOAGULANTS WITH INR GOAL OF 2.0-3.0: ICD-10-CM

## 2020-05-22 LAB — INR PPP: 3 (ref 0.86–1.14)

## 2020-05-22 PROCEDURE — 99207 ZZC NO CHARGE NURSE ONLY: CPT

## 2020-05-22 PROCEDURE — 85610 PROTHROMBIN TIME: CPT | Performed by: INTERNAL MEDICINE

## 2020-05-22 PROCEDURE — 36416 COLLJ CAPILLARY BLOOD SPEC: CPT | Performed by: INTERNAL MEDICINE

## 2020-05-22 NOTE — PROGRESS NOTES
"ANTICOAGULATION FOLLOW-UP CLINIC VISIT    Patient Name:  uZnilda Alicea May  Date:  5/22/2020  Contact Type:  Telephone    SUBJECTIVE:  Patient Findings     Positives:   Signs/symptoms of bleeding (Methotrexate injection given on 05/21, the site did \"bleed for a while\"), Missed doses (Intentional hold on 05/12 and 05/13 due to supra INR), Change in diet/appetite (Inconsistent greens, pt thinks she has had about 3 servings of peas in the past week and maybe a small bit of spinach. I emphasized importance of consistency and small portions. ), Bruising    Comments:   For vitamin K intake, I suggested patient try for 4 servings per week and to make 1 or 2 of those a higher vitamin K food such as the spinach or broccoli that she currently has on hand, patient verbalized understanding.        Clinical Outcomes     Negatives:   Major bleeding event, Thromboembolic event, Anticoagulation-related hospital admission, Anticoagulation-related ED visit, Anticoagulation-related fatality    Comments:   For vitamin K intake, I suggested patient try for 4 servings per week and to make 1 or 2 of those a higher vitamin K food such as the spinach or broccoli that she currently has on hand, patient verbalized understanding.           OBJECTIVE    Recent labs: (last 7 days)     05/22/20  1254   INR 3.00*       ASSESSMENT / PLAN  INR assessment THER    Recheck INR In: 10 DAYS    INR Location Clinic      Anticoagulation Summary  As of 5/22/2020    INR goal:   2.0-3.0   TTR:   62.4 % (1 y)   INR used for dosing:   3.00 (5/22/2020)   Warfarin maintenance plan:   5 mg (2.5 mg x 2) every Mon, Thu; 2.5 mg (2.5 mg x 1) all other days   Full warfarin instructions:   5 mg every Mon, Thu; 2.5 mg all other days   Weekly warfarin total:   22.5 mg   No change documented:   Linette Rocha RN   Plan last modified:   Sonam Teixeira RN (12/6/2019)   Next INR check:   6/1/2020   Priority:   High   Target end date:   Indefinite    Indications    Long term " current use of anticoagulants with INR goal of 2.0-3.0 [Z79.01]  Atrial fibrillation and flutter (H) [I48.91  I48.92]             Anticoagulation Episode Summary     INR check location:       Preferred lab:       Send INR reminders to:   [facility]    Comments:   likes printed AVS      Anticoagulation Care Providers     Provider Role Specialty Phone number    Yunior Huang MD [facility] Internal Medicine [phone]            See the Encounter Report to view Anticoagulation Flowsheet and Dosing Calendar (Go to Encounters tab in chart review, and find the Anticoagulation Therapy Visit)        Linette Rocha RN

## 2020-06-01 ENCOUNTER — ANTICOAGULATION THERAPY VISIT (OUTPATIENT)
Dept: ANTICOAGULATION | Facility: CLINIC | Age: 80
End: 2020-06-01

## 2020-06-01 DIAGNOSIS — Z79.01 LONG TERM CURRENT USE OF ANTICOAGULANTS WITH INR GOAL OF 2.0-3.0: ICD-10-CM

## 2020-06-01 DIAGNOSIS — I48.92 ATRIAL FIBRILLATION AND FLUTTER (H): ICD-10-CM

## 2020-06-01 DIAGNOSIS — I48.91 ATRIAL FIBRILLATION AND FLUTTER (H): ICD-10-CM

## 2020-06-01 DIAGNOSIS — Z95.3 S/P MITRAL VALVE REPLACEMENT WITH BIOPROSTHETIC VALVE: ICD-10-CM

## 2020-06-01 LAB
CAPILLARY BLOOD COLLECTION: NORMAL
INR PPP: 3.8 (ref 0.86–1.14)

## 2020-06-01 PROCEDURE — 99207 ZZC NO CHARGE NURSE ONLY: CPT | Performed by: INTERNAL MEDICINE

## 2020-06-01 PROCEDURE — 36416 COLLJ CAPILLARY BLOOD SPEC: CPT | Performed by: INTERNAL MEDICINE

## 2020-06-01 PROCEDURE — 85610 PROTHROMBIN TIME: CPT | Performed by: INTERNAL MEDICINE

## 2020-06-01 NOTE — PROGRESS NOTES
ANTICOAGULATION FOLLOW-UP CLINIC VISIT    Patient Name:  Zunilda Alicea May  Date:  6/1/2020  Contact Type:  Telephone/ Called patient, she reports has not been consistent with her greens veggies. Patient denies any other changes. Orders discussed with patient.     SUBJECTIVE:  Patient Findings     Positives:   Change in diet/appetite (Patient reports had green veggies about 3 times since last INR)    Comments:   The patient was assessed for diet, medication, and activity level changes, missed or extra doses, bruising or bleeding, with no problem findings.          Clinical Outcomes     Comments:   The patient was assessed for diet, medication, and activity level changes, missed or extra doses, bruising or bleeding, with no problem findings.             OBJECTIVE    Recent labs: (last 7 days)     06/01/20  1149   INR 3.80*       ASSESSMENT / PLAN  INR assessment SUPRA    Recheck INR In: 10 DAYS    INR Location Outside lab      Anticoagulation Summary  As of 6/1/2020    INR goal:   2.0-3.0   TTR:   61.6 % (1 y)   INR used for dosing:   3.80! (6/1/2020)   Warfarin maintenance plan:   5 mg (2.5 mg x 2) every Mon, Thu; 2.5 mg (2.5 mg x 1) all other days   Full warfarin instructions:   6/1: 2.5 mg; Otherwise 5 mg every Mon, Thu; 2.5 mg all other days   Weekly warfarin total:   22.5 mg   Plan last modified:   Sonam Teixeira RN (12/6/2019)   Next INR check:   6/12/2020   Priority:   High   Target end date:   Indefinite    Indications    Long term current use of anticoagulants with INR goal of 2.0-3.0 [Z79.01]  Atrial fibrillation and flutter (H) [I48.91  I48.92]             Anticoagulation Episode Summary     INR check location:       Preferred lab:       Send INR reminders to:   [facility]    Comments:   likes printed AVS      Anticoagulation Care Providers     Provider Role Specialty Phone number    Yunior Huang MD [facility] Internal Medicine [phone]            See the Encounter Report to  view Anticoagulation Flowsheet and Dosing Calendar (Go to Encounters tab in chart review, and find the Anticoagulation Therapy Visit)    Dosage adjustment made based on physician directed care plan.    Екатерина Mahan RN

## 2020-06-08 ENCOUNTER — ANCILLARY PROCEDURE (OUTPATIENT)
Dept: CARDIOLOGY | Facility: CLINIC | Age: 80
End: 2020-06-08
Attending: INTERNAL MEDICINE
Payer: MEDICARE

## 2020-06-08 DIAGNOSIS — Z95.0 PACEMAKER: ICD-10-CM

## 2020-06-08 PROCEDURE — 93296 REM INTERROG EVL PM/IDS: CPT | Performed by: INTERNAL MEDICINE

## 2020-06-08 PROCEDURE — 93294 REM INTERROG EVL PM/LDLS PM: CPT | Performed by: INTERNAL MEDICINE

## 2020-06-11 LAB
MDC_IDC_EPISODE_DTM: NORMAL
MDC_IDC_EPISODE_DURATION: 102 S
MDC_IDC_EPISODE_DURATION: 11 S
MDC_IDC_EPISODE_DURATION: 111 S
MDC_IDC_EPISODE_DURATION: 114 S
MDC_IDC_EPISODE_DURATION: 116 S
MDC_IDC_EPISODE_DURATION: 119 S
MDC_IDC_EPISODE_DURATION: 124 S
MDC_IDC_EPISODE_DURATION: 136 S
MDC_IDC_EPISODE_DURATION: 146 S
MDC_IDC_EPISODE_DURATION: 163 S
MDC_IDC_EPISODE_DURATION: 17 S
MDC_IDC_EPISODE_DURATION: 171 S
MDC_IDC_EPISODE_DURATION: 172 S
MDC_IDC_EPISODE_DURATION: 178 S
MDC_IDC_EPISODE_DURATION: 178 S
MDC_IDC_EPISODE_DURATION: 181 S
MDC_IDC_EPISODE_DURATION: 182 S
MDC_IDC_EPISODE_DURATION: 185 S
MDC_IDC_EPISODE_DURATION: 186 S
MDC_IDC_EPISODE_DURATION: 210 S
MDC_IDC_EPISODE_DURATION: 211 S
MDC_IDC_EPISODE_DURATION: 224 S
MDC_IDC_EPISODE_DURATION: 23 S
MDC_IDC_EPISODE_DURATION: 269 S
MDC_IDC_EPISODE_DURATION: 28 S
MDC_IDC_EPISODE_DURATION: 309 S
MDC_IDC_EPISODE_DURATION: 31 S
MDC_IDC_EPISODE_DURATION: 32 S
MDC_IDC_EPISODE_DURATION: 32 S
MDC_IDC_EPISODE_DURATION: 34 S
MDC_IDC_EPISODE_DURATION: 37 S
MDC_IDC_EPISODE_DURATION: 372 S
MDC_IDC_EPISODE_DURATION: 44 S
MDC_IDC_EPISODE_DURATION: 49 S
MDC_IDC_EPISODE_DURATION: 52 S
MDC_IDC_EPISODE_DURATION: 57 S
MDC_IDC_EPISODE_DURATION: 62 S
MDC_IDC_EPISODE_DURATION: 67 S
MDC_IDC_EPISODE_DURATION: 67 S
MDC_IDC_EPISODE_DURATION: 71 S
MDC_IDC_EPISODE_DURATION: 81 S
MDC_IDC_EPISODE_DURATION: 86 S
MDC_IDC_EPISODE_DURATION: 95 S
MDC_IDC_EPISODE_ID: 344
MDC_IDC_EPISODE_ID: 345
MDC_IDC_EPISODE_ID: 346
MDC_IDC_EPISODE_ID: 347
MDC_IDC_EPISODE_ID: 348
MDC_IDC_EPISODE_ID: 349
MDC_IDC_EPISODE_ID: 350
MDC_IDC_EPISODE_ID: 351
MDC_IDC_EPISODE_ID: 352
MDC_IDC_EPISODE_ID: 353
MDC_IDC_EPISODE_ID: 354
MDC_IDC_EPISODE_ID: 355
MDC_IDC_EPISODE_ID: 356
MDC_IDC_EPISODE_ID: 357
MDC_IDC_EPISODE_ID: 358
MDC_IDC_EPISODE_ID: 359
MDC_IDC_EPISODE_ID: 360
MDC_IDC_EPISODE_ID: 361
MDC_IDC_EPISODE_ID: 362
MDC_IDC_EPISODE_ID: 363
MDC_IDC_EPISODE_ID: 364
MDC_IDC_EPISODE_ID: 365
MDC_IDC_EPISODE_ID: 366
MDC_IDC_EPISODE_ID: 367
MDC_IDC_EPISODE_ID: 368
MDC_IDC_EPISODE_ID: 369
MDC_IDC_EPISODE_ID: 370
MDC_IDC_EPISODE_ID: 371
MDC_IDC_EPISODE_ID: 372
MDC_IDC_EPISODE_ID: 373
MDC_IDC_EPISODE_ID: 374
MDC_IDC_EPISODE_ID: 375
MDC_IDC_EPISODE_ID: 376
MDC_IDC_EPISODE_ID: 377
MDC_IDC_EPISODE_ID: 378
MDC_IDC_EPISODE_ID: 379
MDC_IDC_EPISODE_ID: 380
MDC_IDC_EPISODE_ID: 381
MDC_IDC_EPISODE_ID: 382
MDC_IDC_EPISODE_ID: 383
MDC_IDC_EPISODE_ID: 384
MDC_IDC_EPISODE_ID: 385
MDC_IDC_EPISODE_ID: 386
MDC_IDC_EPISODE_ID: 387
MDC_IDC_EPISODE_ID: 388
MDC_IDC_EPISODE_ID: 389
MDC_IDC_EPISODE_ID: 390
MDC_IDC_EPISODE_ID: 391
MDC_IDC_EPISODE_ID: 392
MDC_IDC_EPISODE_ID: 393
MDC_IDC_EPISODE_TYPE: NORMAL
MDC_IDC_LEAD_IMPLANT_DT: NORMAL
MDC_IDC_LEAD_IMPLANT_DT: NORMAL
MDC_IDC_LEAD_LOCATION: NORMAL
MDC_IDC_LEAD_LOCATION: NORMAL
MDC_IDC_LEAD_MFG: NORMAL
MDC_IDC_LEAD_MFG: NORMAL
MDC_IDC_LEAD_MODEL: NORMAL
MDC_IDC_LEAD_MODEL: NORMAL
MDC_IDC_LEAD_POLARITY_TYPE: NORMAL
MDC_IDC_LEAD_POLARITY_TYPE: NORMAL
MDC_IDC_LEAD_SERIAL: NORMAL
MDC_IDC_LEAD_SERIAL: NORMAL
MDC_IDC_MSMT_BATTERY_DTM: NORMAL
MDC_IDC_MSMT_BATTERY_STATUS: NORMAL
MDC_IDC_MSMT_BATTERY_VOLTAGE: 2.88 V
MDC_IDC_MSMT_LEADCHNL_RA_IMPEDANCE_VALUE: 416 OHM
MDC_IDC_MSMT_LEADCHNL_RA_SENSING_INTR_AMPL: 0.72 MV
MDC_IDC_MSMT_LEADCHNL_RV_IMPEDANCE_VALUE: 384 OHM
MDC_IDC_MSMT_LEADCHNL_RV_SENSING_INTR_AMPL: 11.6 MV
MDC_IDC_PG_IMPLANT_DTM: NORMAL
MDC_IDC_PG_MFG: NORMAL
MDC_IDC_PG_MODEL: NORMAL
MDC_IDC_PG_SERIAL: NORMAL
MDC_IDC_PG_TYPE: NORMAL
MDC_IDC_SESS_CLINIC_NAME: NORMAL
MDC_IDC_SESS_DTM: NORMAL
MDC_IDC_SESS_TYPE: NORMAL
MDC_IDC_SET_BRADY_AT_MODE_SWITCH_RATE: 171 {BEATS}/MIN
MDC_IDC_SET_BRADY_HYSTRATE: NORMAL
MDC_IDC_SET_BRADY_LOWRATE: 60 {BEATS}/MIN
MDC_IDC_SET_BRADY_MAX_SENSOR_RATE: 130 {BEATS}/MIN
MDC_IDC_SET_BRADY_MAX_TRACKING_RATE: 130 {BEATS}/MIN
MDC_IDC_SET_BRADY_MODE: NORMAL
MDC_IDC_SET_BRADY_PAV_DELAY_LOW: 180 MS
MDC_IDC_SET_BRADY_SAV_DELAY_LOW: 150 MS
MDC_IDC_SET_LEADCHNL_RA_PACING_AMPLITUDE: 2 V
MDC_IDC_SET_LEADCHNL_RA_PACING_ANODE_ELECTRODE_1: NORMAL
MDC_IDC_SET_LEADCHNL_RA_PACING_ANODE_LOCATION_1: NORMAL
MDC_IDC_SET_LEADCHNL_RA_PACING_CATHODE_ELECTRODE_1: NORMAL
MDC_IDC_SET_LEADCHNL_RA_PACING_CATHODE_LOCATION_1: NORMAL
MDC_IDC_SET_LEADCHNL_RA_PACING_POLARITY: NORMAL
MDC_IDC_SET_LEADCHNL_RA_PACING_PULSEWIDTH: 0.4 MS
MDC_IDC_SET_LEADCHNL_RA_SENSING_ANODE_ELECTRODE_1: NORMAL
MDC_IDC_SET_LEADCHNL_RA_SENSING_ANODE_LOCATION_1: NORMAL
MDC_IDC_SET_LEADCHNL_RA_SENSING_CATHODE_ELECTRODE_1: NORMAL
MDC_IDC_SET_LEADCHNL_RA_SENSING_CATHODE_LOCATION_1: NORMAL
MDC_IDC_SET_LEADCHNL_RA_SENSING_POLARITY: NORMAL
MDC_IDC_SET_LEADCHNL_RA_SENSING_SENSITIVITY: 0.45 MV
MDC_IDC_SET_LEADCHNL_RV_PACING_AMPLITUDE: 2 V
MDC_IDC_SET_LEADCHNL_RV_PACING_ANODE_ELECTRODE_1: NORMAL
MDC_IDC_SET_LEADCHNL_RV_PACING_ANODE_LOCATION_1: NORMAL
MDC_IDC_SET_LEADCHNL_RV_PACING_CATHODE_ELECTRODE_1: NORMAL
MDC_IDC_SET_LEADCHNL_RV_PACING_CATHODE_LOCATION_1: NORMAL
MDC_IDC_SET_LEADCHNL_RV_PACING_POLARITY: NORMAL
MDC_IDC_SET_LEADCHNL_RV_PACING_PULSEWIDTH: 0.4 MS
MDC_IDC_SET_LEADCHNL_RV_SENSING_ANODE_ELECTRODE_1: NORMAL
MDC_IDC_SET_LEADCHNL_RV_SENSING_ANODE_LOCATION_1: NORMAL
MDC_IDC_SET_LEADCHNL_RV_SENSING_CATHODE_ELECTRODE_1: NORMAL
MDC_IDC_SET_LEADCHNL_RV_SENSING_CATHODE_LOCATION_1: NORMAL
MDC_IDC_SET_LEADCHNL_RV_SENSING_POLARITY: NORMAL
MDC_IDC_SET_LEADCHNL_RV_SENSING_SENSITIVITY: 2.1 MV
MDC_IDC_SET_ZONE_DETECTION_INTERVAL: 350 MS
MDC_IDC_SET_ZONE_DETECTION_INTERVAL: 400 MS
MDC_IDC_SET_ZONE_TYPE: NORMAL
MDC_IDC_STAT_AT_BURDEN_PERCENT: 0.2 %
MDC_IDC_STAT_AT_DTM_END: NORMAL
MDC_IDC_STAT_AT_DTM_START: NORMAL
MDC_IDC_STAT_BRADY_AP_VP_PERCENT: 21.87 %
MDC_IDC_STAT_BRADY_AP_VS_PERCENT: 4.07 %
MDC_IDC_STAT_BRADY_AS_VP_PERCENT: 63.64 %
MDC_IDC_STAT_BRADY_AS_VS_PERCENT: 10.42 %
MDC_IDC_STAT_BRADY_DTM_END: NORMAL
MDC_IDC_STAT_BRADY_DTM_START: NORMAL
MDC_IDC_STAT_BRADY_RA_PERCENT_PACED: 25.8 %
MDC_IDC_STAT_BRADY_RV_PERCENT_PACED: 85.39 %
MDC_IDC_STAT_EPISODE_RECENT_COUNT: 0
MDC_IDC_STAT_EPISODE_RECENT_COUNT: 94
MDC_IDC_STAT_EPISODE_RECENT_COUNT_DTM_END: NORMAL
MDC_IDC_STAT_EPISODE_RECENT_COUNT_DTM_START: NORMAL
MDC_IDC_STAT_EPISODE_TOTAL_COUNT: 1
MDC_IDC_STAT_EPISODE_TOTAL_COUNT: 13
MDC_IDC_STAT_EPISODE_TOTAL_COUNT: 3
MDC_IDC_STAT_EPISODE_TOTAL_COUNT: 374
MDC_IDC_STAT_EPISODE_TOTAL_COUNT_DTM_END: NORMAL
MDC_IDC_STAT_EPISODE_TYPE: NORMAL

## 2020-06-12 ENCOUNTER — ANTICOAGULATION THERAPY VISIT (OUTPATIENT)
Dept: ANTICOAGULATION | Facility: CLINIC | Age: 80
End: 2020-06-12

## 2020-06-12 DIAGNOSIS — I48.91 ATRIAL FIBRILLATION AND FLUTTER (H): ICD-10-CM

## 2020-06-12 DIAGNOSIS — Z79.01 LONG TERM CURRENT USE OF ANTICOAGULANTS WITH INR GOAL OF 2.0-3.0: ICD-10-CM

## 2020-06-12 DIAGNOSIS — I48.92 ATRIAL FIBRILLATION AND FLUTTER (H): ICD-10-CM

## 2020-06-12 DIAGNOSIS — Z95.3 S/P MITRAL VALVE REPLACEMENT WITH BIOPROSTHETIC VALVE: ICD-10-CM

## 2020-06-12 LAB
CAPILLARY BLOOD COLLECTION: NORMAL
INR PPP: 3.8 (ref 0.86–1.14)

## 2020-06-12 PROCEDURE — 85610 PROTHROMBIN TIME: CPT | Performed by: INTERNAL MEDICINE

## 2020-06-12 PROCEDURE — 99207 ZZC NO CHARGE NURSE ONLY: CPT | Performed by: INTERNAL MEDICINE

## 2020-06-12 PROCEDURE — 36416 COLLJ CAPILLARY BLOOD SPEC: CPT | Performed by: INTERNAL MEDICINE

## 2020-06-12 NOTE — PROGRESS NOTES
ANTICOAGULATION FOLLOW-UP CLINIC VISIT    Patient Name:  Zunilda Alicea May  Date:  6/12/2020  Contact Type:  Telephone/ discussed with patient    SUBJECTIVE:  Patient Findings     Positives:   Signs/symptoms of bleeding (small amount of blood with BMs), Change in diet/appetite (eating more green veggies), Bruising (small bruise)    Comments:   The patient was assessed for medication, and activity level changes, missed or extra doses, with no problem findings.  Patient denies any identifiable changes that caused the supra therapeutic INR. INR has been consistently elevated so maintenance dose was decreased 11% today.            Clinical Outcomes     Negatives:   Major bleeding event, Thromboembolic event, Anticoagulation-related hospital admission, Anticoagulation-related ED visit, Anticoagulation-related fatality    Comments:   The patient was assessed for medication, and activity level changes, missed or extra doses, with no problem findings.  Patient denies any identifiable changes that caused the supra therapeutic INR. INR has been consistently elevated so maintenance dose was decreased 11% today.               OBJECTIVE    Recent labs: (last 7 days)     06/12/20  0959   INR 3.80*       ASSESSMENT / PLAN  INR assessment SUPRA    Recheck INR In: 10 DAYS    INR Location Clinic      Anticoagulation Summary  As of 6/12/2020    INR goal:   2.0-3.0   TTR:   58.6 % (1 y)   INR used for dosing:   3.80! (6/12/2020)   Warfarin maintenance plan:   5 mg (2.5 mg x 2) every Thu; 2.5 mg (2.5 mg x 1) all other days   Full warfarin instructions:   6/12: Hold; Otherwise 5 mg every Thu; 2.5 mg all other days   Weekly warfarin total:   20 mg   Plan last modified:   Sonam Teixeira RN (6/12/2020)   Next INR check:   6/23/2020   Priority:   High   Target end date:   Indefinite    Indications    Long term current use of anticoagulants with INR goal of 2.0-3.0 [Z79.01]  Atrial fibrillation and flutter (H) [I48.91  I48.92]              Anticoagulation Episode Summary     INR check location:       Preferred lab:       Send INR reminders to:   [facility]    Comments:   likes printed AVS      Anticoagulation Care Providers     Provider Role Specialty Phone number    Yunior Huang MD [facility] Internal Medicine [phone]            See the Encounter Report to view Anticoagulation Flowsheet and Dosing Calendar (Go to Encounters tab in chart review, and find the Anticoagulation Therapy Visit)    Dosage adjustment made based on physician directed care plan.    Sonam Teixeira RN

## 2020-06-23 ENCOUNTER — ANTICOAGULATION THERAPY VISIT (OUTPATIENT)
Dept: ANTICOAGULATION | Facility: CLINIC | Age: 80
End: 2020-06-23

## 2020-06-23 DIAGNOSIS — I48.92 ATRIAL FIBRILLATION AND FLUTTER (H): ICD-10-CM

## 2020-06-23 DIAGNOSIS — Z79.01 LONG TERM CURRENT USE OF ANTICOAGULANTS WITH INR GOAL OF 2.0-3.0: ICD-10-CM

## 2020-06-23 DIAGNOSIS — I48.91 ATRIAL FIBRILLATION AND FLUTTER (H): ICD-10-CM

## 2020-06-23 DIAGNOSIS — Z95.3 S/P MITRAL VALVE REPLACEMENT WITH BIOPROSTHETIC VALVE: ICD-10-CM

## 2020-06-23 LAB
CAPILLARY BLOOD COLLECTION: NORMAL
INR PPP: 3.5 (ref 0.86–1.14)

## 2020-06-23 PROCEDURE — 85610 PROTHROMBIN TIME: CPT | Performed by: INTERNAL MEDICINE

## 2020-06-23 PROCEDURE — 99207 ZZC NO CHARGE NURSE ONLY: CPT | Performed by: INTERNAL MEDICINE

## 2020-06-23 PROCEDURE — 36416 COLLJ CAPILLARY BLOOD SPEC: CPT | Performed by: INTERNAL MEDICINE

## 2020-06-23 NOTE — PROGRESS NOTES
ANTICOAGULATION FOLLOW-UP CLINIC VISIT    Patient Name:  Zunilda Alicea May  Date:  6/23/2020  Contact Type:  Telephone/ Called patient, she reports incraeasd green veggie intake, denies any other changes. Orders discussed with patient.     SUBJECTIVE:  Patient Findings     Positives:   Change in diet/appetite (Patient reports has increased green veggies. )    Comments:   The patient was assessed for diet, medication, and activity level changes, missed or extra doses, bruising or bleeding, with no problem findings.  Patient denies any identifiable changes that caused the supratherapeutic INR.   INR consistently elevated, will decrease weekly maintenance dose again. Decreased by 6.3%, now will take 3.75 mg warfarin on Thursday, 2.5 mg warfarin ROW        Clinical Outcomes     Comments:   The patient was assessed for diet, medication, and activity level changes, missed or extra doses, bruising or bleeding, with no problem findings.  Patient denies any identifiable changes that caused the supratherapeutic INR.   INR consistently elevated, will decrease weekly maintenance dose again. Decreased by 6.3%, now will take 3.75 mg warfarin on Thursday, 2.5 mg warfarin ROW           OBJECTIVE    Recent labs: (last 7 days)     06/23/20  1104   INR 3.50*       ASSESSMENT / PLAN  INR assessment SUPRA    Recheck INR In: 2 WEEKS    INR Location Outside lab      Anticoagulation Summary  As of 6/23/2020    INR goal:   2.0-3.0   TTR:   55.6 % (1 y)   INR used for dosing:   3.50! (6/23/2020)   Warfarin maintenance plan:   3.75 mg (2.5 mg x 1.5) every Thu; 2.5 mg (2.5 mg x 1) all other days   Full warfarin instructions:   6/23: 1.25 mg; Otherwise 3.75 mg every Thu; 2.5 mg all other days   Weekly warfarin total:   18.75 mg   Plan last modified:   Екатерина Mahan RN (6/23/2020)   Next INR check:   7/7/2020   Priority:   High   Target end date:   Indefinite    Indications    Long term current use of anticoagulants with INR goal of 2.0-3.0  [Z79.01]  Atrial fibrillation and flutter (H) [I48.91  I48.92]             Anticoagulation Episode Summary     INR check location:       Preferred lab:       Send INR reminders to:   RANJIT COOK    Comments:   likes printed AVS      Anticoagulation Care Providers     Provider Role Specialty Phone number    Yunior Huang MD [facility] Internal Medicine [phone]            See the Encounter Report to view Anticoagulation Flowsheet and Dosing Calendar (Go to Encounters tab in chart review, and find the Anticoagulation Therapy Visit)    Dosage adjustment made based on physician directed care plan.    Екатерина Mahan RN

## 2020-07-07 ENCOUNTER — TRANSFERRED RECORDS (OUTPATIENT)
Dept: HEALTH INFORMATION MANAGEMENT | Facility: CLINIC | Age: 80
End: 2020-07-07

## 2020-07-07 ENCOUNTER — ANTICOAGULATION THERAPY VISIT (OUTPATIENT)
Dept: ANTICOAGULATION | Facility: CLINIC | Age: 80
End: 2020-07-07

## 2020-07-07 DIAGNOSIS — I48.92 ATRIAL FIBRILLATION AND FLUTTER (H): ICD-10-CM

## 2020-07-07 DIAGNOSIS — I48.91 ATRIAL FIBRILLATION AND FLUTTER (H): ICD-10-CM

## 2020-07-07 DIAGNOSIS — Z79.01 LONG TERM CURRENT USE OF ANTICOAGULANTS WITH INR GOAL OF 2.0-3.0: ICD-10-CM

## 2020-07-07 DIAGNOSIS — Z95.3 S/P MITRAL VALVE REPLACEMENT WITH BIOPROSTHETIC VALVE: ICD-10-CM

## 2020-07-07 LAB
CAPILLARY BLOOD COLLECTION: NORMAL
INR PPP: 2.4 (ref 0.86–1.14)

## 2020-07-07 PROCEDURE — 36416 COLLJ CAPILLARY BLOOD SPEC: CPT | Performed by: INTERNAL MEDICINE

## 2020-07-07 PROCEDURE — 85610 PROTHROMBIN TIME: CPT | Performed by: INTERNAL MEDICINE

## 2020-07-07 PROCEDURE — 99207 ZZC NO CHARGE NURSE ONLY: CPT | Performed by: INTERNAL MEDICINE

## 2020-07-07 NOTE — PROGRESS NOTES
ANTICOAGULATION FOLLOW-UP CLINIC VISIT    Patient Name:  Zunilda Alicea May  Date:  2020  Contact Type:  Telephone/ discussed with patient    SUBJECTIVE:  Patient Findings     Positives:   Change in diet/appetite (increased her vitamin K intake.  Encouraged her to continue to eat the same amount.)    Comments:   The patient was assessed for medication, and activity level changes, missed or extra doses, bruising or bleeding, with no problem findings.          Clinical Outcomes     Negatives:   Major bleeding event, Thromboembolic event, Anticoagulation-related hospital admission, Anticoagulation-related ED visit, Anticoagulation-related fatality    Comments:   The patient was assessed for medication, and activity level changes, missed or extra doses, bruising or bleeding, with no problem findings.             OBJECTIVE    Recent labs: (last 7 days)     20  0917   INR 2.40*       ASSESSMENT / PLAN  INR assessment THER    Recheck INR In: 3 WEEKS    INR Location Clinic      Anticoagulation Summary  As of 2020    INR goal:   2.0-3.0   TTR:   53.9 % (1 y)   INR used for dosin.40 (2020)   Warfarin maintenance plan:   3.75 mg (2.5 mg x 1.5) every Thu; 2.5 mg (2.5 mg x 1) all other days   Full warfarin instructions:   3.75 mg every Thu; 2.5 mg all other days   Weekly warfarin total:   18.75 mg   No change documented:   Sonam Teixeira RN   Plan last modified:   Екатерина Mahan, RN (2020)   Next INR check:   2020   Priority:   High   Target end date:   Indefinite    Indications    Long term current use of anticoagulants with INR goal of 2.0-3.0 [Z79.01]  Atrial fibrillation and flutter (H) [I48.91  I48.92]             Anticoagulation Episode Summary     INR check location:       Preferred lab:       Send INR reminders to:   RANJIT COOK    Comments:   likes printed AVS      Anticoagulation Care Providers     Provider Role Specialty Phone number    Yunior Huang MD  [facility] Internal Medicine [phone]            See the Encounter Report to view Anticoagulation Flowsheet and Dosing Calendar (Go to Encounters tab in chart review, and find the Anticoagulation Therapy Visit)    Dosage adjustment made based on physician directed care plan.    Sonam Teixeira RN

## 2020-07-21 ENCOUNTER — VIRTUAL VISIT (OUTPATIENT)
Dept: CARDIOLOGY | Facility: CLINIC | Age: 80
End: 2020-07-21
Payer: MEDICARE

## 2020-07-21 DIAGNOSIS — Z95.0 CARDIAC PACEMAKER IN SITU: ICD-10-CM

## 2020-07-21 DIAGNOSIS — I27.20 PULMONARY HTN (H): ICD-10-CM

## 2020-07-21 DIAGNOSIS — Z95.3 S/P MITRAL VALVE REPLACEMENT WITH BIOPROSTHETIC VALVE: Primary | ICD-10-CM

## 2020-07-21 PROCEDURE — 99442 ZZC PHYSICIAN TELEPHONE EVALUATION 11-20 MIN: CPT | Performed by: PHYSICIAN ASSISTANT

## 2020-07-21 NOTE — LETTER
2020    Yunior Huang MD  [address] [facility] [address]    RE: Zunilda Clark       Dear Colleague,    I had the pleasure of seeing Zunilda Clark in the [facility].    CARDIOLOGY TELEPHONE VISIT  This visit is being conducted as a virtual visit due to the emphasis on mitigation of the COVID-19 virus pandemic. The clinician has decided that the risk of an in-office visit outweighs the benefit for this patient.       Zunilda Clark is a 80 year old female who is being evaluated via a billable telephone visit.          Primary Cardiologist:  Previously Dr. Das     HPI:  Zunilda Clark is a 80 year old female with past medical history includin.  Group 2 pulmonary hypertension.    Estimated RVSP as high as 85 mmHg on echocardiogram.   2.  Rheumatic mitral valve disease.  She had her first bioprosthetic mitral valve replacement in , and a redo 27 mm Magna valve bioprosthetic valve replacement in .   3.  Status post tricuspid valve annuloplasty with residual moderately severe tricuspid regurgitation with mildly dilated right ventricle with normal systolic function on cardiac MRI.   4.  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  Recent pacemaker check satisfactory (26% A paced, 85% V paced).   5.  Chronic atrial fibrillation on long-term warfarin anticoagulation.   6.  History of ischemic stroke prior to warfarin.  None since she has been on it.   7.  No coronary artery disease on angiogram in .   8.  Chronic rheumatoid arthritis.  On methotrexate therapy.       20 last visit with Dr. Das. Echo 2020 showed stable pulmonary pressures.  She was asymptomatic. No medication changes were made.      She currently walks with a cane (sometimes uses a walker) because of her right knee arthritis.  She continues to do ok and has not rescheduled surgery (was scheduled at the  time her   2019, was cancelled and has not rescheduled).   Her exercise continues to include walking, some stairs, movement classes several times per week.    With her activity, she denies chest pain.  She has some mild MORRISON, but overall feels her breathing is ok and stable.  She has not had weight gain.  She denies peripheral edema.      INRs are being managed through the INR Clinic and her goal is between 2 and 3.  It has been a little labile lately.      Her last device check was 2020 and was ok.     I have reviewed and updated the patient's Past Medical History, Social History, Family History and Medication List.      MEDICATIONS:  Current Outpatient Medications   Medication Sig Dispense Refill     ALLOPURINOL PO Take 100 mg by mouth 2 times daily       amLODIPine-valsartan (EXFORGE) 5-160 MG tablet Take 1 tablet by mouth daily 90 tablet 2     calcium citrate (CALCITRATE) 950 MG tablet Take 1 tablet by mouth daily        folic acid (FOLVITE) 1 MG tablet Take 1 mg by mouth daily       latanoprostene bunod (VYZULTA) 0.024 % SOLN ophthalmic solution Place 1 drop Into the left eye At Bedtime       levothyroxine (SYNTHROID/LEVOTHROID) 112 MCG tablet TAKE ONE TABLET BY MOUTH ONE TIME DAILY  90 tablet 1     METHOTREXATE SODIUM IJ Inject 0.8 mLs as directed once a week        metoprolol tartrate (LOPRESSOR) 50 MG tablet Take 1 tablet (50 mg) by mouth 2 times daily 180 tablet 3     multivitamin, therapeutic with minerals (MULTI-VITAMIN) TABS tablet Take 1 tablet by mouth daily Without iron       omeprazole (PRILOSEC) 20 MG DR capsule Take 1 capsule (20 mg) by mouth 2 times daily 180 capsule 1     torsemide (DEMADEX) 10 MG tablet Take 1 tablet (10 mg) by mouth daily (with breakfast) 90 tablet 3     VITAMIN D, CHOLECALCIFEROL, PO Take 1,000 Units by mouth daily       warfarin ANTICOAGULANT (COUMADIN) 2.5 MG tablet TAKE 2 TABLETS (5MG) ON MON, THURS AND 1 TABLET (2.5MG) ON ALL OTHER DAYS OR AS  DIRECTED BY INR CLINIC 110 tablet 1     pantoprazole 40 MG PO EC tablet Take 1 tablet (40 mg) by mouth every morning (before breakfast) (Patient not taking: Reported on 7/21/2020) 30 tablet 4       ALLERGIES:  No Known Allergies      MA ROS:  Skin: negative  Eyes: positive for  glasses  Ears/Nose/Throat: negative  Respiratory: MORRISON  Cardiovascular: negative   Gastrointestinal: positive for reflux takes meds   Genitourinary:  positive for nocturia  Musculoskeletal: positive for back pain and joint pain  Neurologic: positive for negative and numbness or tingling of feet  Psychiatric: positive for sleep disturbance  Hematologic/Lymphatic/Immunologic: positive for easy bruising   Endocrine: positive for thyroid disorder      Patient reported vitals:  BP: na  Heart rate: na   Weight: 152 lbs   Jenise Bunn CMA        DIAGNOSTICS:  ECHO 1/7/2020:   Interpretation Summary     There is a bioprosthetic mitral valve.  Normal prosthetic mitral valve gradients.  Left ventricular systolic function is normal.  The visual ejection fraction is estimated at 60-65%.  The left ventricle is normal in size.  Mildly decreased right ventricular systolic function  There is a pacemaker lead in the right ventricle.  There is mild-moderate biatrial enlargement.  Right ventricular systolic pressure is elevated, consistent with moderate to  severe pulmonary hypertension.  There is mild (1+) aortic regurgitation.     No significant change since 7/9/2019.         ASSESSMENT/PLAN:  Deanne Clark is a delightful 80-year-old female with rheumatic valvular heart disease, status post mitral valve replacement.  She has pulmonary hypertension secondary to previous mitral stenosis.  Her pulmonary pressures are currently stable (last echo 1/7/2020) and she denies significant dyspnea, peripheral edema or weight gain.  She is following a low-salt diet.  Her most recent labs (8/2019) show her kidney function is normal on her current dose of torsemide.    1.   Continue current medications.     2.  Her echocardiogram 1/7/2020 was stable without evidence of worsening mitral stenosis or insufficiency.  Pulmonary pressures stable. Repeat echo 1/2021 when she establishes with a new cardiologist.     3.  BMP 1/2021.   4.  Continued device checks.   5.  Continued INR checks through INR clinic.   6.  She is to call for a 5-pound weight gain or more.  The patient will also call for increasing shortness of breath.         Follow up:  Establish with new cardiologist in Jan 2021.  Repeat echo at that time.       I have reviewed the note as documented above.  This accurately captures the substance of my conversation with the patient.        Thank you for allowing me to participate in the care of your patient.    Sincerely,     Augusto Brasher PA-C     [facility]

## 2020-07-21 NOTE — PROGRESS NOTES
"CARDIOLOGY TELEPHONE VISIT  This visit is being conducted as a virtual visit due to the emphasis on mitigation of the COVID-19 virus pandemic. The clinician has decided that the risk of an in-office visit outweighs the benefit for this patient.       Zunilda Clark is a 80 year old female who is being evaluated via a billable telephone visit.      The patient has been notified of following:     \"This telephone visit will be conducted via a call between you and your physician/provider. We have found that certain health care needs can be provided without the need for a physical exam.  This service lets us provide the care you need with a short phone conversation.  If a prescription is necessary we can send it directly to your pharmacy.  If lab work is needed we can place an order for that and you can then stop by our lab to have the test done at a later time.    Telephone visits are billed at different rates depending on your insurance coverage. During this emergency period, for some insurers they may be billed the same as an in-person visit.  Please reach out to your insurance provider with any questions.    If during the course of the call the physician/provider feels a telephone visit is not appropriate, you will not be charged for this service.\"    Patient has given verbal consent for Telephone visit?  Yes    What phone number would you like to be contacted at? [phone]     How would you like to obtain your AVS? Mail a copy      Primary Cardiologist:  Previously Dr. Das     HPI:  Zunilda Clark is a 80 year old female with past medical history includin.  Group 2 pulmonary hypertension.    Estimated RVSP as high as 85 mmHg on echocardiogram.   2.  Rheumatic mitral valve disease.  She had her first bioprosthetic mitral valve replacement in , and a redo 27 mm Magna valve bioprosthetic valve replacement in .   3.  Status post tricuspid valve annuloplasty with residual moderately severe " tricuspid regurgitation with mildly dilated right ventricle with normal systolic function on cardiac MRI.   4.  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  Recent pacemaker check satisfactory (26% A paced, 85% V paced).   5.  Chronic atrial fibrillation on long-term warfarin anticoagulation.   6.  History of ischemic stroke prior to warfarin.  None since she has been on it.   7.  No coronary artery disease on angiogram in .   8.  Chronic rheumatoid arthritis.  On methotrexate therapy.       20 last visit with Dr. Das. Echo 2020 showed stable pulmonary pressures.  She was asymptomatic. No medication changes were made.      She currently walks with a cane (sometimes uses a walker) because of her right knee arthritis.  She continues to do ok and has not rescheduled surgery (was scheduled at the time her   2019, was cancelled and has not rescheduled).   Her exercise continues to include walking, some stairs, movement classes several times per week.    With her activity, she denies chest pain.  She has some mild MORRISON, but overall feels her breathing is ok and stable.  She has not had weight gain.  She denies peripheral edema.      INRs are being managed through the INR Clinic and her goal is between 2 and 3.  It has been a little labile lately.      Her last device check was 2020 and was ok.     I have reviewed and updated the patient's Past Medical History, Social History, Family History and Medication List.      MEDICATIONS:  Current Outpatient Medications   Medication Sig Dispense Refill     ALLOPURINOL PO Take 100 mg by mouth 2 times daily       amLODIPine-valsartan (EXFORGE) 5-160 MG tablet Take 1 tablet by mouth daily 90 tablet 2     calcium citrate (CALCITRATE) 950 MG tablet Take 1 tablet by mouth daily        folic acid (FOLVITE) 1 MG tablet Take 1 mg by mouth daily       latanoprostene bunod (VYZULTA) 0.024 % SOLN  ophthalmic solution Place 1 drop Into the left eye At Bedtime       levothyroxine (SYNTHROID/LEVOTHROID) 112 MCG tablet TAKE ONE TABLET BY MOUTH ONE TIME DAILY  90 tablet 1     METHOTREXATE SODIUM IJ Inject 0.8 mLs as directed once a week Thursdays       metoprolol tartrate (LOPRESSOR) 50 MG tablet Take 1 tablet (50 mg) by mouth 2 times daily 180 tablet 3     multivitamin, therapeutic with minerals (MULTI-VITAMIN) TABS tablet Take 1 tablet by mouth daily Without iron       omeprazole (PRILOSEC) 20 MG DR capsule Take 1 capsule (20 mg) by mouth 2 times daily 180 capsule 1     torsemide (DEMADEX) 10 MG tablet Take 1 tablet (10 mg) by mouth daily (with breakfast) 90 tablet 3     VITAMIN D, CHOLECALCIFEROL, PO Take 1,000 Units by mouth daily       warfarin ANTICOAGULANT (COUMADIN) 2.5 MG tablet TAKE 2 TABLETS (5MG) ON MON, THURS AND 1 TABLET (2.5MG) ON ALL OTHER DAYS OR AS DIRECTED BY INR CLINIC 110 tablet 1     pantoprazole 40 MG PO EC tablet Take 1 tablet (40 mg) by mouth every morning (before breakfast) (Patient not taking: Reported on 7/21/2020) 30 tablet 4       ALLERGIES:  No Known Allergies      MA ROS:  Skin: negative  Eyes: positive for  glasses  Ears/Nose/Throat: negative  Respiratory: MORRISON  Cardiovascular: negative   Gastrointestinal: positive for reflux takes meds   Genitourinary:  positive for nocturia  Musculoskeletal: positive for back pain and joint pain  Neurologic: positive for negative and numbness or tingling of feet  Psychiatric: positive for sleep disturbance  Hematologic/Lymphatic/Immunologic: positive for easy bruising   Endocrine: positive for thyroid disorder      Patient reported vitals:  BP: na  Heart rate: na   Weight: 152 lbs   Jenise Bunn CMA           DIAGNOSTICS:  ECHO 1/7/2020:   Interpretation Summary     There is a bioprosthetic mitral valve.  Normal prosthetic mitral valve gradients.  Left ventricular systolic function is normal.  The visual ejection fraction is estimated at  60-65%.  The left ventricle is normal in size.  Mildly decreased right ventricular systolic function  There is a pacemaker lead in the right ventricle.  There is mild-moderate biatrial enlargement.  Right ventricular systolic pressure is elevated, consistent with moderate to  severe pulmonary hypertension.  There is mild (1+) aortic regurgitation.     No significant change since 7/9/2019.         ASSESSMENT/PLAN:  Deanne Clark is a delightful 80-year-old female with rheumatic valvular heart disease, status post mitral valve replacement.  She has pulmonary hypertension secondary to previous mitral stenosis.  Her pulmonary pressures are currently stable (last echo 1/7/2020) and she denies significant dyspnea, peripheral edema or weight gain.  She is following a low-salt diet.  Her most recent labs (8/2019) show her kidney function is normal on her current dose of torsemide.    1.  Continue current medications.     2.  Her echocardiogram 1/7/2020 was stable without evidence of worsening mitral stenosis or insufficiency.  Pulmonary pressures stable. Repeat echo 1/2021 when she establishes with a new cardiologist.     3.  BMP 1/2021.   4.  Continued device checks.   5.  Continued INR checks through INR clinic.   6.  She is to call for a 5-pound weight gain or more.  The patient will also call for increasing shortness of breath.         Follow up:  Establish with new cardiologist in Jan 2021.  Repeat echo at that time.       I have reviewed the note as documented above.  This accurately captures the substance of my conversation with the patient.      Phone call contact time  Call Started at 1109  Call Ended at 1122      YOLANDE Garcia [facility]

## 2020-07-28 ENCOUNTER — ANTICOAGULATION THERAPY VISIT (OUTPATIENT)
Dept: ANTICOAGULATION | Facility: CLINIC | Age: 80
End: 2020-07-28

## 2020-07-28 DIAGNOSIS — Z79.01 LONG TERM CURRENT USE OF ANTICOAGULANTS WITH INR GOAL OF 2.0-3.0: ICD-10-CM

## 2020-07-28 DIAGNOSIS — I48.92 ATRIAL FIBRILLATION AND FLUTTER (H): ICD-10-CM

## 2020-07-28 DIAGNOSIS — I48.91 ATRIAL FIBRILLATION AND FLUTTER (H): ICD-10-CM

## 2020-07-28 DIAGNOSIS — Z95.3 S/P MITRAL VALVE REPLACEMENT WITH BIOPROSTHETIC VALVE: ICD-10-CM

## 2020-07-28 LAB
CAPILLARY BLOOD COLLECTION: NORMAL
INR PPP: 1.9 (ref 0.86–1.14)

## 2020-07-28 PROCEDURE — 85610 PROTHROMBIN TIME: CPT | Performed by: INTERNAL MEDICINE

## 2020-07-28 PROCEDURE — 36416 COLLJ CAPILLARY BLOOD SPEC: CPT | Performed by: INTERNAL MEDICINE

## 2020-07-28 PROCEDURE — 99207 ZZC NO CHARGE NURSE ONLY: CPT | Performed by: INTERNAL MEDICINE

## 2020-07-28 NOTE — PROGRESS NOTES
ANTICOAGULATION FOLLOW-UP CLINIC VISIT    Patient Name:  Zunilda Alicea May  Date:  2020  Contact Type:  Telephone/ Called patient, she reports missing warfarin dose, she denies any other changes. Orders discussed with patient.     SUBJECTIVE:  Patient Findings     Positives:   Missed doses (Patient reports missing warfarin on  and 20 but did take the missed warfarin dose from 20 this morning. )    Comments:   The patient was assessed for diet, medication, and activity level changes, extra doses, bruising or bleeding, with no problem findings.           Clinical Outcomes     Comments:   The patient was assessed for diet, medication, and activity level changes, extra doses, bruising or bleeding, with no problem findings.              OBJECTIVE    Recent labs: (last 7 days)     20  0954   INR 1.90*       ASSESSMENT / PLAN  INR assessment SUB    Recheck INR In: 2 WEEKS    INR Location Outside lab      Anticoagulation Summary  As of 2020    INR goal:   2.0-3.0   TTR:   52.7 % (1 y)   INR used for dosin.90! (2020)   Warfarin maintenance plan:   3.75 mg (2.5 mg x 1.5) every Thu; 2.5 mg (2.5 mg x 1) all other days   Full warfarin instructions:   : 5 mg; Otherwise 3.75 mg every Thu; 2.5 mg all other days   Weekly warfarin total:   18.75 mg   Plan last modified:   Екатерина Mahan, RN (2020)   Next INR check:   2020   Priority:   Maintenance   Target end date:   Indefinite    Indications    Long term current use of anticoagulants with INR goal of 2.0-3.0 [Z79.01]  Atrial fibrillation and flutter (H) [I48.91  I48.92]             Anticoagulation Episode Summary     INR check location:       Preferred lab:       Send INR reminders to:   [facility] the [facility]    Comments:   likes printed AVS      Anticoagulation Care Providers     Provider Role Specialty Phone number    Yunior Huang MD [facility] Internal Medicine [phone]            See the Encounter Report  to view Anticoagulation Flowsheet and Dosing Calendar (Go to Encounters tab in chart review, and find the Anticoagulation Therapy Visit)    Dosage adjustment made based on physician directed care plan.    Екатерина Mahan RN

## 2020-08-11 ENCOUNTER — ANTICOAGULATION THERAPY VISIT (OUTPATIENT)
Dept: ANTICOAGULATION | Facility: CLINIC | Age: 80
End: 2020-08-11

## 2020-08-11 DIAGNOSIS — I48.91 ATRIAL FIBRILLATION AND FLUTTER (H): ICD-10-CM

## 2020-08-11 DIAGNOSIS — Z95.3 S/P MITRAL VALVE REPLACEMENT WITH BIOPROSTHETIC VALVE: ICD-10-CM

## 2020-08-11 DIAGNOSIS — Z79.01 LONG TERM CURRENT USE OF ANTICOAGULANTS WITH INR GOAL OF 2.0-3.0: ICD-10-CM

## 2020-08-11 DIAGNOSIS — I48.92 ATRIAL FIBRILLATION AND FLUTTER (H): ICD-10-CM

## 2020-08-11 LAB
CAPILLARY BLOOD COLLECTION: NORMAL
INR PPP: 2.4 (ref 0.86–1.14)

## 2020-08-11 PROCEDURE — 36416 COLLJ CAPILLARY BLOOD SPEC: CPT | Performed by: INTERNAL MEDICINE

## 2020-08-11 PROCEDURE — 85610 PROTHROMBIN TIME: CPT | Performed by: INTERNAL MEDICINE

## 2020-08-11 PROCEDURE — 99207 ZZC NO CHARGE NURSE ONLY: CPT | Performed by: INTERNAL MEDICINE

## 2020-08-11 NOTE — PROGRESS NOTES
ANTICOAGULATION FOLLOW-UP CLINIC VISIT    Patient Name:  Zunilda Alicea May  Date:  2020  Contact Type:  Telephone/ discussed with patient    SUBJECTIVE:  Patient Findings     Positives:   Missed doses (missed dose on  this week, but took this on Monday evening with her scheduled dose.), Change in diet/appetite (started having a spinach shakes 2 times a week.  Plans to continue this)    Comments:   The patient was assessed for medication, and activity level changes, extra doses, bruising or bleeding, with no problem findings.          Clinical Outcomes     Negatives:   Major bleeding event, Thromboembolic event, Anticoagulation-related hospital admission, Anticoagulation-related ED visit, Anticoagulation-related fatality    Comments:   The patient was assessed for medication, and activity level changes, extra doses, bruising or bleeding, with no problem findings.             OBJECTIVE    Recent labs: (last 7 days)     20  1102   INR 2.40*       ASSESSMENT / PLAN  INR assessment THER    Recheck INR In: 3 WEEKS    INR Location Clinic      Anticoagulation Summary  As of 2020    INR goal:   2.0-3.0   TTR:   51.9 % (1 y)   INR used for dosin.40 (2020)   Warfarin maintenance plan:   3.75 mg (2.5 mg x 1.5) every Thu; 2.5 mg (2.5 mg x 1) all other days   Full warfarin instructions:   3.75 mg every Thu; 2.5 mg all other days   Weekly warfarin total:   18.75 mg   No change documented:   Sonam Teixeira, RN   Plan last modified:   Екатерина Mahan RN (2020)   Next INR check:   2020   Priority:   Maintenance   Target end date:   Indefinite    Indications    Long term current use of anticoagulants with INR goal of 2.0-3.0 [Z79.01]  Atrial fibrillation and flutter (H) [I48.91  I48.92]             Anticoagulation Episode Summary     INR check location:       Preferred lab:       Send INR reminders to:   RANJIT COOK    Comments:   likes printed AVS      Anticoagulation Care  Providers     Provider Role Specialty Phone number    Yunior Huang MD Responsible Internal Medicine [phone]            See the Encounter Report to view Anticoagulation Flowsheet and Dosing Calendar (Go to Encounters tab in chart review, and find the Anticoagulation Therapy Visit)    Dosage adjustment made based on physician directed care plan.    Sonam Teixeira RN

## 2020-08-26 DIAGNOSIS — I27.20 PULMONARY HTN (H): ICD-10-CM

## 2020-08-26 RX ORDER — TORSEMIDE 10 MG/1
10 TABLET ORAL
Qty: 90 TABLET | Refills: 1 | Status: SHIPPED | OUTPATIENT
Start: 2020-08-26 | End: 2021-04-06

## 2020-08-31 NOTE — TELEPHONE ENCOUNTER
Can I prescribe 5 mg tablets, Cialis? I can not prescribe 20--too high a dose.   Signed and faxed back.     Tori Mcintyre MA

## 2020-09-01 ENCOUNTER — ANTICOAGULATION THERAPY VISIT (OUTPATIENT)
Dept: ANTICOAGULATION | Facility: CLINIC | Age: 80
End: 2020-09-01

## 2020-09-01 DIAGNOSIS — Z95.3 S/P MITRAL VALVE REPLACEMENT WITH BIOPROSTHETIC VALVE: ICD-10-CM

## 2020-09-01 DIAGNOSIS — Z79.01 LONG TERM CURRENT USE OF ANTICOAGULANTS WITH INR GOAL OF 2.0-3.0: ICD-10-CM

## 2020-09-01 DIAGNOSIS — I48.92 ATRIAL FIBRILLATION AND FLUTTER (H): ICD-10-CM

## 2020-09-01 DIAGNOSIS — I48.91 ATRIAL FIBRILLATION AND FLUTTER (H): ICD-10-CM

## 2020-09-01 LAB
CAPILLARY BLOOD COLLECTION: NORMAL
INR PPP: 2.2 (ref 0.86–1.14)

## 2020-09-01 PROCEDURE — 36416 COLLJ CAPILLARY BLOOD SPEC: CPT | Performed by: INTERNAL MEDICINE

## 2020-09-01 PROCEDURE — 85610 PROTHROMBIN TIME: CPT | Performed by: INTERNAL MEDICINE

## 2020-09-01 PROCEDURE — 99207 ZZC NO CHARGE NURSE ONLY: CPT | Performed by: INTERNAL MEDICINE

## 2020-09-01 NOTE — PROGRESS NOTES
ANTICOAGULATION FOLLOW-UP CLINIC VISIT    Patient Name:  Zunilda Alicea May  Date:  2020  Contact Type:  Telephone/ discussed with patient    SUBJECTIVE:  Patient Findings     Comments:   The patient was assessed for diet (continues to eat spinach 1-2 times a week), medication, and activity level changes, missed or extra doses, bruising or bleeding, with no problem findings.          Clinical Outcomes     Negatives:   Major bleeding event, Thromboembolic event, Anticoagulation-related hospital admission, Anticoagulation-related ED visit, Anticoagulation-related fatality    Comments:   The patient was assessed for diet (continues to eat spinach 1-2 times a week), medication, and activity level changes, missed or extra doses, bruising or bleeding, with no problem findings.             OBJECTIVE    Recent labs: (last 7 days)     20  0959   INR 2.20*       ASSESSMENT / PLAN  INR assessment THER    Recheck INR In: 4 WEEKS    INR Location Clinic      Anticoagulation Summary  As of 2020    INR goal:   2.0-3.0   TTR:   51.9 % (1 y)   INR used for dosin.20 (2020)   Warfarin maintenance plan:   3.75 mg (2.5 mg x 1.5) every Thu; 2.5 mg (2.5 mg x 1) all other days   Full warfarin instructions:   3.75 mg every Thu; 2.5 mg all other days   Weekly warfarin total:   18.75 mg   No change documented:   Sonam Teixeira, RN   Plan last modified:   Екатерина Mahan, RN (2020)   Next INR check:   2020   Priority:   Maintenance   Target end date:   Indefinite    Indications    Long term current use of anticoagulants with INR goal of 2.0-3.0 [Z79.01]  Atrial fibrillation and flutter (H) [I48.91  I48.92]             Anticoagulation Episode Summary     INR check location:       Preferred lab:       [facility]    Comments:   likes printed AVS      Anticoagulation Care Providers     Provider Role Specialty Phone number    Yunior Huang MD Responsible Internal Medicine  [phone]            See the Encounter Report to view Anticoagulation Flowsheet and Dosing Calendar (Go to Encounters tab in chart review, and find the Anticoagulation Therapy Visit)    Dosage adjustment made based on physician directed care plan.    Sonam Teixeira RN

## 2020-09-14 ENCOUNTER — ANCILLARY PROCEDURE (OUTPATIENT)
Dept: CARDIOLOGY | Facility: CLINIC | Age: 80
End: 2020-09-14
Attending: INTERNAL MEDICINE
Payer: MEDICARE

## 2020-09-14 DIAGNOSIS — Z95.0 CARDIAC PACEMAKER IN SITU: ICD-10-CM

## 2020-09-14 PROCEDURE — 93296 REM INTERROG EVL PM/IDS: CPT | Performed by: INTERNAL MEDICINE

## 2020-09-14 PROCEDURE — 93294 REM INTERROG EVL PM/LDLS PM: CPT | Performed by: INTERNAL MEDICINE

## 2020-09-16 LAB
MDC_IDC_EPISODE_DTM: NORMAL
MDC_IDC_EPISODE_DURATION: 1 S
MDC_IDC_EPISODE_DURATION: 10 S
MDC_IDC_EPISODE_DURATION: 12 S
MDC_IDC_EPISODE_DURATION: 122 S
MDC_IDC_EPISODE_DURATION: 123 S
MDC_IDC_EPISODE_DURATION: 131 S
MDC_IDC_EPISODE_DURATION: 19 S
MDC_IDC_EPISODE_DURATION: 199 S
MDC_IDC_EPISODE_DURATION: 20 S
MDC_IDC_EPISODE_DURATION: 21 S
MDC_IDC_EPISODE_DURATION: 22 S
MDC_IDC_EPISODE_DURATION: 23 S
MDC_IDC_EPISODE_DURATION: 23 S
MDC_IDC_EPISODE_DURATION: 24 S
MDC_IDC_EPISODE_DURATION: 25 S
MDC_IDC_EPISODE_DURATION: 262 S
MDC_IDC_EPISODE_DURATION: 287 S
MDC_IDC_EPISODE_DURATION: 30 S
MDC_IDC_EPISODE_DURATION: 30 S
MDC_IDC_EPISODE_DURATION: 31 S
MDC_IDC_EPISODE_DURATION: 33 S
MDC_IDC_EPISODE_DURATION: 33 S
MDC_IDC_EPISODE_DURATION: 34 S
MDC_IDC_EPISODE_DURATION: 35 S
MDC_IDC_EPISODE_DURATION: 36 S
MDC_IDC_EPISODE_DURATION: 40 S
MDC_IDC_EPISODE_DURATION: 41 S
MDC_IDC_EPISODE_DURATION: 43 S
MDC_IDC_EPISODE_DURATION: 43 S
MDC_IDC_EPISODE_DURATION: 44 S
MDC_IDC_EPISODE_DURATION: 45 S
MDC_IDC_EPISODE_DURATION: 47 S
MDC_IDC_EPISODE_DURATION: 48 S
MDC_IDC_EPISODE_DURATION: 5 S
MDC_IDC_EPISODE_DURATION: 51 S
MDC_IDC_EPISODE_DURATION: 51 S
MDC_IDC_EPISODE_DURATION: 510 S
MDC_IDC_EPISODE_DURATION: 54 S
MDC_IDC_EPISODE_DURATION: 55 S
MDC_IDC_EPISODE_DURATION: 57 S
MDC_IDC_EPISODE_DURATION: 6 S
MDC_IDC_EPISODE_DURATION: 6 S
MDC_IDC_EPISODE_DURATION: 7 S
MDC_IDC_EPISODE_DURATION: 719 S
MDC_IDC_EPISODE_DURATION: 87 S
MDC_IDC_EPISODE_DURATION: 9 S
MDC_IDC_EPISODE_DURATION: 91 S
MDC_IDC_EPISODE_DURATION: 91 S
MDC_IDC_EPISODE_DURATION: 94 S
MDC_IDC_EPISODE_DURATION: 94 S
MDC_IDC_EPISODE_DURATION: 97 S
MDC_IDC_EPISODE_ID: 394
MDC_IDC_EPISODE_ID: 632
MDC_IDC_EPISODE_ID: 633
MDC_IDC_EPISODE_ID: 634
MDC_IDC_EPISODE_ID: 635
MDC_IDC_EPISODE_ID: 636
MDC_IDC_EPISODE_ID: 637
MDC_IDC_EPISODE_ID: 638
MDC_IDC_EPISODE_ID: 639
MDC_IDC_EPISODE_ID: 640
MDC_IDC_EPISODE_ID: 641
MDC_IDC_EPISODE_ID: 642
MDC_IDC_EPISODE_ID: 643
MDC_IDC_EPISODE_ID: 644
MDC_IDC_EPISODE_ID: 645
MDC_IDC_EPISODE_ID: 646
MDC_IDC_EPISODE_ID: 647
MDC_IDC_EPISODE_ID: 648
MDC_IDC_EPISODE_ID: 649
MDC_IDC_EPISODE_ID: 650
MDC_IDC_EPISODE_ID: 651
MDC_IDC_EPISODE_ID: 652
MDC_IDC_EPISODE_ID: 653
MDC_IDC_EPISODE_ID: 654
MDC_IDC_EPISODE_ID: 655
MDC_IDC_EPISODE_ID: 656
MDC_IDC_EPISODE_ID: 657
MDC_IDC_EPISODE_ID: 658
MDC_IDC_EPISODE_ID: 659
MDC_IDC_EPISODE_ID: 660
MDC_IDC_EPISODE_ID: 661
MDC_IDC_EPISODE_ID: 662
MDC_IDC_EPISODE_ID: 663
MDC_IDC_EPISODE_ID: 664
MDC_IDC_EPISODE_ID: 665
MDC_IDC_EPISODE_ID: 666
MDC_IDC_EPISODE_ID: 667
MDC_IDC_EPISODE_ID: 668
MDC_IDC_EPISODE_ID: 669
MDC_IDC_EPISODE_ID: 670
MDC_IDC_EPISODE_ID: 671
MDC_IDC_EPISODE_ID: 672
MDC_IDC_EPISODE_ID: 673
MDC_IDC_EPISODE_ID: 674
MDC_IDC_EPISODE_ID: 675
MDC_IDC_EPISODE_ID: 676
MDC_IDC_EPISODE_ID: 677
MDC_IDC_EPISODE_ID: 678
MDC_IDC_EPISODE_ID: 679
MDC_IDC_EPISODE_ID: 680
MDC_IDC_EPISODE_ID: 681
MDC_IDC_EPISODE_TYPE: NORMAL
MDC_IDC_LEAD_IMPLANT_DT: NORMAL
MDC_IDC_LEAD_IMPLANT_DT: NORMAL
MDC_IDC_LEAD_LOCATION: NORMAL
MDC_IDC_LEAD_LOCATION: NORMAL
MDC_IDC_LEAD_MFG: NORMAL
MDC_IDC_LEAD_MFG: NORMAL
MDC_IDC_LEAD_MODEL: NORMAL
MDC_IDC_LEAD_MODEL: NORMAL
MDC_IDC_LEAD_POLARITY_TYPE: NORMAL
MDC_IDC_LEAD_POLARITY_TYPE: NORMAL
MDC_IDC_LEAD_SERIAL: NORMAL
MDC_IDC_LEAD_SERIAL: NORMAL
MDC_IDC_MSMT_BATTERY_DTM: NORMAL
MDC_IDC_MSMT_BATTERY_STATUS: NORMAL
MDC_IDC_MSMT_BATTERY_VOLTAGE: 2.86 V
MDC_IDC_MSMT_LEADCHNL_RA_IMPEDANCE_VALUE: 400 OHM
MDC_IDC_MSMT_LEADCHNL_RA_SENSING_INTR_AMPL: 0.58 MV
MDC_IDC_MSMT_LEADCHNL_RV_IMPEDANCE_VALUE: 416 OHM
MDC_IDC_PG_IMPLANT_DTM: NORMAL
MDC_IDC_PG_MFG: NORMAL
MDC_IDC_PG_MODEL: NORMAL
MDC_IDC_PG_SERIAL: NORMAL
MDC_IDC_PG_TYPE: NORMAL
MDC_IDC_SESS_CLINIC_NAME: NORMAL
MDC_IDC_SESS_DTM: NORMAL
MDC_IDC_SESS_TYPE: NORMAL
MDC_IDC_SET_BRADY_AT_MODE_SWITCH_RATE: 171 {BEATS}/MIN
MDC_IDC_SET_BRADY_HYSTRATE: NORMAL
MDC_IDC_SET_BRADY_LOWRATE: 60 {BEATS}/MIN
MDC_IDC_SET_BRADY_MAX_SENSOR_RATE: 130 {BEATS}/MIN
MDC_IDC_SET_BRADY_MAX_TRACKING_RATE: 130 {BEATS}/MIN
MDC_IDC_SET_BRADY_MODE: NORMAL
MDC_IDC_SET_BRADY_PAV_DELAY_LOW: 180 MS
MDC_IDC_SET_BRADY_SAV_DELAY_LOW: 150 MS
MDC_IDC_SET_LEADCHNL_RA_PACING_AMPLITUDE: 2 V
MDC_IDC_SET_LEADCHNL_RA_PACING_ANODE_ELECTRODE_1: NORMAL
MDC_IDC_SET_LEADCHNL_RA_PACING_ANODE_LOCATION_1: NORMAL
MDC_IDC_SET_LEADCHNL_RA_PACING_CATHODE_ELECTRODE_1: NORMAL
MDC_IDC_SET_LEADCHNL_RA_PACING_CATHODE_LOCATION_1: NORMAL
MDC_IDC_SET_LEADCHNL_RA_PACING_POLARITY: NORMAL
MDC_IDC_SET_LEADCHNL_RA_PACING_PULSEWIDTH: 0.4 MS
MDC_IDC_SET_LEADCHNL_RA_SENSING_ANODE_ELECTRODE_1: NORMAL
MDC_IDC_SET_LEADCHNL_RA_SENSING_ANODE_LOCATION_1: NORMAL
MDC_IDC_SET_LEADCHNL_RA_SENSING_CATHODE_ELECTRODE_1: NORMAL
MDC_IDC_SET_LEADCHNL_RA_SENSING_CATHODE_LOCATION_1: NORMAL
MDC_IDC_SET_LEADCHNL_RA_SENSING_POLARITY: NORMAL
MDC_IDC_SET_LEADCHNL_RA_SENSING_SENSITIVITY: 0.45 MV
MDC_IDC_SET_LEADCHNL_RV_PACING_AMPLITUDE: 2 V
MDC_IDC_SET_LEADCHNL_RV_PACING_ANODE_ELECTRODE_1: NORMAL
MDC_IDC_SET_LEADCHNL_RV_PACING_ANODE_LOCATION_1: NORMAL
MDC_IDC_SET_LEADCHNL_RV_PACING_CATHODE_ELECTRODE_1: NORMAL
MDC_IDC_SET_LEADCHNL_RV_PACING_CATHODE_LOCATION_1: NORMAL
MDC_IDC_SET_LEADCHNL_RV_PACING_POLARITY: NORMAL
MDC_IDC_SET_LEADCHNL_RV_PACING_PULSEWIDTH: 0.4 MS
MDC_IDC_SET_LEADCHNL_RV_SENSING_ANODE_ELECTRODE_1: NORMAL
MDC_IDC_SET_LEADCHNL_RV_SENSING_ANODE_LOCATION_1: NORMAL
MDC_IDC_SET_LEADCHNL_RV_SENSING_CATHODE_ELECTRODE_1: NORMAL
MDC_IDC_SET_LEADCHNL_RV_SENSING_CATHODE_LOCATION_1: NORMAL
MDC_IDC_SET_LEADCHNL_RV_SENSING_POLARITY: NORMAL
MDC_IDC_SET_LEADCHNL_RV_SENSING_SENSITIVITY: 2.1 MV
MDC_IDC_SET_ZONE_DETECTION_INTERVAL: 350 MS
MDC_IDC_SET_ZONE_DETECTION_INTERVAL: 400 MS
MDC_IDC_SET_ZONE_TYPE: NORMAL
MDC_IDC_STAT_AT_BURDEN_PERCENT: 0.4 %
MDC_IDC_STAT_AT_DTM_END: NORMAL
MDC_IDC_STAT_AT_DTM_START: NORMAL
MDC_IDC_STAT_BRADY_AP_VP_PERCENT: 23.82 %
MDC_IDC_STAT_BRADY_AP_VS_PERCENT: 0.89 %
MDC_IDC_STAT_BRADY_AS_VP_PERCENT: 72.96 %
MDC_IDC_STAT_BRADY_AS_VS_PERCENT: 2.32 %
MDC_IDC_STAT_BRADY_DTM_END: NORMAL
MDC_IDC_STAT_BRADY_DTM_START: NORMAL
MDC_IDC_STAT_BRADY_RA_PERCENT_PACED: 24.62 %
MDC_IDC_STAT_BRADY_RV_PERCENT_PACED: 96.7 %
MDC_IDC_STAT_EPISODE_RECENT_COUNT: 0
MDC_IDC_STAT_EPISODE_RECENT_COUNT: 0
MDC_IDC_STAT_EPISODE_RECENT_COUNT: 1
MDC_IDC_STAT_EPISODE_RECENT_COUNT: 287
MDC_IDC_STAT_EPISODE_RECENT_COUNT_DTM_END: NORMAL
MDC_IDC_STAT_EPISODE_RECENT_COUNT_DTM_START: NORMAL
MDC_IDC_STAT_EPISODE_TOTAL_COUNT: 1
MDC_IDC_STAT_EPISODE_TOTAL_COUNT: 14
MDC_IDC_STAT_EPISODE_TOTAL_COUNT: 3
MDC_IDC_STAT_EPISODE_TOTAL_COUNT: 661
MDC_IDC_STAT_EPISODE_TOTAL_COUNT_DTM_END: NORMAL
MDC_IDC_STAT_EPISODE_TYPE: NORMAL

## 2020-09-24 DIAGNOSIS — I48.20 CHRONIC ATRIAL FIBRILLATION (H): ICD-10-CM

## 2020-09-24 RX ORDER — METOPROLOL TARTRATE 50 MG
50 TABLET ORAL 2 TIMES DAILY
Qty: 180 TABLET | Refills: 3 | Status: SHIPPED | OUTPATIENT
Start: 2020-09-24 | End: 2021-07-19

## 2020-09-24 NOTE — TELEPHONE ENCOUNTER
Received refill request for:  Metoprolol Tartrate  Last OV was: 7/21/2020 with YOLANDE Winchester  Labs/EKG: na  F/U scheduled: orders in Epic for 1/2021  New script sent to: [facility]

## 2020-09-29 ENCOUNTER — ANTICOAGULATION THERAPY VISIT (OUTPATIENT)
Dept: ANTICOAGULATION | Facility: CLINIC | Age: 80
End: 2020-09-29

## 2020-09-29 DIAGNOSIS — I48.91 ATRIAL FIBRILLATION AND FLUTTER (H): ICD-10-CM

## 2020-09-29 DIAGNOSIS — Z95.3 S/P MITRAL VALVE REPLACEMENT WITH BIOPROSTHETIC VALVE: ICD-10-CM

## 2020-09-29 DIAGNOSIS — I48.92 ATRIAL FIBRILLATION AND FLUTTER (H): ICD-10-CM

## 2020-09-29 DIAGNOSIS — Z79.01 LONG TERM CURRENT USE OF ANTICOAGULANTS WITH INR GOAL OF 2.0-3.0: ICD-10-CM

## 2020-09-29 LAB
CAPILLARY BLOOD COLLECTION: NORMAL
INR PPP: 1.8 (ref 0.86–1.14)

## 2020-09-29 PROCEDURE — 85610 PROTHROMBIN TIME: CPT | Performed by: INTERNAL MEDICINE

## 2020-09-29 PROCEDURE — 99207 ZZC NO CHARGE NURSE ONLY: CPT | Performed by: INTERNAL MEDICINE

## 2020-09-29 PROCEDURE — 36416 COLLJ CAPILLARY BLOOD SPEC: CPT | Performed by: INTERNAL MEDICINE

## 2020-09-29 RX ORDER — WARFARIN SODIUM 2.5 MG/1
TABLET ORAL
Qty: 110 TABLET | Refills: 0 | Status: SHIPPED | OUTPATIENT
Start: 2020-09-29 | End: 2020-11-04

## 2020-09-29 NOTE — PROGRESS NOTES
ANTICOAGULATION FOLLOW-UP CLINIC VISIT    Patient Name:  Zunilda Alicea May  Date:  2020  Contact Type:  Telephone/ discussed with patient    SUBJECTIVE:  Patient Findings     Comments:   The patient was assessed for diet, medication, and activity level changes, missed or extra doses, bruising or bleeding, with no problem findings.  Patient continues to have spinach or greens about 1-2 times weekly.  Had a popeye salad yesterday.  Will consider maintenance dose change with next INR check if INR continues to be sub therapeutic.        Clinical Outcomes     Negatives:   Major bleeding event, Thromboembolic event, Anticoagulation-related hospital admission, Anticoagulation-related ED visit, Anticoagulation-related fatality    Comments:   The patient was assessed for diet, medication, and activity level changes, missed or extra doses, bruising or bleeding, with no problem findings.  Patient continues to have spinach or greens about 1-2 times weekly.  Had a popeye salad yesterday.  Will consider maintenance dose change with next INR check if INR continues to be sub therapeutic.           OBJECTIVE    Recent labs: (last 7 days)     20  1013   INR 1.80*       ASSESSMENT / PLAN  INR assessment SUB    Recheck INR In: 2 WEEKS    INR Location Clinic      Anticoagulation Summary  As of 2020    INR goal:   2.0-3.0   TTR:   52.4 % (1 y)   INR used for dosin.80! (2020)   Warfarin maintenance plan:   3.75 mg (2.5 mg x 1.5) every Thu; 2.5 mg (2.5 mg x 1) all other days   Full warfarin instructions:   : 5 mg; Otherwise 3.75 mg every Thu; 2.5 mg all other days   Weekly warfarin total:   18.75 mg   Plan last modified:   Екатерина Mahan RN (2020)   Next INR check:   10/12/2020   Priority:   Maintenance   Target end date:   Indefinite    Indications    Long term current use of anticoagulants with INR goal of 2.0-3.0 [Z79.01]  Atrial fibrillation and flutter (H) [I48.91  I48.92]              Anticoagulation Episode Summary     INR check location:       Preferred lab:       Send INR reminders to:   RANJIT COOK    Comments:   likes printed AVS      Anticoagulation Care Providers     Provider Role Specialty Phone number    Yunior Huang MD [facility] Internal Medicine [phone]            See the Encounter Report to view Anticoagulation Flowsheet and Dosing Calendar (Go to Encounters tab in chart review, and find the Anticoagulation Therapy Visit)    Dosage adjustment made based on physician directed care plan.    Sonam Teixeira RN

## 2020-10-12 ENCOUNTER — ANTICOAGULATION THERAPY VISIT (OUTPATIENT)
Dept: ANTICOAGULATION | Facility: CLINIC | Age: 80
End: 2020-10-12

## 2020-10-12 DIAGNOSIS — Z79.01 LONG TERM CURRENT USE OF ANTICOAGULANTS WITH INR GOAL OF 2.0-3.0: ICD-10-CM

## 2020-10-12 DIAGNOSIS — I48.91 ATRIAL FIBRILLATION AND FLUTTER (H): ICD-10-CM

## 2020-10-12 DIAGNOSIS — I48.92 ATRIAL FIBRILLATION AND FLUTTER (H): ICD-10-CM

## 2020-10-12 DIAGNOSIS — Z95.3 S/P MITRAL VALVE REPLACEMENT WITH BIOPROSTHETIC VALVE: ICD-10-CM

## 2020-10-12 LAB
CAPILLARY BLOOD COLLECTION: NORMAL
INR PPP: 2.3 (ref 0.86–1.14)

## 2020-10-12 PROCEDURE — 85610 PROTHROMBIN TIME: CPT | Performed by: INTERNAL MEDICINE

## 2020-10-12 PROCEDURE — 36416 COLLJ CAPILLARY BLOOD SPEC: CPT | Performed by: INTERNAL MEDICINE

## 2020-10-12 PROCEDURE — 99207 PR NO CHARGE NURSE ONLY: CPT | Performed by: INTERNAL MEDICINE

## 2020-10-12 NOTE — PROGRESS NOTES
ANTICOAGULATION FOLLOW-UP CLINIC VISIT    Patient Name:  Zunilda Alicea May  Date:  10/12/2020  Contact Type:  Telephone/ Called patient, she denies any changes. Orders discussed with patient, she agrees with the plan. Lab INR appointment scheduled on 20.    SUBJECTIVE:  Patient Findings     Comments:  The patient was assessed for diet, medication, and activity level changes, missed or extra doses, bruising or bleeding, with no problem findings. Maintenance warfarin dosing was reviewed with patient and will remain on the same dose until next INR check. Patient did not have any questions or concerns.             Clinical Outcomes     Negatives:  Major bleeding event, Thromboembolic event, Anticoagulation-related hospital admission, Anticoagulation-related ED visit, Anticoagulation-related fatality    Comments:  The patient was assessed for diet, medication, and activity level changes, missed or extra doses, bruising or bleeding, with no problem findings. Maintenance warfarin dosing was reviewed with patient and will remain on the same dose until next INR check. Patient did not have any questions or concerns.                OBJECTIVE    Recent labs: (last 7 days)     10/12/20  1515   INR 2.30*       ASSESSMENT / PLAN  INR assessment THER    Recheck INR In: 3 WEEKS    INR Location Outside lab      Anticoagulation Summary  As of 10/12/2020    INR goal:  2.0-3.0   TTR:  53.6 % (1 y)   INR used for dosin.30 (10/12/2020)   Warfarin maintenance plan:  3.75 mg (2.5 mg x 1.5) every Thu; 2.5 mg (2.5 mg x 1) all other days   Full warfarin instructions:  3.75 mg every Thu; 2.5 mg all other days   Weekly warfarin total:  18.75 mg   No change documented:  Екатерина Mahan RN   Plan last modified:  Екатерниа Mahan RN (2020)   Next INR check:  2020   Priority:  Maintenance   Target end date:  Indefinite    Indications    Long term current use of anticoagulants with INR goal of 2.0-3.0 [Z79.01]  Atrial  fibrillation and flutter (H) [I48.91  I48.92]             Anticoagulation Episode Summary     INR check location:      Preferred lab:      Send INR reminders to:  RANJIT COOK    Comments:  likes printed AVS      Anticoagulation Care Providers     Provider Role Specialty Phone number    Yunior Huang MD [facility] Internal Medicine [phone]            See the Encounter Report to view Anticoagulation Flowsheet and Dosing Calendar (Go to Encounters tab in chart review, and find the Anticoagulation Therapy Visit)    Dosage adjustment made based on physician directed care plan.    Екатерина Mahan RN

## 2020-10-19 ENCOUNTER — TRANSFERRED RECORDS (OUTPATIENT)
Dept: HEALTH INFORMATION MANAGEMENT | Facility: CLINIC | Age: 80
End: 2020-10-19

## 2020-11-02 ENCOUNTER — ANTICOAGULATION THERAPY VISIT (OUTPATIENT)
Dept: ANTICOAGULATION | Facility: CLINIC | Age: 80
End: 2020-11-02

## 2020-11-02 DIAGNOSIS — I48.91 ATRIAL FIBRILLATION AND FLUTTER (H): ICD-10-CM

## 2020-11-02 DIAGNOSIS — Z95.3 S/P MITRAL VALVE REPLACEMENT WITH BIOPROSTHETIC VALVE: ICD-10-CM

## 2020-11-02 DIAGNOSIS — I48.92 ATRIAL FIBRILLATION AND FLUTTER (H): ICD-10-CM

## 2020-11-02 DIAGNOSIS — Z79.01 LONG TERM CURRENT USE OF ANTICOAGULANTS WITH INR GOAL OF 2.0-3.0: ICD-10-CM

## 2020-11-02 LAB
CAPILLARY BLOOD COLLECTION: NORMAL
INR PPP: 1.8 (ref 0.86–1.14)

## 2020-11-02 PROCEDURE — 85610 PROTHROMBIN TIME: CPT | Performed by: INTERNAL MEDICINE

## 2020-11-02 PROCEDURE — 36416 COLLJ CAPILLARY BLOOD SPEC: CPT | Performed by: INTERNAL MEDICINE

## 2020-11-02 PROCEDURE — 99207 PR NO CHARGE NURSE ONLY: CPT | Performed by: INTERNAL MEDICINE

## 2020-11-02 NOTE — PROGRESS NOTES
ANTICOAGULATION FOLLOW-UP CLINIC VISIT    Patient Name:  Zuinlda Alicea May  Date:  2020  Contact Type:  Telephone/ Called patient, she denies any changes or missed warfarin doses., Orders discussed with the patient, she agrees with the plan. Lab INR appointment scheduled on 20.    SUBJECTIVE:  Patient Findings     Positives:  Change in diet/appetite (Patient reports she is not going to continue with her green shake once she is out of supplies to make them, patient will decide by next INR if she will be decreasing her green veggie intake, )    Comments:  The patient was assessed for medication, and activity level changes, missed or extra doses, bruising or bleeding, with no problem findings.         Clinical Outcomes     Comments:  The patient was assessed for medication, and activity level changes, missed or extra doses, bruising or bleeding, with no problem findings.            OBJECTIVE    Recent labs: (last 7 days)     20  1054   INR 1.80*       ASSESSMENT / PLAN  INR assessment SUB    Recheck INR In: 2 WEEKS    INR Location Outside lab      Anticoagulation Summary  As of 2020    INR goal:  2.0-3.0   TTR:  54.6 % (1 y)   INR used for dosin.80 (2020)   Warfarin maintenance plan:  3.75 mg (2.5 mg x 1.5) every Thu; 2.5 mg (2.5 mg x 1) all other days   Full warfarin instructions:  : 3.75 mg; Otherwise 3.75 mg every Thu; 2.5 mg all other days   Weekly warfarin total:  18.75 mg   Plan last modified:  Екатерина Mahan RN (2020)   Next INR check:  2020   Priority:  Maintenance   Target end date:  Indefinite    Indications    Long term current use of anticoagulants with INR goal of 2.0-3.0 [Z79.01]  Atrial fibrillation and flutter (H) [I48.91  I48.92]             Anticoagulation Episode Summary     INR check location:      Preferred lab:      Send INR reminders to:  [facility]    Comments:  likes printed AVS      Anticoagulation Care Providers     Provider  Role Specialty Phone number    Yunior Huang MD Responsible Internal Medicine [phone]            See the Encounter Report to view Anticoagulation Flowsheet and Dosing Calendar (Go to Encounters tab in chart review, and find the Anticoagulation Therapy Visit)    Dosage adjustment made based on physician directed care plan.    Екатерина Mahan RN

## 2020-11-02 NOTE — PROGRESS NOTES
Left message to call [phone]. Please transfer call to Екатерина at [phone].  Екатерина Mahan RN

## 2020-11-03 DIAGNOSIS — I48.92 ATRIAL FIBRILLATION AND FLUTTER (H): ICD-10-CM

## 2020-11-03 DIAGNOSIS — Z79.01 LONG TERM CURRENT USE OF ANTICOAGULANTS WITH INR GOAL OF 2.0-3.0: ICD-10-CM

## 2020-11-03 DIAGNOSIS — I48.91 ATRIAL FIBRILLATION AND FLUTTER (H): ICD-10-CM

## 2020-11-04 RX ORDER — WARFARIN SODIUM 2.5 MG/1
TABLET ORAL
Qty: 100 TABLET | Refills: 0 | Status: SHIPPED | OUTPATIENT
Start: 2020-11-04 | End: 2020-11-17

## 2020-11-04 NOTE — TELEPHONE ENCOUNTER
"Requested Prescriptions   Pending Prescriptions Disp Refills     warfarin ANTICOAGULANT (COUMADIN) 2.5 MG tablet [Pharmacy Med Name: Warfarin Sodium Oral Tablet 2.5 MG] 110 tablet 0     Sig: TAKE 2 TABLETS BY MOUTH ON MONDAY AND THURSDAY AND 1 TABLET ON ALL OTHERS DAYS OR AS DIRECTED       Vitamin K Antagonists Failed - 11/3/2020 10:20 AM        Failed - INR is within goal in the past 6 weeks     Confirm INR is within goal in the past 6 weeks.     Recent Labs   Lab Test 11/02/20  1054   INR 1.80*                       Passed - Recent (12 mo) or future (30 days) visit within the authorizing provider's specialty     Patient has had an office visit with the authorizing provider or a provider within the authorizing providers department within the previous 12 mos or has a future within next 30 days. See \"Patient Info\" tab in inbasket, or \"Choose Columns\" in Meds & Orders section of the refill encounter.              Passed - Medication is active on med list        Passed - Patient is 18 years of age or older        Passed - Patient is not pregnant        Passed - No positive pregnancy on file in past 12 months           Last office visit 1/8/20.  Warfarin dosing is managed by INR Clinic.  Prescription approved per [facility] [place].  Sonam Teixeira RN    "

## 2020-11-16 ENCOUNTER — ANTICOAGULATION THERAPY VISIT (OUTPATIENT)
Dept: ANTICOAGULATION | Facility: CLINIC | Age: 80
End: 2020-11-16

## 2020-11-16 DIAGNOSIS — Z79.01 LONG TERM CURRENT USE OF ANTICOAGULANTS WITH INR GOAL OF 2.0-3.0: ICD-10-CM

## 2020-11-16 DIAGNOSIS — I48.92 ATRIAL FIBRILLATION AND FLUTTER (H): ICD-10-CM

## 2020-11-16 DIAGNOSIS — I48.91 ATRIAL FIBRILLATION AND FLUTTER (H): ICD-10-CM

## 2020-11-16 DIAGNOSIS — Z95.3 S/P MITRAL VALVE REPLACEMENT WITH BIOPROSTHETIC VALVE: ICD-10-CM

## 2020-11-16 LAB
CAPILLARY BLOOD COLLECTION: NORMAL
INR PPP: 2.1 (ref 0.86–1.14)

## 2020-11-16 PROCEDURE — 99207 PR NO CHARGE NURSE ONLY: CPT | Performed by: INTERNAL MEDICINE

## 2020-11-16 PROCEDURE — 85610 PROTHROMBIN TIME: CPT | Performed by: INTERNAL MEDICINE

## 2020-11-16 PROCEDURE — 36416 COLLJ CAPILLARY BLOOD SPEC: CPT | Performed by: INTERNAL MEDICINE

## 2020-11-16 NOTE — PROGRESS NOTES
ANTICOAGULATION FOLLOW-UP CLINIC VISIT    Patient Name:  Zunilda Alicea May  Date:  2020  Contact Type:  Telephone/ Called patient, she reports diet change, she denies any other changes or missed warfarin doses. Orders discussed with the patient, she agrees with the plan. Lab INR appointment scheduled on 20.    SUBJECTIVE:  Patient Findings     Positives:  Change in diet/appetite (Patient reports she made 1 green shake this past week but did split it up between 2 days.)    Comments:  The patient was assessed for diet, medication, and activity level changes, missed or extra doses, bruising or bleeding, with no problem findings. Maintenance warfarin dosing was reviewed with patient and will remain on the same dose until next INR check. Patient did not have any questions or concerns.             Clinical Outcomes     Comments:  The patient was assessed for diet, medication, and activity level changes, missed or extra doses, bruising or bleeding, with no problem findings. Maintenance warfarin dosing was reviewed with patient and will remain on the same dose until next INR check. Patient did not have any questions or concerns.                OBJECTIVE    Recent labs: (last 7 days)     20  1204   INR 2.10*       ASSESSMENT / PLAN  INR assessment THER    Recheck INR In: 2 WEEKS    INR Location Outside lab      Anticoagulation Summary  As of 2020    INR goal:  2.0-3.0   TTR:  52.9 % (1 y)   INR used for dosin.10 (2020)   Warfarin maintenance plan:  3.75 mg (2.5 mg x 1.5) every Thu; 2.5 mg (2.5 mg x 1) all other days   Full warfarin instructions:  3.75 mg every Thu; 2.5 mg all other days   Weekly warfarin total:  18.75 mg   No change documented:  Екатерина Mahan RN   Plan last modified:  Екатерина Mahan RN (2020)   Next INR check:  2020   Priority:  Maintenance   Target end date:  Indefinite    Indications    Long term current use of anticoagulants with INR goal of 2.0-3.0  [Z79.01]  Atrial fibrillation and flutter (H) [I48.91  I48.92]             Anticoagulation Episode Summary     INR check location:      Preferred lab:      Send INR reminders to:  RANJIT COOK    Comments:  likes printed AVS      Anticoagulation Care Providers     Provider Role Specialty Phone number    Yunior Huang MD [facility] Internal Medicine [phone]            See the Encounter Report to view Anticoagulation Flowsheet and Dosing Calendar (Go to Encounters tab in chart review, and find the Anticoagulation Therapy Visit)    Dosage adjustment made based on physician directed care plan.    Екатерина Mahan RN

## 2020-11-17 ENCOUNTER — OFFICE VISIT (OUTPATIENT)
Dept: INTERNAL MEDICINE | Facility: CLINIC | Age: 80
End: 2020-11-17
Payer: MEDICARE

## 2020-11-17 VITALS
BODY MASS INDEX: 25.95 KG/M2 | WEIGHT: 152 LBS | HEIGHT: 64 IN | DIASTOLIC BLOOD PRESSURE: 62 MMHG | HEART RATE: 80 BPM | SYSTOLIC BLOOD PRESSURE: 138 MMHG | OXYGEN SATURATION: 95 % | TEMPERATURE: 98.2 F

## 2020-11-17 DIAGNOSIS — I48.92 ATRIAL FIBRILLATION AND FLUTTER (H): ICD-10-CM

## 2020-11-17 DIAGNOSIS — I10 BENIGN ESSENTIAL HYPERTENSION: Primary | ICD-10-CM

## 2020-11-17 DIAGNOSIS — Z79.01 LONG TERM CURRENT USE OF ANTICOAGULANTS WITH INR GOAL OF 2.0-3.0: ICD-10-CM

## 2020-11-17 DIAGNOSIS — M06.9 RHEUMATOID ARTHRITIS, INVOLVING UNSPECIFIED SITE, UNSPECIFIED WHETHER RHEUMATOID FACTOR PRESENT (H): ICD-10-CM

## 2020-11-17 DIAGNOSIS — I48.91 ATRIAL FIBRILLATION AND FLUTTER (H): ICD-10-CM

## 2020-11-17 DIAGNOSIS — E03.9 ACQUIRED HYPOTHYROIDISM: ICD-10-CM

## 2020-11-17 LAB
ALBUMIN UR-MCNC: 100 MG/DL
APPEARANCE UR: CLEAR
BACTERIA #/AREA URNS HPF: ABNORMAL /HPF
BILIRUB UR QL STRIP: NEGATIVE
COLOR UR AUTO: YELLOW
ERYTHROCYTE [DISTWIDTH] IN BLOOD BY AUTOMATED COUNT: 17 % (ref 10–15)
GLUCOSE UR STRIP-MCNC: NEGATIVE MG/DL
HCT VFR BLD AUTO: 36.6 % (ref 35–47)
HGB BLD-MCNC: 12.3 G/DL (ref 11.7–15.7)
HGB UR QL STRIP: NEGATIVE
KETONES UR STRIP-MCNC: NEGATIVE MG/DL
LEUKOCYTE ESTERASE UR QL STRIP: ABNORMAL
MCH RBC QN AUTO: 34.2 PG (ref 26.5–33)
MCHC RBC AUTO-ENTMCNC: 33.6 G/DL (ref 31.5–36.5)
MCV RBC AUTO: 102 FL (ref 78–100)
NITRATE UR QL: POSITIVE
NON-SQ EPI CELLS #/AREA URNS LPF: ABNORMAL /LPF
PH UR STRIP: 5.5 PH (ref 5–7)
PLATELET # BLD AUTO: 322 10E9/L (ref 150–450)
RBC # BLD AUTO: 3.6 10E12/L (ref 3.8–5.2)
RBC #/AREA URNS AUTO: ABNORMAL /HPF
SOURCE: ABNORMAL
SP GR UR STRIP: 1.02 (ref 1–1.03)
UROBILINOGEN UR STRIP-ACNC: 0.2 EU/DL (ref 0.2–1)
WBC # BLD AUTO: 6.4 10E9/L (ref 4–11)
WBC #/AREA URNS AUTO: ABNORMAL /HPF

## 2020-11-17 PROCEDURE — 80053 COMPREHEN METABOLIC PANEL: CPT | Performed by: INTERNAL MEDICINE

## 2020-11-17 PROCEDURE — 84443 ASSAY THYROID STIM HORMONE: CPT | Performed by: INTERNAL MEDICINE

## 2020-11-17 PROCEDURE — 80061 LIPID PANEL: CPT | Performed by: INTERNAL MEDICINE

## 2020-11-17 PROCEDURE — 81001 URINALYSIS AUTO W/SCOPE: CPT | Performed by: INTERNAL MEDICINE

## 2020-11-17 PROCEDURE — 36415 COLL VENOUS BLD VENIPUNCTURE: CPT | Performed by: INTERNAL MEDICINE

## 2020-11-17 PROCEDURE — 85027 COMPLETE CBC AUTOMATED: CPT | Performed by: INTERNAL MEDICINE

## 2020-11-17 PROCEDURE — 99214 OFFICE O/P EST MOD 30 MIN: CPT | Performed by: INTERNAL MEDICINE

## 2020-11-17 RX ORDER — WARFARIN SODIUM 2.5 MG/1
TABLET ORAL
Qty: 100 TABLET | Refills: 3 | Status: SHIPPED | OUTPATIENT
Start: 2020-11-17 | End: 2021-12-14

## 2020-11-17 RX ORDER — LEVOTHYROXINE SODIUM 112 UG/1
112 TABLET ORAL DAILY
Qty: 90 TABLET | Refills: 3 | Status: SHIPPED | OUTPATIENT
Start: 2020-11-17 | End: 2021-11-02

## 2020-11-17 ASSESSMENT — MIFFLIN-ST. JEOR: SCORE: 1149.47

## 2020-11-17 NOTE — PROGRESS NOTES
"Subjective     Zunilda Clark is a 80 year old female who presents to clinic today for the following health issues:    HPI        Patient is seen for a follow up visit.    No acute complaints, no medication change or new medical conditions.    Has history of hypothyroidism. On replacement treatment with Synthroid. No heat /cold intolerance, heart palpitations, weight loss/ gain ,  change in bowel habits.    Has history of atrial fibrillation. On anticoagulation with Coumadin and rate control medications. Asymptonatic - no chest pains , palpitations,  no side effects from medications.    Has h/o GERD on Omeprazole treatment. symptoms are controlled. No nausea, vomiting, heartburns, bloating.    Has h/o RA. On Methotrexate, follows with rheumatology. Controlled symptoms.     Hypertension Follow-up  Has h/o HTN. on medical treatment. BP has been controlled. No side effects from medications. No CP, HA, dizziness. good compliance with medications and low salt diet.      Do you check your blood pressure regularly outside of the clinic? No     Are you following a low salt diet? Yes    Are your blood pressures ever more than 140 on the top number (systolic) OR more   than 90 on the bottom number (diastolic), for example 140/90? N/A          Review of Systems     Constitutional, HEENT, cardiovascular, pulmonary, GI, , musculoskeletal, neuro, skin, endocrine and psych systems are negative, except as otherwise noted.      Objective    /62   Pulse 80   Temp 98.2  F (36.8  C) (Oral)   Ht 1.626 m (5' 4\")   Wt 68.9 kg (152 lb)   SpO2 95%   BMI 26.09 kg/m    Body mass index is 26.09 kg/m .  Physical Exam   GENERAL: healthy, alert and no distress  EYES: Eyes grossly normal to inspection, PERRL and conjunctivae and sclerae normal  HENT: ear canals and TM's normal, nose and mouth without ulcers or lesions  NECK: no adenopathy, no asymmetry, masses, or scars and thyroid normal to palpation  RESP: lungs clear to " "auscultation - no rales, rhonchi or wheezes, mild dry crackles in mid and lower lung fields   CV: regular rate and rhythm, normal S1 S2, no S3 or S4, 3/6 systolic murmur, no click or rub, peripheral pulses strong  ABDOMEN: soft, nontender, no hepatosplenomegaly, no masses and bowel sounds normal  MS: no gross musculoskeletal defects noted, trace LE edema  SKIN: no suspicious lesions or rashes            Assessment & Plan   Problem List Items Addressed This Visit     RA (rheumatoid arthritis) (H)    Benign essential hypertension - Primary    Relevant Orders    CBC with platelets (Completed)    Comprehensive metabolic panel (Completed)    Lipid panel reflex to direct LDL Fasting (Completed)    TSH with free T4 reflex (Completed)    *UA reflex to Microscopic (Completed)    Atrial fibrillation and flutter (H)    Relevant Medications    warfarin ANTICOAGULANT (COUMADIN) 2.5 MG tablet    Acquired hypothyroidism    Relevant Medications    levothyroxine (SYNTHROID/LEVOTHROID) 112 MCG tablet    Other Relevant Orders    TSH with free T4 reflex (Completed)    Long term current use of anticoagulants with INR goal of 2.0-3.0    Relevant Medications    warfarin ANTICOAGULANT (COUMADIN) 2.5 MG tablet             BMI:   Estimated body mass index is 26.09 kg/m  as calculated from the following:    Height as of this encounter: 1.626 m (5' 4\").    Weight as of this encounter: 68.9 kg (152 lb).       Assess lab work   Continue treatment   Follow up with rheumatology and cardiology        See Patient Instructions  Return in about 6 months (around 5/17/2021) for Routine Visit.    Yunior Huang MD  [facility]    "

## 2020-11-18 LAB
ALBUMIN SERPL-MCNC: 3.4 G/DL (ref 3.4–5)
ALP SERPL-CCNC: 145 U/L (ref 40–150)
ALT SERPL W P-5'-P-CCNC: 45 U/L (ref 0–50)
ANION GAP SERPL CALCULATED.3IONS-SCNC: 6 MMOL/L (ref 3–14)
AST SERPL W P-5'-P-CCNC: 49 U/L (ref 0–45)
BILIRUB SERPL-MCNC: 0.8 MG/DL (ref 0.2–1.3)
BUN SERPL-MCNC: 19 MG/DL (ref 7–30)
CALCIUM SERPL-MCNC: 9.4 MG/DL (ref 8.5–10.1)
CHLORIDE SERPL-SCNC: 108 MMOL/L (ref 94–109)
CHOLEST SERPL-MCNC: 167 MG/DL
CO2 SERPL-SCNC: 27 MMOL/L (ref 20–32)
CREAT SERPL-MCNC: 0.82 MG/DL (ref 0.52–1.04)
GFR SERPL CREATININE-BSD FRML MDRD: 68 ML/MIN/{1.73_M2}
GLUCOSE SERPL-MCNC: 79 MG/DL (ref 70–99)
HDLC SERPL-MCNC: 45 MG/DL
LDLC SERPL CALC-MCNC: 105 MG/DL
NONHDLC SERPL-MCNC: 122 MG/DL
POTASSIUM SERPL-SCNC: 4.2 MMOL/L (ref 3.4–5.3)
PROT SERPL-MCNC: 7.5 G/DL (ref 6.8–8.8)
SODIUM SERPL-SCNC: 141 MMOL/L (ref 133–144)
TRIGL SERPL-MCNC: 87 MG/DL
TSH SERPL DL<=0.005 MIU/L-ACNC: 1.48 MU/L (ref 0.4–4)

## 2020-11-23 ENCOUNTER — TELEPHONE (OUTPATIENT)
Dept: INTERNAL MEDICINE | Facility: CLINIC | Age: 80
End: 2020-11-23

## 2020-11-23 DIAGNOSIS — N39.0 URINARY TRACT INFECTION WITHOUT HEMATURIA, SITE UNSPECIFIED: Primary | ICD-10-CM

## 2020-11-23 NOTE — TELEPHONE ENCOUNTER
She can try cranberry supplements. Also to keep good hydration.   She is usually not symptomatic and the urine is likely chronically contaminated.   But if she has symptoms should be treated.

## 2020-11-23 NOTE — TELEPHONE ENCOUNTER
Patient is calling to ask if there is anything she can take to prevent UTI's. She says she gets these chronically.

## 2020-11-27 RX ORDER — SULFAMETHOXAZOLE/TRIMETHOPRIM 800-160 MG
1 TABLET ORAL 2 TIMES DAILY
Qty: 20 TABLET | Refills: 0 | Status: SHIPPED | OUTPATIENT
Start: 2020-11-27 | End: 2021-01-07

## 2020-11-27 NOTE — TELEPHONE ENCOUNTER
Let's treat for UTI. Will also check kidneys US. Will order.   If pain persist will reassess.   Needs to check her INR in 5 days , because of the antibiotic.

## 2020-11-27 NOTE — TELEPHONE ENCOUNTER
Patient calls and is having continued back pain that she thinks is being cause by UTI.  No other symptoms. Patient is asking if she should be treated or if there could be another cause.  Please advise.

## 2020-11-30 ENCOUNTER — NURSE TRIAGE (OUTPATIENT)
Dept: NURSING | Facility: CLINIC | Age: 80
End: 2020-11-30

## 2020-11-30 NOTE — TELEPHONE ENCOUNTER
Has questions about being on a blood thinner and taking an antibiotic (Bactrum)  She will will call the pharmacist .MELINA erazo

## 2020-12-01 ENCOUNTER — TELEPHONE (OUTPATIENT)
Dept: ANTICOAGULATION | Facility: CLINIC | Age: 80
End: 2020-12-01

## 2020-12-01 NOTE — TELEPHONE ENCOUNTER
ANTICOAGULATION  MANAGEMENT     Interacting Medication Review    Interacting medication(s): Sulfamethoxazole-Trimethoprim (Bactrim) with warfarin.    Duration: 10 days  (11/27/20 to 12/6/20)    Indication: UTI    New medication?: Yes, interaction may increase INR and risk of bleeding       PLAN     Continue current warfarin dose. Recommend to check INR on 12/3 as previously scheduled.  INR has been running consistently on the lower end of the therapeutic range or sub therapeutic, so will not adjust warfarin dose lower until INR check.    Patient was not contacted    No adjustment to Anticoagulation Calendar was required    Sonam Teixeira RN

## 2020-12-02 ENCOUNTER — HOSPITAL ENCOUNTER (OUTPATIENT)
Dept: ULTRASOUND IMAGING | Facility: CLINIC | Age: 80
Discharge: HOME | End: 2020-12-02
Attending: INTERNAL MEDICINE | Admitting: INTERNAL MEDICINE
Payer: MEDICARE

## 2020-12-02 DIAGNOSIS — N39.0 URINARY TRACT INFECTION WITHOUT HEMATURIA, SITE UNSPECIFIED: ICD-10-CM

## 2020-12-02 PROCEDURE — 76770 US EXAM ABDO BACK WALL COMP: CPT

## 2020-12-03 ENCOUNTER — ANTICOAGULATION THERAPY VISIT (OUTPATIENT)
Dept: ANTICOAGULATION | Facility: CLINIC | Age: 80
End: 2020-12-03

## 2020-12-03 DIAGNOSIS — Z95.3 S/P MITRAL VALVE REPLACEMENT WITH BIOPROSTHETIC VALVE: ICD-10-CM

## 2020-12-03 DIAGNOSIS — I48.91 ATRIAL FIBRILLATION AND FLUTTER (H): ICD-10-CM

## 2020-12-03 DIAGNOSIS — Z79.01 LONG TERM CURRENT USE OF ANTICOAGULANTS WITH INR GOAL OF 2.0-3.0: ICD-10-CM

## 2020-12-03 DIAGNOSIS — I48.92 ATRIAL FIBRILLATION AND FLUTTER (H): ICD-10-CM

## 2020-12-03 LAB
CAPILLARY BLOOD COLLECTION: NORMAL
INR PPP: 2.8 (ref 0.86–1.14)

## 2020-12-03 PROCEDURE — 99207 PR NO CHARGE NURSE ONLY: CPT

## 2020-12-03 PROCEDURE — 36416 COLLJ CAPILLARY BLOOD SPEC: CPT | Performed by: INTERNAL MEDICINE

## 2020-12-03 PROCEDURE — 85610 PROTHROMBIN TIME: CPT | Performed by: INTERNAL MEDICINE

## 2020-12-03 NOTE — PROGRESS NOTES
ANTICOAGULATION FOLLOW-UP CLINIC VISIT    Patient Name:  Zunilda Alicea May  Date:  12/3/2020  Contact Type:  Telephone/ Called patient, reports, health, diet and medication changes. Orders discussed with the patient, she agrees with the plan. Lab INR appointment scheduled on 12/15/20.    SUBJECTIVE:  Patient Findings     Positives:  Change in health (UTI, antibiotic started 20.), Change in medications (Began taking Bactrim 20 in the evening, Rx for 10 days. Concurrent use of SULFAMETHOXAZOLE and WARFARIN may result in increased warfarin exposure. ), Change in diet/appetite (Patient reports she has not had any green veggies or her shakes since 20. Discussed consistency in diet. )    Comments:  The patient was assessed for diet and activity level changes, missed or extra doses, bruising or bleeding, with no problem findings.          Clinical Outcomes     Comments:  The patient was assessed for diet and activity level changes, missed or extra doses, bruising or bleeding, with no problem findings.             OBJECTIVE    Recent labs: (last 7 days)     20  1547   INR 2.80*       ASSESSMENT / PLAN  INR assessment THER    Recheck INR In: 2 WEEKS    INR Location Outside lab      Anticoagulation Summary  As of 12/3/2020    INR goal:  2.0-3.0   TTR:  57.5 % (1 y)   INR used for dosin.80 (12/3/2020)   Warfarin maintenance plan:  3.75 mg (2.5 mg x 1.5) every Thu; 2.5 mg (2.5 mg x 1) all other days   Full warfarin instructions:  12/3: 2.5 mg; Otherwise 3.75 mg every Thu; 2.5 mg all other days   Weekly warfarin total:  18.75 mg   Plan last modified:  Екатерина Mahan, RN (2020)   Next INR check:  12/15/2020   Priority:  Maintenance   Target end date:  Indefinite    Indications    Long term current use of anticoagulants with INR goal of 2.0-3.0 [Z79.01]  Atrial fibrillation and flutter (H) [I48.91  I48.92]             Anticoagulation Episode Summary     INR check location:      Preferred lab:       Send INR reminders to:  RANJIT COOK    Comments:  likes printed AVS      Anticoagulation Care Providers     Provider Role Specialty Phone number    Yunior Huang MD [facility] Internal Medicine [phone]            See the Encounter Report to view Anticoagulation Flowsheet and Dosing Calendar (Go to Encounters tab in chart review, and find the Anticoagulation Therapy Visit)    Dosage adjustment made based on physician directed care plan.    Екатерина Mahan RN

## 2020-12-15 ENCOUNTER — TELEPHONE (OUTPATIENT)
Dept: INTERNAL MEDICINE | Facility: CLINIC | Age: 80
End: 2020-12-15

## 2020-12-15 ENCOUNTER — ANTICOAGULATION THERAPY VISIT (OUTPATIENT)
Dept: ANTICOAGULATION | Facility: CLINIC | Age: 80
End: 2020-12-15

## 2020-12-15 DIAGNOSIS — Z79.01 LONG TERM CURRENT USE OF ANTICOAGULANTS WITH INR GOAL OF 2.0-3.0: ICD-10-CM

## 2020-12-15 DIAGNOSIS — I48.92 ATRIAL FIBRILLATION AND FLUTTER (H): ICD-10-CM

## 2020-12-15 DIAGNOSIS — I48.91 ATRIAL FIBRILLATION AND FLUTTER (H): ICD-10-CM

## 2020-12-15 DIAGNOSIS — Z95.3 S/P MITRAL VALVE REPLACEMENT WITH BIOPROSTHETIC VALVE: ICD-10-CM

## 2020-12-15 DIAGNOSIS — I48.91 ATRIAL FIBRILLATION AND FLUTTER (H): Primary | ICD-10-CM

## 2020-12-15 DIAGNOSIS — I48.92 ATRIAL FIBRILLATION AND FLUTTER (H): Primary | ICD-10-CM

## 2020-12-15 LAB
CAPILLARY BLOOD COLLECTION: NORMAL
INR PPP: 3.3 (ref 0.86–1.14)

## 2020-12-15 PROCEDURE — 99207 PR NO CHARGE NURSE ONLY: CPT

## 2020-12-15 PROCEDURE — 85610 PROTHROMBIN TIME: CPT | Performed by: INTERNAL MEDICINE

## 2020-12-15 PROCEDURE — 36416 COLLJ CAPILLARY BLOOD SPEC: CPT | Performed by: INTERNAL MEDICINE

## 2020-12-15 NOTE — TELEPHONE ENCOUNTER
ANTICOAGULATION MANAGEMENT      Zunilda Alicea May due for annual renewal of referral to anticoagulation monitoring. Order pended for your review and signature.      ANTICOAGULATION SUMMARY      Warfarin indication(s)     Heart Valve Replacement  atrial flutter    Heart valve present?  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       Current goal range   INR: 2.0-3.0     Goal appropriate for indication? Yes, INR 2-3 appropriate for hx of DVT, PE, hypercoagulable state, Afib, LVAD, or bileaflet AVR without risk factors     Current duration of therapy Indefinite/long term therapy   Time in Therapeutic Range (TTR)  (Goal > 60%) 58.9 %       Office visit with referring provider's group within last year yes on 11/17/20       Sonam Teixeira RN

## 2020-12-15 NOTE — PROGRESS NOTES
ANTICOAGULATION FOLLOW-UP CLINIC VISIT    Patient Name:  Zunilda Alicea May  Date:  12/15/2020  Contact Type:  Telephone/ discussed with patient    SUBJECTIVE:  Patient Findings     Positives:  Change in medications (Bactrim completed about 1 week ago (this may have increased INR))    Comments:  The patient was assessed for diet, and activity level changes, missed or extra doses, bruising or bleeding, with no problem findings.  Patient will try to increase her vitamin K intake to help lower INR.  Will consider maintenance dose adjustment with next check if INR remains elevated.          Clinical Outcomes     Negatives:  Major bleeding event, Thromboembolic event, Anticoagulation-related hospital admission, Anticoagulation-related ED visit, Anticoagulation-related fatality    Comments:  The patient was assessed for diet, and activity level changes, missed or extra doses, bruising or bleeding, with no problem findings.  Patient will try to increase her vitamin K intake to help lower INR.  Will consider maintenance dose adjustment with next check if INR remains elevated.             OBJECTIVE    Recent labs: (last 7 days)     12/15/20  1034   INR 3.30*       ASSESSMENT / PLAN  INR assessment SUPRA    Recheck INR In: 2 WEEKS    INR Location Clinic      Anticoagulation Summary  As of 12/15/2020    INR goal:  2.0-3.0   TTR:  58.9 % (1 y)   INR used for dosing:  3.30 (12/15/2020)   Warfarin maintenance plan:  3.75 mg (2.5 mg x 1.5) every Thu; 2.5 mg (2.5 mg x 1) all other days   Full warfarin instructions:  3.75 mg every Thu; 2.5 mg all other days   Weekly warfarin total:  18.75 mg   No change documented:  Sonam Teixeira RN   Plan last modified:  Екатерина Mahan RN (6/23/2020)   Next INR check:  12/29/2020   Priority:  Maintenance   Target end date:  Indefinite    Indications    Long term current use of anticoagulants with INR goal of 2.0-3.0 [Z79.01]  Atrial fibrillation and flutter (H) [I48.91  I48.92]              Anticoagulation Episode Summary     INR check location:      Preferred lab:      Send INR reminders to:  RANJIT COOK    Comments:  likes printed AVS      Anticoagulation Care Providers     Provider Role Specialty Phone number    Yunior Huang MD [facility] Internal Medicine [phone]            See the Encounter Report to view Anticoagulation Flowsheet and Dosing Calendar (Go to Encounters tab in chart review, and find the Anticoagulation Therapy Visit)    Dosage adjustment made based on physician directed care plan.    Sonam Teixeira RN

## 2020-12-16 ENCOUNTER — ANCILLARY PROCEDURE (OUTPATIENT)
Dept: CARDIOLOGY | Facility: CLINIC | Age: 80
End: 2020-12-16
Attending: INTERNAL MEDICINE
Payer: MEDICARE

## 2020-12-16 DIAGNOSIS — Z95.0 CARDIAC PACEMAKER IN SITU: ICD-10-CM

## 2020-12-16 PROCEDURE — 93280 PM DEVICE PROGR EVAL DUAL: CPT | Performed by: INTERNAL MEDICINE

## 2020-12-17 LAB
MDC_IDC_EPISODE_DTM: NORMAL
MDC_IDC_EPISODE_DURATION: 104 S
MDC_IDC_EPISODE_DURATION: 112 S
MDC_IDC_EPISODE_DURATION: 1130 S
MDC_IDC_EPISODE_DURATION: 119 S
MDC_IDC_EPISODE_DURATION: 120 S
MDC_IDC_EPISODE_DURATION: 128 S
MDC_IDC_EPISODE_DURATION: 14 S
MDC_IDC_EPISODE_DURATION: 140 S
MDC_IDC_EPISODE_DURATION: 150 S
MDC_IDC_EPISODE_DURATION: 153 S
MDC_IDC_EPISODE_DURATION: 173 S
MDC_IDC_EPISODE_DURATION: 1786 S
MDC_IDC_EPISODE_DURATION: 194 S
MDC_IDC_EPISODE_DURATION: 2 S
MDC_IDC_EPISODE_DURATION: 20 S
MDC_IDC_EPISODE_DURATION: 213 S
MDC_IDC_EPISODE_DURATION: 2193 S
MDC_IDC_EPISODE_DURATION: 27 S
MDC_IDC_EPISODE_DURATION: 29 S
MDC_IDC_EPISODE_DURATION: 35 S
MDC_IDC_EPISODE_DURATION: 35 S
MDC_IDC_EPISODE_DURATION: 3582 S
MDC_IDC_EPISODE_DURATION: 36 S
MDC_IDC_EPISODE_DURATION: 372 S
MDC_IDC_EPISODE_DURATION: 38 S
MDC_IDC_EPISODE_DURATION: 4 S
MDC_IDC_EPISODE_DURATION: 4062 S
MDC_IDC_EPISODE_DURATION: 44 S
MDC_IDC_EPISODE_DURATION: 45 S
MDC_IDC_EPISODE_DURATION: 478 S
MDC_IDC_EPISODE_DURATION: 484 S
MDC_IDC_EPISODE_DURATION: 486 S
MDC_IDC_EPISODE_DURATION: 497 S
MDC_IDC_EPISODE_DURATION: 52 S
MDC_IDC_EPISODE_DURATION: 526 S
MDC_IDC_EPISODE_DURATION: 53 S
MDC_IDC_EPISODE_DURATION: 55 S
MDC_IDC_EPISODE_DURATION: 578 S
MDC_IDC_EPISODE_DURATION: 58 S
MDC_IDC_EPISODE_DURATION: 66 S
MDC_IDC_EPISODE_DURATION: 67 S
MDC_IDC_EPISODE_DURATION: 677 S
MDC_IDC_EPISODE_DURATION: 7 S
MDC_IDC_EPISODE_DURATION: 72 S
MDC_IDC_EPISODE_DURATION: 74 S
MDC_IDC_EPISODE_DURATION: 75 S
MDC_IDC_EPISODE_DURATION: 77 S
MDC_IDC_EPISODE_DURATION: 79 S
MDC_IDC_EPISODE_DURATION: 8 S
MDC_IDC_EPISODE_DURATION: 81 S
MDC_IDC_EPISODE_DURATION: 9 S
MDC_IDC_EPISODE_ID: 6936
MDC_IDC_EPISODE_ID: 8740
MDC_IDC_EPISODE_ID: 8741
MDC_IDC_EPISODE_ID: 8742
MDC_IDC_EPISODE_ID: 8743
MDC_IDC_EPISODE_ID: 8744
MDC_IDC_EPISODE_ID: 8745
MDC_IDC_EPISODE_ID: 8746
MDC_IDC_EPISODE_ID: 8747
MDC_IDC_EPISODE_ID: 8748
MDC_IDC_EPISODE_ID: 8749
MDC_IDC_EPISODE_ID: 8750
MDC_IDC_EPISODE_ID: 8751
MDC_IDC_EPISODE_ID: 8752
MDC_IDC_EPISODE_ID: 8753
MDC_IDC_EPISODE_ID: 8754
MDC_IDC_EPISODE_ID: 8755
MDC_IDC_EPISODE_ID: 8756
MDC_IDC_EPISODE_ID: 8757
MDC_IDC_EPISODE_ID: 8758
MDC_IDC_EPISODE_ID: 8759
MDC_IDC_EPISODE_ID: 8760
MDC_IDC_EPISODE_ID: 8761
MDC_IDC_EPISODE_ID: 8762
MDC_IDC_EPISODE_ID: 8763
MDC_IDC_EPISODE_ID: 8764
MDC_IDC_EPISODE_ID: 8765
MDC_IDC_EPISODE_ID: 8766
MDC_IDC_EPISODE_ID: 8767
MDC_IDC_EPISODE_ID: 8768
MDC_IDC_EPISODE_ID: 8769
MDC_IDC_EPISODE_ID: 8770
MDC_IDC_EPISODE_ID: 8771
MDC_IDC_EPISODE_ID: 8772
MDC_IDC_EPISODE_ID: 8773
MDC_IDC_EPISODE_ID: 8774
MDC_IDC_EPISODE_ID: 8775
MDC_IDC_EPISODE_ID: 8776
MDC_IDC_EPISODE_ID: 8777
MDC_IDC_EPISODE_ID: 8778
MDC_IDC_EPISODE_ID: 8779
MDC_IDC_EPISODE_ID: 8780
MDC_IDC_EPISODE_ID: 8781
MDC_IDC_EPISODE_ID: 8782
MDC_IDC_EPISODE_ID: 8783
MDC_IDC_EPISODE_ID: 8784
MDC_IDC_EPISODE_ID: 8785
MDC_IDC_EPISODE_ID: 8786
MDC_IDC_EPISODE_ID: 8787
MDC_IDC_EPISODE_ID: 8788
MDC_IDC_EPISODE_ID: 8789
MDC_IDC_EPISODE_ID: 8790
MDC_IDC_EPISODE_TYPE: NORMAL
MDC_IDC_LEAD_IMPLANT_DT: NORMAL
MDC_IDC_LEAD_IMPLANT_DT: NORMAL
MDC_IDC_LEAD_LOCATION: NORMAL
MDC_IDC_LEAD_LOCATION: NORMAL
MDC_IDC_LEAD_MFG: NORMAL
MDC_IDC_LEAD_MFG: NORMAL
MDC_IDC_LEAD_MODEL: NORMAL
MDC_IDC_LEAD_MODEL: NORMAL
MDC_IDC_LEAD_POLARITY_TYPE: NORMAL
MDC_IDC_LEAD_POLARITY_TYPE: NORMAL
MDC_IDC_LEAD_SERIAL: NORMAL
MDC_IDC_LEAD_SERIAL: NORMAL
MDC_IDC_MSMT_BATTERY_DTM: NORMAL
MDC_IDC_MSMT_BATTERY_STATUS: NORMAL
MDC_IDC_MSMT_BATTERY_VOLTAGE: 2.82 V
MDC_IDC_MSMT_LEADCHNL_RA_IMPEDANCE_VALUE: 416 OHM
MDC_IDC_MSMT_LEADCHNL_RA_SENSING_INTR_AMPL: 1.39 MV
MDC_IDC_MSMT_LEADCHNL_RV_IMPEDANCE_VALUE: 416 OHM
MDC_IDC_MSMT_LEADCHNL_RV_SENSING_INTR_AMPL: 14.65 MV
MDC_IDC_PG_IMPLANT_DTM: NORMAL
MDC_IDC_PG_MFG: NORMAL
MDC_IDC_PG_MODEL: NORMAL
MDC_IDC_PG_SERIAL: NORMAL
MDC_IDC_PG_TYPE: NORMAL
MDC_IDC_SESS_CLINIC_NAME: NORMAL
MDC_IDC_SESS_DTM: NORMAL
MDC_IDC_SESS_TYPE: NORMAL
MDC_IDC_SET_BRADY_AT_MODE_SWITCH_RATE: 171 {BEATS}/MIN
MDC_IDC_SET_BRADY_HYSTRATE: NORMAL
MDC_IDC_SET_BRADY_LOWRATE: 60 {BEATS}/MIN
MDC_IDC_SET_BRADY_MAX_SENSOR_RATE: 130 {BEATS}/MIN
MDC_IDC_SET_BRADY_MAX_TRACKING_RATE: 130 {BEATS}/MIN
MDC_IDC_SET_BRADY_MODE: NORMAL
MDC_IDC_SET_BRADY_PAV_DELAY_LOW: 180 MS
MDC_IDC_SET_BRADY_SAV_DELAY_LOW: 150 MS
MDC_IDC_SET_LEADCHNL_RA_PACING_AMPLITUDE: 2 V
MDC_IDC_SET_LEADCHNL_RA_PACING_ANODE_ELECTRODE_1: NORMAL
MDC_IDC_SET_LEADCHNL_RA_PACING_ANODE_LOCATION_1: NORMAL
MDC_IDC_SET_LEADCHNL_RA_PACING_CATHODE_ELECTRODE_1: NORMAL
MDC_IDC_SET_LEADCHNL_RA_PACING_CATHODE_LOCATION_1: NORMAL
MDC_IDC_SET_LEADCHNL_RA_PACING_POLARITY: NORMAL
MDC_IDC_SET_LEADCHNL_RA_PACING_PULSEWIDTH: 0.4 MS
MDC_IDC_SET_LEADCHNL_RA_SENSING_ANODE_ELECTRODE_1: NORMAL
MDC_IDC_SET_LEADCHNL_RA_SENSING_ANODE_LOCATION_1: NORMAL
MDC_IDC_SET_LEADCHNL_RA_SENSING_CATHODE_ELECTRODE_1: NORMAL
MDC_IDC_SET_LEADCHNL_RA_SENSING_CATHODE_LOCATION_1: NORMAL
MDC_IDC_SET_LEADCHNL_RA_SENSING_POLARITY: NORMAL
MDC_IDC_SET_LEADCHNL_RA_SENSING_SENSITIVITY: 0.45 MV
MDC_IDC_SET_LEADCHNL_RV_PACING_AMPLITUDE: 2 V
MDC_IDC_SET_LEADCHNL_RV_PACING_ANODE_ELECTRODE_1: NORMAL
MDC_IDC_SET_LEADCHNL_RV_PACING_ANODE_LOCATION_1: NORMAL
MDC_IDC_SET_LEADCHNL_RV_PACING_CATHODE_ELECTRODE_1: NORMAL
MDC_IDC_SET_LEADCHNL_RV_PACING_CATHODE_LOCATION_1: NORMAL
MDC_IDC_SET_LEADCHNL_RV_PACING_POLARITY: NORMAL
MDC_IDC_SET_LEADCHNL_RV_PACING_PULSEWIDTH: 0.4 MS
MDC_IDC_SET_LEADCHNL_RV_SENSING_ANODE_ELECTRODE_1: NORMAL
MDC_IDC_SET_LEADCHNL_RV_SENSING_ANODE_LOCATION_1: NORMAL
MDC_IDC_SET_LEADCHNL_RV_SENSING_CATHODE_ELECTRODE_1: NORMAL
MDC_IDC_SET_LEADCHNL_RV_SENSING_CATHODE_LOCATION_1: NORMAL
MDC_IDC_SET_LEADCHNL_RV_SENSING_POLARITY: NORMAL
MDC_IDC_SET_LEADCHNL_RV_SENSING_SENSITIVITY: 2.1 MV
MDC_IDC_SET_ZONE_DETECTION_INTERVAL: 350 MS
MDC_IDC_SET_ZONE_DETECTION_INTERVAL: 400 MS
MDC_IDC_SET_ZONE_TYPE: NORMAL
MDC_IDC_STAT_AT_BURDEN_PERCENT: 6.8 %
MDC_IDC_STAT_AT_DTM_END: NORMAL
MDC_IDC_STAT_AT_DTM_START: NORMAL
MDC_IDC_STAT_BRADY_AP_VP_PERCENT: 27.47 %
MDC_IDC_STAT_BRADY_AP_VS_PERCENT: 3.53 %
MDC_IDC_STAT_BRADY_AS_VP_PERCENT: 60.3 %
MDC_IDC_STAT_BRADY_AS_VS_PERCENT: 8.69 %
MDC_IDC_STAT_BRADY_DTM_END: NORMAL
MDC_IDC_STAT_BRADY_DTM_START: NORMAL
MDC_IDC_STAT_BRADY_RA_PERCENT_PACED: 30.54 %
MDC_IDC_STAT_BRADY_RV_PERCENT_PACED: 87.54 %
MDC_IDC_STAT_EPISODE_RECENT_COUNT: 0
MDC_IDC_STAT_EPISODE_RECENT_COUNT: 0
MDC_IDC_STAT_EPISODE_RECENT_COUNT: 2
MDC_IDC_STAT_EPISODE_RECENT_COUNT: 8533
MDC_IDC_STAT_EPISODE_RECENT_COUNT_DTM_END: NORMAL
MDC_IDC_STAT_EPISODE_RECENT_COUNT_DTM_START: NORMAL
MDC_IDC_STAT_EPISODE_TOTAL_COUNT: 1
MDC_IDC_STAT_EPISODE_TOTAL_COUNT: 15
MDC_IDC_STAT_EPISODE_TOTAL_COUNT: 3
MDC_IDC_STAT_EPISODE_TOTAL_COUNT: 8769
MDC_IDC_STAT_EPISODE_TOTAL_COUNT_DTM_END: NORMAL
MDC_IDC_STAT_EPISODE_TYPE: NORMAL

## 2020-12-29 ENCOUNTER — DOCUMENTATION ONLY (OUTPATIENT)
Dept: OTHER | Facility: CLINIC | Age: 80
End: 2020-12-29

## 2020-12-29 ENCOUNTER — ANTICOAGULATION THERAPY VISIT (OUTPATIENT)
Dept: ANTICOAGULATION | Facility: CLINIC | Age: 80
End: 2020-12-29

## 2020-12-29 DIAGNOSIS — I48.92 ATRIAL FIBRILLATION AND FLUTTER (H): ICD-10-CM

## 2020-12-29 DIAGNOSIS — Z95.3 S/P MITRAL VALVE REPLACEMENT WITH BIOPROSTHETIC VALVE: ICD-10-CM

## 2020-12-29 DIAGNOSIS — Z79.01 LONG TERM CURRENT USE OF ANTICOAGULANTS WITH INR GOAL OF 2.0-3.0: ICD-10-CM

## 2020-12-29 DIAGNOSIS — I48.91 ATRIAL FIBRILLATION AND FLUTTER (H): ICD-10-CM

## 2020-12-29 LAB
CAPILLARY BLOOD COLLECTION: NORMAL
INR PPP: 4.1 (ref 0.86–1.14)

## 2020-12-29 PROCEDURE — 99207 PR NO CHARGE NURSE ONLY: CPT

## 2020-12-29 PROCEDURE — 85610 PROTHROMBIN TIME: CPT | Performed by: INTERNAL MEDICINE

## 2020-12-29 PROCEDURE — 36416 COLLJ CAPILLARY BLOOD SPEC: CPT | Performed by: INTERNAL MEDICINE

## 2020-12-29 NOTE — PROGRESS NOTES
ANTICOAGULATION FOLLOW-UP CLINIC VISIT    Patient Name:  Zunilda Alicea May  Date:  2020  Contact Type:  Telephone/ Called patient, she reports diet change and is unsure if she took an extra warfarin dose when she returned home from her daughters. Orders discussed with the patient, she agrees with the plan. Lab INR appointment scheduled on 21.     SUBJECTIVE:  Patient Findings     Positives:  Extra doses (Patient unsure if she took an extra warfarin dose. ), Change in diet/appetite (Patient reports she stopped making her green shakes and only having spinach with her scranmbled eggs 3x a week)    Comments:  The patient was assessed for diet, medication, and activity level changes, missed or extra doses, bruising or bleeding, with no problem findings.         Clinical Outcomes     Comments:  The patient was assessed for diet, medication, and activity level changes, missed or extra doses, bruising or bleeding, with no problem findings.            OBJECTIVE    Recent labs: (last 7 days)     20  1510   INR 4.10*       ASSESSMENT / PLAN  INR assessment SUPRA    Recheck INR In: 9 DAYS    INR Location Outside lab      Anticoagulation Summary  As of 2020    INR goal:  2.0-3.0   TTR:  55.2 % (1 y)   INR used for dosin.10 (2020)   Warfarin maintenance plan:  3.75 mg (2.5 mg x 1.5) every Thu; 2.5 mg (2.5 mg x 1) all other days   Full warfarin instructions:  : Hold; Otherwise 3.75 mg every Thu; 2.5 mg all other days   Weekly warfarin total:  18.75 mg   Plan last modified:  Екатерина Mahan RN (2020)   Next INR check:  2021   Priority:  Maintenance   Target end date:  Indefinite    Indications    Long term current use of anticoagulants with INR goal of 2.0-3.0 [Z79.01]  Atrial fibrillation and flutter (H) [I48.91  I48.92]  S/P mitral valve replacement with bioprosthetic valve [Z95.3]             Anticoagulation Episode Summary     INR check location:      Preferred lab:      Send INR  reminders to:  RANJIT COOK    Comments:  likes printed AVS      Anticoagulation Care Providers     Provider Role Specialty Phone number    Yunior Huang MD Referring Internal Medicine [phone]            See the Encounter Report to view Anticoagulation Flowsheet and Dosing Calendar (Go to Encounters tab in chart review, and find the Anticoagulation Therapy Visit)    Dosage adjustment made based on physician directed care plan.    Екатерина Mahan RN

## 2021-01-05 DIAGNOSIS — I10 BENIGN ESSENTIAL HYPERTENSION: ICD-10-CM

## 2021-01-07 ENCOUNTER — ANTICOAGULATION THERAPY VISIT (OUTPATIENT)
Dept: NURSING | Facility: CLINIC | Age: 81
End: 2021-01-07

## 2021-01-07 DIAGNOSIS — I48.91 ATRIAL FIBRILLATION AND FLUTTER (H): ICD-10-CM

## 2021-01-07 DIAGNOSIS — Z95.3 S/P MITRAL VALVE REPLACEMENT WITH BIOPROSTHETIC VALVE: ICD-10-CM

## 2021-01-07 DIAGNOSIS — I48.92 ATRIAL FIBRILLATION AND FLUTTER (H): ICD-10-CM

## 2021-01-07 DIAGNOSIS — Z79.01 LONG TERM CURRENT USE OF ANTICOAGULANTS WITH INR GOAL OF 2.0-3.0: ICD-10-CM

## 2021-01-07 LAB — INR PPP: 3 (ref 0.86–1.14)

## 2021-01-07 PROCEDURE — 85610 PROTHROMBIN TIME: CPT | Performed by: INTERNAL MEDICINE

## 2021-01-07 PROCEDURE — 36416 COLLJ CAPILLARY BLOOD SPEC: CPT | Performed by: INTERNAL MEDICINE

## 2021-01-07 PROCEDURE — 99207 PR NO CHARGE NURSE ONLY: CPT

## 2021-01-07 RX ORDER — AMLODIPINE AND VALSARTAN 5; 160 MG/1; MG/1
TABLET ORAL
Qty: 90 TABLET | Refills: 2 | Status: SHIPPED | OUTPATIENT
Start: 2021-01-07 | End: 2021-07-19

## 2021-01-07 NOTE — TELEPHONE ENCOUNTER
Pending Prescriptions:                       Disp   Refills    amLODIPine-valsartan (EXFORGE) 5-160 MG t*90 tab*2            Sig: TAKE ONE TABLET BY MOUTH ONE TIME DAILY     Prescription approved per [facility] Refill Protocol.

## 2021-01-07 NOTE — PROGRESS NOTES
ANTICOAGULATION FOLLOW-UP CLINIC VISIT    Patient Name:  Zunilda Alicea May  Date:  1/7/2021  Contact Type:  Telephone    SUBJECTIVE:  Patient Findings     Positives:  Missed doses (Intentional hold on 12/29 due to supra INR), Change in diet/appetite (Pt having eggs with small amount of spinach every MWF.  In the recent past, pt was drinking smoothies with 2 cups of spinach several times per week.)    Comments:  Since INR recently supra and today INR is at top of range, patient agrees to add 1 smoothie with spinach per week and will continue eggs with spinach 3 times per week.        Clinical Outcomes     Negatives:  Major bleeding event, Thromboembolic event, Anticoagulation-related hospital admission, Anticoagulation-related ED visit, Anticoagulation-related fatality    Comments:  Since INR recently supra and today INR is at top of range, patient agrees to add 1 smoothie with spinach per week and will continue eggs with spinach 3 times per week.           OBJECTIVE    Recent labs: (last 7 days)     01/07/21  1417   INR 3.00*       ASSESSMENT / PLAN  INR assessment THER    Recheck INR In: 10 DAYS    INR Location Clinic      Anticoagulation Summary  As of 1/7/2021    INR goal:  2.0-3.0   TTR:  52.7 % (1 y)   INR used for dosing:  3.00 (1/7/2021)   Warfarin maintenance plan:  3.75 mg (2.5 mg x 1.5) every Thu; 2.5 mg (2.5 mg x 1) all other days   Full warfarin instructions:  3.75 mg every Thu; 2.5 mg all other days   Weekly warfarin total:  18.75 mg   No change documented:  Linette Rocha RN   Plan last modified:  Екатерина Mahan RN (6/23/2020)   Next INR check:  1/18/2021   Priority:  High   Target end date:  Indefinite    Indications    Long term current use of anticoagulants with INR goal of 2.0-3.0 [Z79.01]  Atrial fibrillation and flutter (H) [I48.91  I48.92]  S/P mitral valve replacement with bioprosthetic valve [Z95.3]             Anticoagulation Episode Summary     INR check location:      Preferred lab:       Send INR reminders to:  RANJIT COOK    Comments:  likes printed AVS      Anticoagulation Care Providers     Provider Role Specialty Phone number    Yunior Huang MD Referring Internal Medicine [phone]            See the Encounter Report to view Anticoagulation Flowsheet and Dosing Calendar (Go to Encounters tab in chart review, and find the Anticoagulation Therapy Visit)        Linette Rocha RN

## 2021-01-13 ENCOUNTER — HOSPITAL ENCOUNTER (OUTPATIENT)
Dept: CARDIOLOGY | Facility: CLINIC | Age: 81
Discharge: HOME | End: 2021-01-13
Attending: PHYSICIAN ASSISTANT | Admitting: PHYSICIAN ASSISTANT
Payer: MEDICARE

## 2021-01-13 DIAGNOSIS — Z95.3 S/P MITRAL VALVE REPLACEMENT WITH BIOPROSTHETIC VALVE: ICD-10-CM

## 2021-01-13 DIAGNOSIS — Z95.0 CARDIAC PACEMAKER IN SITU: ICD-10-CM

## 2021-01-13 DIAGNOSIS — I27.20 PULMONARY HTN (H): ICD-10-CM

## 2021-01-13 PROCEDURE — 93306 TTE W/DOPPLER COMPLETE: CPT

## 2021-01-13 PROCEDURE — 93306 TTE W/DOPPLER COMPLETE: CPT | Mod: 26 | Performed by: INTERNAL MEDICINE

## 2021-01-18 ENCOUNTER — ANTICOAGULATION THERAPY VISIT (OUTPATIENT)
Dept: ANTICOAGULATION | Facility: CLINIC | Age: 81
End: 2021-01-18

## 2021-01-18 DIAGNOSIS — Z79.01 LONG TERM CURRENT USE OF ANTICOAGULANTS WITH INR GOAL OF 2.0-3.0: ICD-10-CM

## 2021-01-18 DIAGNOSIS — I48.92 ATRIAL FIBRILLATION AND FLUTTER (H): ICD-10-CM

## 2021-01-18 DIAGNOSIS — Z95.3 S/P MITRAL VALVE REPLACEMENT WITH BIOPROSTHETIC VALVE: ICD-10-CM

## 2021-01-18 DIAGNOSIS — I48.91 ATRIAL FIBRILLATION AND FLUTTER (H): ICD-10-CM

## 2021-01-18 LAB — INR PPP: 2.5 (ref 0.86–1.14)

## 2021-01-18 PROCEDURE — 36416 COLLJ CAPILLARY BLOOD SPEC: CPT | Performed by: INTERNAL MEDICINE

## 2021-01-18 PROCEDURE — 85610 PROTHROMBIN TIME: CPT | Performed by: INTERNAL MEDICINE

## 2021-01-18 PROCEDURE — 99207 PR NO CHARGE NURSE ONLY: CPT

## 2021-01-18 NOTE — PROGRESS NOTES
ANTICOAGULATION FOLLOW-UP CLINIC VISIT    Patient Name:  Zunilda Alicea May  Date:  2021  Contact Type:  Telephone/ Called patient, she reports consistency in diet, she denies any other changes. Orders discussed with the patient, she agrees with the plan. Lab INR appointment scheduled on 21.    SUBJECTIVE:  Patient Findings     Positives:  Change in diet/appetite (Patient reports she has been consistent with green veggies in her diet)    Comments:  The patient was assessed for diet, medication, and activity level changes, missed or extra doses, bruising or bleeding, with no problem findings. Maintenance warfarin dosing was reviewed with patient and will remain on the same dose until next INR check. Patient did not have any questions or concerns.           Clinical Outcomes     Comments:  The patient was assessed for diet, medication, and activity level changes, missed or extra doses, bruising or bleeding, with no problem findings. Maintenance warfarin dosing was reviewed with patient and will remain on the same dose until next INR check. Patient did not have any questions or concerns.              OBJECTIVE    Recent labs: (last 7 days)     21  1404   INR 2.50*       ASSESSMENT / PLAN  INR assessment THER    Recheck INR In: 3 WEEKS    INR Location Outside lab      Anticoagulation Summary  As of 2021    INR goal:  2.0-3.0   TTR:  52.7 % (1 y)   INR used for dosin.50 (2021)   Warfarin maintenance plan:  3.75 mg (2.5 mg x 1.5) every Thu; 2.5 mg (2.5 mg x 1) all other days   Full warfarin instructions:  3.75 mg every Thu; 2.5 mg all other days   Weekly warfarin total:  18.75 mg   No change documented:  Екатерина Mahan RN   Plan last modified:  Екатерина Mahan RN (2020)   Next INR check:  2021   Priority:  Maintenance   Target end date:  Indefinite    Indications    Long term current use of anticoagulants with INR goal of 2.0-3.0 [Z79.01]  Atrial fibrillation and flutter (H)  [I48.91  I48.92]  S/P mitral valve replacement with bioprosthetic valve [Z95.3]             Anticoagulation Episode Summary     INR check location:      Preferred lab:      Send INR reminders to:  RANJIT COOK    Comments:  likes printed AVS      Anticoagulation Care Providers     Provider Role Specialty Phone number    Yunior Huang MD Referring Internal Medicine [phone]            See the Encounter Report to view Anticoagulation Flowsheet and Dosing Calendar (Go to Encounters tab in chart review, and find the Anticoagulation Therapy Visit)    Dosage adjustment made based on physician directed care plan.    Екатерина Mahan RN

## 2021-01-19 ENCOUNTER — OFFICE VISIT (OUTPATIENT)
Dept: CARDIOLOGY | Facility: CLINIC | Age: 81
End: 2021-01-19
Attending: PHYSICIAN ASSISTANT
Payer: MEDICARE

## 2021-01-19 VITALS
WEIGHT: 152.9 LBS | DIASTOLIC BLOOD PRESSURE: 75 MMHG | BODY MASS INDEX: 26.1 KG/M2 | HEART RATE: 61 BPM | SYSTOLIC BLOOD PRESSURE: 145 MMHG | HEIGHT: 64 IN

## 2021-01-19 DIAGNOSIS — Z95.0 CARDIAC PACEMAKER IN SITU: ICD-10-CM

## 2021-01-19 DIAGNOSIS — T82.198A: ICD-10-CM

## 2021-01-19 DIAGNOSIS — Z95.3 S/P MITRAL VALVE REPLACEMENT WITH BIOPROSTHETIC VALVE: ICD-10-CM

## 2021-01-19 DIAGNOSIS — I27.20 PULMONARY HTN (H): Primary | ICD-10-CM

## 2021-01-19 DIAGNOSIS — I10 BENIGN ESSENTIAL HYPERTENSION: ICD-10-CM

## 2021-01-19 PROCEDURE — 99214 OFFICE O/P EST MOD 30 MIN: CPT | Performed by: INTERNAL MEDICINE

## 2021-01-19 RX ORDER — SPIRONOLACTONE 25 MG/1
12.5 TABLET ORAL DAILY
Qty: 90 TABLET | Refills: 11 | Status: SHIPPED | OUTPATIENT
Start: 2021-01-19 | End: 2021-02-02 | Stop reason: ALTCHOICE

## 2021-01-19 RX ORDER — SPIRONOLACTONE 25 MG/1
25 TABLET ORAL DAILY
Qty: 90 TABLET | Refills: 11 | Status: SHIPPED | OUTPATIENT
Start: 2021-01-19 | End: 2021-01-19

## 2021-01-19 ASSESSMENT — MIFFLIN-ST. JEOR: SCORE: 1153.55

## 2021-01-19 NOTE — PROGRESS NOTES
Cardiology Clinic Consultation:    January 19, 2021   Patient Name: Zunilda Clark  Patient MRN: [id number]    Consult reason: pulmonary HTN    HPI and Assessment and Plan/Recommendations:    Please see dictation. #[id number]    Thank you for allowing our team to participate in the care of Zunilda Clark.  Please do not hesitate to call or page me with any questions or concerns.    Sincerely,     Noe Rodriguez MD, [facility]  Cardiology  Text Page   January 19, 2021      Augusto Brasher, YOLANDE  [address] [place] [address]    Past Medical History:     The ASCVD Risk score (Laura SWETA Jr., et al., 2013) failed to calculate for the following reasons:    The patient has a prior MI or stroke diagnosis  Patient Active Problem List   Diagnosis     RA (rheumatoid arthritis) (H)     Benign essential hypertension     Atrial fibrillation and flutter (H)     Cardiac pacemaker in situ     Humerus fracture     Fracture, humerus closed, shaft     Anticoagulation management encounter     Acquired hypothyroidism     Essential hypertension     Long term current use of anticoagulants with INR goal of 2.0-3.0     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     S/P mitral valve replacement with bioprosthetic valve     Pulmonary hypertension (H)     S/P total knee arthroplasty     Wound dehiscence     Pacemaker twiddler's syndrome, initial encounter       Past Surgical History:   Past Surgical History:   Procedure Laterality Date     ABDOMEN SURGERY      prolapsed bladder     APPLY WOUND VAC Left 12/18/2018    Procedure: Wound vac application;  Surgeon: Pardeep Sheikh MD;  Location: [facility] OR     ARTHROPLASTY KNEE Left 11/12/2018    Procedure: Left total knee arthroplasty using a [employer] knee system;  Surgeon: Pardeep Sheikh MD;  Location: [facility] OR     ARTHROSCOPY KNEE WITH DEBRIDEMENT JOINT, COMBINED Left 12/18/2018    Procedure: Left knee debridement and placement of  wound vac;  Surgeon: Pardeep Sheikh MD;  Location: [facility] OR     COLONOSCOPY  3/15/2012     ESOPHAGOSCOPY, GASTROSCOPY, DUODENOSCOPY (EGD), COMBINED N/A 2/19/2020    Procedure: ESOPHAGOGASTRODUODENOSCOPY (EGD);  Surgeon: Michael Mccarthy MD;  Location: [facility] GI     EYE SURGERY  2018    Both eyes/cataract surgery     H ABLATION AV NODE  2011    AFlutter with syncope, PPM to follow     HERNIA REPAIR      3 hernies/right and left & umbilical     IMPLANT PACEMAKER  2011     LAPAROSCOPIC CHOLECYSTECTOMY WITH CHOLANGIOGRAMS N/A 4/29/2017    Procedure: LAPAROSCOPIC CHOLECYSTECTOMY WITH CHOLANGIOGRAMS;  LAPAROSCOPIC CHOLECYSTECTOMY WITH CHOLANGIOGRAMS ;  Surgeon: Angeles Mcgill MD;  Location: [facility] OR     OPEN REDUCTION INTERNAL FIXATION RODDING INTRAMEDULLARY HUMERUS Right 7/28/2015    Procedure: OPEN REDUCTION INTERNAL FIXATION RODDING INTRAMEDULLARY HUMERUS;  Surgeon: Raymundo Rivera MD;  Location: [facility] OR     REPLACE VALVE MITRAL  2006    Mitral valve replacement with bioprosthetic valve in 2008 for rheumatic disease     SLING BLADDER SUSPENSION WITH FASCIA UMESH       VASCULAR SURGERY  2003    Blood clots in both legs       Medications (outpatient):  Current Outpatient Medications   Medication Sig Dispense Refill     ALLOPURINOL PO Take 100 mg by mouth 2 times daily       amLODIPine-valsartan (EXFORGE) 5-160 MG tablet TAKE ONE TABLET BY MOUTH ONE TIME DAILY  90 tablet 2     calcium citrate (CALCITRATE) 950 MG tablet Take 1 tablet by mouth daily        folic acid (FOLVITE) 1 MG tablet Take 1 mg by mouth daily       latanoprostene bunod (VYZULTA) 0.024 % SOLN ophthalmic solution Place 1 drop Into the left eye At Bedtime       levothyroxine (SYNTHROID/LEVOTHROID) 112 MCG tablet Take 1 tablet (112 mcg) by mouth daily 90 tablet 3     METHOTREXATE SODIUM IJ Inject 0.8 mLs as directed once a week Thursdays       metoprolol tartrate (LOPRESSOR) 50 MG tablet Take 1 tablet (50 mg) by mouth 2 times daily 180 tablet 3  "    multivitamin, therapeutic with minerals (MULTI-VITAMIN) TABS tablet Take 1 tablet by mouth daily Without iron       omeprazole (PRILOSEC) 20 MG DR capsule Take 1 capsule (20 mg) by mouth 2 times daily 180 capsule 1     spironolactone (ALDACTONE) 25 MG tablet Take 0.5 tablets (12.5 mg) by mouth daily 90 tablet 11     torsemide (DEMADEX) 10 MG tablet Take 1 tablet (10 mg) by mouth daily (with breakfast) 90 tablet 1     VITAMIN D, CHOLECALCIFEROL, PO Take 1,000 Units by mouth daily       warfarin ANTICOAGULANT (COUMADIN) 2.5 MG tablet Take one tablet (2.5 mg) daily except take one and a half tablets (3.75 mg) Th or as directed by INR clinic 100 tablet 3       Allergies:  No Known Allergies    Social History:   History   Drug Use No      History   Smoking Status     Never Smoker   Smokeless Tobacco     Never Used     Social History    Substance and Sexual Activity      Alcohol use: No        Alcohol/week: 0.0 standard drinks       Family History:  Family History   Problem Relation Age of Onset     Coronary Artery Disease Mother      Coronary Artery Disease Brother      Diabetes Brother      Cancer Brother          at age 52      Other Cancer Brother      Hypertension Sister      Hyperlipidemia Sister        Review of Systems:   A complete review of systems was negative except as mentioned in the History of Present Illness.     Objective & Physical Exam:  BP (!) 145/75 (BP Location: Right arm, Patient Position: Sitting, Cuff Size: Adult Regular)   Pulse 61   Ht 1.626 m (5' 4\")   Wt 69.4 kg (152 lb 14.4 oz)   LMP  (LMP Unknown)   Breastfeeding No   BMI 26.25 kg/m    Wt Readings from Last 2 Encounters:   21 69.4 kg (152 lb 14.4 oz)   20 68.9 kg (152 lb)     Body mass index is 26.25 kg/m .   Body surface area is 1.77 meters squared.    Constitutional: appears stated age, in no apparent distress, appears to be well nourished  Eyes: sclera anicteric, conjunctiva normal  ENT: normocephalic, without " obvious abnormality, atraumatic  Pulmonary: clear to auscultation bilaterally  Cardiovascular: JVP normal, regular rate, regular rhythm, 2-/6 JON at the RUSB, no murmur appreciated, no lower extremity edema, (+)pacemaker in Left upper chest  Gastrointestinal: abdominal exam benign  Neurologic: awake, alert, face symmetrical, moves all extremities  Skin: no jaundice  Psychiatric: affect is normal, answers questions appropriately, oriented to self and place    Data reviewed:  Lab Results   Component Value Date    WBC 6.4 11/17/2020    RBC 3.60 (L) 11/17/2020    HGB 12.3 11/17/2020    HCT 36.6 11/17/2020     (H) 11/17/2020    MCH 34.2 (H) 11/17/2020    MCHC 33.6 11/17/2020    RDW 17.0 (H) 11/17/2020     11/17/2020     Sodium   Date Value Ref Range Status   11/17/2020 141 133 - 144 mmol/L Final     Potassium   Date Value Ref Range Status   11/17/2020 4.2 3.4 - 5.3 mmol/L Final     Chloride   Date Value Ref Range Status   11/17/2020 108 94 - 109 mmol/L Final     Carbon Dioxide   Date Value Ref Range Status   11/17/2020 27 20 - 32 mmol/L Final     Anion Gap   Date Value Ref Range Status   11/17/2020 6 3 - 14 mmol/L Final     Glucose   Date Value Ref Range Status   11/17/2020 79 70 - 99 mg/dL Final     Comment:     Fasting specimen     Urea Nitrogen   Date Value Ref Range Status   11/17/2020 19 7 - 30 mg/dL Final     Creatinine   Date Value Ref Range Status   11/17/2020 0.82 0.52 - 1.04 mg/dL Final     GFR Estimate   Date Value Ref Range Status   11/17/2020 68 >60 mL/min/[1.73_m2] Final     Comment:     Non  GFR Calc  Starting 12/18/2018, serum creatinine based estimated GFR (eGFR) will be   calculated using the Chronic Kidney Disease Epidemiology Collaboration   (CKD-EPI) equation.       Calcium   Date Value Ref Range Status   11/17/2020 9.4 8.5 - 10.1 mg/dL Final     Bilirubin Total   Date Value Ref Range Status   11/17/2020 0.8 0.2 - 1.3 mg/dL Final     Alkaline Phosphatase   Date Value Ref  Range Status   2020 145 40 - 150 U/L Final     ALT   Date Value Ref Range Status   2020 45 0 - 50 U/L Final     AST   Date Value Ref Range Status   2020 49 (H) 0 - 45 U/L Final     Recent Labs   Lab Test 20  1122 20  1116 11/13/15  0850 11/13/15  0850   CHOL 167 184   < > 207*   HDL 45* 44*   < > 58   * 121*   < > 134*   TRIG 87 95   < > 77   CHOLHDLRATIO  --   --   --  3.6    < > = values in this interval not displayed.      Lab Results   Component Value Date    A1C 5.4 2016        Recent Results (from the past 4320 hour(s))   Echocardiogram Complete    Narrative    [id number]  [id number]  [id number]  [id number]^SRIRAM^GRACE^E           [facility]  Echocardiography Laboratory  [address] [place] [address]        Name: NADIYA LEWIS  MRN: [id number]  : 1940  Study Date: 2021 09:31 AM  Age: 80 yrs  Gender: Female  Patient [facility]  Reason For Study: S/P mitral valve replacement with bioprosthetic valve,  Pulmonary  Ordering Physician: GRACE BHATIA  Referring Physician: SAHRA Huang MD  Performed By: Agatha Dinh [facility] [place]     BSA: 1.7 m2  Height: 64 in  Weight: 152 lb  HR: 57  BP: 130/69 mmHg  _____________________________________________________________________________  __        Procedure  Complete Echo Adult.  _____________________________________________________________________________  __        Interpretation Summary     There is mild concentric left ventricular hypertrophy.  The visual ejection fraction is estimated at 55-60%.  The right ventricle is moderately dilated.  The right ventricular systolic function is mild to moderately reduced.  There is mild biatrial enlargement.  There is a bioprosthetic mitral valve.  There is moderate to mod-severe (2-3+) tricuspid regurgitation.  Right ventricular systolic pressure is elevated, consistent with severe  pulmonary hypertension.  Moderate valvular aortic stenosis.  There is  mild (1+) aortic regurgitation.  There is trace to mild pulmonic valvular regurgitation.  Compared to prior study, changes are noted.  _____________________________________________________________________________  __        Left Ventricle  The left ventricle is normal in size. There is mild concentric left  ventricular hypertrophy. Left ventricular systolic function is normal. The  visual ejection fraction is estimated at 55-60%. Left ventricular diastolic  function is not assessable. No regional wall motion abnormalities noted.     Right Ventricle  The right ventricle is moderately dilated. There is a catheter/pacemaker lead  seen in the right ventricle. The right ventricular systolic function is mild  to moderately reduced.     Atria  There is mild biatrial enlargement. There is no color Doppler evidence of an  atrial shunt.     Mitral Valve  There is no mitral regurgitation noted. The mean mitral valve gradient is 5.0  mmHg. There is a bioprosthetic mitral valve.        Tricuspid Valve  There is moderate to mod-severe (2-3+) tricuspid regurgitation. Right  ventricular systolic pressure is elevated, consistent with severe pulmonary  hypertension.     Aortic Valve  There is mild (1+) aortic regurgitation. The mean AoV pressure gradient is  11.4 mmHg. The calculated aortic valve are is 1.3 cm^2. Moderate valvular  aortic stenosis.     Pulmonic Valve  The pulmonic valve is not well visualized. There is trace to mild pulmonic  valvular regurgitation.     Vessels  Normal size aorta.     Pericardium  There is no pericardial effusion.        Rhythm  The rhythm was undetermined.  _____________________________________________________________________________  __  MMode/2D Measurements & Calculations  IVSd: 1.3 cm     LVIDd: 4.2 cm  LVIDs: 1.8 cm  LVPWd: 1.3 cm  FS: 57.6 %  LV mass(C)d: 196.6 grams  LV mass(C)dI: 113.0 grams/m2  Ao root diam: 3.2 cm  LA dimension: 4.3 cm  asc Aorta Diam: 3.4 cm  LA/Ao: 1.3  LVOT diam: 1.8  cm  LVOT area: 2.6 cm2  RWT: 0.63        Doppler Measurements & Calculations  MV E max moises: 177.7 cm/sec  MV max P.1 mmHg  MV mean P.0 mmHg  MV V2 VTI: 55.7 cm  MVA(VTI): 1.2 cm2  MV dec time: 0.28 sec  Ao V2 max: 220.9 cm/sec  Ao max P.0 mmHg  Ao V2 mean: 159.9 cm/sec  Ao mean P.4 mmHg  Ao V2 VTI: 51.7 cm  SILVESTRE(I,D): 1.3 cm2  SILVESTRE(V,D): 1.4 cm2  LV V1 max P.0 mmHg  LV V1 max: 122.3 cm/sec  LV V1 VTI: 26.7 cm  SV(LVOT): 68.5 ml  SI(LVOT): 39.4 ml/m2  PA acc time: 0.07 sec     PI end-d moises: 129.7 cm/sec  TR max moises: 389.1 cm/sec  TR max P.9 mmHg  RVSP(TR): 70.9 mmHg  AV Moises Ratio (DI): 0.55  SILVESTRE Index (cm2/m2): 0.76           _____________________________________________________________________________  __           Report approved by: Bishnu Castañeda 2021 02:02 PM

## 2021-01-19 NOTE — LETTER
1/19/2021    Yunior Huang MD  [address] [facility] [address]    RE: Zunilda Clark       Dear Colleague,    I had the pleasure of seeing Zunilda Clark in the [facility].        Cardiology Clinic Consultation:    January 19, 2021   Patient Name: Zunilda Clark  Patient MRN: [id number]    Consult reason: pulmonary HTN    HPI and Assessment and Plan/Recommendations:    Please see dictation. #[id number]    Thank you for allowing our team to participate in the care of Zunilda Clark.  Please do not hesitate to call or page me with any questions or concerns.    Sincerely,     Noe Rodriguez MD, [facility]  Cardiology  Text Page   January 19, 2021    cc  Augusto Brasher PA-C  [address] [place] [address]    Past Medical History:     The ASCVD Risk score (Laura SWETA Jr., et al., 2013) failed to calculate for the following reasons:    The patient has a prior MI or stroke diagnosis  Patient Active Problem List   Diagnosis     RA (rheumatoid arthritis) (H)     Benign essential hypertension     Atrial fibrillation and flutter (H)     Cardiac pacemaker in situ     Humerus fracture     Fracture, humerus closed, shaft     Anticoagulation management encounter     Acquired hypothyroidism     Essential hypertension     Long term current use of anticoagulants with INR goal of 2.0-3.0     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     S/P mitral valve replacement with bioprosthetic valve     Pulmonary hypertension (H)     S/P total knee arthroplasty     Wound dehiscence     Pacemaker twiddler's syndrome, initial encounter       Past Surgical History:   Past Surgical History:   Procedure Laterality Date     ABDOMEN SURGERY      prolapsed bladder     APPLY WOUND VAC Left 12/18/2018    Procedure: Wound vac application;  Surgeon: Pardeep Sheikh MD;  Location: [facility] OR     ARTHROPLASTY KNEE Left 11/12/2018    Procedure: Left total knee arthroplasty  using a [employer] knee system;  Surgeon: Pardeep Sheikh MD;  Location: [facility] OR     ARTHROSCOPY KNEE WITH DEBRIDEMENT JOINT, COMBINED Left 12/18/2018    Procedure: Left knee debridement and placement of wound vac;  Surgeon: Pardeep Sheikh MD;  Location: [facility] OR     COLONOSCOPY  3/15/2012     ESOPHAGOSCOPY, GASTROSCOPY, DUODENOSCOPY (EGD), COMBINED N/A 2/19/2020    Procedure: ESOPHAGOGASTRODUODENOSCOPY (EGD);  Surgeon: Michael Mccarthy MD;  Location: [facility] GI     EYE SURGERY  2018    Both eyes/cataract surgery     H ABLATION AV NODE  2011    AFlutter with syncope, PPM to follow     HERNIA REPAIR      3 hernies/right and left & umbilical     IMPLANT PACEMAKER  2011     LAPAROSCOPIC CHOLECYSTECTOMY WITH CHOLANGIOGRAMS N/A 4/29/2017    Procedure: LAPAROSCOPIC CHOLECYSTECTOMY WITH CHOLANGIOGRAMS;  LAPAROSCOPIC CHOLECYSTECTOMY WITH CHOLANGIOGRAMS ;  Surgeon: Angeles Mcgill MD;  Location: [facility] OR     OPEN REDUCTION INTERNAL FIXATION RODDING INTRAMEDULLARY HUMERUS Right 7/28/2015    Procedure: OPEN REDUCTION INTERNAL FIXATION RODDING INTRAMEDULLARY HUMERUS;  Surgeon: Raymundo Rivera MD;  Location: [facility] OR     REPLACE VALVE MITRAL  2006    Mitral valve replacement with bioprosthetic valve in 2008 for rheumatic disease     SLING BLADDER SUSPENSION WITH FASCIA UMESH       VASCULAR SURGERY  2003    Blood clots in both legs       Medications (outpatient):  Current Outpatient Medications   Medication Sig Dispense Refill     ALLOPURINOL PO Take 100 mg by mouth 2 times daily       amLODIPine-valsartan (EXFORGE) 5-160 MG tablet TAKE ONE TABLET BY MOUTH ONE TIME DAILY  90 tablet 2     calcium citrate (CALCITRATE) 950 MG tablet Take 1 tablet by mouth daily        folic acid (FOLVITE) 1 MG tablet Take 1 mg by mouth daily       latanoprostene bunod (VYZULTA) 0.024 % SOLN ophthalmic solution Place 1 drop Into the left eye At Bedtime       levothyroxine (SYNTHROID/LEVOTHROID) 112 MCG tablet Take 1  "tablet (112 mcg) by mouth daily 90 tablet 3     METHOTREXATE SODIUM IJ Inject 0.8 mLs as directed once a week        metoprolol tartrate (LOPRESSOR) 50 MG tablet Take 1 tablet (50 mg) by mouth 2 times daily 180 tablet 3     multivitamin, therapeutic with minerals (MULTI-VITAMIN) TABS tablet Take 1 tablet by mouth daily Without iron       omeprazole (PRILOSEC) 20 MG DR capsule Take 1 capsule (20 mg) by mouth 2 times daily 180 capsule 1     spironolactone (ALDACTONE) 25 MG tablet Take 0.5 tablets (12.5 mg) by mouth daily 90 tablet 11     torsemide (DEMADEX) 10 MG tablet Take 1 tablet (10 mg) by mouth daily (with breakfast) 90 tablet 1     VITAMIN D, CHOLECALCIFEROL, PO Take 1,000 Units by mouth daily       warfarin ANTICOAGULANT (COUMADIN) 2.5 MG tablet Take one tablet (2.5 mg) daily except take one and a half tablets (3.75 mg)  or as directed by INR clinic 100 tablet 3       Allergies:  No Known Allergies    Social History:   History   Drug Use No      History   Smoking Status     Never Smoker   Smokeless Tobacco     Never Used     Social History    Substance and Sexual Activity      Alcohol use: No        Alcohol/week: 0.0 standard drinks       Family History:  Family History   Problem Relation Age of Onset     Coronary Artery Disease Mother      Coronary Artery Disease Brother      Diabetes Brother      Cancer Brother          at age 52      Other Cancer Brother      Hypertension Sister      Hyperlipidemia Sister        Review of Systems:   A complete review of systems was negative except as mentioned in the History of Present Illness.     Objective & Physical Exam:  BP (!) 145/75 (BP Location: Right arm, Patient Position: Sitting, Cuff Size: Adult Regular)   Pulse 61   Ht 1.626 m (5' 4\")   Wt 69.4 kg (152 lb 14.4 oz)   LMP  (LMP Unknown)   Breastfeeding No   BMI 26.25 kg/m    Wt Readings from Last 2 Encounters:   21 69.4 kg (152 lb 14.4 oz)   20 68.9 kg (152 lb)     Body mass index " is 26.25 kg/m .   Body surface area is 1.77 meters squared.    Constitutional: appears stated age, in no apparent distress, appears to be well nourished  Eyes: sclera anicteric, conjunctiva normal  ENT: normocephalic, without obvious abnormality, atraumatic  Pulmonary: clear to auscultation bilaterally  Cardiovascular: JVP normal, regular rate, regular rhythm, 2-/6 JON at the RUSB, no murmur appreciated, no lower extremity edema, (+)pacemaker in Left upper chest  Gastrointestinal: abdominal exam benign  Neurologic: awake, alert, face symmetrical, moves all extremities  Skin: no jaundice  Psychiatric: affect is normal, answers questions appropriately, oriented to self and place    Data reviewed:  Lab Results   Component Value Date    WBC 6.4 11/17/2020    RBC 3.60 (L) 11/17/2020    HGB 12.3 11/17/2020    HCT 36.6 11/17/2020     (H) 11/17/2020    MCH 34.2 (H) 11/17/2020    MCHC 33.6 11/17/2020    RDW 17.0 (H) 11/17/2020     11/17/2020     Sodium   Date Value Ref Range Status   11/17/2020 141 133 - 144 mmol/L Final     Potassium   Date Value Ref Range Status   11/17/2020 4.2 3.4 - 5.3 mmol/L Final     Chloride   Date Value Ref Range Status   11/17/2020 108 94 - 109 mmol/L Final     Carbon Dioxide   Date Value Ref Range Status   11/17/2020 27 20 - 32 mmol/L Final     Anion Gap   Date Value Ref Range Status   11/17/2020 6 3 - 14 mmol/L Final     Glucose   Date Value Ref Range Status   11/17/2020 79 70 - 99 mg/dL Final     Comment:     Fasting specimen     Urea Nitrogen   Date Value Ref Range Status   11/17/2020 19 7 - 30 mg/dL Final     Creatinine   Date Value Ref Range Status   11/17/2020 0.82 0.52 - 1.04 mg/dL Final     GFR Estimate   Date Value Ref Range Status   11/17/2020 68 >60 mL/min/[1.73_m2] Final     Comment:     Non  GFR Calc  Starting 12/18/2018, serum creatinine based estimated GFR (eGFR) will be   calculated using the Chronic Kidney Disease Epidemiology Collaboration    (CKD-EPI) equation.       Calcium   Date Value Ref Range Status   2020 9.4 8.5 - 10.1 mg/dL Final     Bilirubin Total   Date Value Ref Range Status   2020 0.8 0.2 - 1.3 mg/dL Final     Alkaline Phosphatase   Date Value Ref Range Status   2020 145 40 - 150 U/L Final     ALT   Date Value Ref Range Status   2020 45 0 - 50 U/L Final     AST   Date Value Ref Range Status   2020 49 (H) 0 - 45 U/L Final     Recent Labs   Lab Test 20  1122 20  1116 11/13/15  0850 11/13/15  0850   CHOL 167 184   < > 207*   HDL 45* 44*   < > 58   * 121*   < > 134*   TRIG 87 95   < > 77   CHOLHDLRATIO  --   --   --  3.6    < > = values in this interval not displayed.      Lab Results   Component Value Date    A1C 5.4 2016        Recent Results (from the past 4320 hour(s))   Echocardiogram Complete    Narrative    [id number]  [id number]  [id number]  [id number]^SRIRAM^GRACE^E           [facility]  Echocardiography Laboratory  [address] [place] [address]        Name: NADIYA LEWIS  MRN: [id number]  : 1940  Study Date: 2021 09:31 AM  Age: 80 yrs  Gender: Female  Patient Location: [facility]  Reason For Study: S/P mitral valve replacement with bioprosthetic valve,  Pulmonary  Ordering Physician: GRACE BHATIA  Referring Physician: SAHRA Huang MD  Performed By: Agatha Dinh RDCS     BSA: 1.7 m2  Height: 64 in  Weight: 152 lb  HR: 57  BP: 130/69 mmHg  _____________________________________________________________________________  __        Procedure  Complete Echo Adult.  _____________________________________________________________________________  __        Interpretation Summary     There is mild concentric left ventricular hypertrophy.  The visual ejection fraction is estimated at 55-60%.  The right ventricle is moderately dilated.  The right ventricular systolic function is mild to moderately reduced.  There is mild biatrial enlargement.  There is a  bioprosthetic mitral valve.  There is moderate to mod-severe (2-3+) tricuspid regurgitation.  Right ventricular systolic pressure is elevated, consistent with severe  pulmonary hypertension.  Moderate valvular aortic stenosis.  There is mild (1+) aortic regurgitation.  There is trace to mild pulmonic valvular regurgitation.  Compared to prior study, changes are noted.  _____________________________________________________________________________  __        Left Ventricle  The left ventricle is normal in size. There is mild concentric left  ventricular hypertrophy. Left ventricular systolic function is normal. The  visual ejection fraction is estimated at 55-60%. Left ventricular diastolic  function is not assessable. No regional wall motion abnormalities noted.     Right Ventricle  The right ventricle is moderately dilated. There is a catheter/pacemaker lead  seen in the right ventricle. The right ventricular systolic function is mild  to moderately reduced.     Atria  There is mild biatrial enlargement. There is no color Doppler evidence of an  atrial shunt.     Mitral Valve  There is no mitral regurgitation noted. The mean mitral valve gradient is 5.0  mmHg. There is a bioprosthetic mitral valve.        Tricuspid Valve  There is moderate to mod-severe (2-3+) tricuspid regurgitation. Right  ventricular systolic pressure is elevated, consistent with severe pulmonary  hypertension.     Aortic Valve  There is mild (1+) aortic regurgitation. The mean AoV pressure gradient is  11.4 mmHg. The calculated aortic valve are is 1.3 cm^2. Moderate valvular  aortic stenosis.     Pulmonic Valve  The pulmonic valve is not well visualized. There is trace to mild pulmonic  valvular regurgitation.     Vessels  Normal size aorta.     Pericardium  There is no pericardial effusion.        Rhythm  The rhythm was undetermined.  _____________________________________________________________________________  __  MMode/2D Measurements &  Calculations  IVSd: 1.3 cm     LVIDd: 4.2 cm  LVIDs: 1.8 cm  LVPWd: 1.3 cm  FS: 57.6 %  LV mass(C)d: 196.6 grams  LV mass(C)dI: 113.0 grams/m2  Ao root diam: 3.2 cm  LA dimension: 4.3 cm  asc Aorta Diam: 3.4 cm  LA/Ao: 1.3  LVOT diam: 1.8 cm  LVOT area: 2.6 cm2  RWT: 0.63        Doppler Measurements & Calculations  MV E max moises: 177.7 cm/sec  MV max P.1 mmHg  MV mean P.0 mmHg  MV V2 VTI: 55.7 cm  MVA(VTI): 1.2 cm2  MV dec time: 0.28 sec  Ao V2 max: 220.9 cm/sec  Ao max P.0 mmHg  Ao V2 mean: 159.9 cm/sec  Ao mean P.4 mmHg  Ao V2 VTI: 51.7 cm  SILVESTRE(I,D): 1.3 cm2  SILVESTRE(V,D): 1.4 cm2  LV V1 max P.0 mmHg  LV V1 max: 122.3 cm/sec  LV V1 VTI: 26.7 cm  SV(LVOT): 68.5 ml  SI(LVOT): 39.4 ml/m2  PA acc time: 0.07 sec     PI end-d moises: 129.7 cm/sec  TR max moises: 389.1 cm/sec  TR max P.9 mmHg  RVSP(TR): 70.9 mmHg  AV Moises Ratio (DI): 0.55  SILVESTRE Index (cm2/m2): 0.76           _____________________________________________________________________________  __           Report approved by: Bishnu Castañeda 2021 02:02 PM             Thank you for allowing me to participate in the care of your patient.      Sincerely,     Noe Rodriguez MD     [facility]    cc:   Augusto rBasher PA-C  [address] [place] [address]

## 2021-01-19 NOTE — PATIENT INSTRUCTIONS
January 19, 2021    Thank you for allowing our Cardiology team to participate in your care.     Please note the following changes to your heart treatment plan:     Medication changes:   - start spironolactone 12.5mg daily    Tests to be done:  - NON-fasting electrolyte labs next Friday    Follow up:  - Follow up with Cardiology EP regarding pacemaker  - Follow up with Cardiology [facility] in about 2 months, and with me in 6 months, or sooner as needed.      Please contact our team at [phone] for any questions or concerns.   If you are having a medical emergency, please call 911.       Sincerely,    Noe Rodriguez MD, FACC  Cardiology    [facility] - [facility] - [facility] - [place]

## 2021-01-19 NOTE — LETTER
1/19/2021      Yunior Huang MD  [address] [facility] [address]      RE: Zunilda Clark       Dear Colleague,    I had the pleasure of seeing Zunilda Clark in the [facility].    Service Date: 01/19/2021      CARDIOLOGY CLINIC FOLLOWUP NOTE      CONSULT INDICATION:  Pulmonary hypertension, valvular abnormalities.      HISTORY OF PRESENT ILLNESS:        I had the opportunity to see patient, Zunilda Clark, in Cardiology Clinic for followup.  She is followed by our colleague, Dr. Huang, with Internal Medicine.      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      The patient has a complex cardiac history, notable for valvular heart disease with a prior mitral valve replacement, initially in 2007, as well as a redo mitral valve replacement with a 27 mm Magna bioprosthetic valve (2014).  She is also status post tricuspid valve annuloplasty with residual moderate to severe tricuspid regurgitation, cardiac MRI with mildly dilated right ventricle but normal systolic function (CMR 07/2018).  The patient also has a history of pacemaker placement for high-degree AV block after initial cardiac surgery in 2007.  Patient does note that the device was quite loose after initial implantation and she may have flipped this around once or twice.      She was last seen by my late colleague, Dr. Das, in clinic on 01/10/2020.  At that time, she was doing quite well and no changes were warranted to her cardiac regimen.  Previously, she was seen by my colleague, Dr. Monroy, in  clinic regarding pulmonary hypertension, overall clinical presentation seemed most consistent with group 2 pulmonary hypertension related to valve disease.  Last clinic visit 05/23/2019.       Patient Deanne Clark reports that overall she feels quite well.  Unfortunately, her  of over 50 years passed away a few years ago, and since then she has been living in their apartment at a co-op.  She remains fairly physically active walking up and down the halls of her home in the different floors of her facility.  She denies any chest pain, chest pressure, abnormal shortness of breath.  She denies any abnormal lower leg swelling.  She denies any weight gain, and in fact reports that she may have lost 5-6 pounds over the last year.  She denies any palpitations, dizziness or lightheadedness.  Blood pressure is mildly elevated today at 145/75 mmHg.      Last TTE 01/13/2021 showed LVEF 55%-60%, moderately dilated RV with mild to moderately reduced RV function.  Bioprosthetic mitral valve function was normal, mean gradient was 5 mmHg.  She was noted to have moderate to moderate-severe tricuspid regurgitation, elevated right-sided pressures.  She was also noted to have moderate aortic stenosis and mild aortic insufficiency.      ASSESSMENT AND PLAN/RECOMMENDATIONS:      1.  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  TTE 01/2021 showed mild progression with worsening RV function, elevated right-sided pressures.  However, findings seem similar when compared to prior studies including a cardiac MRI 07/2018.  Previously seen by my colleague Dr. Monroy in clinic.  The patient denies any symptoms concerning for decompensation, in fact she feels overall quite well.    2.  Rheumatic heart disease, history of bioprosthetic mitral valve replacement in 2007, redo in 2014 with a 27 mm Magna bioprosthetic mitral valve.  Mean gradient 5 mmHg, TTE 01/2021.  History of a tricuspid valve annuloplasty with residual  "moderate to severe tricuspid regurgitation.   3.  High-degree AV block after cardiac surgery in 2007, status post dual-chamber pacemaker placement.  Last device interrogation 12/16/2020, demonstrated we are reaching OPAL.  Reviewed TTE, it seems that the pacemaker lead may be impinging on the tricuspid valve, also there seems to be some tricuspid valve annular dilatation due to RV dilatation.  On review of chest x-rays, it seems that she may have twisted the device, which she did endorse doing shortly after implantation, and perhaps this is causing some tension on the leads and disruption of the tricuspid valve coaptation? Since this would have been around 2007, suspect that the leads may be scarred down.   4.  Rheumatoid arthritis.   5.  Hypertension.   6.  Atrial fibrillation, on warfarin.      -- Discussed options for further evaluation and management.  After an in-depth discussion, patient would like to hold off on further invasive procedures though is open to pacemaker replacement.  She would like to hold off on CARSON if possible.  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  Essentially she stated that she would not want to prolong her life or undergo invasive therapies if possible.   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   -- Start spironolactone 12.5 mg daily.   -- BMP next week.   -- Continue current cardiac regimen of amlodipine/valsartan 5/160 mg daily, metoprolol tartrate 50 mg b.i.d., torsemide 10 mg daily, warfarin.   -- Follow up with Cardiology [facility] in about 2 months for reassessment, and with me in 6 months, or sooner as needed.      Thank you for allowing our team to participate in the care of patient Deanne Clark.  Please " do not hesitate to page or call with any questions or concerns.     Noe Rodriguez MD, [facility]  Cardiology  Text Page   2021           D: 2021   T: 2021   MT: AMY      Name:     NADIYA LEWIS   MRN:      [id number]        Account:      [id number]   :      1940           Service Date: 2021      Document: [id number]            Outpatient Encounter Medications as of 2021   Medication Sig Dispense Refill     ALLOPURINOL PO Take 100 mg by mouth 2 times daily       amLODIPine-valsartan (EXFORGE) 5-160 MG tablet TAKE ONE TABLET BY MOUTH ONE TIME DAILY  90 tablet 2     calcium citrate (CALCITRATE) 950 MG tablet Take 1 tablet by mouth daily        folic acid (FOLVITE) 1 MG tablet Take 1 mg by mouth daily       latanoprostene bunod (VYZULTA) 0.024 % SOLN ophthalmic solution Place 1 drop Into the left eye At Bedtime       levothyroxine (SYNTHROID/LEVOTHROID) 112 MCG tablet Take 1 tablet (112 mcg) by mouth daily 90 tablet 3     METHOTREXATE SODIUM IJ Inject 0.8 mLs as directed once a week        metoprolol tartrate (LOPRESSOR) 50 MG tablet Take 1 tablet (50 mg) by mouth 2 times daily 180 tablet 3     multivitamin, therapeutic with minerals (MULTI-VITAMIN) TABS tablet Take 1 tablet by mouth daily Without iron       omeprazole (PRILOSEC) 20 MG DR capsule Take 1 capsule (20 mg) by mouth 2 times daily 180 capsule 1     spironolactone (ALDACTONE) 25 MG tablet Take 0.5 tablets (12.5 mg) by mouth daily 90 tablet 11     torsemide (DEMADEX) 10 MG tablet Take 1 tablet (10 mg) by mouth daily (with breakfast) 90 tablet 1     VITAMIN D, CHOLECALCIFEROL, PO Take 1,000 Units by mouth daily       warfarin ANTICOAGULANT (COUMADIN) 2.5 MG tablet Take one tablet (2.5 mg) daily except take one and a half tablets (3.75 mg)  or as directed by INR clinic 100 tablet 3     [DISCONTINUED] spironolactone (ALDACTONE) 25 MG tablet Take 1 tablet (25 mg) by mouth daily 90 tablet 11     No  facility-administered encounter medications on file as of 1/19/2021.        Again, thank you for allowing me to participate in the care of your patient.      Sincerely,    Noe Rodriguez MD     [facility]

## 2021-01-20 ENCOUNTER — ANCILLARY PROCEDURE (OUTPATIENT)
Dept: CARDIOLOGY | Facility: CLINIC | Age: 81
End: 2021-01-20
Attending: INTERNAL MEDICINE
Payer: MEDICARE

## 2021-01-20 DIAGNOSIS — Z95.0 CARDIAC PACEMAKER IN SITU: ICD-10-CM

## 2021-01-20 NOTE — PROGRESS NOTES
Service Date: 01/19/2021      CARDIOLOGY CLINIC FOLLOWUP NOTE      CONSULT INDICATION:  Pulmonary hypertension, valvular abnormalities.      HISTORY OF PRESENT ILLNESS:        I had the opportunity to see patient, Zunilda Clark, in Cardiology Clinic for followup.  She is followed by our colleague, Dr. Huang, with Internal Medicine.      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      The patient has a complex cardiac history, notable for valvular heart disease with a prior mitral valve replacement, initially in 2007, as well as a redo mitral valve replacement with a 27 mm Magna bioprosthetic valve (2014).  She is also status post tricuspid valve annuloplasty with residual moderate to severe tricuspid regurgitation, cardiac MRI with mildly dilated right ventricle but normal systolic function (CMR 07/2018).  The patient also has a history of pacemaker placement for high-degree AV block after initial cardiac surgery in 2007.  Patient does note that the device was quite loose after initial implantation and she may have flipped this around once or twice.      She was last seen by my late colleague, Dr. Das, in clinic on 01/10/2020.  At that time, she was doing quite well and no changes were warranted to her cardiac regimen.  Previously, she was seen by my colleague, Dr. Monroy, in clinic regarding pulmonary hypertension, overall clinical presentation seemed most consistent with group 2 pulmonary hypertension related to valve disease.  Last clinic visit 05/23/2019.       Patient Deanne Clark reports that overall  she feels quite well.  Unfortunately, her  of over 50 years passed away a few years ago, and since then she has been living in their apartment at a co-op.  She remains fairly physically active walking up and down the halls of her home in the different floors of her facility.  She denies any chest pain, chest pressure, abnormal shortness of breath.  She denies any abnormal lower leg swelling.  She denies any weight gain, and in fact reports that she may have lost 5-6 pounds over the last year.  She denies any palpitations, dizziness or lightheadedness.  Blood pressure is mildly elevated today at 145/75 mmHg.      Last TTE 01/13/2021 showed LVEF 55%-60%, moderately dilated RV with mild to moderately reduced RV function.  Bioprosthetic mitral valve function was normal, mean gradient was 5 mmHg.  She was noted to have moderate to moderate-severe tricuspid regurgitation, elevated right-sided pressures.  She was also noted to have moderate aortic stenosis and mild aortic insufficiency.      ASSESSMENT AND PLAN/RECOMMENDATIONS:      1.  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  TTE 01/2021 showed mild progression with worsening RV function, elevated right-sided pressures.  However, findings seem similar when compared to prior studies including a cardiac MRI 07/2018.  Previously seen by my colleague Dr. Monroy in clinic.  The patient denies any symptoms concerning for decompensation, in fact she feels overall quite well.    2.  Rheumatic heart disease, history of bioprosthetic mitral valve replacement in 2007, redo in 2014 with a 27 mm Magna bioprosthetic mitral valve.  Mean gradient 5 mmHg, TTE 01/2021.  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   3.  High-degree AV block after cardiac surgery in 2007, status post dual-chamber pacemaker placement.  Last device interrogation 12/16/2020, demonstrated we are reaching OPAL.  Reviewed  "TTE, it seems that the pacemaker lead may be impinging on the tricuspid valve, also there seems to be some tricuspid valve annular dilatation due to RV dilatation.  On review of chest x-rays, it seems that she may have twisted the device, which she did endorse doing shortly after implantation, and perhaps this is causing some tension on the leads and disruption of the tricuspid valve coaptation? Since this would have been around 2007, suspect that the leads may be scarred down.   4.  Rheumatoid arthritis.   5.  Hypertension.   6.  Atrial fibrillation, on warfarin.      -- Discussed options for further evaluation and management.  After an in-depth discussion, patient would like to hold off on further invasive procedures though is open to pacemaker replacement.  She would like to hold off on CARSON if possible.  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  Essentially she stated that she would not want to prolong her life or undergo invasive therapies if possible.   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   -- Start spironolactone 12.5 mg daily.   -- BMP next week.   -- Continue current cardiac regimen of amlodipine/valsartan 5/160 mg daily, metoprolol tartrate 50 mg b.i.d., torsemide 10 mg daily, warfarin.   -- Follow up with Cardiology PRE[facility] in about 2 months for reassessment, and with me in 6 months, or sooner as needed.      Thank you for allowing our team to participate in the care of patient Deanne Clark.  Please do not hesitate to page or call with any questions or concerns.     Noe Rodriguez MD, [facility]  Cardiology  Text Page   January 19, 2021           D: 01/19/2021   T: 01/19/2021   MT: AMY      Name:     NADIYA CLARK   MRN:  "     [id number]        Account:      [id number]   :      1940           Service Date: 2021      Document: [id number]

## 2021-01-25 LAB
MDC_IDC_EPISODE_DTM: NORMAL
MDC_IDC_EPISODE_DURATION: 10 S
MDC_IDC_EPISODE_DURATION: 101 S
MDC_IDC_EPISODE_DURATION: 104 S
MDC_IDC_EPISODE_DURATION: 11 S
MDC_IDC_EPISODE_DURATION: 112 S
MDC_IDC_EPISODE_DURATION: 12 S
MDC_IDC_EPISODE_DURATION: 1565 S
MDC_IDC_EPISODE_DURATION: 1587 S
MDC_IDC_EPISODE_DURATION: 17 S
MDC_IDC_EPISODE_DURATION: 18 S
MDC_IDC_EPISODE_DURATION: 182 S
MDC_IDC_EPISODE_DURATION: 187 S
MDC_IDC_EPISODE_DURATION: 197 S
MDC_IDC_EPISODE_DURATION: 211 S
MDC_IDC_EPISODE_DURATION: 23 S
MDC_IDC_EPISODE_DURATION: 2369 S
MDC_IDC_EPISODE_DURATION: 26 S
MDC_IDC_EPISODE_DURATION: 2713 S
MDC_IDC_EPISODE_DURATION: 2735 S
MDC_IDC_EPISODE_DURATION: 2776 S
MDC_IDC_EPISODE_DURATION: 28 S
MDC_IDC_EPISODE_DURATION: 3 S
MDC_IDC_EPISODE_DURATION: 30 S
MDC_IDC_EPISODE_DURATION: 3176 S
MDC_IDC_EPISODE_DURATION: 332 S
MDC_IDC_EPISODE_DURATION: 3576 S
MDC_IDC_EPISODE_DURATION: 37 S
MDC_IDC_EPISODE_DURATION: 371 S
MDC_IDC_EPISODE_DURATION: 386 S
MDC_IDC_EPISODE_DURATION: 4 S
MDC_IDC_EPISODE_DURATION: 4066 S
MDC_IDC_EPISODE_DURATION: 446 S
MDC_IDC_EPISODE_DURATION: 4665 S
MDC_IDC_EPISODE_DURATION: 47 S
MDC_IDC_EPISODE_DURATION: 4721 S
MDC_IDC_EPISODE_DURATION: 5 S
MDC_IDC_EPISODE_DURATION: 562 S
MDC_IDC_EPISODE_DURATION: 6 S
MDC_IDC_EPISODE_DURATION: 60 S
MDC_IDC_EPISODE_DURATION: 61 S
MDC_IDC_EPISODE_DURATION: 631 S
MDC_IDC_EPISODE_DURATION: 674 S
MDC_IDC_EPISODE_DURATION: 694 S
MDC_IDC_EPISODE_DURATION: 793 S
MDC_IDC_EPISODE_DURATION: 793 S
MDC_IDC_EPISODE_DURATION: 8 S
MDC_IDC_EPISODE_DURATION: 809 S
MDC_IDC_EPISODE_DURATION: 819 S
MDC_IDC_EPISODE_DURATION: 91 S
MDC_IDC_EPISODE_DURATION: NORMAL S
MDC_IDC_EPISODE_ID: NORMAL
MDC_IDC_EPISODE_TYPE: NORMAL
MDC_IDC_LEAD_IMPLANT_DT: NORMAL
MDC_IDC_LEAD_IMPLANT_DT: NORMAL
MDC_IDC_LEAD_LOCATION: NORMAL
MDC_IDC_LEAD_LOCATION: NORMAL
MDC_IDC_LEAD_MFG: NORMAL
MDC_IDC_LEAD_MFG: NORMAL
MDC_IDC_LEAD_MODEL: NORMAL
MDC_IDC_LEAD_MODEL: NORMAL
MDC_IDC_LEAD_POLARITY_TYPE: NORMAL
MDC_IDC_LEAD_POLARITY_TYPE: NORMAL
MDC_IDC_LEAD_SERIAL: NORMAL
MDC_IDC_LEAD_SERIAL: NORMAL
MDC_IDC_MSMT_BATTERY_DTM: NORMAL
MDC_IDC_MSMT_BATTERY_STATUS: NORMAL
MDC_IDC_MSMT_BATTERY_VOLTAGE: 2.82 V
MDC_IDC_MSMT_LEADCHNL_RA_IMPEDANCE_VALUE: 392 OHM
MDC_IDC_MSMT_LEADCHNL_RA_SENSING_INTR_AMPL: 1.21 MV
MDC_IDC_MSMT_LEADCHNL_RV_IMPEDANCE_VALUE: 408 OHM
MDC_IDC_PG_IMPLANT_DTM: NORMAL
MDC_IDC_PG_MFG: NORMAL
MDC_IDC_PG_MODEL: NORMAL
MDC_IDC_PG_SERIAL: NORMAL
MDC_IDC_PG_TYPE: NORMAL
MDC_IDC_SESS_CLINIC_NAME: NORMAL
MDC_IDC_SESS_DTM: NORMAL
MDC_IDC_SESS_TYPE: NORMAL
MDC_IDC_SET_BRADY_AT_MODE_SWITCH_RATE: 171 {BEATS}/MIN
MDC_IDC_SET_BRADY_HYSTRATE: NORMAL
MDC_IDC_SET_BRADY_LOWRATE: 60 {BEATS}/MIN
MDC_IDC_SET_BRADY_MAX_SENSOR_RATE: 130 {BEATS}/MIN
MDC_IDC_SET_BRADY_MAX_TRACKING_RATE: 130 {BEATS}/MIN
MDC_IDC_SET_BRADY_MODE: NORMAL
MDC_IDC_SET_BRADY_PAV_DELAY_LOW: 180 MS
MDC_IDC_SET_BRADY_SAV_DELAY_LOW: 150 MS
MDC_IDC_SET_LEADCHNL_RA_PACING_AMPLITUDE: 2 V
MDC_IDC_SET_LEADCHNL_RA_PACING_ANODE_ELECTRODE_1: NORMAL
MDC_IDC_SET_LEADCHNL_RA_PACING_ANODE_LOCATION_1: NORMAL
MDC_IDC_SET_LEADCHNL_RA_PACING_CATHODE_ELECTRODE_1: NORMAL
MDC_IDC_SET_LEADCHNL_RA_PACING_CATHODE_LOCATION_1: NORMAL
MDC_IDC_SET_LEADCHNL_RA_PACING_POLARITY: NORMAL
MDC_IDC_SET_LEADCHNL_RA_PACING_PULSEWIDTH: 0.4 MS
MDC_IDC_SET_LEADCHNL_RA_SENSING_ANODE_ELECTRODE_1: NORMAL
MDC_IDC_SET_LEADCHNL_RA_SENSING_ANODE_LOCATION_1: NORMAL
MDC_IDC_SET_LEADCHNL_RA_SENSING_CATHODE_ELECTRODE_1: NORMAL
MDC_IDC_SET_LEADCHNL_RA_SENSING_CATHODE_LOCATION_1: NORMAL
MDC_IDC_SET_LEADCHNL_RA_SENSING_POLARITY: NORMAL
MDC_IDC_SET_LEADCHNL_RA_SENSING_SENSITIVITY: 0.45 MV
MDC_IDC_SET_LEADCHNL_RV_PACING_AMPLITUDE: 2 V
MDC_IDC_SET_LEADCHNL_RV_PACING_ANODE_ELECTRODE_1: NORMAL
MDC_IDC_SET_LEADCHNL_RV_PACING_ANODE_LOCATION_1: NORMAL
MDC_IDC_SET_LEADCHNL_RV_PACING_CATHODE_ELECTRODE_1: NORMAL
MDC_IDC_SET_LEADCHNL_RV_PACING_CATHODE_LOCATION_1: NORMAL
MDC_IDC_SET_LEADCHNL_RV_PACING_POLARITY: NORMAL
MDC_IDC_SET_LEADCHNL_RV_PACING_PULSEWIDTH: 0.4 MS
MDC_IDC_SET_LEADCHNL_RV_SENSING_ANODE_ELECTRODE_1: NORMAL
MDC_IDC_SET_LEADCHNL_RV_SENSING_ANODE_LOCATION_1: NORMAL
MDC_IDC_SET_LEADCHNL_RV_SENSING_CATHODE_ELECTRODE_1: NORMAL
MDC_IDC_SET_LEADCHNL_RV_SENSING_CATHODE_LOCATION_1: NORMAL
MDC_IDC_SET_LEADCHNL_RV_SENSING_POLARITY: NORMAL
MDC_IDC_SET_LEADCHNL_RV_SENSING_SENSITIVITY: 2.1 MV
MDC_IDC_SET_ZONE_DETECTION_INTERVAL: 350 MS
MDC_IDC_SET_ZONE_DETECTION_INTERVAL: 400 MS
MDC_IDC_SET_ZONE_TYPE: NORMAL
MDC_IDC_STAT_AT_BURDEN_PERCENT: 78.2 %
MDC_IDC_STAT_AT_DTM_END: NORMAL
MDC_IDC_STAT_AT_DTM_START: NORMAL
MDC_IDC_STAT_BRADY_AP_VP_PERCENT: 24.3 %
MDC_IDC_STAT_BRADY_AP_VS_PERCENT: 0.02 %
MDC_IDC_STAT_BRADY_AS_VP_PERCENT: 75.12 %
MDC_IDC_STAT_BRADY_AS_VS_PERCENT: 0.56 %
MDC_IDC_STAT_BRADY_DTM_END: NORMAL
MDC_IDC_STAT_BRADY_DTM_START: NORMAL
MDC_IDC_STAT_BRADY_RA_PERCENT_PACED: 21.97 %
MDC_IDC_STAT_BRADY_RV_PERCENT_PACED: 99.32 %
MDC_IDC_STAT_EPISODE_RECENT_COUNT: 0
MDC_IDC_STAT_EPISODE_RECENT_COUNT: 3195
MDC_IDC_STAT_EPISODE_RECENT_COUNT_DTM_END: NORMAL
MDC_IDC_STAT_EPISODE_RECENT_COUNT_DTM_START: NORMAL
MDC_IDC_STAT_EPISODE_TOTAL_COUNT: 1
MDC_IDC_STAT_EPISODE_TOTAL_COUNT: 15
MDC_IDC_STAT_EPISODE_TOTAL_COUNT: 3
MDC_IDC_STAT_EPISODE_TOTAL_COUNT: NORMAL
MDC_IDC_STAT_EPISODE_TOTAL_COUNT_DTM_END: NORMAL
MDC_IDC_STAT_EPISODE_TYPE: NORMAL

## 2021-01-26 ENCOUNTER — TRANSFERRED RECORDS (OUTPATIENT)
Dept: HEALTH INFORMATION MANAGEMENT | Facility: CLINIC | Age: 81
End: 2021-01-26

## 2021-01-26 LAB
ALT SERPL-CCNC: 21 IU/L (ref 5–35)
AST SERPL-CCNC: 24 U/L (ref 5–34)
CREAT SERPL-MCNC: 0.99 MG/DL (ref 0.5–1.3)
GFR SERPL CREATININE-BSD FRML MDRD: 57.5 ML/MIN/1.73M2

## 2021-01-29 DIAGNOSIS — I27.20 PULMONARY HTN (H): ICD-10-CM

## 2021-01-29 LAB
ANION GAP SERPL CALCULATED.3IONS-SCNC: 3 MMOL/L (ref 3–14)
BUN SERPL-MCNC: 40 MG/DL (ref 7–30)
CALCIUM SERPL-MCNC: 9.5 MG/DL (ref 8.5–10.1)
CHLORIDE SERPL-SCNC: 104 MMOL/L (ref 94–109)
CO2 SERPL-SCNC: 32 MMOL/L (ref 20–32)
CREAT SERPL-MCNC: 1.17 MG/DL (ref 0.52–1.04)
GFR SERPL CREATININE-BSD FRML MDRD: 44 ML/MIN/{1.73_M2}
GLUCOSE SERPL-MCNC: 84 MG/DL (ref 70–99)
POTASSIUM SERPL-SCNC: 4 MMOL/L (ref 3.4–5.3)
SODIUM SERPL-SCNC: 139 MMOL/L (ref 133–144)

## 2021-01-29 PROCEDURE — 80048 BASIC METABOLIC PNL TOTAL CA: CPT | Performed by: INTERNAL MEDICINE

## 2021-01-29 PROCEDURE — 36415 COLL VENOUS BLD VENIPUNCTURE: CPT | Performed by: INTERNAL MEDICINE

## 2021-02-01 ENCOUNTER — TELEPHONE (OUTPATIENT)
Dept: CARDIOLOGY | Facility: CLINIC | Age: 81
End: 2021-02-01

## 2021-02-01 DIAGNOSIS — I10 BENIGN ESSENTIAL HYPERTENSION: Primary | ICD-10-CM

## 2021-02-01 DIAGNOSIS — I48.91 ATRIAL FIBRILLATION AND FLUTTER (H): ICD-10-CM

## 2021-02-01 DIAGNOSIS — I27.20 PULMONARY HTN (H): ICD-10-CM

## 2021-02-01 DIAGNOSIS — I48.92 ATRIAL FIBRILLATION AND FLUTTER (H): ICD-10-CM

## 2021-02-01 NOTE — TELEPHONE ENCOUNTER
Last OV W/Dr. 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 Pt declines invasive procedures, elects for medical management. Referred to EP to review generator replacement. Pt to start spirolactone 25mg daily. BMP X1 week for HTN  Follow-up ordered / Scheduled for 2/17/2021 with Dr. Cowan, 3/29/2021 with phillip luna for pulmonary HTN clinic, and 7/2021 with Dr. Rodriguez for routine follow up.     BMP results:   Component      Latest Ref Rng & Units 1/29/2021   Sodium      133 - 144 mmol/L 139   Potassium      3.4 - 5.3 mmol/L 4.0   Chloride      94 - 109 mmol/L 104   Carbon Dioxide      20 - 32 mmol/L 32   Anion Gap      3 - 14 mmol/L 3   Glucose      70 - 99 mg/dL 84   Urea Nitrogen      7 - 30 mg/dL 40 (H)   Creatinine      0.52 - 1.04 mg/dL 1.17 (H)   GFR Estimate      >60 mL/min/1.73:m2 44 (L)   GFR Estimate If Black      >60 mL/min/1.73:m2 51 (L)   Calcium      8.5 - 10.1 mg/dL 9.5       ----- Message from Noe Rodriguez MD sent at 1/29/2021  3:05 PM CST -----  Results reviewed, unfortunately creatinine is worse since starting spironolactone 12.5mg daily, we should discontinue this and instead add amlodipine 2.5mg daily to her amlodipine/valsartan 5mg/160mg daily since she was hypertensive at our last clinic visit. Please have her check a BMP at her visit with Dr. Cowan thanks!  _____________________________________________    Call placed to pt to review results and recommendations.   No answer - LVM with call back info - ACB 02/01/21 11:13 AM TAD Graves RN, BSN.   Pt returned call - pt verbalized understanding of recommendations. Pt reports ' - 130's / 70's - 80's. Pt will  continue to monitor BP and will call with sustained readings over 130. Pt advised of possible side effects of medication and pt advised to call should she note any side effects. Call back information provided.     Follow up scheduled for 2/17/2021 with Dr. Cowan, and 3/29/2021 with Zena Benitez. Med list updated per MD order. Pt scheduled for BMP prior to OV with Dr. Cowan. To be completed 2/8/21 at INR visit.   TAD Graves RN, BSN. 02/02/21 10:02 AM

## 2021-02-02 RX ORDER — AMLODIPINE BESYLATE 2.5 MG/1
2.5 TABLET ORAL DAILY
Qty: 90 TABLET | Refills: 1 | Status: SHIPPED | OUTPATIENT
Start: 2021-02-02 | End: 2021-02-17

## 2021-02-05 ENCOUNTER — IMMUNIZATION (OUTPATIENT)
Dept: NURSING | Facility: CLINIC | Age: 81
End: 2021-02-05
Payer: MEDICARE

## 2021-02-05 PROCEDURE — 91300 PR COVID VAC PFIZER DIL RECON 30 MCG/0.3 ML IM: CPT

## 2021-02-05 PROCEDURE — 0001A PR COVID VAC PFIZER DIL RECON 30 MCG/0.3 ML IM: CPT

## 2021-02-08 ENCOUNTER — ANTICOAGULATION THERAPY VISIT (OUTPATIENT)
Dept: ANTICOAGULATION | Facility: CLINIC | Age: 81
End: 2021-02-08

## 2021-02-08 DIAGNOSIS — I48.92 ATRIAL FIBRILLATION AND FLUTTER (H): ICD-10-CM

## 2021-02-08 DIAGNOSIS — Z79.01 LONG TERM CURRENT USE OF ANTICOAGULANTS WITH INR GOAL OF 2.0-3.0: ICD-10-CM

## 2021-02-08 DIAGNOSIS — I48.91 ATRIAL FIBRILLATION AND FLUTTER (H): ICD-10-CM

## 2021-02-08 DIAGNOSIS — I10 BENIGN ESSENTIAL HYPERTENSION: ICD-10-CM

## 2021-02-08 DIAGNOSIS — I27.20 PULMONARY HTN (H): ICD-10-CM

## 2021-02-08 DIAGNOSIS — Z95.3 S/P MITRAL VALVE REPLACEMENT WITH BIOPROSTHETIC VALVE: ICD-10-CM

## 2021-02-08 LAB — INR PPP: 2.6 (ref 0.86–1.14)

## 2021-02-08 PROCEDURE — 80048 BASIC METABOLIC PNL TOTAL CA: CPT | Performed by: INTERNAL MEDICINE

## 2021-02-08 PROCEDURE — 99207 PR NO CHARGE NURSE ONLY: CPT

## 2021-02-08 PROCEDURE — 36415 COLL VENOUS BLD VENIPUNCTURE: CPT | Performed by: INTERNAL MEDICINE

## 2021-02-08 PROCEDURE — 85610 PROTHROMBIN TIME: CPT | Performed by: INTERNAL MEDICINE

## 2021-02-08 NOTE — PROGRESS NOTES
ANTICOAGULATION FOLLOW-UP CLINIC VISIT    Patient Name:  Zunilda Alicea May  Date:  2021  Contact Type:  Telephone/ Called patient, she denies any changes. Orders discussed with the patient, she agrees with the plan. Lab INR appointment scheduled on 3/8/21.    SUBJECTIVE:  Patient Findings     Comments:  The patient was assessed for diet, medication, and activity level changes, missed or extra doses, bruising or bleeding, with no problem findings. Maintenance warfarin dosing was reviewed with patient and will remain on the same dose until next INR check. Patient did not have any questions or concerns.           Clinical Outcomes     Negatives:  Major bleeding event, Thromboembolic event, Anticoagulation-related hospital admission, Anticoagulation-related ED visit, Anticoagulation-related fatality    Comments:  The patient was assessed for diet, medication, and activity level changes, missed or extra doses, bruising or bleeding, with no problem findings. Maintenance warfarin dosing was reviewed with patient and will remain on the same dose until next INR check. Patient did not have any questions or concerns.              OBJECTIVE    Recent labs: (last 7 days)     21  1044   INR 2.60*       ASSESSMENT / PLAN  INR assessment THER    Recheck INR In: 4 WEEKS    INR Location Outside lab      Anticoagulation Summary  As of 2021    INR goal:  2.0-3.0   TTR:  52.7 % (1 y)   INR used for dosin.60 (2021)   Warfarin maintenance plan:  3.75 mg (2.5 mg x 1.5) every Thu; 2.5 mg (2.5 mg x 1) all other days   Full warfarin instructions:  3.75 mg every Thu; 2.5 mg all other days   Weekly warfarin total:  18.75 mg   No change documented:  Екатерина Mahan RN   Plan last modified:  Екатерина Mahan RN (2020)   Next INR check:  3/8/2021   Priority:  Maintenance   Target end date:  Indefinite    Indications    Long term current use of anticoagulants with INR goal of 2.0-3.0 [Z79.01]  Atrial fibrillation and  flutter (H) [I48.91  I48.92]  S/P mitral valve replacement with bioprosthetic valve [Z95.3]             Anticoagulation Episode Summary     INR check location:      Preferred lab:      Send INR reminders to:  RANJIT [facility]    Comments:  likes printed AVS      Anticoagulation Care Providers     Provider Role Specialty Phone number    Yunior Huang MD Referring Internal Medicine [phone]            See the Encounter Report to view Anticoagulation Flowsheet and Dosing Calendar (Go to Encounters tab in chart review, and find the Anticoagulation Therapy Visit)    Dosage adjustment made based on physician directed care plan.    Екатерина Mahan RN

## 2021-02-09 LAB
ANION GAP SERPL CALCULATED.3IONS-SCNC: 5 MMOL/L (ref 3–14)
BUN SERPL-MCNC: 40 MG/DL (ref 7–30)
CALCIUM SERPL-MCNC: 9.7 MG/DL (ref 8.5–10.1)
CHLORIDE SERPL-SCNC: 108 MMOL/L (ref 94–109)
CO2 SERPL-SCNC: 26 MMOL/L (ref 20–32)
CREAT SERPL-MCNC: 1.18 MG/DL (ref 0.52–1.04)
GFR SERPL CREATININE-BSD FRML MDRD: 44 ML/MIN/{1.73_M2}
GLUCOSE SERPL-MCNC: 105 MG/DL (ref 70–99)
POTASSIUM SERPL-SCNC: 4.2 MMOL/L (ref 3.4–5.3)
SODIUM SERPL-SCNC: 139 MMOL/L (ref 133–144)

## 2021-02-11 ENCOUNTER — TELEPHONE (OUTPATIENT)
Dept: CARDIOLOGY | Facility: CLINIC | Age: 81
End: 2021-02-11

## 2021-02-11 DIAGNOSIS — I48.92 ATRIAL FIBRILLATION AND FLUTTER (H): ICD-10-CM

## 2021-02-11 DIAGNOSIS — I48.91 ATRIAL FIBRILLATION AND FLUTTER (H): ICD-10-CM

## 2021-02-11 DIAGNOSIS — I10 BENIGN ESSENTIAL HYPERTENSION: Primary | ICD-10-CM

## 2021-02-11 NOTE — TELEPHONE ENCOUNTER
Last OV W/Dr. 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 Pt declines invasive procedures, elects for medical management. Referred to EP to review generator replacement. Pt to start spirolactone 25mg daily. BMP X1 week for HTN. Pt had BMP completed 1/29/21 and CR elevated. Per Dr. Rodriguez pt to stop spirolactone and start Amlodipine 2.5mg daily. BMP prior to OV with Dr. Cowan for Cr recheck. BMP completed 2/8/21 - Cr remains elevated at 1.18.   Routing to MD for review and recommendations.   Follow up scheduled for 2/17/21 with Dr. Cowan and 3/29/21 with Zena luna.   TAD Graves RN, BSN.     Component      Latest Ref Rng & Units 1/29/2021 2/8/2021   Sodium      133 - 144 mmol/L 139 139   Potassium      3.4 - 5.3 mmol/L 4.0 4.2   Chloride      94 - 109 mmol/L 104 108   Carbon Dioxide      20 - 32 mmol/L 32 26   Anion Gap      3 - 14 mmol/L 3 5   Glucose      70 - 99 mg/dL 84 105 (H)   Urea Nitrogen      7 - 30 mg/dL 40 (H) 40 (H)   Creatinine      0.52 - 1.04 mg/dL 1.17 (H) 1.18 (H)   GFR Estimate      >60 mL/min/1.73:m2 44 (L) 44 (L)   GFR Estimate If Black      >60 mL/min/1.73:m2 51 (L) 51 (L)   Calcium      8.5 - 10.1 mg/dL 9.5 9.7       ----- Message from Noe Rodriguez MD sent at 2/11/2021  8:06 AM CST -----  Results reviewed, creatinine remains mildly elevated but it had only been about a week since we had stopped spironolactone, and so it is difficult to tell if there has truly been any change/response. Please see if how her BPs have been doing since starting amlodipine, if consistently >130/80 recommend increasing amlodipine 2.5mg daily to 5mg daily. BMP at follow up visit with  "Zena 3/29/2021 thanks!   JANICE Freitas    Call placed to pt to review results from BMP and blood pressure results from the past week.     2/2/21  /69 HR 62     2/3/21 - Started Amlodipine 2.5mg daily  /62 HR 62    2/4/21  /56 HR 62    2/5/21  /56 HR 64    2/6/21  /57 HR 64    2/7/21  /66 HR 62    Pt reports she has been feeling well without any complaints and feels as though some her \"holiday fatigue\" has diminished. Pt will have repeat BMP completed 3/8/21 at her next INR visit. Pt has OV with Dr. Cowan next week. No med changes made today as BP well controlled. Pt will continue to monitor BP and will call if readings >130/80.     TAD Graves RN, BSN. 02/11/21 2:48 PM     "

## 2021-02-17 ENCOUNTER — OFFICE VISIT (OUTPATIENT)
Dept: CARDIOLOGY | Facility: CLINIC | Age: 81
End: 2021-02-17
Attending: INTERNAL MEDICINE
Payer: MEDICARE

## 2021-02-17 VITALS
HEIGHT: 64 IN | SYSTOLIC BLOOD PRESSURE: 125 MMHG | DIASTOLIC BLOOD PRESSURE: 74 MMHG | WEIGHT: 153 LBS | BODY MASS INDEX: 26.12 KG/M2 | OXYGEN SATURATION: 98 % | HEART RATE: 65 BPM

## 2021-02-17 DIAGNOSIS — Z95.3 STATUS POST MITRAL VALVE REPLACEMENT WITH BIOPROSTHETIC VALVE: ICD-10-CM

## 2021-02-17 DIAGNOSIS — T82.198A: ICD-10-CM

## 2021-02-17 DIAGNOSIS — I44.2 COMPLETE ATRIOVENTRICULAR BLOCK (H): Primary | ICD-10-CM

## 2021-02-17 DIAGNOSIS — I27.20 PULMONARY HYPERTENSION (H): ICD-10-CM

## 2021-02-17 PROCEDURE — 99214 OFFICE O/P EST MOD 30 MIN: CPT | Performed by: INTERNAL MEDICINE

## 2021-02-17 RX ORDER — SPIRONOLACTONE 25 MG/1
25 TABLET ORAL DAILY
Status: ON HOLD | COMMUNITY
End: 2021-03-08

## 2021-02-17 ASSESSMENT — MIFFLIN-ST. JEOR: SCORE: 1154

## 2021-02-17 NOTE — LETTER
2/17/2021    Yunior Huang MD  [address] [facility] [address]    RE: Zunilda Clark       Dear Colleague,    I had the pleasure of seeing Zunilda Clark in the [facility].    HPI and Plan:   See dictation [id number]    No orders of the defined types were placed in this encounter.      Orders Placed This Encounter   Medications     TIMOLOL MALEATE OP     spironolactone (ALDACTONE) 25 MG tablet     Sig: Take 25 mg by mouth daily       Medications Discontinued During This Encounter   Medication Reason     amLODIPine (NORVASC) 2.5 MG tablet Medication Reconciliation Clean Up         Encounter Diagnosis   Name Primary?     Pacemaker twiddler's syndrome, initial encounter        CURRENT MEDICATIONS:  Current Outpatient Medications   Medication Sig Dispense Refill     ALLOPURINOL PO Take 100 mg by mouth 2 times daily       amLODIPine-valsartan (EXFORGE) 5-160 MG tablet TAKE ONE TABLET BY MOUTH ONE TIME DAILY  90 tablet 2     calcium citrate (CALCITRATE) 950 MG tablet Take 1 tablet by mouth daily        folic acid (FOLVITE) 1 MG tablet Take 1 mg by mouth daily       latanoprostene bunod (VYZULTA) 0.024 % SOLN ophthalmic solution Place 1 drop Into the left eye At Bedtime       levothyroxine (SYNTHROID/LEVOTHROID) 112 MCG tablet Take 1 tablet (112 mcg) by mouth daily 90 tablet 3     METHOTREXATE SODIUM IJ Inject 0.8 mLs as directed once a week Thursdays       metoprolol tartrate (LOPRESSOR) 50 MG tablet Take 1 tablet (50 mg) by mouth 2 times daily 180 tablet 3     multivitamin, therapeutic with minerals (MULTI-VITAMIN) TABS tablet Take 1 tablet by mouth daily Without iron       omeprazole (PRILOSEC) 20 MG DR capsule Take 1 capsule (20 mg) by mouth 2 times daily 180 capsule 1     spironolactone (ALDACTONE) 25 MG tablet Take 25 mg by mouth daily       TIMOLOL MALEATE OP        torsemide (DEMADEX) 10 MG tablet Take 1 tablet (10 mg) by mouth daily (with  breakfast) 90 tablet 1     VITAMIN D, CHOLECALCIFEROL, PO Take 1,000 Units by mouth daily       warfarin ANTICOAGULANT (COUMADIN) 2.5 MG tablet Take one tablet (2.5 mg) daily except take one and a half tablets (3.75 mg) Th or as directed by INR clinic 100 tablet 3       ALLERGIES   No Known Allergies    PAST MEDICAL HISTORY:  Past Medical History:   Diagnosis Date     Acquired hypothyroidism 11/4/2015     Aortic valve insufficiency      Arrhythmia     A fib     Arthritis      Atrial fibrillation (H)      Atrial fibrillation and flutter (H)      Blood clotting disorder (H)      CHF (congestive heart failure) (H)      DVT (deep venous thrombosis) (H) 2003     Gastro-oesophageal reflux disease      H/O mitral valve replacement with tissue graft june 2014     History of blood transfusion 2014     HTN (hypertension)      Hypothyroidism      Other chronic pain      PONV (postoperative nausea and vomiting)      Pulmonary HTN (H)      Rheumatoid arthritis (H)      Rheumatoid arthritis(714.0)      Seizures (H) 2014     Shingles 08/2018     Stroke (H) 2009    left side mild weakness      Stroke (H) 2014     [place]ope 2011    [place]     Thrombosis of leg 2003    both legs       PAST SURGICAL HISTORY:  Past Surgical History:   Procedure Laterality Date     ABDOMEN SURGERY      prolapsed bladder     APPLY WOUND VAC Left 12/18/2018    Procedure: Wound vac application;  Surgeon: Pardeep Sheikh MD;  Location: [facility] OR     ARTHROPLASTY KNEE Left 11/12/2018    Procedure: Left total knee arthroplasty using a [employer] knee system;  Surgeon: Pardeep Sheikh MD;  Location:  OR     ARTHROSCOPY KNEE WITH DEBRIDEMENT JOINT, COMBINED Left 12/18/2018    Procedure: Left knee debridement and placement of wound vac;  Surgeon: Pardeep Sheikh MD;  Location:  OR     COLONOSCOPY  3/15/2012     ESOPHAGOSCOPY, GASTROSCOPY, DUODENOSCOPY (EGD), COMBINED N/A 2/19/2020    Procedure:  ESOPHAGOGASTRODUODENOSCOPY (EGD);  Surgeon: Michael Mccarthy MD;  Location: [facility] GI     EYE SURGERY  2018    Both eyes/cataract surgery     H ABLATION AV NODE      AFlutter with syncope, PPM to follow     HERNIA REPAIR      3 hernies/right and left & umbilical     IMPLANT PACEMAKER       LAPAROSCOPIC CHOLECYSTECTOMY WITH CHOLANGIOGRAMS N/A 2017    Procedure: LAPAROSCOPIC CHOLECYSTECTOMY WITH CHOLANGIOGRAMS;  LAPAROSCOPIC CHOLECYSTECTOMY WITH CHOLANGIOGRAMS ;  Surgeon: Angeles Mcgill MD;  Location: [facility] OR     OPEN REDUCTION INTERNAL FIXATION RODDING INTRAMEDULLARY HUMERUS Right 2015    Procedure: OPEN REDUCTION INTERNAL FIXATION RODDING INTRAMEDULLARY HUMERUS;  Surgeon: Raymundo Rivrea MD;  Location: [facility] OR     REPLACE VALVE MITRAL  2006    Mitral valve replacement with bioprosthetic valve in  for rheumatic disease     SLING BLADDER SUSPENSION WITH FASCIA UMESH       VASCULAR SURGERY      Blood clots in both legs       FAMILY HISTORY:  Family History   Problem Relation Age of Onset     Coronary Artery Disease Mother      Coronary Artery Disease Brother      Diabetes Brother      Cancer Brother          at age 52      Other Cancer Brother      Hypertension Sister      Hyperlipidemia Sister        SOCIAL HISTORY:  Social History     Socioeconomic History     Marital status:      Spouse name: None     Number of children: None     Years of education: None     Highest education level: None   Occupational History     None   Social Needs     Financial resource strain: None     Food insecurity     Worry: None     Inability: None     Transportation needs     Medical: None     Non-medical: None   Tobacco Use     Smoking status: Never Smoker     Smokeless tobacco: Never Used   Substance and Sexual Activity     Alcohol use: No     Alcohol/week: 0.0 standard drinks     Drug use: No     Sexual activity: Not Currently     Partners: Male   Lifestyle     Physical activity     Days per  week: None     Minutes per session: None     Stress: None   Relationships     Social connections     Talks on phone: None     Gets together: None     Attends Christian service: None     Active member of club or organization: None     Attends meetings of clubs or organizations: None     Relationship status: None     Intimate partner violence     Fear of current or ex partner: None     Emotionally abused: None     Physically abused: None     Forced sexual activity: None   Other Topics Concern      Service Not Asked     Blood Transfusions Not Asked     Caffeine Concern Yes     Comment: occasionally     Occupational Exposure Not Asked     Hobby Hazards Not Asked     Sleep Concern No     Stress Concern Not Asked     Weight Concern Not Asked     Special Diet Yes     Comment: lower salt     Back Care No     Exercise Yes     Comment: walking ride excercise bike     Bike Helmet Not Asked     Seat Belt Not Asked     Self-Exams Not Asked     Parent/sibling w/ CABG, MI or angioplasty before 65F 55M? Yes     Comment: Brother   Social History Narrative     None       Review of Systems:  Skin:  Negative       Eyes:  Positive for glasses    ENT:  Negative      Respiratory:  Positive for shortness of breath SOB when carring things   Cardiovascular:  Negative      Gastroenterology: Positive for reflux controlled with meds  Genitourinary:  Negative      Musculoskeletal:  Positive for back pain;arthritis    Neurologic:  Positive for numbness or tingling of feet feet feel funny  Psychiatric:  Negative      Heme/Lymph/Imm:  Negative      Endocrine:  Positive for thyroid disorder                  Thank you for allowing me to participate in the care of your patient.      Sincerely,     Tamiko Cowan MD     [facility]  cc:   Noe Rodriguez MD  [address], [address]  [place] [address]

## 2021-02-17 NOTE — PROGRESS NOTES
HPI and Plan:   See dictation [id number]    No orders of the defined types were placed in this encounter.      Orders Placed This Encounter   Medications     TIMOLOL MALEATE OP     spironolactone (ALDACTONE) 25 MG tablet     Sig: Take 25 mg by mouth daily       Medications Discontinued During This Encounter   Medication Reason     amLODIPine (NORVASC) 2.5 MG tablet Medication Reconciliation Clean Up         Encounter Diagnosis   Name Primary?     Pacemaker twiddler's syndrome, initial encounter        CURRENT MEDICATIONS:  Current Outpatient Medications   Medication Sig Dispense Refill     ALLOPURINOL PO Take 100 mg by mouth 2 times daily       amLODIPine-valsartan (EXFORGE) 5-160 MG tablet TAKE ONE TABLET BY MOUTH ONE TIME DAILY  90 tablet 2     calcium citrate (CALCITRATE) 950 MG tablet Take 1 tablet by mouth daily        folic acid (FOLVITE) 1 MG tablet Take 1 mg by mouth daily       latanoprostene bunod (VYZULTA) 0.024 % SOLN ophthalmic solution Place 1 drop Into the left eye At Bedtime       levothyroxine (SYNTHROID/LEVOTHROID) 112 MCG tablet Take 1 tablet (112 mcg) by mouth daily 90 tablet 3     METHOTREXATE SODIUM IJ Inject 0.8 mLs as directed once a week Thursdays       metoprolol tartrate (LOPRESSOR) 50 MG tablet Take 1 tablet (50 mg) by mouth 2 times daily 180 tablet 3     multivitamin, therapeutic with minerals (MULTI-VITAMIN) TABS tablet Take 1 tablet by mouth daily Without iron       omeprazole (PRILOSEC) 20 MG DR capsule Take 1 capsule (20 mg) by mouth 2 times daily 180 capsule 1     spironolactone (ALDACTONE) 25 MG tablet Take 25 mg by mouth daily       TIMOLOL MALEATE OP        torsemide (DEMADEX) 10 MG tablet Take 1 tablet (10 mg) by mouth daily (with breakfast) 90 tablet 1     VITAMIN D, CHOLECALCIFEROL, PO Take 1,000 Units by mouth daily       warfarin ANTICOAGULANT (COUMADIN) 2.5 MG tablet Take one tablet (2.5 mg) daily except take one and a half tablets (3.75 mg) Th or as directed by INR clinic 100  tablet 3       ALLERGIES   No Known Allergies    PAST MEDICAL HISTORY:  Past Medical History:   Diagnosis Date     Acquired hypothyroidism 11/4/2015     Aortic valve insufficiency      Arrhythmia     A fib     Arthritis      Atrial fibrillation (H)      Atrial fibrillation and flutter (H)      Blood clotting disorder (H)      CHF (congestive heart failure) (H)      DVT (deep venous thrombosis) (H) 2003     Gastro-oesophageal reflux disease      H/O mitral valve replacement with tissue graft june 2014     History of blood transfusion 2014     HTN (hypertension)      Hypothyroidism      Other chronic pain      PONV (postoperative nausea and vomiting)      Pulmonary HTN (H)      Rheumatoid arthritis (H)      Rheumatoid arthritis(714.0)      Seizures (H) 2014     Shingles 08/2018     Stroke (H) 2009    left side mild weakness      Stroke (H) 2014     [place]    [place]     Thrombosis of leg 2003    both legs       PAST SURGICAL HISTORY:  Past Surgical History:   Procedure Laterality Date     ABDOMEN SURGERY      prolapsed bladder     APPLY WOUND VAC Left 12/18/2018    Procedure: Wound vac application;  Surgeon: Pardeep Sheikh MD;  Location:  OR     ARTHROPLASTY KNEE Left 11/12/2018    Procedure: Left total knee arthroplasty using a [employer] knee system;  Surgeon: Pardeep Sheikh MD;  Location:  OR     ARTHROSCOPY KNEE WITH DEBRIDEMENT JOINT, COMBINED Left 12/18/2018    Procedure: Left knee debridement and placement of wound vac;  Surgeon: Pardeep Sheikh MD;  Location:  OR     COLONOSCOPY  3/15/2012     ESOPHAGOSCOPY, GASTROSCOPY, DUODENOSCOPY (EGD), COMBINED N/A 2/19/2020    Procedure: ESOPHAGOGASTRODUODENOSCOPY (EGD);  Surgeon: Michael Mccarthy MD;  Location:  GI     EYE SURGERY  2018    Both eyes/cataract surgery     H ABLATION AV NODE  2011    AFlutter with syncope, PPM to follow     HERNIA REPAIR      3 hernies/right and left & umbilical      IMPLANT PACEMAKER       LAPAROSCOPIC CHOLECYSTECTOMY WITH CHOLANGIOGRAMS N/A 2017    Procedure: LAPAROSCOPIC CHOLECYSTECTOMY WITH CHOLANGIOGRAMS;  LAPAROSCOPIC CHOLECYSTECTOMY WITH CHOLANGIOGRAMS ;  Surgeon: Angeles Mcgill MD;  Location: [facility] OR     OPEN REDUCTION INTERNAL FIXATION RODDING INTRAMEDULLARY HUMERUS Right 2015    Procedure: OPEN REDUCTION INTERNAL FIXATION RODDING INTRAMEDULLARY HUMERUS;  Surgeon: Raymundo Rivera MD;  Location: [facility] OR     REPLACE VALVE MITRAL  2006    Mitral valve replacement with bioprosthetic valve in  for rheumatic disease     SLING BLADDER SUSPENSION WITH FASCIA UMESH       VASCULAR SURGERY      Blood clots in both legs       FAMILY HISTORY:  Family History   Problem Relation Age of Onset     Coronary Artery Disease Mother      Coronary Artery Disease Brother      Diabetes Brother      Cancer Brother          at age 52      Other Cancer Brother      Hypertension Sister      Hyperlipidemia Sister        SOCIAL HISTORY:  Social History     Socioeconomic History     Marital status:      Spouse name: None     Number of children: None     Years of education: None     Highest education level: None   Occupational History     None   Social Needs     Financial resource strain: None     Food insecurity     Worry: None     Inability: None     Transportation needs     Medical: None     Non-medical: None   Tobacco Use     Smoking status: Never Smoker     Smokeless tobacco: Never Used   Substance and Sexual Activity     Alcohol use: No     Alcohol/week: 0.0 standard drinks     Drug use: No     Sexual activity: Not Currently     Partners: Male   Lifestyle     Physical activity     Days per week: None     Minutes per session: None     Stress: None   Relationships     Social connections     Talks on phone: None     Gets together: None     Attends Islam service: None     Active member of club or organization: None     Attends meetings of clubs or  organizations: None     Relationship status: None     Intimate partner violence     Fear of current or ex partner: None     Emotionally abused: None     Physically abused: None     Forced sexual activity: None   Other Topics Concern      Service Not Asked     Blood Transfusions Not Asked     Caffeine Concern Yes     Comment: occasionally     Occupational Exposure Not Asked     Hobby Hazards Not Asked     Sleep Concern No     Stress Concern Not Asked     Weight Concern Not Asked     Special Diet Yes     Comment: lower salt     Back Care No     Exercise Yes     Comment: walking ride excercise bike     Bike Helmet Not Asked     Seat Belt Not Asked     Self-Exams Not Asked     Parent/sibling w/ CABG, MI or angioplasty before 65F 55M? Yes     Comment: Brother   Social History Narrative     None       Review of Systems:  Skin:  Negative       Eyes:  Positive for glasses    ENT:  Negative      Respiratory:  Positive for shortness of breath SOB when carring things   Cardiovascular:  Negative      Gastroenterology: Positive for reflux controlled with meds  Genitourinary:  Negative      Musculoskeletal:  Positive for back pain;arthritis    Neurologic:  Positive for numbness or tingling of feet feet feel funny  Psychiatric:  Negative      Heme/Lymph/Imm:  Negative      Endocrine:  Positive for thyroid disorder

## 2021-02-17 NOTE — PROGRESS NOTES
"Service Date: 02/17/2021      HISTORY OF PRESENT ILLNESS:    I had the pleasure of seeing Ms. Zunilda Clark, a delightful 80-year-old female, for discussion of pacemaker replacement.      Ms. Clark has the following chronic medical issues:   A.  Rheumatic valvular disease.  MVR x2 (2008, 2014) and tricuspid valve annuloplasty with residual moderate to severe tricuspid regurgitation.   B.  Paroxysmal atrial fibrillation.  Prior MAZE.   C.  History of stroke with residual mild left arm weakness.   D.  History of DVT.   E.  Pulmonary hypertension, severe range.   F.  Hypertension.   G.  Pacemaker since 2011 for SSS.  The device is a Medtronic MRI-compatible unit.      Ms. Clark's pacemaker has been approaching [facility].  In the past, the patient saw Loy Das and Porfirio and more recently Dr. Rodriguez.      Ms. Clark came into the clinic today accompanied by her sister.  They live in different apartments at the same senior building.  The patient is modestly active.  She can actually manage quite well without excessive shortness of breath or chest pain.  She has not had orthopnea or PND.      Ms. Clark tells me that the first few days/weeks after device implantation in 2011, she felt that her device was sticking out, so she \"twisted it\" in an attempt to get it to sit flatter.  Ever since, there is a lead loop at the upper lateral corner of her device (\"twiddler's syndrome.\")      Cardiac medications at the moment include amlodipine, metoprolol, spironolactone, torsemide, warfarin.      The patient is a nonsmoker, nondrinker.        PHYSICAL EXAMINATION:   VITAL SIGNS:  Blood pressure 125/74, pulse 65 and regular, weight 69 kg, height 163 cm.   GENERAL:  She is a pleasant woman in no distress.   HEENT:  Head normocephalic.   NECK:  Supple with normal JVP.   LUNGS:  Clear.   CARDIOVASCULAR:  Regular rhythm with 2/6 systolic murmur heard throughout the precordium, and it is loudest at the right upper sternal border.   ABDOMEN:  Soft, " "nontender.   EXTREMITIES:  No edema.   SKIN:  With well-healed left pectoral incision.  There is a pacemaker loop at the upper lateral edge of the device.        DIAGNOSTIC STUDIES:     I have personally reviewed her diagnostic studies:  - Recent laboratory tests:  INR 2.6, potassium 4.2, creatinine 1.18, GFR 44.   - Most recent echocardiogram on 01/13/2021 showed EF 55%-60% with moderate RV dilatation, bioprosthetic mitral valve, 2 to 3+ TR, severe pulmonary hypertension estimated at 61 mmHg plus right atrial pressure.        IMPRESSION:  1.  SSS.  Pacemaker approaching OPAL.   2.  Rheumatic valvular disease, status post MVR and TV repair.   3.  Hypertension.   4.  Severe pulmonary HTN.  Moderate+ tricuspid regurgitation.   We expect that Ms. Clark's pacemaker will need to be replaced in the next 1-3 months.  Thus, we have intensified follow-up in the Device Clinic.      The replacement procedure was discussed today.  I quoted an approximately 2% risk of serious complication, most importantly infection.  We discussed ways to reduce the risk of infection.  The patient understands and agrees to proceed.  The patient inquired whether there is a way to \"straighten\" her leads in the pocket area, but this is not possible without risking lead injury.  I would advise against it.  Pacemaker replacement is typically an outpatient procedure and she would be expected to go home later in the day.      RECOMMENDATIONS:    Close followup pacemaker battery.  Replace device as needed.      It was my pleasure seeing Ms. Clark today.  She will follow up with Dr. Rodriguez for her remaining cardiac care.  We will plan to hold warfarin for 2 days before her pacemaker replacement procedure.        NADER ESTRADA MD, FACC         cc:   Yunior Huang MD    [facility]    [address], [address]    [place]  [address]       Noe Rodriguez MD    [address], [address]    [place]  [address] "             D: 2021   T: 2021   MT: AMY      Name:     NADIYA LEWIS   MRN:      [id number]        Account:      [id number]   :      1940           Service Date: 2021      Document: [id number]

## 2021-02-17 NOTE — LETTER
"2/17/2021      Yunior Huang MD  [address] [facility] [address]      RE: Zunilda Clark       Dear Colleague,    I had the pleasure of seeing Zunilda Clark in the [facility].    Service Date: 02/17/2021      HISTORY OF PRESENT ILLNESS:  I had the pleasure of seeing Ms. Zunilda Clark, a delightful 80-year-old female, for discussion of pacemaker replacement.      Ms. Clark has the following chronic medical issues:   A.  Rheumatic valvular disease.  MVR x2 (2007, 2014) and tricuspid valve annuloplasty with residual moderate to severe tricuspid regurgitation.   B.  Paroxysmal atrial fibrillation.   C.  History of stroke with residual mild left arm weakness.   D.  History of DVT.   E.  Pulmonary hypertension, typically in the severe range.   F.  Hypertension.   G.  Pacemaker following AV node ablation in 2011.  The device is a Medtronic MRI-compatible unit.      Ms. Clark's pacemaker has been approaching OPAL.  She is pacemaker dependent following AV node ablation.  In the past, the patient has seen Loy Das and Porfirio and most recently was seen by Dr. Rodriguez.      The patient came into the clinic today accompanied by her sister.  They both live at the same senior building, in different apartments.  The patient is modestly active but can manage quite well without excessive shortness of breath or other issues.  She has not had orthopnea or PND.      She tells me that the first few days after device implantation in 2011, she felt that her device was sticking out oddly, so she \"twisted it\" in an attempt to have it settled down.  Ever since, there is the appearance of a loop in the upper lateral corner of her device consistent with mild \"twiddler's syndrome.\"      The patient has not had recent orthopnea, PND or lower extremity edema.  Cardiac medications at the moment include amlodipine, metoprolol, spironolactone, torsemide, warfarin.      The patient " "is a nonsmoker, nondrinker.      PHYSICAL EXAMINATION:   VITAL SIGNS:  Blood pressure 125/74, pulse 65 and regular, weight 69 kg, height 163 cm.   GENERAL:  She is a pleasant woman in no distress.   HEENT:  Head normocephalic.   NECK:  Supple with normal JVP.   LUNGS:  Clear.   CARDIOVASCULAR:  Regular rhythm with 2/6 systolic murmur heard throughout the precordium, and it is loudest at the right upper sternal border.   ABDOMEN:  Soft, nontender.   EXTREMITIES:  No edema.   SKIN:  With well-healed left pectoral incision.  There is a pacemaker loop at the upper lateral edge of the device.      DIAGNOSTIC STUDIES:     Recent laboratory tests:  INR 2.6, potassium 4.2, creatinine 1.18, GFR 44.      Most recent echocardiogram on 01/13/2021 showed EF 55%-60% with moderate RV dilatation, bioprosthetic mitral valve, 2 to 3+ TR, evidence for severe pulmonary hypertension estimated at 61 mmHg plus right atrial pressure.      IMPRESSION:   1.  Complete AV block.  Pacemaker approaching OPAL.   2.  Rheumatic valvular disease, status post MVR and TV repair.   3.  Hypertension.   4.  Moderate-plus tricuspid regurgitation.      Ms. Clark's pacemaker will need to be replaced in the next 1-3 months.  We have intensified followup in the Device Clinic.  The replacement procedure was discussed today.  I quoted her an approximately 2% risk of serious complication, most importantly infection.  We discussed ways to reduce the risk of infection.  The patient understands and agrees to proceed.  The patient asked me whether there is any way to \"straighten\" her leads, but this is not possible at this time.  Any tension on the leads could potentially to a lead fracture, which is of course something we wish to avoid.  I explained that the pacemaker replacement procedure is an outpatient procedure and she would be expected to go home later in the day.      RECOMMENDATIONS:  Close followup pacemaker battery.  Replace device as needed.      It was my " pleasure seeing Ms. Lewis today.  She will follow up with Dr. Rodriguez for her remaining cardiac care.  We will plan to hold warfarin for 2-3 days before her pacemaker replacement procedure.      cc:   Yunior Huang MD    [facility]    [address], [address]    [place]  [address]       Noe Rodriguez MD    [facility]    [address], [address]    [place]  [address]         NADER ESTRADA MD             D: 2021   T: 2021   MT: AMY      Name:     NADIYA LEWIS   MRN:      [id number]        Account:      [id number]   :      1940           Service Date: 2021      Document: [id number]        Outpatient Encounter Medications as of 2021   Medication Sig Dispense Refill     ALLOPURINOL PO Take 100 mg by mouth 2 times daily       amLODIPine-valsartan (EXFORGE) 5-160 MG tablet TAKE ONE TABLET BY MOUTH ONE TIME DAILY  90 tablet 2     calcium citrate (CALCITRATE) 950 MG tablet Take 1 tablet by mouth daily        folic acid (FOLVITE) 1 MG tablet Take 1 mg by mouth daily       latanoprostene bunod (VYZULTA) 0.024 % SOLN ophthalmic solution Place 1 drop Into the left eye At Bedtime       levothyroxine (SYNTHROID/LEVOTHROID) 112 MCG tablet Take 1 tablet (112 mcg) by mouth daily 90 tablet 3     METHOTREXATE SODIUM IJ Inject 0.8 mLs as directed once a week        metoprolol tartrate (LOPRESSOR) 50 MG tablet Take 1 tablet (50 mg) by mouth 2 times daily 180 tablet 3     multivitamin, therapeutic with minerals (MULTI-VITAMIN) TABS tablet Take 1 tablet by mouth daily Without iron       omeprazole (PRILOSEC) 20 MG DR capsule Take 1 capsule (20 mg) by mouth 2 times daily 180 capsule 1     spironolactone (ALDACTONE) 25 MG tablet Take 25 mg by mouth daily       TIMOLOL MALEATE OP        torsemide (DEMADEX) 10 MG tablet Take 1 tablet (10 mg) by mouth daily (with breakfast) 90 tablet 1     VITAMIN D, CHOLECALCIFEROL, PO Take 1,000 Units by mouth daily        warfarin ANTICOAGULANT (COUMADIN) 2.5 MG tablet Take one tablet (2.5 mg) daily except take one and a half tablets (3.75 mg) Th or as directed by INR clinic 100 tablet 3     [DISCONTINUED] amLODIPine (NORVASC) 2.5 MG tablet Take 1 tablet (2.5 mg) by mouth daily (Patient not taking: Reported on 2/17/2021) 90 tablet 1     No facility-administered encounter medications on file as of 2/17/2021.        Again, thank you for allowing me to participate in the care of your patient.      Sincerely,    Tamiko Cowan MD     [facility]

## 2021-02-25 ENCOUNTER — ANCILLARY PROCEDURE (OUTPATIENT)
Dept: CARDIOLOGY | Facility: CLINIC | Age: 81
End: 2021-02-25
Attending: INTERNAL MEDICINE
Payer: MEDICARE

## 2021-02-25 DIAGNOSIS — Z95.0 CARDIAC PACEMAKER IN SITU: ICD-10-CM

## 2021-02-25 DIAGNOSIS — I44.2 COMPLETE ATRIOVENTRICULAR BLOCK (H): Primary | ICD-10-CM

## 2021-02-25 NOTE — PROGRESS NOTES
Received message from Device Tech that patient's device had triggered OPAL on 2/13/21.  Patient saw Dr. Cowan on 2/17/21 and procedure was discussed and H&P completed.  Order for pacemaker generator change entered and paperwork provided to device .      On review of scanned documents from [facility] regarding pacemaker insertion, patient had an AFlutter ablation on 11/21/2011 prior to pacemaker placement on 11/22/2011.  Pt's most recent check on 12/16/20 showed AT/AFLwith v-rates in the 30's.  Overall burden has increased from 6.8% in December to 92% on 2/25/21.  Overall v-rates controlled.  Pt is on Warfarin for AF/AFL. Device checks prior to 12/2020 showed an AP/VS or AS/VS rhythm.      MELINA Guerrero

## 2021-02-26 ENCOUNTER — IMMUNIZATION (OUTPATIENT)
Dept: NURSING | Facility: CLINIC | Age: 81
End: 2021-02-26
Attending: INTERNAL MEDICINE
Payer: MEDICARE

## 2021-02-26 DIAGNOSIS — Z11.59 ENCOUNTER FOR SCREENING FOR OTHER VIRAL DISEASES: ICD-10-CM

## 2021-02-26 PROBLEM — I44.2 COMPLETE ATRIOVENTRICULAR BLOCK (H): Status: ACTIVE | Noted: 2021-02-26

## 2021-02-26 PROCEDURE — 91300 PR COVID VAC PFIZER DIL RECON 30 MCG/0.3 ML IM: CPT

## 2021-02-26 PROCEDURE — 0002A PR COVID VAC PFIZER DIL RECON 30 MCG/0.3 ML IM: CPT

## 2021-02-28 ENCOUNTER — HEALTH MAINTENANCE LETTER (OUTPATIENT)
Age: 81
End: 2021-02-28

## 2021-03-01 LAB
MDC_IDC_EPISODE_DTM: NORMAL
MDC_IDC_EPISODE_DURATION: 105 S
MDC_IDC_EPISODE_DURATION: 1088 S
MDC_IDC_EPISODE_DURATION: 1115 S
MDC_IDC_EPISODE_DURATION: 1280 S
MDC_IDC_EPISODE_DURATION: 1302 S
MDC_IDC_EPISODE_DURATION: 134 S
MDC_IDC_EPISODE_DURATION: 1539 S
MDC_IDC_EPISODE_DURATION: 158 S
MDC_IDC_EPISODE_DURATION: 16 S
MDC_IDC_EPISODE_DURATION: 1834 S
MDC_IDC_EPISODE_DURATION: 1872 S
MDC_IDC_EPISODE_DURATION: 1961 S
MDC_IDC_EPISODE_DURATION: 212 S
MDC_IDC_EPISODE_DURATION: 226 S
MDC_IDC_EPISODE_DURATION: 246 S
MDC_IDC_EPISODE_DURATION: 2490 S
MDC_IDC_EPISODE_DURATION: 2524 S
MDC_IDC_EPISODE_DURATION: 2585 S
MDC_IDC_EPISODE_DURATION: 2611 S
MDC_IDC_EPISODE_DURATION: 282 S
MDC_IDC_EPISODE_DURATION: 296 S
MDC_IDC_EPISODE_DURATION: 312 S
MDC_IDC_EPISODE_DURATION: 328 S
MDC_IDC_EPISODE_DURATION: 350 S
MDC_IDC_EPISODE_DURATION: 369 S
MDC_IDC_EPISODE_DURATION: 487 S
MDC_IDC_EPISODE_DURATION: 4885 S
MDC_IDC_EPISODE_DURATION: 522 S
MDC_IDC_EPISODE_DURATION: 53 S
MDC_IDC_EPISODE_DURATION: 552 S
MDC_IDC_EPISODE_DURATION: 554 S
MDC_IDC_EPISODE_DURATION: 58 S
MDC_IDC_EPISODE_DURATION: 600 S
MDC_IDC_EPISODE_DURATION: 617 S
MDC_IDC_EPISODE_DURATION: 635 S
MDC_IDC_EPISODE_DURATION: 6816 S
MDC_IDC_EPISODE_DURATION: 7032 S
MDC_IDC_EPISODE_DURATION: 711 S
MDC_IDC_EPISODE_DURATION: 76 S
MDC_IDC_EPISODE_DURATION: 7703 S
MDC_IDC_EPISODE_DURATION: 8 S
MDC_IDC_EPISODE_DURATION: 985 S
MDC_IDC_EPISODE_DURATION: NORMAL S
MDC_IDC_EPISODE_ID: NORMAL
MDC_IDC_EPISODE_TYPE: NORMAL
MDC_IDC_LEAD_IMPLANT_DT: NORMAL
MDC_IDC_LEAD_IMPLANT_DT: NORMAL
MDC_IDC_LEAD_LOCATION: NORMAL
MDC_IDC_LEAD_LOCATION: NORMAL
MDC_IDC_LEAD_MFG: NORMAL
MDC_IDC_LEAD_MFG: NORMAL
MDC_IDC_LEAD_MODEL: NORMAL
MDC_IDC_LEAD_MODEL: NORMAL
MDC_IDC_LEAD_POLARITY_TYPE: NORMAL
MDC_IDC_LEAD_POLARITY_TYPE: NORMAL
MDC_IDC_LEAD_SERIAL: NORMAL
MDC_IDC_LEAD_SERIAL: NORMAL
MDC_IDC_MSMT_BATTERY_DTM: NORMAL
MDC_IDC_MSMT_BATTERY_STATUS: NORMAL
MDC_IDC_MSMT_BATTERY_VOLTAGE: 2.81 V
MDC_IDC_MSMT_LEADCHNL_RA_IMPEDANCE_VALUE: 392 OHM
MDC_IDC_MSMT_LEADCHNL_RV_IMPEDANCE_VALUE: 408 OHM
MDC_IDC_MSMT_LEADCHNL_RV_SENSING_INTR_AMPL: 14.04 MV
MDC_IDC_PG_IMPLANT_DTM: NORMAL
MDC_IDC_PG_MFG: NORMAL
MDC_IDC_PG_MODEL: NORMAL
MDC_IDC_PG_SERIAL: NORMAL
MDC_IDC_PG_TYPE: NORMAL
MDC_IDC_SESS_CLINIC_NAME: NORMAL
MDC_IDC_SESS_DTM: NORMAL
MDC_IDC_SESS_TYPE: NORMAL
MDC_IDC_SET_BRADY_HYSTRATE: NORMAL
MDC_IDC_SET_BRADY_LOWRATE: 65 {BEATS}/MIN
MDC_IDC_SET_BRADY_MODE: NORMAL
MDC_IDC_SET_LEADCHNL_RA_SENSING_ANODE_ELECTRODE_1: NORMAL
MDC_IDC_SET_LEADCHNL_RA_SENSING_ANODE_LOCATION_1: NORMAL
MDC_IDC_SET_LEADCHNL_RA_SENSING_CATHODE_ELECTRODE_1: NORMAL
MDC_IDC_SET_LEADCHNL_RA_SENSING_CATHODE_LOCATION_1: NORMAL
MDC_IDC_SET_LEADCHNL_RA_SENSING_POLARITY: NORMAL
MDC_IDC_SET_LEADCHNL_RA_SENSING_SENSITIVITY: 0.45 MV
MDC_IDC_SET_LEADCHNL_RV_PACING_AMPLITUDE: 2 V
MDC_IDC_SET_LEADCHNL_RV_PACING_ANODE_ELECTRODE_1: NORMAL
MDC_IDC_SET_LEADCHNL_RV_PACING_ANODE_LOCATION_1: NORMAL
MDC_IDC_SET_LEADCHNL_RV_PACING_CATHODE_ELECTRODE_1: NORMAL
MDC_IDC_SET_LEADCHNL_RV_PACING_CATHODE_LOCATION_1: NORMAL
MDC_IDC_SET_LEADCHNL_RV_PACING_POLARITY: NORMAL
MDC_IDC_SET_LEADCHNL_RV_PACING_PULSEWIDTH: 0.4 MS
MDC_IDC_SET_LEADCHNL_RV_SENSING_ANODE_ELECTRODE_1: NORMAL
MDC_IDC_SET_LEADCHNL_RV_SENSING_ANODE_LOCATION_1: NORMAL
MDC_IDC_SET_LEADCHNL_RV_SENSING_CATHODE_ELECTRODE_1: NORMAL
MDC_IDC_SET_LEADCHNL_RV_SENSING_CATHODE_LOCATION_1: NORMAL
MDC_IDC_SET_LEADCHNL_RV_SENSING_POLARITY: NORMAL
MDC_IDC_SET_LEADCHNL_RV_SENSING_SENSITIVITY: 2.1 MV
MDC_IDC_SET_ZONE_DETECTION_INTERVAL: 350 MS
MDC_IDC_SET_ZONE_DETECTION_INTERVAL: 400 MS
MDC_IDC_SET_ZONE_TYPE: NORMAL
MDC_IDC_STAT_AT_BURDEN_PERCENT: 91.9 %
MDC_IDC_STAT_AT_DTM_END: NORMAL
MDC_IDC_STAT_AT_DTM_START: NORMAL
MDC_IDC_STAT_BRADY_AP_VP_PERCENT: 7.56 %
MDC_IDC_STAT_BRADY_AP_VS_PERCENT: 0 %
MDC_IDC_STAT_BRADY_AS_VP_PERCENT: 91.48 %
MDC_IDC_STAT_BRADY_AS_VS_PERCENT: 0.96 %
MDC_IDC_STAT_BRADY_DTM_END: NORMAL
MDC_IDC_STAT_BRADY_DTM_START: NORMAL
MDC_IDC_STAT_BRADY_RA_PERCENT_PACED: 5.16 %
MDC_IDC_STAT_BRADY_RV_PERCENT_PACED: 98.18 %
MDC_IDC_STAT_EPISODE_RECENT_COUNT: 0
MDC_IDC_STAT_EPISODE_RECENT_COUNT: 836
MDC_IDC_STAT_EPISODE_RECENT_COUNT_DTM_END: NORMAL
MDC_IDC_STAT_EPISODE_RECENT_COUNT_DTM_START: NORMAL
MDC_IDC_STAT_EPISODE_TOTAL_COUNT: 1
MDC_IDC_STAT_EPISODE_TOTAL_COUNT: 15
MDC_IDC_STAT_EPISODE_TOTAL_COUNT: 3
MDC_IDC_STAT_EPISODE_TOTAL_COUNT: NORMAL
MDC_IDC_STAT_EPISODE_TOTAL_COUNT_DTM_END: NORMAL
MDC_IDC_STAT_EPISODE_TYPE: NORMAL

## 2021-03-03 ENCOUNTER — TELEPHONE (OUTPATIENT)
Dept: CARDIOLOGY | Facility: CLINIC | Age: 81
End: 2021-03-03

## 2021-03-03 DIAGNOSIS — Z95.0 CARDIAC PACEMAKER IN SITU: Primary | ICD-10-CM

## 2021-03-03 RX ORDER — SODIUM CHLORIDE 450 MG/100ML
INJECTION, SOLUTION INTRAVENOUS CONTINUOUS
Status: CANCELLED | OUTPATIENT
Start: 2021-03-03

## 2021-03-03 RX ORDER — CEFAZOLIN SODIUM 2 G/100ML
2 INJECTION, SOLUTION INTRAVENOUS
Status: CANCELLED | OUTPATIENT
Start: 2021-03-03

## 2021-03-03 RX ORDER — LIDOCAINE 40 MG/G
CREAM TOPICAL
Status: CANCELLED | OUTPATIENT
Start: 2021-03-03

## 2021-03-03 NOTE — TELEPHONE ENCOUNTER
Patient is scheduled for pacemaker generator changed on 3/8/2021.     Patient received 2nd dose of Covid vaccine on 2/26/2021 and has had the following side effects, chills, aches, and sore throat. Patient denies fever and stated that she did stop her rheumatoid arthritis medication in order to receive the vaccine which may be contributing to her symptoms. Patient stated the only lingering side affect she has today is on-going body aches.     Patient was very concerned and wanted to make sure that I made Dr. Cowan aware of these symptoms. I reassured patient that we still have time for symptoms to resolve since procedure is scheduled for 3/8/2021, and patient has asymptomatic pre procedure Covid 19 test on 3/5/2021. Patient received vaccination more than 3 days prior to procedure which is our current recommendation.     I will update Dr. Cowan per patient request. I will also follow up with patient on Friday 3/5/2021 to ensure symptoms at resolved and patient does not have a fever.

## 2021-03-03 NOTE — PROGRESS NOTES
Called patient with pre-procedure instructions for device implant:     Anticoagulation: Patient to hold warfarin two days prior to procedure (3/6 &3/7) per Dr. Cowan   Oral diabetes meds: N/A  Insulin: N/A  Diuretic: Hold torsemide AM of procedure   Contrast allergy: None   Pt informed to be NPO at midnight, may have clear liquid breakfast before 8am.     Instructed pt to shower the morning of the procedure, and then put on a clean shirt in order to help prevent infection.     Pt has post-procedure transportation and 24 hours monitoring set up.   Pt aware of no driving for 24 hours post procedure due to sedation.     COVID test scheduled: 3/5/2021    Pt reminded to self-quarantine from the time of the COVID test to the procedure.    Pt aware of arrival time of 11AM at [facility] location. Pt verbalized understanding of instructions.     Reviewed current visitor guidelines with patient and asked that she wear a mask into the hospital.

## 2021-03-05 ENCOUNTER — HOSPITAL ENCOUNTER (OUTPATIENT)
Dept: LAB | Facility: CLINIC | Age: 81
Discharge: HOME | End: 2021-03-05
Attending: INTERNAL MEDICINE | Admitting: INTERNAL MEDICINE
Payer: MEDICARE

## 2021-03-05 ENCOUNTER — ANTICOAGULATION THERAPY VISIT (OUTPATIENT)
Dept: ANTICOAGULATION | Facility: CLINIC | Age: 81
End: 2021-03-05

## 2021-03-05 DIAGNOSIS — Z79.01 LONG TERM CURRENT USE OF ANTICOAGULANTS WITH INR GOAL OF 2.0-3.0: ICD-10-CM

## 2021-03-05 DIAGNOSIS — I48.91 ATRIAL FIBRILLATION AND FLUTTER (H): ICD-10-CM

## 2021-03-05 DIAGNOSIS — I48.92 ATRIAL FIBRILLATION AND FLUTTER (H): ICD-10-CM

## 2021-03-05 DIAGNOSIS — I10 BENIGN ESSENTIAL HYPERTENSION: ICD-10-CM

## 2021-03-05 DIAGNOSIS — Z95.3 S/P MITRAL VALVE REPLACEMENT WITH BIOPROSTHETIC VALVE: ICD-10-CM

## 2021-03-05 DIAGNOSIS — Z11.59 ENCOUNTER FOR SCREENING FOR OTHER VIRAL DISEASES: ICD-10-CM

## 2021-03-05 LAB
INR PPP: 2.4 (ref 0.86–1.14)
SARS-COV-2 RNA RESP QL NAA+PROBE: NORMAL
SPECIMEN SOURCE: NORMAL

## 2021-03-05 PROCEDURE — 99207 PR NO CHARGE NURSE ONLY: CPT

## 2021-03-05 PROCEDURE — U0005 INFEC AGEN DETEC AMPLI PROBE: HCPCS | Performed by: INTERNAL MEDICINE

## 2021-03-05 PROCEDURE — 36415 COLL VENOUS BLD VENIPUNCTURE: CPT | Performed by: INTERNAL MEDICINE

## 2021-03-05 PROCEDURE — 85610 PROTHROMBIN TIME: CPT | Performed by: INTERNAL MEDICINE

## 2021-03-05 PROCEDURE — 80048 BASIC METABOLIC PNL TOTAL CA: CPT | Performed by: INTERNAL MEDICINE

## 2021-03-05 PROCEDURE — U0003 INFECTIOUS AGENT DETECTION BY NUCLEIC ACID (DNA OR RNA); SEVERE ACUTE RESPIRATORY SYNDROME CORONAVIRUS 2 (SARS-COV-2) (CORONAVIRUS DISEASE [COVID-19]), AMPLIFIED PROBE TECHNIQUE, MAKING USE OF HIGH THROUGHPUT TECHNOLOGIES AS DESCRIBED BY CMS-2020-01-R: HCPCS | Performed by: INTERNAL MEDICINE

## 2021-03-05 NOTE — TELEPHONE ENCOUNTER
Followed up with patient regarding side effects following patient 2nd Covid 19 vaccine and proceeding with pacemaker generator change on 3/8/2021.     Spoke with patient today. Patient is feeling much better and has not had any side effects in the last couple of days. Patient denies fever. Patient is scheduled for Covid 19 test this afternoon. Encouraged patient to call with any other concerns.

## 2021-03-06 LAB
ANION GAP SERPL CALCULATED.3IONS-SCNC: 4 MMOL/L (ref 3–14)
BUN SERPL-MCNC: 26 MG/DL (ref 7–30)
CALCIUM SERPL-MCNC: 9.1 MG/DL (ref 8.5–10.1)
CHLORIDE SERPL-SCNC: 105 MMOL/L (ref 94–109)
CO2 SERPL-SCNC: 29 MMOL/L (ref 20–32)
CREAT SERPL-MCNC: 1.27 MG/DL (ref 0.52–1.04)
GFR SERPL CREATININE-BSD FRML MDRD: 40 ML/MIN/{1.73_M2}
GLUCOSE SERPL-MCNC: 75 MG/DL (ref 70–99)
LABORATORY COMMENT REPORT: NORMAL
POTASSIUM SERPL-SCNC: 4.3 MMOL/L (ref 3.4–5.3)
SARS-COV-2 RNA RESP QL NAA+PROBE: NEGATIVE
SODIUM SERPL-SCNC: 138 MMOL/L (ref 133–144)
SPECIMEN SOURCE: NORMAL

## 2021-03-06 NOTE — PROGRESS NOTES
ANTICOAGULATION FOLLOW-UP CLINIC VISIT    Patient Name:  Zunilda Alicea May  Date:  3/5/2021  Contact Type:  Telephone/ Called patient, she reports a procedure 3/8/21 and warfarin hold for 2 days for the procedure. Orders discussed with the patient, she agrees with the plan. Lab INR appointment scheduled on 3/23/21.  Patient reports she did not want to come in the week of 3/15 because her daughter will be here and has not been here for one year    SUBJECTIVE:  Patient Findings     Positives:  Upcoming invasive procedure (pacemaker generator change on 3/8/2021. )    Comments:  The patient was assessed for diet, medication, and activity level changes, missed or extra doses, bruising or bleeding, with no problem findings.  Patient states a nurse of Dr Cowan told her to hold her warfarin for 2 days prior to her procedure on 3/8/21.        Clinical Outcomes     Comments:  The patient was assessed for diet, medication, and activity level changes, missed or extra doses, bruising or bleeding, with no problem findings.  Patient states a nurse of Dr Cowan told her to hold her warfarin for 2 days prior to her procedure on 3/8/21.           OBJECTIVE    Recent labs: (last 7 days)     21  1606   INR 2.40*       ASSESSMENT / PLAN  INR assessment THER    Recheck INR In: 2 WEEKS    INR Location Outside lab      Anticoagulation Summary  As of 3/5/2021    INR goal:  2.0-3.0   TTR:  57.2 % (1 y)   INR used for dosin.40 (3/5/2021)   Warfarin maintenance plan:  3.75 mg (2.5 mg x 1.5) every Thu; 2.5 mg (2.5 mg x 1) all other days   Full warfarin instructions:  3/6: Hold; 3: Hold; Otherwise 3.75 mg every Thu; 2.5 mg all other days   Weekly warfarin total:  18.75 mg   Plan last modified:  Екатерина Mahan RN (2020)   Next INR check:  3/23/2021   Priority:  Maintenance   Target end date:  Indefinite    Indications    Long term current use of anticoagulants with INR goal of 2.0-3.0 [Z79.01]  Atrial fibrillation and flutter  (H) [I48.91  I48.92]  S/P mitral valve replacement with bioprosthetic valve [Z95.3]             Anticoagulation Episode Summary     INR check location:      Preferred lab:      Send INR reminders to:  RANJIT SAAVEDRA [facility]    Comments:  likes printed AVS      Anticoagulation Care Providers     Provider Role Specialty Phone number    Yunior Huang MD Referring Internal Medicine [phone]            See the Encounter Report to view Anticoagulation Flowsheet and Dosing Calendar (Go to Encounters tab in chart review, and find the Anticoagulation Therapy Visit)    Dosage adjustment made based on physician directed care plan.    Екатерина Mahan RN

## 2021-03-08 ENCOUNTER — HOSPITAL ENCOUNTER (OUTPATIENT)
Facility: CLINIC | Age: 81
Discharge: HOME | End: 2021-03-08
Admitting: INTERNAL MEDICINE
Payer: MEDICARE

## 2021-03-08 VITALS
TEMPERATURE: 96.3 F | RESPIRATION RATE: 16 BRPM | SYSTOLIC BLOOD PRESSURE: 150 MMHG | BODY MASS INDEX: 25.8 KG/M2 | HEART RATE: 58 BPM | DIASTOLIC BLOOD PRESSURE: 60 MMHG | WEIGHT: 151.1 LBS | HEIGHT: 64 IN | OXYGEN SATURATION: 95 %

## 2021-03-08 DIAGNOSIS — I44.2 COMPLETE ATRIOVENTRICULAR BLOCK (H): ICD-10-CM

## 2021-03-08 DIAGNOSIS — Z95.0 CARDIAC PACEMAKER IN SITU: ICD-10-CM

## 2021-03-08 DIAGNOSIS — I44.2 COMPLETE ATRIOVENTRICULAR BLOCK (H): Primary | ICD-10-CM

## 2021-03-08 LAB
ANION GAP SERPL CALCULATED.3IONS-SCNC: 4 MMOL/L (ref 3–14)
BUN SERPL-MCNC: 23 MG/DL (ref 7–30)
CALCIUM SERPL-MCNC: 9 MG/DL (ref 8.5–10.1)
CHLORIDE SERPL-SCNC: 107 MMOL/L (ref 94–109)
CO2 SERPL-SCNC: 30 MMOL/L (ref 20–32)
CREAT SERPL-MCNC: 0.9 MG/DL (ref 0.52–1.04)
ERYTHROCYTE [DISTWIDTH] IN BLOOD BY AUTOMATED COUNT: 17.2 % (ref 10–15)
GFR SERPL CREATININE-BSD FRML MDRD: 61 ML/MIN/{1.73_M2}
GLUCOSE SERPL-MCNC: 88 MG/DL (ref 70–99)
HCT VFR BLD AUTO: 36.5 % (ref 35–47)
HGB BLD-MCNC: 11.6 G/DL (ref 11.7–15.7)
INR BLD: 2 (ref 0.86–1.14)
MCH RBC QN AUTO: 31.7 PG (ref 26.5–33)
MCHC RBC AUTO-ENTMCNC: 31.8 G/DL (ref 31.5–36.5)
MCV RBC AUTO: 100 FL (ref 78–100)
PLATELET # BLD AUTO: 447 10E9/L (ref 150–450)
POTASSIUM SERPL-SCNC: 4 MMOL/L (ref 3.4–5.3)
RBC # BLD AUTO: 3.66 10E12/L (ref 3.8–5.2)
SODIUM SERPL-SCNC: 141 MMOL/L (ref 133–144)
WBC # BLD AUTO: 6.2 10E9/L (ref 4–11)

## 2021-03-08 PROCEDURE — 258N000002 HC RX IP 258 OP 250: Performed by: INTERNAL MEDICINE

## 2021-03-08 PROCEDURE — 36416 COLLJ CAPILLARY BLOOD SPEC: CPT | Performed by: INTERNAL MEDICINE

## 2021-03-08 PROCEDURE — 93005 ELECTROCARDIOGRAM TRACING: CPT

## 2021-03-08 PROCEDURE — 85027 COMPLETE CBC AUTOMATED: CPT

## 2021-03-08 PROCEDURE — 999N000071 HC STATISTIC HEART CATH LAB OR EP LAB

## 2021-03-08 PROCEDURE — C1785 PMKR, DUAL, RATE-RESP: HCPCS | Performed by: INTERNAL MEDICINE

## 2021-03-08 PROCEDURE — 999N000054 HC STATISTIC EKG NON-CHARGEABLE

## 2021-03-08 PROCEDURE — 85610 PROTHROMBIN TIME: CPT | Performed by: INTERNAL MEDICINE

## 2021-03-08 PROCEDURE — 36415 COLL VENOUS BLD VENIPUNCTURE: CPT

## 2021-03-08 PROCEDURE — 99153 MOD SED SAME PHYS/QHP EA: CPT | Performed by: INTERNAL MEDICINE

## 2021-03-08 PROCEDURE — 99152 MOD SED SAME PHYS/QHP 5/>YRS: CPT | Performed by: INTERNAL MEDICINE

## 2021-03-08 PROCEDURE — 250N000011 HC RX IP 250 OP 636: Performed by: INTERNAL MEDICINE

## 2021-03-08 PROCEDURE — 93010 ELECTROCARDIOGRAM REPORT: CPT | Performed by: INTERNAL MEDICINE

## 2021-03-08 PROCEDURE — 272N000001 HC OR GENERAL SUPPLY STERILE: Performed by: INTERNAL MEDICINE

## 2021-03-08 PROCEDURE — 80048 BASIC METABOLIC PNL TOTAL CA: CPT

## 2021-03-08 PROCEDURE — 33228 REMV&REPLC PM GEN DUAL LEAD: CPT | Performed by: INTERNAL MEDICINE

## 2021-03-08 PROCEDURE — 250N000009 HC RX 250: Performed by: INTERNAL MEDICINE

## 2021-03-08 DEVICE — PACEMAKER AZURE MRI XT DR: Type: IMPLANTABLE DEVICE | Status: FUNCTIONAL

## 2021-03-08 RX ORDER — NALOXONE HYDROCHLORIDE 0.4 MG/ML
0.2 INJECTION, SOLUTION INTRAMUSCULAR; INTRAVENOUS; SUBCUTANEOUS
Status: DISCONTINUED | OUTPATIENT
Start: 2021-03-08 | End: 2021-03-08 | Stop reason: HOSPADM

## 2021-03-08 RX ORDER — CEFAZOLIN SODIUM 1 G/3ML
1 INJECTION, POWDER, FOR SOLUTION INTRAMUSCULAR; INTRAVENOUS
Status: DISCONTINUED | OUTPATIENT
Start: 2021-03-08 | End: 2021-03-08 | Stop reason: HOSPADM

## 2021-03-08 RX ORDER — LIDOCAINE 40 MG/G
CREAM TOPICAL
Status: DISCONTINUED | OUTPATIENT
Start: 2021-03-08 | End: 2021-03-08 | Stop reason: HOSPADM

## 2021-03-08 RX ORDER — SODIUM CHLORIDE 450 MG/100ML
INJECTION, SOLUTION INTRAVENOUS CONTINUOUS
Status: DISCONTINUED | OUTPATIENT
Start: 2021-03-08 | End: 2021-03-08 | Stop reason: HOSPADM

## 2021-03-08 RX ORDER — ACETAMINOPHEN 325 MG/1
650 TABLET ORAL EVERY 4 HOURS PRN
Status: DISCONTINUED | OUTPATIENT
Start: 2021-03-08 | End: 2021-03-08 | Stop reason: HOSPADM

## 2021-03-08 RX ORDER — CEFAZOLIN SODIUM 2 G/100ML
2 INJECTION, SOLUTION INTRAVENOUS
Status: COMPLETED | OUTPATIENT
Start: 2021-03-08 | End: 2021-03-08

## 2021-03-08 RX ORDER — HEPARIN SODIUM 200 [USP'U]/100ML
100-500 INJECTION, SOLUTION INTRAVENOUS CONTINUOUS PRN
Status: DISCONTINUED | OUTPATIENT
Start: 2021-03-08 | End: 2021-03-08 | Stop reason: HOSPADM

## 2021-03-08 RX ORDER — NALOXONE HYDROCHLORIDE 0.4 MG/ML
0.4 INJECTION, SOLUTION INTRAMUSCULAR; INTRAVENOUS; SUBCUTANEOUS
Status: DISCONTINUED | OUTPATIENT
Start: 2021-03-08 | End: 2021-03-08 | Stop reason: HOSPADM

## 2021-03-08 RX ORDER — BUPIVACAINE HYDROCHLORIDE 2.5 MG/ML
INJECTION, SOLUTION EPIDURAL; INFILTRATION; INTRACAUDAL
Status: DISCONTINUED | OUTPATIENT
Start: 2021-03-08 | End: 2021-03-08 | Stop reason: HOSPADM

## 2021-03-08 RX ORDER — HEPARIN SODIUM 200 [USP'U]/100ML
100-600 INJECTION, SOLUTION INTRAVENOUS CONTINUOUS PRN
Status: DISCONTINUED | OUTPATIENT
Start: 2021-03-08 | End: 2021-03-08 | Stop reason: HOSPADM

## 2021-03-08 RX ORDER — FENTANYL CITRATE 50 UG/ML
INJECTION, SOLUTION INTRAMUSCULAR; INTRAVENOUS
Status: DISCONTINUED | OUTPATIENT
Start: 2021-03-08 | End: 2021-03-08 | Stop reason: HOSPADM

## 2021-03-08 RX ADMIN — SODIUM CHLORIDE: 4.5 INJECTION, SOLUTION INTRAVENOUS at 11:30

## 2021-03-08 RX ADMIN — CEFAZOLIN SODIUM 2 G: 2 INJECTION, SOLUTION INTRAVENOUS at 13:00

## 2021-03-08 ASSESSMENT — MIFFLIN-ST. JEOR: SCORE: 1145.39

## 2021-03-08 NOTE — PRE-PROCEDURE
GENERAL PRE-PROCEDURE:   Procedure:  PM gen replacement  Date/Time:  3/8/2021 1:46 PM    Written consent obtained?: Yes    Risks and benefits: Risks, benefits and alternatives were discussed    Consent given by:  Patient  Patient states understanding of procedure being performed: Yes    Patient's understanding of procedure matches consent: Yes    Procedure consent matches procedure scheduled: Yes    Expected level of sedation:  Moderate  Appropriately NPO:  Yes  ASA Class:  Class 2- mild systemic disease, no acute problems, no functional limitations  Mallampati  :  Grade 2- soft palate, base of uvula, tonsillar pillars, and portion of posterior pharyngeal wall visible  Lungs:  Lungs clear with good breath sounds bilaterally  Heart:  Normal heart sounds and rate and other (comment)  Heart exam comment:  Paced  History & Physical reviewed:  History and physical reviewed and no updates needed  Statement of review:  I have reviewed the lab findings, diagnostic data, medications, and the plan for sedation

## 2021-03-08 NOTE — PROGRESS NOTES
Care Suites Discharge Nursing Note    Patient Information  Name: Zunilda Alicea May  Age: 80 year old    Discharge Education:  Discharge instructions reviewed: Yes  Additional education/resources provided: [employer] temporary card and pamplet  Patient/patient representative verbalizes understanding: Yes  Patient discharging on new medications: No  Medication education completed: Yes    Discharge Plans:   Discharge location: home  Discharge ride contacted: Yes  Approximate discharge time: 15:45    Discharge Criteria:  Discharge criteria met and vital signs stable: Yes    Patient Belongs:  Patient belongings returned to patient: Yes

## 2021-03-08 NOTE — PROGRESS NOTES
Care Suites Post Procedure Note (Pacemaker Generator Change)    Patient Information  Name: Zunilda Alicea May  Age: 80 year old    Post Procedure  Time patient returned to Care Suites: 14:40  Concerns/abnormal assessment: None  Plan/Other: Monitor patient, review discharge instructions.  Discharge to home when patient meets criteria.

## 2021-03-08 NOTE — DISCHARGE INSTRUCTIONS
Pacemaker Generator Change Discharge Instructions    After you go home:      Have an adult stay with you until tomorrow.    You may resume your normal diet.       For 24 hours - due to the sedation you received:    Relax and take it easy.    Do NOT make any important or legal decisions.    Do NOT drive or operate machines at home or at work.    Do NOT drink alcohol.    Care of Chest Incision:      Keep the bandage on at least 3 days. You may remove the dressing on Thursday. Change it only if it gets loose or soaked. If you need to change it, use 4x4-inch gauze and a large clear bandage.     If there is a pressure dressing (gauze & tape) - 24 hours after your procedure you may remove ONLY the top dressing. Leave the bottom dressing on.    Leave the strips of tape on. They will fall off on their own, or we will remove them at your first check-up.    Check your wound daily for signs of infection, such as increased redness, severe swelling or draining. Fever may also be a sign of infection. Call us if you see any of these signs.    If there are no signs of infection, you may shower after the bandage comes off in 3 days. If you take a tub bath, keep the wound dry.    No soaking the incision (swimming pool, bathtub, hot tub) for 2 weeks.    You may have mild to medium pain for 3 to 5 days. Take Acetaminophen (Tylenol) or Ibuprofen (Advil) for the pain. If the pain persists or is severe, call us.    Activity:      You can begin to use your arm as it feels comfortable to you.    No driving for one day & limit to necessary driving for one week.    Bleeding:      If you start bleeding from the incision site, sit down and press firmly on the site for 10 minutes.     Once bleeding stops, call [facility] as soon as you can.       Call 911 right away if you have heavy bleeding or bleeding that does not stop.      Medicines:      Restart your Coumadin this evening.    You may take all of your medications.    Follow Up  Appointments:      Follow up with Device Clinic at [facility] of patient preference in 7-10 days.    Call the clinic if:      You have a large or growing hard lump around the site.    The site is red, swollen, hot or tender.    Blood or fluid is draining from the site.    You have chills or a fever greater than 101 F (38 C).    You feel dizzy or light-headed.    Questions or concerns.      Telling others about your device:      Before you leave the hospital, you will receive a temporary ID card. A permanent card will be mailed to you about 6 to 8 weeks later. Always carry the ID card with you. It has important details about your device.    You may also get a Medical Alert bracelet or tag that says you have a pacemaker or a defibrillator (ICD).  Go to [email or web address].     Always tell doctors, dentists and other care providers that you have a device implanted in you.    Let us know before you plan any surgeries. Your care team must take special steps to keep you safe during certain procedures. These steps will depend on the type of device you have. Your provider will need to see your ID card. They may need to call us for instructions.    Device Safety:      Please refer to device  s booklet for further information.        [facility] [place]:    [phone] [facility] (7 days a week)

## 2021-03-08 NOTE — PROGRESS NOTES
PATIENT/VISITOR WELLNESS SCREENING    Step 1 Patient Screening    1. In the last month, have you been in contact with someone who was confirmed or suspected to have Coronavirus/COVID-19? No    2. Do you have the following symptoms?  Fever/Chills? No   Cough? No   Shortness of breath? No   New loss of taste or smell? No  Sore throat? No  Muscle or body aches? No  Headaches? No  Fatigue? No  Vomiting or diarrhea? No    Step 2 Visitor Screening    1. Name of Visitor (1 visitor per patient): Agata    2. In the last month, have you been in contact with someone who was confirmed or suspected to have Coronavirus/COVID-19? No    3. Do you have the following symptoms?  Fever/Chills? No   Cough? No   Shortness of breath? No   Skin rash? No   Loss of taste or smell? No  Sore throat? No  Runny or stuffy nose? No  Muscle or body aches? No  Headaches? No  Fatigue? No  Vomiting or diarrhea? No    Step 3 Refer to logic grid below for actions    NO SYMPTOM(S)    ACTIONS:  1. Standard rooming process  2. Provider to assess per normal protocol  3. Implement precautions as needed and per guidelines

## 2021-03-08 NOTE — PROGRESS NOTES
Dictated.    Successful pacemaker generator replacement (Medtronic).  EBL 10 to 15 mL.    Plan:  -Home later today  -Resume warfarin today  -Device clinic follow-up in 1 week.

## 2021-03-08 NOTE — PROGRESS NOTES
Care Suites Admission Nursing Note    Patient Information  Name: Zunilda Alicea May  Age: 80 year old  Reason for admission: Gen change  Care Suites arrival time: 1100    Visitor Information  Name: Agata  Informed of visitor restrictions: Yes  1 visitor allowed per patient   Visitor must screen negative for COVID symptoms   Visitor must wear a mask  Waiting rooms closed to visitors    Patient Admission/Assessment   Pre-procedure assessment complete: Yes  If abnormal assessment/labs, provider notified: N/A  NPO: Yes  Medications held per instructions/orders: Yes  Consent: deferred  If applicable, pregnancy test status: declined  Patient oriented to room: Yes  Education/questions answered: Yes  Plan/other: Gen change at 1300    Discharge Planning  Discharge name/phone number: Agata [phone]  Overnight post sedation caregiver: Agata  Discharge location: home

## 2021-03-09 ENCOUNTER — TELEPHONE (OUTPATIENT)
Dept: CARDIOLOGY | Facility: CLINIC | Age: 81
End: 2021-03-09

## 2021-03-09 ENCOUNTER — DOCUMENTATION ONLY (OUTPATIENT)
Dept: ANTICOAGULATION | Facility: CLINIC | Age: 81
End: 2021-03-09

## 2021-03-09 DIAGNOSIS — I48.91 ATRIAL FIBRILLATION AND FLUTTER (H): ICD-10-CM

## 2021-03-09 DIAGNOSIS — Z95.3 S/P MITRAL VALVE REPLACEMENT WITH BIOPROSTHETIC VALVE: ICD-10-CM

## 2021-03-09 DIAGNOSIS — Z79.01 LONG TERM CURRENT USE OF ANTICOAGULANTS WITH INR GOAL OF 2.0-3.0: ICD-10-CM

## 2021-03-09 DIAGNOSIS — I48.92 ATRIAL FIBRILLATION AND FLUTTER (H): ICD-10-CM

## 2021-03-09 NOTE — TELEPHONE ENCOUNTER
Post device implant discharge phone call.    Reviewed the following:  Remove outer dressing 3 days after implant. May shower after outer dressing removed. Leave steri-strips in place, will be removed at 1 week device check  Watch for redness, drainage, warmth, or fever. Call device clinic if any signs of infection.     1 week device check scheduled: 3/19/21 at 2 PM    Pt states understanding of all instructions.

## 2021-03-09 NOTE — PROGRESS NOTES
ANTICOAGULATION  MANAGEMENT: Discharge Review    Zunilda Clark chart reviewed for anticoagulation continuity of care    Outpatient surgery/procedure on 3/8/21 for pacemaker replacement.    Discharge disposition: Home    Results:    Recent labs: (last 7 days)     03/05/21  1606 03/08/21  1126   INR 2.40* 2.0*     Anticoagulation inpatient management:     not applicable     Anticoagulation discharge instructions:     Warfarin dosing: home regimen continued   Bridging: No   INR goal change: No      Medication changes affecting anticoagulation: No    Additional factors affecting anticoagulation: No    Plan     No adjustment to anticoagulation plan needed    Patient not contacted    No adjustment to Anticoagulation Calendar was required    Sonam Teixeira RN

## 2021-03-11 LAB — INTERPRETATION ECG - MUSE: NORMAL

## 2021-03-18 ENCOUNTER — NURSE TRIAGE (OUTPATIENT)
Dept: INTERNAL MEDICINE | Facility: CLINIC | Age: 81
End: 2021-03-18

## 2021-03-18 NOTE — TELEPHONE ENCOUNTER
Additional Information    Negative: Passed out (i.e., fainted, collapsed and was not responding)    Negative: Shock suspected (e.g., cold/pale/clammy skin, too weak to stand, low BP, rapid pulse)    Negative: Sounds like a life-threatening emergency to the triager    Negative: Major injury to the back (e.g., MVA, fall > 10 feet or 3 meters, penetrating injury, etc.)    Negative: Pain in the upper back over the ribs (rib cage) that radiates (travels) into the chest    Negative: Pain in the upper back over the ribs (rib cage) and worsened by coughing (or clearly increases with breathing)    Negative: SEVERE back pain of sudden onset and age > 60    Negative: SEVERE abdominal pain (e.g., excruciating)    Negative: Abdominal pain and age > 60    Negative: Unable to urinate (or only a few drops) and bladder feels very full    Negative: Loss of bladder or bowel control (urine or bowel incontinence; wetting self, leaking stool) of new onset    Negative: Numbness (loss of sensation) in groin or rectal area    Negative: Pain radiates into groin, scrotum    Negative: Blood in urine (red, pink, or tea-colored)    Negative: Vomiting and pain over lower ribs of back (i.e., flank - kidney area)    Negative: Weakness of a leg or foot (e.g., unable to bear weight, dragging foot)    Negative: Patient sounds very sick or weak to the triager    Negative: Fever > 100.5 F (38.1 C) and flank pain    Negative: Pain or burning with passing urine (urination)    Negative: SEVERE back pain (e.g., excruciating, unable to do any normal activities) and not improved after pain medicine and CARE ADVICE    Negative: Numbness in an arm or hand (i.e., loss of sensation) and upper back pain    Negative: Numbness in a leg or foot (i.e., loss of sensation)    Negative: High-risk adult (e.g., history of cancer, history of HIV, or history of IV drug abuse)    Negative: Painful rash with multiple small blisters grouped together (i.e., dermatomal  "distribution or 'band' or 'stripe')    Negative: Pain radiates into the thigh or further down the leg, and in both legs    Negative: MODERATE back pain (e.g., interferes with normal activities) and present > 3 days    Negative: Pain radiates into the thigh or further down the leg    Patient wants to be seen    Answer Assessment - Initial Assessment Questions  1. ONSET: \"When did the pain begin?\"       Low back pain started a few days ago.  2. LOCATION: \"Where does it hurt?\" (upper, mid or lower back)      Low back pain  3. SEVERITY: \"How bad is the pain?\"  (e.g., Scale 1-10; mild, moderate, or severe)    - MILD (1-3): doesn't interfere with normal activities     - MODERATE (4-7): interferes with normal activities or awakens from sleep     - SEVERE (8-10): excruciating pain, unable to do any normal activities       Mild to moderate per patient.  4. PATTERN: \"Is the pain constant?\" (e.g., yes, no; constant, intermittent)       Pain comes and goes.  5. RADIATION: \"Does the pain shoot into your legs or elsewhere?\"      Denies radiation  6. CAUSE:  \"What do you think is causing the back pain?\"       Patient thinks she may have a UTI.  States low back pain is usually her symptoms for UTI.  7. BACK OVERUSE:  \"Any recent lifting of heavy objects, strenuous work or exercise?\"      No per patient.  8. MEDICATIONS: \"What have you taken so far for the pain?\" (e.g., nothing, acetaminophen, NSAIDS)      Patient hasn't taken anything.  9. NEUROLOGIC SYMPTOMS: \"Do you have any weakness, numbness, or problems with bowel/bladder control?\"      Denies any neurologic symptoms.  10. OTHER SYMPTOMS: \"Do you have any other symptoms?\" (e.g., fever, abdominal pain, burning with urination, blood in urine)        Denies any other symptoms.  11. PREGNANCY: \"Is there any chance you are pregnant?\" (e.g., yes, no; LMP)        NA    Protocols used: BACK PAIN-A-OH    "

## 2021-03-19 ENCOUNTER — ANCILLARY PROCEDURE (OUTPATIENT)
Dept: CARDIOLOGY | Facility: CLINIC | Age: 81
End: 2021-03-19
Attending: INTERNAL MEDICINE
Payer: MEDICARE

## 2021-03-19 ENCOUNTER — DOCUMENTATION ONLY (OUTPATIENT)
Dept: NURSING | Facility: CLINIC | Age: 81
End: 2021-03-19

## 2021-03-19 ENCOUNTER — VIRTUAL VISIT (OUTPATIENT)
Dept: INTERNAL MEDICINE | Facility: CLINIC | Age: 81
End: 2021-03-19
Payer: MEDICARE

## 2021-03-19 ENCOUNTER — ANCILLARY PROCEDURE (OUTPATIENT)
Dept: GENERAL RADIOLOGY | Facility: CLINIC | Age: 81
End: 2021-03-19
Attending: INTERNAL MEDICINE
Payer: MEDICARE

## 2021-03-19 DIAGNOSIS — Z95.0 PACEMAKER: ICD-10-CM

## 2021-03-19 DIAGNOSIS — I44.2 COMPLETE ATRIOVENTRICULAR BLOCK (H): Primary | ICD-10-CM

## 2021-03-19 DIAGNOSIS — R19.7 DIARRHEA, UNSPECIFIED TYPE: ICD-10-CM

## 2021-03-19 DIAGNOSIS — M54.50 LEFT-SIDED LOW BACK PAIN WITHOUT SCIATICA, UNSPECIFIED CHRONICITY: ICD-10-CM

## 2021-03-19 DIAGNOSIS — Z95.0 CARDIAC PACEMAKER IN SITU: ICD-10-CM

## 2021-03-19 DIAGNOSIS — R35.0 URINE FREQUENCY: ICD-10-CM

## 2021-03-19 DIAGNOSIS — R82.90 NONSPECIFIC FINDING ON EXAMINATION OF URINE: ICD-10-CM

## 2021-03-19 DIAGNOSIS — M54.50 LEFT-SIDED LOW BACK PAIN WITHOUT SCIATICA, UNSPECIFIED CHRONICITY: Primary | ICD-10-CM

## 2021-03-19 LAB
ALBUMIN UR-MCNC: NEGATIVE MG/DL
APPEARANCE UR: ABNORMAL
BACTERIA #/AREA URNS HPF: ABNORMAL /HPF
BILIRUB UR QL STRIP: NEGATIVE
COLOR UR AUTO: YELLOW
GLUCOSE UR STRIP-MCNC: NEGATIVE MG/DL
HGB UR QL STRIP: NEGATIVE
HYALINE CASTS #/AREA URNS LPF: ABNORMAL /LPF
KETONES UR STRIP-MCNC: NEGATIVE MG/DL
LEUKOCYTE ESTERASE UR QL STRIP: ABNORMAL
NITRATE UR QL: POSITIVE
NON-SQ EPI CELLS #/AREA URNS LPF: ABNORMAL /LPF
PH UR STRIP: 5.5 PH (ref 5–7)
RBC #/AREA URNS AUTO: ABNORMAL /HPF
SOURCE: ABNORMAL
SP GR UR STRIP: 1.02 (ref 1–1.03)
UROBILINOGEN UR STRIP-ACNC: 0.2 EU/DL (ref 0.2–1)
WBC #/AREA URNS AUTO: ABNORMAL /HPF

## 2021-03-19 PROCEDURE — 36415 COLL VENOUS BLD VENIPUNCTURE: CPT | Performed by: INTERNAL MEDICINE

## 2021-03-19 PROCEDURE — 83516 IMMUNOASSAY NONANTIBODY: CPT | Mod: 59 | Performed by: INTERNAL MEDICINE

## 2021-03-19 PROCEDURE — 87086 URINE CULTURE/COLONY COUNT: CPT | Performed by: INTERNAL MEDICINE

## 2021-03-19 PROCEDURE — 93280 PM DEVICE PROGR EVAL DUAL: CPT | Performed by: INTERNAL MEDICINE

## 2021-03-19 PROCEDURE — 81001 URINALYSIS AUTO W/SCOPE: CPT | Performed by: INTERNAL MEDICINE

## 2021-03-19 PROCEDURE — 72100 X-RAY EXAM L-S SPINE 2/3 VWS: CPT | Performed by: RADIOLOGY

## 2021-03-19 PROCEDURE — 83516 IMMUNOASSAY NONANTIBODY: CPT | Performed by: INTERNAL MEDICINE

## 2021-03-19 PROCEDURE — 87186 SC STD MICRODIL/AGAR DIL: CPT | Performed by: INTERNAL MEDICINE

## 2021-03-19 PROCEDURE — 87077 CULTURE AEROBIC IDENTIFY: CPT | Performed by: INTERNAL MEDICINE

## 2021-03-19 PROCEDURE — 99442 PR PHYSICIAN TELEPHONE EVALUATION 11-20 MIN: CPT | Mod: 95 | Performed by: INTERNAL MEDICINE

## 2021-03-19 RX ORDER — SULFAMETHOXAZOLE/TRIMETHOPRIM 800-160 MG
1 TABLET ORAL 2 TIMES DAILY
Qty: 14 TABLET | Refills: 0 | Status: SHIPPED | OUTPATIENT
Start: 2021-03-19 | End: 2021-03-29

## 2021-03-19 ASSESSMENT — PAIN SCALES - GENERAL: PAINLEVEL: NO PAIN (0)

## 2021-03-19 NOTE — NURSING NOTE
patient thinks she has a UTI, she has low back pain, no other symptoms- she doesnt usually have any symptoms.

## 2021-03-19 NOTE — PROGRESS NOTES
Deanne is a 80 year old who is being evaluated via a billable telephone visit.      What phone number would you like to be contacted at? [phone]  How would you like to obtain your AVS? Mail a copy    Assessment & Plan     Left-sided low back pain without sciatica, unspecified chronicity  Assess UA and X rays spine   - UA with Microscopic reflex to Culture  - XR Lumbar Spine 2/3 Views; Future    Diarrhea, unspecified type  Try adding fiber supplement and probiotic , assess for gluten intolerance, consider GI   - Tissue transglutaminase balaji IgA and IgG    Urine frequency  Assess for UTI                See Patient Instructions    Return in about 4 weeks (around 4/16/2021) for follow up on acute problem if persists.    Yunior Huang MD  [facility]   Deanne is a 80 year old who presents for the following health issues     HPI     patient thinks she has a UTI, she has low back pain, no other symptoms- she doesnt usually have any symptoms.    Presents with symptoms of left lower back pain, on and off, urine frequency, for 2 years. Has had UTI in the past.   No blood in the urine, no fever, no bladder pain.   Concern for chronic diarrhea, also chronic for 2 years. Has loose stools, once daily, small amount, sipping. No bleeding. No abdominal pain.   Keep lactose free diet. No nausea, vomiting.   Has had colonoscopy in 2012, has diverticulosis.   Has had EGD, mild gastritis.       Review of Systems   Constitutional, HEENT, cardiovascular, pulmonary, gi and gu systems are negative, except as otherwise noted.      Objective           Vitals:  No vitals were obtained today due to virtual visit.    Physical Exam   healthy, alert and no distress  PSYCH: Alert and oriented times 3; coherent speech, normal   rate and volume, able to articulate logical thoughts, able   to abstract reason, no tangential thoughts, no hallucinations   or delusions  Her affect is normal  RESP: No cough,  no audible wheezing, able to talk in full sentences  Remainder of exam unable to be completed due to telephone visits                Phone call duration: 14 minutes

## 2021-03-19 NOTE — PROGRESS NOTES
ANTICOAGULATION  MANAGEMENT     Interacting Medication Review    Interacting medication(s): Sulfamethoxazole-Trimethoprim (Bactrim) with warfarin.    Duration: 7 days (3/19 to 3/26/21)    Indication: urinary frequency with non-specific finding on U/A    New medication?: Yes, interaction may increase INR and risk of bleeding       PLAN     No adjustment to anticoagulation plan needed; previously scheduled follow up on 3/23/21 is appropriate.    Patient was not contacted    No adjustment to Anticoagulation Calendar was required    Linette Rocha RN

## 2021-03-20 DIAGNOSIS — K21.9 GASTROESOPHAGEAL REFLUX DISEASE WITHOUT ESOPHAGITIS: ICD-10-CM

## 2021-03-20 LAB
BACTERIA SPEC CULT: ABNORMAL
BACTERIA SPEC CULT: ABNORMAL
Lab: ABNORMAL
SPECIMEN SOURCE: ABNORMAL

## 2021-03-22 LAB
TTG IGA SER-ACNC: 3 U/ML
TTG IGG SER-ACNC: 1 U/ML

## 2021-03-22 RX ORDER — NITROFURANTOIN MACROCRYSTAL 100 MG
100 CAPSULE ORAL 4 TIMES DAILY
Qty: 20 CAPSULE | Refills: 0 | Status: SHIPPED | OUTPATIENT
Start: 2021-03-22 | End: 2021-03-29

## 2021-03-22 NOTE — TELEPHONE ENCOUNTER
Pending Prescriptions:                       Disp   Refills    omeprazole (PRILOSEC) 20 MG DR capsule [Ph*180 ca*0        Sig: TAKE ONE CAPSULE BY MOUTH TWICE DAILY     Routing refill request to provider for review/approval because:  A break in medication

## 2021-03-23 ENCOUNTER — ANTICOAGULATION THERAPY VISIT (OUTPATIENT)
Dept: ANTICOAGULATION | Facility: CLINIC | Age: 81
End: 2021-03-23

## 2021-03-23 DIAGNOSIS — I48.92 ATRIAL FIBRILLATION AND FLUTTER (H): ICD-10-CM

## 2021-03-23 DIAGNOSIS — Z95.3 S/P MITRAL VALVE REPLACEMENT WITH BIOPROSTHETIC VALVE: ICD-10-CM

## 2021-03-23 DIAGNOSIS — Z79.01 LONG TERM CURRENT USE OF ANTICOAGULANTS WITH INR GOAL OF 2.0-3.0: ICD-10-CM

## 2021-03-23 DIAGNOSIS — I48.91 ATRIAL FIBRILLATION AND FLUTTER (H): ICD-10-CM

## 2021-03-23 LAB
CAPILLARY BLOOD COLLECTION: NORMAL
INR PPP: 2.3 (ref 0.86–1.14)
MDC_IDC_EPISODE_DTM: NORMAL
MDC_IDC_EPISODE_DURATION: 1106 S
MDC_IDC_EPISODE_DURATION: 1109 S
MDC_IDC_EPISODE_DURATION: 1119 S
MDC_IDC_EPISODE_DURATION: 1253 S
MDC_IDC_EPISODE_DURATION: 1311 S
MDC_IDC_EPISODE_DURATION: 180 S
MDC_IDC_EPISODE_DURATION: 181 S
MDC_IDC_EPISODE_DURATION: 182 S
MDC_IDC_EPISODE_DURATION: 183 S
MDC_IDC_EPISODE_DURATION: 187 S
MDC_IDC_EPISODE_DURATION: 225 S
MDC_IDC_EPISODE_DURATION: 254 S
MDC_IDC_EPISODE_DURATION: 279 S
MDC_IDC_EPISODE_DURATION: 299 S
MDC_IDC_EPISODE_DURATION: 309 S
MDC_IDC_EPISODE_DURATION: 327 S
MDC_IDC_EPISODE_DURATION: 3306 S
MDC_IDC_EPISODE_DURATION: 364 S
MDC_IDC_EPISODE_DURATION: 3644 S
MDC_IDC_EPISODE_DURATION: 372 S
MDC_IDC_EPISODE_DURATION: 379 S
MDC_IDC_EPISODE_DURATION: 415 S
MDC_IDC_EPISODE_DURATION: 423 S
MDC_IDC_EPISODE_DURATION: 476 S
MDC_IDC_EPISODE_DURATION: 482 S
MDC_IDC_EPISODE_DURATION: 487 S
MDC_IDC_EPISODE_DURATION: 500 S
MDC_IDC_EPISODE_DURATION: 503 S
MDC_IDC_EPISODE_DURATION: 585 S
MDC_IDC_EPISODE_DURATION: 621 S
MDC_IDC_EPISODE_DURATION: 680 S
MDC_IDC_EPISODE_DURATION: 688 S
MDC_IDC_EPISODE_DURATION: 798 S
MDC_IDC_EPISODE_DURATION: 9679 S
MDC_IDC_EPISODE_ID: 137
MDC_IDC_EPISODE_ID: 138
MDC_IDC_EPISODE_ID: 139
MDC_IDC_EPISODE_ID: 140
MDC_IDC_EPISODE_ID: 141
MDC_IDC_EPISODE_ID: 142
MDC_IDC_EPISODE_ID: 143
MDC_IDC_EPISODE_ID: 144
MDC_IDC_EPISODE_ID: 145
MDC_IDC_EPISODE_ID: 146
MDC_IDC_EPISODE_ID: 147
MDC_IDC_EPISODE_ID: 148
MDC_IDC_EPISODE_ID: 149
MDC_IDC_EPISODE_ID: 150
MDC_IDC_EPISODE_ID: 151
MDC_IDC_EPISODE_ID: 152
MDC_IDC_EPISODE_ID: 153
MDC_IDC_EPISODE_ID: 154
MDC_IDC_EPISODE_ID: 155
MDC_IDC_EPISODE_ID: 156
MDC_IDC_EPISODE_ID: 157
MDC_IDC_EPISODE_ID: 158
MDC_IDC_EPISODE_ID: 159
MDC_IDC_EPISODE_ID: 160
MDC_IDC_EPISODE_ID: 161
MDC_IDC_EPISODE_ID: 162
MDC_IDC_EPISODE_ID: 163
MDC_IDC_EPISODE_ID: 164
MDC_IDC_EPISODE_ID: 165
MDC_IDC_EPISODE_ID: 166
MDC_IDC_EPISODE_ID: 167
MDC_IDC_EPISODE_ID: 168
MDC_IDC_EPISODE_ID: 169
MDC_IDC_EPISODE_ID: 170
MDC_IDC_EPISODE_ID: 171
MDC_IDC_EPISODE_ID: 172
MDC_IDC_EPISODE_ID: 173
MDC_IDC_EPISODE_ID: 174
MDC_IDC_EPISODE_ID: 175
MDC_IDC_EPISODE_ID: 176
MDC_IDC_EPISODE_ID: 177
MDC_IDC_EPISODE_ID: 178
MDC_IDC_EPISODE_ID: 179
MDC_IDC_EPISODE_ID: 180
MDC_IDC_EPISODE_ID: 181
MDC_IDC_EPISODE_ID: 182
MDC_IDC_EPISODE_ID: 183
MDC_IDC_EPISODE_ID: 184
MDC_IDC_EPISODE_ID: 185
MDC_IDC_EPISODE_ID: 186
MDC_IDC_EPISODE_ID: 187
MDC_IDC_EPISODE_TYPE: NORMAL
MDC_IDC_LEAD_IMPLANT_DT: NORMAL
MDC_IDC_LEAD_IMPLANT_DT: NORMAL
MDC_IDC_LEAD_LOCATION: NORMAL
MDC_IDC_LEAD_LOCATION: NORMAL
MDC_IDC_LEAD_MFG: NORMAL
MDC_IDC_LEAD_MFG: NORMAL
MDC_IDC_LEAD_MODEL: NORMAL
MDC_IDC_LEAD_MODEL: NORMAL
MDC_IDC_LEAD_POLARITY_TYPE: NORMAL
MDC_IDC_LEAD_POLARITY_TYPE: NORMAL
MDC_IDC_LEAD_SERIAL: NORMAL
MDC_IDC_LEAD_SERIAL: NORMAL
MDC_IDC_MSMT_BATTERY_DTM: NORMAL
MDC_IDC_MSMT_BATTERY_REMAINING_LONGEVITY: 146 MO
MDC_IDC_MSMT_BATTERY_RRT_TRIGGER: 2.62
MDC_IDC_MSMT_BATTERY_STATUS: NORMAL
MDC_IDC_MSMT_BATTERY_VOLTAGE: 3.22 V
MDC_IDC_MSMT_LEADCHNL_RA_IMPEDANCE_VALUE: 323 OHM
MDC_IDC_MSMT_LEADCHNL_RA_IMPEDANCE_VALUE: 418 OHM
MDC_IDC_MSMT_LEADCHNL_RA_SENSING_INTR_AMPL: 1.75 MV
MDC_IDC_MSMT_LEADCHNL_RA_SENSING_INTR_AMPL: 1.75 MV
MDC_IDC_MSMT_LEADCHNL_RV_IMPEDANCE_VALUE: 342 OHM
MDC_IDC_MSMT_LEADCHNL_RV_IMPEDANCE_VALUE: 380 OHM
MDC_IDC_MSMT_LEADCHNL_RV_PACING_THRESHOLD_AMPLITUDE: 0.62 V
MDC_IDC_MSMT_LEADCHNL_RV_PACING_THRESHOLD_PULSEWIDTH: 0.4 MS
MDC_IDC_MSMT_LEADCHNL_RV_SENSING_INTR_AMPL: 13.62 MV
MDC_IDC_MSMT_LEADCHNL_RV_SENSING_INTR_AMPL: 16.62 MV
MDC_IDC_PG_IMPLANT_DTM: NORMAL
MDC_IDC_PG_MFG: NORMAL
MDC_IDC_PG_MODEL: NORMAL
MDC_IDC_PG_SERIAL: NORMAL
MDC_IDC_PG_TYPE: NORMAL
MDC_IDC_SESS_CLINIC_NAME: NORMAL
MDC_IDC_SESS_DTM: NORMAL
MDC_IDC_SESS_TYPE: NORMAL
MDC_IDC_SET_BRADY_AT_MODE_SWITCH_RATE: 171 {BEATS}/MIN
MDC_IDC_SET_BRADY_HYSTRATE: NORMAL
MDC_IDC_SET_BRADY_LOWRATE: 60 {BEATS}/MIN
MDC_IDC_SET_BRADY_MAX_SENSOR_RATE: 130 {BEATS}/MIN
MDC_IDC_SET_BRADY_MAX_TRACKING_RATE: 130 {BEATS}/MIN
MDC_IDC_SET_BRADY_MODE: NORMAL
MDC_IDC_SET_BRADY_PAV_DELAY_LOW: 180 MS
MDC_IDC_SET_BRADY_SAV_DELAY_LOW: 150 MS
MDC_IDC_SET_LEADCHNL_RA_PACING_AMPLITUDE: 2 V
MDC_IDC_SET_LEADCHNL_RA_PACING_ANODE_ELECTRODE_1: NORMAL
MDC_IDC_SET_LEADCHNL_RA_PACING_ANODE_LOCATION_1: NORMAL
MDC_IDC_SET_LEADCHNL_RA_PACING_CAPTURE_MODE: NORMAL
MDC_IDC_SET_LEADCHNL_RA_PACING_CATHODE_ELECTRODE_1: NORMAL
MDC_IDC_SET_LEADCHNL_RA_PACING_CATHODE_LOCATION_1: NORMAL
MDC_IDC_SET_LEADCHNL_RA_PACING_POLARITY: NORMAL
MDC_IDC_SET_LEADCHNL_RA_PACING_PULSEWIDTH: 0.4 MS
MDC_IDC_SET_LEADCHNL_RA_SENSING_ANODE_ELECTRODE_1: NORMAL
MDC_IDC_SET_LEADCHNL_RA_SENSING_ANODE_LOCATION_1: NORMAL
MDC_IDC_SET_LEADCHNL_RA_SENSING_CATHODE_ELECTRODE_1: NORMAL
MDC_IDC_SET_LEADCHNL_RA_SENSING_CATHODE_LOCATION_1: NORMAL
MDC_IDC_SET_LEADCHNL_RA_SENSING_POLARITY: NORMAL
MDC_IDC_SET_LEADCHNL_RA_SENSING_SENSITIVITY: 0.15 MV
MDC_IDC_SET_LEADCHNL_RV_PACING_AMPLITUDE: 2 V
MDC_IDC_SET_LEADCHNL_RV_PACING_ANODE_ELECTRODE_1: NORMAL
MDC_IDC_SET_LEADCHNL_RV_PACING_ANODE_LOCATION_1: NORMAL
MDC_IDC_SET_LEADCHNL_RV_PACING_CAPTURE_MODE: NORMAL
MDC_IDC_SET_LEADCHNL_RV_PACING_CATHODE_ELECTRODE_1: NORMAL
MDC_IDC_SET_LEADCHNL_RV_PACING_CATHODE_LOCATION_1: NORMAL
MDC_IDC_SET_LEADCHNL_RV_PACING_POLARITY: NORMAL
MDC_IDC_SET_LEADCHNL_RV_PACING_PULSEWIDTH: 0.4 MS
MDC_IDC_SET_LEADCHNL_RV_SENSING_ANODE_ELECTRODE_1: NORMAL
MDC_IDC_SET_LEADCHNL_RV_SENSING_ANODE_LOCATION_1: NORMAL
MDC_IDC_SET_LEADCHNL_RV_SENSING_CATHODE_ELECTRODE_1: NORMAL
MDC_IDC_SET_LEADCHNL_RV_SENSING_CATHODE_LOCATION_1: NORMAL
MDC_IDC_SET_LEADCHNL_RV_SENSING_POLARITY: NORMAL
MDC_IDC_SET_LEADCHNL_RV_SENSING_SENSITIVITY: 0.9 MV
MDC_IDC_SET_ZONE_DETECTION_INTERVAL: 350 MS
MDC_IDC_SET_ZONE_DETECTION_INTERVAL: 400 MS
MDC_IDC_SET_ZONE_TYPE: NORMAL
MDC_IDC_STAT_AT_BURDEN_PERCENT: 83.1 %
MDC_IDC_STAT_AT_DTM_END: NORMAL
MDC_IDC_STAT_AT_DTM_START: NORMAL
MDC_IDC_STAT_BRADY_AP_VP_PERCENT: 75.61 %
MDC_IDC_STAT_BRADY_AP_VS_PERCENT: 0 %
MDC_IDC_STAT_BRADY_AS_VP_PERCENT: 24.26 %
MDC_IDC_STAT_BRADY_AS_VS_PERCENT: 0.14 %
MDC_IDC_STAT_BRADY_DTM_END: NORMAL
MDC_IDC_STAT_BRADY_DTM_START: NORMAL
MDC_IDC_STAT_BRADY_RA_PERCENT_PACED: 22.46 %
MDC_IDC_STAT_BRADY_RV_PERCENT_PACED: 99.66 %
MDC_IDC_STAT_EPISODE_RECENT_COUNT: 0
MDC_IDC_STAT_EPISODE_RECENT_COUNT: 0
MDC_IDC_STAT_EPISODE_RECENT_COUNT: 187
MDC_IDC_STAT_EPISODE_RECENT_COUNT_DTM_END: NORMAL
MDC_IDC_STAT_EPISODE_RECENT_COUNT_DTM_START: NORMAL
MDC_IDC_STAT_EPISODE_TOTAL_COUNT: 0
MDC_IDC_STAT_EPISODE_TOTAL_COUNT: 0
MDC_IDC_STAT_EPISODE_TOTAL_COUNT: 187
MDC_IDC_STAT_EPISODE_TOTAL_COUNT_DTM_END: NORMAL
MDC_IDC_STAT_EPISODE_TOTAL_COUNT_DTM_START: NORMAL
MDC_IDC_STAT_EPISODE_TYPE: NORMAL

## 2021-03-23 PROCEDURE — 85610 PROTHROMBIN TIME: CPT | Performed by: INTERNAL MEDICINE

## 2021-03-23 PROCEDURE — 36416 COLLJ CAPILLARY BLOOD SPEC: CPT | Performed by: INTERNAL MEDICINE

## 2021-03-23 PROCEDURE — 99207 PR NO CHARGE NURSE ONLY: CPT

## 2021-03-23 NOTE — PROGRESS NOTES
ANTICOAGULATION FOLLOW-UP CLINIC VISIT    Patient Name:  Zunilda Alicea May  Date:  3/23/2021  Contact Type:  Telephone/ Called patient, she reports health and medication changes. Orders discussed with the patient, she agrees with the plan. Lab INR appointment scheduled on 3/30/21.    SUBJECTIVE:  Patient Findings     Positives:  Change in health (Patient reports she has UTI, is taking antibiotic), Change in medications (Patient took bactrim for 3 days this was stopped and Macrobid started 3/23/21. Macrobid should not interfere with warfarin. )    Comments:  The patient was assessed for diet and activity level changes, missed or extra doses, bruising or bleeding, with no problem findings.  The patient was assessed for diet, medication, and activity level changes, missed or extra doses, bruising or bleeding, with no problem findings.          Clinical Outcomes     Comments:  The patient was assessed for diet and activity level changes, missed or extra doses, bruising or bleeding, with no problem findings.  The patient was assessed for diet, medication, and activity level changes, missed or extra doses, bruising or bleeding, with no problem findings.             OBJECTIVE    Recent labs: (last 7 days)     21  1133   INR 2.30*       ASSESSMENT / PLAN  INR assessment THER    Recheck INR In: 1 WEEK    INR Location Outside lab      Anticoagulation Summary  As of 3/23/2021    INR goal:  2.0-3.0   TTR:  57.1 % (1 y)   INR used for dosin.30 (3/23/2021)   Warfarin maintenance plan:  3.75 mg (2.5 mg x 1.5) every Thu; 2.5 mg (2.5 mg x 1) all other days   Full warfarin instructions:  3.75 mg every Thu; 2.5 mg all other days   Weekly warfarin total:  18.75 mg   No change documented:  Екатерина Mahan, RN   Plan last modified:  Екатерина Mahan RN (2020)   Next INR check:  2021   Priority:  Maintenance   Target end date:  Indefinite    Indications    Long term current use of anticoagulants with INR goal of  2.0-3.0 [Z79.01]  Atrial fibrillation and flutter (H) [I48.91  I48.92]  S/P mitral valve replacement with bioprosthetic valve [Z95.3]             Anticoagulation Episode Summary     INR check location:      Preferred lab:      Send INR reminders to:  [facility]IT [facility]    Comments:  likes printed AVS      Anticoagulation Care Providers     Provider Role Specialty Phone number    Yunior Huang MD Referring Internal Medicine [phone]            See the Encounter Report to view Anticoagulation Flowsheet and Dosing Calendar (Go to Encounters tab in chart review, and find the Anticoagulation Therapy Visit)    Dosage adjustment made based on physician directed care plan.    Екатерина Mahan RN

## 2021-03-29 ENCOUNTER — OFFICE VISIT (OUTPATIENT)
Dept: CARDIOLOGY | Facility: CLINIC | Age: 81
End: 2021-03-29
Attending: INTERNAL MEDICINE
Payer: MEDICARE

## 2021-03-29 VITALS
WEIGHT: 148.8 LBS | DIASTOLIC BLOOD PRESSURE: 67 MMHG | BODY MASS INDEX: 25.4 KG/M2 | HEIGHT: 64 IN | HEART RATE: 81 BPM | SYSTOLIC BLOOD PRESSURE: 110 MMHG

## 2021-03-29 DIAGNOSIS — R06.09 DYSPNEA ON EXERTION: ICD-10-CM

## 2021-03-29 DIAGNOSIS — I27.20 PULMONARY HTN (H): ICD-10-CM

## 2021-03-29 PROCEDURE — 99214 OFFICE O/P EST MOD 30 MIN: CPT | Performed by: PHYSICIAN ASSISTANT

## 2021-03-29 RX ORDER — AMLODIPINE BESYLATE 2.5 MG/1
2.5 TABLET ORAL DAILY
COMMUNITY
Start: 2021-03-29 | End: 2021-06-14

## 2021-03-29 ASSESSMENT — MIFFLIN-ST. JEOR: SCORE: 1134.95

## 2021-03-29 NOTE — LETTER
"3/29/2021    Yunior Huang MD  [address] [facility] [address]    RE: Zunilda Clark       Dear Colleague,    I had the pleasure of seeing Zunilda Clark in the [facility].      Cardiology Progress Note    Date of Service: 03/29/2021      Reason for visit: Follow up pulmonary hypertension, Rheumatic valve disease.    Primary cardiologist: Dr. Noe Rodriguez      HPI:  Ms. 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 Das, but established care with Dr. Rodriguez in January of 2021. At that visit, she reported remaining fairly active walking up and down the halls of her living facility without worsening shortness of breath or new signs of heart failure. Her echocardiogram done shortly before that visit showed LVEF  55%-60%, and moderately dilated RV with mild to moderately reduced RV function.  Bioprosthetic mitral valve function was normal, mean gradient was 5 mmHg.  She was noted to have moderate to moderate-severe tricuspid regurgitation, and elevated right-sided pressures.  She was also noted to have moderate aortic stenosis and mild aortic insufficiency. After continued discussion, Ms. Clark chose to proceed conservatively, and declined further invasive procedures. However, given her pacemaker was reaching end of life, she did agree to a generator replacement, which occurred on 3/8/2021. During that visit, was placed on spironolactone 12.5mg daily, though this was discontinued " "after follow up labs showed a decline in renal function.     Today, we are doing an in clinic visit for follow up. She tells me that overall, she's been feeling \"ok.\" She denies worsening MORRISON and is trying to walk around the halls of her facility when she can. She denies any dizziness or presyncope. She has not had any new lower extremity edema or orthopnea. In fact, weight is down slightly from previous; she reports chronic diarrhea that has been a bit worse lately. She has an upcoming appt with her PCP to discuss this further. She has just finished a course of abx for  UTI. Her BP today is excellent at 110/67. She is no longer on the spironolactone as mentioned, and Dr. Rodriguez had added an additional 2.5mg of amlodipine to her current amlodipine/valsartan combo at 5mg/160mg. She reports she hasn't been checking her BP as much at home, but when she does, it's under good control.        Labs reviewed at time of visit: Most recent labs dated 3/8: Na 141, K 4.0, BUN 23, SCr 0.90. hemoglobin 11.6, hematocrit 36.5. plt 447.    ASSESSMENT/PLAN:    1. Pulmonary hypertension.   --Group 2 PH in the setting of Rheumatic valve disease. Most recent echocardiogram showed preserved EF 55-60%. RV was moderately dilated, and RV function was mild to moderately reduced with 2-3+ TR. RVSp was significantly elevated. This was reviewed by Dr. Rodriguez who felt it was similar when compared to prior CMRI in 2018. As patient has been feeling well, she has opted to defer any further invasive workup in this regard.   --Will continue to focus on BP and volume control. As below, BP under good control today. She appears euvolemic on exam, on torsemide 10mg daily. No changes were made today.     2. Rheumatic heart disease.    --History of bioprosthetic mitral valve replacement in 2007, redo in 2014 with a 27 mm Magna bioprosthetic mitral valve.  Mean gradient 5 mmHg, on echo Jan 2020. She also has a history of a tricuspid valve annuloplasty with residual " moderate to severe tricuspid regurgitation. As above, BP and volume status appear acceptable today.     3. High degree AV block.   --Post surgery 2007, s/p dual-chamber pacemaker placement.  She recently underwent a device generator change. Last device interrogation 3/19/2021showed AP 22.5%,  99.7%. Underlying rhythm was atrial flutter with HR 30s. She underwent 187 mode switches since generator change; thus, her sensitivity settings were changed at that time.    --She is anticoagulated with warfarin.     3. Hypertension.   --BP is excellent today and she is feeling well. I continued her amlodipine/valsartan 5/160 mg daily, with additional 2.5mg amlodipine, and metoprolol tartrate 50 mg BID unchanged, along with torsemide 10mg daily.       Follow up plan: Return to see Dr. Rodriguez in ~4 months with repeat labs as previously requested.       Orders this Visit:  Orders Placed This Encounter   Procedures     Basic metabolic panel     N terminal pro BNP outpatient     Follow-Up with Cardiologist     Orders Placed This Encounter   Medications     amLODIPine (NORVASC) 2.5 MG tablet     Sig: Take 1 tablet (2.5 mg) by mouth daily     Dispense:        Medications Discontinued During This Encounter   Medication Reason     sulfamethoxazole-trimethoprim (BACTRIM DS) 800-160 MG tablet Medication Reconciliation Clean Up     nitroFURantoin macrocrystal (MACRODANTIN) 100 MG capsule Medication Reconciliation Clean Up         CURRENT MEDICATIONS:  Current Outpatient Medications   Medication Sig Dispense Refill     ALLOPURINOL PO Take 100 mg by mouth 2 times daily       amLODIPine (NORVASC) 2.5 MG tablet Take 1 tablet (2.5 mg) by mouth daily       amLODIPine-valsartan (EXFORGE) 5-160 MG tablet TAKE ONE TABLET BY MOUTH ONE TIME DAILY  90 tablet 2     calcium citrate (CALCITRATE) 950 MG tablet Take 1 tablet by mouth daily        folic acid (FOLVITE) 1 MG tablet Take 1 mg by mouth daily       latanoprostene bunod (VYZULTA) 0.024 % SOLN  ophthalmic solution Place 1 drop Into the left eye At Bedtime       levothyroxine (SYNTHROID/LEVOTHROID) 112 MCG tablet Take 1 tablet (112 mcg) by mouth daily 90 tablet 3     METHOTREXATE SODIUM IJ Inject 0.8 mLs as directed once a week Thursdays       metoprolol tartrate (LOPRESSOR) 50 MG tablet Take 1 tablet (50 mg) by mouth 2 times daily 180 tablet 3     multivitamin, therapeutic with minerals (MULTI-VITAMIN) TABS tablet Take 1 tablet by mouth daily Without iron       omeprazole (PRILOSEC) 20 MG DR capsule TAKE ONE CAPSULE BY MOUTH TWICE DAILY  180 capsule 0     TIMOLOL MALEATE OP        torsemide (DEMADEX) 10 MG tablet Take 1 tablet (10 mg) by mouth daily (with breakfast) 90 tablet 1     VITAMIN D, CHOLECALCIFEROL, PO Take 1,000 Units by mouth daily       warfarin ANTICOAGULANT (COUMADIN) 2.5 MG tablet Take one tablet (2.5 mg) daily except take one and a half tablets (3.75 mg) Th or as directed by INR clinic 100 tablet 3       ALLERGIES   No Known Allergies    PAST MEDICAL HISTORY:  Past Medical History:   Diagnosis Date     Acquired hypothyroidism 11/4/2015     Aortic valve insufficiency      Arrhythmia     A fib     Arthritis      Atrial fibrillation (H)      Atrial fibrillation and flutter (H)      Blood clotting disorder (H)      CHF (congestive heart failure) (H)      DVT (deep venous thrombosis) (H) 2003     Gastro-oesophageal reflux disease      H/O mitral valve replacement with tissue graft june 2014     History of blood transfusion 2014     HTN (hypertension)      Hypothyroidism      Other chronic pain      PONV (postoperative nausea and vomiting)      Pulmonary HTN (H)      Rheumatoid arthritis (H)      Rheumatoid arthritis(714.0)      Seizures (H) 2014     Shingles 08/2018     Stroke (H) 2009    left side mild weakness      Stroke (H) 2014     [place]    [place]     Thrombosis of leg 2003    both legs         Review of Systems:  Cardiovascular: negative for chest pain, palpitations,  "orthopnea, LE edema  Constitutional: negative for chills, sweats, fevers   Resp: Negative for dyspnea at rest, dyspnea on exertion, cough, known chronic lung disease  HEENT: Negative for new visual changes, frequent headaches  Gastrointestinal: negative for abdominal pain, pos diarrhea, neg blood in stool, nausea, vomiting  Hematologic/lymphatic: pos for current systemic anticoagulation, hx of blood clots  Neurological: negative for focal weakness, LOC, seizures, syncope/presyncope       Physical Exam:  Vitals: /67 (BP Location: Left arm, Patient Position: Sitting, Cuff Size: Adult Regular)   Pulse 81   Ht 1.626 m (5' 4\")   Wt 67.5 kg (148 lb 12.8 oz)   LMP  (LMP Unknown)   Breastfeeding No   BMI 25.54 kg/m     Wt Readings from Last 4 Encounters:   03/29/21 67.5 kg (148 lb 12.8 oz)   03/08/21 68.5 kg (151 lb 1.6 oz)   02/17/21 69.4 kg (153 lb)   01/19/21 69.4 kg (152 lb 14.4 oz)       GEN:  In general, this is a well nourished  female in no acute distress on room air.  Patient ambulatory, unaccompanied.   HEENT:  Pupils grossly equal, sclerae nonicteric.   NECK: Supple, trachea midline. No JVD while upright.   C/V:  Regular rate and rhythm, no murmur, rub or gallop. No S3 or RV heave.   RESP: Respirations are unlabored. No use of accessory muscles. Clear to auscultation bilaterally without wheezing, rales, or rhonchi.  GI: Abdomen soft, nontender, nondistended.   EXTREM: No LE edema. No cyanosis or clubbing.  NEURO: Alert and oriented, cooperative. Gait not formally assessed. No obvious focal deficits.   SKIN: Warm and dry.       Recent Lab Results:    CBC RESULTS:  Lab Results   Component Value Date    WBC 6.2 03/08/2021    RBC 3.66 (L) 03/08/2021    HGB 11.6 (L) 03/08/2021    HCT 36.5 03/08/2021     03/08/2021    MCH 31.7 03/08/2021    MCHC 31.8 03/08/2021    RDW 17.2 (H) 03/08/2021     03/08/2021       BMP RESULTS:  Lab Results   Component Value Date     03/08/2021    " POTASSIUM 4.0 03/08/2021    CHLORIDE 107 03/08/2021    CO2 30 03/08/2021    ANIONGAP 4 03/08/2021    GLC 88 03/08/2021    BUN 23 03/08/2021    CR 0.90 03/08/2021    GFRESTIMATED 61 03/08/2021    GFRESTBLACK 71 03/08/2021    BRICE 9.0 03/08/2021        INR RESULTS:  Lab Results   Component Value Date    INR 2.30 (H) 03/23/2021    INR 2.0 (H) 03/08/2021    INR 2.40 (H) 03/05/2021       Recent Labs   Lab Test 07/09/19  1535 07/26/18  1035 05/03/17  1230 04/26/14  0119   NTBNPI  --   --  4,812* 2,180*   NTBNP 664* 423  --   --          Additional pertinent testing:  Echo 1/13/2021  Interpretation Summary     There is mild concentric left ventricular hypertrophy.  The visual ejection fraction is estimated at 55-60%.  The right ventricle is moderately dilated.  The right ventricular systolic function is mild to moderately reduced.  There is mild biatrial enlargement.  There is a bioprosthetic mitral valve.  There is moderate to mod-severe (2-3+) tricuspid regurgitation.  Right ventricular systolic pressure is elevated, consistent with severe  pulmonary hypertension.  Moderate valvular aortic stenosis.  There is mild (1+) aortic regurgitation.  There is trace to mild pulmonic valvular regurgitation.  Compared to prior study, changes are noted.      35 total minutes was spent today including chart review, precharting, history and exam, post visit documentation, and reviewing studies as outlined above.       Zena Benitez PA-C  [facility]  Pager [phone]      cc:   Noe Rodriguez MD  [address], [address]  [place] [address]

## 2021-03-29 NOTE — PATIENT INSTRUCTIONS
Visit Summary:    Today we discussed:   1. No medication changes today.  2. Please check your blood pressure at home a few days a week. Please call if your blood pressure is routinely >130s/80s.     Follow up:   With Dr Rodriguez in ~4 months with repeat labs.    Please call my nurse Maranda at [phone] with any questions or concerns.

## 2021-03-29 NOTE — PROGRESS NOTES
"      Cardiology Progress Note    Date of Service: 03/29/2021      Reason for visit: Follow up pulmonary hypertension, Rheumatic valve disease.    Primary cardiologist: Dr. Noe Rodriguez      HPI:  Ms. 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 Das, but established care with Dr. Rodriguez in January of 2021. At that visit, she reported remaining fairly active walking up and down the halls of her living facility without worsening shortness of breath or new signs of heart failure. Her echocardiogram done shortly before that visit showed LVEF  55%-60%, and moderately dilated RV with mild to moderately reduced RV function.  Bioprosthetic mitral valve function was normal, mean gradient was 5 mmHg.  She was noted to have moderate to moderate-severe tricuspid regurgitation, and elevated right-sided pressures.  She was also noted to have moderate aortic stenosis and mild aortic insufficiency. After continued discussion, Ms. Clark chose to proceed conservatively, and declined further invasive procedures. However, given her pacemaker was reaching end of life, she did agree to a generator replacement, which occurred on 3/8/2021. During that visit, was placed on spironolactone 12.5mg daily, though this was discontinued after follow up labs showed a decline in renal function.     Today, we are doing an in clinic visit for follow up. She tells me that overall, she's been feeling \"ok.\" She denies worsening MORRISON and is trying to walk around the halls of her facility when " she can. She denies any dizziness or presyncope. She has not had any new lower extremity edema or orthopnea. In fact, weight is down slightly from previous; she reports chronic diarrhea that has been a bit worse lately. She has an upcoming appt with her PCP to discuss this further. She has just finished a course of abx for  UTI. Her BP today is excellent at 110/67. She is no longer on the spironolactone as mentioned, and Dr. Rodriguez had added an additional 2.5mg of amlodipine to her current amlodipine/valsartan combo at 5mg/160mg. She reports she hasn't been checking her BP as much at home, but when she does, it's under good control.        Labs reviewed at time of visit: Most recent labs dated 3/8: Na 141, K 4.0, BUN 23, SCr 0.90. hemoglobin 11.6, hematocrit 36.5. plt 447.    ASSESSMENT/PLAN:    1. Pulmonary hypertension.   --Group 2 PH in the setting of Rheumatic valve disease. Most recent echocardiogram showed preserved EF 55-60%. RV was moderately dilated, and RV function was mild to moderately reduced with 2-3+ TR. RVSp was significantly elevated. This was reviewed by Dr. Rodriguez who felt it was similar when compared to prior CMRI in 2018. As patient has been feeling well, she has opted to defer any further invasive workup in this regard.   --Will continue to focus on BP and volume control. As below, BP under good control today. She appears euvolemic on exam, on torsemide 10mg daily. No changes were made today.     2. Rheumatic heart disease.    --History of bioprosthetic mitral valve replacement in 2007, redo in 2014 with a 27 mm Magna bioprosthetic mitral valve.  Mean gradient 5 mmHg, on echo Jan 2020. She also has a history of a tricuspid valve annuloplasty with residual moderate to severe tricuspid regurgitation. As above, BP and volume status appear acceptable today.     3. High degree AV block.   --Post surgery 2007, s/p dual-chamber pacemaker placement.  She recently underwent a device generator change. Last  device interrogation 3/19/2021showed AP 22.5%,  99.7%. Underlying rhythm was atrial flutter with HR 30s. She underwent 187 mode switches since generator change; thus, her sensitivity settings were changed at that time.    --She is anticoagulated with warfarin.     3. Hypertension.   --BP is excellent today and she is feeling well. I continued her amlodipine/valsartan 5/160 mg daily, with additional 2.5mg amlodipine, and metoprolol tartrate 50 mg BID unchanged, along with torsemide 10mg daily.       Follow up plan: Return to see Dr. Rodriguez in ~4 months with repeat labs as previously requested.       Orders this Visit:  Orders Placed This Encounter   Procedures     Basic metabolic panel     N terminal pro BNP outpatient     Follow-Up with Cardiologist     Orders Placed This Encounter   Medications     amLODIPine (NORVASC) 2.5 MG tablet     Sig: Take 1 tablet (2.5 mg) by mouth daily     Dispense:        Medications Discontinued During This Encounter   Medication Reason     sulfamethoxazole-trimethoprim (BACTRIM DS) 800-160 MG tablet Medication Reconciliation Clean Up     nitroFURantoin macrocrystal (MACRODANTIN) 100 MG capsule Medication Reconciliation Clean Up         CURRENT MEDICATIONS:  Current Outpatient Medications   Medication Sig Dispense Refill     ALLOPURINOL PO Take 100 mg by mouth 2 times daily       amLODIPine (NORVASC) 2.5 MG tablet Take 1 tablet (2.5 mg) by mouth daily       amLODIPine-valsartan (EXFORGE) 5-160 MG tablet TAKE ONE TABLET BY MOUTH ONE TIME DAILY  90 tablet 2     calcium citrate (CALCITRATE) 950 MG tablet Take 1 tablet by mouth daily        folic acid (FOLVITE) 1 MG tablet Take 1 mg by mouth daily       latanoprostene bunod (VYZULTA) 0.024 % SOLN ophthalmic solution Place 1 drop Into the left eye At Bedtime       levothyroxine (SYNTHROID/LEVOTHROID) 112 MCG tablet Take 1 tablet (112 mcg) by mouth daily 90 tablet 3     METHOTREXATE SODIUM IJ Inject 0.8 mLs as directed once a week Thursdays        metoprolol tartrate (LOPRESSOR) 50 MG tablet Take 1 tablet (50 mg) by mouth 2 times daily 180 tablet 3     multivitamin, therapeutic with minerals (MULTI-VITAMIN) TABS tablet Take 1 tablet by mouth daily Without iron       omeprazole (PRILOSEC) 20 MG DR capsule TAKE ONE CAPSULE BY MOUTH TWICE DAILY  180 capsule 0     TIMOLOL MALEATE OP        torsemide (DEMADEX) 10 MG tablet Take 1 tablet (10 mg) by mouth daily (with breakfast) 90 tablet 1     VITAMIN D, CHOLECALCIFEROL, PO Take 1,000 Units by mouth daily       warfarin ANTICOAGULANT (COUMADIN) 2.5 MG tablet Take one tablet (2.5 mg) daily except take one and a half tablets (3.75 mg) Th or as directed by INR clinic 100 tablet 3       ALLERGIES   No Known Allergies    PAST MEDICAL HISTORY:  Past Medical History:   Diagnosis Date     Acquired hypothyroidism 11/4/2015     Aortic valve insufficiency      Arrhythmia     A fib     Arthritis      Atrial fibrillation (H)      Atrial fibrillation and flutter (H)      Blood clotting disorder (H)      CHF (congestive heart failure) (H)      DVT (deep venous thrombosis) (H) 2003     Gastro-oesophageal reflux disease      H/O mitral valve replacement with tissue graft june 2014     History of blood transfusion 2014     HTN (hypertension)      Hypothyroidism      Other chronic pain      PONV (postoperative nausea and vomiting)      Pulmonary HTN (H)      Rheumatoid arthritis (H)      Rheumatoid arthritis(714.0)      Seizures (H) 2014     Shingles 08/2018     Stroke (H) 2009    left side mild weakness      Stroke (H) 2014     [place]    [place]     Thrombosis of leg 2003    both legs         Review of Systems:  Cardiovascular: negative for chest pain, palpitations, orthopnea, LE edema  Constitutional: negative for chills, sweats, fevers   Resp: Negative for dyspnea at rest, dyspnea on exertion, cough, known chronic lung disease  HEENT: Negative for new visual changes, frequent headaches  Gastrointestinal: negative  "for abdominal pain, pos diarrhea, neg blood in stool, nausea, vomiting  Hematologic/lymphatic: pos for current systemic anticoagulation, hx of blood clots  Neurological: negative for focal weakness, LOC, seizures, syncope/presyncope       Physical Exam:  Vitals: /67 (BP Location: Left arm, Patient Position: Sitting, Cuff Size: Adult Regular)   Pulse 81   Ht 1.626 m (5' 4\")   Wt 67.5 kg (148 lb 12.8 oz)   LMP  (LMP Unknown)   Breastfeeding No   BMI 25.54 kg/m     Wt Readings from Last 4 Encounters:   03/29/21 67.5 kg (148 lb 12.8 oz)   03/08/21 68.5 kg (151 lb 1.6 oz)   02/17/21 69.4 kg (153 lb)   01/19/21 69.4 kg (152 lb 14.4 oz)       GEN:  In general, this is a well nourished  female in no acute distress on room air.  Patient ambulatory, unaccompanied.   HEENT:  Pupils grossly equal, sclerae nonicteric.   NECK: Supple, trachea midline. No JVD while upright.   C/V:  Regular rate and rhythm, no murmur, rub or gallop. No S3 or RV heave.   RESP: Respirations are unlabored. No use of accessory muscles. Clear to auscultation bilaterally without wheezing, rales, or rhonchi.  GI: Abdomen soft, nontender, nondistended.   EXTREM: No LE edema. No cyanosis or clubbing.  NEURO: Alert and oriented, cooperative. Gait not formally assessed. No obvious focal deficits.   SKIN: Warm and dry.       Recent Lab Results:    CBC RESULTS:  Lab Results   Component Value Date    WBC 6.2 03/08/2021    RBC 3.66 (L) 03/08/2021    HGB 11.6 (L) 03/08/2021    HCT 36.5 03/08/2021     03/08/2021    MCH 31.7 03/08/2021    MCHC 31.8 03/08/2021    RDW 17.2 (H) 03/08/2021     03/08/2021       BMP RESULTS:  Lab Results   Component Value Date     03/08/2021    POTASSIUM 4.0 03/08/2021    CHLORIDE 107 03/08/2021    CO2 30 03/08/2021    ANIONGAP 4 03/08/2021    GLC 88 03/08/2021    BUN 23 03/08/2021    CR 0.90 03/08/2021    GFRESTIMATED 61 03/08/2021    GFRESTBLACK 71 03/08/2021    BRICE 9.0 03/08/2021        INR " RESULTS:  Lab Results   Component Value Date    INR 2.30 (H) 03/23/2021    INR 2.0 (H) 03/08/2021    INR 2.40 (H) 03/05/2021       Recent Labs   Lab Test 07/09/19  1535 07/26/18  1035 05/03/17  1230 04/26/14  0119   NTBNPI  --   --  4,812* 2,180*   NTBNP 664* 423  --   --          Additional pertinent testing:  Echo 1/13/2021  Interpretation Summary     There is mild concentric left ventricular hypertrophy.  The visual ejection fraction is estimated at 55-60%.  The right ventricle is moderately dilated.  The right ventricular systolic function is mild to moderately reduced.  There is mild biatrial enlargement.  There is a bioprosthetic mitral valve.  There is moderate to mod-severe (2-3+) tricuspid regurgitation.  Right ventricular systolic pressure is elevated, consistent with severe  pulmonary hypertension.  Moderate valvular aortic stenosis.  There is mild (1+) aortic regurgitation.  There is trace to mild pulmonic valvular regurgitation.  Compared to prior study, changes are noted.      35 total minutes was spent today including chart review, precharting, history and exam, post visit documentation, and reviewing studies as outlined above.       Zena Benitez PA-C  [facility]  Pager [phone]

## 2021-03-30 ENCOUNTER — ANTICOAGULATION THERAPY VISIT (OUTPATIENT)
Dept: ANTICOAGULATION | Facility: CLINIC | Age: 81
End: 2021-03-30

## 2021-03-30 DIAGNOSIS — I48.91 ATRIAL FIBRILLATION AND FLUTTER (H): ICD-10-CM

## 2021-03-30 DIAGNOSIS — Z95.3 S/P MITRAL VALVE REPLACEMENT WITH BIOPROSTHETIC VALVE: ICD-10-CM

## 2021-03-30 DIAGNOSIS — I48.92 ATRIAL FIBRILLATION AND FLUTTER (H): ICD-10-CM

## 2021-03-30 DIAGNOSIS — Z79.01 LONG TERM CURRENT USE OF ANTICOAGULANTS WITH INR GOAL OF 2.0-3.0: ICD-10-CM

## 2021-03-30 LAB
CAPILLARY BLOOD COLLECTION: NORMAL
INR PPP: 1.8 (ref 0.86–1.14)

## 2021-03-30 PROCEDURE — 99207 PR NO CHARGE NURSE ONLY: CPT

## 2021-03-30 PROCEDURE — 85610 PROTHROMBIN TIME: CPT | Performed by: INTERNAL MEDICINE

## 2021-03-30 PROCEDURE — 36416 COLLJ CAPILLARY BLOOD SPEC: CPT | Performed by: INTERNAL MEDICINE

## 2021-03-30 NOTE — PROGRESS NOTES
ANTICOAGULATION FOLLOW-UP CLINIC VISIT    Patient Name:  Zunilda Alicea May  Date:  3/30/2021  Contact Type:  Telephone/ called patient, she reports diet and medication change. Orders discussed with the patient, she agrees with the plan. Lab INR appointment scheduled on 21.    SUBJECTIVE:  Patient Findings     Positives:  Change in medications (Patient completed antibiotic 3/25/21.), Change in diet/appetite (Angelatent reports she had more green veggies the past week)    Comments:  The patient was assessed for diet, medication, and activity level changes, missed or extra doses, bruising or bleeding, with no problem findings.           Clinical Outcomes     Comments:  The patient was assessed for diet, medication, and activity level changes, missed or extra doses, bruising or bleeding, with no problem findings.              OBJECTIVE    Recent labs: (last 7 days)     21  1023   INR 1.80*       ASSESSMENT / PLAN  INR assessment SUB    Recheck INR In: 2 WEEKS    INR Location Outside lab      Anticoagulation Summary  As of 3/30/2021    INR goal:  2.0-3.0   TTR:  56.4 % (1 y)   INR used for dosin.80 (3/30/2021)   Warfarin maintenance plan:  3.75 mg (2.5 mg x 1.5) every Thu; 2.5 mg (2.5 mg x 1) all other days   Full warfarin instructions:  3/30: 3.75 mg; Otherwise 3.75 mg every Thu; 2.5 mg all other days   Weekly warfarin total:  18.75 mg   Plan last modified:  Екатерина Mahan RN (2020)   Next INR check:  2021   Priority:  Maintenance   Target end date:  Indefinite    Indications    Long term current use of anticoagulants with INR goal of 2.0-3.0 [Z79.01]  Atrial fibrillation and flutter (H) [I48.91  I48.92]  S/P mitral valve replacement with bioprosthetic valve [Z95.3]             Anticoagulation Episode Summary     INR check location:      Preferred lab:      Send INR reminders to:  RANJIT COOK    Comments:  likes printed AVS      Anticoagulation Care Providers     Provider Role  Specialty Phone number    Yunior Huang MD Referring Internal Medicine [phone]            See the Encounter Report to view Anticoagulation Flowsheet and Dosing Calendar (Go to Encounters tab in chart review, and find the Anticoagulation Therapy Visit)    Dosage adjustment made based on physician directed care plan.    Екатерина Mahan RN

## 2021-04-06 ENCOUNTER — OFFICE VISIT (OUTPATIENT)
Dept: INTERNAL MEDICINE | Facility: CLINIC | Age: 81
End: 2021-04-06
Payer: MEDICARE

## 2021-04-06 VITALS
DIASTOLIC BLOOD PRESSURE: 70 MMHG | SYSTOLIC BLOOD PRESSURE: 120 MMHG | HEIGHT: 64 IN | BODY MASS INDEX: 25.61 KG/M2 | TEMPERATURE: 97.7 F | WEIGHT: 150 LBS | HEART RATE: 92 BPM | OXYGEN SATURATION: 97 %

## 2021-04-06 DIAGNOSIS — R19.7 DIARRHEA, UNSPECIFIED TYPE: Primary | ICD-10-CM

## 2021-04-06 DIAGNOSIS — I27.20 PULMONARY HTN (H): ICD-10-CM

## 2021-04-06 DIAGNOSIS — I48.91 ATRIAL FIBRILLATION AND FLUTTER (H): ICD-10-CM

## 2021-04-06 DIAGNOSIS — E03.9 ACQUIRED HYPOTHYROIDISM: ICD-10-CM

## 2021-04-06 DIAGNOSIS — I10 BENIGN ESSENTIAL HYPERTENSION: ICD-10-CM

## 2021-04-06 DIAGNOSIS — I48.92 ATRIAL FIBRILLATION AND FLUTTER (H): ICD-10-CM

## 2021-04-06 PROCEDURE — 36415 COLL VENOUS BLD VENIPUNCTURE: CPT | Performed by: INTERNAL MEDICINE

## 2021-04-06 PROCEDURE — 99214 OFFICE O/P EST MOD 30 MIN: CPT | Performed by: INTERNAL MEDICINE

## 2021-04-06 PROCEDURE — 83516 IMMUNOASSAY NONANTIBODY: CPT | Mod: 59 | Performed by: INTERNAL MEDICINE

## 2021-04-06 PROCEDURE — 83516 IMMUNOASSAY NONANTIBODY: CPT | Performed by: INTERNAL MEDICINE

## 2021-04-06 RX ORDER — TORSEMIDE 10 MG/1
10 TABLET ORAL
Qty: 90 TABLET | Refills: 1 | Status: SHIPPED | OUTPATIENT
Start: 2021-04-06 | End: 2021-07-19

## 2021-04-06 RX ORDER — LOPERAMIDE HYDROCHLORIDE 2 MG/1
2 TABLET ORAL 4 TIMES DAILY PRN
Qty: 30 TABLET | Refills: 1 | Status: SHIPPED | OUTPATIENT
Start: 2021-04-06 | End: 2022-03-08

## 2021-04-06 RX ORDER — FAMOTIDINE 40 MG/1
40 TABLET, FILM COATED ORAL DAILY
Qty: 90 TABLET | Refills: 3 | Status: SHIPPED | OUTPATIENT
Start: 2021-04-06 | End: 2022-01-27

## 2021-04-06 ASSESSMENT — MIFFLIN-ST. JEOR: SCORE: 1140.4

## 2021-04-06 NOTE — PROGRESS NOTES
"    Assessment & Plan     Pulmonary HTN (H)    - torsemide (DEMADEX) 10 MG tablet; Take 1 tablet (10 mg) by mouth daily (with breakfast)    Diarrhea, unspecified type  Assess lab work   Stop PPI , change to H2 blocker   PRN imodium. Consider bile binder with h/o cholecystectomy     - famotidine (PEPCID) 40 MG tablet; Take 1 tablet (40 mg) by mouth daily  - loperamide (IMODIUM A-D) 2 MG tablet; Take 1 tablet (2 mg) by mouth 4 times daily as needed for diarrhea  - Tissue transglutaminase balaji IgA and IgG  - Clostridium difficile toxin B PCR; Future  - Enteric Bacteria and Virus Panel by VIOLET Stool; Future  - Cryptosporidium/Giardia Immunoassay; Future    Acquired hypothyroidism      Benign essential hypertension      Atrial fibrillation and flutter (H)                   See Patient Instructions    No follow-ups on file.    Yunior Huang MD  [facility] KERI Alicea is a 80 year old who presents for the following health issues     HPI   Chief Complaint   Patient presents with     Diarrhea     Ongoing loose stools         Presents with diarrhea , for 5 years, once a day. No blood in stools, no abdominal pain.   Losing slightly weight.   Has appetite.   On Methotrexate for 5 years for RA.   Has h/o cholecystectomy 1 year ago.   Has h/o HTN. on medical treatment. BP has been controlled. No side effects from medications. No CP, HA, dizziness. good compliance with medications and low salt diet.  Has history of atrial fibrillation. On anticoagulation with Coumadin and rate control medications. Asymptonatic - no chest pains , palpitations,  no side effects from medications.          Review of Systems   Constitutional, HEENT, cardiovascular, pulmonary, gi and gu systems are negative, except as otherwise noted.      Objective    BP (!) 147/78 (BP Location: Left arm, Patient Position: Sitting, Cuff Size: Adult Regular)   Pulse 92   Temp 97.7  F (36.5  C) (Oral)   Ht 1.626 m (5' 4\")   Wt " 68 kg (150 lb)   LMP  (LMP Unknown)   SpO2 97%   BMI 25.75 kg/m    Body mass index is 25.75 kg/m .  Physical Exam   GENERAL: healthy, alert and no distress  EYES: Eyes grossly normal to inspection, PERRL and conjunctivae and sclerae normal  HENT: ear canals and TM's normal, nose and mouth without ulcers or lesions  NECK: no adenopathy, no asymmetry, masses, or scars and thyroid normal to palpation  RESP: lungs clear to auscultation - no rales, rhonchi or wheezes  CV: regular rate and rhythm, normal S1 S2, no S3 or S4, no murmur, click or rub, no peripheral edema and peripheral pulses strong  ABDOMEN: soft, nontender, no hepatosplenomegaly, no masses and bowel sounds normal  MS: no gross musculoskeletal defects noted, no edema  SKIN: no suspicious lesions or rashes  NEURO: Normal strength and tone, mentation intact and speech normal

## 2021-04-08 DIAGNOSIS — R19.7 DIARRHEA, UNSPECIFIED TYPE: ICD-10-CM

## 2021-04-08 PROCEDURE — 87329 GIARDIA AG IA: CPT | Mod: 59 | Performed by: INTERNAL MEDICINE

## 2021-04-08 PROCEDURE — 36415 COLL VENOUS BLD VENIPUNCTURE: CPT | Performed by: INTERNAL MEDICINE

## 2021-04-08 PROCEDURE — 87328 CRYPTOSPORIDIUM AG IA: CPT | Performed by: INTERNAL MEDICINE

## 2021-04-08 PROCEDURE — 87506 IADNA-DNA/RNA PROBE TQ 6-11: CPT | Performed by: INTERNAL MEDICINE

## 2021-04-09 LAB
C PARVUM AG STL QL IA: NEGATIVE
G LAMBLIA AG STL QL IA: NEGATIVE
SPECIMEN SOURCE: NORMAL
TTG IGA SER-ACNC: 2 U/ML
TTG IGG SER-ACNC: 1 U/ML

## 2021-04-13 ENCOUNTER — ANTICOAGULATION THERAPY VISIT (OUTPATIENT)
Dept: ANTICOAGULATION | Facility: CLINIC | Age: 81
End: 2021-04-13

## 2021-04-13 DIAGNOSIS — I48.91 ATRIAL FIBRILLATION AND FLUTTER (H): ICD-10-CM

## 2021-04-13 DIAGNOSIS — I48.92 ATRIAL FIBRILLATION AND FLUTTER (H): ICD-10-CM

## 2021-04-13 DIAGNOSIS — Z95.3 S/P MITRAL VALVE REPLACEMENT WITH BIOPROSTHETIC VALVE: ICD-10-CM

## 2021-04-13 DIAGNOSIS — Z79.01 LONG TERM CURRENT USE OF ANTICOAGULANTS WITH INR GOAL OF 2.0-3.0: ICD-10-CM

## 2021-04-13 LAB
CAPILLARY BLOOD COLLECTION: NORMAL
INR PPP: 2.1 (ref 0.86–1.14)

## 2021-04-13 PROCEDURE — 99207 PR NO CHARGE NURSE ONLY: CPT

## 2021-04-13 PROCEDURE — 85610 PROTHROMBIN TIME: CPT | Performed by: INTERNAL MEDICINE

## 2021-04-13 PROCEDURE — 36416 COLLJ CAPILLARY BLOOD SPEC: CPT | Performed by: INTERNAL MEDICINE

## 2021-04-13 NOTE — PROGRESS NOTES
ANTICOAGULATION FOLLOW-UP CLINIC VISIT    Patient Name:  Zunilda Alicea May  Date:  2021  Contact Type:  Telephone/ Called patient, she any changes or missed warfarin doses. Orders discussed with the patient, she agrees with the plan. Lab INR appointment scheduled on 21    SUBJECTIVE:  Patient Findings     Positives:  Change in diet/appetite (Patient reports continues with her greens, will not be maintaining this amount while on vacation.)    Comments:  The patient was assessed for diet, medication, and activity level changes, missed or extra doses, bruising or bleeding, with no problem findings. Maintenance warfarin dosing was reviewed with patient and will remain on the same dose until next INR check. Patient did not have any questions or concerns. Patient is driving to [place] 21 will return 21.        Clinical Outcomes     Comments:  The patient was assessed for diet, medication, and activity level changes, missed or extra doses, bruising or bleeding, with no problem findings. Maintenance warfarin dosing was reviewed with patient and will remain on the same dose until next INR check. Patient did not have any questions or concerns. Patient is driving to [place] 21 will return 21.           OBJECTIVE    Recent labs: (last 7 days)     21  1048   INR 2.10*       ASSESSMENT / PLAN  INR assessment THER    Recheck INR In: 3 WEEKS    INR Location Outside lab      Anticoagulation Summary  As of 2021    INR goal:  2.0-3.0   TTR:  55.1 % (1 y)   INR used for dosin.10 (2021)   Warfarin maintenance plan:  3.75 mg (2.5 mg x 1.5) every Thu; 2.5 mg (2.5 mg x 1) all other days   Full warfarin instructions:  3.75 mg every Thu; 2.5 mg all other days   Weekly warfarin total:  18.75 mg   No change documented:  Екатерина Mahan, RN   Plan last modified:  Екатерина Mahan RN (2020)   Next INR check:  2021   Priority:  Maintenance   Target end date:  Indefinite    Indications    Long  term current use of anticoagulants with INR goal of 2.0-3.0 [Z79.01]  Atrial fibrillation and flutter (H) [I48.91  I48.92]  S/P mitral valve replacement with bioprosthetic valve [Z95.3]             Anticoagulation Episode Summary     INR check location:      Preferred lab:      Send INR reminders to:  RANJIT [facility]    Comments:  likes printed AVS      Anticoagulation Care Providers     Provider Role Specialty Phone number    Yunior Huang MD Referring Internal Medicine [phone]            See the Encounter Report to view Anticoagulation Flowsheet and Dosing Calendar (Go to Encounters tab in chart review, and find the Anticoagulation Therapy Visit)    Dosage adjustment made based on physician directed care plan.    Екатерина Mahan RN

## 2021-05-04 ENCOUNTER — ANTICOAGULATION THERAPY VISIT (OUTPATIENT)
Dept: ANTICOAGULATION | Facility: CLINIC | Age: 81
End: 2021-05-04

## 2021-05-04 DIAGNOSIS — I48.91 ATRIAL FIBRILLATION AND FLUTTER (H): ICD-10-CM

## 2021-05-04 DIAGNOSIS — Z79.01 LONG TERM CURRENT USE OF ANTICOAGULANTS WITH INR GOAL OF 2.0-3.0: ICD-10-CM

## 2021-05-04 DIAGNOSIS — Z95.3 S/P MITRAL VALVE REPLACEMENT WITH BIOPROSTHETIC VALVE: ICD-10-CM

## 2021-05-04 DIAGNOSIS — I48.92 ATRIAL FIBRILLATION AND FLUTTER (H): ICD-10-CM

## 2021-05-04 LAB
CAPILLARY BLOOD COLLECTION: NORMAL
INR PPP: 1.7 (ref 0.86–1.14)

## 2021-05-04 PROCEDURE — 99207 PR NO CHARGE NURSE ONLY: CPT

## 2021-05-04 PROCEDURE — 85610 PROTHROMBIN TIME: CPT | Performed by: INTERNAL MEDICINE

## 2021-05-04 PROCEDURE — 36416 COLLJ CAPILLARY BLOOD SPEC: CPT | Performed by: INTERNAL MEDICINE

## 2021-05-04 NOTE — PROGRESS NOTES
ANTICOAGULATION FOLLOW-UP CLINIC VISIT    Patient Name:  Zunilda Alicea May  Date:  2021  Contact Type:  Telephone/ Called patient, she denies any changes. Orders discussed with the patient, she agrees with the plan. Lab INR appointment scheduled on 21.    SUBJECTIVE:  Patient Findings     Positives:  Change in diet/appetite (Patient reports she probably had more spinach, she put her fresh spinach in the freezer. )    Comments:  The patient was assessed for diet, medication, and activity level changes, missed or extra doses, bruising or bleeding, with no problem findings.         Clinical Outcomes     Comments:  The patient was assessed for diet, medication, and activity level changes, missed or extra doses, bruising or bleeding, with no problem findings.            OBJECTIVE    Recent labs: (last 7 days)     21  1333   INR 1.70*       ASSESSMENT / PLAN  INR assessment SUB    Recheck INR In: 2 WEEKS    INR Location Outside lab      Anticoagulation Summary  As of 2021    INR goal:  2.0-3.0   TTR:  56.5 % (1 y)   INR used for dosin.70 (2021)   Warfarin maintenance plan:  3.75 mg (2.5 mg x 1.5) every Thu; 2.5 mg (2.5 mg x 1) all other days   Full warfarin instructions:  : 5 mg; Otherwise 3.75 mg every Thu; 2.5 mg all other days   Weekly warfarin total:  18.75 mg   Plan last modified:  Екатерина Mahan RN (2020)   Next INR check:  2021   Priority:  Maintenance   Target end date:  Indefinite    Indications    Long term current use of anticoagulants with INR goal of 2.0-3.0 [Z79.01]  Atrial fibrillation and flutter (H) [I48.91  I48.92]  S/P mitral valve replacement with bioprosthetic valve [Z95.3]             Anticoagulation Episode Summary     INR check location:      Preferred lab:      Send INR reminders to:  [facility]    Comments:  likes printed AVS      Anticoagulation Care Providers     Provider Role Specialty Phone number    Yunior Huang MD Referring  Internal Medicine [phone]            See the Encounter Report to view Anticoagulation Flowsheet and Dosing Calendar (Go to Encounters tab in chart review, and find the Anticoagulation Therapy Visit)    Dosage adjustment made based on physician directed care plan.    Екатерина Mahan RN

## 2021-05-21 ENCOUNTER — ANTICOAGULATION THERAPY VISIT (OUTPATIENT)
Dept: ANTICOAGULATION | Facility: CLINIC | Age: 81
End: 2021-05-21

## 2021-05-21 DIAGNOSIS — Z79.01 LONG TERM CURRENT USE OF ANTICOAGULANTS WITH INR GOAL OF 2.0-3.0: ICD-10-CM

## 2021-05-21 DIAGNOSIS — Z95.3 S/P MITRAL VALVE REPLACEMENT WITH BIOPROSTHETIC VALVE: ICD-10-CM

## 2021-05-21 DIAGNOSIS — I48.92 ATRIAL FIBRILLATION AND FLUTTER (H): ICD-10-CM

## 2021-05-21 DIAGNOSIS — I48.91 ATRIAL FIBRILLATION AND FLUTTER (H): ICD-10-CM

## 2021-05-21 LAB
CAPILLARY BLOOD COLLECTION: NORMAL
INR PPP: 2.2 (ref 0.86–1.14)

## 2021-05-21 PROCEDURE — 36416 COLLJ CAPILLARY BLOOD SPEC: CPT | Performed by: INTERNAL MEDICINE

## 2021-05-21 PROCEDURE — 99207 PR NO CHARGE NURSE ONLY: CPT

## 2021-05-21 PROCEDURE — 85610 PROTHROMBIN TIME: CPT | Performed by: INTERNAL MEDICINE

## 2021-05-21 NOTE — PROGRESS NOTES
ANTICOAGULATION FOLLOW-UP CLINIC VISIT    Patient Name:  Zunilda Alicea May  Date:  2021  Contact Type:  Telephone/ discussed with patient    SUBJECTIVE:  Patient Findings     Comments:  The patient was assessed for diet, medication, and activity level changes, missed or extra doses, bruising or bleeding, with no problem findings.          Clinical Outcomes     Negatives:  Major bleeding event, Thromboembolic event, Anticoagulation-related hospital admission, Anticoagulation-related ED visit, Anticoagulation-related fatality    Comments:  The patient was assessed for diet, medication, and activity level changes, missed or extra doses, bruising or bleeding, with no problem findings.             OBJECTIVE    Recent labs: (last 7 days)     21  1131   INR 2.20*       ASSESSMENT / PLAN  INR assessment THER    Recheck INR In: 3 WEEKS    INR Location Clinic      Anticoagulation Summary  As of 2021    INR goal:  2.0-3.0   TTR:  58.4 % (1 y)   INR used for dosin.20 (2021)   Warfarin maintenance plan:  3.75 mg (2.5 mg x 1.5) every Thu; 2.5 mg (2.5 mg x 1) all other days   Full warfarin instructions:  3.75 mg every Thu; 2.5 mg all other days   Weekly warfarin total:  18.75 mg   No change documented:  Sonam Teixeira RN   Plan last modified:  Екатерина Mahan RN (2020)   Next INR check:  2021   Priority:  Maintenance   Target end date:  Indefinite    Indications    Long term current use of anticoagulants with INR goal of 2.0-3.0 [Z79.01]  Atrial fibrillation and flutter (H) [I48.91  I48.92]  S/P mitral valve replacement with bioprosthetic valve [Z95.3]             Anticoagulation Episode Summary     INR check location:      Preferred lab:      Send INR reminders to:  [facility]    Comments:  likes printed AVS      Anticoagulation Care Providers     Provider Role Specialty Phone number    Yunior Huang MD Referring Internal Medicine [phone]            See the Encounter  Report to view Anticoagulation Flowsheet and Dosing Calendar (Go to Encounters tab in chart review, and find the Anticoagulation Therapy Visit)    Dosage adjustment made based on physician directed care plan.    Sonam Teixeira RN

## 2021-06-03 ENCOUNTER — TRANSFERRED RECORDS (OUTPATIENT)
Dept: HEALTH INFORMATION MANAGEMENT | Facility: CLINIC | Age: 81
End: 2021-06-03

## 2021-06-03 LAB
ALT SERPL-CCNC: 18 IU/L (ref 5–35)
AST SERPL-CCNC: 30 U/L (ref 5–34)
CREATININE (EXTERNAL): 1.09 MG/DL (ref 0.5–1.3)
GFR ESTIMATED (EXTERNAL): 51.5 ML/MIN/1.73M2

## 2021-06-07 ENCOUNTER — TELEPHONE (OUTPATIENT)
Dept: CARDIOLOGY | Facility: CLINIC | Age: 81
End: 2021-06-07

## 2021-06-08 ENCOUNTER — ANCILLARY PROCEDURE (OUTPATIENT)
Dept: CARDIOLOGY | Facility: CLINIC | Age: 81
End: 2021-06-08
Attending: INTERNAL MEDICINE
Payer: MEDICARE

## 2021-06-08 ENCOUNTER — MYC MEDICAL ADVICE (OUTPATIENT)
Dept: INTERNAL MEDICINE | Facility: CLINIC | Age: 81
End: 2021-06-08

## 2021-06-08 DIAGNOSIS — Z95.0 PACEMAKER: ICD-10-CM

## 2021-06-08 LAB
MDC_IDC_EPISODE_DTM: NORMAL
MDC_IDC_EPISODE_DURATION: 1012 S
MDC_IDC_EPISODE_DURATION: 1031 S
MDC_IDC_EPISODE_DURATION: 1191 S
MDC_IDC_EPISODE_DURATION: 1210 S
MDC_IDC_EPISODE_DURATION: 122 S
MDC_IDC_EPISODE_DURATION: 1350 S
MDC_IDC_EPISODE_DURATION: 146 S
MDC_IDC_EPISODE_DURATION: 163 S
MDC_IDC_EPISODE_DURATION: 181 S
MDC_IDC_EPISODE_DURATION: 183 S
MDC_IDC_EPISODE_DURATION: 185 S
MDC_IDC_EPISODE_DURATION: 186 S
MDC_IDC_EPISODE_DURATION: 188 S
MDC_IDC_EPISODE_DURATION: 189 S
MDC_IDC_EPISODE_DURATION: 190 S
MDC_IDC_EPISODE_DURATION: 191 S
MDC_IDC_EPISODE_DURATION: 192 S
MDC_IDC_EPISODE_DURATION: 205 S
MDC_IDC_EPISODE_DURATION: 206 S
MDC_IDC_EPISODE_DURATION: 212 S
MDC_IDC_EPISODE_DURATION: 213 S
MDC_IDC_EPISODE_DURATION: 218 S
MDC_IDC_EPISODE_DURATION: 219 S
MDC_IDC_EPISODE_DURATION: 222 S
MDC_IDC_EPISODE_DURATION: 226 S
MDC_IDC_EPISODE_DURATION: 228 S
MDC_IDC_EPISODE_DURATION: 228 S
MDC_IDC_EPISODE_DURATION: 230 S
MDC_IDC_EPISODE_DURATION: 2311 S
MDC_IDC_EPISODE_DURATION: 239 S
MDC_IDC_EPISODE_DURATION: 246 S
MDC_IDC_EPISODE_DURATION: 258 S
MDC_IDC_EPISODE_DURATION: 284 S
MDC_IDC_EPISODE_DURATION: 288 S
MDC_IDC_EPISODE_DURATION: 292 S
MDC_IDC_EPISODE_DURATION: 316 S
MDC_IDC_EPISODE_DURATION: 331 S
MDC_IDC_EPISODE_DURATION: 331 S
MDC_IDC_EPISODE_DURATION: 354 S
MDC_IDC_EPISODE_DURATION: 387 S
MDC_IDC_EPISODE_DURATION: 4549 S
MDC_IDC_EPISODE_DURATION: 4932 S
MDC_IDC_EPISODE_DURATION: 52 S
MDC_IDC_EPISODE_DURATION: 535 S
MDC_IDC_EPISODE_DURATION: 579 S
MDC_IDC_EPISODE_DURATION: 69 S
MDC_IDC_EPISODE_DURATION: 801 S
MDC_IDC_EPISODE_DURATION: 838 S
MDC_IDC_EPISODE_ID: 3263
MDC_IDC_EPISODE_ID: 3264
MDC_IDC_EPISODE_ID: 3265
MDC_IDC_EPISODE_ID: 3266
MDC_IDC_EPISODE_ID: 3267
MDC_IDC_EPISODE_ID: 3268
MDC_IDC_EPISODE_ID: 3269
MDC_IDC_EPISODE_ID: 3270
MDC_IDC_EPISODE_ID: 3271
MDC_IDC_EPISODE_ID: 3272
MDC_IDC_EPISODE_ID: 3273
MDC_IDC_EPISODE_ID: 3274
MDC_IDC_EPISODE_ID: 3275
MDC_IDC_EPISODE_ID: 3276
MDC_IDC_EPISODE_ID: 3277
MDC_IDC_EPISODE_ID: 3278
MDC_IDC_EPISODE_ID: 3279
MDC_IDC_EPISODE_ID: 3280
MDC_IDC_EPISODE_ID: 3281
MDC_IDC_EPISODE_ID: 3282
MDC_IDC_EPISODE_ID: 3283
MDC_IDC_EPISODE_ID: 3284
MDC_IDC_EPISODE_ID: 3285
MDC_IDC_EPISODE_ID: 3286
MDC_IDC_EPISODE_ID: 3287
MDC_IDC_EPISODE_ID: 3288
MDC_IDC_EPISODE_ID: 3289
MDC_IDC_EPISODE_ID: 3290
MDC_IDC_EPISODE_ID: 3291
MDC_IDC_EPISODE_ID: 3292
MDC_IDC_EPISODE_ID: 3293
MDC_IDC_EPISODE_ID: 3294
MDC_IDC_EPISODE_ID: 3295
MDC_IDC_EPISODE_ID: 3296
MDC_IDC_EPISODE_ID: 3297
MDC_IDC_EPISODE_ID: 3298
MDC_IDC_EPISODE_ID: 3299
MDC_IDC_EPISODE_ID: 3300
MDC_IDC_EPISODE_ID: 3301
MDC_IDC_EPISODE_ID: 3302
MDC_IDC_EPISODE_ID: 3303
MDC_IDC_EPISODE_ID: 3304
MDC_IDC_EPISODE_ID: 3305
MDC_IDC_EPISODE_ID: 3306
MDC_IDC_EPISODE_ID: 3307
MDC_IDC_EPISODE_ID: 3308
MDC_IDC_EPISODE_ID: 3309
MDC_IDC_EPISODE_ID: 3310
MDC_IDC_EPISODE_ID: 3311
MDC_IDC_EPISODE_ID: 3312
MDC_IDC_EPISODE_TYPE: NORMAL
MDC_IDC_LEAD_IMPLANT_DT: NORMAL
MDC_IDC_LEAD_IMPLANT_DT: NORMAL
MDC_IDC_LEAD_LOCATION: NORMAL
MDC_IDC_LEAD_LOCATION: NORMAL
MDC_IDC_LEAD_MFG: NORMAL
MDC_IDC_LEAD_MFG: NORMAL
MDC_IDC_LEAD_MODEL: NORMAL
MDC_IDC_LEAD_MODEL: NORMAL
MDC_IDC_LEAD_POLARITY_TYPE: NORMAL
MDC_IDC_LEAD_POLARITY_TYPE: NORMAL
MDC_IDC_LEAD_SERIAL: NORMAL
MDC_IDC_LEAD_SERIAL: NORMAL
MDC_IDC_MSMT_BATTERY_DTM: NORMAL
MDC_IDC_MSMT_BATTERY_REMAINING_LONGEVITY: 143 MO
MDC_IDC_MSMT_BATTERY_RRT_TRIGGER: 2.62
MDC_IDC_MSMT_BATTERY_STATUS: NORMAL
MDC_IDC_MSMT_BATTERY_VOLTAGE: 3.2 V
MDC_IDC_MSMT_LEADCHNL_RA_IMPEDANCE_VALUE: 323 OHM
MDC_IDC_MSMT_LEADCHNL_RA_IMPEDANCE_VALUE: 437 OHM
MDC_IDC_MSMT_LEADCHNL_RA_PACING_THRESHOLD_AMPLITUDE: 0.62 V
MDC_IDC_MSMT_LEADCHNL_RA_PACING_THRESHOLD_PULSEWIDTH: 0.4 MS
MDC_IDC_MSMT_LEADCHNL_RA_SENSING_INTR_AMPL: 0.88 MV
MDC_IDC_MSMT_LEADCHNL_RA_SENSING_INTR_AMPL: 2 MV
MDC_IDC_MSMT_LEADCHNL_RV_IMPEDANCE_VALUE: 361 OHM
MDC_IDC_MSMT_LEADCHNL_RV_IMPEDANCE_VALUE: 380 OHM
MDC_IDC_MSMT_LEADCHNL_RV_PACING_THRESHOLD_AMPLITUDE: 0.75 V
MDC_IDC_MSMT_LEADCHNL_RV_PACING_THRESHOLD_PULSEWIDTH: 0.4 MS
MDC_IDC_MSMT_LEADCHNL_RV_SENSING_INTR_AMPL: 13.25 MV
MDC_IDC_MSMT_LEADCHNL_RV_SENSING_INTR_AMPL: 15 MV
MDC_IDC_PG_IMPLANT_DTM: NORMAL
MDC_IDC_PG_MFG: NORMAL
MDC_IDC_PG_MODEL: NORMAL
MDC_IDC_PG_SERIAL: NORMAL
MDC_IDC_PG_TYPE: NORMAL
MDC_IDC_SESS_CLINIC_NAME: NORMAL
MDC_IDC_SESS_DTM: NORMAL
MDC_IDC_SESS_TYPE: NORMAL
MDC_IDC_SET_BRADY_AT_MODE_SWITCH_RATE: 171 {BEATS}/MIN
MDC_IDC_SET_BRADY_HYSTRATE: NORMAL
MDC_IDC_SET_BRADY_LOWRATE: 60 {BEATS}/MIN
MDC_IDC_SET_BRADY_MAX_SENSOR_RATE: 130 {BEATS}/MIN
MDC_IDC_SET_BRADY_MAX_TRACKING_RATE: 130 {BEATS}/MIN
MDC_IDC_SET_BRADY_MODE: NORMAL
MDC_IDC_SET_BRADY_PAV_DELAY_LOW: 180 MS
MDC_IDC_SET_BRADY_SAV_DELAY_LOW: 150 MS
MDC_IDC_SET_LEADCHNL_RA_PACING_AMPLITUDE: 1.5 V
MDC_IDC_SET_LEADCHNL_RA_PACING_ANODE_ELECTRODE_1: NORMAL
MDC_IDC_SET_LEADCHNL_RA_PACING_ANODE_LOCATION_1: NORMAL
MDC_IDC_SET_LEADCHNL_RA_PACING_CAPTURE_MODE: NORMAL
MDC_IDC_SET_LEADCHNL_RA_PACING_CATHODE_ELECTRODE_1: NORMAL
MDC_IDC_SET_LEADCHNL_RA_PACING_CATHODE_LOCATION_1: NORMAL
MDC_IDC_SET_LEADCHNL_RA_PACING_POLARITY: NORMAL
MDC_IDC_SET_LEADCHNL_RA_PACING_PULSEWIDTH: 0.4 MS
MDC_IDC_SET_LEADCHNL_RA_SENSING_ANODE_ELECTRODE_1: NORMAL
MDC_IDC_SET_LEADCHNL_RA_SENSING_ANODE_LOCATION_1: NORMAL
MDC_IDC_SET_LEADCHNL_RA_SENSING_CATHODE_ELECTRODE_1: NORMAL
MDC_IDC_SET_LEADCHNL_RA_SENSING_CATHODE_LOCATION_1: NORMAL
MDC_IDC_SET_LEADCHNL_RA_SENSING_POLARITY: NORMAL
MDC_IDC_SET_LEADCHNL_RA_SENSING_SENSITIVITY: 0.45 MV
MDC_IDC_SET_LEADCHNL_RV_PACING_AMPLITUDE: 2 V
MDC_IDC_SET_LEADCHNL_RV_PACING_ANODE_ELECTRODE_1: NORMAL
MDC_IDC_SET_LEADCHNL_RV_PACING_ANODE_LOCATION_1: NORMAL
MDC_IDC_SET_LEADCHNL_RV_PACING_CAPTURE_MODE: NORMAL
MDC_IDC_SET_LEADCHNL_RV_PACING_CATHODE_ELECTRODE_1: NORMAL
MDC_IDC_SET_LEADCHNL_RV_PACING_CATHODE_LOCATION_1: NORMAL
MDC_IDC_SET_LEADCHNL_RV_PACING_POLARITY: NORMAL
MDC_IDC_SET_LEADCHNL_RV_PACING_PULSEWIDTH: 0.4 MS
MDC_IDC_SET_LEADCHNL_RV_SENSING_ANODE_ELECTRODE_1: NORMAL
MDC_IDC_SET_LEADCHNL_RV_SENSING_ANODE_LOCATION_1: NORMAL
MDC_IDC_SET_LEADCHNL_RV_SENSING_CATHODE_ELECTRODE_1: NORMAL
MDC_IDC_SET_LEADCHNL_RV_SENSING_CATHODE_LOCATION_1: NORMAL
MDC_IDC_SET_LEADCHNL_RV_SENSING_POLARITY: NORMAL
MDC_IDC_SET_LEADCHNL_RV_SENSING_SENSITIVITY: 0.9 MV
MDC_IDC_SET_ZONE_DETECTION_INTERVAL: 350 MS
MDC_IDC_SET_ZONE_DETECTION_INTERVAL: 400 MS
MDC_IDC_SET_ZONE_TYPE: NORMAL
MDC_IDC_STAT_AT_BURDEN_PERCENT: 59.3 %
MDC_IDC_STAT_AT_DTM_END: NORMAL
MDC_IDC_STAT_AT_DTM_START: NORMAL
MDC_IDC_STAT_BRADY_AP_VP_PERCENT: 47.52 %
MDC_IDC_STAT_BRADY_AP_VS_PERCENT: 0 %
MDC_IDC_STAT_BRADY_AS_VP_PERCENT: 52.22 %
MDC_IDC_STAT_BRADY_AS_VS_PERCENT: 0.23 %
MDC_IDC_STAT_BRADY_DTM_END: NORMAL
MDC_IDC_STAT_BRADY_DTM_START: NORMAL
MDC_IDC_STAT_BRADY_RA_PERCENT_PACED: 29.42 %
MDC_IDC_STAT_BRADY_RV_PERCENT_PACED: 99.72 %
MDC_IDC_STAT_EPISODE_RECENT_COUNT: 0
MDC_IDC_STAT_EPISODE_RECENT_COUNT: 0
MDC_IDC_STAT_EPISODE_RECENT_COUNT: 3125
MDC_IDC_STAT_EPISODE_RECENT_COUNT_DTM_END: NORMAL
MDC_IDC_STAT_EPISODE_RECENT_COUNT_DTM_START: NORMAL
MDC_IDC_STAT_EPISODE_TOTAL_COUNT: 0
MDC_IDC_STAT_EPISODE_TOTAL_COUNT: 0
MDC_IDC_STAT_EPISODE_TOTAL_COUNT: 3312
MDC_IDC_STAT_EPISODE_TOTAL_COUNT_DTM_END: NORMAL
MDC_IDC_STAT_EPISODE_TOTAL_COUNT_DTM_START: NORMAL
MDC_IDC_STAT_EPISODE_TYPE: NORMAL

## 2021-06-08 PROCEDURE — 93280 PM DEVICE PROGR EVAL DUAL: CPT | Performed by: INTERNAL MEDICINE

## 2021-06-11 ENCOUNTER — ANTICOAGULATION THERAPY VISIT (OUTPATIENT)
Dept: ANTICOAGULATION | Facility: CLINIC | Age: 81
End: 2021-06-11

## 2021-06-11 DIAGNOSIS — Z95.3 S/P MITRAL VALVE REPLACEMENT WITH BIOPROSTHETIC VALVE: ICD-10-CM

## 2021-06-11 DIAGNOSIS — I48.91 ATRIAL FIBRILLATION AND FLUTTER (H): ICD-10-CM

## 2021-06-11 DIAGNOSIS — Z79.01 LONG TERM CURRENT USE OF ANTICOAGULANTS WITH INR GOAL OF 2.0-3.0: ICD-10-CM

## 2021-06-11 DIAGNOSIS — I48.92 ATRIAL FIBRILLATION AND FLUTTER (H): ICD-10-CM

## 2021-06-11 LAB
CAPILLARY BLOOD COLLECTION: NORMAL
INR PPP: 2.1 (ref 0.86–1.14)

## 2021-06-11 PROCEDURE — 85610 PROTHROMBIN TIME: CPT | Performed by: INTERNAL MEDICINE

## 2021-06-11 PROCEDURE — 99207 PR NO CHARGE NURSE ONLY: CPT

## 2021-06-11 PROCEDURE — 36416 COLLJ CAPILLARY BLOOD SPEC: CPT | Performed by: INTERNAL MEDICINE

## 2021-06-11 NOTE — PROGRESS NOTES
ANTICOAGULATION FOLLOW-UP CLINIC VISIT    Patient Name:  Zunilda Alicea May  Date:  2021  Contact Type:  Telephone/ Called patient, she denies any changes. Orders discussed with the patient, she agrees with the plan. Patient will have INR checked after PCP appointment 21.    SUBJECTIVE:  Patient Findings     Comments:  The patient was assessed for diet, medication, and activity level changes, missed or extra doses, bruising or bleeding, with no problem findings. Maintenance warfarin dosing was reviewed with patient and will remain on the same dose until next INR check. Patient did not have any questions. Patient reports she is having trouble cutting her warfarin pill in half because they are so small. Patient would like to try to take 5 mg on  so she does not need to cut pill in 1/2. Will have patient recheck INR at 21 PCP appointment.         Clinical Outcomes     Negatives:  Major bleeding event, Thromboembolic event, Anticoagulation-related hospital admission, Anticoagulation-related ED visit, Anticoagulation-related fatality    Comments:  The patient was assessed for diet, medication, and activity level changes, missed or extra doses, bruising or bleeding, with no problem findings. Maintenance warfarin dosing was reviewed with patient and will remain on the same dose until next INR check. Patient did not have any questions. Patient reports she is having trouble cutting her warfarin pill in half because they are so small. Patient would like to try to take 5 mg on  so she does not need to cut pill in 1/2. Will have patient recheck INR at 21 PCP appointment.            OBJECTIVE    Recent labs: (last 7 days)     21  0945   INR 2.10*       ASSESSMENT / PLAN  INR assessment THER    Recheck INR In: 10 DAYS    INR Location Outside lab      Anticoagulation Summary  As of 2021    INR goal:  2.0-3.0   TTR:  64.1 % (1 y)   INR used for dosin.10 (2021)   Warfarin  maintenance plan:  3.75 mg (2.5 mg x 1.5) every Thu; 2.5 mg (2.5 mg x 1) all other days   Full warfarin instructions:  6/17: 5 mg; Otherwise 3.75 mg every Thu; 2.5 mg all other days   Weekly warfarin total:  18.75 mg   Plan last modified:  Екатерина Mahan RN (6/23/2020)   Next INR check:  6/22/2021   Priority:  Maintenance   Target end date:  Indefinite    Indications    Long term current use of anticoagulants with INR goal of 2.0-3.0 [Z79.01]  Atrial fibrillation and flutter (H) [I48.91  I48.92]  S/P mitral valve replacement with bioprosthetic valve [Z95.3]             Anticoagulation Episode Summary     INR check location:      Preferred lab:      Send INR reminders to:  [facility]    Comments:  likes printed AVS      Anticoagulation Care Providers     Provider Role Specialty Phone number    Yunior Huang MD Referring Internal Medicine [phone]            See the Encounter Report to view Anticoagulation Flowsheet and Dosing Calendar (Go to Encounters tab in chart review, and find the Anticoagulation Therapy Visit)    Dosage adjustment made based on physician directed care plan.    Екатерина Mahan, RN

## 2021-06-14 DIAGNOSIS — I27.20 PULMONARY HTN (H): Primary | ICD-10-CM

## 2021-06-14 DIAGNOSIS — I10 BENIGN ESSENTIAL HYPERTENSION: ICD-10-CM

## 2021-06-14 RX ORDER — AMLODIPINE BESYLATE 2.5 MG/1
2.5 TABLET ORAL DAILY
Qty: 90 TABLET | Refills: 3 | Status: SHIPPED | OUTPATIENT
Start: 2021-06-14 | End: 2021-07-19

## 2021-06-14 NOTE — TELEPHONE ENCOUNTER
Received refill request for:  Amlodipine  Last OV was: 3/29/2021 with YOLANDE Maddox  Labs/EKG: n/a  F/U scheduled: 7/19/2021 with Dr. Rodriguez  New script sent to: [facility]    Per OV note from 3/29/2021:     Hypertension.              --BP is excellent today and she is feeling well. I continued her amlodipine/valsartan 5/160 mg daily, with additional 2.5mg amlodipine, and metoprolol tartrate 50 mg BID unchanged, along with torsemide 10mg daily.

## 2021-06-15 ENCOUNTER — HOSPITAL ENCOUNTER (OUTPATIENT)
Dept: MAMMOGRAPHY | Facility: CLINIC | Age: 81
Discharge: HOME | End: 2021-06-15
Attending: INTERNAL MEDICINE | Admitting: INTERNAL MEDICINE
Payer: MEDICARE

## 2021-06-15 DIAGNOSIS — Z12.31 VISIT FOR SCREENING MAMMOGRAM: ICD-10-CM

## 2021-06-15 PROCEDURE — 77063 BREAST TOMOSYNTHESIS BI: CPT

## 2021-06-22 ENCOUNTER — ANTICOAGULATION THERAPY VISIT (OUTPATIENT)
Dept: ANTICOAGULATION | Facility: CLINIC | Age: 81
End: 2021-06-22

## 2021-06-22 ENCOUNTER — ANCILLARY PROCEDURE (OUTPATIENT)
Dept: GENERAL RADIOLOGY | Facility: CLINIC | Age: 81
End: 2021-06-22
Attending: INTERNAL MEDICINE
Payer: MEDICARE

## 2021-06-22 ENCOUNTER — OFFICE VISIT (OUTPATIENT)
Dept: INTERNAL MEDICINE | Facility: CLINIC | Age: 81
End: 2021-06-22
Payer: MEDICARE

## 2021-06-22 VITALS
TEMPERATURE: 97.8 F | BODY MASS INDEX: 25.1 KG/M2 | HEART RATE: 78 BPM | HEIGHT: 64 IN | RESPIRATION RATE: 16 BRPM | WEIGHT: 147 LBS | OXYGEN SATURATION: 98 % | DIASTOLIC BLOOD PRESSURE: 72 MMHG | SYSTOLIC BLOOD PRESSURE: 119 MMHG

## 2021-06-22 DIAGNOSIS — R07.9 LEFT-SIDED CHEST PAIN: ICD-10-CM

## 2021-06-22 DIAGNOSIS — M06.9 RHEUMATOID ARTHRITIS, INVOLVING UNSPECIFIED SITE, UNSPECIFIED WHETHER RHEUMATOID FACTOR PRESENT (H): ICD-10-CM

## 2021-06-22 DIAGNOSIS — I10 BENIGN ESSENTIAL HYPERTENSION: Primary | ICD-10-CM

## 2021-06-22 DIAGNOSIS — I48.91 ATRIAL FIBRILLATION AND FLUTTER (H): ICD-10-CM

## 2021-06-22 DIAGNOSIS — I48.92 ATRIAL FIBRILLATION AND FLUTTER (H): ICD-10-CM

## 2021-06-22 DIAGNOSIS — Z79.01 LONG TERM CURRENT USE OF ANTICOAGULANTS WITH INR GOAL OF 2.0-3.0: ICD-10-CM

## 2021-06-22 DIAGNOSIS — Z95.3 S/P MITRAL VALVE REPLACEMENT WITH BIOPROSTHETIC VALVE: ICD-10-CM

## 2021-06-22 LAB
ALBUMIN UR-MCNC: NEGATIVE MG/DL
APPEARANCE UR: CLEAR
BILIRUB UR QL STRIP: NEGATIVE
COLOR UR AUTO: YELLOW
ERYTHROCYTE [DISTWIDTH] IN BLOOD BY AUTOMATED COUNT: 17 % (ref 10–15)
GLUCOSE UR STRIP-MCNC: NEGATIVE MG/DL
HCT VFR BLD AUTO: 38.6 % (ref 35–47)
HGB BLD-MCNC: 12.5 G/DL (ref 11.7–15.7)
HGB UR QL STRIP: NEGATIVE
INR PPP: 2.2 (ref 0.86–1.14)
KETONES UR STRIP-MCNC: NEGATIVE MG/DL
LEUKOCYTE ESTERASE UR QL STRIP: NEGATIVE
MCH RBC QN AUTO: 33.3 PG (ref 26.5–33)
MCHC RBC AUTO-ENTMCNC: 32.4 G/DL (ref 31.5–36.5)
MCV RBC AUTO: 103 FL (ref 78–100)
NITRATE UR QL: NEGATIVE
PH UR STRIP: 5.5 PH (ref 5–7)
PLATELET # BLD AUTO: 352 10E9/L (ref 150–450)
RBC # BLD AUTO: 3.75 10E12/L (ref 3.8–5.2)
SOURCE: NORMAL
SP GR UR STRIP: 1.01 (ref 1–1.03)
UROBILINOGEN UR STRIP-ACNC: 0.2 EU/DL (ref 0.2–1)
WBC # BLD AUTO: 7.1 10E9/L (ref 4–11)

## 2021-06-22 PROCEDURE — 84443 ASSAY THYROID STIM HORMONE: CPT | Performed by: INTERNAL MEDICINE

## 2021-06-22 PROCEDURE — 85027 COMPLETE CBC AUTOMATED: CPT | Performed by: INTERNAL MEDICINE

## 2021-06-22 PROCEDURE — 85610 PROTHROMBIN TIME: CPT | Performed by: INTERNAL MEDICINE

## 2021-06-22 PROCEDURE — G0439 PPPS, SUBSEQ VISIT: HCPCS | Performed by: INTERNAL MEDICINE

## 2021-06-22 PROCEDURE — 36415 COLL VENOUS BLD VENIPUNCTURE: CPT | Performed by: INTERNAL MEDICINE

## 2021-06-22 PROCEDURE — 80053 COMPREHEN METABOLIC PANEL: CPT | Performed by: INTERNAL MEDICINE

## 2021-06-22 PROCEDURE — 71046 X-RAY EXAM CHEST 2 VIEWS: CPT | Performed by: RADIOLOGY

## 2021-06-22 PROCEDURE — 81003 URINALYSIS AUTO W/O SCOPE: CPT | Performed by: INTERNAL MEDICINE

## 2021-06-22 ASSESSMENT — MIFFLIN-ST. JEOR: SCORE: 1126.79

## 2021-06-22 ASSESSMENT — ENCOUNTER SYMPTOMS
ABDOMINAL PAIN: 1
HEARTBURN: 0
DIARRHEA: 0
CONSTIPATION: 0
HEMATURIA: 0
WEAKNESS: 0
EYE PAIN: 0
HEMATOCHEZIA: 0
NERVOUS/ANXIOUS: 0
DIZZINESS: 0
PALPITATIONS: 0
JOINT SWELLING: 0
SHORTNESS OF BREATH: 0
PARESTHESIAS: 0
HEADACHES: 0
DYSURIA: 0
NAUSEA: 0
CHILLS: 1
ARTHRALGIAS: 1
MYALGIAS: 1
FEVER: 0
SORE THROAT: 0
COUGH: 0
FREQUENCY: 1

## 2021-06-22 ASSESSMENT — ACTIVITIES OF DAILY LIVING (ADL): CURRENT_FUNCTION: HOUSEWORK REQUIRES ASSISTANCE

## 2021-06-22 NOTE — PROGRESS NOTES
"SUBJECTIVE:   Zunilda Clark is a 80 year old female who presents for Preventive Visit.      Patient has been advised of split billing requirements and indicates understanding: Yes   Are you in the first 12 months of your Medicare coverage?  No    Healthy Habits:     In general, how would you rate your overall health?  Good    Frequency of exercise:  2-3 days/week    Duration of exercise:  15-30 minutes    Do you usually eat at least 4 servings of fruit and vegetables a day, include whole grains    & fiber and avoid regularly eating high fat or \"junk\" foods?  Yes    Taking medications regularly:  Yes    Medication side effects:  None    Ability to successfully perform activities of daily living:  Housework requires assistance    Home Safety:  No safety concerns identified    Hearing Impairment:  No hearing concerns    In the past 6 months, have you been bothered by leaking of urine? Yes    In general, how would you rate your overall mental or emotional health?  Good      PHQ-2 Total Score: 0    Additional concerns today:  Yes    Do you feel safe in your environment? Yes    Have you ever done Advance Care Planning? (For example, a Health Directive, POLST, or a discussion with a medical provider or your loved ones about your wishes): Yes, advance care planning is on file.       Fall risk  Fallen 2 or more times in the past year?: No  Any fall with injury in the past year?: No    Cognitive Screening   1) Repeat 3 items (Leader, Season, Table)    2) Clock draw: NORMAL  3) 3 item recall: Recalls 3 objects  Results: 3 items recalled: COGNITIVE IMPAIRMENT LESS LIKELY    Mini-CogTM Copyright BIANCA Gomez. Licensed by the author for use in [facility]; reprinted with permission (lizzie@.[facility]). All rights reserved.      Do you have sleep apnea, excessive snoring or daytime drowsiness?: no    Reviewed and updated as needed this visit by clinical staff  Tobacco  Allergies    Med Hx  Surg Hx  Fam Hx  Soc Hx    "     Reviewed and updated as needed this visit by Provider                Social History     Tobacco Use     Smoking status: Never Smoker     Smokeless tobacco: Never Used   Substance Use Topics     Alcohol use: No     Alcohol/week: 0.0 standard drinks     If you drink alcohol do you typically have >3 drinks per day or >7 drinks per week? No    Alcohol Use 6/22/2021   Prescreen: >3 drinks/day or >7 drinks/week? Not Applicable   Prescreen: >3 drinks/day or >7 drinks/week? -           PROBLEMS TO ADD ON...  Has h/o HTN. on medical treatment. BP has been controlled. No side effects from medications. No CP, HA, dizziness. good compliance with medications and low salt diet.  Has h/o GERD on Pepcid treatment. symptoms are controlled. No nausea, vomiting, heartburns, bloating.  Has history of atrial fibrillation. On anticoagulation with Coumadin and rate control medications. Asymptonatic - no chest pains , palpitations,  no side effects from medications.  Has h/o RA, on treatment. Controlled.   Has had pain in the mid back and left chest areas with longer sitting.     Current providers sharing in care for this patient include:   Patient Care Team:  Yunior Huang MD as PCP - General (Internal Medicine)  Jaycee Cervantes MD as MD (Rheumatology)  Yunior Huang MD as Assigned PCP  Zena Benitez PA as Assigned Heart and Vascular Provider    The following health maintenance items are reviewed in Epic and correct as of today:  Health Maintenance Due   Topic Date Due     HF ACTION PLAN  Never done     ANNUAL REVIEW OF HM ORDERS  Never done     HEPATITIS C SCREENING  Never done     Lab work is in process  Labs reviewed in [facility]  Mammogram Screening: Mammogram Screening: Recommended mammography every 1-2 years with patient discussion and risk factor consideration    Mammogram Screening - Patient over age 75, has elected to continue with screening.  Pertinent mammograms are reviewed under the imaging tab.    Review of  "Systems   Constitutional: Positive for chills. Negative for fever.   HENT: Negative for congestion, ear pain, hearing loss and sore throat.    Eyes: Negative for pain and visual disturbance.   Respiratory: Negative for cough and shortness of breath.    Cardiovascular: Negative for chest pain, palpitations and peripheral edema.   Gastrointestinal: Positive for abdominal pain. Negative for constipation, diarrhea, heartburn, hematochezia and nausea.   Genitourinary: Positive for frequency and urgency. Negative for dysuria, genital sores, hematuria, pelvic pain and vaginal bleeding.   Musculoskeletal: Positive for arthralgias and myalgias. Negative for joint swelling.   Skin: Negative for rash.   Neurological: Negative for dizziness, weakness, headaches and paresthesias.   Psychiatric/Behavioral: Negative for mood changes. The patient is not nervous/anxious.          OBJECTIVE:   /72 (BP Location: Left arm, Patient Position: Sitting, Cuff Size: Adult Regular)   Pulse 78   Temp 97.8  F (36.6  C) (Oral)   Resp 16   Ht 1.626 m (5' 4\")   Wt 66.7 kg (147 lb)   LMP  (LMP Unknown)   SpO2 98%   BMI 25.23 kg/m   Estimated body mass index is 25.23 kg/m  as calculated from the following:    Height as of this encounter: 1.626 m (5' 4\").    Weight as of this encounter: 66.7 kg (147 lb).  Physical Exam  GENERAL: frail, alert and no distress  EYES: Eyes grossly normal to inspection, PERRL and conjunctivae and sclerae normal  HENT: ear canals and TM's normal, nose and mouth without ulcers or lesions  NECK: no adenopathy, no asymmetry, masses, or scars and thyroid normal to palpation  RESP: lungs clear to auscultation - no rales, rhonchi or wheezes  CV: regular rate and rhythm, normal S1 S2, no S3 or S4, no murmur, click or rub, no peripheral edema and peripheral pulses strong  ABDOMEN: soft, nontender, no hepatosplenomegaly, no masses and bowel sounds normal  MS: no gross musculoskeletal defects noted, no edema  Scoliotic " "changes and OA of the thoracolumbar spine, RA of the hands   SKIN: no suspicious lesions or rashes  NEURO: Normal strength and tone, mentation intact and speech normal  PSYCH: mentation appears normal, affect normal/bright    Diagnostic Test Results:  Labs reviewed in Epic    ASSESSMENT / PLAN:       ICD-10-CM    1. Benign essential hypertension  I10 CBC with platelets     Comprehensive metabolic panel     TSH with free T4 reflex     *UA reflex to Microscopic   2. Rheumatoid arthritis, involving unspecified site, unspecified whether rheumatoid factor present (H)  M06.9    3. Atrial fibrillation and flutter (H)  I48.91 INR    I48.92    4. Left-sided chest pain  R07.9 XR Chest 2 Views       Patient has been advised of split billing requirements and indicates understanding: Yes  COUNSELING:  Reviewed preventive health counseling, as reflected in patient instructions       Regular exercise       Healthy diet/nutrition       Vision screening       Hearing screening    Estimated body mass index is 25.23 kg/m  as calculated from the following:    Height as of this encounter: 1.626 m (5' 4\").    Weight as of this encounter: 66.7 kg (147 lb).        She reports that she has never smoked. She has never used smokeless tobacco.      Appropriate preventive services were discussed with this patient, including applicable screening as appropriate for cardiovascular disease, diabetes, osteopenia/osteoporosis, and glaucoma.  As appropriate for age/gender, discussed screening for colorectal cancer, prostate cancer, breast cancer, and cervical cancer. Checklist reviewing preventive services available has been given to the patient.    Reviewed patients plan of care and provided an AVS. The Intermediate Care Plan ( asthma action plan, low back pain action plan, and migraine action plan) for Zunilda meets the Care Plan requirement. This Care Plan has been established and reviewed with the Patient.    Counseling Resources:  ATP IV " Guidelines  Pooled Cohorts Equation Calculator  Breast Cancer Risk Calculator  Breast Cancer: Medication to Reduce Risk  FRAX Risk Assessment  ICSI Preventive Guidelines  Dietary Guidelines for Americans, 2010  USDA's MyPlate  ASA Prophylaxis  Lung CA Screening    Yunior Huang MD  [facility]    Identified Health Risks:

## 2021-06-22 NOTE — PROGRESS NOTES
ANTICOAGULATION MANAGEMENT     Zunilda Alicea May 80 year old female is on warfarin with therapeutic INR result. (Goal INR 2.0-3.0)    Recent labs: (last 7 days)     06/22/21  1504   INR 2.20*       ASSESSMENT     Source(s): Patient/Caregiver Call       Warfarin doses taken: Warfarin taken as instructed    Diet: No new diet changes identified    New illness, injury, or hospitalization: No    Medication/supplement changes: None noted    Signs or symptoms of bleeding or clotting: No    Previous INR: Therapeutic last 2(+) visits    Additional findings: None     PLAN     Recommended plan for no diet, medication or health factor changes affecting INR     Dosing Instructions:  Increase your warfarin dose (6.7% change) with next INR in 2 weeks   Patient had a warfarin boost previously, weekly warfarin maintenance dose was updated to reflect the 1.25 mg boost.     Summary  As of 6/22/2021    Full warfarin instructions:  5 mg every Thu; 2.5 mg all other days   Next INR check:  7/6/2021             Telephone call with Zunilda whom verbalizes understanding and agrees to plan    Lab visit scheduled    Education provided: Please call back if any changes to your diet, medications or how you've been taking warfarin, Importance of notifying clinic for changes in medications; a sooner lab recheck maybe needed. and Contact [phone]  with any changes, questions or concerns.     Plan made per [facility] anticoagulation protocol    Екатерина Mahan RN  Anticoagulation Clinic  6/22/2021    _______________________________________________________________________     Anticoagulation Episode Summary     Current INR goal:  2.0-3.0   TTR:  67.1 % (1 y)   Target end date:  Indefinite   Send INR reminders to:  [facility]    Indications    Long term current use of anticoagulants with INR goal of 2.0-3.0 [Z79.01]  Atrial fibrillation and flutter (H) [I48.91  I48.92]  S/P mitral valve replacement with bioprosthetic valve [Z95.3]            Comments:  likes printed AVS         Anticoagulation Care Providers     Provider Role Specialty Phone number    Yunior Huang MD Referring Internal Medicine [phone]

## 2021-06-23 LAB
ALBUMIN SERPL-MCNC: 3.6 G/DL (ref 3.4–5)
ALP SERPL-CCNC: 99 U/L (ref 40–150)
ALT SERPL W P-5'-P-CCNC: 23 U/L (ref 0–50)
ANION GAP SERPL CALCULATED.3IONS-SCNC: 1 MMOL/L (ref 3–14)
AST SERPL W P-5'-P-CCNC: 23 U/L (ref 0–45)
BILIRUB SERPL-MCNC: 0.4 MG/DL (ref 0.2–1.3)
BUN SERPL-MCNC: 27 MG/DL (ref 7–30)
CALCIUM SERPL-MCNC: 9.6 MG/DL (ref 8.5–10.1)
CHLORIDE SERPL-SCNC: 104 MMOL/L (ref 94–109)
CO2 SERPL-SCNC: 33 MMOL/L (ref 20–32)
CREAT SERPL-MCNC: 1.11 MG/DL (ref 0.52–1.04)
GFR SERPL CREATININE-BSD FRML MDRD: 47 ML/MIN/{1.73_M2}
GLUCOSE SERPL-MCNC: 88 MG/DL (ref 70–99)
POTASSIUM SERPL-SCNC: 3.8 MMOL/L (ref 3.4–5.3)
PROT SERPL-MCNC: 8.6 G/DL (ref 6.8–8.8)
SODIUM SERPL-SCNC: 138 MMOL/L (ref 133–144)
TSH SERPL DL<=0.005 MIU/L-ACNC: 1.45 MU/L (ref 0.4–4)

## 2021-06-28 ENCOUNTER — TRANSFERRED RECORDS (OUTPATIENT)
Dept: HEALTH INFORMATION MANAGEMENT | Facility: CLINIC | Age: 81
End: 2021-06-28

## 2021-06-28 LAB
CREATININE (EXTERNAL): 1.1 MG/DL (ref 0.5–1.3)
GFR SERPL CREATININE-BSD FRML MDRD: 50.9 ML/MIN/1.73M2

## 2021-07-06 ENCOUNTER — ANTICOAGULATION THERAPY VISIT (OUTPATIENT)
Dept: ANTICOAGULATION | Facility: CLINIC | Age: 81
End: 2021-07-06

## 2021-07-06 DIAGNOSIS — Z79.01 LONG TERM CURRENT USE OF ANTICOAGULANTS WITH INR GOAL OF 2.0-3.0: ICD-10-CM

## 2021-07-06 DIAGNOSIS — I48.91 ATRIAL FIBRILLATION AND FLUTTER (H): ICD-10-CM

## 2021-07-06 DIAGNOSIS — I48.92 ATRIAL FIBRILLATION AND FLUTTER (H): ICD-10-CM

## 2021-07-06 DIAGNOSIS — Z95.3 S/P MITRAL VALVE REPLACEMENT WITH BIOPROSTHETIC VALVE: ICD-10-CM

## 2021-07-06 LAB
CAPILLARY BLOOD COLLECTION: NORMAL
INR PPP: 1.7 (ref 0.86–1.14)

## 2021-07-06 PROCEDURE — 36416 COLLJ CAPILLARY BLOOD SPEC: CPT | Performed by: INTERNAL MEDICINE

## 2021-07-06 PROCEDURE — 85610 PROTHROMBIN TIME: CPT | Performed by: INTERNAL MEDICINE

## 2021-07-06 NOTE — PROGRESS NOTES
ANTICOAGULATION MANAGEMENT     Zunilda Alicea May 81 year old female is on warfarin with subtherapeutic INR result. (Goal INR 2.0-3.0)    Recent labs: (last 7 days)     07/06/21  1038   INR 1.70*       ASSESSMENT     Source(s): Patient/Caregiver Call       Warfarin doses taken: Warfarin taken as instructed, denies any missed warfarin doses    Diet: No new diet changes identified, patient thought her amount of green veggies was the same as it has been    New illness, injury, or hospitalization: No    Medication/supplement changes: None noted    Signs or symptoms of bleeding or clotting: No    Previous INR: Therapeutic last 2(+) visits    Additional findings: None     PLAN     Recommended plan for temporary change(s) affecting INR     Dosing Instructions: Booster dose then continue your current warfarin dose with next INR in 2 weeks       Summary  As of 7/6/2021    Full warfarin instructions:  7/6: 5 mg; Otherwise 5 mg every Thu; 2.5 mg all other days   Next INR check:  7/21/2021             Telephone call with Zunilda who verbalizes understanding and agrees to plan    Lab visit scheduled    Education provided: Contact [phone]  with any changes, questions or concerns.     Plan made per [facility] anticoagulation protocol    Екатерина Mahan RN  Anticoagulation Clinic  7/6/2021    _______________________________________________________________________     Anticoagulation Episode Summary     Current INR goal:  2.0-3.0   TTR:  67.0 % (1 y)   Target end date:  Indefinite   Send INR reminders to:  [facility]    Indications    Long term current use of anticoagulants with INR goal of 2.0-3.0 [Z79.01]  Atrial fibrillation and flutter (H) [I48.91  I48.92]  S/P mitral valve replacement with bioprosthetic valve [Z95.3]           Comments:  likes printed AVS         Anticoagulation Care Providers     Provider Role Specialty Phone number    Yunior Huang MD Referring Internal Medicine [phone]

## 2021-07-15 DIAGNOSIS — Z79.01 LONG TERM CURRENT USE OF ANTICOAGULANTS WITH INR GOAL OF 2.0-3.0: Primary | ICD-10-CM

## 2021-07-15 DIAGNOSIS — Z95.3 S/P MITRAL VALVE REPLACEMENT WITH BIOPROSTHETIC VALVE: ICD-10-CM

## 2021-07-15 DIAGNOSIS — I48.92 ATRIAL FIBRILLATION AND FLUTTER (H): ICD-10-CM

## 2021-07-15 DIAGNOSIS — I48.91 ATRIAL FIBRILLATION AND FLUTTER (H): ICD-10-CM

## 2021-07-19 ENCOUNTER — OFFICE VISIT (OUTPATIENT)
Dept: CARDIOLOGY | Facility: CLINIC | Age: 81
End: 2021-07-19
Attending: PHYSICIAN ASSISTANT
Payer: MEDICARE

## 2021-07-19 ENCOUNTER — LAB (OUTPATIENT)
Dept: LAB | Facility: CLINIC | Age: 81
End: 2021-07-19
Attending: INTERNAL MEDICINE
Payer: MEDICARE

## 2021-07-19 VITALS
DIASTOLIC BLOOD PRESSURE: 62 MMHG | HEIGHT: 64 IN | HEART RATE: 62 BPM | BODY MASS INDEX: 25.39 KG/M2 | WEIGHT: 148.7 LBS | OXYGEN SATURATION: 96 % | SYSTOLIC BLOOD PRESSURE: 118 MMHG

## 2021-07-19 DIAGNOSIS — R06.09 DYSPNEA ON EXERTION: ICD-10-CM

## 2021-07-19 DIAGNOSIS — I27.20 PULMONARY HTN (H): ICD-10-CM

## 2021-07-19 DIAGNOSIS — Z95.3 S/P MITRAL VALVE REPLACEMENT WITH BIOPROSTHETIC VALVE: ICD-10-CM

## 2021-07-19 DIAGNOSIS — I10 BENIGN ESSENTIAL HYPERTENSION: ICD-10-CM

## 2021-07-19 DIAGNOSIS — I48.20 CHRONIC ATRIAL FIBRILLATION (H): ICD-10-CM

## 2021-07-19 DIAGNOSIS — N18.30 STAGE 3 CHRONIC KIDNEY DISEASE, UNSPECIFIED WHETHER STAGE 3A OR 3B CKD (H): Primary | ICD-10-CM

## 2021-07-19 DIAGNOSIS — Z98.890 HISTORY OF TRICUSPID VALVE ANNULOPLASTY: ICD-10-CM

## 2021-07-19 LAB
ANION GAP SERPL CALCULATED.3IONS-SCNC: 3 MMOL/L (ref 3–14)
BUN SERPL-MCNC: 29 MG/DL (ref 7–30)
CALCIUM SERPL-MCNC: 9.3 MG/DL (ref 8.5–10.1)
CHLORIDE BLD-SCNC: 105 MMOL/L (ref 94–109)
CO2 SERPL-SCNC: 31 MMOL/L (ref 20–32)
CREAT SERPL-MCNC: 1.05 MG/DL (ref 0.52–1.04)
GFR SERPL CREATININE-BSD FRML MDRD: 50 ML/MIN/1.73M2
GLUCOSE BLD-MCNC: 89 MG/DL (ref 70–99)
NT-PROBNP SERPL-MCNC: 1218 PG/ML (ref 0–450)
POTASSIUM BLD-SCNC: 3.7 MMOL/L (ref 3.4–5.3)
SODIUM SERPL-SCNC: 139 MMOL/L (ref 133–144)

## 2021-07-19 PROCEDURE — 99214 OFFICE O/P EST MOD 30 MIN: CPT | Performed by: INTERNAL MEDICINE

## 2021-07-19 PROCEDURE — 80048 BASIC METABOLIC PNL TOTAL CA: CPT

## 2021-07-19 PROCEDURE — 83880 ASSAY OF NATRIURETIC PEPTIDE: CPT

## 2021-07-19 PROCEDURE — 36415 COLL VENOUS BLD VENIPUNCTURE: CPT

## 2021-07-19 RX ORDER — TORSEMIDE 10 MG/1
10 TABLET ORAL
Qty: 90 TABLET | Refills: 3 | Status: SHIPPED | OUTPATIENT
Start: 2021-07-19 | End: 2022-08-10

## 2021-07-19 RX ORDER — AMLODIPINE BESYLATE 2.5 MG/1
2.5 TABLET ORAL DAILY
Qty: 90 TABLET | Refills: 3 | Status: SHIPPED | OUTPATIENT
Start: 2021-07-19 | End: 2022-07-27

## 2021-07-19 RX ORDER — METOPROLOL TARTRATE 50 MG
50 TABLET ORAL 2 TIMES DAILY
Qty: 180 TABLET | Refills: 3 | Status: SHIPPED | OUTPATIENT
Start: 2021-07-19 | End: 2022-08-25

## 2021-07-19 RX ORDER — AMLODIPINE AND VALSARTAN 5; 160 MG/1; MG/1
1 TABLET ORAL DAILY
Qty: 90 TABLET | Refills: 3 | Status: SHIPPED | OUTPATIENT
Start: 2021-07-19 | End: 2022-08-01

## 2021-07-19 ASSESSMENT — MIFFLIN-ST. JEOR: SCORE: 1129.5

## 2021-07-19 NOTE — LETTER
7/19/2021    Yunior Huang MD  [address] [facility] [address]    RE: Zunilda Clark       Dear Colleague,    I had the pleasure of seeing Zunilda Clark in the [facility].        Cardiology Clinic Progress Note:    July 19, 2021   Patient Name: Zunilda Clark  Patient MRN: [id number]     Consult indication: PH, MVR, TV annuloplasty     HPI:    I had the opportunity to see patient Zunilda Clark in cardiology clinic for a consultation. Patient is followed by our colleague Yunior Huang MD with Primary Care.     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     The patient has a complex cardiac history, notable for valvular heart disease with a prior mitral valve replacement, initially in 2007, as well as a redo mitral valve replacement with a 27 mm Magna bioprosthetic valve (2014).  She is also status post tricuspid valve annuloplasty with residual moderate to severe tricuspid regurgitation, cardiac MRI with mildly dilated right ventricle but normal systolic function (CMR 07/2018).  The patient also has a history of pacemaker placement for high-degree AV block after initial cardiac surgery in 2007.  Patient does note that the device was quite loose after initial implantation and she may have flipped this around once or twice.      She was previously followed by my late colleague, Dr. Das, in clinic on 01/10/2020.  At that time, she was doing quite well and  "no changes were warranted to her cardiac regimen.  Previously, she was seen by my colleague, Dr. Monroy, in clinic regarding pulmonary hypertension, overall clinical presentation seemed most consistent with group 2 pulmonary hypertension related to valve disease.  Last clinic visit 05/23/2019.      I had last seen her in clinic on 1/19/2021.  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  She was seen in consultation by my colleague Dr. Cowan and underwent generator replacement uneventfully.  She has since been followed by my colleague Zena TEMPLETON.     Overall, patient reports that she feels quite well.  She denies any chest pain, chest pressure, abnormal shortness of breath.  She does note stable dyspnea on exertion, however denies any recent change over the last year.  She denies any weight gain, abnormal lower extremity swelling, symptoms of orthopnea/PND.  She denies any significant dizziness/lightheadedness.    She shares that for her 81th birthday approximately 2 weeks ago, her 4 daughters flew in from around the country to spend the weekend with her.    Last TTE from 1/13/2021 demonstrates LVEF 55 to 60%, RV moderately dilated with mild to moderately reduced function, moderate to moderate severe tricuspid regurgitation, moderate aortic stenosis with mild aortic insufficiency, elevated right-sided pressures, mean gradient across the bioprosthetic mitral valve was 5 mmHg.    Labs from today demonstrate potassium 3.7, creatinine 1.05.  NT proBNP 1200.    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  TTE 01/2021 showed mild progression with worsening RV function, elevated right-sided pressures.  However, findings seem similar when compared to prior studies including a cardiac MRI 07/2018.  Previously seen by my colleague Dr. Mnoroy in clinic.  The patient denies " any symptoms concerning for decompensation, in fact she feels overall quite well.     # Rheumatic heart disease, history of bioprosthetic mitral valve replacement in 2007, redo in 2014 with a 27 mm Magna bioprosthetic mitral valve.  Mean gradient 5 mmHg, TTE 01/2021.  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   # High-degree AV block after cardiac surgery in 2007, status post dual-chamber pacemaker placement that had recently reached end-of-life.  Now status post pacemaker generator replacement 3/8/2021.  Device interrogation 6/8/2021 demonstrated AP 29%,  99%, underlying rhythm atrial flutter with ventricular rate less than 30 bpm.  Battery status 11 years 11 months.  #  Rheumatoid arthritis.   #  Hypertension.  Stable.    #  Atrial fibrillation, on warfarin.     -Overall patient is in stable cardiovascular health without symptoms concerning for angina or decompensated heart failure.  NT proBNP was mildly elevated at 1200, however patient feels well, weight is stable at 148 pounds.  Advised patient to monitor her weights at home, and to alert our clinic if she is noticing any weight gain of 2 to 3 pounds in 1 day, 5 pounds in 1 week, or if she has worsening shortness of breath or other symptoms that are concerning for her.  -Continue current cardiac regimen of amlodipine 2.5 mg daily in addition to amlodipine-valsartan,  Metoprolol tartrate 50 mg twice daily, torsemide 10 mg daily  -Counseled on endocarditis prophylaxis  -Follow-up in 6 months with TTE with Zena TEMPLETON  -Follow-up in 1 year with me, or sooner as needed    Thank you for allowing our team to participate in the care of Zunilda Clark.  Please do not hesitate to call or page me with any questions or concerns.    Sincerely,     Noe Rodriguez MD, [facility]  Cardiology  Text Page   July 19, 2021    cc  YOKASTA Salcido  [facility] [[email or web address]] [facility]  [address] [place] [address]  [phone]    Voice recognition software utilized. Although reviewed after completion, some word and grammatical errors may be present.    Total time spent on this encounter: 30 minutes, providing care in this encounter including, but not limited to, reviewing prior medical records, laboratory data, imaging studies, diagnostic studies, procedure notes, formulating an assessment and plan, recommendations.    Past Medical History:     Past Medical History:   Diagnosis Date     Acquired hypothyroidism 11/4/2015     Aortic valve insufficiency      Arrhythmia     A fib     Arthritis      Atrial fibrillation (H)      Atrial fibrillation and flutter (H)      Blood clotting disorder (H)      CHF (congestive heart failure) (H)      DVT (deep venous thrombosis) (H) 2003     Gastro-oesophageal reflux disease      H/O mitral valve replacement with tissue graft june 2014     History of blood transfusion 2014     HTN (hypertension)      Hypothyroidism      Other chronic pain      PONV (postoperative nausea and vomiting)      Pulmonary HTN (H)      Rheumatoid arthritis (H)      Rheumatoid arthritis(714.0)      Seizures (H) 2014     Shingles 08/2018     Stroke (H) 2009    left side mild weakness      Stroke (H) 2014     [place]ope 2011    [place]     Thrombosis of leg 2003    both legs        Past Surgical History:   Past Surgical History:   Procedure Laterality Date     ABDOMEN SURGERY      prolapsed bladder     APPLY WOUND VAC Left 12/18/2018    Procedure: Wound vac application;  Surgeon: Pardeep Sheikh MD;  Location:  OR     ARTHROPLASTY KNEE Left 11/12/2018    Procedure: Left total knee arthroplasty using a [employer] knee system;  Surgeon: Pardeep Sheikh MD;  Location:  OR     ARTHROSCOPY KNEE WITH DEBRIDEMENT JOINT, COMBINED Left 12/18/2018    Procedure: Left knee debridement and placement of wound vac;  Surgeon: Pardeep Sheikh MD;  Location:  OR     COLONOSCOPY   3/15/2012     EP PPM GENERATOR CHANGE SINGLE N/A 3/8/2021    Procedure: Permanent Pacemakers  Generator Change Dual Chamber;  Surgeon: Tamiko Cowan MD;  Location:  HEART CARDIAC CATH LAB     ESOPHAGOSCOPY, GASTROSCOPY, DUODENOSCOPY (EGD), COMBINED N/A 2/19/2020    Procedure: ESOPHAGOGASTRODUODENOSCOPY (EGD);  Surgeon: Michael Mccarthy MD;  Location:  GI     EYE SURGERY  2018    Both eyes/cataract surgery     H ABLATION AV NODE  2011    AFlutter with syncope, PPM to follow     HERNIA REPAIR      3 hernies/right and left & umbilical     IMPLANT PACEMAKER  2011     LAPAROSCOPIC CHOLECYSTECTOMY WITH CHOLANGIOGRAMS N/A 4/29/2017    Procedure: LAPAROSCOPIC CHOLECYSTECTOMY WITH CHOLANGIOGRAMS;  LAPAROSCOPIC CHOLECYSTECTOMY WITH CHOLANGIOGRAMS ;  Surgeon: Angeles Mcgill MD;  Location: [facility] OR     OPEN REDUCTION INTERNAL FIXATION RODDING INTRAMEDULLARY HUMERUS Right 7/28/2015    Procedure: OPEN REDUCTION INTERNAL FIXATION RODDING INTRAMEDULLARY HUMERUS;  Surgeon: Raymundo Rivera MD;  Location: [facility] OR     REPLACE VALVE MITRAL  2006    Mitral valve replacement with bioprosthetic valve in 2008 for rheumatic disease     SLING BLADDER SUSPENSION WITH FASCIA UMESH       VASCULAR SURGERY  2003    Blood clots in both legs       Medications (outpatient):  Current Outpatient Medications   Medication Sig Dispense Refill     ALLOPURINOL PO Take 100 mg by mouth 2 times daily       amLODIPine (NORVASC) 2.5 MG tablet Take 1 tablet (2.5 mg) by mouth daily 90 tablet 3     amLODIPine-valsartan (EXFORGE) 5-160 MG tablet TAKE ONE TABLET BY MOUTH ONE TIME DAILY  90 tablet 2     calcium citrate (CALCITRATE) 950 MG tablet Take 1 tablet by mouth daily        famotidine (PEPCID) 40 MG tablet Take 1 tablet (40 mg) by mouth daily 90 tablet 3     folic acid (FOLVITE) 1 MG tablet Take 1 mg by mouth daily       latanoprostene bunod (VYZULTA) 0.024 % SOLN ophthalmic solution Place 1 drop Into the left eye At Bedtime        "levothyroxine (SYNTHROID/LEVOTHROID) 112 MCG tablet Take 1 tablet (112 mcg) by mouth daily 90 tablet 3     METHOTREXATE SODIUM IJ Inject 0.8 mLs as directed once a week        metoprolol tartrate (LOPRESSOR) 50 MG tablet Take 1 tablet (50 mg) by mouth 2 times daily 180 tablet 3     multivitamin, therapeutic with minerals (MULTI-VITAMIN) TABS tablet Take 1 tablet by mouth daily Without iron       TIMOLOL MALEATE OP        torsemide (DEMADEX) 10 MG tablet Take 1 tablet (10 mg) by mouth daily (with breakfast) 90 tablet 1     VITAMIN D, CHOLECALCIFEROL, PO Take 1,000 Units by mouth daily       warfarin ANTICOAGULANT (COUMADIN) 2.5 MG tablet Take one tablet (2.5 mg) daily except take one and a half tablets (3.75 mg)  or as directed by INR clinic 100 tablet 3     loperamide (IMODIUM A-D) 2 MG tablet Take 1 tablet (2 mg) by mouth 4 times daily as needed for diarrhea (Patient not taking: Reported on 2021) 30 tablet 1       Allergies:  No Known Allergies    Social History:   History   Drug Use No      History   Smoking Status     Never Smoker   Smokeless Tobacco     Never Used     Social History    Substance and Sexual Activity      Alcohol use: No        Alcohol/week: 0.0 standard drinks       Family History:  Family History   Problem Relation Age of Onset     Coronary Artery Disease Mother      Coronary Artery Disease Brother      Diabetes Brother      Cancer Brother          at age 52      Other Cancer Brother      Hypertension Sister      Hyperlipidemia Sister        Review of Systems:   A complete review of systems was negative except as mentioned in the History of Present Illness.     Objective & Physical Exam:  /62 (BP Location: Left arm, Patient Position: Sitting, Cuff Size: Adult Regular)   Pulse 62   Ht 1.626 m (5' 4\")   Wt 67.4 kg (148 lb 11.2 oz)   LMP  (LMP Unknown)   SpO2 96%   BMI 25.52 kg/m    Wt Readings from Last 2 Encounters:   21 67.4 kg (148 lb 11.2 oz)   21 " 66.7 kg (147 lb)     Body mass index is 25.52 kg/m .   Body surface area is 1.74 meters squared.    Constitutional: appears stated age, in no apparent distress, appears to be well nourished  Eyes: sclera anicteric, conjunctiva normal  ENT: normocephalic, without obvious abnormality, atraumatic  Pulmonary: clear to auscultation bilaterally  Cardiovascular: JVP normal, regular rate, regular rhythm, 1/6 systolic murmur RUSB, no lower extremity edema  Gastrointestinal: abdominal exam benign  Neurologic: awake, alert, face symmetrical, moves all extremities  Skin: no jaundice  Psychiatric: affect is normal, answers questions appropriately, oriented to self and place    Data reviewed:  Lab Results   Component Value Date    WBC 7.1 06/22/2021    RBC 3.75 (L) 06/22/2021    HGB 12.5 06/22/2021    HCT 38.6 06/22/2021     (H) 06/22/2021    MCH 33.3 (H) 06/22/2021    MCHC 32.4 06/22/2021    RDW 17.0 (H) 06/22/2021     06/22/2021     Sodium   Date Value Ref Range Status   07/19/2021 139 133 - 144 mmol/L Final   06/22/2021 138 133 - 144 mmol/L Final     Potassium   Date Value Ref Range Status   07/19/2021 3.7 3.4 - 5.3 mmol/L Final   06/22/2021 3.8 3.4 - 5.3 mmol/L Final     Chloride   Date Value Ref Range Status   07/19/2021 105 94 - 109 mmol/L Final   06/22/2021 104 94 - 109 mmol/L Final     Carbon Dioxide   Date Value Ref Range Status   06/22/2021 33 (H) 20 - 32 mmol/L Final     Carbon Dioxide (CO2)   Date Value Ref Range Status   07/19/2021 31 20 - 32 mmol/L Final     Anion Gap   Date Value Ref Range Status   07/19/2021 3 3 - 14 mmol/L Final   06/22/2021 1 (L) 3 - 14 mmol/L Final     Glucose   Date Value Ref Range Status   07/19/2021 89 70 - 99 mg/dL Final   06/22/2021 88 70 - 99 mg/dL Final     Urea Nitrogen   Date Value Ref Range Status   07/19/2021 29 7 - 30 mg/dL Final   06/22/2021 27 7 - 30 mg/dL Final     Creatinine   Date Value Ref Range Status   07/19/2021 1.05 (H) 0.52 - 1.04 mg/dL Final   06/22/2021  1.11 (H) 0.52 - 1.04 mg/dL Final     GFR Estimate   Date Value Ref Range Status   07/19/2021 50 (L) >60 mL/min/1.73m2 Final     Comment:     As of July 11, 2021, eGFR is calculated by the CKD-EPI creatinine equation, without race adjustment. eGFR can be influenced by muscle mass, exercise, and diet. The reported eGFR is an estimation only and is only applicable if the renal function is stable.   06/22/2021 47 (L) >60 mL/min/[1.73_m2] Final     Comment:     Non  GFR Calc  Starting 12/18/2018, serum creatinine based estimated GFR (eGFR) will be   calculated using the Chronic Kidney Disease Epidemiology Collaboration   (CKD-EPI) equation.       Calcium   Date Value Ref Range Status   07/19/2021 9.3 8.5 - 10.1 mg/dL Final   06/22/2021 9.6 8.5 - 10.1 mg/dL Final     Bilirubin Total   Date Value Ref Range Status   06/22/2021 0.4 0.2 - 1.3 mg/dL Final     Alkaline Phosphatase   Date Value Ref Range Status   06/22/2021 99 40 - 150 U/L Final     ALT   Date Value Ref Range Status   06/22/2021 23 0 - 50 U/L Final     AST   Date Value Ref Range Status   06/22/2021 23 0 - 45 U/L Final     Recent Labs   Lab Test 11/17/20  1122 01/08/20  1116 11/13/15  0850   CHOL 167 184 207*   HDL 45* 44* 58   * 121* 134*   TRIG 87 95 77   CHOLHDLRATIO  --   --  3.6      Lab Results   Component Value Date    A1C 5.4 05/04/2016       Thank you for allowing me to participate in the care of your patient.      Sincerely,     Noe Rodriguez MD     [facility]  cc:   YOKASTA Salcido  [facility]  [address] [place] [address]

## 2021-07-19 NOTE — PROGRESS NOTES
Cardiology Clinic Progress Note:    July 19, 2021   Patient Name: Zunilda Clark  Patient MRN: [id number]     Consult indication: PH, MVR, TV annuloplasty     HPI:    I had the opportunity to see patient Zunilda Clark in cardiology clinic for a consultation. Patient is followed by our colleague Yunior Huang MD with Primary Care.     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     The patient has a complex cardiac history, notable for valvular heart disease with a prior mitral valve replacement, initially in 2007, as well as a redo mitral valve replacement with a 27 mm Magna bioprosthetic valve (2014).  She is also status post tricuspid valve annuloplasty with residual moderate to severe tricuspid regurgitation, cardiac MRI with mildly dilated right ventricle but normal systolic function (CMR 07/2018).  The patient also has a history of pacemaker placement for high-degree AV block after initial cardiac surgery in 2007.  Patient does note that the device was quite loose after initial implantation and she may have flipped this around once or twice.      She was previously followed by my late colleague, Dr. Das, in clinic on 01/10/2020.  At that time, she was doing quite well and no changes were warranted to her cardiac regimen.  Previously, she was seen by my colleague, Dr. Monroy, in clinic regarding pulmonary hypertension, overall clinical presentation seemed most consistent with group 2 pulmonary hypertension related to valve  "disease.  Last clinic visit 05/23/2019.      I had last seen her in clinic on 1/19/2021.  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  She was seen in consultation by my colleague Dr. Cowan and underwent generator replacement uneventfully.  She has since been followed by my colleague Zena TEMPLETON.     Overall, patient reports that she feels quite well.  She denies any chest pain, chest pressure, abnormal shortness of breath.  She does note stable dyspnea on exertion, however denies any recent change over the last year.  She denies any weight gain, abnormal lower extremity swelling, symptoms of orthopnea/PND.  She denies any significant dizziness/lightheadedness.    She shares that for her 81th birthday approximately 2 weeks ago, her 4 daughters flew in from around the country to spend the weekend with her.    Last TTE from 1/13/2021 demonstrates LVEF 55 to 60%, RV moderately dilated with mild to moderately reduced function, moderate to moderate severe tricuspid regurgitation, moderate aortic stenosis with mild aortic insufficiency, elevated right-sided pressures, mean gradient across the bioprosthetic mitral valve was 5 mmHg.    Labs from today demonstrate potassium 3.7, creatinine 1.05.  NT proBNP 1200.    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  TTE 01/2021 showed mild progression with worsening RV function, elevated right-sided pressures.  However, findings seem similar when compared to prior studies including a cardiac MRI 07/2018.  Previously seen by my colleague Dr. Monroy in clinic.  The patient denies any symptoms concerning for decompensation, in fact she feels overall quite well.     # Rheumatic heart disease, history of bioprosthetic mitral valve replacement in 2007, redo in 2014 with a 27 mm Magna bioprosthetic mitral valve.  Mean gradient 5 mmHg, " TTE 01/2021.  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   # High-degree AV block after cardiac surgery in 2007, status post dual-chamber pacemaker placement that had recently reached end-of-life.  Now status post pacemaker generator replacement 3/8/2021.  Device interrogation 6/8/2021 demonstrated AP 29%,  99%, underlying rhythm atrial flutter with ventricular rate less than 30 bpm.  Battery status 11 years 11 months.  #  Rheumatoid arthritis.   #  Hypertension.  Stable.    #  Atrial fibrillation, on warfarin.     -Overall patient is in stable cardiovascular health without symptoms concerning for angina or decompensated heart failure.  NT proBNP was mildly elevated at 1200, however patient feels well, weight is stable at 148 pounds.  Advised patient to monitor her weights at home, and to alert our clinic if she is noticing any weight gain of 2 to 3 pounds in 1 day, 5 pounds in 1 week, or if she has worsening shortness of breath or other symptoms that are concerning for her.  -Continue current cardiac regimen of amlodipine 2.5 mg daily in addition to amlodipine-valsartan,  Metoprolol tartrate 50 mg twice daily, torsemide 10 mg daily  -Counseled on endocarditis prophylaxis  -Follow-up in 6 months with TTE with Zena TEMPLETON  -Follow-up in 1 year with me, or sooner as needed    Thank you for allowing our team to participate in the care of Zunilda Clark.  Please do not hesitate to call or page me with any questions or concerns.    Sincerely,     Noe Rodriguez MD, [facility]  Cardiology  Text Page   July 19, 2021    cc  YOKASTA Salcido  [facility]  [address] [place] [id number]    Voice recognition software utilized. Although reviewed after completion, some word and grammatical errors may be present.    Total time spent on this encounter: 30 minutes, providing care in this encounter including, but not limited to, reviewing prior  medical records, laboratory data, imaging studies, diagnostic studies, procedure notes, formulating an assessment and plan, recommendations.    Past Medical History:     Past Medical History:   Diagnosis Date     Acquired hypothyroidism 11/4/2015     Aortic valve insufficiency      Arrhythmia     A fib     Arthritis      Atrial fibrillation (H)      Atrial fibrillation and flutter (H)      Blood clotting disorder (H)      CHF (congestive heart failure) (H)      DVT (deep venous thrombosis) (H) 2003     Gastro-oesophageal reflux disease      H/O mitral valve replacement with tissue graft june 2014     History of blood transfusion 2014     HTN (hypertension)      Hypothyroidism      Other chronic pain      PONV (postoperative nausea and vomiting)      Pulmonary HTN (H)      Rheumatoid arthritis (H)      Rheumatoid arthritis(714.0)      Seizures (H) 2014     Shingles 08/2018     Stroke (H) 2009    left side mild weakness      Stroke (H) 2014     [place]ope 2011    [place]     Thrombosis of leg 2003    both legs        Past Surgical History:   Past Surgical History:   Procedure Laterality Date     ABDOMEN SURGERY      prolapsed bladder     APPLY WOUND VAC Left 12/18/2018    Procedure: Wound vac application;  Surgeon: Pardeep Sheikh MD;  Location:  OR     ARTHROPLASTY KNEE Left 11/12/2018    Procedure: Left total knee arthroplasty using a [employer] knee system;  Surgeon: Pardeep Sheikh MD;  Location:  OR     ARTHROSCOPY KNEE WITH DEBRIDEMENT JOINT, COMBINED Left 12/18/2018    Procedure: Left knee debridement and placement of wound vac;  Surgeon: Pardeep Sheikh MD;  Location:  OR     COLONOSCOPY  3/15/2012     EP PPM GENERATOR CHANGE SINGLE N/A 3/8/2021    Procedure: Permanent Pacemakers  Generator Change Dual Chamber;  Surgeon: Tamiko Cowan MD;  Location: [facility] CARDIAC CATH LAB     ESOPHAGOSCOPY, GASTROSCOPY, DUODENOSCOPY (EGD), COMBINED N/A  2/19/2020    Procedure: ESOPHAGOGASTRODUODENOSCOPY (EGD);  Surgeon: Michael Mccarthy MD;  Location: [facility] GI     EYE SURGERY  2018    Both eyes/cataract surgery     H ABLATION AV NODE  2011    AFlutter with syncope, PPM to follow     HERNIA REPAIR      3 hernies/right and left & umbilical     IMPLANT PACEMAKER  2011     LAPAROSCOPIC CHOLECYSTECTOMY WITH CHOLANGIOGRAMS N/A 4/29/2017    Procedure: LAPAROSCOPIC CHOLECYSTECTOMY WITH CHOLANGIOGRAMS;  LAPAROSCOPIC CHOLECYSTECTOMY WITH CHOLANGIOGRAMS ;  Surgeon: Angeles Mcgill MD;  Location: [facility] OR     OPEN REDUCTION INTERNAL FIXATION RODDING INTRAMEDULLARY HUMERUS Right 7/28/2015    Procedure: OPEN REDUCTION INTERNAL FIXATION RODDING INTRAMEDULLARY HUMERUS;  Surgeon: Raymundo Rivera MD;  Location: [facility] OR     REPLACE VALVE MITRAL  2006    Mitral valve replacement with bioprosthetic valve in 2008 for rheumatic disease     SLING BLADDER SUSPENSION WITH FASCIA UMESH       VASCULAR SURGERY  2003    Blood clots in both legs       Medications (outpatient):  Current Outpatient Medications   Medication Sig Dispense Refill     ALLOPURINOL PO Take 100 mg by mouth 2 times daily       amLODIPine (NORVASC) 2.5 MG tablet Take 1 tablet (2.5 mg) by mouth daily 90 tablet 3     amLODIPine-valsartan (EXFORGE) 5-160 MG tablet TAKE ONE TABLET BY MOUTH ONE TIME DAILY  90 tablet 2     calcium citrate (CALCITRATE) 950 MG tablet Take 1 tablet by mouth daily        famotidine (PEPCID) 40 MG tablet Take 1 tablet (40 mg) by mouth daily 90 tablet 3     folic acid (FOLVITE) 1 MG tablet Take 1 mg by mouth daily       latanoprostene bunod (VYZULTA) 0.024 % SOLN ophthalmic solution Place 1 drop Into the left eye At Bedtime       levothyroxine (SYNTHROID/LEVOTHROID) 112 MCG tablet Take 1 tablet (112 mcg) by mouth daily 90 tablet 3     METHOTREXATE SODIUM IJ Inject 0.8 mLs as directed once a week Thursdays       metoprolol tartrate (LOPRESSOR) 50 MG tablet Take 1 tablet (50 mg) by mouth 2 times  "daily 180 tablet 3     multivitamin, therapeutic with minerals (MULTI-VITAMIN) TABS tablet Take 1 tablet by mouth daily Without iron       TIMOLOL MALEATE OP        torsemide (DEMADEX) 10 MG tablet Take 1 tablet (10 mg) by mouth daily (with breakfast) 90 tablet 1     VITAMIN D, CHOLECALCIFEROL, PO Take 1,000 Units by mouth daily       warfarin ANTICOAGULANT (COUMADIN) 2.5 MG tablet Take one tablet (2.5 mg) daily except take one and a half tablets (3.75 mg) Th or as directed by INR clinic 100 tablet 3     loperamide (IMODIUM A-D) 2 MG tablet Take 1 tablet (2 mg) by mouth 4 times daily as needed for diarrhea (Patient not taking: Reported on 2021) 30 tablet 1       Allergies:  No Known Allergies    Social History:   History   Drug Use No      History   Smoking Status     Never Smoker   Smokeless Tobacco     Never Used     Social History    Substance and Sexual Activity      Alcohol use: No        Alcohol/week: 0.0 standard drinks       Family History:  Family History   Problem Relation Age of Onset     Coronary Artery Disease Mother      Coronary Artery Disease Brother      Diabetes Brother      Cancer Brother          at age 52      Other Cancer Brother      Hypertension Sister      Hyperlipidemia Sister        Review of Systems:   A complete review of systems was negative except as mentioned in the History of Present Illness.     Objective & Physical Exam:  /62 (BP Location: Left arm, Patient Position: Sitting, Cuff Size: Adult Regular)   Pulse 62   Ht 1.626 m (5' 4\")   Wt 67.4 kg (148 lb 11.2 oz)   LMP  (LMP Unknown)   SpO2 96%   BMI 25.52 kg/m    Wt Readings from Last 2 Encounters:   21 67.4 kg (148 lb 11.2 oz)   21 66.7 kg (147 lb)     Body mass index is 25.52 kg/m .   Body surface area is 1.74 meters squared.    Constitutional: appears stated age, in no apparent distress, appears to be well nourished  Eyes: sclera anicteric, conjunctiva normal  ENT: normocephalic, without obvious " abnormality, atraumatic  Pulmonary: clear to auscultation bilaterally  Cardiovascular: JVP normal, regular rate, regular rhythm, 1/6 systolic murmur RUSB, no lower extremity edema  Gastrointestinal: abdominal exam benign  Neurologic: awake, alert, face symmetrical, moves all extremities  Skin: no jaundice  Psychiatric: affect is normal, answers questions appropriately, oriented to self and place    Data reviewed:  Lab Results   Component Value Date    WBC 7.1 06/22/2021    RBC 3.75 (L) 06/22/2021    HGB 12.5 06/22/2021    HCT 38.6 06/22/2021     (H) 06/22/2021    MCH 33.3 (H) 06/22/2021    MCHC 32.4 06/22/2021    RDW 17.0 (H) 06/22/2021     06/22/2021     Sodium   Date Value Ref Range Status   07/19/2021 139 133 - 144 mmol/L Final   06/22/2021 138 133 - 144 mmol/L Final     Potassium   Date Value Ref Range Status   07/19/2021 3.7 3.4 - 5.3 mmol/L Final   06/22/2021 3.8 3.4 - 5.3 mmol/L Final     Chloride   Date Value Ref Range Status   07/19/2021 105 94 - 109 mmol/L Final   06/22/2021 104 94 - 109 mmol/L Final     Carbon Dioxide   Date Value Ref Range Status   06/22/2021 33 (H) 20 - 32 mmol/L Final     Carbon Dioxide (CO2)   Date Value Ref Range Status   07/19/2021 31 20 - 32 mmol/L Final     Anion Gap   Date Value Ref Range Status   07/19/2021 3 3 - 14 mmol/L Final   06/22/2021 1 (L) 3 - 14 mmol/L Final     Glucose   Date Value Ref Range Status   07/19/2021 89 70 - 99 mg/dL Final   06/22/2021 88 70 - 99 mg/dL Final     Urea Nitrogen   Date Value Ref Range Status   07/19/2021 29 7 - 30 mg/dL Final   06/22/2021 27 7 - 30 mg/dL Final     Creatinine   Date Value Ref Range Status   07/19/2021 1.05 (H) 0.52 - 1.04 mg/dL Final   06/22/2021 1.11 (H) 0.52 - 1.04 mg/dL Final     GFR Estimate   Date Value Ref Range Status   07/19/2021 50 (L) >60 mL/min/1.73m2 Final     Comment:     As of July 11, 2021, eGFR is calculated by the CKD-EPI creatinine equation, without race adjustment. eGFR can be influenced by  muscle mass, exercise, and diet. The reported eGFR is an estimation only and is only applicable if the renal function is stable.   06/22/2021 47 (L) >60 mL/min/[1.73_m2] Final     Comment:     Non  GFR Calc  Starting 12/18/2018, serum creatinine based estimated GFR (eGFR) will be   calculated using the Chronic Kidney Disease Epidemiology Collaboration   (CKD-EPI) equation.       Calcium   Date Value Ref Range Status   07/19/2021 9.3 8.5 - 10.1 mg/dL Final   06/22/2021 9.6 8.5 - 10.1 mg/dL Final     Bilirubin Total   Date Value Ref Range Status   06/22/2021 0.4 0.2 - 1.3 mg/dL Final     Alkaline Phosphatase   Date Value Ref Range Status   06/22/2021 99 40 - 150 U/L Final     ALT   Date Value Ref Range Status   06/22/2021 23 0 - 50 U/L Final     AST   Date Value Ref Range Status   06/22/2021 23 0 - 45 U/L Final     Recent Labs   Lab Test 11/17/20  1122 01/08/20  1116 11/13/15  0850   CHOL 167 184 207*   HDL 45* 44* 58   * 121* 134*   TRIG 87 95 77   CHOLHDLRATIO  --   --  3.6      Lab Results   Component Value Date    A1C 5.4 05/04/2016

## 2021-07-20 ENCOUNTER — LAB (OUTPATIENT)
Dept: LAB | Facility: CLINIC | Age: 81
End: 2021-07-20
Payer: MEDICARE

## 2021-07-20 ENCOUNTER — ANTICOAGULATION THERAPY VISIT (OUTPATIENT)
Dept: ANTICOAGULATION | Facility: CLINIC | Age: 81
End: 2021-07-20

## 2021-07-20 DIAGNOSIS — Z95.3 S/P MITRAL VALVE REPLACEMENT WITH BIOPROSTHETIC VALVE: ICD-10-CM

## 2021-07-20 DIAGNOSIS — I48.92 ATRIAL FIBRILLATION AND FLUTTER (H): ICD-10-CM

## 2021-07-20 DIAGNOSIS — Z79.01 LONG TERM CURRENT USE OF ANTICOAGULANTS WITH INR GOAL OF 2.0-3.0: Primary | ICD-10-CM

## 2021-07-20 DIAGNOSIS — Z79.01 LONG TERM CURRENT USE OF ANTICOAGULANTS WITH INR GOAL OF 2.0-3.0: ICD-10-CM

## 2021-07-20 DIAGNOSIS — I48.91 ATRIAL FIBRILLATION AND FLUTTER (H): ICD-10-CM

## 2021-07-20 LAB — INR BLD: 1.7 (ref 0.9–1.1)

## 2021-07-20 PROCEDURE — 85610 PROTHROMBIN TIME: CPT

## 2021-07-20 PROCEDURE — 36415 COLL VENOUS BLD VENIPUNCTURE: CPT

## 2021-07-20 NOTE — PROGRESS NOTES
ANTICOAGULATION MANAGEMENT     Zunilda Alicea May 81 year old female is on warfarin with subtherapeutic INR result. (Goal INR 2.0-3.0)    Recent labs: (last 7 days)     07/20/21  1530   INR 1.7*       ASSESSMENT     Source(s): Patient/Caregiver Call       Warfarin doses taken: Warfarin taken as instructed, patient denies any missed warfarin doses.     Diet: No new diet changes identified, patient reports she wants to continue to have spinach 2x a week    New illness, injury, or hospitalization: No    Medication/supplement changes: None noted    Signs or symptoms of bleeding or clotting: No    Previous INR: Subtherapeutic    Additional findings: None     PLAN     Recommended plan for no diet, medication or health factor changes affecting INR     Dosing Instructions:  Increase your warfarin dose (12.5% change) (2.5 mg) with next INR in 2 weeks       Summary  As of 7/20/2021    Full warfarin instructions:  7/21: 5 mg; Otherwise 5 mg every Tue, Fri; 2.5 mg all other days   Next INR check:  8/3/2021             Telephone call with Zunilda who agrees to plan and repeated back plan correctly    Lab visit scheduled    Education provided: Please call back if any changes to your diet, medications or how you've been taking warfarin and Contact [phone]  with any changes, questions or concerns.     Plan made per [facility] anticoagulation protocol    Екатерина Mahan RN  Anticoagulation Clinic  7/20/2021    _______________________________________________________________________     Anticoagulation Episode Summary     Current INR goal:  2.0-3.0   TTR:  63.1 % (1 y)   Target end date:  Indefinite   Send INR reminders to:  [facility]    Indications    Long term current use of anticoagulants with INR goal of 2.0-3.0 [Z79.01]  Atrial fibrillation and flutter (H) [I48.91  I48.92]  S/P mitral valve replacement with bioprosthetic valve [Z95.3]           Comments:  likes printed AVS         Anticoagulation Care Providers      Provider Role Specialty Phone number    Yunior Huang MD Referring Internal Medicine [phone]

## 2021-08-03 ENCOUNTER — LAB (OUTPATIENT)
Dept: LAB | Facility: CLINIC | Age: 81
End: 2021-08-03
Payer: MEDICARE

## 2021-08-03 ENCOUNTER — ANTICOAGULATION THERAPY VISIT (OUTPATIENT)
Dept: ANTICOAGULATION | Facility: CLINIC | Age: 81
End: 2021-08-03

## 2021-08-03 DIAGNOSIS — I48.92 ATRIAL FIBRILLATION AND FLUTTER (H): ICD-10-CM

## 2021-08-03 DIAGNOSIS — Z79.01 LONG TERM CURRENT USE OF ANTICOAGULANTS WITH INR GOAL OF 2.0-3.0: Primary | ICD-10-CM

## 2021-08-03 DIAGNOSIS — Z95.3 S/P MITRAL VALVE REPLACEMENT WITH BIOPROSTHETIC VALVE: ICD-10-CM

## 2021-08-03 DIAGNOSIS — Z79.01 LONG TERM CURRENT USE OF ANTICOAGULANTS WITH INR GOAL OF 2.0-3.0: ICD-10-CM

## 2021-08-03 DIAGNOSIS — I48.91 ATRIAL FIBRILLATION AND FLUTTER (H): ICD-10-CM

## 2021-08-03 LAB — INR BLD: 2.8 (ref 0.9–1.1)

## 2021-08-03 PROCEDURE — 36416 COLLJ CAPILLARY BLOOD SPEC: CPT

## 2021-08-03 PROCEDURE — 85610 PROTHROMBIN TIME: CPT

## 2021-08-03 NOTE — PROGRESS NOTES
ANTICOAGULATION MANAGEMENT     Zunilda Alicea May 81 year old female is on warfarin with therapeutic INR result. (Goal INR 2.0-3.0)    Recent labs: (last 7 days)     08/03/21  1132   INR 2.8*       ASSESSMENT     Source(s): Chart Review and Patient/Caregiver Call       Warfarin doses taken: Warfarin taken as instructed    Diet: No new diet changes identified.  Continues to eat spinach 2 times a week.    New illness, injury, or hospitalization: No    Medication/supplement changes: None noted    Signs or symptoms of bleeding or clotting: Yes, bruise on her toe and one on her foot.    Previous INR: Subtherapeutic    Additional findings: None     PLAN     Recommended plan for no diet, medication or health factor changes affecting INR     Dosing Instructions: Continue your current warfarin dose with next INR in 3 weeks       Summary  As of 8/3/2021    Full warfarin instructions:  5 mg every Tue, Fri; 2.5 mg all other days   Next INR check:  8/24/2021             Telephone call with Zunilda who verbalizes understanding and agrees to plan    Lab visit scheduled    Education provided: Please call back if any changes to your diet, medications or how you've been taking warfarin    Plan made per [facility] anticoagulation protocol    Sonam Teixeira, RN  Anticoagulation Clinic  8/3/2021    _______________________________________________________________________     Anticoagulation Episode Summary     Current INR goal:  2.0-3.0   TTR:  64.0 % (1 y)   Target end date:  Indefinite   Send INR reminders to:  [facility]    Indications    Long term current use of anticoagulants with INR goal of 2.0-3.0 [Z79.01]  Atrial fibrillation and flutter (H) [I48.91  I48.92]  S/P mitral valve replacement with bioprosthetic valve [Z95.3]           Comments:  likes printed AVS         Anticoagulation Care Providers     Provider Role Specialty Phone number    Yunior Huang MD Referring Internal Medicine [phone]

## 2021-08-24 ENCOUNTER — LAB (OUTPATIENT)
Dept: LAB | Facility: CLINIC | Age: 81
End: 2021-08-24
Payer: MEDICARE

## 2021-08-24 ENCOUNTER — ANTICOAGULATION THERAPY VISIT (OUTPATIENT)
Dept: ANTICOAGULATION | Facility: CLINIC | Age: 81
End: 2021-08-24

## 2021-08-24 DIAGNOSIS — I48.91 ATRIAL FIBRILLATION AND FLUTTER (H): ICD-10-CM

## 2021-08-24 DIAGNOSIS — Z79.01 LONG TERM CURRENT USE OF ANTICOAGULANTS WITH INR GOAL OF 2.0-3.0: Primary | ICD-10-CM

## 2021-08-24 DIAGNOSIS — Z95.3 S/P MITRAL VALVE REPLACEMENT WITH BIOPROSTHETIC VALVE: ICD-10-CM

## 2021-08-24 DIAGNOSIS — I48.92 ATRIAL FIBRILLATION AND FLUTTER (H): ICD-10-CM

## 2021-08-24 DIAGNOSIS — Z79.01 LONG TERM CURRENT USE OF ANTICOAGULANTS WITH INR GOAL OF 2.0-3.0: ICD-10-CM

## 2021-08-24 LAB — INR BLD: 3 (ref 0.9–1.1)

## 2021-08-24 PROCEDURE — 85610 PROTHROMBIN TIME: CPT

## 2021-08-24 PROCEDURE — 36416 COLLJ CAPILLARY BLOOD SPEC: CPT

## 2021-08-24 NOTE — PROGRESS NOTES
ANTICOAGULATION MANAGEMENT     Zunilda Alicea May 81 year old female is on warfarin with therapeutic INR result. (Goal INR 2.0-3.0)    Recent labs: (last 7 days)     08/24/21  1102   INR 3.0*       ASSESSMENT     Source(s): Chart Review & spoke with patient       Warfarin doses taken: Warfarin taken as instructed    Diet: No new diet changes identified    New illness, injury, or hospitalization: No    Medication/supplement changes: None noted    Signs or symptoms of bleeding or clotting: No    Previous INR: Therapeutic last visit; previously outside of goal range    Additional findings: None     PLAN     Recommended plan for no diet, medication or health factor changes affecting INR     Dosing Instructions: Continue your current warfarin dose with next INR in 4 weeks       Summary  As of 8/24/2021    Full warfarin instructions:  5 mg every Tue, Fri; 2.5 mg all other days   Next INR check:  9/21/2021             Telephone call with Zunilda who verbalizes understanding and agrees to plan    Lab visit scheduled    Education provided: Please call back if any changes to your diet, medications or how you've been taking warfarin    Plan made per [facility] anticoagulation protocol    Sonam Teixeira RN  Anticoagulation Clinic  8/24/2021    _______________________________________________________________________     Anticoagulation Episode Summary     Current INR goal:  2.0-3.0   TTR:  64.0 % (1 y)   Target end date:  Indefinite   Send INR reminders to:  [facility]    Indications    Long term current use of anticoagulants with INR goal of 2.0-3.0 [Z79.01]  Atrial fibrillation and flutter (H) [I48.91  I48.92]  S/P mitral valve replacement with bioprosthetic valve [Z95.3]           Comments:  likes printed AVS         Anticoagulation Care Providers     Provider Role Specialty Phone number    Yunior Huang MD Referring Internal Medicine [phone]

## 2021-08-30 ENCOUNTER — OFFICE VISIT (OUTPATIENT)
Dept: INTERNAL MEDICINE | Facility: CLINIC | Age: 81
End: 2021-08-30
Payer: MEDICARE

## 2021-08-30 ENCOUNTER — ANCILLARY PROCEDURE (OUTPATIENT)
Dept: GENERAL RADIOLOGY | Facility: CLINIC | Age: 81
End: 2021-08-30
Attending: PHYSICIAN ASSISTANT
Payer: MEDICARE

## 2021-08-30 VITALS
TEMPERATURE: 97.5 F | HEART RATE: 70 BPM | SYSTOLIC BLOOD PRESSURE: 122 MMHG | BODY MASS INDEX: 25.44 KG/M2 | WEIGHT: 149 LBS | RESPIRATION RATE: 16 BRPM | DIASTOLIC BLOOD PRESSURE: 64 MMHG | OXYGEN SATURATION: 97 % | HEIGHT: 64 IN

## 2021-08-30 DIAGNOSIS — M25.561 RIGHT KNEE PAIN, UNSPECIFIED CHRONICITY: ICD-10-CM

## 2021-08-30 DIAGNOSIS — M25.561 RIGHT KNEE PAIN, UNSPECIFIED CHRONICITY: Primary | ICD-10-CM

## 2021-08-30 PROCEDURE — 73560 X-RAY EXAM OF KNEE 1 OR 2: CPT | Mod: RT | Performed by: RADIOLOGY

## 2021-08-30 PROCEDURE — 99213 OFFICE O/P EST LOW 20 MIN: CPT | Performed by: PHYSICIAN ASSISTANT

## 2021-08-30 ASSESSMENT — MIFFLIN-ST. JEOR: SCORE: 1130.86

## 2021-08-30 NOTE — PROGRESS NOTES
"    Assessment & Plan     Right knee pain, unspecified chronicity  Not concerned for DVT as she is appropriately anticoagulated based on recent INR. Suspect soft tissue strain/sprain vs. Baker's cyst. Check x-ray today. Known OA in the knee.  Discussed pain relievers, ice/heat, and activity modification.  - XR Knee Standing Right 2 Views; Future    20 minutes spent on the date of the encounter doing chart review, history and exam, documentation and further activities per the note      No follow-ups on file.    Yanci Brown PA-C  [facility]    Rowdy Alicea is a 81 year old who presents for the following health issues     HPI     Pain behind right knee since exercising 4 days ago. Hx DVT about 2003 left leg.    H/o L TKA    Had done some leg exercises before symptoms started. Right posterior knee pain started pretty quickly after the class.  Class up to 3 times per week for a while  Knee stiffness for years.   Gets pain when she flexes her knee.  Pain does not radiate  No swelling of the extremity    Known OA in R knee (bone on bone)--gets anterior pain with that  Had a R TKA scheduled in the past, but cancelled it. Has some healing issues after her left TKA, which required long-term wound vac    Took Tylenol and Tylenol PM  Uses heat    Appropriately anticoagulated with coumadin. Last INR 3.0 on 8/24/21    Review of Systems   CONSTITUTIONAL:NEGATIVE  for chills and fever  INTEGUMENTARY/SKIN: NEGATIVE for skin changes  MUSCULOSKELETAL: POSITIVE  for right posterior knee pain, history of R knee OA, history of L TKA.  NEURO: NEGATIVE for numbness or tingling       Objective    /64   Pulse 70   Temp 97.5  F (36.4  C) (Oral)   Resp 16   Ht 1.626 m (5' 4\")   Wt 67.6 kg (149 lb)   LMP  (LMP Unknown)   SpO2 97%   Breastfeeding No   BMI 25.58 kg/m    Body mass index is 25.58 kg/m .  Physical Exam   GENERAL: healthy, alert and no distress  MS: No significant tenderness to " palpation of popliteal area. R posterior knee pain with knee flexion. No pain with knee extension, including resisted knee extension. No pain with varus/valgus stress.   SKIN: Some varicosities on lower extremities  PSYCH: mentation appears normal, affect normal/bright    Xray - Reviewed and interpreted by me.  Severe joint space narrowing. Effusion present.

## 2021-09-09 ENCOUNTER — ANCILLARY PROCEDURE (OUTPATIENT)
Dept: CARDIOLOGY | Facility: CLINIC | Age: 81
End: 2021-09-09
Attending: INTERNAL MEDICINE
Payer: MEDICARE

## 2021-09-09 DIAGNOSIS — I44.2 COMPLETE ATRIOVENTRICULAR BLOCK (H): ICD-10-CM

## 2021-09-09 DIAGNOSIS — Z95.0 CARDIAC PACEMAKER IN SITU: ICD-10-CM

## 2021-09-09 PROCEDURE — 93294 REM INTERROG EVL PM/LDLS PM: CPT | Performed by: INTERNAL MEDICINE

## 2021-09-09 PROCEDURE — 93296 REM INTERROG EVL PM/IDS: CPT | Performed by: INTERNAL MEDICINE

## 2021-09-14 LAB
MDC_IDC_EPISODE_DTM: NORMAL
MDC_IDC_EPISODE_DURATION: 1050 S
MDC_IDC_EPISODE_DURATION: 107 S
MDC_IDC_EPISODE_DURATION: 1156 S
MDC_IDC_EPISODE_DURATION: 116 S
MDC_IDC_EPISODE_DURATION: 123 S
MDC_IDC_EPISODE_DURATION: 134 S
MDC_IDC_EPISODE_DURATION: 1419 S
MDC_IDC_EPISODE_DURATION: 1473 S
MDC_IDC_EPISODE_DURATION: 1504 S
MDC_IDC_EPISODE_DURATION: 188 S
MDC_IDC_EPISODE_DURATION: 2 S
MDC_IDC_EPISODE_DURATION: 224 S
MDC_IDC_EPISODE_DURATION: 229 S
MDC_IDC_EPISODE_DURATION: 237 S
MDC_IDC_EPISODE_DURATION: 238 S
MDC_IDC_EPISODE_DURATION: 241 S
MDC_IDC_EPISODE_DURATION: 2428 S
MDC_IDC_EPISODE_DURATION: 252 S
MDC_IDC_EPISODE_DURATION: 254 S
MDC_IDC_EPISODE_DURATION: 257 S
MDC_IDC_EPISODE_DURATION: 259 S
MDC_IDC_EPISODE_DURATION: 266 S
MDC_IDC_EPISODE_DURATION: 274 S
MDC_IDC_EPISODE_DURATION: 276 S
MDC_IDC_EPISODE_DURATION: 278 S
MDC_IDC_EPISODE_DURATION: 288 S
MDC_IDC_EPISODE_DURATION: 296 S
MDC_IDC_EPISODE_DURATION: 298 S
MDC_IDC_EPISODE_DURATION: 320 S
MDC_IDC_EPISODE_DURATION: 331 S
MDC_IDC_EPISODE_DURATION: 341 S
MDC_IDC_EPISODE_DURATION: 363 S
MDC_IDC_EPISODE_DURATION: 363 S
MDC_IDC_EPISODE_DURATION: 373 S
MDC_IDC_EPISODE_DURATION: 380 S
MDC_IDC_EPISODE_DURATION: 3963 S
MDC_IDC_EPISODE_DURATION: 422 S
MDC_IDC_EPISODE_DURATION: 431 S
MDC_IDC_EPISODE_DURATION: 442 S
MDC_IDC_EPISODE_DURATION: 479 S
MDC_IDC_EPISODE_DURATION: 498 S
MDC_IDC_EPISODE_DURATION: 515 S
MDC_IDC_EPISODE_DURATION: 580 S
MDC_IDC_EPISODE_DURATION: 649 S
MDC_IDC_EPISODE_DURATION: 667 S
MDC_IDC_EPISODE_DURATION: 69 S
MDC_IDC_EPISODE_DURATION: 745 S
MDC_IDC_EPISODE_DURATION: 759 S
MDC_IDC_EPISODE_DURATION: 866 S
MDC_IDC_EPISODE_DURATION: 95 S
MDC_IDC_EPISODE_DURATION: 96 S
MDC_IDC_EPISODE_ID: 5373
MDC_IDC_EPISODE_ID: 7854
MDC_IDC_EPISODE_ID: 7855
MDC_IDC_EPISODE_ID: 7856
MDC_IDC_EPISODE_ID: 7857
MDC_IDC_EPISODE_ID: 7858
MDC_IDC_EPISODE_ID: 7859
MDC_IDC_EPISODE_ID: 7860
MDC_IDC_EPISODE_ID: 7861
MDC_IDC_EPISODE_ID: 7862
MDC_IDC_EPISODE_ID: 7863
MDC_IDC_EPISODE_ID: 7864
MDC_IDC_EPISODE_ID: 7865
MDC_IDC_EPISODE_ID: 7866
MDC_IDC_EPISODE_ID: 7867
MDC_IDC_EPISODE_ID: 7868
MDC_IDC_EPISODE_ID: 7869
MDC_IDC_EPISODE_ID: 7870
MDC_IDC_EPISODE_ID: 7871
MDC_IDC_EPISODE_ID: 7872
MDC_IDC_EPISODE_ID: 7873
MDC_IDC_EPISODE_ID: 7874
MDC_IDC_EPISODE_ID: 7875
MDC_IDC_EPISODE_ID: 7876
MDC_IDC_EPISODE_ID: 7877
MDC_IDC_EPISODE_ID: 7878
MDC_IDC_EPISODE_ID: 7879
MDC_IDC_EPISODE_ID: 7880
MDC_IDC_EPISODE_ID: 7881
MDC_IDC_EPISODE_ID: 7882
MDC_IDC_EPISODE_ID: 7883
MDC_IDC_EPISODE_ID: 7884
MDC_IDC_EPISODE_ID: 7885
MDC_IDC_EPISODE_ID: 7886
MDC_IDC_EPISODE_ID: 7887
MDC_IDC_EPISODE_ID: 7888
MDC_IDC_EPISODE_ID: 7889
MDC_IDC_EPISODE_ID: 7890
MDC_IDC_EPISODE_ID: 7891
MDC_IDC_EPISODE_ID: 7892
MDC_IDC_EPISODE_ID: 7893
MDC_IDC_EPISODE_ID: 7894
MDC_IDC_EPISODE_ID: 7895
MDC_IDC_EPISODE_ID: 7896
MDC_IDC_EPISODE_ID: 7897
MDC_IDC_EPISODE_ID: 7898
MDC_IDC_EPISODE_ID: 7899
MDC_IDC_EPISODE_ID: 7900
MDC_IDC_EPISODE_ID: 7901
MDC_IDC_EPISODE_ID: 7902
MDC_IDC_EPISODE_ID: 7903
MDC_IDC_EPISODE_TYPE: NORMAL
MDC_IDC_LEAD_IMPLANT_DT: NORMAL
MDC_IDC_LEAD_IMPLANT_DT: NORMAL
MDC_IDC_LEAD_LOCATION: NORMAL
MDC_IDC_LEAD_LOCATION: NORMAL
MDC_IDC_LEAD_MFG: NORMAL
MDC_IDC_LEAD_MFG: NORMAL
MDC_IDC_LEAD_MODEL: NORMAL
MDC_IDC_LEAD_MODEL: NORMAL
MDC_IDC_LEAD_POLARITY_TYPE: NORMAL
MDC_IDC_LEAD_POLARITY_TYPE: NORMAL
MDC_IDC_LEAD_SERIAL: NORMAL
MDC_IDC_LEAD_SERIAL: NORMAL
MDC_IDC_MSMT_BATTERY_DTM: NORMAL
MDC_IDC_MSMT_BATTERY_REMAINING_LONGEVITY: 138 MO
MDC_IDC_MSMT_BATTERY_RRT_TRIGGER: 2.62
MDC_IDC_MSMT_BATTERY_STATUS: NORMAL
MDC_IDC_MSMT_BATTERY_VOLTAGE: 3.15 V
MDC_IDC_MSMT_LEADCHNL_RA_IMPEDANCE_VALUE: 323 OHM
MDC_IDC_MSMT_LEADCHNL_RA_IMPEDANCE_VALUE: 437 OHM
MDC_IDC_MSMT_LEADCHNL_RA_PACING_THRESHOLD_AMPLITUDE: 0.62 V
MDC_IDC_MSMT_LEADCHNL_RA_PACING_THRESHOLD_PULSEWIDTH: 0.4 MS
MDC_IDC_MSMT_LEADCHNL_RA_SENSING_INTR_AMPL: 0.88 MV
MDC_IDC_MSMT_LEADCHNL_RA_SENSING_INTR_AMPL: 0.88 MV
MDC_IDC_MSMT_LEADCHNL_RV_IMPEDANCE_VALUE: 361 OHM
MDC_IDC_MSMT_LEADCHNL_RV_IMPEDANCE_VALUE: 380 OHM
MDC_IDC_MSMT_LEADCHNL_RV_PACING_THRESHOLD_AMPLITUDE: 0.75 V
MDC_IDC_MSMT_LEADCHNL_RV_PACING_THRESHOLD_PULSEWIDTH: 0.4 MS
MDC_IDC_MSMT_LEADCHNL_RV_SENSING_INTR_AMPL: 15.12 MV
MDC_IDC_MSMT_LEADCHNL_RV_SENSING_INTR_AMPL: 15.12 MV
MDC_IDC_PG_IMPLANT_DTM: NORMAL
MDC_IDC_PG_MFG: NORMAL
MDC_IDC_PG_MODEL: NORMAL
MDC_IDC_PG_SERIAL: NORMAL
MDC_IDC_PG_TYPE: NORMAL
MDC_IDC_SESS_CLINIC_NAME: NORMAL
MDC_IDC_SESS_DTM: NORMAL
MDC_IDC_SESS_TYPE: NORMAL
MDC_IDC_SET_BRADY_AT_MODE_SWITCH_RATE: 171 {BEATS}/MIN
MDC_IDC_SET_BRADY_HYSTRATE: NORMAL
MDC_IDC_SET_BRADY_LOWRATE: 60 {BEATS}/MIN
MDC_IDC_SET_BRADY_MAX_SENSOR_RATE: 130 {BEATS}/MIN
MDC_IDC_SET_BRADY_MAX_TRACKING_RATE: 130 {BEATS}/MIN
MDC_IDC_SET_BRADY_MODE: NORMAL
MDC_IDC_SET_BRADY_PAV_DELAY_LOW: 180 MS
MDC_IDC_SET_BRADY_SAV_DELAY_LOW: 150 MS
MDC_IDC_SET_LEADCHNL_RA_PACING_AMPLITUDE: 1.5 V
MDC_IDC_SET_LEADCHNL_RA_PACING_ANODE_ELECTRODE_1: NORMAL
MDC_IDC_SET_LEADCHNL_RA_PACING_ANODE_LOCATION_1: NORMAL
MDC_IDC_SET_LEADCHNL_RA_PACING_CAPTURE_MODE: NORMAL
MDC_IDC_SET_LEADCHNL_RA_PACING_CATHODE_ELECTRODE_1: NORMAL
MDC_IDC_SET_LEADCHNL_RA_PACING_CATHODE_LOCATION_1: NORMAL
MDC_IDC_SET_LEADCHNL_RA_PACING_POLARITY: NORMAL
MDC_IDC_SET_LEADCHNL_RA_PACING_PULSEWIDTH: 0.4 MS
MDC_IDC_SET_LEADCHNL_RA_SENSING_ANODE_ELECTRODE_1: NORMAL
MDC_IDC_SET_LEADCHNL_RA_SENSING_ANODE_LOCATION_1: NORMAL
MDC_IDC_SET_LEADCHNL_RA_SENSING_CATHODE_ELECTRODE_1: NORMAL
MDC_IDC_SET_LEADCHNL_RA_SENSING_CATHODE_LOCATION_1: NORMAL
MDC_IDC_SET_LEADCHNL_RA_SENSING_POLARITY: NORMAL
MDC_IDC_SET_LEADCHNL_RA_SENSING_SENSITIVITY: 0.45 MV
MDC_IDC_SET_LEADCHNL_RV_PACING_AMPLITUDE: 2 V
MDC_IDC_SET_LEADCHNL_RV_PACING_ANODE_ELECTRODE_1: NORMAL
MDC_IDC_SET_LEADCHNL_RV_PACING_ANODE_LOCATION_1: NORMAL
MDC_IDC_SET_LEADCHNL_RV_PACING_CAPTURE_MODE: NORMAL
MDC_IDC_SET_LEADCHNL_RV_PACING_CATHODE_ELECTRODE_1: NORMAL
MDC_IDC_SET_LEADCHNL_RV_PACING_CATHODE_LOCATION_1: NORMAL
MDC_IDC_SET_LEADCHNL_RV_PACING_POLARITY: NORMAL
MDC_IDC_SET_LEADCHNL_RV_PACING_PULSEWIDTH: 0.4 MS
MDC_IDC_SET_LEADCHNL_RV_SENSING_ANODE_ELECTRODE_1: NORMAL
MDC_IDC_SET_LEADCHNL_RV_SENSING_ANODE_LOCATION_1: NORMAL
MDC_IDC_SET_LEADCHNL_RV_SENSING_CATHODE_ELECTRODE_1: NORMAL
MDC_IDC_SET_LEADCHNL_RV_SENSING_CATHODE_LOCATION_1: NORMAL
MDC_IDC_SET_LEADCHNL_RV_SENSING_POLARITY: NORMAL
MDC_IDC_SET_LEADCHNL_RV_SENSING_SENSITIVITY: 0.9 MV
MDC_IDC_SET_ZONE_DETECTION_INTERVAL: 350 MS
MDC_IDC_SET_ZONE_DETECTION_INTERVAL: 400 MS
MDC_IDC_SET_ZONE_TYPE: NORMAL
MDC_IDC_STAT_AT_BURDEN_PERCENT: 66.6 %
MDC_IDC_STAT_AT_DTM_END: NORMAL
MDC_IDC_STAT_AT_DTM_START: NORMAL
MDC_IDC_STAT_BRADY_AP_VP_PERCENT: 75.04 %
MDC_IDC_STAT_BRADY_AP_VS_PERCENT: 0.01 %
MDC_IDC_STAT_BRADY_AS_VP_PERCENT: 24.33 %
MDC_IDC_STAT_BRADY_AS_VS_PERCENT: 0.6 %
MDC_IDC_STAT_BRADY_DTM_END: NORMAL
MDC_IDC_STAT_BRADY_DTM_START: NORMAL
MDC_IDC_STAT_BRADY_RA_PERCENT_PACED: 37.11 %
MDC_IDC_STAT_BRADY_RV_PERCENT_PACED: 99.05 %
MDC_IDC_STAT_EPISODE_RECENT_COUNT: 0
MDC_IDC_STAT_EPISODE_RECENT_COUNT: 1
MDC_IDC_STAT_EPISODE_RECENT_COUNT: 4590
MDC_IDC_STAT_EPISODE_RECENT_COUNT_DTM_END: NORMAL
MDC_IDC_STAT_EPISODE_RECENT_COUNT_DTM_START: NORMAL
MDC_IDC_STAT_EPISODE_TOTAL_COUNT: 0
MDC_IDC_STAT_EPISODE_TOTAL_COUNT: 1
MDC_IDC_STAT_EPISODE_TOTAL_COUNT: 7902
MDC_IDC_STAT_EPISODE_TOTAL_COUNT_DTM_END: NORMAL
MDC_IDC_STAT_EPISODE_TOTAL_COUNT_DTM_START: NORMAL
MDC_IDC_STAT_EPISODE_TYPE: NORMAL

## 2021-09-20 ENCOUNTER — TRANSFERRED RECORDS (OUTPATIENT)
Dept: HEALTH INFORMATION MANAGEMENT | Facility: CLINIC | Age: 81
End: 2021-09-20
Payer: MEDICARE

## 2021-09-21 ENCOUNTER — LAB (OUTPATIENT)
Dept: LAB | Facility: CLINIC | Age: 81
End: 2021-09-21
Payer: MEDICARE

## 2021-09-21 ENCOUNTER — ANTICOAGULATION THERAPY VISIT (OUTPATIENT)
Dept: ANTICOAGULATION | Facility: CLINIC | Age: 81
End: 2021-09-21

## 2021-09-21 DIAGNOSIS — I48.92 ATRIAL FIBRILLATION AND FLUTTER (H): ICD-10-CM

## 2021-09-21 DIAGNOSIS — Z79.01 LONG TERM CURRENT USE OF ANTICOAGULANTS WITH INR GOAL OF 2.0-3.0: ICD-10-CM

## 2021-09-21 DIAGNOSIS — Z79.01 LONG TERM CURRENT USE OF ANTICOAGULANTS WITH INR GOAL OF 2.0-3.0: Primary | ICD-10-CM

## 2021-09-21 DIAGNOSIS — I48.91 ATRIAL FIBRILLATION AND FLUTTER (H): ICD-10-CM

## 2021-09-21 DIAGNOSIS — Z95.3 S/P MITRAL VALVE REPLACEMENT WITH BIOPROSTHETIC VALVE: ICD-10-CM

## 2021-09-21 LAB — INR BLD: 2.2 (ref 0.9–1.1)

## 2021-09-21 PROCEDURE — 36416 COLLJ CAPILLARY BLOOD SPEC: CPT

## 2021-09-21 PROCEDURE — 85610 PROTHROMBIN TIME: CPT

## 2021-09-21 NOTE — PROGRESS NOTES
ANTICOAGULATION MANAGEMENT     Zunilda Alicea May 81 year old female is on warfarin with therapeutic INR result. (Goal INR 2.0-3.0)    Recent labs: (last 7 days)     09/21/21  1108   INR 2.2*       ASSESSMENT     Source(s): Chart Review and Patient/Caregiver Call       Warfarin doses taken: Warfarin taken as instructed    Diet: No new diet changes identified    New illness, injury, or hospitalization: No    Medication/supplement changes: None noted    Signs or symptoms of bleeding or clotting: No    Previous INR: Therapeutic last 2(+) visits    Additional findings: None     PLAN     Recommended plan for no diet, medication or health factor changes affecting INR     Dosing Instructions: Continue your current warfarin dose with next INR in 4 weeks       Summary  As of 9/21/2021    Full warfarin instructions:  5 mg every Tue, Fri; 2.5 mg all other days   Next INR check:  10/19/2021             Telephone call with Zunilda who verbalizes understanding and agrees to plan    Lab visit scheduled    Education provided: Please call back if any changes to your diet, medications or how you've been taking warfarin and Contact [phone]  with any changes, questions or concerns.     Plan made per [facility] anticoagulation protocol    Екатерина Mahan RN  Anticoagulation Clinic  9/21/2021    _______________________________________________________________________     Anticoagulation Episode Summary     Current INR goal:  2.0-3.0   TTR:  65.5 % (1 y)   Target end date:  Indefinite   Send INR reminders to:  [facility]    Indications    Long term current use of anticoagulants with INR goal of 2.0-3.0 [Z79.01]  Atrial fibrillation and flutter (H) [I48.91  I48.92]  S/P mitral valve replacement with bioprosthetic valve [Z95.3]           Comments:  likes printed AVS         Anticoagulation Care Providers     Provider Role Specialty Phone number    Yunior Huang MD Referring Internal Medicine [phone]

## 2021-09-27 ENCOUNTER — TRANSFERRED RECORDS (OUTPATIENT)
Dept: HEALTH INFORMATION MANAGEMENT | Facility: CLINIC | Age: 81
End: 2021-09-27

## 2021-09-27 LAB
ALT SERPL-CCNC: 20 IU/L (ref 5–35)
AST SERPL-CCNC: 31 U/L (ref 5–34)
CREATININE (EXTERNAL): 0.79 MG/DL (ref 0.5–1.3)

## 2021-10-03 ENCOUNTER — HEALTH MAINTENANCE LETTER (OUTPATIENT)
Age: 81
End: 2021-10-03

## 2021-10-19 ENCOUNTER — ANTICOAGULATION THERAPY VISIT (OUTPATIENT)
Dept: ANTICOAGULATION | Facility: CLINIC | Age: 81
End: 2021-10-19

## 2021-10-19 ENCOUNTER — LAB (OUTPATIENT)
Dept: LAB | Facility: CLINIC | Age: 81
End: 2021-10-19
Payer: MEDICARE

## 2021-10-19 DIAGNOSIS — I48.91 ATRIAL FIBRILLATION AND FLUTTER (H): ICD-10-CM

## 2021-10-19 DIAGNOSIS — Z95.3 S/P MITRAL VALVE REPLACEMENT WITH BIOPROSTHETIC VALVE: ICD-10-CM

## 2021-10-19 DIAGNOSIS — Z79.01 LONG TERM CURRENT USE OF ANTICOAGULANTS WITH INR GOAL OF 2.0-3.0: ICD-10-CM

## 2021-10-19 DIAGNOSIS — Z79.01 LONG TERM CURRENT USE OF ANTICOAGULANTS WITH INR GOAL OF 2.0-3.0: Primary | ICD-10-CM

## 2021-10-19 DIAGNOSIS — I48.92 ATRIAL FIBRILLATION AND FLUTTER (H): ICD-10-CM

## 2021-10-19 LAB — INR BLD: 2.3 (ref 0.9–1.1)

## 2021-10-19 PROCEDURE — 85610 PROTHROMBIN TIME: CPT

## 2021-10-19 PROCEDURE — 36416 COLLJ CAPILLARY BLOOD SPEC: CPT

## 2021-10-19 NOTE — PROGRESS NOTES
ANTICOAGULATION MANAGEMENT     Zunilda Alicea May 81 year old female is on warfarin with therapeutic INR result. (Goal INR 2.0-3.0)    Recent labs: (last 7 days)     10/19/21  1253   INR 2.3*       ASSESSMENT     Source(s): Chart Review and Patient/Caregiver Call       Warfarin doses taken: Warfarin taken as instructed    Diet: No new diet changes identified    New illness, injury, or hospitalization: No    Medication/supplement changes: None noted    Signs or symptoms of bleeding or clotting: Yes: two bruises on left upper leg.  These are healing ok.    Previous INR: Therapeutic last 2(+) visits    Additional findings: None     PLAN     Recommended plan for no diet, medication or health factor changes affecting INR     Dosing Instructions: Continue your current warfarin dose with next INR in 4 weeks       Summary  As of 10/19/2021    Full warfarin instructions:  5 mg every Tue, Fri; 2.5 mg all other days   Next INR check:  11/16/2021             Telephone call with Zunilda who agrees to plan and repeated back plan correctly    Lab visit scheduled    Education provided: Please call back if any changes to your diet, medications or how you've been taking warfarin    Plan made per [facility] anticoagulation protocol    Sonam Teixeira RN  Anticoagulation Clinic  10/19/2021    _______________________________________________________________________     Anticoagulation Episode Summary     Current INR goal:  2.0-3.0   TTR:  69.3 % (1 y)   Target end date:  Indefinite   Send INR reminders to:  [facility]    Indications    Long term current use of anticoagulants with INR goal of 2.0-3.0 [Z79.01]  Atrial fibrillation and flutter (H) [I48.91  I48.92]  S/P mitral valve replacement with bioprosthetic valve [Z95.3]           Comments:  likes printed AVS         Anticoagulation Care Providers     Provider Role Specialty Phone number    Yunior Huang MD Referring Internal Medicine [phone]

## 2021-11-08 NOTE — IP AVS SNAPSHOT
[facility]    [address] [facility] [address]    Phone:  [phone]    Fax:  [phone]                                       After Visit Summary   4/27/2017    Zunilda Alicea May    MRN: [id number]           After Visit Summary Signature Page     I have received my discharge instructions, and my questions have been answered. I have discussed any challenges I see with this plan with the nurse or doctor.    ..........................................................................................................................................  Patient/Patient Representative Signature      ..........................................................................................................................................  Patient Representative Print Name and Relationship to Patient    ..................................................               ................................................  Date                                            Time    ..........................................................................................................................................  Reviewed by Signature/Title    ...................................................              ..............................................  Date                                                            Time           HPI:   Ruma Metzger is a 65year old [id number] who presents for an annual gynecological exam.   Menses: No LMP recorded (approximate). (Menstrual status: Menopause). Menopause: postmenopausal    Never started HRT. HF better.  Diffuse pain right breast. No lumps masses, no thyromegaly  BREASTS: Normal inspection, no dominant masses on palpation, no lymphadenopathy  ABD: Soft, nontender and not distended, no masses, no hernia  EXTREMITIES: No edema  EXTERNAL GENITALIA: Normal appearance for age, no lesions, normal exam.        Normal exam.  Pap done. Mammogram ordered. Screening colonoscopy current    Cholesterol and screening blood work current, ordered by primary MD.  Omid Marking 1 year or PRN.     Diagnoses and all orders for this visit:    Encounter for annual routine

## 2021-11-16 ENCOUNTER — ANTICOAGULATION THERAPY VISIT (OUTPATIENT)
Dept: ANTICOAGULATION | Facility: CLINIC | Age: 81
End: 2021-11-16

## 2021-11-16 ENCOUNTER — LAB (OUTPATIENT)
Dept: LAB | Facility: CLINIC | Age: 81
End: 2021-11-16
Payer: MEDICARE

## 2021-11-16 DIAGNOSIS — Z79.01 LONG TERM CURRENT USE OF ANTICOAGULANTS WITH INR GOAL OF 2.0-3.0: Primary | ICD-10-CM

## 2021-11-16 DIAGNOSIS — I48.92 ATRIAL FIBRILLATION AND FLUTTER (H): ICD-10-CM

## 2021-11-16 DIAGNOSIS — Z79.01 LONG TERM CURRENT USE OF ANTICOAGULANTS WITH INR GOAL OF 2.0-3.0: ICD-10-CM

## 2021-11-16 DIAGNOSIS — I48.91 ATRIAL FIBRILLATION AND FLUTTER (H): ICD-10-CM

## 2021-11-16 DIAGNOSIS — Z95.3 S/P MITRAL VALVE REPLACEMENT WITH BIOPROSTHETIC VALVE: ICD-10-CM

## 2021-11-16 LAB — INR BLD: 2.5 (ref 0.9–1.1)

## 2021-11-16 PROCEDURE — 85610 PROTHROMBIN TIME: CPT

## 2021-11-16 PROCEDURE — 36416 COLLJ CAPILLARY BLOOD SPEC: CPT

## 2021-11-16 NOTE — PROGRESS NOTES
ANTICOAGULATION MANAGEMENT     Zunilda Alicea May 81 year old female is on warfarin with therapeutic INR result. (Goal INR 2.0-3.0)    Recent labs: (last 7 days)     11/16/21  0952   INR 2.5*       ASSESSMENT     Source(s): Chart Review and Patient/Caregiver Call       Warfarin doses taken: Warfarin taken as instructed    Diet: No new diet changes identified    New illness, injury, or hospitalization: No    Medication/supplement changes: None noted    Signs or symptoms of bleeding or clotting: No    Previous INR: Therapeutic last 2(+) visits    Additional findings: None     PLAN     Recommended plan for no diet, medication or health factor changes affecting INR     Dosing Instructions: Continue your current warfarin dose with next INR in 4 weeks       Summary  As of 11/16/2021    Full warfarin instructions:  5 mg every Tue, Fri; 2.5 mg all other days   Next INR check:  12/14/2021             Telephone call with Zunilda who verbalizes understanding and agrees to plan    Lab visit scheduled    Education provided: Please call back if any changes to your diet, medications or how you've been taking warfarin and Contact [phone]  with any changes, questions or concerns.     Plan made per [facility] anticoagulation protocol    Екатерина Mahan RN  Anticoagulation Clinic  11/16/2021    _______________________________________________________________________     Anticoagulation Episode Summary     Current INR goal:  2.0-3.0   TTR:  74.1 % (1 y)   Target end date:  Indefinite   Send INR reminders to:  [facility]    Indications    Long term current use of anticoagulants with INR goal of 2.0-3.0 [Z79.01]  Atrial fibrillation and flutter (H) [I48.91  I48.92]  S/P mitral valve replacement with bioprosthetic valve [Z95.3]           Comments:  likes printed AVS         Anticoagulation Care Providers     Provider Role Specialty Phone number    Yunior Huang MD Referring Internal Medicine [phone]

## 2021-12-14 ENCOUNTER — LAB (OUTPATIENT)
Dept: LAB | Facility: CLINIC | Age: 81
End: 2021-12-14
Payer: MEDICARE

## 2021-12-14 ENCOUNTER — ANTICOAGULATION THERAPY VISIT (OUTPATIENT)
Dept: ANTICOAGULATION | Facility: CLINIC | Age: 81
End: 2021-12-14

## 2021-12-14 ENCOUNTER — DOCUMENTATION ONLY (OUTPATIENT)
Dept: ANTICOAGULATION | Facility: CLINIC | Age: 81
End: 2021-12-14

## 2021-12-14 DIAGNOSIS — Z95.3 S/P MITRAL VALVE REPLACEMENT WITH BIOPROSTHETIC VALVE: ICD-10-CM

## 2021-12-14 DIAGNOSIS — I48.92 ATRIAL FIBRILLATION AND FLUTTER (H): ICD-10-CM

## 2021-12-14 DIAGNOSIS — I48.91 ATRIAL FIBRILLATION AND FLUTTER (H): ICD-10-CM

## 2021-12-14 DIAGNOSIS — Z79.01 LONG TERM CURRENT USE OF ANTICOAGULANTS WITH INR GOAL OF 2.0-3.0: Primary | ICD-10-CM

## 2021-12-14 DIAGNOSIS — Z79.01 LONG TERM CURRENT USE OF ANTICOAGULANTS WITH INR GOAL OF 2.0-3.0: ICD-10-CM

## 2021-12-14 LAB — INR BLD: 2 (ref 0.9–1.1)

## 2021-12-14 PROCEDURE — 36416 COLLJ CAPILLARY BLOOD SPEC: CPT

## 2021-12-14 PROCEDURE — 85610 PROTHROMBIN TIME: CPT

## 2021-12-14 RX ORDER — WARFARIN SODIUM 2.5 MG/1
TABLET ORAL
Qty: 110 TABLET | Refills: 1 | Status: SHIPPED | OUTPATIENT
Start: 2021-12-14 | End: 2022-03-08

## 2021-12-14 NOTE — PROGRESS NOTES
ANTICOAGULATION MANAGEMENT     Zunilda Alicea May 81 year old female is on warfarin with therapeutic INR result. (Goal INR 2.0-3.0)    Recent labs: (last 7 days)     12/14/21  1000   INR 2.0*       ASSESSMENT     Source(s): Chart Review and Patient/Caregiver Call       Warfarin doses taken: Warfarin taken as instructed    Diet: No new diet changes identified    New illness, injury, or hospitalization: No    Medication/supplement changes: None noted    Signs or symptoms of bleeding or clotting: Yes: Patient reports she has noticed blood in the tissue when she blows her nose, patient will try saline nasal spray    Previous INR: Therapeutic last 2(+) visits    Additional findings: Refill needed today     PLAN     Recommended plan for no diet, medication or health factor changes affecting INR     Dosing Instructions: Continue your current warfarin dose with next INR in 4 weeks       Summary  As of 12/14/2021    Full warfarin instructions:  5 mg every Tue, Fri; 2.5 mg all other days   Next INR check:  1/11/2022             Telephone call with Zunilda who verbalizes understanding and agrees to plan    Lab visit scheduled    Education provided: Please call back if any changes to your diet, medications or how you've been taking warfarin and Contact [phone]  with any changes, questions or concerns.     Plan made per [facility] anticoagulation protocol    Екатерина Mahan RN  Anticoagulation Clinic  12/14/2021    _______________________________________________________________________     Anticoagulation Episode Summary     Current INR goal:  2.0-3.0   TTR:  75.7 % (1 y)   Target end date:  Indefinite   Send INR reminders to:  [facility]    Indications    Long term current use of anticoagulants with INR goal of 2.0-3.0 [Z79.01]  Atrial fibrillation and flutter (H) [I48.91  I48.92]  S/P mitral valve replacement with bioprosthetic valve [Z95.3]           Comments:  likes printed AVS         Anticoagulation Care  Providers     Provider Role Specialty Phone number    Yunior Huang MD Referring Internal Medicine [phone]

## 2021-12-14 NOTE — PROGRESS NOTES
ANTICOAGULATION MANAGEMENT      Zunilda Alicea May due for annual renewal of referral to anticoagulation monitoring. Order pended for your review and signature.      ANTICOAGULATION SUMMARY      Warfarin indication(s)     Atrial fibrillation  Heart Valve Replacement    Heart valve present?  Bioprosthetic MVR       Current goal range   INR: 2.0-3.0     Goal appropriate for indication? Yes, INR 2-3 appropriate for hx of DVT, PE, hypercoagulable state, Afib, LVAD, or bileaflet AVR without risk factors     Current duration of therapy Indefinite/long term therapy   Time in Therapeutic Range (TTR)  (Goal > 60%) 75.7%       Office visit with referring provider's group within last year yes on 6/22/21       Екатерина Mahan RN

## 2021-12-16 ENCOUNTER — ANCILLARY PROCEDURE (OUTPATIENT)
Dept: CARDIOLOGY | Facility: CLINIC | Age: 81
End: 2021-12-16
Attending: INTERNAL MEDICINE
Payer: MEDICARE

## 2021-12-16 DIAGNOSIS — Z95.0 CARDIAC PACEMAKER IN SITU: ICD-10-CM

## 2021-12-16 PROCEDURE — 93296 REM INTERROG EVL PM/IDS: CPT | Performed by: INTERNAL MEDICINE

## 2021-12-16 PROCEDURE — 93294 REM INTERROG EVL PM/LDLS PM: CPT | Performed by: INTERNAL MEDICINE

## 2022-01-07 ENCOUNTER — HOSPITAL ENCOUNTER (OUTPATIENT)
Dept: CARDIOLOGY | Facility: CLINIC | Age: 82
Discharge: HOME | End: 2022-01-07
Attending: INTERNAL MEDICINE | Admitting: INTERNAL MEDICINE
Payer: MEDICARE

## 2022-01-07 DIAGNOSIS — I27.20 PULMONARY HTN (H): ICD-10-CM

## 2022-01-07 DIAGNOSIS — Z95.3 S/P MITRAL VALVE REPLACEMENT WITH BIOPROSTHETIC VALVE: ICD-10-CM

## 2022-01-07 DIAGNOSIS — Z98.890 HISTORY OF TRICUSPID VALVE ANNULOPLASTY: ICD-10-CM

## 2022-01-07 LAB — LVEF ECHO: NORMAL

## 2022-01-07 PROCEDURE — 93306 TTE W/DOPPLER COMPLETE: CPT

## 2022-01-07 PROCEDURE — 93306 TTE W/DOPPLER COMPLETE: CPT | Mod: 26 | Performed by: INTERNAL MEDICINE

## 2022-01-10 ENCOUNTER — OFFICE VISIT (OUTPATIENT)
Dept: CARDIOLOGY | Facility: CLINIC | Age: 82
End: 2022-01-10
Payer: MEDICARE

## 2022-01-10 VITALS
OXYGEN SATURATION: 97 % | BODY MASS INDEX: 25.1 KG/M2 | HEART RATE: 68 BPM | DIASTOLIC BLOOD PRESSURE: 64 MMHG | HEIGHT: 64 IN | WEIGHT: 147 LBS | SYSTOLIC BLOOD PRESSURE: 126 MMHG

## 2022-01-10 DIAGNOSIS — I27.20 PULMONARY HTN (H): ICD-10-CM

## 2022-01-10 DIAGNOSIS — R06.09 DYSPNEA ON EXERTION: Primary | ICD-10-CM

## 2022-01-10 DIAGNOSIS — I10 BENIGN ESSENTIAL HYPERTENSION: ICD-10-CM

## 2022-01-10 DIAGNOSIS — I48.20 CHRONIC ATRIAL FIBRILLATION (H): ICD-10-CM

## 2022-01-10 PROCEDURE — 99214 OFFICE O/P EST MOD 30 MIN: CPT | Performed by: PHYSICIAN ASSISTANT

## 2022-01-10 RX ORDER — TIMOLOL/DORZOLAMIDE/LATANOP/PF 0.5-2-.005
DROPS OPHTHALMIC (EYE)
COMMUNITY
End: 2023-02-08

## 2022-01-10 ASSESSMENT — MIFFLIN-ST. JEOR: SCORE: 1121.79

## 2022-01-10 NOTE — PATIENT INSTRUCTIONS
Visit Summary:    Today we discussed:   1. Your echocardiogram showed some changes in how part of the heart wall is moving. We will do a Lexiscan (stress test) to help us sort this out further.     2. Please ask the INR clinic to draw the other labs I have ordered today.     Medication changes:    none    Follow up:   With Zena via telephone visit in the next week or two after testing completed.     Please call my nurse Maranda at [phone] with any questions or concerns.

## 2022-01-10 NOTE — PROGRESS NOTES
"      Cardiology Progress Note    Date of Service: 01/10/22      Reason for visit: Follow up pulmonary hypertension, Rheumatic valve disease.      Primary cardiologist: Dr. Noe Rodriguez      HPI:  Ms. 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 However, she has chosen to proceed conservatively.     Ms. Clark was seen last by Dr. Rodriguez in July of this year. At that time, she was stable from a cardiovascular standpoint. Her NT-proBNP was elevated but she was feeling well with stable weights and no signs of decompensated heart failure. No medication changes were made at that time and she was advised to follow up with us in 6 months with a repeat echocardiogram.    Today, I'm seeing her for follow up in clinic. She tells me that overall she is feeling well. She notes no worsening MORRISON, orthopnea, or worsening LE edema. Weight is stable. She denies any new chest pain, palpitations, dizziness, or presyncope. Her BP today is under good control.  She had a repeat echocardiogram done last week, which was reviewed with patient in clinic today. Her EF remains preserved at 55-60%, though there is mention of a new WMA with severe hypokinesis of the basil inferior wall. Her RV pressures remain severely elevated with 2-3+ TR which appears unchanged from prior, along with moderate valvular aortic stenosis. Her " bioprosthetic MV gradients were normal.     There were no new labs performed prior to our visit today. Most recent labs reviewed as below.      ASSESSMENT/PLAN:    1. Pulmonary hypertension.   --Group 2 PH in the setting of Rheumatic valve disease. Overall, pulmonary vasodilators are not felt to be indicated and she has chosen to proceed conservatively with avoiding invasive procedures when possible.  Echo last week showed continued preserved EF at 55-60%, though there is mention of a new WMA with severe hypokinesis of the basil inferior wall. Her RV pressures remain severely elevated with 2-3+ TR which appears unchanged from prior, along with moderate valvular aortic stenosis. Her RV function was unable to be assessed due to poor image quality. Clinically, she is is feeling well without angina, but she is agreeable to a Lexiscan to see if we can sort the WMA out further for reassurance. I did discuss with her that angiogram would be recommended should it return significantly abnormal.    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 Per Dr. Monroy's previous recommendations, if her TR becomes more severe or she starts to develop RV dysfunction, she could be considered for redo tricuspid valve repair/replacement through a right-sided mini thoracotomy. Today, as mentioned, she is feeling well.    --Will continue to focus on BP and volume control. She appears relatively euvolemic on exam, on torsemide 10mg daily. Will request BMP/NT-proBNP with her INR draw tomorrow.     2. Rheumatic heart disease.    --History of bioprosthetic mitral valve replacement in 2007, redo in 2014 with a 27 mm Magna bioprosthetic mitral valve.  Echo last week showed normal MV gradients.     3. High degree AV block.   --Post surgery 2007, s/p dual-chamber pacemaker placement.  Last device check Dec 2021. AP 40%,  >99%. 11 years of battery life. She did have over 10,000 mode  switches (62%), with controlled ventricular rates. She had 1 ventricular high rate logged. EGM shows Vs>As for NSVT lasting 11 beats, rates 110-210bpm. She has not had any dizziness/presyncope.    --She remains anticoagulated with warfarin, and follows with the INR clinic.     4. Hypertension.   --BP is under very good control. I continued her amlodipine/valsartan, with additional 2.5mg amlodipine, and metoprolol tartrate 50 mg BID unchanged, along with torsemide 10mg daily as above. BMP to be drawn tomorrow as above.      Follow up plan: Return in 1-2 weeks via virtual visit with my to discuss results of testing. If normal stress test, tentative follow up in 6 months with Dr. Rodriguez in clinic.       Orders this Visit:  Orders Placed This Encounter   Procedures     Basic metabolic panel     N terminal pro BNP outpatient     Hemoglobin     Follow-Up with Pulmonary Hypertension Clinic     Orders Placed This Encounter   Medications     Timolol-Dorzolamid-Latanoprost 0.5-0.15-0.005 % SOLN     There are no discontinued medications.      CURRENT MEDICATIONS:  Current Outpatient Medications   Medication Sig Dispense Refill     ALLOPURINOL PO Take 100 mg by mouth 2 times daily       amLODIPine (NORVASC) 2.5 MG tablet Take 1 tablet (2.5 mg) by mouth daily 90 tablet 3     amLODIPine-valsartan (EXFORGE) 5-160 MG tablet Take 1 tablet by mouth daily 90 tablet 3     calcium citrate (CALCITRATE) 950 MG tablet Take 1 tablet by mouth daily        famotidine (PEPCID) 40 MG tablet Take 1 tablet (40 mg) by mouth daily 90 tablet 3     folic acid (FOLVITE) 1 MG tablet Take 1 mg by mouth daily       latanoprostene bunod (VYZULTA) 0.024 % SOLN ophthalmic solution Place 1 drop Into the left eye At Bedtime       levothyroxine (SYNTHROID/LEVOTHROID) 112 MCG tablet TAKE ONE TABLET BY MOUTH ONE TIME DAILY  90 tablet 1     loperamide (IMODIUM A-D) 2 MG tablet Take 1 tablet (2 mg) by mouth 4 times daily as needed for diarrhea 30 tablet 1      METHOTREXATE SODIUM IJ Inject 0.8 mLs as directed once a week Thursdays       metoprolol tartrate (LOPRESSOR) 50 MG tablet Take 1 tablet (50 mg) by mouth 2 times daily 180 tablet 3     multivitamin, therapeutic with minerals (MULTI-VITAMIN) TABS tablet Take 1 tablet by mouth daily Without iron       TIMOLOL MALEATE OP        Timolol-Dorzolamid-Latanoprost 0.5-0.15-0.005 % SOLN        torsemide (DEMADEX) 10 MG tablet Take 1 tablet (10 mg) by mouth daily (with breakfast) 90 tablet 3     VITAMIN D, CHOLECALCIFEROL, PO Take 1,000 Units by mouth daily       warfarin ANTICOAGULANT (COUMADIN) 2.5 MG tablet Take one tablet (2.5 mg) my mouth daily except take 2 tablets (5 mg) Tue and Fri or as directed by INR clinic 110 tablet 1       ALLERGIES   No Known Allergies    PAST MEDICAL HISTORY:  Past Medical History:   Diagnosis Date     Acquired hypothyroidism 11/4/2015     Aortic valve insufficiency      Arrhythmia     A fib     Arthritis      Atrial fibrillation (H)      Atrial fibrillation and flutter (H)      Blood clotting disorder (H)      CHF (congestive heart failure) (H)      DVT (deep venous thrombosis) (H) 2003     Gastro-oesophageal reflux disease      H/O mitral valve replacement with tissue graft june 2014     History of blood transfusion 2014     HTN (hypertension)      Hypothyroidism      Other chronic pain      PONV (postoperative nausea and vomiting)      Pulmonary HTN (H)      Rheumatoid arthritis (H)      Rheumatoid arthritis(714.0)      Seizures (H) 2014     Shingles 08/2018     Stroke (H) 2009    left side mild weakness      Stroke (H) 2014     Syncope 2011    [place]     Thrombosis of leg 2003    both legs         Review of Systems:  Cardiovascular: negative for chest pain, palpitations, orthopnea, LE edema  Constitutional: negative for chills, sweats, fevers   Resp: Negative for dyspnea at rest, dyspnea on exertion, cough, known chronic lung disease  HEENT: Negative for new visual changes, frequent  "headaches  Gastrointestinal: negative for abdominal pain, neg N/V/D  Hematologic/lymphatic: pos for current systemic anticoagulation, hx of blood clots  Neurological: negative for focal weakness, LOC, seizures, syncope/presyncope       Physical Exam:  Vitals: /64 (BP Location: Right arm, Patient Position: Sitting, Cuff Size: Adult Regular)   Pulse 68   Ht 1.626 m (5' 4\")   Wt 66.7 kg (147 lb)   LMP  (LMP Unknown)   SpO2 97%   BMI 25.23 kg/m     Wt Readings from Last 4 Encounters:   01/10/22 66.7 kg (147 lb)   08/30/21 67.6 kg (149 lb)   07/19/21 67.4 kg (148 lb 11.2 oz)   06/22/21 66.7 kg (147 lb)       GEN:  In general, this is a well nourished  female in no acute distress on room air.  Patient ambulatory, unaccompanied.   HEENT:  Pupils grossly equal, sclerae nonicteric.   NECK: Supple, trachea midline. No JVD while upright.   C/V:  Regular rate and rhythm, no murmur, rub or gallop. No S3 or RV heave.   RESP: Respirations are unlabored. No use of accessory muscles. Clear to auscultation bilaterally without wheezing, rales, or rhonchi.  GI: Abdomen soft, nontender, nondistended.   EXTREM: Trace PT edema. No cyanosis or clubbing.  NEURO: Alert and oriented, cooperative. Gait not formally assessed. No obvious focal deficits.   SKIN: Warm and dry.       Recent Lab Results:    CBC RESULTS:  Lab Results   Component Value Date    WBC 7.1 06/22/2021    RBC 3.75 (L) 06/22/2021    HGB 12.5 06/22/2021    HCT 38.6 06/22/2021     (H) 06/22/2021    MCH 33.3 (H) 06/22/2021    MCHC 32.4 06/22/2021    RDW 17.0 (H) 06/22/2021     06/22/2021       BMP RESULTS:  Lab Results   Component Value Date     07/19/2021     06/22/2021    POTASSIUM 3.7 07/19/2021    POTASSIUM 3.8 06/22/2021    CHLORIDE 105 07/19/2021    CHLORIDE 104 06/22/2021    CO2 31 07/19/2021    CO2 33 (H) 06/22/2021    ANIONGAP 3 07/19/2021    ANIONGAP 1 (L) 06/22/2021    GLC 89 07/19/2021    GLC 88 06/22/2021    BUN 29 " 07/19/2021    BUN 27 06/22/2021    CR 1.05 (H) 07/19/2021    CR 1.11 (H) 06/22/2021    GFRESTIMATED 50 (L) 07/19/2021    GFRESTIMATED 50.9 06/28/2021    GFRESTBLACK 54 (L) 06/22/2021    BRICE 9.3 07/19/2021    BRICE 9.6 06/22/2021        INR RESULTS:  Lab Results   Component Value Date    INR 2.0 (H) 12/14/2021    INR 2.5 (H) 11/16/2021    INR 1.70 (H) 07/06/2021    INR 2.20 (H) 06/22/2021       Recent Labs   Lab Test 07/19/21  1144 07/09/19  1535 07/26/18  1035 05/03/17  1230 04/26/14  0119   NTBNPI  --   --   --  4,812* 2,180*   NTBNP 1,218* 664* 423  --   --          Additional pertinent testing:  Echo 1/7/2022  Interpretation Summary     The visual ejection fraction is 55-60%.  The basal inferior wall appears severely hypokinertic. This wall motion was  not seen on the previous study on direct comparison.  Right ventricular systolic pressure is elevated, consistent with severe  pulmonary hypertension.  There is moderate to mod-severe (2-3+) tricuspid regurgitation.  Moderate valvular aortic stenosis.  The ascending aorta is Mildly dilated.  There is a bioprosthetic mitral valve.  Normal prosthetic mitral valve gradients.  Apart from the new wall motion abnormality there has been no change from  before. The study was technically difficult.      35 total minutes was spent today including chart review, precharting, history and exam, post visit documentation, and reviewing studies as outlined above.         Zena Benitez PA-C  [facility]  Pager [phone]

## 2022-01-10 NOTE — LETTER
"1/10/2022    Yunior Huang MD  [address] [facility] [address]    RE: Zunilda Clark       Dear Colleague,     I had the pleasure of seeing Zunilda Clark in the [facility].        Cardiology Progress Note    Date of Service: 01/10/22    Reason for visit: Follow up pulmonary hypertension, Rheumatic valve disease.    Primary cardiologist: Dr. Noe Rodriguez    HPI:  Ms. 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 However, she has chosen to proceed conservatively.     Ms. Clark was seen last by Dr. Rodriguez in July of this year. At that time, she was stable from a cardiovascular standpoint. Her NT-proBNP was elevated but she was feeling well with stable weights and no signs of decompensated heart failure. No medication changes were made at that time and she was advised to follow up with us in 6 months with a repeat echocardiogram.    Today, I'm seeing her for follow up in clinic. She tells me that overall she is feeling well. She notes no worsening MORRISON, orthopnea, or worsening LE edema. Weight is stable. She denies any new chest pain, palpitations, dizziness, or presyncope. Her BP today is under good control.  She had a repeat echocardiogram done last week, which was reviewed with patient in clinic today. Her EF remains preserved at 55-60%, though there is mention of " a new WMA with severe hypokinesis of the basil inferior wall. Her RV pressures remain severely elevated with 2-3+ TR which appears unchanged from prior, along with moderate valvular aortic stenosis. Her bioprosthetic MV gradients were normal.     There were no new labs performed prior to our visit today. Most recent labs reviewed as below.      ASSESSMENT/PLAN:    1. Pulmonary hypertension.   --Group 2 PH in the setting of Rheumatic valve disease. Overall, pulmonary vasodilators are not felt to be indicated and she has chosen to proceed conservatively with avoiding invasive procedures when possible.  Echo last week showed continued preserved EF at 55-60%, though there is mention of a new WMA with severe hypokinesis of the basil inferior wall. Her RV pressures remain severely elevated with 2-3+ TR which appears unchanged from prior, along with moderate valvular aortic stenosis. Her RV function was unable to be assessed due to poor image quality. Clinically, she is is feeling well without angina, but she is agreeable to a Lexiscan to see if we can sort the WMA out further for reassurance. I did discuss with her that angiogram would be recommended should it return significantly abnormal.    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 Per Dr. Monroy's previous recommendations, if her TR becomes more severe or she starts to develop RV dysfunction, she could be considered for redo tricuspid valve repair/replacement through a right-sided mini thoracotomy. Today, as mentioned, she is feeling well.    --Will continue to focus on BP and volume control. She appears relatively euvolemic on exam, on torsemide 10mg daily. Will request BMP/NT-proBNP with her INR draw tomorrow.     2. Rheumatic heart disease.    --History of bioprosthetic mitral valve replacement in 2007, redo in 2014 with a 27 mm Magna bioprosthetic mitral valve.  Echo last week showed normal MV  gradients.     3. High degree AV block.   --Post surgery 2007, s/p dual-chamber pacemaker placement.  Last device check Dec 2021. AP 40%,  >99%. 11 years of battery life. She did have over 10,000 mode switches (62%), with controlled ventricular rates. She had 1 ventricular high rate logged. EGM shows Vs>As for NSVT lasting 11 beats, rates 110-210bpm. She has not had any dizziness/presyncope.    --She remains anticoagulated with warfarin, and follows with the INR clinic.     4. Hypertension.   --BP is under very good control. I continued her amlodipine/valsartan, with additional 2.5mg amlodipine, and metoprolol tartrate 50 mg BID unchanged, along with torsemide 10mg daily as above. BMP to be drawn tomorrow as above.      Follow up plan: Return in 1-2 weeks via virtual visit with my to discuss results of testing. If normal stress test, tentative follow up in 6 months with Dr. Rodriguez in clinic.       Orders this Visit:  Orders Placed This Encounter   Procedures     Basic metabolic panel     N terminal pro BNP outpatient     Hemoglobin     Follow-Up with Pulmonary Hypertension Clinic     Orders Placed This Encounter   Medications     Timolol-Dorzolamid-Latanoprost 0.5-0.15-0.005 % SOLN     There are no discontinued medications.      CURRENT MEDICATIONS:  Current Outpatient Medications   Medication Sig Dispense Refill     ALLOPURINOL PO Take 100 mg by mouth 2 times daily       amLODIPine (NORVASC) 2.5 MG tablet Take 1 tablet (2.5 mg) by mouth daily 90 tablet 3     amLODIPine-valsartan (EXFORGE) 5-160 MG tablet Take 1 tablet by mouth daily 90 tablet 3     calcium citrate (CALCITRATE) 950 MG tablet Take 1 tablet by mouth daily        famotidine (PEPCID) 40 MG tablet Take 1 tablet (40 mg) by mouth daily 90 tablet 3     folic acid (FOLVITE) 1 MG tablet Take 1 mg by mouth daily       latanoprostene bunod (VYZULTA) 0.024 % SOLN ophthalmic solution Place 1 drop Into the left eye At Bedtime       levothyroxine  (SYNTHROID/LEVOTHROID) 112 MCG tablet TAKE ONE TABLET BY MOUTH ONE TIME DAILY  90 tablet 1     loperamide (IMODIUM A-D) 2 MG tablet Take 1 tablet (2 mg) by mouth 4 times daily as needed for diarrhea 30 tablet 1     METHOTREXATE SODIUM IJ Inject 0.8 mLs as directed once a week Thursdays       metoprolol tartrate (LOPRESSOR) 50 MG tablet Take 1 tablet (50 mg) by mouth 2 times daily 180 tablet 3     multivitamin, therapeutic with minerals (MULTI-VITAMIN) TABS tablet Take 1 tablet by mouth daily Without iron       TIMOLOL MALEATE OP        Timolol-Dorzolamid-Latanoprost 0.5-0.15-0.005 % SOLN        torsemide (DEMADEX) 10 MG tablet Take 1 tablet (10 mg) by mouth daily (with breakfast) 90 tablet 3     VITAMIN D, CHOLECALCIFEROL, PO Take 1,000 Units by mouth daily       warfarin ANTICOAGULANT (COUMADIN) 2.5 MG tablet Take one tablet (2.5 mg) my mouth daily except take 2 tablets (5 mg) Tue and Fri or as directed by INR clinic 110 tablet 1       ALLERGIES   No Known Allergies    PAST MEDICAL HISTORY:  Past Medical History:   Diagnosis Date     Acquired hypothyroidism 11/4/2015     Aortic valve insufficiency      Arrhythmia     A fib     Arthritis      Atrial fibrillation (H)      Atrial fibrillation and flutter (H)      Blood clotting disorder (H)      CHF (congestive heart failure) (H)      DVT (deep venous thrombosis) (H) 2003     Gastro-oesophageal reflux disease      H/O mitral valve replacement with tissue graft june 2014     History of blood transfusion 2014     HTN (hypertension)      Hypothyroidism      Other chronic pain      PONV (postoperative nausea and vomiting)      Pulmonary HTN (H)      Rheumatoid arthritis (H)      Rheumatoid arthritis(714.0)      Seizures (H) 2014     Shingles 08/2018     Stroke (H) 2009    left side mild weakness      Stroke (H) 2014     Syncope 2011    [place]     Thrombosis of leg 2003    both legs         Review of Systems:  Cardiovascular: negative for chest pain, palpitations,  "orthopnea, LE edema  Constitutional: negative for chills, sweats, fevers   Resp: Negative for dyspnea at rest, dyspnea on exertion, cough, known chronic lung disease  HEENT: Negative for new visual changes, frequent headaches  Gastrointestinal: negative for abdominal pain, neg N/V/D  Hematologic/lymphatic: pos for current systemic anticoagulation, hx of blood clots  Neurological: negative for focal weakness, LOC, seizures, syncope/presyncope       Physical Exam:  Vitals: /64 (BP Location: Right arm, Patient Position: Sitting, Cuff Size: Adult Regular)   Pulse 68   Ht 1.626 m (5' 4\")   Wt 66.7 kg (147 lb)   LMP  (LMP Unknown)   SpO2 97%   BMI 25.23 kg/m     Wt Readings from Last 4 Encounters:   01/10/22 66.7 kg (147 lb)   08/30/21 67.6 kg (149 lb)   07/19/21 67.4 kg (148 lb 11.2 oz)   06/22/21 66.7 kg (147 lb)       GEN:  In general, this is a well nourished  female in no acute distress on room air.  Patient ambulatory, unaccompanied.   HEENT:  Pupils grossly equal, sclerae nonicteric.   NECK: Supple, trachea midline. No JVD while upright.   C/V:  Regular rate and rhythm, no murmur, rub or gallop. No S3 or RV heave.   RESP: Respirations are unlabored. No use of accessory muscles. Clear to auscultation bilaterally without wheezing, rales, or rhonchi.  GI: Abdomen soft, nontender, nondistended.   EXTREM: Trace PT edema. No cyanosis or clubbing.  NEURO: Alert and oriented, cooperative. Gait not formally assessed. No obvious focal deficits.   SKIN: Warm and dry.       Recent Lab Results:    CBC RESULTS:  Lab Results   Component Value Date    WBC 7.1 06/22/2021    RBC 3.75 (L) 06/22/2021    HGB 12.5 06/22/2021    HCT 38.6 06/22/2021     (H) 06/22/2021    MCH 33.3 (H) 06/22/2021    MCHC 32.4 06/22/2021    RDW 17.0 (H) 06/22/2021     06/22/2021       BMP RESULTS:  Lab Results   Component Value Date     07/19/2021     06/22/2021    POTASSIUM 3.7 07/19/2021    POTASSIUM 3.8 " 06/22/2021    CHLORIDE 105 07/19/2021    CHLORIDE 104 06/22/2021    CO2 31 07/19/2021    CO2 33 (H) 06/22/2021    ANIONGAP 3 07/19/2021    ANIONGAP 1 (L) 06/22/2021    GLC 89 07/19/2021    GLC 88 06/22/2021    BUN 29 07/19/2021    BUN 27 06/22/2021    CR 1.05 (H) 07/19/2021    CR 1.11 (H) 06/22/2021    GFRESTIMATED 50 (L) 07/19/2021    GFRESTIMATED 50.9 06/28/2021    GFRESTBLACK 54 (L) 06/22/2021    BRICE 9.3 07/19/2021    BRICE 9.6 06/22/2021        INR RESULTS:  Lab Results   Component Value Date    INR 2.0 (H) 12/14/2021    INR 2.5 (H) 11/16/2021    INR 1.70 (H) 07/06/2021    INR 2.20 (H) 06/22/2021       Recent Labs   Lab Test 07/19/21  1144 07/09/19  1535 07/26/18  1035 05/03/17  1230 04/26/14  0119   NTBNPI  --   --   --  4,812* 2,180*   NTBNP 1,218* 664* 423  --   --          Additional pertinent testing:  Echo 1/7/2022  Interpretation Summary     The visual ejection fraction is 55-60%.  The basal inferior wall appears severely hypokinertic. This wall motion was  not seen on the previous study on direct comparison.  Right ventricular systolic pressure is elevated, consistent with severe  pulmonary hypertension.  There is moderate to mod-severe (2-3+) tricuspid regurgitation.  Moderate valvular aortic stenosis.  The ascending aorta is Mildly dilated.  There is a bioprosthetic mitral valve.  Normal prosthetic mitral valve gradients.  Apart from the new wall motion abnormality there has been no change from  before. The study was technically difficult.      35 total minutes was spent today including chart review, precharting, history and exam, post visit documentation, and reviewing studies as outlined above.         Zena Benitez PA-C  [facility]  Pager [phone]      cc:   Noe Rodriguez MD  [address]  [place] [id number]

## 2022-01-11 ENCOUNTER — LAB (OUTPATIENT)
Dept: LAB | Facility: CLINIC | Age: 82
End: 2022-01-11
Payer: MEDICARE

## 2022-01-11 ENCOUNTER — ANTICOAGULATION THERAPY VISIT (OUTPATIENT)
Dept: ANTICOAGULATION | Facility: CLINIC | Age: 82
End: 2022-01-11

## 2022-01-11 DIAGNOSIS — I48.91 ATRIAL FIBRILLATION AND FLUTTER (H): ICD-10-CM

## 2022-01-11 DIAGNOSIS — Z79.01 LONG TERM CURRENT USE OF ANTICOAGULANTS WITH INR GOAL OF 2.0-3.0: ICD-10-CM

## 2022-01-11 DIAGNOSIS — Z95.3 S/P MITRAL VALVE REPLACEMENT WITH BIOPROSTHETIC VALVE: ICD-10-CM

## 2022-01-11 DIAGNOSIS — I48.92 ATRIAL FIBRILLATION AND FLUTTER (H): ICD-10-CM

## 2022-01-11 DIAGNOSIS — I27.20 PULMONARY HTN (H): ICD-10-CM

## 2022-01-11 DIAGNOSIS — R06.09 DYSPNEA ON EXERTION: ICD-10-CM

## 2022-01-11 DIAGNOSIS — I10 BENIGN ESSENTIAL HYPERTENSION: ICD-10-CM

## 2022-01-11 DIAGNOSIS — I48.20 CHRONIC ATRIAL FIBRILLATION (H): ICD-10-CM

## 2022-01-11 DIAGNOSIS — Z79.01 LONG TERM CURRENT USE OF ANTICOAGULANTS WITH INR GOAL OF 2.0-3.0: Primary | ICD-10-CM

## 2022-01-11 LAB
HGB BLD-MCNC: 11.6 G/DL (ref 11.7–15.7)
INR BLD: 2.3 (ref 0.9–1.1)
MDC_IDC_EPISODE_DTM: NORMAL
MDC_IDC_EPISODE_DURATION: 1 S
MDC_IDC_EPISODE_DURATION: 104 S
MDC_IDC_EPISODE_DURATION: 107 S
MDC_IDC_EPISODE_DURATION: 108 S
MDC_IDC_EPISODE_DURATION: 116 S
MDC_IDC_EPISODE_DURATION: 121 S
MDC_IDC_EPISODE_DURATION: 122 S
MDC_IDC_EPISODE_DURATION: 123 S
MDC_IDC_EPISODE_DURATION: 133 S
MDC_IDC_EPISODE_DURATION: 140 S
MDC_IDC_EPISODE_DURATION: 145 S
MDC_IDC_EPISODE_DURATION: 159 S
MDC_IDC_EPISODE_DURATION: 160 S
MDC_IDC_EPISODE_DURATION: 162 S
MDC_IDC_EPISODE_DURATION: 178 S
MDC_IDC_EPISODE_DURATION: 181 S
MDC_IDC_EPISODE_DURATION: 199 S
MDC_IDC_EPISODE_DURATION: 201 S
MDC_IDC_EPISODE_DURATION: 205 S
MDC_IDC_EPISODE_DURATION: 21 S
MDC_IDC_EPISODE_DURATION: 211 S
MDC_IDC_EPISODE_DURATION: 25 S
MDC_IDC_EPISODE_DURATION: 280 S
MDC_IDC_EPISODE_DURATION: 30 S
MDC_IDC_EPISODE_DURATION: 32 S
MDC_IDC_EPISODE_DURATION: 333 S
MDC_IDC_EPISODE_DURATION: 35 S
MDC_IDC_EPISODE_DURATION: 36 S
MDC_IDC_EPISODE_DURATION: 37 S
MDC_IDC_EPISODE_DURATION: 38 S
MDC_IDC_EPISODE_DURATION: 4 S
MDC_IDC_EPISODE_DURATION: 42 S
MDC_IDC_EPISODE_DURATION: 42 S
MDC_IDC_EPISODE_DURATION: 44 S
MDC_IDC_EPISODE_DURATION: 45 S
MDC_IDC_EPISODE_DURATION: 47 S
MDC_IDC_EPISODE_DURATION: 47 S
MDC_IDC_EPISODE_DURATION: 48 S
MDC_IDC_EPISODE_DURATION: 48 S
MDC_IDC_EPISODE_DURATION: 51 S
MDC_IDC_EPISODE_DURATION: 54 S
MDC_IDC_EPISODE_DURATION: 56 S
MDC_IDC_EPISODE_DURATION: 58 S
MDC_IDC_EPISODE_DURATION: 65 S
MDC_IDC_EPISODE_DURATION: 65 S
MDC_IDC_EPISODE_DURATION: 77 S
MDC_IDC_EPISODE_DURATION: 79 S
MDC_IDC_EPISODE_DURATION: 79 S
MDC_IDC_EPISODE_DURATION: 80 S
MDC_IDC_EPISODE_DURATION: 80 S
MDC_IDC_EPISODE_DURATION: 87 S
MDC_IDC_EPISODE_ID: NORMAL
MDC_IDC_EPISODE_TYPE: NORMAL
MDC_IDC_LEAD_IMPLANT_DT: NORMAL
MDC_IDC_LEAD_IMPLANT_DT: NORMAL
MDC_IDC_LEAD_LOCATION: NORMAL
MDC_IDC_LEAD_LOCATION: NORMAL
MDC_IDC_LEAD_MFG: NORMAL
MDC_IDC_LEAD_MFG: NORMAL
MDC_IDC_LEAD_MODEL: NORMAL
MDC_IDC_LEAD_MODEL: NORMAL
MDC_IDC_LEAD_POLARITY_TYPE: NORMAL
MDC_IDC_LEAD_POLARITY_TYPE: NORMAL
MDC_IDC_LEAD_SERIAL: NORMAL
MDC_IDC_LEAD_SERIAL: NORMAL
MDC_IDC_MSMT_BATTERY_DTM: NORMAL
MDC_IDC_MSMT_BATTERY_REMAINING_LONGEVITY: 137 MO
MDC_IDC_MSMT_BATTERY_RRT_TRIGGER: 2.62
MDC_IDC_MSMT_BATTERY_STATUS: NORMAL
MDC_IDC_MSMT_BATTERY_VOLTAGE: 3.08 V
MDC_IDC_MSMT_LEADCHNL_RA_IMPEDANCE_VALUE: 342 OHM
MDC_IDC_MSMT_LEADCHNL_RA_IMPEDANCE_VALUE: 456 OHM
MDC_IDC_MSMT_LEADCHNL_RA_PACING_THRESHOLD_AMPLITUDE: 0.62 V
MDC_IDC_MSMT_LEADCHNL_RA_PACING_THRESHOLD_PULSEWIDTH: 0.4 MS
MDC_IDC_MSMT_LEADCHNL_RA_SENSING_INTR_AMPL: 0.88 MV
MDC_IDC_MSMT_LEADCHNL_RA_SENSING_INTR_AMPL: 0.88 MV
MDC_IDC_MSMT_LEADCHNL_RV_IMPEDANCE_VALUE: 380 OHM
MDC_IDC_MSMT_LEADCHNL_RV_IMPEDANCE_VALUE: 418 OHM
MDC_IDC_MSMT_LEADCHNL_RV_PACING_THRESHOLD_AMPLITUDE: 0.75 V
MDC_IDC_MSMT_LEADCHNL_RV_PACING_THRESHOLD_PULSEWIDTH: 0.4 MS
MDC_IDC_MSMT_LEADCHNL_RV_SENSING_INTR_AMPL: 19.25 MV
MDC_IDC_MSMT_LEADCHNL_RV_SENSING_INTR_AMPL: 19.25 MV
MDC_IDC_PG_IMPLANT_DTM: NORMAL
MDC_IDC_PG_MFG: NORMAL
MDC_IDC_PG_MODEL: NORMAL
MDC_IDC_PG_SERIAL: NORMAL
MDC_IDC_PG_TYPE: NORMAL
MDC_IDC_SESS_CLINIC_NAME: NORMAL
MDC_IDC_SESS_DTM: NORMAL
MDC_IDC_SESS_TYPE: NORMAL
MDC_IDC_SET_BRADY_AT_MODE_SWITCH_RATE: 171 {BEATS}/MIN
MDC_IDC_SET_BRADY_HYSTRATE: NORMAL
MDC_IDC_SET_BRADY_LOWRATE: 60 {BEATS}/MIN
MDC_IDC_SET_BRADY_MAX_SENSOR_RATE: 130 {BEATS}/MIN
MDC_IDC_SET_BRADY_MAX_TRACKING_RATE: 130 {BEATS}/MIN
MDC_IDC_SET_BRADY_MODE: NORMAL
MDC_IDC_SET_BRADY_PAV_DELAY_LOW: 180 MS
MDC_IDC_SET_BRADY_SAV_DELAY_LOW: 150 MS
MDC_IDC_SET_LEADCHNL_RA_PACING_AMPLITUDE: 1.5 V
MDC_IDC_SET_LEADCHNL_RA_PACING_ANODE_ELECTRODE_1: NORMAL
MDC_IDC_SET_LEADCHNL_RA_PACING_ANODE_LOCATION_1: NORMAL
MDC_IDC_SET_LEADCHNL_RA_PACING_CAPTURE_MODE: NORMAL
MDC_IDC_SET_LEADCHNL_RA_PACING_CATHODE_ELECTRODE_1: NORMAL
MDC_IDC_SET_LEADCHNL_RA_PACING_CATHODE_LOCATION_1: NORMAL
MDC_IDC_SET_LEADCHNL_RA_PACING_POLARITY: NORMAL
MDC_IDC_SET_LEADCHNL_RA_PACING_PULSEWIDTH: 0.4 MS
MDC_IDC_SET_LEADCHNL_RA_SENSING_ANODE_ELECTRODE_1: NORMAL
MDC_IDC_SET_LEADCHNL_RA_SENSING_ANODE_LOCATION_1: NORMAL
MDC_IDC_SET_LEADCHNL_RA_SENSING_CATHODE_ELECTRODE_1: NORMAL
MDC_IDC_SET_LEADCHNL_RA_SENSING_CATHODE_LOCATION_1: NORMAL
MDC_IDC_SET_LEADCHNL_RA_SENSING_POLARITY: NORMAL
MDC_IDC_SET_LEADCHNL_RA_SENSING_SENSITIVITY: 0.45 MV
MDC_IDC_SET_LEADCHNL_RV_PACING_AMPLITUDE: 2 V
MDC_IDC_SET_LEADCHNL_RV_PACING_ANODE_ELECTRODE_1: NORMAL
MDC_IDC_SET_LEADCHNL_RV_PACING_ANODE_LOCATION_1: NORMAL
MDC_IDC_SET_LEADCHNL_RV_PACING_CAPTURE_MODE: NORMAL
MDC_IDC_SET_LEADCHNL_RV_PACING_CATHODE_ELECTRODE_1: NORMAL
MDC_IDC_SET_LEADCHNL_RV_PACING_CATHODE_LOCATION_1: NORMAL
MDC_IDC_SET_LEADCHNL_RV_PACING_POLARITY: NORMAL
MDC_IDC_SET_LEADCHNL_RV_PACING_PULSEWIDTH: 0.4 MS
MDC_IDC_SET_LEADCHNL_RV_SENSING_ANODE_ELECTRODE_1: NORMAL
MDC_IDC_SET_LEADCHNL_RV_SENSING_ANODE_LOCATION_1: NORMAL
MDC_IDC_SET_LEADCHNL_RV_SENSING_CATHODE_ELECTRODE_1: NORMAL
MDC_IDC_SET_LEADCHNL_RV_SENSING_CATHODE_LOCATION_1: NORMAL
MDC_IDC_SET_LEADCHNL_RV_SENSING_POLARITY: NORMAL
MDC_IDC_SET_LEADCHNL_RV_SENSING_SENSITIVITY: 0.9 MV
MDC_IDC_SET_ZONE_DETECTION_INTERVAL: 350 MS
MDC_IDC_SET_ZONE_DETECTION_INTERVAL: 400 MS
MDC_IDC_SET_ZONE_TYPE: NORMAL
MDC_IDC_STAT_AT_BURDEN_PERCENT: 62.4 %
MDC_IDC_STAT_AT_DTM_END: NORMAL
MDC_IDC_STAT_AT_DTM_START: NORMAL
MDC_IDC_STAT_BRADY_AP_VP_PERCENT: 64.45 %
MDC_IDC_STAT_BRADY_AP_VS_PERCENT: 0.01 %
MDC_IDC_STAT_BRADY_AS_VP_PERCENT: 35.22 %
MDC_IDC_STAT_BRADY_AS_VS_PERCENT: 0.37 %
MDC_IDC_STAT_BRADY_DTM_END: NORMAL
MDC_IDC_STAT_BRADY_DTM_START: NORMAL
MDC_IDC_STAT_BRADY_RA_PERCENT_PACED: 39.92 %
MDC_IDC_STAT_BRADY_RV_PERCENT_PACED: 99.55 %
MDC_IDC_STAT_EPISODE_RECENT_COUNT: 0
MDC_IDC_STAT_EPISODE_RECENT_COUNT: 1
MDC_IDC_STAT_EPISODE_RECENT_COUNT: NORMAL
MDC_IDC_STAT_EPISODE_RECENT_COUNT_DTM_END: NORMAL
MDC_IDC_STAT_EPISODE_RECENT_COUNT_DTM_START: NORMAL
MDC_IDC_STAT_EPISODE_TOTAL_COUNT: 0
MDC_IDC_STAT_EPISODE_TOTAL_COUNT: 2
MDC_IDC_STAT_EPISODE_TOTAL_COUNT: NORMAL
MDC_IDC_STAT_EPISODE_TOTAL_COUNT_DTM_END: NORMAL
MDC_IDC_STAT_EPISODE_TOTAL_COUNT_DTM_START: NORMAL
MDC_IDC_STAT_EPISODE_TYPE: NORMAL
NT-PROBNP SERPL-MCNC: 981 PG/ML (ref 0–450)

## 2022-01-11 PROCEDURE — 83880 ASSAY OF NATRIURETIC PEPTIDE: CPT

## 2022-01-11 PROCEDURE — 85610 PROTHROMBIN TIME: CPT

## 2022-01-11 PROCEDURE — 36416 COLLJ CAPILLARY BLOOD SPEC: CPT

## 2022-01-11 PROCEDURE — 85018 HEMOGLOBIN: CPT

## 2022-01-11 PROCEDURE — 80048 BASIC METABOLIC PNL TOTAL CA: CPT

## 2022-01-11 PROCEDURE — 36415 COLL VENOUS BLD VENIPUNCTURE: CPT

## 2022-01-11 NOTE — PROGRESS NOTES
ANTICOAGULATION MANAGEMENT     Zunilda Alicea May 81 year old female is on warfarin with therapeutic INR result. (Goal INR 2.0-3.0)    Recent labs: (last 7 days)     01/11/22  1008   INR 2.3*       ASSESSMENT     Source(s): Chart Review and Patient/Caregiver Call       Warfarin doses taken: Warfarin taken as instructed    Diet: No new diet changes identified    New illness, injury, or hospitalization: No    Medication/supplement changes: None noted    Signs or symptoms of bleeding or clotting: No    Previous INR: Therapeutic last 2(+) visits    Additional findings: None     PLAN     Recommended plan for no diet, medication or health factor changes affecting INR     Dosing Instructions: Continue your current warfarin dose with next INR in 4 weeks       Summary  As of 1/11/2022    Full warfarin instructions:  5 mg every Tue, Fri; 2.5 mg all other days   Next INR check:  2/9/2022             Telephone call with Zunilda who verbalizes understanding and agrees to plan    Lab visit scheduled    Education provided: Please call back if any changes to your diet, medications or how you've been taking warfarin and Contact [phone]  with any changes, questions or concerns.     Plan made per [facility] anticoagulation protocol    Екатерина Mahan RN  Anticoagulation Clinic  1/11/2022    _______________________________________________________________________     Anticoagulation Episode Summary     Current INR goal:  2.0-3.0   TTR:  82.4 % (1 y)   Target end date:  Indefinite   Send INR reminders to:  [facility]    Indications    Long term current use of anticoagulants with INR goal of 2.0-3.0 [Z79.01]  Atrial fibrillation and flutter (H) [I48.91  I48.92]  S/P mitral valve replacement with bioprosthetic valve [Z95.3]           Comments:  likes printed AVS         Anticoagulation Care Providers     Provider Role Specialty Phone number    Yunior Huang MD Referring Internal Medicine [phone]

## 2022-01-12 LAB
ANION GAP SERPL CALCULATED.3IONS-SCNC: 4 MMOL/L (ref 3–14)
BUN SERPL-MCNC: 25 MG/DL (ref 7–30)
CALCIUM SERPL-MCNC: 9.6 MG/DL (ref 8.5–10.1)
CHLORIDE BLD-SCNC: 106 MMOL/L (ref 94–109)
CO2 SERPL-SCNC: 29 MMOL/L (ref 20–32)
CREAT SERPL-MCNC: 0.86 MG/DL (ref 0.52–1.04)
GFR SERPL CREATININE-BSD FRML MDRD: 68 ML/MIN/1.73M2
GLUCOSE BLD-MCNC: 87 MG/DL (ref 70–99)
POTASSIUM BLD-SCNC: 3.7 MMOL/L (ref 3.4–5.3)
SODIUM SERPL-SCNC: 139 MMOL/L (ref 133–144)

## 2022-01-14 ENCOUNTER — HOSPITAL ENCOUNTER (OUTPATIENT)
Dept: CARDIOLOGY | Facility: CLINIC | Age: 82
End: 2022-01-14
Attending: PHYSICIAN ASSISTANT
Payer: MEDICARE

## 2022-01-14 ENCOUNTER — HOSPITAL ENCOUNTER (OUTPATIENT)
Dept: NUCLEAR MEDICINE | Facility: CLINIC | Age: 82
Setting detail: NUCLEAR MEDICINE
End: 2022-01-14
Attending: PHYSICIAN ASSISTANT
Payer: MEDICARE

## 2022-01-14 DIAGNOSIS — I27.20 PULMONARY HTN (H): ICD-10-CM

## 2022-01-14 DIAGNOSIS — I10 BENIGN ESSENTIAL HYPERTENSION: ICD-10-CM

## 2022-01-14 DIAGNOSIS — I48.20 CHRONIC ATRIAL FIBRILLATION (H): ICD-10-CM

## 2022-01-14 LAB
CV STRESS MAX HR HE: 73
RATE PRESSURE PRODUCT: 8249
STRESS ECHO BASELINE DIASTOLIC HE: 70
STRESS ECHO BASELINE HR: 63 BPM
STRESS ECHO BASELINE SYSTOLIC BP: 145
STRESS ECHO CALCULATED PERCENT HR: 52 %
STRESS ECHO LAST STRESS DIASTOLIC BP: 57
STRESS ECHO LAST STRESS SYSTOLIC BP: 113
STRESS ECHO TARGET HR: 140

## 2022-01-14 PROCEDURE — G1004 CDSM NDSC: HCPCS

## 2022-01-14 PROCEDURE — 250N000011 HC RX IP 250 OP 636

## 2022-01-14 PROCEDURE — 93016 CV STRESS TEST SUPVJ ONLY: CPT | Performed by: INTERNAL MEDICINE

## 2022-01-14 PROCEDURE — G1004 CDSM NDSC: HCPCS | Performed by: INTERNAL MEDICINE

## 2022-01-14 PROCEDURE — 343N000001 HC RX 343: Performed by: PHYSICIAN ASSISTANT

## 2022-01-14 PROCEDURE — 93017 CV STRESS TEST TRACING ONLY: CPT

## 2022-01-14 PROCEDURE — A9502 TC99M TETROFOSMIN: HCPCS | Performed by: PHYSICIAN ASSISTANT

## 2022-01-14 PROCEDURE — 78452 HT MUSCLE IMAGE SPECT MULT: CPT | Mod: 26 | Performed by: INTERNAL MEDICINE

## 2022-01-14 PROCEDURE — 93018 CV STRESS TEST I&R ONLY: CPT | Mod: MG | Performed by: INTERNAL MEDICINE

## 2022-01-14 RX ORDER — ACYCLOVIR 200 MG/1
0-1 CAPSULE ORAL
Status: DISCONTINUED | OUTPATIENT
Start: 2022-01-14 | End: 2022-01-15 | Stop reason: HOSPADM

## 2022-01-14 RX ORDER — AMINOPHYLLINE 25 MG/ML
50-100 INJECTION, SOLUTION INTRAVENOUS
Status: DISCONTINUED | OUTPATIENT
Start: 2022-01-14 | End: 2022-01-15 | Stop reason: HOSPADM

## 2022-01-14 RX ORDER — CAFFEINE CITRATE 20 MG/ML
60 SOLUTION INTRAVENOUS
Status: DISCONTINUED | OUTPATIENT
Start: 2022-01-14 | End: 2022-01-15 | Stop reason: HOSPADM

## 2022-01-14 RX ORDER — ALBUTEROL SULFATE 90 UG/1
2 AEROSOL, METERED RESPIRATORY (INHALATION) EVERY 5 MIN PRN
Status: DISCONTINUED | OUTPATIENT
Start: 2022-01-14 | End: 2022-01-15 | Stop reason: HOSPADM

## 2022-01-14 RX ORDER — REGADENOSON 0.08 MG/ML
0.4 INJECTION, SOLUTION INTRAVENOUS ONCE
Status: DISCONTINUED | OUTPATIENT
Start: 2022-01-14 | End: 2022-01-15 | Stop reason: HOSPADM

## 2022-01-14 RX ORDER — REGADENOSON 0.08 MG/ML
INJECTION, SOLUTION INTRAVENOUS
Status: COMPLETED
Start: 2022-01-14 | End: 2022-01-14

## 2022-01-14 RX ADMIN — REGADENOSON 0.4 MG: 0.08 INJECTION, SOLUTION INTRAVENOUS at 12:36

## 2022-01-14 RX ADMIN — TETROFOSMIN 11 MCI.: 1.38 INJECTION, POWDER, LYOPHILIZED, FOR SOLUTION INTRAVENOUS at 11:10

## 2022-01-14 RX ADMIN — TETROFOSMIN 31.5 MCI.: 1.38 INJECTION, POWDER, LYOPHILIZED, FOR SOLUTION INTRAVENOUS at 12:35

## 2022-01-18 ENCOUNTER — TELEPHONE (OUTPATIENT)
Dept: CARDIOLOGY | Facility: CLINIC | Age: 82
End: 2022-01-18
Payer: MEDICARE

## 2022-01-18 ENCOUNTER — TRANSFERRED RECORDS (OUTPATIENT)
Dept: HEALTH INFORMATION MANAGEMENT | Facility: CLINIC | Age: 82
End: 2022-01-18
Payer: MEDICARE

## 2022-01-18 LAB
ALT SERPL-CCNC: 18 IU/L (ref 5–35)
AST SERPL-CCNC: 31 U/L (ref 5–34)
CREATININE (EXTERNAL): 0.75 MG/DL (ref 0.5–1.3)

## 2022-01-18 NOTE — TELEPHONE ENCOUNTER
----- Message from YOKASTA Salcido sent at 1/18/2022  3:47 PM CST -----  Regarding: stress test  Can you call Deanne and let her know that her stress test was ok?  We have a virtual visit but it isn't for a few weeks so wanted to let her know. I didn't make any other changes so if she would like to cancel that visit I'm ok with it, unless she would like to discuss further and then we can keep it.    Otherwise 6 month follow up as planned.    Thanks  Zena  ================    Called to patient. Reviewed Lexiscan and lab results.  Reviewed PRESTON Zena note with rationale for testing at request of patient. Patient reports her  passed away two years ago. Since then Her sister has been has assisted in hearing planning and explanation of her care. Deanne would prefer to keep the visit on 1/31 so they can both hear results and have a chance to ask questions to the provider. Update to Zena that patient will keep visit.  Maranda Fan RN 01/18/22 3:57 PM    Component      Latest Ref Rng & Units 1/11/2022   Sodium      133 - 144 mmol/L 139   Potassium      3.4 - 5.3 mmol/L 3.7   Chloride      94 - 109 mmol/L 106   Carbon Dioxide      20 - 32 mmol/L 29   Anion Gap      3 - 14 mmol/L 4   Urea Nitrogen      7 - 30 mg/dL 25   Creatinine      0.52 - 1.04 mg/dL 0.86   Calcium      8.5 - 10.1 mg/dL 9.6   Glucose      70 - 99 mg/dL 87   GFR Estimate      >60 mL/min/1.73m2 68   N-Terminal Pro Bnp      0 - 450 pg/mL 981 (H)   Hemoglobin      11.7 - 15.7 g/dL 11.6 (L)

## 2022-01-28 ENCOUNTER — TELEPHONE (OUTPATIENT)
Dept: INTERNAL MEDICINE | Facility: CLINIC | Age: 82
End: 2022-01-28
Payer: MEDICARE

## 2022-01-28 DIAGNOSIS — S42.294S OTHER CLOSED NONDISPLACED FRACTURE OF PROXIMAL END OF RIGHT HUMERUS, SEQUELA: Primary | ICD-10-CM

## 2022-01-28 DIAGNOSIS — M81.0 AGE-RELATED OSTEOPOROSIS WITHOUT CURRENT PATHOLOGICAL FRACTURE: ICD-10-CM

## 2022-01-28 NOTE — TELEPHONE ENCOUNTER
Patient calling states she is due for her bone density scan and wants provider to order this for her. Patient reports she gets this done every 2 years and patient reports her rheumatologist told her to ask Dr. Huang to order this as Dr. Huang previously ordered this.     Last bone density scan was on 3/4/2020.     Chantel MURDOCK RN   [facility]

## 2022-01-30 NOTE — PROGRESS NOTES
CARDIOLOGY CLINIC TELEPHONE VISIT    Date of telephone visit: 01/31/22    Zunilda Clark is a 81 year old female who is being evaluated via a billable telephone visit.      I have reviewed and updated the patient's Past Medical History, Social History, Family History and Medication List.      MEDICATIONS:  Current Outpatient Medications   Medication Sig Dispense Refill     ALLOPURINOL PO Take 100 mg by mouth 2 times daily       amLODIPine (NORVASC) 2.5 MG tablet Take 1 tablet (2.5 mg) by mouth daily 90 tablet 3     amLODIPine-valsartan (EXFORGE) 5-160 MG tablet Take 1 tablet by mouth daily 90 tablet 3     calcium citrate (CALCITRATE) 950 MG tablet Take 1 tablet by mouth daily        famotidine (PEPCID) 40 MG tablet Take 1 tablet (40 mg) by mouth daily 90 tablet 3     folic acid (FOLVITE) 1 MG tablet Take 1 mg by mouth daily       latanoprostene bunod (VYZULTA) 0.024 % SOLN ophthalmic solution Place 1 drop Into the left eye At Bedtime       levothyroxine (SYNTHROID/LEVOTHROID) 112 MCG tablet TAKE ONE TABLET BY MOUTH ONE TIME DAILY  90 tablet 1     METHOTREXATE SODIUM IJ Inject 0.8 mLs as directed once a week Thursdays       metoprolol tartrate (LOPRESSOR) 50 MG tablet Take 1 tablet (50 mg) by mouth 2 times daily 180 tablet 3     multivitamin, therapeutic with minerals (MULTI-VITAMIN) TABS tablet Take 1 tablet by mouth daily Without iron       TIMOLOL MALEATE OP        Timolol-Dorzolamid-Latanoprost 0.5-0.15-0.005 % SOLN        torsemide (DEMADEX) 10 MG tablet Take 1 tablet (10 mg) by mouth daily (with breakfast) 90 tablet 3     VITAMIN D, CHOLECALCIFEROL, PO Take 1,000 Units by mouth daily       warfarin ANTICOAGULANT (COUMADIN) 2.5 MG tablet Take one tablet (2.5 mg) my mouth daily except take 2 tablets (5 mg) Tue and Fri or as directed by INR clinic 110 tablet 1     loperamide (IMODIUM A-D) 2 MG tablet Take 1 tablet (2 mg) by mouth 4 times daily as needed for diarrhea (Patient not taking: Reported on  "1/31/2022) 30 tablet 1       ALLERGIES  Patient has no known allergies.      Self reported vitals:  Weight: 147 lbs - taken in the last week  BP: N/A  HR: N/A    Review Of Systems  Skin: Negative  Eyes: Positive for glasses; cataract extraction of both eyes; possible glaucoma  Ears/Nose/Throat: Negative  Respiratory: Negative  Cardiovascular: Positive for off and on fatigue; possible edema  Gastrointestinal: Negative  Genitourinary: Positive for incontinence   Musculoskeletal: Positive for arthritis  - fingers  Neurologic: Positive for tingling/numbness in feet  Psychiatric: Negative  Hematologic/Lymphatic/Immunologic: Negative  Endocrine: Positive for thyroid disorder      Primary cardiologist: Dr. Noe Rodriguez      HPI:  Ms. 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 However, she has chosen to proceed conservatively.      Ms. Clark was seen last by Dr. Rodriguez in July of this year. At that time, she was stable from a cardiovascular standpoint. Her NT-proBNP was elevated but she was feeling well with stable weights and no signs of decompensated heart failure. No medication changes were made at that time and she was advised to follow up with us in 6 months with a repeat echocardiogram.    I met with Deanne in clinic earlier this month and she reported continuing to feel well with no new symptoms " concerning for heart failure. Her echo showed EF preserved at 55-60%, though there was mention of a new WMA with severe hypokinesis of the basil inferior wall. Her RV pressures remain severely elevated with 2-3+ TR which appears unchanged from prior, along with moderate valvular aortic stenosis. Her bioprosthetic MV gradients were normal. She had denied any new ischemic symptoms as well, though after discussion with Dr. Rodriguez, Lexiscan was recommended.    Today, we are doing a telephone visit to follow up. Her Lexiscan was read as probably negative for inducible myocardial ischemia or infarction. There was a small perfusion defect but this was fixed and felt likely to be breast artifact. She tells me today that she has not had any chest pain, palpitations, or dizziness. She did have a little ankle swelling over the weekend, but upon questioning, did have a high sodium meal of roast beef earlier that day. She has not had any worsening MORRISON or orthopnea.       There were no new labs performed prior to our visit today. Most recent labs reviewed as below.        ASSESSMENT/PLAN:     1. Pulmonary hypertension.              --Group 2 PH in the setting of Rheumatic valve disease. Overall, pulmonary vasodilators are not felt to be indicated and she has chosen to proceed conservatively with avoiding invasive procedures when possible.  Recent echo showed continued preserved EF at 55-60%, though there is mention of a new WMA with severe hypokinesis of the basil inferior wall. Her RV pressures remain severely elevated with 2-3+ TR which appears unchanged from prior, along with moderate valvular aortic stenosis. Her RV function was unable to be assessed due to poor image quality. She went on to have a Lexiscan last week which was read as probably negative, and while she did have a small perfusion defect, this was fixed and felt likely to be breast artifact. She tells me today that she remains free of chest pain and overall  clinically is stable.    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 Per Dr. Monroy's previous recommendations, if her TR becomes more severe or she starts to develop RV dysfunction, she could be considered for redo tricuspid valve repair/replacement through a right-sided mini thoracotomy. Today, as mentioned, she is feeling well.               --Will continue to focus on BP and volume control. She remains on torsemide 10mg daily with preserved renal function. I did tell her she may take 20mg daily on the day she notes some edema.      2. Rheumatic heart disease.                          --History of bioprosthetic mitral valve replacement in 2007, redo in 2014 with a 27 mm Magna bioprosthetic mitral valve.  Echo from a few weeks ago showed normal MV gradients.      3. High degree AV block.              --Post surgery 2007, s/p dual-chamber pacemaker placement.  Last device check Dec 2021. AP 40%,  >99%. 11 years of battery life. She did have over 10,000 mode switches (62%), with controlled ventricular rates. She had 1ventricular high rate logged. EGM shows Vs>As for NSVT lasting 11 beats, rates 110-210bpm. She has not had any dizziness/presyncope.               --She remains anticoagulated with warfarin, and follows with the INR clinic.      4. Hypertension.              --BP has recently been under good control. I continued her medications unchanged. She is on amlodipine/valsartan, with additional 2.5mg amlodipine, and metoprolol tartrate 50 mg BID, along with torsemide as above.       Follow up plan:  Return in 6 months with Dr. Rodriguez in clinic. I asked her to call earlier with any new concerns.       Testing/labs:      Most recent labs:   Results for ISRAEL LEWIS (MRN [id number]) as of 1/30/2022 13:35   Ref. Range 1/11/2022 10:09   Sodium Latest Ref Range: 133 - 144 mmol/L 139   Potassium Latest Ref Range: 3.4 - 5.3 mmol/L 3.7   Chloride Latest  Ref Range: 94 - 109 mmol/L 106   Carbon Dioxide Latest Ref Range: 20 - 32 mmol/L 29   Urea Nitrogen Latest Ref Range: 7 - 30 mg/dL 25   Creatinine Latest Ref Range: 0.52 - 1.04 mg/dL 0.86   GFR Estimate Latest Ref Range: >60 mL/min/1.73m2 68   Calcium Latest Ref Range: 8.5 - 10.1 mg/dL 9.6   Anion Gap Latest Ref Range: 3 - 14 mmol/L 4   N-Terminal Pro Bnp Latest Ref Range: 0 - 450 pg/mL 981 (H)   Glucose Latest Ref Range: 70 - 99 mg/dL 87   Hemoglobin Latest Ref Range: 11.7 - 15.7 g/dL 11.6 (L)     Other recent pertinent testing:    Echo 1/7/2022  ______________________________________________________________________________  Interpretation Summary     The visual ejection fraction is 55-60%.  The basal inferior wall appears severely hypokinertic. This wall motion was  not seen on the previous study on direct comparison.  Right ventricular systolic pressure is elevated, consistent with severe  pulmonary hypertension.  There is moderate to mod-severe (2-3+) tricuspid regurgitation.  Moderate valvular aortic stenosis.  The ascending aorta is Mildly dilated.  There is a bioprosthetic mitral valve.  Normal prosthetic mitral valve gradients.  Apart from the new wall motion abnormality there has been no change from  before.. The study was technically difficult.    I have reviewed the note as documented above.  This accurately captures the substance of my conversation with the patient.    Lexiscan 1/14/2022  Result Text        The nuclear stress test is probably negative for inducible myocardial ischemia or infarction.     There is a small perfusion defect of mild severity involving the mid to apical anterior wall which is essentially fixed and more prominent on the resting images. These findings may be consistent with breast attenuation artifact.     Left ventricular function is normal.     The left ventricular ejection fraction at stress is greater than 70%.     TID is absent.     There is no prior study for  comparison.       Phone call contact time  Call Started at 1032  Call Ended at 1038    An additional 15 minutes was spent today performing chart and history review, pre and post visit documentation, review of recent testing, and care coordination.    Zena Benitez PA-C  [facility]  Pager [phone]

## 2022-01-31 ENCOUNTER — VIRTUAL VISIT (OUTPATIENT)
Dept: CARDIOLOGY | Facility: CLINIC | Age: 82
End: 2022-01-31
Attending: PHYSICIAN ASSISTANT
Payer: MEDICARE

## 2022-01-31 DIAGNOSIS — I27.20 PULMONARY HTN (H): ICD-10-CM

## 2022-01-31 DIAGNOSIS — I10 BENIGN ESSENTIAL HYPERTENSION: ICD-10-CM

## 2022-01-31 DIAGNOSIS — R06.09 DYSPNEA ON EXERTION: ICD-10-CM

## 2022-01-31 PROCEDURE — 99442 PR PHYSICIAN TELEPHONE EVALUATION 11-20 MIN: CPT | Mod: 95 | Performed by: PHYSICIAN ASSISTANT

## 2022-01-31 NOTE — LETTER
1/31/2022    Yunior Huang MD  [address] [facility] [address]    RE: Zunilda Clark       Dear Colleague,     I had the pleasure of seeing Zunilda Clark in the [facility].      CARDIOLOGY CLINIC TELEPHONE VISIT    Date of telephone visit: 01/31/22    Zunilda Clark is a 81 year old female who is being evaluated via a billable telephone visit.      I have reviewed and updated the patient's Past Medical History, Social History, Family History and Medication List.      MEDICATIONS:  Current Outpatient Medications   Medication Sig Dispense Refill     ALLOPURINOL PO Take 100 mg by mouth 2 times daily       amLODIPine (NORVASC) 2.5 MG tablet Take 1 tablet (2.5 mg) by mouth daily 90 tablet 3     amLODIPine-valsartan (EXFORGE) 5-160 MG tablet Take 1 tablet by mouth daily 90 tablet 3     calcium citrate (CALCITRATE) 950 MG tablet Take 1 tablet by mouth daily        famotidine (PEPCID) 40 MG tablet Take 1 tablet (40 mg) by mouth daily 90 tablet 3     folic acid (FOLVITE) 1 MG tablet Take 1 mg by mouth daily       latanoprostene bunod (VYZULTA) 0.024 % SOLN ophthalmic solution Place 1 drop Into the left eye At Bedtime       levothyroxine (SYNTHROID/LEVOTHROID) 112 MCG tablet TAKE ONE TABLET BY MOUTH ONE TIME DAILY  90 tablet 1     METHOTREXATE SODIUM IJ Inject 0.8 mLs as directed once a week Thursdays       metoprolol tartrate (LOPRESSOR) 50 MG tablet Take 1 tablet (50 mg) by mouth 2 times daily 180 tablet 3     multivitamin, therapeutic with minerals (MULTI-VITAMIN) TABS tablet Take 1 tablet by mouth daily Without iron       TIMOLOL MALEATE OP        Timolol-Dorzolamid-Latanoprost 0.5-0.15-0.005 % SOLN        torsemide (DEMADEX) 10 MG tablet Take 1 tablet (10 mg) by mouth daily (with breakfast) 90 tablet 3     VITAMIN D, CHOLECALCIFEROL, PO Take 1,000 Units by mouth daily       warfarin ANTICOAGULANT (COUMADIN) 2.5 MG tablet Take one tablet (2.5 mg) my mouth daily except take 2  "tablets (5 mg) Tue and Fri or as directed by INR clinic 110 tablet 1     loperamide (IMODIUM A-D) 2 MG tablet Take 1 tablet (2 mg) by mouth 4 times daily as needed for diarrhea (Patient not taking: Reported on 1/31/2022) 30 tablet 1       ALLERGIES  Patient has no known allergies.      Self reported vitals:  Weight: 147 lbs - taken in the last week  BP: N/A  HR: N/A    Review Of Systems  Skin: Negative  Eyes: Positive for glasses; cataract extraction of both eyes; possible glaucoma  Ears/Nose/Throat: Negative  Respiratory: Negative  Cardiovascular: Positive for off and on fatigue; possible edema  Gastrointestinal: Negative  Genitourinary: Positive for incontinence   Musculoskeletal: Positive for arthritis  - fingers  Neurologic: Positive for tingling/numbness in feet  Psychiatric: Negative  Hematologic/Lymphatic/Immunologic: Negative  Endocrine: Positive for thyroid disorder      Primary cardiologist: Dr. Noe Rodriguez      HPI:  Ms. 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 However, she has chosen to proceed conservatively.      Ms. Clark was seen last by Dr. Rodriguez in July of this year. At that time, she was stable from a cardiovascular standpoint. Her NT-proBNP was elevated but she was feeling well with stable weights and no signs of decompensated heart failure. No medication changes were made at " that time and she was advised to follow up with us in 6 months with a repeat echocardiogram.    I met with Deanne in clinic earlier this month and she reported continuing to feel well with no new symptoms concerning for heart failure. Her echo showed EF preserved at 55-60%, though there was mention of a new WMA with severe hypokinesis of the basil inferior wall. Her RV pressures remain severely elevated with 2-3+ TR which appears unchanged from prior, along with moderate valvular aortic stenosis. Her bioprosthetic MV gradients were normal. She had denied any new ischemic symptoms as well, though after discussion with Dr. Rodriguez, Lexiscan was recommended.    Today, we are doing a telephone visit to follow up. Her Lexiscan was read as probably negative for inducible myocardial ischemia or infarction. There was a small perfusion defect but this was fixed and felt likely to be breast artifact. She tells me today that she has not had any chest pain, palpitations, or dizziness. She did have a little ankle swelling over the weekend, but upon questioning, did have a high sodium meal of roast beef earlier that day. She has not had any worsening MORRISON or orthopnea.       There were no new labs performed prior to our visit today. Most recent labs reviewed as below.        ASSESSMENT/PLAN:     1. Pulmonary hypertension.              --Group 2 PH in the setting of Rheumatic valve disease. Overall, pulmonary vasodilators are not felt to be indicated and she has chosen to proceed conservatively with avoiding invasive procedures when possible.  Recent echo showed continued preserved EF at 55-60%, though there is mention of a new WMA with severe hypokinesis of the basil inferior wall. Her RV pressures remain severely elevated with 2-3+ TR which appears unchanged from prior, along with moderate valvular aortic stenosis. Her RV function was unable to be assessed due to poor image quality. She went on to have a Lexiscan last week which was  read as probably negative, and while she did have a small perfusion defect, this was fixed and felt likely to be breast artifact. She tells me today that she remains free of chest pain and overall clinically is stable.    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 Per Dr. Monroy's previous recommendations, if her TR becomes more severe or she starts to develop RV dysfunction, she could be considered for redo tricuspid valve repair/replacement through a right-sided mini thoracotomy. Today, as mentioned, she is feeling well.               --Will continue to focus on BP and volume control. She remains on torsemide 10mg daily with preserved renal function. I did tell her she may take 20mg daily on the day she notes some edema.      2. Rheumatic heart disease.                          --History of bioprosthetic mitral valve replacement in 2007, redo in 2014 with a 27 mm Magna bioprosthetic mitral valve.  Echo from a few weeks ago showed normal MV gradients.      3. High degree AV block.              --Post surgery 2007, s/p dual-chamber pacemaker placement.  Last device check Dec 2021. AP 40%,  >99%. 11 years of battery life. She did have over 10,000 mode switches (62%), with controlled ventricular rates. She had 1ventricular high rate logged. EGM shows Vs>As for NSVT lasting 11 beats, rates 110-210bpm. She has not had any dizziness/presyncope.               --She remains anticoagulated with warfarin, and follows with the INR clinic.      4. Hypertension.              --BP has recently been under good control. I continued her medications unchanged. She is on amlodipine/valsartan, with additional 2.5mg amlodipine, and metoprolol tartrate 50 mg BID, along with torsemide as above.       Follow up plan:  Return in 6 months with Dr. Rodriguez in clinic. I asked her to call earlier with any new concerns.       Testing/labs:      Most recent labs:   Results for  ISRAEL LEWIS (MRN [id number]) as of 1/30/2022 13:35   Ref. Range 1/11/2022 10:09   Sodium Latest Ref Range: 133 - 144 mmol/L 139   Potassium Latest Ref Range: 3.4 - 5.3 mmol/L 3.7   Chloride Latest Ref Range: 94 - 109 mmol/L 106   Carbon Dioxide Latest Ref Range: 20 - 32 mmol/L 29   Urea Nitrogen Latest Ref Range: 7 - 30 mg/dL 25   Creatinine Latest Ref Range: 0.52 - 1.04 mg/dL 0.86   GFR Estimate Latest Ref Range: >60 mL/min/1.73m2 68   Calcium Latest Ref Range: 8.5 - 10.1 mg/dL 9.6   Anion Gap Latest Ref Range: 3 - 14 mmol/L 4   N-Terminal Pro Bnp Latest Ref Range: 0 - 450 pg/mL 981 (H)   Glucose Latest Ref Range: 70 - 99 mg/dL 87   Hemoglobin Latest Ref Range: 11.7 - 15.7 g/dL 11.6 (L)     Other recent pertinent testing:    Echo 1/7/2022  ______________________________________________________________________________  Interpretation Summary     The visual ejection fraction is 55-60%.  The basal inferior wall appears severely hypokinertic. This wall motion was  not seen on the previous study on direct comparison.  Right ventricular systolic pressure is elevated, consistent with severe  pulmonary hypertension.  There is moderate to mod-severe (2-3+) tricuspid regurgitation.  Moderate valvular aortic stenosis.  The ascending aorta is Mildly dilated.  There is a bioprosthetic mitral valve.  Normal prosthetic mitral valve gradients.  Apart from the new wall motion abnormality there has been no change from  before.. The study was technically difficult.    I have reviewed the note as documented above.  This accurately captures the substance of my conversation with the patient.    Lexiscan 1/14/2022  Result Text        The nuclear stress test is probably negative for inducible myocardial ischemia or infarction.     There is a small perfusion defect of mild severity involving the mid to apical anterior wall which is essentially fixed and more prominent on the resting images. These findings may be consistent with breast  attenuation artifact.     Left ventricular function is normal.     The left ventricular ejection fraction at stress is greater than 70%.     TID is absent.     There is no prior study for comparison.       Phone call contact time  Call Started at 1032  Call Ended at 1038    An additional 15 minutes was spent today performing chart and history review, pre and post visit documentation, review of recent testing, and care coordination.    Zena Benitez PA-C  [facility]  Pager [phone]    Thank you for allowing me to participate in the care of your patient.      Sincerely,     YOKASTA Salcido     [facility]  cc:   YOKASTA Salcido  [facility]  [address] [place] [id number]

## 2022-01-31 NOTE — PATIENT INSTRUCTIONS
Visit Summary:    Today we discussed:   We reviewed the results of your Lexiscan. No intervention needed at this time.  Please call us with any new symptoms of worsening shortness of breath, chest discomfort, or dizziness.    You may take an additional torsemide pill (for a total of 20mg daily) on days you note some swelling in your legs.   Try to limit sodium in your diet, which should help this. Stay under 2000mg per day as a target.     Medication changes:    None    Follow up:   With Dr. Rodriguez in 6 months, or sooner if needed.       Please call my nurse Maranda at [phone] with any questions or concerns.

## 2022-02-09 ENCOUNTER — LAB (OUTPATIENT)
Dept: LAB | Facility: CLINIC | Age: 82
End: 2022-02-09
Payer: MEDICARE

## 2022-02-09 ENCOUNTER — ANTICOAGULATION THERAPY VISIT (OUTPATIENT)
Dept: ANTICOAGULATION | Facility: CLINIC | Age: 82
End: 2022-02-09

## 2022-02-09 ENCOUNTER — TRANSFERRED RECORDS (OUTPATIENT)
Dept: HEALTH INFORMATION MANAGEMENT | Facility: CLINIC | Age: 82
End: 2022-02-09

## 2022-02-09 DIAGNOSIS — Z95.3 S/P MITRAL VALVE REPLACEMENT WITH BIOPROSTHETIC VALVE: ICD-10-CM

## 2022-02-09 DIAGNOSIS — Z79.01 LONG TERM CURRENT USE OF ANTICOAGULANTS WITH INR GOAL OF 2.0-3.0: Primary | ICD-10-CM

## 2022-02-09 DIAGNOSIS — I48.92 ATRIAL FIBRILLATION AND FLUTTER (H): ICD-10-CM

## 2022-02-09 DIAGNOSIS — Z79.01 LONG TERM CURRENT USE OF ANTICOAGULANTS WITH INR GOAL OF 2.0-3.0: ICD-10-CM

## 2022-02-09 DIAGNOSIS — I48.91 ATRIAL FIBRILLATION AND FLUTTER (H): ICD-10-CM

## 2022-02-09 LAB — INR BLD: 2.3 (ref 0.9–1.1)

## 2022-02-09 PROCEDURE — 85610 PROTHROMBIN TIME: CPT

## 2022-02-09 PROCEDURE — 36416 COLLJ CAPILLARY BLOOD SPEC: CPT

## 2022-02-09 NOTE — PROGRESS NOTES
ANTICOAGULATION MANAGEMENT     Zunilda Alicea May 81 year old female is on warfarin with therapeutic INR result. (Goal INR 2.0-3.0)    Recent labs: (last 7 days)     02/09/22  0852   INR 2.3*       ASSESSMENT     Source(s): Chart Review and Patient/Caregiver Call       Warfarin doses taken: Warfarin taken as instructed    Diet: No new diet changes identified    New illness, injury, or hospitalization: No    Medication/supplement changes: None noted    Signs or symptoms of bleeding or clotting: No    Previous INR: Therapeutic last 2(+) visits    Additional findings: None     PLAN     Recommended plan for no diet, medication or health factor changes affecting INR     Dosing Instructions: Continue your current warfarin dose with next INR in 6 weeks       Summary  As of 2/9/2022    Full warfarin instructions:  5 mg every Tue, Fri; 2.5 mg all other days   Next INR check:  3/23/2022             Telephone call with Zunilda who agrees to plan and repeated back plan correctly    Lab visit scheduled    Education provided: Please call back if any changes to your diet, medications or how you've been taking warfarin    Plan made per [facility] anticoagulation protocol    Sonam Teixeira RN  Anticoagulation Clinic  2/9/2022    _______________________________________________________________________     Anticoagulation Episode Summary     Current INR goal:  2.0-3.0   TTR:  82.4 % (1 y)   Target end date:  Indefinite   Send INR reminders to:  [facility]    Indications    Long term current use of anticoagulants with INR goal of 2.0-3.0 [Z79.01]  Atrial fibrillation and flutter (H) [I48.91  I48.92]  S/P mitral valve replacement with bioprosthetic valve [Z95.3]           Comments:           Anticoagulation Care Providers     Provider Role Specialty Phone number    Yunior Huang MD Referring Internal Medicine [phone]

## 2022-02-11 ENCOUNTER — TRANSCRIBE ORDERS (OUTPATIENT)
Dept: OTHER | Age: 82
End: 2022-02-11
Payer: MEDICARE

## 2022-02-11 DIAGNOSIS — Z86.73 HISTORY OF STROKE: Primary | ICD-10-CM

## 2022-03-08 ENCOUNTER — OFFICE VISIT (OUTPATIENT)
Dept: INTERNAL MEDICINE | Facility: CLINIC | Age: 82
End: 2022-03-08
Payer: MEDICARE

## 2022-03-08 VITALS
BODY MASS INDEX: 25.1 KG/M2 | SYSTOLIC BLOOD PRESSURE: 134 MMHG | HEIGHT: 64 IN | HEART RATE: 91 BPM | TEMPERATURE: 98.1 F | WEIGHT: 147 LBS | OXYGEN SATURATION: 96 % | DIASTOLIC BLOOD PRESSURE: 73 MMHG

## 2022-03-08 DIAGNOSIS — H40.1130 PRIMARY OPEN ANGLE GLAUCOMA OF BOTH EYES, UNSPECIFIED GLAUCOMA STAGE: ICD-10-CM

## 2022-03-08 DIAGNOSIS — Z00.00 ENCOUNTER FOR PREVENTATIVE ADULT HEALTH CARE EXAMINATION: ICD-10-CM

## 2022-03-08 DIAGNOSIS — I10 BENIGN ESSENTIAL HYPERTENSION: Primary | ICD-10-CM

## 2022-03-08 DIAGNOSIS — I48.91 ATRIAL FIBRILLATION AND FLUTTER (H): ICD-10-CM

## 2022-03-08 DIAGNOSIS — E03.9 ACQUIRED HYPOTHYROIDISM: ICD-10-CM

## 2022-03-08 DIAGNOSIS — I48.92 ATRIAL FIBRILLATION AND FLUTTER (H): ICD-10-CM

## 2022-03-08 DIAGNOSIS — Z79.01 LONG TERM CURRENT USE OF ANTICOAGULANTS WITH INR GOAL OF 2.0-3.0: ICD-10-CM

## 2022-03-08 PROCEDURE — 99214 OFFICE O/P EST MOD 30 MIN: CPT | Performed by: INTERNAL MEDICINE

## 2022-03-08 RX ORDER — WARFARIN SODIUM 2.5 MG/1
TABLET ORAL
Qty: 110 TABLET | Refills: 1 | Status: SHIPPED | OUTPATIENT
Start: 2022-03-08 | End: 2022-08-10

## 2022-03-08 RX ORDER — LEVOTHYROXINE SODIUM 112 UG/1
112 TABLET ORAL DAILY
Qty: 90 TABLET | Refills: 3 | Status: SHIPPED | OUTPATIENT
Start: 2022-03-08 | End: 2023-01-05

## 2022-03-08 NOTE — PROGRESS NOTES
"  Assessment & Plan     Acquired hypothyroidism  Assess thyroid function , cont treatmen t  - levothyroxine (SYNTHROID/LEVOTHROID) 112 MCG tablet; Take 1 tablet (112 mcg) by mouth daily  - TSH with free T4 reflex; Future    Long term current use of anticoagulants with INR goal of 2.0-3.0    - warfarin ANTICOAGULANT (COUMADIN) 2.5 MG tablet; Take one tablet (2.5 mg) my mouth daily except take 2 tablets (5 mg) Tue and Fri or as directed by INR clinic    Atrial fibrillation and flutter (H)  Controlled, on AC   - warfarin ANTICOAGULANT (COUMADIN) 2.5 MG tablet; Take one tablet (2.5 mg) my mouth daily except take 2 tablets (5 mg) Tue and Fri or as directed by INR clinic    Benign essential hypertension  Controlled, cont treatment   - Lipid panel reflex to direct LDL Fasting; Future    Encounter for preventative adult health care examination  Assess COVID immunity   - COVID-19 Can RBD Swati & Titer Reflex; Future    Primary open angle glaucoma of both eyes, unspecified glaucoma stage  Follow up with ophthalmology                BMI:   Estimated body mass index is 25.23 kg/m  as calculated from the following:    Height as of this encounter: 1.626 m (5' 4\").    Weight as of this encounter: 66.7 kg (147 lb).       See Patient Instructions    Return in about 6 months (around 9/8/2022) for Routine Visit.    Yunior Huang MD  [facility] 81 year [facility] who presents for the following health issues     HPI       Patient is seen for a follow up visit.  Patient has h/o glaucoma, on treatment, follows with ophthalmology. Will be seeing [facility] specialist for assessment of the optic nerves. Possible MRI.   Eye pressure appears controlled.     Hypertension Follow-up  Has h/o HTN. on medical treatment. BP has been controlled. No side effects from medications. No CP, HA, dizziness. good compliance with medications and low salt diet.      Do you check your blood pressure regularly " "outside of the clinic? No     Are you following a low salt diet? Yes    Are your blood pressures ever more than 140 on the top number (systolic) OR more   than 90 on the bottom number (diastolic), for example 140/90? N/A    Has history of hypothyroidism. On replacement treatment with Synthroid. No heat /cold intolerance, heart palpitations, weight loss/ gain ,  change in bowel habits.  Has history of atrial fibrillation. On anticoagulation with Coumadin and rate control medications. Asymptonatic - no chest pains , palpitations,  no side effects from medications.      Review of Systems   Constitutional, HEENT, cardiovascular, pulmonary, gi and gu systems are negative, except as otherwise noted.      Objective    /73 (BP Location: Left arm, Patient Position: Sitting, Cuff Size: Adult Regular)   Pulse 91   Temp 98.1  F (36.7  C) (Tympanic)   Ht 1.626 m (5' 4\")   Wt 66.7 kg (147 lb)   LMP  (LMP Unknown)   SpO2 96%   BMI 25.23 kg/m    Body mass index is 25.23 kg/m .  Physical Exam   GENERAL: frail, alert and no distress  NECK: no adenopathy, no asymmetry, masses, or scars and thyroid normal to palpation  RESP: lungs clear to auscultation - no rales, rhonchi or wheezes  CV: regular rate and rhythm, normal S1 S2, no S3 or S4, 4/6 systolic murmur, no click or rub, no peripheral edema and peripheral pulses strong  ABDOMEN: soft, nontender, no hepatosplenomegaly, no masses and bowel sounds normal  MS: no gross musculoskeletal defects noted, no edema    Lab on 02/09/2022   Component Date Value Ref Range Status     INR 02/09/2022 2.3 (A) 0.9 - 1.1 Final               "

## 2022-03-17 ENCOUNTER — ANCILLARY PROCEDURE (OUTPATIENT)
Dept: CARDIOLOGY | Facility: CLINIC | Age: 82
End: 2022-03-17
Attending: INTERNAL MEDICINE
Payer: MEDICARE

## 2022-03-17 DIAGNOSIS — Z95.0 CARDIAC PACEMAKER IN SITU: ICD-10-CM

## 2022-03-17 PROCEDURE — 93294 REM INTERROG EVL PM/LDLS PM: CPT | Performed by: INTERNAL MEDICINE

## 2022-03-17 PROCEDURE — 93296 REM INTERROG EVL PM/IDS: CPT | Performed by: INTERNAL MEDICINE

## 2022-03-21 ENCOUNTER — TELEPHONE (OUTPATIENT)
Dept: INTERNAL MEDICINE | Facility: CLINIC | Age: 82
End: 2022-03-21

## 2022-03-21 NOTE — TELEPHONE ENCOUNTER
She can take OTC Robitussin ,OTC Claritin and OTC nasal spray - Flonase.   If increased productive cough, SOB, fever, to make an appointment.

## 2022-03-21 NOTE — TELEPHONE ENCOUNTER
Patient calls with an URI since Friday.  Patient reports negative COVID-19 home test.  Symptoms are runny nose, sound in chest some times when coughing or breathing.  Patient is requesting an RX or what she can take over the counter.  Patient is concerned what is safe for her to take with the multiple medications she takes.  Please advise.

## 2022-03-25 ENCOUNTER — ANCILLARY PROCEDURE (OUTPATIENT)
Dept: GENERAL RADIOLOGY | Facility: CLINIC | Age: 82
End: 2022-03-25
Attending: FAMILY MEDICINE
Payer: MEDICARE

## 2022-03-25 ENCOUNTER — OFFICE VISIT (OUTPATIENT)
Dept: URGENT CARE | Facility: URGENT CARE | Age: 82
End: 2022-03-25
Payer: MEDICARE

## 2022-03-25 VITALS
OXYGEN SATURATION: 99 % | DIASTOLIC BLOOD PRESSURE: 68 MMHG | HEART RATE: 77 BPM | SYSTOLIC BLOOD PRESSURE: 127 MMHG | TEMPERATURE: 98.7 F | WEIGHT: 147 LBS | BODY MASS INDEX: 25.23 KG/M2

## 2022-03-25 DIAGNOSIS — R05.8 PRODUCTIVE COUGH: Primary | ICD-10-CM

## 2022-03-25 DIAGNOSIS — I10 BENIGN ESSENTIAL HYPERTENSION: ICD-10-CM

## 2022-03-25 DIAGNOSIS — I27.20 PULMONARY HYPERTENSION (H): ICD-10-CM

## 2022-03-25 DIAGNOSIS — I08.0 RHEUMATIC DISORDER OF BOTH MITRAL AND AORTIC VALVES: ICD-10-CM

## 2022-03-25 DIAGNOSIS — R79.89 ELEVATED BRAIN NATRIURETIC PEPTIDE (BNP) LEVEL: ICD-10-CM

## 2022-03-25 DIAGNOSIS — R06.02 SOB (SHORTNESS OF BREATH): ICD-10-CM

## 2022-03-25 LAB
BASOPHILS # BLD AUTO: 0 10E3/UL (ref 0–0.2)
BASOPHILS NFR BLD AUTO: 0 %
EOSINOPHIL # BLD AUTO: 0.3 10E3/UL (ref 0–0.7)
EOSINOPHIL NFR BLD AUTO: 3 %
ERYTHROCYTE [DISTWIDTH] IN BLOOD BY AUTOMATED COUNT: 16.4 % (ref 10–15)
HCT VFR BLD AUTO: 35 % (ref 35–47)
HGB BLD-MCNC: 11.5 G/DL (ref 11.7–15.7)
LYMPHOCYTES # BLD AUTO: 0.4 10E3/UL (ref 0.8–5.3)
LYMPHOCYTES NFR BLD AUTO: 5 %
MCH RBC QN AUTO: 34.4 PG (ref 26.5–33)
MCHC RBC AUTO-ENTMCNC: 32.9 G/DL (ref 31.5–36.5)
MCV RBC AUTO: 105 FL (ref 78–100)
MONOCYTES # BLD AUTO: 1.2 10E3/UL (ref 0–1.3)
MONOCYTES NFR BLD AUTO: 14 %
NEUTROPHILS # BLD AUTO: 7 10E3/UL (ref 1.6–8.3)
NEUTROPHILS NFR BLD AUTO: 78 %
NT-PROBNP SERPL-MCNC: 1754 PG/ML (ref 0–450)
PLATELET # BLD AUTO: 301 10E3/UL (ref 150–450)
RBC # BLD AUTO: 3.34 10E6/UL (ref 3.8–5.2)
WBC # BLD AUTO: 9 10E3/UL (ref 4–11)

## 2022-03-25 PROCEDURE — 71046 X-RAY EXAM CHEST 2 VIEWS: CPT | Performed by: RADIOLOGY

## 2022-03-25 PROCEDURE — 85025 COMPLETE CBC W/AUTO DIFF WBC: CPT | Performed by: FAMILY MEDICINE

## 2022-03-25 PROCEDURE — 36415 COLL VENOUS BLD VENIPUNCTURE: CPT | Performed by: FAMILY MEDICINE

## 2022-03-25 PROCEDURE — 99214 OFFICE O/P EST MOD 30 MIN: CPT | Performed by: FAMILY MEDICINE

## 2022-03-25 PROCEDURE — 83880 ASSAY OF NATRIURETIC PEPTIDE: CPT | Performed by: FAMILY MEDICINE

## 2022-03-25 RX ORDER — BENZONATATE 100 MG/1
100 CAPSULE ORAL 3 TIMES DAILY PRN
Qty: 30 CAPSULE | Refills: 0 | Status: SHIPPED | OUTPATIENT
Start: 2022-03-25 | End: 2023-02-08

## 2022-03-25 RX ORDER — DOXYCYCLINE 100 MG/1
100 CAPSULE ORAL 2 TIMES DAILY
Qty: 14 CAPSULE | Refills: 0 | Status: SHIPPED | OUTPATIENT
Start: 2022-03-25 | End: 2022-04-01

## 2022-03-25 NOTE — PROGRESS NOTES
Chief Complaint   Patient presents with     Cough     since Friday worse last , coughing up phlem a little, Home test Covid on Friday     Nasal Congestion     Initial differential diagnosis included but was not restricted to   Differential Diagnosis:  URI Adult/Peds:  Asthma, Asthma exacerbation, Bronchitis-viral, Pneumonia, Viral pharyngitis, Viral syndrome, Viral upper respiratory illness and CHF   Medical Decision Making:    STEPHANIELEONARDO AND SINDI   Zunilda was seen today for cough and nasal congestion.    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  Called and Tessalon Perles to help with the cough.  Discussed with patient to start the diuretic.  Routine discharge counseling was given to the patient and the patient understands that worsening, changing or persistent symptoms should prompt an immediate call or follow up with their primary physician or the emergency department. The importance of appropriate follow up was also discussed with the patient.     I have reviewed the nursing notes.  Review of the result(s) of  each unique test   X-Ray was done, my findings are: No acute lung findings there was some opacity noted in the left lower lung which was noted before also.    Time  spent on the date of the encounter doing chart review, review of test results, interpretation of tests, patient visit and documentation   see orders in Epic  Pt verbalized and agreed with the plan and is aware of the worsening symptoms for which would need to follow up .  Pt was stable during time of discharge from the clinic     SUBJECTIVE     Zunilda Clark is a 81 year old female presenting with a chief complaint of    Chief Complaint   Patient presents with     Cough     since Friday worse last , coughing up phlem a little, Home test Covid on Friday     Nasal Congestion           Zunilda Clark is a 81 year old female history of aortic valve insufficiency, A. fib on warfarin, CHF history, history of mitral valve replacement presenting with a chief complaint of cough - productive. She is an established patient of [facility].  Stopped taking her diuretic for last 7 days.  She denies any chest pain  Onset of symptoms was 7 day(s) ago.  She has been noticing worsening shortness of breath mostly with laying down and has been using 2 pillows to help with the coughing symptoms.  This has been going on for last 7 days.  Denies any exertional chest symptoms.  Course of illness is worsening.    Severity moderate  Current and Associated symptoms: cough - productive and shortness of breath  Treatment measures tried include OTC Cough med.  Predisposing factors include recent illness recent illness     Past Medical History:   Diagnosis Date     Acquired hypothyroidism 11/4/2015     Aortic valve insufficiency      Arrhythmia     A fib     Arthritis      Atrial fibrillation (H)      Atrial fibrillation and flutter (H)      Blood clotting disorder (H)      CHF (congestive heart failure) (H)      DVT (deep venous thrombosis) (H) 2003     Gastro-oesophageal reflux  disease      H/O mitral valve replacement with tissue graft june 2014     History of blood transfusion 2014     HTN (hypertension)      Hypothyroidism      Other chronic pain      PONV (postoperative nausea and vomiting)      Pulmonary HTN (H)      Rheumatoid arthritis (H)      Rheumatoid arthritis(714.0)      Seizures (H) 2014     Shingles 08/2018     Stroke (H) 2009    left side mild weakness      Stroke (H) 2014     [place]ope 2011    [place]     Thrombosis of leg 2003    both legs     Current Outpatient Medications   Medication Sig Dispense Refill     ALLOPURINOL PO Take 100 mg by mouth 2 times daily       amLODIPine (NORVASC) 2.5 MG tablet Take 1 tablet (2.5 mg) by mouth daily 90 tablet 3     amLODIPine-valsartan (EXFORGE) 5-160 MG tablet Take 1 tablet by mouth daily 90 tablet 3     benzonatate (TESSALON) 100 MG capsule Take 1 capsule (100 mg) by mouth 3 times daily as needed 30 capsule 0     calcium citrate (CALCITRATE) 950 MG tablet Take 1 tablet by mouth daily        doxycycline hyclate (VIBRAMYCIN) 100 MG capsule Take 1 capsule (100 mg) by mouth 2 times daily for 7 days 14 capsule 0     famotidine (PEPCID) 40 MG tablet Take 1 tablet (40 mg) by mouth daily 90 tablet 3     folic acid (FOLVITE) 1 MG tablet Take 1 mg by mouth daily       latanoprostene bunod (VYZULTA) 0.024 % SOLN ophthalmic solution Place 1 drop Into the left eye At Bedtime       levothyroxine (SYNTHROID/LEVOTHROID) 112 MCG tablet Take 1 tablet (112 mcg) by mouth daily 90 tablet 3     METHOTREXATE SODIUM IJ Inject 0.8 mLs as directed once a week Thursdays       metoprolol tartrate (LOPRESSOR) 50 MG tablet Take 1 tablet (50 mg) by mouth 2 times daily 180 tablet 3     multivitamin, therapeutic with minerals (MULTI-VITAMIN) TABS tablet Take 1 tablet by mouth daily Without iron       TIMOLOL MALEATE OP        Timolol-Dorzolamid-Latanoprost 0.5-0.15-0.005 % SOLN        torsemide (DEMADEX) 10 MG tablet Take 1 tablet (10 mg) by mouth daily (with  breakfast) 90 tablet 3     VITAMIN D, CHOLECALCIFEROL, PO Take 1,000 Units by mouth daily       warfarin ANTICOAGULANT (COUMADIN) 2.5 MG tablet Take one tablet (2.5 mg) my mouth daily except take 2 tablets (5 mg) Tue and Fri or as directed by INR clinic 110 tablet 1     Social History     Tobacco Use     Smoking status: Never Smoker     Smokeless tobacco: Never Used   Substance Use Topics     Alcohol use: No     Alcohol/week: 0.0 standard drinks     Family History   Problem Relation Age of Onset     Coronary Artery Disease Mother      Coronary Artery Disease Brother      Diabetes Brother      Cancer Brother          at age 52      Other Cancer Brother      Hypertension Sister      Hyperlipidemia Sister          ROS:    10 point ROS of systems including Constitutional, Eyes, ,Gastroenterology, Genitourinary, Integumentary, Muscularskeletal, Psychiatric ,neurological were all negative except for pertinent positives noted in my HPI         OBJECTIVE:    /68 (BP Location: Right arm, Patient Position: Sitting, Cuff Size: Adult Regular)   Pulse 77   Temp 98.7  F (37.1  C) (Oral)   Wt 66.7 kg (147 lb)   LMP  (LMP Unknown)   SpO2 99%   BMI 25.23 kg/m    GENERAL APPEARANCE: healthy, alert and no distress  EYES: EOMI,  PERRL, conjunctiva clear  HENT: ear canals and TM's normal.  Nose and mouth without ulcers, erythema or lesions  NECK: supple, nontender, no lymphadenopathy  RESP: lungs good air entry there was some Rales noted in the right lower lung field   CV: regular rates and rhythm, normal S1 S2, murmur noted  PSYCH: mentation appears normal  Physical Exam      (Note was completed, in part, with [employer] voice-recognition software. Documentation reviewed, but some grammatical, spelling, and word errors may remain.)  Enid Yuan MD.

## 2022-03-28 ENCOUNTER — ANTICOAGULATION THERAPY VISIT (OUTPATIENT)
Dept: ANTICOAGULATION | Facility: CLINIC | Age: 82
End: 2022-03-28

## 2022-03-28 ENCOUNTER — LAB (OUTPATIENT)
Dept: LAB | Facility: CLINIC | Age: 82
End: 2022-03-28
Payer: MEDICARE

## 2022-03-28 DIAGNOSIS — I48.91 ATRIAL FIBRILLATION AND FLUTTER (H): ICD-10-CM

## 2022-03-28 DIAGNOSIS — I48.92 ATRIAL FIBRILLATION AND FLUTTER (H): ICD-10-CM

## 2022-03-28 DIAGNOSIS — Z00.00 ENCOUNTER FOR PREVENTATIVE ADULT HEALTH CARE EXAMINATION: ICD-10-CM

## 2022-03-28 DIAGNOSIS — E03.9 ACQUIRED HYPOTHYROIDISM: ICD-10-CM

## 2022-03-28 DIAGNOSIS — Z79.01 LONG TERM CURRENT USE OF ANTICOAGULANTS WITH INR GOAL OF 2.0-3.0: ICD-10-CM

## 2022-03-28 DIAGNOSIS — Z95.3 S/P MITRAL VALVE REPLACEMENT WITH BIOPROSTHETIC VALVE: ICD-10-CM

## 2022-03-28 DIAGNOSIS — Z79.01 LONG TERM CURRENT USE OF ANTICOAGULANTS WITH INR GOAL OF 2.0-3.0: Primary | ICD-10-CM

## 2022-03-28 DIAGNOSIS — I10 BENIGN ESSENTIAL HYPERTENSION: ICD-10-CM

## 2022-03-28 LAB — INR BLD: 2.7 (ref 0.9–1.1)

## 2022-03-28 PROCEDURE — 86769 SARS-COV-2 COVID-19 ANTIBODY: CPT | Mod: 90

## 2022-03-28 PROCEDURE — 36415 COLL VENOUS BLD VENIPUNCTURE: CPT

## 2022-03-28 PROCEDURE — 85610 PROTHROMBIN TIME: CPT

## 2022-03-28 PROCEDURE — 84443 ASSAY THYROID STIM HORMONE: CPT

## 2022-03-28 PROCEDURE — 80061 LIPID PANEL: CPT

## 2022-03-28 PROCEDURE — 99000 SPECIMEN HANDLING OFFICE-LAB: CPT

## 2022-03-28 NOTE — PROGRESS NOTES
ANTICOAGULATION MANAGEMENT     Zunilda Alicea May 81 year old female is on warfarin with therapeutic INR result. (Goal INR 2.0-3.0)    Recent labs: (last 7 days)     03/28/22  0936   INR 2.7*       ASSESSMENT       Source(s): Chart Review and Patient/Caregiver Call       Warfarin doses taken: Warfarin taken as instructed    Diet: No new diet changes identified    New illness, injury, or hospitalization: Yes: Patient seen in  3/25/22 for cough and congestion    Medication/supplement changes: Vibramycin 7 day course (dates: 3/25/22 - 3/31/22) may increase risk of bleeding, but not expected to affect INR    Tessalon as needed use No interaction anticipated   Interactions per [employer]    Signs or symptoms of bleeding or clotting: No    Previous INR: Therapeutic last 2(+) visits    Additional findings: None       PLAN     Recommended plan for no diet, medication or health factor changes affecting INR     Dosing Instructions: Continue your current warfarin dose with next INR in 4 days       Summary  As of 3/28/2022    Full warfarin instructions:  5 mg every Tue, Fri; 2.5 mg all other days   Next INR check:  4/1/2022             Telephone call with Zunilda who agrees to plan and repeated back plan correctly    Lab visit scheduled    Education provided: Please call back if any changes to your diet, medications or how you've been taking warfarin, Potential interaction between warfarin and Vibramycin and Contact [phone]  with any changes, questions or concerns.     Plan made per [facility] anticoagulation protocol    Екатерина Mahan RN  Anticoagulation Clinic  3/28/2022    _______________________________________________________________________     Anticoagulation Episode Summary     Current INR goal:  2.0-3.0   TTR:  82.5 % (1 y)   Target end date:  Indefinite   Send INR reminders to:  [facility]    Indications    Long term current use of anticoagulants with INR goal of 2.0-3.0 [Z79.01]  Atrial fibrillation  and flutter (H) [I48.91  I48.92]  S/P mitral valve replacement with bioprosthetic valve [Z95.3]           Comments:           Anticoagulation Care Providers     Provider Role Specialty Phone number    Yunior Huang MD Referring Internal Medicine [phone]

## 2022-03-29 ENCOUNTER — OFFICE VISIT (OUTPATIENT)
Dept: OPHTHALMOLOGY | Facility: CLINIC | Age: 82
End: 2022-03-29
Attending: OPTOMETRIST
Payer: MEDICARE

## 2022-03-29 DIAGNOSIS — H47.20 PARTIAL OPTIC ATROPHY: Primary | ICD-10-CM

## 2022-03-29 DIAGNOSIS — H53.40 VISUAL FIELD DEFECT: ICD-10-CM

## 2022-03-29 DIAGNOSIS — Z86.73 HISTORY OF STROKE: ICD-10-CM

## 2022-03-29 DIAGNOSIS — H53.40 VISUAL FIELD DEFECT: Primary | ICD-10-CM

## 2022-03-29 LAB
CHOLEST SERPL-MCNC: 184 MG/DL
FASTING STATUS PATIENT QL REPORTED: YES
HDLC SERPL-MCNC: 38 MG/DL
LDLC SERPL CALC-MCNC: 125 MG/DL
NONHDLC SERPL-MCNC: 146 MG/DL
TRIGL SERPL-MCNC: 107 MG/DL
TSH SERPL DL<=0.005 MIU/L-ACNC: 2.33 MU/L (ref 0.4–4)

## 2022-03-29 PROCEDURE — G0463 HOSPITAL OUTPT CLINIC VISIT: HCPCS

## 2022-03-29 PROCEDURE — 92083 EXTENDED VISUAL FIELD XM: CPT | Mod: 26 | Performed by: STUDENT IN AN ORGANIZED HEALTH CARE EDUCATION/TRAINING PROGRAM

## 2022-03-29 PROCEDURE — 92133 CPTRZD OPH DX IMG PST SGM ON: CPT | Mod: 26 | Performed by: STUDENT IN AN ORGANIZED HEALTH CARE EDUCATION/TRAINING PROGRAM

## 2022-03-29 PROCEDURE — 99204 OFFICE O/P NEW MOD 45 MIN: CPT | Mod: GC | Performed by: STUDENT IN AN ORGANIZED HEALTH CARE EDUCATION/TRAINING PROGRAM

## 2022-03-29 PROCEDURE — 92083 EXTENDED VISUAL FIELD XM: CPT | Performed by: OPHTHALMOLOGY

## 2022-03-29 PROCEDURE — 92133 CPTRZD OPH DX IMG PST SGM ON: CPT | Performed by: OPHTHALMOLOGY

## 2022-03-29 ASSESSMENT — CUP TO DISC RATIO
OS_RATIO: 0.55
OD_RATIO: 0.7

## 2022-03-29 ASSESSMENT — REFRACTION_WEARINGRX
OS_AXIS: 009
OD_ADD: +2.75
OS_SPHERE: -0.75
OD_AXIS: 015
OD_CYLINDER: +0.75
OS_CYLINDER: +0.50
OD_SPHERE: -0.50
OS_ADD: +2.75
SPECS_TYPE: BIFOCAL

## 2022-03-29 ASSESSMENT — CONF VISUAL FIELD
METHOD: COUNTING FINGERS
OD_NORMAL: 1
OS_NORMAL: 1

## 2022-03-29 ASSESSMENT — VISUAL ACUITY
METHOD: SNELLEN - LINEAR
OD_CC: 20/20
OS_CC: 20/20
OD_CC+: -1
CORRECTION_TYPE: GLASSES
OS_CC+: -3

## 2022-03-29 ASSESSMENT — EXTERNAL EXAM - LEFT EYE: OS_EXAM: NORMAL

## 2022-03-29 ASSESSMENT — SLIT LAMP EXAM - LIDS
COMMENTS: NORMAL
COMMENTS: NORMAL

## 2022-03-29 ASSESSMENT — EXTERNAL EXAM - RIGHT EYE: OD_EXAM: NORMAL

## 2022-03-29 ASSESSMENT — TONOMETRY
IOP_METHOD: ICARE
OD_IOP_MMHG: 8
OS_IOP_MMHG: 8

## 2022-03-29 NOTE — PROGRESS NOTES
"     Assessment & Plan     Zunilda Clark is a 81 year old female with the following diagnoses:   1. History of stroke    2. Visual field defect         Patient was sent for consultation by Dr. 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     HPI:  Patient was last evaluated by Dr. Cooper on 2/9/22 diagnosis of normal tension glaucoma.    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 Prior visual fields from 9/20/21 demonstrate nasal steps in both eyes with possible blind spot enlargement in the left eye. Currently on latanoprost, Cosopt twice a day, and Alphagan twice a day in both eyes.     She denies any family history of glaucoma. Denies any episodes of acute vision loss, black out or white out episodes of vision loss. Per records, Tmax 17 in the right eye and 19 in the left eye, with a pachymetry of 516 in the right eye and 523 in the left eye.     History of cerebral vascular accident in 2009 (where she had blurry vision, diplopia, and gait difficulties, unsure location; denies field cuts and scotomas) and TIA (had LLE weakness) after 2nd mitral valvular replacement 2014 (first replacement was in 2009).    Does unintentional weight loss, 30 lb loss, since  passed away in 7/2019. No fever. No night sweats. No jaw claudication. No headaches.     Has had a \"cold\" for the last week and a half and has been using her drops well, but before that has been very compliant. Still has cold symptoms but \"90%\" better. Currently antibiotics.    History of Plaquenil use for many years (started at age 56), has been off for at least ten years.     The patient is presenting with a chronic illness with severe exacerbation or progression.    Independent historians:  Patient    Review of outside testing:  octopus visual field testing testing from Dr. Cooper's clinic note from 9/2021    My interpretation performed today of " outside testing:  Prior visual fields from 9/20/21 demonstrate nasal steps in both eyes with possible blind spot enlargement in the left eye.     Review of outside clinical notes:  Dr. Wilfredo Jennings clinic notes     Past medical history:  History of cerebral vascular accident in 2009 and TIA after 2nd mitral valvular replacement 2014 (first replacement was in 2009)  AF on warfarin  Cataract surgery within the last two years  DVT  Pulmonary hypertension  RA (on methotrexate 0.8 mg weekly injection)  Shingles  Hypothyroidism    Medications:   ALLOPURINOL PO  amLODIPine  amLODIPine-valsartan  benzonatate  calcium citrate  doxycycline hyclate  famotidine  folic acid  latanoprostene bunod Soln  levothyroxine  METHOTREXATE SODIUM IJ  metoprolol tartrate  multivitamin w/minerals  TIMOLOL MALEATE OP  Timolol-Dorzolamid-Latanoprost Soln  torsemide  VITAMIN D (CHOLECALCIFEROL) PO  warfarin ANTICOAGULANT    Family history / social history:  No family history of ocular problems.    No smoking, alcohol, or recreational drugs.   Says she does not eat much meat. Good appetite.     Exam:  Visual acuity 20/20-1 right eye 20/20-3 left eye.  Color vision 10/11 right eye and 10/11 left eye.  Pupils without relative afferent pupillary defect.  Intraocular pressure 8 right eye and 8 left eye with iCare.  Anterior segment exam pseudophakic with deep anterior chamber in both eyes. Fundus exam with tilted nerves bilaterally with significant peripapillary atrophy.  . Strabismus exam with mild intermittent exotropia at near.     Tests ordered and interpreted today:  Octopus visual field testing G-top shows dense superior arcuate and nasal step, no central defects noted in the right eye.     Discussion of management / interpretation with another provider:   None    Assessment/Plan:   It is my impression that patient has loss of visual field both eyes along with thinning of the corresponding retinal nerve fiber layer.  She misses a single  color vision plate both eyes, which is unusual in glaucoma.  She has experienced progressive visual field loss despite great control of her intraocular pressure.      While this could certainly represent normal tension glaucoma of both eyes, she does not have a family history and has some atypical features of low tension glaucoma (LTG) including the loss of color vision.      I will order an MRI orbit to evaluate for compressive etiologies.  If this is normal, then would recommend continuing treatment for NTG of both eyes.           Attending Physician Attestation:  Complete documentation of historical and exam elements from today's encounter can be found in the full encounter summary report (not reduplicated in this progress note).  I personally obtained the chief complaint(s) and history of present illness.  I confirmed and edited as necessary the review of systems, past medical/surgical history, family history, social history, and examination findings as documented by others; and I examined the patient myself.  I personally reviewed the relevant tests, images, and reports as documented above.  I formulated and edited as necessary the assessment and plan and discussed the findings and management plan with the patient and family. I personally reviewed the ophthalmic test(s) associated with this encounter, agree with the interpretation(s) as documented by the resident/fellow, and have edited the corresponding report(s) as necessary.  - Kelton Sheets MD  PGY-3 Resident Physician  Department of Ophthalmology

## 2022-03-29 NOTE — Clinical Note
"3/29/2022       RE: Zunilda Clark  [address]  [address]  [facility] [address]     Dear Colleague,    Thank you for referring your patient, Zunilda Clark, to the [facility] [place] at [facility]. Please see a copy of my visit note below.         Assessment & Plan     Zunilda Clark is a 81 year old female with the following diagnoses:   1. History of stroke    2. Visual field defect         Patient was sent for consultation by Dr. 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     HPI:  Patient was last evaluated by Dr. Cooper on 2/9/22 diagnosis of normal tension glaucoma.    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 Prior visual fields from 9/20/21 demonstrate nasal steps in both eyes with possible blind spot enlargement in the left eye. Currently on latanoprost, Cosopt twice a day, and Alphagan twice a day in both eyes.     She denies any family history of glaucoma. Denies any episodes of acute vision loss, black out or white out episodes of vision loss. Per records, Tmax 17 in the right eye and 19 in the left eye, with a pachymetry of 516 in the right eye and 523 in the left eye.     History of cerebral vascular accident in 2009 (where she had blurry vision, diplopia, and gait difficulties, unsure location; denies field cuts and scotomas) and TIA (had LLE weakness) after 2nd mitral valvular replacement 2014 (first replacement was in 2009).    Does unintentional weight loss, 30 lb loss, since  passed away in 7/2019. No fever. No night sweats. No jaw claudication. No headaches.     Has had a \"cold\" for the last week and a half and has been using her drops well, but before that has been very compliant. Still has cold symptoms but \"90%\" better. Currently antibiotics.    History of Plaquenil use for many years (started " at age 56), has been off for at least ten years.     The patient is presenting with a chronic illness with severe exacerbation or progression.    Independent historians:  Patient    Review of outside testing:  octopus visual field testing testing from Dr. Cooper's clinic note from 9/2021    My interpretation performed today of outside testing:  Prior visual fields from 9/20/21 demonstrate nasal steps in both eyes with possible blind spot enlargement in the left eye.     Review of outside clinical notes:  Dr. Wilfredo Jennings clinic notes     Past medical history:  History of cerebral vascular accident in 2009 and TIA after 2nd mitral valvular replacement 2014 (first replacement was in 2009)  AF on warfarin  Cataract surgery within the last two years  DVT  Pulmonary hypertension  RA (on methotrexate 0.8 mg weekly injection)  Shingles  Hypothyroidism    Medications:   ALLOPURINOL PO  amLODIPine  amLODIPine-valsartan  benzonatate  calcium citrate  doxycycline hyclate  famotidine  folic acid  latanoprostene bunod Soln  levothyroxine  METHOTREXATE SODIUM IJ  metoprolol tartrate  multivitamin w/minerals  TIMOLOL MALEATE OP  Timolol-Dorzolamid-Latanoprost Soln  torsemide  VITAMIN D (CHOLECALCIFEROL) PO  warfarin ANTICOAGULANT    Family history / social history:  No family history of ocular problems.    No smoking, alcohol, or recreational drugs.   Says she does not eat much meat. Good appetite.     Exam:  Visual acuity 20/20-1 right eye 20/20-3 left eye.  Color vision 10/11 right eye and 10/11 left eye.  Pupils without relative afferent pupillary defect.  Intraocular pressure 8 right eye and 8 left eye with iCare.  Anterior segment exam pseudophakic with deep anterior chamber in both eyes. Fundus exam with tilted nerves bilaterally with significant peripapillary atrophy.  . Strabismus exam with mild intermittent exotropia at near.     Tests ordered and interpreted today:  Octopus visual field testing G-top shows dense  superior arcuate and nasal step, no central defects noted in the right eye.     Discussion of management / interpretation with another provider:   None    Assessment/Plan:   It is my impression that patient has loss of visual field both eyes along with thinning of the corresponding retinal nerve fiber layer.  She misses a single color vision plate both eyes, which is unusual in glaucoma.  She has experienced progressive visual field loss despite great control of her intraocular pressure.      While this could certainly represent normal tension glaucoma of both eyes, she does not have a family history and has some atypical features of low tension glaucoma (LTG) including the loss of color vision.      I will order an MRI orbit to evaluate for compressive etiologies.  If this is normal, then would recommend continuing treatment for NTG of both eyes.           Attending Physician Attestation:  Complete documentation of historical and exam elements from today's encounter can be found in the full encounter summary report (not reduplicated in this progress note).  I personally obtained the chief complaint(s) and history of present illness.  I confirmed and edited as necessary the review of systems, past medical/surgical history, family history, social history, and examination findings as documented by others; and I examined the patient myself.  I personally reviewed the relevant tests, images, and reports as documented above.  I formulated and edited as necessary the assessment and plan and discussed the findings and management plan with the patient and family. I personally reviewed the ophthalmic test(s) associated with this encounter, agree with the interpretation(s) as documented by the resident/fellow, and have edited the corresponding report(s) as necessary.  - Kelton Sheets MD  PGY-3 Resident Physician  Department of Ophthalmology              Again, thank you for allowing me to participate in  the care of your patient.      Sincerely,    Kelton William MD

## 2022-03-29 NOTE — LETTER
"3/29/2022         RE:  :  MRN: Zunilda Alicea May  1940  [id number]     Dear Dr. Cooper,    Thank you for asking me to see your very pleasant patient, Zunilda Clark, in neuro-ophthalmic consultation.  I would like to thank you for sending your records and I have summarized them in the history of present illness. My assessment and plan are below.  For further details, please see my attached clinic note.      Assessment & Plan     Zunilda Clark is a 81 year old female with the following diagnoses:   1. History of stroke    2. Visual field defect         Patient was sent for consultation by Dr. 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     HPI: Patient was last evaluated by Dr. Cooper on 22 diagnosis of normal tension glaucoma.    She was diagnosed with NTG in both eyes in  and has been on IOP lowering drops since 2 years ago. Prior visual fields from 21 demonstrate nasal steps in both eyes with possible blind spot enlargement in the left eye. Currently on latanoprost, Cosopt twice a day, and Alphagan twice a day in both eyes.     She denies any family history of glaucoma. Denies any episodes of acute vision loss, black out or white out episodes of vision loss. Per records, Tmax 17 in the right eye and 19 in the left eye, with a pachymetry of 516 in the right eye and 523 in the left eye.     History of cerebral vascular accident in  (where she had blurry vision, diplopia, and gait difficulties, unsure location; denies field cuts and scotomas) and TIA (had LLE weakness) after 2nd mitral valvular replacement  (first replacement was in ).    Does unintentional weight loss, 30 lb loss, since  passed away in 2019. No fever. No night sweats. No jaw claudication. No headaches.     Has had a \"cold\" for the last week and a half and has been using her drops well, but before that has been very compliant. Still has cold symptoms but \"90%\" " better. Currently antibiotics.    History of Plaquenil use for many years (started at age 56), has been off for at least ten years.     The patient is presenting with a chronic illness with severe exacerbation or progression.    Independent historians: Patient    Review of outside testing: octopus visual field testing testing from Dr. Cooper's clinic note from 9/2021    My interpretation performed today of outside testing: Prior visual fields from 9/20/21 demonstrate nasal steps in both eyes with possible blind spot enlargement in the left eye.     Review of outside clinical notes: Dr. Wilfredo Cooper clinic notes     Past medical history:  History of cerebral vascular accident in 2009 and TIA after 2nd mitral valvular replacement 2014 (first replacement was in 2009)  AF on warfarin  Cataract surgery within the last two years  DVT  Pulmonary hypertension  RA (on methotrexate 0.8 mg weekly injection)  Shingles  Hypothyroidism    Medications:   ALLOPURINOL PO  amLODIPine  amLODIPine-valsartan  benzonatate  calcium citrate  doxycycline hyclate  famotidine  folic acid  latanoprostene bunod Soln  levothyroxine  METHOTREXATE SODIUM IJ  metoprolol tartrate  multivitamin w/minerals  TIMOLOL MALEATE OP  Timolol-Dorzolamid-Latanoprost Soln  torsemide  VITAMIN D (CHOLECALCIFEROL) PO  warfarin ANTICOAGULANT    Family history / social history:  No family history of ocular problems.    No smoking, alcohol, or recreational drugs.   Says she does not eat much meat. Good appetite.     Exam: Visual acuity 20/20-1 right eye 20/20-3 left eye.  Color vision 10/11 right eye and 10/11 left eye.  Pupils without relative afferent pupillary defect.  Intraocular pressure 8 right eye and 8 left eye with iCare.  Anterior segment exam pseudophakic with deep anterior chamber in both eyes. Fundus exam with tilted nerves bilaterally with significant peripapillary atrophy.  . Strabismus exam with mild intermittent exotropia at near.     Tests ordered  and interpreted today:Octopus visual field testing G-top shows dense superior arcuate and nasal step, no central defects noted in the right eye.     Discussion of management / interpretation with another provider:   None    Assessment/Plan:   It is my impression that patient has loss of visual field both eyes along with thinning of the corresponding retinal nerve fiber layer.  She misses a single color vision plate both eyes, which is unusual in glaucoma.  She has experienced progressive visual field loss despite great control of her intraocular pressure.      While this could certainly represent normal tension glaucoma of both eyes, she does not have a family history and has some atypical features of low tension glaucoma (LTG) including the loss of color vision.      I will order an MRI orbit to evaluate for compressive etiologies.  If this is normal, then would recommend continuing treatment for NTG of both eyes.     Again, thank you for allowing me to participate in the care of your patient.      Sincerely,    Kelton William MD  Professor  Ophthalmology Residency   Director of Neuro-Ophthalmology  [employer] [facility] [employer] Endowed Chair  Departments of Ophthalmology, Neurology, and Neurosurgery  [address]  [address]  T - [phone]  F - [phone]  [email or web address]      CC: Yunior Huang MD  [address]  [facility] [address]  Via In Basket     Wilfredo Jennings DO  [facility]  [address] [facility] [address]  Via Fax: [phone]    DX = low tension glaucoma (LTG) vs. Optic neuropathy

## 2022-03-29 NOTE — NURSING NOTE
Chief Complaints and History of Present Illnesses   Patient presents with     Subjective visual disturbance     Referral     Visual Field Defect Evaluation      Chief Complaint(s) and History of Present Illness(es)     Subjective visual disturbance               Referral               Visual Field Defect Evaluation     Laterality: both eyes    Timing: when reading    Associated symptoms: Negative for eye pain, redness and tearing    Pain scale: 0/10              Comments     New patient referral of visual field defect evaluation in the setting of history of stroke.  Stroke x 2 in 2009 & 2014 after valve procedure.  Denies vision loss in any area of vision either eye.  Says fine print is too small with current glasses.  Most recent eye exam was 1 year ago, normal eye exam.  Hx of IOL each eye with YAG each eye   Eye meds: Cosopt each eye BID, Brimonidine each eye BID, Latanaprost each eye at bedtime  NORA Clemente 3/29/2022 1:08 PM

## 2022-03-30 LAB
SARS-COV-2 AB SERPL IA-ACNC: 224 U/ML
SARS-COV-2 AB SERPL-IMP: POSITIVE

## 2022-03-31 LAB
MDC_IDC_EPISODE_DTM: NORMAL
MDC_IDC_EPISODE_DURATION: 11 S
MDC_IDC_EPISODE_DURATION: 12 S
MDC_IDC_EPISODE_DURATION: 13 S
MDC_IDC_EPISODE_DURATION: 15 S
MDC_IDC_EPISODE_DURATION: 15 S
MDC_IDC_EPISODE_DURATION: 20 S
MDC_IDC_EPISODE_DURATION: 21 S
MDC_IDC_EPISODE_DURATION: 23 S
MDC_IDC_EPISODE_DURATION: 24 S
MDC_IDC_EPISODE_DURATION: 25 S
MDC_IDC_EPISODE_DURATION: 26 S
MDC_IDC_EPISODE_DURATION: 28 S
MDC_IDC_EPISODE_DURATION: 28 S
MDC_IDC_EPISODE_DURATION: 29 S
MDC_IDC_EPISODE_DURATION: 29 S
MDC_IDC_EPISODE_DURATION: 30 S
MDC_IDC_EPISODE_DURATION: 31 S
MDC_IDC_EPISODE_DURATION: 32 S
MDC_IDC_EPISODE_DURATION: 33 S
MDC_IDC_EPISODE_DURATION: 34 S
MDC_IDC_EPISODE_DURATION: 35 S
MDC_IDC_EPISODE_DURATION: 35 S
MDC_IDC_EPISODE_DURATION: 36 S
MDC_IDC_EPISODE_DURATION: 37 S
MDC_IDC_EPISODE_DURATION: 37 S
MDC_IDC_EPISODE_DURATION: 39 S
MDC_IDC_EPISODE_DURATION: 40 S
MDC_IDC_EPISODE_DURATION: 42 S
MDC_IDC_EPISODE_DURATION: 42 S
MDC_IDC_EPISODE_DURATION: 43 S
MDC_IDC_EPISODE_DURATION: 43 S
MDC_IDC_EPISODE_DURATION: 45 S
MDC_IDC_EPISODE_DURATION: 46 S
MDC_IDC_EPISODE_DURATION: 49 S
MDC_IDC_EPISODE_DURATION: 54 S
MDC_IDC_EPISODE_DURATION: 60 S
MDC_IDC_EPISODE_DURATION: 60 S
MDC_IDC_EPISODE_DURATION: 61 S
MDC_IDC_EPISODE_DURATION: 65 S
MDC_IDC_EPISODE_ID: NORMAL
MDC_IDC_EPISODE_TYPE: NORMAL
MDC_IDC_LEAD_IMPLANT_DT: NORMAL
MDC_IDC_LEAD_IMPLANT_DT: NORMAL
MDC_IDC_LEAD_LOCATION: NORMAL
MDC_IDC_LEAD_LOCATION: NORMAL
MDC_IDC_LEAD_MFG: NORMAL
MDC_IDC_LEAD_MFG: NORMAL
MDC_IDC_LEAD_MODEL: NORMAL
MDC_IDC_LEAD_MODEL: NORMAL
MDC_IDC_LEAD_POLARITY_TYPE: NORMAL
MDC_IDC_LEAD_POLARITY_TYPE: NORMAL
MDC_IDC_LEAD_SERIAL: NORMAL
MDC_IDC_LEAD_SERIAL: NORMAL
MDC_IDC_MSMT_BATTERY_DTM: NORMAL
MDC_IDC_MSMT_BATTERY_REMAINING_LONGEVITY: 130 MO
MDC_IDC_MSMT_BATTERY_RRT_TRIGGER: 2.62
MDC_IDC_MSMT_BATTERY_STATUS: NORMAL
MDC_IDC_MSMT_BATTERY_VOLTAGE: 3.03 V
MDC_IDC_MSMT_LEADCHNL_RA_IMPEDANCE_VALUE: 323 OHM
MDC_IDC_MSMT_LEADCHNL_RA_IMPEDANCE_VALUE: 437 OHM
MDC_IDC_MSMT_LEADCHNL_RA_PACING_THRESHOLD_AMPLITUDE: 0.62 V
MDC_IDC_MSMT_LEADCHNL_RA_PACING_THRESHOLD_PULSEWIDTH: 0.4 MS
MDC_IDC_MSMT_LEADCHNL_RA_SENSING_INTR_AMPL: 0.75 MV
MDC_IDC_MSMT_LEADCHNL_RA_SENSING_INTR_AMPL: 0.75 MV
MDC_IDC_MSMT_LEADCHNL_RV_IMPEDANCE_VALUE: 342 OHM
MDC_IDC_MSMT_LEADCHNL_RV_IMPEDANCE_VALUE: 380 OHM
MDC_IDC_MSMT_LEADCHNL_RV_PACING_THRESHOLD_AMPLITUDE: 0.75 V
MDC_IDC_MSMT_LEADCHNL_RV_PACING_THRESHOLD_PULSEWIDTH: 0.4 MS
MDC_IDC_MSMT_LEADCHNL_RV_SENSING_INTR_AMPL: 14.12 MV
MDC_IDC_MSMT_LEADCHNL_RV_SENSING_INTR_AMPL: 14.12 MV
MDC_IDC_PG_IMPLANT_DTM: NORMAL
MDC_IDC_PG_MFG: NORMAL
MDC_IDC_PG_MODEL: NORMAL
MDC_IDC_PG_SERIAL: NORMAL
MDC_IDC_PG_TYPE: NORMAL
MDC_IDC_SESS_CLINIC_NAME: NORMAL
MDC_IDC_SESS_DTM: NORMAL
MDC_IDC_SESS_TYPE: NORMAL
MDC_IDC_SET_BRADY_AT_MODE_SWITCH_RATE: 171 {BEATS}/MIN
MDC_IDC_SET_BRADY_HYSTRATE: NORMAL
MDC_IDC_SET_BRADY_LOWRATE: 60 {BEATS}/MIN
MDC_IDC_SET_BRADY_MAX_SENSOR_RATE: 130 {BEATS}/MIN
MDC_IDC_SET_BRADY_MAX_TRACKING_RATE: 130 {BEATS}/MIN
MDC_IDC_SET_BRADY_MODE: NORMAL
MDC_IDC_SET_BRADY_PAV_DELAY_LOW: 180 MS
MDC_IDC_SET_BRADY_SAV_DELAY_LOW: 150 MS
MDC_IDC_SET_LEADCHNL_RA_PACING_AMPLITUDE: 1.5 V
MDC_IDC_SET_LEADCHNL_RA_PACING_ANODE_ELECTRODE_1: NORMAL
MDC_IDC_SET_LEADCHNL_RA_PACING_ANODE_LOCATION_1: NORMAL
MDC_IDC_SET_LEADCHNL_RA_PACING_CAPTURE_MODE: NORMAL
MDC_IDC_SET_LEADCHNL_RA_PACING_CATHODE_ELECTRODE_1: NORMAL
MDC_IDC_SET_LEADCHNL_RA_PACING_CATHODE_LOCATION_1: NORMAL
MDC_IDC_SET_LEADCHNL_RA_PACING_POLARITY: NORMAL
MDC_IDC_SET_LEADCHNL_RA_PACING_PULSEWIDTH: 0.4 MS
MDC_IDC_SET_LEADCHNL_RA_SENSING_ANODE_ELECTRODE_1: NORMAL
MDC_IDC_SET_LEADCHNL_RA_SENSING_ANODE_LOCATION_1: NORMAL
MDC_IDC_SET_LEADCHNL_RA_SENSING_CATHODE_ELECTRODE_1: NORMAL
MDC_IDC_SET_LEADCHNL_RA_SENSING_CATHODE_LOCATION_1: NORMAL
MDC_IDC_SET_LEADCHNL_RA_SENSING_POLARITY: NORMAL
MDC_IDC_SET_LEADCHNL_RA_SENSING_SENSITIVITY: 0.45 MV
MDC_IDC_SET_LEADCHNL_RV_PACING_AMPLITUDE: 2 V
MDC_IDC_SET_LEADCHNL_RV_PACING_ANODE_ELECTRODE_1: NORMAL
MDC_IDC_SET_LEADCHNL_RV_PACING_ANODE_LOCATION_1: NORMAL
MDC_IDC_SET_LEADCHNL_RV_PACING_CAPTURE_MODE: NORMAL
MDC_IDC_SET_LEADCHNL_RV_PACING_CATHODE_ELECTRODE_1: NORMAL
MDC_IDC_SET_LEADCHNL_RV_PACING_CATHODE_LOCATION_1: NORMAL
MDC_IDC_SET_LEADCHNL_RV_PACING_POLARITY: NORMAL
MDC_IDC_SET_LEADCHNL_RV_PACING_PULSEWIDTH: 0.4 MS
MDC_IDC_SET_LEADCHNL_RV_SENSING_ANODE_ELECTRODE_1: NORMAL
MDC_IDC_SET_LEADCHNL_RV_SENSING_ANODE_LOCATION_1: NORMAL
MDC_IDC_SET_LEADCHNL_RV_SENSING_CATHODE_ELECTRODE_1: NORMAL
MDC_IDC_SET_LEADCHNL_RV_SENSING_CATHODE_LOCATION_1: NORMAL
MDC_IDC_SET_LEADCHNL_RV_SENSING_POLARITY: NORMAL
MDC_IDC_SET_LEADCHNL_RV_SENSING_SENSITIVITY: 0.9 MV
MDC_IDC_SET_ZONE_DETECTION_INTERVAL: 350 MS
MDC_IDC_SET_ZONE_DETECTION_INTERVAL: 400 MS
MDC_IDC_SET_ZONE_TYPE: NORMAL
MDC_IDC_STAT_AT_BURDEN_PERCENT: 16.4 %
MDC_IDC_STAT_AT_DTM_END: NORMAL
MDC_IDC_STAT_AT_DTM_START: NORMAL
MDC_IDC_STAT_BRADY_AP_VP_PERCENT: 79.01 %
MDC_IDC_STAT_BRADY_AP_VS_PERCENT: 0.01 %
MDC_IDC_STAT_BRADY_AS_VP_PERCENT: 20.59 %
MDC_IDC_STAT_BRADY_AS_VS_PERCENT: 0.46 %
MDC_IDC_STAT_BRADY_DTM_END: NORMAL
MDC_IDC_STAT_BRADY_DTM_START: NORMAL
MDC_IDC_STAT_BRADY_RA_PERCENT_PACED: 71.74 %
MDC_IDC_STAT_BRADY_RV_PERCENT_PACED: 99.52 %
MDC_IDC_STAT_EPISODE_RECENT_COUNT: 0
MDC_IDC_STAT_EPISODE_RECENT_COUNT: 4258
MDC_IDC_STAT_EPISODE_RECENT_COUNT_DTM_END: NORMAL
MDC_IDC_STAT_EPISODE_RECENT_COUNT_DTM_START: NORMAL
MDC_IDC_STAT_EPISODE_TOTAL_COUNT: 0
MDC_IDC_STAT_EPISODE_TOTAL_COUNT: 2
MDC_IDC_STAT_EPISODE_TOTAL_COUNT: NORMAL
MDC_IDC_STAT_EPISODE_TOTAL_COUNT_DTM_END: NORMAL
MDC_IDC_STAT_EPISODE_TOTAL_COUNT_DTM_START: NORMAL
MDC_IDC_STAT_EPISODE_TYPE: NORMAL

## 2022-04-01 ENCOUNTER — ANTICOAGULATION THERAPY VISIT (OUTPATIENT)
Dept: ANTICOAGULATION | Facility: CLINIC | Age: 82
End: 2022-04-01

## 2022-04-01 ENCOUNTER — LAB (OUTPATIENT)
Dept: LAB | Facility: CLINIC | Age: 82
End: 2022-04-01
Payer: MEDICARE

## 2022-04-01 DIAGNOSIS — I48.92 ATRIAL FIBRILLATION AND FLUTTER (H): ICD-10-CM

## 2022-04-01 DIAGNOSIS — I48.91 ATRIAL FIBRILLATION AND FLUTTER (H): ICD-10-CM

## 2022-04-01 DIAGNOSIS — Z95.3 S/P MITRAL VALVE REPLACEMENT WITH BIOPROSTHETIC VALVE: ICD-10-CM

## 2022-04-01 DIAGNOSIS — Z79.01 LONG TERM CURRENT USE OF ANTICOAGULANTS WITH INR GOAL OF 2.0-3.0: Primary | ICD-10-CM

## 2022-04-01 DIAGNOSIS — Z79.01 LONG TERM CURRENT USE OF ANTICOAGULANTS WITH INR GOAL OF 2.0-3.0: ICD-10-CM

## 2022-04-01 LAB — INR BLD: 3.6 (ref 0.9–1.1)

## 2022-04-01 PROCEDURE — 36416 COLLJ CAPILLARY BLOOD SPEC: CPT

## 2022-04-01 PROCEDURE — 85610 PROTHROMBIN TIME: CPT

## 2022-04-01 NOTE — PROGRESS NOTES
ANTICOAGULATION MANAGEMENT     Zunilda Alicea May 81 year old female is on warfarin with supratherapeutic INR result. (Goal INR 2.0-3.0)    Recent labs: (last 7 days)     04/01/22  1125   INR 3.6*       ASSESSMENT       Source(s): Chart Review and Patient/Caregiver Call       Warfarin doses taken: Warfarin taken as instructed    Diet: No new diet changes identified    New illness, injury, or hospitalization: No - patient continues to have congestion    Medication/supplement changes: Vibramycin completed 4/1/22    Signs or symptoms of bleeding or clotting: No    Previous INR: Therapeutic last 2(+) visits    Additional findings: Upcoming surgery/procedure MRI of eyes 4/14/22       PLAN     Recommended plan for temporary change(s) affecting INR     Dosing Instructions: hold dose then continue your current warfarin dose with next INR in 2 weeks       Summary  As of 4/1/2022    Full warfarin instructions:  4/1: Hold; Otherwise 5 mg every Tue, Fri; 2.5 mg all other days   Next INR check:  4/14/2022             Telephone call with Deanne who verbalizes understanding and agrees to plan    Lab visit scheduled    Education provided: Please call back if any changes to your diet, medications or how you've been taking warfarin and Contact [phone]  with any changes, questions or concerns.     Plan made per [facility] anticoagulation protocol    Екатерина Mahan RN  Anticoagulation Clinic  4/1/2022    _______________________________________________________________________     Anticoagulation Episode Summary     Current INR goal:  2.0-3.0   TTR:  82.8 % (1 y)   Target end date:  Indefinite   Send INR reminders to:  [facility]    Indications    Long term current use of anticoagulants with INR goal of 2.0-3.0 [Z79.01]  Atrial fibrillation and flutter (H) [I48.91  I48.92]  S/P mitral valve replacement with bioprosthetic valve [Z95.3]           Comments:           Anticoagulation Care Providers     Provider Role Specialty  Phone number    Yunior Huang MD Referring Internal Medicine [phone]

## 2022-04-04 ENCOUNTER — ANCILLARY PROCEDURE (OUTPATIENT)
Dept: GENERAL RADIOLOGY | Facility: CLINIC | Age: 82
End: 2022-04-04
Attending: FAMILY MEDICINE
Payer: MEDICARE

## 2022-04-04 ENCOUNTER — NURSE TRIAGE (OUTPATIENT)
Dept: NURSING | Facility: CLINIC | Age: 82
End: 2022-04-04
Payer: MEDICARE

## 2022-04-04 ENCOUNTER — OFFICE VISIT (OUTPATIENT)
Dept: URGENT CARE | Facility: URGENT CARE | Age: 82
End: 2022-04-04
Payer: MEDICARE

## 2022-04-04 VITALS
DIASTOLIC BLOOD PRESSURE: 70 MMHG | RESPIRATION RATE: 20 BRPM | TEMPERATURE: 98 F | OXYGEN SATURATION: 96 % | BODY MASS INDEX: 25.23 KG/M2 | SYSTOLIC BLOOD PRESSURE: 120 MMHG | WEIGHT: 147 LBS | HEART RATE: 81 BPM

## 2022-04-04 DIAGNOSIS — R05.9 COUGH: Primary | ICD-10-CM

## 2022-04-04 PROCEDURE — U0003 INFECTIOUS AGENT DETECTION BY NUCLEIC ACID (DNA OR RNA); SEVERE ACUTE RESPIRATORY SYNDROME CORONAVIRUS 2 (SARS-COV-2) (CORONAVIRUS DISEASE [COVID-19]), AMPLIFIED PROBE TECHNIQUE, MAKING USE OF HIGH THROUGHPUT TECHNOLOGIES AS DESCRIBED BY CMS-2020-01-R: HCPCS | Performed by: FAMILY MEDICINE

## 2022-04-04 PROCEDURE — U0005 INFEC AGEN DETEC AMPLI PROBE: HCPCS | Performed by: FAMILY MEDICINE

## 2022-04-04 PROCEDURE — 99214 OFFICE O/P EST MOD 30 MIN: CPT | Mod: CS | Performed by: FAMILY MEDICINE

## 2022-04-04 PROCEDURE — 71046 X-RAY EXAM CHEST 2 VIEWS: CPT | Performed by: RADIOLOGY

## 2022-04-04 RX ORDER — PREDNISONE 20 MG/1
20 TABLET ORAL DAILY
Qty: 5 TABLET | Refills: 0 | Status: SHIPPED | OUTPATIENT
Start: 2022-04-04 | End: 2022-04-09

## 2022-04-04 NOTE — TELEPHONE ENCOUNTER
Pt is calling.    Cough since March 19th, 2022. Was seen in urgent care 03/25/2022. Chest xray was negative at that time.Cough is wet. When she breathes in, she can hear a crackling.  Negative for chest pain, chest tightness or pressure.  Denies any difficulty breathing or wheezing. No fever.  When she coughs it is very painful.   Feels better at times, and then gets worse again.    Care advice reviewed. I advised her to be seen today or tomorrow in clinic.  No opening for 3 weeks at her PCP's office with anyone per scheduling.  She stated that she would go to urgent care now to be seen.  When to call back reviewed per care advice and she verbalized understanding.      Crystal Thomas RN  [facility] Nurse Advisor  4/4/2022 at 4:47 PM          COVID 19 Nurse Triage Plan/Patient Instructions    Please be aware that novel coronavirus (COVID-19) may be circulating in the community. If you develop symptoms such as fever, cough, or SOB or if you have concerns about the presence of another infection including coronavirus (COVID-19), please contact your health care provider or visit [email or web address].     Disposition/Instructions    In-Person Visit with provider recommended. Reference Visit Selection Guide.    Thank you for taking steps to prevent the spread of this virus.  o Limit your contact with others.  o Wear a simple mask to cover your cough.  o Wash your hands well and often.    Resources    [facility]: About COVID-19: [email or web address]    CDC: What to Do If You're Sick: www.cdc.gov/coronavirus/2019-ncov/about/steps-when-sick.html    CDC: Ending Home Isolation: www.cdc.gov/coronavirus/2019-ncov/hcp/disposition-in-home-patients.html     CDC: Caring for Someone: www.cdc.gov/coronavirus/2019-ncov/if-you-are-sick/care-for-someone.html     [facility]: Interim Guidance for Hospital Discharge to Home: www.health.[facility].[place]/diseases/coronavirus/hcp/hospdischarge.pdf    [facility]  clinical trials (COVID-19 research studies): clinicalaffairs.[facility]-clinical-trials     Below are the COVID-19 hotlines at the [place] Department of Health ([facility]). Interpreters are available.   o For health questions: Call 656-588-2429 or 1-248.679.4256 (7 a.m. to 7 p.m.)  o For questions about schools and childcare: Call 370-872-8190 or 1-119.646.8192 (7 a.m. to 7 p.m.)     Reason for Disposition    Patient wants to be seen    Additional Information    Negative: Bluish (or gray) lips or face    Negative: Severe difficulty breathing (e.g., struggling for each breath, speaks in single words)    Negative: Rapid onset of cough and has hives    Negative: Coughing started suddenly after medicine, an allergic food or bee sting    Negative: Difficulty breathing after exposure to flames, smoke, or fumes    Negative: Sounds like a life-threatening emergency to the triager    Negative: Previous asthma attacks and this feels like asthma attack    Negative: Chest pain present when not coughing    Negative: Difficulty breathing    Negative: Passed out (i.e., fainted, collapsed and was not responding)    Negative: Patient sounds very sick or weak to the triager    Negative: Coughed up > 1 tablespoon (15 ml) blood (Exception: blood-tinged sputum)    Negative: Fever > 103 F (39.4 C)    Negative: Fever > 101 F (38.3 C) and over 60 years of age    Negative: Fever > 100.0 F (37.8 C) and has diabetes mellitus or a weak immune system (e.g., HIV positive, cancer chemotherapy, organ transplant, splenectomy, chronic steroids)    Negative: Fever > 100.0 F (37.8 C) and bedridden (e.g., nursing home patient, stroke, chronic illness, recovering from surgery)    Negative: Increasing ankle swelling    Negative: Wheezing is present    Negative: SEVERE coughing spells (e.g., whooping sound after coughing, vomiting after coughing)    Negative: Coughing up katarzyna-colored (reddish-brown) or blood-tinged sputum    Negative: Fever present > 3 days  (72 hours)    Negative: Fever returns after gone for over 24 hours and symptoms worse or not improved    Negative: Using nasal washes and pain medicine > 24 hours and sinus pain persists    Negative: Known COPD or other severe lung disease (i.e., bronchiectasis, cystic fibrosis, lung surgery) and worsening symptoms (i.e., increased sputum purulence or amount, increased breathing difficulty)    Negative: Continuous (nonstop) coughing interferes with work or school and no improvement using cough treatment per Care Advice    Protocols used: COUGH-A-OH

## 2022-04-04 NOTE — PROGRESS NOTES
SUBJECTIVE: Zunilda Clark is a 81 year old female presenting with a chief complaint of cough .  Onset of symptoms was 2 week(s) ago.  Course of illness is same.    Current and Associated symptoms: cough - non-productive  Treatment measures tried include doxycycline.  Predisposing factors include None.    Past Medical History:   Diagnosis Date     Acquired hypothyroidism 11/4/2015     Aortic valve insufficiency      Arrhythmia     A fib     Arthritis      Atrial fibrillation (H)      Atrial fibrillation and flutter (H)      Blood clotting disorder (H)      CHF (congestive heart failure) (H)      DVT (deep venous thrombosis) (H) 2003     Gastro-oesophageal reflux disease      H/O mitral valve replacement with tissue graft june 2014     History of blood transfusion 2014     HTN (hypertension)      Hypothyroidism      Other chronic pain      PONV (postoperative nausea and vomiting)      Pulmonary HTN (H)      Rheumatoid arthritis (H)      Rheumatoid arthritis(714.0)      Seizures (H) 2014     Shingles 08/2018     Stroke (H) 2009    left side mild weakness      Stroke (H) 2014     [place] 2011    [place]     Thrombosis of leg 2003    both legs     No Known Allergies  Social History     Tobacco Use     Smoking status: Never Smoker     Smokeless tobacco: Never Used   Substance Use Topics     Alcohol use: No     Alcohol/week: 0.0 standard drinks       ROS:  SKIN: no rash  GI: no vomiting    OBJECTIVE:  /70   Pulse 81   Temp 98  F (36.7  C)   Resp 20   Wt 66.7 kg (147 lb)   LMP  (LMP Unknown)   SpO2 96%   BMI 25.23 kg/m  GENERAL APPEARANCE: healthy, alert and no distress  EYES: EOMI,  PERRL, conjunctiva clear  HENT: ear canals and TM's normal.  Nose and mouth without ulcers, erythema or lesions  RESP: lungs clear to auscultation - no rales, rhonchi or wheezes  CV: regular rates and rhythm, normal S1 S2, no murmur noted  SKIN: no suspicious lesions or rashes    Xray without acute findings, no pneumonia  read by Lito Meeks D.O.      ICD-10-CM    1. Cough  R05.9 Symptomatic; Unknown COVID-19 Virus (Coronavirus) by PCR Nose     XR Chest 2 Views     predniSONE (DELTASONE) 20 MG tablet     OTC zyrtec  Fluids/Rest, f/u if worse/not any better

## 2022-04-05 LAB — SARS-COV-2 RNA RESP QL NAA+PROBE: NEGATIVE

## 2022-04-14 ENCOUNTER — ANTICOAGULATION THERAPY VISIT (OUTPATIENT)
Dept: ANTICOAGULATION | Facility: CLINIC | Age: 82
End: 2022-04-14

## 2022-04-14 ENCOUNTER — HOSPITAL ENCOUNTER (OUTPATIENT)
Dept: MRI IMAGING | Facility: CLINIC | Age: 82
Discharge: HOME | End: 2022-04-14
Attending: OPHTHALMOLOGY
Payer: MEDICARE

## 2022-04-14 ENCOUNTER — HOSPITAL ENCOUNTER (OUTPATIENT)
Facility: CLINIC | Age: 82
Discharge: HOME | End: 2022-04-14
Admitting: RADIOLOGY
Payer: MEDICARE

## 2022-04-14 ENCOUNTER — LAB (OUTPATIENT)
Dept: LAB | Facility: CLINIC | Age: 82
End: 2022-04-14
Payer: MEDICARE

## 2022-04-14 VITALS
HEART RATE: 70 BPM | OXYGEN SATURATION: 93 % | DIASTOLIC BLOOD PRESSURE: 66 MMHG | SYSTOLIC BLOOD PRESSURE: 136 MMHG | RESPIRATION RATE: 1 BRPM

## 2022-04-14 DIAGNOSIS — I48.91 ATRIAL FIBRILLATION AND FLUTTER (H): ICD-10-CM

## 2022-04-14 DIAGNOSIS — Z95.3 S/P MITRAL VALVE REPLACEMENT WITH BIOPROSTHETIC VALVE: ICD-10-CM

## 2022-04-14 DIAGNOSIS — I48.92 ATRIAL FIBRILLATION AND FLUTTER (H): ICD-10-CM

## 2022-04-14 DIAGNOSIS — H47.20 PARTIAL OPTIC ATROPHY: ICD-10-CM

## 2022-04-14 DIAGNOSIS — Z79.01 LONG TERM CURRENT USE OF ANTICOAGULANTS WITH INR GOAL OF 2.0-3.0: Primary | ICD-10-CM

## 2022-04-14 DIAGNOSIS — Z79.01 LONG TERM CURRENT USE OF ANTICOAGULANTS WITH INR GOAL OF 2.0-3.0: ICD-10-CM

## 2022-04-14 DIAGNOSIS — Z86.73 HISTORY OF STROKE: ICD-10-CM

## 2022-04-14 LAB — INR BLD: 2.7 (ref 0.9–1.1)

## 2022-04-14 PROCEDURE — 85610 PROTHROMBIN TIME: CPT

## 2022-04-14 PROCEDURE — 999N000154 HC STATISTIC RADIOLOGY XRAY, US, CT, MAR, NM

## 2022-04-14 PROCEDURE — 70543 MRI ORBT/FAC/NCK W/O &W/DYE: CPT | Mod: MG

## 2022-04-14 PROCEDURE — 36416 COLLJ CAPILLARY BLOOD SPEC: CPT

## 2022-04-14 PROCEDURE — A9585 GADOBUTROL INJECTION: HCPCS | Performed by: OPHTHALMOLOGY

## 2022-04-14 PROCEDURE — 255N000002 HC RX 255 OP 636: Performed by: OPHTHALMOLOGY

## 2022-04-14 RX ORDER — GADOBUTROL 604.72 MG/ML
7 INJECTION INTRAVENOUS ONCE
Status: COMPLETED | OUTPATIENT
Start: 2022-04-14 | End: 2022-04-14

## 2022-04-14 RX ADMIN — GADOBUTROL 7 ML: 604.72 INJECTION INTRAVENOUS at 15:19

## 2022-04-14 NOTE — PROGRESS NOTES
Care Suites Procedure Nursing Note    Patient Information  Name: Zunilda Alicea May  Age: 81 year old    Procedure  Procedure: MRI with PPM  Procedure start time: 1530  Procedure complete time: 1610  Concerns/abnormal assessment: NA  If abnormal assessment, provider notified: N/A  Plan/Other: Proceed as planned.  PPM re program by [employer] rep remotely per order at [facility]  VS stable.   PPM re program back to her original setting.  Pt will be discharged from MRI department    Ness Prieto RN

## 2022-04-14 NOTE — PROGRESS NOTES
ANTICOAGULATION MANAGEMENT     Zunilda Alicea May 81 year old female is on warfarin with therapeutic INR result. (Goal INR 2.0-3.0)    Recent labs: (last 7 days)     04/14/22  0926   INR 2.7*       ASSESSMENT       Source(s): Chart Review and Patient/Caregiver Call       Warfarin doses taken: Warfarin taken as instructed    Diet: No new diet changes identified    New illness, injury, or hospitalization: Yes: continues to have a cough, but this is improving.    Medication/supplement changes: prednisone 4/4-4/8.  Also taking Mucinex and Tessalon prn (no effect per Up to Date)    Signs or symptoms of bleeding or clotting: No    Previous INR: Supratherapeutic    Additional findings: MRI of eye today       PLAN     Recommended plan for temporary change(s) affecting INR     Dosing Instructions: continue your current warfarin dose with next INR in 3 weeks       Summary  As of 4/14/2022    Full warfarin instructions:  5 mg every Tue, Fri; 2.5 mg all other days   Next INR check:  5/5/2022             Telephone call with Deanne who agrees to plan and repeated back plan correctly    Lab visit scheduled    Education provided: Please call back if any changes to your diet, medications or how you've been taking warfarin    Plan made per [facility] anticoagulation protocol    Sonam Teixeira RN  Anticoagulation Clinic  4/14/2022    _______________________________________________________________________     Anticoagulation Episode Summary     Current INR goal:  2.0-3.0   TTR:  82.5 % (1 y)   Target end date:  Indefinite   Send INR reminders to:  [facility]    Indications    Long term current use of anticoagulants with INR goal of 2.0-3.0 [Z79.01]  Atrial fibrillation and flutter (H) [I48.91  I48.92]  S/P mitral valve replacement with bioprosthetic valve [Z95.3]           Comments:           Anticoagulation Care Providers     Provider Role Specialty Phone number    Yunior Huang MD Referring Internal Medicine  [phone]

## 2022-04-15 ENCOUNTER — DOCUMENTATION ONLY (OUTPATIENT)
Dept: ANTICOAGULATION | Facility: CLINIC | Age: 82
End: 2022-04-15
Payer: MEDICARE

## 2022-04-15 NOTE — PROGRESS NOTES
ANTICOAGULATION  MANAGEMENT: Discharge Review    Zunilda Clark chart reviewed for anticoagulation continuity of care    Outpatient surgery/procedure on 4/14/22 for MRI of eye.    Discharge disposition: Home    Results:    Recent labs: (last 7 days)     04/14/22  0926   INR 2.7*     Anticoagulation inpatient management:     not applicable     Anticoagulation discharge instructions:     Warfarin dosing: home regimen continued   Bridging: No   INR goal change: No      Medication changes affecting anticoagulation: No    Additional factors affecting anticoagulation: No    Plan     No adjustment to anticoagulation plan needed    Recommended follow up is scheduled    No adjustment to Anticoagulation Calendar was required    Sonam Teixeira RN

## 2022-05-05 ENCOUNTER — LAB (OUTPATIENT)
Dept: LAB | Facility: CLINIC | Age: 82
End: 2022-05-05
Payer: MEDICARE

## 2022-05-05 ENCOUNTER — ANTICOAGULATION THERAPY VISIT (OUTPATIENT)
Dept: ANTICOAGULATION | Facility: CLINIC | Age: 82
End: 2022-05-05

## 2022-05-05 DIAGNOSIS — Z95.3 S/P MITRAL VALVE REPLACEMENT WITH BIOPROSTHETIC VALVE: ICD-10-CM

## 2022-05-05 DIAGNOSIS — Z79.01 LONG TERM CURRENT USE OF ANTICOAGULANTS WITH INR GOAL OF 2.0-3.0: Primary | ICD-10-CM

## 2022-05-05 DIAGNOSIS — Z79.01 LONG TERM CURRENT USE OF ANTICOAGULANTS WITH INR GOAL OF 2.0-3.0: ICD-10-CM

## 2022-05-05 DIAGNOSIS — I48.91 ATRIAL FIBRILLATION AND FLUTTER (H): ICD-10-CM

## 2022-05-05 DIAGNOSIS — I48.92 ATRIAL FIBRILLATION AND FLUTTER (H): ICD-10-CM

## 2022-05-05 LAB — INR BLD: 2.5 (ref 0.9–1.1)

## 2022-05-05 PROCEDURE — 85610 PROTHROMBIN TIME: CPT

## 2022-05-05 PROCEDURE — 36416 COLLJ CAPILLARY BLOOD SPEC: CPT

## 2022-05-05 NOTE — PROGRESS NOTES
ANTICOAGULATION MANAGEMENT     Zunilda Alicea May 81 year old female is on warfarin with therapeutic INR result. (Goal INR 2.0-3.0)    Recent labs: (last 7 days)     05/05/22  0907   INR 2.5*       ASSESSMENT       Source(s): Chart Review and Patient/Caregiver Call       Warfarin doses taken: Warfarin taken as instructed    Diet: No new diet changes identified    New illness, injury, or hospitalization: No - Patient reports she has recovered from recent URI    Medication/supplement changes: None noted    Signs or symptoms of bleeding or clotting: No    Previous INR: Therapeutic last visit; previously outside of goal range    Additional findings: None       PLAN     Recommended plan for no diet, medication or health factor changes affecting INR     Dosing Instructions: continue your current warfarin dose with next INR in 4 weeks       Summary  As of 5/5/2022    Full warfarin instructions:  5 mg every Tue, Fri; 2.5 mg all other days   Next INR check:  6/1/2022             Telephone call with Deanne who verbalizes understanding and agrees to plan    Lab visit scheduled    Education provided: Please call back if any changes to your diet, medications or how you've been taking warfarin and Contact [phone]  with any changes, questions or concerns.     Plan made per [facility] anticoagulation protocol    Екатерина Mahan RN  Anticoagulation Clinic  5/5/2022    _______________________________________________________________________     Anticoagulation Episode Summary     Current INR goal:  2.0-3.0   TTR:  87.0 % (1 y)   Target end date:  Indefinite   Send INR reminders to:  [facility]    Indications    Long term current use of anticoagulants with INR goal of 2.0-3.0 [Z79.01]  Atrial fibrillation and flutter (H) [I48.91  I48.92]  S/P mitral valve replacement with bioprosthetic valve [Z95.3]           Comments:           Anticoagulation Care Providers     Provider Role Specialty Phone number    Yunior Huang,  MD [facility] Internal Medicine [phone]

## 2022-05-09 ENCOUNTER — TRANSFERRED RECORDS (OUTPATIENT)
Dept: HEALTH INFORMATION MANAGEMENT | Facility: CLINIC | Age: 82
End: 2022-05-09
Payer: MEDICARE

## 2022-05-12 ENCOUNTER — ANCILLARY PROCEDURE (OUTPATIENT)
Dept: BONE DENSITY | Facility: CLINIC | Age: 82
End: 2022-05-12
Attending: INTERNAL MEDICINE
Payer: MEDICARE

## 2022-05-12 DIAGNOSIS — S42.294S OTHER CLOSED NONDISPLACED FRACTURE OF PROXIMAL END OF RIGHT HUMERUS, SEQUELA: ICD-10-CM

## 2022-05-12 DIAGNOSIS — M81.0 AGE-RELATED OSTEOPOROSIS WITHOUT CURRENT PATHOLOGICAL FRACTURE: ICD-10-CM

## 2022-05-12 PROCEDURE — 77085 DXA BONE DENSITY AXL VRT FX: CPT | Performed by: INTERNAL MEDICINE

## 2022-05-16 ENCOUNTER — TRANSFERRED RECORDS (OUTPATIENT)
Dept: HEALTH INFORMATION MANAGEMENT | Facility: CLINIC | Age: 82
End: 2022-05-16
Payer: MEDICARE

## 2022-05-16 ENCOUNTER — TRANSFERRED RECORDS (OUTPATIENT)
Dept: INTERNAL MEDICINE | Facility: CLINIC | Age: 82
End: 2022-05-16
Payer: MEDICARE

## 2022-05-16 LAB
ALT SERPL-CCNC: 16 IU/L (ref 5–35)
AST SERPL-CCNC: 28 U/L (ref 5–34)
CREATININE (EXTERNAL): 0.9 MG/DL (ref 0.5–1.3)

## 2022-06-01 ENCOUNTER — LAB (OUTPATIENT)
Dept: LAB | Facility: CLINIC | Age: 82
End: 2022-06-01
Payer: MEDICARE

## 2022-06-01 ENCOUNTER — ANTICOAGULATION THERAPY VISIT (OUTPATIENT)
Dept: ANTICOAGULATION | Facility: CLINIC | Age: 82
End: 2022-06-01

## 2022-06-01 DIAGNOSIS — I48.92 ATRIAL FIBRILLATION AND FLUTTER (H): ICD-10-CM

## 2022-06-01 DIAGNOSIS — Z79.01 LONG TERM CURRENT USE OF ANTICOAGULANTS WITH INR GOAL OF 2.0-3.0: Primary | ICD-10-CM

## 2022-06-01 DIAGNOSIS — Z95.3 S/P MITRAL VALVE REPLACEMENT WITH BIOPROSTHETIC VALVE: ICD-10-CM

## 2022-06-01 DIAGNOSIS — Z79.01 LONG TERM CURRENT USE OF ANTICOAGULANTS WITH INR GOAL OF 2.0-3.0: ICD-10-CM

## 2022-06-01 DIAGNOSIS — I48.91 ATRIAL FIBRILLATION AND FLUTTER (H): ICD-10-CM

## 2022-06-01 LAB — INR BLD: 2.6 (ref 0.9–1.1)

## 2022-06-01 PROCEDURE — 36416 COLLJ CAPILLARY BLOOD SPEC: CPT

## 2022-06-01 PROCEDURE — 85610 PROTHROMBIN TIME: CPT

## 2022-06-01 NOTE — PROGRESS NOTES
ANTICOAGULATION MANAGEMENT     Zunilda Alicea May 81 year old female is on warfarin with therapeutic INR result. (Goal INR 2.0-3.0)    Recent labs: (last 7 days)     06/01/22  1053   INR 2.6*       ASSESSMENT       Source(s): Chart Review and Patient/Caregiver Call       Warfarin doses taken: Warfarin taken as instructed    Diet: No new diet changes identified    New illness, injury, or hospitalization: No    Medication/supplement changes: None noted    Signs or symptoms of bleeding or clotting: left leg bruise, seems to be healing, not hurting, continues to be tender to the touch    Previous INR: Therapeutic last 2(+) visits    Additional findings: None       PLAN     Recommended plan for no diet, medication or health factor changes affecting INR     Dosing Instructions: continue your current warfarin dose with next INR in 5 weeks       Summary  As of 6/1/2022    Full warfarin instructions:  5 mg every Tue, Fri; 2.5 mg all other days   Next INR check:  7/6/2022             Telephone call with Deanne who verbalizes understanding and agrees to plan    Lab visit scheduled    Education provided: Please call back if any changes to your diet, medications or how you've been taking warfarin and Contact [phone]  with any changes, questions or concerns.     Plan made per [facility] anticoagulation protocol    Екатерина Mahan RN  Anticoagulation Clinic  6/1/2022    _______________________________________________________________________     Anticoagulation Episode Summary     Current INR goal:  2.0-3.0   TTR:  89.7 % (1 y)   Target end date:  Indefinite   Send INR reminders to:  [facility]    Indications    Long term current use of anticoagulants with INR goal of 2.0-3.0 [Z79.01]  Atrial fibrillation and flutter (H) [I48.91  I48.92]  S/P mitral valve replacement with bioprosthetic valve [Z95.3]           Comments:           Anticoagulation Care Providers     Provider Role Specialty Phone number    Yunior Huang  MD [facility] Internal Medicine [phone]

## 2022-06-15 ENCOUNTER — ANCILLARY PROCEDURE (OUTPATIENT)
Dept: CARDIOLOGY | Facility: CLINIC | Age: 82
End: 2022-06-15
Attending: INTERNAL MEDICINE
Payer: MEDICARE

## 2022-06-15 DIAGNOSIS — Z95.0 CARDIAC PACEMAKER IN SITU: ICD-10-CM

## 2022-06-15 PROCEDURE — 93280 PM DEVICE PROGR EVAL DUAL: CPT | Performed by: INTERNAL MEDICINE

## 2022-06-22 LAB
MDC_IDC_EPISODE_DTM: NORMAL
MDC_IDC_EPISODE_DURATION: 11 S
MDC_IDC_EPISODE_DURATION: 13 S
MDC_IDC_EPISODE_DURATION: 18 S
MDC_IDC_EPISODE_DURATION: 21 S
MDC_IDC_EPISODE_DURATION: 22 S
MDC_IDC_EPISODE_DURATION: 24 S
MDC_IDC_EPISODE_DURATION: 24 S
MDC_IDC_EPISODE_DURATION: 25 S
MDC_IDC_EPISODE_DURATION: 26 S
MDC_IDC_EPISODE_DURATION: 26 S
MDC_IDC_EPISODE_DURATION: 27 S
MDC_IDC_EPISODE_DURATION: 27 S
MDC_IDC_EPISODE_DURATION: 28 S
MDC_IDC_EPISODE_DURATION: 28 S
MDC_IDC_EPISODE_DURATION: 29 S
MDC_IDC_EPISODE_DURATION: 30 S
MDC_IDC_EPISODE_DURATION: 30 S
MDC_IDC_EPISODE_DURATION: 31 S
MDC_IDC_EPISODE_DURATION: 32 S
MDC_IDC_EPISODE_DURATION: 32 S
MDC_IDC_EPISODE_DURATION: 33 S
MDC_IDC_EPISODE_DURATION: 34 S
MDC_IDC_EPISODE_DURATION: 35 S
MDC_IDC_EPISODE_DURATION: 36 S
MDC_IDC_EPISODE_DURATION: 38 S
MDC_IDC_EPISODE_DURATION: 39 S
MDC_IDC_EPISODE_DURATION: 41 S
MDC_IDC_EPISODE_DURATION: 42 S
MDC_IDC_EPISODE_DURATION: 44 S
MDC_IDC_EPISODE_DURATION: 46 S
MDC_IDC_EPISODE_DURATION: 47 S
MDC_IDC_EPISODE_DURATION: 48 S
MDC_IDC_EPISODE_DURATION: 50 S
MDC_IDC_EPISODE_DURATION: 51 S
MDC_IDC_EPISODE_DURATION: 52 S
MDC_IDC_EPISODE_DURATION: 52 S
MDC_IDC_EPISODE_DURATION: 56 S
MDC_IDC_EPISODE_DURATION: 57 S
MDC_IDC_EPISODE_ID: NORMAL
MDC_IDC_EPISODE_TYPE: NORMAL
MDC_IDC_LEAD_IMPLANT_DT: NORMAL
MDC_IDC_LEAD_IMPLANT_DT: NORMAL
MDC_IDC_LEAD_LOCATION: NORMAL
MDC_IDC_LEAD_LOCATION: NORMAL
MDC_IDC_LEAD_MFG: NORMAL
MDC_IDC_LEAD_MFG: NORMAL
MDC_IDC_LEAD_MODEL: NORMAL
MDC_IDC_LEAD_MODEL: NORMAL
MDC_IDC_LEAD_POLARITY_TYPE: NORMAL
MDC_IDC_LEAD_POLARITY_TYPE: NORMAL
MDC_IDC_LEAD_SERIAL: NORMAL
MDC_IDC_LEAD_SERIAL: NORMAL
MDC_IDC_MSMT_BATTERY_DTM: NORMAL
MDC_IDC_MSMT_BATTERY_REMAINING_LONGEVITY: 127 MO
MDC_IDC_MSMT_BATTERY_RRT_TRIGGER: 2.62
MDC_IDC_MSMT_BATTERY_STATUS: NORMAL
MDC_IDC_MSMT_BATTERY_VOLTAGE: 3.02 V
MDC_IDC_MSMT_LEADCHNL_RA_IMPEDANCE_VALUE: 323 OHM
MDC_IDC_MSMT_LEADCHNL_RA_IMPEDANCE_VALUE: 437 OHM
MDC_IDC_MSMT_LEADCHNL_RA_PACING_THRESHOLD_AMPLITUDE: 0.75 V
MDC_IDC_MSMT_LEADCHNL_RA_PACING_THRESHOLD_PULSEWIDTH: 0.4 MS
MDC_IDC_MSMT_LEADCHNL_RA_SENSING_INTR_AMPL: 0.62 MV
MDC_IDC_MSMT_LEADCHNL_RA_SENSING_INTR_AMPL: 1.5 MV
MDC_IDC_MSMT_LEADCHNL_RV_IMPEDANCE_VALUE: 342 OHM
MDC_IDC_MSMT_LEADCHNL_RV_IMPEDANCE_VALUE: 380 OHM
MDC_IDC_MSMT_LEADCHNL_RV_PACING_THRESHOLD_AMPLITUDE: 0.75 V
MDC_IDC_MSMT_LEADCHNL_RV_PACING_THRESHOLD_PULSEWIDTH: 0.4 MS
MDC_IDC_MSMT_LEADCHNL_RV_SENSING_INTR_AMPL: 19.12 MV
MDC_IDC_MSMT_LEADCHNL_RV_SENSING_INTR_AMPL: 19.12 MV
MDC_IDC_PG_IMPLANT_DTM: NORMAL
MDC_IDC_PG_MFG: NORMAL
MDC_IDC_PG_MODEL: NORMAL
MDC_IDC_PG_SERIAL: NORMAL
MDC_IDC_PG_TYPE: NORMAL
MDC_IDC_SESS_CLINIC_NAME: NORMAL
MDC_IDC_SESS_DTM: NORMAL
MDC_IDC_SESS_TYPE: NORMAL
MDC_IDC_SET_BRADY_AT_MODE_SWITCH_RATE: 171 {BEATS}/MIN
MDC_IDC_SET_BRADY_HYSTRATE: NORMAL
MDC_IDC_SET_BRADY_LOWRATE: 60 {BEATS}/MIN
MDC_IDC_SET_BRADY_MAX_SENSOR_RATE: 130 {BEATS}/MIN
MDC_IDC_SET_BRADY_MAX_TRACKING_RATE: 130 {BEATS}/MIN
MDC_IDC_SET_BRADY_MODE: NORMAL
MDC_IDC_SET_BRADY_PAV_DELAY_LOW: 180 MS
MDC_IDC_SET_BRADY_SAV_DELAY_LOW: 150 MS
MDC_IDC_SET_LEADCHNL_RA_PACING_AMPLITUDE: 1.5 V
MDC_IDC_SET_LEADCHNL_RA_PACING_ANODE_ELECTRODE_1: NORMAL
MDC_IDC_SET_LEADCHNL_RA_PACING_ANODE_LOCATION_1: NORMAL
MDC_IDC_SET_LEADCHNL_RA_PACING_CAPTURE_MODE: NORMAL
MDC_IDC_SET_LEADCHNL_RA_PACING_CATHODE_ELECTRODE_1: NORMAL
MDC_IDC_SET_LEADCHNL_RA_PACING_CATHODE_LOCATION_1: NORMAL
MDC_IDC_SET_LEADCHNL_RA_PACING_POLARITY: NORMAL
MDC_IDC_SET_LEADCHNL_RA_PACING_PULSEWIDTH: 0.4 MS
MDC_IDC_SET_LEADCHNL_RA_SENSING_ANODE_ELECTRODE_1: NORMAL
MDC_IDC_SET_LEADCHNL_RA_SENSING_ANODE_LOCATION_1: NORMAL
MDC_IDC_SET_LEADCHNL_RA_SENSING_CATHODE_ELECTRODE_1: NORMAL
MDC_IDC_SET_LEADCHNL_RA_SENSING_CATHODE_LOCATION_1: NORMAL
MDC_IDC_SET_LEADCHNL_RA_SENSING_POLARITY: NORMAL
MDC_IDC_SET_LEADCHNL_RA_SENSING_SENSITIVITY: 0.45 MV
MDC_IDC_SET_LEADCHNL_RV_PACING_AMPLITUDE: 2 V
MDC_IDC_SET_LEADCHNL_RV_PACING_ANODE_ELECTRODE_1: NORMAL
MDC_IDC_SET_LEADCHNL_RV_PACING_ANODE_LOCATION_1: NORMAL
MDC_IDC_SET_LEADCHNL_RV_PACING_CAPTURE_MODE: NORMAL
MDC_IDC_SET_LEADCHNL_RV_PACING_CATHODE_ELECTRODE_1: NORMAL
MDC_IDC_SET_LEADCHNL_RV_PACING_CATHODE_LOCATION_1: NORMAL
MDC_IDC_SET_LEADCHNL_RV_PACING_POLARITY: NORMAL
MDC_IDC_SET_LEADCHNL_RV_PACING_PULSEWIDTH: 0.4 MS
MDC_IDC_SET_LEADCHNL_RV_SENSING_ANODE_ELECTRODE_1: NORMAL
MDC_IDC_SET_LEADCHNL_RV_SENSING_ANODE_LOCATION_1: NORMAL
MDC_IDC_SET_LEADCHNL_RV_SENSING_CATHODE_ELECTRODE_1: NORMAL
MDC_IDC_SET_LEADCHNL_RV_SENSING_CATHODE_LOCATION_1: NORMAL
MDC_IDC_SET_LEADCHNL_RV_SENSING_POLARITY: NORMAL
MDC_IDC_SET_LEADCHNL_RV_SENSING_SENSITIVITY: 0.9 MV
MDC_IDC_SET_ZONE_DETECTION_INTERVAL: 350 MS
MDC_IDC_SET_ZONE_DETECTION_INTERVAL: 400 MS
MDC_IDC_SET_ZONE_TYPE: NORMAL
MDC_IDC_STAT_AT_BURDEN_PERCENT: 18.6 %
MDC_IDC_STAT_AT_DTM_END: NORMAL
MDC_IDC_STAT_AT_DTM_START: NORMAL
MDC_IDC_STAT_BRADY_AP_VP_PERCENT: 72.96 %
MDC_IDC_STAT_BRADY_AP_VS_PERCENT: 0.01 %
MDC_IDC_STAT_BRADY_AS_VP_PERCENT: 26.82 %
MDC_IDC_STAT_BRADY_AS_VS_PERCENT: 0.26 %
MDC_IDC_STAT_BRADY_DTM_END: NORMAL
MDC_IDC_STAT_BRADY_DTM_START: NORMAL
MDC_IDC_STAT_BRADY_RA_PERCENT_PACED: 63.43 %
MDC_IDC_STAT_BRADY_RV_PERCENT_PACED: 99.71 %
MDC_IDC_STAT_EPISODE_RECENT_COUNT: 0
MDC_IDC_STAT_EPISODE_RECENT_COUNT: 0
MDC_IDC_STAT_EPISODE_RECENT_COUNT: 8668
MDC_IDC_STAT_EPISODE_RECENT_COUNT_DTM_END: NORMAL
MDC_IDC_STAT_EPISODE_RECENT_COUNT_DTM_START: NORMAL
MDC_IDC_STAT_EPISODE_TOTAL_COUNT: 0
MDC_IDC_STAT_EPISODE_TOTAL_COUNT: 2
MDC_IDC_STAT_EPISODE_TOTAL_COUNT: NORMAL
MDC_IDC_STAT_EPISODE_TOTAL_COUNT_DTM_END: NORMAL
MDC_IDC_STAT_EPISODE_TOTAL_COUNT_DTM_START: NORMAL
MDC_IDC_STAT_EPISODE_TYPE: NORMAL

## 2022-07-06 ENCOUNTER — ANTICOAGULATION THERAPY VISIT (OUTPATIENT)
Dept: ANTICOAGULATION | Facility: CLINIC | Age: 82
End: 2022-07-06

## 2022-07-06 ENCOUNTER — LAB (OUTPATIENT)
Dept: LAB | Facility: CLINIC | Age: 82
End: 2022-07-06
Payer: MEDICARE

## 2022-07-06 DIAGNOSIS — Z95.3 S/P MITRAL VALVE REPLACEMENT WITH BIOPROSTHETIC VALVE: ICD-10-CM

## 2022-07-06 DIAGNOSIS — I48.91 ATRIAL FIBRILLATION AND FLUTTER (H): ICD-10-CM

## 2022-07-06 DIAGNOSIS — Z79.01 LONG TERM CURRENT USE OF ANTICOAGULANTS WITH INR GOAL OF 2.0-3.0: ICD-10-CM

## 2022-07-06 DIAGNOSIS — I48.92 ATRIAL FIBRILLATION AND FLUTTER (H): ICD-10-CM

## 2022-07-06 DIAGNOSIS — Z79.01 LONG TERM CURRENT USE OF ANTICOAGULANTS WITH INR GOAL OF 2.0-3.0: Primary | ICD-10-CM

## 2022-07-06 LAB — INR BLD: 2.4 (ref 0.9–1.1)

## 2022-07-06 PROCEDURE — 85610 PROTHROMBIN TIME: CPT

## 2022-07-06 PROCEDURE — 36415 COLL VENOUS BLD VENIPUNCTURE: CPT

## 2022-07-06 NOTE — PROGRESS NOTES
ANTICOAGULATION MANAGEMENT     Zunilda Alicea May 82 year old female is on warfarin with therapeutic INR result. (Goal INR 2.0-3.0)    Recent labs: (last 7 days)     07/06/22  0936   INR 2.4*       ASSESSMENT       Source(s): Chart Review and Patient/Caregiver Call       Warfarin doses taken: Warfarin taken as instructed    Diet: No new diet changes identified    New illness, injury, or hospitalization: No    Medication/supplement changes: None noted    Signs or symptoms of bleeding or clotting: No    Previous INR: Therapeutic last 2(+) visits    Additional findings: None       PLAN     Recommended plan for no diet, medication or health factor changes affecting INR     Dosing Instructions: continue your current warfarin dose with next INR in 6 weeks       Summary  As of 7/6/2022    Full warfarin instructions:  5 mg every Tue, Fri; 2.5 mg all other days   Next INR check:  8/17/2022             Telephone call with Deanne who verbalizes understanding and agrees to plan    Lab visit scheduled    Education provided: Please call back if any changes to your diet, medications or how you've been taking warfarin and Contact [phone]  with any changes, questions or concerns.     Plan made per [facility] anticoagulation protocol    Екатерина Mahan RN  Anticoagulation Clinic  7/6/2022    _______________________________________________________________________     Anticoagulation Episode Summary     Current INR goal:  2.0-3.0   TTR:  91.9 % (1 y)   Target end date:  Indefinite   Send INR reminders [facility]:  [facility]    Indications    Long term current use of anticoagulants with INR goal of 2.0-3.0 [Z79.01]  Atrial fibrillation and flutter (H) [I48.91  I48.92]  S/P mitral valve replacement with bioprosthetic valve [Z95.3]           Comments:           Anticoagulation Care Providers     Provider Role Specialty Phone number    Yunior Huang MD Referring Internal Medicine [phone]

## 2022-07-12 ENCOUNTER — OFFICE VISIT (OUTPATIENT)
Dept: INTERNAL MEDICINE | Facility: CLINIC | Age: 82
End: 2022-07-12
Payer: MEDICARE

## 2022-07-12 VITALS
WEIGHT: 148.6 LBS | OXYGEN SATURATION: 99 % | BODY MASS INDEX: 25.37 KG/M2 | SYSTOLIC BLOOD PRESSURE: 120 MMHG | HEIGHT: 64 IN | TEMPERATURE: 98.4 F | HEART RATE: 86 BPM | RESPIRATION RATE: 16 BRPM | DIASTOLIC BLOOD PRESSURE: 80 MMHG

## 2022-07-12 DIAGNOSIS — Z00.00 ENCOUNTER FOR PREVENTATIVE ADULT HEALTH CARE EXAMINATION: Primary | ICD-10-CM

## 2022-07-12 DIAGNOSIS — E03.9 ACQUIRED HYPOTHYROIDISM: ICD-10-CM

## 2022-07-12 DIAGNOSIS — I48.92 ATRIAL FIBRILLATION AND FLUTTER (H): ICD-10-CM

## 2022-07-12 DIAGNOSIS — M06.9 RHEUMATOID ARTHRITIS, INVOLVING UNSPECIFIED SITE, UNSPECIFIED WHETHER RHEUMATOID FACTOR PRESENT (H): ICD-10-CM

## 2022-07-12 DIAGNOSIS — I48.91 ATRIAL FIBRILLATION AND FLUTTER (H): ICD-10-CM

## 2022-07-12 DIAGNOSIS — N18.30 STAGE 3 CHRONIC KIDNEY DISEASE, UNSPECIFIED WHETHER STAGE 3A OR 3B CKD (H): ICD-10-CM

## 2022-07-12 DIAGNOSIS — I10 BENIGN ESSENTIAL HYPERTENSION: ICD-10-CM

## 2022-07-12 PROCEDURE — G0439 PPPS, SUBSEQ VISIT: HCPCS | Performed by: INTERNAL MEDICINE

## 2022-07-12 ASSESSMENT — ENCOUNTER SYMPTOMS
HEMATOCHEZIA: 0
HEMATURIA: 0
ABDOMINAL PAIN: 0
CHILLS: 0

## 2022-07-12 ASSESSMENT — ACTIVITIES OF DAILY LIVING (ADL): CURRENT_FUNCTION: HOUSEWORK REQUIRES ASSISTANCE

## 2022-07-12 NOTE — PROGRESS NOTES
"SUBJECTIVE:   Zunilda Clark is a 82 year old female who presents for Preventive Visit.      Patient has been advised of split billing requirements and indicates understanding: Yes  Are you in the first 12 months of your Medicare coverage?  No    Healthy Habits:     In general, how would you rate your overall health?  Good    Frequency of exercise:  2-3 days/week    Duration of exercise:  15-30 minutes    Do you usually eat at least 4 servings of fruit and vegetables a day, include whole grains    & fiber and avoid regularly eating high fat or \"junk\" foods?  No    Taking medications regularly:  Yes    Medication side effects:  Muscle aches    Ability to successfully perform activities of daily living:  Housework requires assistance    Home Safety:  No safety concerns identified    Hearing Impairment:  No hearing concerns    In the past 6 months, have you been bothered by leaking of urine? Yes    In general, how would you rate your overall mental or emotional health?  Excellent      PHQ-2 Total Score: 0    Additional concerns today:  Yes    Do you feel safe in your environment? Yes    Have you ever done Advance Care Planning? (For example, a Health Directive, POLST, or a discussion with a medical provider or your loved ones about your wishes): Yes, advance care planning is on file.       Fall risk  Fallen 2 or more times in the past year?: No  Any fall with injury in the past year?: No    Cognitive Screening   1) Repeat 3 items (Leader, Season, Table)    2) Clock draw: NORMAL  3) 3 item recall: Recalls 3 objects  Results: 3 items recalled: COGNITIVE IMPAIRMENT LESS LIKELY    Mini-CogTM Copyright BIANCA Gomez. Licensed by the author for use in [facility]; reprinted with permission (lizzie@.[facility]). All rights reserved.      Do you have sleep apnea, excessive snoring or daytime drowsiness?: yes, a little daytime drowsiness occassionally    Reviewed and updated as needed this visit by clinical staff   Tobacco "  Allergies  Meds    Surg Hx  Fam Hx  Soc Hx          Reviewed and updated as needed this visit by Provider                   Social History     Tobacco Use     Smoking status: Never Smoker     Smokeless tobacco: Never Used   Substance Use Topics     Alcohol use: No     Alcohol/week: 0.0 standard drinks     If you drink alcohol do you typically have >3 drinks per day or >7 drinks per week? No    Alcohol Use 7/12/2022   Prescreen: >3 drinks/day or >7 drinks/week? No   Prescreen: >3 drinks/day or >7 drinks/week? -           PROBLEMS TO ADD ON...  -------------------------------------  Has history of atrial fibrillation. On anticoagulation with Coumadin and rate control medications. Asymptonatic - no chest pains , palpitations,  no side effects from medications.  Has h/o HTN. on medical treatment. BP has been controlled. No side effects from medications. No CP, HA, dizziness. good compliance with medications and low salt diet.  Has history of hypothyroidism. On replacement treatment with Synthroid. No heat /cold intolerance, heart palpitations, weight loss/ gain ,  change in bowel habits.  Has h/o CRF. Monitoring BP, BG, medications, avoiding OTC NSAIDs. Needs periodic recheck of kidney function.  Has h/o RA. Follows with rheumatology.     Current providers sharing in care for this patient include:   Patient Care Team:  Yunior Huang MD as PCP - General (Internal Medicine)  Jaycee Cervantes MD as MD (Rheumatology)  Yunior Huang MD as Assigned PCP  Zena Benitez PA as Assigned Heart and Vascular Provider  Wilfredo Cooper Do, DO  Noe Rodriguez MD as MD (Cardiovascular Disease)  Noe Rodriguez MD as MD (Cardiovascular Disease)  Kelton William MD as Assigned Surgical Provider    The following health maintenance items are reviewed in Epic and correct as of today:  Health Maintenance Due   Topic Date Due     MICROALBUMIN  Never done     ANNUAL REVIEW OF HM ORDERS  Never done     COVID-19 Vaccine (4 - Booster  "for Pfizer series) 12/29/2021     BMP  07/11/2022     MEDICARE ANNUAL WELLNESS VISIT  06/22/2022     Lab work is in process  Labs reviewed in EPIC        Pertinent mammograms are reviewed under the imaging tab.    Review of Systems   Constitutional: Negative for chills.   HENT: Negative for congestion.    Cardiovascular: Negative for chest pain.   Gastrointestinal: Negative for abdominal pain and hematochezia.   Genitourinary: Negative for hematuria.         OBJECTIVE:   /80   Pulse 86   Temp 98.4  F (36.9  C) (Tympanic)   Resp 16   Ht 1.626 m (5' 4\")   Wt 67.4 kg (148 lb 9.6 oz)   LMP  (LMP Unknown)   SpO2 99%   BMI 25.51 kg/m   Estimated body mass index is 25.51 kg/m  as calculated from the following:    Height as of this encounter: 1.626 m (5' 4\").    Weight as of this encounter: 67.4 kg (148 lb 9.6 oz).  Physical Exam  GENERAL: frail, alert and no distress  EYES: Eyes grossly normal to inspection, PERRL and conjunctivae and sclerae normal  HENT: ear canals and TM's normal, nose and mouth without ulcers or lesions  NECK: no adenopathy, no asymmetry, masses, or scars and thyroid normal to palpation  RESP: lungs clear to auscultation - no rales, rhonchi or wheezes  BREAST: normal without masses, tenderness or nipple discharge and no palpable axillary masses or adenopathy  CV: regular rate and rhythm, normal S1 S2, no S3 or S4, 4/6 systolic murmur, trace peripheral edema and peripheral pulses strong  ABDOMEN: soft, nontender, no hepatosplenomegaly, no masses and bowel sounds normal  MS: no gross musculoskeletal defects noted  SKIN: no suspicious lesions or rashes  NEURO: Normal strength and tone, mentation intact and speech normal  PSYCH: mentation appears normal, affect normal/bright    Diagnostic Test Results:  Labs reviewed in Epic    ASSESSMENT / PLAN:       ICD-10-CM    1. Encounter for preventative adult health care examination  Z00.00    2. Acquired hypothyroidism  E03.9    3. Benign essential " "hypertension  I10    4. Atrial fibrillation and flutter (H)  I48.91     I48.92    5. Rheumatoid arthritis, involving unspecified site, unspecified whether rheumatoid factor present (H)  M06.9    6. Stage 3 chronic kidney disease, unspecified whether stage 3a or 3b CKD (H)  N18.30        Patient has been advised of split billing requirements and indicates understanding: Yes    COUNSELING:  Reviewed preventive health counseling, as reflected in patient instructions       Regular exercise       Healthy diet/nutrition       Vision screening       Hearing screening       Dental care    Estimated body mass index is 25.51 kg/m  as calculated from the following:    Height as of this encounter: 1.626 m (5' 4\").    Weight as of this encounter: 67.4 kg (148 lb 9.6 oz).        She reports that she has never smoked. She has never used smokeless tobacco.      Appropriate preventive services were discussed with this patient, including applicable screening as appropriate for cardiovascular disease, diabetes, osteopenia/osteoporosis, and glaucoma.  As appropriate for age/gender, discussed screening for colorectal cancer, prostate cancer, breast cancer, and cervical cancer. Checklist reviewing preventive services available has been given to the patient.    Reviewed patients plan of care and provided an AVS. The Intermediate Care Plan ( asthma action plan, low back pain action plan, and migraine action plan) for Zunilda meets the Care Plan requirement. This Care Plan has been established and reviewed with the Patient.    Counseling Resources:  ATP IV Guidelines  Pooled Cohorts Equation Calculator  Breast Cancer Risk Calculator  Breast Cancer: Medication to Reduce Risk  FRAX Risk Assessment  ICSI Preventive Guidelines  Dietary Guidelines for Americans, 2010  [employer]'s MyPlate  ASA Prophylaxis  Lung CA Screening    Yunior Huang MD  [facility]    Identified Health Risks:  "

## 2022-07-27 DIAGNOSIS — I10 BENIGN ESSENTIAL HYPERTENSION: ICD-10-CM

## 2022-07-27 DIAGNOSIS — I27.20 PULMONARY HTN (H): ICD-10-CM

## 2022-07-27 RX ORDER — AMLODIPINE BESYLATE 2.5 MG/1
2.5 TABLET ORAL DAILY
Qty: 90 TABLET | Refills: 1 | Status: SHIPPED | OUTPATIENT
Start: 2022-07-27 | End: 2022-08-25

## 2022-07-27 NOTE — TELEPHONE ENCOUNTER
Received refill request for:  Amlodipine  Last OV was: 1/31/22 YOKASTA Maddox  Labs/EKG: N/a  F/U scheduled:   Date Time Provider Department Center   8/17/2022  9:45 AM RI LAB [facility]   8/24/2022 10:15 AM RU LAB [facility]   8/25/2022  7:45 AM Noe Rodriguez MD [facility]   9/15/2022 12:00 AM [facility] [address] [place] [address] PSA CLIN     Pharmacy: [facility] Cardiology Refill Guideline reviewed.  Medication meets criteria for refill.    Alannah Howard RN, BSN  07/27/22 at 8:33 AM

## 2022-08-01 DIAGNOSIS — I10 BENIGN ESSENTIAL HYPERTENSION: ICD-10-CM

## 2022-08-01 RX ORDER — AMLODIPINE AND VALSARTAN 5; 160 MG/1; MG/1
1 TABLET ORAL DAILY
Qty: 90 TABLET | Refills: 3 | Status: SHIPPED | OUTPATIENT
Start: 2022-08-01 | End: 2022-08-25

## 2022-08-01 NOTE — TELEPHONE ENCOUNTER
Medication Refilled: Amlodipine-valsartan   Last office visit: 1/31/22 with Zena Benitez PA-C   Last Labs/EKG: NA  Next office visit: 8/25/22 with Dr. Rodriguez   Pharmacy sent to: Yahaira Graves RN

## 2022-08-09 DIAGNOSIS — I48.91 ATRIAL FIBRILLATION AND FLUTTER (H): ICD-10-CM

## 2022-08-09 DIAGNOSIS — I48.92 ATRIAL FIBRILLATION AND FLUTTER (H): ICD-10-CM

## 2022-08-09 DIAGNOSIS — Z79.01 LONG TERM CURRENT USE OF ANTICOAGULANTS WITH INR GOAL OF 2.0-3.0: ICD-10-CM

## 2022-08-10 ENCOUNTER — TRANSFERRED RECORDS (OUTPATIENT)
Dept: HEALTH INFORMATION MANAGEMENT | Facility: CLINIC | Age: 82
End: 2022-08-10

## 2022-08-10 DIAGNOSIS — I27.20 PULMONARY HTN (H): ICD-10-CM

## 2022-08-10 RX ORDER — TORSEMIDE 10 MG/1
10 TABLET ORAL
Qty: 90 TABLET | Refills: 1 | Status: SHIPPED | OUTPATIENT
Start: 2022-08-10 | End: 2022-08-25

## 2022-08-10 RX ORDER — WARFARIN SODIUM 2.5 MG/1
TABLET ORAL
Qty: 110 TABLET | Refills: 1 | Status: SHIPPED | OUTPATIENT
Start: 2022-08-10 | End: 2023-01-05

## 2022-08-10 NOTE — TELEPHONE ENCOUNTER
Received refill request for:  [place]  Last OV was: 1/31/22 YOKASTA Maddox  Labs/EKG: BMP 1/11/22  F/U scheduled:   Date Time Provider Department Center   8/17/2022  9:45 AM RI LAB [facility]   8/24/2022 10:15 AM RU LAB [facility]   8/25/2022  7:45 AM Noe Rodriguez MD [facility] PSA CLIN   9/15/2022 12:00 AM [facility] [place] [address] PSA CLIN     Pharmacy: [facility] Cardiology Refill Guideline reviewed.  Medication meets criteria for refill.    Alannah Howard RN, BSN  08/10/22 at 11:56 AM

## 2022-08-10 NOTE — TELEPHONE ENCOUNTER
ANTICOAGULATION MANAGEMENT:  Medication Refill    Anticoagulation Summary  As of 7/6/2022    Warfarin maintenance plan:  5 mg (2.5 mg x 2) every Tue, Fri; 2.5 mg (2.5 mg x 1) all other days   Next INR check:  8/17/2022   Target end date:  Indefinite    Indications    Long term current use of anticoagulants with INR goal of 2.0-3.0 [Z79.01]  Atrial fibrillation and flutter (H) [I48.91  I48.92]  S/P mitral valve replacement with bioprosthetic valve [Z95.3]             Anticoagulation Care Providers     Provider Role Specialty Phone number    Yunior Huang MD Referring Internal Medicine [phone]          Visit with referring provider/group within last year: Yes    [facility] referral signed within last year: Yes    Zunilda meets all criteria for refill (current [facility] referral, office visit with referring provider/group in last year, lab monitoring up to date or not exceeding 2 weeks overdue). Rx instructions and quantity supplied updated to match patient's current dosing plan. Warfarin 90 day supply with 1 refill granted per [facility] protocol     Maranda Garland RN  Anticoagulation Clinic

## 2022-08-17 ENCOUNTER — LAB (OUTPATIENT)
Dept: LAB | Facility: CLINIC | Age: 82
End: 2022-08-17
Payer: MEDICARE

## 2022-08-17 ENCOUNTER — ANTICOAGULATION THERAPY VISIT (OUTPATIENT)
Dept: ANTICOAGULATION | Facility: CLINIC | Age: 82
End: 2022-08-17

## 2022-08-17 DIAGNOSIS — I48.92 ATRIAL FIBRILLATION AND FLUTTER (H): ICD-10-CM

## 2022-08-17 DIAGNOSIS — I48.91 ATRIAL FIBRILLATION AND FLUTTER (H): ICD-10-CM

## 2022-08-17 DIAGNOSIS — Z95.3 S/P MITRAL VALVE REPLACEMENT WITH BIOPROSTHETIC VALVE: ICD-10-CM

## 2022-08-17 DIAGNOSIS — Z79.01 LONG TERM CURRENT USE OF ANTICOAGULANTS WITH INR GOAL OF 2.0-3.0: Primary | ICD-10-CM

## 2022-08-17 DIAGNOSIS — I27.20 PULMONARY HTN (H): ICD-10-CM

## 2022-08-17 DIAGNOSIS — I10 BENIGN ESSENTIAL HYPERTENSION: ICD-10-CM

## 2022-08-17 DIAGNOSIS — R06.09 DYSPNEA ON EXERTION: ICD-10-CM

## 2022-08-17 DIAGNOSIS — Z79.01 LONG TERM CURRENT USE OF ANTICOAGULANTS WITH INR GOAL OF 2.0-3.0: ICD-10-CM

## 2022-08-17 LAB
INR BLD: 2.7 (ref 0.9–1.1)
NT-PROBNP SERPL-MCNC: 1290 PG/ML (ref 0–450)

## 2022-08-17 PROCEDURE — 80048 BASIC METABOLIC PNL TOTAL CA: CPT

## 2022-08-17 PROCEDURE — 36415 COLL VENOUS BLD VENIPUNCTURE: CPT

## 2022-08-17 PROCEDURE — 83880 ASSAY OF NATRIURETIC PEPTIDE: CPT

## 2022-08-17 PROCEDURE — 85610 PROTHROMBIN TIME: CPT

## 2022-08-17 NOTE — PROGRESS NOTES
ANTICOAGULATION MANAGEMENT     Zunilda Alicea May 82 year old female is on warfarin with therapeutic INR result. (Goal INR 2.0-3.0)    Recent labs: (last 7 days)     08/17/22  0947   INR 2.7*       ASSESSMENT       Source(s): Chart Review and Patient/Caregiver Call       Warfarin doses taken: Warfarin taken as instructed    Diet: No new diet changes identified    New illness, injury, or hospitalization: No    Medication/supplement changes: None noted    Signs or symptoms of bleeding or clotting: No    Previous INR: Therapeutic last 2(+) visits    Additional findings: None       PLAN     Recommended plan for no diet, medication or health factor changes affecting INR     Dosing Instructions: Continue your current warfarin dose with next INR in 6 weeks       Summary  As of 8/17/2022    Full warfarin instructions:  5 mg every Tue, Fri; 2.5 mg all other days   Next INR check:  9/28/2022             Telephone call with Deanne who verbalizes understanding and agrees to plan    Lab visit scheduled    Education provided: Goal range and significance of current result and Contact [phone]  with any changes, questions or concerns.     Plan made per [facility] anticoagulation protocol    Ronel Yao RN  Anticoagulation Clinic  8/17/2022    _______________________________________________________________________     Anticoagulation Episode Summary     Current INR goal:  2.0-3.0   TTR:  96.9 % (1 y)   Target end date:  Indefinite   Send INR reminders to:  [facility]    Indications    Long term current use of anticoagulants with INR goal of 2.0-3.0 [Z79.01]  Atrial fibrillation and flutter (H) [I48.91  I48.92]  S/P mitral valve replacement with bioprosthetic valve [Z95.3]           Comments:           Anticoagulation Care Providers     Provider Role Specialty Phone number    Yunior Huang MD Referring Internal Medicine [phone]

## 2022-08-18 LAB
ANION GAP SERPL CALCULATED.3IONS-SCNC: 7 MMOL/L (ref 3–14)
BUN SERPL-MCNC: 21 MG/DL (ref 7–30)
CALCIUM SERPL-MCNC: 9.4 MG/DL (ref 8.5–10.1)
CHLORIDE BLD-SCNC: 107 MMOL/L (ref 94–109)
CO2 SERPL-SCNC: 26 MMOL/L (ref 20–32)
CREAT SERPL-MCNC: 0.92 MG/DL (ref 0.52–1.04)
GFR SERPL CREATININE-BSD FRML MDRD: 62 ML/MIN/1.73M2
GLUCOSE BLD-MCNC: 91 MG/DL (ref 70–99)
POTASSIUM BLD-SCNC: 3.7 MMOL/L (ref 3.4–5.3)
SODIUM SERPL-SCNC: 140 MMOL/L (ref 133–144)

## 2022-08-25 ENCOUNTER — OFFICE VISIT (OUTPATIENT)
Dept: CARDIOLOGY | Facility: CLINIC | Age: 82
End: 2022-08-25
Payer: MEDICARE

## 2022-08-25 VITALS
WEIGHT: 150.1 LBS | SYSTOLIC BLOOD PRESSURE: 122 MMHG | BODY MASS INDEX: 25.62 KG/M2 | DIASTOLIC BLOOD PRESSURE: 74 MMHG | HEART RATE: 84 BPM | HEIGHT: 64 IN

## 2022-08-25 DIAGNOSIS — I48.20 CHRONIC ATRIAL FIBRILLATION (H): ICD-10-CM

## 2022-08-25 DIAGNOSIS — I10 BENIGN ESSENTIAL HYPERTENSION: ICD-10-CM

## 2022-08-25 DIAGNOSIS — I27.20 PULMONARY HTN (H): ICD-10-CM

## 2022-08-25 PROCEDURE — 99214 OFFICE O/P EST MOD 30 MIN: CPT | Performed by: INTERNAL MEDICINE

## 2022-08-25 RX ORDER — AMLODIPINE AND VALSARTAN 5; 160 MG/1; MG/1
1 TABLET ORAL DAILY
Qty: 90 TABLET | Refills: 3 | Status: SHIPPED | OUTPATIENT
Start: 2022-08-25 | End: 2023-01-06

## 2022-08-25 RX ORDER — AMLODIPINE BESYLATE 2.5 MG/1
2.5 TABLET ORAL DAILY
Qty: 90 TABLET | Refills: 3 | Status: SHIPPED | OUTPATIENT
Start: 2022-08-25 | End: 2023-01-06

## 2022-08-25 RX ORDER — TORSEMIDE 10 MG/1
10 TABLET ORAL
Qty: 90 TABLET | Refills: 3 | Status: SHIPPED | OUTPATIENT
Start: 2022-08-25 | End: 2023-01-06

## 2022-08-25 RX ORDER — METOPROLOL TARTRATE 50 MG
50 TABLET ORAL 2 TIMES DAILY
Qty: 180 TABLET | Refills: 3 | Status: SHIPPED | OUTPATIENT
Start: 2022-08-25 | End: 2023-01-06

## 2022-08-25 NOTE — PATIENT INSTRUCTIONS
August 25, 2022    Thank you for allowing our Cardiology team to participate in your care.     Please note the following changes to your heart treatment plan:     Medication changes:   - none    Tests to be done:  - NON-fasting labs in 6 months  - TTE (heart ultrasound) in 1 year    Follow up:  - Follow up in 6 months with Zena TEMPLETON and with me in 1 year, or sooner as needed.      Please contact our team at [phone] or [phone] for any questions or concerns.   For scheduling, please call [phone].  If you are having a medical emergency, please call [phone].     Sincerely,    Noe Rodriguez MD, [facility]  Cardiology    [facility] - [facility] - [facility] [place]

## 2022-08-25 NOTE — LETTER
8/25/2022    Yunior Huang MD  [address] [facility] [address]    RE: Zunilda Clark       Dear Colleague,     I had the pleasure of seeing Zunilda Clark in the [facility].      Cardiology Clinic Progress Note:    August 25, 2022   Patient Name: Zunilda Clark  Patient MRN: [id number]     Consult indication: PH, MVR, TV annuloplasty     HPI:    I had the opportunity to see patient Zunilda Clark in cardiology clinic for a follow up visit. Patient is followed by our colleague Yunior Huang MD with Primary Care.     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      The patient has a complex cardiac history, notable for valvular heart disease with a prior mitral valve replacement, initially in 2007, as well as a redo mitral valve replacement with a 27 mm Magna bioprosthetic valve (2014).  She is also status post tricuspid valve annuloplasty with residual moderate to severe tricuspid regurgitation, cardiac MRI with mildly dilated right ventricle but normal systolic function (CMR 07/2018).  The patient also has a history of pacemaker placement for high-degree AV block after initial cardiac surgery in 2007.  Patient does note that the device was quite loose after initial implantation and she may have flipped this around once or twice.      She was previously followed by my late colleague, Dr. Das, in clinic on 01/10/2020.  At that time, she was doing quite well and no changes were warranted to her  "cardiac regimen.  Previously, she was seen by my colleague, Dr. Monroy, in clinic regarding pulmonary hypertension, overall clinical presentation seemed most consistent with group 2 pulmonary hypertension related to valve disease.     I had seen her in clinic on 1/19/2021.  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  She was seen in consultation by my colleague Dr. Cowan and underwent generator replacement uneventfully.     When I had seen her last in clinic on 7/90/2021, she had been doing well, no changes were warranted to her cardiac regimen.  Since then, she has been followed closely by my colleague YOKASTA Salcido.  Overall patient reports that she continues to do well.  She denies any chest pain, chest pressure, abnormal shortness of breath.  Weight has been stable at home around 145 to 150 pounds.  She denies symptoms of orthopnea/PND, abnormal lower extremity swelling.  She did have some unsteadiness over a week ago, when she was trying to put on pajamas too quickly, she lost her footing and did fall.  She was helped up with paramedics, and did not require transfer to ED.  She has recovered since then, denies any headache, acute vision changes, muscle weakness, focal change in sensation.  She continues to walk the halls of her living facility for exercise without issue.     Last echocardiogram 1/7/2022 demonstrates LVEF 55 to 60% with basal inferior hypokinesis, severe pulmonary hypertension, moderate aortic stenosis, normal functioning bioprosthetic mitral valve.  Nuclear stress test 1/14/2022 did not demonstrate any obvious evidence of significant myocardial ischemia or infarction.    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  TTE 01/2021 showed mild progression with worsening RV function, elevated right-sided pressures.  However, findings seem " similar when compared to prior studies including a cardiac MRI 07/2018.  Previously seen by my colleague Dr. Monroy in clinic.  The patient denies any symptoms concerning for decompensation, in fact she feels overall quite well.     # Rheumatic heart disease, history of bioprosthetic mitral valve replacement in 2007, redo in 2014 with a 27 mm Magna bioprosthetic mitral valve.  Mean gradient 5 mmHg, TTE 01/2021.  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   # High-degree AV block after cardiac surgery in 2007, status post dual-chamber pacemaker placement that had recently reached end-of-life.  Now status post pacemaker generator replacement 3/8/2021.   #  Rheumatoid arthritis.   #  Hypertension.  Stable.    #  Atrial fibrillation, on warfarin.     -Overall patient is in stable cardiovascular health without symptoms concerning for angina or decompensated heart failure  - Will continue current cardiac regimen of aspirin, warfarin, amlodipine-valsartan, metoprolol tartrate, torsemide  - Counseled on endocarditis prophylaxis  - Follow-up in 6 months with YOKASTA Salcido, and with me in 1 year with repeat TTE at that time, or sooner as needed    Thank you for allowing our team to participate in the care of Zunilda Clark.  Please do not hesitate to call or page me with any questions or concerns.    Sincerely,     Noe Rodriguez MD, [facility]  Cardiology  Text Page   August 25, 2022    Voice recognition software utilized.     Total time spent on this encounter: 35 minutes, providing care in this encounter including, but not limited to, reviewing prior medical records, laboratory data, imaging studies, diagnostic studies, procedure notes, formulating an assessment and plan, recommendations, discussion and counseling with patient face to face, dictation.    Past Medical History:     Past Medical History:   Diagnosis Date     Acquired hypothyroidism  11/4/2015     Aortic valve insufficiency      Arrhythmia     A fib     Arthritis      Atrial fibrillation (H)      Atrial fibrillation and flutter (H)      Blood clotting disorder (H)      CHF (congestive heart failure) (H)      DVT (deep venous thrombosis) (H) 2003     Gastro-oesophageal reflux disease      H/O mitral valve replacement with tissue graft june 2014     History of blood transfusion 2014     HTN (hypertension)      Hypothyroidism      Other chronic pain      PONV (postoperative nausea and vomiting)      Pulmonary HTN (H)      Rheumatoid arthritis (H)      Rheumatoid arthritis(714.0)      Seizures (H) 2014     Shingles 08/2018     Stroke (H) 2009    left side mild weakness      Stroke (H) 2014     [place]ope 2011    [place]     Thrombosis of leg 2003    both legs        Past Surgical History:   Past Surgical History:   Procedure Laterality Date     ABDOMEN SURGERY      prolapsed bladder     APPLY WOUND VAC Left 12/18/2018    Procedure: Wound vac application;  Surgeon: Pardeep Sheikh MD;  Location:  OR     ARTHROPLASTY KNEE Left 11/12/2018    Procedure: Left total knee arthroplasty using a [employer] Melissa II knee system;  Surgeon: Pardeep Sheikh MD;  Location:  OR     ARTHROSCOPY KNEE WITH DEBRIDEMENT JOINT, COMBINED Left 12/18/2018    Procedure: Left knee debridement and placement of wound vac;  Surgeon: Pardeep Sheikh MD;  Location:  OR     CATARACT IOL, RT/LT       COLONOSCOPY  3/15/2012     EP PPM GENERATOR CHANGE SINGLE N/A 3/8/2021    Procedure: Permanent Pacemakers  Generator Change Dual Chamber;  Surgeon: Tamiko Cowan MD;  Location: [facility] CARDIAC CATH LAB     ESOPHAGOSCOPY, GASTROSCOPY, DUODENOSCOPY (EGD), COMBINED N/A 2/19/2020    Procedure: ESOPHAGOGASTRODUODENOSCOPY (EGD);  Surgeon: Michael Mccarthy MD;  Location:  GI     EYE SURGERY  2018    Both eyes/cataract surgery     H ABLATION AV NODE  2011    AFlutter with  syncope, PPM to follow     HERNIA REPAIR      3 hernies/right and left & umbilical     IMPLANT PACEMAKER  2011     LAPAROSCOPIC CHOLECYSTECTOMY WITH CHOLANGIOGRAMS N/A 4/29/2017    Procedure: LAPAROSCOPIC CHOLECYSTECTOMY WITH CHOLANGIOGRAMS;  LAPAROSCOPIC CHOLECYSTECTOMY WITH CHOLANGIOGRAMS ;  Surgeon: Angeles Mcgill MD;  Location: [facility] OR     OPEN REDUCTION INTERNAL FIXATION RODDING INTRAMEDULLARY HUMERUS Right 7/28/2015    Procedure: OPEN REDUCTION INTERNAL FIXATION RODDING INTRAMEDULLARY HUMERUS;  Surgeon: Raymundo Rivera MD;  Location: [facility] OR     REPLACE VALVE MITRAL  2006    Mitral valve replacement with bioprosthetic valve in 2008 for rheumatic disease     SLING BLADDER SUSPENSION WITH FASCIA UMESH       VASCULAR SURGERY  2003    Blood clots in both legs       Medications (outpatient):  Current Outpatient Medications   Medication Sig Dispense Refill     ALLOPURINOL PO Take 100 mg by mouth 2 times daily       amLODIPine (NORVASC) 2.5 MG tablet Take 1 tablet (2.5 mg) by mouth daily 90 tablet 1     amLODIPine-valsartan (EXFORGE) 5-160 MG tablet Take 1 tablet by mouth daily 90 tablet 3     calcium citrate (CALCITRATE) 950 MG tablet Take 1 tablet by mouth daily        Dorzolamide HCl-Timolol Mal (COSOPT OP)        famotidine (PEPCID) 40 MG tablet Take 1 tablet (40 mg) by mouth daily 90 tablet 3     folic acid (FOLVITE) 1 MG tablet Take 1 mg by mouth daily       levothyroxine (SYNTHROID/LEVOTHROID) 112 MCG tablet Take 1 tablet (112 mcg) by mouth daily 90 tablet 3     METHOTREXATE SODIUM IJ Inject 0.8 mLs as directed once a week Thursdays       metoprolol tartrate (LOPRESSOR) 50 MG tablet Take 1 tablet (50 mg) by mouth 2 times daily 180 tablet 3     multivitamin w/minerals (THERA-VIT-M) tablet Take 1 tablet by mouth daily Without iron       Timolol-Dorzolamid-Latanoprost 0.5-0.15-0.005 % SOLN        torsemide (DEMADEX) 10 MG tablet Take 1 tablet (10 mg) by mouth daily (with breakfast) 90 tablet 1     VITAMIN D,  "CHOLECALCIFEROL, PO Take 1,000 Units by mouth daily       warfarin ANTICOAGULANT (COUMADIN) 2.5 MG tablet TAKE ONE TABLET BY MOUTH DAILY EXCEPT TAKE 2 TABLETS TUE AND FRI OR AS DIRECTED BY  tablet 1     benzonatate (TESSALON) 100 MG capsule Take 1 capsule (100 mg) by mouth 3 times daily as needed (Patient not taking: No sig reported) 30 capsule 0     latanoprostene bunod (VYZULTA) 0.024 % SOLN ophthalmic solution Place 1 drop Into the left eye At Bedtime (Patient not taking: No sig reported)       TIMOLOL MALEATE OP  (Patient not taking: Reported on 2022)         Allergies:  No Known Allergies    Social History:   History   Drug Use No      History   Smoking Status     Never Smoker   Smokeless Tobacco     Never Used     Social History    Substance and Sexual Activity      Alcohol use: No        Alcohol/week: 0.0 standard drinks       Family History:  Family History   Problem Relation Age of Onset     Coronary Artery Disease Mother      Coronary Artery Disease Brother      Diabetes Brother      Cancer Brother          at age 52      Other Cancer Brother      Hypertension Sister      Hyperlipidemia Sister        Review of Systems:   A complete review of systems was negative except as mentioned in the History of Present Illness.     Objective & Physical Exam:  /74   Pulse 84   Ht 1.626 m (5' 4\")   Wt 68.1 kg (150 lb 1.6 oz)   LMP  (LMP Unknown)   BMI 25.76 kg/m    Wt Readings from Last 2 Encounters:   22 68.1 kg (150 lb 1.6 oz)   22 67.4 kg (148 lb 9.6 oz)     Body mass index is 25.76 kg/m .   Body surface area is 1.75 meters squared.    Constitutional: appears stated age, in no apparent distress, appears to be well nourished  Head: normocephalic, atraumatic  Neck: supple, trachea midline, no bruit bilaterally   Pulmonary: clear to auscultation bilaterally, no wheezes, no rales, no increased work of breathing  Cardiovascular: regular rate, regular rhythm, 1/6 JON at the RUSB, no " lower extremity edema  Gastrointestinal: no guarding, non-rigid   Neurologic: awake, alert, moves all extremities  Skin: no jaundice, warm on limited exam  Psychiatric: affect is normal, answers questions appropriately, oriented to self and place    Data reviewed:  Lab Results   Component Value Date    WBC 9.0 03/25/2022    WBC 7.1 06/22/2021    RBC 3.34 (L) 03/25/2022    RBC 3.75 (L) 06/22/2021    HGB 11.5 (L) 03/25/2022    HGB 12.5 06/22/2021    HCT 35.0 03/25/2022    HCT 38.6 06/22/2021     (H) 03/25/2022     (H) 06/22/2021    MCH 34.4 (H) 03/25/2022    MCH 33.3 (H) 06/22/2021    MCHC 32.9 03/25/2022    MCHC 32.4 06/22/2021    RDW 16.4 (H) 03/25/2022    RDW 17.0 (H) 06/22/2021     03/25/2022     06/22/2021     Sodium   Date Value Ref Range Status   08/17/2022 140 133 - 144 mmol/L Final   06/22/2021 138 133 - 144 mmol/L Final     Potassium   Date Value Ref Range Status   08/17/2022 3.7 3.4 - 5.3 mmol/L Final   06/22/2021 3.8 3.4 - 5.3 mmol/L Final     Chloride   Date Value Ref Range Status   08/17/2022 107 94 - 109 mmol/L Final   06/22/2021 104 94 - 109 mmol/L Final     Carbon Dioxide   Date Value Ref Range Status   06/22/2021 33 (H) 20 - 32 mmol/L Final     Carbon Dioxide (CO2)   Date Value Ref Range Status   08/17/2022 26 20 - 32 mmol/L Final     Anion Gap   Date Value Ref Range Status   08/17/2022 7 3 - 14 mmol/L Final   06/22/2021 1 (L) 3 - 14 mmol/L Final     Glucose   Date Value Ref Range Status   08/17/2022 91 70 - 99 mg/dL Final   06/22/2021 88 70 - 99 mg/dL Final     Urea Nitrogen   Date Value Ref Range Status   08/17/2022 21 7 - 30 mg/dL Final   06/22/2021 27 7 - 30 mg/dL Final     Creatinine   Date Value Ref Range Status   08/17/2022 0.92 0.52 - 1.04 mg/dL Final   06/22/2021 1.11 (H) 0.52 - 1.04 mg/dL Final     GFR Estimate   Date Value Ref Range Status   08/17/2022 62 >60 mL/min/1.73m2 Final     Comment:     Effective December 21, 2021 eGFRcr in adults is calculated using  the 2021 CKD-EPI creatinine equation which includes age and gender (Silvia et al., [place], DOI: 10.[facility]6/[id number])   06/28/2021 50.9 ml/min/1.73m2 Final     Calcium   Date Value Ref Range Status   08/17/2022 9.4 8.5 - 10.1 mg/dL Final   06/22/2021 9.6 8.5 - 10.1 mg/dL Final     Bilirubin Total   Date Value Ref Range Status   06/22/2021 0.4 0.2 - 1.3 mg/dL Final     Alkaline Phosphatase   Date Value Ref Range Status   06/22/2021 99 40 - 150 U/L Final     ALT   Date Value Ref Range Status   06/22/2021 23 0 - 50 U/L Final     AST   Date Value Ref Range Status   06/22/2021 23 0 - 45 U/L Final     Recent Labs   Lab Test 03/28/22  0936 11/17/20  1122 11/04/16  0844 11/13/15  0850   CHOL 184 167   < > 207*   HDL 38* 45*   < > 58   * 105*   < > 134*   TRIG 107 87   < > 77   CHOLHDLRATIO  --   --   --  3.6    < > = values in this interval not displayed.      Lab Results   Component Value Date    A1C 5.4 05/04/2016        Thank you for allowing me to participate in the care of your patient.      Sincerely,     Noe Rodriguez MD     [facility]  cc:   Noe Rodriguez MD  [address], [address]  [place]  [place] [address]

## 2022-08-25 NOTE — PROGRESS NOTES
Cardiology Clinic Progress Note:    August 25, 2022   Patient Name: Zunilda Clark  Patient MRN: [id number]     Consult indication: PH, MVR, TV annuloplasty     HPI:    I had the opportunity to see patient Zunilda Clark in cardiology clinic for a follow up visit. Patient is followed by our colleague Yunior Huang MD with Primary Care.     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      The patient has a complex cardiac history, notable for valvular heart disease with a prior mitral valve replacement, initially in 2007, as well as a redo mitral valve replacement with a 27 mm Magna bioprosthetic valve (2014).  She is also status post tricuspid valve annuloplasty with residual moderate to severe tricuspid regurgitation, cardiac MRI with mildly dilated right ventricle but normal systolic function (CMR 07/2018).  The patient also has a history of pacemaker placement for high-degree AV block after initial cardiac surgery in 2007.  Patient does note that the device was quite loose after initial implantation and she may have flipped this around once or twice.      She was previously followed by my late colleague, Dr. Das, in clinic on 01/10/2020.  At that time, she was doing quite well and no changes were warranted to her cardiac regimen.  Previously, she was seen by my colleague, Dr. Monroy, in clinic regarding pulmonary hypertension, overall clinical presentation seemed most consistent with group 2 pulmonary hypertension related to  "valve disease.     I had seen her in clinic on 1/19/2021.  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  She was seen in consultation by my colleague Dr. Cowan and underwent generator replacement uneventfully.     When I had seen her last in clinic on 7/90/2021, she had been doing well, no changes were warranted to her cardiac regimen.  Since then, she has been followed closely by my colleague YOKASTA Salcido.  Overall patient reports that she continues to do well.  She denies any chest pain, chest pressure, abnormal shortness of breath.  Weight has been stable at home around 145 to 150 pounds.  She denies symptoms of orthopnea/PND, abnormal lower extremity swelling.  She did have some unsteadiness over a week ago, when she was trying to put on pajamas too quickly, she lost her footing and did fall.  She was helped up with paramedics, and did not require transfer to ED.  She has recovered since then, denies any headache, acute vision changes, muscle weakness, focal change in sensation.  She continues to walk the halls of her living facility for exercise without issue.     Last echocardiogram 1/7/2022 demonstrates LVEF 55 to 60% with basal inferior hypokinesis, severe pulmonary hypertension, moderate aortic stenosis, normal functioning bioprosthetic mitral valve.  Nuclear stress test 1/14/2022 did not demonstrate any obvious evidence of significant myocardial ischemia or infarction.    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  TTE 01/2021 showed mild progression with worsening RV function, elevated right-sided pressures.  However, findings seem similar when compared to prior studies including a cardiac MRI 07/2018.  Previously seen by my colleague Dr. Monroy in clinic.  The patient denies any symptoms concerning for decompensation, in fact she feels overall quite " well.     # Rheumatic heart disease, history of bioprosthetic mitral valve replacement in 2007, redo in 2014 with a 27 mm Magna bioprosthetic mitral valve.  Mean gradient 5 mmHg, TTE 01/2021.  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   # High-degree AV block after cardiac surgery in 2007, status post dual-chamber pacemaker placement that had recently reached end-of-life.  Now status post pacemaker generator replacement 3/8/2021.   #  Rheumatoid arthritis.   #  Hypertension.  Stable.    #  Atrial fibrillation, on warfarin.     -Overall patient is in stable cardiovascular health without symptoms concerning for angina or decompensated heart failure  - Will continue current cardiac regimen of aspirin, warfarin, amlodipine-valsartan, metoprolol tartrate, torsemide  - Counseled on endocarditis prophylaxis  - Follow-up in 6 months with YOKASTA Salcido, and with me in 1 year with repeat TTE at that time, or sooner as needed    Thank you for allowing our team to participate in the care of Zunilda Clark.  Please do not hesitate to call or page me with any questions or concerns.    Sincerely,     Noe Rodriguez MD, [facility]  Cardiology  Text Page   August 25, 2022    Voice recognition software utilized.     Total time spent on this encounter: 35 minutes, providing care in this encounter including, but not limited to, reviewing prior medical records, laboratory data, imaging studies, diagnostic studies, procedure notes, formulating an assessment and plan, recommendations, discussion and counseling with patient face to face, dictation.    Past Medical History:     Past Medical History:   Diagnosis Date     Acquired hypothyroidism 11/4/2015     Aortic valve insufficiency      Arrhythmia     A fib     Arthritis      Atrial fibrillation (H)      Atrial fibrillation and flutter (H)      Blood clotting disorder (H)      CHF (congestive heart failure) (H)       DVT (deep venous thrombosis) (H) 2003     Gastro-oesophageal reflux disease      H/O mitral valve replacement with tissue graft june 2014     History of blood transfusion 2014     HTN (hypertension)      Hypothyroidism      Other chronic pain      PONV (postoperative nausea and vomiting)      Pulmonary HTN (H)      Rheumatoid arthritis (H)      Rheumatoid arthritis(714.0)      Seizures (H) 2014     Shingles 08/2018     Stroke (H) 2009    left side mild weakness      Stroke (H) 2014     [place]ope 2011    [place]     Thrombosis of leg 2003    both legs        Past Surgical History:   Past Surgical History:   Procedure Laterality Date     ABDOMEN SURGERY      prolapsed bladder     APPLY WOUND VAC Left 12/18/2018    Procedure: Wound vac application;  Surgeon: Pardeep Sheikh MD;  Location: [facility] OR     ARTHROPLASTY KNEE Left 11/12/2018    Procedure: Left total knee arthroplasty using a [employer] knee system;  Surgeon: Pardeep Sheikh MD;  Location: [facility] OR     ARTHROSCOPY KNEE WITH DEBRIDEMENT JOINT, COMBINED Left 12/18/2018    Procedure: Left knee debridement and placement of wound vac;  Surgeon: Pardeep Sheikh MD;  Location: [facility] OR     CATARACT IOL, RT/LT       COLONOSCOPY  3/15/2012     EP PPM GENERATOR CHANGE SINGLE N/A 3/8/2021    Procedure: Permanent Pacemakers  Generator Change Dual Chamber;  Surgeon: Tamiko Cowan MD;  Location:  [facility]     ESOPHAGOSCOPY, GASTROSCOPY, DUODENOSCOPY (EGD), COMBINED N/A 2/19/2020    Procedure: ESOPHAGOGASTRODUODENOSCOPY (EGD);  Surgeon: Michael Mccarthy MD;  Location:  GI     EYE SURGERY  2018    Both eyes/cataract surgery     H ABLATION AV NODE  2011    AFlutter with syncope, PPM to follow     HERNIA REPAIR      3 hernies/right and left & umbilical     IMPLANT PACEMAKER  2011     LAPAROSCOPIC CHOLECYSTECTOMY WITH CHOLANGIOGRAMS N/A 4/29/2017    Procedure: LAPAROSCOPIC CHOLECYSTECTOMY WITH  CHOLANGIOGRAMS;  LAPAROSCOPIC CHOLECYSTECTOMY WITH CHOLANGIOGRAMS ;  Surgeon: Angeles Mcgill MD;  Location: [facility] OR     OPEN REDUCTION INTERNAL FIXATION RODDING INTRAMEDULLARY HUMERUS Right 7/28/2015    Procedure: OPEN REDUCTION INTERNAL FIXATION RODDING INTRAMEDULLARY HUMERUS;  Surgeon: Raymundo Rivera MD;  Location: [facility] OR     REPLACE VALVE MITRAL  2006    Mitral valve replacement with bioprosthetic valve in 2008 for rheumatic disease     SLING BLADDER SUSPENSION WITH FASCIA UMESH       VASCULAR SURGERY  2003    Blood clots in both legs       Medications (outpatient):  Current Outpatient Medications   Medication Sig Dispense Refill     ALLOPURINOL PO Take 100 mg by mouth 2 times daily       amLODIPine (NORVASC) 2.5 MG tablet Take 1 tablet (2.5 mg) by mouth daily 90 tablet 1     amLODIPine-valsartan (EXFORGE) 5-160 MG tablet Take 1 tablet by mouth daily 90 tablet 3     calcium citrate (CALCITRATE) 950 MG tablet Take 1 tablet by mouth daily        Dorzolamide HCl-Timolol Mal (COSOPT OP)        famotidine (PEPCID) 40 MG tablet Take 1 tablet (40 mg) by mouth daily 90 tablet 3     folic acid (FOLVITE) 1 MG tablet Take 1 mg by mouth daily       levothyroxine (SYNTHROID/LEVOTHROID) 112 MCG tablet Take 1 tablet (112 mcg) by mouth daily 90 tablet 3     METHOTREXATE SODIUM IJ Inject 0.8 mLs as directed once a week Thursdays       metoprolol tartrate (LOPRESSOR) 50 MG tablet Take 1 tablet (50 mg) by mouth 2 times daily 180 tablet 3     multivitamin w/minerals (THERA-VIT-M) tablet Take 1 tablet by mouth daily Without iron       Timolol-Dorzolamid-Latanoprost 0.5-0.15-0.005 % SOLN        torsemide (DEMADEX) 10 MG tablet Take 1 tablet (10 mg) by mouth daily (with breakfast) 90 tablet 1     VITAMIN D, CHOLECALCIFEROL, PO Take 1,000 Units by mouth daily       warfarin ANTICOAGULANT (COUMADIN) 2.5 MG tablet TAKE ONE TABLET BY MOUTH DAILY EXCEPT TAKE 2 TABLETS TUE AND FRI OR AS DIRECTED BY  tablet 1     benzonatate  "(TESSALON) 100 MG capsule Take 1 capsule (100 mg) by mouth 3 times daily as needed (Patient not taking: No sig reported) 30 capsule 0     latanoprostene bunod (VYZULTA) 0.024 % SOLN ophthalmic solution Place 1 drop Into the left eye At Bedtime (Patient not taking: No sig reported)       TIMOLOL MALEATE OP  (Patient not taking: Reported on 2022)         Allergies:  No Known Allergies    Social History:   History   Drug Use No      History   Smoking Status     Never Smoker   Smokeless Tobacco     Never Used     Social History    Substance and Sexual Activity      Alcohol use: No        Alcohol/week: 0.0 standard drinks       Family History:  Family History   Problem Relation Age of Onset     Coronary Artery Disease Mother      Coronary Artery Disease Brother      Diabetes Brother      Cancer Brother          at age 52      Other Cancer Brother      Hypertension Sister      Hyperlipidemia Sister        Review of Systems:   A complete review of systems was negative except as mentioned in the History of Present Illness.     Objective & Physical Exam:  /74   Pulse 84   Ht 1.626 m (5' 4\")   Wt 68.1 kg (150 lb 1.6 oz)   LMP  (LMP Unknown)   BMI 25.76 kg/m    Wt Readings from Last 2 Encounters:   22 68.1 kg (150 lb 1.6 oz)   22 67.4 kg (148 lb 9.6 oz)     Body mass index is 25.76 kg/m .   Body surface area is 1.75 meters squared.    Constitutional: appears stated age, in no apparent distress, appears to be well nourished  Head: normocephalic, atraumatic  Neck: supple, trachea midline, no bruit bilaterally   Pulmonary: clear to auscultation bilaterally, no wheezes, no rales, no increased work of breathing  Cardiovascular: regular rate, regular rhythm, 1/6 JON at the RUSB, no lower extremity edema  Gastrointestinal: no guarding, non-rigid   Neurologic: awake, alert, moves all extremities  Skin: no jaundice, warm on limited exam  Psychiatric: affect is normal, answers questions appropriately, " oriented to self and place    Data reviewed:  Lab Results   Component Value Date    WBC 9.0 03/25/2022    WBC 7.1 06/22/2021    RBC 3.34 (L) 03/25/2022    RBC 3.75 (L) 06/22/2021    HGB 11.5 (L) 03/25/2022    HGB 12.5 06/22/2021    HCT 35.0 03/25/2022    HCT 38.6 06/22/2021     (H) 03/25/2022     (H) 06/22/2021    MCH 34.4 (H) 03/25/2022    MCH 33.3 (H) 06/22/2021    MCHC 32.9 03/25/2022    MCHC 32.4 06/22/2021    RDW 16.4 (H) 03/25/2022    RDW 17.0 (H) 06/22/2021     03/25/2022     06/22/2021     Sodium   Date Value Ref Range Status   08/17/2022 140 133 - 144 mmol/L Final   06/22/2021 138 133 - 144 mmol/L Final     Potassium   Date Value Ref Range Status   08/17/2022 3.7 3.4 - 5.3 mmol/L Final   06/22/2021 3.8 3.4 - 5.3 mmol/L Final     Chloride   Date Value Ref Range Status   08/17/2022 107 94 - 109 mmol/L Final   06/22/2021 104 94 - 109 mmol/L Final     Carbon Dioxide   Date Value Ref Range Status   06/22/2021 33 (H) 20 - 32 mmol/L Final     Carbon Dioxide (CO2)   Date Value Ref Range Status   08/17/2022 26 20 - 32 mmol/L Final     Anion Gap   Date Value Ref Range Status   08/17/2022 7 3 - 14 mmol/L Final   06/22/2021 1 (L) 3 - 14 mmol/L Final     Glucose   Date Value Ref Range Status   08/17/2022 91 70 - 99 mg/dL Final   06/22/2021 88 70 - 99 mg/dL Final     Urea Nitrogen   Date Value Ref Range Status   08/17/2022 21 7 - 30 mg/dL Final   06/22/2021 27 7 - 30 mg/dL Final     Creatinine   Date Value Ref Range Status   08/17/2022 0.92 0.52 - 1.04 mg/dL Final   06/22/2021 1.11 (H) 0.52 - 1.04 mg/dL Final     GFR Estimate   Date Value Ref Range Status   08/17/2022 62 >60 mL/min/1.73m2 Final     Comment:     Effective December 21, 2021 eGFRcr in adults is calculated using the 2021 CKD-EPI creatinine equation which includes age and gender (Silvia manzano al., NEJ, DOI: 10.1056/FYXQxj3174144)   06/28/2021 50.9 ml/min/1.73m2 Final     Calcium   Date Value Ref Range Status   08/17/2022 9.4 8.5 -  10.1 mg/dL Final   06/22/2021 9.6 8.5 - 10.1 mg/dL Final     Bilirubin Total   Date Value Ref Range Status   06/22/2021 0.4 0.2 - 1.3 mg/dL Final     Alkaline Phosphatase   Date Value Ref Range Status   06/22/2021 99 40 - 150 U/L Final     ALT   Date Value Ref Range Status   06/22/2021 23 0 - 50 U/L Final     AST   Date Value Ref Range Status   06/22/2021 23 0 - 45 U/L Final     Recent Labs   Lab Test 03/28/22  0936 11/17/20  1122 11/04/16  0844 11/13/15  0850   CHOL 184 167   < > 207*   HDL 38* 45*   < > 58   * 105*   < > 134*   TRIG 107 87   < > 77   CHOLHDLRATIO  --   --   --  3.6    < > = values in this interval not displayed.      Lab Results   Component Value Date    A1C 5.4 05/04/2016

## 2022-08-30 ENCOUNTER — TRANSFERRED RECORDS (OUTPATIENT)
Dept: HEALTH INFORMATION MANAGEMENT | Facility: CLINIC | Age: 82
End: 2022-08-30

## 2022-08-30 ENCOUNTER — TRANSFERRED RECORDS (OUTPATIENT)
Dept: INTERNAL MEDICINE | Facility: CLINIC | Age: 82
End: 2022-08-30

## 2022-08-30 LAB
ALT SERPL-CCNC: 20 IU/L (ref 5–35)
AST SERPL-CCNC: 30 U/L (ref 5–34)
CREATININE (EXTERNAL): 1.03 MG/DL (ref 0.5–1.3)

## 2022-09-10 ENCOUNTER — HEALTH MAINTENANCE LETTER (OUTPATIENT)
Age: 82
End: 2022-09-10

## 2022-09-15 ENCOUNTER — ANCILLARY PROCEDURE (OUTPATIENT)
Dept: CARDIOLOGY | Facility: CLINIC | Age: 82
End: 2022-09-15
Attending: INTERNAL MEDICINE
Payer: MEDICARE

## 2022-09-15 DIAGNOSIS — I44.2 COMPLETE ATRIOVENTRICULAR BLOCK (H): ICD-10-CM

## 2022-09-15 DIAGNOSIS — Z95.0 CARDIAC PACEMAKER IN SITU: ICD-10-CM

## 2022-09-15 DIAGNOSIS — Z95.0 CARDIAC PACEMAKER IN SITU: Primary | ICD-10-CM

## 2022-09-15 PROCEDURE — 93294 REM INTERROG EVL PM/LDLS PM: CPT | Performed by: INTERNAL MEDICINE

## 2022-09-15 PROCEDURE — 93296 REM INTERROG EVL PM/IDS: CPT | Performed by: INTERNAL MEDICINE

## 2022-09-17 LAB
MDC_IDC_EPISODE_DTM: NORMAL
MDC_IDC_EPISODE_DURATION: 1 S
MDC_IDC_EPISODE_DURATION: 1 S
MDC_IDC_EPISODE_DURATION: 104 S
MDC_IDC_EPISODE_DURATION: 114 S
MDC_IDC_EPISODE_DURATION: 114 S
MDC_IDC_EPISODE_DURATION: 123 S
MDC_IDC_EPISODE_DURATION: 15 S
MDC_IDC_EPISODE_DURATION: 153 S
MDC_IDC_EPISODE_DURATION: 154 S
MDC_IDC_EPISODE_DURATION: 168 S
MDC_IDC_EPISODE_DURATION: 169 S
MDC_IDC_EPISODE_DURATION: 175 S
MDC_IDC_EPISODE_DURATION: 180 S
MDC_IDC_EPISODE_DURATION: 180 S
MDC_IDC_EPISODE_DURATION: 182 S
MDC_IDC_EPISODE_DURATION: 183 S
MDC_IDC_EPISODE_DURATION: 184 S
MDC_IDC_EPISODE_DURATION: 23 S
MDC_IDC_EPISODE_DURATION: 238 S
MDC_IDC_EPISODE_DURATION: 25 S
MDC_IDC_EPISODE_DURATION: 267 S
MDC_IDC_EPISODE_DURATION: 27 S
MDC_IDC_EPISODE_DURATION: 3 S
MDC_IDC_EPISODE_DURATION: 30 S
MDC_IDC_EPISODE_DURATION: 31 S
MDC_IDC_EPISODE_DURATION: 317 S
MDC_IDC_EPISODE_DURATION: 324 S
MDC_IDC_EPISODE_DURATION: 368 S
MDC_IDC_EPISODE_DURATION: 37 S
MDC_IDC_EPISODE_DURATION: 4 S
MDC_IDC_EPISODE_DURATION: 436 S
MDC_IDC_EPISODE_DURATION: 453 S
MDC_IDC_EPISODE_DURATION: 49 S
MDC_IDC_EPISODE_DURATION: 5 S
MDC_IDC_EPISODE_DURATION: 5 S
MDC_IDC_EPISODE_DURATION: 510 S
MDC_IDC_EPISODE_DURATION: 53 S
MDC_IDC_EPISODE_DURATION: 546 S
MDC_IDC_EPISODE_DURATION: 58 S
MDC_IDC_EPISODE_DURATION: 6 S
MDC_IDC_EPISODE_DURATION: 6 S
MDC_IDC_EPISODE_DURATION: 7 S
MDC_IDC_EPISODE_DURATION: 85 S
MDC_IDC_EPISODE_DURATION: 86 S
MDC_IDC_EPISODE_DURATION: 95 S
MDC_IDC_EPISODE_DURATION: 96 S
MDC_IDC_EPISODE_DURATION: 97 S
MDC_IDC_EPISODE_DURATION: 98 S
MDC_IDC_EPISODE_ID: NORMAL
MDC_IDC_EPISODE_TYPE: NORMAL
MDC_IDC_LEAD_IMPLANT_DT: NORMAL
MDC_IDC_LEAD_IMPLANT_DT: NORMAL
MDC_IDC_LEAD_LOCATION: NORMAL
MDC_IDC_LEAD_LOCATION: NORMAL
MDC_IDC_LEAD_MFG: NORMAL
MDC_IDC_LEAD_MFG: NORMAL
MDC_IDC_LEAD_MODEL: NORMAL
MDC_IDC_LEAD_MODEL: NORMAL
MDC_IDC_LEAD_POLARITY_TYPE: NORMAL
MDC_IDC_LEAD_POLARITY_TYPE: NORMAL
MDC_IDC_LEAD_SERIAL: NORMAL
MDC_IDC_LEAD_SERIAL: NORMAL
MDC_IDC_MSMT_BATTERY_DTM: NORMAL
MDC_IDC_MSMT_BATTERY_REMAINING_LONGEVITY: 123 MO
MDC_IDC_MSMT_BATTERY_RRT_TRIGGER: 2.62
MDC_IDC_MSMT_BATTERY_STATUS: NORMAL
MDC_IDC_MSMT_BATTERY_VOLTAGE: 3.01 V
MDC_IDC_MSMT_LEADCHNL_RA_IMPEDANCE_VALUE: 342 OHM
MDC_IDC_MSMT_LEADCHNL_RA_IMPEDANCE_VALUE: 456 OHM
MDC_IDC_MSMT_LEADCHNL_RA_PACING_THRESHOLD_AMPLITUDE: 0.62 V
MDC_IDC_MSMT_LEADCHNL_RA_PACING_THRESHOLD_PULSEWIDTH: 0.4 MS
MDC_IDC_MSMT_LEADCHNL_RA_SENSING_INTR_AMPL: 1 MV
MDC_IDC_MSMT_LEADCHNL_RA_SENSING_INTR_AMPL: 1 MV
MDC_IDC_MSMT_LEADCHNL_RV_IMPEDANCE_VALUE: 361 OHM
MDC_IDC_MSMT_LEADCHNL_RV_IMPEDANCE_VALUE: 380 OHM
MDC_IDC_MSMT_LEADCHNL_RV_PACING_THRESHOLD_AMPLITUDE: 0.75 V
MDC_IDC_MSMT_LEADCHNL_RV_PACING_THRESHOLD_PULSEWIDTH: 0.4 MS
MDC_IDC_MSMT_LEADCHNL_RV_SENSING_INTR_AMPL: 17.5 MV
MDC_IDC_MSMT_LEADCHNL_RV_SENSING_INTR_AMPL: 17.5 MV
MDC_IDC_PG_IMPLANT_DTM: NORMAL
MDC_IDC_PG_MFG: NORMAL
MDC_IDC_PG_MODEL: NORMAL
MDC_IDC_PG_SERIAL: NORMAL
MDC_IDC_PG_TYPE: NORMAL
MDC_IDC_SESS_CLINIC_NAME: NORMAL
MDC_IDC_SESS_DTM: NORMAL
MDC_IDC_SESS_TYPE: NORMAL
MDC_IDC_SET_BRADY_AT_MODE_SWITCH_RATE: 171 {BEATS}/MIN
MDC_IDC_SET_BRADY_HYSTRATE: NORMAL
MDC_IDC_SET_BRADY_LOWRATE: 60 {BEATS}/MIN
MDC_IDC_SET_BRADY_MAX_SENSOR_RATE: 130 {BEATS}/MIN
MDC_IDC_SET_BRADY_MAX_TRACKING_RATE: 130 {BEATS}/MIN
MDC_IDC_SET_BRADY_MODE: NORMAL
MDC_IDC_SET_BRADY_PAV_DELAY_LOW: 180 MS
MDC_IDC_SET_BRADY_SAV_DELAY_LOW: 150 MS
MDC_IDC_SET_LEADCHNL_RA_PACING_AMPLITUDE: 1.5 V
MDC_IDC_SET_LEADCHNL_RA_PACING_ANODE_ELECTRODE_1: NORMAL
MDC_IDC_SET_LEADCHNL_RA_PACING_ANODE_LOCATION_1: NORMAL
MDC_IDC_SET_LEADCHNL_RA_PACING_CAPTURE_MODE: NORMAL
MDC_IDC_SET_LEADCHNL_RA_PACING_CATHODE_ELECTRODE_1: NORMAL
MDC_IDC_SET_LEADCHNL_RA_PACING_CATHODE_LOCATION_1: NORMAL
MDC_IDC_SET_LEADCHNL_RA_PACING_POLARITY: NORMAL
MDC_IDC_SET_LEADCHNL_RA_PACING_PULSEWIDTH: 0.4 MS
MDC_IDC_SET_LEADCHNL_RA_SENSING_ANODE_ELECTRODE_1: NORMAL
MDC_IDC_SET_LEADCHNL_RA_SENSING_ANODE_LOCATION_1: NORMAL
MDC_IDC_SET_LEADCHNL_RA_SENSING_CATHODE_ELECTRODE_1: NORMAL
MDC_IDC_SET_LEADCHNL_RA_SENSING_CATHODE_LOCATION_1: NORMAL
MDC_IDC_SET_LEADCHNL_RA_SENSING_POLARITY: NORMAL
MDC_IDC_SET_LEADCHNL_RA_SENSING_SENSITIVITY: 0.45 MV
MDC_IDC_SET_LEADCHNL_RV_PACING_AMPLITUDE: 2 V
MDC_IDC_SET_LEADCHNL_RV_PACING_ANODE_ELECTRODE_1: NORMAL
MDC_IDC_SET_LEADCHNL_RV_PACING_ANODE_LOCATION_1: NORMAL
MDC_IDC_SET_LEADCHNL_RV_PACING_CAPTURE_MODE: NORMAL
MDC_IDC_SET_LEADCHNL_RV_PACING_CATHODE_ELECTRODE_1: NORMAL
MDC_IDC_SET_LEADCHNL_RV_PACING_CATHODE_LOCATION_1: NORMAL
MDC_IDC_SET_LEADCHNL_RV_PACING_POLARITY: NORMAL
MDC_IDC_SET_LEADCHNL_RV_PACING_PULSEWIDTH: 0.4 MS
MDC_IDC_SET_LEADCHNL_RV_SENSING_ANODE_ELECTRODE_1: NORMAL
MDC_IDC_SET_LEADCHNL_RV_SENSING_ANODE_LOCATION_1: NORMAL
MDC_IDC_SET_LEADCHNL_RV_SENSING_CATHODE_ELECTRODE_1: NORMAL
MDC_IDC_SET_LEADCHNL_RV_SENSING_CATHODE_LOCATION_1: NORMAL
MDC_IDC_SET_LEADCHNL_RV_SENSING_POLARITY: NORMAL
MDC_IDC_SET_LEADCHNL_RV_SENSING_SENSITIVITY: 0.9 MV
MDC_IDC_SET_ZONE_DETECTION_INTERVAL: 350 MS
MDC_IDC_SET_ZONE_DETECTION_INTERVAL: 400 MS
MDC_IDC_SET_ZONE_TYPE: NORMAL
MDC_IDC_STAT_AT_BURDEN_PERCENT: 18 %
MDC_IDC_STAT_AT_DTM_END: NORMAL
MDC_IDC_STAT_AT_DTM_START: NORMAL
MDC_IDC_STAT_BRADY_AP_VP_PERCENT: 80.84 %
MDC_IDC_STAT_BRADY_AP_VS_PERCENT: 0.02 %
MDC_IDC_STAT_BRADY_AS_VP_PERCENT: 18.05 %
MDC_IDC_STAT_BRADY_AS_VS_PERCENT: 1.2 %
MDC_IDC_STAT_BRADY_DTM_END: NORMAL
MDC_IDC_STAT_BRADY_DTM_START: NORMAL
MDC_IDC_STAT_BRADY_RA_PERCENT_PACED: 75.1 %
MDC_IDC_STAT_BRADY_RV_PERCENT_PACED: 98.7 %
MDC_IDC_STAT_EPISODE_RECENT_COUNT: 0
MDC_IDC_STAT_EPISODE_RECENT_COUNT: 3
MDC_IDC_STAT_EPISODE_RECENT_COUNT: 7601
MDC_IDC_STAT_EPISODE_RECENT_COUNT_DTM_END: NORMAL
MDC_IDC_STAT_EPISODE_RECENT_COUNT_DTM_START: NORMAL
MDC_IDC_STAT_EPISODE_TOTAL_COUNT: 0
MDC_IDC_STAT_EPISODE_TOTAL_COUNT: 5
MDC_IDC_STAT_EPISODE_TOTAL_COUNT: NORMAL
MDC_IDC_STAT_EPISODE_TOTAL_COUNT_DTM_END: NORMAL
MDC_IDC_STAT_EPISODE_TOTAL_COUNT_DTM_START: NORMAL
MDC_IDC_STAT_EPISODE_TYPE: NORMAL

## 2022-09-28 ENCOUNTER — LAB (OUTPATIENT)
Dept: LAB | Facility: CLINIC | Age: 82
End: 2022-09-28
Payer: MEDICARE

## 2022-09-28 ENCOUNTER — ANTICOAGULATION THERAPY VISIT (OUTPATIENT)
Dept: ANTICOAGULATION | Facility: CLINIC | Age: 82
End: 2022-09-28

## 2022-09-28 DIAGNOSIS — I48.92 ATRIAL FIBRILLATION AND FLUTTER (H): ICD-10-CM

## 2022-09-28 DIAGNOSIS — Z79.01 LONG TERM CURRENT USE OF ANTICOAGULANTS WITH INR GOAL OF 2.0-3.0: Primary | ICD-10-CM

## 2022-09-28 DIAGNOSIS — Z95.3 S/P MITRAL VALVE REPLACEMENT WITH BIOPROSTHETIC VALVE: ICD-10-CM

## 2022-09-28 DIAGNOSIS — I48.91 ATRIAL FIBRILLATION AND FLUTTER (H): ICD-10-CM

## 2022-09-28 DIAGNOSIS — Z79.01 LONG TERM CURRENT USE OF ANTICOAGULANTS WITH INR GOAL OF 2.0-3.0: ICD-10-CM

## 2022-09-28 LAB — INR BLD: 2.5 (ref 0.9–1.1)

## 2022-09-28 PROCEDURE — 85610 PROTHROMBIN TIME: CPT

## 2022-09-28 PROCEDURE — 36415 COLL VENOUS BLD VENIPUNCTURE: CPT

## 2022-09-28 NOTE — PROGRESS NOTES
ANTICOAGULATION MANAGEMENT     Zunilda Alicea May 82 year old female is on warfarin with therapeutic INR result. (Goal INR 2.0-3.0)    Recent labs: (last 7 days)     09/28/22  0858   INR 2.5*       ASSESSMENT       Source(s): Chart Review and Patient/Caregiver Call       Warfarin doses taken: Warfarin taken as instructed    Diet: No new diet changes identified    New illness, injury, or hospitalization: No    Medication/supplement changes: None noted    Signs or symptoms of bleeding or clotting: No    Previous INR: Therapeutic last 2(+) visits    Additional findings: Cardio appointment 8/25/22, no changes were made to the patient's care plan       PLAN     Recommended plan for no diet, medication or health factor changes affecting INR     Dosing Instructions: Continue your current warfarin dose with next INR in 6 weeks       Summary  As of 9/28/2022    Full warfarin instructions:  5 mg every Tue, Fri; 2.5 mg all other days   Next INR check:  11/9/2022             Telephone call with Deanne who verbalizes understanding and agrees to plan    Lab visit scheduled    Education provided: Please call back if any changes to your diet, medications or how you've been taking warfarin and Contact [phone]  with any changes, questions or concerns.     Plan made per [facility] anticoagulation protocol    Екатерина Mahan RN  Anticoagulation Clinic  9/28/2022    _______________________________________________________________________     Anticoagulation Episode Summary     Current INR goal:  2.0-3.0   TTR:  96.9 % (1 y)   Target end date:  Indefinite   Send INR reminders to:  [facility]    Indications    Long term current use of anticoagulants with INR goal of 2.0-3.0 [Z79.01]  Atrial fibrillation and flutter (H) [I48.91  I48.92]  S/P mitral valve replacement with bioprosthetic valve [Z95.3]           Comments:  27 mm Magna bioprosthetic valve (2014)         Anticoagulation Care Providers     Provider Role Specialty  Phone number    Yunior Huang MD Referring Internal Medicine [phone]

## 2022-09-30 NOTE — MR AVS SNAPSHOT
Zunilda Alicea May   4/18/2017 10:30 AM   Anticoagulation Therapy Visit    Description:  76 year old female   Provider:  [facility]   Department:  [facility]           INR as of 4/18/2017     Today's INR 2.6      Anticoagulation Summary as of 4/18/2017     INR goal 2.0-3.0   Today's INR 2.6   Full instructions 5 mg on Tue, Thu, Sat; 2.5 mg all other days   Next INR check 5/30/2017    Indications   Long-term (current) use of anticoagulants [Z79.01] [Z79.01]  Atrial fibrillation and flutter (H) [I48.91  I48.92]         Your next Anticoagulation Clinic appointment(s)     May 30, 2017 10:30 AM CDT   Anticoagulation Visit with [facility]   [facility] ([facility])    [address] [facility] [address]  [facility] [id number] [phone]              Contact Numbers     [facility] Phone Numbers:  Anticoagulation Clinic Appointments : [phone]  Anticoagulation Nurse: [phone]         April 2017 Details    Sun Mon Tue Wed Thu Fri Sat           1                 2               3               4               5               6               7               8                 9               10               11               12               13               14               15                 16               17               18      5 mg   See details      19      2.5 mg         20      5 mg         21      2.5 mg         22      5 mg           23      2.5 mg         24      2.5 mg         25      5 mg         26      2.5 mg         27      5 mg         28      2.5 mg         29      5 mg           30      2.5 mg                Date Details   04/18 This INR check               How to take your warfarin dose     To take:  2.5 mg Take 0.5 of a 5 mg tablet.    To take:  5 mg Take 1 of the 5 mg tablets.           May 2017 Details    Sun Mon Tue Wed Thu Fri Sat      1      2.5 mg         2      5 mg         3      2.5 mg         4      5 mg        [facility] PROGRESS NOTE        Subjective     Albin is a 6 year old, here for Ear Problem (LT side /)     Child presents with dad.    Runny nose, scratchy throat started on Monday/Tuesday. Parents were keeping an eye on it. Yesterday, he developed more of a runny nose. Was telling mom/dad that his ear started popping. Has been coughing - sounds wet. No difficulty breathing. Eating, drinking normally. No diarrhea and no vomiting.     Is in . No recent antibiotic use. Dad did start children's allergy medication yesterday and he seemed to be better after taking it.     Social Determinants      Objective   Vitals:    09/30/22 1019   Pulse: 114   Temp: 99 °F (37.2 °C)   SpO2: 99%   Weight: 20.4 kg (45 lb)     Physical Exam  Constitutional:       General: He is active.   HENT:      Head: Normocephalic and atraumatic.      Right Ear: Tympanic membrane, ear canal and external ear normal.      Left Ear: Tympanic membrane, ear canal and external ear normal.      Nose: Rhinorrhea (clear) present.      Mouth/Throat:      Pharynx: No oropharyngeal exudate or posterior oropharyngeal erythema.   Eyes:      Conjunctiva/sclera: Conjunctivae normal.   Cardiovascular:      Rate and Rhythm: Normal rate and regular rhythm.      Heart sounds: Normal heart sounds.   Pulmonary:      Effort: Pulmonary effort is normal. No respiratory distress or retractions.      Breath sounds: Normal breath sounds. No stridor. No wheezing or rhonchi.   Lymphadenopathy:      Cervical: No cervical adenopathy.   Neurological:      Mental Status: He is alert.          ASSESSMENT AND PLAN       1. Seasonal allergic rhinitis, unspecified trigger  - children's zyrtec 5mg daily prn  - if symptoms are worsening, they're to let us know      Follow Up  Return if symptoms worsen or fail to improve.           5      2.5 mg         6      5 mg           7      2.5 mg         8      2.5 mg         9      5 mg         10      2.5 mg         11      5 mg         12      2.5 mg         13      5 mg           14      2.5 mg         15      2.5 mg         16      5 mg         17      2.5 mg         18      5 mg         19      2.5 mg         20      5 mg           21      2.5 mg         22      2.5 mg         23      5 mg         24      2.5 mg         25      5 mg         26      2.5 mg         27      5 mg           28      2.5 mg         29      2.5 mg         30            31                   Date Details   No additional details    Date of next INR:  5/30/2017         How to take your warfarin dose     To take:  2.5 mg Take 0.5 of a 5 mg tablet.    To take:  5 mg Take 1 of the 5 mg tablets.

## 2022-10-12 ENCOUNTER — TRANSFERRED RECORDS (OUTPATIENT)
Dept: HEALTH INFORMATION MANAGEMENT | Facility: CLINIC | Age: 82
End: 2022-10-12

## 2022-11-09 ENCOUNTER — DOCUMENTATION ONLY (OUTPATIENT)
Dept: ANTICOAGULATION | Facility: CLINIC | Age: 82
End: 2022-11-09

## 2022-11-09 ENCOUNTER — LAB (OUTPATIENT)
Dept: LAB | Facility: CLINIC | Age: 82
End: 2022-11-09
Payer: MEDICARE

## 2022-11-09 ENCOUNTER — ANTICOAGULATION THERAPY VISIT (OUTPATIENT)
Dept: ANTICOAGULATION | Facility: CLINIC | Age: 82
End: 2022-11-09

## 2022-11-09 DIAGNOSIS — I48.92 ATRIAL FIBRILLATION AND FLUTTER (H): ICD-10-CM

## 2022-11-09 DIAGNOSIS — Z95.3 S/P MITRAL VALVE REPLACEMENT WITH BIOPROSTHETIC VALVE: ICD-10-CM

## 2022-11-09 DIAGNOSIS — I48.91 ATRIAL FIBRILLATION AND FLUTTER (H): ICD-10-CM

## 2022-11-09 DIAGNOSIS — Z79.01 LONG TERM CURRENT USE OF ANTICOAGULANTS WITH INR GOAL OF 2.0-3.0: Primary | ICD-10-CM

## 2022-11-09 DIAGNOSIS — Z79.01 LONG TERM CURRENT USE OF ANTICOAGULANTS WITH INR GOAL OF 2.0-3.0: ICD-10-CM

## 2022-11-09 LAB — INR BLD: 2.6 (ref 0.9–1.1)

## 2022-11-09 PROCEDURE — 36416 COLLJ CAPILLARY BLOOD SPEC: CPT

## 2022-11-09 PROCEDURE — 85610 PROTHROMBIN TIME: CPT

## 2022-11-09 NOTE — PROGRESS NOTES
ANTICOAGULATION MANAGEMENT     Zunilda Alicea May 82 year old female is on warfarin with therapeutic INR result. (Goal INR 2.0-3.0)    Recent labs: (last 7 days)     11/09/22  1016   INR 2.6*       ASSESSMENT       Source(s): Chart Review    Previous INR was Therapeutic last 2(+) visits    Medication, diet, health changes since last INR chart reviewed; none identified           PLAN     Recommended plan for no diet, medication or health factor changes affecting INR     Dosing Instructions: Continue your current warfarin dose with next INR in 6 weeks       Summary  As of 11/9/2022    Full warfarin instructions:  5 mg every Tue, Fri; 2.5 mg all other days; Starting 11/9/2022   Next INR check:  12/21/2022             Detailed voice message left for Deanne with dosing instructions and follow up date.     Contact [phone]  to schedule and with any changes, questions or concerns.     Education provided:     Please call back if any changes to your diet, medications or how you've been taking warfarin    Contact [phone]  with any changes, questions or concerns.     Plan made per [facility] anticoagulation protocol    Екатерина Mahan RN  Anticoagulation Clinic  11/9/2022    _______________________________________________________________________     Anticoagulation Episode Summary     Current INR goal:  2.0-3.0   TTR:  96.9 % (1 y)   Target end date:  Indefinite   Send INR reminders to:  Counts include 234 beds at the [facility]    Indications    Long term current use of anticoagulants with INR goal of 2.0-3.0 [Z79.01]  Atrial fibrillation and flutter (H) [I48.91  I48.92]  S/P mitral valve replacement with bioprosthetic valve [Z95.3]           Comments:  27 mm Magna bioprosthetic valve (2014)         Anticoagulation Care Providers     Provider Role Specialty Phone number    Yunior Huang MD Referring Internal Medicine [phone]

## 2022-11-09 NOTE — PROGRESS NOTES
ANTICOAGULATION CLINIC REFERRAL RENEWAL REQUEST       An annual renewal order is required for all patients referred to [facility].?  Please review and sign the pended referral order for Zunilda Alicea May.       ANTICOAGULATION SUMMARY      Warfarin indication(s)   Mechanical MVR    Mechanical heart valve present?  Mechanical MVR (bioprosthetic)       Current goal range   INR: 2.0-3.0     Goal appropriate for indication? Goal INR 2-3, standard for indication(s) above     Time in Therapeutic Range (TTR)  (Goal > 60%) 96.9%       Office visit with referring provider's group within last year yes on 7/12/22       Екатерина Mahan RN  [facility]

## 2022-12-21 ENCOUNTER — LAB (OUTPATIENT)
Dept: LAB | Facility: CLINIC | Age: 82
End: 2022-12-21
Payer: MEDICARE

## 2022-12-21 ENCOUNTER — ANTICOAGULATION THERAPY VISIT (OUTPATIENT)
Dept: ANTICOAGULATION | Facility: CLINIC | Age: 82
End: 2022-12-21

## 2022-12-21 DIAGNOSIS — Z95.3 S/P MITRAL VALVE REPLACEMENT WITH BIOPROSTHETIC VALVE: ICD-10-CM

## 2022-12-21 DIAGNOSIS — I48.92 ATRIAL FIBRILLATION AND FLUTTER (H): ICD-10-CM

## 2022-12-21 DIAGNOSIS — I48.91 ATRIAL FIBRILLATION AND FLUTTER (H): ICD-10-CM

## 2022-12-21 DIAGNOSIS — Z79.01 LONG TERM CURRENT USE OF ANTICOAGULANTS WITH INR GOAL OF 2.0-3.0: Primary | ICD-10-CM

## 2022-12-21 DIAGNOSIS — Z79.01 LONG TERM CURRENT USE OF ANTICOAGULANTS WITH INR GOAL OF 2.0-3.0: ICD-10-CM

## 2022-12-21 LAB — INR BLD: 2.4 (ref 0.9–1.1)

## 2022-12-21 PROCEDURE — 36416 COLLJ CAPILLARY BLOOD SPEC: CPT

## 2022-12-21 PROCEDURE — 85610 PROTHROMBIN TIME: CPT

## 2022-12-21 NOTE — PROGRESS NOTES
ANTICOAGULATION MANAGEMENT     Zunilda Alicea May 82 year old female is on warfarin with therapeutic INR result. (Goal INR 2.0-3.0)    Recent labs: (last 7 days)     12/21/22  0952   INR 2.4*       ASSESSMENT       Source(s): Chart Review and Patient/Caregiver Call       Warfarin doses taken: Warfarin taken as instructed    Diet: No new diet changes identified    New illness, injury, or hospitalization: No    Medication/supplement changes: None noted    Signs or symptoms of bleeding or clotting: No    Previous INR: Therapeutic last 2(+) visits    Additional findings: None       PLAN     Recommended plan for no diet, medication or health factor changes affecting INR     Dosing Instructions: Continue your current warfarin dose with next INR in 6 weeks       Summary  As of 12/21/2022    Full warfarin instructions:  5 mg every Tue, Fri; 2.5 mg all other days   Next INR check:  2/1/2023             Telephone call with Deanne who verbalizes understanding and agrees to plan    Lab visit scheduled    Education provided:     Please call back if any changes to your diet, medications or how you've been taking warfarin    Contact [phone]  with any changes, questions or concerns.     Plan made per [facility] anticoagulation protocol    Екатерина Mahan RN  Anticoagulation Clinic  12/21/2022    _______________________________________________________________________     Anticoagulation Episode Summary     Current INR goal:  2.0-3.0   TTR:  96.9 % (1 y)   Target end date:  Indefinite   Send INR reminders to:  [facility]    Indications    Long term current use of anticoagulants with INR goal of 2.0-3.0 [Z79.01]  Atrial fibrillation and flutter (H) [I48.91  I48.92]  S/P mitral valve replacement with bioprosthetic valve [Z95.3]           Comments:  27 mm Magna bioprosthetic valve (2014)         Anticoagulation Care Providers     Provider Role Specialty Phone number    Yunior Huang MD Referring Internal Medicine  [phone]

## 2022-12-22 ENCOUNTER — ANCILLARY PROCEDURE (OUTPATIENT)
Dept: CARDIOLOGY | Facility: CLINIC | Age: 82
End: 2022-12-22
Attending: INTERNAL MEDICINE
Payer: MEDICARE

## 2022-12-22 DIAGNOSIS — I44.2 COMPLETE ATRIOVENTRICULAR BLOCK (H): ICD-10-CM

## 2022-12-22 DIAGNOSIS — Z95.0 CARDIAC PACEMAKER IN SITU: ICD-10-CM

## 2022-12-22 PROCEDURE — 93296 REM INTERROG EVL PM/IDS: CPT | Performed by: INTERNAL MEDICINE

## 2022-12-22 PROCEDURE — 93294 REM INTERROG EVL PM/LDLS PM: CPT | Performed by: INTERNAL MEDICINE

## 2023-01-01 ENCOUNTER — HOSPITAL ENCOUNTER (EMERGENCY)
Facility: CLINIC | Age: 83
Discharge: HOME | End: 2023-01-01
Attending: EMERGENCY MEDICINE | Admitting: EMERGENCY MEDICINE
Payer: COMMERCIAL

## 2023-01-01 ENCOUNTER — APPOINTMENT (OUTPATIENT)
Dept: GENERAL RADIOLOGY | Facility: CLINIC | Age: 83
End: 2023-01-01
Attending: EMERGENCY MEDICINE
Payer: COMMERCIAL

## 2023-01-01 VITALS
OXYGEN SATURATION: 97 % | RESPIRATION RATE: 16 BRPM | HEART RATE: 84 BPM | SYSTOLIC BLOOD PRESSURE: 159 MMHG | TEMPERATURE: 97.5 F | DIASTOLIC BLOOD PRESSURE: 73 MMHG

## 2023-01-01 DIAGNOSIS — S16.1XXA STRAIN OF NECK MUSCLE, INITIAL ENCOUNTER: ICD-10-CM

## 2023-01-01 DIAGNOSIS — M47.812 CERVICAL ARTHRITIS: ICD-10-CM

## 2023-01-01 DIAGNOSIS — S22.030A COMPRESSION FRACTURE OF T3 VERTEBRA, INITIAL ENCOUNTER (H): ICD-10-CM

## 2023-01-01 LAB
ANION GAP SERPL CALCULATED.3IONS-SCNC: 9 MMOL/L (ref 7–15)
APTT PPP: 36 SECONDS (ref 22–38)
BASOPHILS # BLD AUTO: 0 10E3/UL (ref 0–0.2)
BASOPHILS NFR BLD AUTO: 1 %
BUN SERPL-MCNC: 26.9 MG/DL (ref 8–23)
CALCIUM SERPL-MCNC: 9.6 MG/DL (ref 8.8–10.2)
CHLORIDE SERPL-SCNC: 102 MMOL/L (ref 98–107)
CREAT SERPL-MCNC: 0.97 MG/DL (ref 0.51–0.95)
DEPRECATED HCO3 PLAS-SCNC: 27 MMOL/L (ref 22–29)
EOSINOPHIL # BLD AUTO: 0.1 10E3/UL (ref 0–0.7)
EOSINOPHIL NFR BLD AUTO: 2 %
ERYTHROCYTE [DISTWIDTH] IN BLOOD BY AUTOMATED COUNT: 17.5 % (ref 10–15)
GFR SERPL CREATININE-BSD FRML MDRD: 58 ML/MIN/1.73M2
GLUCOSE SERPL-MCNC: 113 MG/DL (ref 70–99)
HCT VFR BLD AUTO: 33.4 % (ref 35–47)
HGB BLD-MCNC: 10.9 G/DL (ref 11.7–15.7)
IMM GRANULOCYTES # BLD: 0 10E3/UL
IMM GRANULOCYTES NFR BLD: 1 %
INR PPP: 2.86 (ref 0.85–1.15)
LYMPHOCYTES # BLD AUTO: 0.5 10E3/UL (ref 0.8–5.3)
LYMPHOCYTES NFR BLD AUTO: 7 %
MCH RBC QN AUTO: 34.3 PG (ref 26.5–33)
MCHC RBC AUTO-ENTMCNC: 32.6 G/DL (ref 31.5–36.5)
MCV RBC AUTO: 105 FL (ref 78–100)
MONOCYTES # BLD AUTO: 0.4 10E3/UL (ref 0–1.3)
MONOCYTES NFR BLD AUTO: 7 %
NEUTROPHILS # BLD AUTO: 5.3 10E3/UL (ref 1.6–8.3)
NEUTROPHILS NFR BLD AUTO: 82 %
NRBC # BLD AUTO: 0 10E3/UL
NRBC BLD AUTO-RTO: 0 /100
PLATELET # BLD AUTO: 278 10E3/UL (ref 150–450)
POTASSIUM SERPL-SCNC: 4 MMOL/L (ref 3.4–5.3)
RBC # BLD AUTO: 3.18 10E6/UL (ref 3.8–5.2)
SODIUM SERPL-SCNC: 138 MMOL/L (ref 136–145)
WBC # BLD AUTO: 6.4 10E3/UL (ref 4–11)

## 2023-01-01 PROCEDURE — 99285 EMERGENCY DEPT VISIT HI MDM: CPT

## 2023-01-01 PROCEDURE — 85025 COMPLETE CBC W/AUTO DIFF WBC: CPT | Performed by: EMERGENCY MEDICINE

## 2023-01-01 PROCEDURE — 93005 ELECTROCARDIOGRAM TRACING: CPT

## 2023-01-01 PROCEDURE — 72040 X-RAY EXAM NECK SPINE 2-3 VW: CPT

## 2023-01-01 PROCEDURE — 85730 THROMBOPLASTIN TIME PARTIAL: CPT | Performed by: EMERGENCY MEDICINE

## 2023-01-01 PROCEDURE — 80048 BASIC METABOLIC PNL TOTAL CA: CPT | Performed by: EMERGENCY MEDICINE

## 2023-01-01 PROCEDURE — 85610 PROTHROMBIN TIME: CPT | Performed by: EMERGENCY MEDICINE

## 2023-01-01 PROCEDURE — 36415 COLL VENOUS BLD VENIPUNCTURE: CPT | Performed by: EMERGENCY MEDICINE

## 2023-01-01 RX ORDER — HYDROCODONE BITARTRATE AND ACETAMINOPHEN 5; 325 MG/1; MG/1
1 TABLET ORAL EVERY 6 HOURS PRN
Qty: 20 TABLET | Refills: 0 | Status: SHIPPED | OUTPATIENT
Start: 2023-01-01 | End: 2023-01-06

## 2023-01-01 ASSESSMENT — ENCOUNTER SYMPTOMS
CHILLS: 0
NECK PAIN: 1
FEVER: 0
NUMBNESS: 1
MYALGIAS: 0

## 2023-01-01 ASSESSMENT — ACTIVITIES OF DAILY LIVING (ADL): ADLS_ACUITY_SCORE: 35

## 2023-01-01 NOTE — ED PROVIDER NOTES
History     Chief Complaint:  Neck Pain       HPI   Zunilda Clark is a 82 year old female, on methotrexate for RA and anticoagulated on warfarin, who presents with neck pain. Deanne states that she's had neck pain that has persisted for a few months and it is exacerbated with moving her head to either side. Today, she noted that the pain was worse than normal, thus prompting her presentation. During evaluation, Deanne states that the pain is on the side of her neck and feels muscular to her. She is not aware of any trauma or injury to the area that provoked the onset. Resting improves the pain but does not make it resolve. The pain does not radiate into her arms, but incidentally she does have some upper arm pain that she doesn't feel is associated with the neck pain. Deanne does have some numbness in her hands but notes that this is chronic due to her RA. Deanne denies any fever, chills, myalgias, or rash.     Independent Historian: yes       ROS:  Review of Systems   Constitutional: Negative for chills and fever.   Musculoskeletal: Positive for neck pain. Negative for myalgias.   Skin: Negative for rash.   Neurological: Positive for numbness (chronic).   All other systems reviewed and are negative.        Allergies:  No Known Allergies     Medications:    Methotrexate  Amlodipine  Levothyroxine  Metoprolol tartrate  Torsemide  Warfarin    Past Medical History:    Atrial fibrillation  Aortic regurgitation  Stroke  Hypertension  Hypothyroidism  Mitral stenosis  Osteoarthritis  Osteopenia  Rheumatoid arthritis  TIA  Heart murmur  GERD  Aortic valve insufficiency  Blood clotting disorder  CHF  DVT  Pulmonary hypertension   Seizures  Shingles    Past Surgical History:    DEXA bone density  Tubal ligation  Hernia repair x3  Mitral valve repair  Mitral valve replacement  Prolapsed bladder surgery  Wound vac application  Left TKR  Left knee debridement  Cataract IOL   Colonoscopy  Permanent pacemaker generator  change  EGD  Ablation AV node  Pacemaker implantation  Laparoscopic cholecystectomy  Vascular surgery     Family History:    Diabetes - brother  Arthritis - brother, mother, sister  Unspecified cancer - brother  CAD - father, mother, brother  Hyperlipidemia - sister  Hypertension - sister    Social History:  Patient came from home.  Patient is accompanied in the ED.  Patient denies tobacco use.  Patient denies alcohol use.  Patient denies drug use.  PCP: Yunior Huang     Physical Exam     Patient Vitals for the past 24 hrs:   BP Temp Pulse Resp SpO2   01/01/23 0804 (!) 159/73 97.5  F (36.4  C) 84 16 97 %        Physical Exam  Constitutional:       General: She is not in acute distress.     Appearance: She is not diaphoretic.   HENT:      Head: Atraumatic.      Mouth/Throat:      Pharynx: No oropharyngeal exudate.   Eyes:      General: No scleral icterus.     Pupils: Pupils are equal, round, and reactive to light.   Neck:      Comments: No midline C-spine tenderness or spinous process step-off.  Mild tenderness bilaterally over the cervical paraspinal muscles.  No nuchal rigidity or meningismus  Cardiovascular:      Rate and Rhythm: Normal rate and regular rhythm.      Heart sounds: Normal heart sounds.   Pulmonary:      Effort: No respiratory distress.      Breath sounds: Normal breath sounds.   Abdominal:      General: Bowel sounds are normal.      Palpations: Abdomen is soft.      Tenderness: There is no abdominal tenderness.   Musculoskeletal:         General: No tenderness.   Skin:     General: Skin is warm.      Capillary Refill: Capillary refill takes less than 2 seconds.      Findings: No rash.   Neurological:      General: No focal deficit present.      Mental Status: She is oriented to person, place, and time.      Comments: Strength and sensation intact and symmetric over the upper and lower extremities.   Psychiatric:         Mood and Affect: Mood normal.         Behavior: Behavior normal.            Emergency Department Course   ECG  ECG obtained at 0843, ECG read at 0847  Atrial-sensed ventricular-paced rhythm with prolonged AV conduction  Abnormal ECG  No significant change from prior ECG dated 3/8/21.  Rate 74 bpm. RI interval 214 ms. QRS duration 160 ms. QT/QTc 458/508 ms. P-R-T axes 54 100 4.    Imaging:  XR Cervical Spine 2/3 Views   Final Result   IMPRESSION:    -The cervical spine is visualized to the C7 superior endplate on the lateral view. There is retrolisthesis of C5 on C6. Moderate loss of disc height at this level.       -On the lateral swimmer's view, there is a compression fracture of one of the upper thoracic vertebral bodies, possibly T3, overall similar in appearance to the 04/04/2022 chest radiograph study. However, there is also an apparent compression fracture of    possibly T5 which appears new compared to the prior study. Correlate for focal tenderness at this level and consider cross-sectional imaging of the thoracic spine for further evaluation.       -No acute fracture within the visualized portions of the cervical spine. No prevertebral soft tissue swelling. Open-mouth odontoid view is unremarkable.       -No nodules in the visualized lung parenchyma. Partial visualization of a left subclavian venous pacemaker and sternotomy wires.            Report per radiology    Laboratory:  Labs Ordered and Resulted from Time of ED Arrival to Time of ED Departure   INR - Abnormal       Result Value    INR 2.86 (*)    BASIC METABOLIC PANEL - Abnormal    Sodium 138      Potassium 4.0      Chloride 102      Carbon Dioxide (CO2) 27      Anion Gap 9      Urea Nitrogen 26.9 (*)     Creatinine 0.97 (*)     Calcium 9.6      Glucose 113 (*)     GFR Estimate 58 (*)    CBC WITH PLATELETS AND DIFFERENTIAL - Abnormal    WBC Count 6.4      RBC Count 3.18 (*)     Hemoglobin 10.9 (*)     Hematocrit 33.4 (*)      (*)     MCH 34.3 (*)     MCHC 32.6      RDW 17.5 (*)     Platelet Count 278      %  Neutrophils 82      % Lymphocytes 7      % Monocytes 7      % Eosinophils 2      % Basophils 1      % Immature Granulocytes 1      NRBCs per 100 WBC 0      Absolute Neutrophils 5.3      Absolute Lymphocytes 0.5 (*)     Absolute Monocytes 0.4      Absolute Eosinophils 0.1      Absolute Basophils 0.0      Absolute Immature Granulocytes 0.0      Absolute NRBCs 0.0     PARTIAL THROMBOPLASTIN TIME - Normal    aPTT 36         Emergency Department Course & Assessments:         Consultations/Discussion of Management or Tests:  0809 I obtained history and examined the patient as noted above.   0955 I rechecked and updated the patient. At this time, the patient was deemed safe to return home, and she agreed to the plan of care.       Disposition:  The patient was discharged to home.     Impression & Plan      Medical Decision Making:  This patient is an 82-year-old woman who presents to the ED with neck pain.  This is been present over the course of months.  There is no radicular component into the arms.  No loss of motor or sensory function.  Her pain is improved with rest and worse when moving her head side to side.  X-rays do show degenerative changes.  We discussed what appear to be incidental compression fractures in the thoracic spine.  The patient declined any pain medication here in the ED but will have a prescription to go home with.  She was referred to the spinal clinic for outpatient follow-up for further evaluation of the neck pain as well as the compression fractures.    Diagnosis:    ICD-10-CM    1. Strain of neck muscle, initial encounter  S16.1XXA       2. Cervical arthritis  M47.812       3. Compression fracture of T3 vertebra, initial encounter (H)  S22.030A            Discharge Medications:  Discharge Medication List as of 1/1/2023 10:01 AM      START taking these medications    Details   HYDROcodone-acetaminophen (NORCO) 5-325 MG tablet Take 1 tablet by mouth every 6 hours as needed for pain, Disp-20  tablet, R-0, E-Prescribe              Scribe Disclosure:  I, Juany Ornelas, am serving as a scribe at 8:09 AM on 1/1/2023 to document services personally performed by Medardo Carrillo MD based on my observations and the provider's statements to me.     1/1/2023   Medardo Carrillo MD McRoberts, Sean Edward, MD  01/01/23 1205

## 2023-01-01 NOTE — ED TRIAGE NOTES
Patient presents to the ED reporting neck pain. States has been having neck pain intermittently for months. Denies known injury.

## 2023-01-01 NOTE — DISCHARGE INSTRUCTIONS
Return to the ER for fever, worsening pain, numbness or weakness of the arms or legs, or any new concerns.    Do not drive, use machinery, use alcohol, use other sedating medications, or use any medication that also contains acetaminophen (Tylenol) while taking Norco.

## 2023-01-02 ENCOUNTER — DOCUMENTATION ONLY (OUTPATIENT)
Dept: INTERNAL MEDICINE | Facility: CLINIC | Age: 83
End: 2023-01-02

## 2023-01-02 LAB
ATRIAL RATE - MUSE: 74 BPM
DIASTOLIC BLOOD PRESSURE - MUSE: NORMAL MMHG
INTERPRETATION ECG - MUSE: NORMAL
P AXIS - MUSE: 54 DEGREES
PR INTERVAL - MUSE: 214 MS
QRS DURATION - MUSE: 160 MS
QT - MUSE: 458 MS
QTC - MUSE: 508 MS
R AXIS - MUSE: 100 DEGREES
SYSTOLIC BLOOD PRESSURE - MUSE: NORMAL MMHG
T AXIS - MUSE: 4 DEGREES
VENTRICULAR RATE- MUSE: 74 BPM

## 2023-01-02 NOTE — PROGRESS NOTES
ANTICOAGULATION  MANAGEMENT: Discharge Review    Zunilda Clark chart reviewed for anticoagulation continuity of care    Emergency room visit on 1/1/23 for neck pain.    Discharge disposition: Home    Results:    Recent labs: (last 7 days)     01/01/23  0830   INR 2.86*     Anticoagulation inpatient management:     not applicable     Anticoagulation discharge instructions:     Warfarin dosing: home regimen continued   Bridging: No   INR goal change: No      Medication changes affecting anticoagulation: No    Additional factors affecting anticoagulation: No     PLAN     No adjustment to anticoagulation plan needed    Recommended follow up is scheduled  Patient not contacted    No adjustment to Anticoagulation Calendar was required    Pearl Mahan RN

## 2023-01-04 LAB
MDC_IDC_EPISODE_DTM: NORMAL
MDC_IDC_EPISODE_DURATION: 103 S
MDC_IDC_EPISODE_DURATION: 118 S
MDC_IDC_EPISODE_DURATION: 120 S
MDC_IDC_EPISODE_DURATION: 127 S
MDC_IDC_EPISODE_DURATION: 132 S
MDC_IDC_EPISODE_DURATION: 159 S
MDC_IDC_EPISODE_DURATION: 16 S
MDC_IDC_EPISODE_DURATION: 166 S
MDC_IDC_EPISODE_DURATION: 25 S
MDC_IDC_EPISODE_DURATION: 2509 S
MDC_IDC_EPISODE_DURATION: 267 S
MDC_IDC_EPISODE_DURATION: 28 S
MDC_IDC_EPISODE_DURATION: 28 S
MDC_IDC_EPISODE_DURATION: 309 S
MDC_IDC_EPISODE_DURATION: 31 S
MDC_IDC_EPISODE_DURATION: 31 S
MDC_IDC_EPISODE_DURATION: 32 S
MDC_IDC_EPISODE_DURATION: 32 S
MDC_IDC_EPISODE_DURATION: 33 S
MDC_IDC_EPISODE_DURATION: 33 S
MDC_IDC_EPISODE_DURATION: 35 S
MDC_IDC_EPISODE_DURATION: 35 S
MDC_IDC_EPISODE_DURATION: 37 S
MDC_IDC_EPISODE_DURATION: 40 S
MDC_IDC_EPISODE_DURATION: 40 S
MDC_IDC_EPISODE_DURATION: 42 S
MDC_IDC_EPISODE_DURATION: 43 S
MDC_IDC_EPISODE_DURATION: 431 S
MDC_IDC_EPISODE_DURATION: 44 S
MDC_IDC_EPISODE_DURATION: 45 S
MDC_IDC_EPISODE_DURATION: 46 S
MDC_IDC_EPISODE_DURATION: 48 S
MDC_IDC_EPISODE_DURATION: 49 S
MDC_IDC_EPISODE_DURATION: 50 S
MDC_IDC_EPISODE_DURATION: 51 S
MDC_IDC_EPISODE_DURATION: 52 S
MDC_IDC_EPISODE_DURATION: 52 S
MDC_IDC_EPISODE_DURATION: 55 S
MDC_IDC_EPISODE_DURATION: 59 S
MDC_IDC_EPISODE_DURATION: 61 S
MDC_IDC_EPISODE_DURATION: 61 S
MDC_IDC_EPISODE_DURATION: 62 S
MDC_IDC_EPISODE_DURATION: 65 S
MDC_IDC_EPISODE_DURATION: 74 S
MDC_IDC_EPISODE_DURATION: 75 S
MDC_IDC_EPISODE_DURATION: 77 S
MDC_IDC_EPISODE_DURATION: 84 S
MDC_IDC_EPISODE_DURATION: 841 S
MDC_IDC_EPISODE_DURATION: 86 S
MDC_IDC_EPISODE_DURATION: 89 S
MDC_IDC_EPISODE_ID: NORMAL
MDC_IDC_EPISODE_TYPE: NORMAL
MDC_IDC_LEAD_IMPLANT_DT: NORMAL
MDC_IDC_LEAD_IMPLANT_DT: NORMAL
MDC_IDC_LEAD_LOCATION: NORMAL
MDC_IDC_LEAD_LOCATION: NORMAL
MDC_IDC_LEAD_MFG: NORMAL
MDC_IDC_LEAD_MFG: NORMAL
MDC_IDC_LEAD_MODEL: NORMAL
MDC_IDC_LEAD_MODEL: NORMAL
MDC_IDC_LEAD_POLARITY_TYPE: NORMAL
MDC_IDC_LEAD_POLARITY_TYPE: NORMAL
MDC_IDC_LEAD_SERIAL: NORMAL
MDC_IDC_LEAD_SERIAL: NORMAL
MDC_IDC_MSMT_BATTERY_DTM: NORMAL
MDC_IDC_MSMT_BATTERY_REMAINING_LONGEVITY: 118 MO
MDC_IDC_MSMT_BATTERY_RRT_TRIGGER: 2.62
MDC_IDC_MSMT_BATTERY_STATUS: NORMAL
MDC_IDC_MSMT_BATTERY_VOLTAGE: 3.01 V
MDC_IDC_MSMT_LEADCHNL_RA_IMPEDANCE_VALUE: 304 OHM
MDC_IDC_MSMT_LEADCHNL_RA_IMPEDANCE_VALUE: 456 OHM
MDC_IDC_MSMT_LEADCHNL_RA_PACING_THRESHOLD_AMPLITUDE: 0.88 V
MDC_IDC_MSMT_LEADCHNL_RA_PACING_THRESHOLD_PULSEWIDTH: 0.4 MS
MDC_IDC_MSMT_LEADCHNL_RA_SENSING_INTR_AMPL: 0.62 MV
MDC_IDC_MSMT_LEADCHNL_RA_SENSING_INTR_AMPL: 0.62 MV
MDC_IDC_MSMT_LEADCHNL_RV_IMPEDANCE_VALUE: 342 OHM
MDC_IDC_MSMT_LEADCHNL_RV_IMPEDANCE_VALUE: 361 OHM
MDC_IDC_MSMT_LEADCHNL_RV_PACING_THRESHOLD_AMPLITUDE: 0.88 V
MDC_IDC_MSMT_LEADCHNL_RV_PACING_THRESHOLD_PULSEWIDTH: 0.4 MS
MDC_IDC_MSMT_LEADCHNL_RV_SENSING_INTR_AMPL: 16.38 MV
MDC_IDC_MSMT_LEADCHNL_RV_SENSING_INTR_AMPL: 16.38 MV
MDC_IDC_PG_IMPLANT_DTM: NORMAL
MDC_IDC_PG_MFG: NORMAL
MDC_IDC_PG_MODEL: NORMAL
MDC_IDC_PG_SERIAL: NORMAL
MDC_IDC_PG_TYPE: NORMAL
MDC_IDC_SESS_CLINIC_NAME: NORMAL
MDC_IDC_SESS_DTM: NORMAL
MDC_IDC_SESS_TYPE: NORMAL
MDC_IDC_SET_BRADY_AT_MODE_SWITCH_RATE: 171 {BEATS}/MIN
MDC_IDC_SET_BRADY_HYSTRATE: NORMAL
MDC_IDC_SET_BRADY_LOWRATE: 60 {BEATS}/MIN
MDC_IDC_SET_BRADY_MAX_SENSOR_RATE: 130 {BEATS}/MIN
MDC_IDC_SET_BRADY_MAX_TRACKING_RATE: 130 {BEATS}/MIN
MDC_IDC_SET_BRADY_MODE: NORMAL
MDC_IDC_SET_BRADY_PAV_DELAY_LOW: 180 MS
MDC_IDC_SET_BRADY_SAV_DELAY_LOW: 150 MS
MDC_IDC_SET_LEADCHNL_RA_PACING_AMPLITUDE: 1.5 V
MDC_IDC_SET_LEADCHNL_RA_PACING_ANODE_ELECTRODE_1: NORMAL
MDC_IDC_SET_LEADCHNL_RA_PACING_ANODE_LOCATION_1: NORMAL
MDC_IDC_SET_LEADCHNL_RA_PACING_CAPTURE_MODE: NORMAL
MDC_IDC_SET_LEADCHNL_RA_PACING_CATHODE_ELECTRODE_1: NORMAL
MDC_IDC_SET_LEADCHNL_RA_PACING_CATHODE_LOCATION_1: NORMAL
MDC_IDC_SET_LEADCHNL_RA_PACING_POLARITY: NORMAL
MDC_IDC_SET_LEADCHNL_RA_PACING_PULSEWIDTH: 0.4 MS
MDC_IDC_SET_LEADCHNL_RA_SENSING_ANODE_ELECTRODE_1: NORMAL
MDC_IDC_SET_LEADCHNL_RA_SENSING_ANODE_LOCATION_1: NORMAL
MDC_IDC_SET_LEADCHNL_RA_SENSING_CATHODE_ELECTRODE_1: NORMAL
MDC_IDC_SET_LEADCHNL_RA_SENSING_CATHODE_LOCATION_1: NORMAL
MDC_IDC_SET_LEADCHNL_RA_SENSING_POLARITY: NORMAL
MDC_IDC_SET_LEADCHNL_RA_SENSING_SENSITIVITY: 0.45 MV
MDC_IDC_SET_LEADCHNL_RV_PACING_AMPLITUDE: 2 V
MDC_IDC_SET_LEADCHNL_RV_PACING_ANODE_ELECTRODE_1: NORMAL
MDC_IDC_SET_LEADCHNL_RV_PACING_ANODE_LOCATION_1: NORMAL
MDC_IDC_SET_LEADCHNL_RV_PACING_CAPTURE_MODE: NORMAL
MDC_IDC_SET_LEADCHNL_RV_PACING_CATHODE_ELECTRODE_1: NORMAL
MDC_IDC_SET_LEADCHNL_RV_PACING_CATHODE_LOCATION_1: NORMAL
MDC_IDC_SET_LEADCHNL_RV_PACING_POLARITY: NORMAL
MDC_IDC_SET_LEADCHNL_RV_PACING_PULSEWIDTH: 0.4 MS
MDC_IDC_SET_LEADCHNL_RV_SENSING_ANODE_ELECTRODE_1: NORMAL
MDC_IDC_SET_LEADCHNL_RV_SENSING_ANODE_LOCATION_1: NORMAL
MDC_IDC_SET_LEADCHNL_RV_SENSING_CATHODE_ELECTRODE_1: NORMAL
MDC_IDC_SET_LEADCHNL_RV_SENSING_CATHODE_LOCATION_1: NORMAL
MDC_IDC_SET_LEADCHNL_RV_SENSING_POLARITY: NORMAL
MDC_IDC_SET_LEADCHNL_RV_SENSING_SENSITIVITY: 0.9 MV
MDC_IDC_SET_ZONE_DETECTION_INTERVAL: 350 MS
MDC_IDC_SET_ZONE_DETECTION_INTERVAL: 400 MS
MDC_IDC_SET_ZONE_TYPE: NORMAL
MDC_IDC_STAT_AT_BURDEN_PERCENT: 19.3 %
MDC_IDC_STAT_AT_DTM_END: NORMAL
MDC_IDC_STAT_AT_DTM_START: NORMAL
MDC_IDC_STAT_BRADY_AP_VP_PERCENT: 71.4 %
MDC_IDC_STAT_BRADY_AP_VS_PERCENT: 0.02 %
MDC_IDC_STAT_BRADY_AS_VP_PERCENT: 27.31 %
MDC_IDC_STAT_BRADY_AS_VS_PERCENT: 1.37 %
MDC_IDC_STAT_BRADY_DTM_END: NORMAL
MDC_IDC_STAT_BRADY_DTM_START: NORMAL
MDC_IDC_STAT_BRADY_RA_PERCENT_PACED: 65.54 %
MDC_IDC_STAT_BRADY_RV_PERCENT_PACED: 98.58 %
MDC_IDC_STAT_EPISODE_RECENT_COUNT: 0
MDC_IDC_STAT_EPISODE_RECENT_COUNT: 7096
MDC_IDC_STAT_EPISODE_RECENT_COUNT_DTM_END: NORMAL
MDC_IDC_STAT_EPISODE_RECENT_COUNT_DTM_START: NORMAL
MDC_IDC_STAT_EPISODE_TOTAL_COUNT: 0
MDC_IDC_STAT_EPISODE_TOTAL_COUNT: 5
MDC_IDC_STAT_EPISODE_TOTAL_COUNT: NORMAL
MDC_IDC_STAT_EPISODE_TOTAL_COUNT_DTM_END: NORMAL
MDC_IDC_STAT_EPISODE_TOTAL_COUNT_DTM_START: NORMAL
MDC_IDC_STAT_EPISODE_TYPE: NORMAL

## 2023-01-05 DIAGNOSIS — I48.91 ATRIAL FIBRILLATION AND FLUTTER (H): ICD-10-CM

## 2023-01-05 DIAGNOSIS — E03.9 ACQUIRED HYPOTHYROIDISM: ICD-10-CM

## 2023-01-05 DIAGNOSIS — R19.7 DIARRHEA, UNSPECIFIED TYPE: ICD-10-CM

## 2023-01-05 DIAGNOSIS — I48.92 ATRIAL FIBRILLATION AND FLUTTER (H): ICD-10-CM

## 2023-01-05 DIAGNOSIS — Z79.01 LONG TERM CURRENT USE OF ANTICOAGULANTS WITH INR GOAL OF 2.0-3.0: ICD-10-CM

## 2023-01-05 RX ORDER — LEVOTHYROXINE SODIUM 112 UG/1
112 TABLET ORAL DAILY
Qty: 90 TABLET | Refills: 1 | Status: SHIPPED | OUTPATIENT
Start: 2023-01-05 | End: 2023-03-21

## 2023-01-05 RX ORDER — FAMOTIDINE 40 MG/1
40 TABLET, FILM COATED ORAL DAILY
Qty: 90 TABLET | Refills: 1 | Status: SHIPPED | OUTPATIENT
Start: 2023-01-05 | End: 2023-05-12

## 2023-01-05 RX ORDER — WARFARIN SODIUM 2.5 MG/1
TABLET ORAL
Qty: 110 TABLET | Refills: 1 | Status: ON HOLD | OUTPATIENT
Start: 2023-01-05 | End: 2023-02-09

## 2023-01-05 NOTE — TELEPHONE ENCOUNTER
Levothyroxine 112 Mcg Tab  Prescription approved per [facility] Refill Protocol.    Famotidine (Pepcid) 40 mg tab  Prescription approved per [facility] Refill Protocol.

## 2023-01-05 NOTE — TELEPHONE ENCOUNTER
ANTICOAGULATION MANAGEMENT:  Medication Refill    Anticoagulation Summary  As of 12/21/2022    Warfarin maintenance plan:  5 mg (2.5 mg x 2) every Tue, Fri; 2.5 mg (2.5 mg x 1) all other days   Next INR check:  2/1/2023   Target end date:  Indefinite    Indications    Long term current use of anticoagulants with INR goal of 2.0-3.0 [Z79.01]  Atrial fibrillation and flutter (H) [I48.91  I48.92]  S/P mitral valve replacement with bioprosthetic valve [Z95.3]             Anticoagulation Care Providers     Provider Role Specialty Phone number    Yunior Huang MD Referring Internal Medicine [phone]          Visit with referring provider/group within last year: Yes    [facility] referral signed within last year: Yes    Zunilda meets all criteria for refill (current [facility] referral, office visit with referring provider/group in last year, lab monitoring up to date or not exceeding 2 weeks overdue). Rx instructions and quantity match patient's current dosing plan. Warfarin 90 day supply with 1 refill granted per [facility] protocol     Linette Rocha RN  Anticoagulation Clinic

## 2023-01-05 NOTE — TELEPHONE ENCOUNTER
Patient is calling because she has a new pharmacy starting this year. She is calling a little early for the refills but she wanted to allow enough time for the mail order especially since she is new to [facility].

## 2023-01-06 DIAGNOSIS — I48.20 CHRONIC ATRIAL FIBRILLATION (H): ICD-10-CM

## 2023-01-06 DIAGNOSIS — I27.20 PULMONARY HTN (H): ICD-10-CM

## 2023-01-06 DIAGNOSIS — I10 BENIGN ESSENTIAL HYPERTENSION: ICD-10-CM

## 2023-01-06 RX ORDER — AMLODIPINE BESYLATE 2.5 MG/1
2.5 TABLET ORAL DAILY
Qty: 90 TABLET | Refills: 3 | Status: SHIPPED | OUTPATIENT
Start: 2023-01-06 | End: 2023-11-15

## 2023-01-06 RX ORDER — TORSEMIDE 10 MG/1
10 TABLET ORAL
Qty: 90 TABLET | Refills: 3 | Status: SHIPPED | OUTPATIENT
Start: 2023-01-06 | End: 2023-10-09

## 2023-01-06 RX ORDER — METOPROLOL TARTRATE 50 MG
50 TABLET ORAL 2 TIMES DAILY
Qty: 180 TABLET | Refills: 3 | Status: SHIPPED | OUTPATIENT
Start: 2023-01-06 | End: 2023-11-06

## 2023-01-06 RX ORDER — AMLODIPINE AND VALSARTAN 5; 160 MG/1; MG/1
1 TABLET ORAL DAILY
Qty: 90 TABLET | Refills: 3 | Status: SHIPPED | OUTPATIENT
Start: 2023-01-06 | End: 2023-11-15

## 2023-01-06 NOTE — TELEPHONE ENCOUNTER
Medication Refilled: metoprolol, amlodipine, Exforge, torsemide  Last office visit: 8/258/22 with Dr. Rodriguez  Last Labs/EKG: NA  Next office visit: 2/13/2023   Pharmacy sent to: [facility] - pt called to request all scripts be transferred to this pharmacy for insurance coverage.   TAD Graves RN

## 2023-02-01 ENCOUNTER — LAB (OUTPATIENT)
Dept: LAB | Facility: CLINIC | Age: 83
End: 2023-02-01
Payer: COMMERCIAL

## 2023-02-01 ENCOUNTER — ANTICOAGULATION THERAPY VISIT (OUTPATIENT)
Dept: ANTICOAGULATION | Facility: CLINIC | Age: 83
End: 2023-02-01

## 2023-02-01 DIAGNOSIS — N18.30 CHRONIC KIDNEY DISEASE, STAGE 3 (H): Primary | ICD-10-CM

## 2023-02-01 DIAGNOSIS — I48.92 ATRIAL FIBRILLATION AND FLUTTER (H): ICD-10-CM

## 2023-02-01 DIAGNOSIS — Z79.01 LONG TERM CURRENT USE OF ANTICOAGULANTS WITH INR GOAL OF 2.0-3.0: Primary | ICD-10-CM

## 2023-02-01 DIAGNOSIS — Z79.01 LONG TERM CURRENT USE OF ANTICOAGULANTS WITH INR GOAL OF 2.0-3.0: ICD-10-CM

## 2023-02-01 DIAGNOSIS — Z95.3 S/P MITRAL VALVE REPLACEMENT WITH BIOPROSTHETIC VALVE: ICD-10-CM

## 2023-02-01 DIAGNOSIS — I48.91 ATRIAL FIBRILLATION AND FLUTTER (H): ICD-10-CM

## 2023-02-01 LAB
CREAT UR-MCNC: 130 MG/DL
INR BLD: 2.6 (ref 0.9–1.1)
MICROALBUMIN UR-MCNC: 169 MG/L
MICROALBUMIN/CREAT UR: 130 MG/G CR (ref 0–25)

## 2023-02-01 PROCEDURE — 36416 COLLJ CAPILLARY BLOOD SPEC: CPT

## 2023-02-01 PROCEDURE — 82570 ASSAY OF URINE CREATININE: CPT

## 2023-02-01 PROCEDURE — 85610 PROTHROMBIN TIME: CPT

## 2023-02-01 PROCEDURE — 82043 UR ALBUMIN QUANTITATIVE: CPT

## 2023-02-01 NOTE — PROGRESS NOTES
ANTICOAGULATION MANAGEMENT     Zunilda Alicea May 82 year old female is on warfarin with therapeutic INR result. (Goal INR 2.0-3.0)    Recent labs: (last 7 days)     02/01/23  1001   INR 2.6*       ASSESSMENT       Source(s): Chart Review    Previous INR was Therapeutic last 2(+) visits    Medication, diet, health changes since last INR    1/2/23 - ED visit for neck pain           PLAN     Recommended plan for no diet, medication or health factor changes affecting INR     Dosing Instructions: Continue your current warfarin dose with next INR in 6 weeks       Summary  As of 2/1/2023    Full warfarin instructions:  5 mg every Tue, Fri; 2.5 mg all other days   Next INR check:  3/15/2023             Detailed voice message left for Deanne with dosing instructions and follow up date.     Contact [phone]  to schedule and with any changes, questions or concerns.     Education provided:     Please call back if any changes to your diet, medications or how you've been taking warfarin    Contact [phone]  with any changes, questions or concerns.     Plan made per [facility] anticoagulation protocol    Екатерина Mahan RN  Anticoagulation Clinic  2/1/2023    _______________________________________________________________________     Anticoagulation Episode Summary     Current INR goal:  2.0-3.0   TTR:  96.9 % (1 y)   Target end date:  Indefinite   Send INR reminders to:  [facility]    Indications    Long term current use of anticoagulants with INR goal of 2.0-3.0 [Z79.01]  Atrial fibrillation and flutter (H) [I48.91  I48.92]  S/P mitral valve replacement with bioprosthetic valve [Z95.3]           Comments:  27 mm Magna bioprosthetic valve (2014)         Anticoagulation Care Providers     Provider Role Specialty Phone number    Yunior Huang MD Referring Internal Medicine [phone]

## 2023-02-01 NOTE — LETTER
February 3, 2023      Deanne May  [address]   [place] [place] [facility] [place] [address]        Dear ,    We are writing to inform you of your test results. Your labs show slightly elevated protein in the urine. Follow up in 6 months for recheck.       Resulted Orders   Albumin Random Urine Quantitative with Creat Ratio   Result Value Ref Range    Creatinine Urine mg/dL 130.0 mg/dL      Comment:      The reference ranges have not been established in urine creatinine. The results should be integrated into the clinical context for interpretation.    Albumin Urine mg/L 169.0 mg/L      Comment:      The reference ranges have not been established in urine albumin. The results should be integrated into the clinical context for interpretation.    Albumin Urine mg/g Cr 130.00 (H) 0.00 - 25.00 mg/g Cr      Comment:      Microalbuminuria is defined as an albumin:creatinine ratio of 17 to 299 for males and 25 to 299 for females. A ratio of albumin:creatinine of 300 or higher is indicative of overt proteinuria.  Due to biologic variability, positive results should be confirmed by a second, first-morning random or 24-hour timed urine specimen. If there is discrepancy, a third specimen is recommended. When 2 out of 3 results are in the microalbuminuria range, this is evidence for incipient nephropathy and warrants increased efforts at glucose control, blood pressure control, and institution of therapy with an angiotensin-converting-enzyme (ACE) inhibitor (if the patient can tolerate it).         If you have any questions or concerns, please call the clinic at the number listed above.       Sincerely,      Yunior Huang MD        mazin

## 2023-02-07 ENCOUNTER — TRANSFERRED RECORDS (OUTPATIENT)
Dept: HEALTH INFORMATION MANAGEMENT | Facility: CLINIC | Age: 83
End: 2023-02-07

## 2023-02-08 ENCOUNTER — APPOINTMENT (OUTPATIENT)
Dept: CT IMAGING | Facility: CLINIC | Age: 83
End: 2023-02-08
Attending: EMERGENCY MEDICINE
Payer: COMMERCIAL

## 2023-02-08 ENCOUNTER — HOSPITAL ENCOUNTER (OUTPATIENT)
Facility: CLINIC | Age: 83
Setting detail: OBSERVATION
Discharge: HOME | End: 2023-02-09
Attending: EMERGENCY MEDICINE | Admitting: HOSPITALIST
Payer: COMMERCIAL

## 2023-02-08 DIAGNOSIS — K59.00 CONSTIPATION, UNSPECIFIED CONSTIPATION TYPE: Primary | ICD-10-CM

## 2023-02-08 DIAGNOSIS — S22.001A CLOSED BURST FRACTURE OF THORACIC VERTEBRA, INITIAL ENCOUNTER (H): ICD-10-CM

## 2023-02-08 DIAGNOSIS — I48.91 ATRIAL FIBRILLATION AND FLUTTER (H): ICD-10-CM

## 2023-02-08 DIAGNOSIS — M54.50 ACUTE BILATERAL LOW BACK PAIN WITHOUT SCIATICA: ICD-10-CM

## 2023-02-08 DIAGNOSIS — I48.92 ATRIAL FIBRILLATION AND FLUTTER (H): ICD-10-CM

## 2023-02-08 DIAGNOSIS — R79.1 SUBTHERAPEUTIC INTERNATIONAL NORMALIZED RATIO (INR): ICD-10-CM

## 2023-02-08 LAB
ANION GAP SERPL CALCULATED.3IONS-SCNC: 9 MMOL/L (ref 7–15)
BASOPHILS # BLD AUTO: 0.1 10E3/UL (ref 0–0.2)
BASOPHILS NFR BLD AUTO: 1 %
BUN SERPL-MCNC: 26 MG/DL (ref 8–23)
CALCIUM SERPL-MCNC: 9.4 MG/DL (ref 8.8–10.2)
CHLORIDE SERPL-SCNC: 104 MMOL/L (ref 98–107)
CREAT SERPL-MCNC: 0.94 MG/DL (ref 0.51–0.95)
DEPRECATED HCO3 PLAS-SCNC: 27 MMOL/L (ref 22–29)
EOSINOPHIL # BLD AUTO: 0.2 10E3/UL (ref 0–0.7)
EOSINOPHIL NFR BLD AUTO: 3 %
ERYTHROCYTE [DISTWIDTH] IN BLOOD BY AUTOMATED COUNT: 18.1 % (ref 10–15)
GFR SERPL CREATININE-BSD FRML MDRD: 61 ML/MIN/1.73M2
GLUCOSE SERPL-MCNC: 104 MG/DL (ref 70–99)
HCT VFR BLD AUTO: 33.2 % (ref 35–47)
HGB BLD-MCNC: 10.6 G/DL (ref 11.7–15.7)
IMM GRANULOCYTES # BLD: 0.1 10E3/UL
IMM GRANULOCYTES NFR BLD: 1 %
INR PPP: 1.74 (ref 0.85–1.15)
LYMPHOCYTES # BLD AUTO: 0.4 10E3/UL (ref 0.8–5.3)
LYMPHOCYTES NFR BLD AUTO: 5 %
MCH RBC QN AUTO: 34 PG (ref 26.5–33)
MCHC RBC AUTO-ENTMCNC: 31.9 G/DL (ref 31.5–36.5)
MCV RBC AUTO: 106 FL (ref 78–100)
MONOCYTES # BLD AUTO: 1.3 10E3/UL (ref 0–1.3)
MONOCYTES NFR BLD AUTO: 18 %
NEUTROPHILS # BLD AUTO: 5.5 10E3/UL (ref 1.6–8.3)
NEUTROPHILS NFR BLD AUTO: 72 %
NRBC # BLD AUTO: 0 10E3/UL
NRBC BLD AUTO-RTO: 0 /100
PLATELET # BLD AUTO: 237 10E3/UL (ref 150–450)
POTASSIUM SERPL-SCNC: 4.3 MMOL/L (ref 3.4–5.3)
RBC # BLD AUTO: 3.12 10E6/UL (ref 3.8–5.2)
SODIUM SERPL-SCNC: 140 MMOL/L (ref 136–145)
WBC # BLD AUTO: 7.5 10E3/UL (ref 4–11)

## 2023-02-08 PROCEDURE — 250N000013 HC RX MED GY IP 250 OP 250 PS 637: Performed by: HOSPITALIST

## 2023-02-08 PROCEDURE — 85610 PROTHROMBIN TIME: CPT | Performed by: EMERGENCY MEDICINE

## 2023-02-08 PROCEDURE — 82374 ASSAY BLOOD CARBON DIOXIDE: CPT | Performed by: EMERGENCY MEDICINE

## 2023-02-08 PROCEDURE — 99222 1ST HOSP IP/OBS MODERATE 55: CPT | Performed by: HOSPITALIST

## 2023-02-08 PROCEDURE — 85025 COMPLETE CBC W/AUTO DIFF WBC: CPT | Performed by: EMERGENCY MEDICINE

## 2023-02-08 PROCEDURE — 82310 ASSAY OF CALCIUM: CPT | Performed by: EMERGENCY MEDICINE

## 2023-02-08 PROCEDURE — G0378 HOSPITAL OBSERVATION PER HR: HCPCS

## 2023-02-08 PROCEDURE — 250N000013 HC RX MED GY IP 250 OP 250 PS 637: Performed by: STUDENT IN AN ORGANIZED HEALTH CARE EDUCATION/TRAINING PROGRAM

## 2023-02-08 PROCEDURE — L0464 TLSO 4MOD SACRO-SCAP PRE: HCPCS

## 2023-02-08 PROCEDURE — 250N000013 HC RX MED GY IP 250 OP 250 PS 637: Performed by: EMERGENCY MEDICINE

## 2023-02-08 PROCEDURE — 99285 EMERGENCY DEPT VISIT HI MDM: CPT | Mod: 25

## 2023-02-08 PROCEDURE — 72128 CT CHEST SPINE W/O DYE: CPT

## 2023-02-08 PROCEDURE — 72131 CT LUMBAR SPINE W/O DYE: CPT

## 2023-02-08 PROCEDURE — 36415 COLL VENOUS BLD VENIPUNCTURE: CPT | Performed by: EMERGENCY MEDICINE

## 2023-02-08 RX ORDER — AMLODIPINE BESYLATE 2.5 MG/1
2.5 TABLET ORAL DAILY
Status: DISCONTINUED | OUTPATIENT
Start: 2023-02-08 | End: 2023-02-09 | Stop reason: HOSPADM

## 2023-02-08 RX ORDER — LEVOTHYROXINE SODIUM 112 UG/1
112 TABLET ORAL DAILY
Status: DISCONTINUED | OUTPATIENT
Start: 2023-02-08 | End: 2023-02-09 | Stop reason: HOSPADM

## 2023-02-08 RX ORDER — FAMOTIDINE 20 MG/1
40 TABLET, FILM COATED ORAL DAILY
Status: DISCONTINUED | OUTPATIENT
Start: 2023-02-08 | End: 2023-02-09 | Stop reason: HOSPADM

## 2023-02-08 RX ORDER — TORSEMIDE 10 MG/1
10 TABLET ORAL
Status: DISCONTINUED | OUTPATIENT
Start: 2023-02-09 | End: 2023-02-09 | Stop reason: HOSPADM

## 2023-02-08 RX ORDER — NALOXONE HYDROCHLORIDE 0.4 MG/ML
0.4 INJECTION, SOLUTION INTRAMUSCULAR; INTRAVENOUS; SUBCUTANEOUS
Status: DISCONTINUED | OUTPATIENT
Start: 2023-02-08 | End: 2023-02-09 | Stop reason: HOSPADM

## 2023-02-08 RX ORDER — OXYCODONE HYDROCHLORIDE 5 MG/1
5 TABLET ORAL ONCE
Status: COMPLETED | OUTPATIENT
Start: 2023-02-08 | End: 2023-02-08

## 2023-02-08 RX ORDER — NALOXONE HYDROCHLORIDE 0.4 MG/ML
0.2 INJECTION, SOLUTION INTRAMUSCULAR; INTRAVENOUS; SUBCUTANEOUS
Status: DISCONTINUED | OUTPATIENT
Start: 2023-02-08 | End: 2023-02-09 | Stop reason: HOSPADM

## 2023-02-08 RX ORDER — DORZOLAMIDE HYDROCHLORIDE AND TIMOLOL MALEATE 20; 5 MG/ML; MG/ML
1 SOLUTION/ DROPS OPHTHALMIC 2 TIMES DAILY
Status: DISCONTINUED | OUTPATIENT
Start: 2023-02-08 | End: 2023-02-09 | Stop reason: HOSPADM

## 2023-02-08 RX ORDER — WARFARIN SODIUM 3 MG/1
3 TABLET ORAL
Status: COMPLETED | OUTPATIENT
Start: 2023-02-08 | End: 2023-02-08

## 2023-02-08 RX ORDER — OXYCODONE HYDROCHLORIDE 5 MG/1
5 TABLET ORAL EVERY 4 HOURS PRN
Status: DISCONTINUED | OUTPATIENT
Start: 2023-02-08 | End: 2023-02-09 | Stop reason: HOSPADM

## 2023-02-08 RX ORDER — METOPROLOL TARTRATE 50 MG
50 TABLET ORAL 2 TIMES DAILY
Status: DISCONTINUED | OUTPATIENT
Start: 2023-02-08 | End: 2023-02-09 | Stop reason: HOSPADM

## 2023-02-08 RX ORDER — POLYETHYLENE GLYCOL 3350 17 G/17G
17 POWDER, FOR SOLUTION ORAL DAILY
Status: DISCONTINUED | OUTPATIENT
Start: 2023-02-08 | End: 2023-02-09 | Stop reason: HOSPADM

## 2023-02-08 RX ORDER — AMOXICILLIN 250 MG
2 CAPSULE ORAL 2 TIMES DAILY PRN
Status: DISCONTINUED | OUTPATIENT
Start: 2023-02-08 | End: 2023-02-09 | Stop reason: HOSPADM

## 2023-02-08 RX ORDER — AMOXICILLIN 250 MG
1 CAPSULE ORAL 2 TIMES DAILY PRN
Status: DISCONTINUED | OUTPATIENT
Start: 2023-02-08 | End: 2023-02-09 | Stop reason: HOSPADM

## 2023-02-08 RX ORDER — ONDANSETRON 2 MG/ML
4 INJECTION INTRAMUSCULAR; INTRAVENOUS EVERY 6 HOURS PRN
Status: DISCONTINUED | OUTPATIENT
Start: 2023-02-08 | End: 2023-02-09 | Stop reason: HOSPADM

## 2023-02-08 RX ORDER — ALLOPURINOL 100 MG/1
100 TABLET ORAL 2 TIMES DAILY
Status: DISCONTINUED | OUTPATIENT
Start: 2023-02-08 | End: 2023-02-09 | Stop reason: HOSPADM

## 2023-02-08 RX ORDER — ONDANSETRON 4 MG/1
4 TABLET, ORALLY DISINTEGRATING ORAL EVERY 6 HOURS PRN
Status: DISCONTINUED | OUTPATIENT
Start: 2023-02-08 | End: 2023-02-09 | Stop reason: HOSPADM

## 2023-02-08 RX ORDER — ACETAMINOPHEN 325 MG/1
975 TABLET ORAL EVERY 8 HOURS
Status: DISCONTINUED | OUTPATIENT
Start: 2023-02-08 | End: 2023-02-09 | Stop reason: HOSPADM

## 2023-02-08 RX ADMIN — FAMOTIDINE 40 MG: 20 TABLET, FILM COATED ORAL at 14:44

## 2023-02-08 RX ADMIN — WARFARIN SODIUM 3 MG: 3 TABLET ORAL at 17:11

## 2023-02-08 RX ADMIN — ACETAMINOPHEN 975 MG: 325 TABLET ORAL at 13:19

## 2023-02-08 RX ADMIN — METOPROLOL TARTRATE 50 MG: 50 TABLET, FILM COATED ORAL at 10:06

## 2023-02-08 RX ADMIN — LEVOTHYROXINE SODIUM 112 MCG: 112 TABLET ORAL at 14:44

## 2023-02-08 RX ADMIN — METOPROLOL TARTRATE 50 MG: 50 TABLET, FILM COATED ORAL at 21:42

## 2023-02-08 RX ADMIN — AMLODIPINE BESYLATE 2.5 MG: 2.5 TABLET ORAL at 14:44

## 2023-02-08 RX ADMIN — OXYCODONE HYDROCHLORIDE 5 MG: 5 TABLET ORAL at 13:19

## 2023-02-08 RX ADMIN — OXYCODONE HYDROCHLORIDE 5 MG: 5 TABLET ORAL at 07:16

## 2023-02-08 RX ADMIN — DORZOLAMIDE HYDROCHLORIDE AND TIMOLOL MALEATE 1 DROP: 20; 5 SOLUTION OPHTHALMIC at 21:42

## 2023-02-08 RX ADMIN — ACETAMINOPHEN 975 MG: 325 TABLET ORAL at 21:42

## 2023-02-08 RX ADMIN — ALLOPURINOL 100 MG: 100 TABLET ORAL at 21:42

## 2023-02-08 RX ADMIN — OXYCODONE HYDROCHLORIDE 5 MG: 5 TABLET ORAL at 08:00

## 2023-02-08 RX ADMIN — POLYETHYLENE GLYCOL 3350 17 G: 17 POWDER, FOR SOLUTION ORAL at 10:06

## 2023-02-08 ASSESSMENT — ACTIVITIES OF DAILY LIVING (ADL)
ADLS_ACUITY_SCORE: 37
ADLS_ACUITY_SCORE: 35
ADLS_ACUITY_SCORE: 33
ADLS_ACUITY_SCORE: 35
ADLS_ACUITY_SCORE: 33
ADLS_ACUITY_SCORE: 37
ADLS_ACUITY_SCORE: 33

## 2023-02-08 ASSESSMENT — ENCOUNTER SYMPTOMS
BACK PAIN: 1
NAUSEA: 0
CONSTIPATION: 0
VOMITING: 0
CONFUSION: 0
WEAKNESS: 0
NECK PAIN: 0
ABDOMINAL PAIN: 0
HEADACHES: 0
ARTHRALGIAS: 0
DIARRHEA: 0

## 2023-02-08 NOTE — PLAN OF CARE
PRIMARY DIAGNOSIS: ACUTE PAIN/T12 FX  OUTPATIENT/OBSERVATION GOALS TO BE MET BEFORE DISCHARGE:  1. Pain Status: Improved-controlled with oral pain medications.    2. Return to near baseline physical activity: No    3. Cleared for discharge by consultants (if involved): No - PT consulted    Conservative treatment per ortho, oxycodone given for 8/10 pain brought pain down to 6/10. Up with 1 to commode while in ED.     Discharge Planner Nurse   Safe discharge environment identified: No  Barriers to discharge: Yes       Entered by: Brandon Bueno RN 02/08/2023 2:53 PM     Please review provider order for any additional goals.   Nurse to notify provider when observation goals have been met and patient is ready for discharge.    /58   Pulse 60   Temp 98  F (36.7  C) (Oral)   Resp 16   LMP  (LMP Unknown)   SpO2 92%

## 2023-02-08 NOTE — PHARMACY-ANTICOAGULATION SERVICE
Clinical Pharmacy - Warfarin Dosing Consult     Pharmacy has been consulted to manage this patient s warfarin therapy.  Indication: Atrial Fibrillation  Therapy Goal: INR 2-3  Warfarin Prior to Admission: Yes  Warfarin PTA Regimen: 5 mg Tue, Fri and 2.5 mg all other days  Recent documented change in oral intake/nutrition: Unknown    INR   Date Value Ref Range Status   02/08/2023 1.74 (H) 0.85 - 1.15 Final   02/01/2023 2.6 (H) 0.9 - 1.1 Final       Recommend warfarin 3 mg today.  Pharmacy will monitor Zunilda Clark daily and order warfarin doses to achieve specified goal.      Please contact pharmacy as soon as possible if the warfarin needs to be held for a procedure or if the warfarin goals change.

## 2023-02-08 NOTE — PROGRESS NOTES
Patient was fit with an [employer] 464 tlso.  Physical, written and verbal instruction given as well as contact information.  Patient to follow with office as needed.  Tera PINEDA.

## 2023-02-08 NOTE — PLAN OF CARE
PRIMARY DIAGNOSIS: ACUTE PAIN/T12 FX  OUTPATIENT/OBSERVATION GOALS TO BE MET BEFORE DISCHARGE:  1. Pain Status: Improved-controlled with oral pain medications.    2. Return to near baseline physical activity: No    3. Cleared for discharge by consultants (if involved): No    Discharge Planner Nurse   Safe discharge environment identified: No  Barriers to discharge: Yes       Entered by: Caro Hastings 02/08/2023 10:53 AM     Please review provider order for any additional goals.   Nurse to notify provider when observation goals have been met and patient is ready for discharge.    /66   Pulse 75   Temp 98.3  F (36.8  C) (Temporal)   Resp 18   LMP  (LMP Unknown)   SpO2 98%

## 2023-02-08 NOTE — ED NOTES
[facility]  ED Nurse Handoff Report    Zunilda Clark is a 82 year old female   ED Chief complaint: Back Pain  . ED Diagnosis:   Final diagnoses:   Acute bilateral low back pain without sciatica   Subtherapeutic international normalized ratio (INR)   Closed burst fracture of thoracic vertebra, initial encounter (H)     Allergies: No Known Allergies    Code Status: DNR / DNI  Activity level - Baseline/Home:  Independent. Activity Level - Current:   Assist X 1. Lift room needed: No. Bariatric: No   Needed: No   Isolation: No. Infection: Not Applicable.     Vital Signs:   Vitals:    02/08/23 0604 02/08/23 0615   BP: (!) 126/92    Pulse: 69    Resp: 18    Temp: 98.3  F (36.8  C)    TempSrc: Temporal    SpO2: 98% 94%       Cardiac Rhythm:  ,      Pain level:    Patient confused: No. Patient Falls Risk: Yes.   Elimination Status: Has voided   Patient Report - Initial Complaint: Briefly, the patient presented with low back pain.  Patient reports 2 days prior to arrival, she was getting water out of her fridge.  She lost her balance and fell onto her buttocks.  She had immediate onset of low back pain.  Pain is throughout the low back, nonradiating, constant aggravated by movement.  She had some leftover Percocet from her prior prescription.  She took the medication which seem to remarkably help her pain.  She went to [facility] and was diagnosed with a lumbar compression fracture based on x-rays and was to have an outpatient CT scan.  Today she was unable to get to her medications secondary to the pain, prompting her to call EMS.  En route patient was given fentanyl with improvement of symptoms.  She denies urinary/stool incontinence, lower extremity weakness/numbness, fever, history of malignancy or IV drug use.  She is on warfarin for history of atrial fibrillation.. Focused Assessment:    Tests Performed:   Abnormal Labs Resulted from Time of ED Arrival to Time of ED Departure   BASIC METABOLIC  PANEL - Abnormal       Result Value    Sodium 140      Potassium 4.3      Chloride 104      Carbon Dioxide (CO2) 27      Anion Gap 9      Urea Nitrogen 26.0 (*)     Creatinine 0.94      Calcium 9.4      Glucose 104 (*)     GFR Estimate 61     INR - Abnormal    INR 1.74 (*)    CBC WITH PLATELETS AND DIFFERENTIAL - Abnormal    WBC Count 7.5      RBC Count 3.12 (*)     Hemoglobin 10.6 (*)     Hematocrit 33.2 (*)      (*)     MCH 34.0 (*)     MCHC 31.9      RDW 18.1 (*)     Platelet Count 237      % Neutrophils 72      % Lymphocytes 5      % Monocytes 18      % Eosinophils 3      % Basophils 1      % Immature Granulocytes 1      NRBCs per 100 WBC 0      Absolute Neutrophils 5.5      Absolute Lymphocytes 0.4 (*)     Absolute Monocytes 1.3      Absolute Eosinophils 0.2      Absolute Basophils 0.1      Absolute Immature Granulocytes 0.1      Absolute NRBCs 0.0        Lumbar spine CT w/o contrast   Final Result   IMPRESSION:     1. T12 burst fracture. Refer to thoracic spine report.   2. No lumbar spine fracture is identified.   3. Multilevel degenerative change.       JESÚS MACIAS MD            SYSTEM ID:  [id number]      CT Thoracic Spine w/o Contrast   Final Result   IMPRESSION:   1. Acute T12 burst fracture with slight fragment retropulsion   contributing to mild spinal canal stenosis.   2. Chronic T5 compression fracture with mild height loss.      JESÚS MACIAS MD            SYSTEM ID:  [id number]       . Abnormal Results: .   Treatments provided: pain   Family Comments: family at bedside   OBS brochure/video discussed/provided to patient:  Yes  ED Medications:   Medications   oxyCODONE (ROXICODONE) tablet 5 mg (has no administration in time range)   oxyCODONE (ROXICODONE) tablet 5 mg (5 mg Oral Given 2/8/23 0716)     Drips infusing:  No  For the majority of the shift, the patient's behavior Green. Interventions performed were .    Sepsis treatment initiated: No     Patient tested for COVID 19 prior to  admission: NO    ED Nurse Name/Phone Number: Kavin Garcia RN,   8:57 AM    RECEIVING UNIT ED HANDOFF REVIEW    Above ED Nurse Handoff Report was reviewed: Yes  Reviewed by: Brandon Bueno RN on February 8, 2023 at 12:17 PM

## 2023-02-08 NOTE — ED TRIAGE NOTES
Here for midlower back pain. Stated that she fell down on Monday, went to [facility] yesterday and had an xray done that shows a fracture on T12, but unsure if new or old. Stated will received a call from [facility] for a CT scan of her back.  Per ems patient is ambulatory. Denies any numbness or tingling sensation. Ems gave Fentanyl 50mcg which did help with the pain. ABCs intact.      Triage Assessment     Row Name 02/08/23 0601       Triage Assessment (Adult)    Airway WDL WDL       Respiratory WDL    Respiratory WDL WDL       Cardiac WDL    Cardiac WDL WDL

## 2023-02-08 NOTE — H&P
[facility]    History and Physical - Hospitalist Service       Date of Admission:  2/8/2023    Assessment & Plan      Zunilda Clark is a 82 year old female with rheumatoid arthritis, parox a fib (on coumadin), bioprosthetic MV replacement in 2008 then replacement again with bioprosthetic valve in 5/2014 (with tricuspid annuloplasty ring) on coumadin, pacemaker (SSS), chf, dvt, HTN, pulmonary hypertension, stroke with mild left hemiparesis (2008, now resolved) admitted on 2/8/2023 after fall, found to have a T12 burst fracture.    Fall  T12 burst fracture    - had a mechanical fall on 2/6, landing on her buttocks    - was seen at [facility] on 2/7 where she had xrays that showed an age indeterminate T12 fracture    - pain continued and had to call 911 this am to come to the ED    - CT scan in the ED showed a T12 burst fracture    - Dr. Jackson has placed a note: conservative treatment    - I do not think patient would tolerate a TLSO, so she has an off the shelf brace from orthotics    - pain control: scheduled tylenol, oxy (no symptoms of soasm)    - PT consult    Paroxysmal a fib  Bioprosthetic MV replacement x 2  TR annuloplasty    - had rheumatic heart disease    - MVR 2007 and 2014    - TR annuloplasty, has residual mod-severe TR    - cont coumadin    - follows with Dr. Rodriguez    Hypothyroidism    - cont synthroid    RA    - on methotrexate, likely hold while here    HTN    - cont amlodipine and amlodipine/valsartan    CHF    - cont torsemide    History CVA    - 2008, had some residual left sided weakness, but since resolved    DNR/DNI: discussed with patient. She states she has discussed this with her family     Diet: Regular Diet Adult    DVT Prophylaxis: Warfarin  Leonard Catheter: Not present  Lines: None     Cardiac Monitoring: None  Code Status: Full Code      Clinically Significant Risk Factors Present on Admission               # Drug Induced Coagulation Defect: home medication list includes  an anticoagulant medication    # Hypertension: home medication list includes antihypertensive(s)   # Acute Respiratory Failure: Documented O2 saturation < 91%.  Continue supplemental oxygen as needed             Disposition Plan      Expected Discharge Date: 02/09/2023                  Jarrett Johnson MD  Hospitalist Service  [facility]  Securely message with [employer] (more info)  Text page via [facility] Paging/Directory     ______________________________________________________________________    Chief Complaint   Back pain    History is obtained from the patient    History of Present Illness   Zunilda Clark is a 82 year old female with rheumatoid arthritis, parox a fib (on coumadin), bioprosthetic MV replacement in 2008 then replacement again with bioprosthetic valve in 5/2014 (with tricuspid annuloplasty ring) on coumadin, pacemaker (SSS), chf, dvt, HTN, pulmonary hypertension, stroke with mild left hemiparesis (2008, now resolved) admitted on 2/8/2023 after fall, found to have a T12 burst fracture.  Patient states she was in her kitchen on Monday, 2 days ago.  She was getting a picture for water.  She states she went to go fill up her cup when the pitcher hit the refrigerator and it knocked her over.  She landed on her butt.  She was not able to get up and pushed her life alert button.  This contacted her assisted living who called her sister to come and check on her.  Her sister called 911.  They helped the patient off the floor.  She did not want to be seen at the hospital at that time.  The next day she still had pain, so she went to [facility].  They had imaging, which I assume where x-rays, done which showed an age-indeterminate T12 fracture.  She was sent home.  Her pain continued.  She was unable to tolerate the pain or walk, so she called 911 again this morning.  She states she did not pass out.  She states she was knocked over and it was a mechanical fall.  She denies any chest pain,  shortness of breath, abdominal pain, nausea, vomiting, diarrhea.  No cough, runny nose, sore throat.  No fevers or chills.  No urinary symptoms.  Currently at rest she has no pain.      Past Medical History    Past Medical History:   Diagnosis Date     Acquired hypothyroidism 11/04/2015     Aortic valve insufficiency      Atrial fibrillation and flutter      CHF (congestive heart failure)      DVT (deep venous thrombosis) 2003     Gastro-oesophageal reflux disease      H/O mitral valve replacement with tissue graft 06/2014     HTN (hypertension)      Other chronic pain      Pulmonary HTN      Rheumatoid arthritis      Seizure 2014     Shingles 08/2018     Stroke 2009    left side mild weakness        Past Surgical History   Past Surgical History:   Procedure Laterality Date     APPLY WOUND VAC Left 12/18/2018    Procedure: Wound vac application;  Surgeon: Pardeep Sheikh MD;  Location: [facility] OR     ARTHROPLASTY KNEE Left 11/12/2018    Procedure: Left total knee arthroplasty using a [employer] Melissa II knee system;  Surgeon: Pardeep Sheikh MD;  Location: [facility] OR     ARTHROSCOPY KNEE WITH DEBRIDEMENT JOINT, COMBINED Left 12/18/2018    Procedure: Left knee debridement and placement of wound vac;  Surgeon: Pardeep Sheikh MD;  Location: [facility] OR     CATARACT IOL, RT/LT       COLONOSCOPY  03/15/2012     EP PPM GENERATOR CHANGE SINGLE N/A 03/08/2021    Procedure: Permanent Pacemakers  Generator Change Dual Chamber;  Surgeon: Tamiko Cowan MD;  Location:  [facility]ATH LAB     ESOPHAGOSCOPY, GASTROSCOPY, DUODENOSCOPY (EGD), COMBINED N/A 02/19/2020    Procedure: ESOPHAGOGASTRODUODENOSCOPY (EGD);  Surgeon: Michael Mccarthy MD;  Location:  GI     EYE SURGERY  2018    Both eyes/cataract surgery     H ABLATION AV NODE  2011    AFlutter with syncope, PPM to follow     HERNIA REPAIR      3 hernies/right and left & umbilical     IMPLANT PACEMAKER  2011     LAPAROSCOPIC  CHOLECYSTECTOMY WITH CHOLANGIOGRAMS N/A 04/29/2017    Procedure: LAPAROSCOPIC CHOLECYSTECTOMY WITH CHOLANGIOGRAMS;  LAPAROSCOPIC CHOLECYSTECTOMY WITH CHOLANGIOGRAMS ;  Surgeon: Angeles Mcgill MD;  Location: [facility] OR     OPEN REDUCTION INTERNAL FIXATION RODDING INTRAMEDULLARY HUMERUS Right 07/28/2015    Procedure: OPEN REDUCTION INTERNAL FIXATION RODDING INTRAMEDULLARY HUMERUS;  Surgeon: Raymundo Rivera MD;  Location: [facility] OR     REPLACE VALVE MITRAL  2006    Mitral valve replacement with bioprosthetic valve in 2008 for rheumatic disease     SLING BLADDER SUSPENSION WITH FASCIA UMESH         Prior to Admission Medications   Prior to Admission Medications   Prescriptions Last Dose Informant Patient Reported? Taking?   ALLOPURINOL PO 2/7/2023  Yes Yes   Sig: Take 100 mg by mouth 2 times daily   Dorzolamide HCl-Timolol Mal (COSOPT OP) 2/7/2023  Yes Yes   Sig: Apply to eye 2 times daily Place one drop into each eye twice daily   METHOTREXATE SODIUM IJ 2/2/2023  Yes No   Sig: Inject 0.8 mLs as directed once a week Thursdays   VITAMIN D, CHOLECALCIFEROL, PO 2/7/2023  Yes Yes   Sig: Take 1,000 Units by mouth daily   amLODIPine (NORVASC) 2.5 MG tablet 2/7/2023  No Yes   Sig: Take 1 tablet (2.5 mg) by mouth daily   amLODIPine-valsartan (EXFORGE) 5-160 MG tablet 2/7/2023  No Yes   Sig: Take 1 tablet by mouth daily   calcium citrate (CALCITRATE) 950 MG tablet 2/7/2023  Yes Yes   Sig: Take 1 tablet by mouth daily    famotidine (PEPCID) 40 MG tablet 2/7/2023  No Yes   Sig: Take 1 tablet (40 mg) by mouth daily   folic acid (FOLVITE) 1 MG tablet 2/7/2023  Yes Yes   Sig: Take 1 mg by mouth daily   levothyroxine (SYNTHROID/LEVOTHROID) 112 MCG tablet 2/7/2023  No Yes   Sig: Take 1 tablet (112 mcg) by mouth daily   metoprolol tartrate (LOPRESSOR) 50 MG tablet 2/7/2023  No Yes   Sig: Take 1 tablet (50 mg) by mouth 2 times daily   multivitamin w/minerals (THERA-VIT-M) tablet 2/7/2023  Yes Yes   Sig: Take 1 tablet by mouth daily  Without iron   torsemide (DEMADEX) 10 MG tablet 2023  No Yes   Sig: Take 1 tablet (10 mg) by mouth daily (with breakfast)   warfarin ANTICOAGULANT (COUMADIN) 2.5 MG tablet 2023  No Yes   Sig: TAKE ONE TABLET BY MOUTH DAILY EXCEPT TAKE 2 TABLETS TUE AND FRI OR AS DIRECTED BY INR      Facility-Administered Medications: None        Review of Systems    The 10 point Review of Systems is negative other than noted in the HPI or here.     Social History   I have reviewed this patient's social history and updated it with pertinent information if needed.  Social History     Tobacco Use     Smoking status: Never     Smokeless tobacco: Never   Vaping Use     Vaping Use: Never used   Substance Use Topics     Alcohol use: No     Alcohol/week: 0.0 standard drinks     Drug use: No       Family History   I have reviewed this patient's family history and updated it with pertinent information if needed.  Family History   Problem Relation Age of Onset     Coronary Artery Disease Mother      Coronary Artery Disease Brother      Diabetes Brother      Cancer Brother          at age 52      Other Cancer Brother      Hypertension Sister      Hyperlipidemia Sister        Allergies   No Known Allergies     Physical Exam   Vital Signs: Temp: 98  F (36.7  C) Temp src: Oral BP: 137/68 Pulse: 69   Resp: 18 SpO2: 96 % O2 Device: Nasal cannula Oxygen Delivery: 2 LPM  Weight: 0 lbs 0 oz    Constitutional: awake, alert, cooperative, no apparent distress, and appears stated age  Eyes: Lids and lashes normal, pupils equal, round and reactive to light, extra ocular muscles intact, sclera clear, conjunctiva normal  ENT: Normocephalic, without obvious abnormality, atraumatic, sinuses nontender on palpation, external ears without lesions, oral pharynx with moist mucous membranes, tonsils without erythema or exudates, gums normal and good dentition.  Respiratory: No increased work of breathing, good air exchange, clear to auscultation  bilaterally, no crackles or wheezing  Cardiovascular: Normal apical impulse, regular rate and rhythm, normal S1 and S2, no S3 or S4, and no murmur noted  GI: No scars, normal bowel sounds, soft, non-distended, non-tender, no masses palpated, no hepatosplenomegally  Skin: no bruising or bleeding  Musculoskeletal: no lower extremity pitting edema present    Medical Decision Making       MANAGEMENT DISCUSSED with the following over the past 24 hours: ED provider     60 mins spent on patient care  Data     I have personally reviewed the following data over the past 24 hrs:    7.5  \   10.6 (L)   / 237     140 104 26.0 (H) /  104 (H)   4.3 27 0.94 \       INR:  1.74 (H) PTT:  N/A   D-dimer:  N/A Fibrinogen:  N/A       Imaging results reviewed over the past 24 hrs:   Recent Results (from the past 24 hour(s))   CT Thoracic Spine w/o Contrast    Narrative    CT THORACIC SPINE WITHOUT CONTRAST  2/8/2023 7:03 AM     HISTORY: Fall, back pain.     TECHNIQUE: Axial images of the thoracic spine were obtained without  intravenous contrast. Multiplanar reformations were performed.   Radiation dose for this scan was reduced using automated exposure  control, adjustment of the mA and/or kV according to patient size, or  iterative reconstruction technique.    COMPARISON: None.    FINDINGS:  Acute T12 vertebral body burst fracture with mild height  loss and slight fragment retropulsion contributing to mild spinal  canal stenosis. No pedicle or posterior element extension. No other  acute fractures are identified. Chronic-appearing compression  deformity at T5. Multilevel mild to moderate degenerative disc disease  is present with areas of shallow disc bulging/protrusions. No  high-grade spinal canal or foraminal stenosis. Scattered nonspecific  pulmonary opacities. Bilateral pleural effusions. Vascular  calcifications. Heart is enlarged. Sliding hiatal hernia. Nonspecific  splenic and liver calcifications.      Impression     IMPRESSION:  1. Acute T12 burst fracture with slight fragment retropulsion  contributing to mild spinal canal stenosis.  2. Chronic T5 compression fracture with mild height loss.    JESÚS MACIAS MD         SYSTEM ID:  [id number]   Lumbar spine CT w/o contrast    Narrative    CT LUMBAR SPINE WITHOUT CONTRAST  2/8/2023 7:04 AM     HISTORY: Fall, back pain.     TECHNIQUE: Axial images of the lumbar spine were obtained without  intravenous contrast. Multiplanar reformations were performed.   Radiation dose for this scan was reduced using automated exposure  control, adjustment of the mA and/or kV according to patient size, or  iterative reconstruction technique.    COMPARISON: None.    FINDINGS: T12 burst fracture at the superior field of view. No lumbar  spine fracture is identified. Multilevel mild degenerative disc  disease. Multilevel severe facet arthropathy. Multilevel grade 1  spondylolisthesis most conspicuous at L4-L5 where there is grade 1  anterolisthesis of L4 on L5. Disc bulging contributes to mild spinal  canal stenoses at multiple levels.    Mild to moderate spinal canal stenosis at L3-L4 and L4-L5. Mild  foraminal stenoses at multiple levels.    Bilateral sacroiliac joint degenerative change. Scattered vascular  calcifications. Bilateral pleural effusions.      Impression    IMPRESSION:    1. T12 burst fracture. Refer to thoracic spine report.  2. No lumbar spine fracture is identified.  3. Multilevel degenerative change.     JESÚS MACIAS MD         SYSTEM ID:  [id number]

## 2023-02-08 NOTE — PLAN OF CARE
PRIMARY DIAGNOSIS: ACUTE PAIN/T12 FX  OUTPATIENT/OBSERVATION GOALS TO BE MET BEFORE DISCHARGE:    1. Pain Status: Improved-controlled with oral pain medications.     2. Return to near baseline physical activity: No     3. Cleared for discharge by consultants (if involved): No - PT consulted     /53   Pulse 63   Temp 97.7  F (36.5  C) (Oral)   Resp 16   LMP  (LMP Unknown)   SpO2 96%     Vitals stable except requiring 1L O2 to keep sats WDL, BP soft-PM BP med held. Pt alert and oriented x4. Ax1 with walker, gait belt and TLSO  to commode per ED, has not been up yet due to discomfort. Purewick in place. Pt reports improved pain on oral pain medications. Denies numbness or tingling. Urinating without difficulty. Tolerating regular diet. Ortho following. Plan for PT consult tomorrow. Pt resting comfortably. Will continue to provide supportive cares.      Discharge Planner Nurse   Safe discharge environment identified: No  Barriers to discharge: Yes, PT consult       Entered by: Cookie Lincoln RN    Please review provider order for any additional goals.   Nurse to notify provider when observation goals have been met and patient is ready for discharge.

## 2023-02-08 NOTE — PROGRESS NOTES
ROOM # 204-2    Living Situation (if not independent, order SW consult):Ind living   Facility name:  : [facility]    Activity level at baseline: Walker for distance, cane otherwise  Activity level on admit: Max assist, not out of bed      Patient registered to observation; given Patient Bill of Rights; given the opportunity to ask questions about observation status and their plan of care.  Patient has been oriented to the observation room, bathroom and call light is in place.    Discussed discharge goals and expectations with patient/family.

## 2023-02-08 NOTE — ED PROVIDER NOTES
Emergency Department Attending Supervision Note  2/8/2023  7:36 AM      I evaluated this patient in conjunction with Roxi Castillo PA-C      Briefly, the patient presented with low back pain.  Patient reports 2 days prior to arrival, she was getting water out of her fridge.  She lost her balance and fell onto her buttocks.  She had immediate onset of low back pain.  Pain is throughout the low back, nonradiating, constant aggravated by movement.  She had some leftover Percocet from her prior prescription.  She took the medication which seem to remarkably help her pain.  She went to [facility] and was diagnosed with a lumbar compression fracture based on x-rays and was to have an outpatient CT scan.  Today she was unable to get to her medications secondary to the pain, prompting her to call EMS.  En route patient was given fentanyl with improvement of symptoms.  She denies urinary/stool incontinence, lower extremity weakness/numbness, fever, history of malignancy or IV drug use.  She is on warfarin for history of atrial fibrillation.      On my exam,       HEENT:   Conjunctiva are normal and without erythema.    NECK:   Supple, no meningismus.     CV:    Regular rate and rhythm     Grade 4/6 systolic murmurs     No rubs or gallops.      2+ dorsalis pedis pulses bilateral.  PULM:   Clear to auscultation bilateral.      No respiratory distress.  No stridor.  ABD:   Soft, non-tender, non-distendend.      No rebound or guarding.  MSK:   Patient is non-tender over the thoracic or lumbar spine.      Tenderness at the right lumbar paraspinal musculature.      No step-off to the bony spine or overlying lesions.   LYMPH:  No cervical lymphadenopathy.  NEURO:  Strength is 5/5 and sensation is intact to the lower extremities bilateral.      1+ Achilles tendon reflexes bilateral.      No clonus and down-going Babinski bilateral.  SKIN:   Warm, dry and intact.    PYSCH:   Mood is good and affect is appropriate.      Results:    Lumbar  spine CT w/o contrast   Final Result   IMPRESSION:     1. T12 burst fracture. Refer to thoracic spine report.   2. No lumbar spine fracture is identified.   3. Multilevel degenerative change.       JESÚS MACIAS MD            SYSTEM ID:  [id number]      CT Thoracic Spine w/o Contrast   Final Result   IMPRESSION:   1. Acute T12 burst fracture with slight fragment retropulsion   contributing to mild spinal canal stenosis.   2. Chronic T5 compression fracture with mild height loss.      JESÚS MACIAS MD            SYSTEM ID:  [id number]      Read by radiology    ED course:    0828 I spoke with Dr. Johnson of the hospitalist services who is in agreement to accept the patient for admission for pain management with orthopedics consult.    0837 I discussed the patient with YOLANDE Rivera of orthopedics.    0857 I discussed the patient with Dr. Jackson of orthopedics.    82-year-old female presents with mechanical fall 2 days prior with associated low back pain.  Despite her reports of low back pain, CT of the L-spine is unremarkable.  CT of the T-spine reveals a T12 burst fracture with mild retropulsion.  She did not have any significant discomfort in this area but is the probable source of her pain.  Fortunately she is neurologically intact.  She was incidentally noted to be subtherapeutic on INR.  Due to associated fracture, level of discomfort and developing hypoxia with oral oxycodone, she is unfit to be discharged home to be transferred to an observation bed.  Spine physician Dr. Jackson of orthopedic surgery made aware of case.  He recommended MRI but upon further discussion with patient, she has pacemaker and thus cannot undergo MRI safely in the ED.    Diagnosis    ICD-10-CM    1. Acute bilateral low back pain without sciatica  M54.50       2. Subtherapeutic international normalized ratio (INR)  R79.1       3. Closed burst fracture of thoracic vertebra, initial encounter (H)  S22.001A         Rikki Story MD           Rikki Story MD  02/08/23 9022

## 2023-02-08 NOTE — CONSULTS
Ortho Spine    CC: burst fracture without neurologic injury    HPI: ground level fall 2 days ago.  Seen at [facility] yesterday with likely T12 fx and referred for CT scan.  Presented to ER for pain control and admitted    Plan: Conservative treatment.  She has a brace from orthotics, will need don/doff training.  Wear as tolerated.  Minimize bend/lift/twist.  Pain management.  F/u in ortho clinic in 1-2 weeks at her convenience after hospital discharge.    Past Medical History:   Diagnosis Date     Acquired hypothyroidism 11/04/2015     Aortic valve insufficiency      Atrial fibrillation and flutter      CHF (congestive heart failure)      DVT (deep venous thrombosis) 2003     Gastro-oesophageal reflux disease      H/O mitral valve replacement with tissue graft 06/2014     HTN (hypertension)      Other chronic pain      Pulmonary HTN      Rheumatoid arthritis      Seizure 2014     Shingles 08/2018     Stroke 2009    left side mild weakness      Past Surgical History:   Procedure Laterality Date     APPLY WOUND VAC Left 12/18/2018    Procedure: Wound vac application;  Surgeon: Pardeep Sheikh MD;  Location:  OR     ARTHROPLASTY KNEE Left 11/12/2018    Procedure: Left total knee arthroplasty using a [employer] knee system;  Surgeon: Pardeep Sheikh MD;  Location:  OR     ARTHROSCOPY KNEE WITH DEBRIDEMENT JOINT, COMBINED Left 12/18/2018    Procedure: Left knee debridement and placement of wound vac;  Surgeon: Pardeep Sheikh MD;  Location:  OR     CATARACT IOL, RT/LT       COLONOSCOPY  03/15/2012     EP PPM GENERATOR CHANGE SINGLE N/A 03/08/2021    Procedure: Permanent Pacemakers  Generator Change Dual Chamber;  Surgeon: Tamiko Cowan MD;  Location: [facility] CARDIAC CATH LAB     ESOPHAGOSCOPY, GASTROSCOPY, DUODENOSCOPY (EGD), COMBINED N/A 02/19/2020    Procedure: ESOPHAGOGASTRODUODENOSCOPY (EGD);  Surgeon: Michael Mccarthy MD;  Location:   GI     EYE SURGERY  2018    Both eyes/cataract surgery     H ABLATION AV NODE  2011    AFlutter with syncope, PPM to follow     HERNIA REPAIR      3 hernies/right and left & umbilical     IMPLANT PACEMAKER  2011     LAPAROSCOPIC CHOLECYSTECTOMY WITH CHOLANGIOGRAMS N/A 04/29/2017    Procedure: LAPAROSCOPIC CHOLECYSTECTOMY WITH CHOLANGIOGRAMS;  LAPAROSCOPIC CHOLECYSTECTOMY WITH CHOLANGIOGRAMS ;  Surgeon: Angeles Mcgill MD;  Location: [facility] OR     OPEN REDUCTION INTERNAL FIXATION RODDING INTRAMEDULLARY HUMERUS Right 07/28/2015    Procedure: OPEN REDUCTION INTERNAL FIXATION RODDING INTRAMEDULLARY HUMERUS;  Surgeon: Raymundo Rivera MD;  Location: [facility] OR     REPLACE VALVE MITRAL  2006    Mitral valve replacement with bioprosthetic valve in 2008 for rheumatic disease     SLING BLADDER SUSPENSION WITH FASCIA UMESH       Current Facility-Administered Medications   Medication     acetaminophen (TYLENOL) tablet 975 mg     allopurinol (ZYLOPRIM) tablet 100 mg     amLODIPine (NORVASC) tablet 2.5 mg     amlodipine-valsartan (EXFORGE) 5-160 mg combo dose     dorzolamide-timolol (COSOPT) ophthalmic solution 1 drop     famotidine (PEPCID) tablet 40 mg     levothyroxine (SYNTHROID/LEVOTHROID) tablet 112 mcg     melatonin tablet 1 mg     metoprolol tartrate (LOPRESSOR) tablet 50 mg     naloxone (NARCAN) injection 0.2 mg    Or     naloxone (NARCAN) injection 0.4 mg    Or     naloxone (NARCAN) injection 0.2 mg    Or     naloxone (NARCAN) injection 0.4 mg     ondansetron (ZOFRAN ODT) ODT tab 4 mg    Or     ondansetron (ZOFRAN) injection 4 mg     oxyCODONE (ROXICODONE) tablet 5 mg     polyethylene glycol (MIRALAX) Packet 17 g     senna-docusate (SENOKOT-S/PERICOLACE) 8.6-50 MG per tablet 1 tablet    Or     senna-docusate (SENOKOT-S/PERICOLACE) 8.6-50 MG per tablet 2 tablet     [START ON 2/9/2023] torsemide (DEMADEX) tablet 10 mg     Warfarin Dose Required Daily - Pharmacist Managed      No Known Allergies    Family History   Problem  Relation Age of Onset     Coronary Artery Disease Mother      Coronary Artery Disease Brother      Diabetes Brother      Cancer Brother          at age 52      Other Cancer Brother      Hypertension Sister      Hyperlipidemia Sister      Social History     Socioeconomic History     Marital status:      Spouse name: Not on file     Number of children: Not on file     Years of education: Not on file     Highest education level: Not on file   Occupational History     Not on file   Tobacco Use     Smoking status: Never     Smokeless tobacco: Never   Vaping Use     Vaping Use: Never used   Substance and Sexual Activity     Alcohol use: No     Alcohol/week: 0.0 standard drinks     Drug use: No     Sexual activity: Not Currently     Partners: Male   Other Topics Concern      Service Not Asked     Blood Transfusions Not Asked     Caffeine Concern Yes     Comment: occasionally     Occupational Exposure Not Asked     Hobby Hazards Not Asked     Sleep Concern No     Stress Concern Not Asked     Weight Concern Not Asked     Special Diet Yes     Comment: lower salt     Back Care No     Exercise Yes     Comment: walking ride excercise bike     Bike Helmet Not Asked     Seat Belt Not Asked     Self-Exams Not Asked     Parent/sibling w/ CABG, MI or angioplasty before 65F 55M? Yes     Comment: Brother   Social History Narrative     Not on file     Social Determinants of Health     Financial Resource Strain: Not on file   Food Insecurity: Not on file   Transportation Needs: Not on file   Physical Activity: Not on file   Stress: Not on file   Social Connections: Not on file   Intimate Partner Violence: Not on file   Housing Stability: Not on file     ROS: back pain from fall    Imaging reviewed: CT scan 23    Exam  Comfortable in bed so I did not force her OOB or eval skin  Pleasantly conversant with adult daughter in room  Normal motor and sensory testing through [facility]  No Clonus   Mid-Level Procedure Text (F): After obtaining clear surgical margins the patient was sent to a mid-level provider for surgical repair.  The patient understands they will receive post-surgical care and follow-up from the mid-level provider.

## 2023-02-08 NOTE — ED PROVIDER NOTES
History     Chief Complaint:  Back Pain       HPI   Zunilda Clark is a 82 year old female on Coumadin with a history of afib/flutter, cardiac pacemaker, HTN, s/p total knee arthoplasty who presents after fall. Patient experienced fall at home on Monday when attempting to grab something from refrigerator. Fell backwards onto buttocks, denies hitting head or LOC. No pain to upper or lower extremities bilaterally. EMS was called but patient decided to stay home and see how she felt with time. Had percocet for pain at home and took some for symptomatic relief. Patient was able to ambulate independently at home until yesterday when patient could not get out of bed due to pain and could not access her pain medication. Went to [facility], informed of spinal fracture, but there was uncertainty if fracture was new or old. Plan was to complete CT with [facility] and create plan. When pain could not be managed at home, called EMS to come to ER. Denies urinary or bowel incontinence, weakness, numbness.       Independent Historian:   None - Patient Only    Review of External Notes: None    ROS:  Review of Systems   Gastrointestinal: Negative for abdominal pain, constipation, diarrhea, nausea and vomiting.   Musculoskeletal: Positive for back pain. Negative for arthralgias and neck pain.   Neurological: Negative for weakness and headaches.   Psychiatric/Behavioral: Negative for confusion.       Allergies:  No Known Allergies     Medications:    Allopurinol   Amlodipine  Pepcid  Folvite  Levothyroxine  Methotrexate  Metoprolol tartrate  Timolol maleate  Torsemide  Coumadin    Past Medical History:    Acquired hypothyroidism  Aortic valve insufficiency  Atrial fibrillation and flutter  CHF (congestive heart failure)  DVT (deep venous thrombosis)  Gastro-oesophageal reflux disease  Hypertension  Pulmonary hypertension  Rheumatoid arthritis  Seizure  Shingles  Stroke  Aortic regurgitation  Mitral  stenosis  Osteoarthritis  Osteopenia  Rheumatic heart disease  Heart murmur    Past Surgical History:    Apply wound vac  Arthroplasty knee  Arthroscopy knee with debridement joint, combined  Cataract IOL   Colonoscopy  EP PPM generator change single  Esophagogastroduodenoscopy    Ablation AV node  Hernia repair x3  Implant pacemaker  Laparoscopic cholecystectomy with cholangiograms  ORIF rodding intramedullary humerus  Replace mitral valve  Sling bladder suspension with fascia anusha  Tubal ligation    Family History:    Mother: CAD, osteoarthritis  Father: CAD  Brother: cancer, CAD, diabetes, osteoarthritis  Sister: hyperlipidemia, hypertension, osteoarthritis    Social History:  Presents to the ED with her sister  Living situation: independent sector of assisted living  PCP: Yunior Huang     Physical Exam     Patient Vitals for the past 24 hrs:   BP Temp Temp src Pulse Resp SpO2   02/08/23 0912 133/66 -- -- -- 18 98 %   02/08/23 0857 -- -- -- -- -- 93 %   02/08/23 0852 -- -- -- -- -- 93 %   02/08/23 0842 -- -- -- -- -- 94 %   02/08/23 0832 -- -- -- -- -- 95 %   02/08/23 0827 -- -- -- -- -- 94 %   02/08/23 0822 -- -- -- -- -- 95 %   02/08/23 0819 -- -- -- -- -- (!) 88 %   02/08/23 0818 -- -- -- -- -- (!) 83 %   02/08/23 0817 -- -- -- -- -- (!) 86 %   02/08/23 0814 -- -- -- -- -- (!) 89 %   02/08/23 0813 -- -- -- -- -- (!) 87 %   02/08/23 0812 -- -- -- -- -- (!) 87 %   02/08/23 0810 -- -- -- -- -- 90 %   02/08/23 0807 -- -- -- -- -- 90 %   02/08/23 0737 134/72 -- -- 62 -- --   02/08/23 0615 -- -- -- -- -- 94 %   02/08/23 0604 (!) 126/92 98.3  F (36.8  C) Temporal 69 18 98 %        Physical Exam  Eyes:      Extraocular Movements: Extraocular movements intact.   Cardiovascular:      Heart sounds: Normal heart sounds.   Pulmonary:      Breath sounds: Normal breath sounds.   Musculoskeletal:         General: Normal range of motion.      Lumbar back: Tenderness present.      Comments: Lower left lumbar pain    Skin:     General: Skin is warm.   Neurological:      Mental Status: She is oriented to person, place, and time.      Sensory: Sensation is intact.      Motor: Motor function is intact.         Emergency Department Course     Imaging:  Lumbar spine CT w/o contrast   Final Result   IMPRESSION:     1. T12 burst fracture. Refer to thoracic spine report.   2. No lumbar spine fracture is identified.   3. Multilevel degenerative change.       JESÚS MACIAS MD            SYSTEM ID:  [id number]      CT Thoracic Spine w/o Contrast   Final Result   IMPRESSION:   1. Acute T12 burst fracture with slight fragment retropulsion   contributing to mild spinal canal stenosis.   2. Chronic T5 compression fracture with mild height loss.      JESÚS MACIAS MD            SYSTEM ID:  [id number]         Report per radiology    Laboratory:  Labs Ordered and Resulted from Time of ED Arrival to Time of ED Departure   BASIC METABOLIC PANEL - Abnormal       Result Value    Sodium 140      Potassium 4.3      Chloride 104      Carbon Dioxide (CO2) 27      Anion Gap 9      Urea Nitrogen 26.0 (*)     Creatinine 0.94      Calcium 9.4      Glucose 104 (*)     GFR Estimate 61     INR - Abnormal    INR 1.74 (*)    CBC WITH PLATELETS AND DIFFERENTIAL - Abnormal    WBC Count 7.5      RBC Count 3.12 (*)     Hemoglobin 10.6 (*)     Hematocrit 33.2 (*)      (*)     MCH 34.0 (*)     MCHC 31.9      RDW 18.1 (*)     Platelet Count 237      % Neutrophils 72      % Lymphocytes 5      % Monocytes 18      % Eosinophils 3      % Basophils 1      % Immature Granulocytes 1      NRBCs per 100 WBC 0      Absolute Neutrophils 5.5      Absolute Lymphocytes 0.4 (*)     Absolute Monocytes 1.3      Absolute Eosinophils 0.2      Absolute Basophils 0.1      Absolute Immature Granulocytes 0.1      Absolute NRBCs 0.0          Emergency Department Course & Assessments:    Interventions:  Medications   oxyCODONE (ROXICODONE) tablet 5 mg (has no administration in time  range)   metoprolol tartrate (LOPRESSOR) tablet 50 mg (has no administration in time range)   melatonin tablet 1 mg (has no administration in time range)   ondansetron (ZOFRAN ODT) ODT tab 4 mg (has no administration in time range)     Or   ondansetron (ZOFRAN) injection 4 mg (has no administration in time range)   polyethylene glycol (MIRALAX) Packet 17 g (has no administration in time range)   senna-docusate (SENOKOT-S/PERICOLACE) 8.6-50 MG per tablet 1 tablet (has no administration in time range)     Or   senna-docusate (SENOKOT-S/PERICOLACE) 8.6-50 MG per tablet 2 tablet (has no administration in time range)   acetaminophen (TYLENOL) tablet 975 mg (has no administration in time range)   oxyCODONE (ROXICODONE) tablet 5 mg (has no administration in time range)   Warfarin Dose Required Daily - Pharmacist Managed (has no administration in time range)   oxyCODONE (ROXICODONE) tablet 5 mg (5 mg Oral Given 2/8/23 0716)        Independent Interpretation (X-rays, CTs, rhythm strip):  None    Consultations/Discussion of Management or Tests:  Consulted with hospitalist for [facility] admission for pain control, PT/OT - Dr. Jarrett Johnson accepting  Consulted with Dr. Jackson with Orthopedics - recommending MRI  ED Course as of 02/08/23 0937   Wed Feb 08, 2023   0730 Initial assessment   0755 Reassessed by Dr. Story   0812 Reassessed by myself, pain improved from 8/10 to 6/10. Patient's sister has to leave for a moment but will be returning       Social Determinants of Health affecting care:   Elderly living independently    Assessments:  0715 I obtained history and examined the patient as noted above.   0809 Patient rechecked and updated.     Disposition:  The patient was admitted to the hospital under the care of Dr. Johnson.     Impression & Plan      Medical Decision Making:  Zunilda Clark is a 82 year old female who presents with back pain after fall.  Pain has only slightly improved with interventions in the emergency  department, remains 7-8/10.  The patient had fracture noted on [facility] imaging - CT completed to determine if acute or chronic. Imaging confirmed acute T12 burst fracture with chronic T5 compression fracture. The patient has not had a fever, saddle/perineal anesthesia, bilateral foot numbness, or bowel or bladder dysfunction.  She has no red flags in history of cancer or IVDU. There is no clinical evidence of cauda equina syndrome, discitis, spinal/epidural space hematoma or epidural abscess. The neurological exam is normal. Patient was provided 5 mg oxycodone x2 in ER, noted to have O2 desaturations when sleeping. Patient put on 2L, sating 98%. Due to the patient's level of pain, incidentally not at site of burst fracture but lower lumbar, considering [facility] admission for further pain management with possible PT/OT assessments. Discussed with hospitalist who is in agreement with admission. Consulted with Dr. Jackson of orthopedics, recommending MRI. Patient currently has pacemaker in place, MRI deferred. Informed [facility]'s Nicole LANIER, of patient's presence at ER.      Diagnosis:    ICD-10-CM    1. Acute bilateral low back pain without sciatica  M54.50       2. Subtherapeutic international normalized ratio (INR)  R79.1       3. Closed burst fracture of thoracic vertebra, initial encounter (H)  S22.001A            Scribe Disclosure:  Becky HUNT, am serving as a scribe at 7:14 AM on 2/8/2023 to document services personally performed by Roxi Castillo PA-C based on my observations and the provider's statements to me.     2/8/2023   YOLANDE Betancourt Lauren R, PA-C  02/08/23 0937

## 2023-02-08 NOTE — PROGRESS NOTES
Deanne was seen yesterday in our urgent care at [facility] and diagnosed with an age-indeterminate T12 fracture.  It was recommended that she undergo further imaging to better assess the fracture.  Today she presents to the emergency room for pain management.  Neurologically intact.  I have spoke with Dr. Story in the emergency room.      She is on anticoagulation currently subtherapeutic.  As mentioned, neurologically intact.  Somewhat  hypoxic even on oxycodone so somewhat fragile in regards to opioid tolerance.  Would recommend thoracolumbar MRI to age the fracture.  Burst fractures with a normal neurologic status can be treated conservatively and in fact do not always even require bracing.  If she is having significant pain control issues then bracing can be a helpful option.  Bracing would likely require a rigid TLSO bracing would likely require a rigid TLSO.  < BR>I would assume that she will discharge once her pain is controlled. Physical therapy should be consulted for mobilization.  If her pain is controlled she may not need a brace.  Overall healing time would be 3 months on average.  Restrictions during that time would be to minimize bending lifting and twisting.    she is being admitted for pain management currently. Once the pain is controlled, would like to see her back in clinic i n a week or two for ongoing outpatient follow up.

## 2023-02-08 NOTE — PHARMACY-ADMISSION MEDICATION HISTORY
Admission medication history interview status for this patient is complete. See [facility] admission navigator for allergy information, prior to admission medications and immunization status.     Medication history interview done, indicate source(s): Patient and sister  Medication history resources (including written lists, pill bottles, clinic record):[facility]  Pharmacy: [facility]. Discharge, [facility] in [place]    Changes made to PTA medication list:  Added: None  Changed: None  Reported as Not Taking: None  Removed: Vyzulta eye drop, timolol ophthalmic, timolol-dorzolamide-latanoprost opth.     Actions taken by pharmacist (provider contacted, etc):None     Additional medication history information:  1. Warfarin - Patient is not positive that they took their warfarin yesterday on 2/7 and did not have any today 2/8 yet. She typically takes her warfarin in the evening. She also wanted to make sure I wrote down that she typically eats lots of green vegetables in her diet.   2. Patient told me she does not want to take torsemide while inpatient because she doesn't want to have to go that much.      Medication reconciliation/reorder completed by provider prior to medication history?  N   (Y/N)       Prior to Admission medications    Medication Sig Last Dose Taking? Auth Provider Long Term End Date   ALLOPURINOL PO Take 100 mg by mouth 2 times daily 2/7/2023 Yes Unknown, Entered By History     amLODIPine (NORVASC) 2.5 MG tablet Take 1 tablet (2.5 mg) by mouth daily 2/7/2023 Yes Noe Rodriguez MD Yes    amLODIPine-valsartan (EXFORGE) 5-160 MG tablet Take 1 tablet by mouth daily 2/7/2023 Yes Noe Rodriguez MD Yes    calcium citrate (CALCITRATE) 950 MG tablet Take 1 tablet by mouth daily  2/7/2023 Yes Unknown, Entered By History     Dorzolamide HCl-Timolol Mal (COSOPT OP) Apply to eye 2 times daily Place one drop into each eye twice daily 2/7/2023 Yes Reported, Patient     famotidine (PEPCID) 40 MG tablet Take 1 tablet  (40 mg) by mouth daily 2/7/2023 Yes Yunior Huang MD     folic acid (FOLVITE) 1 MG tablet Take 1 mg by mouth daily 2/7/2023 Yes Reported, Patient     levothyroxine (SYNTHROID/LEVOTHROID) 112 MCG tablet Take 1 tablet (112 mcg) by mouth daily 2/7/2023 Yes Yunior Huang MD Yes    metoprolol tartrate (LOPRESSOR) 50 MG tablet Take 1 tablet (50 mg) by mouth 2 times daily 2/7/2023 Yes Noe Rodriguez MD Yes    multivitamin w/minerals (THERA-VIT-M) tablet Take 1 tablet by mouth daily Without iron 2/7/2023 Yes Unknown, Entered By History     torsemide (DEMADEX) 10 MG tablet Take 1 tablet (10 mg) by mouth daily (with breakfast) 2/7/2023 Yes Noe Rodriguez MD Yes    VITAMIN D, CHOLECALCIFEROL, PO Take 1,000 Units by mouth daily 2/7/2023 Yes Unknown, Entered By History     warfarin ANTICOAGULANT (COUMADIN) 2.5 MG tablet TAKE ONE TABLET BY MOUTH DAILY EXCEPT TAKE 2 TABLETS TUE AND FRI OR AS DIRECTED BY INR 2/7/2023 Yes Yunior Huang MD     METHOTREXATE SODIUM IJ Inject 0.8 mLs as directed once a week Thursdays 2/2/2023  Reported, Patient No

## 2023-02-09 ENCOUNTER — APPOINTMENT (OUTPATIENT)
Dept: PHYSICAL THERAPY | Facility: CLINIC | Age: 83
End: 2023-02-09
Payer: COMMERCIAL

## 2023-02-09 ENCOUNTER — APPOINTMENT (OUTPATIENT)
Dept: PHYSICAL THERAPY | Facility: CLINIC | Age: 83
End: 2023-02-09
Attending: HOSPITALIST
Payer: COMMERCIAL

## 2023-02-09 ENCOUNTER — TELEPHONE (OUTPATIENT)
Dept: INTERNAL MEDICINE | Facility: CLINIC | Age: 83
End: 2023-02-09
Payer: COMMERCIAL

## 2023-02-09 VITALS
SYSTOLIC BLOOD PRESSURE: 128 MMHG | RESPIRATION RATE: 16 BRPM | TEMPERATURE: 98 F | HEART RATE: 62 BPM | OXYGEN SATURATION: 91 % | DIASTOLIC BLOOD PRESSURE: 75 MMHG

## 2023-02-09 LAB
BASOPHILS # BLD AUTO: 0 10E3/UL (ref 0–0.2)
BASOPHILS NFR BLD AUTO: 1 %
EOSINOPHIL # BLD AUTO: 0.5 10E3/UL (ref 0–0.7)
EOSINOPHIL NFR BLD AUTO: 7 %
ERYTHROCYTE [DISTWIDTH] IN BLOOD BY AUTOMATED COUNT: 17.2 % (ref 10–15)
HCT VFR BLD AUTO: 31.2 % (ref 35–47)
HGB BLD-MCNC: 10.3 G/DL (ref 11.7–15.7)
IMM GRANULOCYTES # BLD: 0 10E3/UL
IMM GRANULOCYTES NFR BLD: 1 %
INR PPP: 1.6 (ref 0.85–1.15)
LYMPHOCYTES # BLD AUTO: 0.3 10E3/UL (ref 0.8–5.3)
LYMPHOCYTES NFR BLD AUTO: 4 %
MCH RBC QN AUTO: 34.6 PG (ref 26.5–33)
MCHC RBC AUTO-ENTMCNC: 33 G/DL (ref 31.5–36.5)
MCV RBC AUTO: 105 FL (ref 78–100)
MONOCYTES # BLD AUTO: 1.6 10E3/UL (ref 0–1.3)
MONOCYTES NFR BLD AUTO: 23 %
NEUTROPHILS # BLD AUTO: 4.7 10E3/UL (ref 1.6–8.3)
NEUTROPHILS NFR BLD AUTO: 64 %
NRBC # BLD AUTO: 0 10E3/UL
NRBC BLD AUTO-RTO: 0 /100
PLAT MORPH BLD: NORMAL
PLATELET # BLD AUTO: 213 10E3/UL (ref 150–450)
RBC # BLD AUTO: 2.98 10E6/UL (ref 3.8–5.2)
RBC MORPH BLD: NORMAL
WBC # BLD AUTO: 7.2 10E3/UL (ref 4–11)

## 2023-02-09 PROCEDURE — 99239 HOSP IP/OBS DSCHRG MGMT >30: CPT | Performed by: HOSPITALIST

## 2023-02-09 PROCEDURE — 97161 PT EVAL LOW COMPLEX 20 MIN: CPT | Mod: GP

## 2023-02-09 PROCEDURE — 97530 THERAPEUTIC ACTIVITIES: CPT | Mod: GP

## 2023-02-09 PROCEDURE — G0378 HOSPITAL OBSERVATION PER HR: HCPCS

## 2023-02-09 PROCEDURE — 97116 GAIT TRAINING THERAPY: CPT | Mod: GP

## 2023-02-09 PROCEDURE — 85610 PROTHROMBIN TIME: CPT | Performed by: HOSPITALIST

## 2023-02-09 PROCEDURE — 36415 COLL VENOUS BLD VENIPUNCTURE: CPT | Performed by: HOSPITALIST

## 2023-02-09 PROCEDURE — 250N000013 HC RX MED GY IP 250 OP 250 PS 637: Performed by: HOSPITALIST

## 2023-02-09 PROCEDURE — 85025 COMPLETE CBC W/AUTO DIFF WBC: CPT | Performed by: HOSPITALIST

## 2023-02-09 RX ORDER — OXYCODONE HYDROCHLORIDE 5 MG/1
5 TABLET ORAL EVERY 4 HOURS PRN
Qty: 7 TABLET | Refills: 0 | Status: SHIPPED | OUTPATIENT
Start: 2023-02-09 | End: 2023-02-15

## 2023-02-09 RX ORDER — WARFARIN SODIUM 5 MG/1
5 TABLET ORAL
Status: DISCONTINUED | OUTPATIENT
Start: 2023-02-09 | End: 2023-02-09 | Stop reason: HOSPADM

## 2023-02-09 RX ORDER — POLYETHYLENE GLYCOL 3350 17 G/17G
17 POWDER, FOR SOLUTION ORAL DAILY
Qty: 10 PACKET | Refills: 0 | Status: SHIPPED | OUTPATIENT
Start: 2023-02-10 | End: 2023-03-13

## 2023-02-09 RX ORDER — WARFARIN SODIUM 2.5 MG/1
TABLET ORAL
Qty: 110 TABLET | Refills: 1 | COMMUNITY
Start: 2023-02-09 | End: 2023-03-21

## 2023-02-09 RX ORDER — WARFARIN SODIUM 2.5 MG/1
TABLET ORAL
Qty: 110 TABLET | Refills: 1 | Status: SHIPPED | OUTPATIENT
Start: 2023-02-09 | End: 2023-02-09

## 2023-02-09 RX ORDER — ACETAMINOPHEN 325 MG/1
975 TABLET ORAL EVERY 8 HOURS PRN
COMMUNITY
Start: 2023-02-09 | End: 2023-03-13

## 2023-02-09 RX ORDER — AMOXICILLIN 250 MG
1 CAPSULE ORAL 2 TIMES DAILY PRN
Qty: 20 TABLET | Refills: 0 | Status: SHIPPED | OUTPATIENT
Start: 2023-02-09 | End: 2023-03-13

## 2023-02-09 RX ADMIN — ACETAMINOPHEN 975 MG: 325 TABLET ORAL at 13:49

## 2023-02-09 RX ADMIN — METOPROLOL TARTRATE 50 MG: 50 TABLET, FILM COATED ORAL at 08:18

## 2023-02-09 RX ADMIN — AMLODIPINE BESYLATE 2.5 MG: 2.5 TABLET ORAL at 08:18

## 2023-02-09 RX ADMIN — POLYETHYLENE GLYCOL 3350 17 G: 17 POWDER, FOR SOLUTION ORAL at 08:18

## 2023-02-09 RX ADMIN — DORZOLAMIDE HYDROCHLORIDE AND TIMOLOL MALEATE 1 DROP: 20; 5 SOLUTION OPHTHALMIC at 09:28

## 2023-02-09 RX ADMIN — OXYCODONE HYDROCHLORIDE 5 MG: 5 TABLET ORAL at 06:41

## 2023-02-09 RX ADMIN — TORSEMIDE 10 MG: 10 TABLET ORAL at 08:18

## 2023-02-09 RX ADMIN — OXYCODONE HYDROCHLORIDE 5 MG: 5 TABLET ORAL at 16:26

## 2023-02-09 RX ADMIN — ALLOPURINOL 100 MG: 100 TABLET ORAL at 08:18

## 2023-02-09 RX ADMIN — FAMOTIDINE 40 MG: 20 TABLET, FILM COATED ORAL at 08:18

## 2023-02-09 RX ADMIN — LEVOTHYROXINE SODIUM 112 MCG: 112 TABLET ORAL at 08:18

## 2023-02-09 RX ADMIN — ACETAMINOPHEN 975 MG: 325 TABLET ORAL at 05:41

## 2023-02-09 RX ADMIN — VALSARTAN: 160 TABLET, FILM COATED ORAL at 08:18

## 2023-02-09 ASSESSMENT — ACTIVITIES OF DAILY LIVING (ADL)
ADLS_ACUITY_SCORE: 33
DEPENDENT_IADLS:: CLEANING;SHOPPING;TRANSPORTATION
ADLS_ACUITY_SCORE: 33

## 2023-02-09 NOTE — PROGRESS NOTES
02/09/23 1036   Appointment Info   Signing Clinician's Name / Credentials (PT) Warren Kurtz, PT, DPT   Rehab Comments (PT) brace on when OOB, spinal precautions   Quick Adds   Quick Adds Certification   Living Environment   People in Home alone   Current Living Arrangements independent living facility  (senior living facility)   Home Accessibility no concerns   Transportation Anticipated family or friend will provide   Living Environment Comments Patient lives alone in senior living facility, no concerns with accessbility. Has tub shower, shower chair, and grab bars. Has handrails/armrests on toilet.   Self-Care   Usual Activity Tolerance good   Current Activity Tolerance moderate   Equipment Currently Used at Home cane, straight;walker, rolling   Fall history within last six months yes   Number of times patient has fallen within last six months 2   Activity/Exercise/Self-Care Comment Patient independent with mobility and cares at baseline. Uses 4ww when ambulating long hallways, cane when outside of the apartment, no device when inside apartment.   General Information   Onset of Illness/Injury or Date of Surgery 02/08/23   Referring Physician Jarrett Johnson MD   Patient/Family Therapy Goals Statement (PT) Patient would like to return home   Pertinent History of Current Problem (include personal factors and/or comorbidities that impact the POC) 82 year old female with rheumatoid arthritis, parox a fib (on coumadin), bioprosthetic MV replacement in 2008 then replacement again with bioprosthetic valve in 5/2014 (with tricuspid annuloplasty ring) on coumadin, pacemaker (SSS), chf, dvt, HTN, pulmonary hypertension, stroke with mild left hemiparesis (2008, now resolved) admitted on 2/8/2023 after fall, found to have a T12 burst fracture.   Existing Precautions/Restrictions fall;brace worn when out of bed;spinal   Cognition   Affect/Mental Status (Cognition) WNL   Orientation Status (Cognition) oriented x 4    Follows Commands (Cognition) WNL   Pain Assessment   Patient Currently in Pain Yes, see Vital Sign flowsheet   Range of Motion (ROM)   ROM Comment spinal ROM restricted   Strength (Manual Muscle Testing)   Strength Comments 4+/5 LE strength bilaterally   Bed Mobility   Bed Mobility supine-sit;sit-supine   Supine-Sit [place] (Bed Mobility) minimum assist (75% patient effort)   Sit-Supine [place] (Bed Mobility) minimum assist (75% patient effort)   Comment, (Bed Mobility) min assist x1 for supine<>sit transfers with logroll technique.   Transfers   Transfers sit-stand transfer   Sit-Stand Transfer   Sit-Stand [place] (Transfers) contact guard   Assistive Device (Sit-Stand Transfers) walker, front-wheeled   Comment, (Sit-Stand Transfer) sit<>stand transfers with CGA and FWW   Gait/Stairs (Locomotion)   [place] Level (Gait) contact guard   Assistive Device (Gait) walker, front-wheeled   Distance in Feet 10' eval   Distance in Feet (Gait) 100', 15'   Comment, (Gait/Stairs) ambulating 10' with FWW and CGA, 5' with handheld assist and no device requiring min assist x1.   Balance   Balance Comments impaired standing balance requiring BUE support on FWW   Sensory Examination   Sensory Perception patient reports no sensory changes   Clinical Impression   Criteria for Skilled Therapeutic Intervention Yes, treatment indicated   PT Diagnosis (PT) impaired mobility   Influenced by the following impairments restricted spinal ROM per MD spinal precautions, generalized weakness, reduced activity tolerance, impaired balance   Functional limitations due to impairments impaired functional mobility, impaired gait, transfers, bed mobility   Clinical Presentation (PT Evaluation Complexity) Stable/Uncomplicated   Clinical Presentation Rationale clinical judgement   Clinical Decision Making (Complexity) low complexity   Planned Therapy Interventions (PT) balance training;bed mobility training;gait  training;neuromuscular re-education;patient/family education;stair training;strengthening;transfer training;progressive activity/exercise   Risk & Benefits of therapy have been explained evaluation/treatment results reviewed;care plan/treatment goals reviewed;risks/benefits reviewed;current/potential barriers reviewed;participants voiced agreement with care plan;participants included;patient;daughter   PT Total Evaluation Time   PT Eval, Low Complexity Minutes (21431) 12   Therapy Certification   Start of care date 02/08/23   Certification date from 02/09/23   Certification date to 02/16/23   Medical Diagnosis T12 Fracture   Physical Therapy Goals   PT Frequency Daily   PT Predicted Duration/Target Date for Goal Attainment 02/16/23   PT Goals Bed Mobility;Transfers;Gait   PT: Bed Mobility Supervision/stand-by assist;Supine to/from sit   PT: Transfers Supervision/stand-by assist;Sit to/from stand;Assistive device   PT: Gait Supervision/stand-by assist;Greater than 200 feet;Rolling walker   Interventions   Interventions Quick Adds Gait Training;Therapeutic Activity   Therapeutic Activity   Therapeutic Activities: dynamic activities to improve functional performance Minutes (08392) 20   Symptoms Noted During/After Treatment Fatigue   Treatment Detail/Skilled Intervention Patient seated in recliner at beginning of session with daughter present, agreeable to participate in PT. TLSO brace on while seated in chair. Daughter with questions regarding proper donning/doffing of TLSO brace. Time during session educating and assisting patient and daughter in proper donning/doffing of TLSO brace. Educated patient and caregiver on spinal precautions with good understanding. Performing sit<>stand transfers with CGA and FWW, cues for hand placement. Performing supine<>sit transfers x2 with min assist x1 and use of logroll technique. Educated patient and caregiver and patient on logroll technique to maintain spinal precautions. Did  need mod assist x1 for LE positioning in bed during sit to supine transfer. Patient supine in bed at end of session with call light next to her and bed alarm on. Daughter wanting more training for assist at session tomorrow, stated that she will be there around 9-10am.   Gait Training   Gait Training Minutes (24252) 12   Symptoms Noted During/After Treatment (Gait Training) fatigue   Treatment Detail/Skilled Intervention Patient completing bout of ambulation for 100' with FWW and CGA. Decreased step length and gait velocity noted. UE reliance on FWW at this time. No overt instability or LOB throughout. Patient attempting short bout of ambulation for 15' with handheld assist, no assistive device, and min assist x1. Patient with further decreased gait velocity and step length. Increased lateral sway and instability noted. Recommending use of FWW at all times at this time.   PT Discharge Planning   PT Plan bed mobility with logroll technique, caregiver training on assist for mobility and donning TLSO brace, progress gait distance, repeated sit<>stands   PT Discharge Recommendation (DC Rec) home with assist;home with home care physical therapy   PT Rationale for DC Rec Patient below baseline functional status. Presents with weakness, restricted spinal ROM, impaired balance, and impaired activity tolerance resulting in the need for assist with all mobility. Lives alone in accessible independent living facility. Patient will have daughter staying with her for 13 days upon discharge to assist. Anticipate that patient will discharge home with daughter providing assist and supervision for mobility and cares.   PT Brief overview of current status min assist x1 bed mobility, CGA for sit<>stands and gait with FWW.   Total Session Time   Timed Code Treatment Minutes 32   Total Session Time (sum of timed and untimed services) 44       [facility]  OUTPATIENT PHYSICAL THERAPY EVALUATION  PLAN OF  TREATMENT FOR OUTPATIENT REHABILITATION  (COMPLETE FOR INITIAL CLAIMS ONLY)  Patient's Last Name, First Name, M.I.  YOB: 1940  May,Zunilda Alicea                        Provider's Name  [facility] Medical Record No.  [id number]                             Onset Date:  02/08/23   Start of Care Date:  02/08/23   Type:     _X_PT   ___OT   ___SLP Medical Diagnosis:  T12 Fracture              PT Diagnosis:  impaired mobility Visits from SOC:  1     See note for plan of treatment, functional goals and certification details    I CERTIFY THE NEED FOR THESE SERVICES FURNISHED UNDER        THIS PLAN OF TREATMENT AND WHILE UNDER MY CARE     (Physician co-signature of this document indicates review and certification of the therapy plan).

## 2023-02-09 NOTE — CONSULTS
Care Management Initial Consult    General Information  Assessment completed with: Patient, Children,    Type of CM/SW Visit: Initial Assessment    Primary Care Provider verified and updated as needed: Yes   Readmission within the last 30 days: no previous admission in last 30 days      Reason for Consult: discharge planning    Communication Assessment  Patient's communication style: spoken language (English or Bilingual)    Hearing Difficulty or Deaf: no   Wear Glasses or Blind: yes    Cognitive  Cognitive/Neuro/Behavioral: WDL                      Living Environment:   People in home: alone     Current living Arrangements: apartment   Able to return to prior arrangements: yes     Family/Social Support:  Care provided by: self  Provides care for: no one  Marital Status:   Children          Description of Support System: Supportive, Involved    Support Assessment: Adequate family and caregiver support    Current Resources:   Patient receiving home care services: No  Community Resources: None  Equipment currently used at home: cane, straight, walker, rolling     Lifestyle & Psychosocial Needs:  Social Determinants of Health     Tobacco Use: Low Risk      Smoking Tobacco Use: Never     Smokeless Tobacco Use: Never     Passive Exposure: Not on file   Alcohol Use: Not on file   Financial Resource Strain: Not on file   Food Insecurity: Not on file   Transportation Needs: Not on file   Physical Activity: Not on file   Stress: Not on file   Social Connections: Not on file   Intimate Partner Violence: Not on file   Depression: Not at risk     PHQ-2 Score: 0   Housing Stability: Not on file     Functional Status:  Prior to admission patient needed assistance:   Dependent ADLs:: Ambulation-walker  Dependent IADLs:: Cleaning, Shopping, Transportation     Additional Information:  CM consulted for discharge planning, met with pt and joy Diane at bedside to discuss. Pt lives in a senior apartment and is not open to any  "services prior to admission. She does have her groceries delivered and has a  2x/month. She is independent with mobility at baseline, uses a WW for longer distances. She manages her own medications without difficulty.     Reviewed PT recommendations for discharge: \"Patient below baseline functional status. Presents with weakness, restricted spinal ROM, impaired balance, and impaired activity tolerance resulting in the need for assist with all mobility. Lives alone in accessible independent living facility. Patient will have daughter staying with her for 13 days upon discharge to assist. Anticipate that patient will discharge home with daughter providing assist and supervision for mobility and cares.\" Pt is in agreement with this plan. Pt would like home PT and OT ordered at discharge. Pt has no preference on agency, has used [facility] about 4 years ago. HC referrals sent, awaiting responses.     Update 1540: [facility]  is able to accept, they report that pt may have a co-pay of up to 20% but will not know until they process pt's insurance. Pt understanding and agreeable to this. AVS updated. No further needs anticipated. Orders completed and sent.     Kierra Awad RN BSN   Inpatient Care Coordination  [facility]   Phone [phone]      "

## 2023-02-09 NOTE — PLAN OF CARE
PRIMARY DIAGNOSIS: ACUTE PAIN/T12 FX  OUTPATIENT/OBSERVATION GOALS TO BE MET BEFORE DISCHARGE:     1. Pain Status: Improved-controlled with oral pain medications.     2. Return to near baseline physical activity: No     3. Cleared for discharge by consultants (if involved): No - PT consulted       Vitals stable, O2 sats stable on room air. Pt alert and oriented x4. Ax1 with walker, gait belt and TLSO. Purewick in place. Pt reports improved pain on oral pain medications, currently denying pain when not moving. Denies numbness or tingling. Urinating without difficulty. Tolerating regular diet. Ortho following. Plan for PT consult tomorrow. Pt resting comfortably. Will continue to provide supportive cares.      Discharge Planner Nurse   Safe discharge environment identified: No  Barriers to discharge: Yes, PT consult       Entered by: Cookie Lincoln RN     Please review provider order for any additional goals.   Nurse to notify provider when observation goals have been met and patient is ready for discharge.

## 2023-02-09 NOTE — PLAN OF CARE
PRIMARY DIAGNOSIS: ACUTE PAIN/T12 FX  OUTPATIENT/OBSERVATION GOALS TO BE MET BEFORE DISCHARGE:     1. Pain Status: Improved-controlled with oral pain medications.     2. Return to near baseline physical activity: No     3. Cleared for discharge by consultants (if involved): No - PT consulted     /58   Pulse 67   Temp 97.8  F (36.6  C) (Oral)   Resp 16   LMP  (LMP Unknown)   SpO2 95%     Vitals stable, O2 sats stable on room air. Pt alert and oriented x4. Ax1 with walker, gait belt and TLSO  to commode per ED, still has not been up yet due to discomfort with movement. Purewick in place. Pt reports improved pain on oral pain medications, currently denying pain when not moving. Denies numbness or tingling. Urinating without difficulty. Tolerating regular diet. Ortho following. Plan for PT consult tomorrow. Pt resting comfortably. Will continue to provide supportive cares.      Discharge Planner Nurse   Safe discharge environment identified: No  Barriers to discharge: Yes, PT consult       Entered by: Cookie Lincoln RN     Please review provider order for any additional goals.   Nurse to notify provider when observation goals have been met and patient is ready for discharge.

## 2023-02-09 NOTE — PLAN OF CARE
PRIMARY DIAGNOSIS: ACUTE PAIN/T12 FX  OUTPATIENT/OBSERVATION GOALS TO BE MET BEFORE DISCHARGE:  1. Pain Status: Improved-controlled with oral pain medications.    2. Return to near baseline physical activity: No    3. Cleared for discharge by consultants (if involved): No - PT consulted    Conservative treatment per ortho, oxycodone given for 8/10 pain brought pain down significantly - now 2/10. Up with 1, walker and gait belt to chair and off-the-shelf TLSO brace. Pt walked to the bathroom, demonstrated spinal precaution techniques taught by PT. Pt reports her pain is well controlled while sitting in chair, 3-4/10. Pt from [facility] where her sister also resides. Pt reports she is completely independent prior to fall.     Discharge Planner Nurse   Safe discharge environment identified: No  Barriers to discharge: Yes       Entered by: Brandon Bueno RN 02/09/2023 12:35 PM     Please review provider order for any additional goals.   Nurse to notify provider when observation goals have been met and patient is ready for discharge.    /57 (BP Location: Left arm)   Pulse 63   Temp 97.5  F (36.4  C) (Oral)   Resp 16   LMP  (LMP Unknown)   SpO2 90%

## 2023-02-09 NOTE — PLAN OF CARE
PRIMARY DIAGNOSIS: ACUTE PAIN/T12 FX  OUTPATIENT/OBSERVATION GOALS TO BE MET BEFORE DISCHARGE:     1. Pain Status: Improved-controlled with oral pain medications.     2. Return to near baseline physical activity: No     3. Cleared for discharge by consultants (if involved): No - PT consulted     /58 (BP Location: Left arm)   Pulse 61   Temp 97.7  F (36.5  C) (Oral)   Resp 16   LMP  (LMP Unknown)   SpO2 95%        Vitals stable, O2 sats stable on room air. Pt alert and oriented x4. Ax1 with walker, gait belt and TLSO. Purewick in place. Pt reports improved pain on oral pain medications, currently denying pain when not moving. Denies numbness or tingling. Urinating without difficulty. Tolerating regular diet. Ortho following. Plan for PT consult today. Pt resting comfortably. Will continue to provide supportive cares.      Discharge Planner Nurse   Safe discharge environment identified: No  Barriers to discharge: Yes, PT consult       Entered by: Cookie Lincoln RN     Please review provider order for any additional goals.   Nurse to notify provider when observation goals have been met and patient is ready for discharge.

## 2023-02-09 NOTE — PLAN OF CARE
PRIMARY DIAGNOSIS: ACUTE PAIN/T12 FX  OUTPATIENT/OBSERVATION GOALS TO BE MET BEFORE DISCHARGE:  1. Pain Status: Improved-controlled with oral pain medications.    2. Return to near baseline physical activity: No    3. Cleared for discharge by consultants (if involved): No - PT consulted    Conservative treatment per ortho, oxycodone given for 8/10 pain brought pain down significantly - now 2/10. Up with 1, walker and gait belt to chair and off-the-shelf TLSO brace. Pt reports her pain is well controlled while sitting in chair. Pt from [facility] where her sister also resides. Pt reports she is completely independent prior to fall.     Discharge Planner Nurse   Safe discharge environment identified: No  Barriers to discharge: Yes       Entered by: Brandon Bueno RN 02/09/2023 11:16 AM     Please review provider order for any additional goals.   Nurse to notify provider when observation goals have been met and patient is ready for discharge.    /60 (BP Location: Left arm)   Pulse 61   Temp 98.7  F (37.1  C) (Oral)   Resp 16   LMP  (LMP Unknown)   SpO2 93%

## 2023-02-09 NOTE — PLAN OF CARE
Physical Therapy Discharge Summary    Reason for therapy discharge:    Discharged to home with assist and home health PT.    Progress towards therapy goal(s). See goals on Care Plan in [facility] electronic health record for goal details.  Goals partially met.  Barriers to achieving goals:   discharge from facility.    Therapy recommendation(s):    Continued therapy is recommended.  Rationale/Recommendations:  recommending continued skilled therapies with  PT for improvement of strength, functional mobility, safety, and balance. Also, to provide home safety evaluation. Patient discharging home with assist and supervision for mobility and cares from daughter. .

## 2023-02-09 NOTE — TELEPHONE ENCOUNTER
Sonia from [facility], calling in for Verbal Orders:     Delay Start of Care until 02/13/23.     Phone#: [phone]  Okay to [facility].

## 2023-02-10 ENCOUNTER — TELEPHONE (OUTPATIENT)
Dept: INTERNAL MEDICINE | Facility: CLINIC | Age: 83
End: 2023-02-10
Payer: COMMERCIAL

## 2023-02-10 ENCOUNTER — NURSE TRIAGE (OUTPATIENT)
Dept: NURSING | Facility: CLINIC | Age: 83
End: 2023-02-10
Payer: COMMERCIAL

## 2023-02-10 ENCOUNTER — PATIENT OUTREACH (OUTPATIENT)
Dept: CARE COORDINATION | Facility: CLINIC | Age: 83
End: 2023-02-10
Payer: COMMERCIAL

## 2023-02-10 DIAGNOSIS — I48.91 ATRIAL FIBRILLATION AND FLUTTER (H): ICD-10-CM

## 2023-02-10 DIAGNOSIS — I48.92 ATRIAL FIBRILLATION AND FLUTTER (H): ICD-10-CM

## 2023-02-10 DIAGNOSIS — Z95.3 S/P MITRAL VALVE REPLACEMENT WITH BIOPROSTHETIC VALVE: ICD-10-CM

## 2023-02-10 DIAGNOSIS — Z79.01 LONG TERM CURRENT USE OF ANTICOAGULANTS WITH INR GOAL OF 2.0-3.0: Primary | ICD-10-CM

## 2023-02-10 NOTE — PROGRESS NOTES
Clinic Care Coordination Contact  Community Health Worker Initial Outreach    Patient accepts CC: Yes, Pt requested to talk with RN & SW about:    - having a referral for rehab center instead of home nursing service. Pt's daughter stated that her mother needs 24 hours care service    - Pt is  Struggling with brace & how its fits - doesn't feel comfortable.     - Physical mobility? What is Pt allowed to do and not allowed to do? Pt stated that she has a pain but it's controlled - using tylenol.       * CHW scheduled appt to be on Tues 02/14/23 with SW @10am, Confirmed appt with Pt.     * Advised Pt to call PCP office and talk with medical staff as clinic RN schedule is full today.      Clinic Care Coordination Contact  [facility]: Post-Discharge Note  SITUATION                                                      Admission:    Admission Date: 02/08/23   Reason for Admission: Mechanical fall with T12 burst fracture  Discharge:   Discharge Date: 02/09/23  Discharge Diagnosis: Mechanical fall with T12 burst fracture    BACKGROUND                                                      Per hospital discharge summary and inpatient provider notes:    Zunilda Clark is a 82 year old female with rheumatoid arthritis, parox a fib (on coumadin), bioprosthetic MV replacement in 2008 then replacement again with bioprosthetic valve in 5/2014 (with tricuspid annuloplasty ring) on coumadin, pacemaker (SSS), chf, dvt, HTN, pulmonary hypertension, stroke with mild left hemiparesis (2008, now resolved) admitted on 2/8/2023 after fall, found to have a T12 burst fracture.  Patient states she was in her kitchen on Monday, 2 days ago.  She was getting a picture for water    ASSESSMENT           Discharge Assessment  How are you doing now that you are home?: feeling littlle tired  How are your symptoms? (Red Flag symptoms escalate to triage hotline per guidelines): Unchanged  Do you feel your condition is stable enough to be safe at  home until your provider visit?: Yes  Does the patient have their discharge instructions? : Yes  Does the patient have questions regarding their discharge instructions? : No  Were you started on any new medications or were there changes to any of your previous medications? : Yes  Does the patient have all of their medications?: Yes  Do you have questions regarding any of your medications? : No  Do you have all of your needed medical supplies or equipment (DME)?  (i.e. oxygen tank, CPAP, cane, etc.): Yes  Discharge follow-up appointment scheduled within 14 calendar days? : Yes  Discharge Follow Up Appointment Date: 02/13/23    Post-op (CHW CTA Only)  If the patient had a surgery or procedure, do they have any questions for a nurse?: Yes (see comment)         PLAN                                                      Outpatient Plan:     Follow-up and recommended labs and tests      Follow up with primary care provider, Yunior Huang, within 7 days for hospital follow- up.  The following labs/tests are recommended: INR in 2-3 days.      Future Appointments   Date Time Provider Department [place]   3/13/2023 11:00 AM RU LAB [facility] [place] [facility]   3/13/2023 11:30 AM Zena Benitez PA RUUMHT [facility] PSA CLIN   3/15/2023  9:30 AM RI LAB [facility]   [facility]23 [facility]   7/14/2023 11:00 AM Yunior Huang MD [facility]     For any urgent concerns, please contact our 24 hour nurse triage line: [phone] ([phone])       DALI White  [phone]  Connected Care Resource [facility]

## 2023-02-10 NOTE — TELEPHONE ENCOUNTER
ANTICOAGULATION  MANAGEMENT: Discharge Review    Zunilda Alicea May chart reviewed for anticoagulation continuity of care    Hospital Admission on - for fall with fracture.    Discharge disposition: Home with Home Care    Results:    Recent labs: (last 7 days)     23  0627 23  0809   INR 1.74* 1.60*     Anticoagulation inpatient management:     more warfarin administered than maintenance regimen     Anticoagulation discharge instructions:     Warfarin dosin mg on , then continue usual maintenance dosing - upon speaking with patient, she did not take the 5 mg as advised, took 2.5 mg instead. Per Evon Lincoln, [facility], instruction - advised 6.25 mg today 2/10   Bridging: No   INR goal change: No      Medication changes affecting anticoagulation: No    Additional factors affecting anticoagulation: Yes - missed Monday and Tuesday dose after fall on . This is likely why Deanne presented to the ED subtherapeutic on      PLAN     Recommend to check INR on  (unable to check INR over the weekend as advised in discharge note)    Spoke with Deanne and daughters, Roxi and Alejandra    Anticoagulation Calendar updated    Pearl Mahan RN

## 2023-02-10 NOTE — TELEPHONE ENCOUNTER
AVS states patient to follow up with PCP in 7 days.  Fit patient in on 02/15/23 and left a VM to advise.

## 2023-02-10 NOTE — TELEPHONE ENCOUNTER
Reason for Call:  Appointment Request    Patient requesting this type of appt:  Hospital/ED Follow-Up     Requested provider: Yunior Huang    Reason patient unable to be scheduled: Not within requested timeframe    When does patient want to be seen/preferred time: 1-2 days    Comments: Daughter called and is needing a hospital F/U from a fractured vertebrae      Could we send this information to you in [facility] or would you prefer to receive a phone call?:   Patient would prefer a phone call   Okay to leave a detailed message?: Yes at Home number on file [phone] (home)    Call taken on 2/10/2023 at 3:01 PM by Rosa Michelle

## 2023-02-11 NOTE — TELEPHONE ENCOUNTER
Nurse Triage SBAR    Is this a 2nd Level Triage? NO    Situation: Discharged on 2/9 from hospital     Background: T 12 fx    Assessment: Daughter calling wondering if she could give patient the pain medication that was prescribed upon discharge. Gave two ES Tylenol at 7pm, still having pain and unable to get comfortable in bed unable to sleep, has not given her any of the Oxycodone yet. Writer explained to daughter that she can have the Oxycodone 5 mg with the Tylenol, and this should help relieve some of the pain. Also daughter mentioned that upon discharge they told her she did not have to wear the brace while in bed and wondering if this is also if she naps during the day, writer explained that she should wear while up and if was told she did not have to in bed this would include during the day. Daughter understood.     Protocol Recommended Disposition:   Home Care - Information or Advice Only Call, Home Care    Crystal Osman RN on 2/10/2023 at 9:52 PM          Reason for Disposition    Back pain    Caller has medicine question only, adult not sick, AND triager answers question    Additional Information    Negative: Passed out (i.e., lost consciousness, collapsed and was not responding)    Negative: Shock suspected (e.g., cold/pale/clammy skin, too weak to stand, low BP, rapid pulse)    Negative: Sounds like a life-threatening emergency to the triager    Negative: Major injury to the back (e.g., MVA, fall > 10 feet or 3 meters, penetrating injury, etc.)    Negative: Followed a tailbone injury    Negative: [1] Pain in the upper back over the ribs (rib cage) AND [2] radiates (travels, goes) into chest    Negative: [1] Pain in the upper back over the ribs (rib cage) AND [2] worsened by coughing (or clearly increases with breathing)    Negative: Back pain during pregnancy    Negative: Pain mainly in flank (i.e., in the side, over the lower ribs or just below the ribs)    Negative: [1] SEVERE back pain (e.g.,  "excruciating) AND [2] sudden onset AND [3] age > 60 years    Negative: [1] Unable to urinate (or only a few drops) > 4 hours AND [2] bladder feels very full (e.g., palpable bladder or strong urge to urinate)    Negative: [1] Loss of bladder or bowel control (urine or bowel incontinence; wetting self, leaking stool) AND [2] new-onset    Negative: Numbness in groin or rectal area (i.e., loss of sensation)    Negative: [1] SEVERE abdominal pain AND [2] present > 1 hour    Negative: [1] Abdominal pain AND [2] age > 60 years    Negative: Weakness of a leg or foot (e.g., unable to bear weight, dragging foot)    Negative: Unable to walk    Negative: Patient sounds very sick or weak to the triager    Negative: [1] SEVERE back pain (e.g., excruciating, unable to do any normal activities) AND [2] not improved 2 hours after pain medicine    Negative: [1] Pain radiates into the thigh or further down the leg AND [2] both legs    Negative: [1] Fever > 100.0 F (37.8 C) AND [2] flank pain (i.e., in side, below ribs and above hip)    Negative: [1] Pain or burning with passing urine (urination) AND [2] flank pain (i.e., in side, below ribs and above hip)    Negative: Numbness in a leg or foot (i.e., loss of sensation)    Negative: [1] Numbness in an arm or hand (i.e., loss of sensation) AND [2] upper back pain    Negative: High-risk adult (e.g., history of cancer, HIV, or IV drug use)    Negative: Soft tissue infection (e.g., abscess, cellulitis) or other serious infection (e.g., bacteremia) in last 2 weeks    Negative: [1] Fever AND [2] no symptoms of UTI  (Exception: has generalized muscle pains, not localized back pain)    Negative: Rash in same area as pain (may be described as \"small blisters\")    Negative: Blood in urine (red, pink, or tea-colored)    Negative: [1] MODERATE back pain (e.g., interferes with normal activities) AND [2] present > 3 days    Negative: [1] Pain radiates into the thigh or further down the leg AND [2] " one leg    Negative: [1] Age > 50 AND [2] no history of prior similar back pain    Negative: Back pain present > 2 weeks    Negative: Back pain is a chronic symptom (recurrent or ongoing AND present > 4 weeks)    Negative: Caused by a twisting, bending, or lifting injury    Negative: Caused by overuse from recent vigorous activity (e.g., exercise, gardening, lifting and carrying, sports)    Negative: [1] Intentional drug overdose AND [2] suicidal thoughts or ideas    Negative: Drug overdose and triager unable to answer question    Negative: Caller requesting a renewal or refill of a medicine patient is currently taking    Negative: Caller requesting information unrelated to medicine    Negative: Caller requesting information about COVID-19 Vaccine    Negative: Caller requesting information about Emergency Contraception    Negative: Caller requesting information about Combined Birth Control Pills    Negative: Caller requesting information about Progestin Birth Control Pills    Negative: Caller requesting information about Post-Op pain or medicines    Negative: Caller requesting a prescription antibiotic (such as Penicillin) for Strep throat and has a positive culture result    Negative: Caller requesting a prescription anti-viral med (such as Tamiflu) and has influenza (flu) symptoms    Negative: Immunization reaction suspected    Negative: Rash while taking a medicine or within 3 days of stopping it    Negative: [1] Asthma and [2] having symptoms of asthma (cough, wheezing, etc.)    Negative: [1] Symptom of illness (e.g., headache, abdominal pain, earache, vomiting) AND [2] more than mild    Negative: Breastfeeding questions about mother's medicines and diet    Negative: MORE THAN A DOUBLE DOSE of a prescription or over-the-counter (OTC) drug    Negative: [1] DOUBLE DOSE (an extra dose or lesser amount) of prescription drug AND [2] any symptoms (e.g., dizziness, nausea, pain, sleepiness)    Negative: [1] DOUBLE DOSE  (an extra dose or lesser amount) of over-the-counter (OTC) drug AND [2] any symptoms (e.g., dizziness, nausea, pain, sleepiness)    Negative: Took another person's prescription drug    Negative: [1] DOUBLE DOSE (an extra dose or lesser amount) of prescription drug AND [2] NO symptoms (Exception: a double dose of antibiotics)    Negative: Diabetes drug error or overdose (e.g., took wrong type of insulin or took extra dose)    Negative: [1] Prescription not at pharmacy AND [2] was prescribed by PCP recently (Exception: triager has access to EMR and prescription is recorded there. Go to Home Care and confirm for pharmacy.)    Negative: [1] Pharmacy calling with prescription question AND [2] triager unable to answer question    Negative: [1] Caller has URGENT medicine question about med that PCP or specialist prescribed AND [2] triager unable to answer question    Negative: Medicine patch causing local rash or itching    Negative: [1] Caller has medicine question about med NOT prescribed by PCP AND [2] triager unable to answer question (e.g., compatibility with other med, storage)    Negative: Prescription request for new medicine (not a refill)    Negative: [1] Caller has NON-URGENT medicine question about med that PCP prescribed AND [2] triager unable to answer question    Negative: Caller wants to use a complementary or alternative medicine    Negative: [1] Prescription prescribed recently is not at pharmacy AND [2] triager has access to patient's EMR AND [3] prescription is recorded in the EMR    Negative: [1] DOUBLE DOSE (an extra dose or lesser amount) of over-the-counter (OTC) drug AND [2] NO symptoms    Negative: [1] DOUBLE DOSE (an extra dose or lesser amount) of antibiotic drug AND [2] NO symptoms    Protocols used: BACK PAIN-A-AH, MEDICATION QUESTION CALL-A-AH

## 2023-02-13 ENCOUNTER — TELEPHONE (OUTPATIENT)
Dept: INTERNAL MEDICINE | Facility: CLINIC | Age: 83
End: 2023-02-13

## 2023-02-13 NOTE — TELEPHONE ENCOUNTER
Eliazar physical therapist from [facility] calling for verbal orders.  Verbal ok given.    physical therapy 2 x/week for 1 week  1x / week for 6 weeks  For strengthening and mobility training       Medication interaction regarding warfarin 2.5 mg and allopurinol 100 mg.      Call back [phone]-ok to benoit

## 2023-02-14 ENCOUNTER — PATIENT OUTREACH (OUTPATIENT)
Dept: NURSING | Facility: CLINIC | Age: 83
End: 2023-02-14
Payer: COMMERCIAL

## 2023-02-14 ENCOUNTER — TELEPHONE (OUTPATIENT)
Dept: INTERNAL MEDICINE | Facility: CLINIC | Age: 83
End: 2023-02-14

## 2023-02-14 SDOH — ECONOMIC STABILITY: FOOD INSECURITY: WITHIN THE PAST 12 MONTHS, YOU WORRIED THAT YOUR FOOD WOULD RUN OUT BEFORE YOU GOT MONEY TO BUY MORE.: NEVER TRUE

## 2023-02-14 SDOH — ECONOMIC STABILITY: INCOME INSECURITY: IN THE LAST 12 MONTHS, WAS THERE A TIME WHEN YOU WERE NOT ABLE TO PAY THE MORTGAGE OR RENT ON TIME?: NO

## 2023-02-14 SDOH — ECONOMIC STABILITY: FOOD INSECURITY: WITHIN THE PAST 12 MONTHS, THE FOOD YOU BOUGHT JUST DIDN'T LAST AND YOU DIDN'T HAVE MONEY TO GET MORE.: NEVER TRUE

## 2023-02-14 ASSESSMENT — SOCIAL DETERMINANTS OF HEALTH (SDOH): HOW HARD IS IT FOR YOU TO PAY FOR THE VERY BASICS LIKE FOOD, HOUSING, MEDICAL CARE, AND HEATING?: NOT VERY HARD

## 2023-02-14 NOTE — LETTER
[facility]  Patient Centered Plan of Care  About Me:        Patient Name:  Zunilda Clark    YOB: 1940  Age:         82 year old   [place] MRN:    [id number] Telephone Information:  Home Phone [phone]   Mobile [phone]       Address:  [address]  [facility] [address] Email address:  [email or web address]      Emergency Contact(s)    Name Relationship Lgl Grd Work Phone Home Phone Mobile Phone   1. YARITZA FINNEY Sister No   [phone]   2. MAY ELIZABETH SILVA* Daughter No   [phone]   3. HUNTER FREEMAN* Daughter No  [phone]   4. RADHA RUIZ Daughter   [phone]    5. ANNEL RODRIGUES Daughter No   [phone]           Primary language:  English     needed? No   [facility]:  [phone] op. 1  Other communication barriers:None    Preferred Method of Communication:  Estefani  Current living arrangement: I live in a private home  Mobility Status/ Medical Equipment: Independent w/Device    Health Maintenance  Health Maintenance Reviewed: Due/Overdue   Health Maintenance Due   Topic Date Due     PHQ-2 (once per calendar year)  01/01/2023     My Access Plan  Medical Emergency 911   Primary Clinic Line [facility] - [phone]   24 Hour Appointment Line [phone] or  [phone]) (toll-free)   24 Hour Nurse Line [phone] (toll-free)   Preferred Urgent Care No data recorded   [facility]  [phone]     Preferred Pharmacy [facility] [place], [place] - [address] [facility]      Behavioral Health Crisis Line The National Suicide Prevention Lifeline at 1-795.201.2881 or Text/Call 988     My Care Team Members  Patient Care Team       Relationship Specialty Notifications Start End    Yunior Huang MD PCP - General Internal Medicine  5/4/15     Phone: [phone] Pager: [phone] Fax: [phone]        [address] [place] [address]    Jaycee Cervantes MD MD Rheumatology  5/13/15     Phone: [phone] Fax: [phone]         [address] [place] [address]    Yunior Huang MD Assigned PCP   8/7/16     Phone: [phone] Pager: [phone] Fax: [phone]        [address] [place] [address]    Wilfredo Cooper Do, DO    4/18/22     Phone: [phone] Fax: [phone]         [facility] [address] [facility] [address]    Noe Rodriguez MD MD Cardiovascular Disease  5/24/22     Phone: [phone] Pager: [phone] Fax: [phone] [address] [facility] [address]    Noe Rodriguez MD MD Cardiovascular Disease  5/24/22     Phone: [phone] Pager: [phone] Fax: [phone] [address] [facility] [address]    Kelton William MD Assigned Surgical Provider   6/25/22     Phone: [phone] Fax: [phone]         [address] [place] [address]    Zena Benitez PA Assigned Heart and Vascular Provider   1/21/23     Phone: [phone] Pager: [phone] Fax: [phone]        [facility] HE[facility] [address] [place] [address]    Cookie Atkinson, [facility] Lead Care Coordinator  - Clinical Admissions 2/15/23     Phone: [phone] Fax: [phone]        Flores Fernando MA Community Health Worker  Admissions 2/15/23             My Care Plans  Self Management and Treatment Plan  Care Plan  Care Plan: Help At Home     Problem: Insufficient In-home support     Goal: Establish adequate home support     Start Date: 2/14/2023 Expected End Date: 2/14/2024    This Visit's Progress: 10%    Priority: High    Note:     Barriers: fixed income   Strengths: family support  Patient expressed understanding of goal: Yes  Action steps to achieve this goal:  1. I will continue to lean on informal supports of my: family  2. My family will review resources and supports sent to me via [employer]/mail.  3. My family will  outreach to supports and consider establishing with ones I might find beneficial.  4. My family will continue to outreach to care coordination as needed for additional resources or supports.                          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     S/P mitral valve replacement with bioprosthetic valve     Pulmonary hypertension (H)     S/P total knee arthroplasty     Wound dehiscence     Pacemaker twiddler's syndrome, initial encounter     Complete atrioventricular block (H)     Chronic kidney disease, stage 3 (H)        Current Medications and Allergies:  Current Outpatient Medications   Medication     acetaminophen (TYLENOL) 325 MG tablet     ALLOPURINOL PO     amLODIPine (NORVASC) 2.5 MG tablet     amLODIPine-valsartan (EXFORGE) 5-160 MG tablet     calcium citrate (CALCITRATE) 950 MG tablet     Dorzolamide HCl-Timolol Mal (COSOPT OP)     famotidine (PEPCID) 40 MG tablet     folic acid (FOLVITE) 1 MG tablet     levothyroxine (SYNTHROID/LEVOTHROID) 112 MCG tablet     METHOTREXATE SODIUM IJ     metoprolol tartrate (LOPRESSOR) 50 MG tablet     multivitamin w/minerals (THERA-VIT-M) tablet     oxyCODONE (ROXICODONE) 5 MG tablet     polyethylene glycol (MIRALAX) 17 g packet     senna-docusate (SENOKOT-S/PERICOLACE) 8.6-50 MG tablet     torsemide (DEMADEX) 10 MG tablet     VITAMIN D, CHOLECALCIFEROL, PO     warfarin ANTICOAGULANT (COUMADIN) 2.5 MG tablet     No current facility-administered  medications for this visit.     No Known Allergies    Care Coordination Start Date: 2/14/2023   Frequency of Care Coordination: monthly     Form Last Updated: 02/15/2023

## 2023-02-14 NOTE — LETTER
[facility] CARE COORDINATION  [address]  [facility] [address]    February 15, 2023      Zunilda Alicea May  [address]   [facility] [address]          Dear Zunilda,    I am a clinic care coordinator who works with Yunior Huang MD with the [facility]. I wanted to thank you for spending the time to talk with me. Below is a description of clinic care coordination and how I can further assist you.       The clinic care coordination team is made up of a registered nurse, , financial resource worker and community health worker who understand the health care system. The goal of clinic care coordination is to help you manage your health and improve access to the health care system. Our team works alongside your provider to assist you in determining your health and social needs. We can help you obtain health care and community resources, providing you with necessary information and education. We can work with you through any barriers and develop a care plan that helps coordinate and strengthen the communication between you and your care team.    Please feel free to contact me with any questions or concerns regarding care coordination and what we can offer.      We are focused on providing you with the highest-quality healthcare experience possible.    Sincerely,     AUDREY [facility]ceda/[facility]  Social Work Care Coordinator  [facility], [place], [facility], and [place]  Phone: [phone]    Enclosed: I have enclosed the resources we discussed over the phone. Please review and call me with any questions. I have also enclosed a copy of the Patient Centered Plan of Care. This has helpful information and goals that we have talked about. Please keep this in an easy to access place to use as needed.     Resources    Higher Level Care Search Assistance:     [facility] [email or web address]/ [phone]    [place] [facility] [email or web address]/[email or web address]/ [phone]    [facility] Care https://[facility].[employer]/ [phone]    [facility] Options Network [email or web address]      Private Pay Agencies - Nursing, , and  services:    [facility]: [phone]   [address] [place] [address]   [email or web address]        [facility] Care: [phone] [address] [place] [address]   [email or web address]       [facility]: [phone]   [address], [facility] [address], [place] [address]    www.[email or web address]        [facility]: [phone]  [address] [facility] [address], [place] [address] www.Netminingre.[employer]       [facility] Care: [phone]   [address] [place] [address]   www.[email or web address].org       [facility]: [phone]   [address] [place] [address]   www.[email or web address]       [facility]: [phone]  [place], [place], [facility], [place], [facility], [place]   www.[email or web address]       [facility]: [phone])  [email or web address]       [facility]: [phone]   A Service Division of [facility]. Your Bridge to [employer].    www.optage.org       [facility]: [phone]   [address], [place]??[address] & [place], [place]?   www.[email or web address]     *this list is neither exhaustive nor an endorsement of any agencies

## 2023-02-14 NOTE — TELEPHONE ENCOUNTER
Call to Amna and lit.     Verbal order given to approve visits until certification received for MD signature.

## 2023-02-14 NOTE — TELEPHONE ENCOUNTER
Call to [facility] PT and left detailed message.     Verbal order given to approve visits until certification received for MD signature.

## 2023-02-14 NOTE — TELEPHONE ENCOUNTER
Amna Occupational Therapy with [facility] calls for verbal order      Continuation of Occupational Therapy    2x1wk  1x1wk  1x every other week for 2wk    Best number to call back [phone]

## 2023-02-15 ENCOUNTER — OFFICE VISIT (OUTPATIENT)
Dept: INTERNAL MEDICINE | Facility: CLINIC | Age: 83
End: 2023-02-15
Payer: COMMERCIAL

## 2023-02-15 ENCOUNTER — TELEPHONE (OUTPATIENT)
Dept: INTERNAL MEDICINE | Facility: CLINIC | Age: 83
End: 2023-02-15

## 2023-02-15 ENCOUNTER — LAB (OUTPATIENT)
Dept: LAB | Facility: CLINIC | Age: 83
End: 2023-02-15
Payer: COMMERCIAL

## 2023-02-15 ENCOUNTER — ANTICOAGULATION THERAPY VISIT (OUTPATIENT)
Dept: ANTICOAGULATION | Facility: CLINIC | Age: 83
End: 2023-02-15

## 2023-02-15 VITALS
HEART RATE: 72 BPM | TEMPERATURE: 97.2 F | HEIGHT: 64 IN | SYSTOLIC BLOOD PRESSURE: 140 MMHG | BODY MASS INDEX: 25.61 KG/M2 | RESPIRATION RATE: 20 BRPM | OXYGEN SATURATION: 94 % | DIASTOLIC BLOOD PRESSURE: 72 MMHG | WEIGHT: 150 LBS

## 2023-02-15 DIAGNOSIS — I48.92 ATRIAL FIBRILLATION AND FLUTTER (H): ICD-10-CM

## 2023-02-15 DIAGNOSIS — Z79.01 LONG TERM CURRENT USE OF ANTICOAGULANTS WITH INR GOAL OF 2.0-3.0: ICD-10-CM

## 2023-02-15 DIAGNOSIS — I10 BENIGN ESSENTIAL HYPERTENSION: ICD-10-CM

## 2023-02-15 DIAGNOSIS — I48.91 ATRIAL FIBRILLATION AND FLUTTER (H): ICD-10-CM

## 2023-02-15 DIAGNOSIS — S22.081A BURST FRACTURE OF T12 VERTEBRA (H): Primary | ICD-10-CM

## 2023-02-15 DIAGNOSIS — Z95.3 S/P MITRAL VALVE REPLACEMENT WITH BIOPROSTHETIC VALVE: ICD-10-CM

## 2023-02-15 DIAGNOSIS — Z79.01 LONG TERM CURRENT USE OF ANTICOAGULANTS WITH INR GOAL OF 2.0-3.0: Primary | ICD-10-CM

## 2023-02-15 DIAGNOSIS — S22.001A CLOSED BURST FRACTURE OF THORACIC VERTEBRA, INITIAL ENCOUNTER (H): ICD-10-CM

## 2023-02-15 LAB — INR BLD: 2.7 (ref 0.9–1.1)

## 2023-02-15 PROCEDURE — 99495 TRANSJ CARE MGMT MOD F2F 14D: CPT | Performed by: INTERNAL MEDICINE

## 2023-02-15 PROCEDURE — 85610 PROTHROMBIN TIME: CPT

## 2023-02-15 PROCEDURE — 36416 COLLJ CAPILLARY BLOOD SPEC: CPT

## 2023-02-15 RX ORDER — OXYCODONE HYDROCHLORIDE 5 MG/1
5 TABLET ORAL EVERY 4 HOURS PRN
Qty: 7 TABLET | Refills: 0 | Status: SHIPPED | OUTPATIENT
Start: 2023-02-15 | End: 2023-03-13

## 2023-02-15 ASSESSMENT — PATIENT HEALTH QUESTIONNAIRE - PHQ9
10. IF YOU CHECKED OFF ANY PROBLEMS, HOW DIFFICULT HAVE THESE PROBLEMS MADE IT FOR YOU TO DO YOUR WORK, TAKE CARE OF THINGS AT HOME, OR GET ALONG WITH OTHER PEOPLE: SOMEWHAT DIFFICULT
SUM OF ALL RESPONSES TO PHQ QUESTIONS 1-9: 10
SUM OF ALL RESPONSES TO PHQ QUESTIONS 1-9: 10

## 2023-02-15 NOTE — PROGRESS NOTES
ANTICOAGULATION MANAGEMENT     Zunilda Alicea May 82 year old female is on warfarin with therapeutic INR result. (Goal INR 2.0-3.0)    Recent labs: (last 7 days)     02/15/23  1159   INR 2.7*       ASSESSMENT       Source(s): Chart Review and Patient/Caregiver Call       Warfarin doses taken: Warfarin taken as instructed    Diet: No new diet changes identified    New illness, injury, or hospitalization: per chart review.     Medication/supplement changes: None noted    Signs or symptoms of bleeding or clotting: No    Previous INR: Subtherapeutic at hospital discharge.  Therapeutic prior to fall.    Additional findings: home care ordered at PCP visit today for skilled nurse visits.       PLAN     Recommended plan for ongoing change(s) affecting INR     Dosing Instructions: Continue your current warfarin dose with next INR in 1 week       Summary  As of 2/15/2023    Full warfarin instructions:  5 mg every Tue, Fri; 2.5 mg all other days   Next INR check:  2/22/2023             Telephone call with Deanne and daughter who agrees to plan and repeated back plan correctly    Lab visit scheduled    Education provided:     Please call back if any changes to your diet, medications or how you've been taking warfarin    Plan made per [facility] anticoagulation protocol    Sonam Teixeira, RN  Anticoagulation Clinic  2/15/2023    _______________________________________________________________________     Anticoagulation Episode Summary     Current INR goal:  2.0-3.0   TTR:  95.4 % (1 y)   Target end date:  Indefinite   Send INR reminders to:  [facility]    Indications    Long term current use of anticoagulants with INR goal of 2.0-3.0 [Z79.01]  Atrial fibrillation and flutter (H) [I48.91  I48.92]  S/P mitral valve replacement with bioprosthetic valve [Z95.3]           Comments:  27 mm Magna bioprosthetic valve (2014)         Anticoagulation Care Providers     Provider Role Specialty Phone number    Yunior Huang MD  Referring Internal Medicine [phone]

## 2023-02-15 NOTE — PROGRESS NOTES
Clinic Care Coordination Contact    Clinic Care Coordination Contact  OUTREACH    Referral Information:  Referral Source: Other, specify  Primary Diagnosis: Psychosocial    Chief Complaint   Patient presents with     Clinic Care Coordination - Initial      Universal Utilization: Reviewed 2/14/2023  Clinic Utilization  Difficulty keeping appointments:: No  Compliance Concerns: No  No-Show Concerns: No  No PCP office visit in Past Year: No  Utilization    Hospital Admissions  2             ED Visits  2             No Show Count (past year)  0                Current as of: 2/14/2023  3:59 PM            Clinical Concerns:  Current Medical Concerns:    Patient Active Problem List   Diagnosis     RA (rheumatoid arthritis) (H)     Benign essential hypertension     Atrial fibrillation and flutter (H)     Cardiac pacemaker in situ     Humerus fracture     Fracture, humerus closed, shaft     Anticoagulation management encounter     Acquired hypothyroidism     Essential hypertension     Long term current use of anticoagulants with INR goal of 2.0-3.0     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    Pain  Pain (GOAL):: No  Health Maintenance Reviewed: Due/Overdue   Health Maintenance Due   Topic Date Due     PHQ-2 (once per calendar year)  01/01/2023   Clinical Pathway: None    Medication Management:  Medication review status: Medications reviewed and no changes reported per patient.        Current Outpatient Medications   Medication     acetaminophen (TYLENOL) 325 MG tablet     ALLOPURINOL PO     amLODIPine (NORVASC) 2.5 MG tablet      amLODIPine-valsartan (EXFORGE) 5-160 MG tablet     calcium citrate (CALCITRATE) 950 MG tablet     Dorzolamide HCl-Timolol Mal (COSOPT OP)     famotidine (PEPCID) 40 MG tablet     folic acid (FOLVITE) 1 MG tablet     levothyroxine (SYNTHROID/LEVOTHROID) 112 MCG tablet     METHOTREXATE SODIUM IJ     metoprolol tartrate (LOPRESSOR) 50 MG tablet     multivitamin w/minerals (THERA-VIT-M) tablet     oxyCODONE (ROXICODONE) 5 MG tablet     polyethylene glycol (MIRALAX) 17 g packet     senna-docusate (SENOKOT-S/PERICOLACE) 8.6-50 MG tablet     torsemide (DEMADEX) 10 MG tablet     VITAMIN D, CHOLECALCIFEROL, PO     warfarin ANTICOAGULANT (COUMADIN) 2.5 MG tablet     No current facility-administered medications for this visit.     Functional Status:  Dependent ADLs:: Ambulation-walker, Bathing, Dressing  Dependent IADLs:: Cleaning, Shopping, Transportation, Cooking, Laundry, Meal Preparation, Medication Management  Bed or wheelchair confined:: No  Mobility Status: Independent w/Device    Living Situation:  Current living arrangement:: I live in a private home  Type of residence:: Independent Senior Living    Lifestyle & Psychosocial Needs:  Social Determinants of Health     Tobacco Use: Low Risk      Smoking Tobacco Use: Never     Smokeless Tobacco Use: Never     Passive Exposure: Not on file   Alcohol Use: Not on file   Financial Resource Strain: Low Risk      Difficulty of Paying Living Expenses: Not very hard   Food Insecurity: No Food Insecurity     Worried About Running Out of Food in the Last Year: Never true     Ran Out of Food in the Last Year: Never true   Transportation Needs: Not on file   Physical Activity: Not on file   Stress: Not on file   Social Connections: Not on file   Intimate Partner Violence: Not on file   Depression: Not at risk     PHQ-2 Score: 0   Housing Stability: Low Risk      Unable to Pay for Housing in the Last Year: No     Number of Places Lived in the Last Year: 1     Unstable Housing in the  Last Year: No     Diet:: Regular  Inadequate nutrition (GOAL):: No  Tube Feeding: No  Inadequate activity/exercise (GOAL):: No  Significant changes in sleep pattern (GOAL): No  Transportation means:: Family     Anabaptist or spiritual beliefs that impact treatment:: No  Mental health DX:: No  Mental health management concern (GOAL):: No  Chemical Dependency Status: No Current Concerns  Informal Support system:: Children, Family     [facility] spoke with patient and patients daughter, Alejandra, (consent to communicate on file) to introduce self and offer Care Coordination services. Per report, patient was recently hospitalized due to fall resulting in a fracture. Patient reports she was discharged home with PT/OT ([facility]) and 24/7 support from family. Patient reports her daughters have been taking time away from work to stay with her, however, are concerned about patients future as they are only able to provided assistance for 6 more weeks. Per report, patient was living independently prior to fall and patient/family are not sure if she will return to her baseline of independence. Per report, things are slowly improving since returning home, however, patient continues to require assistance with ADLs and IADLs. Daughter, Alejandra, also shared it is difficult to transport patient to INR appointments and is inquiring if this can be done at home. Encouraged patient/family to discuss potential of adding skilled nursing to current homecare orders with PCP at appointment on 2/15/2023. Patient/family in agreement with plan. Alejandra also reports difficulty with bathing patient. Encourgaed patient/family to discuss adding home health aid to homecare orders as well. Patient/family in agreement with plan. Patient/family voiced questions regarding patients discharge from hospital and inquired if patient could have discharged to TCU. Provided basic education surrounding TCU placement and eligibility requirements for potential coverage.  Patient/family expressed understanding. Patient/family shared they are doing well for now, however, reiterated concerns for future if/when daughters are no longer available for support. Reviewed additional in-home supports (private-pay, county waiver programs) vs. alternate living arrangements (AL, SNF). Per report patient/family mainly interested in at home supports, however, patient is on a fixed income. Offered to send patient/family resources regarding above options to explore further on own. Patient/family in agreement with plan and requested resources be sent via UserEventshart and mail. Allowed time and space for patient/family to voice additional questions/concerns. No further care coordination needs identified at this time. Patient/family in agreement with care coordination services and monthly outreaches. Created patient centered goals and sent to patient via UserEventshart/mail. Patient request daughter, Alejandra, be primary point of contact for care coordination team. Updated next outreach to reflect above patient preference. Routed to PCP as FYI.     Resources and Interventions:  Current Resources:   Skilled Home Care Services: Physical Therapy, Occupational Therapy  Community Resources: None  Equipment Currently Used at Home: cane, straight, walker, rolling  Employment Status: retired  Advance Care Plan/Directive  Advanced Care Plans/Directives on file:: No  Advanced Care Plan/Directive Status: Considering Options  Referrals Placed: Housekeeping or Chore Agency     Care Plan:  Care Plan: Help At Home     Problem: Insufficient In-home support     Goal: Establish adequate home support     Start Date: 2/14/2023 Expected End Date: 2/14/2024    This Visit's Progress: 10%    Priority: High    Note:     Barriers: fixed income   Strengths: family support  Patient expressed understanding of goal: Yes  Action steps to achieve this goal:  1. I will continue to lean on informal supports of my: family  2. My family will review  resources and supports sent to me via IntelePeerhart/mail.  3. My family will outreach to supports and consider establishing with ones I might find beneficial.  4. My family will continue to outreach to care coordination as needed for additional resources or supports.                     Patient/Caregiver understanding: Patient reports understanding and denies any additional questions or concerns at this times. SW CC engaged in AIDET communication during encounter.    Outreach Frequency: monthly  Future Appointments              Today [place] LAB [facility], [place]    Today Yunior Huang MD [place], [place]    In 3 weeks [place] LAB [facility] RID    In 3 weeks Zena Beintez PA [facility], [facility] PSA CLIN    In 4 weeks [place] LAB [facility]    In 1 month KRAMER TECH1 [facility], P PSA CLIN    In 4 months Yunior Huang MD [place], [place]        Plan: Patient will review introduction to CC letter, resources, and Complex Care Plan sent via IntelePeerhart/mail. CHW will outreach to patient in one month to review goal progression. CHW will involve Lead CC as needed or if patient is ready to move to maintenance. Lead CC will continue to monitor CHW's monthly outreaches and progress to goal(s) every 6 weeks.    AUDREY Sauceda/[facility]  Social Work Care Coordinator  [facility], [place], [facility], and [place]  Phone: [phone]

## 2023-02-15 NOTE — PROGRESS NOTES
"  Assessment & Plan     Burst fracture of T12 vertebra (H)  Continue PT, OT, pain treatment  Follow up with ortho,     - Home Care Referral    Benign essential hypertension  Controlled on treatment     Atrial fibrillation and flutter (H)  Controlled, on rate control and AC     Closed burst fracture of thoracic vertebra, initial encounter (H)    - oxyCODONE (ROXICODONE) 5 MG tablet; Take 1 tablet (5 mg) by mouth every 4 hours as needed for severe pain (7-10) (IF pain not managed with non-pharmacological and non-opioid interventions)           MED REC REQUIRED  Post Medication Reconciliation Status: discharge medications reconciled, continue medications without change  BMI:   Estimated body mass index is 25.75 kg/m  as calculated from the following:    Height as of this encounter: 5' 4\" (1.626 m).    Weight as of this encounter: 150 lb (68 kg).       Depression Screening Follow Up    PHQ 2/15/2023   PHQ-9 Total Score 10   Q9: Thoughts of better off dead/self-harm past 2 weeks Not at all            See Patient Instructions    Return in about 6 months (around 8/15/2023) for Routine Visit.    Yunior Huang MD  [facility] is a 82 year old, presenting for the following health issues with her daughter:  Follow Up      Healthy Habits:   PHQ-2 Total Score: 3       Post Discharge Outreach 2/10/2023   Admission Date 2/8/2023   Reason for Admission Mechanical fall with T12 burst fracture   Discharge Date 2/9/2023   Discharge Diagnosis Mechanical fall with T12 burst fracture   How are you doing now that you are home? feeling littlle tired   How are your symptoms? (Red Flag symptoms escalate to triage hotline per guidelines) Unchanged   Do you feel your condition is stable enough to be safe at home until your provider visit? Yes   Does the patient have their discharge instructions?  Yes   Does the patient have questions regarding their discharge instructions?  No   Were you " started on any new medications or were there changes to any of your previous medications?  Yes   Does the patient have all of their medications? Yes   Do you have questions regarding any of your medications?  No   Do you have all of your needed medical supplies or equipment (DME)?  (i.e. oxygen tank, CPAP, cane, etc.) Yes   Discharge follow-up appointment scheduled within 14 calendar days?  Yes   Discharge Follow Up Appointment Date 2/13/2023     Hospital Follow-up Visit:    Hospital/Nursing Home/IP Rehab Facility: [facility]  Date of Admission: 2/8  Date of Discharge: 2/9  Reason(s) for Admission: burst fracture of Th12    Was your hospitalization related to COVID-19? No   Problems taking medications regularly:  None  Medication changes since discharge: None  Problems adhering to non-medication therapy:  None    Summary of hospitalization:  [facility] discharge summary reviewed  Diagnostic Tests/Treatments reviewed.  Follow up needed: clinic visit   Other Healthcare Providers Involved in Patient s Care:         Specialist appointment - ortho   Update since discharge: improved.         Plan of care communicated with patient           Patient is seen for a follow up visit.  Recently hospitalized for back pain, after a fall. Sustained Th12 burst fracture.   Fall was mechanical. No dizziness. No chest pain, palpitations.   Treated conservatively. Has a back brace , on PRN Oxycodone. Used only at night.   Feels improved. Able to walk with a walker.   Has had mild constipation, improved.   Has history of atrial fibrillation. On anticoagulation with Coumadin and rate control medications. Asymptonatic - no chest pains , palpitations,  no side effects from medications.  Has h/o HTN. on medical treatment. BP has been controlled. No side effects from medications. No CP, HA, dizziness. good compliance with medications and low salt diet.      Review of Systems   Constitutional, HEENT,  "cardiovascular, pulmonary, gi and gu systems are negative, except as otherwise noted.      Objective    BP (!) 140/72 (BP Location: Left arm, Patient Position: Sitting, Cuff Size: Adult Regular)   Pulse 72   Temp 97.2  F (36.2  C) (Oral)   Resp 20   Ht 5' 4\" (1.626 m)   Wt 150 lb (68 kg)   LMP  (LMP Unknown)   SpO2 94%   BMI 25.75 kg/m    Body mass index is 25.75 kg/m .  Physical Exam   GENERAL: healthy, alert and no distress  NECK: no adenopathy, no asymmetry, masses, or scars and thyroid normal to palpation  RESP: lungs clear to auscultation - no rales, rhonchi or wheezes  CV: regular rate and rhythm, normal S1 S2, no S3 or S4, no murmur, click or rub, no peripheral edema and peripheral pulses strong  ABDOMEN: soft, nontender, no hepatosplenomegaly, no masses and bowel sounds normal  MS: no gross musculoskeletal defects noted, no edema, patient is with a chest brace. Able to move her legs, no weakness or numbness     Lab on 02/15/2023   Component Date Value Ref Range Status     INR 02/15/2023 2.7 (H)  0.9 - 1.1 Final                   Answers for HPI/ROS submitted by the patient on 2/15/2023  If you checked off any problems, how difficult have these problems made it for you to do your work, take care of things at home, or get along with other people?: Somewhat difficult  PHQ9 TOTAL SCORE: 10      "

## 2023-02-15 NOTE — PROGRESS NOTES
ANTICOAGULATION MANAGEMENT     Zunilda Alicea May 82 year old female is on warfarin with therapeutic INR result. (Goal INR 2.0-3.0)    Recent labs: (last 7 days)     02/15/23  1159   INR 2.7*       ASSESSMENT       Source(s): Chart Review    Previous INR was Therapeutic last 2(+) visits    Medication, diet, health changes since last INR chart reviewed; none identified     Recovering from Burst fracture of T12 vertebra.      PCP visit today.  Home care nursing referral done.           PLAN     Unable to reach Deanne today.    Left message for patient to call INR clinic to discuss result.    Follow up required to confirm warfarin dose taken and assess for changes    Sonam Teixeiar RN  Anticoagulation Clinic  2/15/2023

## 2023-02-15 NOTE — TELEPHONE ENCOUNTER
Patient Returning Call    Reason for call:  To talk to INR nurse    Information relayed to patient:  That I would leave an message for INR nurse to call back    Patient has additional questions:  No    What are your questions/concerns:  results    Could we send this information to you in [facility] or would you prefer to receive a phone call?:   Patient would prefer a phone call   Okay to leave a detailed message?: Yes at Home number on file [phone] (home)

## 2023-02-16 ENCOUNTER — TELEPHONE (OUTPATIENT)
Dept: INTERNAL MEDICINE | Facility: CLINIC | Age: 83
End: 2023-02-16
Payer: COMMERCIAL

## 2023-02-16 NOTE — TELEPHONE ENCOUNTER
Amna, from [facility], calling in to leave a message:     May need to resend Nursing Orders for Home Health. They have not received it yet. Patient was recently seen by Dr Huang Yesterday and was told SKILLED NURSING Orders would be faxed in. But they have not received anything yet.     Phone#: [phone]  Okay to [facility].

## 2023-02-20 ENCOUNTER — TRANSFERRED RECORDS (OUTPATIENT)
Dept: HEALTH INFORMATION MANAGEMENT | Facility: CLINIC | Age: 83
End: 2023-02-20

## 2023-02-21 ENCOUNTER — DOCUMENTATION ONLY (OUTPATIENT)
Dept: CARDIOLOGY | Facility: CLINIC | Age: 83
End: 2023-02-21
Payer: COMMERCIAL

## 2023-02-21 NOTE — PROGRESS NOTES
Received MRI Cardiology Clearance form from [facility] MRI department.  Form completed, signed by MD, and faxed back to MRI at [phone].      MELINA Yadav

## 2023-02-22 ENCOUNTER — ANTICOAGULATION THERAPY VISIT (OUTPATIENT)
Dept: ANTICOAGULATION | Facility: CLINIC | Age: 83
End: 2023-02-22
Payer: COMMERCIAL

## 2023-02-22 DIAGNOSIS — Z79.01 LONG TERM CURRENT USE OF ANTICOAGULANTS WITH INR GOAL OF 2.0-3.0: Primary | ICD-10-CM

## 2023-02-22 DIAGNOSIS — I48.92 ATRIAL FIBRILLATION AND FLUTTER (H): ICD-10-CM

## 2023-02-22 DIAGNOSIS — I48.91 ATRIAL FIBRILLATION AND FLUTTER (H): ICD-10-CM

## 2023-02-22 DIAGNOSIS — Z95.3 S/P MITRAL VALVE REPLACEMENT WITH BIOPROSTHETIC VALVE: ICD-10-CM

## 2023-02-22 LAB — INR (EXTERNAL): 3.1 (ref 0.9–1.1)

## 2023-02-22 NOTE — PROGRESS NOTES
ANTICOAGULATION MANAGEMENT     Zunilda Alicea May 82 year old female is on warfarin with supratherapeutic INR result. (Goal INR 2.0-3.0)    Recent labs: (last 7 days)     02/22/23  1320   INR 3.1*       ASSESSMENT       Source(s): Chart Review and Home Care/Facility Nurse       Warfarin doses taken: Warfarin taken as instructed    Diet: No new diet changes identified    New illness, injury, or hospitalization: No    Medication/supplement changes: None noted    Signs or symptoms of bleeding or clotting: No    Previous INR: Therapeutic last visit; previously outside of goal range    Additional findings: Upcoming surgery/procedure MRI on 3/8          PLAN     Recommended plan for no diet, medication or health factor changes affecting INR     Dosing Instructions: Continue your current warfarin dose with next INR in 1 week       Summary  As of 2/22/2023    Full warfarin instructions:  5 mg every Tue, Fri; 2.5 mg all other days   Next INR check:  3/1/2023             Telephone call with [facility] home care nurse who verbalizes understanding and agrees to plan and who agrees to plan and repeated back plan correctly    Orders given to  Homecare nurse/facility to recheck    Education provided:     Contact [phone]  with any changes, questions or concerns.     Plan made per [facility] anticoagulation protocol    Isabel Montes RN  Anticoagulation Clinic  2/22/2023    _______________________________________________________________________     Anticoagulation Episode Summary     Current INR goal:  2.0-3.0   TTR:  94.9 % (1 y)   Target end date:  Indefinite   [facility]  [facility]    Indications    Long term current use of anticoagulants with INR goal of 2.0-3.0 [Z79.01]  Atrial fibrillation and flutter (H) [I48.91  I48.92]  S/P mitral valve replacement with bioprosthetic valve [Z95.3]           Comments:  27 mm Magna bioprosthetic valve (2014)         Anticoagulation Care Providers     Provider Role Specialty Phone  number    Yunior Huang MD [facility] Internal Medicine [phone]

## 2023-02-27 ENCOUNTER — PATIENT OUTREACH (OUTPATIENT)
Dept: CARE COORDINATION | Facility: CLINIC | Age: 83
End: 2023-02-27
Payer: COMMERCIAL

## 2023-02-27 ENCOUNTER — TRANSFERRED RECORDS (OUTPATIENT)
Dept: HEALTH INFORMATION MANAGEMENT | Facility: CLINIC | Age: 83
End: 2023-02-27
Payer: COMMERCIAL

## 2023-02-27 LAB
ALT SERPL-CCNC: 20 IU/L (ref 5–35)
AST SERPL-CCNC: 25 U/L (ref 5–34)
CREATININE (EXTERNAL): 1.04 MG/DL (ref 0.5–1.3)
GFR ESTIMATED (EXTERNAL): 54.1 ML/MIN/1.73M2

## 2023-02-27 NOTE — PROGRESS NOTES
Clinic Care Coordination Contact  [facility]/Voicemail     Clinical Data: Care Coordinator Outreach  Outreach attempted x 2. Left message on Alejandra's (patients daughter) voicemail with call back information and requested return call. Briefly spoke with patient whom reports her daughters would like to discuss alternate care options as they are unable to continue providing care. Patient stated daughter, Kyara, is out of the apartment picking up groceries. Patient requested [facility]'s contact information to call back upon Kyara's return. Provided direct call back information for [facility].     Plan: Care Coordinator will await return call from patient/daughters and/or will try to reach patient again in 1-2 business days.    AUDREY Sauceda/[facility]  Social Work Care Coordinator  [facility] [place], [place], [facility], and [place]  Phone: [phone]

## 2023-02-28 ENCOUNTER — TELEPHONE (OUTPATIENT)
Dept: INTERNAL MEDICINE | Facility: CLINIC | Age: 83
End: 2023-02-28
Payer: COMMERCIAL

## 2023-02-28 NOTE — PROGRESS NOTES
"Clinic Care Coordination Contact    [facility] spoke with patients daughter, Alejandra, (consent to communicate on file) in regards to discussing alternate care options. Per report, patients daughters will no longer be able to provide ongoing 24/7 care to patient after March 19th, 2023. Per daughters report, patient is progressing well with home therapies, however, needs support with placing brace on/off daily as well as bathroom assistance. 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 Per report, patient has a MRI to assess further on 3/8/2023 with orthopedic follow-up on 3/13/2023. Patients daughter stated patient is not suppose to \"bend, lift, twist, and/or stretch\", therefore, makes it challenging to currently meet day to day needs. Patient continues to have homecare SN, OT, and PT, however, patient/family interested in alternate options for temporary support when daughters are no longer able to provide 24/7 care. Per report, 3/13/2023 visit will assist in determining if patient will need long-term support or if she will eventually be able to return to independent living. [facility] provided general education surrounding private pay home support, detention, and/or SNF placements. [facility] also discussed general education surrounding TCU placement and explained insurance coverage of TCU stay including needing a documented (PT/OT recommendation) of a skilled need for TCU. Patients daughter expressed understanding. Discussed skilled need vs. prison cares. Patients daughter expressed understanding. Offered to follow-up with homecare RN and PCP to collaborate on above. Patients daughter in agreement with plan. Patients daughter also requesting previous CC resources be sent via e-mail. Writer communicated the risks of unencrypted electronic communication and the patient and/or patient representative has agreed to accept the risks and receive unencrypted communication " related to the information or resources we have discussed. We reviewed that no PHI will be included. Email address verified with the patients daughter ([email or web address]). Resources sent 2/28/2023. [facility] spoke with homecare RN, Shola, whom shared he would speak with PT/OT and discuss further care options with patient/family. [facility] routed note to PCP as FYI. No further CC needs identified at this time.     Plan: [facility] to follow-up with patients daughter, Alejandra, 3/2/2023 to continue above discussion.      AUDREY Sauceda/LICHOLA  Social Work Care Coordinator  [facility], [place], [facility], and [place]  Phone: [phone]

## 2023-02-28 NOTE — TELEPHONE ENCOUNTER
[facility] is calling for verbal orders. [facility]URSING weekly visits for seven weeks for INR monitor and disease management.  [facility] [phone] okay to leave a message

## 2023-02-28 NOTE — TELEPHONE ENCOUNTER
Call to Mihn and advised.     Verbal order given to approve visits until certification received for MD signature.

## 2023-03-01 ENCOUNTER — ANTICOAGULATION THERAPY VISIT (OUTPATIENT)
Dept: ANTICOAGULATION | Facility: CLINIC | Age: 83
End: 2023-03-01
Payer: COMMERCIAL

## 2023-03-01 ENCOUNTER — TELEPHONE (OUTPATIENT)
Dept: INTERNAL MEDICINE | Facility: CLINIC | Age: 83
End: 2023-03-01
Payer: COMMERCIAL

## 2023-03-01 DIAGNOSIS — I48.92 ATRIAL FIBRILLATION AND FLUTTER (H): ICD-10-CM

## 2023-03-01 DIAGNOSIS — D64.9 LOW HEMOGLOBIN: Primary | ICD-10-CM

## 2023-03-01 DIAGNOSIS — Z95.3 S/P MITRAL VALVE REPLACEMENT WITH BIOPROSTHETIC VALVE: ICD-10-CM

## 2023-03-01 DIAGNOSIS — I48.91 ATRIAL FIBRILLATION AND FLUTTER (H): ICD-10-CM

## 2023-03-01 DIAGNOSIS — Z79.01 LONG TERM CURRENT USE OF ANTICOAGULANTS WITH INR GOAL OF 2.0-3.0: Primary | ICD-10-CM

## 2023-03-01 LAB — INR (EXTERNAL): 3.2 (ref 0.9–1.1)

## 2023-03-01 NOTE — PROGRESS NOTES
ANTICOAGULATION MANAGEMENT     Zunilda Alicea May 82 year old female is on warfarin with supratherapeutic INR result. (Goal INR 2.0-3.0)    Recent labs: (last 7 days)     03/01/23  1016   INR 3.2*       ASSESSMENT       Source(s): Chart Review and Home Care/Facility Nurse       Warfarin doses taken: Warfarin taken as instructed    Diet: Increased greens/vitamin K in diet; ongoing change    New illness, injury, or hospitalization: No    Medication/supplement changes: None noted    Signs or symptoms of bleeding or clotting: No    Previous INR: Supratherapeutic    Additional findings: None         PLAN     Recommended plan for ongoing change(s) affecting INR     Dosing Instructions: decrease your warfarin dose (11.1% change) with next INR in 1 week   (smallest dose change available with tablet strength)    Summary  As of 3/1/2023    Full warfarin instructions:  5 mg every Tue; 2.5 mg all other days   Next INR check:  3/8/2023             Telephone call with Min home care nurse who verbalizes understanding and agrees to plan    Orders given to  Homecare nurse/facility to recheck    Education provided:     Please call back if any changes to your diet, medications or how you've been taking warfarin    Plan made per [facility] anticoagulation protocol    Navya Bowles RN  Anticoagulation Clinic  3/1/2023    _______________________________________________________________________     Anticoagulation Episode Summary     Current INR goal:  2.0-3.0   TTR:  93.1 % (1 y)   Target end date:  Indefinite   [facility]    Indications    Long term current use of anticoagulants with INR goal of 2.0-3.0 [Z79.01]  Atrial fibrillation and flutter (H) [I48.91  I48.92]  S/P mitral valve replacement with bioprosthetic valve [Z95.3]           Comments:  27 mm Magna bioprosthetic valve (2014)         Anticoagulation Care Providers     Provider Role Specialty Phone number    Yunior Huang MD Referring Internal  Medicine [phone]

## 2023-03-01 NOTE — TELEPHONE ENCOUNTER
Patient calls to say she was recently seen by her Rheumatologist and her hemoglobin was low. She reported in Aug 2022 it was 11.9 and Feb 2023 was 10.6.     She and her Daughter Kyara are asking if  wants to address this before or after her 3-  PULMONARY HYPERTENSION appointment. She says that provider will most likely draw labs too and then there would be new lab information.     pls call to advise [phone]

## 2023-03-01 NOTE — TELEPHONE ENCOUNTER
Does she have symptoms of bleeding - blood in the urine, stools, nose bleeds?  Recommend to recheck Hgb in one week.

## 2023-03-01 NOTE — TELEPHONE ENCOUNTER
See message below. Pt also takes Warfarin.     Hemoglobin   Date Value Ref Range Status   02/09/2023 10.3 (L) 11.7 - 15.7 g/dL Final   02/08/2023 10.6 (L) 11.7 - 15.7 g/dL Final   06/22/2021 12.5 11.7 - 15.7 g/dL Final   03/08/2021 11.6 (L) 11.7 - 15.7 g/dL Final       CBC RESULTS: Recent Labs   Lab Test 02/09/23  0809   WBC 7.2   RBC 2.98*   HGB 10.3*   HCT 31.2*   *   MCH 34.6*   MCHC 33.0   RDW 17.2*

## 2023-03-02 NOTE — PROGRESS NOTES
Clinic Care Coordination Contact    [facility] spoke with patients daughter, Alejandra, to follow-up in regards to alternate care options as patients daughters will no longer be able to provide support come March 19th, 2023. Reviewed PCP recommendation to follow-up with orthopedic provider regarding restrictions and potential placement options. Patients daughter expressed understanding. Reviewed assisted vs skilled needs/cares and reiterated insurance coverage of TCU stay including needing a documented (PT/OT recommendation) of a skilled need for TCU. Patients daughter expressed understanding. Informed patients daughter homecare RN, Shola, plans to communicate with homecare PT/OT regarding possible modifications to assist patient in day to day tasks. Patients daughter expressed understanding and is in agreement with plan. Reviewed resources provided via e-mail on private pay home support options and senior care living agencies. Patients daughter confirmed she received resources and will review with patient/family. No further CC needs noted at this time. Patients daughter aware she can reach out as needed.     Plan: CHW will outreach to patient in one month to review goal progression. CHW will involve Lead CC as needed or if patient is ready to move to maintenance. Lead CC will continue to monitor CHW's monthly outreaches and progress to goal(s) every 6 weeks.    AUDREY Sauceda/[facility]  Social Work Care Coordinator  [facility], [place], [facility], and [place]  Phone: [phone]

## 2023-03-06 ENCOUNTER — TELEPHONE (OUTPATIENT)
Dept: INTERNAL MEDICINE | Facility: CLINIC | Age: 83
End: 2023-03-06
Payer: COMMERCIAL

## 2023-03-06 ENCOUNTER — TELEPHONE (OUTPATIENT)
Dept: MEDSURG UNIT | Facility: CLINIC | Age: 83
End: 2023-03-06
Payer: COMMERCIAL

## 2023-03-06 DIAGNOSIS — R06.02 SHORTNESS OF BREATH: ICD-10-CM

## 2023-03-06 DIAGNOSIS — I27.20 PULMONARY HYPERTENSION (H): Primary | ICD-10-CM

## 2023-03-06 NOTE — TELEPHONE ENCOUNTER
HOME HEALTH CERTIFICATION 2/13/23-4/13/23; received via fax. Orders/Forms/Letters in your mailbox to be signed.

## 2023-03-07 DIAGNOSIS — Z53.9 DIAGNOSIS NOT YET DEFINED: Primary | ICD-10-CM

## 2023-03-07 PROCEDURE — G0180 MD CERTIFICATION HHA PATIENT: HCPCS | Performed by: INTERNAL MEDICINE

## 2023-03-08 ENCOUNTER — DOCUMENTATION ONLY (OUTPATIENT)
Dept: ANTICOAGULATION | Facility: CLINIC | Age: 83
End: 2023-03-08
Payer: COMMERCIAL

## 2023-03-08 ENCOUNTER — ANTICOAGULATION THERAPY VISIT (OUTPATIENT)
Dept: ANTICOAGULATION | Facility: CLINIC | Age: 83
End: 2023-03-08
Payer: COMMERCIAL

## 2023-03-08 ENCOUNTER — HOSPITAL ENCOUNTER (OUTPATIENT)
Facility: CLINIC | Age: 83
Discharge: HOME | End: 2023-03-08
Admitting: RADIOLOGY
Payer: COMMERCIAL

## 2023-03-08 ENCOUNTER — HOSPITAL ENCOUNTER (OUTPATIENT)
Dept: MRI IMAGING | Facility: CLINIC | Age: 83
Discharge: HOME | End: 2023-03-08
Attending: ORTHOPAEDIC SURGERY
Payer: COMMERCIAL

## 2023-03-08 ENCOUNTER — TRANSFERRED RECORDS (OUTPATIENT)
Dept: HEALTH INFORMATION MANAGEMENT | Facility: CLINIC | Age: 83
End: 2023-03-08

## 2023-03-08 VITALS — HEART RATE: 80 BPM

## 2023-03-08 DIAGNOSIS — Z79.01 LONG TERM CURRENT USE OF ANTICOAGULANTS WITH INR GOAL OF 2.0-3.0: Primary | ICD-10-CM

## 2023-03-08 DIAGNOSIS — I48.91 ATRIAL FIBRILLATION AND FLUTTER (H): ICD-10-CM

## 2023-03-08 DIAGNOSIS — Z95.3 S/P MITRAL VALVE REPLACEMENT WITH BIOPROSTHETIC VALVE: ICD-10-CM

## 2023-03-08 DIAGNOSIS — I48.92 ATRIAL FIBRILLATION AND FLUTTER (H): ICD-10-CM

## 2023-03-08 DIAGNOSIS — M54.9 BACK PAIN: ICD-10-CM

## 2023-03-08 LAB — INR (EXTERNAL): 2.2 (ref 0.9–1.1)

## 2023-03-08 PROCEDURE — 999N000154 HC STATISTIC RADIOLOGY XRAY, US, CT, MAR, NM

## 2023-03-08 PROCEDURE — 72148 MRI LUMBAR SPINE W/O DYE: CPT

## 2023-03-08 ASSESSMENT — ACTIVITIES OF DAILY LIVING (ADL): ADLS_ACUITY_SCORE: 35

## 2023-03-08 NOTE — PROGRESS NOTES
ANTICOAGULATION  MANAGEMENT: Discharge Review    Zunilda Clark chart reviewed for anticoagulation continuity of care    Outpatient surgery/procedure on 3/8/23 for MR for lumbar spine without contrast.    Discharge disposition: Home with Home Care    Results:    Recent labs: (last 7 days)     03/08/23  1009   INR 2.2*     Anticoagulation inpatient management:     home regimen continued    Anticoagulation discharge instructions:     Warfarin dosing: home regimen continued   Bridging: No   INR goal change: No      Medication changes affecting anticoagulation: No    Additional factors affecting anticoagulation: No     PLAN     No adjustment to anticoagulation plan needed    Patient not contacted    No adjustment to Anticoagulation Calendar was required    Navya Bowles RN

## 2023-03-08 NOTE — PROGRESS NOTES
ANTICOAGULATION MANAGEMENT     Zunilda Alicea May 82 year old female is on warfarin with therapeutic INR result. (Goal INR 2.0-3.0)    Recent labs: (last 7 days)     03/08/23  1009   INR 2.2*       ASSESSMENT       Source(s): Chart Review and Home Care/Facility Nurse       Warfarin doses taken: Warfarin taken as instructed    Diet: No new diet changes identified    New illness, injury, or hospitalization: No    Medication/supplement changes: None noted    Signs or symptoms of bleeding or clotting: No    Previous INR: Supratherapeutic    Additional findings: None         PLAN     Recommended plan for no diet, medication or health factor changes affecting INR     Dosing Instructions: Continue your current warfarin dose with next INR in 1 week       Summary  As of 3/8/2023    Full warfarin instructions:  5 mg every Tue; 2.5 mg all other days   Next INR check:  3/15/2023             Telephone call with Xena Holloway  home care nurse who agrees to plan and repeated back plan correctly    Orders given to  Homecare nurse/facility to recheck    Education provided:     None required    Plan made per [facility] anticoagulation protocol    Katalina Restrepo, RN  Anticoagulation Clinic  3/8/2023    _______________________________________________________________________     Anticoagulation Episode Summary     Current INR goal:  2.0-3.0   TTR:  92.7 % (1 y)   Target end date:  Indefinite   Send INR reminders to:  RANJIT STEEN    Indications    Long term current use of anticoagulants with INR goal of 2.0-3.0 [Z79.01]  Atrial fibrillation and flutter (H) [I48.91  I48.92]  S/P mitral valve replacement with bioprosthetic valve [Z95.3]           Comments:  27 mm Magna bioprosthetic valve (2014)         Anticoagulation Care Providers     Provider Role Specialty Phone number    Yunior Huang MD Referring Internal Medicine [phone]

## 2023-03-10 ENCOUNTER — TRANSFERRED RECORDS (OUTPATIENT)
Dept: HEALTH INFORMATION MANAGEMENT | Facility: CLINIC | Age: 83
End: 2023-03-10

## 2023-03-13 ENCOUNTER — OFFICE VISIT (OUTPATIENT)
Dept: CARDIOLOGY | Facility: CLINIC | Age: 83
End: 2023-03-13
Attending: INTERNAL MEDICINE
Payer: COMMERCIAL

## 2023-03-13 ENCOUNTER — LAB (OUTPATIENT)
Dept: LAB | Facility: CLINIC | Age: 83
End: 2023-03-13
Attending: INTERNAL MEDICINE
Payer: COMMERCIAL

## 2023-03-13 VITALS
HEIGHT: 64 IN | DIASTOLIC BLOOD PRESSURE: 50 MMHG | OXYGEN SATURATION: 98 % | BODY MASS INDEX: 25.75 KG/M2 | SYSTOLIC BLOOD PRESSURE: 118 MMHG | HEART RATE: 60 BPM

## 2023-03-13 DIAGNOSIS — I27.20 PULMONARY HTN (H): ICD-10-CM

## 2023-03-13 DIAGNOSIS — D64.9 LOW HEMOGLOBIN: ICD-10-CM

## 2023-03-13 DIAGNOSIS — I27.20 PULMONARY HYPERTENSION (H): ICD-10-CM

## 2023-03-13 DIAGNOSIS — R06.02 SHORTNESS OF BREATH: ICD-10-CM

## 2023-03-13 DIAGNOSIS — D64.9 ANEMIA, UNSPECIFIED TYPE: ICD-10-CM

## 2023-03-13 LAB
ANION GAP SERPL CALCULATED.3IONS-SCNC: 9 MMOL/L (ref 7–15)
BUN SERPL-MCNC: 29.5 MG/DL (ref 8–23)
CALCIUM SERPL-MCNC: 9.5 MG/DL (ref 8.8–10.2)
CHLORIDE SERPL-SCNC: 100 MMOL/L (ref 98–107)
CREAT SERPL-MCNC: 0.88 MG/DL (ref 0.51–0.95)
DEPRECATED HCO3 PLAS-SCNC: 28 MMOL/L (ref 22–29)
FERRITIN SERPL-MCNC: 374 NG/ML (ref 11–328)
GFR SERPL CREATININE-BSD FRML MDRD: 66 ML/MIN/1.73M2
GLUCOSE SERPL-MCNC: 95 MG/DL (ref 70–99)
HGB BLD-MCNC: 10.8 G/DL (ref 11.7–15.7)
IRON BINDING CAPACITY (ROCHE): 248 UG/DL (ref 240–430)
IRON SATN MFR SERPL: 19 % (ref 15–46)
IRON SERPL-MCNC: 48 UG/DL (ref 37–145)
NT-PROBNP SERPL-MCNC: 1680 PG/ML (ref 0–1800)
POTASSIUM SERPL-SCNC: 4.5 MMOL/L (ref 3.4–5.3)
SODIUM SERPL-SCNC: 137 MMOL/L (ref 136–145)

## 2023-03-13 PROCEDURE — 83550 IRON BINDING TEST: CPT | Performed by: PHYSICIAN ASSISTANT

## 2023-03-13 PROCEDURE — 99214 OFFICE O/P EST MOD 30 MIN: CPT | Performed by: PHYSICIAN ASSISTANT

## 2023-03-13 PROCEDURE — 99000 SPECIMEN HANDLING OFFICE-LAB: CPT | Performed by: PHYSICIAN ASSISTANT

## 2023-03-13 PROCEDURE — 84238 ASSAY NONENDOCRINE RECEPTOR: CPT | Mod: 90 | Performed by: PHYSICIAN ASSISTANT

## 2023-03-13 PROCEDURE — 85018 HEMOGLOBIN: CPT | Performed by: INTERNAL MEDICINE

## 2023-03-13 PROCEDURE — 83880 ASSAY OF NATRIURETIC PEPTIDE: CPT | Performed by: PHYSICIAN ASSISTANT

## 2023-03-13 PROCEDURE — 36415 COLL VENOUS BLD VENIPUNCTURE: CPT | Performed by: PHYSICIAN ASSISTANT

## 2023-03-13 PROCEDURE — 83540 ASSAY OF IRON: CPT | Performed by: PHYSICIAN ASSISTANT

## 2023-03-13 PROCEDURE — 82728 ASSAY OF FERRITIN: CPT | Mod: 90 | Performed by: PHYSICIAN ASSISTANT

## 2023-03-13 PROCEDURE — 80048 BASIC METABOLIC PNL TOTAL CA: CPT | Performed by: PHYSICIAN ASSISTANT

## 2023-03-13 NOTE — LETTER
"3/13/2023    Yunior Huang MD  [address] [facility] [address]    RE: Zunilda Clark       Dear Colleague,     [facility] the [facility].        Cardiology Progress Note    Date of Service: 03/13/23      Reason for visit: Follow up rheumatic heart disease, pulmonary hypertension.    Primary cardiologist: Dr. Noe Rodriguez      HPI:  Ms. 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 However, she has chosen to proceed conservatively.     She was seen most recently in clinic by Dr. Rodriguez in Aug of 2022 at which time she was feeling well. No changes were made to her regimen at that time.    Today, I am seeing Deanne nash in clinic. She relays that she has continued to do well from a breathing standpoint with no new issues. However, she suffered a mechanical fall last month while trying to get a water pitcher out of her refrigerator and lost her balance. She denies any dizziness or presyncope. She suffered a T12 burst fracture which they are treating conservatively, and she is now wearing a back brace. Despite this, she says she is feeling well; no worsening MORRISON, and she has not had any new chest pain, palpitations, or LE edema.      There were no new labs performed prior to our visit today. Most recent labs reviewed as " below.        ASSESSMENT/PLAN:     1. Pulmonary hypertension.              --Group 2 PH in the setting of Rheumatic valve disease. Overall, pulmonary vasodilators are not felt to be indicated and she has chosen to proceed conservatively with avoiding invasive procedures when possible, which I think is reasonable as she is feeling well.   --Most recent echo from Jan 2022 showed preserved EF 55-60%. She has 2-3+ TR, and moderate valvular arotic stenosis. Her bioprosthetic mitral valve showed normal gradients. She had a new WMA but we evaluated this last year with a Lexiscan which did not suggest ischemia and showed only a fixed defect felt likely to be artifact.   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 Per Dr. Monroy's previous recommendations, if her TR becomes more severe or she starts to develop RV dysfunction, she could be considered for redo tricuspid valve repair/replacement through a right-sided mini thoracotomy. Today, as mentioned, she is feeling well.               --She appears euvoelmic today on torsemide 10mg daily despite elevated NT-proBNP. Renal function is preserved.    --She has been more anemic recently, though hemoglobin stable on today's check. I added iron studies post visit which did not suggest iron deficiency.     2. Rheumatic heart disease.                          --History of bioprosthetic mitral valve replacement in 2007, redo in 2014 with a 27 mm Magna bioprosthetic mitral valve.  Echo from Jan 2022 showed normal MV gradients. Will plan recheck echo later this summer when she returns to see MD (will defer for now due to need for back brace with T12 fracture, and clinically doing well).      3. High degree AV block.              --Post surgery 2007, s/p dual-chamber pacemaker placement.  Last device check Dec 2022 with AP 66%,  99%. She had frequent mode switches with AT/AF longest episode of 41 minutes and  controlled ventricular rates with no ventricular arrhythmias. Battery status 9.9 years. She has another remote check planned in a few weeks.               --She remains anticoagulated with warfarin, and follows with the INR clinic.      4. Hypertension.              --BP appears to be under good control recently. She is on amlodipine/valsartan, with additional 2.5mg amlodipine, and metoprolol tartrate 50 mg BID, along with torsemide as above.         Follow up plan: Return to see Dr. Noe Rodriguez this summer as already scheduled. Plan repeat echo prior to this visit. I asked her to call sooner with any new concerns.       Orders this Visit:  Orders Placed This Encounter   Procedures     Iron and iron binding capacity     Ferritin     Soluble transferrin receptor     Echocardiogram Complete     No orders of the defined types were placed in this encounter.    Medications Discontinued During This Encounter   Medication Reason     oxyCODONE (ROXICODONE) 5 MG tablet      polyethylene glycol (MIRALAX) 17 g packet      senna-docusate (SENOKOT-S/PERICOLACE) 8.6-50 MG tablet      acetaminophen (TYLENOL) 325 MG tablet            CURRENT MEDICATIONS:  Current Outpatient Medications   Medication Sig Dispense Refill     ALLOPURINOL PO Take 100 mg by mouth 2 times daily       amLODIPine (NORVASC) 2.5 MG tablet Take 1 tablet (2.5 mg) by mouth daily 90 tablet 3     amLODIPine-valsartan (EXFORGE) 5-160 MG tablet Take 1 tablet by mouth daily 90 tablet 3     calcium citrate (CALCITRATE) 950 MG tablet Take 1 tablet by mouth daily        Dorzolamide HCl-Timolol Mal (COSOPT OP) Apply to eye 2 times daily Place one drop into each eye twice daily       famotidine (PEPCID) 40 MG tablet Take 1 tablet (40 mg) by mouth daily 90 tablet 1     folic acid (FOLVITE) 1 MG tablet Take 1 mg by mouth daily       levothyroxine (SYNTHROID/LEVOTHROID) 112 MCG tablet Take 1 tablet (112 mcg) by mouth daily 90 tablet 1     METHOTREXATE SODIUM IJ Inject 0.8 mLs  "as directed once a week Thursdays       metoprolol tartrate (LOPRESSOR) 50 MG tablet Take 1 tablet (50 mg) by mouth 2 times daily 180 tablet 3     multivitamin w/minerals (THERA-VIT-M) tablet Take 1 tablet by mouth daily Without iron       torsemide (DEMADEX) 10 MG tablet Take 1 tablet (10 mg) by mouth daily (with breakfast) 90 tablet 3     VITAMIN D, CHOLECALCIFEROL, PO Take 1,000 Units by mouth daily       warfarin ANTICOAGULANT (COUMADIN) 2.5 MG tablet TAKE ONE TABLET BY MOUTH DAILY EXCEPT TAKE 2 TABLETS TUE AND FRI OR AS DIRECTED BY INR. Take 2 tabs (total of 5mg) today (Thursday). INR check in 2-3 days. 110 tablet 1       ALLERGIES   No Known Allergies    PAST MEDICAL HISTORY:  Past Medical History:   Diagnosis Date     Acquired hypothyroidism 11/04/2015     Aortic valve insufficiency      Atrial fibrillation and flutter      CHF (congestive heart failure)      DVT (deep venous thrombosis) 2003     Gastro-oesophageal reflux disease      H/O mitral valve replacement with tissue graft 06/2014     HTN (hypertension)      Other chronic pain      Pulmonary HTN      Rheumatoid arthritis      Seizure 2014     Shingles 08/2018     Stroke 2009    left side mild weakness          Review of Systems:  POS ROS ARE BOLDED, all other negative.    Cardiovascular: Chest pain, palpitations, orthopnea, LE edema  Constitutional: New chills, sweats, fevers   Resp: Dyspnea at rest, dyspnea on exertion, known chronic lung disease  HEENT: New visual changes, frequent headaches  Gastrointestinal: Abdominal pain, diarrhea, blood in stool, nausea, vomiting  Hematologic/lymphatic: Current systemic anticoagulation, hx of blood clots, new abnormal bleeding.  Neurological: New focal weakness, LOC, seizures, syncope/presyncope       Physical Exam:  Vitals: /50 (BP Location: Right arm, Patient Position: Sitting, Cuff Size: Adult Regular)   Pulse 60   Ht 1.626 m (5' 4\")   LMP  (LMP Unknown)   SpO2 98%   BMI 25.75 kg/m     Wt Readings " from Last 4 Encounters:   02/15/23 68 kg (150 lb)   08/25/22 68.1 kg (150 lb 1.6 oz)   07/12/22 67.4 kg (148 lb 9.6 oz)   04/04/22 66.7 kg (147 lb)       GEN:  In general, this is a well nourished female in no acute distress on room air. Has a back brace on, accompanied by a family member.   HEENT:  Pupils grossly equal, sclerae nonicteric.   NECK: Supple, trachea midline. No JVD while upright.   C/V:  Regular rate and rhythm, 3/6 JON heard at both right and left sternal border. No S3 or RV heave.   RESP: Respirations are unlabored. No use of accessory muscles. Clear to auscultation bilaterally in upper lobes (unable to do lower due to back brace) without wheezing, rales, or rhonchi.  GI: Abdomen soft, nontender, nondistended.   EXTREM: No LE edema. No cyanosis or clubbing.  NEURO: Alert and oriented, cooperative. Gait not formally assessed. No obvious focal deficits.   SKIN: Warm and dry.       Recent Lab Results:  LIPID RESULTS:  Lab Results   Component Value Date    CHOL 184 03/28/2022    CHOL 167 11/17/2020    HDL 38 (L) 03/28/2022    HDL 45 (L) 11/17/2020     (H) 03/28/2022     (H) 11/17/2020    TRIG 107 03/28/2022    TRIG 87 11/17/2020    CHOLHDLRATIO 3.6 11/13/2015       CBC RESULTS:  Lab Results   Component Value Date    WBC 7.2 02/09/2023    WBC 7.1 06/22/2021    RBC 2.98 (L) 02/09/2023    RBC 3.75 (L) 06/22/2021    HGB 10.8 (L) 03/13/2023    HGB 12.5 06/22/2021    HCT 31.2 (L) 02/09/2023    HCT 38.6 06/22/2021     (H) 02/09/2023     (H) 06/22/2021    MCH 34.6 (H) 02/09/2023    MCH 33.3 (H) 06/22/2021    MCHC 33.0 02/09/2023    MCHC 32.4 06/22/2021    RDW 17.2 (H) 02/09/2023    RDW 17.0 (H) 06/22/2021     02/09/2023     06/22/2021       BMP RESULTS:  Lab Results   Component Value Date     03/13/2023     06/22/2021    POTASSIUM 4.5 03/13/2023    POTASSIUM 3.7 08/17/2022    POTASSIUM 3.8 06/22/2021    CHLORIDE 100 03/13/2023    CHLORIDE 107 08/17/2022     CHLORIDE 104 06/22/2021    CO2 28 03/13/2023    CO2 26 08/17/2022    CO2 33 (H) 06/22/2021    ANIONGAP 9 03/13/2023    ANIONGAP 7 08/17/2022    ANIONGAP 1 (L) 06/22/2021    GLC 95 03/13/2023    GLC 91 08/17/2022    GLC 88 06/22/2021    BUN 29.5 (H) 03/13/2023    BUN 21 08/17/2022    BUN 27 06/22/2021    CR 0.88 03/13/2023    CR 1.11 (H) 06/22/2021    GFRESTIMATED 66 03/13/2023    GFRESTIMATED 50.9 06/28/2021    GFRESTBLACK 54 (L) 06/22/2021    BRICE 9.5 03/13/2023    BRICE 9.6 06/22/2021        INR RESULTS:  Lab Results   Component Value Date    INR 2.2 (A) 03/08/2023    INR 3.2 (A) 03/01/2023    INR 1.70 (H) 07/06/2021    INR 2.20 (H) 06/22/2021       Recent Labs   Lab Test 03/13/23  1110 08/17/22  0947 03/25/22  1557 07/26/18  1035 05/03/17  1230   NTBNPI  --   --   --   --  4,812*   NTBNP 1,680 1,290* 1,754*   < >  --     < > = values in this interval not displayed.       Additional pertinent testing:    Echo Jan 2022  Interpretation Summary     The visual ejection fraction is 55-60%.  The basal inferior wall appears severely hypokinertic. This wall motion was  not seen on the previous study on direct comparison.  Right ventricular systolic pressure is elevated, consistent with severe  pulmonary hypertension.  There is moderate to mod-severe (2-3+) tricuspid regurgitation.  Moderate valvular aortic stenosis.  The ascending aorta is Mildly dilated.  There is a bioprosthetic mitral valve.  Normal prosthetic mitral valve gradients.  Apart from the new wall motion abnormality there has been no change from  before.. The study was technically difficult.      30 total minutes was spent today including chart review, precharting, history and exam, post visit documentation, and reviewing studies as outlined above.       Zena Benitez PA-C  [facility]  Pager [phone]    Thank you for allowing me to participate in the care of your patient.      Sincerely,     YOKASTA Salcido     [facility]  [facility]  cc:   Noe Rodriguez MD  [address], [address]  [place] [id number]

## 2023-03-13 NOTE — PROGRESS NOTES
"      Cardiology Progress Note    Date of Service: 03/13/23      Reason for visit: Follow up rheumatic heart disease, pulmonary hypertension.    Primary cardiologist: Dr. Noe Rodriguez      HPI:  Ms. 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 However, she has chosen to proceed conservatively.     She was seen most recently in clinic by Dr. Rodriguez in Aug of 2022 at which time she was feeling well. No changes were made to her regimen at that time.    Today, I am seeing Deanne nash in clinic. She relays that she has continued to do well from a breathing standpoint with no new issues. However, she suffered a mechanical fall last month while trying to get a water pitcher out of her refrigerator and lost her balance. She denies any dizziness or presyncope. She suffered a T12 burst fracture which they are treating conservatively, and she is now wearing a back brace. Despite this, she says she is feeling well; no worsening MORRISON, and she has not had any new chest pain, palpitations, or LE edema.      There were no new labs performed prior to our visit today. Most recent labs reviewed as below.        ASSESSMENT/PLAN:     1. Pulmonary hypertension.              --Group 2 PH in the setting of Rheumatic valve disease. Overall, pulmonary vasodilators are not felt to be indicated and she has chosen to proceed " conservatively with avoiding invasive procedures when possible, which I think is reasonable as she is feeling well.   --Most recent echo from Jan 2022 showed preserved EF 55-60%. She has 2-3+ TR, and moderate valvular arotic stenosis. Her bioprosthetic mitral valve showed normal gradients. She had a new WMA but we evaluated this last year with a Lexiscan which did not suggest ischemia and showed only a fixed defect felt likely to be artifact.   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 Per Dr. Monroy's previous recommendations, if her TR becomes more severe or she starts to develop RV dysfunction, she could be considered for redo tricuspid valve repair/replacement through a right-sided mini thoracotomy. Today, as mentioned, she is feeling well.               --She appears euvoelmic today on torsemide 10mg daily despite elevated NT-proBNP. Renal function is preserved.    --She has been more anemic recently, though hemoglobin stable on today's check. I added iron studies post visit which did not suggest iron deficiency.     2. Rheumatic heart disease.                          --History of bioprosthetic mitral valve replacement in 2007, redo in 2014 with a 27 mm Magna bioprosthetic mitral valve.  Echo from Jan 2022 showed normal MV gradients. Will plan recheck echo later this summer when she returns to see MD (will defer for now due to need for back brace with T12 fracture, and clinically doing well).      3. High degree AV block.              --Post surgery 2007, s/p dual-chamber pacemaker placement.  Last device check Dec 2022 with AP 66%,  99%. She had frequent mode switches with AT/AF longest episode of 41 minutes and controlled ventricular rates with no ventricular arrhythmias. Battery status 9.9 years. She has another remote check planned in a few weeks.               --She remains anticoagulated with warfarin, and follows with the INR  clinic.      4. Hypertension.              --BP appears to be under good control recently. She is on amlodipine/valsartan, with additional 2.5mg amlodipine, and metoprolol tartrate 50 mg BID, along with torsemide as above.         Follow up plan: Return to see Dr. Noe Rodriguez this summer as already scheduled. Plan repeat echo prior to this visit. I asked her to call sooner with any new concerns.       Orders this Visit:  Orders Placed This Encounter   Procedures     Iron and iron binding capacity     Ferritin     Soluble transferrin receptor     Echocardiogram Complete     No orders of the defined types were placed in this encounter.    Medications Discontinued During This Encounter   Medication Reason     oxyCODONE (ROXICODONE) 5 MG tablet      polyethylene glycol (MIRALAX) 17 g packet      senna-docusate (SENOKOT-S/PERICOLACE) 8.6-50 MG tablet      acetaminophen (TYLENOL) 325 MG tablet            CURRENT MEDICATIONS:  Current Outpatient Medications   Medication Sig Dispense Refill     ALLOPURINOL PO Take 100 mg by mouth 2 times daily       amLODIPine (NORVASC) 2.5 MG tablet Take 1 tablet (2.5 mg) by mouth daily 90 tablet 3     amLODIPine-valsartan (EXFORGE) 5-160 MG tablet Take 1 tablet by mouth daily 90 tablet 3     calcium citrate (CALCITRATE) 950 MG tablet Take 1 tablet by mouth daily        Dorzolamide HCl-Timolol Mal (COSOPT OP) Apply to eye 2 times daily Place one drop into each eye twice daily       famotidine (PEPCID) 40 MG tablet Take 1 tablet (40 mg) by mouth daily 90 tablet 1     folic acid (FOLVITE) 1 MG tablet Take 1 mg by mouth daily       levothyroxine (SYNTHROID/LEVOTHROID) 112 MCG tablet Take 1 tablet (112 mcg) by mouth daily 90 tablet 1     METHOTREXATE SODIUM IJ Inject 0.8 mLs as directed once a week Thursdays       metoprolol tartrate (LOPRESSOR) 50 MG tablet Take 1 tablet (50 mg) by mouth 2 times daily 180 tablet 3     multivitamin w/minerals (THERA-VIT-M) tablet Take 1 tablet by mouth daily  "Without iron       torsemide (DEMADEX) 10 MG tablet Take 1 tablet (10 mg) by mouth daily (with breakfast) 90 tablet 3     VITAMIN D, CHOLECALCIFEROL, PO Take 1,000 Units by mouth daily       warfarin ANTICOAGULANT (COUMADIN) 2.5 MG tablet TAKE ONE TABLET BY MOUTH DAILY EXCEPT TAKE 2 TABLETS TUE AND FRI OR AS DIRECTED BY INR. Take 2 tabs (total of 5mg) today (Thursday). INR check in 2-3 days. 110 tablet 1       ALLERGIES   No Known Allergies    PAST MEDICAL HISTORY:  Past Medical History:   Diagnosis Date     Acquired hypothyroidism 11/04/2015     Aortic valve insufficiency      Atrial fibrillation and flutter      CHF (congestive heart failure)      DVT (deep venous thrombosis) 2003     Gastro-oesophageal reflux disease      H/O mitral valve replacement with tissue graft 06/2014     HTN (hypertension)      Other chronic pain      Pulmonary HTN      Rheumatoid arthritis      Seizure 2014     Shingles 08/2018     Stroke 2009    left side mild weakness          Review of Systems:  POS ROS ARE BOLDED, all other negative.    Cardiovascular: Chest pain, palpitations, orthopnea, LE edema  Constitutional: New chills, sweats, fevers   Resp: Dyspnea at rest, dyspnea on exertion, known chronic lung disease  HEENT: New visual changes, frequent headaches  Gastrointestinal: Abdominal pain, diarrhea, blood in stool, nausea, vomiting  Hematologic/lymphatic: Current systemic anticoagulation, hx of blood clots, new abnormal bleeding.  Neurological: New focal weakness, LOC, seizures, syncope/presyncope       Physical Exam:  Vitals: /50 (BP Location: Right arm, Patient Position: Sitting, Cuff Size: Adult Regular)   Pulse 60   Ht 1.626 m (5' 4\")   LMP  (LMP Unknown)   SpO2 98%   BMI 25.75 kg/m     Wt Readings from Last 4 Encounters:   02/15/23 68 kg (150 lb)   08/25/22 68.1 kg (150 lb 1.6 oz)   07/12/22 67.4 kg (148 lb 9.6 oz)   04/04/22 66.7 kg (147 lb)       GEN:  In general, this is a well nourished female in no acute " distress on room air. Has a back brace on, accompanied by a family member.   HEENT:  Pupils grossly equal, sclerae nonicteric.   NECK: Supple, trachea midline. No JVD while upright.   C/V:  Regular rate and rhythm, 3/6 JON heard at both right and left sternal border. No S3 or RV heave.   RESP: Respirations are unlabored. No use of accessory muscles. Clear to auscultation bilaterally in upper lobes (unable to do lower due to back brace) without wheezing, rales, or rhonchi.  GI: Abdomen soft, nontender, nondistended.   EXTREM: No LE edema. No cyanosis or clubbing.  NEURO: Alert and oriented, cooperative. Gait not formally assessed. No obvious focal deficits.   SKIN: Warm and dry.       Recent Lab Results:  LIPID RESULTS:  Lab Results   Component Value Date    CHOL 184 03/28/2022    CHOL 167 11/17/2020    HDL 38 (L) 03/28/2022    HDL 45 (L) 11/17/2020     (H) 03/28/2022     (H) 11/17/2020    TRIG 107 03/28/2022    TRIG 87 11/17/2020    CHOLHDLRATIO 3.6 11/13/2015       CBC RESULTS:  Lab Results   Component Value Date    WBC 7.2 02/09/2023    WBC 7.1 06/22/2021    RBC 2.98 (L) 02/09/2023    RBC 3.75 (L) 06/22/2021    HGB 10.8 (L) 03/13/2023    HGB 12.5 06/22/2021    HCT 31.2 (L) 02/09/2023    HCT 38.6 06/22/2021     (H) 02/09/2023     (H) 06/22/2021    MCH 34.6 (H) 02/09/2023    MCH 33.3 (H) 06/22/2021    MCHC 33.0 02/09/2023    MCHC 32.4 06/22/2021    RDW 17.2 (H) 02/09/2023    RDW 17.0 (H) 06/22/2021     02/09/2023     06/22/2021       BMP RESULTS:  Lab Results   Component Value Date     03/13/2023     06/22/2021    POTASSIUM 4.5 03/13/2023    POTASSIUM 3.7 08/17/2022    POTASSIUM 3.8 06/22/2021    CHLORIDE 100 03/13/2023    CHLORIDE 107 08/17/2022    CHLORIDE 104 06/22/2021    CO2 28 03/13/2023    CO2 26 08/17/2022    CO2 33 (H) 06/22/2021    ANIONGAP 9 03/13/2023    ANIONGAP 7 08/17/2022    ANIONGAP 1 (L) 06/22/2021    GLC 95 03/13/2023    GLC 91 08/17/2022     GLC 88 06/22/2021    BUN 29.5 (H) 03/13/2023    BUN 21 08/17/2022    BUN 27 06/22/2021    CR 0.88 03/13/2023    CR 1.11 (H) 06/22/2021    GFRESTIMATED 66 03/13/2023    GFRESTIMATED 50.9 06/28/2021    GFRESTBLACK 54 (L) 06/22/2021    BRICE 9.5 03/13/2023    BRICE 9.6 06/22/2021        INR RESULTS:  Lab Results   Component Value Date    INR 2.2 (A) 03/08/2023    INR 3.2 (A) 03/01/2023    INR 1.70 (H) 07/06/2021    INR 2.20 (H) 06/22/2021       Recent Labs   Lab Test 03/13/23  1110 08/17/22  0947 03/25/22  1557 07/26/18  1035 05/03/17  1230   NTBNPI  --   --   --   --  4,812*   NTBNP 1,680 1,290* 1,754*   < >  --     < > = values in this interval not displayed.       Additional pertinent testing:    Echo Jan 2022  Interpretation Summary     The visual ejection fraction is 55-60%.  The basal inferior wall appears severely hypokinertic. This wall motion was  not seen on the previous study on direct comparison.  Right ventricular systolic pressure is elevated, consistent with severe  pulmonary hypertension.  There is moderate to mod-severe (2-3+) tricuspid regurgitation.  Moderate valvular aortic stenosis.  The ascending aorta is Mildly dilated.  There is a bioprosthetic mitral valve.  Normal prosthetic mitral valve gradients.  Apart from the new wall motion abnormality there has been no change from  before.. The study was technically difficult.      30 total minutes was spent today including chart review, precharting, history and exam, post visit documentation, and reviewing studies as outlined above.       Zena Benitez PA-C  [facility]  Pager [phone]

## 2023-03-13 NOTE — PATIENT INSTRUCTIONS
Visit Summary:    Today we discussed:   No medication changes today.    Test results:   I will send you a [employer] message after I review your labs from today.    Follow up:   With Dr Rodriguez in July as scheduled--we will check an echocardiogram just prior to that visit.     Please call my nurse Maranda at [phone] with any questions or concerns.

## 2023-03-14 LAB — STFR SERPL-MCNC: 6.2 MG/L

## 2023-03-15 ENCOUNTER — ANTICOAGULATION THERAPY VISIT (OUTPATIENT)
Dept: ANTICOAGULATION | Facility: CLINIC | Age: 83
End: 2023-03-15

## 2023-03-15 DIAGNOSIS — Z95.3 S/P MITRAL VALVE REPLACEMENT WITH BIOPROSTHETIC VALVE: ICD-10-CM

## 2023-03-15 DIAGNOSIS — Z79.01 LONG TERM CURRENT USE OF ANTICOAGULANTS WITH INR GOAL OF 2.0-3.0: Primary | ICD-10-CM

## 2023-03-15 DIAGNOSIS — I48.92 ATRIAL FIBRILLATION AND FLUTTER (H): ICD-10-CM

## 2023-03-15 DIAGNOSIS — I48.91 ATRIAL FIBRILLATION AND FLUTTER (H): ICD-10-CM

## 2023-03-15 LAB — INR (EXTERNAL): 2.3 (ref 0.9–1.1)

## 2023-03-15 NOTE — PROGRESS NOTES
ANTICOAGULATION MANAGEMENT     Zunilda SHAW May 82 year old female is on warfarin with therapeutic INR result. (Goal INR 2.0-3.0)    Recent labs: (last 7 days)     03/15/23  1058   INR 2.3*       ASSESSMENT       Source(s): Chart Review, Patient/Caregiver Call and Home Care/Facility Nurse       Warfarin doses taken: More warfarin taken than planned which may be affecting INR-patient thought the 2.5 mg on Friday was a one time dose adjustment and did not understand it was a maintenance dose change    Diet: No new diet changes identified    New illness, injury, or hospitalization: Outpatient MRI    Medication/supplement changes: None noted    Signs or symptoms of bleeding or clotting: No    Previous INR: Therapeutic last visit; previously outside of goal range    Additional findings: None         PLAN     Recommended plan for no diet, medication or health factor changes affecting INR     Dosing Instructions: Continue your current warfarin dose with next INR in 1 week       Summary  As of 3/15/2023    Full warfarin instructions:  5 mg every Tue; 2.5 mg all other days   Next INR check:  3/22/2023             Telephone call with [facility] home care nurse who verbalizes understanding and agrees to plan    Orders given to  Homecare nurse/facility to recheck    Education provided:     Taking warfarin: Importance of taking warfarin as instructed    Plan made per [facility] anticoagulation protocol    Janet Adair, RN  Anticoagulation Clinic  3/15/2023    _______________________________________________________________________     Anticoagulation Episode Summary     Current INR goal:  2.0-3.0   TTR:  92.7 % (1 y)   Target end date:  Indefinite   Send INR reminders to:  RANJIT STEEN    Indications    Long term current use of anticoagulants with INR goal of 2.0-3.0 [Z79.01]  Atrial fibrillation and flutter (H) [I48.91  I48.92]  S/P mitral valve replacement with bioprosthetic valve [Z95.3]           Comments:  27 mm Magna bioprosthetic  valve (2014)         Anticoagulation Care Providers     Provider Role Specialty Phone number    Yunior Huang MD Referring Internal Medicine [phone]

## 2023-03-17 ENCOUNTER — TELEPHONE (OUTPATIENT)
Dept: INTERNAL MEDICINE | Facility: CLINIC | Age: 83
End: 2023-03-17
Payer: COMMERCIAL

## 2023-03-17 DIAGNOSIS — R19.7 DIARRHEA, UNSPECIFIED TYPE: ICD-10-CM

## 2023-03-17 RX ORDER — FAMOTIDINE 40 MG/1
TABLET, FILM COATED ORAL
Qty: 100 TABLET | Refills: 2 | OUTPATIENT
Start: 2023-03-17

## 2023-03-17 NOTE — TELEPHONE ENCOUNTER
Medication refused, Called pharmacy to see if they received prescription.  They did receive prescription, so will deny this request

## 2023-03-17 NOTE — TELEPHONE ENCOUNTER
[facility] calling for orders.  Additional OCCUPATIONAL THERAPY visit for two more times and then also a discharge visit.    Amna [phone]

## 2023-03-17 NOTE — TELEPHONE ENCOUNTER
Call to Amna at [place] and advised.     Verbal order given to approve visits until certification received for MD signature.

## 2023-03-19 DIAGNOSIS — I48.91 ATRIAL FIBRILLATION AND FLUTTER (H): ICD-10-CM

## 2023-03-19 DIAGNOSIS — E03.9 ACQUIRED HYPOTHYROIDISM: ICD-10-CM

## 2023-03-19 DIAGNOSIS — I48.92 ATRIAL FIBRILLATION AND FLUTTER (H): ICD-10-CM

## 2023-03-20 ENCOUNTER — TELEPHONE (OUTPATIENT)
Dept: INTERNAL MEDICINE | Facility: CLINIC | Age: 83
End: 2023-03-20
Payer: COMMERCIAL

## 2023-03-21 RX ORDER — WARFARIN SODIUM 2.5 MG/1
TABLET ORAL
Qty: 130 TABLET | Refills: 1 | Status: SHIPPED | OUTPATIENT
Start: 2023-03-21 | End: 2023-09-26

## 2023-03-21 RX ORDER — LEVOTHYROXINE SODIUM 112 UG/1
TABLET ORAL
Qty: 100 TABLET | Refills: 0 | Status: SHIPPED | OUTPATIENT
Start: 2023-03-21 | End: 2023-04-30

## 2023-03-21 NOTE — TELEPHONE ENCOUNTER
ANTICOAGULATION MANAGEMENT:  Medication Refill    Anticoagulation Summary  As of 3/15/2023    Warfarin maintenance plan:  5 mg (2.5 mg x 2) every Tue; 2.5 mg (2.5 mg x 1) all other days   Next INR check:  3/22/2023   Target end date:  Indefinite    Indications    Long term current use of anticoagulants with INR goal of 2.0-3.0 [Z79.01]  Atrial fibrillation and flutter (H) [I48.91  I48.92]  S/P mitral valve replacement with bioprosthetic valve [Z95.3]             Anticoagulation Care Providers     Provider Role Specialty Phone number    Yunior Huang MD Referring Internal Medicine [phone]          Visit with referring provider/group within last year: Yes    [facility] referral signed within last year: Yes    Zunilda meets all criteria for refill (current [facility] referral, office visit with referring provider/group in last year, lab monitoring up to date or not exceeding 2 weeks overdue). Rx instructions and quantity supplied updated to match patient's current dosing plan. Warfarin 90 day supply with 1 refill granted per [facility] protocol     Emelyn Marcos, RN  Anticoagulation Clinic

## 2023-03-22 ENCOUNTER — ANTICOAGULATION THERAPY VISIT (OUTPATIENT)
Dept: ANTICOAGULATION | Facility: CLINIC | Age: 83
End: 2023-03-22
Payer: COMMERCIAL

## 2023-03-22 DIAGNOSIS — Z95.3 S/P MITRAL VALVE REPLACEMENT WITH BIOPROSTHETIC VALVE: ICD-10-CM

## 2023-03-22 DIAGNOSIS — I48.92 ATRIAL FIBRILLATION AND FLUTTER (H): ICD-10-CM

## 2023-03-22 DIAGNOSIS — I48.91 ATRIAL FIBRILLATION AND FLUTTER (H): ICD-10-CM

## 2023-03-22 DIAGNOSIS — Z79.01 LONG TERM CURRENT USE OF ANTICOAGULANTS WITH INR GOAL OF 2.0-3.0: Primary | ICD-10-CM

## 2023-03-22 LAB — INR (EXTERNAL): 2 (ref 0.9–1.1)

## 2023-03-22 NOTE — PROGRESS NOTES
ANTICOAGULATION MANAGEMENT     Zunilda SHAW May 82 year old female is on warfarin with therapeutic INR result. (Goal INR 2.0-3.0)    Recent labs: (last 7 days)     03/22/23  1039   INR 2.0*       ASSESSMENT       Source(s): Chart Review and Home Care/Facility Nurse       Warfarin doses taken: Warfarin taken as instructed    Diet: No new diet changes identified, reports inconsistent with greens    New illness, injury, or hospitalization: No    Medication/supplement changes: None noted    Signs or symptoms of bleeding or clotting: No    Previous INR: Therapeutic last 2(+) visits    Additional findings: None         PLAN     Recommended plan for no diet, medication or health factor changes affecting INR     Dosing Instructions: Continue your current warfarin dose with next INR in 1 week       Summary  As of 3/22/2023    Full warfarin instructions:  5 mg every Tue; 2.5 mg all other days   Next INR check:  3/29/2023             Telephone call with [facility] home care nurse who verbalizes understanding and agrees to plan    Orders given to  Homecare nurse/facility to recheck    Education provided:     Please call back if any changes to your diet, medications or how you've been taking warfarin    Plan made per [facility] anticoagulation protocol    Navya Bowles, RN  Anticoagulation Clinic  3/22/2023    _______________________________________________________________________     Anticoagulation Episode Summary     Current INR goal:  2.0-3.0   TTR:  92.7 % (1 y)   Target end date:  Indefinite   Send INR reminders to:  RANJIT STEEN    Indications    Long term current use of anticoagulants with INR goal of 2.0-3.0 [Z79.01]  Atrial fibrillation and flutter (H) [I48.91  I48.92]  S/P mitral valve replacement with bioprosthetic valve [Z95.3]           Comments:  27 mm Magna bioprosthetic valve (2014)         Anticoagulation Care Providers     Provider Role Specialty Phone number    Yunior Huang MD Referring Internal Medicine  [phone]

## 2023-03-29 ENCOUNTER — ANTICOAGULATION THERAPY VISIT (OUTPATIENT)
Dept: ANTICOAGULATION | Facility: CLINIC | Age: 83
End: 2023-03-29
Payer: COMMERCIAL

## 2023-03-29 DIAGNOSIS — I48.92 ATRIAL FIBRILLATION AND FLUTTER (H): ICD-10-CM

## 2023-03-29 DIAGNOSIS — Z79.01 LONG TERM CURRENT USE OF ANTICOAGULANTS WITH INR GOAL OF 2.0-3.0: Primary | ICD-10-CM

## 2023-03-29 DIAGNOSIS — Z95.3 S/P MITRAL VALVE REPLACEMENT WITH BIOPROSTHETIC VALVE: ICD-10-CM

## 2023-03-29 DIAGNOSIS — I48.91 ATRIAL FIBRILLATION AND FLUTTER (H): ICD-10-CM

## 2023-03-29 LAB — INR (EXTERNAL): 1.8 (ref 0.9–1.1)

## 2023-03-29 NOTE — PROGRESS NOTES
ANTICOAGULATION MANAGEMENT     Zunilda SHAW May 82 year old female is on warfarin with subtherapeutic INR result. (Goal INR 2.0-3.0)    Recent labs: (last 7 days)     03/29/23  1008   INR 1.8*       ASSESSMENT       Source(s): Chart Review and Home Care/Facility Nurse       Warfarin doses taken: Warfarin taken as instructed    Diet: Increased greens/vitamin K in diet; ongoing change    New illness, injury, or hospitalization: No    Medication/supplement changes: None noted    Signs or symptoms of bleeding or clotting: No    Previous INR: Therapeutic last 2(+) visits    Additional findings: None         PLAN     Recommended plan for ongoing change(s) affecting INR     Dosing Instructions: Increase your warfarin dose (6% change) with next INR in 1 week       Summary  As of 3/29/2023    Full warfarin instructions:  3.75 mg every Tue, Thu, Sat; 2.5 mg all other days   Next INR check:  4/5/2023             Telephone call with [facility] home care nurse who verbalizes understanding and agrees to plan    Orders given to  Homecare nurse/facility to recheck    Education provided:     Goal range and lab monitoring: goal range and significance of current result    Plan made per [facility] anticoagulation protocol    Leo aSntiago RN  Anticoagulation Clinic  3/29/2023    _______________________________________________________________________     Anticoagulation Episode Summary     Current INR goal:  2.0-3.0   TTR:  90.8 % (1 y)   Target end date:  Indefinite   Send INR reminders to:  RANJIT STEEN    Indications    Long term current use of anticoagulants with INR goal of 2.0-3.0 [Z79.01]  Atrial fibrillation and flutter (H) [I48.91  I48.92]  S/P mitral valve replacement with bioprosthetic valve [Z95.3]           Comments:  27 mm Magna bioprosthetic valve (2014)         Anticoagulation Care Providers     Provider Role Specialty Phone number    Yunior Huang MD Referring Internal Medicine [phone]

## 2023-03-30 ENCOUNTER — PATIENT OUTREACH (OUTPATIENT)
Dept: CARE COORDINATION | Facility: CLINIC | Age: 83
End: 2023-03-30

## 2023-03-30 ENCOUNTER — ANCILLARY PROCEDURE (OUTPATIENT)
Dept: CARDIOLOGY | Facility: CLINIC | Age: 83
End: 2023-03-30
Attending: INTERNAL MEDICINE
Payer: COMMERCIAL

## 2023-03-30 DIAGNOSIS — Z95.0 CARDIAC PACEMAKER IN SITU: ICD-10-CM

## 2023-03-30 DIAGNOSIS — I44.2 COMPLETE ATRIOVENTRICULAR BLOCK (H): ICD-10-CM

## 2023-03-30 LAB
MDC_IDC_EPISODE_DTM: NORMAL
MDC_IDC_EPISODE_DURATION: 116 S
MDC_IDC_EPISODE_DURATION: 124 S
MDC_IDC_EPISODE_DURATION: 125 S
MDC_IDC_EPISODE_DURATION: 18 S
MDC_IDC_EPISODE_DURATION: 24 S
MDC_IDC_EPISODE_DURATION: 24 S
MDC_IDC_EPISODE_DURATION: 25 S
MDC_IDC_EPISODE_DURATION: 25 S
MDC_IDC_EPISODE_DURATION: 26 S
MDC_IDC_EPISODE_DURATION: 27 S
MDC_IDC_EPISODE_DURATION: 27 S
MDC_IDC_EPISODE_DURATION: 29 S
MDC_IDC_EPISODE_DURATION: 30 S
MDC_IDC_EPISODE_DURATION: 31 S
MDC_IDC_EPISODE_DURATION: 33 S
MDC_IDC_EPISODE_DURATION: 40 S
MDC_IDC_EPISODE_DURATION: 40 S
MDC_IDC_EPISODE_DURATION: 41 S
MDC_IDC_EPISODE_DURATION: 43 S
MDC_IDC_EPISODE_DURATION: 44 S
MDC_IDC_EPISODE_DURATION: 45 S
MDC_IDC_EPISODE_DURATION: 46 S
MDC_IDC_EPISODE_DURATION: 47 S
MDC_IDC_EPISODE_DURATION: 48 S
MDC_IDC_EPISODE_DURATION: 49 S
MDC_IDC_EPISODE_DURATION: 49 S
MDC_IDC_EPISODE_DURATION: 4900 S
MDC_IDC_EPISODE_DURATION: 50 S
MDC_IDC_EPISODE_DURATION: 51 S
MDC_IDC_EPISODE_DURATION: 52 S
MDC_IDC_EPISODE_DURATION: 52 S
MDC_IDC_EPISODE_DURATION: 53 S
MDC_IDC_EPISODE_DURATION: 56 S
MDC_IDC_EPISODE_DURATION: 56 S
MDC_IDC_EPISODE_DURATION: 57 S
MDC_IDC_EPISODE_DURATION: 58 S
MDC_IDC_EPISODE_DURATION: 58 S
MDC_IDC_EPISODE_DURATION: 63 S
MDC_IDC_EPISODE_DURATION: 64 S
MDC_IDC_EPISODE_DURATION: 67 S
MDC_IDC_EPISODE_DURATION: 7 S
MDC_IDC_EPISODE_DURATION: 70 S
MDC_IDC_EPISODE_DURATION: 73 S
MDC_IDC_EPISODE_DURATION: 75 S
MDC_IDC_EPISODE_DURATION: 75 S
MDC_IDC_EPISODE_DURATION: 91 S
MDC_IDC_EPISODE_ID: NORMAL
MDC_IDC_EPISODE_TYPE: NORMAL
MDC_IDC_LEAD_IMPLANT_DT: NORMAL
MDC_IDC_LEAD_IMPLANT_DT: NORMAL
MDC_IDC_LEAD_LOCATION: NORMAL
MDC_IDC_LEAD_LOCATION: NORMAL
MDC_IDC_LEAD_MFG: NORMAL
MDC_IDC_LEAD_MFG: NORMAL
MDC_IDC_LEAD_MODEL: NORMAL
MDC_IDC_LEAD_MODEL: NORMAL
MDC_IDC_LEAD_POLARITY_TYPE: NORMAL
MDC_IDC_LEAD_POLARITY_TYPE: NORMAL
MDC_IDC_LEAD_SERIAL: NORMAL
MDC_IDC_LEAD_SERIAL: NORMAL
MDC_IDC_MSMT_BATTERY_DTM: NORMAL
MDC_IDC_MSMT_BATTERY_REMAINING_LONGEVITY: 114 MO
MDC_IDC_MSMT_BATTERY_RRT_TRIGGER: 2.62
MDC_IDC_MSMT_BATTERY_STATUS: NORMAL
MDC_IDC_MSMT_BATTERY_VOLTAGE: 3 V
MDC_IDC_MSMT_LEADCHNL_RA_IMPEDANCE_VALUE: 304 OHM
MDC_IDC_MSMT_LEADCHNL_RA_IMPEDANCE_VALUE: 418 OHM
MDC_IDC_MSMT_LEADCHNL_RA_PACING_THRESHOLD_AMPLITUDE: 0.75 V
MDC_IDC_MSMT_LEADCHNL_RA_PACING_THRESHOLD_PULSEWIDTH: 0.4 MS
MDC_IDC_MSMT_LEADCHNL_RA_SENSING_INTR_AMPL: 0.38 MV
MDC_IDC_MSMT_LEADCHNL_RA_SENSING_INTR_AMPL: 0.38 MV
MDC_IDC_MSMT_LEADCHNL_RV_IMPEDANCE_VALUE: 342 OHM
MDC_IDC_MSMT_LEADCHNL_RV_IMPEDANCE_VALUE: 380 OHM
MDC_IDC_MSMT_LEADCHNL_RV_PACING_THRESHOLD_AMPLITUDE: 0.75 V
MDC_IDC_MSMT_LEADCHNL_RV_PACING_THRESHOLD_PULSEWIDTH: 0.4 MS
MDC_IDC_MSMT_LEADCHNL_RV_SENSING_INTR_AMPL: 11.38 MV
MDC_IDC_MSMT_LEADCHNL_RV_SENSING_INTR_AMPL: 11.38 MV
MDC_IDC_PG_IMPLANT_DTM: NORMAL
MDC_IDC_PG_MFG: NORMAL
MDC_IDC_PG_MODEL: NORMAL
MDC_IDC_PG_SERIAL: NORMAL
MDC_IDC_PG_TYPE: NORMAL
MDC_IDC_SESS_CLINIC_NAME: NORMAL
MDC_IDC_SESS_DTM: NORMAL
MDC_IDC_SESS_TYPE: NORMAL
MDC_IDC_SET_BRADY_AT_MODE_SWITCH_RATE: 171 {BEATS}/MIN
MDC_IDC_SET_BRADY_HYSTRATE: NORMAL
MDC_IDC_SET_BRADY_LOWRATE: 60 {BEATS}/MIN
MDC_IDC_SET_BRADY_MAX_SENSOR_RATE: 130 {BEATS}/MIN
MDC_IDC_SET_BRADY_MAX_TRACKING_RATE: 130 {BEATS}/MIN
MDC_IDC_SET_BRADY_MODE: NORMAL
MDC_IDC_SET_BRADY_PAV_DELAY_LOW: 180 MS
MDC_IDC_SET_BRADY_SAV_DELAY_LOW: 150 MS
MDC_IDC_SET_LEADCHNL_RA_PACING_AMPLITUDE: 1.75 V
MDC_IDC_SET_LEADCHNL_RA_PACING_ANODE_ELECTRODE_1: NORMAL
MDC_IDC_SET_LEADCHNL_RA_PACING_ANODE_LOCATION_1: NORMAL
MDC_IDC_SET_LEADCHNL_RA_PACING_CAPTURE_MODE: NORMAL
MDC_IDC_SET_LEADCHNL_RA_PACING_CATHODE_ELECTRODE_1: NORMAL
MDC_IDC_SET_LEADCHNL_RA_PACING_CATHODE_LOCATION_1: NORMAL
MDC_IDC_SET_LEADCHNL_RA_PACING_POLARITY: NORMAL
MDC_IDC_SET_LEADCHNL_RA_PACING_PULSEWIDTH: 0.4 MS
MDC_IDC_SET_LEADCHNL_RA_SENSING_ANODE_ELECTRODE_1: NORMAL
MDC_IDC_SET_LEADCHNL_RA_SENSING_ANODE_LOCATION_1: NORMAL
MDC_IDC_SET_LEADCHNL_RA_SENSING_CATHODE_ELECTRODE_1: NORMAL
MDC_IDC_SET_LEADCHNL_RA_SENSING_CATHODE_LOCATION_1: NORMAL
MDC_IDC_SET_LEADCHNL_RA_SENSING_POLARITY: NORMAL
MDC_IDC_SET_LEADCHNL_RA_SENSING_SENSITIVITY: 0.45 MV
MDC_IDC_SET_LEADCHNL_RV_PACING_AMPLITUDE: 2 V
MDC_IDC_SET_LEADCHNL_RV_PACING_ANODE_ELECTRODE_1: NORMAL
MDC_IDC_SET_LEADCHNL_RV_PACING_ANODE_LOCATION_1: NORMAL
MDC_IDC_SET_LEADCHNL_RV_PACING_CAPTURE_MODE: NORMAL
MDC_IDC_SET_LEADCHNL_RV_PACING_CATHODE_ELECTRODE_1: NORMAL
MDC_IDC_SET_LEADCHNL_RV_PACING_CATHODE_LOCATION_1: NORMAL
MDC_IDC_SET_LEADCHNL_RV_PACING_POLARITY: NORMAL
MDC_IDC_SET_LEADCHNL_RV_PACING_PULSEWIDTH: 0.4 MS
MDC_IDC_SET_LEADCHNL_RV_SENSING_ANODE_ELECTRODE_1: NORMAL
MDC_IDC_SET_LEADCHNL_RV_SENSING_ANODE_LOCATION_1: NORMAL
MDC_IDC_SET_LEADCHNL_RV_SENSING_CATHODE_ELECTRODE_1: NORMAL
MDC_IDC_SET_LEADCHNL_RV_SENSING_CATHODE_LOCATION_1: NORMAL
MDC_IDC_SET_LEADCHNL_RV_SENSING_POLARITY: NORMAL
MDC_IDC_SET_LEADCHNL_RV_SENSING_SENSITIVITY: 0.9 MV
MDC_IDC_SET_ZONE_DETECTION_INTERVAL: 350 MS
MDC_IDC_SET_ZONE_DETECTION_INTERVAL: 400 MS
MDC_IDC_SET_ZONE_TYPE: NORMAL
MDC_IDC_STAT_AT_BURDEN_PERCENT: 0.6 %
MDC_IDC_STAT_AT_DTM_END: NORMAL
MDC_IDC_STAT_AT_DTM_START: NORMAL
MDC_IDC_STAT_BRADY_AP_VP_PERCENT: 76.86 %
MDC_IDC_STAT_BRADY_AP_VS_PERCENT: 0.21 %
MDC_IDC_STAT_BRADY_AS_VP_PERCENT: 21.61 %
MDC_IDC_STAT_BRADY_AS_VS_PERCENT: 1.33 %
MDC_IDC_STAT_BRADY_DTM_END: NORMAL
MDC_IDC_STAT_BRADY_DTM_START: NORMAL
MDC_IDC_STAT_BRADY_RA_PERCENT_PACED: 77.56 %
MDC_IDC_STAT_BRADY_RV_PERCENT_PACED: 98.46 %
MDC_IDC_STAT_EPISODE_RECENT_COUNT: 0
MDC_IDC_STAT_EPISODE_RECENT_COUNT: 649
MDC_IDC_STAT_EPISODE_RECENT_COUNT_DTM_END: NORMAL
MDC_IDC_STAT_EPISODE_RECENT_COUNT_DTM_START: NORMAL
MDC_IDC_STAT_EPISODE_TOTAL_COUNT: 0
MDC_IDC_STAT_EPISODE_TOTAL_COUNT: 5
MDC_IDC_STAT_EPISODE_TOTAL_COUNT: NORMAL
MDC_IDC_STAT_EPISODE_TOTAL_COUNT_DTM_END: NORMAL
MDC_IDC_STAT_EPISODE_TOTAL_COUNT_DTM_START: NORMAL
MDC_IDC_STAT_EPISODE_TYPE: NORMAL

## 2023-03-30 PROCEDURE — 93296 REM INTERROG EVL PM/IDS: CPT | Performed by: INTERNAL MEDICINE

## 2023-03-30 PROCEDURE — 93294 REM INTERROG EVL PM/LDLS PM: CPT | Performed by: INTERNAL MEDICINE

## 2023-03-30 NOTE — PROGRESS NOTES
Clinic Care Coordination Contact  [facility]/Voicemail       Clinical Data: Care Coordinator Outreach  Outreach attempted x 1.  Left message on Alejandra's voicemail with call back information and requested return call.    Plan: Care Coordinator will try to reach patient again in 10 business days.    Flores ROBERTS Public Health  Community Health Worker  [facility]  Clinic Care Coordination  [phone]

## 2023-04-05 ENCOUNTER — ANTICOAGULATION THERAPY VISIT (OUTPATIENT)
Dept: ANTICOAGULATION | Facility: CLINIC | Age: 83
End: 2023-04-05
Payer: COMMERCIAL

## 2023-04-05 DIAGNOSIS — I48.92 ATRIAL FIBRILLATION AND FLUTTER (H): ICD-10-CM

## 2023-04-05 DIAGNOSIS — Z95.3 S/P MITRAL VALVE REPLACEMENT WITH BIOPROSTHETIC VALVE: ICD-10-CM

## 2023-04-05 DIAGNOSIS — Z79.01 LONG TERM CURRENT USE OF ANTICOAGULANTS WITH INR GOAL OF 2.0-3.0: Primary | ICD-10-CM

## 2023-04-05 DIAGNOSIS — I48.91 ATRIAL FIBRILLATION AND FLUTTER (H): ICD-10-CM

## 2023-04-05 LAB — INR (EXTERNAL): 1.9 (ref 0.9–1.1)

## 2023-04-05 NOTE — PROGRESS NOTES
ANTICOAGULATION MANAGEMENT     Zunilda SHAW May 82 year old female is on warfarin with subtherapeutic INR result. (Goal INR 2.0-3.0)    Recent labs: (last 7 days)     04/05/23  1020   INR 1.9*       ASSESSMENT       Source(s): Chart Review, Patient/Caregiver Call and Home Care/Facility Nurse       Warfarin doses taken: Warfarin taken as instructed    Diet: Increased greens/vitamin K in diet; plans to resume previous intake, will go back to normal amount with in the next week.    New illness, injury, or hospitalization: No    Medication/supplement changes: None noted    Signs or symptoms of bleeding or clotting: No    Previous INR: Subtherapeutic    Additional findings: None     Patient will be discharged from home care on 4/12/2023         PLAN     Recommended plan for temporary change(s) affecting INR     Dosing Instructions: Continue your current warfarin dose with next INR in 1 week       Summary  As of 4/5/2023    Full warfarin instructions:  4/5: 3.75 mg; Otherwise 3.75 mg every Tue, Thu, Sat; 2.5 mg all other days   Next INR check:  4/12/2023             Telephone call with Deanne who agrees to plan and repeated back plan correctly, Anatoly, home care nurse.    Orders given to  Homecare nurse/facility to recheck    Education provided:     Dietary considerations: importance of consistent vitamin K intake    Plan made per [facility] anticoagulation protocol    Katalina Restrepo, RN  Anticoagulation Clinic  4/5/2023    _______________________________________________________________________     Anticoagulation Episode Summary     Current INR goal:  2.0-3.0   TTR:  90.6 % (1 y)   Target end date:  Indefinite   Send INR reminders to:  RANJIT STEEN    Indications    Long term current use of anticoagulants with INR goal of 2.0-3.0 [Z79.01]  Atrial fibrillation and flutter (H) [I48.91  I48.92]  S/P mitral valve replacement with bioprosthetic valve [Z95.3]           Comments:  27 mm Magna bioprosthetic valve (2014)          Anticoagulation Care Providers     Provider Role Specialty Phone number    Yunior Huang MD Referring Internal Medicine [phone]

## 2023-04-10 ENCOUNTER — PATIENT OUTREACH (OUTPATIENT)
Dept: CARE COORDINATION | Facility: CLINIC | Age: 83
End: 2023-04-10
Payer: COMMERCIAL

## 2023-04-10 NOTE — PROGRESS NOTES
Clinic Care Coordination Contact    Situation: Patient chart reviewed by care coordinator.    Background/Assessment: CHW has been unable to reach patient since care coordination's last outreach. Patient will benefit from ongoing CHW outreach attempts per standard work.    Plan/Recommendations: CHW will reach out to patient per standard work flow. SWCC will review chart in 4 weeks.    AUDREY Sauceda/[facility]  Social Work Care Coordinator  [facility] [place], [place], [facility], and [place]  Phone: [phone]

## 2023-04-12 ENCOUNTER — MEDICAL CORRESPONDENCE (OUTPATIENT)
Dept: HEALTH INFORMATION MANAGEMENT | Facility: CLINIC | Age: 83
End: 2023-04-12
Payer: COMMERCIAL

## 2023-04-12 ENCOUNTER — ANTICOAGULATION THERAPY VISIT (OUTPATIENT)
Dept: ANTICOAGULATION | Facility: CLINIC | Age: 83
End: 2023-04-12
Payer: COMMERCIAL

## 2023-04-12 DIAGNOSIS — I48.92 ATRIAL FIBRILLATION AND FLUTTER (H): ICD-10-CM

## 2023-04-12 DIAGNOSIS — I48.91 ATRIAL FIBRILLATION AND FLUTTER (H): ICD-10-CM

## 2023-04-12 DIAGNOSIS — Z79.01 LONG TERM CURRENT USE OF ANTICOAGULANTS WITH INR GOAL OF 2.0-3.0: Primary | ICD-10-CM

## 2023-04-12 DIAGNOSIS — Z95.3 S/P MITRAL VALVE REPLACEMENT WITH BIOPROSTHETIC VALVE: ICD-10-CM

## 2023-04-12 LAB — INR (EXTERNAL): 1.7 (ref 0.9–1.1)

## 2023-04-12 NOTE — PROGRESS NOTES
ANTICOAGULATION MANAGEMENT     Zunilda SHAW May 82 year old female is on warfarin with subtherapeutic INR result. (Goal INR 2.0-3.0)    Recent labs: (last 7 days)     04/12/23  0000   INR 1.7*       ASSESSMENT       Source(s): Chart Review and Patient/Caregiver Call       Warfarin doses taken: Warfarin taken as instructed    Diet: inconsistent green intake may be affecting diet and INR    New illness, injury, or hospitalization: No    Medication/supplement changes: None noted    Signs or symptoms of bleeding or clotting: No    Previous INR: Subtherapeutic    Additional findings: Pt discharged from Home care today. Pt scheduled to recheck INR again in clinic next week. [facility] responsible group switched back to [place].         PLAN     Recommended plan for ongoing change(s) affecting INR     Dosing Instructions: booster dose then Increase your warfarin dose (11.8% change) with next INR in 1 week       Summary  As of 4/12/2023    Full warfarin instructions:  4/12: 5 mg; Otherwise 2.5 mg every Mon, Fri; 3.75 mg all other days   Next INR check:  4/20/2023             Telephone call with Deanne who agrees to plan and repeated back plan correctly    Lab visit scheduled    Education provided:     Please call back if any changes to your diet, medications or how you've been taking warfarin    Contact [phone]  with any changes, questions or concerns.     Plan made with [facility] Pharmacist Janay Perera, RN  Anticoagulation Clinic  4/12/2023    _______________________________________________________________________     Anticoagulation Episode Summary     Current INR goal:  2.0-3.0   TTR:  90.1 % (1 y)   Target end date:  Indefinite   Send INR reminders to:  [facility]    Indications    Long term current use of anticoagulants with INR goal of 2.0-3.0 [Z79.01]  Atrial fibrillation and flutter (H) [I48.91  I48.92]  S/P mitral valve replacement with bioprosthetic valve [Z95.3]           Comments:   27 mm Magna bioprosthetic valve (2014)         Anticoagulation Care Providers     Provider Role Specialty Phone number    Yunior Huang MD Referring Internal Medicine [phone]

## 2023-04-13 ENCOUNTER — PATIENT OUTREACH (OUTPATIENT)
Dept: CARE COORDINATION | Facility: CLINIC | Age: 83
End: 2023-04-13
Payer: COMMERCIAL

## 2023-04-13 NOTE — PROGRESS NOTES
Clinic Care Coordination Contact    Community Health Worker Follow Up    Care Gaps:     Health Maintenance Due   Topic Date Due     LIPID  03/28/2023     TSH W/FREE T4 REFLEX  03/28/2023       Scheduled to be addressed at ongoing PCP appointments\      Care Plan:   Care Plan: Help At Home     Problem: Insufficient In-home support     Goal: Establish adequate home support     Start Date: 2/14/2023 Expected End Date: 2/14/2024    This Visit's Progress: 30% Recent Progress: 30%    Priority: High    Note:     Barriers: fixed income   Strengths: family support  Patient expressed understanding of goal: Yes  Action steps to achieve this goal:  1. I will continue to lean on informal supports of my: family  2. My family will review resources and supports sent to me via [employer]/mail.  3. My family will outreach to supports and consider establishing with ones I might find beneficial.  4. My family will continue to outreach to care coordination as needed for additional resources or supports.                         Intervention and Education during outreach:     CHW was able to connect with the Patient and address their above goals. Patient shared that she is doing well and thinks she has things worked out. Patient has been recently discharged from Home Care. Patient shares that she has been doing everything that Home Care has recommended for using her walker around her home and showering safety suggestions. Patient is able to dress self and put her back brace on. Patient expresses she was not able to do this right after her injury, so she notes this improvement. States she feels safe at home. Patient does have a  that helps around the home. Further, Patient is done getting INR lab draws through [facility].    Discussed transportation to and from INR lab draws. Patient's sister in-law is planning to bring to her to labs. They will have to work it out with both of their schedules. Currently, Patient needs more frequent lab  draws due to her diet. Patient's daughters have been cooking her very health meals, however they consist of leafy greens which affects the Patient's INR levels. The anticoagulation clinic is aware of this. Writer gave the Patient their direct number to call with any questions or concerns about diet: [phone] or for diet recommendations.     Patient notes that she will know more once she has her follow-up with [facility] in May 2023.    Patient's daughter has set her up with [employer] Whe[employer]. This will be starting 4/21/2023.    Educated on the potential for home INR lab draws if transportation becomes difficult. Also briefly educated on [employer] being an option as well. Right now, Patient would like to see how transportation goes with sister in-law and hopes she will be able to go in less once her INR levels are more under control; Writer voiced understanding.    Patient shared that her daughter's (3) all took turns off of work to come and be with the Patient. They all [place] of [place].     Patient knows she can reach the CC team at anytime, and she has no other questions or concerns during the call.    CHW Plan: Writer will reach out to the Patient in 1 month to monitor the progression of their goals.       Flores HOLDEN. Public Health  Community Health Worker  [facility]  Clinic Care Coordination  [phone]

## 2023-04-20 ENCOUNTER — LAB (OUTPATIENT)
Dept: LAB | Facility: CLINIC | Age: 83
End: 2023-04-20
Payer: COMMERCIAL

## 2023-04-20 ENCOUNTER — ANTICOAGULATION THERAPY VISIT (OUTPATIENT)
Dept: ANTICOAGULATION | Facility: CLINIC | Age: 83
End: 2023-04-20

## 2023-04-20 DIAGNOSIS — Z95.3 S/P MITRAL VALVE REPLACEMENT WITH BIOPROSTHETIC VALVE: ICD-10-CM

## 2023-04-20 DIAGNOSIS — Z79.01 LONG TERM CURRENT USE OF ANTICOAGULANTS WITH INR GOAL OF 2.0-3.0: ICD-10-CM

## 2023-04-20 DIAGNOSIS — I48.91 ATRIAL FIBRILLATION AND FLUTTER (H): ICD-10-CM

## 2023-04-20 DIAGNOSIS — I48.92 ATRIAL FIBRILLATION AND FLUTTER (H): ICD-10-CM

## 2023-04-20 DIAGNOSIS — Z79.01 LONG TERM CURRENT USE OF ANTICOAGULANTS WITH INR GOAL OF 2.0-3.0: Primary | ICD-10-CM

## 2023-04-20 LAB — INR BLD: 3.1 (ref 0.9–1.1)

## 2023-04-20 PROCEDURE — 36416 COLLJ CAPILLARY BLOOD SPEC: CPT

## 2023-04-20 PROCEDURE — 85610 PROTHROMBIN TIME: CPT

## 2023-04-20 NOTE — PROGRESS NOTES
ANTICOAGULATION MANAGEMENT     Zunilda SHAW May 82 year old female is on warfarin with supratherapeutic INR result. (Goal INR 2.0-3.0)    Recent labs: (last 7 days)     04/20/23  1005   INR 3.1*       ASSESSMENT     Warfarin Lab Questionnaire    Warfarin Doses Last 7 Days      4/20/2023     9:57 AM   Dose in Tablet or Mg   TAB or MG? tablet (tab)           4/20/2023   Warfarin Lab Questionnaire   Missed doses? No   Changes in diet or alcohol? No--Yes, patient reports only 1 serving of spinach in the patient week, she will resume 2 servings on Mon & Thurs   Medication changes? Yes   Please list: Coumadin--dose was increased on 4/12/23   Injuries or illness since last INR? No   Shortness of breath? No   Abnormal bleeding? No   Upcoming surgery, procedure? No   Best number to call with results? [phone]        Previous INR: Subtherapeutic  Additional findings: warfarin maintenance dose was increased 11.8% on 4/12/23. INR teresa significantly but patient prefers to leave warfarin dose as is and concentrate on eating 2 servings of spinach per week.  Patient confirmed that she is taking warfarin maintenance dose as listed       PLAN     Recommended plan for temporary change(s) affecting INR     Dosing Instructions: Continue your current warfarin dose with next INR in 11 days       Summary  As of 4/20/2023    Full warfarin instructions:  2.5 mg every Mon, Thu; 3.75 mg all other days   Next INR check:  5/1/2023             Telephone call with Deanne who agrees to plan and repeated back plan correctly    Lab visit scheduled    Education provided:     Dietary considerations: importance of consistent vitamin K intake and impact of vitamin K foods on INR    Plan made per [facility] anticoagulation protocol    Linette Rocha, RN  Anticoagulation Clinic  4/20/2023    _______________________________________________________________________     Anticoagulation Episode Summary     Current INR goal:  2.0-3.0   TTR:  89.5 % (1 y)   Target end  date:  Indefinite   Send INR reminders to:  [facility]    Indications    Long term current use of anticoagulants with INR goal of 2.0-3.0 [Z79.01]  Atrial fibrillation and flutter (H) [I48.91  I48.92]  S/P mitral valve replacement with bioprosthetic valve [Z95.3]           Comments:  27 mm Magna bioprosthetic valve (2014)         Anticoagulation Care Providers     Provider Role Specialty Phone number    Yunior Huang MD Referring Internal Medicine [phone]          2

## 2023-04-30 ENCOUNTER — MYC REFILL (OUTPATIENT)
Dept: INTERNAL MEDICINE | Facility: CLINIC | Age: 83
End: 2023-04-30
Payer: COMMERCIAL

## 2023-04-30 DIAGNOSIS — E03.9 ACQUIRED HYPOTHYROIDISM: ICD-10-CM

## 2023-05-01 ENCOUNTER — ANTICOAGULATION THERAPY VISIT (OUTPATIENT)
Dept: ANTICOAGULATION | Facility: CLINIC | Age: 83
End: 2023-05-01

## 2023-05-01 ENCOUNTER — LAB (OUTPATIENT)
Dept: LAB | Facility: CLINIC | Age: 83
End: 2023-05-01
Payer: COMMERCIAL

## 2023-05-01 DIAGNOSIS — Z95.3 S/P MITRAL VALVE REPLACEMENT WITH BIOPROSTHETIC VALVE: ICD-10-CM

## 2023-05-01 DIAGNOSIS — I48.92 ATRIAL FIBRILLATION AND FLUTTER (H): ICD-10-CM

## 2023-05-01 DIAGNOSIS — I48.91 ATRIAL FIBRILLATION AND FLUTTER (H): ICD-10-CM

## 2023-05-01 DIAGNOSIS — Z79.01 LONG TERM CURRENT USE OF ANTICOAGULANTS WITH INR GOAL OF 2.0-3.0: ICD-10-CM

## 2023-05-01 DIAGNOSIS — Z79.01 LONG TERM CURRENT USE OF ANTICOAGULANTS WITH INR GOAL OF 2.0-3.0: Primary | ICD-10-CM

## 2023-05-01 LAB — INR BLD: 2.6 (ref 0.9–1.1)

## 2023-05-01 PROCEDURE — 85610 PROTHROMBIN TIME: CPT

## 2023-05-01 PROCEDURE — 36416 COLLJ CAPILLARY BLOOD SPEC: CPT

## 2023-05-01 NOTE — PROGRESS NOTES
ANTICOAGULATION MANAGEMENT     Zunilda Clark 82 year old female is on warfarin with therapeutic INR result. (Goal INR 2.0-3.0)    Recent labs: (last 7 days)     05/01/23  1401   INR 2.6*       ASSESSMENT     Warfarin Lab Questionnaire    Warfarin Doses Last 7 Days      5/1/2023     1:53 PM   Dose in Tablet or Mg   TAB or MG? tablet (tab)     Pt Rptd Dose SUNDAY MONDAY TUESDAY WED THURS FRIDAY SATURDAY 5/1/2023   1:53 PM 1.5 1 1.5 1.5 1 1.5 1.5         5/1/2023   Warfarin Lab Questionnaire   Missed doses within past 14 days? No   Changes in diet or alcohol within past 14 days? Yes   Please explain:  started meals - discussed with patient, she is getting meals on wheels, has resumed usual spinach routine   Medication changes since last result? No   Injuries or illness since last result? No   New shortness of breath, severe headaches or sudden changes in vision since last result? No   Abnormal bleeding since last result? Yes   If yes, please explain: bleeding right nose - patient reports she is not sure if it is from dryness, patient reports will have dry blood in her nose, sometimes fresh blood, cleaning her nose with a kleenex when she wakes. She will try saline nasal spray   Upcoming surgery, procedure? No   Best number to call with results? [phone]        Previous result: Supratherapeutic  Additional findings: Patient reports back has improved, sees Ortho 5/8/23, continyes to wear back brace daily until that appointment       PLAN     Recommended plan for no diet, medication or health factor changes affecting INR     Dosing Instructions: Continue your current warfarin dose with next INR in 2 weeks       Summary  As of 5/1/2023    Full warfarin instructions:  2.5 mg every Mon, Thu; 3.75 mg all other days   Next INR check:  5/15/2023             Telephone call with Deanne who verbalizes understanding and agrees to plan    Lab visit scheduled    Education provided:     Please call back if any changes to your diet,  medications or how you've been taking warfarin    Contact [phone]  with any changes, questions or concerns.     Plan made per [facility] anticoagulation protocol    Екатерина Mahan RN  Anticoagulation Clinic  5/1/2023    _______________________________________________________________________     Anticoagulation Episode Summary     Current INR goal:  2.0-3.0   TTR:  88.8 % (1 y)   Target end date:  Indefinite   Send INR reminders to:  [facility]    Indications    Long term current use of anticoagulants with INR goal of 2.0-3.0 [Z79.01]  Atrial fibrillation and flutter (H) [I48.91  I48.92]  S/P mitral valve replacement with bioprosthetic valve [Z95.3]           Comments:  27 mm Magna bioprosthetic valve (2014)         Anticoagulation Care Providers     Provider Role Specialty Phone number    Yunior Huang MD Referring Internal Medicine [phone]

## 2023-05-02 RX ORDER — LEVOTHYROXINE SODIUM 112 UG/1
112 TABLET ORAL DAILY
Qty: 100 TABLET | Refills: 0 | Status: SHIPPED | OUTPATIENT
Start: 2023-05-02 | End: 2023-07-14

## 2023-05-02 NOTE — TELEPHONE ENCOUNTER
Pt has appt with Dr Huang in July     Provider approval needed.     TSH   Date Value Ref Range Status   03/28/2022 2.33 0.40 - 4.00 mU/L Final   06/22/2021 1.45 0.40 - 4.00 mU/L Final

## 2023-05-04 ENCOUNTER — PATIENT OUTREACH (OUTPATIENT)
Dept: CARE COORDINATION | Facility: CLINIC | Age: 83
End: 2023-05-04
Payer: COMMERCIAL

## 2023-05-04 NOTE — LETTER
[facility] CARE COORDINATION  [address]  [facility] [address]    May 4, 2023    Zunilda SHAW May  [address]  [place] [facility] [address]      Dear Zunilda,     Jessica is an updated Patient Centered Plan of Care for your continued enrollment in Care Coordination. Please let us know if you have additional questions, concerns, or goals that we can assist with.    Sincerely,    Cookie Atkinson, MSW/[facility] CenterSW  Social Work Care Coordinator  [facility], [place], [facility], and [place]  Phone: [phone]      [facility]  Patient Centered Plan of Care  About Me:        Patient Name:  Zunilda SHAW May    YOB: 1940  Age:         82 year old   [facility] MRN:    [id number] Telephone Information:  Home Phone [phone]   Mobile [phone]       Address:  [address]  [place] [facility] [address] Email address:  [email or web address]      Emergency Contact(s)    Name Relationship Lgl Grd Work Phone Home Phone Mobile Phone   1. YARITZA FINNEY Sister No   [phone]   2. MAY ELIZABETH SILVA* Daughter No   [phone]   3. HUNTER FREEMAN* Daughter No  [phone]   4. RADHA RUIZ Daughter   [phone]    5. ANNEL RODRIGUES Daughter No   [phone]           Primary language:  English     needed? No   [employer]:  [phone] op. 1  Other communication barriers:None  Preferred Method of Communication:  Estefani  Current living arrangement: I live in a private home  Mobility Status/ Medical Equipment: Independent w/Device    Health Maintenance  Health Maintenance Reviewed: Due/Overdue   Health Maintenance Due   Topic Date Due     LIPID  03/28/2023     TSH W/FREE T4 REFLEX  03/28/2023     My Access Plan  Medical Emergency 911   Primary Clinic Line [facility] - [phone]   24 Hour Appointment Line [phone] or  [phone]) (toll-free)   24 Hour Nurse Line  [phone] (toll-free)   Preferred Urgent Care No data recorded   Preferred Hospital [facility]  [phone]       Preferred Pharmacy [id number] [address] [address]     Behavioral Health Crisis Line The National Suicide Prevention Lifeline at 1-695.741.2598 or Text/Call 988     My Care Team Members  Patient Care Team         Relationship Specialty Notifications Start End    Yunior Huang MD PCP - General Internal Medicine  5/4/15     Phone: [phone] Pager: [phone] Fax: [phone]        [address] [facility] [address]    Jaycee Cervantes MD MD Rheumatology  5/13/15     Phone: [phone] Fax: [phone]         [address] [place] [address]    Yunior Huang MD Assigned PCP   8/7/16     Phone: [phone] Pager: [phone] Fax: [phone]        [address] [facility] [address]    Wilfredo Cooper Do, DO    4/18/22     Phone: [phone] Fax: [phone]         [facility] [address] [facility] [address]    Noe Rodriguez MD MD Cardiovascular Disease  5/24/22     Phone: [phone] Pager: [phone] Fax: [phone] [address] [place] [address]    Noe Rodriguez MD MD Cardiovascular Disease  5/24/22     Phone: [phone] Pager: [phone] Fax: [phone] [address] [place] [address]    Kelton William MD Assigned Surgical Provider   6/25/22     Phone: [phone] Fax: [phone]         [address] [facility] [address]    Zena Benitez PA Assigned Heart and Vascular Provider   1/21/23     Phone: [phone] Pager: [phone] Fax: [phone]        [facility] [address] [place] [address]    Cookie Atkinson [facility] Lead Care Coordinator  - Clinical Admissions 2/15/23     Phone: [phone] Fax: [phone]        Flores Fernando MA Community Health Worker   2/15/23               My Care  Plans  Self Management and Treatment Plan  Care Plan  Care Plan: Help At Home       Problem: Insufficient In-home support       Goal: Establish adequate home support       Start Date: 2/14/2023 Expected End Date: 2/14/2024    This Visit's Progress: 30% Recent Progress: 30%    Priority: High    Note:     Barriers: fixed income   Strengths: family support  Patient expressed understanding of goal: Yes  Action steps to achieve this goal:  1. I will continue to lean on informal supports of my: family  2. My family will review resources and supports sent to me via [employer]/mail.  3. My family will outreach to supports and consider establishing with ones I might find beneficial.  4. My family will continue to outreach to care coordination as needed for additional resources or supports.                                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     S/P mitral valve replacement with bioprosthetic valve     Pulmonary hypertension (H)     S/P total knee arthroplasty     Wound dehiscence     Pacemaker twiddler's syndrome, initial encounter     Complete atrioventricular block (H)     Chronic kidney disease, stage 3 (H)        Current Medications and Allergies:  Current Outpatient Medications   Medication     levothyroxine (SYNTHROID/LEVOTHROID) 112 MCG tablet     ALLOPURINOL PO     amLODIPine (NORVASC) 2.5 MG tablet     amLODIPine-valsartan (EXFORGE) 5-160 MG tablet     calcium citrate (CALCITRATE) 950  MG tablet     Dorzolamide HCl-Timolol Mal (COSOPT OP)     famotidine (PEPCID) 40 MG tablet     folic acid (FOLVITE) 1 MG tablet     METHOTREXATE SODIUM IJ     metoprolol tartrate (LOPRESSOR) 50 MG tablet     multivitamin w/minerals (THERA-VIT-M) tablet     torsemide (DEMADEX) 10 MG tablet     VITAMIN D, CHOLECALCIFEROL, PO     warfarin ANTICOAGULANT (COUMADIN) 2.5 MG tablet     No current facility-administered medications for this visit.     No Known Allergies    Care Coordination Start Date: 2/14/2023   Frequency of Care Coordination: monthly       Form Last Updated: 05/04/2023

## 2023-05-04 NOTE — PROGRESS NOTES
Clinic Care Coordination Contact  Care Coordination Clinician Chart Review    Situation: Patient chart reviewed by Care Coordinator.       Background: Care Coordination Program started: 2/14/2023. Initial assessment completed and patient-centered care plan(s) were developed with participation from patient. Lead CC handed patient off to CHW for continued outreaches.       Assessment: Per chart review, patient outreach completed by CC CHW on 4/13/2023. Patient is actively working to accomplish goal(s). Patient's goal(s) appropriate and relevant at this time. Patient is due for updated Plan of Care. Assessments will be completed annually or as needed/with change of patient status.      Care Plan: Help At Home     Problem: Insufficient In-home support     Goal: Establish adequate home support     Start Date: 2/14/2023 Expected End Date: 2/14/2024    This Visit's Progress: 30% Recent Progress: 30%    Priority: High    Note:     Barriers: fixed income   Strengths: family support  Patient expressed understanding of goal: Yes  Action steps to achieve this goal:  1. I will continue to lean on informal supports of my: family  2. My family will review resources and supports sent to me via [employer]/mail.  3. My family will outreach to supports and consider establishing with ones I might find beneficial.  4. My family will continue to outreach to care coordination as needed for additional resources or supports.                     Plan/Recommendations: The patient will continue working with Care Coordination to achieve goal(s) as above. CHW will continue outreaches at minimum every 30 days and will involve Lead CC as needed or if patient is ready to move to Maintenance. Lead CC will continue to monitor CHW outreaches and patient's progress to goal(s) every 6 weeks.     Plan of Care updated and sent to patient: Yes, via [employer]     AUDREY Sauceda/[facility]  Social Work Care Coordinator  [facility]  [place], [facility], and [place]  Phone: [phone]

## 2023-05-08 ENCOUNTER — TRANSFERRED RECORDS (OUTPATIENT)
Dept: HEALTH INFORMATION MANAGEMENT | Facility: CLINIC | Age: 83
End: 2023-05-08
Payer: COMMERCIAL

## 2023-05-12 DIAGNOSIS — R19.7 DIARRHEA, UNSPECIFIED TYPE: ICD-10-CM

## 2023-05-12 RX ORDER — FAMOTIDINE 40 MG/1
TABLET, FILM COATED ORAL
Qty: 90 TABLET | Refills: 3 | Status: SHIPPED | OUTPATIENT
Start: 2023-05-12 | End: 2024-06-12

## 2023-05-15 ENCOUNTER — ANTICOAGULATION THERAPY VISIT (OUTPATIENT)
Dept: ANTICOAGULATION | Facility: CLINIC | Age: 83
End: 2023-05-15

## 2023-05-15 ENCOUNTER — LAB (OUTPATIENT)
Dept: LAB | Facility: CLINIC | Age: 83
End: 2023-05-15
Payer: COMMERCIAL

## 2023-05-15 DIAGNOSIS — Z79.01 LONG TERM CURRENT USE OF ANTICOAGULANTS WITH INR GOAL OF 2.0-3.0: ICD-10-CM

## 2023-05-15 DIAGNOSIS — I48.92 ATRIAL FIBRILLATION AND FLUTTER (H): ICD-10-CM

## 2023-05-15 DIAGNOSIS — I48.91 ATRIAL FIBRILLATION AND FLUTTER (H): ICD-10-CM

## 2023-05-15 DIAGNOSIS — Z79.01 LONG TERM CURRENT USE OF ANTICOAGULANTS WITH INR GOAL OF 2.0-3.0: Primary | ICD-10-CM

## 2023-05-15 DIAGNOSIS — Z95.3 S/P MITRAL VALVE REPLACEMENT WITH BIOPROSTHETIC VALVE: ICD-10-CM

## 2023-05-15 DIAGNOSIS — E03.9 ACQUIRED HYPOTHYROIDISM: Primary | ICD-10-CM

## 2023-05-15 LAB — INR BLD: 2.7 (ref 0.9–1.1)

## 2023-05-15 PROCEDURE — 80061 LIPID PANEL: CPT

## 2023-05-15 PROCEDURE — 36416 COLLJ CAPILLARY BLOOD SPEC: CPT

## 2023-05-15 PROCEDURE — 84439 ASSAY OF FREE THYROXINE: CPT

## 2023-05-15 PROCEDURE — 84443 ASSAY THYROID STIM HORMONE: CPT

## 2023-05-15 PROCEDURE — 85610 PROTHROMBIN TIME: CPT

## 2023-05-15 PROCEDURE — 36415 COLL VENOUS BLD VENIPUNCTURE: CPT

## 2023-05-15 NOTE — PROGRESS NOTES
ANTICOAGULATION MANAGEMENT     Zunilda SHAW May 82 year old female is on warfarin with therapeutic INR result. (Goal INR 2.0-3.0)    Recent labs: (last 7 days)     05/15/23  1528   INR 2.7*       ASSESSMENT     Warfarin Lab Questionnaire    Warfarin Doses Last 7 Days      5/15/2023     3:21 PM   Dose in Tablet or Mg   TAB or MG? tablet (tab)     Pt Rptd Dose CINDY MONDAY TUESDAY WED THURS FRIDAY SATURDAY   5/15/2023   3:21 PM 1.5 1 1.5 1.5 1 1.5 1.5         5/15/2023   Warfarin Lab Questionnaire   Missed doses within past 14 days? No   Changes in diet or alcohol within past 14 days? No   Medication changes since last result? No   Injuries or illness since last result? No   New shortness of breath, severe headaches or sudden changes in vision since last result? No   Abnormal bleeding since last result? No   Upcoming surgery, procedure? No   Best number to call with results? [phone]        Previous result: Therapeutic last visit; previously outside of goal range  Additional findings: None       PLAN     Recommended plan for no diet, medication or health factor changes affecting INR     Dosing Instructions: Continue your current warfarin dose with next INR in 3 weeks       Summary  As of 5/15/2023    Full warfarin instructions:  2.5 mg every Mon, Thu; 3.75 mg all other days   Next INR check:  6/5/2023             Telephone call with Deanne who verbalizes understanding and agrees to plan    Lab visit scheduled    Education provided:     Goal range and lab monitoring: goal range and significance of current result and Importance of therapeutic range    Plan made per [facility] anticoagulation protocol    Rodolfo Garcia RN  Anticoagulation Clinic  5/15/2023    _______________________________________________________________________     Anticoagulation Episode Summary     Current INR goal:  2.0-3.0   TTR:  88.8 % (1 y)   Target end date:  Indefinite   Send INR reminders to:  [facility]    Indications    Long term  current use of anticoagulants with INR goal of 2.0-3.0 [Z79.01]  Atrial fibrillation and flutter (H) [I48.91  I48.92]  S/P mitral valve replacement with bioprosthetic valve [Z95.3]           Comments:  27 mm Magna bioprosthetic valve (2014)         Anticoagulation Care Providers     Provider Role Specialty Phone number    Yunior Huang MD Referring Internal Medicine [phone]

## 2023-05-16 ENCOUNTER — PATIENT OUTREACH (OUTPATIENT)
Dept: CARE COORDINATION | Facility: CLINIC | Age: 83
End: 2023-05-16
Payer: COMMERCIAL

## 2023-05-16 LAB
CHOLEST SERPL-MCNC: 194 MG/DL
HDLC SERPL-MCNC: 46 MG/DL
LDLC SERPL CALC-MCNC: 120 MG/DL
NONHDLC SERPL-MCNC: 148 MG/DL
T4 FREE SERPL-MCNC: 1.35 NG/DL (ref 0.9–1.7)
TRIGL SERPL-MCNC: 140 MG/DL
TSH SERPL DL<=0.005 MIU/L-ACNC: 7.64 UIU/ML (ref 0.3–4.2)

## 2023-05-16 NOTE — LETTER
[facility] CARE COORDINATION  [facility]  May 16, 2023    Zunilda SHAW May  [address]   [facility] [address]      Dear Zunilda,        I am a  clinic community health worker who works with Yunior Huang MD with the [facility]. I wanted to thank you for spending the time to talk with me.  Here is the [facility] Transportation options that we discussed over the phone today:       Transportation      [employer] - [phone] must be approved first. Call for an application.    Shared public transportation.    For individuals with disabilities as certified by [employer].    Reservations taken 7 days a week.  Call in advance, up to four days.    Taxi Ticket Program   *ADA certified riders unable to schedule a ride on [employer] can be reimbursed up to $20 if they use a private taxi.      Rides are for any purpose.    [facility] -  [phone] Must make transportation rides two weeks in [facility] only    Volunteers will pick you up from your home and bring you back from your appointments    Medical rides are elevated in importance over other types of rides    MUST MAKE RIDES TWO WEEKS IN ADVANCED    Transportation is free but they do accept donations    Help at Your Door/Help for Seniors in the [facility] Area [phone] (former Store to Door).    Grocery shopping for seniors and individuals with disabilities/$5 - $15 fee depending on income.     services for seniors - $35/hr minimum 2 hrs.    Transportation for seniors - $35/trip up to 20 miles round trip - $1.90 each additional mile.   to start up in October.  Will contact CARY when transportation is available in our area.      Go [employer] Grandparent - [phone] must register online or by phone before first use  - Similar to Lyft, Uber. Do not need to use a smart phone  - Need a credit card, $2.70 fee per ride plus $1 per mile and $.34 per  minute  - Can accommodate walkers, door to door  - Need to give 15 minutes notice  - Can have live texts sent to friend, family on status of ride    [facility], [phone], [phone]; 24 hour automated departure times.      Everyone is eligible.     Fare based on age and time of day.    Taxi Services    [employer] - [phone]    [facility], [phone]    [employer], [phone] $5 pick-up, $1.80/mile      [facility], [phone]    [employer] - [phone]  - Direct- Group rides $80 an hour, Select- individual rides $23.50 plus $1.75 per mile     Assisted Transport - [phone]    Serves [address] [place] [address].    Call for rates.  Private pay.  Some insurance plans accepted.    Non-emergency wheelchair and stretcher transportation.    Medical appointments.      [facility] Transportation - [phone]    Serves [address] [place] [address].    Accepts Med[employer] Choice, [employer]-Care, BluePlus, Health [facility]. [place]iff, [place], and private pay.      Medical appointment.        [facility], [facility], [employer], [employer], private pay insurance    To receive medical rides they must be on Medicaid/Medical Assistance.  Medical rides only.    The Elderly Waiver (EW) is for adults ages 65 and over, who wish to live in their own home rather than in a nursing facility.  If you are eligible, Elderly Waiver pays for services which support and care for you so that you can continue living in your home.  These services are referred to as home and community-based services.      If they have an  Elderly Waiver, EW, they are eligible for other rides that are required to keep them living independently - grocery shopping etc.    For waiver services call [employer] [phone] if 65 or older or under 65 and ADA Certified.     [place] Non-Emergency Transportation - [phone] and have Medical assistance ID number ready    Must be on Medical Assistance and have [place] ID number.    Medical Appointment only.     Please feel free  to contact me with any questions or concerns regarding care coordination and what we can offer.      [employer]  [phone]  Can provide transportation to medical appointments, special events, errands,  transportation.  [email or web address]     We are focused on providing you with the highest-quality healthcare experience possible.      Sincerely,       Flores HOLDEN. Public Health  Community Health Worker  [facility]  Clinic Care Coordination  [phone]

## 2023-05-16 NOTE — PROGRESS NOTES
Clinic Care Coordination Contact    Community Health Worker Follow Up    Care Gaps:     Health Maintenance Due   Topic Date Due     LIPID  03/28/2023     TSH W/FREE T4 REFLEX  03/28/2023       Patient was focused on addressing their current goals.     Care Plan:   Care Plan: Help At Home     Problem: Insufficient In-home support     Goal: Establish adequate home support     Start Date: 2/14/2023 Expected End Date: 2/14/2024    This Visit's Progress: 60% Recent Progress: 30%    Priority: High    Note:     Barriers: fixed income   Strengths: family support  Patient expressed understanding of goal: Yes  Action steps to achieve this goal:  1. I will continue to lean on informal supports of my: family  2. My family will review resources and supports sent to me via [employer]/mail.  3. My family will outreach to supports and consider establishing with ones I might find beneficial.  4. My family will continue to outreach to care coordination as needed for additional resources or supports.                     Care Plan: Transportation     Problem: Transportation     Goal: I will establish transportation     This Visit's Progress: 10%    Note:     Barriers: health complexities, frequent appointments (INR).  Strengths: motivated.  Patient expressed understanding of goal: yes  Action steps to achieve this goal:  1. I understand that my CHW, Flores, will send me a list of transportation resources in [facility]  2. I will utilize the resources as I see fit.  3. I will continue working with Care Coordination                        Intervention and Education during outreach:     CHW was able to connect with the Patient and review their above goals. Patient shared that she is doing well. On Monday 5/8 Patient shares that she had her final appointment with [facility]. Through them, they worked through a plan with the Patient where she slowly increases the amount of time she spends without her back brace.. Per Patient, she will be starting  Physical Therapy soon.     Patient asks where she can call if she feels more back pain. Patient mentions that sometimes on really busy days, she experiences back pain in the localized area where her injury was. Writer reviewed that she can always call the [facility] directly if her pain persists, increases, or happens more frequently. Patient voiced understanding and took down the Clinic's P. Number.     Patient would like the Writer to send [facility] Transportation resources. Patient notes that her sister in-law taking her to her INR appointments is working out some of the time. Patient did state that she is not interested in taking her own INR at home at this time.     The Patient has started receiving Meals on Wheels.     Writer to send transportation resources.     The Patient has no other questions or concerns at this time and knows that she can contact the Writer at any point.      CHW Plan: Writer will reach out to the Patient in 1 month to monitor the progression of their goals.       Flores ROBERTS Public Health  Community Health Worker  [facility]  Clinic Care Coordination  [phone]

## 2023-05-30 ENCOUNTER — TRANSFERRED RECORDS (OUTPATIENT)
Dept: HEALTH INFORMATION MANAGEMENT | Facility: CLINIC | Age: 83
End: 2023-05-30
Payer: COMMERCIAL

## 2023-05-30 LAB
ALT SERPL-CCNC: 21 IU/L (ref 5–35)
AST SERPL-CCNC: 33 U/L (ref 5–34)
CREATININE (EXTERNAL): 1.19 MG/DL (ref 0.5–1.3)
GFR ESTIMATED (EXTERNAL): 46.3 ML/MIN/1.73M2

## 2023-06-05 ENCOUNTER — ANTICOAGULATION THERAPY VISIT (OUTPATIENT)
Dept: ANTICOAGULATION | Facility: CLINIC | Age: 83
End: 2023-06-05

## 2023-06-05 ENCOUNTER — DOCUMENTATION ONLY (OUTPATIENT)
Dept: CARDIOLOGY | Facility: CLINIC | Age: 83
End: 2023-06-05

## 2023-06-05 ENCOUNTER — LAB (OUTPATIENT)
Dept: LAB | Facility: CLINIC | Age: 83
End: 2023-06-05
Payer: COMMERCIAL

## 2023-06-05 DIAGNOSIS — I48.92 ATRIAL FIBRILLATION AND FLUTTER (H): ICD-10-CM

## 2023-06-05 DIAGNOSIS — I48.91 ATRIAL FIBRILLATION AND FLUTTER (H): ICD-10-CM

## 2023-06-05 DIAGNOSIS — Z79.01 LONG TERM CURRENT USE OF ANTICOAGULANTS WITH INR GOAL OF 2.0-3.0: Primary | ICD-10-CM

## 2023-06-05 DIAGNOSIS — Z95.3 S/P MITRAL VALVE REPLACEMENT WITH BIOPROSTHETIC VALVE: ICD-10-CM

## 2023-06-05 DIAGNOSIS — Z79.01 LONG TERM CURRENT USE OF ANTICOAGULANTS WITH INR GOAL OF 2.0-3.0: ICD-10-CM

## 2023-06-05 LAB — INR BLD: 2.7 (ref 0.9–1.1)

## 2023-06-05 PROCEDURE — 85610 PROTHROMBIN TIME: CPT

## 2023-06-05 PROCEDURE — 36416 COLLJ CAPILLARY BLOOD SPEC: CPT

## 2023-06-05 NOTE — PROGRESS NOTES
Received MRI Cardiology Clearance form from [facility] MRI department.  Form completed, signed by MD, and faxed back to MRI at [phone].      MELINA Guerrero

## 2023-06-05 NOTE — PROGRESS NOTES
"ANTICOAGULATION MANAGEMENT     Zunilda SHAW May 82 year old female is on warfarin with therapeutic INR result. (Goal INR 2.0-3.0)    Recent labs: (last 7 days)     06/05/23  1527   INR 2.7*       ASSESSMENT       Source(s): Chart Review and Patient/Caregiver Call       Warfarin doses taken: Warfarin taken as instructed    Diet: No new diet changes identified    Medication/supplement changes: None noted    New illness, injury, or hospitalization: Yes: patient reports 5/29/23 she stubbed a toe on her left foot, was bruised at 1st now patient reports it is \"puffy\", patient reports has been soaking in epsom salts, patient will try icing, states toe is not painful    Signs or symptoms of bleeding or clotting: No    Previous result: Therapeutic last 2(+) visits    Additional findings: None         PLAN     Recommended plan for no diet, medication or health factor changes affecting INR     Dosing Instructions: Continue your current warfarin dose with next INR in 4 weeks       Summary  As of 6/5/2023    Full warfarin instructions:  2.5 mg every Mon, Thu; 3.75 mg all other days   Next INR check:  7/3/2023             Telephone call with Deanne who verbalizes understanding and agrees to plan    Lab visit scheduled    Education provided:     Please call back if any changes to your diet, medications or how you've been taking warfarin    Contact [phone]  with any changes, questions or concerns.     Plan made per [facility] anticoagulation protocol    Екатерина Mahan RN  Anticoagulation Clinic  6/5/2023    _______________________________________________________________________     Anticoagulation Episode Summary     Current INR goal:  2.0-3.0   TTR:  88.8 % (1 y)   Target end date:  Indefinite   Send INR reminders to:  [facility]    Indications    Long term current use of anticoagulants with INR goal of 2.0-3.0 [Z79.01]  Atrial fibrillation and flutter (H) [I48.91  I48.92]  S/P mitral valve replacement with " bioprosthetic valve [Z95.3]           Comments:  27 mm Magna bioprosthetic valve (2014)         Anticoagulation Care Providers     Provider Role Specialty Phone number    Yunior Huang MD Referring Internal Medicine [phone]

## 2023-06-12 ENCOUNTER — HOSPITAL ENCOUNTER (OUTPATIENT)
Dept: GENERAL RADIOLOGY | Facility: CLINIC | Age: 83
Discharge: HOME | End: 2023-06-12
Attending: ORTHOPAEDIC SURGERY
Payer: COMMERCIAL

## 2023-06-12 ENCOUNTER — HOSPITAL ENCOUNTER (OUTPATIENT)
Facility: CLINIC | Age: 83
Discharge: HOME | End: 2023-06-12
Payer: COMMERCIAL

## 2023-06-12 ENCOUNTER — HOSPITAL ENCOUNTER (OUTPATIENT)
Dept: MRI IMAGING | Facility: CLINIC | Age: 83
Discharge: HOME | End: 2023-06-12
Attending: ORTHOPAEDIC SURGERY
Payer: COMMERCIAL

## 2023-06-12 VITALS — HEART RATE: 84 BPM

## 2023-06-12 DIAGNOSIS — M54.50 LOW BACK PAIN: ICD-10-CM

## 2023-06-12 DIAGNOSIS — Z01.89 ENCOUNTER FOR IMAGING TO SCREEN FOR METAL PRIOR TO MRI: ICD-10-CM

## 2023-06-12 PROCEDURE — 72148 MRI LUMBAR SPINE W/O DYE: CPT

## 2023-06-12 PROCEDURE — 999N000154 HC STATISTIC RADIOLOGY XRAY, US, CT, MAR, NM

## 2023-06-12 PROCEDURE — 71046 X-RAY EXAM CHEST 2 VIEWS: CPT

## 2023-06-12 ASSESSMENT — ACTIVITIES OF DAILY LIVING (ADL): ADLS_ACUITY_SCORE: 35

## 2023-06-13 ENCOUNTER — PATIENT OUTREACH (OUTPATIENT)
Dept: CARE COORDINATION | Facility: CLINIC | Age: 83
End: 2023-06-13
Payer: COMMERCIAL

## 2023-06-13 NOTE — PROGRESS NOTES
Clinic Care Coordination Contact  Care Coordination Clinician Chart Review    Situation: Patient chart reviewed by Care Coordinator.       Background: Care Coordination Program started: 2/14/2023. Initial assessment completed and patient-centered care plan(s) were developed with participation from patient. Lead CC handed patient off to CHW for continued outreaches.       Assessment: Per chart review, patient outreach completed by CC CHW on 5/16/2023. Patient is actively working to accomplish goal(s). Patient's goal(s) appropriate and relevant at this time. Patient is not due for updated Plan of Care. Assessments will be completed annually or as needed/with change of patient status.      Care Plan: Help At Home     Problem: Insufficient In-home support     Goal: Establish adequate home support     Start Date: 2/14/2023 Expected End Date: 2/14/2024    This Visit's Progress: 60% Recent Progress: 30%    Priority: High    Note:     Barriers: fixed income   Strengths: family support  Patient expressed understanding of goal: Yes  Action steps to achieve this goal:  1. I will continue to lean on informal supports of my: family  2. My family will review resources and supports sent to me via [employer] [place]/mail.  3. My family will outreach to supports and consider establishing with ones I might find beneficial.  4. My family will continue to outreach to care coordination as needed for additional resources or supports.                     Care Plan: Transportation     Problem: Transportation     Goal: I will establish transportation     Start Date: 5/16/2023 Expected End Date: 9/14/2023    Recent Progress: 10%    Priority: Medium    Note:     Barriers: health complexities, frequent appointments (INR).  Strengths: motivated.  Patient expressed understanding of goal: yes  Action steps to achieve this goal:  1. I understand that my CHW, Flores, will send me a list of transportation resources in [facility]  2. I will utilize the  resources as I see fit.  3. I will continue working with Care Coordination                    Plan/Recommendations: The patient will continue working with Care Coordination to achieve goal(s) as above. CHW will continue outreaches at minimum every 30 days and will involve Lead CC as needed or if patient is ready to move to Maintenance. Lead CC will continue to monitor CHW outreaches and patient's progress to goal(s) every 6 weeks.     Plan of Care updated and sent to patient: AUDREY Portillo/[facility]  Social Work Care Coordinator  [facility] [place]  Phone: [phone]

## 2023-06-15 ENCOUNTER — PATIENT OUTREACH (OUTPATIENT)
Dept: CARE COORDINATION | Facility: CLINIC | Age: 83
End: 2023-06-15
Payer: COMMERCIAL

## 2023-06-15 NOTE — PROGRESS NOTES
Clinic Care Coordination Contact    Community Health Worker Follow Up    Care Gaps:     Health Maintenance Due   Topic Date Due     MEDICARE ANNUAL WELLNESS VISIT  07/12/2023     FALL RISK ASSESSMENT  07/12/2023       The Patient was focused on addressing their current goals    Care Plan:   Care Plan: Help At Home     Problem: Insufficient In-home support     Goal: Establish adequate home support     Start Date: 2/14/2023 Expected End Date: 2/14/2024    This Visit's Progress: 90% Recent Progress: 60%    Priority: High    Note:     Barriers: fixed income   Strengths: family support  Patient expressed understanding of goal: Yes  Action steps to achieve this goal:  1. I will continue to lean on informal supports of my: family  2. My family will review resources and supports sent to me via [employer]/mail.  3. My family will outreach to supports and consider establishing with ones I might find beneficial.  4. My family will continue to outreach to care coordination as needed for additional resources or supports.                     Care Plan: Transportation Completed 6/15/2023    Problem: Transportation  Resolved 6/15/2023    Goal: I will establish transportation  Completed 6/15/2023    Start Date: 5/16/2023 Expected End Date: 9/14/2023    This Visit's Progress: 100% Recent Progress: 10%    Priority: Medium    Note:     Barriers: health complexities, frequent appointments (INR).  Strengths: motivated.  Patient expressed understanding of goal: yes  Action steps to achieve this goal:  1. I understand that my CHW, Flores, will send me a list of transportation resources in [facility]  2. I will utilize the resources as I see fit.  3. I will continue working with Care Coordination                        Intervention and Education during outreach:     CHW was able to get connect with the Patient and address their above goals.     The Patient is continuing to get meals on wheels. States that it is fine for now, however she is  not sure how long she will be continuing to get it.     Patient did receive the transportation resources and notes she will keep them to use if needed. Right now, her sister is able to bring her to and from appointments; Goal completed.     Patient feels safe at home. She is not using her back brace anymore and is planning to follow-up with orthopedics on her MRI.    Patient notes that she stubbed her toe and it is healing. Writer encouraged her to bring this up at her upcoming PCP appointment. Per Patient, there are no open wounds on the toe and appears to be getting better.    The Patient had no other questions or concerns and knows that she can reach out to the Writer at anytime.      CHW Plan: Writer will reach out to the Patient in 1 month to monitor the progression of their goals.       Flores HOLDEN. Public Health  Community Health Worker  [facility]  Clinic Care Coordination  [phone]

## 2023-07-03 ENCOUNTER — ANTICOAGULATION THERAPY VISIT (OUTPATIENT)
Dept: ANTICOAGULATION | Facility: CLINIC | Age: 83
End: 2023-07-03

## 2023-07-03 ENCOUNTER — LAB (OUTPATIENT)
Dept: LAB | Facility: CLINIC | Age: 83
End: 2023-07-03
Payer: COMMERCIAL

## 2023-07-03 DIAGNOSIS — I48.91 ATRIAL FIBRILLATION AND FLUTTER (H): ICD-10-CM

## 2023-07-03 DIAGNOSIS — Z95.3 S/P MITRAL VALVE REPLACEMENT WITH BIOPROSTHETIC VALVE: ICD-10-CM

## 2023-07-03 DIAGNOSIS — I48.92 ATRIAL FIBRILLATION AND FLUTTER (H): ICD-10-CM

## 2023-07-03 DIAGNOSIS — Z79.01 LONG TERM CURRENT USE OF ANTICOAGULANTS WITH INR GOAL OF 2.0-3.0: ICD-10-CM

## 2023-07-03 DIAGNOSIS — Z79.01 LONG TERM CURRENT USE OF ANTICOAGULANTS WITH INR GOAL OF 2.0-3.0: Primary | ICD-10-CM

## 2023-07-03 LAB — INR BLD: 2 (ref 0.9–1.1)

## 2023-07-03 PROCEDURE — 36415 COLL VENOUS BLD VENIPUNCTURE: CPT

## 2023-07-03 PROCEDURE — 85610 PROTHROMBIN TIME: CPT

## 2023-07-03 NOTE — PROGRESS NOTES
ANTICOAGULATION MANAGEMENT     Zunilda SHAW May 82 year old female is on warfarin with therapeutic INR result. (Goal INR 2.0-3.0)    Recent labs: (last 7 days)     07/03/23  0945   INR 2.0*       ASSESSMENT     Warfarin Lab Questionnaire    Warfarin Doses Last 7 Days      7/3/2023     9:31 AM   Dose in Tablet or Mg   TAB or MG? tablet (tab)     Pt Rptd Dose CINDY MONDAY TUESDAY WED THURS FRIDAY SATURDAY   7/3/2023   9:31 AM 3.75 2.5 3.75 3.75 2.5 3.75 3.75         7/3/2023   Warfarin Lab Questionnaire   Missed doses within past 14 days? No   Changes in diet or alcohol within past 14 days? No   Medication changes since last result? No   Injuries or illness since last result? No   New shortness of breath, severe headaches or sudden changes in vision since last result? No   Abnormal bleeding since last result? No   Upcoming surgery, procedure? No     Previous result: Therapeutic last 2(+) visits  Additional findings: None       PLAN     Recommended plan for no diet, medication or health factor changes affecting INR     Dosing Instructions: Continue your current warfarin dose with next INR in 4 weeks       Summary  As of 7/3/2023    Full warfarin instructions:  2.5 mg every Mon, Thu; 3.75 mg all other days   Next INR check:  7/31/2023             Telephone call with Deanne who verbalizes understanding and agrees to plan    Lab visit scheduled    Education provided:     Goal range and lab monitoring: goal range and significance of current result    Contact [phone]  with any changes, questions or concerns.     Plan made per [facility] anticoagulation protocol    Rebeka Hernandez, RN  Anticoagulation Clinic  7/3/2023    _______________________________________________________________________     Anticoagulation Episode Summary     Current INR goal:  2.0-3.0   TTR:  88.9 % (1 y)   Target end date:  Indefinite   Send INR reminders to:  [facility]    Indications    Long term current use of anticoagulants with  INR goal of 2.0-3.0 [Z79.01]  Atrial fibrillation and flutter (H) [I48.91  I48.92]  S/P mitral valve replacement with bioprosthetic valve [Z95.3]           Comments:  27 mm Magna bioprosthetic valve (2014)         Anticoagulation Care Providers     Provider Role Specialty Phone number    Yunior Huang MD Referring Internal Medicine [phone]

## 2023-07-14 ENCOUNTER — OFFICE VISIT (OUTPATIENT)
Dept: INTERNAL MEDICINE | Facility: CLINIC | Age: 83
End: 2023-07-14
Payer: COMMERCIAL

## 2023-07-14 ENCOUNTER — LAB (OUTPATIENT)
Dept: INTERNAL MEDICINE | Facility: CLINIC | Age: 83
End: 2023-07-14

## 2023-07-14 VITALS
TEMPERATURE: 98.3 F | BODY MASS INDEX: 24.11 KG/M2 | SYSTOLIC BLOOD PRESSURE: 112 MMHG | OXYGEN SATURATION: 96 % | HEIGHT: 64 IN | WEIGHT: 141.2 LBS | RESPIRATION RATE: 15 BRPM | DIASTOLIC BLOOD PRESSURE: 68 MMHG | HEART RATE: 95 BPM

## 2023-07-14 DIAGNOSIS — Z12.11 COLON CANCER SCREENING: ICD-10-CM

## 2023-07-14 DIAGNOSIS — N18.30 STAGE 3 CHRONIC KIDNEY DISEASE, UNSPECIFIED WHETHER STAGE 3A OR 3B CKD (H): ICD-10-CM

## 2023-07-14 DIAGNOSIS — I10 BENIGN ESSENTIAL HYPERTENSION: ICD-10-CM

## 2023-07-14 DIAGNOSIS — E03.9 ACQUIRED HYPOTHYROIDISM: ICD-10-CM

## 2023-07-14 DIAGNOSIS — I48.91 ATRIAL FIBRILLATION AND FLUTTER (H): ICD-10-CM

## 2023-07-14 DIAGNOSIS — Z00.00 ENCOUNTER FOR MEDICARE ANNUAL WELLNESS EXAM: ICD-10-CM

## 2023-07-14 DIAGNOSIS — Z00.00 ENCOUNTER FOR PREVENTATIVE ADULT HEALTH CARE EXAMINATION: Primary | ICD-10-CM

## 2023-07-14 DIAGNOSIS — I48.92 ATRIAL FIBRILLATION AND FLUTTER (H): ICD-10-CM

## 2023-07-14 DIAGNOSIS — Z00.00 ENCOUNTER FOR PREVENTATIVE ADULT HEALTH CARE EXAMINATION: ICD-10-CM

## 2023-07-14 DIAGNOSIS — M06.9 RHEUMATOID ARTHRITIS, INVOLVING UNSPECIFIED SITE, UNSPECIFIED WHETHER RHEUMATOID FACTOR PRESENT (H): ICD-10-CM

## 2023-07-14 DIAGNOSIS — D64.9 ANEMIA, UNSPECIFIED TYPE: ICD-10-CM

## 2023-07-14 LAB
ALBUMIN SERPL BCG-MCNC: 4.4 G/DL (ref 3.5–5.2)
ALP SERPL-CCNC: 103 U/L (ref 35–104)
ALT SERPL W P-5'-P-CCNC: 19 U/L (ref 0–50)
ANION GAP SERPL CALCULATED.3IONS-SCNC: 14 MMOL/L (ref 7–15)
AST SERPL W P-5'-P-CCNC: 34 U/L (ref 0–45)
BILIRUB SERPL-MCNC: 0.6 MG/DL
BUN SERPL-MCNC: 31.2 MG/DL (ref 8–23)
CALCIUM SERPL-MCNC: 10.8 MG/DL (ref 8.8–10.2)
CHLORIDE SERPL-SCNC: 102 MMOL/L (ref 98–107)
CHOLEST SERPL-MCNC: 183 MG/DL
CREAT SERPL-MCNC: 1.05 MG/DL (ref 0.51–0.95)
DEPRECATED HCO3 PLAS-SCNC: 25 MMOL/L (ref 22–29)
ERYTHROCYTE [DISTWIDTH] IN BLOOD BY AUTOMATED COUNT: 18.5 % (ref 10–15)
FOLATE SERPL-MCNC: >40 NG/ML (ref 4.6–34.8)
GFR SERPL CREATININE-BSD FRML MDRD: 53 ML/MIN/1.73M2
GLUCOSE SERPL-MCNC: 89 MG/DL (ref 70–99)
HCT VFR BLD AUTO: 36.7 % (ref 35–47)
HDLC SERPL-MCNC: 47 MG/DL
HGB BLD-MCNC: 12.1 G/DL (ref 11.7–15.7)
IRON BINDING CAPACITY (ROCHE): 258 UG/DL (ref 240–430)
IRON SATN MFR SERPL: 24 % (ref 15–46)
IRON SERPL-MCNC: 61 UG/DL (ref 37–145)
LDH SERPL L TO P-CCNC: 343 U/L (ref 0–250)
LDLC SERPL CALC-MCNC: 120 MG/DL
MCH RBC QN AUTO: 34 PG (ref 26.5–33)
MCHC RBC AUTO-ENTMCNC: 33 G/DL (ref 31.5–36.5)
MCV RBC AUTO: 103 FL (ref 78–100)
NONHDLC SERPL-MCNC: 136 MG/DL
PLATELET # BLD AUTO: 274 10E3/UL (ref 150–450)
POTASSIUM SERPL-SCNC: 4 MMOL/L (ref 3.4–5.3)
PROT SERPL-MCNC: 8 G/DL (ref 6.4–8.3)
RBC # BLD AUTO: 3.56 10E6/UL (ref 3.8–5.2)
RETICS # AUTO: 0.06 10E6/UL (ref 0.03–0.1)
RETICS/RBC NFR AUTO: 1.8 % (ref 0.5–2)
SODIUM SERPL-SCNC: 141 MMOL/L (ref 136–145)
TRIGL SERPL-MCNC: 79 MG/DL
TSH SERPL DL<=0.005 MIU/L-ACNC: 2.3 UIU/ML (ref 0.3–4.2)
VIT B12 SERPL-MCNC: 899 PG/ML (ref 232–1245)
WBC # BLD AUTO: 7.7 10E3/UL (ref 4–11)

## 2023-07-14 PROCEDURE — 36415 COLL VENOUS BLD VENIPUNCTURE: CPT | Performed by: INTERNAL MEDICINE

## 2023-07-14 PROCEDURE — 83550 IRON BINDING TEST: CPT | Performed by: INTERNAL MEDICINE

## 2023-07-14 PROCEDURE — 80053 COMPREHEN METABOLIC PANEL: CPT | Performed by: INTERNAL MEDICINE

## 2023-07-14 PROCEDURE — 99214 OFFICE O/P EST MOD 30 MIN: CPT | Mod: 25 | Performed by: INTERNAL MEDICINE

## 2023-07-14 PROCEDURE — G0439 PPPS, SUBSEQ VISIT: HCPCS | Performed by: INTERNAL MEDICINE

## 2023-07-14 PROCEDURE — 80061 LIPID PANEL: CPT | Performed by: INTERNAL MEDICINE

## 2023-07-14 PROCEDURE — 83540 ASSAY OF IRON: CPT | Performed by: INTERNAL MEDICINE

## 2023-07-14 PROCEDURE — 84443 ASSAY THYROID STIM HORMONE: CPT | Performed by: INTERNAL MEDICINE

## 2023-07-14 PROCEDURE — 82607 VITAMIN B-12: CPT | Performed by: INTERNAL MEDICINE

## 2023-07-14 PROCEDURE — 82746 ASSAY OF FOLIC ACID SERUM: CPT | Performed by: INTERNAL MEDICINE

## 2023-07-14 PROCEDURE — 83615 LACTATE (LD) (LDH) ENZYME: CPT | Performed by: INTERNAL MEDICINE

## 2023-07-14 PROCEDURE — 85045 AUTOMATED RETICULOCYTE COUNT: CPT | Performed by: INTERNAL MEDICINE

## 2023-07-14 PROCEDURE — 85027 COMPLETE CBC AUTOMATED: CPT | Performed by: INTERNAL MEDICINE

## 2023-07-14 RX ORDER — LEVOTHYROXINE SODIUM 112 UG/1
112 TABLET ORAL DAILY
Qty: 100 TABLET | Refills: 3 | Status: SHIPPED | OUTPATIENT
Start: 2023-07-14 | End: 2024-07-17

## 2023-07-14 ASSESSMENT — ENCOUNTER SYMPTOMS
FEVER: 0
ARTHRALGIAS: 1
HEADACHES: 0
SORE THROAT: 0
FREQUENCY: 0
CHILLS: 0
BREAST MASS: 0
HEMATOCHEZIA: 0
CONSTIPATION: 0
HEARTBURN: 0
WEAKNESS: 0
ABDOMINAL PAIN: 0
DYSURIA: 0
SHORTNESS OF BREATH: 0
COUGH: 0
MYALGIAS: 1
JOINT SWELLING: 0
NERVOUS/ANXIOUS: 1
NAUSEA: 0
PARESTHESIAS: 0
EYE PAIN: 1
DIZZINESS: 0
DIARRHEA: 1
PALPITATIONS: 0
HEMATURIA: 0

## 2023-07-14 ASSESSMENT — ACTIVITIES OF DAILY LIVING (ADL)
CURRENT_FUNCTION: TRANSPORTATION REQUIRES ASSISTANCE
CURRENT_FUNCTION: HOUSEWORK REQUIRES ASSISTANCE

## 2023-07-14 ASSESSMENT — PAIN SCALES - GENERAL: PAINLEVEL: NO PAIN (0)

## 2023-07-14 NOTE — PATIENT INSTRUCTIONS
Patient Education   Personalized Prevention Plan  You are due for the preventive services outlined below.  Your care team is available to assist you in scheduling these services.  If you have already completed any of these items, please share that information with your care team to update in your medical record.  There are no preventive care reminders to display for this patient.  Activities of Daily Living    Your Health Risk Assessment indicates you have difficulties with activities of daily living such as housework, bathing, preparing meals, taking medication, etc. Please make a follow up appointment for us to address this issue in more detail.    Signs of Hearing Loss  Hearing loss is a problem shared by many people. In fact, it's one of the most common health problems, particularly as people age. Most people aged 65 and older have some hearing loss. By age 80, almost everyone does. Hearing loss often occurs slowly over the years. So, you may not realize your hearing has gotten worse.   When sudden hearing loss occurs, it's important to contact your healthcare provider right away. Your provider will do a medical exam and a hearing exam as soon as possible. This is to help find the cause and type of your sudden hearing loss. Based on your diagnosis, your healthcare provider will discuss possible treatments.      Hearing much better with one ear can be a sign of hearing loss.     Have your hearing checked  Call your healthcare provider if you:     Have to strain to hear normal conversation    Have to watch other people s faces very carefully to follow what they re saying    Need to ask people to repeat what they ve said    Often misunderstand what people are saying    Turn the volume of the television or radio up so high that others complain    Feel that people are mumbling when they re talking to you    Find that the effort to hear leaves you feeling tired and irritated    Notice, when using the phone, that you  hear better with one ear than the other  [employer] last reviewed this educational content on 6/1/2022 2000-2023 [employer]. All rights reserved. This information is not intended as a substitute for professional medical care. Always follow your healthcare professional's instructions.          Urinary Incontinence, Female (Adult)   Urinary incontinence means loss of bladder control. This problem affects many women, especially as they get older. If you have incontinence, you may be embarrassed to ask for help. But know that this problem can be treated.   Types of Incontinence  There are different types of incontinence. Two of the main types are described here. You can have more than one type.     Stress incontinence. With this type, urine leaks when pressure (stress) is put on the bladder. This may happen when you cough, sneeze, or laugh. Stress incontinence most often occurs because the pelvic floor muscles that support the bladder and urethra are weak. This can happen after pregnancy and vaginal childbirth or a hysterectomy. It can also be due to excess body weight or hormone changes.    Urge incontinence (also called overactive bladder). With this type, a sudden urge to urinate is felt often. This may happen even though there may not be much urine in the bladder. The need to urinate often during the night is common. Urge incontinence most often occurs because of bladder spasms. This may be due to bladder irritation or infection. Damage to bladder nerves or pelvic muscles, constipation, and certain medicines can also lead to urge incontinence.  Treatment depends on the cause. Further evaluation is needed to find the type you have. This will likely include an exam and certain tests. Based on the results, you and your healthcare provider can then plan treatment. Until a diagnosis is made, the home care tips below can help ease symptoms.   Home care    Do pelvic floor muscle exercises, if they are  prescribed. The pelvic floor muscles help support the bladder and urethra. Many women find that their symptoms improve when doing special exercises that strengthen these muscles. To do the exercises, contract the muscles you would use to stop your stream of urine. But do this when you re not urinating. Hold for 10 seconds, then relax. Repeat 10 to 20 times in a row, at least 3 times a day. Your healthcare provider may give you other instructions for how to do the exercises and how often.    Keep a bladder diary. This helps track how often and how much you urinate over a set period of time. Bring this diary with you to your next visit with the provider. The information can help your provider learn more about your bladder problem.    Lose weight, if advised to by your provider. Extra weight puts pressure on the bladder. Your provider can help you create a weight-loss plan that s right for you. This may include exercising more and making certain diet changes.    Don't have foods and drinks that may irritate the bladder. These can include alcohol and caffeinated drinks.    Quit smoking. Smoking and other tobacco use can lead to a long-term (chronic) cough that strains the pelvic floor muscles. Smoking may also damage the bladder and urethra. Talk with your provider about treatments or methods you can use to quit smoking.    If drinking large amounts of fluid makes you have symptoms, you may be advised to limit your fluid intake. You may also be advised to drink most of your fluids during the day and to limit fluids at night.    If you re worried about urine leakage or accidents, you may wear absorbent pads to catch urine. Change the pads often. This helps reduce discomfort. It may also reduce the risk of skin or bladder infections.    Follow-up care  Follow up with your healthcare provider, or as directed. It may take some to find the right treatment for your problem. But healthy lifestyle changes can be made right  away. These include such things as exercising on a regular basis, eating a healthy diet, losing weight (if needed), and quitting smoking. Your treatment plan may include special therapies or medicines. Certain procedures or surgery may also be options. Talk about any questions you have with your provider.   When to seek medical advice  Call the healthcare provider right away if any of these occur:    Fever of 100.4 F (38 C) or higher, or as directed by your provider    Bladder pain or fullness    Belly swelling    Nausea or vomiting    Back pain    Weakness, dizziness, or fainting  [employer] last reviewed this educational content on 1/1/2020 2000-2022 [employer]. All rights reserved. This information is not intended as a substitute for professional medical care. Always follow your healthcare professional's instructions.          Preventing Falls at Home  A person can fall for many reasons. Older adults may fall because reaction time slows as we age. Your muscles and joints may get stiff, weak, or less flexible because of illness, medicines, or a physical condition.   Other health problems that make falls more likely include:     Arthritis    Dizziness or lightheadedness when you stand up (orthostatic hypotension)    History of a stroke    Dizziness    Anemia    Certain medicines taken for mental illness or to control blood pressure.    Problems with balance or gait    Bladder or urinary problems    History of falling    Changes in vision (vision impairment)    Changes in thinking skills and memory (cognitive impairment)    Muscle weakness    Excessive alcohol use  Falls can cause serious injuries, such as head trauma, broken bones, dislocated joints, internal bleeding, and cuts. Injuries like these can limit your independence.   Prevention tips  To help prevent falls and fall-related injuries, follow the tips below.    Floors  To make floors safer:     Put nonskid pads under area rugs.    Remove  "small rugs.    Replace worn floor coverings.    Tack carpets firmly to each step on carpeted stairs. Put nonskid strips on the edges of uncarpeted stairs.    Keep floors and stairs free of clutter and cords.    Keep floors and stairs clear of animal and children's toys    Arrange furniture so there are clear pathways.    Clean up any spills right away. Don't walk on wet floors.  Bathrooms    To make bathrooms safer:     Install grab bars in the tub or shower.    Install a raised (elevated) toilet or toilet seat.    Apply nonskid strips or put a nonskid rubber mat in the tub or shower.    Sit on a bath chair to bathe.    Use bathmats with nonskid backing.  Lighting  To improve visibility in your home:      Keep a flashlight in each room. Or put a lamp next to the bed within easy reach.    Put nightlights in the bedrooms, hallways, kitchen, and bathrooms.    Make sure all stairways have good lighting. There should be a light switch at the bottom and the top of each stairway.  Other changes to make    Look around to find any safety hazards. Look closely at doorways, walkways, and the driveway. Remove or repair any safety problems that you find.    Wear shoes that fit well. Never go barefoot or wear socks or slippers with smooth soles.    See your eye care provider once a year if you wear glasses. This is to be sure the prescription is still right for you.    Take your time when going up and down stairs; always use handrails. Never carry items in both hands.    Wear an alert necklace or bracelet if you are already prone to falling.    Put handrails on both sides of stairs and in walkways for more support. To prevent injury to your wrist or arm, don t use handrails to pull yourself up.    Use a \"reach stick\" to grab hard-to-reach items.    Install grab bars wherever needed to pull yourself up.    Arrange items that you use often. This will make them easier to find or reach.    Keep track of where your pets are so you " don't trip over them when you are walking.    [employer] last reviewed this educational content on 11/1/2022 2000-2023 [employer]. All rights reserved. This information is not intended as a substitute for professional medical care. Always follow your healthcare professional's instructions.

## 2023-07-14 NOTE — PROGRESS NOTES
"SUBJECTIVE:   Deanne is a 83 year old who presents for Preventive Visit.      7/14/2023    10:53 AM   Additional Questions   Roomed by Rosa DUFF   Accompanied by n/a     Are you in the first 12 months of your Medicare coverage?  No    Healthy Habits:     In general, how would you rate your overall health?  Good    Frequency of exercise:  2-3 days/week    Duration of exercise:  15-30 minutes    Do you usually eat at least 4 servings of fruit and vegetables a day, include whole grains    & fiber and avoid regularly eating high fat or \"junk\" foods?  Yes    Taking medications regularly:  Yes    Barriers to taking medications:  None    Medication side effects:  None    Ability to successfully perform activities of daily living:  Transportation requires assistance and housework requires assistance    Home Safety:  No safety concerns identified    Hearing Impairment:  Difficulty following a conversation in a noisy restaurant or crowded room    In the past 6 months, have you been bothered by leaking of urine? Yes    In general, how would you rate your overall mental or emotional health?  Excellent    Additional concerns today:  No        Have you ever done Advance Care Planning? (For example, a Health Directive, POLST, or a discussion with a medical provider or your loved ones about your wishes): Yes, advance care planning is on file.       Fall risk  Fallen 2 or more times in the past year?: No  Any fall with injury in the past year?: Yes    Cognitive Screening   1) Repeat 3 items (Leader, Season, Table)    2) Clock draw: NORMAL  3) 3 item recall: Recalls 3 objects  Results: 3 items recalled: COGNITIVE IMPAIRMENT LESS LIKELY    Mini-CogTM Copyright BIANCA Gomez. Licensed by the author for use in [facility]; reprinted with permission (lizzie@.[facility]). All rights reserved.      Do you have sleep apnea, excessive snoring or daytime drowsiness?: no    Reviewed and updated as needed this visit by clinical staff   Tobacco "  Allergies  Meds  Problems  Med Hx  Surg Hx  Fam Hx          Reviewed and updated as needed this visit by Provider                 Social History     Tobacco Use     Smoking status: Never     Smokeless tobacco: Never   Substance Use Topics     Alcohol use: No     Alcohol/week: 0.0 standard drinks of alcohol             7/14/2023    10:43 AM   Alcohol Use   Prescreen: >3 drinks/day or >7 drinks/week? Not Applicable     Do you have a current opioid prescription? No  Do you use any other controlled substances or medications that are not prescribed by a provider? None          PROBLEMS TO ADD ON...  Has h/o HTN. on medical treatment. BP has been controlled. No side effects from medications. No CP, HA, dizziness. good compliance with medications and low salt diet.  Has history of atrial fibrillation. On anticoagulation with Coumadin and rate control medications. Asymptonatic - no chest pains , palpitations,  no side effects from medications.  Has h/o CRF. Monitoring BP, BG, medications, avoiding OTC NSAIDs. Needs periodic recheck of kidney function.  Has RA, follows with rheumatology. On Methotrexate injections.   Has history of hypothyroidism. On replacement treatment with Synthroid. No heat /cold intolerance, heart palpitations, weight loss/ gain ,  change in bowel habits.  Patient has anemia of chronic disease. Has been treated with PO Folate . No bleeding diathesis noted.       Current providers sharing in care for this patient include:   Patient Care Team:  Yunior Huang MD as PCP - General (Internal Medicine)  Jaycee Cervantes MD as MD (Rheumatology)  Yunior Huang MD as Assigned PCP  Wilfredo Cooper Do, DO  Noe Rodriguez MD as MD (Cardiovascular Disease)  Noe Rodriguez MD as MD (Cardiovascular Disease)  Kelton William MD as Assigned Surgical Provider  Zena Benitez PA as Assigned Heart and Vascular Provider  Cookie Atkinson LICSW as Lead Care Coordinator ( - Clinical)  Abdullahi  LEELA Tanner as Community Health Worker    The following health maintenance items are reviewed in Epic and correct as of today:  Health Maintenance   Topic Date Due     ANNUAL REVIEW OF HM ORDERS  07/12/2023     MEDICARE ANNUAL WELLNESS VISIT  07/12/2023     INFLUENZA VACCINE (1) 09/01/2023     BMP  09/13/2023     MICROALBUMIN  02/01/2024     HEMOGLOBIN  03/13/2024     LIPID  05/15/2024     TSH W/FREE T4 REFLEX  05/15/2024     FALL RISK ASSESSMENT  07/14/2024     DTAP/TDAP/TD IMMUNIZATION (2 - Td or Tdap) 05/04/2026     ADVANCE CARE PLANNING  07/12/2027     DEXA  05/12/2037     PHQ-2 (once per calendar year)  Completed     Pneumococcal Vaccine: 65+ Years  Completed     URINALYSIS  Completed     ZOSTER IMMUNIZATION  Completed     COVID-19 Vaccine  Completed     IPV IMMUNIZATION  Aged Out     MENINGITIS IMMUNIZATION  Aged Out     Lab work is in process  Labs reviewed in EPIC        Pertinent mammograms are reviewed under the imaging tab.    Review of Systems   Constitutional: Negative for chills and fever.   HENT: Negative for congestion, ear pain, hearing loss and sore throat.    Eyes: Positive for pain. Negative for visual disturbance.   Respiratory: Negative for cough and shortness of breath.    Cardiovascular: Negative for chest pain, palpitations and peripheral edema.   Gastrointestinal: Positive for diarrhea. Negative for abdominal pain, constipation, heartburn, hematochezia and nausea.   Breasts:  Negative for tenderness, breast mass and discharge.   Genitourinary: Positive for urgency. Negative for dysuria, frequency, genital sores, hematuria, pelvic pain, vaginal bleeding and vaginal discharge.   Musculoskeletal: Positive for arthralgias and myalgias. Negative for joint swelling.   Skin: Negative for rash.   Neurological: Negative for dizziness, weakness, headaches and paresthesias.   Psychiatric/Behavioral: Negative for mood changes. The patient is nervous/anxious.          OBJECTIVE:   /68 (BP  "Location: Left arm, Cuff Size: Adult Regular)   Pulse 95   Temp 98.3  F (36.8  C) (Tympanic)   Resp 15   Ht 1.626 m (5' 4\")   Wt 64 kg (141 lb 3.2 oz)   LMP  (LMP Unknown)   SpO2 96%   BMI 24.24 kg/m   Estimated body mass index is 24.24 kg/m  as calculated from the following:    Height as of this encounter: 1.626 m (5' 4\").    Weight as of this encounter: 64 kg (141 lb 3.2 oz).  Physical Exam  GENERAL: frail, alert and no distress  EYES: Eyes grossly normal to inspection, PERRL and conjunctivae and sclerae normal  HENT: ear canals and TM's normal, nose and mouth without ulcers or lesions  NECK: no adenopathy, no asymmetry, masses, or scars and thyroid normal to palpation  RESP: lungs clear to auscultation - no rales, rhonchi or wheezes  BREAST: normal without masses, tenderness or nipple discharge and no palpable axillary masses or adenopathy  CV: regular rate and rhythm, normal S1 S2, no S3 or S4, 4/6 systolic murmur, + click , no peripheral edema and peripheral pulses strong  ABDOMEN: soft, nontender, no hepatosplenomegaly, no masses and bowel sounds normal  MS: no gross musculoskeletal defects noted, no edema  SKIN: no suspicious lesions or rashes  NEURO: Normal strength and tone, mentation intact and speech normal  PSYCH: mentation appears normal, affect normal/bright    Diagnostic Test Results:  Labs reviewed in Epic    ASSESSMENT / PLAN:       ICD-10-CM    1. Encounter for preventative adult health care examination  Z00.00 OSMAR(EXACT SCIENCES)     Lipid panel reflex to direct LDL Fasting     CBC with platelets     Comprehensive metabolic panel (BMP + Alb, Alk Phos, ALT, AST, Total. Bili, TP)     TSH with free T4 reflex     Iron and iron binding capacity     Vitamin B12     Folate     Lactate Dehydrogenase     Reticulocyte count      2. Acquired hypothyroidism  E03.9 levothyroxine (SYNTHROID/LEVOTHROID) 112 MCG tablet     TSH with free T4 reflex     OFFICE/OUTPT VISIT,EST,LEVL III      3. Colon " cancer screening  Z12.11 OSMAR(EXACT SCIENCES)      4. Anemia, unspecified type  D64.9 Iron and iron binding capacity     Vitamin B12     Folate     Lactate Dehydrogenase     Reticulocyte count     OFFICE/OUTPT VISIT,EST,LEVL III      5. Atrial fibrillation and flutter (H)  I48.91 OFFICE/OUTPT VISIT,EST,LEVL III    I48.92       6. Benign essential hypertension  I10 Lipid panel reflex to direct LDL Fasting     CBC with platelets     Comprehensive metabolic panel (BMP + Alb, Alk Phos, ALT, AST, Total. Bili, TP)     OFFICE/OUTPT VISIT,EST,LEVL III      7. Rheumatoid arthritis, involving unspecified site, unspecified whether rheumatoid factor present (H)  M06.9       8. Stage 3 chronic kidney disease, unspecified whether stage 3a or 3b CKD (H)  N18.30 OFFICE/OUTPT VISIT,EST,LEVL III      assess lab work   Continue treatment   Controlled HTN  Monitor renal function   Assess thyroid function and anemia     Patient has been advised of split billing requirements and indicates understanding: Yes      COUNSELING:  Reviewed preventive health counseling, as reflected in patient instructions       Regular exercise       Healthy diet/nutrition       Vision screening       Hearing screening       Dental care       Colon cancer screening        She reports that she has never smoked. She has never used smokeless tobacco.      Appropriate preventive services were discussed with this patient, including applicable screening as appropriate for cardiovascular disease, diabetes, osteopenia/osteoporosis, and glaucoma.  As appropriate for age/gender, discussed screening for colorectal cancer, prostate cancer, breast cancer, and cervical cancer. Checklist reviewing preventive services available has been given to the patient.    Reviewed patients plan of care and provided an AVS. The Intermediate Care Plan ( asthma action plan, low back pain action plan, and migraine action plan) for Zunilda meets the Care Plan requirement. This Care Plan  has been established and reviewed with the Patient.          Yunior Huang MD  [facility]    Identified Health Risks:    I have reviewed Opioid Use Disorder and Substance Use Disorder risk factors and made any needed referrals.     The patient was provided with written information regarding signs of hearing loss.  Information on urinary incontinence and treatment options given to patient.  She is at risk for falling and has been provided with information to reduce the risk of falling at home.

## 2023-07-17 ENCOUNTER — PATIENT OUTREACH (OUTPATIENT)
Dept: CARE COORDINATION | Facility: CLINIC | Age: 83
End: 2023-07-17
Payer: COMMERCIAL

## 2023-07-17 NOTE — PROGRESS NOTES
Clinic Care Coordination Contact  [facility]/Voicemail       Clinical Data: Care Coordinator Outreach  Outreach attempted x 1.  Left message on patient's voicemail with call back information and requested return call.    Plan: Care Coordinator will try to reach patient again in 10 business days.    Flores ROBERTS [facility]  Community Health Worker  [facility]  Clinic Care Coordination  [phone]

## 2023-07-18 ENCOUNTER — HOSPITAL ENCOUNTER (OUTPATIENT)
Dept: CARDIOLOGY | Facility: CLINIC | Age: 83
Discharge: HOME | End: 2023-07-18
Attending: PHYSICIAN ASSISTANT | Admitting: PHYSICIAN ASSISTANT
Payer: COMMERCIAL

## 2023-07-18 DIAGNOSIS — D64.9 ANEMIA, UNSPECIFIED TYPE: ICD-10-CM

## 2023-07-18 DIAGNOSIS — I27.20 PULMONARY HTN (H): ICD-10-CM

## 2023-07-18 LAB — LVEF ECHO: NORMAL

## 2023-07-18 PROCEDURE — 93306 TTE W/DOPPLER COMPLETE: CPT

## 2023-07-18 PROCEDURE — 93306 TTE W/DOPPLER COMPLETE: CPT | Mod: 26 | Performed by: INTERNAL MEDICINE

## 2023-07-19 ENCOUNTER — ANCILLARY PROCEDURE (OUTPATIENT)
Dept: CARDIOLOGY | Facility: CLINIC | Age: 83
End: 2023-07-19
Attending: INTERNAL MEDICINE
Payer: COMMERCIAL

## 2023-07-19 DIAGNOSIS — Z95.0 CARDIAC PACEMAKER IN SITU: Primary | ICD-10-CM

## 2023-07-19 DIAGNOSIS — I44.2 COMPLETE ATRIOVENTRICULAR BLOCK (H): ICD-10-CM

## 2023-07-19 DIAGNOSIS — Z95.0 CARDIAC PACEMAKER IN SITU: ICD-10-CM

## 2023-07-19 PROCEDURE — 93280 PM DEVICE PROGR EVAL DUAL: CPT | Performed by: INTERNAL MEDICINE

## 2023-07-20 LAB
MDC_IDC_EPISODE_DTM: NORMAL
MDC_IDC_EPISODE_DURATION: 1 S
MDC_IDC_EPISODE_DURATION: 1 S
MDC_IDC_EPISODE_DURATION: 7593 S
MDC_IDC_EPISODE_ID: NORMAL
MDC_IDC_EPISODE_TYPE: NORMAL
MDC_IDC_LEAD_IMPLANT_DT: NORMAL
MDC_IDC_LEAD_IMPLANT_DT: NORMAL
MDC_IDC_LEAD_LOCATION: NORMAL
MDC_IDC_LEAD_LOCATION: NORMAL
MDC_IDC_LEAD_MFG: NORMAL
MDC_IDC_LEAD_MFG: NORMAL
MDC_IDC_LEAD_MODEL: NORMAL
MDC_IDC_LEAD_MODEL: NORMAL
MDC_IDC_LEAD_POLARITY_TYPE: NORMAL
MDC_IDC_LEAD_POLARITY_TYPE: NORMAL
MDC_IDC_LEAD_SERIAL: NORMAL
MDC_IDC_LEAD_SERIAL: NORMAL
MDC_IDC_MSMT_BATTERY_DTM: NORMAL
MDC_IDC_MSMT_BATTERY_REMAINING_LONGEVITY: 111 MO
MDC_IDC_MSMT_BATTERY_RRT_TRIGGER: 2.62
MDC_IDC_MSMT_BATTERY_STATUS: NORMAL
MDC_IDC_MSMT_BATTERY_VOLTAGE: 3 V
MDC_IDC_MSMT_LEADCHNL_RA_IMPEDANCE_VALUE: 304 OHM
MDC_IDC_MSMT_LEADCHNL_RA_IMPEDANCE_VALUE: 418 OHM
MDC_IDC_MSMT_LEADCHNL_RA_PACING_THRESHOLD_AMPLITUDE: 0.62 V
MDC_IDC_MSMT_LEADCHNL_RA_PACING_THRESHOLD_PULSEWIDTH: 0.4 MS
MDC_IDC_MSMT_LEADCHNL_RA_SENSING_INTR_AMPL: 0.38 MV
MDC_IDC_MSMT_LEADCHNL_RA_SENSING_INTR_AMPL: 0.38 MV
MDC_IDC_MSMT_LEADCHNL_RV_IMPEDANCE_VALUE: 342 OHM
MDC_IDC_MSMT_LEADCHNL_RV_IMPEDANCE_VALUE: 380 OHM
MDC_IDC_MSMT_LEADCHNL_RV_PACING_THRESHOLD_AMPLITUDE: 0.75 V
MDC_IDC_MSMT_LEADCHNL_RV_PACING_THRESHOLD_PULSEWIDTH: 0.4 MS
MDC_IDC_MSMT_LEADCHNL_RV_SENSING_INTR_AMPL: 12.25 MV
MDC_IDC_MSMT_LEADCHNL_RV_SENSING_INTR_AMPL: 12.25 MV
MDC_IDC_PG_IMPLANT_DTM: NORMAL
MDC_IDC_PG_MFG: NORMAL
MDC_IDC_PG_MODEL: NORMAL
MDC_IDC_PG_SERIAL: NORMAL
MDC_IDC_PG_TYPE: NORMAL
MDC_IDC_SESS_CLINIC_NAME: NORMAL
MDC_IDC_SESS_DTM: NORMAL
MDC_IDC_SESS_TYPE: NORMAL
MDC_IDC_SET_BRADY_AT_MODE_SWITCH_RATE: 171 {BEATS}/MIN
MDC_IDC_SET_BRADY_HYSTRATE: NORMAL
MDC_IDC_SET_BRADY_LOWRATE: 60 {BEATS}/MIN
MDC_IDC_SET_BRADY_MAX_SENSOR_RATE: 130 {BEATS}/MIN
MDC_IDC_SET_BRADY_MAX_TRACKING_RATE: 130 {BEATS}/MIN
MDC_IDC_SET_BRADY_MODE: NORMAL
MDC_IDC_SET_BRADY_PAV_DELAY_LOW: 180 MS
MDC_IDC_SET_BRADY_SAV_DELAY_LOW: 150 MS
MDC_IDC_SET_LEADCHNL_RA_PACING_AMPLITUDE: 1.5 V
MDC_IDC_SET_LEADCHNL_RA_PACING_ANODE_ELECTRODE_1: NORMAL
MDC_IDC_SET_LEADCHNL_RA_PACING_ANODE_LOCATION_1: NORMAL
MDC_IDC_SET_LEADCHNL_RA_PACING_CAPTURE_MODE: NORMAL
MDC_IDC_SET_LEADCHNL_RA_PACING_CATHODE_ELECTRODE_1: NORMAL
MDC_IDC_SET_LEADCHNL_RA_PACING_CATHODE_LOCATION_1: NORMAL
MDC_IDC_SET_LEADCHNL_RA_PACING_POLARITY: NORMAL
MDC_IDC_SET_LEADCHNL_RA_PACING_PULSEWIDTH: 0.4 MS
MDC_IDC_SET_LEADCHNL_RA_SENSING_ANODE_ELECTRODE_1: NORMAL
MDC_IDC_SET_LEADCHNL_RA_SENSING_ANODE_LOCATION_1: NORMAL
MDC_IDC_SET_LEADCHNL_RA_SENSING_CATHODE_ELECTRODE_1: NORMAL
MDC_IDC_SET_LEADCHNL_RA_SENSING_CATHODE_LOCATION_1: NORMAL
MDC_IDC_SET_LEADCHNL_RA_SENSING_POLARITY: NORMAL
MDC_IDC_SET_LEADCHNL_RA_SENSING_SENSITIVITY: 0.45 MV
MDC_IDC_SET_LEADCHNL_RV_PACING_AMPLITUDE: 2 V
MDC_IDC_SET_LEADCHNL_RV_PACING_ANODE_ELECTRODE_1: NORMAL
MDC_IDC_SET_LEADCHNL_RV_PACING_ANODE_LOCATION_1: NORMAL
MDC_IDC_SET_LEADCHNL_RV_PACING_CAPTURE_MODE: NORMAL
MDC_IDC_SET_LEADCHNL_RV_PACING_CATHODE_ELECTRODE_1: NORMAL
MDC_IDC_SET_LEADCHNL_RV_PACING_CATHODE_LOCATION_1: NORMAL
MDC_IDC_SET_LEADCHNL_RV_PACING_POLARITY: NORMAL
MDC_IDC_SET_LEADCHNL_RV_PACING_PULSEWIDTH: 0.4 MS
MDC_IDC_SET_LEADCHNL_RV_SENSING_ANODE_ELECTRODE_1: NORMAL
MDC_IDC_SET_LEADCHNL_RV_SENSING_ANODE_LOCATION_1: NORMAL
MDC_IDC_SET_LEADCHNL_RV_SENSING_CATHODE_ELECTRODE_1: NORMAL
MDC_IDC_SET_LEADCHNL_RV_SENSING_CATHODE_LOCATION_1: NORMAL
MDC_IDC_SET_LEADCHNL_RV_SENSING_POLARITY: NORMAL
MDC_IDC_SET_LEADCHNL_RV_SENSING_SENSITIVITY: 0.9 MV
MDC_IDC_SET_ZONE_DETECTION_INTERVAL: 350 MS
MDC_IDC_SET_ZONE_DETECTION_INTERVAL: 400 MS
MDC_IDC_SET_ZONE_TYPE: NORMAL
MDC_IDC_STAT_AT_BURDEN_PERCENT: 9 %
MDC_IDC_STAT_AT_DTM_END: NORMAL
MDC_IDC_STAT_AT_DTM_START: NORMAL
MDC_IDC_STAT_BRADY_AP_VP_PERCENT: 82.43 %
MDC_IDC_STAT_BRADY_AP_VS_PERCENT: 0.14 %
MDC_IDC_STAT_BRADY_AS_VP_PERCENT: 16.22 %
MDC_IDC_STAT_BRADY_AS_VS_PERCENT: 1.24 %
MDC_IDC_STAT_BRADY_DTM_END: NORMAL
MDC_IDC_STAT_BRADY_DTM_START: NORMAL
MDC_IDC_STAT_BRADY_RA_PERCENT_PACED: 79.59 %
MDC_IDC_STAT_BRADY_RV_PERCENT_PACED: 98.59 %
MDC_IDC_STAT_EPISODE_RECENT_COUNT: 0
MDC_IDC_STAT_EPISODE_RECENT_COUNT: 5
MDC_IDC_STAT_EPISODE_RECENT_COUNT: NORMAL
MDC_IDC_STAT_EPISODE_RECENT_COUNT_DTM_END: NORMAL
MDC_IDC_STAT_EPISODE_RECENT_COUNT_DTM_START: NORMAL
MDC_IDC_STAT_EPISODE_TOTAL_COUNT: 0
MDC_IDC_STAT_EPISODE_TOTAL_COUNT: 7
MDC_IDC_STAT_EPISODE_TOTAL_COUNT: NORMAL
MDC_IDC_STAT_EPISODE_TOTAL_COUNT_DTM_END: NORMAL
MDC_IDC_STAT_EPISODE_TOTAL_COUNT_DTM_START: NORMAL
MDC_IDC_STAT_EPISODE_TYPE: NORMAL

## 2023-07-24 ENCOUNTER — OFFICE VISIT (OUTPATIENT)
Dept: CARDIOLOGY | Facility: CLINIC | Age: 83
End: 2023-07-24
Attending: INTERNAL MEDICINE
Payer: COMMERCIAL

## 2023-07-24 ENCOUNTER — TELEPHONE (OUTPATIENT)
Dept: INTERNAL MEDICINE | Facility: CLINIC | Age: 83
End: 2023-07-24

## 2023-07-24 VITALS
SYSTOLIC BLOOD PRESSURE: 114 MMHG | HEIGHT: 64 IN | HEART RATE: 66 BPM | BODY MASS INDEX: 24.24 KG/M2 | DIASTOLIC BLOOD PRESSURE: 54 MMHG | WEIGHT: 142 LBS

## 2023-07-24 DIAGNOSIS — Z95.3 S/P MITRAL VALVE REPLACEMENT WITH BIOPROSTHETIC VALVE: Primary | ICD-10-CM

## 2023-07-24 DIAGNOSIS — I27.20 PULMONARY HYPERTENSION (H): ICD-10-CM

## 2023-07-24 DIAGNOSIS — I06.0 RHEUMATIC AORTIC STENOSIS: ICD-10-CM

## 2023-07-24 PROCEDURE — 99214 OFFICE O/P EST MOD 30 MIN: CPT | Performed by: INTERNAL MEDICINE

## 2023-07-24 RX ORDER — AMOXICILLIN 500 MG/1
TABLET, FILM COATED ORAL
Qty: 4 TABLET | Refills: 3 | Status: SHIPPED | OUTPATIENT
Start: 2023-07-24 | End: 2024-07-05

## 2023-07-24 NOTE — PATIENT INSTRUCTIONS
July 24, 2023    Thank you for allowing our Cardiology team to participate in your care.     Please note the following changes to your heart treatment plan:     Medication changes:   - none    Tests to be done:  - TTE in 6 months and in 1 year    Follow up:  - Follow up in 6 months with Zena TEMPLETON and with me in 1 year, or sooner as needed.      For scheduling, please call [phone].      Please contact our team through [employer] or our Nurse Team Voicemail service [phone], or the General Clinic [phone] for any questions or concerns.     If you are having a medical emergency, please call [phone].     Sincerely,    Noe Rodriguez MD, FACC  Cardiology    [facility] - [place]

## 2023-07-24 NOTE — TELEPHONE ENCOUNTER
Patient calls because her Handicap Parking Permit is going to   and she did not receive a renewal in the mail.  Patient is requesting a new Handicap Form be completed as soon as possible.  Please call when completed.

## 2023-07-24 NOTE — PROGRESS NOTES
Cardiology Clinic Progress Note:    July 24, 2023   Patient Name: Zunilda Clark  Patient MRN: [id number]     Consult indication: MVR    HPI:    I had the opportunity to see patient Zunilda Clark in cardiology clinic for a follow up visit. Patient is followed by our colleague Yunior Huang MD with Primary Care.     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      The patient has a complex cardiac history, notable for valvular heart disease with a prior mitral valve replacement, initially in 2007, as well as a redo mitral valve replacement with a 27 mm Magna bioprosthetic valve (2014).  She is also status post tricuspid valve annuloplasty with residual moderate to severe tricuspid regurgitation, cardiac MRI with mildly dilated right ventricle but normal systolic function (CMR 07/2018).  The patient also has a history of pacemaker placement for high-degree AV block after initial cardiac surgery in 2007.  Patient does note that the device was quite loose after initial implantation and she may have flipped this around once or twice.     She was previously seen by my late colleague, Dr. Das, and later Dr. Monroy, in clinic regarding pulmonary hypertension, overall clinical presentation seemed most consistent with group 2 pulmonary hypertension related to valve disease.    Patient is now followed closely by my colleague YOKASTA Salcido.  Overall patient reports that she has been feeling well.  Her  of over 50 years passed away a few years ago, and since then she has been living  independently.     Denies any chest pain, chest pressure, dyspnea on exertion, abnormal lower extremity swelling, symptoms of orthopnea/PND, weight stable at 142 pounds.  BP today in clinic 114/54 mmHg.    Device interrogation 7/19/2023 demonstrated AP 80%,  99%, battery status 9.1 years, atrial fibrillation episode lasting 2 hours 6 minutes 33 seconds with rates in the 60s beats per minute range, 2 episodes of NSVT lasting less than 1 second.  Asymptomatic.    TTE 7/18/2023  Interpretation Summary     Left ventricular systolic function is normal.The visual ejection fraction is  55-60%.  The right ventricle is mildly dilated.The right ventricular systolic function  is normal.  The left atrium is severely dilated.  There is a bioprosthetic mitral valve.There is trace mitral regurgitation.Mean  gradients 5.6 mm hg across prosthetic mitral valve at heart rate sof 60 bpm.  Moderate to severe valvular aortic stenosis.The calculated aortic valve area  is 1cm2.. Mean gradients 22 mm hg, DI 0.28  Severe (>55mmHg) pulmonary hypertension is present- RVSP 79 mm hg +RA.  There is moderate (2+) tricuspid regurgitation.     Echo dated 01/07/2022 moderate aortic valve stenosis, SILVESTRE 1.2 cm2, mean  gradients 19.5 mm hg, severe pulmonary hypertension- RVSP 75 mm hg +RA.    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  TTE findings are similar when compared to prior studies including a cardiac MRI 07/2018, however now has mod-severe aortic stenosis as well.  The patient denies any symptoms concerning for decompensation, in fact she feels overall quite well.     # Moderate-severe aortic stenosis  # Rheumatic heart disease, history of bioprosthetic mitral valve replacement in 2007, redo in 2014 with a 27 mm Magna bioprosthetic mitral valve.  Moderate TR.   # High-degree AV block after cardiac surgery in 2007, status post dual-chamber pacemaker placement that had  recently reached end-of-life.  Now status post pacemaker generator replacement 3/8/2021.   #  Rheumatoid arthritis.   #  Hypertension.  Stable.    #  Atrial fibrillation, on warfarin.     - Overall patient is in stable cardiovascular health without symptoms concerning for angina or decompensated heart failure or symptomatic valvular heart disease  - Continue current regimen of metoprolol tartrate, amlodipine and valsartan, warfarin, endocarditis prophylaxis  - TTE in 6 months and in 1 year  - Follow up with Zena TEMPLETON in 6 months and with me in 1 year    Thank you for allowing our team to participate in the care of Zunilda Clark.  Please do not hesitate to call or page me with any questions or concerns.    Sincerely,     Noe Rodriguez MD, [facility]  Cardiology  Text Page   July 24, 2023    Voice recognition software utilized.     Total time spent on this encounter today: 33 minutes, providing care in this encounter including, but not limited to, reviewing prior medical records, laboratory data, imaging studies, diagnostic studies, procedure notes, formulating an assessment and plan, recommendations, discussion and counseling with patient face to face, dictation.    Past Medical History:     Past Medical History:   Diagnosis Date    Acquired hypothyroidism 11/04/2015    Aortic valve insufficiency     Atrial fibrillation and flutter     CHF (congestive heart failure)     DVT (deep venous thrombosis) 2003    Gastro-oesophageal reflux disease     H/O mitral valve replacement with tissue graft 06/2014    HTN (hypertension)     Other chronic pain     Pulmonary HTN     Rheumatoid arthritis     Seizure 2014    Shingles 08/2018    Stroke 2009    left side mild weakness         Past Surgical History:   Past Surgical History:   Procedure Laterality Date    APPLY WOUND VAC Left 12/18/2018    Procedure: Wound vac application;  Surgeon: Pardeep Sheikh MD;  Location: [facility] OR    ARTHROPLASTY KNEE Left  11/12/2018    Procedure: Left total knee arthroplasty using a [employer] II knee system;  Surgeon: Pardeep Sheikh MD;  Location: [facility] OR    ARTHROSCOPY KNEE WITH DEBRIDEMENT JOINT, COMBINED Left 12/18/2018    Procedure: Left knee debridement and placement of wound vac;  Surgeon: Pardeep Sheikh MD;  Location: [facility] OR    CATARACT IOL, RT/LT      COLONOSCOPY  03/15/2012    EP PPM GENERATOR CHANGE SINGLE N/A 03/08/2021    Procedure: Permanent Pacemakers  Generator Change Dual Chamber;  Surgeon: Tamiko Cowan MD;  Location:  [facility]ATH LAB    ESOPHAGOSCOPY, GASTROSCOPY, DUODENOSCOPY (EGD), COMBINED N/A 02/19/2020    Procedure: ESOPHAGOGASTRODUODENOSCOPY (EGD);  Surgeon: Michael Mccarthy MD;  Location:  GI    EYE SURGERY  2018    Both eyes/cataract surgery    H ABLATION AV NODE  2011    AFlutter with syncope, PPM to follow    HERNIA REPAIR      3 hernies/right and left & umbilical    IMPLANT PACEMAKER  2011    LAPAROSCOPIC CHOLECYSTECTOMY WITH CHOLANGIOGRAMS N/A 04/29/2017    Procedure: LAPAROSCOPIC CHOLECYSTECTOMY WITH CHOLANGIOGRAMS;  LAPAROSCOPIC CHOLECYSTECTOMY WITH CHOLANGIOGRAMS ;  Surgeon: Angeles Mcgill MD;  Location: [facility] OR    OPEN REDUCTION INTERNAL FIXATION RODDING INTRAMEDULLARY HUMERUS Right 07/28/2015    Procedure: OPEN REDUCTION INTERNAL FIXATION RODDING INTRAMEDULLARY HUMERUS;  Surgeon: Raymundo Rivera MD;  Location: [facility] OR    REPLACE VALVE MITRAL  2006    Mitral valve replacement with bioprosthetic valve in 2008 for rheumatic disease    SLING BLADDER SUSPENSION WITH FASCIA UMESH         Medications (outpatient):  Current Outpatient Medications   Medication Sig Dispense Refill    ALLOPURINOL PO Take 100 mg by mouth 2 times daily      amLODIPine (NORVASC) 2.5 MG tablet Take 1 tablet (2.5 mg) by mouth daily 90 tablet 3    amLODIPine-valsartan (EXFORGE) 5-160 MG tablet Take 1 tablet by mouth daily 90 tablet 3    calcium citrate (CALCITRATE)  950 MG tablet Take 1 tablet by mouth daily       Dorzolamide HCl-Timolol Mal (COSOPT OP) Apply to eye 2 times daily Place one drop into each eye twice daily      famotidine (PEPCID) 40 MG tablet TAKE 1 TABLET BY MOUTH DAILY 90 tablet 3    folic acid (FOLVITE) 1 MG tablet Take 1 mg by mouth daily      levothyroxine (SYNTHROID/LEVOTHROID) 112 MCG tablet Take 1 tablet (112 mcg) by mouth daily 100 tablet 3    METHOTREXATE SODIUM IJ Inject 0.8 mLs as directed once a week       metoprolol tartrate (LOPRESSOR) 50 MG tablet Take 1 tablet (50 mg) by mouth 2 times daily 180 tablet 3    multivitamin w/minerals (THERA-VIT-M) tablet Take 1 tablet by mouth daily Without iron      torsemide (DEMADEX) 10 MG tablet Take 1 tablet (10 mg) by mouth daily (with breakfast) 90 tablet 3    VITAMIN D, CHOLECALCIFEROL, PO Take 1,000 Units by mouth daily      warfarin ANTICOAGULANT (COUMADIN) 2.5 MG tablet Take 2 Tablets (5 mg) by mouth every Tuesday; and take 1 tablet (2.5 mg) all other days or as directed by your ACC Team. (Patient taking differently: 1 tablet Monday and Thursday, 1 1/2 tablets all other days or as directed by your ACC Team.) 130 tablet 1       Allergies:  No Known Allergies    Social History:   History   Drug Use No      History   Smoking Status    Never   Smokeless Tobacco    Never     Social History    Substance and Sexual Activity      Alcohol use: No        Alcohol/week: 0.0 standard drinks of alcohol       Family History:  Family History   Problem Relation Age of Onset    Coronary Artery Disease Mother     Coronary Artery Disease Brother     Diabetes Brother     Cancer Brother          at age 52     Other Cancer Brother     Hypertension Sister     Hyperlipidemia Sister        Review of Systems:   A complete review of systems was negative except as mentioned in the History of Present Illness.     Objective & Physical Exam:  /54 (BP Location: Right arm, Patient Position: Sitting, Cuff Size: Adult  "Regular)   Pulse 66   Ht 1.626 m (5' 4\")   Wt 64.4 kg (142 lb)   LMP  (LMP Unknown)   BMI 24.37 kg/m    Wt Readings from Last 2 Encounters:   07/24/23 64.4 kg (142 lb)   07/14/23 64 kg (141 lb 3.2 oz)     Body mass index is 24.37 kg/m .   Body surface area is 1.71 meters squared.    Constitutional: appears stated age, in no apparent distress, appears to be well nourished  Head: normocephalic, atraumatic  Neck: supple, trachea midline  Pulmonary: clear to auscultation bilaterally, no wheezes, no rales, no increased work of breathing  Cardiovascular: JVP normal, regular rate, regular rhythm, normal S1 and S2, 2/6 JON at the RUSB, no lower extremity edema  Gastrointestinal: no guarding, non-rigid   Neurologic: awake, alert, moves all extremities  Skin: no jaundice, warm on limited exam  Psychiatric: affect is normal, answers questions appropriately, oriented to self and place    Data reviewed:  Lab Results   Component Value Date    WBC 7.7 07/14/2023    WBC 7.1 06/22/2021    RBC 3.56 (L) 07/14/2023    RBC 3.75 (L) 06/22/2021    HGB 12.1 07/14/2023    HGB 12.5 06/22/2021    HCT 36.7 07/14/2023    HCT 38.6 06/22/2021     (H) 07/14/2023     (H) 06/22/2021    MCH 34.0 (H) 07/14/2023    MCH 33.3 (H) 06/22/2021    MCHC 33.0 07/14/2023    MCHC 32.4 06/22/2021    RDW 18.5 (H) 07/14/2023    RDW 17.0 (H) 06/22/2021     07/14/2023     06/22/2021     Sodium   Date Value Ref Range Status   07/14/2023 141 136 - 145 mmol/L Final   06/22/2021 138 133 - 144 mmol/L Final     Potassium   Date Value Ref Range Status   07/14/2023 4.0 3.4 - 5.3 mmol/L Final   08/17/2022 3.7 3.4 - 5.3 mmol/L Final   06/22/2021 3.8 3.4 - 5.3 mmol/L Final     Chloride   Date Value Ref Range Status   07/14/2023 102 98 - 107 mmol/L Final   08/17/2022 107 94 - 109 mmol/L Final   06/22/2021 104 94 - 109 mmol/L Final     Carbon Dioxide   Date Value Ref Range Status   06/22/2021 33 (H) 20 - 32 mmol/L Final     Carbon Dioxide (CO2) "   Date Value Ref Range Status   07/14/2023 25 22 - 29 mmol/L Final   08/17/2022 26 20 - 32 mmol/L Final     Anion Gap   Date Value Ref Range Status   07/14/2023 14 7 - 15 mmol/L Final   08/17/2022 7 3 - 14 mmol/L Final   06/22/2021 1 (L) 3 - 14 mmol/L Final     Glucose   Date Value Ref Range Status   07/14/2023 89 70 - 99 mg/dL Final   08/17/2022 91 70 - 99 mg/dL Final   06/22/2021 88 70 - 99 mg/dL Final     Urea Nitrogen   Date Value Ref Range Status   07/14/2023 31.2 (H) 8.0 - 23.0 mg/dL Final   08/17/2022 21 7 - 30 mg/dL Final   06/22/2021 27 7 - 30 mg/dL Final     Creatinine   Date Value Ref Range Status   07/14/2023 1.05 (H) 0.51 - 0.95 mg/dL Final   06/22/2021 1.11 (H) 0.52 - 1.04 mg/dL Final     GFR Estimate   Date Value Ref Range Status   07/14/2023 53 (L) >60 mL/min/1.73m2 Final   06/28/2021 50.9 ml/min/1.73m2 Final     Calcium   Date Value Ref Range Status   07/14/2023 10.8 (H) 8.8 - 10.2 mg/dL Final   06/22/2021 9.6 8.5 - 10.1 mg/dL Final     Bilirubin Total   Date Value Ref Range Status   07/14/2023 0.6 <=1.2 mg/dL Final   06/22/2021 0.4 0.2 - 1.3 mg/dL Final     Alkaline Phosphatase   Date Value Ref Range Status   07/14/2023 103 35 - 104 U/L Final   06/22/2021 99 40 - 150 U/L Final     ALT   Date Value Ref Range Status   07/14/2023 19 0 - 50 U/L Final     Comment:     Reference intervals for this test were updated on 6/12/2023 to more accurately reflect our healthy population. There may be differences in the flagging of prior results with similar values performed with this method. Interpretation of those prior results can be made in the context of the updated reference intervals.     06/22/2021 23 0 - 50 U/L Final     AST   Date Value Ref Range Status   07/14/2023 34 0 - 45 U/L Final     Comment:     Reference intervals for this test were updated on 6/12/2023 to more accurately reflect our healthy population. There may be differences in the flagging of prior results with similar values performed with  this method. Interpretation of those prior results can be made in the context of the updated reference intervals.   2021 23 0 - 45 U/L Final     Recent Labs   Lab Test 23  1132 05/15/23  1528 16  0844 11/13/15  0850   CHOL 183 194   < > 207*   HDL 47* 46*   < > 58   * 120*   < > 134*   TRIG 79 140   < > 77   CHOLHDLRATIO  --   --   --  3.6    < > = values in this interval not displayed.      Lab Results   Component Value Date    A1C 5.4 2016        Recent Results (from the past 4320 hour(s))   Echocardiogram Complete   Result Value    LVEF  55-60%    Narrative    [id number]  [id number]  [id number]  [id number]^DEDE^VINNIE     [facility]  Echocardiography Laboratory  [address] [place] [address]     Name: NADIYA LEWIS  MRN: [id number]  : 1940  Study Date: 2023 11:50 AM  Age: 83 yrs  Gender: Female  Patient Location: [facility]  Reason For Study: Pulmonary HTN (H), Anemia, unspecified type  Ordering Physician: VINNIE AGUAYO  Referring Physician: VINNIE AGUAYO  Performed By: Sandro Beasley RDCS     BSA: 1.7 m2  Height: 64 in  Weight: 141 lb  HR: 70  BP: 127/70 mmHg  ______________________________________________________________________________  Procedure  Complete Echo Adult.  ______________________________________________________________________________  Interpretation Summary     Left ventricular systolic function is normal.The visual ejection fraction is  55-60%.  The right ventricle is mildly dilated.The right ventricular systolic function  is normal.  The left atrium is severely dilated.  There is a bioprosthetic mitral valve.There is trace mitral regurgitation.Mean  gradients 5.6 mm hg across prosthetic mitral valve at heart rate sof 60 bpm.  Moderate to severe valvular aortic stenosis.The calculated aortic valve area  is 1cm2.. Mean gradients 22 mm hg, DI 0.28  Severe (>55mmHg) pulmonary hypertension is present- RVSP 79 mm hg +RA.  There is moderate  (2+) tricuspid regurgitation.     Echo dated 01/07/2022 moderate aortic valve stenosis, SILVESTRE 1.2 cm2, mean  gradients 19.5 mm hg, severe pulmonary hypertension- RVSP 75 mm hg +RA.  ______________________________________________________________________________  Left Ventricle  The left ventricle is normal in size. There is mild concentric left  ventricular hypertrophy. Left ventricular systolic function is normal. The  visual ejection fraction is 55-60%. Septal motion is consistent with post-  operative state.     Right Ventricle  The right ventricle is mildly dilated. The right ventricular systolic function  is normal. There is a pacemaker lead in the right ventricle.     Atria  The left atrium is severely dilated. The right atrium is mildly dilated. There  is no color Doppler evidence of an atrial shunt.     Mitral Valve  There is trace mitral regurgitation. Mean gradients 5.6 mm hg across  prosthetic mitral valve at heart rate sof 60 bpm. There is a bioprosthetic  mitral valve.     Tricuspid Valve  There is moderate (2+) tricuspid regurgitation. The right ventricular systolic  pressure is approximated at 78.6 mmHg plus the right atrial pressure. Right  ventricular systolic pressure is elevated, consistent with severe pulmonary  hypertension. Severe (>55mmHg) pulmonary hypertension is present.     Aortic Valve  The aortic valve is trileaflet. There is mild (1+) aortic regurgitation.  Moderate to severe valvular aortic stenosis. The calculated aortic valve area  is 1cm2. Mean gradients 22 mm hg, DI 0.28.     Pulmonic Valve  There is no pulmonic valvular stenosis.     Vessels  The aortic root is normal size. The ascending aorta is Borderline dilated. 3.7  cm. Dilation of the inferior vena cava is present with normal respiratory  variation in diameter.     Pericardium  There is no pericardial effusion.     ______________________________________________________________________________  MMode/2D Measurements &  Calculations  IVSd: 1.2 cm  LVIDd: 4.1 cm  LVIDs: 2.1 cm  LVPWd: 1.2 cm  IVC diam: 2.2 cm  FS: 48.7 %  LV mass(C)d: 174.8 grams  LV mass(C)dI: 103.7 grams/m2     Ao root diam: 3.6 cm  asc Aorta Diam: 3.7 cm  LVOT diam: 2.1 cm  LVOT area: 3.4 cm2  LA Volume Index (BP): 58.4 ml/m2  RWT: 0.60     Doppler Measurements & Calculations  MV E max moises: 225.6 cm/sec  MV max P.4 mmHg  MV mean P.6 mmHg  MV V2 VTI: 51.4 cm  MVA(VTI): 1.5 cm2  Ao V2 max: 306.3 cm/sec  Ao max P.5 mmHg  Ao V2 mean: 218.3 cm/sec  Ao mean P.9 mmHg  Ao V2 VTI: 74.8 cm  SILVESTRE(I,D): 1.0 cm2  SILVESTRE(V,D): 0.95 cm2     AI P1/2t: 507.2 msec  LV V1 max P.9 mmHg  LV V1 max: 84.7 cm/sec  LV V1 VTI: 22.9 cm  SV(LVOT): 78.3 ml  SI(LVOT): 46.4 ml/m2  TR max moises: 443.2 cm/sec  TR max P.6 mmHg  AV Moises Ratio (DI): 0.28  SILVESTRE Index (cm2/m2): 0.62     ______________________________________________________________________________  Report approved by: Bishnu Alcala 2023 02:39 PM

## 2023-07-24 NOTE — LETTER
7/24/2023    Yunior Huang MD  [address] [facility] [address]    RE: Zunilda Clark       Dear Colleague,     I had the pleasure of seeing Zunilda Clark in the [facility].      Cardiology Clinic Progress Note:    July 24, 2023   Patient Name: Zunilda Clark  Patient MRN: [id number]     Consult indication: MVR    HPI:    I had the opportunity to see patient Zunilda Clark in cardiology clinic for a follow up visit. Patient is followed by our colleague Yunior Huang MD with Primary Care.     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      The patient has a complex cardiac history, notable for valvular heart disease with a prior mitral valve replacement, initially in 2007, as well as a redo mitral valve replacement with a 27 mm Magna bioprosthetic valve (2014).  She is also status post tricuspid valve annuloplasty with residual moderate to severe tricuspid regurgitation, cardiac MRI with mildly dilated right ventricle but normal systolic function (CMR 07/2018).  The patient also has a history of pacemaker placement for high-degree AV block after initial cardiac surgery in 2007.  Patient does note that the device was quite loose after initial implantation and she may have flipped this around once or twice.     She was previously seen by my late colleague, Dr. Das, and later Dr. Monroy, in clinic regarding pulmonary hypertension, overall clinical presentation seemed most consistent with group 2 pulmonary hypertension related to valve disease.    Patient is now  followed closely by my colleague YOKASTA Salcido.  Overall patient reports that she has been feeling well.  Her  of over 50 years passed away a few years ago, and since then she has been living independently.     Denies any chest pain, chest pressure, dyspnea on exertion, abnormal lower extremity swelling, symptoms of orthopnea/PND, weight stable at 142 pounds.  BP today in clinic 114/54 mmHg.    Device interrogation 7/19/2023 demonstrated AP 80%,  99%, battery status 9.1 years, atrial fibrillation episode lasting 2 hours 6 minutes 33 seconds with rates in the 60s beats per minute range, 2 episodes of NSVT lasting less than 1 second.  Asymptomatic.    TTE 7/18/2023  Interpretation Summary     Left ventricular systolic function is normal.The visual ejection fraction is  55-60%.  The right ventricle is mildly dilated.The right ventricular systolic function  is normal.  The left atrium is severely dilated.  There is a bioprosthetic mitral valve.There is trace mitral regurgitation.Mean  gradients 5.6 mm hg across prosthetic mitral valve at heart rate sof 60 bpm.  Moderate to severe valvular aortic stenosis.The calculated aortic valve area  is 1cm2.. Mean gradients 22 mm hg, DI 0.28  Severe (>55mmHg) pulmonary hypertension is present- RVSP 79 mm hg +RA.  There is moderate (2+) tricuspid regurgitation.     Echo dated 01/07/2022 moderate aortic valve stenosis, SILVESTRE 1.2 cm2, mean  gradients 19.5 mm hg, severe pulmonary hypertension- RVSP 75 mm hg +RA.    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  TTE findings are similar when compared to prior studies including a cardiac MRI 07/2018, however now has mod-severe aortic stenosis as well.  The patient denies any symptoms concerning for decompensation, in fact she feels overall quite well.     # Moderate-severe aortic stenosis  # Rheumatic heart disease, history of bioprosthetic mitral valve  replacement in 2007, redo in 2014 with a 27 mm Magna bioprosthetic mitral valve.  Moderate TR.   # High-degree AV block after cardiac surgery in 2007, status post dual-chamber pacemaker placement that had recently reached end-of-life.  Now status post pacemaker generator replacement 3/8/2021.   #  Rheumatoid arthritis.   #  Hypertension.  Stable.    #  Atrial fibrillation, on warfarin.     - Overall patient is in stable cardiovascular health without symptoms concerning for angina or decompensated heart failure or symptomatic valvular heart disease  - Continue current regimen of metoprolol tartrate, amlodipine and valsartan, warfarin, endocarditis prophylaxis  - TTE in 6 months and in 1 year  - Follow up with Zena TEMPLETON in 6 months and with me in 1 year    Thank you for allowing our team to participate in the care of Zunilda Clark.  Please do not hesitate to call or page me with any questions or concerns.    Sincerely,     Noe Rodriguez MD, [facility]  Cardiology  Text Page   July 24, 2023    Voice recognition software utilized.     Total time spent on this encounter today: 33 minutes, providing care in this encounter including, but not limited to, reviewing prior medical records, laboratory data, imaging studies, diagnostic studies, procedure notes, formulating an assessment and plan, recommendations, discussion and counseling with patient face to face, dictation.    Past Medical History:     Past Medical History:   Diagnosis Date    Acquired hypothyroidism 11/04/2015    Aortic valve insufficiency     Atrial fibrillation and flutter     CHF (congestive heart failure)     DVT (deep venous thrombosis) 2003    Gastro-oesophageal reflux disease     H/O mitral valve replacement with tissue graft 06/2014    HTN (hypertension)     Other chronic pain     Pulmonary HTN     Rheumatoid arthritis     Seizure 2014    Shingles 08/2018    Stroke 2009    left side mild weakness         Past Surgical History:   Past  Surgical History:   Procedure Laterality Date    APPLY WOUND VAC Left 12/18/2018    Procedure: Wound vac application;  Surgeon: Pardeep Sheikh MD;  Location: [facility] OR    ARTHROPLASTY KNEE Left 11/12/2018    Procedure: Left total knee arthroplasty using a [employer] knee system;  Surgeon: Pardeep Sheikh MD;  Location: [facility] OR    ARTHROSCOPY KNEE WITH DEBRIDEMENT JOINT, COMBINED Left 12/18/2018    Procedure: Left knee debridement and placement of wound vac;  Surgeon: Pardeep Sheikh MD;  Location: [facility] OR    CATARACT IOL, RT/LT      COLONOSCOPY  03/15/2012    EP PPM GENERATOR CHANGE SINGLE N/A 03/08/2021    Procedure: Permanent Pacemakers  Generator Change Dual Chamber;  Surgeon: Tamiko Cowan MD;  Location:  HEART CARDIAC CATH LAB    ESOPHAGOSCOPY, GASTROSCOPY, DUODENOSCOPY (EGD), COMBINED N/A 02/19/2020    Procedure: ESOPHAGOGASTRODUODENOSCOPY (EGD);  Surgeon: Michael Mccarthy MD;  Location:  GI    EYE SURGERY  2018    Both eyes/cataract surgery    H ABLATION AV NODE  2011    AFlutter with syncope, PPM to follow    HERNIA REPAIR      3 hernies/right and left & umbilical    IMPLANT PACEMAKER  2011    LAPAROSCOPIC CHOLECYSTECTOMY WITH CHOLANGIOGRAMS N/A 04/29/2017    Procedure: LAPAROSCOPIC CHOLECYSTECTOMY WITH CHOLANGIOGRAMS;  LAPAROSCOPIC CHOLECYSTECTOMY WITH CHOLANGIOGRAMS ;  Surgeon: Angeles Mcgill MD;  Location: [facility] OR    OPEN REDUCTION INTERNAL FIXATION RODDING INTRAMEDULLARY HUMERUS Right 07/28/2015    Procedure: OPEN REDUCTION INTERNAL FIXATION RODDING INTRAMEDULLARY HUMERUS;  Surgeon: Raymundo Rivera MD;  Location: [facility] OR    REPLACE VALVE MITRAL  2006    Mitral valve replacement with bioprosthetic valve in 2008 for rheumatic disease    SLING BLADDER SUSPENSION WITH FASCIA UMESH         Medications (outpatient):  Current Outpatient Medications   Medication Sig Dispense Refill    ALLOPURINOL PO Take 100 mg by mouth 2 times daily       amLODIPine (NORVASC) 2.5 MG tablet Take 1 tablet (2.5 mg) by mouth daily 90 tablet 3    amLODIPine-valsartan (EXFORGE) 5-160 MG tablet Take 1 tablet by mouth daily 90 tablet 3    calcium citrate (CALCITRATE) 950 MG tablet Take 1 tablet by mouth daily       Dorzolamide HCl-Timolol Mal (COSOPT OP) Apply to eye 2 times daily Place one drop into each eye twice daily      famotidine (PEPCID) 40 MG tablet TAKE 1 TABLET BY MOUTH DAILY 90 tablet 3    folic acid (FOLVITE) 1 MG tablet Take 1 mg by mouth daily      levothyroxine (SYNTHROID/LEVOTHROID) 112 MCG tablet Take 1 tablet (112 mcg) by mouth daily 100 tablet 3    METHOTREXATE SODIUM IJ Inject 0.8 mLs as directed once a week       metoprolol tartrate (LOPRESSOR) 50 MG tablet Take 1 tablet (50 mg) by mouth 2 times daily 180 tablet 3    multivitamin w/minerals (THERA-VIT-M) tablet Take 1 tablet by mouth daily Without iron      torsemide (DEMADEX) 10 MG tablet Take 1 tablet (10 mg) by mouth daily (with breakfast) 90 tablet 3    VITAMIN D, CHOLECALCIFEROL, PO Take 1,000 Units by mouth daily      warfarin ANTICOAGULANT (COUMADIN) 2.5 MG tablet Take 2 Tablets (5 mg) by mouth every Tuesday; and take 1 tablet (2.5 mg) all other days or as directed by your [facility] Team. (Patient taking differently: 1 tablet Monday and Thursday, 1 1/2 tablets all other days or as directed by your ACC Team.) 130 tablet 1       Allergies:  No Known Allergies    Social History:   History   Drug Use No      History   Smoking Status    Never   Smokeless Tobacco    Never     Social History    Substance and Sexual Activity      Alcohol use: No        Alcohol/week: 0.0 standard drinks of alcohol       Family History:  Family History   Problem Relation Age of Onset    Coronary Artery Disease Mother     Coronary Artery Disease Brother     Diabetes Brother     Cancer Brother          at age 52     Other Cancer Brother     Hypertension Sister     Hyperlipidemia Sister        Review of Systems:   A  "complete review of systems was negative except as mentioned in the History of Present Illness.     Objective & Physical Exam:  /54 (BP Location: Right arm, Patient Position: Sitting, Cuff Size: Adult Regular)   Pulse 66   Ht 1.626 m (5' 4\")   Wt 64.4 kg (142 lb)   LMP  (LMP Unknown)   BMI 24.37 kg/m    Wt Readings from Last 2 Encounters:   07/24/23 64.4 kg (142 lb)   07/14/23 64 kg (141 lb 3.2 oz)     Body mass index is 24.37 kg/m .   Body surface area is 1.71 meters squared.    Constitutional: appears stated age, in no apparent distress, appears to be well nourished  Head: normocephalic, atraumatic  Neck: supple, trachea midline  Pulmonary: clear to auscultation bilaterally, no wheezes, no rales, no increased work of breathing  Cardiovascular: JVP normal, regular rate, regular rhythm, normal S1 and S2, 2/6 JON at the RUSB, no lower extremity edema  Gastrointestinal: no guarding, non-rigid   Neurologic: awake, alert, moves all extremities  Skin: no jaundice, warm on limited exam  Psychiatric: affect is normal, answers questions appropriately, oriented to self and place    Data reviewed:  Lab Results   Component Value Date    WBC 7.7 07/14/2023    WBC 7.1 06/22/2021    RBC 3.56 (L) 07/14/2023    RBC 3.75 (L) 06/22/2021    HGB 12.1 07/14/2023    HGB 12.5 06/22/2021    HCT 36.7 07/14/2023    HCT 38.6 06/22/2021     (H) 07/14/2023     (H) 06/22/2021    MCH 34.0 (H) 07/14/2023    MCH 33.3 (H) 06/22/2021    MCHC 33.0 07/14/2023    MCHC 32.4 06/22/2021    RDW 18.5 (H) 07/14/2023    RDW 17.0 (H) 06/22/2021     07/14/2023     06/22/2021     Sodium   Date Value Ref Range Status   07/14/2023 141 136 - 145 mmol/L Final   06/22/2021 138 133 - 144 mmol/L Final     Potassium   Date Value Ref Range Status   07/14/2023 4.0 3.4 - 5.3 mmol/L Final   08/17/2022 3.7 3.4 - 5.3 mmol/L Final   06/22/2021 3.8 3.4 - 5.3 mmol/L Final     Chloride   Date Value Ref Range Status   07/14/2023 102 98 - 107 " mmol/L Final   08/17/2022 107 94 - 109 mmol/L Final   06/22/2021 104 94 - 109 mmol/L Final     Carbon Dioxide   Date Value Ref Range Status   06/22/2021 33 (H) 20 - 32 mmol/L Final     Carbon Dioxide (CO2)   Date Value Ref Range Status   07/14/2023 25 22 - 29 mmol/L Final   08/17/2022 26 20 - 32 mmol/L Final     Anion Gap   Date Value Ref Range Status   07/14/2023 14 7 - 15 mmol/L Final   08/17/2022 7 3 - 14 mmol/L Final   06/22/2021 1 (L) 3 - 14 mmol/L Final     Glucose   Date Value Ref Range Status   07/14/2023 89 70 - 99 mg/dL Final   08/17/2022 91 70 - 99 mg/dL Final   06/22/2021 88 70 - 99 mg/dL Final     Urea Nitrogen   Date Value Ref Range Status   07/14/2023 31.2 (H) 8.0 - 23.0 mg/dL Final   08/17/2022 21 7 - 30 mg/dL Final   06/22/2021 27 7 - 30 mg/dL Final     Creatinine   Date Value Ref Range Status   07/14/2023 1.05 (H) 0.51 - 0.95 mg/dL Final   06/22/2021 1.11 (H) 0.52 - 1.04 mg/dL Final     GFR Estimate   Date Value Ref Range Status   07/14/2023 53 (L) >60 mL/min/1.73m2 Final   06/28/2021 50.9 ml/min/1.73m2 Final     Calcium   Date Value Ref Range Status   07/14/2023 10.8 (H) 8.8 - 10.2 mg/dL Final   06/22/2021 9.6 8.5 - 10.1 mg/dL Final     Bilirubin Total   Date Value Ref Range Status   07/14/2023 0.6 <=1.2 mg/dL Final   06/22/2021 0.4 0.2 - 1.3 mg/dL Final     Alkaline Phosphatase   Date Value Ref Range Status   07/14/2023 103 35 - 104 U/L Final   06/22/2021 99 40 - 150 U/L Final     ALT   Date Value Ref Range Status   07/14/2023 19 0 - 50 U/L Final     Comment:     Reference intervals for this test were updated on 6/12/2023 to more accurately reflect our healthy population. There may be differences in the flagging of prior results with similar values performed with this method. Interpretation of those prior results can be made in the context of the updated reference intervals.     06/22/2021 23 0 - 50 U/L Final     AST   Date Value Ref Range Status   07/14/2023 34 0 - 45 U/L Final     Comment:      Reference intervals for this test were updated on 2023 to more accurately reflect our healthy population. There may be differences in the flagging of prior results with similar values performed with this method. Interpretation of those prior results can be made in the context of the updated reference intervals.   2021 23 0 - 45 U/L Final     Recent Labs   Lab Test 23  1132 05/15/23  1528 16  0844 11/13/15  0850   CHOL 183 194   < > 207*   HDL 47* 46*   < > 58   * 120*   < > 134*   TRIG 79 140   < > 77   CHOLHDLRATIO  --   --   --  3.6    < > = values in this interval not displayed.      Lab Results   Component Value Date    A1C 5.4 2016        Recent Results (from the past 4320 hour(s))   Echocardiogram Complete   Result Value    LVEF  55-60%    Narrative    [id number]  [id number]  [id number]  [id number]^DEDE^VINNIE     [facility]  Echocardiography Laboratory  [address] [place] [address]     Name: NADIYA LEWIS  MRN: [id number]  : 1940  Study Date: 2023 11:50 AM  Age: 83 yrs  Gender: Female  Patient Location: [facility]  Reason For Study: Pulmonary HTN (H), Anemia, unspecified type  Ordering Physician: VINNIE AGUAYO  Referring Physician: VINNIE AGUAYO  Performed By: Sandro Beasley RDCS     BSA: 1.7 m2  Height: 64 in  Weight: 141 lb  HR: 70  BP: 127/70 mmHg  ______________________________________________________________________________  Procedure  Complete Echo Adult.  ______________________________________________________________________________  Interpretation Summary     Left ventricular systolic function is normal.The visual ejection fraction is  55-60%.  The right ventricle is mildly dilated.The right ventricular systolic function  is normal.  The left atrium is severely dilated.  There is a bioprosthetic mitral valve.There is trace mitral regurgitation.Mean  gradients 5.6 mm hg across prosthetic mitral valve at heart rate sof 60 bpm.  Moderate to  severe valvular aortic stenosis.The calculated aortic valve area  is 1cm2.. Mean gradients 22 mm hg, DI 0.28  Severe (>55mmHg) pulmonary hypertension is present- RVSP 79 mm hg +RA.  There is moderate (2+) tricuspid regurgitation.     Echo dated 01/07/2022 moderate aortic valve stenosis, SILVESTRE 1.2 cm2, mean  gradients 19.5 mm hg, severe pulmonary hypertension- RVSP 75 mm hg +RA.  ______________________________________________________________________________  Left Ventricle  The left ventricle is normal in size. There is mild concentric left  ventricular hypertrophy. Left ventricular systolic function is normal. The  visual ejection fraction is 55-60%. Septal motion is consistent with post-  operative state.     Right Ventricle  The right ventricle is mildly dilated. The right ventricular systolic function  is normal. There is a pacemaker lead in the right ventricle.     Atria  The left atrium is severely dilated. The right atrium is mildly dilated. There  is no color Doppler evidence of an atrial shunt.     Mitral Valve  There is trace mitral regurgitation. Mean gradients 5.6 mm hg across  prosthetic mitral valve at heart rate sof 60 bpm. There is a bioprosthetic  mitral valve.     Tricuspid Valve  There is moderate (2+) tricuspid regurgitation. The right ventricular systolic  pressure is approximated at 78.6 mmHg plus the right atrial pressure. Right  ventricular systolic pressure is elevated, consistent with severe pulmonary  hypertension. Severe (>55mmHg) pulmonary hypertension is present.     Aortic Valve  The aortic valve is trileaflet. There is mild (1+) aortic regurgitation.  Moderate to severe valvular aortic stenosis. The calculated aortic valve area  is 1cm2. Mean gradients 22 mm hg, DI 0.28.     Pulmonic Valve  There is no pulmonic valvular stenosis.     Vessels  The aortic root is normal size. The ascending aorta is Borderline dilated. 3.7  cm. Dilation of the inferior vena cava is present with normal  respiratory  variation in diameter.     Pericardium  There is no pericardial effusion.     ______________________________________________________________________________  MMode/2D Measurements & Calculations  IVSd: 1.2 cm  LVIDd: 4.1 cm  LVIDs: 2.1 cm  LVPWd: 1.2 cm  IVC diam: 2.2 cm  FS: 48.7 %  LV mass(C)d: 174.8 grams  LV mass(C)dI: 103.7 grams/m2     Ao root diam: 3.6 cm  asc Aorta Diam: 3.7 cm  LVOT diam: 2.1 cm  LVOT area: 3.4 cm2  LA Volume Index (BP): 58.4 ml/m2  RWT: 0.60     Doppler Measurements & Calculations  MV E max moises: 225.6 cm/sec  MV max P.4 mmHg  MV mean P.6 mmHg  MV V2 VTI: 51.4 cm  MVA(VTI): 1.5 cm2  Ao V2 max: 306.3 cm/sec  Ao max P.5 mmHg  Ao V2 mean: 218.3 cm/sec  Ao mean P.9 mmHg  Ao V2 VTI: 74.8 cm  SILVESTRE(I,D): 1.0 cm2  SILVESTRE(V,D): 0.95 cm2     AI P1/2t: 507.2 msec  LV V1 max P.9 mmHg  LV V1 max: 84.7 cm/sec  LV V1 VTI: 22.9 cm  SV(LVOT): 78.3 ml  SI(LVOT): 46.4 ml/m2  TR max moises: 443.2 cm/sec  TR max P.6 mmHg  AV Moises Ratio (DI): 0.28  SILVESTRE Index (cm2/m2): 0.62     ______________________________________________________________________________  Report approved by: Bishnu Alcala 2023 02:39 PM                Thank you for allowing me to participate in the care of your patient.      Sincerely,     Noe Rodriguez MD     [facility]  cc:   Tamiko Cowan MD  [address]  [place] [id number]

## 2023-07-25 NOTE — TELEPHONE ENCOUNTER
Called and spoke with patient to see what disability patient was using for her last parking pass. Patient states that she has heart issues (a-fib, pace maker in place) as well as walking difficulties. Patient states she uses a cane and has difficulty traveling long distances. She will use a walker when she is traveling longer distances such as when she is at the doctor's office. Patient states she saw her cardiologist yesterday who told her she needed to have the disability parking pass renewed, but provider didn't have paperwork available during time of appointment.    Thank you,  Rodolfo Ibarra, Triage RN [facility]  9:20 AM 7/25/2023

## 2023-07-25 NOTE — TELEPHONE ENCOUNTER
Patient using the cane only because of heart issues, is there are some other orthopedic issues such as back pain, hip pain, knee pain?  If so what is the issue?    If it is for heart issues, how many feet can she walk without having to stop or rest?  That is can she walk 200 feet or more?

## 2023-07-25 NOTE — TELEPHONE ENCOUNTER
"Called and spoke to patient who states she is using the cane due to safety reasons after a fall in February 2023. Patient states she has knee pain due to \"bone on bone\". Patient states she can walk 200 feet with support of her cane and she does not need to stop to rest, but she moves slowly and needs support after walking roughly 200 feet (using a shopping cart for support).    Patient requesting to know the best way to get the handicap renewal ( in clinic versus get it mailed to her).    Thank you,  Rodolfo Ibarra, Triage RN [facility] [place]  10:27 AM 7/25/2023     "

## 2023-07-27 ENCOUNTER — PATIENT OUTREACH (OUTPATIENT)
Dept: CARE COORDINATION | Facility: CLINIC | Age: 83
End: 2023-07-27
Payer: COMMERCIAL

## 2023-07-27 NOTE — PROGRESS NOTES
Clinic Care Coordination Contact    Situation: Patient chart reviewed by care coordinator.    Background/Assessment: CHW has been unable to reach patient since care coordination's last outreach. Patient will benefit from ongoing CHW outreach attempts per standard work.    Plan/Recommendations: CHW will reach out to patient per standard work flow. SWCC will review chart in 4 weeks.    AUDREY Sauceda/[facility]  Social Work Care Coordinator  [facility] [place], [place], and [place]  Phone: [phone]

## 2023-07-31 ENCOUNTER — ANTICOAGULATION THERAPY VISIT (OUTPATIENT)
Dept: ANTICOAGULATION | Facility: CLINIC | Age: 83
End: 2023-07-31

## 2023-07-31 ENCOUNTER — LAB (OUTPATIENT)
Dept: LAB | Facility: CLINIC | Age: 83
End: 2023-07-31
Payer: COMMERCIAL

## 2023-07-31 DIAGNOSIS — Z95.3 S/P MITRAL VALVE REPLACEMENT WITH BIOPROSTHETIC VALVE: ICD-10-CM

## 2023-07-31 DIAGNOSIS — I48.91 ATRIAL FIBRILLATION AND FLUTTER (H): ICD-10-CM

## 2023-07-31 DIAGNOSIS — I48.92 ATRIAL FIBRILLATION AND FLUTTER (H): ICD-10-CM

## 2023-07-31 DIAGNOSIS — Z79.01 LONG TERM CURRENT USE OF ANTICOAGULANTS WITH INR GOAL OF 2.0-3.0: Primary | ICD-10-CM

## 2023-07-31 DIAGNOSIS — Z79.01 LONG TERM CURRENT USE OF ANTICOAGULANTS WITH INR GOAL OF 2.0-3.0: ICD-10-CM

## 2023-07-31 LAB — INR BLD: 2.3 (ref 0.9–1.1)

## 2023-07-31 PROCEDURE — 85610 PROTHROMBIN TIME: CPT

## 2023-07-31 PROCEDURE — 36416 COLLJ CAPILLARY BLOOD SPEC: CPT

## 2023-07-31 NOTE — PROGRESS NOTES
ANTICOAGULATION MANAGEMENT     Zunilda Clark 83 year old female is on warfarin with therapeutic INR result. (Goal INR 2.0-3.0)    Recent labs: (last 7 days)     07/31/23  1011   INR 2.3*       ASSESSMENT     Warfarin Lab Questionnaire    Warfarin Doses Last 7 Days      7/31/2023    10:09 AM   Dose in Tablet or Mg   TAB or MG? tablet (tab)     Pt Rptd Dose SUNDAY MONDAY TUESDAY WED THURS FRIDAY SATURDAY 7/31/2023  10:09 AM 1.5 1 1.5 1.5 1 1.5 1.5         7/31/2023   Warfarin Lab Questionnaire   Missed doses within past 14 days? No   Changes in diet or alcohol within past 14 days? No   Medication changes since last result? No   Injuries or illness since last result? No   New shortness of breath, severe headaches or sudden changes in vision since last result? No   Abnormal bleeding since last result? Yes   If yes, please explain: Nose bleed this am.--patient reports it lasted about 5 minutes   Upcoming surgery, procedure? No   Best number to call with results? [phone]     Previous result: Therapeutic last 2(+) visits  Additional findings: annual wellness exam 7/3/23--no change in care plan.  Cardiology office visit 7/24/23--no change in care plan, planned for TTE in 6 months and 1 year.       PLAN     Recommended plan for no diet, medication or health factor changes affecting INR     Dosing Instructions: Continue your current warfarin dose with next INR in 5 weeks       Summary  As of 7/31/2023      Full warfarin instructions:  2.5 mg every Mon, Thu; 3.75 mg all other days   Next INR check:  9/6/2023               Telephone call with Deanne who verbalizes understanding and agrees to plan    Lab visit scheduled    Education provided:   Symptom monitoring: monitoring for bleeding signs and symptoms and how to properly stop a nosebleed and when to seek medical attention.    Plan made per [facility] anticoagulation protocol    Linette Rocha RN  Anticoagulation  Clinic  7/31/2023    _______________________________________________________________________     Anticoagulation Episode Summary       Current INR goal:  2.0-3.0   TTR:  88.9 % (1 y)   Target end date:  Indefinite   Send INR reminders to:  [facility]    Indications    Long term current use of anticoagulants with INR goal of 2.0-3.0 [Z79.01]  Atrial fibrillation and flutter (H) [I48.91  I48.92]  S/P mitral valve replacement with bioprosthetic valve [Z95.3]             Comments:  27 mm Magna bioprosthetic valve (2014)             Anticoagulation Care Providers       Provider Role Specialty Phone number    Yunior Huang MD Referring Internal Medicine [phone]

## 2023-08-01 LAB — NONINV COLON CA DNA+OCC BLD SCRN STL QL: POSITIVE

## 2023-08-08 NOTE — ED PROVIDER NOTES
History     Chief Complaint:  Abdominal Pain      HPI   Zunilda Clark is a 76 year old female with a history of gallstones, GERD, CHF, DVT, A fib and mitral valve repair on coumadin who presents with abdominal pain. The patient states that last night she developed diffuse abdominal pain worse in the epigastric region. She describes the pain as a constant irritation that keeps her from sleeping. The pain does radiate to her back as well. She has had nausea and vomiting with the pain. She notes that she has had similar episodes in the past, but none that have lasted this long.The patient denies any diarrhea, fevers, or chills.     Allergies:  No known drug allergies.     Medications:    Coumadin   Bonivia  Exforge  Prilosec  Levothyroxine  Lopressor   Hygroton   Folviate  Methotrexate Sodium   Zyloprim     Past Medical History:    Hypothyroidism  Aortic valve insufficiency   Arthritis   Atrial fibrillation and flutter  CHF  DVT  GERD  History of mitral valve replacement with tissue graft  History of blood transfusion  HTN  Chronic pain   Pulmonary HTN  Rheumatoid arthritis  Seizures   Stroke     Past Surgical History:    Prolapsed Bladder  Ablation AV note  Implant pacemaker   Right open reduction internal fixation rodding intramedullary humerus  Replace mitral valve     Family History:    CAD-Mother, Brother  Diabetes-Brother  Hypertension-Sister  Hyperlipidemia-Sister    Social History:  Marital Status:   Presents to the ED with   Tobacco Use: Never Used  Alcohol Use: No  PCP: Yunior Huang      Review of Systems   Constitutional: Negative for chills and fever.   Gastrointestinal: Positive for abdominal pain, nausea and vomiting. Negative for diarrhea.   All other systems reviewed and are negative.      Physical Exam   First Vitals:  BP: 154/68  Pulse: 67  Temp: 98.7  F (37.1  C)  Resp: 16  SpO2: 99 %    Physical Exam  Constitutional: Alert, attentive  HENT:    Nose: Nose normal.     Occupational Therapy  Facility/Department: [facility]  Occupational Therapy Daily Treatment Note    Name: Marnie Hussein  : 1952  MRN: [id number]  Date of Service: 2023    Discharge Recommendations:  Patient would benefit from continued therapy after discharge     Patient Diagnosis(es): The primary encounter diagnosis was Chest pain, unspecified type. A diagnosis of Pleural effusion was also pertinent to this visit. Past Medical History:  has a past medical history of CHF (congestive heart failure) ([address]), COPD (chronic obstructive pulmonary disease) ([address]), Diabetes mellitus ([address]), Erectile dysfunction due to arterial insufficiency, Hypertension, Microalbuminuria due to type 2 diabetes mellitus ([address]), and Overweight (BMI 25.0-29.9). Past Surgical History:  has a past surgical history that includes Appendectomy; Total ankle arthroplasty; Esophagogastroduodenoscopy (2023); Upper gastrointestinal endoscopy (N/A, 2023); Colonoscopy (N/A, 2023); and Colonoscopy (2023). Assessment   Performance deficits / Impairments: Decreased functional mobility ; Decreased ADL status; Decreased ROM; Decreased strength;Decreased high-level IADLs;Decreased balance;Decreased sensation;Decreased endurance  Assessment: Pt would benefit from continued OT services to address acute occupational performance deficits s/p chest pain. Pt is unsafe to return to prior living environment d/t inability to safely access 2nd floor apartment. Pt's rehab potential is guarded d/t severity of diagnoses.   Prognosis: Guarded  Activity Tolerance  Activity Tolerance: Patient Tolerated treatment well;Patient limited by fatigue        Plan   Occupational Therapy Plan  Times Per Week: 3-4x/wk  Current Treatment Recommendations: Strengthening, ROM, Balance training, Functional mobility training, Endurance training, Equipment evaluation, education, & procurement, Patient/Caregiver education & training, Safety education & Mouth/Throat: Oropharynx is clear, mucous membranes are moist  Eyes:  EOM are normal. Pupils are equal, round, and reactive to light.   CV:  regular rate and rhythm; no murmurs, rubs or gallups  Chest: Effort normal and breath sounds normal.   GI:   Epigastrium - +tenderness, no guarding   RUQ - no tenderness or Cheema's sign   RLQ - No tenderness, no guarding, no Rovsing's sign   Suprapubic area - no tenderness, no guarding    LLQ - no tenderness, no guarding   LUQ - no tenderness, no guarding   No distension. Normal bowel sounds  MSK: Normal range of motion.   Neurological: Alert, attentive  Skin: Skin is warm and dry.      Emergency Department Course   Imaging:  CT Abdomen Pelvis with contrast:   Large gallstone in the gallbladder neck with distention of  the gallbladder and surrounding pericholecystic fluid. The appearance  suggests cholecystitis.  Report per radiology.     US Abdomen, limited:   Pending    Laboratory:  Lipase: 183  CMP: Glucose 124 (H), Albumin 3.1 (L), otherwise WNL (Creatinine 0.85)   CBC:  WBC 11.7 (H), HGB 12.1,   (2050) ISTAT Gases lactate adi POCT: pH 7.40, PCO2 46, PO2 22 (L), Bicarbonate 28, O2 SAT 37, Lactic Acid 0.9     INR: 2.54 (H)     Interventions:   (2052) Zofran, 4 mg, IV injection   (2052) Normal Saline, 1 liter, IV bolus   (2052) Morphine, 2 mg, IV injection   Unasyn, 3 g, IV ordered not yet given     Emergency Department Course:  Nursing notes and vitals reviewed.  I performed an exam of the patient as documented above.   A peripheral IV was established. Blood was drawn from the patient. This was sent for laboratory testing, findings above.    The patient was sent for a CT Abdomen while in the emergency department, findings above.    Findings and plan explained to the patient who consents to admission.   (2158) I discussed the patient with Sonam Mahan PA-C of the hospitalist service, who will admit the patient to a medical bed for further monitoring, evaluation, and  return following short rest break)  Assistive Device: Gait belt;Walker, rolling        ADL  Grooming: Modified independent   Grooming Skilled Clinical Factors: Pt completed hair care and oral care while seated upright in recliner  UE Dressing: Supervision  UE Dressing Skilled Clinical Factors: Pt able to adjust gown as needed and demonstrates appropriate movement to don over head t-shirt  LE Dressing: Moderate assistance  LE Dressing Skilled Clinical Factors: Don/doff slipper socks and CGA hike pants as pajama pants keep falling down due to being too large. Pt able to pull back up over hips and buttocks  Toileting: Supervision  Toileting Skilled Clinical Factors: Pt utilizing urinal appropriately  Additional Comments: Pt verbalizes fatigue and frustration with not being allowed to don personal clothing. Pt has on hospital pajama pants size large but those are too big and ties won't keep pants on patients hips. SHIRLEY recommended patient have family bring in a clean pair of shorts. Pt required frequent rest breaks throughout ADL tasks this session. Cognition  Overall Cognitive Status: WFL  Orientation  Overall Orientation Status: Within Functional Limits         Education Given To: Patient  Education Provided: Role of Therapy; ADL Adaptive Strategies;Transfer Training;Energy Conservation  Education Provided Comments: Safety and importance of increasing activity  Education Method: Demonstration;Verbal  Barriers to Learning: None  Education Outcome: Verbalized understanding;Demonstrated understanding;Continued education needed    AM-PAC Score  AM-[facility] Inpatient Daily Activity Raw Score: 20 (08/08/23 1431)  AM-PAC Inpatient ADL T-Scale Score : 42.03 (08/08/23 1431)  ADL Inpatient CMS 0-100% Score: 38.32 (08/08/23 1431)  ADL Inpatient CMS G-Code Modifier : CJ (08/08/23 1431)         Goals  Short Term Goals  Time Frame for Short Term Goals: Pt will by d/c:  Short Term Goal 1: Demo bed mobility w/ IND, HOB flat, and w/o treatment.   (5054) I consulted with Dr. Harrington of general surgery regarding the patient.     Impression & Plan    Medical Decision Making:  Zunilda Clark is a 76 year old female with a history of Afib and mitral valve replacement on coumadin who presents for evaluation of epigastric abdominal pain and vomiting. She has a history of gallstones and today is found to have acute cholecystitis. The remainder of the workup shows leukocytosis but no other acute abnormality. Her INR is 2.54. She is well appearing and there is no evidence of shock or severe sepsis. She will be admitted with plan for IV antibiotics, risk stratification, INR reversal, and surgery consultations.     Diagnosis:    ICD-10-CM   1. Acute cholecystitis K81.0               Disposition:  Admitted to the hospitalist         I, Tenisha Barros, am serving as a scribe on 4/27/2017 at 8:30 PM to personally document services performed by Dr. Fontanez based on my observations and the provider's statements to me.    4/27/2017   [facility] EMERGENCY DEPARTMENT       Ubaldo Fontanez MD  04/27/17 4613

## 2023-08-16 ENCOUNTER — PATIENT OUTREACH (OUTPATIENT)
Dept: CARE COORDINATION | Facility: CLINIC | Age: 83
End: 2023-08-16
Payer: COMMERCIAL

## 2023-08-16 NOTE — TELEPHONE ENCOUNTER
Reviewed and approved.     AUDREY Sauceda/[facility] Center[facility]  Social Work Care Coordinator  [facility] [place], [place], and [place]  Phone: [phone]

## 2023-08-16 NOTE — PROGRESS NOTES
Clinic Care Coordination Contact     Situation: Patient chart reviewed by care coordinator.     Background: Patient was enrolled in care coordination.      Assessment: CHW completed max attempts per standard work flow.      Plan/Recommendations: CC will send CC disenrollment letter via [employer]. No further CC outreaches at this time.      AUDREY Sauceda/[facility]  Social Work Care Coordinator  [facility] - [place], [place], and [place]  Phone: [phone]

## 2023-08-16 NOTE — LETTER
[facility] CARE COORDINATION  [address]  [facility] [address]    August 16, 2023    Zunilda SHAW May  [address]   [facility] [address]      Dear Zunilda,    We have been unsuccessful in reaching you since our last contact. At this time the Care Coordination team will make no further attempts to reach you, however this does not change your ability to continue receiving care from your providers at your primary care clinic. If you need additional support from a care coordinator in the future please contact us.    All of us at [facility] are invested in your health and are here to assist you in meeting your goals.     Sincerely,    AUDERY Sauceda/[facility] Center[facility]OLA  Social Work Care Coordinator  [facility] - [facility], and [place]  Phone: [phone]    and    Flores ROBERTS Public Health  Community Health Worker  [facility] & [facility]  Clinic Care Coordination  [phone]

## 2023-08-21 ENCOUNTER — TELEPHONE (OUTPATIENT)
Dept: INTERNAL MEDICINE | Facility: CLINIC | Age: 83
End: 2023-08-21
Payer: COMMERCIAL

## 2023-08-21 DIAGNOSIS — Z12.11 COLON CANCER SCREENING: Primary | ICD-10-CM

## 2023-08-21 NOTE — TELEPHONE ENCOUNTER
Call received from patient stating she saw on MyChart that her Cologaurd a month ago was abnormal. Please advise.

## 2023-08-30 ENCOUNTER — TELEPHONE (OUTPATIENT)
Dept: ANTICOAGULATION | Facility: CLINIC | Age: 83
End: 2023-08-30

## 2023-08-30 ENCOUNTER — APPOINTMENT (OUTPATIENT)
Dept: CT IMAGING | Facility: CLINIC | Age: 83
End: 2023-08-30
Attending: PHYSICIAN ASSISTANT
Payer: COMMERCIAL

## 2023-08-30 ENCOUNTER — NURSE TRIAGE (OUTPATIENT)
Dept: INTERNAL MEDICINE | Facility: CLINIC | Age: 83
End: 2023-08-30
Payer: COMMERCIAL

## 2023-08-30 ENCOUNTER — HOSPITAL ENCOUNTER (EMERGENCY)
Facility: CLINIC | Age: 83
Discharge: HOME | End: 2023-08-30
Attending: PHYSICIAN ASSISTANT | Admitting: PHYSICIAN ASSISTANT
Payer: COMMERCIAL

## 2023-08-30 VITALS
WEIGHT: 143.52 LBS | RESPIRATION RATE: 20 BRPM | TEMPERATURE: 98.3 F | SYSTOLIC BLOOD PRESSURE: 119 MMHG | BODY MASS INDEX: 24.64 KG/M2 | HEART RATE: 60 BPM | OXYGEN SATURATION: 99 % | DIASTOLIC BLOOD PRESSURE: 59 MMHG

## 2023-08-30 DIAGNOSIS — R93.5 ABNORMAL CT OF THE ABDOMEN: ICD-10-CM

## 2023-08-30 DIAGNOSIS — R82.71 ASYMPTOMATIC BACTERIURIA: ICD-10-CM

## 2023-08-30 DIAGNOSIS — K52.9 COLITIS: ICD-10-CM

## 2023-08-30 LAB
ALBUMIN SERPL BCG-MCNC: 4.2 G/DL (ref 3.5–5.2)
ALBUMIN UR-MCNC: 20 MG/DL
ALP SERPL-CCNC: 102 U/L (ref 35–104)
ALT SERPL W P-5'-P-CCNC: 22 U/L (ref 0–50)
ANION GAP SERPL CALCULATED.3IONS-SCNC: 8 MMOL/L (ref 7–15)
APPEARANCE UR: CLEAR
AST SERPL W P-5'-P-CCNC: 40 U/L (ref 0–45)
ATRIAL RATE - MUSE: 52 BPM
BACTERIA #/AREA URNS HPF: ABNORMAL /HPF
BASOPHILS # BLD AUTO: 0.1 10E3/UL (ref 0–0.2)
BASOPHILS NFR BLD AUTO: 1 %
BILIRUB SERPL-MCNC: 0.6 MG/DL
BILIRUB UR QL STRIP: NEGATIVE
BUN SERPL-MCNC: 24.8 MG/DL (ref 8–23)
CALCIUM SERPL-MCNC: 9.6 MG/DL (ref 8.8–10.2)
CHLORIDE SERPL-SCNC: 103 MMOL/L (ref 98–107)
COLOR UR AUTO: YELLOW
CREAT SERPL-MCNC: 1.11 MG/DL (ref 0.51–0.95)
DEPRECATED HCO3 PLAS-SCNC: 29 MMOL/L (ref 22–29)
DIASTOLIC BLOOD PRESSURE - MUSE: NORMAL MMHG
EOSINOPHIL # BLD AUTO: 0.2 10E3/UL (ref 0–0.7)
EOSINOPHIL NFR BLD AUTO: 3 %
ERYTHROCYTE [DISTWIDTH] IN BLOOD BY AUTOMATED COUNT: 17.8 % (ref 10–15)
GFR SERPL CREATININE-BSD FRML MDRD: 49 ML/MIN/1.73M2
GLUCOSE SERPL-MCNC: 101 MG/DL (ref 70–99)
GLUCOSE UR STRIP-MCNC: NEGATIVE MG/DL
HCT VFR BLD AUTO: 35.5 % (ref 35–47)
HGB BLD-MCNC: 11.6 G/DL (ref 11.7–15.7)
HGB UR QL STRIP: NEGATIVE
IMM GRANULOCYTES # BLD: 0 10E3/UL
IMM GRANULOCYTES NFR BLD: 1 %
INR PPP: 2.95 (ref 0.85–1.15)
INTERPRETATION ECG - MUSE: NORMAL
KETONES UR STRIP-MCNC: NEGATIVE MG/DL
LEUKOCYTE ESTERASE UR QL STRIP: ABNORMAL
LIPASE SERPL-CCNC: 62 U/L (ref 13–60)
LYMPHOCYTES # BLD AUTO: 0.3 10E3/UL (ref 0.8–5.3)
LYMPHOCYTES NFR BLD AUTO: 6 %
MCH RBC QN AUTO: 35.4 PG (ref 26.5–33)
MCHC RBC AUTO-ENTMCNC: 32.7 G/DL (ref 31.5–36.5)
MCV RBC AUTO: 108 FL (ref 78–100)
MONOCYTES # BLD AUTO: 1.1 10E3/UL (ref 0–1.3)
MONOCYTES NFR BLD AUTO: 20 %
NEUTROPHILS # BLD AUTO: 4.1 10E3/UL (ref 1.6–8.3)
NEUTROPHILS NFR BLD AUTO: 69 %
NITRATE UR QL: POSITIVE
NRBC # BLD AUTO: 0 10E3/UL
NRBC BLD AUTO-RTO: 0 /100
P AXIS - MUSE: 174 DEGREES
PH UR STRIP: 7 [PH] (ref 5–7)
PLATELET # BLD AUTO: 256 10E3/UL (ref 150–450)
POTASSIUM SERPL-SCNC: 3.9 MMOL/L (ref 3.4–5.3)
PR INTERVAL - MUSE: 184 MS
PROT SERPL-MCNC: 7.3 G/DL (ref 6.4–8.3)
QRS DURATION - MUSE: 162 MS
QT - MUSE: 450 MS
QTC - MUSE: 506 MS
R AXIS - MUSE: 100 DEGREES
RBC # BLD AUTO: 3.28 10E6/UL (ref 3.8–5.2)
RBC URINE: 1 /HPF
SODIUM SERPL-SCNC: 140 MMOL/L (ref 136–145)
SP GR UR STRIP: 1 (ref 1–1.03)
SQUAMOUS EPITHELIAL: <1 /HPF
SYSTOLIC BLOOD PRESSURE - MUSE: NORMAL MMHG
T AXIS - MUSE: 17 DEGREES
UROBILINOGEN UR STRIP-MCNC: NORMAL MG/DL
VENTRICULAR RATE- MUSE: 76 BPM
WBC # BLD AUTO: 5.8 10E3/UL (ref 4–11)
WBC URINE: 13 /HPF

## 2023-08-30 PROCEDURE — 83690 ASSAY OF LIPASE: CPT | Performed by: PHYSICIAN ASSISTANT

## 2023-08-30 PROCEDURE — 80053 COMPREHEN METABOLIC PANEL: CPT | Performed by: PHYSICIAN ASSISTANT

## 2023-08-30 PROCEDURE — 74177 CT ABD & PELVIS W/CONTRAST: CPT

## 2023-08-30 PROCEDURE — 36415 COLL VENOUS BLD VENIPUNCTURE: CPT | Performed by: PHYSICIAN ASSISTANT

## 2023-08-30 PROCEDURE — 96360 HYDRATION IV INFUSION INIT: CPT | Mod: 59

## 2023-08-30 PROCEDURE — 81001 URINALYSIS AUTO W/SCOPE: CPT | Performed by: PHYSICIAN ASSISTANT

## 2023-08-30 PROCEDURE — 85025 COMPLETE CBC W/AUTO DIFF WBC: CPT | Performed by: PHYSICIAN ASSISTANT

## 2023-08-30 PROCEDURE — 93005 ELECTROCARDIOGRAM TRACING: CPT

## 2023-08-30 PROCEDURE — 87186 SC STD MICRODIL/AGAR DIL: CPT | Performed by: PHYSICIAN ASSISTANT

## 2023-08-30 PROCEDURE — 250N000009 HC RX 250: Performed by: PHYSICIAN ASSISTANT

## 2023-08-30 PROCEDURE — 258N000003 HC RX IP 258 OP 636: Performed by: PHYSICIAN ASSISTANT

## 2023-08-30 PROCEDURE — 250N000011 HC RX IP 250 OP 636: Performed by: PHYSICIAN ASSISTANT

## 2023-08-30 PROCEDURE — 99285 EMERGENCY DEPT VISIT HI MDM: CPT | Mod: 25

## 2023-08-30 PROCEDURE — 85610 PROTHROMBIN TIME: CPT | Performed by: PHYSICIAN ASSISTANT

## 2023-08-30 RX ORDER — HYDROCODONE BITARTRATE AND ACETAMINOPHEN 5; 325 MG/1; MG/1
1 TABLET ORAL EVERY 6 HOURS PRN
Qty: 8 TABLET | Refills: 0 | Status: SHIPPED | OUTPATIENT
Start: 2023-08-30 | End: 2023-09-02

## 2023-08-30 RX ORDER — IOPAMIDOL 755 MG/ML
500 INJECTION, SOLUTION INTRAVASCULAR ONCE
Status: COMPLETED | OUTPATIENT
Start: 2023-08-30 | End: 2023-08-30

## 2023-08-30 RX ADMIN — SODIUM CHLORIDE 58 ML: 9 INJECTION, SOLUTION INTRAVENOUS at 12:19

## 2023-08-30 RX ADMIN — IOPAMIDOL 72 ML: 755 INJECTION, SOLUTION INTRAVENOUS at 12:19

## 2023-08-30 RX ADMIN — SODIUM CHLORIDE 500 ML: 9 INJECTION, SOLUTION INTRAVENOUS at 13:39

## 2023-08-30 ASSESSMENT — ACTIVITIES OF DAILY LIVING (ADL)
ADLS_ACUITY_SCORE: 35
ADLS_ACUITY_SCORE: 33

## 2023-08-30 NOTE — ED PROVIDER NOTES
History     Chief Complaint:  Flank Pain       The history is provided by the patient.      Zunilda Clark is a 83 year old female on Warfarin with history of stroke, congestive heart failure, atrial fibrillation, mitral valve repair who presents to the ED with flank pain. The patient reports constant left flank pain and soreness 1-2 weeks ago. Pain is worse after lying on left side and after prolonged sitting. Pain is reproducible when she pushes on left upper abdomen. She states that she has been taking Tylenol for the pain. She states she initially felt constipated however had bowel movement upon arrival to ED. She denies fevers, chills, chest pain, shortness of breath, urinary symptoms, hematuria, blood in stool, or vomiting. Prior abdominal surgeries include appendectomy. She is anticoagulated on warfarin secondary to mitral valve reapir. She states that she had a fall in February where she fractured her T12 but denies a fall in the past couple weeks. Lastly, patient reports she had positive Cologuard in July 2023 but declined colonoscopy.    Independent Historian:   None - Patient Only    Review of External Notes:   None    Medications:    Allopurinol PO   Amlodipine  Amlodipine-valsartan  Amoxicillin  Calcium citrate   Dorzolamide HCl-Timolol Mal   Famotidine  Folic acid   Levothyroxine  Methotrexate sodium IJ   Metoprolol tartrate  Torsemide  Warfarin    Past Medical History:    Acquired hypothyroidism  Aortic valve insufficiency  Atrial fibrillation and flutter  CHF (congestive heart failure)  DVT (deep venous thrombosis)  Gastro-oesophageal reflux disease  Other chronic pain  Pulmonary HTN  Rheumatoid arthritis  Seizure  Shingles  Stroke  Cardiac pacemaker in situ   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   Wound dehiscence  Pacemaker twiddler's syndrome, initial encounter  Complete atrioventricular block (H)  Chronic kidney disease, stage 3 (H)  Aortic regurgitation   Mitral  stenosis   Osteoarthritis   Osteopenia   GERD      Past Surgical History:    Ep ppm generator change single   Esophagoscopy, gastroscopy, duodenoscopy (egd), combined   Arthroscopy knee with debridement joint, combined (Left)   Apply wound vac (Left)   Arthroplasty knee (Left)   Laparoscopic cholecystectomy with cholangiograms   Open reduction internal fixation rodding intramedullary humerus (Right)   Colonoscopy  Implant pacemaker  H ablation av node   Replace valve mitral   Mitral valve repair  Cataract iol, rt/lt  Hernia repair   Sling bladder suspension with fascia anusha  Tubal ligation   Dexa bone density axial skeleton       Physical Exam   Patient Vitals for the past 24 hrs:   BP Temp Temp src Pulse Resp SpO2 Weight   08/30/23 1226 119/59 -- -- -- -- -- --   08/30/23 1205 -- -- -- -- -- 99 % --   08/30/23 1200 113/58 -- -- 60 -- -- --   08/30/23 1130 107/54 -- -- 60 -- 94 % --   08/30/23 1124 -- -- -- -- -- 97 % --   08/30/23 1113 116/75 -- -- 65 -- -- --   08/30/23 1014 137/75 98.3  F (36.8  C) Temporal 83 20 96 % 65.1 kg (143 lb 8.3 oz)        Physical Exam  Constitutional: Alert, attentive, GCS 15  HENT:    Nose: Nose normal.    Mouth/Throat: Oropharynx is clear, mucous membranes are moist  Eyes:  EOM are normal.    CV:  regular rate and rhythm; no murmurs, rubs or gallups  Chest: Effort normal and breath sounds normal.   GI:   Epigastrium - no tenderness, no guarding   RUQ - no tenderness or Cheema's sign   RLQ - No tenderness, no guarding, no Rovsing's sign   Suprapubic area - no tenderness, no guarding    LLQ - no tenderness, no guarding   LUQ -tenderness without guarding.   No distension. Normal bowel sounds  : No CVA tenderness  MSK: Normal range of motion.   Neurological: Alert, attentive  Skin: Skin is warm and dry.      Emergency Department Course   ECG:  ECG results from 08/30/23   EKG 12 lead     Value    Systolic Blood Pressure     Diastolic Blood Pressure     Ventricular Rate 76    Atrial Rate  52    AR Interval 184    QRS Duration 162        QTc 506    P Axis 174    R AXIS 100    T Axis 17    Interpretation ECG      AV dual-paced rhythm  Abnormal ECG  When compared with ECG of 01-JAN-2023 08:43,  Vent. rate has increased BY   2 BPM  Confirmed by - EMERGENCY ROOM, PHYSICIAN ([id number]),  AMANDO MANNING ([id number]) on 8/30/2023 2:21:18 PM         Imaging:  CT Abdomen Pelvis w Contrast   Final Result   IMPRESSION:    1.  Circumferential wall thickening of the distal transverse colon,   descending colon, and sigmoid colon suggestive of a colitis. This may   be infectious, inflammatory, or ischemic in etiology.   2.  Soft tissue lesion within the cecum measuring up to 1.3 cm,   suspicious for a polyp or other mass lesion. Recommend nonemergent   evaluation with colonoscopy.   3.  Moderate to high-grade narrowing of the proximal SMA secondary to   atherosclerotic plaque with resumption of normal caliber distally.   4.  Fibrotic changes at the lung bases appear increased compared to   prior. Consider nonemergent evaluation with high-resolution chest CT.   5.  Remote burst fracture of the T12 vertebral body with near complete   height loss, increased compared to CT thoracic spine 2/8/2023. There   is mild bony retropulsion of the superior endplate which is increased   compared to prior resulting in mild canal stenosis.   6.  Cardiomegaly.      KATHARINE CASAS MD            SYSTEM ID:  [id number]         Report per radiology    Laboratory:  Labs Ordered and Resulted from Time of ED Arrival to Time of ED Departure   COMPREHENSIVE METABOLIC PANEL - Abnormal       Result Value    Sodium 140      Potassium 3.9      Chloride 103      Carbon Dioxide (CO2) 29      Anion Gap 8      Urea Nitrogen 24.8 (*)     Creatinine 1.11 (*)     Calcium 9.6      Glucose 101 (*)     Alkaline Phosphatase 102      AST 40      ALT 22      Protein Total 7.3      Albumin 4.2      Bilirubin Total 0.6      GFR Estimate 49 (*)    LIPASE -  Abnormal    Lipase 62 (*)    ROUTINE UA WITH MICROSCOPIC REFLEX TO CULTURE - Abnormal    Color Urine Yellow      Appearance Urine Clear      Glucose Urine Negative      Bilirubin Urine Negative      Ketones Urine Negative      Specific Gravity Urine 1.005      Blood Urine Negative      pH Urine 7.0      Protein Albumin Urine 20 (*)     Urobilinogen Urine Normal      Nitrite Urine Positive (*)     Leukocyte Esterase Urine Small (*)     Bacteria Urine Few (*)     RBC Urine 1      WBC Urine 13 (*)     Squamous Epithelials Urine <1     CBC WITH PLATELETS AND DIFFERENTIAL - Abnormal    WBC Count 5.8      RBC Count 3.28 (*)     Hemoglobin 11.6 (*)     Hematocrit 35.5       (*)     MCH 35.4 (*)     MCHC 32.7      RDW 17.8 (*)     Platelet Count 256      % Neutrophils 69      % Lymphocytes 6      % Monocytes 20      % Eosinophils 3      % Basophils 1      % Immature Granulocytes 1      NRBCs per 100 WBC 0      Absolute Neutrophils 4.1      Absolute Lymphocytes 0.3 (*)     Absolute Monocytes 1.1      Absolute Eosinophils 0.2      Absolute Basophils 0.1      Absolute Immature Granulocytes 0.0      Absolute NRBCs 0.0     INR - Abnormal    INR 2.95 (*)    URINE CULTURE    Emergency Department Course & Assessments:           Interventions:  Medications   CT SCAN FLUSH (58 mLs Intravenous $Given 8/30/23 1219)   iopamidol (ISOVUE-370) solution 500 mL (72 mLs Intravenous $Given 8/30/23 1219)   0.9% sodium chloride BOLUS (0 mLs Intravenous Stopped 8/30/23 1413)        Independent Interpretation (X-rays, CTs, rhythm strip):  None    Assessments/Consultations/Discussion of Management or Tests:  ED Course as of 08/30/23 1633   Wed Aug 30, 2023   1039 I obtained history and examined the patient as noted above.    1335 I rechecked the patient and explained findings.    1348 I rechecked the patient and explained findings.    1446 I rechecked the patient and explained findings.        Social Determinants of Health affecting care:    None    Disposition:  The patient was discharged to home.     Impression & Plan    Medical Decision Making:  Patient is an 83-year-old female chronic anticoagulation for history of stroke and mitral valve replacement who presents with left upper quadrant abdominal/flank pain for the past 1 to 2 weeks.  She is afebrile, normotensive, nonhypoxic.  Physical examination reveals reproducible left upper quadrant tenderness without peritoneal signs.  Differential includes nephrolithiasis, UTI, pyelonephritis, PE, colitis, SBO, GERD.  Labs are reassuring today.  Mild elevation in creatinine to 1.11 however no evidence of SRIDHAR.  LFTs and lipase are reassuring.  No leukocytosis to suggest a systemic bacterial process.  Abdominal CT confirms evidence of colitis involving the distal transverse, descending, and sigmoid colon.  There is also a cecal polyp measuring at 1.3 cm.  Incidental findings include narrowing of the SMA and fibrotic changes of the bilateral lungs.  Known T12 vertebral fracture is also noted.  Results reviewed and discussed with patient.  Patient likely has a colitis secondary to infectious or inflammatory process.  She has history of atrial fibrillation however no evidence of atrial fibrillation on EKG today and I have low suspicion for ischemic colitis.  Patient would likely benefit from bland diet, pain medication, and follow-up with GI and primary.  Contact information provided for local GI groups though patient is adamant she does not want colonoscopy which is fine.  Her UA is suspicious for infection however patient states she currently has no urinary symptoms. I will send her antibiotics to cover for her colitis and wait for the urine culture to alter regimen.  Patient is otherwise well-appearing and may be managed as an outpatient.  Supportive cares and return precautions to the ED were discussed and patient was ready for discharge.    Diagnosis:    ICD-10-CM    1. Colitis  K52.9       2. Abnormal CT  of the abdomen  R93.5       3. Asymptomatic bacteriuria  R82.71            Discharge Medications:  Discharge Medication List as of 8/30/2023  2:57 PM        START taking these medications    Details   amoxicillin-clavulanate (AUGMENTIN) 875-125 MG tablet Take 1 tablet by mouth 2 times daily for 7 days, Disp-14 tablet, R-0, E-Prescribe      HYDROcodone-acetaminophen (NORCO) 5-325 MG tablet Take 1 tablet by mouth every 6 hours as needed for severe pain, Disp-8 tablet, R-0, E-Prescribe                Scribe Disclosure:  Olya HUNT, am serving as a scribe at 14:52 AM on 8/30/2023 to document services personally performed by Andreea Long PA-C based on my observations and the provider's statements to me.     8/30/2023   Andreea Long PA-C Steinbrueck, Emily, PA-C  08/30/23 [id number]

## 2023-08-30 NOTE — ED TRIAGE NOTES
Pt presents to the ED with complaint of left flank pain since Wednesday. Pain 5/10. Pt states she feels like she could have a bowel movement.      Triage Assessment       Row Name 08/30/23 1013       Triage Assessment (Adult)    Airway WDL WDL       Respiratory WDL    Respiratory WDL WDL       Skin Circulation/Temperature WDL    Skin Circulation/Temperature WDL WDL       Cardiac WDL    Cardiac WDL WDL       Peripheral/Neurovascular WDL    Peripheral Neurovascular WDL WDL       Cognitive/Neuro/Behavioral WDL    Cognitive/Neuro/Behavioral WDL WDL

## 2023-08-30 NOTE — TELEPHONE ENCOUNTER
ANTICOAGULATION  MANAGEMENT: Discharge Review    Zunilda Clark chart reviewed for anticoagulation continuity of care    Emergency room visit on 8/30/23 for Flank pain.    Discharge disposition: Home    Results:    Recent labs: (last 7 days)     08/30/23  1140   INR 2.95*     Anticoagulation inpatient management:     not applicable     Anticoagulation discharge instructions:     Warfarin dosing: home regimen continued   Bridging: No   INR goal change: No      Medication changes affecting anticoagulation: Yes: Augmentin for 7 days (8/30/23 - 9/6/23) - per [employer]: Concurrent use of AMOXICILLIN and WARFARIN may result in an increased risk of bleeding.    Additional factors affecting anticoagulation: Yes: colitis and also possible UTI, culture pending so it is possible that treatment could change     PLAN     Recommend to check INR on 9/1/23  due to antibiotic and infection with INR result at top of goal range 8/30/23.    Spoke with Deanne , she agrees with the plan    No adjustment to Anticoagulation Calendar was required    Екатерина Mahan RN

## 2023-08-30 NOTE — DISCHARGE INSTRUCTIONS
You will be treated for colitis and sent home with antibiotics. We will wait for a urine culture results to start treatment of UTI. Please take oral antibiotics as prescribed.  Continue with a liquid diet for the next 1 to 2 days and slowly advance diet as tolerated.  Follow-up with your primary care provider within the next 1 to 3 days for follow-up.  Contact information also provided for local GI groups.  For any new or worsening symptoms, present back to ED.

## 2023-08-30 NOTE — TELEPHONE ENCOUNTER
"Nurse Triage SBAR    Is this a 2nd Level Triage? NO    Situation: Pain along lower left side of torso radiating to back    Background: 1 week history of constant pain along lower left side of torso radiating into back, feels almost like a bruise.  Pain keeps her awake at night.  Patient reports she had a positive Cologuard test July 2023 but chose not to get a colonoscopy.   Assessment: Rates pain 7-8 out of 10, interferes with sleep.  She denies nausea, vomiting, fever, constipation, diarrhea, dysuria, frequent urination. Last BM 8/29/23. Pain seems worse after sitting for any length of time. She denies radiation of pain into groin or legs.  No numbness, tingling or weakness.    Protocol Recommended Disposition:   Go To ED/UCC Now (Or To Office With PCP Approval)    Recommendation: Per protocol directed patient to be seen in ED/UC     Routed to provider    Does the patient meet one of the following criteria for ADS visit consideration? 17+ years old, with an [facility] PCP     Reason for Disposition   Constant abdominal pain lasting > 2 hours    Additional Information   Negative: Passed out (i.e., fainted, collapsed and was not responding)   Negative: Shock suspected (e.g., cold/pale/clammy skin, too weak to stand, low BP, rapid pulse)   Negative: Sounds like a life-threatening emergency to the triager   Negative: Chest pain   Negative: Pain is mainly in upper abdomen (if needed ask: 'is it mainly above the belly button?')   Negative: Abdominal pain and pregnant < 20 weeks   Negative: Abdominal pain and pregnant 20 or more weeks   Negative: Bloody, black, or tarry bowel movements  (Exception: Chronic-unchanged black-grey bowel movements and is taking iron pills or Pepto-Bismol.)   Negative: SEVERE abdominal pain (e.g., excruciating)   Negative: Vomiting red blood or black (coffee ground) material   Negative: Vomiting bile (green color)    Answer Assessment - Initial Assessment Questions  1. LOCATION: \"Where does it " "hurt?\"       Along left lower side of torso  2. RADIATION: \"Does the pain shoot anywhere else?\" (e.g., chest, back)      A little towards left back  3. ONSET: \"When did the pain begin?\" (e.g., minutes, hours or days ago)       1 week ago  4. SUDDEN: \"Gradual or sudden onset?\"      Doesn't know  5. PATTERN \"Does the pain come and go, or is it constant?\"     - If constant: \"Is it getting better, staying the same, or worsening?\"       (Note: Constant means the pain never goes away completely; most serious pain is constant and it progresses)      - If intermittent: \"How long does it last?\" \"Do you have pain now?\"      (Note: Intermittent means the pain goes away completely between bouts)      Constant  6. SEVERITY: \"How bad is the pain?\"  (e.g., Scale 1-10; mild, moderate, or severe)    - MILD (1-3): doesn't interfere with normal activities, abdomen soft and not tender to touch     - MODERATE (4-7): interferes with normal activities or awakens from sleep, abdomen tender to touch     - SEVERE (8-10): excruciating pain, doubled over, unable to do any normal activities       7-8 out of 10  7. RECURRENT SYMPTOM: \"Have you ever had this type of stomach pain before?\" If Yes, ask: \"When was the last time?\" and \"What happened that time?\"       No  8. CAUSE: \"What do you think is causing the stomach pain?\"      Doesn't know  9. RELIEVING/AGGRAVATING FACTORS: \"What makes it better or worse?\" (e.g., movement, antacids, bowel movement)      Heat helps, moving around helps.  Worse after sitting for any length of time  10. OTHER SYMPTOMS: \"Do you have any other symptoms?\" (e.g., back pain, diarrhea, fever, urination pain, vomiting)        Denies nausea, vomiting, fever, diarrhea, constipation, dysuria, urinary frequency  11. PREGNANCY: \"Is there any chance you are pregnant?\" \"When was your last menstrual period?\"        NA    Protocols used: Abdominal Pain - Female-A-OH    "

## 2023-08-31 ENCOUNTER — TELEPHONE (OUTPATIENT)
Dept: INTERNAL MEDICINE | Facility: CLINIC | Age: 83
End: 2023-08-31
Payer: COMMERCIAL

## 2023-08-31 NOTE — TELEPHONE ENCOUNTER
Reason for Call:  Appointment Request    Patient requesting this type of appt:  Hospital/ED Follow-Up     Requested provider: Yunior Huang    Reason patient unable to be scheduled: Not within requested timeframe    When does patient want to be seen/preferred time:  Within 3 days    Comments: MARLENE YOUNGBLOOD-08/30/23- PAIN IN LEFT LOWER SIDE    Could we send this information to you in [facility] or would you prefer to receive a phone call?:   Patient would prefer a phone call   Okay to leave a detailed message?: Yes at Home number on file [phone] (home)    Call taken on 8/31/2023 at 8:17 AM by Mesha HARKINS

## 2023-08-31 NOTE — RESULT ENCOUNTER NOTE
[facility] Emergency Dept discharge antibiotic (if prescribed): Amoxicillin-Clavulanate (Augmentin) 875-125 mg PO tablet, 1 tablet by mouth 2 times daily for 7 days   Date of Rx (if applicable):  8/30/23  No changes in treatment per [facility] ED Lab Result Urine culture protocol.

## 2023-09-01 ENCOUNTER — ANTICOAGULATION THERAPY VISIT (OUTPATIENT)
Dept: ANTICOAGULATION | Facility: CLINIC | Age: 83
End: 2023-09-01

## 2023-09-01 ENCOUNTER — LAB (OUTPATIENT)
Dept: LAB | Facility: CLINIC | Age: 83
End: 2023-09-01
Payer: COMMERCIAL

## 2023-09-01 DIAGNOSIS — Z79.01 LONG TERM CURRENT USE OF ANTICOAGULANTS WITH INR GOAL OF 2.0-3.0: Primary | ICD-10-CM

## 2023-09-01 DIAGNOSIS — I48.91 ATRIAL FIBRILLATION AND FLUTTER (H): ICD-10-CM

## 2023-09-01 DIAGNOSIS — Z95.3 S/P MITRAL VALVE REPLACEMENT WITH BIOPROSTHETIC VALVE: ICD-10-CM

## 2023-09-01 DIAGNOSIS — I48.92 ATRIAL FIBRILLATION AND FLUTTER (H): ICD-10-CM

## 2023-09-01 DIAGNOSIS — Z79.01 LONG TERM CURRENT USE OF ANTICOAGULANTS WITH INR GOAL OF 2.0-3.0: ICD-10-CM

## 2023-09-01 LAB
BACTERIA UR CULT: ABNORMAL
INR BLD: 3 (ref 0.9–1.1)

## 2023-09-01 PROCEDURE — 85610 PROTHROMBIN TIME: CPT

## 2023-09-01 PROCEDURE — 36415 COLL VENOUS BLD VENIPUNCTURE: CPT

## 2023-09-01 NOTE — PROGRESS NOTES
ANTICOAGULATION MANAGEMENT     Zunilda Clark 83 year old female is on warfarin with therapeutic INR result. (Goal INR 2.0-3.0)    Recent labs: (last 7 days)     09/01/23  1302   INR 3.0*       ASSESSMENT     Warfarin Lab Questionnaire    Warfarin Doses Last 7 Days      9/1/2023     1:00 PM   Dose in Tablet or Mg   TAB or MG? milligram (mg)     Pt Rptd Dose SUNDAY MONDAY TUESDAY WED THURS FRIDAY SATURDAY 9/1/2023   1:00 PM 1.5 2.5 1.5 1.5 2.5 1.5 1.5         9/1/2023   Warfarin Lab Questionnaire   Missed doses within past 14 days? No   Changes in diet or alcohol within past 14 days? No Patient reports she did not have her usual amount of spinach this week, will resume usual intake   Medication changes since last result? No - patient continues with Augmentin, will completed 9/6/23   Injuries or illness since last result? No   New shortness of breath, severe headaches or sudden changes in vision since last result? No   Abnormal bleeding since last result? No   Upcoming surgery, procedure? No   Best number to call with results? [phone]     Previous result: Therapeutic last 2(+) visits  Additional findings: None       PLAN     Recommended plan for no diet, medication or health factor changes affecting INR     Dosing Instructions: Continue your current warfarin dose with next INR in 2 weeks       Summary  As of 9/1/2023      Full warfarin instructions:  2.5 mg every Mon, Thu; 3.75 mg all other days   Next INR check:  9/15/2023               Telephone call with Deanne who verbalizes understanding and agrees to plan    Lab visit scheduled    Education provided:   Please call back if any changes to your diet, medications or how you've been taking warfarin  Contact [phone]  with any changes, questions or concerns.     Plan made per [facility] anticoagulation protocol    Екатерина Mahan RN  Anticoagulation Clinic  9/1/2023    _______________________________________________________________________     Anticoagulation Episode  Summary       Current INR goal:  2.0-3.0   TTR:  88.9 % (1 y)   Target end date:  Indefinite   Send INR reminders to:  [facility]    Indications    Long term current use of anticoagulants with INR goal of 2.0-3.0 [Z79.01]  Atrial fibrillation and flutter (H) [I48.91  I48.92]  S/P mitral valve replacement with bioprosthetic valve [Z95.3]             Comments:  27 mm Magna bioprosthetic valve (2014)             Anticoagulation Care Providers       Provider Role Specialty Phone number    Yunior Huang MD Referring Internal Medicine [phone]

## 2023-09-05 ENCOUNTER — OFFICE VISIT (OUTPATIENT)
Dept: INTERNAL MEDICINE | Facility: CLINIC | Age: 83
End: 2023-09-05
Payer: COMMERCIAL

## 2023-09-05 VITALS
TEMPERATURE: 98.4 F | HEIGHT: 64 IN | RESPIRATION RATE: 16 BRPM | DIASTOLIC BLOOD PRESSURE: 71 MMHG | HEART RATE: 71 BPM | OXYGEN SATURATION: 98 % | BODY MASS INDEX: 23.99 KG/M2 | SYSTOLIC BLOOD PRESSURE: 141 MMHG | WEIGHT: 140.5 LBS

## 2023-09-05 DIAGNOSIS — K52.9 COLITIS: ICD-10-CM

## 2023-09-05 DIAGNOSIS — Z09 HOSPITAL DISCHARGE FOLLOW-UP: Primary | ICD-10-CM

## 2023-09-05 DIAGNOSIS — R19.7 DIARRHEA OF PRESUMED INFECTIOUS ORIGIN: ICD-10-CM

## 2023-09-05 DIAGNOSIS — Z12.11 SCREEN FOR COLON CANCER: ICD-10-CM

## 2023-09-05 LAB
ERYTHROCYTE [DISTWIDTH] IN BLOOD BY AUTOMATED COUNT: 17.8 % (ref 10–15)
HCT VFR BLD AUTO: 36.6 % (ref 35–47)
HGB BLD-MCNC: 12 G/DL (ref 11.7–15.7)
MCH RBC QN AUTO: 35.5 PG (ref 26.5–33)
MCHC RBC AUTO-ENTMCNC: 32.8 G/DL (ref 31.5–36.5)
MCV RBC AUTO: 108 FL (ref 78–100)
PLATELET # BLD AUTO: 301 10E3/UL (ref 150–450)
RBC # BLD AUTO: 3.38 10E6/UL (ref 3.8–5.2)
WBC # BLD AUTO: 6 10E3/UL (ref 4–11)

## 2023-09-05 PROCEDURE — 36415 COLL VENOUS BLD VENIPUNCTURE: CPT | Performed by: INTERNAL MEDICINE

## 2023-09-05 PROCEDURE — 99214 OFFICE O/P EST MOD 30 MIN: CPT | Performed by: INTERNAL MEDICINE

## 2023-09-05 PROCEDURE — 85027 COMPLETE CBC AUTOMATED: CPT | Performed by: INTERNAL MEDICINE

## 2023-09-05 PROCEDURE — 80053 COMPREHEN METABOLIC PANEL: CPT | Performed by: INTERNAL MEDICINE

## 2023-09-05 ASSESSMENT — PAIN SCALES - GENERAL: PAINLEVEL: MILD PAIN (3)

## 2023-09-05 NOTE — LETTER
September 7, 2023      Deanne SHAW May  [address]   [facility] [place] [facility] [address]        Dear ,    We are writing to inform you of your test results.    Your test results fall within the expected range(s) or remain unchanged from previous results.  Please continue with current treatment plan.    Resulted Orders   CBC with platelets   Result Value Ref Range    WBC Count 6.0 4.0 - 11.0 10e3/uL    RBC Count 3.38 (L) 3.80 - 5.20 10e6/uL    Hemoglobin 12.0 11.7 - 15.7 g/dL    Hematocrit 36.6 35.0 - 47.0 %     (H) 78 - 100 fL    MCH 35.5 (H) 26.5 - 33.0 pg    MCHC 32.8 31.5 - 36.5 g/dL    RDW 17.8 (H) 10.0 - 15.0 %    Platelet Count 301 150 - 450 10e3/uL   Comprehensive metabolic panel (BMP + Alb, Alk Phos, ALT, AST, Total. Bili, TP)   Result Value Ref Range    Sodium 141 136 - 145 mmol/L    Potassium 4.2 3.4 - 5.3 mmol/L    Chloride 104 98 - 107 mmol/L    Carbon Dioxide (CO2) 27 22 - 29 mmol/L    Anion Gap 10 7 - 15 mmol/L    Urea Nitrogen 19.4 8.0 - 23.0 mg/dL    Creatinine 1.03 (H) 0.51 - 0.95 mg/dL    Calcium 10.0 8.8 - 10.2 mg/dL    Glucose 89 70 - 99 mg/dL    Alkaline Phosphatase 101 35 - 104 U/L    AST 43 0 - 45 U/L      Comment:      Reference intervals for this test were updated on 6/12/2023 to more accurately reflect our healthy population. There may be differences in the flagging of prior results with similar values performed with this method. Interpretation of those prior results can be made in the context of the updated reference intervals.    ALT 25 0 - 50 U/L      Comment:      Reference intervals for this test were updated on 6/12/2023 to more accurately reflect our healthy population. There may be differences in the flagging of prior results with similar values performed with this method. Interpretation of those prior results can be made in the context of the updated reference intervals.      Protein Total 7.7 6.4 - 8.3 g/dL    Albumin 4.2 3.5 - 5.2 g/dL    Bilirubin Total 0.5  <=1.2 mg/dL    GFR Estimate 54 (L) >60 mL/min/1.73m2   Enteric Bacteria and Virus Panel by VIOLET Stool   Result Value Ref Range    Campylobacter species Negative Negative    Salmonella species Negative Negative    Vibrio species Negative Negative    Vibrio cholerae Negative Negative    Yersinia enterocolitica Negative Negative    Enteropathogenic E. coli (EPEC) Negative Negative, NA    Shiga-like toxin-producing E. coli (STEC) Negative Negative    Shigella/Enteroinvasive E. coli (EIEC) Negative Negative    Cryptosporidium species Negative Negative    Giardia lamblia Negative Negative    Norovirus Gl/Gll Negative Negative    Rotavirus A Negative Negative    Plesiomonas shigelloides Negative Negative    Enteroaggregative E. coli (EAEC) Negative Negative    Enterotoxigenic E. coli (ETEC) Negative Negative    E. coli O157 NA Negative, NA    Cyclospora cayetanensis Negative Negative    Entamoeba histolytica Negative Negative    Adenovirus F40/41 Negative Negative    Astrovirus Negative Negative    Sapovirus Negative Negative    Narrative    Assay performed using the FDA-cleared FilmArray GI Panel from [employer]  A negative result should not rule out infection in patients with a probability for gastrointestinal infection. The assay does not test for all potential infectious agents of diarrheal disease.  Positive results do not distinguish between a viable or replicating organism and the presence of a nonviable organism or nucleic acid, nor do they exclude the possibility of coinfection by organisms not in the panel.  Results are intended to aid in the diagnosis of illness and are meant to be used in conjunction with other clinical findings.  This test has been verified and is performed by the Infectious Diseases Diagnostic Laboratory at [facility]. This laboratory is certified under the Clinical Laboratory Improvement Amendments of 1988 (CLIA-88) as qualified to perform high complexity clinical  laboratory testing.   C. difficile Toxin B PCR with reflex to C. difficile Antigen and Toxins A/B EIA   Result Value Ref Range    C Difficile Toxin B by PCR Negative Negative      Comment:      A negative result does not exclude actual disease due to C. difficile and may be due to improper collection, handling and storage of the specimen or the number of organisms in the specimen is below the detection limit of the assay.    Narrative    The [employer] Xpert C. difficile Assay, performed on the [employer]  [employer] Systems, is a qualitative in vitro diagnostic test for rapid detection of toxin B gene sequences from unformed (liquid or soft) stool specimens collected from patients suspected of having Clostridioides difficile infection (CDI). The test utilizes automated real-time polymerase chain reaction (PCR) to detect toxin gene sequences associated with toxin producing C. difficile. The Xpert C. difficile Assay is intended as an aid in the diagnosis of CDI.       If you have any questions or concerns, please call the clinic at the number listed above.       Sincerely,      Yunior Huang MD

## 2023-09-05 NOTE — PROGRESS NOTES
"  Assessment & Plan       Diarrhea of presumed infectious origin  Assess lab work for infectious colitis   - CBC with platelets  - Comprehensive metabolic panel (BMP + Alb, Alk Phos, ALT, AST, Total. Bili, TP)  - Enteric Bacteria and Virus Panel by VIOLET Stool  - C. difficile Toxin B PCR with reflex to C. difficile Antigen and Toxins A/B EIA    Hospital discharge follow-up  Improved symptoms. Still with mild abdominal pain and diarrhea.     Colitis  Finished Augmentin for possible infectious colitis, developed diarrhea, will assess for C diff            MED REC REQUIRED  Post Medication Reconciliation Status: discharge medications reconciled, continue medications without change  BMI:   Estimated body mass index is 24.12 kg/m  as calculated from the following:    Height as of this encounter: 1.626 m (5' 4\").    Weight as of this encounter: 63.7 kg (140 lb 8 oz).       See Patient Instructions    Yunior Huang MD  [facility] KERI Alicea is a 83 year old, presenting for the following health issues:  ER F/U        9/5/2023     5:23 PM   Additional Questions   Roomed by Rosa DUFF   Accompanied by friend       [facility]     ED/UC Followup:    Facility:  [facility] ED  Date of visit: 08/30/23  Reason for visit: flank pain, Colitis  Current Status: pt states she is still having pain    Patient is seen for a follow up visit.  Seen in ER for left flank pain. Had assessment with lab work , CT of the abdomen.   Had finding of colitis. Started on Augmentin.   Has developed diarrhea. No fever, chills. Has persistent mild left flank pain, but improved.   No nausea, vomiting. No blood in the stools.   No dizziness.     Review of Systems   Constitutional, HEENT, cardiovascular, pulmonary, GI, , musculoskeletal, neuro, skin, endocrine and psych systems are negative, except as otherwise noted.      Objective    BP (!) 141/71 (BP Location: Left arm, Cuff Size: Adult Regular)   Pulse 71   Temp 98.4  F " "(36.9  C) (Tympanic)   Resp 16   Ht 1.626 m (5' 4\")   Wt 63.7 kg (140 lb 8 oz)   LMP  (LMP Unknown)   SpO2 98%   BMI 24.12 kg/m    Body mass index is 24.12 kg/m .  Physical Exam   GENERAL: healthy, alert and no distress  NECK: no adenopathy, no asymmetry, masses, or scars and thyroid normal to palpation  RESP: lungs clear to auscultation - no rales, rhonchi or wheezes  CV: regular rate and rhythm, normal S1 S2, no S3 or S4, no murmur, click or rub, no peripheral edema and peripheral pulses strong  ABDOMEN: soft, mild left flank tenderness, no guarding or rebound,  no hepatosplenomegaly, no masses and bowel sounds normal  MS: no gross musculoskeletal defects noted, no edema    Lab on 09/01/2023   Component Date Value Ref Range Status    INR 09/01/2023 3.0 (H)  0.9 - 1.1 Final                     "

## 2023-09-06 LAB
ADV 40+41 DNA STL QL NAA+NON-PROBE: NEGATIVE
ALBUMIN SERPL BCG-MCNC: 4.2 G/DL (ref 3.5–5.2)
ALP SERPL-CCNC: 101 U/L (ref 35–104)
ALT SERPL W P-5'-P-CCNC: 25 U/L (ref 0–50)
ANION GAP SERPL CALCULATED.3IONS-SCNC: 10 MMOL/L (ref 7–15)
AST SERPL W P-5'-P-CCNC: 43 U/L (ref 0–45)
ASTRO TYP 1-8 RNA STL QL NAA+NON-PROBE: NEGATIVE
BILIRUB SERPL-MCNC: 0.5 MG/DL
BUN SERPL-MCNC: 19.4 MG/DL (ref 8–23)
C CAYETANENSIS DNA STL QL NAA+NON-PROBE: NEGATIVE
C DIFF TOX B STL QL: NEGATIVE
CALCIUM SERPL-MCNC: 10 MG/DL (ref 8.8–10.2)
CAMPYLOBACTER DNA SPEC NAA+PROBE: NEGATIVE
CHLORIDE SERPL-SCNC: 104 MMOL/L (ref 98–107)
CREAT SERPL-MCNC: 1.03 MG/DL (ref 0.51–0.95)
CRYPTOSP DNA STL QL NAA+NON-PROBE: NEGATIVE
DEPRECATED HCO3 PLAS-SCNC: 27 MMOL/L (ref 22–29)
E COLI O157 DNA STL QL NAA+NON-PROBE: NORMAL
E HISTOLYT DNA STL QL NAA+NON-PROBE: NEGATIVE
EAEC ASTA GENE ISLT QL NAA+PROBE: NEGATIVE
EC STX1+STX2 GENES STL QL NAA+NON-PROBE: NEGATIVE
EGFRCR SERPLBLD CKD-EPI 2021: 54 ML/MIN/1.73M2
EPEC EAE GENE STL QL NAA+NON-PROBE: NEGATIVE
ETEC LTA+ST1A+ST1B TOX ST NAA+NON-PROBE: NEGATIVE
G LAMBLIA DNA STL QL NAA+NON-PROBE: NEGATIVE
GLUCOSE SERPL-MCNC: 89 MG/DL (ref 70–99)
NOROVIRUS GI+II RNA STL QL NAA+NON-PROBE: NEGATIVE
P SHIGELLOIDES DNA STL QL NAA+NON-PROBE: NEGATIVE
POTASSIUM SERPL-SCNC: 4.2 MMOL/L (ref 3.4–5.3)
PROT SERPL-MCNC: 7.7 G/DL (ref 6.4–8.3)
RVA RNA STL QL NAA+NON-PROBE: NEGATIVE
SALMONELLA SP RPOD STL QL NAA+PROBE: NEGATIVE
SAPO I+II+IV+V RNA STL QL NAA+NON-PROBE: NEGATIVE
SHIGELLA SP+EIEC IPAH ST NAA+NON-PROBE: NEGATIVE
SODIUM SERPL-SCNC: 141 MMOL/L (ref 136–145)
V CHOLERAE DNA SPEC QL NAA+PROBE: NEGATIVE
VIBRIO DNA SPEC NAA+PROBE: NEGATIVE
Y ENTEROCOL DNA STL QL NAA+PROBE: NEGATIVE

## 2023-09-06 PROCEDURE — 87493 C DIFF AMPLIFIED PROBE: CPT | Mod: 59 | Performed by: INTERNAL MEDICINE

## 2023-09-06 PROCEDURE — 87507 IADNA-DNA/RNA PROBE TQ 12-25: CPT | Performed by: INTERNAL MEDICINE

## 2023-09-14 ENCOUNTER — TRANSFERRED RECORDS (OUTPATIENT)
Dept: HEALTH INFORMATION MANAGEMENT | Facility: CLINIC | Age: 83
End: 2023-09-14
Payer: COMMERCIAL

## 2023-09-15 ENCOUNTER — LAB (OUTPATIENT)
Dept: LAB | Facility: CLINIC | Age: 83
End: 2023-09-15
Payer: COMMERCIAL

## 2023-09-15 ENCOUNTER — TELEPHONE (OUTPATIENT)
Dept: ANTICOAGULATION | Facility: CLINIC | Age: 83
End: 2023-09-15

## 2023-09-15 ENCOUNTER — ANTICOAGULATION THERAPY VISIT (OUTPATIENT)
Dept: ANTICOAGULATION | Facility: CLINIC | Age: 83
End: 2023-09-15

## 2023-09-15 DIAGNOSIS — I48.92 ATRIAL FIBRILLATION AND FLUTTER (H): ICD-10-CM

## 2023-09-15 DIAGNOSIS — I48.91 ATRIAL FIBRILLATION AND FLUTTER (H): ICD-10-CM

## 2023-09-15 DIAGNOSIS — Z79.01 LONG TERM CURRENT USE OF ANTICOAGULANTS WITH INR GOAL OF 2.0-3.0: Primary | ICD-10-CM

## 2023-09-15 DIAGNOSIS — Z95.3 S/P MITRAL VALVE REPLACEMENT WITH BIOPROSTHETIC VALVE: ICD-10-CM

## 2023-09-15 DIAGNOSIS — Z79.01 LONG TERM CURRENT USE OF ANTICOAGULANTS WITH INR GOAL OF 2.0-3.0: ICD-10-CM

## 2023-09-15 LAB — INR BLD: 3.4 (ref 0.9–1.1)

## 2023-09-15 PROCEDURE — 85610 PROTHROMBIN TIME: CPT

## 2023-09-15 PROCEDURE — 36416 COLLJ CAPILLARY BLOOD SPEC: CPT

## 2023-09-15 NOTE — PROGRESS NOTES
ANTICOAGULATION MANAGEMENT     Zunilda SHAW May 83 year old female is on warfarin with supratherapeutic INR result. (Goal INR 2.0-3.0)    Recent labs: (last 7 days)     09/15/23  0929   INR 3.4*       ASSESSMENT     Source(s): Chart Review and Patient/Caregiver Call     Warfarin doses taken: Warfarin taken as instructed  Diet: No new diet changes identified  Medication/supplement changes:  antibiotic completed 9/6/23  New illness, injury, or hospitalization: No: patient reports she continues to have some abdominal pain, she will call PCP today to see if he wants to do another urine sample  Signs or symptoms of bleeding or clotting: No  Previous result: Therapeutic last 2(+) visits  Additional findings:  patient will have colonoscopy, no date set, patient will call once appointment is scheduled.       PLAN     Recommended plan for no diet, medication or health factor changes affecting INR. Patient denies any changes that would interfere with warfarin so maintenance dose was decreased today.    Dosing Instructions: hold dose then decrease your warfarin dose (5.3% change) with next INR in 1-2 weeks       Summary  As of 9/15/2023      Full warfarin instructions:  9/15: Hold; Otherwise 2.5 mg every Mon, Wed, Fri; 3.75 mg all other days   Next INR check:  9/25/2023               Telephone call with Deanne who verbalizes understanding and agrees to plan    Lab visit scheduled    Education provided:   Please call back if any changes to your diet, medications or how you've been taking warfarin  Contact [phone]  with any changes, questions or concerns.     Plan made per [facility] anticoagulation protocol    Екатерина Mahan RN  Anticoagulation Clinic  9/15/2023    _______________________________________________________________________     Anticoagulation Episode Summary       Current INR goal:  2.0-3.0   TTR:  85.1 % (1 y)   Target end date:  Indefinite   Send INR reminders to:  [facility]    Eli    Long  term current use of anticoagulants with INR goal of 2.0-3.0 [Z79.01]  Atrial fibrillation and flutter (H) [I48.91  I48.92]  S/P mitral valve replacement with bioprosthetic valve [Z95.3]             Comments:  27 mm Magna bioprosthetic valve (2014)             Anticoagulation Care Providers       Provider Role Specialty Phone number    Yunior Huang MD Referring Internal Medicine [phone]

## 2023-09-15 NOTE — TELEPHONE ENCOUNTER
General Call      Reason for Call:  Returning Екатерина's Call    What are your questions or concerns:  patient was returning Екатерина's call.     Date of last appointment with provider: 09/15/23    Could we send this information to you in [employer] or would you prefer to receive a phone call?:   Patient would prefer a phone call   Okay to leave a detailed message?: No at Home number on file [phone] (home)

## 2023-09-15 NOTE — TELEPHONE ENCOUNTER
Returned call to patient. Patient will have colonoscopy 9/25/23.    Dr Cummings in [place] will be doing the procedure.    Please review for warfarin hold.    Екатерина Mahan RN, BSN  Anticoagulation Clinic

## 2023-09-15 NOTE — TELEPHONE ENCOUNTER
[facility] ANTICOAGULATION  MANAGEMENT    ASSESSMENT     Warfarin interruption plan for colonoscopy on 9/25/23.    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     RECOMMENDATION     Pre-Procedure:  Hold warfarin for 5 days, until after procedure starting: Wednesday, September 20   Enoxaparin (Lovenox) 50 mg subq Q 12 hrs (0.75 mg/kg Q 12 hrs for CrCl 30-60 ml/min per [facility] P&T approved dose adjustment protocol)   Start enoxaparin: Friday, September 22 in AM  Last dose of enoxaparin prior to procedure: Sunday, September 24 in AM  (~24 hours prior to procedure)    Post-Procedure:  Resume warfarin dose if okay with provider doing procedure on night of procedure, Monday, September 25 PM: take 5 mg x 2 days, then resume home dose  Resume enoxaparin (Lovenox) ~ 24 hrs post procedure when okay with provider doing procedure. Continue until INR >= 2.0  Recheck INR 5-7 days after resuming warfarin   ?     Plan routed to referring provider for approval  ?   Mary Rossi [facility]    SUBJECTIVE/OBJECTIVE     Zunilda Clark, a 83 year old female    Goal INR Range: 2.0-3.0     Patient bridged in past: Yes: last in 2018 for left knee replacement; held for 5-day for EGD in February 2020 with no bridge    Wt Readings from Last 3 Encounters:   09/05/23 63.7 kg (140 lb 8 oz)   08/30/23 65.1 kg (143 lb 8.3 oz)   07/24/23 64.4 kg (142 lb)      Ideal body weight: 54.7 kg (120 lb 9.5 oz)  Adjusted ideal body weight: 58.3 kg (128 lb 8.9 oz)     Estimated body mass index is 24.12 kg/m  as  "calculated from the following:    Height as of 9/5/23: 1.626 m (5' 4\").    Weight as of 9/5/23: 63.7 kg (140 lb 8 oz).    Lab Results   Component Value Date    INR 3.4 (H) 09/15/2023    INR 3.0 (H) 09/01/2023    INR 2.95 (H) 08/30/2023     Lab Results   Component Value Date    HGB 12.0 09/05/2023    HCT 36.6 09/05/2023     09/05/2023     Lab Results   Component Value Date    CR 1.03 (H) 09/05/2023    CR 1.11 (H) 08/30/2023    CR 1.05 (H) 07/14/2023     Estimated Creatinine Clearance: 42.3 mL/min (A) (based on SCr of 1.03 mg/dL (H)).    "

## 2023-09-18 DIAGNOSIS — I48.92 ATRIAL FIBRILLATION AND FLUTTER (H): ICD-10-CM

## 2023-09-18 DIAGNOSIS — I48.91 ATRIAL FIBRILLATION AND FLUTTER (H): ICD-10-CM

## 2023-09-18 DIAGNOSIS — Z95.3 S/P MITRAL VALVE REPLACEMENT WITH BIOPROSTHETIC VALVE: ICD-10-CM

## 2023-09-18 DIAGNOSIS — Z79.01 LONG TERM CURRENT USE OF ANTICOAGULANTS WITH INR GOAL OF 2.0-3.0: ICD-10-CM

## 2023-09-18 RX ORDER — ENOXAPARIN SODIUM 100 MG/ML
50 INJECTION SUBCUTANEOUS EVERY 12 HOURS
Qty: 16.8 ML | Refills: 1 | Status: CANCELLED | OUTPATIENT
Start: 2023-09-18

## 2023-09-18 RX ORDER — ENOXAPARIN SODIUM 100 MG/ML
50 INJECTION SUBCUTANEOUS EVERY 12 HOURS
Qty: 16.8 ML | Refills: 1 | Status: SHIPPED | OUTPATIENT
Start: 2023-09-18 | End: 2023-09-18

## 2023-09-18 RX ORDER — ENOXAPARIN SODIUM 100 MG/ML
50 INJECTION SUBCUTANEOUS EVERY 12 HOURS
Qty: 16.8 ML | Refills: 1 | Status: ON HOLD | OUTPATIENT
Start: 2023-09-18 | End: 2023-10-01

## 2023-09-18 NOTE — TELEPHONE ENCOUNTER
Writer re sent script to pharmacy requested. Writer called patient and advised her of this.    Katalina Castro RN, BSN, PHN

## 2023-09-18 NOTE — TELEPHONE ENCOUNTER
Received incoming phone call from patient who states that [facility] pharmacy in [place] doesn't have the enoxaparin ANTICOAGULANT (LOVENOX) 60 MG/0.6ML syringe -  50 mg, EVERY 12 HOURS in stock. Patient requesting medication be sent to [facility] pharmacy in [place] on [address] if they have medication available.    Called [facility] pharmacy who states they have this in stock and it would be easier for anticoagulation team to resent prescription to [facility] pharmacy.    Please advise.    Patient requesting phone call follow up when this is done please.     Thank you,  Rodolfo Ibarra, Triage RN [facility]  3:00 PM 9/18/2023

## 2023-09-18 NOTE — PROGRESS NOTES
[place] [address]  [facility] [address]  [phone]  Dept: [phone]    PRE-OP EVALUATION:  Today's date: 2018    Zunilda Clark (: 1940) presents for pre-operative evaluation assessment as requested by Dr. Sheikh.  She requires evaluation and anesthesia risk assessment prior to undergoing surgery/procedure for treatment of L knee replacement .    Fax number for surgical facility: [facility]  Primary Physician: Yunior Huang  Type of Anesthesia Anticipated: General    Patient has a Health Care Directive or Living Will:  YES     Preop Questions 2018   Who is doing your surgery? Dr Jurgen Sheikh   What are you having done? left knee replacement   Date of Surgery/Procedure: 2018   Facility or Hospital where procedure/surgery will be performed: [facility]   1.  Do you have a history of Heart attack, stroke, stent, coronary bypass surgery, or other heart surgery? YES  -    2.  Do you ever have any pain or discomfort in your chest? No   3.  Do you have a history of  Heart Failure? YES -    4.   Are you troubled by shortness of breath when:  walking on a level surface, or up a slight hill, or at night? UNKNOWN -    5.  Do you currently have a cold, bronchitis or other respiratory infection? No   6.  Do you have a cough, shortness of breath, or wheezing? No   7.  Do you sometimes get pains in the calves of your legs when you walk? YES -    8. Do you or anyone in your family have previous history of blood clots? YES -    9.  Do you or does anyone in your family have a serious bleeding problem such as prolonged bleeding following surgeries or cuts? UNKNOWN -    10. Have you ever had problems with anemia or been told to take iron pills? No   11. Have you had any abnormal blood loss such as black, tarry or bloody stools, or abnormal vaginal bleeding? No   12. Have you ever had a blood transfusion? No   13. Have you or any of your relatives ever had  problems with anesthesia? YES -    14. Do you have sleep apnea, excessive snoring or daytime drowsiness? No   15. Do you have any prosthetic heart valves? No   16. Do you have prosthetic joints? No   17. Is there any chance that you may be pregnant? No         HPI:     HPI related to upcoming procedure: scheduled for left TKA for history of RA, OA.   No acute complaints, no medication change or new medical conditions.  Has h/o HTN. on medical treatment. BP has been controlled. No side effects from medications. No CP, HA, dizziness. good compliance with medications and low salt diet.  Has history of paroxysmal atrial fibrillation. On anticoagulation with Coumadin and rate control medications. Asymptonatic - no chest pains , palpitations,  no side effects from medications.  Has RA. On Methotrexate treatment. Controlled symptoms. No infections.       See problem list for active medical problems.  Problems all longstanding and stable, except as noted/documented.  See ROS for pertinent symptoms related to these conditions.                                                                                                                                                          .    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 09/29/2017     Priority: Medium     Valvular heart disease 09/29/2017     Priority: Medium     Paroxysmal atrial fibrillation (H) 09/29/2017     Priority: Medium     S/P mitral valve replacement with bioprosthetic valve 09/29/2017     Priority: Medium     Pulmonary hypertension 09/29/2017     Priority: Medium     Acute cholecystitis 04/28/2017     Priority: Medium     Long-term (current) use of anticoagulants [Z79.01] 04/25/2016     Priority: Medium     Essential hypertension 11/10/2015     Priority: Medium     Acquired hypothyroidism 11/04/2015     Priority: Medium     Anticoagulation management encounter 07/30/2015      Priority: Medium     Advance Care Planning 07/29/2015     Priority: Medium     Advance Care Planning 7/29/2015: Receipt of ACP document:  Received: Health Care Directive which was witnessed or notarized on 9/5/13.  Document not previously scanned.  Validation form completed and sent with document to be scanned.  Code Status reflects choices in most recent ACP document.  Confirmed/documented designated decision maker(s): Norberto Clark is primary health care agent, and Kyara Mayorga and Tenisha Moeller are alternate agents.  Added by Jose Castañeda           Fracture, humerus closed, shaft 07/28/2015     Priority: Medium     Humerus fracture 07/22/2015     Priority: Medium     Cardiac pacemaker in situ 06/05/2015     Priority: Medium     Mitral valve disorder 06/01/2015     Priority: Medium     mitral valve replacement in 2008 following attempted balloon valvuloplasty in 2006 for mitral stenosis related to presumed rheumatic valvular heart disease        Pulmonary HTN      Priority: Medium     Atrial fibrillation and flutter (H)      Priority: Medium     Ablation and PPM 2011       RA (rheumatoid arthritis) (H) 05/05/2015     Priority: Medium     Benign essential hypertension 05/05/2015     Priority: Medium     CHF (congestive heart failure) (H) 04/26/2014     Priority: Medium      Past Medical History:   Diagnosis Date     Acquired hypothyroidism 11/4/2015     Aortic valve insufficiency      Arthritis      Atrial fibrillation (H)      Atrial fibrillation and flutter (H)      Blood clotting disorder (H)      CHF (congestive heart failure) (H)      DVT (deep venous thrombosis) (H) 2003     Gastro-oesophageal reflux disease      H/O mitral valve replacement with tissue graft june 2014     History of blood transfusion 2014     HTN (hypertension)      Hypothyroidism      Other chronic pain      Pulmonary HTN      Rheumatoid arthritis(714.0)      Seizures (H) 2014     Stroke (H) 2009    left side mild weakness      Stroke  (H) 2014     [place]ope 2011    [place]     Thrombosis of leg 2003    both legs     Past Surgical History:   Procedure Laterality Date     ABDOMEN SURGERY      prolapsed bladder     H ABLATION AV NODE  2011    AFlutter with syncope, PPM to follow     IMPLANT PACEMAKER  2011     LAPAROSCOPIC CHOLECYSTECTOMY WITH CHOLANGIOGRAMS N/A 4/29/2017    Procedure: LAPAROSCOPIC CHOLECYSTECTOMY WITH CHOLANGIOGRAMS;  LAPAROSCOPIC CHOLECYSTECTOMY WITH CHOLANGIOGRAMS ;  Surgeon: Angeles Mcgill MD;  Location: [facility] OR     OPEN REDUCTION INTERNAL FIXATION RODDING INTRAMEDULLARY HUMERUS Right 7/28/2015    Procedure: OPEN REDUCTION INTERNAL FIXATION RODDING INTRAMEDULLARY HUMERUS;  Surgeon: Raymundo Rivera MD;  Location: [facility] OR     REPLACE VALVE MITRAL  2006    Mitral valve replacement with bioprosthetic valve in 2008 for rheumatic disease     SLING BLADDER SUSPENSION WITH FASCIA UMESH       Current Outpatient Prescriptions   Medication Sig Dispense Refill     ACETAMINOPHEN PO Take 650 mg by mouth every 6 hours as needed        ALLOPURINOL PO Take 100 mg by mouth 2 times daily       Amlodipine Besylate-Valsartan (EXFORGE)  MG TABS Take 0.5 tablets by mouth every morning 30 tablet 3     calcium citrate (CALCITRATE) 950 MG tablet Take by mouth daily 1200 mg daily       fish oil-omega-3 fatty acids 1000 MG capsule Take 1 g by mouth daily Pt takes as remembers       folic acid (FOLVITE) 1 MG tablet Take 1 mg by mouth daily       ibandronate (BONIVA) 3 MG/3ML Inject 3 mg into the vein every 3 months        levothyroxine (SYNTHROID/LEVOTHROID) 112 MCG tablet TAKE 1 TABLET (112 MCG) BY MOUTH DAILY 90 tablet 0     METHOTREXATE SODIUM IJ Inject 0.8 mLs as directed once a week       metoprolol tartrate (LOPRESSOR) 50 MG tablet Take 1 tablet (50 mg) by mouth 2 times daily 200 tablet 3     multivitamin, therapeutic with minerals (MULTI-VITAMIN) TABS tablet Take 1 tablet by mouth daily       omeprazole (PRILOSEC) 20 MG CR capsule  Take 1 capsule (20 mg) by mouth 2 times daily 180 capsule 1     torsemide (DEMADEX) 10 MG tablet Take 1 tablet (10 mg) by mouth daily (with breakfast) 30 tablet 3     VITAMIN D, CHOLECALCIFEROL, PO Take 1,000 Units by mouth daily       warfarin (COUMADIN) 2.5 MG tablet Take 1 tablet (2.5 mg) by mouth Sun, Tue, Thur, Fri. Take 2 tablets (5mg) by mouth Mon, wed, Sat or as instructed by INR clinic. 120 tablet 0     OTC products: None, except as noted above    No Known Allergies   Latex Allergy: NO    Social History   Substance Use Topics     Smoking status: Never Smoker     Smokeless tobacco: Never Used     Alcohol use No     History   Drug Use No       REVIEW OF SYSTEMS:   CONSTITUTIONAL: NEGATIVE for fever, chills, change in weight  INTEGUMENTARY/SKIN: NEGATIVE for worrisome rashes, moles or lesions  EYES: NEGATIVE for vision changes or irritation  ENT/MOUTH: NEGATIVE for ear, mouth and throat problems  RESP: NEGATIVE for significant cough or SOB  BREAST: NEGATIVE for masses, tenderness or discharge  CV: NEGATIVE for chest pain, palpitations or peripheral edema  GI: NEGATIVE for nausea, abdominal pain, heartburn, or change in bowel habits  : NEGATIVE for frequency, dysuria, or hematuria  MUSCULOSKELETAL: NEGATIVE for significant arthralgias or myalgia, + for bilateral knees pains   NEURO: NEGATIVE for weakness, dizziness or paresthesias  ENDOCRINE: NEGATIVE for temperature intolerance, skin/hair changes  HEME: NEGATIVE for bleeding problems  PSYCHIATRIC: NEGATIVE for changes in mood or affect    EXAM:   There were no vitals taken for this visit.    GENERAL APPEARANCE: healthy, alert and no distress     EYES: EOMI, PERRL     HENT: ear canals and TM's normal and nose and mouth without ulcers or lesions     NECK: no adenopathy, no asymmetry, masses, or scars and thyroid normal to palpation     RESP: lungs clear to auscultation - no rales, rhonchi or wheezes     CV: regular rates and rhythm, normal S1 S2, no S3 or S4  and no murmur, click or rub     ABDOMEN:  soft, nontender, no HSM or masses and bowel sounds normal     MS: extremities normal- no gross deformities noted, no evidence of inflammation in joints, FROM in all extremities.     SKIN: no suspicious lesions or rashes     NEURO: Normal strength and tone, sensory exam grossly normal, mentation intact and speech normal     PSYCH: mentation appears normal. and affect normal/bright     LYMPHATICS: No cervical adenopathy    DIAGNOSTICS:     EKG: appears normal, NSR, normal axis, normal intervals, no acute ST/T changes c/w ischemia, no LVH by voltage criteria, unchanged from previous tracings  Labs Drawn and in Process:   Unresulted Labs Ordered in the Past 30 Days of this Admission     Date and Time Order Name Status Description    8/17/2018 1022 COMPREHENSIVE METABOLIC PANEL In process     8/17/2018 1022 CBC WITH PLATELETS In process           Recent Labs   Lab Test 08/17/18 07/26/18   1035 07/09/18 04/23/18   1120   08/17/17   0843   05/04/16   1141   HGB   --    --    --    --   11.7   --   11.6*   < >   --    PLT   --    --    --    --   343   --   329   < >   --    INR  1.8*   --   2.1*   < >   --    < >   --    < >   --    NA   --   141   --    --   142   --   142   < >   --    POTASSIUM   --   3.7   --    --   3.9   --   3.7   < >   --    CR   --   1.13*   --    --   0.77   --   0.96   < >   --    A1C   --    --    --    --    --    --    --    --   5.4    < > = values in this interval not displayed.        IMPRESSION:   Reason for surgery/procedure: left knee OA, RA   Diagnosis/reason for consult: preoperative evaluation/ clearance      The proposed surgical procedure is considered INTERMEDIATE risk.    REVISED CARDIAC RISK INDEX  The patient has the following serious cardiovascular risks for perioperative complications such as (MI, PE, VFib and 3  AV Block):  No serious cardiac risks  INTERPRETATION: 0 risks: Class I (very low risk - 0.4% complication rate)    The  patient has the following additional risks for perioperative complications:  No identified additional risks      ICD-10-CM    1. Preop general physical exam Z01.818        RECOMMENDATIONS:         --Patient is to take all scheduled medications on the day of surgery EXCEPT for modifications listed below.  Hold Coumadin , Fish oil, MVI for 5 days prior to surgery.   Resume Coumadin after surgery   Hold Diuretic on the day of surgery.   Decrease dose of Amlodipine/ Valsartan , BP is low -normal.     APPROVAL GIVEN to proceed with proposed procedure, without further diagnostic evaluation       Signed Electronically by: Yunior Huang MD    Copy of this evaluation report is provided to requesting physician.    [facility] Preop Guidelines    Revised Cardiac Risk Index   Consent (Scalp)/Introductory Paragraph: The rationale for Mohs was explained to the patient and consent was obtained. The risks, benefits and alternatives to therapy were discussed in detail. Specifically, the risks of changes in hair growth pattern secondary to repair, infection, scarring, bleeding, prolonged wound healing, incomplete removal, allergy to anesthesia, nerve injury and recurrence were addressed. Prior to the procedure, the treatment site was clearly identified and confirmed by the patient. All components of Universal Protocol/PAUSE Rule completed.

## 2023-09-18 NOTE — TELEPHONE ENCOUNTER
Writer reviewed bridge plan with patient and also sent via [employer] Lovenox sent to preferred pharmacy. Patient will call to make sure they can have in stock.    Katalina Castro RN, BSN, PHN

## 2023-09-25 ENCOUNTER — TRANSFERRED RECORDS (OUTPATIENT)
Dept: HEALTH INFORMATION MANAGEMENT | Facility: CLINIC | Age: 83
End: 2023-09-25

## 2023-09-25 PROCEDURE — 88305 TISSUE EXAM BY PATHOLOGIST: CPT | Mod: TC,ORL | Performed by: INTERNAL MEDICINE

## 2023-09-26 ENCOUNTER — APPOINTMENT (OUTPATIENT)
Dept: GENERAL RADIOLOGY | Facility: CLINIC | Age: 83
DRG: 481 | End: 2023-09-26
Attending: STUDENT IN AN ORGANIZED HEALTH CARE EDUCATION/TRAINING PROGRAM
Payer: COMMERCIAL

## 2023-09-26 ENCOUNTER — LAB REQUISITION (OUTPATIENT)
Dept: LAB | Facility: CLINIC | Age: 83
End: 2023-09-26
Payer: COMMERCIAL

## 2023-09-26 ENCOUNTER — HOSPITAL ENCOUNTER (INPATIENT)
Facility: CLINIC | Age: 83
LOS: 5 days | Discharge: NURSING, REHAB OR LONG-TERM CARE | DRG: 481 | End: 2023-10-01
Attending: EMERGENCY MEDICINE | Admitting: INTERNAL MEDICINE
Payer: COMMERCIAL

## 2023-09-26 ENCOUNTER — APPOINTMENT (OUTPATIENT)
Dept: CT IMAGING | Facility: CLINIC | Age: 83
DRG: 481 | End: 2023-09-26
Attending: STUDENT IN AN ORGANIZED HEALTH CARE EDUCATION/TRAINING PROGRAM
Payer: COMMERCIAL

## 2023-09-26 DIAGNOSIS — I48.91 ATRIAL FIBRILLATION AND FLUTTER (H): Primary | ICD-10-CM

## 2023-09-26 DIAGNOSIS — Z95.3 S/P MITRAL VALVE REPLACEMENT WITH BIOPROSTHETIC VALVE: ICD-10-CM

## 2023-09-26 DIAGNOSIS — I48.92 ATRIAL FIBRILLATION AND FLUTTER (H): Primary | ICD-10-CM

## 2023-09-26 DIAGNOSIS — J81.0 ACUTE PULMONARY EDEMA (H): ICD-10-CM

## 2023-09-26 DIAGNOSIS — R19.5 OTHER FECAL ABNORMALITIES: ICD-10-CM

## 2023-09-26 DIAGNOSIS — K57.30 DIVERTICULOSIS OF LARGE INTESTINE WITHOUT PERFORATION OR ABSCESS WITHOUT BLEEDING: ICD-10-CM

## 2023-09-26 DIAGNOSIS — S72.352A CLOSED DISPLACED COMMINUTED FRACTURE OF SHAFT OF LEFT FEMUR, INITIAL ENCOUNTER (H): ICD-10-CM

## 2023-09-26 DIAGNOSIS — D12.0 BENIGN NEOPLASM OF CECUM: ICD-10-CM

## 2023-09-26 LAB
ABO/RH(D): NORMAL
ANION GAP SERPL CALCULATED.3IONS-SCNC: 8 MMOL/L (ref 7–15)
ANTIBODY SCREEN: NEGATIVE
ATRIAL RATE - MUSE: 62 BPM
BASOPHILS # BLD AUTO: 0 10E3/UL (ref 0–0.2)
BASOPHILS NFR BLD AUTO: 0 %
BUN SERPL-MCNC: 13.7 MG/DL (ref 8–23)
CALCIUM SERPL-MCNC: 9.2 MG/DL (ref 8.8–10.2)
CHLORIDE SERPL-SCNC: 106 MMOL/L (ref 98–107)
CREAT SERPL-MCNC: 0.73 MG/DL (ref 0.51–0.95)
DEPRECATED HCO3 PLAS-SCNC: 24 MMOL/L (ref 22–29)
DIASTOLIC BLOOD PRESSURE - MUSE: NORMAL MMHG
EGFRCR SERPLBLD CKD-EPI 2021: 82 ML/MIN/1.73M2
EOSINOPHIL # BLD AUTO: 0.1 10E3/UL (ref 0–0.7)
EOSINOPHIL NFR BLD AUTO: 1 %
ERYTHROCYTE [DISTWIDTH] IN BLOOD BY AUTOMATED COUNT: 16.9 % (ref 10–15)
FERRITIN SERPL-MCNC: 297 NG/ML (ref 11–328)
GLUCOSE SERPL-MCNC: 103 MG/DL (ref 70–99)
HCT VFR BLD AUTO: 32.3 % (ref 35–47)
HGB BLD-MCNC: 10.4 G/DL (ref 11.7–15.7)
IMM GRANULOCYTES # BLD: 0 10E3/UL
IMM GRANULOCYTES NFR BLD: 0 %
INR PPP: 1.25 (ref 0.85–1.15)
INTERPRETATION ECG - MUSE: NORMAL
IRON BINDING CAPACITY (ROCHE): 212 UG/DL (ref 240–430)
IRON SATN MFR SERPL: 15 % (ref 15–46)
IRON SERPL-MCNC: 32 UG/DL (ref 37–145)
LYMPHOCYTES # BLD AUTO: 0.3 10E3/UL (ref 0.8–5.3)
LYMPHOCYTES NFR BLD AUTO: 3 %
MAGNESIUM SERPL-MCNC: 2 MG/DL (ref 1.7–2.3)
MCH RBC QN AUTO: 34.9 PG (ref 26.5–33)
MCHC RBC AUTO-ENTMCNC: 32.2 G/DL (ref 31.5–36.5)
MCV RBC AUTO: 108 FL (ref 78–100)
MONOCYTES # BLD AUTO: 0.7 10E3/UL (ref 0–1.3)
MONOCYTES NFR BLD AUTO: 7 %
NEUTROPHILS # BLD AUTO: 8.2 10E3/UL (ref 1.6–8.3)
NEUTROPHILS NFR BLD AUTO: 89 %
NRBC # BLD AUTO: 0 10E3/UL
NRBC BLD AUTO-RTO: 0 /100
NT-PROBNP SERPL-MCNC: 1711 PG/ML (ref 0–1800)
P AXIS - MUSE: 41 DEGREES
PLATELET # BLD AUTO: 264 10E3/UL (ref 150–450)
POTASSIUM SERPL-SCNC: 3.7 MMOL/L (ref 3.4–5.3)
PR INTERVAL - MUSE: 176 MS
QRS DURATION - MUSE: 158 MS
QT - MUSE: 492 MS
QTC - MUSE: 499 MS
R AXIS - MUSE: 98 DEGREES
RBC # BLD AUTO: 2.98 10E6/UL (ref 3.8–5.2)
SODIUM SERPL-SCNC: 138 MMOL/L (ref 136–145)
SPECIMEN EXPIRATION DATE: NORMAL
SYSTOLIC BLOOD PRESSURE - MUSE: NORMAL MMHG
T AXIS - MUSE: 9 DEGREES
TROPONIN T SERPL HS-MCNC: 14 NG/L
TROPONIN T SERPL HS-MCNC: 16 NG/L
VENTRICULAR RATE- MUSE: 62 BPM
VIT B12 SERPL-MCNC: 703 PG/ML (ref 232–1245)
VIT D+METAB SERPL-MCNC: 84 NG/ML (ref 20–50)
WBC # BLD AUTO: 9.3 10E3/UL (ref 4–11)

## 2023-09-26 PROCEDURE — 250N000013 HC RX MED GY IP 250 OP 250 PS 637: Performed by: STUDENT IN AN ORGANIZED HEALTH CARE EDUCATION/TRAINING PROGRAM

## 2023-09-26 PROCEDURE — 120N000001 HC R&B MED SURG/OB

## 2023-09-26 PROCEDURE — 85025 COMPLETE CBC W/AUTO DIFF WBC: CPT | Performed by: STUDENT IN AN ORGANIZED HEALTH CARE EDUCATION/TRAINING PROGRAM

## 2023-09-26 PROCEDURE — 82306 VITAMIN D 25 HYDROXY: CPT | Performed by: PHYSICIAN ASSISTANT

## 2023-09-26 PROCEDURE — 80048 BASIC METABOLIC PNL TOTAL CA: CPT | Performed by: STUDENT IN AN ORGANIZED HEALTH CARE EDUCATION/TRAINING PROGRAM

## 2023-09-26 PROCEDURE — 250N000013 HC RX MED GY IP 250 OP 250 PS 637: Performed by: PHYSICIAN ASSISTANT

## 2023-09-26 PROCEDURE — 73700 CT LOWER EXTREMITY W/O DYE: CPT | Mod: LT

## 2023-09-26 PROCEDURE — 83550 IRON BINDING TEST: CPT | Performed by: INTERNAL MEDICINE

## 2023-09-26 PROCEDURE — 93005 ELECTROCARDIOGRAM TRACING: CPT

## 2023-09-26 PROCEDURE — 250N000013 HC RX MED GY IP 250 OP 250 PS 637: Performed by: INTERNAL MEDICINE

## 2023-09-26 PROCEDURE — 84484 ASSAY OF TROPONIN QUANT: CPT | Performed by: STUDENT IN AN ORGANIZED HEALTH CARE EDUCATION/TRAINING PROGRAM

## 2023-09-26 PROCEDURE — 250N000011 HC RX IP 250 OP 636: Performed by: STUDENT IN AN ORGANIZED HEALTH CARE EDUCATION/TRAINING PROGRAM

## 2023-09-26 PROCEDURE — 36415 COLL VENOUS BLD VENIPUNCTURE: CPT | Performed by: STUDENT IN AN ORGANIZED HEALTH CARE EDUCATION/TRAINING PROGRAM

## 2023-09-26 PROCEDURE — 99223 1ST HOSP IP/OBS HIGH 75: CPT | Mod: AI | Performed by: INTERNAL MEDICINE

## 2023-09-26 PROCEDURE — 83735 ASSAY OF MAGNESIUM: CPT | Performed by: INTERNAL MEDICINE

## 2023-09-26 PROCEDURE — 82607 VITAMIN B-12: CPT | Performed by: INTERNAL MEDICINE

## 2023-09-26 PROCEDURE — 83880 ASSAY OF NATRIURETIC PEPTIDE: CPT | Performed by: STUDENT IN AN ORGANIZED HEALTH CARE EDUCATION/TRAINING PROGRAM

## 2023-09-26 PROCEDURE — 99285 EMERGENCY DEPT VISIT HI MDM: CPT

## 2023-09-26 PROCEDURE — 86900 BLOOD TYPING SEROLOGIC ABO: CPT | Performed by: PHYSICIAN ASSISTANT

## 2023-09-26 PROCEDURE — 82728 ASSAY OF FERRITIN: CPT | Performed by: INTERNAL MEDICINE

## 2023-09-26 PROCEDURE — 73560 X-RAY EXAM OF KNEE 1 OR 2: CPT | Mod: LT

## 2023-09-26 PROCEDURE — 85610 PROTHROMBIN TIME: CPT | Performed by: STUDENT IN AN ORGANIZED HEALTH CARE EDUCATION/TRAINING PROGRAM

## 2023-09-26 PROCEDURE — 71045 X-RAY EXAM CHEST 1 VIEW: CPT

## 2023-09-26 PROCEDURE — 73552 X-RAY EXAM OF FEMUR 2/>: CPT | Mod: LT

## 2023-09-26 RX ORDER — PROCHLORPERAZINE 25 MG
12.5 SUPPOSITORY, RECTAL RECTAL EVERY 12 HOURS PRN
Status: DISCONTINUED | OUTPATIENT
Start: 2023-09-26 | End: 2023-09-27 | Stop reason: ALTCHOICE

## 2023-09-26 RX ORDER — ONDANSETRON 2 MG/ML
4 INJECTION INTRAMUSCULAR; INTRAVENOUS EVERY 6 HOURS PRN
Status: DISCONTINUED | OUTPATIENT
Start: 2023-09-26 | End: 2023-09-27 | Stop reason: ALTCHOICE

## 2023-09-26 RX ORDER — AMOXICILLIN 250 MG
2 CAPSULE ORAL 2 TIMES DAILY PRN
Status: DISCONTINUED | OUTPATIENT
Start: 2023-09-26 | End: 2023-09-27 | Stop reason: ALTCHOICE

## 2023-09-26 RX ORDER — WARFARIN SODIUM 2.5 MG/1
TABLET ORAL DAILY
Status: ON HOLD | COMMUNITY
End: 2023-10-01

## 2023-09-26 RX ORDER — AMOXICILLIN 250 MG
1 CAPSULE ORAL 2 TIMES DAILY PRN
Status: DISCONTINUED | OUTPATIENT
Start: 2023-09-26 | End: 2023-09-27 | Stop reason: ALTCHOICE

## 2023-09-26 RX ORDER — DORZOLAMIDE HYDROCHLORIDE AND TIMOLOL MALEATE 20; 5 MG/ML; MG/ML
1 SOLUTION/ DROPS OPHTHALMIC 2 TIMES DAILY
Status: DISCONTINUED | OUTPATIENT
Start: 2023-09-26 | End: 2023-10-01 | Stop reason: HOSPADM

## 2023-09-26 RX ORDER — PROCHLORPERAZINE MALEATE 5 MG
5 TABLET ORAL EVERY 6 HOURS PRN
Status: DISCONTINUED | OUTPATIENT
Start: 2023-09-26 | End: 2023-09-27 | Stop reason: ALTCHOICE

## 2023-09-26 RX ORDER — VITAMIN B COMPLEX
50 TABLET ORAL DAILY
Status: DISCONTINUED | OUTPATIENT
Start: 2023-09-26 | End: 2023-10-01 | Stop reason: HOSPADM

## 2023-09-26 RX ORDER — ACETAMINOPHEN 325 MG/1
650 TABLET ORAL EVERY 6 HOURS PRN
Status: DISCONTINUED | OUTPATIENT
Start: 2023-09-26 | End: 2023-09-27 | Stop reason: ALTCHOICE

## 2023-09-26 RX ORDER — ONDANSETRON 4 MG/1
4 TABLET, ORALLY DISINTEGRATING ORAL EVERY 6 HOURS PRN
Status: DISCONTINUED | OUTPATIENT
Start: 2023-09-26 | End: 2023-09-27 | Stop reason: ALTCHOICE

## 2023-09-26 RX ORDER — POLYETHYLENE GLYCOL 3350 17 G/17G
17 POWDER, FOR SOLUTION ORAL DAILY PRN
Status: DISCONTINUED | OUTPATIENT
Start: 2023-09-26 | End: 2023-09-27 | Stop reason: ALTCHOICE

## 2023-09-26 RX ORDER — FOLIC ACID 1 MG/1
1 TABLET ORAL DAILY
Status: DISCONTINUED | OUTPATIENT
Start: 2023-09-27 | End: 2023-10-01 | Stop reason: HOSPADM

## 2023-09-26 RX ORDER — DOCUSATE SODIUM 100 MG/1
100 CAPSULE, LIQUID FILLED ORAL 2 TIMES DAILY
Status: DISCONTINUED | OUTPATIENT
Start: 2023-09-26 | End: 2023-10-01 | Stop reason: HOSPADM

## 2023-09-26 RX ORDER — KETOROLAC TROMETHAMINE 15 MG/ML
10 INJECTION, SOLUTION INTRAMUSCULAR; INTRAVENOUS ONCE
Status: COMPLETED | OUTPATIENT
Start: 2023-09-26 | End: 2023-09-26

## 2023-09-26 RX ORDER — ACETAMINOPHEN 650 MG/1
650 SUPPOSITORY RECTAL EVERY 6 HOURS PRN
Status: DISCONTINUED | OUTPATIENT
Start: 2023-09-26 | End: 2023-09-27 | Stop reason: ALTCHOICE

## 2023-09-26 RX ORDER — ACETAMINOPHEN 325 MG/1
650 TABLET ORAL ONCE
Status: COMPLETED | OUTPATIENT
Start: 2023-09-26 | End: 2023-09-26

## 2023-09-26 RX ORDER — OXYCODONE HYDROCHLORIDE 5 MG/1
5 TABLET ORAL EVERY 4 HOURS PRN
Status: DISCONTINUED | OUTPATIENT
Start: 2023-09-26 | End: 2023-09-27 | Stop reason: ALTCHOICE

## 2023-09-26 RX ORDER — LEVOTHYROXINE SODIUM 112 UG/1
112 TABLET ORAL DAILY
Status: DISCONTINUED | OUTPATIENT
Start: 2023-09-27 | End: 2023-10-01 | Stop reason: HOSPADM

## 2023-09-26 RX ORDER — HYDROMORPHONE HCL IN WATER/PF 6 MG/30 ML
0.2 PATIENT CONTROLLED ANALGESIA SYRINGE INTRAVENOUS
Status: DISCONTINUED | OUTPATIENT
Start: 2023-09-26 | End: 2023-09-27 | Stop reason: ALTCHOICE

## 2023-09-26 RX ORDER — FAMOTIDINE 20 MG/1
40 TABLET, FILM COATED ORAL DAILY
Status: DISCONTINUED | OUTPATIENT
Start: 2023-09-27 | End: 2023-10-01 | Stop reason: HOSPADM

## 2023-09-26 RX ORDER — GABAPENTIN 100 MG/1
100 CAPSULE ORAL DAILY
Status: DISCONTINUED | OUTPATIENT
Start: 2023-09-26 | End: 2023-10-01 | Stop reason: HOSPADM

## 2023-09-26 RX ORDER — METOPROLOL TARTRATE 25 MG/1
25 TABLET, FILM COATED ORAL 2 TIMES DAILY
Status: DISCONTINUED | OUTPATIENT
Start: 2023-09-26 | End: 2023-09-27 | Stop reason: ALTCHOICE

## 2023-09-26 RX ADMIN — Medication 50 MCG: at 11:45

## 2023-09-26 RX ADMIN — ACETAMINOPHEN 650 MG: 325 TABLET, FILM COATED ORAL at 09:39

## 2023-09-26 RX ADMIN — ACETAMINOPHEN 650 MG: 325 TABLET, FILM COATED ORAL at 21:12

## 2023-09-26 RX ADMIN — METOPROLOL TARTRATE 25 MG: 25 TABLET, FILM COATED ORAL at 21:12

## 2023-09-26 RX ADMIN — GABAPENTIN 100 MG: 100 CAPSULE ORAL at 15:47

## 2023-09-26 RX ADMIN — DOCUSATE SODIUM 100 MG: 100 CAPSULE, LIQUID FILLED ORAL at 21:12

## 2023-09-26 RX ADMIN — KETOROLAC TROMETHAMINE 10 MG: 15 INJECTION INTRAMUSCULAR; INTRAVENOUS at 11:48

## 2023-09-26 RX ADMIN — OXYCODONE HYDROCHLORIDE 5 MG: 5 TABLET ORAL at 16:49

## 2023-09-26 ASSESSMENT — ACTIVITIES OF DAILY LIVING (ADL)
ADLS_ACUITY_SCORE: 35
DIFFICULTY_EATING/SWALLOWING: NO
EQUIPMENT_CURRENTLY_USED_AT_HOME: WALKER, ROLLING;CANE, STRAIGHT
CONCENTRATING,_REMEMBERING_OR_MAKING_DECISIONS_DIFFICULTY: OTHER (SEE COMMENTS)
VISION_MANAGEMENT: GLASSES
ADLS_ACUITY_SCORE: 35
DRESSING/BATHING_DIFFICULTY: NO
CONCENTRATING,_REMEMBERING_OR_MAKING_DECISIONS_DIFFICULTY: OTHER (SEE COMMENTS)
FALL_HISTORY_WITHIN_LAST_SIX_MONTHS: YES
TRANSFERRING: 1-->ASSISTANCE (EQUIPMENT/PERSON) NEEDED (NOT DEVELOPMENTALLY APPROPRIATE)
TRANSFERRING: 1-->ASSISTANCE (EQUIPMENT/PERSON) NEEDED
ADLS_ACUITY_SCORE: 35
WALKING_OR_CLIMBING_STAIRS_DIFFICULTY: YES
ADLS_ACUITY_SCORE: 35
WEAR_GLASSES_OR_BLIND: YES
NUMBER_OF_TIMES_PATIENT_HAS_FALLEN_WITHIN_LAST_SIX_MONTHS: 2
WALKING_OR_CLIMBING_STAIRS: STAIR CLIMBING DIFFICULTY, ASSISTANCE 1 PERSON
CHANGE_IN_FUNCTIONAL_STATUS_SINCE_ONSET_OF_CURRENT_ILLNESS/INJURY: YES
DOING_ERRANDS_INDEPENDENTLY_DIFFICULTY: YES
TOILETING_ISSUES: NO
ADLS_ACUITY_SCORE: 35
ADLS_ACUITY_SCORE: 25

## 2023-09-26 NOTE — ED PROVIDER NOTES
History     Chief Complaint:  Fall and Knee Pain       HPI   Zunilda Clark is a 83 year old female with a history of hypertension, hypothyroidism, chronic kidney disease, chronic atrial fibrillation chronically anticoagulated, currently bridging from warfarin to Lovenox, s/p mitral valve repair, cardiac pacemaker in situ, who presents with knee pain after mechanical fall.  Patient states that she had a colonoscopy yesterday, states feeling very weak since, and fell this morning injuring her left knee.  She states that she has been unable to bend it since the injury.  Denies hitting her head, no loss of consciousness.  Also reports orthopnea this morning.  Has a history of CHF, not currently on diuretics.  States that her symptoms improved.  Upright position.  Denies any chest pain, cough, fever, chills, wheezing.      Independent Historian:   None - Patient Only    Review of External Notes:   Echo completed 7/18/23 - EF 55-60%, left atrium is severely dilated. Severe (>55mmHg) pulmonary hypertension is present- RVSP 79 mm hg +RA.       Medications:    ALLOPURINOL PO  amLODIPine (NORVASC) 2.5 MG tablet  amLODIPine-valsartan (EXFORGE) 5-160 MG tablet  amoxicillin (AMOXIL) 500 MG tablet  calcium citrate (CALCITRATE) 950 MG tablet  Dorzolamide HCl-Timolol Mal (COSOPT OP)  enoxaparin ANTICOAGULANT (LOVENOX) 60 MG/0.6ML syringe  famotidine (PEPCID) 40 MG tablet  folic acid (FOLVITE) 1 MG tablet  levothyroxine (SYNTHROID/LEVOTHROID) 112 MCG tablet  METHOTREXATE SODIUM IJ  metoprolol tartrate (LOPRESSOR) 50 MG tablet  multivitamin w/minerals (THERA-VIT-M) tablet  torsemide (DEMADEX) 10 MG tablet  VITAMIN D, CHOLECALCIFEROL, PO  warfarin ANTICOAGULANT (COUMADIN) 2.5 MG tablet        Past Medical History:    Past Medical History:   Diagnosis Date    Acquired hypothyroidism 11/04/2015    Aortic valve insufficiency     Atrial fibrillation and flutter     CHF (congestive heart failure)     DVT (deep venous thrombosis) 2003     Gastro-oesophageal reflux disease     H/O mitral valve replacement with tissue graft 06/2014    HTN (hypertension)     Other chronic pain     Pulmonary HTN     Rheumatoid arthritis     Seizure 2014    Shingles 08/2018    Stroke 2009       Past Surgical History:    Past Surgical History:   Procedure Laterality Date    APPLY WOUND VAC Left 12/18/2018    Procedure: Wound vac application;  Surgeon: Pardeep Sheikh MD;  Location: [facility]    ARTHROPLASTY KNEE Left 11/12/2018    Procedure: Left total knee arthroplasty using a [employer] knee system;  Surgeon: Pardeep Sheikh MD;  Location: [facility] OR    ARTHROSCOPY KNEE WITH DEBRIDEMENT JOINT, COMBINED Left 12/18/2018    Procedure: Left knee debridement and placement of wound vac;  Surgeon: Pardeep Sheikh MD;  Location: [facility] OR    CATARACT IOL, RT/LT      COLONOSCOPY  03/15/2012    EP PPM GENERATOR CHANGE SINGLE N/A 03/08/2021    Procedure: Permanent Pacemakers  Generator Change Dual Chamber;  Surgeon: Tamiko Cowan MD;  Location:  [facility]    ESOPHAGOSCOPY, GASTROSCOPY, DUODENOSCOPY (EGD), COMBINED N/A 02/19/2020    Procedure: ESOPHAGOGASTRODUODENOSCOPY (EGD);  Surgeon: Michael Mccarthy MD;  Location:  GI    EYE SURGERY  2018    Both eyes/cataract surgery    H ABLATION AV NODE  2011    AFlutter with syncope, PPM to follow    HERNIA REPAIR      3 hernies/right and left & umbilical    IMPLANT PACEMAKER  2011    LAPAROSCOPIC CHOLECYSTECTOMY WITH CHOLANGIOGRAMS N/A 04/29/2017    Procedure: LAPAROSCOPIC CHOLECYSTECTOMY WITH CHOLANGIOGRAMS;  LAPAROSCOPIC CHOLECYSTECTOMY WITH CHOLANGIOGRAMS ;  Surgeon: Angeles Mcgill MD;  Location: [facility] OR    OPEN REDUCTION INTERNAL FIXATION RODDING INTRAMEDULLARY HUMERUS Right 07/28/2015    Procedure: OPEN REDUCTION INTERNAL FIXATION RODDING INTRAMEDULLARY HUMERUS;  Surgeon: Raymundo Rivera MD;  Location: [facility] OR    REPLACE VALVE MITRAL  2006    Mitral valve  replacement with bioprosthetic valve in 2008 for rheumatic disease    SLING BLADDER SUSPENSION WITH FASCIA UMESH          Physical Exam   Patient Vitals for the past 24 hrs:   BP Temp Temp src Pulse Resp SpO2   09/26/23 0813 (!) 149/98 97  F (36.1  C) Oral 66 16 93 %        Physical Exam  General: Alert and cooperative with exam. Patient in no apparent distress. Normal mentation.  Head:  Scalp is NC/AT  Eyes:  No scleral icterus, PERRL  ENT:  The external nose and ears are normal.   Neck:  Normal range of motion without rigidity.  CV:  Regular rate and rhythm    Systolic murmur present  Resp:  Breath sounds are clear bilaterally    Non-labored, no retractions or accessory muscle use  GI:  Abdomen is soft, no distension, no tenderness. No peritoneal signs  MS:  Left knee nontender to palpation Limited ROM of left knee due to pain. No hip pain bilaterally. Mid left thigh pain to palpation. No obvious deformity noted.  Skin:  Warm and dry, No rash or lesions noted.  Neuro:  Oriented x 3. No gross motor deficits.      Emergency Department Course     ECG results from 09/26/23   EKG 12-lead, tracing only     Value    Systolic Blood Pressure     Diastolic Blood Pressure     Ventricular Rate 62    Atrial Rate 62    WA Interval 176    QRS Duration 158        QTc 499    P Axis 41    R AXIS 98    T Axis 9    Interpretation ECG      AV dual-paced rhythm  Abnormal ECG  When compared with ECG of 30-AUG-2023 11:06,  Vent. rate has decreased BY  14 BPM         Imaging:  XR Femur Left 2 Views   Final Result   IMPRESSION:      There is an oblique, comminuted, and displaced periprosthetic fracture   through the distal femoral diaphysis extending to the femoral   component of the left knee arthroplasty. Mild degenerative changes of   the left hip. Vascular calcifications. Osteopenia.      NOTE: ABNORMAL REPORT      THE DICTATION ABOVE DESCRIBES AN ABNORMAL REPORT FOR WHICH FOLLOW-UP   IS NEEDED.      ANTOINETTE DIEHL MD             SYSTEM ID:  [id number]      XR Knee Left 1/2 Views   Final Result   IMPRESSION:      There is an oblique, comminuted, and displaced periprosthetic fracture   through the distal femoral diaphysis extending to the femoral   component of the left knee arthroplasty. Mild degenerative changes of   the left hip. Vascular calcifications. Osteopenia.      NOTE: ABNORMAL REPORT      THE DICTATION ABOVE DESCRIBES AN ABNORMAL REPORT FOR WHICH FOLLOW-UP   IS NEEDED.      ANTOINETTE DIEHL MD            SYSTEM ID:  [id number]      XR Chest 1 View   Preliminary Result   IMPRESSION: Interval increase of bilateral vascular and interstitial   prominence that may represent pulmonary edema. Mildly increased   cardiomegaly. Stable left chest pacemaker. Stable sternotomy. Atypical   infectious etiology remains in the differential.       CT Femur Thigh Left w/o Contrast    (Results Pending)      Report per radiology    Laboratory:  Labs Ordered and Resulted from Time of ED Arrival to Time of ED Departure   BASIC METABOLIC PANEL - Abnormal       Result Value    Sodium 138      Potassium 3.7      Chloride 106      Carbon Dioxide (CO2) 24      Anion Gap 8      Urea Nitrogen 13.7      Creatinine 0.73      GFR Estimate 82      Calcium 9.2      Glucose 103 (*)    TROPONIN T, HIGH SENSITIVITY - Abnormal    Troponin T, High Sensitivity 16 (*)    CBC WITH PLATELETS AND DIFFERENTIAL - Abnormal    WBC Count 9.3      RBC Count 2.98 (*)     Hemoglobin 10.4 (*)     Hematocrit 32.3 (*)      (*)     MCH 34.9 (*)     MCHC 32.2      RDW 16.9 (*)     Platelet Count 264      % Neutrophils 89      % Lymphocytes 3      % Monocytes 7      % Eosinophils 1      % Basophils 0      % Immature Granulocytes 0      NRBCs per 100 WBC 0      Absolute Neutrophils 8.2      Absolute Lymphocytes 0.3 (*)     Absolute Monocytes 0.7      Absolute Eosinophils 0.1      Absolute Basophils 0.0      Absolute Immature Granulocytes 0.0      Absolute NRBCs 0.0     NT PROBNP  INPATIENT - Normal    N terminal Pro BNP Inpatient 1,711     TROPONIN T, HIGH SENSITIVITY   VITAMIN D DEFICIENCY SCREENING   INR   ABO/RH TYPE AND SCREEN        Procedures   None    Emergency Department Course & Assessments:      Interventions:  Medications   Vitamin D3 (CHOLECALCIFEROL) tablet 50 mcg (has no administration in time range)   ketorolac (TORADOL) injection 10 mg (has no administration in time range)   acetaminophen (TYLENOL) tablet 650 mg (650 mg Oral $Given 9/26/23 0900)       Independent Interpretation (X-rays, CTs, rhythm strip):  Left femur - comminuted displaced femur fracture  Left knee - hardware in place, femur fracture extension to distal end of femur  Chest - bilateral haziness to suggest pulmonary edema    Consultations/Discussion of Management or Tests:  ED Course as of 09/26/23 1131   Tue Sep 26, 2023   1049 Consulted with Kyara LANIER with [facility]   1129 Consulted with Dr. Benton with inpatient hospitalist service       Social Determinants of Health affecting care:   None    Disposition:  The patient was admitted to the hospital under the care of Dr. Benton.     Impression & Plan      Medical Decision Making:  Zunilda Clark is a 83 year old female who presents with left knee injury after mechanical fall along with orthopnea.  On exam, patient does not have any tenderness to the knee, no swelling appreciated or obvious deformity.  She does however have significant tenderness to the anterior middle left thigh.  No pain to the hips bilaterally.  She has limited range of motion of the left knee due to severe thigh pain.  History of left knee replacement.  On imaging, comminuted displaced left femur fracture present with extension down to the distal femur into the knee hardware.  Consulted with Kyara LANIER with [facility].  Patient is anticoagulated on Lovenox, however her last injection was Sunday night as she had a colonoscopy yesterday.  Coumadin last taken on the 19th.  INR yesterday for colonoscopy was  reportedly 1.2 as mentioned by patient's daughter.  Unable to find lab evidence of this in the chart.  Orthopedics plan to operate on femur tomorrow.  Patient will be n.p.o. at midnight tonight.  History of CHF and now complains of new orthopnea, concern for acute CHF exacerbation.  Her BNP is mildly elevated at 1700 however do not have recent lab to compare to.  Chest x-ray does show bilateral haziness to suggest pulmonary edema.  She is breathing comfortably on room air, not requiring any oxygen supplementation and maintaining stable oxygen saturations.  We will hold off on diuretics until after surgery.  Also holding anticoagulation. I spoke to the hospitalist, , who has agreed to admit the patient for continued evaluation and treatment.      Diagnosis:    ICD-10-CM    1. Acute pulmonary edema (H)  J81.0       2. Closed displaced comminuted fracture of shaft of left femur, initial encounter (H)  S72.352A Case Request: Left periprosthetic femur fracture open reduction internal fixation using retrograde intramedullary nail     Case Request: Left periprosthetic femur fracture open reduction internal fixation using retrograde intramedullary nail           Discharge Medications:  New Prescriptions    No medications on file          9/26/2023   YOLANDE Betancourt Lauren R, PA-C  09/26/23 1131

## 2023-09-26 NOTE — H&P
[facility]  Hospitalist H&P    Name: Zunilda Clark      MRN: [id number]  YOB: 1940    Age: 83 year old  Date of admission: 9/26/2023  Primary care provider: Yunior Huang            Assessment and Plan:     Zunilda Clark is a 83 year old female with PMH significant for RA, HTN, hypothyroidism, bioprosthetic MVRx 2 , A.fib, SSS s/p PPM, Pulmonary HTN, stroke who presents to ED after mechanical fall and found to have left distal femoral periprosthetic fracture and admitted on 9/26/2023.    Mechanical fall  Acute, closed, displaced, commuted left distal femur periprosthetic fracture.  -Patient fell down and sustained trauma to her left leg.  -She had history of left TKA in 2018.  -Left leg is immobilized.  -Imaging studies and CT scan showed closed displaced comminuted left periprosthetic distal femur fracture.  -Pain control with low-dose oxycodone and Dilaudid.  -Started gabapentin 100 mg daily.  -Ordered muscle relaxants.  -Orthopedic surgery consulted planning to do surgical repair tomorrow, briefly discussed with the team, input appreciated.  -N.p.o. after midnight, no need for IV fluid overnight, watch out for any sign of fluid overload.  -PTA patient was on full anticoagulation, she was off warfarin and transition to Lovenox for colonoscopy that was done yesterday.  -Last Lovenox dose was on 9/24 night.  -Use PCD's for now.  -Restart anticoagulation when okay with orthopedic surgery.    3.  Macrocytic anemia:  -Hemoglobin is 10.4, MCV is 108.  -Likely due to methotrexate.  -Check B12 and folic acid level as well as iron studies.  -Continue folic acid.      Chronic medical conditions.  Paroxysmal A-fib, Currently paced, anticoagulation on hold, continue low-dose metoprolol  Bioprosthetic mitral valve replacement x2 and TR annuloplasty  Chronic congestive heart failure without exacerbation: patient stopped taking her continue her torsemide about a month ago. CXR reported as  pulmonary congestion. No hypoxia, will hold off diuresis at this time unless there is sign of fluid overload.  Hypertension: Continue metoprolol, decrease the dose to 25 mg twice daily  Rheumatoid arthritis: On methotrexate once a week, will hold  History of CVA with prior left-sided weakness, resolved.      Clinically Significant Risk Factors Present on Admission               # Drug Induced Coagulation Defect: home medication list includes an anticoagulant medication    # Hypertension: Noted on problem list           # Pacemaker present       Code status: DNR/DNI  Admit to inpatient expect at least 3 nights stay in the hospital.  Prophylaxis: PCD's for now, restart full anticoagulation when okay with orthopedic surgery post op.  Disposition: Likely TCU, pending improvement post op, PT/ OT evaluation.    I discussed with patient at length the plan of care all his question and concerns addressed.          Chief Complaint:     Fall.         History of Present Illness:   Zunilda Clark is a 83 year old female with PMH significant for RA, HTN, hypothyroidism, bioprosthetic MVRx 2 with  tissue valve, A.fib, PPM for SSS, Pulmonary HTN, stroke admitted on 9/26/2023 after mechanical fall, sustained trauma here to her left leg and found to have left distal periprosthetic femur fracture.    Patient had colonoscopy yesterday, states that she was feeling weak after the procedure, was not eating as much postprocedure.  She denied any dizziness, palpitation or blurring of vision prior to the fall.  This morning she fell down and sustained trauma to her left leg and was not able to bend and came to the emergency room.  Patient denied any loss of consciousness, remembers similar event.  She felt some shortness of breath earlier this morning when she woke up.   The emergency room she was evaluated, noted to have left distal femur periprosthetic fracture.  ED provider consulted orthopedic service recommended admission with a plan for  surgical repair tomorrow.  Patient denied any urinary urgency or frequency or dysuria.   She has had bowel prep for colonoscopy she had yesterday, no diarrhea or bleeding postprocedure.      History was obtained from my discussion with the patient at the bedside.  I also discussed the case with the ED provider.  The electronic medical record was also reviewed.            Past Medical History:     Past Medical History:   Diagnosis Date    Acquired hypothyroidism 11/04/2015    Aortic valve insufficiency     Atrial fibrillation and flutter     CHF (congestive heart failure)     DVT (deep venous thrombosis) 2003    Gastro-oesophageal reflux disease     H/O mitral valve replacement with tissue graft 06/2014    HTN (hypertension)     Other chronic pain     Pulmonary HTN     Rheumatoid arthritis     Seizure 2014    Shingles 08/2018    Stroke 2009    left side mild weakness              Past Surgical History:     Past Surgical History:   Procedure Laterality Date    APPLY WOUND VAC Left 12/18/2018    Procedure: Wound vac application;  Surgeon: Pardeep Sheikh MD;  Location: [facility] OR    ARTHROPLASTY KNEE Left 11/12/2018    Procedure: Left total knee arthroplasty using a [employer] II knee system;  Surgeon: Pardeep Sheikh MD;  Location:  OR    ARTHROSCOPY KNEE WITH DEBRIDEMENT JOINT, COMBINED Left 12/18/2018    Procedure: Left knee debridement and placement of wound vac;  Surgeon: Pardeep Sheikh MD;  Location:  OR    CATARACT IOL, RT/LT      COLONOSCOPY  03/15/2012    EP PPM GENERATOR CHANGE SINGLE N/A 03/08/2021    Procedure: Permanent Pacemakers  Generator Change Dual Chamber;  Surgeon: Tamiko Cowan MD;  Location: [facility] CARDIAC CATH LAB    ESOPHAGOSCOPY, GASTROSCOPY, DUODENOSCOPY (EGD), COMBINED N/A 02/19/2020    Procedure: ESOPHAGOGASTRODUODENOSCOPY (EGD);  Surgeon: Michael Mccarthy MD;  Location:  GI    EYE SURGERY  2018    Both eyes/cataract surgery     H ABLATION AV NODE  2011    AFlutter with syncope, PPM to follow    HERNIA REPAIR      3 hernies/right and left & umbilical    IMPLANT PACEMAKER  2011    LAPAROSCOPIC CHOLECYSTECTOMY WITH CHOLANGIOGRAMS N/A 04/29/2017    Procedure: LAPAROSCOPIC CHOLECYSTECTOMY WITH CHOLANGIOGRAMS;  LAPAROSCOPIC CHOLECYSTECTOMY WITH CHOLANGIOGRAMS ;  Surgeon: Angeles Mcgill MD;  Location: [facility] OR    OPEN REDUCTION INTERNAL FIXATION RODDING INTRAMEDULLARY HUMERUS Right 07/28/2015    Procedure: OPEN REDUCTION INTERNAL FIXATION RODDING INTRAMEDULLARY HUMERUS;  Surgeon: Raymundo Rivera MD;  Location: [facility] OR    REPLACE VALVE MITRAL  2006    Mitral valve replacement with bioprosthetic valve in 2008 for rheumatic disease    SLING BLADDER SUSPENSION WITH FASCIA UMESH               Social History:     Social History     Tobacco Use    Smoking status: Never    Smokeless tobacco: Never   Substance Use Topics    Alcohol use: No     Alcohol/week: 0.0 standard drinks of alcohol             Family History:   The family history was fully reviewed and non-contributory in this case.         Allergies:   No Known Allergies          Medications:     Prior to Admission medications    Medication Sig Last Dose Taking? Auth Provider Long Term End Date   warfarin ANTICOAGULANT (COUMADIN) 2.5 MG tablet Take by mouth daily 1 tablet (2.5 mg) on Monday and Thursday, 1.5 tablets (3.75 mg) all other days or as directed by your ACC Team.  Yes Unknown, Entered By History     ALLOPURINOL PO Take 100 mg by mouth 2 times daily   Unknown, Entered By History     amLODIPine (NORVASC) 2.5 MG tablet Take 1 tablet (2.5 mg) by mouth daily   Noe Rodriguez MD Yes    amLODIPine-valsartan (EXFORGE) 5-160 MG tablet Take 1 tablet by mouth daily   Noe Rodriguez MD Yes    amoxicillin (AMOXIL) 500 MG tablet Take 2000mg (4 tablets of 500mg each) approx 30 min to 1 hour prior to any dental procedures   Noe Rodriguez MD     calcium citrate (CALCITRATE) 950 MG tablet Take 1 tablet  by mouth daily    Unknown, Entered By History     Dorzolamide HCl-Timolol Mal (COSOPT OP) Apply to eye 2 times daily Place one drop into each eye twice daily   Reported, Patient     enoxaparin ANTICOAGULANT (LOVENOX) 60 MG/0.6ML syringe Inject 0.5 mLs (50 mg) Subcutaneous every 12 hours   Yunior Huang MD No    famotidine (PEPCID) 40 MG tablet TAKE 1 TABLET BY MOUTH DAILY   Yunior Huang MD     folic acid (FOLVITE) 1 MG tablet Take 1 mg by mouth daily   Reported, Patient     levothyroxine (SYNTHROID/LEVOTHROID) 112 MCG tablet Take 1 tablet (112 mcg) by mouth daily   Yunior Huang MD Yes    METHOTREXATE SODIUM IJ Inject 0.8 mLs as directed once a week Thursdays   Reported, Patient No    metoprolol tartrate (LOPRESSOR) 50 MG tablet Take 1 tablet (50 mg) by mouth 2 times daily   Noe Rodriguez MD Yes    multivitamin w/minerals (THERA-VIT-M) tablet Take 1 tablet by mouth daily Without iron   Unknown, Entered By History     torsemide (DEMADEX) 10 MG tablet Take 1 tablet (10 mg) by mouth daily (with breakfast)   Noe Rodriguez MD Yes    VITAMIN D, CHOLECALCIFEROL, PO Take 1,000 Units by mouth daily   Unknown, Entered By History               Review of Systems:     A Comprehensive greater than 10 system review of systems was carried out.  Pertinent positives and negatives are noted above.  Otherwise negative for contributory information.           Physical Exam:   Blood pressure (!) 156/78, pulse 82, temperature 97  F (36.1  C), temperature source Oral, resp. rate 16, SpO2 93 %, not currently breastfeeding.  Wt Readings from Last 1 Encounters:   09/05/23 63.7 kg (140 lb 8 oz)     Exam:  GENERAL: Awake, alert, comfortable, not in distress at rest.  HEENT: Normocephalic, atraumatic. Extraocular movements intact.  CARDIOVASCULAR: S1 and S2 well heard, positive diastolic murmur more prominent at left sternal border No S3.  PULMONARY: Clear to auscultation bilaterally.  ABDOMINAL: Soft, non-tender, non-distended.  Bowel sounds normoactive.   EXTREMITIES: Left lower extremity immobilized, good capillary refill, no neurovascular compromise.  Trace edema.  NEUROLOGICAL: CN 2-12 grossly intact, no focal neurological deficits.  DERMATOLOGICAL: No rash, ulcer, bruising, nor jaundice.          Data:   EKG:  Personally reviewed.  Paced rhythm at 62 bpm, QTc is 499,     Laboratory:  Recent Labs   Lab 09/26/23  0934   WBC 9.3   HGB 10.4*   HCT 32.3*   *        Recent Labs   Lab 09/26/23  0934      POTASSIUM 3.7   CHLORIDE 106   CO2 24   ANIONGAP 8   *   BUN 13.7   CR 0.73   GFRESTIMATED 82   BRICE 9.2     No results for input(s): CULT in the last 168 hours.    Imaging:  Recent Results (from the past 24 hour(s))   XR Chest 1 View    Narrative    CHEST ONE VIEW   9/26/2023 10:26 AM     HISTORY: Shortness of breath.    COMPARISON: 6/12/2023.      Impression    IMPRESSION: Interval increase of bilateral vascular and interstitial  prominence that may represent pulmonary edema. Mildly increased  cardiomegaly. Stable left chest pacemaker. Stable sternotomy. Atypical  infectious etiology remains in the differential.    XR Knee Left 1/2 Views    Narrative    XR FEMUR LEFT 2 VIEWS, XR KNEE LEFT 1/2 VIEWS 9/26/2023 10:27 AM     HISTORY: thigh pain post fall    COMPARISON: None.       Impression    IMPRESSION:    There is an oblique, comminuted, and displaced periprosthetic fracture  through the distal femoral diaphysis extending to the femoral  component of the left knee arthroplasty. Mild degenerative changes of  the left hip. Vascular calcifications. Osteopenia.    NOTE: ABNORMAL REPORT    THE DICTATION ABOVE DESCRIBES AN ABNORMAL REPORT FOR WHICH FOLLOW-UP  IS NEEDED.    ANTOINETTE DIEHL MD         SYSTEM ID:  [id number]   XR Femur Left 2 Views    Narrative    XR FEMUR LEFT 2 VIEWS, XR KNEE LEFT 1/2 VIEWS 9/26/2023 10:27 AM     HISTORY: thigh pain post fall    COMPARISON: None.       Impression    IMPRESSION:    There  is an oblique, comminuted, and displaced periprosthetic fracture  through the distal femoral diaphysis extending to the femoral  component of the left knee arthroplasty. Mild degenerative changes of  the left hip. Vascular calcifications. Osteopenia.    NOTE: ABNORMAL REPORT    THE DICTATION ABOVE DESCRIBES AN ABNORMAL REPORT FOR WHICH FOLLOW-UP  IS NEEDED.    ANTOINETTE DIEHL MD         SYSTEM ID:  [id number]   CT Knee Left w/o Contrast    Narrative    EXAM: CT KNEE LEFT W/O CONTRAST WITH 3D RECONSTRUCTION  DATE/TIME: 9/26/2023 12:28 PM    INDICATION: Pain. Left femur fracture.  COMPARISON: Radiographs 9/26/2023  TECHNIQUE: Noncontrast. Axial, sagittal and coronal thin-section  reconstruction. Dose reduction techniques were used. Three-dimensional  images were created on a separate workstation by the CT technologist  using source data obtained at the time of image acquisition. Images  were made available to the surgeons for surgical planning.    FINDINGS:     BONES:  -There is an oblique comminuted mildly displaced periprosthetic  fracture through the distal femoral diaphysis extending to the lateral  metaphyseal cortex and femoral component of the knee arthroplasty.   -Moderate lipohemarthrosis.    SOFT TISSUES:  -There is adjacent soft tissue stranding adjacent to the fracture in  the intervening fat planes, which may represent edema or small volume  blood products..  -Vascular calcifications..      Impression    IMPRESSION:  1.  There is an acute oblique, comminuted, mildly displaced  periprosthetic fracture through the distal femur extending to the  femoral component of the knee arthroplasty.  2.  Moderate lipohemarthrosis.    ANTOINETTE DIEHL MD         SYSTEM ID:  [id number]       Venkat Benton MD  [facility]  [address] [facility] [place] [address]  09/26/2023

## 2023-09-26 NOTE — ED TRIAGE NOTES
Pt arrives via EMS for left knee pain after mechanical fall. Pt reports she was attempting to sit in a chair and she turned and twisted her left knee causing her to fall, landed on carpet. No LOC. Did not hit her head. Pt reports feeling weak due to colonoscopy 09/25. Unable to bear weight to left leg after the fall. HX: Left knee replacement. A&OX4.    Triage Assessment       Row Name 09/26/23 0815       Triage Assessment (Adult)    Airway WDL WDL       Respiratory WDL    Respiratory WDL WDL       Skin Circulation/Temperature WDL    Skin Circulation/Temperature WDL WDL       Cardiac WDL    Cardiac WDL WDL       Peripheral/Neurovascular WDL    Peripheral Neurovascular WDL WDL       Cognitive/Neuro/Behavioral WDL    Cognitive/Neuro/Behavioral WDL WDL

## 2023-09-26 NOTE — PHARMACY-ADMISSION MEDICATION HISTORY
Pharmacist Admission Medication History    Admission medication history is complete. The information provided in this note is only as accurate as the sources available at the time of the update.    Medication reconciliation/reorder completed by provider prior to medication history? No    Information Source(s): Patient, Family member, and CareEverywhere/SureScripts via in-person    Pertinent Information:   - Of note, patient last took torsemide about a month ago due to adverse effect (diarrhea) that patient attributed to the water pill.    Changes made to PTA medication list:  Added: None  Deleted: None  Changed: updated warfarin regimen per AC clinic and patient    Medication Affordability:  Not including over the counter (OTC) medications, was there a time in the past 3 months when you did not take your medications as prescribed because of cost?: No    Allergies reviewed with patient and updates made in EHR: yes    Medication History Completed By: Neto Aviles RP 9/26/2023 2:45 PM    Prior to Admission medications    Medication Sig Last Dose Taking? Auth Provider Long Term End Date   allopurinol (ZYLOPRIM) 100 MG tablet Take 100 mg by mouth 2 times daily 9/26/2023 at x1 Yes Unknown, Entered By History     amLODIPine (NORVASC) 2.5 MG tablet Take 1 tablet (2.5 mg) by mouth daily 9/26/2023 at AM Yes Noe Rodriguez MD Yes    amLODIPine-valsartan (EXFORGE) 5-160 MG tablet Take 1 tablet by mouth daily 9/26/2023 at AM Yes Noe Rodriguez MD Yes    amoxicillin (AMOXIL) 500 MG tablet Take 2000mg (4 tablets of 500mg each) approx 30 min to 1 hour prior to any dental procedures Unknown Yes Noe Rodriguez MD     calcium citrate (CALCITRATE) 950 MG tablet Take 1 tablet by mouth daily  9/26/2023 at AM Yes Unknown, Entered By History     Dorzolamide HCl-Timolol Mal (COSOPT OP) Apply to eye 2 times daily Place one drop into each eye twice daily Past Month Yes Reported, Patient     famotidine (PEPCID) 40 MG tablet TAKE 1 TABLET BY MOUTH  DAILY 9/26/2023 at AM Yes Yunior Huang MD     folic acid (FOLVITE) 1 MG tablet Take 1 mg by mouth daily 9/26/2023 at AM Yes Reported, Patient     levothyroxine (SYNTHROID/LEVOTHROID) 112 MCG tablet Take 1 tablet (112 mcg) by mouth daily 9/26/2023 at AM Yes Yunior Huang MD Yes    METHOTREXATE SODIUM IJ Inject 0.8 mLs as directed once a week Thursdays 9/21/2023 at AM Yes Reported, Patient No    metoprolol tartrate (LOPRESSOR) 50 MG tablet Take 1 tablet (50 mg) by mouth 2 times daily 9/26/2023 at X1 Yes Noe Rodriguez MD Yes    multivitamin w/minerals (THERA-VIT-M) tablet Take 1 tablet by mouth daily Without iron 9/26/2023 at AM Yes Unknown, Entered By History     VITAMIN D, CHOLECALCIFEROL, PO Take 1,000 Units by mouth daily 9/26/2023 at AM Yes Unknown, Entered By History     warfarin ANTICOAGULANT (COUMADIN) 2.5 MG tablet Take by mouth daily 1 tablet (2.5 mg) on Monday, Wednesday, Friday, 1.5 tablets (3.75 mg) all other days or as directed by your ACC Team. 9/19/2023 at PM Yes Unknown, Entered By History     enoxaparin ANTICOAGULANT (LOVENOX) 60 MG/0.6ML syringe Inject 0.5 mLs (50 mg) Subcutaneous every 12 hours 9/24/2023 at AM  Yunior Huang MD No    torsemide (DEMADEX) 10 MG tablet Take 1 tablet (10 mg) by mouth daily (with breakfast)  Patient not taking: Reported on 9/26/2023 Not Taking  Noe Rodriguez MD Yes          9/29 ADDENDUM:   Spoke with patient and her daughter regarding PTA warfarin dose as the 9/15 & 9/18 anticoagulation visit notes stated her normal dose prior to her colonoscopy on 9/25 was supposed to be 2.5mg MWF and 3.75mg ROW, however, previous MR & the patient stated she was taking 2.5mg M/Th & 3.75mg ROW. There was confusion as to what she was supposed to take as her dose was decreased 3 days prior to her having to hold her warfarin for her colonoscopy.     Her daughter called her anticoagulation clinic to confirm the warfarin dose. Her correct PTA warfarin dose is 2.5mg MWF &  3.75mg ROW. Updated med list.       Margie Brock, PharmD  September 29, 2023

## 2023-09-26 NOTE — CONSULTS
[facility]    Orthopedic Consultation    Zunilda Clark MRN# [id number]   Age: 83 year old YOB: 1940     Date of Admission: 9/26/2023    Reason for consult: Left periprosthetic distal femur fracture       Requesting physician: Emerson Zuleta MD and Roxi Castillo PA-C (ED consult)       Level of consult: Consult, follow and place orders           Assessment and Plan:   Assessment:   Acute, closed, displaced, oblique, comminuted left periprosthetic distal femur fracture  S/p left TKA (DOS: 11/12/18) performed by Dr. Pardeep Sheikh  Chronically anticoagulated with Warfarin for atrial fibrillation      Plan:   The patient's history and clinical/diagnostic findings were reviewed with the on-call orthopedic trauma surgeon, Dr. Spike Vann. The patient sustained a mechanical fall on 9/26/23 resulting in a left periprosthetic distal femur fracture. Patient is notably s/p left TKA. Surgical intervention is recommended for the goals of fracture stabilization, pain control, and to maximize mobility/functionality postoperatively. Brief discussion held over potential risks and benefits of nonoperative and operative management. The patient and her daughter wish to proceed with surgery as recommended above. The patient will be scheduled for a left periprosthetic femur fracture ORIF using retrograde nail vs plate for tomorrow (9/27/23) pending medical optimization by the hospital team. Dr. Vann will further discuss the risks, benefits, and outcomes of surgery while obtaining consent.     -NPO at midnight. Okay for diet today from an orthopedic standpoint.  -NWB/bedrest until postop.  -Continue pain regimen.  -Continue to hold Warfarin and Lovenox.   -Vitamin D deficiency lab and supplementation ordered.  -Type and screen ordered.    Please contact orthopedic trauma team if any questions or concerns arise.           Chief Complaint:   Left femur fracture         History of Present Illness:  "  Medical history obtained via chart review and discussion with the patient and daughter at bedside. Zunilda \"Deanne\" May is an 83 year old female with past medical history of atrial fibrillation on PTA Warfarin, aortic valve insufficiency and mitral valve replacement x 2, s/p pacemaker, CHF, HTN, hypothyroidism, and rheumatoid arthritis among others who was admitted on 9/26/23 for a left periprosthetic femur fracture. Patient is s/p left TKA (DOS: 11/12/18) with subsequent I&D and wound vac placement for distal wound dehiscence (DOS: 12/18/18) both performed by Dr. Pardeep Sheikh.     Earlier this morning (9/26/23) around 0615, the patient was up early to take a shower and upon going from a fully lit room to a dimmer room, she reportedly turned and lost her balance, causing her to fall. She denies head trauma or LOC. The patient's daughter is staying with her as she had a colonoscopy yesterday. She applied ice to the area and put a pillow below the patient's knee. The patient was in too much pain to stand up. After an hour, they called EMS who brought the patient to [facility] ED. Here, the patient underwent radiographs that demonstrated a left distal femur periprosthetic fracture. Patient is admitted for pain control and surgical management. Currently, the patient states that she has reasonable pain control at complete rest. Any attempted movement causes increased pain, though she states that she is able to still move her ankle and toes without issue. Denies numbness or tingling. Patient lives independently in a senior living apartment. She uses a walker vs cane depending on distance walked. She no longer drives or shops for herself. Patient has not had Warfarin since 9/19/23 or Lovenox since 9/24/23 due to colonoscopy on 9/25/23. She has had some shortness of breath when lying supine for the past few days. No chest pain. No known recent illnesses. Previously tolerating PO intake.          Past Medical History:     Past " Medical History:   Diagnosis Date    Acquired hypothyroidism 11/04/2015    Aortic valve insufficiency     Atrial fibrillation and flutter     CHF (congestive heart failure)     DVT (deep venous thrombosis) 2003    Gastro-oesophageal reflux disease     H/O mitral valve replacement with tissue graft 06/2014    HTN (hypertension)     Other chronic pain     Pulmonary HTN     Rheumatoid arthritis     Seizure 2014    Shingles 08/2018    Stroke 2009    left side mild weakness              Past Surgical History:     Past Surgical History:   Procedure Laterality Date    APPLY WOUND VAC Left 12/18/2018    Procedure: Wound vac application;  Surgeon: Pardeep hSeikh MD;  Location: [facility] OR    ARTHROPLASTY KNEE Left 11/12/2018    Procedure: Left total knee arthroplasty using a [employer] Melissa II knee system;  Surgeon: Pardeep Sheikh MD;  Location: [facility] OR    ARTHROSCOPY KNEE WITH DEBRIDEMENT JOINT, COMBINED Left 12/18/2018    Procedure: Left knee debridement and placement of wound vac;  Surgeon: Pardeep Sheikh MD;  Location: [facility] OR    CATARACT IOL, RT/LT      COLONOSCOPY  03/15/2012    EP PPM GENERATOR CHANGE SINGLE N/A 03/08/2021    Procedure: Permanent Pacemakers  Generator Change Dual Chamber;  Surgeon: Tamiko Cowan MD;  Location: [facility] CARDIAC CATH LAB    ESOPHAGOSCOPY, GASTROSCOPY, DUODENOSCOPY (EGD), COMBINED N/A 02/19/2020    Procedure: ESOPHAGOGASTRODUODENOSCOPY (EGD);  Surgeon: Michael Mccarthy MD;  Location:  GI    EYE SURGERY  2018    Both eyes/cataract surgery    H ABLATION AV NODE  2011    AFlutter with syncope, PPM to follow    HERNIA REPAIR      3 hernies/right and left & umbilical    IMPLANT PACEMAKER  2011    LAPAROSCOPIC CHOLECYSTECTOMY WITH CHOLANGIOGRAMS N/A 04/29/2017    Procedure: LAPAROSCOPIC CHOLECYSTECTOMY WITH CHOLANGIOGRAMS;  LAPAROSCOPIC CHOLECYSTECTOMY WITH CHOLANGIOGRAMS ;  Surgeon: Angeles Mcgill MD;  Location:  OR    OPEN  REDUCTION INTERNAL FIXATION RODDING INTRAMEDULLARY HUMERUS Right 2015    Procedure: OPEN REDUCTION INTERNAL FIXATION RODDING INTRAMEDULLARY HUMERUS;  Surgeon: Raymundo Rivera MD;  Location: [facility] OR    REPLACE VALVE MITRAL  2006    Mitral valve replacement with bioprosthetic valve in  for rheumatic disease    SLING BLADDER SUSPENSION WITH FASCIA UMESH               Social History:     Social History     Tobacco Use    Smoking status: Never    Smokeless tobacco: Never   Substance Use Topics    Alcohol use: No     Alcohol/week: 0.0 standard drinks of alcohol             Family History:     Family History   Problem Relation Age of Onset    Coronary Artery Disease Mother     Coronary Artery Disease Brother     Diabetes Brother     Cancer Brother          at age 52     Other Cancer Brother     Hypertension Sister     Hyperlipidemia Sister              Immunizations:     VACCINE/DOSE   Diptheria   DPT   DTAP   HBIG   Hepatitis A   Hepatitis B   HIB   Influenza   Measles   Meningococcal   MMR   Mumps   Pneumococcal   Polio   Rubella   Small Pox   TDAP   Varicella   Zoster             Allergies:   No Known Allergies          Medications:     Current Facility-Administered Medications   Medication    Vitamin D3 (CHOLECALCIFEROL) tablet 50 mcg     Current Outpatient Medications   Medication Sig    ALLOPURINOL PO Take 100 mg by mouth 2 times daily    amLODIPine (NORVASC) 2.5 MG tablet Take 1 tablet (2.5 mg) by mouth daily    amLODIPine-valsartan (EXFORGE) 5-160 MG tablet Take 1 tablet by mouth daily    amoxicillin (AMOXIL) 500 MG tablet Take 2000mg (4 tablets of 500mg each) approx 30 min to 1 hour prior to any dental procedures    calcium citrate (CALCITRATE) 950 MG tablet Take 1 tablet by mouth daily     Dorzolamide HCl-Timolol Mal (COSOPT OP) Apply to eye 2 times daily Place one drop into each eye twice daily    enoxaparin ANTICOAGULANT (LOVENOX) 60 MG/0.6ML syringe Inject 0.5 mLs (50 mg) Subcutaneous every 12  hours    famotidine (PEPCID) 40 MG tablet TAKE 1 TABLET BY MOUTH DAILY    folic acid (FOLVITE) 1 MG tablet Take 1 mg by mouth daily    levothyroxine (SYNTHROID/LEVOTHROID) 112 MCG tablet Take 1 tablet (112 mcg) by mouth daily    METHOTREXATE SODIUM IJ Inject 0.8 mLs as directed once a week Thursdays    metoprolol tartrate (LOPRESSOR) 50 MG tablet Take 1 tablet (50 mg) by mouth 2 times daily    multivitamin w/minerals (THERA-VIT-M) tablet Take 1 tablet by mouth daily Without iron    torsemide (DEMADEX) 10 MG tablet Take 1 tablet (10 mg) by mouth daily (with breakfast)    VITAMIN D, CHOLECALCIFEROL, PO Take 1,000 Units by mouth daily    warfarin ANTICOAGULANT (COUMADIN) 2.5 MG tablet Take 2 Tablets (5 mg) by mouth every Tuesday; and take 1 tablet (2.5 mg) all other days or as directed by your ACC Team. (Patient taking differently: 1 tablet Monday and Thursday, 1 1/2 tablets all other days or as directed by your ACC Team.)             Review of Systems:   CV: NEGATIVE for chest pain, palpitations or peripheral edema  C: NEGATIVE for fever, chills, change in weight  E/M: NEGATIVE for ear, mouth and throat problems  R: POSITIVE for dyspnea when lying supine, NEGATIVE for significant cough          Physical Exam:   All vitals have been reviewed  Patient Vitals for the past 24 hrs:   BP Temp Temp src Pulse Resp SpO2   09/26/23 1200 (!) 156/78 -- -- 82 -- --   09/26/23 1130 129/61 -- -- 90 -- --   09/26/23 0813 (!) 149/98 97  F (36.1  C) Oral 66 16 93 %     No intake or output data in the 24 hours ending 09/26/23 1239    Constitutional: Pleasant, alert, appropriate, following commands. NAD.   HEENT: Head atraumatic normocephalic. Pupils equal round and reactive.  Respiratory: Periodic desats to the upper 80s on room air. Unlabored breathing no audible wheeze. Able to talk in full sentences.  Cardiovascular: Regular rate and rhythm per pulses.  GI: Abdomen is non-distended.  Lymph/Hematologic: No lymphadenopathy in areas  examined.  Genitourinary: No mcnair.  Skin: No rashes, no cyanosis, no edema.  Musculoskeletal: Left lower extremity: Some shortening and rotational deformity. Skin intact to the examined areas. Well-healed surgical scar to the anterior knee. Mild swelling to the left thigh. No erythema or ecchymosis. Thigh and lower leg compartments are soft and compressible. Diffusely tender to the distal thigh. Nontender over the anterior hip joint, greater trochanter/lateral hip, medial and lateral joint lines of the knee, calf, ankle, and foot. Calf is soft and without palpable cords. No attempts made to range the hip or knee due to known femur fracture. Patient is actively able to DF and PF the ankle and toes, bilaterally, without increased pain. DP pulse palpable. Toes are warm. SILT diffusely.  Neurologic: GCS 15          Data:   All laboratory data reviewed  Results for orders placed or performed during the hospital encounter of 09/26/23   XR Knee Left 1/2 Views     Status: None    Narrative    XR FEMUR LEFT 2 VIEWS, XR KNEE LEFT 1/2 VIEWS 9/26/2023 10:27 AM     HISTORY: thigh pain post fall    COMPARISON: None.       Impression    IMPRESSION:    There is an oblique, comminuted, and displaced periprosthetic fracture  through the distal femoral diaphysis extending to the femoral  component of the left knee arthroplasty. Mild degenerative changes of  the left hip. Vascular calcifications. Osteopenia.    NOTE: ABNORMAL REPORT    THE DICTATION ABOVE DESCRIBES AN ABNORMAL REPORT FOR WHICH FOLLOW-UP  IS NEEDED.    ANTOINETTE DIEHL MD         SYSTEM ID:  [id number]   XR Femur Left 2 Views     Status: None    Narrative    XR FEMUR LEFT 2 VIEWS, XR KNEE LEFT 1/2 VIEWS 9/26/2023 10:27 AM     HISTORY: thigh pain post fall    COMPARISON: None.       Impression    IMPRESSION:    There is an oblique, comminuted, and displaced periprosthetic fracture  through the distal femoral diaphysis extending to the femoral  component of the left knee  arthroplasty. Mild degenerative changes of  the left hip. Vascular calcifications. Osteopenia.    NOTE: ABNORMAL REPORT    THE DICTATION ABOVE DESCRIBES AN ABNORMAL REPORT FOR WHICH FOLLOW-UP  IS NEEDED.    ANTOINETTE DIEHL MD         SYSTEM ID:  [id number]   XR Chest 1 View     Status: None (Preliminary result)    Narrative    CHEST ONE VIEW   9/26/2023 10:26 AM     HISTORY: Shortness of breath.    COMPARISON: 6/12/2023.      Impression    IMPRESSION: Interval increase of bilateral vascular and interstitial  prominence that may represent pulmonary edema. Mildly increased  cardiomegaly. Stable left chest pacemaker. Stable sternotomy. Atypical  infectious etiology remains in the differential.    Basic metabolic panel     Status: Abnormal   Result Value Ref Range    Sodium 138 136 - 145 mmol/L    Potassium 3.7 3.4 - 5.3 mmol/L    Chloride 106 98 - 107 mmol/L    Carbon Dioxide (CO2) 24 22 - 29 mmol/L    Anion Gap 8 7 - 15 mmol/L    Urea Nitrogen 13.7 8.0 - 23.0 mg/dL    Creatinine 0.73 0.51 - 0.95 mg/dL    GFR Estimate 82 >60 mL/min/1.73m2    Calcium 9.2 8.8 - 10.2 mg/dL    Glucose 103 (H) 70 - 99 mg/dL   BNP     Status: Normal   Result Value Ref Range    N terminal Pro BNP Inpatient 1,711 0 - 1,800 pg/mL   Troponin T, High Sensitivity     Status: Abnormal   Result Value Ref Range    Troponin T, High Sensitivity 16 (H) <=14 ng/L   CBC with platelets and differential     Status: Abnormal   Result Value Ref Range    WBC Count 9.3 4.0 - 11.0 10e3/uL    RBC Count 2.98 (L) 3.80 - 5.20 10e6/uL    Hemoglobin 10.4 (L) 11.7 - 15.7 g/dL    Hematocrit 32.3 (L) 35.0 - 47.0 %     (H) 78 - 100 fL    MCH 34.9 (H) 26.5 - 33.0 pg    MCHC 32.2 31.5 - 36.5 g/dL    RDW 16.9 (H) 10.0 - 15.0 %    Platelet Count 264 150 - 450 10e3/uL    % Neutrophils 89 %    % Lymphocytes 3 %    % Monocytes 7 %    % Eosinophils 1 %    % Basophils 0 %    % Immature Granulocytes 0 %    NRBCs per 100 WBC 0 <1 /100    Absolute Neutrophils 8.2 1.6 - 8.3  10e3/uL    Absolute Lymphocytes 0.3 (L) 0.8 - 5.3 10e3/uL    Absolute Monocytes 0.7 0.0 - 1.3 10e3/uL    Absolute Eosinophils 0.1 0.0 - 0.7 10e3/uL    Absolute Basophils 0.0 0.0 - 0.2 10e3/uL    Absolute Immature Granulocytes 0.0 <=0.4 10e3/uL    Absolute NRBCs 0.0 10e3/uL   Troponin T, High Sensitivity     Status: Normal   Result Value Ref Range    Troponin T, High Sensitivity 14 <=14 ng/L   INR     Status: Abnormal   Result Value Ref Range    INR 1.25 (H) 0.85 - 1.15   EKG 12-lead, tracing only     Status: None   Result Value Ref Range    Systolic Blood Pressure  mmHg    Diastolic Blood Pressure  mmHg    Ventricular Rate 62 BPM    Atrial Rate 62 BPM    AL Interval 176 ms    QRS Duration 158 ms     ms    QTc 499 ms    P Axis 41 degrees    R AXIS 98 degrees    T Axis 9 degrees    Interpretation ECG       AV dual-paced rhythm  Abnormal ECG  When compared with ECG of 30-AUG-2023 11:06,  Vent. rate has decreased BY  14 BPM     Adult Type and Screen     Status: None   Result Value Ref Range    ABO/RH(D) O POS     Antibody Screen Negative Negative    SPECIMEN EXPIRATION DATE [id number]    CBC with platelets differential     Status: Abnormal    Narrative    The following orders were created for panel order CBC with platelets differential.  Procedure                               Abnormality         Status                     ---------                               -----------         ------                     CBC with platelets and d...[[id number]]  Abnormal            Final result                 Please view results for these tests on the individual orders.   ABO/Rh type and screen     Status: None    Narrative    The following orders were created for panel order ABO/Rh type and screen.  Procedure                               Abnormality         Status                     ---------                               -----------         ------                     Adult Type and Screen[[id number]]                             Final result                 Please view results for these tests on the individual orders.          Attestation:  I have reviewed today's vital signs, notes, medications, labs and imaging with Dr. Spike Vann.  Amount of time performed on this consult: 60 minutes.    Sheba March PA-C  [facility]

## 2023-09-26 NOTE — PROGRESS NOTES
Pt arrived to the floor from the ED @ 1745.     VS-stable. Continue to monitor.   Lung Sounds- clear  O2- 2L NC  GI- purewick in place.  IV-SL  CMS-left knee immobilizer in place.   Activity- bedrest  Pain- 2/10  Discharge Plan- TBD    NPO @ midnight for procedure tomorrow 9/27/23

## 2023-09-26 NOTE — ED NOTES
Bed: [facility]  Expected date:   Expected time:   Means of arrival:   Comments:  A hallway: 4

## 2023-09-26 NOTE — ED NOTES
[facility]  ED Nurse Handoff Report    ED Chief complaint: Fall and Knee Pain  . ED Diagnosis:   Final diagnoses:   Acute pulmonary edema (H)   Closed displaced comminuted fracture of shaft of left femur, initial encounter (H)       Allergies: No Known Allergies    Code Status: DNR / DNI    Activity level - Baseline/Home:  walker.  Activity Level - Current:   assist of 1.   Lift room needed: No.   Bariatric: No   Needed: No   Isolation: No.   Infection: Not Applicable.     Respiratory status: Room air    Vital Signs (within 30 minutes):   Vitals:    09/26/23 0813 09/26/23 1130   BP: (!) 149/98 129/61   Pulse: 66 90   Resp: 16    Temp: 97  F (36.1  C)    TempSrc: Oral    SpO2: 93%        Cardiac Rhythm:  ,      Pain level:    Patient confused: No.   Patient Falls Risk: nonskid shoes/slippers when out of bed, arm band in place, patient and family education, and assistive device/personal items within reach.   Elimination Status: Has voided     Patient Report - Initial Complaint: fall.   Focused Assessment:    Musculoskeletal (Adult)Musculoskeletal WDL: .WDL except (patient fell and complains of right knee pain)    Abnormal Results:   Labs Ordered and Resulted from Time of ED Arrival to Time of ED Departure   BASIC METABOLIC PANEL - Abnormal       Result Value    Sodium 138      Potassium 3.7      Chloride 106      Carbon Dioxide (CO2) 24      Anion Gap 8      Urea Nitrogen 13.7      Creatinine 0.73      GFR Estimate 82      Calcium 9.2      Glucose 103 (*)    TROPONIN T, HIGH SENSITIVITY - Abnormal    Troponin T, High Sensitivity 16 (*)    CBC WITH PLATELETS AND DIFFERENTIAL - Abnormal    WBC Count 9.3      RBC Count 2.98 (*)     Hemoglobin 10.4 (*)     Hematocrit 32.3 (*)      (*)     MCH 34.9 (*)     MCHC 32.2      RDW 16.9 (*)     Platelet Count 264      % Neutrophils 89      % Lymphocytes 3      % Monocytes 7      % Eosinophils 1      % Basophils 0      % Immature  Granulocytes 0      NRBCs per 100 WBC 0      Absolute Neutrophils 8.2      Absolute Lymphocytes 0.3 (*)     Absolute Monocytes 0.7      Absolute Eosinophils 0.1      Absolute Basophils 0.0      Absolute Immature Granulocytes 0.0      Absolute NRBCs 0.0     NT PROBNP INPATIENT - Normal    N terminal Pro BNP Inpatient 1,711     TROPONIN T, HIGH SENSITIVITY   VITAMIN D DEFICIENCY SCREENING   INR   TYPE AND SCREEN, ADULT    SPECIMEN EXPIRATION DATE [id number]     ABO/RH TYPE AND SCREEN        XR Femur Left 2 Views   Final Result   IMPRESSION:      There is an oblique, comminuted, and displaced periprosthetic fracture   through the distal femoral diaphysis extending to the femoral   component of the left knee arthroplasty. Mild degenerative changes of   the left hip. Vascular calcifications. Osteopenia.      NOTE: ABNORMAL REPORT      THE DICTATION ABOVE DESCRIBES AN ABNORMAL REPORT FOR WHICH FOLLOW-UP   IS NEEDED.      ANTOINETTE DIEHL MD            SYSTEM ID:  [id number]      XR Knee Left 1/2 Views   Final Result   IMPRESSION:      There is an oblique, comminuted, and displaced periprosthetic fracture   through the distal femoral diaphysis extending to the femoral   component of the left knee arthroplasty. Mild degenerative changes of   the left hip. Vascular calcifications. Osteopenia.      NOTE: ABNORMAL REPORT      THE DICTATION ABOVE DESCRIBES AN ABNORMAL REPORT FOR WHICH FOLLOW-UP   IS NEEDED.      ANTOINETTE DIEHL MD            SYSTEM ID:  [id number]      XR Chest 1 View   Preliminary Result   IMPRESSION: Interval increase of bilateral vascular and interstitial   prominence that may represent pulmonary edema. Mildly increased   cardiomegaly. Stable left chest pacemaker. Stable sternotomy. Atypical   infectious etiology remains in the differential.       CT Femur Thigh Left w/o Contrast    (Results Pending)       Treatments provided: See MAR  Family Comments: daughter at bedside  OBS brochure/video  discussed/provided to patient:  N/A  ED Medications:   Medications   Vitamin D3 (CHOLECALCIFEROL) tablet 50 mcg (50 mcg Oral $Given 9/26/23 0953)   acetaminophen (TYLENOL) tablet 650 mg (650 mg Oral $Given 9/26/23 0934)   ketorolac (TORADOL) injection 10 mg (10 mg Intravenous $Given 9/26/23 1143)       Drips infusing:  No  For the majority of the shift this patient was Green.   Interventions performed were none needed.    Sepsis treatment initiated: No    Cares/treatment/interventions/medications to be completed following ED care: continue plan of care    ED Nurse Name: Ina Moya RN  11:54 AM     RECEIVING UNIT ED HANDOFF REVIEW    Above ED Nurse Handoff Report was reviewed: Yes  Reviewed by: Amberly Darby RN on September 26, 2023 at 5:22 PM

## 2023-09-27 ENCOUNTER — ANESTHESIA EVENT (OUTPATIENT)
Dept: SURGERY | Facility: CLINIC | Age: 83
DRG: 481 | End: 2023-09-27
Payer: COMMERCIAL

## 2023-09-27 ENCOUNTER — APPOINTMENT (OUTPATIENT)
Dept: GENERAL RADIOLOGY | Facility: CLINIC | Age: 83
DRG: 481 | End: 2023-09-27
Attending: ORTHOPAEDIC SURGERY
Payer: COMMERCIAL

## 2023-09-27 ENCOUNTER — ANESTHESIA (OUTPATIENT)
Dept: SURGERY | Facility: CLINIC | Age: 83
DRG: 481 | End: 2023-09-27
Payer: COMMERCIAL

## 2023-09-27 ENCOUNTER — APPOINTMENT (OUTPATIENT)
Dept: GENERAL RADIOLOGY | Facility: CLINIC | Age: 83
DRG: 481 | End: 2023-09-27
Payer: COMMERCIAL

## 2023-09-27 LAB
ANION GAP SERPL CALCULATED.3IONS-SCNC: 9 MMOL/L (ref 7–15)
BUN SERPL-MCNC: 19.6 MG/DL (ref 8–23)
CALCIUM SERPL-MCNC: 8.5 MG/DL (ref 8.8–10.2)
CHLORIDE SERPL-SCNC: 107 MMOL/L (ref 98–107)
CREAT SERPL-MCNC: 0.97 MG/DL (ref 0.51–0.95)
DEPRECATED HCO3 PLAS-SCNC: 22 MMOL/L (ref 22–29)
EGFRCR SERPLBLD CKD-EPI 2021: 58 ML/MIN/1.73M2
FOLATE SERPL-MCNC: 17.2 NG/ML (ref 4.6–34.8)
GLUCOSE BLDC GLUCOMTR-MCNC: 135 MG/DL (ref 70–99)
GLUCOSE SERPL-MCNC: 98 MG/DL (ref 70–99)
HGB BLD-MCNC: 8.5 G/DL (ref 11.7–15.7)
INR PPP: 1.27 (ref 0.85–1.15)
PATH REPORT.COMMENTS IMP SPEC: NORMAL
PATH REPORT.COMMENTS IMP SPEC: NORMAL
PATH REPORT.FINAL DX SPEC: NORMAL
PATH REPORT.GROSS SPEC: NORMAL
PATH REPORT.MICROSCOPIC SPEC OTHER STN: NORMAL
PATH REPORT.RELEVANT HX SPEC: NORMAL
PHOTO IMAGE: NORMAL
POTASSIUM SERPL-SCNC: 4 MMOL/L (ref 3.4–5.3)
SODIUM SERPL-SCNC: 138 MMOL/L (ref 135–145)

## 2023-09-27 PROCEDURE — 258N000003 HC RX IP 258 OP 636: Performed by: NURSE ANESTHETIST, CERTIFIED REGISTERED

## 2023-09-27 PROCEDURE — 88305 TISSUE EXAM BY PATHOLOGIST: CPT | Mod: 26 | Performed by: PATHOLOGY

## 2023-09-27 PROCEDURE — 272N000001 HC OR GENERAL SUPPLY STERILE: Performed by: ORTHOPAEDIC SURGERY

## 2023-09-27 PROCEDURE — 36415 COLL VENOUS BLD VENIPUNCTURE: CPT | Performed by: INTERNAL MEDICINE

## 2023-09-27 PROCEDURE — 999N000179 XR SURGERY CARM FLUORO LESS THAN 5 MIN W STILLS: Mod: TC

## 2023-09-27 PROCEDURE — 250N000011 HC RX IP 250 OP 636: Mod: JZ | Performed by: NURSE ANESTHETIST, CERTIFIED REGISTERED

## 2023-09-27 PROCEDURE — 0QSC36Z REPOSITION LEFT LOWER FEMUR WITH INTRAMEDULLARY INTERNAL FIXATION DEVICE, PERCUTANEOUS APPROACH: ICD-10-PCS | Performed by: ORTHOPAEDIC SURGERY

## 2023-09-27 PROCEDURE — 250N000011 HC RX IP 250 OP 636

## 2023-09-27 PROCEDURE — 82746 ASSAY OF FOLIC ACID SERUM: CPT | Performed by: INTERNAL MEDICINE

## 2023-09-27 PROCEDURE — 250N000013 HC RX MED GY IP 250 OP 250 PS 637: Performed by: PHYSICIAN ASSISTANT

## 2023-09-27 PROCEDURE — C1713 ANCHOR/SCREW BN/BN,TIS/BN: HCPCS | Performed by: ORTHOPAEDIC SURGERY

## 2023-09-27 PROCEDURE — 360N000083 HC SURGERY LEVEL 3 W/ FLUORO, PER MIN: Performed by: ORTHOPAEDIC SURGERY

## 2023-09-27 PROCEDURE — 250N000011 HC RX IP 250 OP 636: Performed by: ORTHOPAEDIC SURGERY

## 2023-09-27 PROCEDURE — 250N000013 HC RX MED GY IP 250 OP 250 PS 637: Performed by: INTERNAL MEDICINE

## 2023-09-27 PROCEDURE — 0SCD3ZZ EXTIRPATION OF MATTER FROM LEFT KNEE JOINT, PERCUTANEOUS APPROACH: ICD-10-PCS | Performed by: ORTHOPAEDIC SURGERY

## 2023-09-27 PROCEDURE — 250N000009 HC RX 250: Performed by: ORTHOPAEDIC SURGERY

## 2023-09-27 PROCEDURE — 250N000013 HC RX MED GY IP 250 OP 250 PS 637

## 2023-09-27 PROCEDURE — 85018 HEMOGLOBIN: CPT | Performed by: INTERNAL MEDICINE

## 2023-09-27 PROCEDURE — 85610 PROTHROMBIN TIME: CPT | Performed by: PHYSICIAN ASSISTANT

## 2023-09-27 PROCEDURE — 710N000009 HC RECOVERY PHASE 1, LEVEL 1, PER MIN: Performed by: ORTHOPAEDIC SURGERY

## 2023-09-27 PROCEDURE — 258N000003 HC RX IP 258 OP 636

## 2023-09-27 PROCEDURE — 250N000011 HC RX IP 250 OP 636: Performed by: PHYSICIAN ASSISTANT

## 2023-09-27 PROCEDURE — 250N000009 HC RX 250: Performed by: PHYSICIAN ASSISTANT

## 2023-09-27 PROCEDURE — 250N000009 HC RX 250: Performed by: NURSE ANESTHETIST, CERTIFIED REGISTERED

## 2023-09-27 PROCEDURE — 120N000001 HC R&B MED SURG/OB

## 2023-09-27 PROCEDURE — 258N000003 HC RX IP 258 OP 636: Performed by: PHYSICIAN ASSISTANT

## 2023-09-27 PROCEDURE — C1769 GUIDE WIRE: HCPCS | Performed by: ORTHOPAEDIC SURGERY

## 2023-09-27 PROCEDURE — 999N000141 HC STATISTIC PRE-PROCEDURE NURSING ASSESSMENT: Performed by: ORTHOPAEDIC SURGERY

## 2023-09-27 PROCEDURE — 250N000011 HC RX IP 250 OP 636: Mod: JZ | Performed by: ANESTHESIOLOGY

## 2023-09-27 PROCEDURE — 80048 BASIC METABOLIC PNL TOTAL CA: CPT | Performed by: INTERNAL MEDICINE

## 2023-09-27 PROCEDURE — 272N000002 HC OR SUPPLY OTHER OPNP: Performed by: ORTHOPAEDIC SURGERY

## 2023-09-27 PROCEDURE — 99233 SBSQ HOSP IP/OBS HIGH 50: CPT | Performed by: INTERNAL MEDICINE

## 2023-09-27 PROCEDURE — 999N000065 XR FEMUR PORT LEFT 2 VIEWS: Mod: LT

## 2023-09-27 PROCEDURE — 250N000025 HC SEVOFLURANE, PER MIN: Performed by: ORTHOPAEDIC SURGERY

## 2023-09-27 PROCEDURE — 370N000017 HC ANESTHESIA TECHNICAL FEE, PER MIN: Performed by: ORTHOPAEDIC SURGERY

## 2023-09-27 DEVICE — IMP CABLE SYN ORTHO COCR W/CRIMP 1.7X750MM 611.105.01S: Type: IMPLANTABLE DEVICE | Site: LEG | Status: FUNCTIONAL

## 2023-09-27 RX ORDER — VANCOMYCIN HYDROCHLORIDE 1 G/20ML
INJECTION, POWDER, LYOPHILIZED, FOR SOLUTION INTRAVENOUS PRN
Status: DISCONTINUED | OUTPATIENT
Start: 2023-09-27 | End: 2023-09-27 | Stop reason: HOSPADM

## 2023-09-27 RX ORDER — CEFAZOLIN SODIUM/WATER 2 G/20 ML
2 SYRINGE (ML) INTRAVENOUS SEE ADMIN INSTRUCTIONS
Status: DISCONTINUED | OUTPATIENT
Start: 2023-09-27 | End: 2023-09-27 | Stop reason: HOSPADM

## 2023-09-27 RX ORDER — LIDOCAINE 40 MG/G
CREAM TOPICAL
Status: DISCONTINUED | OUTPATIENT
Start: 2023-09-27 | End: 2023-09-27 | Stop reason: HOSPADM

## 2023-09-27 RX ORDER — CEFAZOLIN SODIUM/WATER 2 G/20 ML
2 SYRINGE (ML) INTRAVENOUS
Status: COMPLETED | OUTPATIENT
Start: 2023-09-27 | End: 2023-09-27

## 2023-09-27 RX ORDER — HYDROMORPHONE HCL IN WATER/PF 6 MG/30 ML
0.1 PATIENT CONTROLLED ANALGESIA SYRINGE INTRAVENOUS
Status: DISCONTINUED | OUTPATIENT
Start: 2023-09-27 | End: 2023-10-01 | Stop reason: HOSPADM

## 2023-09-27 RX ORDER — ONDANSETRON 2 MG/ML
4 INJECTION INTRAMUSCULAR; INTRAVENOUS EVERY 6 HOURS PRN
Status: DISCONTINUED | OUTPATIENT
Start: 2023-09-27 | End: 2023-10-01 | Stop reason: HOSPADM

## 2023-09-27 RX ORDER — ONDANSETRON 4 MG/1
4 TABLET, ORALLY DISINTEGRATING ORAL EVERY 6 HOURS PRN
Status: DISCONTINUED | OUTPATIENT
Start: 2023-09-27 | End: 2023-10-01 | Stop reason: HOSPADM

## 2023-09-27 RX ORDER — FAMOTIDINE 20 MG/1
20 TABLET, FILM COATED ORAL 2 TIMES DAILY
Status: DISCONTINUED | OUTPATIENT
Start: 2023-09-27 | End: 2023-09-27 | Stop reason: ALTCHOICE

## 2023-09-27 RX ORDER — HYDROMORPHONE HCL IN WATER/PF 6 MG/30 ML
0.2 PATIENT CONTROLLED ANALGESIA SYRINGE INTRAVENOUS
Status: DISCONTINUED | OUTPATIENT
Start: 2023-09-27 | End: 2023-10-01 | Stop reason: HOSPADM

## 2023-09-27 RX ORDER — NALOXONE HYDROCHLORIDE 0.4 MG/ML
0.4 INJECTION, SOLUTION INTRAMUSCULAR; INTRAVENOUS; SUBCUTANEOUS
Status: DISCONTINUED | OUTPATIENT
Start: 2023-09-27 | End: 2023-10-01 | Stop reason: HOSPADM

## 2023-09-27 RX ORDER — LIDOCAINE HYDROCHLORIDE 20 MG/ML
INJECTION, SOLUTION INFILTRATION; PERINEURAL PRN
Status: DISCONTINUED | OUTPATIENT
Start: 2023-09-27 | End: 2023-09-27

## 2023-09-27 RX ORDER — LIDOCAINE 40 MG/G
CREAM TOPICAL
Status: DISCONTINUED | OUTPATIENT
Start: 2023-09-27 | End: 2023-10-01 | Stop reason: HOSPADM

## 2023-09-27 RX ORDER — TRANEXAMIC ACID 10 MG/ML
1 INJECTION, SOLUTION INTRAVENOUS ONCE
Status: COMPLETED | OUTPATIENT
Start: 2023-09-27 | End: 2023-09-27

## 2023-09-27 RX ORDER — HYDROMORPHONE HCL IN WATER/PF 6 MG/30 ML
0.2 PATIENT CONTROLLED ANALGESIA SYRINGE INTRAVENOUS EVERY 5 MIN PRN
Status: DISCONTINUED | OUTPATIENT
Start: 2023-09-27 | End: 2023-09-27 | Stop reason: HOSPADM

## 2023-09-27 RX ORDER — AMOXICILLIN 250 MG
1 CAPSULE ORAL 2 TIMES DAILY
Status: DISCONTINUED | OUTPATIENT
Start: 2023-09-27 | End: 2023-10-01 | Stop reason: HOSPADM

## 2023-09-27 RX ORDER — SODIUM CHLORIDE, SODIUM LACTATE, POTASSIUM CHLORIDE, CALCIUM CHLORIDE 600; 310; 30; 20 MG/100ML; MG/100ML; MG/100ML; MG/100ML
INJECTION, SOLUTION INTRAVENOUS CONTINUOUS PRN
Status: DISCONTINUED | OUTPATIENT
Start: 2023-09-27 | End: 2023-09-27

## 2023-09-27 RX ORDER — OXYCODONE HYDROCHLORIDE 10 MG/1
10 TABLET ORAL
Status: DISCONTINUED | OUTPATIENT
Start: 2023-09-27 | End: 2023-09-28

## 2023-09-27 RX ORDER — HYDROMORPHONE HCL IN WATER/PF 6 MG/30 ML
0.4 PATIENT CONTROLLED ANALGESIA SYRINGE INTRAVENOUS EVERY 5 MIN PRN
Status: DISCONTINUED | OUTPATIENT
Start: 2023-09-27 | End: 2023-09-27 | Stop reason: HOSPADM

## 2023-09-27 RX ORDER — DEXAMETHASONE SODIUM PHOSPHATE 4 MG/ML
INJECTION, SOLUTION INTRA-ARTICULAR; INTRALESIONAL; INTRAMUSCULAR; INTRAVENOUS; SOFT TISSUE PRN
Status: DISCONTINUED | OUTPATIENT
Start: 2023-09-27 | End: 2023-09-27

## 2023-09-27 RX ORDER — AMLODIPINE BESYLATE 2.5 MG/1
2.5 TABLET ORAL DAILY
Status: DISCONTINUED | OUTPATIENT
Start: 2023-09-27 | End: 2023-10-01 | Stop reason: HOSPADM

## 2023-09-27 RX ORDER — NALOXONE HYDROCHLORIDE 0.4 MG/ML
0.2 INJECTION, SOLUTION INTRAMUSCULAR; INTRAVENOUS; SUBCUTANEOUS
Status: DISCONTINUED | OUTPATIENT
Start: 2023-09-27 | End: 2023-10-01 | Stop reason: HOSPADM

## 2023-09-27 RX ORDER — GLYCOPYRROLATE 0.2 MG/ML
INJECTION, SOLUTION INTRAMUSCULAR; INTRAVENOUS PRN
Status: DISCONTINUED | OUTPATIENT
Start: 2023-09-27 | End: 2023-09-27

## 2023-09-27 RX ORDER — ONDANSETRON 2 MG/ML
INJECTION INTRAMUSCULAR; INTRAVENOUS PRN
Status: DISCONTINUED | OUTPATIENT
Start: 2023-09-27 | End: 2023-09-27

## 2023-09-27 RX ORDER — CEFAZOLIN SODIUM 1 G/3ML
1 INJECTION, POWDER, FOR SOLUTION INTRAMUSCULAR; INTRAVENOUS EVERY 8 HOURS
Status: COMPLETED | OUTPATIENT
Start: 2023-09-27 | End: 2023-09-28

## 2023-09-27 RX ORDER — TRANEXAMIC ACID 10 MG/ML
1 INJECTION, SOLUTION INTRAVENOUS ONCE
Status: DISCONTINUED | OUTPATIENT
Start: 2023-09-27 | End: 2023-09-27 | Stop reason: HOSPADM

## 2023-09-27 RX ORDER — MAGNESIUM HYDROXIDE 1200 MG/15ML
LIQUID ORAL PRN
Status: DISCONTINUED | OUTPATIENT
Start: 2023-09-27 | End: 2023-09-27 | Stop reason: HOSPADM

## 2023-09-27 RX ORDER — OXYCODONE HYDROCHLORIDE 5 MG/1
5 TABLET ORAL EVERY 4 HOURS PRN
Status: DISCONTINUED | OUTPATIENT
Start: 2023-09-27 | End: 2023-10-01 | Stop reason: HOSPADM

## 2023-09-27 RX ORDER — ONDANSETRON 2 MG/ML
4 INJECTION INTRAMUSCULAR; INTRAVENOUS EVERY 30 MIN PRN
Status: DISCONTINUED | OUTPATIENT
Start: 2023-09-27 | End: 2023-09-27 | Stop reason: HOSPADM

## 2023-09-27 RX ORDER — PROCHLORPERAZINE MALEATE 5 MG
5 TABLET ORAL EVERY 6 HOURS PRN
Status: DISCONTINUED | OUTPATIENT
Start: 2023-09-27 | End: 2023-10-01 | Stop reason: HOSPADM

## 2023-09-27 RX ORDER — ONDANSETRON 2 MG/ML
4 INJECTION INTRAMUSCULAR; INTRAVENOUS EVERY 30 MIN PRN
Status: DISCONTINUED | OUTPATIENT
Start: 2023-09-27 | End: 2023-09-28

## 2023-09-27 RX ORDER — ACETAMINOPHEN 325 MG/1
650 TABLET ORAL EVERY 4 HOURS PRN
Status: DISCONTINUED | OUTPATIENT
Start: 2023-09-30 | End: 2023-10-01 | Stop reason: HOSPADM

## 2023-09-27 RX ORDER — FENTANYL CITRATE 50 UG/ML
INJECTION, SOLUTION INTRAMUSCULAR; INTRAVENOUS PRN
Status: DISCONTINUED | OUTPATIENT
Start: 2023-09-27 | End: 2023-09-27

## 2023-09-27 RX ORDER — ONDANSETRON 4 MG/1
4 TABLET, ORALLY DISINTEGRATING ORAL EVERY 30 MIN PRN
Status: DISCONTINUED | OUTPATIENT
Start: 2023-09-27 | End: 2023-09-27 | Stop reason: HOSPADM

## 2023-09-27 RX ORDER — ALLOPURINOL 100 MG/1
100 TABLET ORAL 2 TIMES DAILY
Status: DISCONTINUED | OUTPATIENT
Start: 2023-09-27 | End: 2023-10-01 | Stop reason: HOSPADM

## 2023-09-27 RX ORDER — SODIUM CHLORIDE, SODIUM LACTATE, POTASSIUM CHLORIDE, CALCIUM CHLORIDE 600; 310; 30; 20 MG/100ML; MG/100ML; MG/100ML; MG/100ML
INJECTION, SOLUTION INTRAVENOUS CONTINUOUS
Status: DISCONTINUED | OUTPATIENT
Start: 2023-09-27 | End: 2023-10-01 | Stop reason: HOSPADM

## 2023-09-27 RX ORDER — PROPOFOL 10 MG/ML
INJECTION, EMULSION INTRAVENOUS PRN
Status: DISCONTINUED | OUTPATIENT
Start: 2023-09-27 | End: 2023-09-27

## 2023-09-27 RX ORDER — SODIUM CHLORIDE, SODIUM LACTATE, POTASSIUM CHLORIDE, CALCIUM CHLORIDE 600; 310; 30; 20 MG/100ML; MG/100ML; MG/100ML; MG/100ML
INJECTION, SOLUTION INTRAVENOUS CONTINUOUS
Status: DISCONTINUED | OUTPATIENT
Start: 2023-09-27 | End: 2023-09-27 | Stop reason: HOSPADM

## 2023-09-27 RX ORDER — IBUPROFEN 200 MG
950 CAPSULE ORAL DAILY
Status: DISCONTINUED | OUTPATIENT
Start: 2023-09-28 | End: 2023-10-01 | Stop reason: HOSPADM

## 2023-09-27 RX ORDER — ACETAMINOPHEN 325 MG/1
975 TABLET ORAL EVERY 8 HOURS
Status: COMPLETED | OUTPATIENT
Start: 2023-09-27 | End: 2023-09-30

## 2023-09-27 RX ORDER — METOPROLOL TARTRATE 50 MG
50 TABLET ORAL 2 TIMES DAILY
Status: DISCONTINUED | OUTPATIENT
Start: 2023-09-27 | End: 2023-10-01 | Stop reason: HOSPADM

## 2023-09-27 RX ORDER — FENTANYL CITRATE 50 UG/ML
25 INJECTION, SOLUTION INTRAMUSCULAR; INTRAVENOUS EVERY 5 MIN PRN
Status: DISCONTINUED | OUTPATIENT
Start: 2023-09-27 | End: 2023-09-27 | Stop reason: HOSPADM

## 2023-09-27 RX ORDER — BISACODYL 10 MG
10 SUPPOSITORY, RECTAL RECTAL DAILY PRN
Status: DISCONTINUED | OUTPATIENT
Start: 2023-09-27 | End: 2023-10-01 | Stop reason: HOSPADM

## 2023-09-27 RX ORDER — FENTANYL CITRATE 50 UG/ML
50 INJECTION, SOLUTION INTRAMUSCULAR; INTRAVENOUS EVERY 5 MIN PRN
Status: DISCONTINUED | OUTPATIENT
Start: 2023-09-27 | End: 2023-09-27 | Stop reason: HOSPADM

## 2023-09-27 RX ORDER — POLYETHYLENE GLYCOL 3350 17 G/17G
17 POWDER, FOR SOLUTION ORAL DAILY
Status: DISCONTINUED | OUTPATIENT
Start: 2023-09-28 | End: 2023-10-01 | Stop reason: HOSPADM

## 2023-09-27 RX ORDER — OXYCODONE HYDROCHLORIDE 5 MG/1
5 TABLET ORAL
Status: DISCONTINUED | OUTPATIENT
Start: 2023-09-27 | End: 2023-09-28

## 2023-09-27 RX ORDER — ONDANSETRON 4 MG/1
4 TABLET, ORALLY DISINTEGRATING ORAL EVERY 30 MIN PRN
Status: DISCONTINUED | OUTPATIENT
Start: 2023-09-27 | End: 2023-09-28

## 2023-09-27 RX ADMIN — Medication 50 MCG: at 07:56

## 2023-09-27 RX ADMIN — PROPOFOL 50 MG: 10 INJECTION, EMULSION INTRAVENOUS at 10:59

## 2023-09-27 RX ADMIN — TRANEXAMIC ACID 1 G: 10 INJECTION, SOLUTION INTRAVENOUS at 11:17

## 2023-09-27 RX ADMIN — AMLODIPINE BESYLATE 2.5 MG: 2.5 TABLET ORAL at 16:14

## 2023-09-27 RX ADMIN — DEXAMETHASONE SODIUM PHOSPHATE 8 MG: 4 INJECTION, SOLUTION INTRA-ARTICULAR; INTRALESIONAL; INTRAMUSCULAR; INTRAVENOUS; SOFT TISSUE at 09:56

## 2023-09-27 RX ADMIN — PROPOFOL 75 MG: 10 INJECTION, EMULSION INTRAVENOUS at 09:56

## 2023-09-27 RX ADMIN — METOPROLOL TARTRATE 50 MG: 50 TABLET, FILM COATED ORAL at 20:46

## 2023-09-27 RX ADMIN — Medication 2 G: at 09:45

## 2023-09-27 RX ADMIN — PHENYLEPHRINE HYDROCHLORIDE 50 MCG: 10 INJECTION INTRAVENOUS at 10:03

## 2023-09-27 RX ADMIN — PHENYLEPHRINE HYDROCHLORIDE 100 MCG: 10 INJECTION INTRAVENOUS at 11:17

## 2023-09-27 RX ADMIN — ROCURONIUM BROMIDE 30 MG: 50 INJECTION, SOLUTION INTRAVENOUS at 09:56

## 2023-09-27 RX ADMIN — FENTANYL CITRATE 100 MCG: 50 INJECTION, SOLUTION INTRAMUSCULAR; INTRAVENOUS at 09:56

## 2023-09-27 RX ADMIN — ACETAMINOPHEN 650 MG: 325 TABLET, FILM COATED ORAL at 07:57

## 2023-09-27 RX ADMIN — PHENYLEPHRINE HYDROCHLORIDE 50 MCG: 10 INJECTION INTRAVENOUS at 10:07

## 2023-09-27 RX ADMIN — LEVOTHYROXINE SODIUM 112 MCG: 0.11 TABLET ORAL at 07:56

## 2023-09-27 RX ADMIN — ONDANSETRON 4 MG: 2 INJECTION INTRAMUSCULAR; INTRAVENOUS at 11:47

## 2023-09-27 RX ADMIN — SODIUM CHLORIDE, POTASSIUM CHLORIDE, SODIUM LACTATE AND CALCIUM CHLORIDE: 600; 310; 30; 20 INJECTION, SOLUTION INTRAVENOUS at 14:23

## 2023-09-27 RX ADMIN — FENTANYL CITRATE 50 MCG: 50 INJECTION, SOLUTION INTRAMUSCULAR; INTRAVENOUS at 10:59

## 2023-09-27 RX ADMIN — METOPROLOL TARTRATE 25 MG: 25 TABLET, FILM COATED ORAL at 07:55

## 2023-09-27 RX ADMIN — DORZOLAMIDE HYDROCHLORIDE AND TIMOLOL MALEATE 1 DROP: 22.3; 6.8 SOLUTION/ DROPS OPHTHALMIC at 20:48

## 2023-09-27 RX ADMIN — GLYCOPYRROLATE 0.2 MG: 0.2 INJECTION, SOLUTION INTRAMUSCULAR; INTRAVENOUS at 09:56

## 2023-09-27 RX ADMIN — LIDOCAINE HYDROCHLORIDE 30 MG: 20 INJECTION, SOLUTION INFILTRATION; PERINEURAL at 09:56

## 2023-09-27 RX ADMIN — DOCUSATE SODIUM 100 MG: 100 CAPSULE, LIQUID FILLED ORAL at 07:56

## 2023-09-27 RX ADMIN — SODIUM CHLORIDE, POTASSIUM CHLORIDE, SODIUM LACTATE AND CALCIUM CHLORIDE: 600; 310; 30; 20 INJECTION, SOLUTION INTRAVENOUS at 22:02

## 2023-09-27 RX ADMIN — ONDANSETRON 4 MG: 2 INJECTION INTRAMUSCULAR; INTRAVENOUS at 12:30

## 2023-09-27 RX ADMIN — ACETAMINOPHEN 975 MG: 325 TABLET, FILM COATED ORAL at 16:13

## 2023-09-27 RX ADMIN — FOLIC ACID 1 MG: 1 TABLET ORAL at 07:56

## 2023-09-27 RX ADMIN — TRANEXAMIC ACID 1 G: 10 INJECTION, SOLUTION INTRAVENOUS at 10:05

## 2023-09-27 RX ADMIN — FENTANYL CITRATE 50 MCG: 50 INJECTION, SOLUTION INTRAMUSCULAR; INTRAVENOUS at 10:12

## 2023-09-27 RX ADMIN — FAMOTIDINE 40 MG: 20 TABLET ORAL at 07:57

## 2023-09-27 RX ADMIN — SODIUM CHLORIDE, POTASSIUM CHLORIDE, SODIUM LACTATE AND CALCIUM CHLORIDE: 600; 310; 30; 20 INJECTION, SOLUTION INTRAVENOUS at 11:47

## 2023-09-27 RX ADMIN — CEFAZOLIN 1 G: 1 INJECTION, POWDER, FOR SOLUTION INTRAMUSCULAR; INTRAVENOUS at 16:17

## 2023-09-27 RX ADMIN — PHENYLEPHRINE HYDROCHLORIDE 50 MCG: 10 INJECTION INTRAVENOUS at 11:05

## 2023-09-27 RX ADMIN — SUGAMMADEX 120 MG: 100 INJECTION, SOLUTION INTRAVENOUS at 11:17

## 2023-09-27 RX ADMIN — GABAPENTIN 100 MG: 100 CAPSULE ORAL at 07:56

## 2023-09-27 RX ADMIN — ALLOPURINOL 100 MG: 100 TABLET ORAL at 20:46

## 2023-09-27 RX ADMIN — PHENYLEPHRINE HYDROCHLORIDE 100 MCG: 10 INJECTION INTRAVENOUS at 11:09

## 2023-09-27 RX ADMIN — GLYCOPYRROLATE 0.2 MG: 0.2 INJECTION, SOLUTION INTRAMUSCULAR; INTRAVENOUS at 11:14

## 2023-09-27 RX ADMIN — SODIUM CHLORIDE, POTASSIUM CHLORIDE, SODIUM LACTATE AND CALCIUM CHLORIDE: 600; 310; 30; 20 INJECTION, SOLUTION INTRAVENOUS at 08:36

## 2023-09-27 ASSESSMENT — ACTIVITIES OF DAILY LIVING (ADL)
ADLS_ACUITY_SCORE: 25
ADLS_ACUITY_SCORE: 26
ADLS_ACUITY_SCORE: 25
ADLS_ACUITY_SCORE: 26
ADLS_ACUITY_SCORE: 25
ADLS_ACUITY_SCORE: 26

## 2023-09-27 NOTE — ANESTHESIA CARE TRANSFER NOTE
Patient: Zunilda Clark    Procedure: Procedure(s):  Left periprosthetic femur fracture open reduction internal fixation using retrograde intramedullary nail       Diagnosis: Closed displaced comminuted fracture of shaft of left femur, initial encounter (H) [S72.352A]  Diagnosis Additional Information: No value filed.    Anesthesia Type:   General     Note:    Oropharynx: oropharynx clear of all foreign objects and spontaneously breathing  Level of Consciousness: awake  Oxygen Supplementation: face mask  Level of Supplemental Oxygen (L/min / FiO2): 6  Independent Airway: airway patency satisfactory and stable  Dentition: dentition unchanged  Vital Signs Stable: post-procedure vital signs reviewed and stable  Report to RN Given: handoff report given  Patient transferred to: PACU  Comments: Awake, VSS  Handoff Report: Identifed the Patient, Identified the Reponsible Provider, Reviewed the pertinent medical history, Discussed the surgical course, Reviewed Intra-OP anesthesia mangement and issues during anesthesia and Allowed opportunity for questions and acknowledgement of understanding      Vitals:  Vitals Value Taken Time   /61 09/27/23 1207   Temp     Pulse 72 09/27/23 1209   Resp 11 09/27/23 1209   SpO2 84 % 09/27/23 1209   Vitals shown include unvalidated device data.    Electronically Signed By: MANJIT Ghotra CRNA  September 27, 2023  12:11 PM

## 2023-09-27 NOTE — PROGRESS NOTES
SPIRITUAL HEALTH SERVICES - Progress Note  [facility] Pre-Surgical    Referral Source: Pre- Surgical.    Present: Pt sister and daughter were present.    Assessment/Intervention: Pt named her sister and her daughter as supportive people in her life.I provided devotional reading. Pt welcomed prayer.    Plan: I and other  remain available as needed.    Benjie Linton, [facility]    Intern    [facility] routine referrals *[phone]  [facility] available 24/7 for emergent requests/referrals, either by paging the on-call  or by entering an ASAP/STAT consult in Epic (this will also page the on-call ).

## 2023-09-27 NOTE — OP NOTE
Preoperative diagnosis:  Periprosthetic left femur fracture    Postoperative diagnosis:  As above    Procedure:  Placement of retrograde femoral nail left femur    Surgeon:  Spike Vann MD     Assistant:  Amna Sánchez PA-C  A physicians assistant was available for the surgery and participated to decrease the patient's morbidity by assisting with positioning, manipulation of the limb during the procedure, surgical retraction as necessary, closure of the surgical wound and transferring the patient back to a hospital bed    Anesthesia:  General endotracheal    Estimated blood loss:  200 cc    Complications:  None readily apparent    Indications for procedure:  Deanne Clark is a very 83-year-old female who fell from standing 1 day prior.  She sustained an injury to the left thigh.  She was unable to ambulate afterwards.  She was brought to the emergency room by EMS.  X-rays were obtained and she was found to have a periprosthetic left femur fracture.  The risk benefits and alternatives were discussed with the patient as well as her family.  This included but was not limited to continued postoperative pain, risk of malunion or nonunion, risk of painful hardware, risk of bleeding enough to require blood transfusion, risk of postoperative infection, injury to neurovascular structures and thromboembolic events.  She expressed understanding of these risks and stated preference to proceed with surgery today.    Description of procedure:  Deanne was met in the preoperative area by myself.  Informed consent was obtained as outlined above.  She was in agreement.  Thus, initials were placed on the left leg indicating the correct surgical site.  She was then brought to the operating room where a general anesthetic was induced by the anesthesia service.  This was successful.  She was placed supine on the surgical table with a bump behind the left hip.  The left lower extremity was prepped and draped in sterile fashion.  A timeout was  called by surgical team.  The correct patient, hospital identification number, laterality and procedure to be performed were confirmed.  All in attendance were in agreement.  A longitudinal incision over her previous total knee incision was utilized.  This was extended proximally.  Skin was incised with a #10 blade and full-thickness skin flaps were elevated.  Hemostasis was achieved with Bovie electrocautery.  The joint was entered through a median parapatellar arthrotomy.  A large hematoma was encountered and evacuated.  Synovium was lifted over the anterior cortex of the femur.  Traction was applied and a bone clamp was utilized for fracture reduction.  Fluoroscopic image intensification was utilized to confirm reduction on orthogonal planes.  A guidepin was placed through the femoral box and again placement was confirmed with fluoroscopy.  Satisfied with this a ball-tipped guidewire was introduced to the femur.  The endosteal cortex was reamed to accept a size 11 mm x 380 mm femoral nail.  This was inserted and provisionally fixed distally utilizing the aiming arm.  I did place a cerclage cable over the fracture site to facilitate cortical contact.  Perfect circles technique was utilized to fix the nail proximally.  Final images were obtained in orthogonal planes with fluoroscopic image intensifier.  Satisfied with the construct, the wounds were copiously irrigated and closed in layers.  1 g of vancomycin was placed deep in the arthrotomy.  The arthrotomy was approximated with a combination of #1 Vicryl and strata fix.  The skin incisions were closed with 2-0 Vicryl and staples.  A sterile dressing was placed and the patient was transferred safely to hospital bed.  All instrument, needle and lap counts were correct at the end of the case in accordance with hospital protocol    Postoperative plan:  Deanne will return to the hospital for pain control and monitoring.  She may weight-bear as tolerated on the  extremity.  We will plan to utilize aspirin for DVT prophylaxis.  We will plan to follow-up with me 2 weeks from discharge for staple removal.

## 2023-09-27 NOTE — PLAN OF CARE
Goal Outcome Evaluation:      Plan of Care Reviewed With: patient, child        Pt A/Ox4, VSS on 2L. Denies pain unless moving. Tylenol given 1x for pain while doing surgical scrub. Bedrest. Purewick in place. PVR for 146. Immobilizer in place. CMS intact. Plan for surgery at 0945.

## 2023-09-27 NOTE — ANESTHESIA PREPROCEDURE EVALUATION
Anesthesia Pre-Procedure Evaluation    Patient: Zunilda Clark   MRN: [id number] : 1940        Procedure : Procedure(s):  Left periprosthetic femur fracture open reduction internal fixation using retrograde intramedullary nail          Past Medical History:   Diagnosis Date    Acquired hypothyroidism 2015    Aortic valve insufficiency     Atrial fibrillation and flutter     CHF (congestive heart failure)     DVT (deep venous thrombosis)     Gastro-oesophageal reflux disease     H/O mitral valve replacement with tissue graft 2014    HTN (hypertension)     Other chronic pain     Pulmonary HTN     Rheumatoid arthritis     Seizure 2014    Shingles 2018    Stroke 2009    left side mild weakness       Past Surgical History:   Procedure Laterality Date    APPLY WOUND VAC Left 2018    Procedure: Wound vac application;  Surgeon: Pardeep Sheikh MD;  Location: [facility]    ARTHROPLASTY KNEE Left 2018    Procedure: Left total knee arthroplasty using a [employer] Melissa II knee system;  Surgeon: Pardeep Sheikh MD;  Location: [facility] OR    ARTHROSCOPY KNEE WITH DEBRIDEMENT JOINT, COMBINED Left 2018    Procedure: Left knee debridement and placement of wound vac;  Surgeon: Pardeep Sheikh MD;  Location: [facility] OR    CATARACT IOL, RT/LT      COLONOSCOPY  03/15/2012    EP PPM GENERATOR CHANGE SINGLE N/A 2021    Procedure: Permanent Pacemakers  Generator Change Dual Chamber;  Surgeon: Tamiko Cowan MD;  Location:  [facility]    ESOPHAGOSCOPY, GASTROSCOPY, DUODENOSCOPY (EGD), COMBINED N/A 2020    Procedure: ESOPHAGOGASTRODUODENOSCOPY (EGD);  Surgeon: Michael Mccarthy MD;  Location:  GI    EYE SURGERY      Both eyes/cataract surgery    H ABLATION AV NODE      AFlutter with syncope, PPM to follow    HERNIA REPAIR      3 hernies/right and left & umbilical    IMPLANT PACEMAKER      LAPAROSCOPIC CHOLECYSTECTOMY WITH  CHOLANGIOGRAMS N/A 04/29/2017    Procedure: LAPAROSCOPIC CHOLECYSTECTOMY WITH CHOLANGIOGRAMS;  LAPAROSCOPIC CHOLECYSTECTOMY WITH CHOLANGIOGRAMS ;  Surgeon: Angeles Mcgill MD;  Location: [facility] OR    OPEN REDUCTION INTERNAL FIXATION RODDING INTRAMEDULLARY HUMERUS Right 07/28/2015    Procedure: OPEN REDUCTION INTERNAL FIXATION RODDING INTRAMEDULLARY HUMERUS;  Surgeon: Raymundo Rivera MD;  Location: [facility] OR    REPLACE VALVE MITRAL  2006    Mitral valve replacement with bioprosthetic valve in 2008 for rheumatic disease    SLING BLADDER SUSPENSION WITH FASCIA UMESH        No Known Allergies   Social History     Tobacco Use    Smoking status: Never    Smokeless tobacco: Never   Substance Use Topics    Alcohol use: No     Alcohol/week: 0.0 standard drinks of alcohol      Wt Readings from Last 1 Encounters:   09/26/23 66.2 kg (145 lb 15.1 oz)        Anesthesia Evaluation            ROS/MED HX  ENT/Pulmonary:  - neg pulmonary ROS     Neurologic:     (+)          CVA,                      Cardiovascular:     (+)  hypertension- -   -  - -      CHF                          pulmonary hypertension,      METS/Exercise Tolerance: >4 METS    Hematologic:  - neg hematologic  ROS     Musculoskeletal:   (+)  arthritis,             GI/Hepatic:     (+) GERD,                   Renal/Genitourinary:     (+) renal disease, type: CRI,            Endo:     (+)          thyroid problem,            Psychiatric/Substance Use:  - neg psychiatric ROS     Infectious Disease:  - neg infectious disease ROS     Malignancy:  - neg malignancy ROS     Other:  - neg other ROS          Physical Exam    Airway        Mallampati: II    Neck ROM: full     Respiratory Devices and Support         Dental           Cardiovascular   cardiovascular exam normal       Rhythm and rate: regular     Pulmonary   pulmonary exam normal                OUTSIDE LABS:  CBC:   Lab Results   Component Value Date    WBC 9.3 09/26/2023    WBC 6.0 09/05/2023    HGB 8.5 (L)  09/27/2023    HGB 10.4 (L) 09/26/2023    HCT 32.3 (L) 09/26/2023    HCT 36.6 09/05/2023     09/26/2023     09/05/2023     BMP:   Lab Results   Component Value Date     09/27/2023     09/26/2023    POTASSIUM 4.0 09/27/2023    POTASSIUM 3.7 09/26/2023    CHLORIDE 107 09/27/2023    CHLORIDE 106 09/26/2023    CO2 22 09/27/2023    CO2 24 09/26/2023    BUN 19.6 09/27/2023    BUN 13.7 09/26/2023    CR 0.97 (H) 09/27/2023    CR 0.73 09/26/2023    GLC 98 09/27/2023     (H) 09/26/2023     COAGS:   Lab Results   Component Value Date    PTT 36 01/01/2023    INR 1.27 (H) 09/27/2023     POC:   Lab Results   Component Value Date     (H) 07/23/2015     HEPATIC:   Lab Results   Component Value Date    ALBUMIN 4.2 09/05/2023    PROTTOTAL 7.7 09/05/2023    ALT 25 09/05/2023    AST 43 09/05/2023    ALKPHOS 101 09/05/2023    BILITOTAL 0.5 09/05/2023     OTHER:   Lab Results   Component Value Date    LACT 0.9 04/27/2017    A1C 5.4 05/04/2016    BRICE 8.5 (L) 09/27/2023    MAG 2.0 09/26/2023    LIPASE 62 (H) 08/30/2023    TSH 2.30 07/14/2023    T4 1.35 05/15/2023    SED 32 (H) 07/03/2015       Anesthesia Plan    ASA Status:  3       Anesthesia Type: General.     - Airway: ETT   Induction: Intravenous, Propofol.   Maintenance: Balanced.        Consents    Anesthesia Plan(s) and associated risks, benefits, and realistic alternatives discussed. Questions answered and patient/representative(s) expressed understanding.     - Discussed:     - Discussed with:  Patient            Postoperative Care    Pain management: IV analgesics, Oral pain medications, Multi-modal analgesia.   PONV prophylaxis: Ondansetron (or other 5HT-3), Dexamethasone or Solumedrol     Comments:                Scott Padilla DO

## 2023-09-27 NOTE — BRIEF OP NOTE
[facility]    Brief Operative Note    Pre-operative diagnosis: Closed displaced comminuted fracture of shaft of left femur, initial encounter (H) [I56.706R]  Post-operative diagnosis Same as pre-operative diagnosis    Procedure: Left periprosthetic femur fracture open reduction internal fixation using retrograde intramedullary nail, Left - Leg    Surgeon: Surgeon(s) and Role:     * Spike Vnan MD - Primary     * Amna Sánchez PA-C - Assisting  Anesthesia: Choice with Block   Estimated Blood Loss: 250cc    Drains: None  Specimens: * No specimens in log *  Findings:   None.  Complications: None.  Implants:   Implant Name Type Inv. Item Serial No.  Lot No. LRB No. Used Action   10 DEGREE BEND/STERILE material: [id number]   N/A  [id number] Left 1 Implanted   IMP CABLE SYN ORTHO COCR W/CRIMP [id number] - SN/A [employer] IMP CABLE SYN ORTHO COCR W/CRIMP [id number] N/A [employer] [id number] Left 1 Implanted   LOW PROF LOCKING SCREW F/IM NAIL 0.5MM/L70MM/XL25/STER   N/A  [id number] Left 1 Implanted   LOW PROF LOCKING SCREW F/M NAIL 05.0MM/L70MM/XL25/STER   N/A  [id number] Left 1 Implanted   LOW PROF LOCKING SCREW F/M NAIL 05.0MM/L84MM/XL25/STER   N/A  [id number] Left 1 Implanted   LOW PROF LOCKING SCREW F/M NAIL 05.0MM/L52MM/XL25/STER   N/A  [id number] Left 1 Implanted   LOCKING SCREW FOR IM NAIL 0.5MM/30MM/XL25/STERILE   N/A  [id number] Left 1 Implanted   END CAP FOR RFNA/OMM MATERIAL   N/A  [id number] Left 1 Implanted   LOCKING SCREW F/M NAIL 05.0MM/L36MM/XL25/STER   N/A  [id number] Left 1 Implanted

## 2023-09-27 NOTE — PLAN OF CARE
"Goal Outcome Evaluation:      Plan of Care Reviewed With: patient, child    Overall Patient Progress: improving Overall Patient Progress: improving         VS-stable. Continue to monitor.   Lung Sounds- clear  O2- on 1.5L NC  - purewick in place. Voiding minimally. Bladder scanned at 1715 for 456.   GI-bowel sounds active. Last bm on 9/25/23  IV-infusing LR @100ml  Dressings- left hip and left knee. CDI  CMS- intact. Left leg weaker at baseline.   Activity- Ax2 walker gait belt. WBT on left leg.   Pain- 2/10. Managed with scheduled PO Tylenol.    Discharge Plan- TBD     /57 (BP Location: Left arm)   Pulse 68   Temp 97.6  F (36.4  C) (Temporal)   Resp 20   Ht 1.626 m (5' 4\")   Wt 66.2 kg (145 lb 15.1 oz)   LMP  (LMP Unknown)   SpO2 95%   BMI 25.05 kg/m     "

## 2023-09-27 NOTE — ANESTHESIA POSTPROCEDURE EVALUATION
Patient: Zunilda Clark    Procedure: Procedure(s):  Left periprosthetic femur fracture open reduction internal fixation using retrograde intramedullary nail       Anesthesia Type:  General    Note:     Postop Pain Control: Uneventful            Sign Out: Well controlled pain   PONV: No   Neuro/Psych: Uneventful            Sign Out: Acceptable/Baseline neuro status   Airway/Respiratory:             Sign Out: Acceptable/Baseline resp. status   CV/Hemodynamics:             Sign Out: Acceptable CV status   Other NRE:    DID A NON-ROUTINE EVENT OCCUR?            Last vitals:  Vitals Value Taken Time   /58 09/27/23 1345   Temp 97.3  F (36.3  C) 09/27/23 1251   Pulse 62 09/27/23 1345   Resp 10 09/27/23 1330   SpO2 94 % 09/27/23 1347   Vitals shown include unvalidated device data.    Electronically Signed By: Scott Padilla DO  September 27, 2023  1:58 PM

## 2023-09-27 NOTE — PROGRESS NOTES
[facility]  Hospitalist Progress Note  Jesse Viera M.D., M.B.A.   09/27/2023    Reason for Stay/active problem list    Mechanical fall with acute displaced left femoral periprosthetic fracture         Assessment and Plan:        Summary of Stay: Zunilda Clark is a 83 year old female with PMH significant for RA, HTN, hypothyroidism, bioprosthetic MVRx 2 , A.fib, SSS s/p PPM, Pulmonary HTN, stroke who presents to ED after mechanical fall and found to have left distal femoral periprosthetic fracture and admitted on 9/26/2023.       Problem List with Assessment and Plan:    Mechanical fall  Acute, closed, displaced, comminuted left distal femur periprosthetic fracture.  -Patient fell down and sustained trauma to her left leg.  -She had history of left TKA in 2018.  -Left leg is immobilized in ED   -Imaging studies and CT scan showed closed displaced comminuted left periprosthetic distal femur fracture.  -Patient was admitted to the hospital and treated with pain medications including oxycodone, Dilaudid, gabapentin and closely monitored.    --Orthopedic surgery consulted and patient underwent  Left periprosthetic femur fracture open reduction internal fixation using retrograde intramedullary nail, Left - Leg on September 27.  --Currently patient is postop in PACU.  She appears to be comfortable and hemodynamically stable.  Continue postoperative care and monitor closely.  May resume anticoagulation if okay with orthopedic surgery.       3.  Acute blood loss anemia   -- Hemoglobin was 10.4 on September 26.  Down to 8.5 morning of September 27.  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  Both B12 and folic acid within normal limit.  Serum ferritin also normal.  -- Continue to monitor hemoglobin, transfuse as needed    Chronic medical conditions.  Paroxysmal A-fib, Currently paced, anticoagulation on hold, continue low-dose metoprolol.  May resume if okay with orthopedic  "surgery  Bioprosthetic mitral valve replacement x2 and TR annuloplasty  Chronic congestive heart failure without exacerbation:   --patient stopped taking  her torsemide about a month ago. No hypoxia, will hold off diuresis at this time unless there is sign of fluid overload.  Hypertension:  -- Resumed home metoprolol 50 mg p.o. twice a day, amlodipine 2.5 mg daily,  --PTA Exforge on hold, continue to monitor    Rheumatoid arthritis: On methotrexate once a week,  held  History of CVA with prior left-sided weakness, resolved.       VTE Prophylaxis: Pneumatic Compression Devices  Code Status: No CPR- Do NOT Intubate  Diet: advanced  Leonard Catheter: Not present    Family updated today: No     Disposition: Likely TCU in the next few days pending progress        Interval History (Subjective):        Patient is seen and examined by me today and medical record reviewed.Overnight events noted and care discussed with nursing staff.  Patient care assumed by me this morning.  Patient was seen in PACU following surgery.  She feels comfortable without significant pain.  No shortness of breath.  She had some nausea requiring Zofran.                  Physical Exam:        Last Vital Signs:  /62   Pulse 67   Temp 97.3  F (36.3  C) (Temporal)   Resp 13   Ht 1.626 m (5' 4\")   Wt 66.2 kg (145 lb 15.1 oz)   LMP  (LMP Unknown)   SpO2 97%   BMI 25.05 kg/m      Wt Readings from Last 1 Encounters:   09/26/23 66.2 kg (145 lb 15.1 oz)       Wt Readings from Last 5 Encounters:   09/26/23 66.2 kg (145 lb 15.1 oz)   09/05/23 63.7 kg (140 lb 8 oz)   08/30/23 65.1 kg (143 lb 8.3 oz)   07/24/23 64.4 kg (142 lb)   07/14/23 64 kg (141 lb 3.2 oz)        Constitutional: wake, alert, cooperative, no apparent distress     Respiratory: Clear to auscultation bilaterally, no crackles or wheezing   Cardiovascular: Regular rate and rhythm, normal S1 and S2, and no murmur noted   Abdomen: Normal bowel sounds, soft, non-distended, non-tender "   Skin: No new rashes, no cyanosis, dry to touch   Neuro: Alert with  no new focal weakness   Extremities: No edema   Other(s):        All other systems: Negative          Medications:        All current medications were reviewed with changes reflected in problem list.         Data:      All new lab and imaging data was reviewed.      Data reviewed today: I reviewed all new labs and imaging results over the last 24 hours. I personally reviewed       Recent Labs   Lab 09/27/23  0610 09/26/23  0934   WBC  --  9.3   HGB 8.5* 10.4*   HCT  --  32.3*   MCV  --  108*   PLT  --  264     No results for input(s): CULT in the last 168 hours.  Recent Labs   Lab 09/27/23  0610 09/26/23  1138 09/26/23  0934     --  138   POTASSIUM 4.0  --  3.7   CHLORIDE 107  --  106   CO2 22  --  24   ANIONGAP 9  --  8   GLC 98  --  103*   BUN 19.6  --  13.7   CR 0.97*  --  0.73   GFRESTIMATED 58*  --  82   BRICE 8.5*  --  9.2   MAG  --  2.0  --        Recent Labs   Lab 09/27/23  0610 09/26/23  0934   GLC 98 103*       Recent Labs   Lab 09/27/23  0610 09/26/23  1138   INR 1.27* 1.25*         No results for input(s): TROPONIN, TROPI, TROPR in the last 168 hours.    Invalid input(s): TROP, TROPONINIES    Recent Results (from the past 48 hour(s))   XR Chest 1 View    Narrative    CHEST ONE VIEW   9/26/2023 10:26 AM     HISTORY: Shortness of breath.    COMPARISON: 6/12/2023.      Impression    IMPRESSION: Interval increase of bilateral vascular and interstitial  prominence that may represent pulmonary edema. Mildly increased  cardiomegaly. Stable left chest pacemaker. Stable sternotomy. Atypical  infectious etiology remains in the differential.     TRINIDAD ZURITA MD         SYSTEM ID:  [id number]   XR Knee Left 1/2 Views    Narrative    XR FEMUR LEFT 2 VIEWS, XR KNEE LEFT 1/2 VIEWS 9/26/2023 10:27 AM     HISTORY: thigh pain post fall    COMPARISON: None.       Impression    IMPRESSION:    There is an oblique, comminuted, and displaced periprosthetic  fracture  through the distal femoral diaphysis extending to the femoral  component of the left knee arthroplasty. Mild degenerative changes of  the left hip. Vascular calcifications. Osteopenia.    NOTE: ABNORMAL REPORT    THE DICTATION ABOVE DESCRIBES AN ABNORMAL REPORT FOR WHICH FOLLOW-UP  IS NEEDED.    ANTOINETTE DIEHL MD         SYSTEM ID:  [id number]   XR Femur Left 2 Views    Narrative    XR FEMUR LEFT 2 VIEWS, XR KNEE LEFT 1/2 VIEWS 9/26/2023 10:27 AM     HISTORY: thigh pain post fall    COMPARISON: None.       Impression    IMPRESSION:    There is an oblique, comminuted, and displaced periprosthetic fracture  through the distal femoral diaphysis extending to the femoral  component of the left knee arthroplasty. Mild degenerative changes of  the left hip. Vascular calcifications. Osteopenia.    NOTE: ABNORMAL REPORT    THE DICTATION ABOVE DESCRIBES AN ABNORMAL REPORT FOR WHICH FOLLOW-UP  IS NEEDED.    ANTOINETTE DIEHL MD         SYSTEM ID:  [id number]   CT Knee Left w/o Contrast    Narrative    EXAM: CT KNEE LEFT W/O CONTRAST WITH 3D RECONSTRUCTION  DATE/TIME: 9/26/2023 12:28 PM    INDICATION: Pain. Left femur fracture.  COMPARISON: Radiographs 9/26/2023  TECHNIQUE: Noncontrast. Axial, sagittal and coronal thin-section  reconstruction. Dose reduction techniques were used. Three-dimensional  images were created on a separate workstation by the CT technologist  using source data obtained at the time of image acquisition. Images  were made available to the surgeons for surgical planning.    FINDINGS:     BONES:  -There is an oblique comminuted mildly displaced periprosthetic  fracture through the distal femoral diaphysis extending to the lateral  metaphyseal cortex and femoral component of the knee arthroplasty.   -Moderate lipohemarthrosis.    SOFT TISSUES:  -There is adjacent soft tissue stranding adjacent to the fracture in  the intervening fat planes, which may represent edema or small volume  blood  products..  -Vascular calcifications..      Impression    IMPRESSION:  1.  There is an acute oblique, comminuted, mildly displaced  periprosthetic fracture through the distal femur extending to the  femoral component of the knee arthroplasty.  2.  Moderate lipohemarthrosis.    ANTOINETTE DIEHL MD         SYSTEM ID:  [id number]   XR Surgery YONATHAN L/T 5 Min Fluoro w Stills    Narrative    This exam was marked as non-reportable because it will not be read by a   radiologist or a [place] non-radiologist provider.             COVID Status:  COVID-19 PCR Results          4/4/2022    17:33   COVID-19 PCR Results   SARS CoV2 PCR Negative      COVID-19 Antibody Results, Testing for Immunity           No data to display                 Disclaimer: This note consists of symbols derived from keyboarding, dictation and/or voice recognition software. As a result, there may be errors in the script that have gone undetected. Please consider this when interpreting information found in this chart.

## 2023-09-28 ENCOUNTER — APPOINTMENT (OUTPATIENT)
Dept: OCCUPATIONAL THERAPY | Facility: CLINIC | Age: 83
DRG: 481 | End: 2023-09-28
Payer: COMMERCIAL

## 2023-09-28 ENCOUNTER — APPOINTMENT (OUTPATIENT)
Dept: PHYSICAL THERAPY | Facility: CLINIC | Age: 83
DRG: 481 | End: 2023-09-28
Payer: COMMERCIAL

## 2023-09-28 LAB
ANION GAP SERPL CALCULATED.3IONS-SCNC: 9 MMOL/L (ref 7–15)
BUN SERPL-MCNC: 20 MG/DL (ref 8–23)
CALCIUM SERPL-MCNC: 8.7 MG/DL (ref 8.8–10.2)
CHLORIDE SERPL-SCNC: 105 MMOL/L (ref 98–107)
CREAT SERPL-MCNC: 0.91 MG/DL (ref 0.51–0.95)
EGFRCR SERPLBLD CKD-EPI 2021: 63 ML/MIN/1.73M2
ERYTHROCYTE [DISTWIDTH] IN BLOOD BY AUTOMATED COUNT: 17.2 % (ref 10–15)
GLUCOSE SERPL-MCNC: 108 MG/DL (ref 70–99)
HCO3 SERPL-SCNC: 22 MMOL/L (ref 22–29)
HCT VFR BLD AUTO: 25.8 % (ref 35–47)
HGB BLD-MCNC: 8.4 G/DL (ref 11.7–15.7)
INR PPP: 1.22 (ref 0.85–1.15)
INR PPP: 1.24 (ref 0.85–1.15)
MCH RBC QN AUTO: 35.4 PG (ref 26.5–33)
MCHC RBC AUTO-ENTMCNC: 32.6 G/DL (ref 31.5–36.5)
MCV RBC AUTO: 109 FL (ref 78–100)
PLATELET # BLD AUTO: 178 10E3/UL (ref 150–450)
POTASSIUM SERPL-SCNC: 4.4 MMOL/L (ref 3.4–5.3)
RBC # BLD AUTO: 2.37 10E6/UL (ref 3.8–5.2)
SODIUM SERPL-SCNC: 136 MMOL/L (ref 135–145)
WBC # BLD AUTO: 12 10E3/UL (ref 4–11)

## 2023-09-28 PROCEDURE — 85610 PROTHROMBIN TIME: CPT | Performed by: INTERNAL MEDICINE

## 2023-09-28 PROCEDURE — 82310 ASSAY OF CALCIUM: CPT | Performed by: INTERNAL MEDICINE

## 2023-09-28 PROCEDURE — 120N000001 HC R&B MED SURG/OB

## 2023-09-28 PROCEDURE — 250N000013 HC RX MED GY IP 250 OP 250 PS 637

## 2023-09-28 PROCEDURE — 97530 THERAPEUTIC ACTIVITIES: CPT | Mod: GP | Performed by: PHYSICAL THERAPIST

## 2023-09-28 PROCEDURE — 97165 OT EVAL LOW COMPLEX 30 MIN: CPT | Mod: GO

## 2023-09-28 PROCEDURE — 250N000013 HC RX MED GY IP 250 OP 250 PS 637: Performed by: INTERNAL MEDICINE

## 2023-09-28 PROCEDURE — 250N000013 HC RX MED GY IP 250 OP 250 PS 637: Performed by: PHYSICIAN ASSISTANT

## 2023-09-28 PROCEDURE — 250N000011 HC RX IP 250 OP 636

## 2023-09-28 PROCEDURE — 85610 PROTHROMBIN TIME: CPT | Performed by: STUDENT IN AN ORGANIZED HEALTH CARE EDUCATION/TRAINING PROGRAM

## 2023-09-28 PROCEDURE — 36415 COLL VENOUS BLD VENIPUNCTURE: CPT | Performed by: INTERNAL MEDICINE

## 2023-09-28 PROCEDURE — 250N000013 HC RX MED GY IP 250 OP 250 PS 637: Performed by: HOSPITALIST

## 2023-09-28 PROCEDURE — 99231 SBSQ HOSP IP/OBS SF/LOW 25: CPT | Performed by: INTERNAL MEDICINE

## 2023-09-28 PROCEDURE — 97161 PT EVAL LOW COMPLEX 20 MIN: CPT | Mod: GP | Performed by: PHYSICAL THERAPIST

## 2023-09-28 PROCEDURE — 85027 COMPLETE CBC AUTOMATED: CPT | Performed by: INTERNAL MEDICINE

## 2023-09-28 PROCEDURE — 36415 COLL VENOUS BLD VENIPUNCTURE: CPT | Performed by: STUDENT IN AN ORGANIZED HEALTH CARE EDUCATION/TRAINING PROGRAM

## 2023-09-28 PROCEDURE — 97530 THERAPEUTIC ACTIVITIES: CPT | Mod: GO

## 2023-09-28 PROCEDURE — 250N000011 HC RX IP 250 OP 636: Mod: JZ | Performed by: INTERNAL MEDICINE

## 2023-09-28 PROCEDURE — 97116 GAIT TRAINING THERAPY: CPT | Mod: GP | Performed by: PHYSICAL THERAPIST

## 2023-09-28 RX ORDER — ROPINIROLE 0.25 MG/1
0.25 TABLET, FILM COATED ORAL 3 TIMES DAILY
Status: DISCONTINUED | OUTPATIENT
Start: 2023-09-28 | End: 2023-10-01 | Stop reason: HOSPADM

## 2023-09-28 RX ORDER — WARFARIN SODIUM 7.5 MG
3.75 TABLET ORAL
Status: COMPLETED | OUTPATIENT
Start: 2023-09-28 | End: 2023-09-28

## 2023-09-28 RX ORDER — METHOTREXATE 25 MG/ML
20 INJECTION INTRA-ARTERIAL; INTRAMUSCULAR; INTRATHECAL; INTRAVENOUS
Status: DISCONTINUED | OUTPATIENT
Start: 2023-09-28 | End: 2023-10-01 | Stop reason: HOSPADM

## 2023-09-28 RX ORDER — METHOTREXATE 25 MG/ML
20 INJECTION, SOLUTION INTRA-ARTERIAL; INTRAMUSCULAR; INTRAVENOUS ONCE
Status: DISCONTINUED | OUTPATIENT
Start: 2023-09-28 | End: 2023-09-28

## 2023-09-28 RX ORDER — CYCLOBENZAPRINE HCL 10 MG
10 TABLET ORAL EVERY 8 HOURS PRN
Status: DISCONTINUED | OUTPATIENT
Start: 2023-09-28 | End: 2023-10-01 | Stop reason: HOSPADM

## 2023-09-28 RX ADMIN — ALLOPURINOL 100 MG: 100 TABLET ORAL at 20:15

## 2023-09-28 RX ADMIN — LEVOTHYROXINE SODIUM 112 MCG: 0.11 TABLET ORAL at 09:08

## 2023-09-28 RX ADMIN — Medication 950 MG: at 09:08

## 2023-09-28 RX ADMIN — DOCUSATE SODIUM 100 MG: 100 CAPSULE, LIQUID FILLED ORAL at 20:14

## 2023-09-28 RX ADMIN — ACETAMINOPHEN 975 MG: 325 TABLET, FILM COATED ORAL at 00:12

## 2023-09-28 RX ADMIN — CYCLOBENZAPRINE 10 MG: 10 TABLET, FILM COATED ORAL at 00:29

## 2023-09-28 RX ADMIN — WARFARIN SODIUM 3.75 MG: 7.5 TABLET ORAL at 20:17

## 2023-09-28 RX ADMIN — ROPINIROLE HYDROCHLORIDE 0.25 MG: 0.25 TABLET, FILM COATED ORAL at 14:34

## 2023-09-28 RX ADMIN — ALLOPURINOL 100 MG: 100 TABLET ORAL at 09:08

## 2023-09-28 RX ADMIN — SENNOSIDES AND DOCUSATE SODIUM 1 TABLET: 50; 8.6 TABLET ORAL at 09:16

## 2023-09-28 RX ADMIN — ROPINIROLE HYDROCHLORIDE 0.25 MG: 0.25 TABLET, FILM COATED ORAL at 20:15

## 2023-09-28 RX ADMIN — ACETAMINOPHEN 975 MG: 325 TABLET, FILM COATED ORAL at 17:37

## 2023-09-28 RX ADMIN — METHOTREXATE 20 MG: 25 INJECTION INTRA-ARTERIAL; INTRAMUSCULAR; INTRATHECAL; INTRAVENOUS at 19:29

## 2023-09-28 RX ADMIN — METOPROLOL TARTRATE 50 MG: 50 TABLET, FILM COATED ORAL at 09:09

## 2023-09-28 RX ADMIN — DORZOLAMIDE HYDROCHLORIDE AND TIMOLOL MALEATE 1 DROP: 22.3; 6.8 SOLUTION/ DROPS OPHTHALMIC at 11:01

## 2023-09-28 RX ADMIN — CEFAZOLIN 1 G: 1 INJECTION, POWDER, FOR SOLUTION INTRAMUSCULAR; INTRAVENOUS at 00:13

## 2023-09-28 RX ADMIN — FAMOTIDINE 40 MG: 20 TABLET ORAL at 09:07

## 2023-09-28 RX ADMIN — DORZOLAMIDE HYDROCHLORIDE AND TIMOLOL MALEATE 1 DROP: 22.3; 6.8 SOLUTION/ DROPS OPHTHALMIC at 20:16

## 2023-09-28 RX ADMIN — GABAPENTIN 100 MG: 100 CAPSULE ORAL at 09:09

## 2023-09-28 RX ADMIN — OXYCODONE HYDROCHLORIDE 2.5 MG: 5 TABLET ORAL at 14:43

## 2023-09-28 RX ADMIN — SENNOSIDES AND DOCUSATE SODIUM 1 TABLET: 50; 8.6 TABLET ORAL at 20:15

## 2023-09-28 RX ADMIN — METOPROLOL TARTRATE 50 MG: 50 TABLET, FILM COATED ORAL at 20:16

## 2023-09-28 RX ADMIN — Medication 50 MCG: at 09:09

## 2023-09-28 RX ADMIN — OXYCODONE HYDROCHLORIDE 2.5 MG: 5 TABLET ORAL at 20:28

## 2023-09-28 RX ADMIN — AMLODIPINE BESYLATE 2.5 MG: 2.5 TABLET ORAL at 09:08

## 2023-09-28 RX ADMIN — ACETAMINOPHEN 975 MG: 325 TABLET, FILM COATED ORAL at 09:07

## 2023-09-28 RX ADMIN — OXYCODONE HYDROCHLORIDE 2.5 MG: 5 TABLET ORAL at 05:07

## 2023-09-28 RX ADMIN — FOLIC ACID 1 MG: 1 TABLET ORAL at 09:09

## 2023-09-28 ASSESSMENT — ACTIVITIES OF DAILY LIVING (ADL)
ADLS_ACUITY_SCORE: 32
ADLS_ACUITY_SCORE: 32
ADLS_ACUITY_SCORE: 26
ADLS_ACUITY_SCORE: 32
ADLS_ACUITY_SCORE: 42
DEPENDENT_IADLS:: CLEANING;COOKING;TRANSPORTATION
ADLS_ACUITY_SCORE: 42
ADLS_ACUITY_SCORE: 26
ADLS_ACUITY_SCORE: 32
ADLS_ACUITY_SCORE: 32
ADLS_ACUITY_SCORE: 42
PREVIOUS_RESPONSIBILITIES: MEAL PREP;LAUNDRY
ADLS_ACUITY_SCORE: 32
ADLS_ACUITY_SCORE: 42

## 2023-09-28 NOTE — PLAN OF CARE
Goal Outcome Evaluation:      Plan of Care Reviewed With: patient, child    Overall Patient Progress: improvingOverall Patient Progress: improving       Patient vital signs are at baseline: No,  Reason:  requiring 1.0L O2  Patient able to ambulate as they were prior to admission or with assist devices provided by therapies during their stay:  No,  Reason:  Is able to pivot but has not ambulated more than a couple of steps.  Patient MUST void prior to discharge:  Yes  Patient able to tolerate oral intake:  Yes  Pain has adequate pain control using Oral analgesics:  Yes  Does patient have an identified :  Yes  Has goal D/C date and time been discussed with patient:  No,  Reason:  awaiting TCU placement    A&Ox4, daughter at bedside. Voiding, tolerating regular diet. Pain managed with ice, PRN oxy, and scheduled tylenol. Requip started today for restless leg. Upper dressing cdi, small amount of dried drainage on Aquacell to knee. SW following, awaiting TCU placement.

## 2023-09-28 NOTE — PLAN OF CARE
Goal Outcome Evaluation:      Plan of Care Reviewed With: patient, child    Overall Patient Progress: improvingOverall Patient Progress: improving     Patient vital signs are at baseline: Yes  Patient able to ambulate as they were prior to admission or with assist devices provided by therapies during their stay:  No,  Reason:  Assist 2 to bedside commode  Patient MUST void prior to discharge:  Yes  Patient able to tolerate oral intake:  Yes  Pain has adequate pain control using Oral analgesics:  Yes  Does patient have an identified :  Yes  Has goal D/C date and time been discussed with patient:  Yes    VSS, 1L O2, Capno was removed - spot checking occasionally, ambulated to BSC, pain controlled with Tylenol, oxycodone, and flexeril, Pt voiding with external catheter, bladder scan 303, dressing CDI, CMS intact, potential discharge to TCU depending on mobility

## 2023-09-28 NOTE — PROGRESS NOTES
[facility]  Hospitalist Progress Note  Jesse Viera M.D., M.B.A.   09/28/2023    Reason for Stay/active problem list    Mechanical fall with acute displaced left femoral periprosthetic fracture         Assessment and Plan:        Summary of Stay: Zunilda Clark is a 83 year old female with PMH significant for RA, HTN, hypothyroidism, bioprosthetic MVRx 2 , A.fib, SSS s/p PPM, Pulmonary HTN, stroke who presents to ED after mechanical fall and found to have left distal femoral periprosthetic fracture and admitted on 9/26/2023.       Problem List with Assessment and Plan:    Mechanical fall  Acute, closed, displaced, comminuted left distal femur periprosthetic fracture.  -Patient fell down and sustained trauma to her left leg.  -She had history of left TKA in 2018.  -Left leg is immobilized in ED   -Imaging studies and CT scan showed closed displaced comminuted left periprosthetic distal femur fracture.  -Patient was admitted to the hospital and treated with pain medications including oxycodone, Dilaudid, gabapentin and closely monitored.    --Orthopedic surgery consulted and patient underwent  Left periprosthetic femur fracture open reduction internal fixation using retrograde intramedullary nail, Left - Leg on September 27.  --Currently patient is stable with fair control of her pain.  She is working with therapy.  Continue postoperative care including DVT prophylaxis, pain control and rehabilitation    3.  Acute blood loss anemia   -- Hemoglobin was 10.4 on September 26.  Down to 8.5 morning of September 27.  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  Both B12 and folic acid within normal limit.  Serum ferritin also normal.  -- Continue to monitor hemoglobin, transfuse as needed    4.  Restless leg syndrome  --Start Requip    Chronic medical conditions.  Paroxysmal A-fib, Currently paced, anticoagulation on hold, continue low-dose metoprolol.  We will resume  "anticoagulation with Coumadin, pharmacy dosing     Bioprosthetic mitral valve replacement x2 and TR annuloplasty  Chronic congestive heart failure without exacerbation:   --patient stopped taking  her torsemide about a month ago. No hypoxia, will hold off diuresis at this time unless there is sign of fluid overload.  Hypertension:  -- Resumed home metoprolol 50 mg p.o. twice a day, amlodipine 2.5 mg daily,  --PTA Exforge on hold, continue to monitor    Rheumatoid arthritis: On methotrexate once a week, resumed  History of CVA with prior left-sided weakness, resolved.       VTE Prophylaxis: Pneumatic Compression Devices  Code Status: No CPR- Do NOT Intubate  Diet: advanced  Leonard Catheter: Not present    Family updated today: No     Disposition: Likely TCU in the next few days pending progress        Interval History (Subjective):        Patient is seen and examined by me today and medical record reviewed.Overnight events noted and care discussed with nursing staff.  Patient is feeling better.  Her family at bedside.  Questions and concerns addressed.  She has been having restless legs.  The Flexeril is not helping.  Denies any fever or chills.       Physical Exam:        Last Vital Signs:  /58 (BP Location: Right arm)   Pulse 61   Temp 97.2  F (36.2  C) (Temporal)   Resp 20   Ht 1.626 m (5' 4\")   Wt 66.2 kg (145 lb 15.1 oz)   LMP  (LMP Unknown)   SpO2 92%   BMI 25.05 kg/m      Wt Readings from Last 1 Encounters:   09/26/23 66.2 kg (145 lb 15.1 oz)       Wt Readings from Last 5 Encounters:   09/26/23 66.2 kg (145 lb 15.1 oz)   09/05/23 63.7 kg (140 lb 8 oz)   08/30/23 65.1 kg (143 lb 8.3 oz)   07/24/23 64.4 kg (142 lb)   07/14/23 64 kg (141 lb 3.2 oz)        Constitutional: awake, alert, cooperative, no apparent distress     Respiratory: Clear to auscultation bilaterally, no crackles or wheezing   Cardiovascular: Regular rate and rhythm, normal S1 and S2, and no murmur noted   Abdomen: Normal bowel " sounds, soft, non-distended, non-tender   Skin: No new rashes, no cyanosis, dry to touch   Neuro: Alert with  no new focal weakness   Extremities: No edema   Other(s):        All other systems: Negative          Medications:        All current medications were reviewed with changes reflected in problem list.         Data:      All new lab and imaging data was reviewed.      Data reviewed today: I reviewed all new labs and imaging results over the last 24 hours. I personally reviewed       Recent Labs   Lab 09/28/23  0804 09/27/23  0610 09/26/23  0934   WBC 12.0*  --  9.3   HGB 8.4* 8.5* 10.4*   HCT 25.8*  --  32.3*   *  --  108*     --  264       No results for input(s): CULT in the last 168 hours.  Recent Labs   Lab 09/28/23  0804 09/27/23  1854 09/27/23  0610 09/26/23  1138 09/26/23  0934     --  138  --  138   POTASSIUM 4.4  --  4.0  --  3.7   CHLORIDE 105  --  107  --  106   CO2 22  --  22  --  24   ANIONGAP 9  --  9  --  8   * 135* 98  --  103*   BUN 20.0  --  19.6  --  13.7   CR 0.91  --  0.97*  --  0.73   GFRESTIMATED 63  --  58*  --  82   BRICE 8.7*  --  8.5*  --  9.2   MAG  --   --   --  2.0  --          Recent Labs   Lab 09/28/23  0804 09/27/23  1854 09/27/23  0610 09/26/23  0934   * 135* 98 103*         Recent Labs   Lab 09/27/23  0610 09/26/23  1138   INR 1.27* 1.25*           No results for input(s): TROPONIN, TROPI, TROPR in the last 168 hours.    Invalid input(s): TROP, TROPONINIES    Recent Results (from the past 48 hour(s))   XR Chest 1 View    Narrative    CHEST ONE VIEW   9/26/2023 10:26 AM     HISTORY: Shortness of breath.    COMPARISON: 6/12/2023.      Impression    IMPRESSION: Interval increase of bilateral vascular and interstitial  prominence that may represent pulmonary edema. Mildly increased  cardiomegaly. Stable left chest pacemaker. Stable sternotomy. Atypical  infectious etiology remains in the differential.     TRINIDAD ZURITA MD         SYSTEM ID:  [id number]    XR Knee Left 1/2 Views    Narrative    XR FEMUR LEFT 2 VIEWS, XR KNEE LEFT 1/2 VIEWS 9/26/2023 10:27 AM     HISTORY: thigh pain post fall    COMPARISON: None.       Impression    IMPRESSION:    There is an oblique, comminuted, and displaced periprosthetic fracture  through the distal femoral diaphysis extending to the femoral  component of the left knee arthroplasty. Mild degenerative changes of  the left hip. Vascular calcifications. Osteopenia.    NOTE: ABNORMAL REPORT    THE DICTATION ABOVE DESCRIBES AN ABNORMAL REPORT FOR WHICH FOLLOW-UP  IS NEEDED.    ANTOINETTE DIEHL MD         SYSTEM ID:  [id number]   XR Femur Left 2 Views    Narrative    XR FEMUR LEFT 2 VIEWS, XR KNEE LEFT 1/2 VIEWS 9/26/2023 10:27 AM     HISTORY: thigh pain post fall    COMPARISON: None.       Impression    IMPRESSION:    There is an oblique, comminuted, and displaced periprosthetic fracture  through the distal femoral diaphysis extending to the femoral  component of the left knee arthroplasty. Mild degenerative changes of  the left hip. Vascular calcifications. Osteopenia.    NOTE: ABNORMAL REPORT    THE DICTATION ABOVE DESCRIBES AN ABNORMAL REPORT FOR WHICH FOLLOW-UP  IS NEEDED.    ANTOINETTE DIEHL MD         SYSTEM ID:  [id number]   CT Knee Left w/o Contrast    Narrative    EXAM: CT KNEE LEFT W/O CONTRAST WITH 3D RECONSTRUCTION  DATE/TIME: 9/26/2023 12:28 PM    INDICATION: Pain. Left femur fracture.  COMPARISON: Radiographs 9/26/2023  TECHNIQUE: Noncontrast. Axial, sagittal and coronal thin-section  reconstruction. Dose reduction techniques were used. Three-dimensional  images were created on a separate workstation by the CT technologist  using source data obtained at the time of image acquisition. Images  were made available to the surgeons for surgical planning.    FINDINGS:     BONES:  -There is an oblique comminuted mildly displaced periprosthetic  fracture through the distal femoral diaphysis extending to the lateral  metaphyseal  cortex and femoral component of the knee arthroplasty.   -Moderate lipohemarthrosis.    SOFT TISSUES:  -There is adjacent soft tissue stranding adjacent to the fracture in  the intervening fat planes, which may represent edema or small volume  blood products..  -Vascular calcifications..      Impression    IMPRESSION:  1.  There is an acute oblique, comminuted, mildly displaced  periprosthetic fracture through the distal femur extending to the  femoral component of the knee arthroplasty.  2.  Moderate lipohemarthrosis.    ANTOINETTE DIEHL MD         SYSTEM ID:  [id number]   XR Surgery YONATHAN L/T 5 Min Fluoro w Stills    Narrative    This exam was marked as non-reportable because it will not be read by a   radiologist or a [place] non-radiologist provider.             COVID Status:  COVID-19 PCR Results          4/4/2022    17:33   COVID-19 PCR Results   SARS CoV2 PCR Negative      COVID-19 Antibody Results, Testing for Immunity           No data to display                 Disclaimer: This note consists of symbols derived from keyboarding, dictation and/or voice recognition software. As a result, there may be errors in the script that have gone undetected. Please consider this when interpreting information found in this chart.

## 2023-09-28 NOTE — PROGRESS NOTES
"   09/28/23 0800   Appointment Info   Signing Clinician's Name / Credentials (PT) Ann Marie Carmona, PT   Rehab Comments (PT) L LE WBAT   Living Environment   People in Home alone   Current Living Arrangements apartment   Home Accessibility no concerns   Transportation Anticipated family or friend will provide   Living Environment Comments Pt lives alone in a Sr. apt buidling   Self-Care   Usual Activity Tolerance good   Current Activity Tolerance moderate   Equipment Currently Used at Home cane, straight;walker, rolling   Fall history within last six months yes   Number of times patient has fallen within last six months 1   Activity/Exercise/Self-Care Comment Pt reports being independent with all ADLs.  Pt uses a 4WW for most mobility but also has a FWW and SEC.  Pt reports \"furniture walking\" in her apt.  Pt orders groceries online and has them delivered.   General Information   Onset of Illness/Injury or Date of Surgery 09/26/23   Referring Physician Amna Sánchez PA-C   Patient/Family Therapy Goals Statement (PT) Pt agreeable to TCU   Pertinent History of Current Problem (include personal factors and/or comorbidities that impact the POC) Zunilda Clark is a 83 year old female with PMH significant for RA, HTN, hypothyroidism, bioprosthetic MVRx 2 , A.fib, SSS s/p PPM, Pulmonary HTN, stroke who presents to ED after mechanical fall and found to have left distal femoral periprosthetic fracture and admitted on 9/26/2023. Pt underwent L femur ORIF   Existing Precautions/Restrictions fall   Weight-Bearing Status - LLE weight-bearing as tolerated   General Observations Pt lying in bed with daughter present   Cognition   Affect/Mental Status (Cognition) WFL   Cognitive Status Comments Pt pleasant and cooperative   Pain Assessment   Patient Currently in Pain Yes, see Vital Sign flowsheet  (L hip pain 7/10 after mobility)   Integumentary/Edema   Integumentary/Edema Comments L hip incision covered with bandage   Posture    Posture " Forward head position;Protracted shoulders   Range of Motion (ROM)   Range of Motion ROM deficits secondary to surgical procedure;ROM deficits secondary to pain   ROM Comment Limited L hip and knee flexion due to pain, weakness. Pt reports tightness at heels and back of knees with ankle DF   Strength (Manual Muscle Testing)   Strength Comments decreased L LE strength, assist to bring L LE over EOB.  Generalized mms weakness   Bed Mobility   Bed Mobility supine-sit   Supine-Sit [place] (Bed Mobility) moderate assist (50% patient effort);verbal cues   Bed Mobility Limitations decreased ability to use legs for bridging/pushing   Comment, (Bed Mobility) sup > sit with  Mod A at LE and Min A at trunk   Transfers   Transfers sit-stand transfer   Sit-Stand Transfer   Sit-Stand [place] (Transfers) verbal cues;moderate assist (50% patient effort)   Assistive Device (Sit-Stand Transfers) walker, front-wheeled   Comment, (Sit-Stand Transfer) sit <> stand to FWW with Mod A   Gait/Stairs (Locomotion)   [place] Level (Gait) minimum assist (75% patient effort);verbal cues   Assistive Device (Gait) walker, front-wheeled   Distance in Feet 10' (eval) + 10 (treatment)   Comment, (Gait/Stairs) Pt amb with FWW and Min A at trunk with forward flexed posture   Balance   Balance Comments Impaired standing balance requiring B UE support on FWW and Assist at trunk for safe mobility   Sensory Examination   Sensory Perception other (describe)   Sensory Perception Comments Pt reports possible neuropathy in B feet. Pt states her feet feel numb but can sense touch   Coordination   Coordination no deficits were identified   Muscle Tone   Muscle Tone no deficits were identified   Clinical Impression   Criteria for Skilled Therapeutic Intervention Yes, treatment indicated   PT Diagnosis (PT) Impaired functional mobility   Influenced by the following impairments L hip pain, decreased LE strength and ROM, decreased activity tolerance,  impaired balance   Functional limitations due to impairments Impaired gait, assisted mobility and ADLs, pt unable to amb functional household distances or care for herself   Clinical Presentation (PT Evaluation Complexity) Stable/Uncomplicated   Clinical Presentation Rationale pt medically stable   Clinical Decision Making (Complexity) low complexity   Planned Therapy Interventions (PT) balance training;bed mobility training;cryotherapy;gait training;home exercise program;patient/family education;ROM (range of motion);strengthening;stretching;transfer training;progressive activity/exercise;risk factor education;home program guidelines   Risk & Benefits of therapy have been explained evaluation/treatment results reviewed;care plan/treatment goals reviewed;risks/benefits reviewed;current/potential barriers reviewed;participants voiced agreement with care plan;participants included;patient;daughter   PT Total Evaluation Time   PT Eval, Low Complexity Minutes (38702) 10   Plan of Care Review   Plan of Care Reviewed With patient;daughter   Physical Therapy Goals   PT Frequency Daily   PT Predicted Duration/Target Date for Goal Attainment 10/01/23   PT Goals Bed Mobility;Transfers;Gait   PT: Bed Mobility Supervision/stand-by assist;Supine to/from sit   PT: Transfers Supervision/stand-by assist;Sit to/from stand;Assistive device   PT: Gait Supervision/stand-by assist;Rolling walker;150 feet   PT Discharge Planning   PT Plan Progress bed mobility, transfers and amb distance/gait dynamics   PT Discharge Recommendation (DC Rec) Transitional Care Facility   PT Rationale for DC Rec Pt significantly below baseline with all mobility.  Pt lives alone and was independent with mobility and ADLs.  Pt currently requires Mod A with bed mobility and transfers and only tolerating amb up to 20' with Min A.  Recommend TCU to increase strength and progress safety and independence with all mobility to enable pt to return home with decreased  falls risk.   PT Brief overview of current status Min/Mod A bed mobility and transfers, Min A amb in room with FWW, encourage amb to bathroom

## 2023-09-28 NOTE — PROGRESS NOTES
Orthopedic Surgery  Zunilda SHAW May  09/28/2023     Admit Date:  9/26/2023    POD: 1 Day Post-Op   Procedure(s):  Placement of retrograde femoral nail left femur     Patient resting comfortably in bed.  Her daughter is at bedside this morning.   Reports being restless and having significant pain throughout the night. She is doing much better this morning and reports minimal current pain of the left leg. Daughter reports she has a tendency to wake up groggy during the night and try to get up and walk on her own. She normally lives alone at home, however given her tendency to get up multiple times a night without help the daughter thinks a short stay in rehab would be a good idea for her.   Tolerating oral intake.    Denies nausea or vomiting  Denies chest pain or shortness of breath    Temp:  [97  F (36.1  C)-97.6  F (36.4  C)] 97.2  F (36.2  C)  Pulse:  [61-76] 61  Resp:  [10-41] 20  BP: (108-132)/(50-70) 128/58  SpO2:  [85 %-100 %] 92 %    Alert and oriented  Dressing is clean, dry, and intact.   Minimal erythema of the surrounding skin.   Bilateral calves are soft, non-tender.  Left lower extremity is NVI.  Sensation intact bilateral lower extremities  Patient able to resist dorsi and plantar flexion bilaterally  +Dp pulse    Labs:  Recent Labs   Lab Test 09/28/23  0804 09/27/23  0610 09/26/23  0934 09/05/23  1753   WBC 12.0*  --  9.3 6.0   HGB 8.4* 8.5* 10.4* 12.0     --  264 301     Recent Labs   Lab Test 09/27/23  0610 09/26/23  1138 09/15/23  0929   INR 1.27* 1.25* 3.4*     No lab results found.      1. PLAN:   Continue with PTA Coumadin for DVT prophylaxis.     Mobilize with PT/OT    WBAT left lower extremity.     Continue current pain regiment.   Dressings: Keep intact.  Change if >60% saturated or peeling off.    Follow-up: 2 weeks post-op with Dr. Vann team    2. Disposition   Anticipate d/c to TCU when medically cleared and progressing in PT.    Amna Sánchez PA-C

## 2023-09-28 NOTE — PROVIDER NOTIFICATION
Code status was DNR/DNI when pt came into hospital, changed to full code for surgery. Pt has expressed she would like to be DNR/DNI. Chart still says Full code. Thanks.

## 2023-09-28 NOTE — PROVIDER NOTIFICATION
Kimberly to Dr. Viera to confirm he would be ordering methotrexate injection today. He confirmed he would be ordering.

## 2023-09-28 NOTE — CONSULTS
Care Management Initial Consult    General Information  Assessment completed with: Patient, Children, patient, Daiana  Type of CM/SW Visit: Initial Assessment    Primary Care Provider verified and updated as needed: Yes   Readmission within the last 30 days: no previous admission in last 30 days      Reason for Consult: care coordination/care conference, discharge planning       Communication Assessment  Patient's communication style: spoken language (English or Bilingual)    Hearing Difficulty or Deaf: no   Wear Glasses or Blind: yes    Cognitive  Cognitive/Neuro/Behavioral: WDL  Level of Consciousness: alert  Arousal Level: opens eyes spontaneously  Orientation: oriented x 4  Mood/Behavior: cooperative, calm  Best Language: 0 - No aphasia  Speech: clear, logical, slow    Living Environment:   People in home: alone     Current living Arrangements: condominium           Family/Social Support:  Care provided by: self  Provides care for: no one     Children          Description of Support System: Involved       Current Resources:   Patient receiving home care services: No     Community Resources: None  Equipment currently used at home: cane, straight, walker, rolling  Supplies currently used at home: None    Employment/Financial:  Does the patient's insurance plan have a 3 day qualifying hospital stay waiver?  Yes     Which insurance plan 3 day waiver is available? Alternative insurance waiver    Will the waiver be used for post-acute placement? Undetermined at this time    Lifestyle & Psychosocial Needs:  Social Determinants of Health     Food Insecurity: No Food Insecurity (2/14/2023)    Hunger Vital Sign     Worried About Running Out of Food in the Last Year: Never true     Ran Out of Food in the Last Year: Never true   Depression: Not at risk (7/14/2023)    PHQ-2     PHQ-2 Score: 0   Housing Stability: Low Risk  (2/14/2023)    Housing Stability Vital Sign     Unable to Pay for Housing in the Last Year: No      Number of Places Lived in the Last Year: 1     Unstable Housing in the Last Year: No   Tobacco Use: Low Risk  (9/27/2023)    Patient History     Smoking Tobacco Use: Never     Smokeless Tobacco Use: Never     Passive Exposure: Not on file   Financial Resource Strain: Low Risk  (2/14/2023)    Overall Financial Resource Strain (CARDIA)     Difficulty of Paying Living Expenses: Not very hard   Alcohol Use: Not on file   Transportation Needs: Not on file   Physical Activity: Not on file   Interpersonal Safety: Not on file   Stress: Not on file   Social Connections: Not on file       Functional Status:  Prior to admission patient needed assistance:   Dependent ADLs:: Ambulation-walker  Dependent IADLs:: Cleaning, Cooking, Transportation       Additional Information:  Care Management has been consulted for discharge planning. PT/OT are following the patient in hospital with recommendations for TCU on discharge. Met with patient and daughter Daiana to discuss discharging planning and TCU recommendations. He/she is agreeable to a TCU plan of care for discharge. Pt was given list of [facility] in network providers. TCU packet was given and discussed.   Pt/family was provided with the Medicare Compare list for SNF.    Education was given to pt/family that star ratings are updated/maintained by Medicare and can be reviewed by visiting www.medicare.gov No     Facility Choices: [facility], [facility]  Private/Shared room: Either, prefer private  Transportation: [facility]  Insurance plan/need for Auth: [facility] Medicare Advantage    CM sent requested referrals. Daughter Daiana would like a call to update when accepting facility is found if family is not present at bedside once we find an accepting facility. Family to continue looking at list and let us know if they find a third choice to send referrals. [facility]. Reviewed out of pocket cost for [facility] transport, $81.80 for base rate and $5.26 per mile to the  destination. Pt/family expressed understanding and are agreeable to this. CM will continue to follow for discharge planning to TCU.    Evon Thompson RN, BSN  Inpatient Care Coordination  [facility]  [phone]

## 2023-09-29 ENCOUNTER — APPOINTMENT (OUTPATIENT)
Dept: PHYSICAL THERAPY | Facility: CLINIC | Age: 83
DRG: 481 | End: 2023-09-29
Payer: COMMERCIAL

## 2023-09-29 ENCOUNTER — TELEPHONE (OUTPATIENT)
Dept: ANTICOAGULATION | Facility: CLINIC | Age: 83
End: 2023-09-29
Payer: COMMERCIAL

## 2023-09-29 LAB
ANION GAP SERPL CALCULATED.3IONS-SCNC: 9 MMOL/L (ref 7–15)
BUN SERPL-MCNC: 22.7 MG/DL (ref 8–23)
CALCIUM SERPL-MCNC: 8.9 MG/DL (ref 8.8–10.2)
CHLORIDE SERPL-SCNC: 105 MMOL/L (ref 98–107)
CREAT SERPL-MCNC: 0.88 MG/DL (ref 0.51–0.95)
DEPRECATED HCO3 PLAS-SCNC: 23 MMOL/L (ref 22–29)
EGFRCR SERPLBLD CKD-EPI 2021: 65 ML/MIN/1.73M2
ERYTHROCYTE [DISTWIDTH] IN BLOOD BY AUTOMATED COUNT: 17.1 % (ref 10–15)
GLUCOSE SERPL-MCNC: 92 MG/DL (ref 70–99)
HCT VFR BLD AUTO: 24.1 % (ref 35–47)
HGB BLD-MCNC: 7.9 G/DL (ref 11.7–15.7)
HGB BLD-MCNC: 8.2 G/DL (ref 11.7–15.7)
INR PPP: 1.3 (ref 0.85–1.15)
MCH RBC QN AUTO: 35.9 PG (ref 26.5–33)
MCHC RBC AUTO-ENTMCNC: 32.8 G/DL (ref 31.5–36.5)
MCV RBC AUTO: 110 FL (ref 78–100)
PLATELET # BLD AUTO: 188 10E3/UL (ref 150–450)
POTASSIUM SERPL-SCNC: 4 MMOL/L (ref 3.4–5.3)
RBC # BLD AUTO: 2.2 10E6/UL (ref 3.8–5.2)
SODIUM SERPL-SCNC: 137 MMOL/L (ref 135–145)
WBC # BLD AUTO: 11.9 10E3/UL (ref 4–11)

## 2023-09-29 PROCEDURE — 36415 COLL VENOUS BLD VENIPUNCTURE: CPT | Performed by: INTERNAL MEDICINE

## 2023-09-29 PROCEDURE — 36415 COLL VENOUS BLD VENIPUNCTURE: CPT | Performed by: STUDENT IN AN ORGANIZED HEALTH CARE EDUCATION/TRAINING PROGRAM

## 2023-09-29 PROCEDURE — 250N000013 HC RX MED GY IP 250 OP 250 PS 637

## 2023-09-29 PROCEDURE — 250N000013 HC RX MED GY IP 250 OP 250 PS 637: Performed by: INTERNAL MEDICINE

## 2023-09-29 PROCEDURE — 250N000013 HC RX MED GY IP 250 OP 250 PS 637: Performed by: PHYSICIAN ASSISTANT

## 2023-09-29 PROCEDURE — 250N000011 HC RX IP 250 OP 636: Mod: JZ | Performed by: INTERNAL MEDICINE

## 2023-09-29 PROCEDURE — 97530 THERAPEUTIC ACTIVITIES: CPT | Mod: GP

## 2023-09-29 PROCEDURE — 85610 PROTHROMBIN TIME: CPT | Performed by: STUDENT IN AN ORGANIZED HEALTH CARE EDUCATION/TRAINING PROGRAM

## 2023-09-29 PROCEDURE — 85018 HEMOGLOBIN: CPT | Performed by: INTERNAL MEDICINE

## 2023-09-29 PROCEDURE — 99231 SBSQ HOSP IP/OBS SF/LOW 25: CPT | Performed by: INTERNAL MEDICINE

## 2023-09-29 PROCEDURE — 80048 BASIC METABOLIC PNL TOTAL CA: CPT | Performed by: INTERNAL MEDICINE

## 2023-09-29 PROCEDURE — 85027 COMPLETE CBC AUTOMATED: CPT | Performed by: INTERNAL MEDICINE

## 2023-09-29 PROCEDURE — 120N000001 HC R&B MED SURG/OB

## 2023-09-29 RX ORDER — WARFARIN SODIUM 5 MG/1
5 TABLET ORAL
Status: COMPLETED | OUTPATIENT
Start: 2023-09-29 | End: 2023-09-29

## 2023-09-29 RX ORDER — OXYCODONE HYDROCHLORIDE 5 MG/1
2.5 TABLET ORAL EVERY 4 HOURS PRN
Qty: 20 TABLET | Refills: 0 | Status: SHIPPED | OUTPATIENT
Start: 2023-09-29 | End: 2023-10-20

## 2023-09-29 RX ORDER — ENOXAPARIN SODIUM 100 MG/ML
0.75 INJECTION SUBCUTANEOUS EVERY 12 HOURS
Status: DISCONTINUED | OUTPATIENT
Start: 2023-09-29 | End: 2023-10-01 | Stop reason: HOSPADM

## 2023-09-29 RX ADMIN — FOLIC ACID 1 MG: 1 TABLET ORAL at 09:30

## 2023-09-29 RX ADMIN — ROPINIROLE HYDROCHLORIDE 0.25 MG: 0.25 TABLET, FILM COATED ORAL at 09:31

## 2023-09-29 RX ADMIN — SENNOSIDES AND DOCUSATE SODIUM 1 TABLET: 50; 8.6 TABLET ORAL at 20:30

## 2023-09-29 RX ADMIN — FAMOTIDINE 40 MG: 20 TABLET ORAL at 09:31

## 2023-09-29 RX ADMIN — DOCUSATE SODIUM 100 MG: 100 CAPSULE, LIQUID FILLED ORAL at 20:30

## 2023-09-29 RX ADMIN — ENOXAPARIN SODIUM 50 MG: 60 INJECTION SUBCUTANEOUS at 10:35

## 2023-09-29 RX ADMIN — METOPROLOL TARTRATE 50 MG: 50 TABLET, FILM COATED ORAL at 09:31

## 2023-09-29 RX ADMIN — ENOXAPARIN SODIUM 50 MG: 60 INJECTION SUBCUTANEOUS at 22:44

## 2023-09-29 RX ADMIN — GABAPENTIN 100 MG: 100 CAPSULE ORAL at 09:30

## 2023-09-29 RX ADMIN — ALLOPURINOL 100 MG: 100 TABLET ORAL at 09:30

## 2023-09-29 RX ADMIN — METOPROLOL TARTRATE 50 MG: 50 TABLET, FILM COATED ORAL at 20:31

## 2023-09-29 RX ADMIN — Medication 50 MCG: at 09:29

## 2023-09-29 RX ADMIN — DORZOLAMIDE HYDROCHLORIDE AND TIMOLOL MALEATE 1 DROP: 22.3; 6.8 SOLUTION/ DROPS OPHTHALMIC at 20:31

## 2023-09-29 RX ADMIN — Medication 950 MG: at 09:31

## 2023-09-29 RX ADMIN — OXYCODONE HYDROCHLORIDE 2.5 MG: 5 TABLET ORAL at 09:29

## 2023-09-29 RX ADMIN — WARFARIN SODIUM 5 MG: 5 TABLET ORAL at 18:17

## 2023-09-29 RX ADMIN — LEVOTHYROXINE SODIUM 112 MCG: 0.11 TABLET ORAL at 09:30

## 2023-09-29 RX ADMIN — ALLOPURINOL 100 MG: 100 TABLET ORAL at 20:30

## 2023-09-29 RX ADMIN — DORZOLAMIDE HYDROCHLORIDE AND TIMOLOL MALEATE 1 DROP: 22.3; 6.8 SOLUTION/ DROPS OPHTHALMIC at 09:32

## 2023-09-29 RX ADMIN — ROPINIROLE HYDROCHLORIDE 0.25 MG: 0.25 TABLET, FILM COATED ORAL at 15:26

## 2023-09-29 RX ADMIN — ACETAMINOPHEN 975 MG: 325 TABLET, FILM COATED ORAL at 09:32

## 2023-09-29 RX ADMIN — AMLODIPINE BESYLATE 2.5 MG: 2.5 TABLET ORAL at 09:30

## 2023-09-29 RX ADMIN — DOCUSATE SODIUM 100 MG: 100 CAPSULE, LIQUID FILLED ORAL at 09:30

## 2023-09-29 RX ADMIN — ROPINIROLE HYDROCHLORIDE 0.25 MG: 0.25 TABLET, FILM COATED ORAL at 20:31

## 2023-09-29 RX ADMIN — ACETAMINOPHEN 975 MG: 325 TABLET, FILM COATED ORAL at 15:26

## 2023-09-29 RX ADMIN — ACETAMINOPHEN 975 MG: 325 TABLET, FILM COATED ORAL at 00:06

## 2023-09-29 ASSESSMENT — ACTIVITIES OF DAILY LIVING (ADL)
ADLS_ACUITY_SCORE: 42
ADLS_ACUITY_SCORE: 44
ADLS_ACUITY_SCORE: 42
ADLS_ACUITY_SCORE: 44
ADLS_ACUITY_SCORE: 44
ADLS_ACUITY_SCORE: 47
ADLS_ACUITY_SCORE: 42
ADLS_ACUITY_SCORE: 42
ADLS_ACUITY_SCORE: 47
ADLS_ACUITY_SCORE: 44
ADLS_ACUITY_SCORE: 44
ADLS_ACUITY_SCORE: 42

## 2023-09-29 NOTE — PROGRESS NOTES
Care Management Discharge Note    Discharge Date: 09/29/2023       Discharge Disposition: Transitional Care @ [facility]    Discharge Services: TCU    Discharge DME: None    Discharge Transportation: agency wheelchair  Reviewed out of pocket cost for [facility] transport, $81.80 for base rate and $5.26 per mile to the destination. Pt/family expressed understanding and are agreeable to this.       Private pay costs discussed: private room/amenity fees and transportation costs    Does the patient's insurance plan have a 3 day qualifying hospital stay waiver?  No    PAS Confirmation Code:  [id number]  Patient/family educated on Medicare website which has current facility and service quality ratings: yes    Education Provided on the Discharge Plan: Yes  Persons Notified of Discharge Plans: patient and daughter, Alejandra  Patient/Family in Agreement with the Plan:  yes    Handoff Referral Completed: No    Additional Information:  Patient was accepted at [facility] and [facility]. Patient has chosen to go to [facility] of [facility]. Updated admissions at [facility]. The MD feels that she will probably discharge over the weekend in the next 1-2 days. Patient can be admitted to [facility] over the weekend.    Met with patient and daughter, Alejandra. Updated them. Plan will be for patient to go to [facility] once medically stable and TCU bed is secured at [facility].     Patient and family would like wheelchair transport.    LEONEL Reyes   Inpatient Care Coordination   Supervisor  [facility]  [phone]          LEONEL Brown

## 2023-09-29 NOTE — PLAN OF CARE
Goal Outcome Evaluation: no change        Alert, oriented x4, forgetful. A little more sleepy this afternoon. Resting between cares. Dressing clean and dry with min dried drainage to knee dressing. Unable to lift left leg off bed per self. Ace wrap removed this AM by Neelima. No numbness. Doppler pulses. Hands and toes are cool to touch. Weaned to room air now 1820 when up in chair. Did note some fine crackles to bilateral bases. No SOB, no cough, no temp. IS [id number] encouraged.   Voided last 1300 for 200cc. Bladder scan now for 120cc. Did encourage fluids and drink water.   Daughter at bedside.   Up to chair with randy steady 2 assist. Was very hard for Deanne to take any steps this afternoon to get back to bed from the chair.   Eating supper meal.   Lovenox and coumadin.   Min pain.   No restless legs.   HGB 7.9 this am and recheck at 1200 is 8.2    Patient vital signs are at baseline: Yes. Weaned to room air now 1820 up in chair.   Patient able to ambulate as they were prior to admission or with assist devices provided by therapies during their stay:  No,  Reason:  2 assist, randy steady to chair this afternoon.   Patient MUST void prior to discharge:  Yes  Patient able to tolerate oral intake:  Yes  Pain has adequate pain control using Oral analgesics:  Yes  Does patient have an identified :  Yes. Daughter at bedside.   Has goal D/C date and time been discussed with patient:  Yes. [facility] when medically ready.

## 2023-09-29 NOTE — TELEPHONE ENCOUNTER
General Call      Reason for Call:  Daughter Alejandra Calling with questions regarding Warfarin    What are your questions or concerns:  Cece Ortez would like a call back to discuss patients Warfarin. Patient had a colonoscopy and then had to have surgery on her leg and daughter would like to know what to do about post surgery warfarin dosing.     Date of last appointment with provider: Unknown    Could we send this information to you in [employer] or would you prefer to receive a phone call?:   Patient would prefer a phone call   Okay to leave a detailed message?: Yes at Other phone number:  Daughter Alejandra: [phone]

## 2023-09-29 NOTE — PROGRESS NOTES
[facility]  Hospitalist Progress Note  Jesse Viera M.D., M.B.A.   09/29/2023    Reason for Stay/active problem list    Mechanical fall with acute displaced left femoral periprosthetic fracture         Assessment and Plan:        Summary of Stay: Zunilda Clark is a 83 year old female with PMH significant for RA, HTN, hypothyroidism, bioprosthetic MVRx 2 , A.fib, SSS s/p PPM, Pulmonary HTN, stroke who presents to ED after mechanical fall and found to have left distal femoral periprosthetic fracture and admitted on 9/26/2023.       Problem List with Assessment and Plan:    Mechanical fall  Acute, closed, displaced, comminuted left distal femur periprosthetic fracture.  -Patient fell down and sustained trauma to her left leg.  -She had history of left TKA in 2018.  -Left leg is immobilized in ED   -Imaging studies and CT scan showed closed displaced comminuted left periprosthetic distal femur fracture.  -Patient was admitted to the hospital and treated with pain medications including oxycodone, Dilaudid, gabapentin and closely monitored.    --Orthopedic surgery consulted and patient underwent  Left periprosthetic femur fracture open reduction internal fixation using retrograde intramedullary nail, Left - Leg on September 27.  --Currently patient is stable with fair control of her pain.  She is working with therapy.  Continue postoperative care including DVT prophylaxis, pain control and rehabilitation    3.  Acute blood loss anemia   -- Hemoglobin was 10.4 on September 26.  Down to 8.5 morning of September 27.  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  Both B12 and folic acid within normal limit.  Serum ferritin also normal.  -- Continue to monitor hemoglobin, transfuse as needed    4.  Restless leg syndrome  --Started  Requip, improved     Chronic medical conditions.  Paroxysmal A-fib, Currently paced, anticoagulation on hold, continue low-dose metoprolol.  "  --Warfarin  resumed , lovenox bridge   Bioprosthetic mitral valve replacement x2 and TR annuloplasty  Chronic congestive heart failure without exacerbation:   --patient stopped taking  her torsemide about a month ago. No hypoxia, will hold off diuresis at this time unless there is sign of fluid overload.  Hypertension:  -- Resumed home metoprolol 50 mg p.o. twice a day, amlodipine 2.5 mg daily,  --PTA Exforge on hold, continue to monitor    Rheumatoid arthritis: On methotrexate once a week, resumed  History of CVA with prior left-sided weakness, resolved.       VTE Prophylaxis: Pneumatic Compression Devices  Code Status: No CPR- Do NOT Intubate  Diet: advanced  Leonard Catheter: Not present    Family updated today: No     Disposition: Likely TCU over the weekend         Interval History (Subjective):        Patient is seen and examined by me today and medical record reviewed.Overnight events noted and care discussed with nursing staff.  Patient is feeling better.  No new concerns.Able to sleep well. Working with PT       Physical Exam:        Last Vital Signs:  /52 (BP Location: Left arm)   Pulse 62   Temp 97.6  F (36.4  C) (Temporal)   Resp 20   Ht 1.626 m (5' 4\")   Wt 66.2 kg (145 lb 15.1 oz)   LMP  (LMP Unknown)   SpO2 94%   BMI 25.05 kg/m      Wt Readings from Last 1 Encounters:   09/26/23 66.2 kg (145 lb 15.1 oz)       Wt Readings from Last 5 Encounters:   09/26/23 66.2 kg (145 lb 15.1 oz)   09/05/23 63.7 kg (140 lb 8 oz)   08/30/23 65.1 kg (143 lb 8.3 oz)   07/24/23 64.4 kg (142 lb)   07/14/23 64 kg (141 lb 3.2 oz)        Constitutional: awake, alert, cooperative, no apparent distress     Respiratory: Clear to auscultation bilaterally, no crackles or wheezing   Cardiovascular: Regular rate and rhythm, normal S1 and S2, and no murmur noted   Abdomen: Normal bowel sounds, soft, non-distended, non-tender   Skin: No new rashes, no cyanosis, dry to touch   Neuro: Alert with  no new focal weakness "   Extremities: No edema   Other(s):        All other systems: Negative          Medications:        All current medications were reviewed with changes reflected in problem list.         Data:      All new lab and imaging data was reviewed.      Data reviewed today: I reviewed all new labs and imaging results over the last 24 hours. I personally reviewed       Recent Labs   Lab 09/29/23 0647 09/28/23  0804 09/27/23  0610 09/26/23  0934   WBC 11.9* 12.0*  --  9.3   HGB 7.9* 8.4* 8.5* 10.4*   HCT 24.1* 25.8*  --  32.3*   * 109*  --  108*    178  --  264       No results for input(s): CULT in the last 168 hours.  Recent Labs   Lab 09/29/23 0647 09/28/23 0804 09/27/23 1854 09/27/23 0610 09/26/23  1138    136  --  138  --    POTASSIUM 4.0 4.4  --  4.0  --    CHLORIDE 105 105  --  107  --    CO2 23 22  --  22  --    ANIONGAP 9 9  --  9  --    GLC 92 108* 135* 98  --    BUN 22.7 20.0  --  19.6  --    CR 0.88 0.91  --  0.97*  --    GFRESTIMATED 65 63  --  58*  --    BRICE 8.9 8.7*  --  8.5*  --    MAG  --   --   --   --  2.0         Recent Labs   Lab 09/29/23 0647 09/28/23  0804 09/27/23 1854 09/27/23  0610 09/26/23  0934   GLC 92 108* 135* 98 103*         Recent Labs   Lab 09/29/23 0647 09/28/23 1854 09/28/23  1700   INR 1.30* 1.24* 1.22*           No results for input(s): TROPONIN, TROPI, TROPR in the last 168 hours.    Invalid input(s): TROP, TROPONINIES    Recent Results (from the past 48 hour(s))   XR Chest 1 View    Narrative    CHEST ONE VIEW   9/26/2023 10:26 AM     HISTORY: Shortness of breath.    COMPARISON: 6/12/2023.      Impression    IMPRESSION: Interval increase of bilateral vascular and interstitial  prominence that may represent pulmonary edema. Mildly increased  cardiomegaly. Stable left chest pacemaker. Stable sternotomy. Atypical  infectious etiology remains in the differential.     TRINIDAD ZURITA MD         SYSTEM ID:  [id number]   XR Knee Left 1/2 Views    Narrative    XR FEMUR  LEFT 2 VIEWS, XR KNEE LEFT 1/2 VIEWS 9/26/2023 10:27 AM     HISTORY: thigh pain post fall    COMPARISON: None.       Impression    IMPRESSION:    There is an oblique, comminuted, and displaced periprosthetic fracture  through the distal femoral diaphysis extending to the femoral  component of the left knee arthroplasty. Mild degenerative changes of  the left hip. Vascular calcifications. Osteopenia.    NOTE: ABNORMAL REPORT    THE DICTATION ABOVE DESCRIBES AN ABNORMAL REPORT FOR WHICH FOLLOW-UP  IS NEEDED.    ANTOINETTE DIEHL MD         SYSTEM ID:  [id number]   XR Femur Left 2 Views    Narrative    XR FEMUR LEFT 2 VIEWS, XR KNEE LEFT 1/2 VIEWS 9/26/2023 10:27 AM     HISTORY: thigh pain post fall    COMPARISON: None.       Impression    IMPRESSION:    There is an oblique, comminuted, and displaced periprosthetic fracture  through the distal femoral diaphysis extending to the femoral  component of the left knee arthroplasty. Mild degenerative changes of  the left hip. Vascular calcifications. Osteopenia.    NOTE: ABNORMAL REPORT    THE DICTATION ABOVE DESCRIBES AN ABNORMAL REPORT FOR WHICH FOLLOW-UP  IS NEEDED.    ANTOINETTE DIEHL MD         SYSTEM ID:  [id number]   CT Knee Left w/o Contrast    Narrative    EXAM: CT KNEE LEFT W/O CONTRAST WITH 3D RECONSTRUCTION  DATE/TIME: 9/26/2023 12:28 PM    INDICATION: Pain. Left femur fracture.  COMPARISON: Radiographs 9/26/2023  TECHNIQUE: Noncontrast. Axial, sagittal and coronal thin-section  reconstruction. Dose reduction techniques were used. Three-dimensional  images were created on a separate workstation by the CT technologist  using source data obtained at the time of image acquisition. Images  were made available to the surgeons for surgical planning.    FINDINGS:     BONES:  -There is an oblique comminuted mildly displaced periprosthetic  fracture through the distal femoral diaphysis extending to the lateral  metaphyseal cortex and femoral component of the knee  arthroplasty.   -Moderate lipohemarthrosis.    SOFT TISSUES:  -There is adjacent soft tissue stranding adjacent to the fracture in  the intervening fat planes, which may represent edema or small volume  blood products..  -Vascular calcifications..      Impression    IMPRESSION:  1.  There is an acute oblique, comminuted, mildly displaced  periprosthetic fracture through the distal femur extending to the  femoral component of the knee arthroplasty.  2.  Moderate lipohemarthrosis.    ANTOINETTE DIEHL MD         SYSTEM ID:  [id number]   XR Surgery YONATHAN L/T 5 Min Fluoro w Stills    Narrative    This exam was marked as non-reportable because it will not be read by a   radiologist or a [facility] non-radiologist provider.             COVID Status:  COVID-19 PCR Results          4/4/2022    17:33   COVID-19 PCR Results   SARS CoV2 PCR Negative      COVID-19 Antibody Results, Testing for Immunity           No data to display                 Disclaimer: This note consists of symbols derived from keyboarding, dictation and/or voice recognition software. As a result, there may be errors in the script that have gone undetected. Please consider this when interpreting information found in this chart.

## 2023-09-29 NOTE — PHARMACY-ANTICOAGULATION SERVICE
Clinical Pharmacy - Warfarin Dosing Consult     Pharmacy has been consulted to manage this patient s warfarin therapy.  Indication: Atrial Fibrillation;Mechanical Mitral Valve Replacement  Therapy Goal: INR 2-3  Warfarin Prior to Admission: Yes  Warfarin PTA Regimen: 2.5mg MonThu, 3.75mg ROW    INR   Date Value Ref Range Status   09/28/2023 1.24 (H) 0.85 - 1.15 Final   09/28/2023 1.22 (H) 0.85 - 1.15 Final       Recommend warfarin 3.75 mg today.  Pharmacy will monitor Zunilda Clark daily and order warfarin doses to achieve specified goal.      Please contact pharmacy as soon as possible if the warfarin needs to be held for a procedure or if the warfarin goals change.

## 2023-09-29 NOTE — PLAN OF CARE
Goal Outcome Evaluation:                      Patient vital signs are at baseline: Yes  Patient able to ambulate as they were prior to admission or with assist devices provided by therapies during their stay:  No,  Reason: pt is A-1  or 2 with walker and gate belt.  Patient MUST void prior to discharge:  Yes, voiding.  Patient able to tolerate oral intake:  Yes  Pain has adequate pain control using Oral analgesics:  Yes, PRN Oxycodone 2.5 mg.  Does patient have an identified :  Yes, family.  Has goal D/C date and time been discussed with patient:  No,  Reason:plan is to discharge  to TCU.

## 2023-09-29 NOTE — PLAN OF CARE
Patient vital signs are at baseline: No, on 2 lmp NC at night  Patient able to ambulate as they were prior to admission or with assist devices provided by therapies during their stay:  No,  Reason:  no oob at night  Patient MUST void prior to discharge:  Yes using purewick at night   Patient able to tolerate oral intake:  Yes on regular diet; takes PO meds ok   Pain has adequate pain control using Oral analgesics:  Yes denies pain, but takes scheduled Tylenol  Does patient have an identified :  Yes daughter, at bedside at night  Has goal D/C date and time been discussed with patient:  Yes pending, SW follows; referrals sent       Pt is A&O x4. IV SL. CMS intact. Dressing CDI. Educated pt to keep her knee straight. Weight bearing as tolerated. Sleeps between cares. From home.

## 2023-09-29 NOTE — PROGRESS NOTES
"Orthopedic Surgery  9/29/2023  POD 2    S: Patient voices no unexpected ortho complaints today. Denies chest pain or shortness of breath. Sitting up in chair eating breakfast appears comfortable.     O: Blood pressure 127/50, pulse 65, temperature 97.6  F (36.4  C), temperature source Temporal, resp. rate 20, height 1.626 m (5' 4\"), weight 66.2 kg (145 lb 15.1 oz), SpO2 94 %, not currently breastfeeding.  Lab Results   Component Value Date    HGB 7.9 09/29/2023    HGB 12.5 06/22/2021     Lab Results   Component Value Date    INR 1.30 09/29/2023    INR 1.7 04/12/2023    INR 1.70 07/06/2021     I/O last 3 completed shifts:  In: 480 [P.O.:480]  Out: 250 [Urine:250]  Distal extremity CMSI bilaterally.  Calves are negative bilaterally, both soft and nontender.  The dressing is C/D/I.  Ace removed minimal lower edema. Minimal ecchymosis  Has small amount of blood on aquacell.  Noted low Hgb       A: Ms. Clark is doing well status post Procedure(s):  Placement of retrograde femoral nail left femur.    P: Continue physical therapy. Continue DVT pphx with Lovenox and Warfarin per primary team.  WBAT may only tolearate TTWB initially. Anticipate discharge to TCU when bed available and cleared by medicine. Repeat HGB later today.    Follow up with Dr Vann at 2 weeks post-op.    Angeles Abad PA-C  [phone]   "

## 2023-09-29 NOTE — TELEPHONE ENCOUNTER
Called Alejandra. Patient fell and broke her femur 9/26/23. Patient had surgery 9/27/23. Alejandra had questions what patient's usual maintenance warfarin dose was. Alejandra stated the pharmacist was confused as to what patient should be taking since patient was holding her warfarin and Lovenox bridging. Discussed with Alejandra what the original warfarin hold plan was and what patient's usual warfarin maintenance is. Advised Alejandra that the hospital will handle the warfarin dosing and any Lovenox bridging. Patient will be going to TCU and advised Alejandra that the TCU would manage warfarin dosing. Once patient returns back home, the [facility]ticoagulation Clinic would resume managing patient's warfarin dosing.    Alejandra agrees with plan.    Екатерина Mahan RN, BSN  Anticoagulation Clinic

## 2023-09-30 ENCOUNTER — APPOINTMENT (OUTPATIENT)
Dept: PHYSICAL THERAPY | Facility: CLINIC | Age: 83
DRG: 481 | End: 2023-09-30
Payer: COMMERCIAL

## 2023-09-30 LAB
HGB BLD-MCNC: 8.3 G/DL (ref 11.7–15.7)
INR PPP: 1.29 (ref 0.85–1.15)

## 2023-09-30 PROCEDURE — 250N000013 HC RX MED GY IP 250 OP 250 PS 637: Performed by: INTERNAL MEDICINE

## 2023-09-30 PROCEDURE — 97116 GAIT TRAINING THERAPY: CPT | Mod: GP

## 2023-09-30 PROCEDURE — 250N000013 HC RX MED GY IP 250 OP 250 PS 637

## 2023-09-30 PROCEDURE — 85018 HEMOGLOBIN: CPT | Performed by: INTERNAL MEDICINE

## 2023-09-30 PROCEDURE — 250N000013 HC RX MED GY IP 250 OP 250 PS 637: Performed by: PHYSICIAN ASSISTANT

## 2023-09-30 PROCEDURE — 85610 PROTHROMBIN TIME: CPT | Performed by: INTERNAL MEDICINE

## 2023-09-30 PROCEDURE — 99231 SBSQ HOSP IP/OBS SF/LOW 25: CPT | Performed by: INTERNAL MEDICINE

## 2023-09-30 PROCEDURE — 36415 COLL VENOUS BLD VENIPUNCTURE: CPT | Performed by: INTERNAL MEDICINE

## 2023-09-30 PROCEDURE — 97530 THERAPEUTIC ACTIVITIES: CPT | Mod: GP

## 2023-09-30 PROCEDURE — 250N000011 HC RX IP 250 OP 636: Performed by: INTERNAL MEDICINE

## 2023-09-30 PROCEDURE — 120N000001 HC R&B MED SURG/OB

## 2023-09-30 RX ORDER — WARFARIN SODIUM 5 MG/1
5 TABLET ORAL
Status: COMPLETED | OUTPATIENT
Start: 2023-09-30 | End: 2023-09-30

## 2023-09-30 RX ADMIN — DORZOLAMIDE HYDROCHLORIDE AND TIMOLOL MALEATE 1 DROP: 22.3; 6.8 SOLUTION/ DROPS OPHTHALMIC at 08:57

## 2023-09-30 RX ADMIN — ROPINIROLE HYDROCHLORIDE 0.25 MG: 0.25 TABLET, FILM COATED ORAL at 08:43

## 2023-09-30 RX ADMIN — POLYETHYLENE GLYCOL 3350 17 G: 17 POWDER, FOR SOLUTION ORAL at 08:45

## 2023-09-30 RX ADMIN — WARFARIN SODIUM 5 MG: 5 TABLET ORAL at 17:46

## 2023-09-30 RX ADMIN — LEVOTHYROXINE SODIUM 112 MCG: 0.11 TABLET ORAL at 08:44

## 2023-09-30 RX ADMIN — ALLOPURINOL 100 MG: 100 TABLET ORAL at 08:43

## 2023-09-30 RX ADMIN — ROPINIROLE HYDROCHLORIDE 0.25 MG: 0.25 TABLET, FILM COATED ORAL at 14:27

## 2023-09-30 RX ADMIN — GABAPENTIN 100 MG: 100 CAPSULE ORAL at 08:43

## 2023-09-30 RX ADMIN — ALLOPURINOL 100 MG: 100 TABLET ORAL at 19:38

## 2023-09-30 RX ADMIN — SENNOSIDES AND DOCUSATE SODIUM 1 TABLET: 50; 8.6 TABLET ORAL at 19:38

## 2023-09-30 RX ADMIN — OXYCODONE HYDROCHLORIDE 2.5 MG: 5 TABLET ORAL at 14:26

## 2023-09-30 RX ADMIN — Medication 50 MCG: at 08:43

## 2023-09-30 RX ADMIN — AMLODIPINE BESYLATE 2.5 MG: 2.5 TABLET ORAL at 08:43

## 2023-09-30 RX ADMIN — FAMOTIDINE 40 MG: 20 TABLET ORAL at 08:44

## 2023-09-30 RX ADMIN — FOLIC ACID 1 MG: 1 TABLET ORAL at 08:43

## 2023-09-30 RX ADMIN — DOCUSATE SODIUM 100 MG: 100 CAPSULE, LIQUID FILLED ORAL at 19:38

## 2023-09-30 RX ADMIN — ENOXAPARIN SODIUM 50 MG: 60 INJECTION SUBCUTANEOUS at 10:45

## 2023-09-30 RX ADMIN — METOPROLOL TARTRATE 50 MG: 50 TABLET, FILM COATED ORAL at 19:38

## 2023-09-30 RX ADMIN — DOCUSATE SODIUM 100 MG: 100 CAPSULE, LIQUID FILLED ORAL at 08:42

## 2023-09-30 RX ADMIN — ROPINIROLE HYDROCHLORIDE 0.25 MG: 0.25 TABLET, FILM COATED ORAL at 19:38

## 2023-09-30 RX ADMIN — DORZOLAMIDE HYDROCHLORIDE AND TIMOLOL MALEATE 1 DROP: 22.3; 6.8 SOLUTION/ DROPS OPHTHALMIC at 19:40

## 2023-09-30 RX ADMIN — SENNOSIDES AND DOCUSATE SODIUM 1 TABLET: 50; 8.6 TABLET ORAL at 08:43

## 2023-09-30 RX ADMIN — ENOXAPARIN SODIUM 50 MG: 60 INJECTION SUBCUTANEOUS at 21:22

## 2023-09-30 RX ADMIN — ACETAMINOPHEN 975 MG: 325 TABLET, FILM COATED ORAL at 08:44

## 2023-09-30 RX ADMIN — METOPROLOL TARTRATE 50 MG: 50 TABLET, FILM COATED ORAL at 08:43

## 2023-09-30 RX ADMIN — Medication 950 MG: at 08:43

## 2023-09-30 ASSESSMENT — ACTIVITIES OF DAILY LIVING (ADL)
ADLS_ACUITY_SCORE: 37
ADLS_ACUITY_SCORE: 37
ADLS_ACUITY_SCORE: 47
ADLS_ACUITY_SCORE: 47
ADLS_ACUITY_SCORE: 37
ADLS_ACUITY_SCORE: 47
ADLS_ACUITY_SCORE: 37
ADLS_ACUITY_SCORE: 47
ADLS_ACUITY_SCORE: 37
ADLS_ACUITY_SCORE: 47
ADLS_ACUITY_SCORE: 37
ADLS_ACUITY_SCORE: 37

## 2023-09-30 NOTE — PLAN OF CARE
Goal Outcome Evaluation:  Patient resting comfortably. Denies pain/discomfort. Alert/oriented x4. Plan for PT/OT.

## 2023-09-30 NOTE — PROGRESS NOTES
Care Management Follow Up    Length of Stay (days): 4    Expected Discharge Date: 09/30/2023     Concerns to be Addressed: care coordination/care conferences, discharge planning     Patient plan of care discussed at interdisciplinary rounds: Yes    Anticipated Discharge Disposition: Transitional Care     Additional Information:    Pt's discussed during rounds and expected discharge is tomorrow. HOLA spoke with [facility]org[facility] and notified them. HOLA then spoke with pt bedside with her two daughters and notified them that pt would be discharged tomorrow to [place]. Confirmed transportation was needed and permission was granted to schedule a wheelchair transport.     HOLA scheduled wheelchair transportation for the window time of 11:40am/12:20pm. Called [facility] and they were unavailable so left a message. HOLA then notified pt's daughters and head nurse. HOLA will follow up tomorrow with [facility].      AUDREY Lemons, SW   Care Management

## 2023-09-30 NOTE — PROGRESS NOTES
Weaned off O2. Pain managed with tylenol and oxycodone. Dressings CDI. CMS intact. Tolerating regular diet. Voiding. Up with JUDI hogue. Plan for possible discharge to TCU tomorrow.

## 2023-09-30 NOTE — CARE PLAN
.Patient vital signs are at baseline: No,  Reason:  no because she was De sating to 86% on RA, 1 litre of oxygen on  Patient able to ambulate as they were prior to admission or with assist devices provided by therapies during their stay:  No,  Reason:  No assist of 1 with randy steady  Patient MUST void prior to discharge:  Yes  Patient able to tolerate oral intake:  Yes  Pain has adequate pain control using Oral analgesics:  Yes  Does patient have an identified :  Yes, daughter by bedside  Has goal D/C date and time been discussed with patient:  Yes, plan to [facility] when medically ready.

## 2023-09-30 NOTE — PROGRESS NOTES
[facility]  Hospitalist Progress Note  Jesse Viera M.D., M.B.A.   09/30/2023    Reason for Stay/active problem list    Mechanical fall with acute displaced left femoral periprosthetic fracture         Assessment and Plan:        Summary of Stay: Zunilda Clark is a 83 year old female with PMH significant for RA, HTN, hypothyroidism, bioprosthetic MVRx 2 , A.fib, SSS s/p PPM, Pulmonary HTN, stroke who presents to ED after mechanical fall and found to have left distal femoral periprosthetic fracture and admitted on 9/26/2023.       Problem List with Assessment and Plan:    Mechanical fall  Acute, closed, displaced, comminuted left distal femur periprosthetic fracture.  -Patient fell down and sustained trauma to her left leg.  -She had history of left TKA in 2018.  -Left leg is immobilized in ED   -Imaging studies and CT scan showed closed displaced comminuted left periprosthetic distal femur fracture.  -Patient was admitted to the hospital and treated with pain medications including oxycodone, Dilaudid, gabapentin and closely monitored.    --Orthopedic surgery consulted and patient underwent  Left periprosthetic femur fracture open reduction internal fixation using retrograde intramedullary nail, Left - Leg on September 27.  --Currently patient is stable with fair control of her pain.  She is working with therapy.  Continue postoperative care including DVT prophylaxis, pain control and rehabilitation    3.  Acute blood loss anemia   -- Hemoglobin was 10.4 on September 26.  Down to 8.5 morning of September 27.  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  Both B12 and folic acid within normal limit.  Serum ferritin also normal.  -- Continue to monitor hemoglobin, transfuse as needed    4.  Restless leg syndrome  --Started  Requip, improved     Chronic medical conditions.  Paroxysmal A-fib, Currently paced, anticoagulation on hold, continue low-dose metoprolol.  "  --Warfarin  resumed , lovenox bridge   Bioprosthetic mitral valve replacement x2 and TR annuloplasty  Chronic congestive heart failure without exacerbation:   --patient stopped taking  her torsemide about a month ago. No hypoxia, will hold off diuresis at this time unless there is sign of fluid overload.  Hypertension:  -- Resumed home metoprolol 50 mg p.o. twice a day, amlodipine 2.5 mg daily,  --PTA Exforge on hold, continue to monitor    Rheumatoid arthritis: On methotrexate once a week, resumed  History of CVA with prior left-sided weakness, resolved.       VTE Prophylaxis: Pneumatic Compression Devices  Code Status: No CPR- Do NOT Intubate  Diet: advanced  Leonard Catheter: Not present    Family updated today: No     Disposition: Likely TCU tomorrow        Interval History (Subjective):        Patient is seen and examined by me today and medical record reviewed.Overnight events noted and care discussed with nursing staff.  Patient is feeling better but continues to have generalized weakness and difficulty with mobility.  She she does not feel she is ready for discharge today.  Care plan discussed with social service and bedside nurse.  Patient's family at bedside and updated.       Physical Exam:        Last Vital Signs:  /55 (BP Location: Right arm)   Pulse 66   Temp 98.4  F (36.9  C) (Temporal)   Resp 16   Ht 1.626 m (5' 4\")   Wt 66.2 kg (145 lb 15.1 oz)   LMP  (LMP Unknown)   SpO2 96%   BMI 25.05 kg/m      Wt Readings from Last 1 Encounters:   09/26/23 66.2 kg (145 lb 15.1 oz)       Wt Readings from Last 5 Encounters:   09/26/23 66.2 kg (145 lb 15.1 oz)   09/05/23 63.7 kg (140 lb 8 oz)   08/30/23 65.1 kg (143 lb 8.3 oz)   07/24/23 64.4 kg (142 lb)   07/14/23 64 kg (141 lb 3.2 oz)        Constitutional: awake, alert, cooperative, no apparent distress     Respiratory: Clear to auscultation bilaterally, no crackles or wheezing   Cardiovascular: Regular rate and rhythm, normal S1 and S2, and no " murmur noted   Abdomen: Normal bowel sounds, soft, non-distended, non-tender   Skin: No new rashes, no cyanosis, dry to touch   Neuro: Alert with  no new focal weakness   Extremities: No edema   Other(s):        All other systems: Negative          Medications:        All current medications were reviewed with changes reflected in problem list.         Data:      All new lab and imaging data was reviewed.      Data reviewed today: I reviewed all new labs and imaging results over the last 24 hours. I personally reviewed       Recent Labs   Lab 09/30/23  0620 09/29/23  1304 09/29/23  0647 09/28/23  0804 09/27/23  0610 09/26/23  0934   WBC  --   --  11.9* 12.0*  --  9.3   HGB 8.3* 8.2* 7.9* 8.4*   < > 10.4*   HCT  --   --  24.1* 25.8*  --  32.3*   MCV  --   --  110* 109*  --  108*   PLT  --   --  188 178  --  264    < > = values in this interval not displayed.       No results for input(s): CULT in the last 168 hours.  Recent Labs   Lab 09/29/23  0647 09/28/23  0804 09/27/23  1854 09/27/23  0610 09/26/23  1138    136  --  138  --    POTASSIUM 4.0 4.4  --  4.0  --    CHLORIDE 105 105  --  107  --    CO2 23 22  --  22  --    ANIONGAP 9 9  --  9  --    GLC 92 108* 135* 98  --    BUN 22.7 20.0  --  19.6  --    CR 0.88 0.91  --  0.97*  --    GFRESTIMATED 65 63  --  58*  --    BRICE 8.9 8.7*  --  8.5*  --    MAG  --   --   --   --  2.0         Recent Labs   Lab 09/29/23  0647 09/28/23  0804 09/27/23  1854 09/27/23  0610 09/26/23  0934   GLC 92 108* 135* 98 103*         Recent Labs   Lab 09/30/23  0620 09/29/23  0647 09/28/23  1854   INR 1.29* 1.30* 1.24*           No results for input(s): TROPONIN, TROPI, TROPR in the last 168 hours.    Invalid input(s): TROP, TROPONINIES    Recent Results (from the past 48 hour(s))   XR Chest 1 View    Narrative    CHEST ONE VIEW   9/26/2023 10:26 AM     HISTORY: Shortness of breath.    COMPARISON: 6/12/2023.      Impression    IMPRESSION: Interval increase of bilateral vascular and  interstitial  prominence that may represent pulmonary edema. Mildly increased  cardiomegaly. Stable left chest pacemaker. Stable sternotomy. Atypical  infectious etiology remains in the differential.     TRINIDAD ZURITA MD         SYSTEM ID:  [id number]   XR Knee Left 1/2 Views    Narrative    XR FEMUR LEFT 2 VIEWS, XR KNEE LEFT 1/2 VIEWS 9/26/2023 10:27 AM     HISTORY: thigh pain post fall    COMPARISON: None.       Impression    IMPRESSION:    There is an oblique, comminuted, and displaced periprosthetic fracture  through the distal femoral diaphysis extending to the femoral  component of the left knee arthroplasty. Mild degenerative changes of  the left hip. Vascular calcifications. Osteopenia.    NOTE: ABNORMAL REPORT    THE DICTATION ABOVE DESCRIBES AN ABNORMAL REPORT FOR WHICH FOLLOW-UP  IS NEEDED.    ANTOINETTE DIEHL MD         SYSTEM ID:  [id number]   XR Femur Left 2 Views    Narrative    XR FEMUR LEFT 2 VIEWS, XR KNEE LEFT 1/2 VIEWS 9/26/2023 10:27 AM     HISTORY: thigh pain post fall    COMPARISON: None.       Impression    IMPRESSION:    There is an oblique, comminuted, and displaced periprosthetic fracture  through the distal femoral diaphysis extending to the femoral  component of the left knee arthroplasty. Mild degenerative changes of  the left hip. Vascular calcifications. Osteopenia.    NOTE: ABNORMAL REPORT    THE DICTATION ABOVE DESCRIBES AN ABNORMAL REPORT FOR WHICH FOLLOW-UP  IS NEEDED.    ANTOINETTE DIEHL MD         SYSTEM ID:  [id number]   CT Knee Left w/o Contrast    Narrative    EXAM: CT KNEE LEFT W/O CONTRAST WITH 3D RECONSTRUCTION  DATE/TIME: 9/26/2023 12:28 PM    INDICATION: Pain. Left femur fracture.  COMPARISON: Radiographs 9/26/2023  TECHNIQUE: Noncontrast. Axial, sagittal and coronal thin-section  reconstruction. Dose reduction techniques were used. Three-dimensional  images were created on a separate workstation by the CT technologist  using source data obtained at the time of image  acquisition. Images  were made available to the surgeons for surgical planning.    FINDINGS:     BONES:  -There is an oblique comminuted mildly displaced periprosthetic  fracture through the distal femoral diaphysis extending to the lateral  metaphyseal cortex and femoral component of the knee arthroplasty.   -Moderate lipohemarthrosis.    SOFT TISSUES:  -There is adjacent soft tissue stranding adjacent to the fracture in  the intervening fat planes, which may represent edema or small volume  blood products..  -Vascular calcifications..      Impression    IMPRESSION:  1.  There is an acute oblique, comminuted, mildly displaced  periprosthetic fracture through the distal femur extending to the  femoral component of the knee arthroplasty.  2.  Moderate lipohemarthrosis.    ANTOINETTE DIEHL MD         SYSTEM ID:  [id number]   XR Surgery YONATHAN L/T 5 Min Fluoro w Stills    Narrative    This exam was marked as non-reportable because it will not be read by a   radiologist or a [facility] non-radiologist provider.             COVID Status:  COVID-19 PCR Results          4/4/2022    17:33   COVID-19 PCR Results   SARS CoV2 PCR Negative      COVID-19 Antibody Results, Testing for Immunity           No data to display                 Disclaimer: This note consists of symbols derived from keyboarding, dictation and/or voice recognition software. As a result, there may be errors in the script that have gone undetected. Please consider this when interpreting information found in this chart.

## 2023-09-30 NOTE — PROGRESS NOTES
Orthopedics Daily Progress Note  Zunilda SHAW May  September 30, 2023  Date of Admission: 9/26/2023      Post Op Day: 3  Procedures: Placement of retrograde femoral nail left femur       Interval History:  Doing well this morning. Daughter stayed overnight at bedside.  Pain controlled. Concerned about fatigue per daughter.  Denies cp. Endorsing some SOB overnight but denies currently.     Temp:  [97  F (36.1  C)-98.4  F (36.9  C)] 98.4  F (36.9  C)  Pulse:  [64-68] 66  Resp:  [16-20] 16  BP: (115-132)/(50-62) 127/55  SpO2:  [88 %-100 %] 96 %    Physical Exam:  Alert, appropriate, and following commands  Breathing easily without wheeze  Dressing/wound c/d/I  Bilateral calves soft, compressible, non tender  5/5 df/pf/ehl, SILT, palpable dp pulse    Labs/Imaging:  Results for orders placed or performed during the hospital encounter of 09/26/23 (from the past 24 hour(s))   Hemoglobin   Result Value Ref Range    Hemoglobin 8.2 (L) 11.7 - 15.7 g/dL   INR   Result Value Ref Range    INR 1.29 (H) 0.85 - 1.15   Hemoglobin   Result Value Ref Range    Hemoglobin 8.3 (L) 11.7 - 15.7 g/dL         A/P - 83 year old female s/p right retrograde femur nailing    Hbg improving over last 24 hours. Will continue to monitor. Endorsing fatigue and SOB so could consider symptomatic versus hx of heart failure per daughter. Would defer to primary should need replacement. Continue to monitor. Repeat hbg later today.     DVT proph: PTA Coumadin   Activity: WBAT. PT/OT.  Dressing: Aquacel. keep intact. Change if >60% saturated    Follow up: Dr. Vann in 2 weeks.     Dispo: Pending PT progression and stable medically.       Myrna Manning PA-C  [facility]

## 2023-10-01 ENCOUNTER — LAB REQUISITION (OUTPATIENT)
Dept: LAB | Facility: CLINIC | Age: 83
End: 2023-10-01

## 2023-10-01 ENCOUNTER — APPOINTMENT (OUTPATIENT)
Dept: PHYSICAL THERAPY | Facility: CLINIC | Age: 83
DRG: 481 | End: 2023-10-01
Payer: COMMERCIAL

## 2023-10-01 VITALS
HEART RATE: 64 BPM | TEMPERATURE: 99.4 F | RESPIRATION RATE: 18 BRPM | DIASTOLIC BLOOD PRESSURE: 61 MMHG | OXYGEN SATURATION: 92 % | SYSTOLIC BLOOD PRESSURE: 113 MMHG

## 2023-10-01 VITALS
OXYGEN SATURATION: 95 % | RESPIRATION RATE: 22 BRPM | TEMPERATURE: 97.7 F | SYSTOLIC BLOOD PRESSURE: 125 MMHG | HEIGHT: 64 IN | BODY MASS INDEX: 24.92 KG/M2 | WEIGHT: 145.94 LBS | DIASTOLIC BLOOD PRESSURE: 52 MMHG | HEART RATE: 66 BPM

## 2023-10-01 DIAGNOSIS — Z11.1 ENCOUNTER FOR SCREENING FOR RESPIRATORY TUBERCULOSIS: ICD-10-CM

## 2023-10-01 LAB — INR PPP: 1.47 (ref 0.85–1.15)

## 2023-10-01 PROCEDURE — 250N000013 HC RX MED GY IP 250 OP 250 PS 637: Performed by: PHYSICIAN ASSISTANT

## 2023-10-01 PROCEDURE — 250N000013 HC RX MED GY IP 250 OP 250 PS 637: Performed by: INTERNAL MEDICINE

## 2023-10-01 PROCEDURE — 250N000013 HC RX MED GY IP 250 OP 250 PS 637

## 2023-10-01 PROCEDURE — 36415 COLL VENOUS BLD VENIPUNCTURE: CPT | Performed by: INTERNAL MEDICINE

## 2023-10-01 PROCEDURE — 85610 PROTHROMBIN TIME: CPT | Performed by: INTERNAL MEDICINE

## 2023-10-01 PROCEDURE — 99239 HOSP IP/OBS DSCHRG MGMT >30: CPT | Performed by: INTERNAL MEDICINE

## 2023-10-01 PROCEDURE — 97530 THERAPEUTIC ACTIVITIES: CPT | Mod: GP

## 2023-10-01 PROCEDURE — 250N000011 HC RX IP 250 OP 636: Mod: JZ | Performed by: INTERNAL MEDICINE

## 2023-10-01 PROCEDURE — 97116 GAIT TRAINING THERAPY: CPT | Mod: GP

## 2023-10-01 RX ORDER — OXYCODONE HYDROCHLORIDE 5 MG/1
5-10 TABLET ORAL EVERY 4 HOURS PRN
Qty: 20 TABLET | Refills: 0 | Status: SHIPPED | DISCHARGE
Start: 2023-10-01 | End: 2023-10-02

## 2023-10-01 RX ORDER — ENOXAPARIN SODIUM 100 MG/ML
0.75 INJECTION SUBCUTANEOUS EVERY 12 HOURS
DISCHARGE
Start: 2023-10-01 | End: 2023-10-05

## 2023-10-01 RX ORDER — AMOXICILLIN 250 MG
1-2 CAPSULE ORAL 2 TIMES DAILY
Qty: 30 TABLET | Refills: 0 | DISCHARGE
Start: 2023-10-01 | End: 2024-02-22

## 2023-10-01 RX ORDER — WARFARIN SODIUM 2.5 MG/1
2.5 TABLET ORAL DAILY
DISCHARGE
Start: 2023-10-01 | End: 2023-10-02

## 2023-10-01 RX ORDER — ACETAMINOPHEN 325 MG/1
650 TABLET ORAL EVERY 4 HOURS PRN
Qty: 100 TABLET | Refills: 0 | DISCHARGE
Start: 2023-10-01 | End: 2023-10-02

## 2023-10-01 RX ADMIN — Medication 950 MG: at 08:08

## 2023-10-01 RX ADMIN — GABAPENTIN 100 MG: 100 CAPSULE ORAL at 08:08

## 2023-10-01 RX ADMIN — SENNOSIDES AND DOCUSATE SODIUM 1 TABLET: 50; 8.6 TABLET ORAL at 08:08

## 2023-10-01 RX ADMIN — FOLIC ACID 1 MG: 1 TABLET ORAL at 08:07

## 2023-10-01 RX ADMIN — AMLODIPINE BESYLATE 2.5 MG: 2.5 TABLET ORAL at 08:07

## 2023-10-01 RX ADMIN — DOCUSATE SODIUM 100 MG: 100 CAPSULE, LIQUID FILLED ORAL at 08:07

## 2023-10-01 RX ADMIN — ROPINIROLE HYDROCHLORIDE 0.25 MG: 0.25 TABLET, FILM COATED ORAL at 08:08

## 2023-10-01 RX ADMIN — FAMOTIDINE 40 MG: 20 TABLET ORAL at 08:08

## 2023-10-01 RX ADMIN — DORZOLAMIDE HYDROCHLORIDE AND TIMOLOL MALEATE 1 DROP: 22.3; 6.8 SOLUTION/ DROPS OPHTHALMIC at 08:13

## 2023-10-01 RX ADMIN — ALLOPURINOL 100 MG: 100 TABLET ORAL at 08:08

## 2023-10-01 RX ADMIN — ENOXAPARIN SODIUM 50 MG: 60 INJECTION SUBCUTANEOUS at 09:42

## 2023-10-01 RX ADMIN — LEVOTHYROXINE SODIUM 112 MCG: 0.11 TABLET ORAL at 08:08

## 2023-10-01 RX ADMIN — Medication 50 MCG: at 08:07

## 2023-10-01 RX ADMIN — METOPROLOL TARTRATE 50 MG: 50 TABLET, FILM COATED ORAL at 08:08

## 2023-10-01 RX ADMIN — POLYETHYLENE GLYCOL 3350 17 G: 17 POWDER, FOR SOLUTION ORAL at 08:12

## 2023-10-01 ASSESSMENT — ACTIVITIES OF DAILY LIVING (ADL)
ADLS_ACUITY_SCORE: 37

## 2023-10-01 NOTE — PROGRESS NOTES
Orthopedics Daily Progress Note  Zunilda SHAW May  October 1, 2023  Date of Admission: 9/26/2023      Post Op Day: 4  Procedures: Placement of retrograde femoral nail left femur       Interval History:  Doing well this morning. Daughter stayed overnight at bedside.  Pain controlled. Continued fatigue. Denies cp. Endorsing some SOB overnight again but denies any currently.     Temp:  [97.7  F (36.5  C)-98.8  F (37.1  C)] 97.7  F (36.5  C)  Pulse:  [60-66] 66  Resp:  [16-22] 22  BP: (106-127)/(41-55) 125/52  SpO2:  [91 %-96 %] 95 %    Physical Exam:  Alert, appropriate, and following commands  Breathing easily without wheeze  Dressing/wound c/d/I  Bilateral calves soft, compressible, non tender  5/5 df/pf/ehl, SILT, palpable dp pulse    Labs/Imaging:  Results for orders placed or performed during the hospital encounter of 09/26/23 (from the past 24 hour(s))   INR   Result Value Ref Range    INR 1.47 (H) 0.85 - 1.15         A/P - 83 year old female s/p right retrograde femur nailing    Hbg continues to improve. Will continue to monitor as possible cause of continued fatigue/SOB versus heart failure. Defer to primary.    DVT proph: PTA Coumadin   Activity: WBAT. PT/OT.  Dressing: Aquacel. keep intact. Change if >60% saturated    Follow up: Dr. Vann in 2 weeks.     Dispo: TCU on 10/2. Pending PT progression and stable medically.       Myrna Manning PA-C  [facility]

## 2023-10-01 NOTE — PROGRESS NOTES
Occupational Therapy Discharge Summary    Reason for therapy discharge:    Discharged to transitional care facility.    Progress towards therapy goal(s). See goals on Care Plan in [facility] electronic health record for goal details.  Goals partially met.  Barriers to achieving goals:   discharge from facility.    Therapy recommendation(s):    Continued therapy is recommended.  Rationale/Recommendations:  Pt. Demonstrates below baseline independence, tolerance and increased pain impairing previous level of independence and tolerance with ADLs and would benefit from continued skilled OT.

## 2023-10-01 NOTE — PROGRESS NOTES
[id number]    Pt is A & O x 4, reg diet, up with sera steady A X 1, RA, dressing on left hip with cdi, left knee Aquacel  with moderate drainage, voiding spontaneously, plain is to discharge Joslyn @ 11;40-12;20 pm  via w/c transport.

## 2023-10-01 NOTE — PROGRESS NOTES
Afebrile.  Pain managed with PRN PO pain med + cold.  No nausea.  LS clear bilat., RA, encouraged hourly CDB/IS x10.  CMS+.  Drsg CDI.  Up with randy steady, sat up in chair,  Voiding, LBM 10/1/23 .  Plan DC TCU [facility]    Patient transported by ealth wheelchair to [facility] at 1140

## 2023-10-01 NOTE — PLAN OF CARE
Pt is alert and oriented x4, randy hogue for transfers. Pain controlled with scheduled Tylenol and ice to L knee. Dressing to hip cdi. Aquacel dressing to knee with moderate amt of drainage, marked. No numbness, tingling. Pt is voiding adequately. On RA. Plan to discharge to TCU tomorrow.

## 2023-10-01 NOTE — PLAN OF CARE
Physical Therapy Discharge Summary     Reason for therapy discharge:    Discharged to transitional care facility.     Progress towards therapy goal(s). See goals on Care Plan in [facility] electronic health record for goal details.  Goals not met.  Barriers to achieving goals:   discharge from facility.     Therapy recommendation(s):    Continued therapy is recommended.  Rationale/Recommendations:  Patient would benefit from PT eval at TCU in order to increase strength, activity tolerance, balance and independence with mobility.

## 2023-10-01 NOTE — PROGRESS NOTES
Care Management Discharge Note    Discharge Date: 10/01/2023       Discharge Disposition: Transitional Care    Discharge Services: None    Discharge DME: None    Discharge Transportation: Agency    PAS Confirmation Code: [id number]  Patient/family educated on Medicare website which has current facility and service quality ratings: yes    HOLA spoke with Evon at [facility] this morning and confirmed transportation expected arrival time. HOLA notified by [facility] that their fax was down and received a different fax number to send hard script. HOLA notified head nurse of the information to fax over required paperwork. Fax number #[phone]. HOLA called Evon at [facility] and confirmed all was received on the behalf of pt.    AUDREY Lemons, LGSW   Care Management

## 2023-10-02 ENCOUNTER — DOCUMENTATION ONLY (OUTPATIENT)
Dept: ANTICOAGULATION | Facility: CLINIC | Age: 83
End: 2023-10-02

## 2023-10-02 ENCOUNTER — TELEPHONE (OUTPATIENT)
Dept: INTERNAL MEDICINE | Facility: CLINIC | Age: 83
End: 2023-10-02

## 2023-10-02 ENCOUNTER — PATIENT OUTREACH (OUTPATIENT)
Dept: CARE COORDINATION | Facility: CLINIC | Age: 83
End: 2023-10-02

## 2023-10-02 ENCOUNTER — LAB REQUISITION (OUTPATIENT)
Dept: LAB | Facility: CLINIC | Age: 83
End: 2023-10-02

## 2023-10-02 ENCOUNTER — TRANSITIONAL CARE UNIT VISIT (OUTPATIENT)
Dept: GERIATRICS | Facility: CLINIC | Age: 83
End: 2023-10-02
Payer: COMMERCIAL

## 2023-10-02 DIAGNOSIS — Z79.01 LONG TERM CURRENT USE OF ANTICOAGULANTS WITH INR GOAL OF 2.0-3.0: ICD-10-CM

## 2023-10-02 DIAGNOSIS — S72.352A CLOSED DISPLACED COMMINUTED FRACTURE OF SHAFT OF LEFT FEMUR, INITIAL ENCOUNTER (H): ICD-10-CM

## 2023-10-02 DIAGNOSIS — N18.30 STAGE 3 CHRONIC KIDNEY DISEASE, UNSPECIFIED WHETHER STAGE 3A OR 3B CKD (H): ICD-10-CM

## 2023-10-02 DIAGNOSIS — D64.9 ANEMIA, UNSPECIFIED: ICD-10-CM

## 2023-10-02 DIAGNOSIS — R52 PAIN: ICD-10-CM

## 2023-10-02 DIAGNOSIS — E03.9 ACQUIRED HYPOTHYROIDISM: Primary | ICD-10-CM

## 2023-10-02 DIAGNOSIS — M06.9 RHEUMATOID ARTHRITIS, INVOLVING UNSPECIFIED SITE, UNSPECIFIED WHETHER RHEUMATOID FACTOR PRESENT (H): ICD-10-CM

## 2023-10-02 DIAGNOSIS — Z95.3 S/P MITRAL VALVE REPLACEMENT WITH BIOPROSTHETIC VALVE: ICD-10-CM

## 2023-10-02 DIAGNOSIS — I10 BENIGN ESSENTIAL HYPERTENSION: ICD-10-CM

## 2023-10-02 PROCEDURE — 99309 SBSQ NF CARE MODERATE MDM 30: CPT | Performed by: NURSE PRACTITIONER

## 2023-10-02 PROCEDURE — P9604 ONE-WAY ALLOW PRORATED TRIP: HCPCS | Performed by: NURSE PRACTITIONER

## 2023-10-02 PROCEDURE — 86481 TB AG RESPONSE T-CELL SUSP: CPT | Performed by: NURSE PRACTITIONER

## 2023-10-02 RX ORDER — HYDROXYZINE PAMOATE 25 MG/1
25 CAPSULE ORAL 3 TIMES DAILY
COMMUNITY
End: 2023-10-20

## 2023-10-02 RX ORDER — ACETAMINOPHEN 500 MG
1000 TABLET ORAL EVERY 8 HOURS PRN
COMMUNITY

## 2023-10-02 RX ORDER — LIDOCAINE 4 G/G
1 PATCH TOPICAL EVERY 24 HOURS
COMMUNITY
End: 2023-10-20

## 2023-10-02 RX ORDER — WARFARIN SODIUM 4 MG/1
2 TABLET ORAL DAILY
COMMUNITY
End: 2023-10-09

## 2023-10-02 NOTE — PROGRESS NOTES
[facility] GERIATRICS    PRIMARY CARE PROVIDER AND CLINIC:  Yunior Huang MD, [address] / [facility] [address]  Chief Complaint   Patient presents with    Hospital F/U      [employer] Medical Record Number:  [id number]  Place of Service where encounter took place:  [facility] (TCU) [[id number]]    Zunilda Clark  is a 83 year old  (1940), admitted to the above facility from  [facility]. Hospital stay 9/26/23 through 10/1/23..     HPI:    This is a 84 yo female with PMHx for RA, hypertension, hypothyroidism, mild prostatic and VR x2, A-fib, sick sinus syndrome status post PPM, pulm hypertension, stroke who presented mechanical fall.  Found to have a acute closed displaced commuted left distal femur periprosthetic fracture, history of left TKA in 2018.  Underwent ORIF using retrograde intramedullary nailing on 9/27.  She was restarted on Coumadin with bridging, given Tylenol and oxycodone for pain control, given WBAT and sustained ABLA with discharge hemoglobin of 8.5 at discharge, likely due to chronic macrocytic anemia per methotrexate use.  Of note Demadex on hold.  No other changes noted, discharged to [facility] for rehab.      CODE STATUS/ADVANCE DIRECTIVES DISCUSSION:  Prior  DNR / DNI  ALLERGIES: No Known Allergies   PAST MEDICAL HISTORY:   Past Medical History:   Diagnosis Date    Acquired hypothyroidism 11/04/2015    Aortic valve insufficiency     Atrial fibrillation and flutter     CHF (congestive heart failure)     DVT (deep venous thrombosis) 2003    Gastro-oesophageal reflux disease     H/O mitral valve replacement with tissue graft 06/2014    HTN (hypertension)     Other chronic pain     Pulmonary HTN     Rheumatoid arthritis     Seizure 2014    Shingles 08/2018    Stroke 2009    left side mild weakness       PAST SURGICAL HISTORY:   has a past surgical history that includes Replace valve mitral (2006); Implant pacemaker (2011); Sling bladder suspension with  fascia anusha; EP Ablation AV node (2011); Open reduction internal fixation rodding intramedullary humerus (Right, 07/28/2015); Laparoscopic cholecystectomy with cholangiograms (N/A, 04/29/2017); colonoscopy (03/15/2012); Eye surgery (2018); hernia repair; Arthroplasty knee (Left, 11/12/2018); Arthroscopy knee with debridement joint, combined (Left, 12/18/2018); Apply Wound Vac (Left, 12/18/2018); Esophagoscopy, gastroscopy, duodenoscopy (EGD), combined (N/A, 02/19/2020); EP Pacemaker Generator Change (N/A, 03/08/2021); cataract iol, rt/lt; and Open reduction internal fixation rodding intramedullary femur (Left, 9/27/2023).  FAMILY HISTORY: family history includes Cancer in her brother; Coronary Artery Disease in her brother and mother; Diabetes in her brother; Hyperlipidemia in her sister; Hypertension in her sister; Other Cancer in her brother.  SOCIAL HISTORY:   reports that she has never smoked. She has never used smokeless tobacco. She reports that she does not drink alcohol and does not use drugs.  Patient's living condition: lives alone    Post Discharge Medication Reconciliation Status:   MED REC REQUIRED  Post Medication Reconciliation Status:  Discharge medications reconciled and changed, see notes/orders       Current Outpatient Medications   Medication Sig    acetaminophen (TYLENOL) 500 MG tablet Take 1,000 mg by mouth 3 times daily    hydrOXYzine (VISTARIL) 25 MG capsule Take 25 mg by mouth 3 times daily    Lidocaine (LIDOCARE) 4 % Patch Place 1 patch onto the skin every 24 hours To prevent lidocaine toxicity, patient should be patch free for 12 hrs daily.  Apply to left leg    warfarin ANTICOAGULANT (COUMADIN) 4 MG tablet Take 4 mg by mouth daily Recheck INR on 10/3    allopurinol (ZYLOPRIM) 100 MG tablet Take 100 mg by mouth 2 times daily    amLODIPine (NORVASC) 2.5 MG tablet Take 1 tablet (2.5 mg) by mouth daily    amLODIPine-valsartan (EXFORGE) 5-160 MG tablet Take 1 tablet by mouth daily     amoxicillin (AMOXIL) 500 MG tablet Take 2000mg (4 tablets of 500mg each) approx 30 min to 1 hour prior to any dental procedures    calcium citrate (CALCITRATE) 950 MG tablet Take 1 tablet by mouth daily     Dorzolamide HCl-Timolol Mal (COSOPT OP) Apply to eye 2 times daily Place one drop into each eye twice daily    enoxaparin ANTICOAGULANT (LOVENOX) 60 MG/0.6ML syringe Inject 0.5 mLs (50 mg) Subcutaneous every 12 hours for 5 days (Patient taking differently: Inject 0.75 mg/kg Subcutaneous every 12 hours Complete on 10/6)    famotidine (PEPCID) 40 MG tablet TAKE 1 TABLET BY MOUTH DAILY    folic acid (FOLVITE) 1 MG tablet Take 1 mg by mouth daily    levothyroxine (SYNTHROID/LEVOTHROID) 112 MCG tablet Take 1 tablet (112 mcg) by mouth daily    METHOTREXATE SODIUM IJ Inject 0.8 mLs as directed once a week Thursdays    metoprolol tartrate (LOPRESSOR) 50 MG tablet Take 1 tablet (50 mg) by mouth 2 times daily    multivitamin w/minerals (THERA-VIT-M) tablet Take 1 tablet by mouth daily Without iron    oxyCODONE (ROXICODONE) 5 MG tablet Take 0.5 tablets (2.5 mg) by mouth every 4 hours as needed for severe pain    senna-docusate (SENOKOT-S/PERICOLACE) 8.6-50 MG tablet Take 1-2 tablets by mouth 2 times daily Take while on oral narcotics to prevent or treat constipation. (Patient taking differently: Take 1 tablet by mouth 2 times daily Take while on oral narcotics to prevent or treat constipation.)    torsemide (DEMADEX) 10 MG tablet Take 1 tablet (10 mg) by mouth daily (with breakfast) (Patient not taking: Reported on 10/2/2023)    VITAMIN D, CHOLECALCIFEROL, PO Take 1,000 Units by mouth daily     No current facility-administered medications for this visit.       ROS:  10 point ROS of systems including Constitutional, Eyes, Respiratory, Cardiovascular, Gastroenterology, Genitourinary, Integumentary, Musculoskeletal, Psychiatric were all negative except for pertinent positives noted in my HPI.    Vitals:  /61   Pulse 64    Temp 99.4  F (37.4  C)   Resp 18   LMP  (LMP Unknown)   SpO2 92%   Exam:  GENERAL APPEARANCE:  Alert, in no distress, oriented, cooperative, LLE pain  ENT:  Mouth and posterior oropharynx normal, moist mucous membranes, normal hearing acuity  NECK:  No adenopathy,masses or thyromegaly  RESP:  no respiratory distress, diminished breath sounds bilaterally, RA  CV:  regular rate and rhythm, no murmur, rub, or gallop, no edema, PPM  ABDOMEN:  normal bowel sounds, soft, nontender, no hepatosplenomegaly or other masses  M/S:   WBAT  SKIN:  LLE aquacel drsg intact  NEURO:   No focal deficits  PSYCH:  oriented to 2/3    Lab/Diagnostic data:  Most Recent 3 CBC's:  Recent Labs   Lab Test 09/30/23  0620 09/29/23  1304 09/29/23  0647 09/28/23  0804 09/27/23  0610 09/26/23  0934   WBC  --   --  11.9* 12.0*  --  9.3   HGB 8.3* 8.2* 7.9* 8.4*   < > 10.4*   MCV  --   --  110* 109*  --  108*   PLT  --   --  188 178  --  264    < > = values in this interval not displayed.     Most Recent 3 BMP's:  Recent Labs   Lab Test 09/29/23  0647 09/28/23  0804 09/27/23  1854 09/27/23  0610    136  --  138   POTASSIUM 4.0 4.4  --  4.0   CHLORIDE 105 105  --  107   CO2 23 22  --  22   BUN 22.7 20.0  --  19.6   CR 0.88 0.91  --  0.97*   ANIONGAP 9 9  --  9   BRICE 8.9 8.7*  --  8.5*   GLC 92 108* 135* 98     Most Recent TSH and T4:  Recent Labs   Lab Test 07/14/23  1132 05/15/23  1528   TSH 2.30 7.64*   T4  --  1.35       ASSESSMENT/PLAN:    (S72.352A) Closed displaced comminuted fracture of shaft of left femur  Underwent surgical repair, given WBAT, continue vitamin D/ca supplementation, follow-up with Ortho (TBD)    (Z79.01) Long term current use of anticoagulants with INR goal of 2.0-3.0  (Z95.3) S/P mitral valve replacement with bioprosthetic valve  Continue Lovenox bridging, last INR 1.5, continue Coumadin 4 mg daily, recheck INR on 10/3.  Of note 4/2.5    (R52) Pain  Increase Tylenol to 1000 mg 3 times daily, start Vistaril 25 mg 3  times daily, change oxycodone to 2.5 mg every 6 4 hours as needed and encourage icing with lidocaine patch    (I10) Benign essential hypertension  Continue Exforge 7.5 mg/160 mg daily and metoprolol to tartrate 50 mg twice daily.  Monitor blood pressure twice daily.  Demadex on hold    (E03.9) Acquired hypothyroidism  (primary encounter diagnosis)  Continue Synthroid 112 mcg daily, last TSH 2.30 on 7/14/2023    (M06.9) Rheumatoid arthritis, involving unspecified site, unspecified whether rheumatoid factor present (H)  Continue methotrexate 0.8 mls every Thursday with folic acid supplementation    Diminished lung sounds  Encourage incentive spirometry    (N18.30) Stage 3 chronic kidney disease, unspecified whether stage 3a  Last CR 0.89 with GFR >60 on 9/29, recheck BMP on 10/3    ABLA  Last Hgb 8.3 on 9/29, recheck CBC on 10/3    Constipation  Continue senna S 1 tab twice daily    GERD  Continue famotidine 40 mg daily    Gout  Continue allopurinol 100 mg twice daily    Orders:  Increase blood pressure monitoring, increase Tylenol, start Vistaril, start lidocaine patch, Coumadin dosing, change oxycodone, hold Demadex, encourage incentive spirometry, BMP/CBC recheck    Electronically signed by:  Kelton Tse NP

## 2023-10-02 NOTE — PROGRESS NOTES
Clinic Care Coordination Contact  Care Team Conversations    Situation: CC following patient through TCU care progression.    Background: Patient was hospitalized at [facility] from 9/26/2023 to 10/1/2023 with diagnosis of Closed displaced comminuted fracture of shaft of left femur.    Assessment: Patient was discharged to [place] [facility] on 10/1/2023.     Plan/Recommendations: [facility] will send TCU Care Team Care Management hand in communication and request involvement in discharge planning and coordination of care.      AUDREY Sauceda/LICSW  Social Work Care Coordinator  [facility] and [place]  Phone: [phone]

## 2023-10-02 NOTE — PROGRESS NOTES
ANTICOAGULATION  MANAGEMENT: Discharge Review    Zunilda Clark chart reviewed for anticoagulation continuity of care    Hospital Admission on 9/26/23 for a fall with CT scan showing closed displaced comminuted left femur fracture .    Discharge disposition: TCU    Results:    Recent labs: (last 7 days)     09/26/23  1138 09/27/23  0610 09/28/23  1700 09/28/23  1854 09/29/23  0647 09/30/23  0620 10/01/23  0608   INR 1.25* 1.27* 1.22* 1.24* 1.30* 1.29* 1.47*     Anticoagulation inpatient management:     Patient had been holding warfarin for colonoscopy, had not yet resumed dosing. Warfarin resumed 9/28/23    Anticoagulation discharge instructions:     Warfarin dosing:  will be managed by TCU   Bridging: bridging with enoxaparin (Lovenox)   INR goal change: No      Medication changes affecting anticoagulation: No    Additional factors affecting anticoagulation: Yes: inflammation     PLAN     No adjustment to anticoagulation plan needed. Patient's warfarin dosing being managed by TCU.    Patient not contacted    No adjustment to Anticoagulation Calendar was required    Екатерина Mahan RN

## 2023-10-02 NOTE — TELEPHONE ENCOUNTER
General Call    Contacts         Type Contact Phone/Fax    10/02/2023 08:54 AM CDT Phone (Incoming) Deanne Clark (Self) [phone] (H)          Reason for Call:  anticoagulation    What are your questions or concerns:  Patient wanted to let INR nurse Екатерина know that she had her INR taken today and the TCU and it was 1.5. Please call patient back to discuss.    Date of last appointment with provider: n/a    Could we send this information to you in [employer] [facility] [employer] or would you prefer to receive a phone call?:   Patient would prefer a phone call   Okay to leave a detailed message?: Yes at Cell number on file:    Telephone Information:   Mobile [phone]

## 2023-10-02 NOTE — TELEPHONE ENCOUNTER
Returned call to patient, advised daughter that TCU will manage INR results and warfarin dosing. [facility] will resume managing warfarin dosing when patient returns home. Daughter verbalized understanding.    Екатерина Mahan RN, BSN  Anticoagulation Clinic

## 2023-10-03 LAB
ANION GAP SERPL CALCULATED.3IONS-SCNC: 10 MMOL/L (ref 7–15)
BUN SERPL-MCNC: 27.1 MG/DL (ref 8–23)
CALCIUM SERPL-MCNC: 8.9 MG/DL (ref 8.8–10.2)
CHLORIDE SERPL-SCNC: 105 MMOL/L (ref 98–107)
CREAT SERPL-MCNC: 0.86 MG/DL (ref 0.51–0.95)
DEPRECATED HCO3 PLAS-SCNC: 21 MMOL/L (ref 22–29)
EGFRCR SERPLBLD CKD-EPI 2021: 67 ML/MIN/1.73M2
ERYTHROCYTE [DISTWIDTH] IN BLOOD BY AUTOMATED COUNT: 17.1 % (ref 10–15)
GAMMA INTERFERON BACKGROUND BLD IA-ACNC: 0 IU/ML
GLUCOSE SERPL-MCNC: 85 MG/DL (ref 70–99)
HCT VFR BLD AUTO: 24.5 % (ref 35–47)
HGB BLD-MCNC: 7.9 G/DL (ref 11.7–15.7)
M TB IFN-G BLD-IMP: NEGATIVE
M TB IFN-G CD4+ BCKGRND COR BLD-ACNC: 4.09 IU/ML
MCH RBC QN AUTO: 34.8 PG (ref 26.5–33)
MCHC RBC AUTO-ENTMCNC: 32.2 G/DL (ref 31.5–36.5)
MCV RBC AUTO: 108 FL (ref 78–100)
MITOGEN IGNF BCKGRD COR BLD-ACNC: 0 IU/ML
MITOGEN IGNF BCKGRD COR BLD-ACNC: 0.01 IU/ML
PLATELET # BLD AUTO: 274 10E3/UL (ref 150–450)
POTASSIUM SERPL-SCNC: 4.2 MMOL/L (ref 3.4–5.3)
QUANTIFERON MITOGEN: 4.09 IU/ML
QUANTIFERON NIL TUBE: 0 IU/ML
QUANTIFERON TB1 TUBE: 0 IU/ML
QUANTIFERON TB2 TUBE: 0.01
RBC # BLD AUTO: 2.27 10E6/UL (ref 3.8–5.2)
SODIUM SERPL-SCNC: 136 MMOL/L (ref 135–145)
WBC # BLD AUTO: 6.2 10E3/UL (ref 4–11)

## 2023-10-03 PROCEDURE — 36415 COLL VENOUS BLD VENIPUNCTURE: CPT | Performed by: NURSE PRACTITIONER

## 2023-10-03 PROCEDURE — P9604 ONE-WAY ALLOW PRORATED TRIP: HCPCS | Performed by: NURSE PRACTITIONER

## 2023-10-03 PROCEDURE — 80048 BASIC METABOLIC PNL TOTAL CA: CPT | Performed by: NURSE PRACTITIONER

## 2023-10-03 PROCEDURE — 85027 COMPLETE CBC AUTOMATED: CPT | Performed by: NURSE PRACTITIONER

## 2023-10-04 VITALS
HEART RATE: 82 BPM | OXYGEN SATURATION: 92 % | RESPIRATION RATE: 18 BRPM | HEIGHT: 64 IN | BODY MASS INDEX: 25.27 KG/M2 | SYSTOLIC BLOOD PRESSURE: 135 MMHG | TEMPERATURE: 97.4 F | WEIGHT: 148 LBS | DIASTOLIC BLOOD PRESSURE: 80 MMHG

## 2023-10-05 ENCOUNTER — TRANSITIONAL CARE UNIT VISIT (OUTPATIENT)
Dept: GERIATRICS | Facility: CLINIC | Age: 83
End: 2023-10-05
Payer: COMMERCIAL

## 2023-10-05 DIAGNOSIS — D62 ABLA (ACUTE BLOOD LOSS ANEMIA): ICD-10-CM

## 2023-10-05 DIAGNOSIS — Z79.01 LONG TERM CURRENT USE OF ANTICOAGULANTS WITH INR GOAL OF 2.0-3.0: ICD-10-CM

## 2023-10-05 DIAGNOSIS — S72.352A CLOSED DISPLACED COMMINUTED FRACTURE OF SHAFT OF LEFT FEMUR, INITIAL ENCOUNTER (H): ICD-10-CM

## 2023-10-05 DIAGNOSIS — N18.30 STAGE 3 CHRONIC KIDNEY DISEASE, UNSPECIFIED WHETHER STAGE 3A OR 3B CKD (H): ICD-10-CM

## 2023-10-05 DIAGNOSIS — I10 BENIGN ESSENTIAL HYPERTENSION: Primary | ICD-10-CM

## 2023-10-05 PROCEDURE — 99309 SBSQ NF CARE MODERATE MDM 30: CPT | Performed by: NURSE PRACTITIONER

## 2023-10-05 RX ORDER — FERROUS SULFATE 325(65) MG
325 TABLET ORAL
COMMUNITY
End: 2024-02-22

## 2023-10-05 NOTE — PROGRESS NOTES
"[facility] GERIATRICS    Chief Complaint   Patient presents with    RECHECK     HPI:  Zunilda Clark is a 83 year old  (1940), who is being seen today for an episodic care visit at: [facility] (TCU) [[id number]].     This is a 82 yo female with PMHx for RA, hypertension, hypothyroidism, mild prostatic and VR x2, A-fib, sick sinus syndrome status post PPM, pulm hypertension, stroke who presented mechanical fall.  Found to have a acute closed displaced commuted left distal femur periprosthetic fracture, history of left TKA in 2018.  Underwent ORIF using retrograde intramedullary nailing on 9/27.  She was restarted on Coumadin with bridging, given Tylenol and oxycodone for pain control, given WBAT and sustained ABLA with discharge hemoglobin of 8.5 at discharge, likely due to chronic macrocytic anemia per methotrexate use.  Of note Demadex on hold.  No other changes noted, discharged to [facility] for rehab     Today's concern is:   Anemia,     Allergies, and PMH/PSH reviewed in [facility] today.  REVIEW OF SYSTEMS:  4 point ROS including Respiratory, CV, GI and , other than that noted in the HPI,  is negative    Objective:   /80   Pulse 82   Temp 97.4  F (36.3  C)   Resp 18   Ht 1.626 m (5' 4\")   Wt 67.1 kg (148 lb)   LMP  (LMP Unknown)   SpO2 92%   BMI 25.40 kg/m    GENERAL APPEARANCE:  Alert, in no distress, oriented, cooperative, LLE pain  RESP:  no respiratory distress, diminished breath sounds bilaterally, RA  CV:  regular rate and rhythm, no murmur, rub, or gallop, no edema, PPM  PSYCH:  oriented to 2/3, SLUMS 22/30    Most Recent 3 CBC's:  Recent Labs   Lab Test 10/03/23  0809 09/30/23  0620 09/29/23  1304 09/29/23  0647 09/28/23  0804   WBC 6.2  --   --  11.9* 12.0*   HGB 7.9* 8.3* 8.2* 7.9* 8.4*   *  --   --  110* 109*     --   --  188 178     Most Recent 3 BMP's:  Recent Labs   Lab Test 10/03/23  0809 09/29/23  0647 09/28/23  0804    137 136   POTASSIUM 4.2 4.0 4.4 "   CHLORIDE 105 105 105   CO2 21* 23 22   BUN 27.1* 22.7 20.0   CR 0.86 0.88 0.91   ANIONGAP 10 9 9   BRICE 8.9 8.9 8.7*   GLC 85 92 108*     Most Recent TSH and T4:  Recent Labs   Lab Test 07/14/23  1132 05/15/23  1528   TSH 2.30 7.64*   T4  --  1.35       Assessment/Plan:    (S72.352A) Closed displaced comminuted fracture of shaft of left femur  Underwent surgical repair, given WBAT, continue vitamin D/ca supplementation, follow-up with Ortho (TBD)     (Z79.01) Long term current use of anticoagulants with INR goal of 2.0-3.0  (Z95.3) S/P mitral valve replacement with bioprosthetic valve  Today discontinue lovenox, last INR 2.2, continue Coumadin 2 mg daily, recheck INR on 10/6.  Of note 4/2.5     (R52) Pain  Continue Tylenol 1000 mg 3 times daily, Vistaril 25 mg 3 times daily, oxycodone 2.5 mg every q4 hours as needed (0 doses) and encourage icing with lidocaine patch. Pain      (I10) Benign essential hypertension  Continue Exforge 7.5 mg/160 mg daily and metoprolol to tartrate 50 mg twice daily.  -130, <80.  Demadex on hold     (E03.9) Acquired hypothyroidism  (primary encounter diagnosis)  Continue Synthroid 112 mcg daily, last TSH 2.30 on 7/14/2023     (M06.9) Rheumatoid arthritis, involving unspecified site, unspecified whether rheumatoid factor present (H)  Continue methotrexate 0.8 mls every Thursday with folic acid supplementation     Diminished lung sounds  Encourage incentive spirometry     (N18.30) Stage 3 chronic kidney disease, unspecified whether stage 3a  Last CR 0.86 with GFR >60 on 10/3     ABLA  Last Hgb 7.9 on 10/3, today start FeSO4 325mg daily. Recheck CBC on 10/10     Constipation  Continue senna S 1 tab twice daily     GERD  Continue famotidine 40 mg daily     Gout  Continue allopurinol 100 mg twice daily    Therapy  Ambulating 32ft with FWW, CGA    Orders:  Start FeSO4, coumadin dosing, discontinue lovenox, CBC recheck    Electronically signed by: Kelton Tse NP

## 2023-10-05 NOTE — DISCHARGE SUMMARY
Physician Discharge Summary           [facility]    Name: Zunilda Clark    MRN: [id number]     YOB: 1940    Age: 83 year old                                                     Primary care provider: Yunior Huang    Admit date:  9/26/2023    Discharge date and time: 10/1/2023 11:41 AM    Discharge Physician: Jesse Viera M.D., M.B.A.       Primary Discharge Diagnosis    Mechanical fall with acute displaced left femoral periprosthetic fracture     Secondary Diagnosis /chronic medical conditions     Past Medical History:   Diagnosis Date    Acquired hypothyroidism 11/04/2015    Aortic valve insufficiency     Atrial fibrillation and flutter     CHF (congestive heart failure)     DVT (deep venous thrombosis) 2003    Gastro-oesophageal reflux disease     H/O mitral valve replacement with tissue graft 06/2014    HTN (hypertension)     Other chronic pain     Pulmonary HTN     Rheumatoid arthritis     Seizure 2014    Shingles 08/2018    Stroke 2009    left side mild weakness      Past Surgical History:  Past Surgical History:   Procedure Laterality Date    APPLY WOUND VAC Left 12/18/2018    Procedure: Wound vac application;  Surgeon: Pardeep Sheikh MD;  Location: [facility] OR    ARTHROPLASTY KNEE Left 11/12/2018    Procedure: Left total knee arthroplasty using a [employer] Melissa II knee system;  Surgeon: Pardeep Sheikh MD;  Location: [facility] OR    ARTHROSCOPY KNEE WITH DEBRIDEMENT JOINT, COMBINED Left 12/18/2018    Procedure: Left knee debridement and placement of wound vac;  Surgeon: Pardeep Sheikh MD;  Location: [facility] OR    CATARACT IOL, RT/LT      COLONOSCOPY  03/15/2012    EP PPM GENERATOR CHANGE SINGLE N/A 03/08/2021    Procedure: Permanent Pacemakers  Generator Change Dual Chamber;  Surgeon: Tamiko Cowan MD;  Location:  [facility]ARD[facility] CATH LAB    ESOPHAGOSCOPY, GASTROSCOPY, DUODENOSCOPY (EGD),  COMBINED N/A 02/19/2020    Procedure: ESOPHAGOGASTRODUODENOSCOPY (EGD);  Surgeon: Michael Mccarthy MD;  Location:  GI    EYE SURGERY  2018    Both eyes/cataract surgery    H ABLATION AV NODE  2011    AFlutter with syncope, PPM to follow    HERNIA REPAIR      3 hernies/right and left & umbilical    IMPLANT PACEMAKER  2011    LAPAROSCOPIC CHOLECYSTECTOMY WITH CHOLANGIOGRAMS N/A 04/29/2017    Procedure: LAPAROSCOPIC CHOLECYSTECTOMY WITH CHOLANGIOGRAMS;  LAPAROSCOPIC CHOLECYSTECTOMY WITH CHOLANGIOGRAMS ;  Surgeon: Angeles Mcgill MD;  Location: [facility] OR    OPEN REDUCTION INTERNAL FIXATION RODDING INTRAMEDULLARY FEMUR Left 9/27/2023    Procedure: Placement of retrograde femoral nail left femur;  Surgeon: Spike Vann MD;  Location: [facility] OR    OPEN REDUCTION INTERNAL FIXATION RODDING INTRAMEDULLARY HUMERUS Right 07/28/2015    Procedure: OPEN REDUCTION INTERNAL FIXATION RODDING INTRAMEDULLARY HUMERUS;  Surgeon: Raymundo Rivera MD;  Location: [facility] OR    REPLACE VALVE MITRAL  2006    Mitral valve replacement with bioprosthetic valve in 2008 for rheumatic disease    SLING BLADDER SUSPENSION WITH FASCIA UMESH             Brief Summary of Hospital stay :       Please refer to  Admission H&P note  and subsequent progress notes in EMR for full details of patient care.    Reason for Hospitalization(C/C,HPI and brief patient summary): Fall       Significant findings(Primary diagnosis )Procedures and treatments provided(Hospital course ,consults, procedures):Please see below for details    Zunilda Clark is a 83 year old female with PMH significant for RA, HTN, hypothyroidism, bioprosthetic MVRx 2 , A.fib, SSS s/p PPM, Pulmonary HTN, stroke who presents to ED after mechanical fall and found to have left distal femoral periprosthetic fracture and admitted on 9/26/2023.     Problem list (medical problems addressed during hospital stay):  Mechanical fall  Acute, closed, displaced, comminuted left distal femur  periprosthetic fracture.  -Patient fell down and sustained trauma to her left leg.  -She had history of left TKA in 2018.  -Left leg is immobilized in ED   -Imaging studies and CT scan showed closed displaced comminuted left periprosthetic distal femur fracture.  -Patient was admitted to the hospital and treated with pain medications including oxycodone, Dilaudid, gabapentin and closely monitored.    --Orthopedic surgery consulted and patient underwent  Left periprosthetic femur fracture open reduction internal fixation using retrograde intramedullary nail, Left - Leg on September 27.    3.  Acute blood loss anemia   -- Hemoglobin was 10.4 on September 26.  Down to 8.5 morning of September 27.  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  Both B12 and folic acid within normal limit.  Serum ferritin also normal.       4.  Restless leg syndrome  --Started  Requip, improved      Chronic medical conditions.  Paroxysmal A-fib, Currently paced, anticoagulation on hold, continue low-dose metoprolol.   --Warfarin  resumed , lovenox bridge   Bioprosthetic mitral valve replacement x2 and TR annuloplasty  Chronic congestive heart failure without exacerbation:   --patient stopped taking  her torsemide about a month ago.   Hypertension:  -- Resumed home metoprolol 50 mg p.o. twice a day, amlodipine 2.5 mg daily,  Rheumatoid arthritis: On methotrexate once a week, resumed  History of CVA with prior left-sided weakness, resolved.         Consultations during hospital stay:       ORTHOPEDIC SURGERY IP CONSULT  HOSPITALIST IP CONSULT  CARE MANAGEMENT / SOCIAL WORK IP CONSULT  PHYSICAL THERAPY ADULT IP CONSULT  OCCUPATIONAL THERAPY ADULT IP CONSULT  PHARMACY TO DOSE WARFARIN  PHARMACY TO DOSE WARFARIN  PHYSICAL THERAPY ADULT IP CONSULT  OCCUPATIONAL THERAPY ADULT IP CONSULT  OCCUPATIONAL THERAPY ADULT IP CONSULT  PHYSICAL THERAPY ADULT IP CONSULT  PHYSICAL THERAPY ADULT IP CONSULT  OCCUPATIONAL THERAPY ADULT  "IP CONSULT      Patient discharge Condition:     stable    /52 (BP Location: Right arm, Patient Position: Semi-Koehler's, Cuff Size: Adult Regular)   Pulse 66   Temp 97.7  F (36.5  C) (Temporal)   Resp 22   Ht 1.626 m (5' 4\")   Wt 66.2 kg (145 lb 15.1 oz)   LMP  (LMP Unknown)   SpO2 95%   BMI 25.05 kg/m         Discharge Instructions:       Patient/family instructions: Written discharge instruction given to patient/family    Discharge Medications:       Review of your medicines        UNREVIEWED medicines. Ask your doctor about these medicines        Dose / Directions   torsemide 10 MG tablet  Commonly known as: DEMADEX  Used for: Pulmonary HTN (H)      Dose: 10 mg  Take 1 tablet (10 mg) by mouth daily (with breakfast)  Quantity: 90 tablet  Refills: 3            START taking        Dose / Directions   oxyCODONE 5 MG tablet  Commonly known as: ROXICODONE  Used for: Closed displaced comminuted fracture of shaft of left femur, initial encounter (H)      Dose: 2.5 mg  Take 0.5 tablets (2.5 mg) by mouth every 4 hours as needed for severe pain  Quantity: 20 tablet  Refills: 0     senna-docusate 8.6-50 MG tablet  Commonly known as: SENOKOT-S/PERICOLACE  Used for: Closed displaced comminuted fracture of shaft of left femur, initial encounter (H)  Notes to patient: Hold if having loose stools      Dose: 1-2 tablet  Take 1-2 tablets by mouth 2 times daily Take while on oral narcotics to prevent or treat constipation.  Quantity: 30 tablet  Refills: 0            CONTINUE these medicines which have NOT CHANGED        Dose / Directions   allopurinol 100 MG tablet  Commonly known as: ZYLOPRIM      Dose: 100 mg  Take 100 mg by mouth 2 times daily  Refills: 0     amLODIPine 2.5 MG tablet  Commonly known as: NORVASC  Used for: Pulmonary HTN (H), Benign essential hypertension      Dose: 2.5 mg  Take 1 tablet (2.5 mg) by mouth daily  Quantity: 90 tablet  Refills: 3     amLODIPine-valsartan 5-160 MG tablet  Commonly known " as: EXFORGE  Used for: Benign essential hypertension      Dose: 1 tablet  Take 1 tablet by mouth daily  Quantity: 90 tablet  Refills: 3     amoxicillin 500 MG tablet  Commonly known as: AMOXIL  Used for: S/P mitral valve replacement with bioprosthetic valve      Take 2000mg (4 tablets of 500mg each) approx 30 min to 1 hour prior to any dental procedures  Quantity: 4 tablet  Refills: 3     calcium citrate 950 (200 Ca) MG tablet  Commonly known as: CITRACAL      Dose: 1 tablet  Take 1 tablet by mouth daily  Refills: 0     COSOPT OP      Apply to eye 2 times daily Place one drop into each eye twice daily  Refills: 0     enoxaparin ANTICOAGULANT 60 MG/0.6ML syringe  Commonly known as: LOVENOX  Used for: S/P mitral valve replacement with bioprosthetic valve  Notes to patient: 2 additional days      Dose: 0.75 mg/kg  Inject 0.5 mLs (50 mg) Subcutaneous every 12 hours for 5 days  Refills: 0     famotidine 40 MG tablet  Commonly known as: PEPCID  Used for: Diarrhea, unspecified type      TAKE 1 TABLET BY MOUTH DAILY  Quantity: 90 tablet  Refills: 3     folic acid 1 MG tablet  Commonly known as: FOLVITE      Dose: 1 mg  Take 1 mg by mouth daily  Refills: 0     levothyroxine 112 MCG tablet  Commonly known as: SYNTHROID/LEVOTHROID  Used for: Acquired hypothyroidism      Dose: 112 mcg  Take 1 tablet (112 mcg) by mouth daily  Quantity: 100 tablet  Refills: 3     METHOTREXATE SODIUM IJ      Dose: 0.8 mL  Inject 0.8 mLs as directed once a week Thursdays  Refills: 0     metoprolol tartrate 50 MG tablet  Commonly known as: LOPRESSOR  Used for: Chronic atrial fibrillation (H)      Dose: 50 mg  Take 1 tablet (50 mg) by mouth 2 times daily  Quantity: 180 tablet  Refills: 3     multivitamin w/minerals tablet      Dose: 1 tablet  Take 1 tablet by mouth daily Without iron  Refills: 0     VITAMIN D (CHOLECALCIFEROL) PO      Dose: 1,000 Units  Take 1,000 Units by mouth daily  Refills: 0            STOP taking      warfarin ANTICOAGULANT 2.5  MG tablet  Commonly known as: COUMADIN                  Where to get your medicines        Some of these will need a paper prescription and others can be bought over the counter. Ask your nurse if you have questions.    Bring a paper prescription for each of these medications  oxyCODONE 5 MG tablet          Discharge diet:Orders Placed This Encounter      Diet      Diet    regular diet      Discharge activity:Activity as tolerated      Discharge follow-up:    Follow up with primary care provider in 14  days or earlier if symptoms return or gets worse.      Other instructions:    We discussed with patient/family about detail discharge instructions as well as discharge medications above including potential risks,side effects and benefits.Patient/family understood benefits and potential serious side effects of taking these medications and need to follow up with PCP if the patient develops complications.  Patient is also advised to see a doctor immediately for severe symptoms.        Major procedure performed/  Significant Diagnostic Studies:           Results for orders placed or performed during the hospital encounter of 09/26/23   XR Knee Left 1/2 Views    Narrative    XR FEMUR LEFT 2 VIEWS, XR KNEE LEFT 1/2 VIEWS 9/26/2023 10:27 AM     HISTORY: thigh pain post fall    COMPARISON: None.       Impression    IMPRESSION:    There is an oblique, comminuted, and displaced periprosthetic fracture  through the distal femoral diaphysis extending to the femoral  component of the left knee arthroplasty. Mild degenerative changes of  the left hip. Vascular calcifications. Osteopenia.    NOTE: ABNORMAL REPORT    THE DICTATION ABOVE DESCRIBES AN ABNORMAL REPORT FOR WHICH FOLLOW-UP  IS NEEDED.    ANTOINETTE DIEHL MD         SYSTEM ID:  [id number]   XR Femur Left 2 Views    Narrative    XR FEMUR LEFT 2 VIEWS, XR KNEE LEFT 1/2 VIEWS 9/26/2023 10:27 AM     HISTORY: thigh pain post fall    COMPARISON: None.       Impression     IMPRESSION:    There is an oblique, comminuted, and displaced periprosthetic fracture  through the distal femoral diaphysis extending to the femoral  component of the left knee arthroplasty. Mild degenerative changes of  the left hip. Vascular calcifications. Osteopenia.    NOTE: ABNORMAL REPORT    THE DICTATION ABOVE DESCRIBES AN ABNORMAL REPORT FOR WHICH FOLLOW-UP  IS NEEDED.    ANTOINETTE DIEHL MD         SYSTEM ID:  [id number]   XR Chest 1 View    Narrative    CHEST ONE VIEW   9/26/2023 10:26 AM     HISTORY: Shortness of breath.    COMPARISON: 6/12/2023.      Impression    IMPRESSION: Interval increase of bilateral vascular and interstitial  prominence that may represent pulmonary edema. Mildly increased  cardiomegaly. Stable left chest pacemaker. Stable sternotomy. Atypical  infectious etiology remains in the differential.     TRINIDAD ZURITA MD         SYSTEM ID:  [id number]   CT Knee Left w/o Contrast    Narrative    EXAM: CT KNEE LEFT W/O CONTRAST WITH 3D RECONSTRUCTION  DATE/TIME: 9/26/2023 12:28 PM    INDICATION: Pain. Left femur fracture.  COMPARISON: Radiographs 9/26/2023  TECHNIQUE: Noncontrast. Axial, sagittal and coronal thin-section  reconstruction. Dose reduction techniques were used. Three-dimensional  images were created on a separate workstation by the CT technologist  using source data obtained at the time of image acquisition. Images  were made available to the surgeons for surgical planning.    FINDINGS:     BONES:  -There is an oblique comminuted mildly displaced periprosthetic  fracture through the distal femoral diaphysis extending to the lateral  metaphyseal cortex and femoral component of the knee arthroplasty.   -Moderate lipohemarthrosis.    SOFT TISSUES:  -There is adjacent soft tissue stranding adjacent to the fracture in  the intervening fat planes, which may represent edema or small volume  blood products..  -Vascular calcifications..      Impression    IMPRESSION:  1.  There is an acute  oblique, comminuted, mildly displaced  periprosthetic fracture through the distal femur extending to the  femoral component of the knee arthroplasty.  2.  Moderate lipohemarthrosis.    ANTOINETTE DIEHL MD         SYSTEM ID:  [id number]   XR Surgery YONATHAN L/T 5 Min Fluoro w Stills    Narrative    This exam was marked as non-reportable because it will not be read by a   radiologist or a [place] non-radiologist provider.         XR Femur Port Left 2 Views    Narrative    XR FEMUR PORT LEFT 2 VIEWS 9/27/2023 1:23 PM     HISTORY: s/p left periprosthetic femur fracture retrograde nail    COMPARISON: 9/26/2023      Impression    IMPRESSION: Intramedullary nail, screw and cerclage wire fixation  across the oblique fracture of the distal femur. Postoperative air is  present. Skin staples are in place. TKA with patellar resurfacing.  Surgical clips in the pelvis and knee.    JESUS SIMMS MD         SYSTEM ID:  [id number]       Recent Labs   Lab 10/03/23  0809 09/30/23  0620 09/29/23  1304 09/29/23  0647 09/28/23  0804   WBC 6.2  --   --  11.9* 12.0*   HGB 7.9* 8.3* 8.2* 7.9* 8.4*   HCT 24.5*  --   --  24.1* 25.8*   *  --   --  110* 109*     --   --  188 178     No results for input(s): CULT in the last 168 hours.  Recent Labs   Lab 10/03/23  0809 09/29/23  0647 09/28/23  0804    137 136   POTASSIUM 4.2 4.0 4.4   CHLORIDE 105 105 105   CO2 21* 23 22   ANIONGAP 10 9 9   GLC 85 92 108*   BUN 27.1* 22.7 20.0   CR 0.86 0.88 0.91   GFRESTIMATED 67 65 63   BRICE 8.9 8.9 8.7*       Recent Labs   Lab 10/03/23  0809 09/29/23  0647 09/28/23  0804   GLC 85 92 108*       Recent Labs   Lab 10/01/23  0608 09/30/23  0620 09/29/23  0647   INR 1.47* 1.29* 1.30*             Pending Results:       Unresulted Labs Ordered in the Past 30 Days of this Admission       No orders found from 8/27/2023 to 9/27/2023.               Patient Allergies:       No Known Allergies      Disposition:     Disposition: SNF        I saw and  evaluated the patient on day of discharge and  discharge instructions reviewed  and  all the patient's questions and concerns addressed. Over 30 minutes spent on discharge and coordination of discharge process for this patient.      Disclaimer: This note consists of symbols derived from keyboarding, dictation and/or voice recognition software. As a result, there may be errors in the script that have gone undetected. Please consider this when interpreting information found in this chart

## 2023-10-06 NOTE — PROGRESS NOTES
[facility]  INITIAL VISIT NOTE  October 9, 2023    PRIMARY CARE PROVIDER AND CLINIC:  Yunior Huang [address] / [facility] [id number]    CHIEF COMPLAINT:  Hospital follow-up/Initial visit    HPI:    Zunilda Clark is a 83 year old  (1940) female who was seen at [place] on [facility] on October 9, 2023 for an initial visit.     Medical history is notable for hypertension, PAF, atrial flutter, high-grade AV block, s/p permanent pacemaker, DVT, CVA, CHF, pulmonary hypertension, rheumatic valvular heart disease, s/p tricuspid valve annuloplasty and mitral valve replacement, aortic stenosis, tricuspid regurgitation, hypothyroidism, rheumatoid arthritis, CKD, chronic anemia, GERD, RLS, seizure, shingles, osteoarthritis, and osteoporosis.    Summary of hospital course:  Patient was hospitalized at [facility] from September 26 through October 1, 2023 for acute left femoral periprosthetic fracture sustained after mechanical fall.  She was evaluated by orthopedic service and underwent ORIF on September 27, 2023.  EBL was 200 mL and postop hemoglobin dropped to 7.9.  Anticoagulation with warfarin was resumed after orthopedic surgery with enoxaparin bridge.  TCU was recommended per therapies.    Patient is admitted to this facility for medical management, nursing care, and rehab.     Of note, history was obtained from patient, facility RN, and extensive review of the chart.    Today's visit:  Patient was seen in her room, while lying in bed.  She appears frail but comfortable.  She reports no significant pain at rest but she rates her leg pain at 8 out of 10 with walking.  She had a bowel movement 3 days ago.  She denies fever, chills, chest pain, palpitation, dyspnea, nausea, vomiting, abdominal pain, or urinary symptoms.  Today's INR is 1.9.    CODE STATUS:   DNR / DNI    PAST MEDICAL HISTORY:   Periprosthetic left femur fracture, s/p ORIF with placement of retrograde  femoral nail on September 27, 2023  Hypertension  PAF and atrial flutter, s/p ablation in 2011  High-grade AV block (postcardiac surgery), s/p permanent pacemaker in 2007  DVT  CVA  Chronic HFpEF (LVEF 55-60% in July 2023)  Severe pulmonary hypertension  Rheumatic valvular heart disease, s/p tricuspid valve annuloplasty, bioprosthetic mitral valve replacement in 2007, and redo bioprosthetic mitral valve replacement in 2014   Moderate tricuspid regurgitation  Moderate-severe aortic stenosis  Hypothyroidism  Rheumatoid arthritis  CKD stage II-IIIa, baseline creatinine 0.9-1.1  Chronic anemia; baseline hemoglobin 11-12  GERD  Gout  RLS  Seizure  Shingles  Glaucoma  Osteoarthritis  Osteoporosis    Past Medical History:   Diagnosis Date     Acquired hypothyroidism 11/04/2015     Aortic valve insufficiency      Atrial fibrillation and flutter      CHF (congestive heart failure)      DVT (deep venous thrombosis) 2003     Gastro-oesophageal reflux disease      H/O mitral valve replacement with tissue graft 06/2014     HTN (hypertension)      Other chronic pain      Pulmonary HTN      Rheumatoid arthritis      Seizure 2014     Shingles 08/2018     Stroke 2009    left side mild weakness        PAST SURGICAL HISTORY:   Past Surgical History:   Procedure Laterality Date     APPLY WOUND VAC Left 12/18/2018    Procedure: Wound vac application;  Surgeon: Pardeep Sheikh MD;  Location: [facility] OR     ARTHROPLASTY KNEE Left 11/12/2018    Procedure: Left total knee arthroplasty using a [employer] Melissa II knee system;  Surgeon: Pardeep Sheikh MD;  Location: [facility] OR     ARTHROSCOPY KNEE WITH DEBRIDEMENT JOINT, COMBINED Left 12/18/2018    Procedure: Left knee debridement and placement of wound vac;  Surgeon: Pardeep Sheikh MD;  Location: [facility] OR     CATARACT IOL, RT/LT       COLONOSCOPY  03/15/2012     EP PPM GENERATOR CHANGE SINGLE N/A 03/08/2021    Procedure: Permanent Pacemakers  Generator Change Dual  Chamber;  Surgeon: Tamiko Cowan MD;  Location:  HEART CARDIAC CATH LAB     ESOPHAGOSCOPY, GASTROSCOPY, DUODENOSCOPY (EGD), COMBINED N/A 2020    Procedure: ESOPHAGOGASTRODUODENOSCOPY (EGD);  Surgeon: Michael Mccarthy MD;  Location:  GI     EYE SURGERY      Both eyes/cataract surgery     H ABLATION AV NODE      AFlutter with syncope, PPM to follow     HERNIA REPAIR      3 hernies/right and left & umbilical     IMPLANT PACEMAKER       LAPAROSCOPIC CHOLECYSTECTOMY WITH CHOLANGIOGRAMS N/A 2017    Procedure: LAPAROSCOPIC CHOLECYSTECTOMY WITH CHOLANGIOGRAMS;  LAPAROSCOPIC CHOLECYSTECTOMY WITH CHOLANGIOGRAMS ;  Surgeon: Angeles Mcgill MD;  Location: [facility] OR     OPEN REDUCTION INTERNAL FIXATION RODDING INTRAMEDULLARY FEMUR Left 2023    Procedure: Placement of retrograde femoral nail left femur;  Surgeon: Spike Vann MD;  Location: [facility] OR     OPEN REDUCTION INTERNAL FIXATION RODDING INTRAMEDULLARY HUMERUS Right 2015    Procedure: OPEN REDUCTION INTERNAL FIXATION RODDING INTRAMEDULLARY HUMERUS;  Surgeon: Raymundo Rivera MD;  Location:  OR     REPLACE VALVE MITRAL  2006    Mitral valve replacement with bioprosthetic valve in  for rheumatic disease     SLING BLADDER SUSPENSION WITH FASCIA UMESH         FAMILY HISTORY:   Family History   Problem Relation Age of Onset     Coronary Artery Disease Mother      Coronary Artery Disease Brother      Diabetes Brother      Cancer Brother          at age 52      Other Cancer Brother      Hypertension Sister      Hyperlipidemia Sister        SOCIAL HISTORY:  Social History     Tobacco Use     Smoking status: Never     Smokeless tobacco: Never   Substance Use Topics     Alcohol use: No     Alcohol/week: 0.0 standard drinks of alcohol       MEDICATIONS:  Current Outpatient Medications   Medication Sig Dispense Refill     acetaminophen (TYLENOL) 500 MG tablet Take 1,000 mg by mouth 3 times daily        allopurinol (ZYLOPRIM) 100 MG tablet Take 100 mg by mouth 2 times daily       amLODIPine (NORVASC) 2.5 MG tablet Take 1 tablet (2.5 mg) by mouth daily 90 tablet 3     amLODIPine-valsartan (EXFORGE) 5-160 MG tablet Take 1 tablet by mouth daily 90 tablet 3     amoxicillin (AMOXIL) 500 MG tablet Take 2000mg (4 tablets of 500mg each) approx 30 min to 1 hour prior to any dental procedures 4 tablet 3     calcium citrate (CALCITRATE) 950 MG tablet Take 1 tablet by mouth daily        Dorzolamide HCl-Timolol Mal (COSOPT OP) Apply to eye 2 times daily Place one drop into each eye twice daily       famotidine (PEPCID) 40 MG tablet TAKE 1 TABLET BY MOUTH DAILY 90 tablet 3     ferrous sulfate (FEROSUL) 325 (65 Fe) MG tablet Take 325 mg by mouth daily (with breakfast)       folic acid (FOLVITE) 1 MG tablet Take 1 mg by mouth daily       hydrOXYzine (VISTARIL) 25 MG capsule Take 25 mg by mouth 3 times daily       levothyroxine (SYNTHROID/LEVOTHROID) 112 MCG tablet Take 1 tablet (112 mcg) by mouth daily 100 tablet 3     Lidocaine (LIDOCARE) 4 % Patch Place 1 patch onto the skin every 24 hours To prevent lidocaine toxicity, patient should be patch free for 12 hrs daily.  Apply to left leg       METHOTREXATE SODIUM IJ Inject 0.8 mLs as directed once a week Thursdays       metoprolol tartrate (LOPRESSOR) 50 MG tablet Take 1 tablet (50 mg) by mouth 2 times daily 180 tablet 3     multivitamin w/minerals (THERA-VIT-M) tablet Take 1 tablet by mouth daily Without iron       oxyCODONE (ROXICODONE) 5 MG tablet Take 0.5 tablets (2.5 mg) by mouth every 4 hours as needed for severe pain 20 tablet 0     senna-docusate (SENOKOT-S/PERICOLACE) 8.6-50 MG tablet Take 1-2 tablets by mouth 2 times daily Take while on oral narcotics to prevent or treat constipation. (Patient taking differently: Take 1 tablet by mouth 2 times daily Take while on oral narcotics to prevent or treat constipation.) 30 tablet 0     torsemide (DEMADEX) 10 MG tablet Take 1  "tablet (10 mg) by mouth daily (with breakfast) (Patient not taking: Reported on 10/2/2023) 90 tablet 3     VITAMIN D, CHOLECALCIFEROL, PO Take 1,000 Units by mouth daily       warfarin ANTICOAGULANT (COUMADIN) 4 MG tablet Take 2 mg by mouth daily Recheck INR on 10/6         MED REC REQUIRED  Post Medication Reconciliation Status: medication reconcilation previously completed during another office visit        ALLERGIES:  No Known Allergies    ROS:  10 point ROS were negative other than the symptoms noted above in the HPI.    PHYSICAL EXAM:  Vital signs were reviewed in the chart.  Vital Signs: /53   Pulse 61   Temp 98.1  F (36.7  C)   Resp 18   Ht 1.626 m (5' 4\")   Wt 66.4 kg (146 lb 6.4 oz)   LMP  (LMP Unknown)   SpO2 97%   BMI 25.13 kg/m    General: Frail appearing but comfortable and in no acute distress  HEENT: Conjunctival pallor, no scleral icterus or injection, moist oral mucosa  Cardiovascular: Normal S1, S2, RRR, grade 3/6 systolic murmur  Respiratory: Lungs clear to auscultation bilaterally  GI: Abdomen soft, non-tender, non-distended, +BS  Extremities: 2-3+ left LE edema and no significant right lower extremity edema  Neuro: CX II-XII grossly intact; ROM in all four extremities grossly intact  Psych: Alert and oriented x3; normal affect  Skin: Left leg surgical wound is dressed and clean    LABORATORY/IMAGING DATA:  All relevant labs and imaging data in [facility] and/or Care Everywhere were personally reviewed today.      Most Recent 3 CBC's:Recent Labs   Lab Test 10/03/23  0809 09/30/23  0620 09/29/23  1304 09/29/23  0647 09/28/23  0804   WBC 6.2  --   --  11.9* 12.0*   HGB 7.9* 8.3* 8.2* 7.9* 8.4*   *  --   --  110* 109*     --   --  188 178     Most Recent 3 BMP's:Recent Labs   Lab Test 10/03/23  0809 09/29/23  0647 09/28/23  0804    137 136   POTASSIUM 4.2 4.0 4.4   CHLORIDE 105 105 105   CO2 21* 23 22   BUN 27.1* 22.7 20.0   CR 0.86 0.88 0.91   ANIONGAP 10 9 9   BRICE 8.9 " 8.9 8.7*   GLC 85 92 108*     Most Recent 2 LFT's:Recent Labs   Lab Test 09/05/23  1753 08/30/23  1112   AST 43 40   ALT 25 22   ALKPHOS 101 102   BILITOTAL 0.5 0.6         ASSESSMENT/PLAN:  Fall, subsequent encounter,  Periprosthetic left femur fracture, s/p ORIF with placement of retrograde femoral nail on September 27, 2023,  Age-related osteoporosis with current pathologic fracture,  Physical deconditioning.  Patient is hemodynamically stable.  Increased pain with ambulation.  Plan:  Fall precautions  Continue pain management with acetaminophen 1000 mg 3 times daily, lidocaine patch, hydroxyzine 25 mg 3 times daily, and oxycodone 2.5 mg every 4 hours PRN as ordered  Continue DVT prophylaxis with chronic warfarin  WBAT LLE  Continue PT/OT evaluation and therapy  Follow-up with Ortho as directed    ABLA,  Chronic anemia.  Baseline hemoglobin 11-12  Postop hemoglobin dropped to 7.9.  EBL reportedly 200 mL.  Last CBC on October 3 with hemoglobin 7.9 and .  Plan:  Continue ferrous sulfate 325 mg daily  Monitor hemoglobin periodically (next CBC on October 10)    Hypertension.  Blood pressure is fairly controlled.  Plan:  Continue amlodipine-valsartan 5-160 mg, 1 tablet daily and extra amlodipine 2.5 mg daily  Continue metoprolol 50 mg twice daily  Monitor blood pressure    PAF and atrial flutter, s/p ablation in 2011,  History of high-grade AV block (postcardiac surgery), s/p permanent pacemaker in 2007,  History of DVT,  History of CVA.  EKG in the hospital showed a paced rhythm.  Rate is controlled.  Currently on warfarin 2 mg daily.   Today's INR is 1.9.  Plan:  Increase warfarin to 2.5 mg daily; hold for INR more than 3  Recheck INR in October 13  Adjust warfarin dose for INR 2-3  Monitor heart rate    Chronic HFpEF (LVEF 55-60% in July 2023),  Severe pulmonary hypertension,  Rheumatic valvular heart disease, s/p tricuspid valve annuloplasty, bioprosthetic mitral valve replacement in 2007, and redo  bioprosthetic mitral valve replacement in  2014,   Moderate tricuspid regurgitation,  Moderate-severe aortic stenosis.  Patient has stopped taking torsemide over a month ago.  Patient currently weighs 148 LBS and has 2-3+ left lower extremity swelling and no significant right lower extremity edema.  Plan:  Continue metoprolol 50 g twice daily  Continue amlodipine-valsartan 5-160 mg, 1 tablet daily and extra amlodipine 2.5 mg daily  Consider resuming PTA torsemide 10 mg daily based on volume status  Continue to monitor weight and volume status  Follow-up with cardiology as directed    Hypothyroidism.  Last TSH 2.3 in July 2023.  Plan:  Continue levothyroxine 112 mcg daily    Rheumatoid arthritis.  Plan:  Continue methotrexate 20 mg (0.8 ml of 25 mg/ml) injection every Thursday  Continue folic acid 1 mg daily  Follow-up with rheumatology as directed    CKD stage II-IIIa.  Baseline creatinine 0.9-1.1.  Last creatinine 0.86 on October 3.  Plan:  Avoid NSAIDs and nephrotoxins  Monitor renal function periodically    GERD.  Plan:  Continue famotidine 40 mg daily    Gout.  Plan:  Continue allopurinol 100 mg twice daily    Slow transit constipation.  Plan:  Continue the bowel regimen        Orders written by provider at facility:  Increase warfarin to 2.5 mg p.o. daily, hold for INR more than 3  Recheck INR on October 13      Disclaimer: This note contains text created using speech-recognition software and may have unintended word substitutions.    Electronically signed by:  Shelly Godfrey MD

## 2023-10-08 VITALS
HEIGHT: 64 IN | HEART RATE: 61 BPM | TEMPERATURE: 98.1 F | OXYGEN SATURATION: 97 % | RESPIRATION RATE: 18 BRPM | BODY MASS INDEX: 25 KG/M2 | DIASTOLIC BLOOD PRESSURE: 53 MMHG | SYSTOLIC BLOOD PRESSURE: 111 MMHG | WEIGHT: 146.4 LBS

## 2023-10-09 ENCOUNTER — LAB REQUISITION (OUTPATIENT)
Dept: LAB | Facility: CLINIC | Age: 83
End: 2023-10-09

## 2023-10-09 ENCOUNTER — TRANSITIONAL CARE UNIT VISIT (OUTPATIENT)
Dept: GERIATRICS | Facility: CLINIC | Age: 83
End: 2023-10-09
Payer: COMMERCIAL

## 2023-10-09 ENCOUNTER — TRANSITIONAL CARE UNIT VISIT (OUTPATIENT)
Dept: GERIATRICS | Facility: CLINIC | Age: 83
End: 2023-10-09

## 2023-10-09 DIAGNOSIS — I50.32 CHRONIC HEART FAILURE WITH PRESERVED EJECTION FRACTION (H): ICD-10-CM

## 2023-10-09 DIAGNOSIS — I10 ESSENTIAL HYPERTENSION: ICD-10-CM

## 2023-10-09 DIAGNOSIS — S72.92XD CLOSED FRACTURE OF LEFT FEMUR WITH ROUTINE HEALING, UNSPECIFIED FRACTURE MORPHOLOGY, UNSPECIFIED PORTION OF FEMUR, SUBSEQUENT ENCOUNTER: ICD-10-CM

## 2023-10-09 DIAGNOSIS — Z86.718 HISTORY OF DEEP VENOUS THROMBOSIS: ICD-10-CM

## 2023-10-09 DIAGNOSIS — I09.1 RHEUMATIC DISEASE OF HEART VALVE: ICD-10-CM

## 2023-10-09 DIAGNOSIS — E03.9 HYPOTHYROIDISM, UNSPECIFIED TYPE: ICD-10-CM

## 2023-10-09 DIAGNOSIS — S72.92XD CLOSED FRACTURE OF LEFT FEMUR WITH ROUTINE HEALING, UNSPECIFIED FRACTURE MORPHOLOGY, UNSPECIFIED PORTION OF FEMUR, SUBSEQUENT ENCOUNTER: Primary | ICD-10-CM

## 2023-10-09 DIAGNOSIS — Z95.0 CARDIAC PACEMAKER IN SITU: ICD-10-CM

## 2023-10-09 DIAGNOSIS — D64.9 ANEMIA, UNSPECIFIED: ICD-10-CM

## 2023-10-09 DIAGNOSIS — Z95.2 H/O MITRAL VALVE REPLACEMENT: ICD-10-CM

## 2023-10-09 DIAGNOSIS — D62 ACUTE BLOOD LOSS ANEMIA: ICD-10-CM

## 2023-10-09 DIAGNOSIS — M80.00XD AGE-RELATED OSTEOPOROSIS WITH CURRENT PATHOLOGICAL FRACTURE WITH ROUTINE HEALING, SUBSEQUENT ENCOUNTER: ICD-10-CM

## 2023-10-09 DIAGNOSIS — Z98.890 S/P ORIF (OPEN REDUCTION INTERNAL FIXATION) FRACTURE: ICD-10-CM
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
## 2023-10-09 DIAGNOSIS — K21.9 GASTROESOPHAGEAL REFLUX DISEASE, UNSPECIFIED WHETHER ESOPHAGITIS PRESENT: ICD-10-CM

## 2023-10-09 DIAGNOSIS — N18.2 STAGE 2 CHRONIC KIDNEY DISEASE: ICD-10-CM

## 2023-10-09 DIAGNOSIS — I07.1 RHEUMATIC TRICUSPID VALVE REGURGITATION: ICD-10-CM

## 2023-10-09 DIAGNOSIS — I06.0 RHEUMATIC AORTIC STENOSIS: ICD-10-CM

## 2023-10-09 DIAGNOSIS — D64.9 CHRONIC ANEMIA: ICD-10-CM

## 2023-10-09 DIAGNOSIS — R53.81 PHYSICAL DECONDITIONING: ICD-10-CM

## 2023-10-09 DIAGNOSIS — Z87.81 S/P ORIF (OPEN REDUCTION INTERNAL FIXATION) FRACTURE: ICD-10-CM

## 2023-10-09 DIAGNOSIS — I48.0 PAF (PAROXYSMAL ATRIAL FIBRILLATION) (H): ICD-10-CM

## 2023-10-09 DIAGNOSIS — Z86.73 HISTORY OF CVA (CEREBROVASCULAR ACCIDENT): ICD-10-CM

## 2023-10-09 DIAGNOSIS — I27.20 PULMONARY HYPERTENSION (H): ICD-10-CM

## 2023-10-09 DIAGNOSIS — M06.9 RHEUMATOID ARTHRITIS, INVOLVING UNSPECIFIED SITE, UNSPECIFIED WHETHER RHEUMATOID FACTOR PRESENT (H): ICD-10-CM

## 2023-10-09 DIAGNOSIS — K59.01 SLOW TRANSIT CONSTIPATION: ICD-10-CM

## 2023-10-09 PROCEDURE — 99305 1ST NF CARE MODERATE MDM 35: CPT | Performed by: INTERNAL MEDICINE

## 2023-10-09 PROCEDURE — 99207 PR NO CHARGE LOS: CPT | Performed by: PHYSICIAN ASSISTANT

## 2023-10-09 RX ORDER — WARFARIN SODIUM 2.5 MG/1
2.5 TABLET ORAL DAILY
COMMUNITY
End: 2023-10-20

## 2023-10-09 RX ORDER — TORSEMIDE 10 MG/1
10 TABLET ORAL DAILY
COMMUNITY
End: 2024-01-08

## 2023-10-09 NOTE — PROGRESS NOTES
Ortho Nursing home visit    Zunilda Clark is a 83 year old female who resides at [facility], following Left femur fracture, Retrograde IM nail, now for fixation;    Patient is seen today for on-site 2 week post-op visit; for staple removal, seen in room with daughter,       Past Medical History:   Diagnosis Date    Acquired hypothyroidism 11/04/2015    Aortic valve insufficiency     Atrial fibrillation and flutter     CHF (congestive heart failure)     DVT (deep venous thrombosis) 2003    Gastro-oesophageal reflux disease     H/O mitral valve replacement with tissue graft 06/2014    HTN (hypertension)     Other chronic pain     Pulmonary HTN     Rheumatoid arthritis     Seizure 2014    Shingles 08/2018    Stroke 2009    left side mild weakness       Past Surgical History:   Procedure Laterality Date    APPLY WOUND VAC Left 12/18/2018    Procedure: Wound vac application;  Surgeon: Pardeep Sheikh MD;  Location: [facility] OR    ARTHROPLASTY KNEE Left 11/12/2018    Procedure: Left total knee arthroplasty using a [employer] Melissa [employer] knee system;  Surgeon: Pardeep Sheikh MD;  Location: [facility] OR    ARTHROSCOPY KNEE WITH DEBRIDEMENT JOINT, COMBINED Left 12/18/2018    Procedure: Left knee debridement and placement of wound vac;  Surgeon: Pardeep Sheikh MD;  Location: [facility] OR    CATARACT IOL, RT/LT      COLONOSCOPY  03/15/2012    EP PPM GENERATOR CHANGE SINGLE N/A 03/08/2021    Procedure: Permanent Pacemakers  Generator Change Dual Chamber;  Surgeon: Tamiko Cowan MD;  Location:  HEART CARDIAC CATH LAB    ESOPHAGOSCOPY, GASTROSCOPY, DUODENOSCOPY (EGD), COMBINED N/A 02/19/2020    Procedure: ESOPHAGOGASTRODUODENOSCOPY (EGD);  Surgeon: Michael Mccarthy MD;  Location:  GI    EYE SURGERY  2018    Both eyes/cataract surgery    H ABLATION AV NODE  2011    AFlutter with syncope, PPM to follow    HERNIA REPAIR      3 hernies/right and left & umbilical    IMPLANT PACEMAKER  2011     LAPAROSCOPIC CHOLECYSTECTOMY WITH CHOLANGIOGRAMS N/A 04/29/2017    Procedure: LAPAROSCOPIC CHOLECYSTECTOMY WITH CHOLANGIOGRAMS;  LAPAROSCOPIC CHOLECYSTECTOMY WITH CHOLANGIOGRAMS ;  Surgeon: Angeles Mcgill MD;  Location: [facility] OR    OPEN REDUCTION INTERNAL FIXATION RODDING INTRAMEDULLARY FEMUR Left 9/27/2023    Procedure: Placement of retrograde femoral nail left femur;  Surgeon: Spike Vann MD;  Location: [facility] OR    OPEN REDUCTION INTERNAL FIXATION RODDING INTRAMEDULLARY HUMERUS Right 07/28/2015    Procedure: OPEN REDUCTION INTERNAL FIXATION RODDING INTRAMEDULLARY HUMERUS;  Surgeon: Raymundo Rivera MD;  Location: [facility] OR    REPLACE VALVE MITRAL  2006    Mitral valve replacement with bioprosthetic valve in 2008 for rheumatic disease    SLING BLADDER SUSPENSION WITH FASCIA UMESH          No Known Allergies   LMP  (LMP Unknown)      Exam: Seated allows PROM to left knee, WNL, working on transfers to bed / bathroom, with walker/ lives alone in Apt; ind living,   Staples removed from knee and thigh, incisions all healing well, new drsg applied,       X-rays not done today.    ASSESSMENT / PLAN; continue to work on transfers and WBAT with walker,short distance. F/U with Dr. Vann in 4 weeks, for x-rays and eval.  Patient may need AL/ Living     [id number]          [facility]  [phone] Cell

## 2023-10-10 LAB
ERYTHROCYTE [DISTWIDTH] IN BLOOD BY AUTOMATED COUNT: 19.2 % (ref 10–15)
HCT VFR BLD AUTO: 26.5 % (ref 35–47)
HGB BLD-MCNC: 8.3 G/DL (ref 11.7–15.7)
MCH RBC QN AUTO: 35 PG (ref 26.5–33)
MCHC RBC AUTO-ENTMCNC: 31.3 G/DL (ref 31.5–36.5)
MCV RBC AUTO: 112 FL (ref 78–100)
PLATELET # BLD AUTO: 409 10E3/UL (ref 150–450)
RBC # BLD AUTO: 2.37 10E6/UL (ref 3.8–5.2)
WBC # BLD AUTO: 6.6 10E3/UL (ref 4–11)

## 2023-10-10 PROCEDURE — 85014 HEMATOCRIT: CPT | Performed by: NURSE PRACTITIONER

## 2023-10-10 PROCEDURE — P9604 ONE-WAY ALLOW PRORATED TRIP: HCPCS | Performed by: NURSE PRACTITIONER

## 2023-10-10 PROCEDURE — 36415 COLL VENOUS BLD VENIPUNCTURE: CPT | Performed by: NURSE PRACTITIONER

## 2023-10-16 ENCOUNTER — TRANSITIONAL CARE UNIT VISIT (OUTPATIENT)
Dept: GERIATRICS | Facility: CLINIC | Age: 83
End: 2023-10-16
Payer: COMMERCIAL

## 2023-10-16 VITALS
BODY MASS INDEX: 25.71 KG/M2 | HEIGHT: 64 IN | TEMPERATURE: 97.2 F | OXYGEN SATURATION: 92 % | HEART RATE: 68 BPM | WEIGHT: 150.6 LBS | SYSTOLIC BLOOD PRESSURE: 159 MMHG | DIASTOLIC BLOOD PRESSURE: 74 MMHG | RESPIRATION RATE: 18 BRPM

## 2023-10-16 DIAGNOSIS — I48.0 PAF (PAROXYSMAL ATRIAL FIBRILLATION) (H): ICD-10-CM

## 2023-10-16 DIAGNOSIS — S72.92XD CLOSED FRACTURE OF LEFT FEMUR WITH ROUTINE HEALING, UNSPECIFIED FRACTURE MORPHOLOGY, UNSPECIFIED PORTION OF FEMUR, SUBSEQUENT ENCOUNTER: Primary | ICD-10-CM

## 2023-10-16 DIAGNOSIS — R53.81 PHYSICAL DECONDITIONING: ICD-10-CM

## 2023-10-16 DIAGNOSIS — I10 ESSENTIAL HYPERTENSION: ICD-10-CM

## 2023-10-16 DIAGNOSIS — D62 ACUTE BLOOD LOSS ANEMIA: ICD-10-CM

## 2023-10-16 DIAGNOSIS — Z86.718 HISTORY OF DEEP VENOUS THROMBOSIS: ICD-10-CM

## 2023-10-16 DIAGNOSIS — Z86.73 HISTORY OF CVA (CEREBROVASCULAR ACCIDENT): ICD-10-CM

## 2023-10-16 DIAGNOSIS — I09.1 RHEUMATIC DISEASE OF HEART VALVE: ICD-10-CM

## 2023-10-16 DIAGNOSIS — Z87.81 S/P ORIF (OPEN REDUCTION INTERNAL FIXATION) FRACTURE: ICD-10-CM

## 2023-10-16 DIAGNOSIS — Z98.890 S/P ORIF (OPEN REDUCTION INTERNAL FIXATION) FRACTURE: ICD-10-CM

## 2023-10-16 DIAGNOSIS — I50.32 CHRONIC HEART FAILURE WITH PRESERVED EJECTION FRACTION (H): ICD-10-CM
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
## 2023-10-16 DIAGNOSIS — M80.00XD AGE-RELATED OSTEOPOROSIS WITH CURRENT PATHOLOGICAL FRACTURE WITH ROUTINE HEALING, SUBSEQUENT ENCOUNTER: ICD-10-CM

## 2023-10-16 DIAGNOSIS — D64.9 CHRONIC ANEMIA: ICD-10-CM

## 2023-10-16 DIAGNOSIS — I27.20 PULMONARY HYPERTENSION (H): ICD-10-CM

## 2023-10-16 PROCEDURE — 99309 SBSQ NF CARE MODERATE MDM 30: CPT | Performed by: PHYSICIAN ASSISTANT

## 2023-10-16 NOTE — PROGRESS NOTES
"[facility]IATR[facility]    Chief Complaint   Patient presents with    RECHECK     HPI:  Zunilda Clark is a 83 year old  (1940), who is being seen today for an episodic care visit at: [facility] (TCU) [[id number]].     Summary per prior provider: Patient was hospitalized at [facility] from September 26 through October 1, 2023 for acute left femoral periprosthetic fracture sustained after mechanical fall.  She was evaluated by orthopedic service and underwent ORIF on September 27, 2023.  EBL was 200 mL and postop hemoglobin dropped to 7.9.  Anticoagulation with warfarin was resumed after orthopedic surgery with enoxaparin bridge.  TCU was recommended per therapies.     Today, pt is seen in follow-up. She is sitting up in recliner at bedside on interview. States her legs have been swollen lately despite trying to elevate them when sitting. When asked, she does admit to mild shortness of breath with exertion. Does not like compression stockings.     On review of facility chart, pt's weight is up nearly 5# since admission, 2# since last provider visit.    Allergies, and PMH/PSH reviewed in [employer] today.  REVIEW OF SYSTEMS:  4 point ROS including Respiratory, CV, GI and , other than that noted in the HPI,  is negative    Objective:   BP (!) 159/74   Pulse 68   Temp 97.2  F (36.2  C)   Resp 18   Ht 1.626 m (5' 4\")   Wt 68.3 kg (150 lb 9.6 oz)   LMP  (LMP Unknown)   SpO2 92%   BMI 25.85 kg/m    GEN: well-developed, elderly female who appears comfortable  HEENT: NCAT, EOM intact bilaterally, sclera clear, conjunctiva normal, nose & mouth patent, mucous membranes moist  CHEST: lungs with fine crackles in bilateral bases, no increased work of breathing, no wheeze  HEART: RRR, S1 & S2, +systolic murmur  ABD: soft, nontender, nondistended, no guarding or rigidity, +BS in all 4 quadrants  MSK: AROM bilateral UE/LE, pedal & radial pulses 2+ bilaterally  NEURO: awake, alert, oriented to name, " place, and time. CN II-XII grossly intact. Sensation grossly intact to light touch.   SKIN: warm & dry without rash, 2+ pitting bilateral pedal edema    Recent labs in EPIC reviewed by me today.     Assessment/Plan:    S/P fall  Left femur periprosthetic fx s/p ORIF with placement of retrograde femoral nail 9/27/2023  Physical deconditioning  Impaired mobility and ADLs  Generalized muscle weakness  Pain controlled at present.  -Pain control with tylenol 1000mg TID, lidoderm, hydroxyzine 25mg TID, oxycodone 2.5mg q4h PRN  -DVT proph with chronic warfarin  -PT/OT continuing  -Follow up with orthopedics as directed    Acute on chronic anemia  Baseline Hgb 11-12. Postoperatively down to 7.9, did not require transfusion. Most recent value 8.3.  -Ferrous sulfate 325mg daily    Hypertension  Chronic, stable.  -Continue amlodipine-valsartan 5-160mg/d, amlodipine 2.5mg/d    PAF/atrial flutter s/p ablation 2011  AVB s/p PPM  Hx DVT, CVA  On chronic AC with warfarin.  -Continues on warfarin as ordered, goal INR 2-3    HFpEF  Severe pulmonary hypertension  Rheumatic valvular HD s/p tricuspid valve annuloplasty, bioprosthetic MVR 2007 with subsequent redo bioprosthetic MVR 2014  Moderate TR  Mod-severe aortic stenosis  Pt appears volume overloaded today with weight gain, ongoing BLE edema, crackles in bilateral bases, and mild SOB.  -Resume torsemide 10mg/d  -[facility] 10/18  -Continue metoprolol 50mg BID, HTN mgmt as above  -Daily weights  -Follow with cardiology as scheduled    Hypothyroidism  Chronic, stable.  -Continue levothyroxine 112mcg/d    Rheumatoid arthritis  Chronic, stable.  -Continue methotrexate 20mg/d, folic acid 1mg/d    CKD-3  Baseline Cr 0.9-1.1.  -Monitor Cr periodically    GERD  Chronic, stable.  -Continue famotidine 40mg/d    Gout  Chronic, stable.  -Continue allopurinol 100mg BID    MED REC REQUIRED  Post Medication Reconciliation Status: patient was not discharged from an inpatient facility or  TCU      Electronically signed by: Brandon Ferrer PA-C

## 2023-10-17 ENCOUNTER — LAB REQUISITION (OUTPATIENT)
Dept: LAB | Facility: CLINIC | Age: 83
End: 2023-10-17

## 2023-10-17 ENCOUNTER — TELEPHONE (OUTPATIENT)
Dept: INTERNAL MEDICINE | Facility: CLINIC | Age: 83
End: 2023-10-17
Payer: COMMERCIAL

## 2023-10-17 ENCOUNTER — TELEPHONE (OUTPATIENT)
Dept: CARDIOLOGY | Facility: CLINIC | Age: 83
End: 2023-10-17
Payer: COMMERCIAL

## 2023-10-17 DIAGNOSIS — N18.9 CHRONIC KIDNEY DISEASE, UNSPECIFIED: ICD-10-CM

## 2023-10-17 NOTE — TELEPHONE ENCOUNTER
Fax received from [facility] on [facility] - Physician Plan of Care  10/17/23 for review and signature.  Put in Dr. Huang's in basket.

## 2023-10-17 NOTE — TELEPHONE ENCOUNTER
Received VM from pt's daughter Tenisha, pt is currently at a rehab hospital, so she won't be home tomorrow when her remote check is scheduled.     Called Tenisha back. Told her we can reschedule the remote check, or if the remote monitor can be brought to her rehab she can just do it there. Tenisha said pt will likely be in TCU until Friday this week, then she will be eventually going to a new assisted living facility, but that may not be set in place until late next week. We rescheduled the remote check for 10/30, Tenisha thinks pt should be settled in her new place by then, and I told Tenisha we can always reschedule again if for some reason things are not settled by then. Tenisha agrees with this plan.

## 2023-10-18 ENCOUNTER — DOCUMENTATION ONLY (OUTPATIENT)
Dept: OTHER | Facility: CLINIC | Age: 83
End: 2023-10-18

## 2023-10-18 ENCOUNTER — TELEPHONE (OUTPATIENT)
Dept: GERIATRICS | Facility: CLINIC | Age: 83
End: 2023-10-18

## 2023-10-18 LAB
ANION GAP SERPL CALCULATED.3IONS-SCNC: 9 MMOL/L (ref 7–15)
BUN SERPL-MCNC: 19.4 MG/DL (ref 8–23)
CALCIUM SERPL-MCNC: 9.1 MG/DL (ref 8.8–10.2)
CHLORIDE SERPL-SCNC: 107 MMOL/L (ref 98–107)
CREAT SERPL-MCNC: 0.99 MG/DL (ref 0.51–0.95)
DEPRECATED HCO3 PLAS-SCNC: 25 MMOL/L (ref 22–29)
EGFRCR SERPLBLD CKD-EPI 2021: 57 ML/MIN/1.73M2
GLUCOSE SERPL-MCNC: 84 MG/DL (ref 70–99)
POTASSIUM SERPL-SCNC: 4.6 MMOL/L (ref 3.4–5.3)
SODIUM SERPL-SCNC: 141 MMOL/L (ref 135–145)

## 2023-10-18 PROCEDURE — 36415 COLL VENOUS BLD VENIPUNCTURE: CPT | Performed by: INTERNAL MEDICINE

## 2023-10-18 PROCEDURE — 80048 BASIC METABOLIC PNL TOTAL CA: CPT | Performed by: INTERNAL MEDICINE

## 2023-10-18 PROCEDURE — P9604 ONE-WAY ALLOW PRORATED TRIP: HCPCS | Performed by: INTERNAL MEDICINE

## 2023-10-18 NOTE — TELEPHONE ENCOUNTER
[facility] Geriatrics Triage Nurse INR     Provider: Brandon Ferrer PA-C  Facility: [facility]  Facility Type:  TCU    Caller: Marva   Call Back Number: [phone]  Reason for call: INR  Diagnosis/Goal: A. Fib    Todays INR: 2.5  Last INR 10/16  2.6 2.5mg x1 and 3mg x1   10/13  1.9  3mg every day   10/9  1.6  2.5mg every day     Heparin/Lovenox:  No  Currently on ABX?: No  Other interacting medication:  None  Missed or refused doses: No    Verbal Order/Direction given by Provider:   Warfarin 2.5mg M, Th, Sa and 3mg AOD. Repeat INR Monday 10/23    Provider Giving Order:  Brandon Ferrer PA-C    Verbal Order given to: Marva Watson RN

## 2023-10-19 ENCOUNTER — TELEPHONE (OUTPATIENT)
Dept: INTERNAL MEDICINE | Facility: CLINIC | Age: 83
End: 2023-10-19
Payer: COMMERCIAL

## 2023-10-19 ENCOUNTER — DOCUMENTATION ONLY (OUTPATIENT)
Dept: ANTICOAGULATION | Facility: CLINIC | Age: 83
End: 2023-10-19
Payer: COMMERCIAL

## 2023-10-19 DIAGNOSIS — Z95.3 S/P MITRAL VALVE REPLACEMENT WITH BIOPROSTHETIC VALVE: Primary | ICD-10-CM

## 2023-10-19 DIAGNOSIS — I48.92 ATRIAL FIBRILLATION AND FLUTTER (H): ICD-10-CM

## 2023-10-19 DIAGNOSIS — I48.91 ATRIAL FIBRILLATION AND FLUTTER (H): ICD-10-CM

## 2023-10-19 PROBLEM — J81.0 ACUTE PULMONARY EDEMA (H): Status: RESOLVED | Noted: 2023-09-26 | Resolved: 2023-10-19

## 2023-10-19 NOTE — PROGRESS NOTES
ANTICOAGULATION CLINIC REFERRAL RENEWAL REQUEST       An annual renewal order is required for all patients referred to [facility].?  Please review and sign the pended referral order for Zunilda Clark.       ANTICOAGULATION SUMMARY      Warfarin indication(s)   Atrial Fibrillation and bioprosthetic mitral valve    Mechanical heart valve present?  NO       Current goal range   INR: 2.0-3.0     Goal appropriate for indication? Goal INR 2-3, standard for indication(s) above     Time in Therapeutic Range (TTR)  (Goal > 60%) 85.1%       Office visit with referring provider's group within last year yes on 9/5/23       Екатерина Mahan RN  [facility]

## 2023-10-19 NOTE — TELEPHONE ENCOUNTER
Patient is moving to an assisted living facility on 10/23/2023. Patient's daughter is calling asking if PCP would sign off on the necessity of having bed rails on the hospital bed she was able to get for the patient. She says the patient would benefit from the rails due to needing occasional incline to help her when she feels short of breath. She is going to ask the assisted living facility to send us a request for information as well.

## 2023-10-20 ENCOUNTER — TRANSITIONAL CARE UNIT VISIT (OUTPATIENT)
Dept: GERIATRICS | Facility: CLINIC | Age: 83
End: 2023-10-20
Payer: COMMERCIAL

## 2023-10-20 VITALS
RESPIRATION RATE: 18 BRPM | OXYGEN SATURATION: 94 % | TEMPERATURE: 97.2 F | HEIGHT: 64 IN | WEIGHT: 150 LBS | DIASTOLIC BLOOD PRESSURE: 69 MMHG | HEART RATE: 63 BPM | SYSTOLIC BLOOD PRESSURE: 118 MMHG | BODY MASS INDEX: 25.61 KG/M2

## 2023-10-20 DIAGNOSIS — I48.92 ATRIAL FIBRILLATION AND FLUTTER (H): ICD-10-CM

## 2023-10-20 DIAGNOSIS — I10 BENIGN ESSENTIAL HYPERTENSION: ICD-10-CM

## 2023-10-20 DIAGNOSIS — I48.91 ATRIAL FIBRILLATION AND FLUTTER (H): ICD-10-CM

## 2023-10-20 DIAGNOSIS — Z96.652 STATUS POST TOTAL LEFT KNEE REPLACEMENT: Primary | ICD-10-CM

## 2023-10-20 DIAGNOSIS — I50.9 CONGESTIVE HEART FAILURE, UNSPECIFIED HF CHRONICITY, UNSPECIFIED HEART FAILURE TYPE (H): ICD-10-CM

## 2023-10-20 DIAGNOSIS — D62 ABLA (ACUTE BLOOD LOSS ANEMIA): ICD-10-CM

## 2023-10-20 DIAGNOSIS — D62 ACUTE BLOOD LOSS ANEMIA: ICD-10-CM

## 2023-10-20 DIAGNOSIS — N18.1 CKD (CHRONIC KIDNEY DISEASE) STAGE 1, GFR 90 ML/MIN OR GREATER: ICD-10-CM

## 2023-10-20 PROBLEM — K21.9 GASTROESOPHAGEAL REFLUX DISEASE: Status: ACTIVE | Noted: 2020-04-07

## 2023-10-20 PROCEDURE — 99316 NF DSCHRG MGMT 30 MIN+: CPT | Performed by: NURSE PRACTITIONER

## 2023-10-20 RX ORDER — LIDOCAINE 4 G/G
1 PATCH TOPICAL EVERY 24 HOURS
Qty: 3 PATCH | Refills: 0 | Status: SHIPPED | OUTPATIENT
Start: 2023-10-20 | End: 2024-03-18

## 2023-10-20 RX ORDER — WARFARIN SODIUM 2.5 MG/1
2.5 TABLET ORAL DAILY
Qty: 30 TABLET | Refills: 0 | Status: SHIPPED | OUTPATIENT
Start: 2023-10-20 | End: 2023-11-27

## 2023-10-20 NOTE — TELEPHONE ENCOUNTER
Will close this enc, since no return # listed and pt has 4 daughters.   New enc will be opened once form received.

## 2023-10-20 NOTE — LETTER
10/20/2023        RE: Zunilda Clark  [address]  [facility] [address]                                                                                                             [facility]      Code Status:  DNR/DNI  Visit Type: Nursing Home Acute     Facility:    (TCU) [[id number]]  PCP:  Yunior Huang  [phone]       Admission Date to our Facility: 10/1/2023   Discharge Date from our Facility: 10/23/2023    Discharge Diagnosis:    Status post total left knee replacement  Acute blood loss anemia  Benign essential hypertension  Atrial fibrillation and flutter (H  Congestive heart failure, unspecified HF chronicity, unspecified heart failure type (H)  CKD (chronic kidney disease) stage 1, GFR 90 ml/min or greater  ABLA (acute blood loss anemia)    History of Present Illness: Zunilda Clark is a 83 year old female with a past medical hsitory for HtN, DVT, CHF, CKD, CVA, PAD, and anemia. She was recently hospitalized after a fall in which he sustained left femoral perioprosthetic fracture.  She underwent an ORIF.  She had post op ABLA and discharge hgb was 8.4.         Skilled Nursing Facility Course: aj at the TCU she did improve her strength and endurance however for safe mobility she still requires a wheelchair.     S/p ORIF:   Pain controlled.  She has only been taking oxycodone at  and states she would like it dc'd.  I did discontinue hydroxyzine also.  Advised to continue to ice thigh often.  She will be transitioning to BA. Follow up with orthopedic as directed.   Edema of LE, declines compression hose. On chronic Warfarin for DVT hx.      Anemia:  MCV elevated, on folic acid.  Would recommend checking folate, B12 and hgb at next PCP appointment. Continue with folic acid.  Last hgb 8.3.  continue with daily ferrous sulftate    HTN:  Stable, continue with home Amlodipine-valsartan and additional amlodipine and metoprolol.     Afib:   Rate  controlled, continue with metoprolol, continue with Warfarin.      HFpEF:   Compensated, continue with torsemide    RA  On methotrexate    Hypothyroidism:  On levothyroxine    CKD:  Last Cr .0.99, monitor          Discharge Plan:  stable to discharge to [facility] with home care services.  Advised to follow up with orthopedics and PCP.     Discharge Medications:   Current Outpatient Medications   Medication Sig Dispense Refill     acetaminophen (TYLENOL) 500 MG tablet Take 1,000 mg by mouth 3 times daily       allopurinol (ZYLOPRIM) 100 MG tablet Take 100 mg by mouth 2 times daily       amLODIPine (NORVASC) 2.5 MG tablet Take 1 tablet (2.5 mg) by mouth daily 90 tablet 3     amLODIPine-valsartan (EXFORGE) 5-160 MG tablet Take 1 tablet by mouth daily 90 tablet 3     amoxicillin (AMOXIL) 500 MG tablet Take 2000mg (4 tablets of 500mg each) approx 30 min to 1 hour prior to any dental procedures 4 tablet 3     calcium citrate (CALCITRATE) 950 MG tablet Take 1 tablet by mouth daily        Dorzolamide HCl-Timolol Mal (COSOPT OP) Apply to eye 2 times daily Place one drop into each eye twice daily       famotidine (PEPCID) 40 MG tablet TAKE 1 TABLET BY MOUTH DAILY 90 tablet 3     ferrous sulfate (FEROSUL) 325 (65 Fe) MG tablet Take 325 mg by mouth daily (with breakfast)       folic acid (FOLVITE) 1 MG tablet Take 1 mg by mouth daily       levothyroxine (SYNTHROID/LEVOTHROID) 112 MCG tablet Take 1 tablet (112 mcg) by mouth daily 100 tablet 3     Lidocaine (LIDOCARE) 4 % Patch Place 1 patch onto the skin every 24 hours To prevent lidocaine toxicity, patient should be patch free for 12 hrs daily.  Apply to left leg       METHOTREXATE SODIUM IJ Inject 0.8 mLs as directed once a week Thursdays       metoprolol tartrate (LOPRESSOR) 50 MG tablet Take 1 tablet (50 mg) by mouth 2 times daily 180 tablet 3     multivitamin w/minerals (THERA-VIT-M) tablet Take 1 tablet by mouth daily Without iron       senna-docusate (SENOKOT-S/PERICOLACE)  "8.6-50 MG tablet Take 1-2 tablets by mouth 2 times daily Take while on oral narcotics to prevent or treat constipation. (Patient taking differently: Take 1 tablet by mouth 2 times daily Take while on oral narcotics to prevent or treat constipation.) 30 tablet 0     torsemide (DEMADEX) 10 MG tablet Take 10 mg by mouth daily Hold for SBP less than 110       VITAMIN D, CHOLECALCIFEROL, PO Take 1,000 Units by mouth daily       warfarin ANTICOAGULANT (COUMADIN) 2.5 MG tablet Take 2.5 mg by mouth daily Hold for INR more than 3  Recheck INR on October 13         Review of Systems   Patient denies fever, chills, headache, lightheadedness, dizziness, rhinorrhea, cough, congestion, shortness of breath, chest pain, palpitations, abdominal pain, n/v, diarrhea, constipation, change in appetite, change in sleep pattern, dysuria, frequency, burning or pain with urination.  Other than stated in HPI all other review of systems is negative.       Physical Exam  Vital signs:/69   Pulse 63   Temp 97.2  F (36.2  C)   Resp 18   Ht 1.626 m (5' 4\")   Wt 68 kg (150 lb)   LMP  (LMP Unknown)   SpO2 94%   BMI 25.75 kg/m     GENERAL APPEARANCE: Well developed, well nourished, in no acute distress.  HEENT: normocephalic, atraumatic  sclerae anicteric, conjunctivae clear and moist, EOM intact  LUNGS: Lung sounds CTA, no adventitious sounds, respiratory effort normal.  CARD: RRR, S1, S2, without murmurs, gallops, rubs  ABD: Soft, nondistended and nontender with normal bowel sounds.   MSK: Muscle strength and tone were equal bilaterally. Moves all extremities easily and intentionally.   EXTREMITIES: soft nonpitting edema BLE, knee edema without erythema.  No calf tenderness  NEURO: Alert and oriented x 3. Face is symmetric.  SKIN: Inspection of the skin reveals no rashes, ulcerations or petechiae.  PSYCH: euthymic        Labs:    Last Comprehensive Metabolic Panel:  Lab Results   Component Value Date     10/18/2023    POTASSIUM " 4.6 10/18/2023    CHLORIDE 107 10/18/2023    CO2 25 10/18/2023    ANIONGAP 9 10/18/2023    GLC 84 10/18/2023    BUN 19.4 10/18/2023    CR 0.99 (H) 10/18/2023    GFRESTIMATED 57 (L) 10/18/2023    BRICE 9.1 10/18/2023       Lab Results   Component Value Date    WBC 6.6 10/10/2023    WBC 7.1 06/22/2021     Lab Results   Component Value Date    RBC 2.37 10/10/2023    RBC 3.75 06/22/2021     Lab Results   Component Value Date    HGB 8.3 10/10/2023    HGB 12.5 06/22/2021     Lab Results   Component Value Date    HCT 26.5 10/10/2023    HCT 38.6 06/22/2021     Lab Results   Component Value Date     10/10/2023     06/22/2021     Lab Results   Component Value Date    MCH 35.0 10/10/2023    MCH 33.3 06/22/2021     Lab Results   Component Value Date    MCHC 31.3 10/10/2023    MCHC 32.4 06/22/2021     Lab Results   Component Value Date    RDW 19.2 10/10/2023    RDW 17.0 06/22/2021     Lab Results   Component Value Date     10/10/2023     06/22/2021               MEDICAL EQUIPMENT NEEDS:  Patient has a mobility limitation that significantly impairs her ability to participate in mobility-related ADLS. Patient has generalized weakness, CHF and gait abnormality related to recent ORIF.  This mobility limitation cannot be sufficiently and safely resolved by use of a cane, walker or crutches, as patient cannot walk  or transfer independently.  Patient has the mental and functional capacity to safely use a manual wheelchair.  Her home has the space to maneuver a wheelchair.  The patient's functional mobility deficit can be sufficiently resolved by use of a manual wheelchair.  Patient has not expressed an unwillingness to use the manual wheelchair that is provided in the home. Patient requires an  bilateral footrests, height adjustable arm rests, seat cushion and anti-tippers.  Patient would also greatly benefit from a standard wheelchair to assist in transportation to medical appointments.   I certify that,  based on my findings, a standard wheelchair is medically necessary for this patient.  Length of need is lifetime.         DISCHARGE PLAN/FACE TO FACE:  I certify that services are/were furnished while this patient was under the care of a physician and that a physician or an allowed non-physician practitioner (NPP), had a face-to-face encounter that meets the physician face-to-face encounter requirements. The encounter was in whole, or in part, related to the primary reason for home health. The patient is confined to his/her home and needs intermittent skilled nursing, physical therapy, speech-language pathology, or the continued need for occupational therapy. A plan of care has been established by a physician and is periodically reviewed by a physician.    I certify that this patient is under my care and that I, or a nurse practitioner or physician's assistant working with me, had a face-to-face encounter that meets the physician face-to-face encounter requirements with this patient.   Date of Face-to-Face Encounter: 10/20/2023    I certify that, based on my findings, the following services are medically necessary home health services:  PT/OT    My clinical findings support the need for the above skilled services because:  RN for medication education, PT/OT for ongoing strengthening and endurance    This patient is homebound because:  She requires maximum effort in order to get out into the community on a regular basis.     The patient is, or has been, under my care and I have initiated the establishment of the plan of care. This patient will be followed by a physician who will periodically review the plan of care.    35 total minutes spent with patient and SW in coordinating her discharge plan of care.     Electronically signed by: Chritsiana Kaba NP                                                                         Sincerely,        Christiana Kaba NP

## 2023-10-20 NOTE — LETTER
10/20/2023        RE: Zunilda Clark  [address]  [facility] [address]                                                                                                             [facility]      Code Status:  DNR/DNI  Visit Type: Nursing Home Acute     Facility:   [facility] (TCU) [[id number]]  PCP:  Yunior Huang  [phone]       Admission Date to our Facility: 10/1/2023   Discharge Date from our Facility: 10/23/2023    Discharge Diagnosis:    Status post total left knee replacement  Acute blood loss anemia  Benign essential hypertension  Atrial fibrillation and flutter (H  Congestive heart failure, unspecified HF chronicity, unspecified heart failure type (H)  CKD (chronic kidney disease) stage 1, GFR 90 ml/min or greater  ABLA (acute blood loss anemia)    History of Present Illness: Zunilda Clark is a 83 year old female with a past medical hsitory for HtN, DVT, CHF, CKD, CVA, PAD, and anemia. She was recently hospitalized after a fall in which he sustained left femoral perioprosthetic fracture.  She underwent an ORIF.  She had post op ABLA and discharge hgb was 8.4.         Skilled Nursing Facility Course: aj at the TCU she did improve her strength and endurance however for safe mobility she still requires a wheelchair.     S/p ORIF:   Pain controlled.  She has only been taking oxycodone at  and states she would like it dc'd.  I did discontinue hydroxyzine also.  Advised to continue to ice thigh often.  She will be transitioning to BA. Follow up with orthopedic as directed.   Edema of LE, declines compression hose. On chronic Warfarin for DVT hx.      Anemia:  MCV elevated, on folic acid.  Would recommend checking folate, B12 and hgb at next PCP appointment. Continue with folic acid.  Last hgb 8.3.  continue with daily ferrous sulftate    HTN:  Stable, continue with home Amlodipine-valsartan and additional amlodipine and metoprolol.     Afib:   Rate  controlled, continue with metoprolol, continue with Warfarin.      HFpEF:   Compensated, continue with torsemide    RA  On methotrexate    Hypothyroidism:  On levothyroxine    CKD:  Last Cr .0.99, monitor          Discharge Plan:  stable to discharge to [facility] with home care services.  Advised to follow up with orthopedics and PCP.     Discharge Medications:   Current Outpatient Medications   Medication Sig Dispense Refill     acetaminophen (TYLENOL) 500 MG tablet Take 1,000 mg by mouth 3 times daily       allopurinol (ZYLOPRIM) 100 MG tablet Take 100 mg by mouth 2 times daily       amLODIPine (NORVASC) 2.5 MG tablet Take 1 tablet (2.5 mg) by mouth daily 90 tablet 3     amLODIPine-valsartan (EXFORGE) 5-160 MG tablet Take 1 tablet by mouth daily 90 tablet 3     amoxicillin (AMOXIL) 500 MG tablet Take 2000mg (4 tablets of 500mg each) approx 30 min to 1 hour prior to any dental procedures 4 tablet 3     calcium citrate (CALCITRATE) 950 MG tablet Take 1 tablet by mouth daily        Dorzolamide HCl-Timolol Mal (COSOPT OP) Apply to eye 2 times daily Place one drop into each eye twice daily       famotidine (PEPCID) 40 MG tablet TAKE 1 TABLET BY MOUTH DAILY 90 tablet 3     ferrous sulfate (FEROSUL) 325 (65 Fe) MG tablet Take 325 mg by mouth daily (with breakfast)       folic acid (FOLVITE) 1 MG tablet Take 1 mg by mouth daily       levothyroxine (SYNTHROID/LEVOTHROID) 112 MCG tablet Take 1 tablet (112 mcg) by mouth daily 100 tablet 3     Lidocaine (LIDOCARE) 4 % Patch Place 1 patch onto the skin every 24 hours To prevent lidocaine toxicity, patient should be patch free for 12 hrs daily.  Apply to left leg       METHOTREXATE SODIUM IJ Inject 0.8 mLs as directed once a week Thursdays       metoprolol tartrate (LOPRESSOR) 50 MG tablet Take 1 tablet (50 mg) by mouth 2 times daily 180 tablet 3     multivitamin w/minerals (THERA-VIT-M) tablet Take 1 tablet by mouth daily Without iron       senna-docusate (SENOKOT-S/PERICOLACE)  "8.6-50 MG tablet Take 1-2 tablets by mouth 2 times daily Take while on oral narcotics to prevent or treat constipation. (Patient taking differently: Take 1 tablet by mouth 2 times daily Take while on oral narcotics to prevent or treat constipation.) 30 tablet 0     torsemide (DEMADEX) 10 MG tablet Take 10 mg by mouth daily Hold for SBP less than 110       VITAMIN D, CHOLECALCIFEROL, PO Take 1,000 Units by mouth daily       warfarin ANTICOAGULANT (COUMADIN) 2.5 MG tablet Take 2.5 mg by mouth daily Hold for INR more than 3  Recheck INR on October 13         Review of Systems   Patient denies fever, chills, headache, lightheadedness, dizziness, rhinorrhea, cough, congestion, shortness of breath, chest pain, palpitations, abdominal pain, n/v, diarrhea, constipation, change in appetite, change in sleep pattern, dysuria, frequency, burning or pain with urination.  Other than stated in HPI all other review of systems is negative.       Physical Exam  Vital signs:/69   Pulse 63   Temp 97.2  F (36.2  C)   Resp 18   Ht 1.626 m (5' 4\")   Wt 68 kg (150 lb)   LMP  (LMP Unknown)   SpO2 94%   BMI 25.75 kg/m     GENERAL APPEARANCE: Well developed, well nourished, in no acute distress.  HEENT: normocephalic, atraumatic  sclerae anicteric, conjunctivae clear and moist, EOM intact  LUNGS: Lung sounds CTA, no adventitious sounds, respiratory effort normal.  CARD: RRR, S1, S2, without murmurs, gallops, rubs  ABD: Soft, nondistended and nontender with normal bowel sounds.   MSK: Muscle strength and tone were equal bilaterally. Moves all extremities easily and intentionally.   EXTREMITIES: soft nonpitting edema BLE, knee edema without erythema.  No calf tenderness  NEURO: Alert and oriented x 3. Face is symmetric.  SKIN: Inspection of the skin reveals no rashes, ulcerations or petechiae.  PSYCH: euthymic        Labs:    Last Comprehensive Metabolic Panel:  Lab Results   Component Value Date     10/18/2023    POTASSIUM " 4.6 10/18/2023    CHLORIDE 107 10/18/2023    CO2 25 10/18/2023    ANIONGAP 9 10/18/2023    GLC 84 10/18/2023    BUN 19.4 10/18/2023    CR 0.99 (H) 10/18/2023    GFRESTIMATED 57 (L) 10/18/2023    BRICE 9.1 10/18/2023       Lab Results   Component Value Date    WBC 6.6 10/10/2023    WBC 7.1 06/22/2021     Lab Results   Component Value Date    RBC 2.37 10/10/2023    RBC 3.75 06/22/2021     Lab Results   Component Value Date    HGB 8.3 10/10/2023    HGB 12.5 06/22/2021     Lab Results   Component Value Date    HCT 26.5 10/10/2023    HCT 38.6 06/22/2021     Lab Results   Component Value Date     10/10/2023     06/22/2021     Lab Results   Component Value Date    MCH 35.0 10/10/2023    MCH 33.3 06/22/2021     Lab Results   Component Value Date    MCHC 31.3 10/10/2023    MCHC 32.4 06/22/2021     Lab Results   Component Value Date    RDW 19.2 10/10/2023    RDW 17.0 06/22/2021     Lab Results   Component Value Date     10/10/2023     06/22/2021               MEDICAL EQUIPMENT NEEDS:  Patient has a mobility limitation that significantly impairs her ability to participate in mobility-related ADLS. Patient has generalized weakness, CHF and gait abnormality related to recent ORIF.  This mobility limitation cannot be sufficiently and safely resolved by use of a cane, walker or crutches, as patient cannot walk  or transfer independently.  Patient has the mental and functional capacity to safely use a manual wheelchair.  Her home has the space to maneuver a wheelchair.  The patient's functional mobility deficit can be sufficiently resolved by use of a manual wheelchair.  Patient has not expressed an unwillingness to use the manual wheelchair that is provided in the home. Patient requires an  bilateral footrests, height adjustable arm rests, seat cushion and anti-tippers.  Patient would also greatly benefit from a standard wheelchair to assist in transportation to medical appointments.   I certify that,  based on my findings, a standard wheelchair is medically necessary for this patient.  Length of need is lifetime.         DISCHARGE PLAN/FACE TO FACE:  I certify that services are/were furnished while this patient was under the care of a physician and that a physician or an allowed non-physician practitioner (NPP), had a face-to-face encounter that meets the physician face-to-face encounter requirements. The encounter was in whole, or in part, related to the primary reason for home health. The patient is confined to his/her home and needs intermittent skilled nursing, physical therapy, speech-language pathology, or the continued need for occupational therapy. A plan of care has been established by a physician and is periodically reviewed by a physician.    I certify that this patient is under my care and that I, or a nurse practitioner or physician's assistant working with me, had a face-to-face encounter that meets the physician face-to-face encounter requirements with this patient.   Date of Face-to-Face Encounter: 10/20/2023    I certify that, based on my findings, the following services are medically necessary home health services:  PT/OT    My clinical findings support the need for the above skilled services because:  RN for medication education, PT/OT for ongoing strengthening and endurance    This patient is homebound because:  She requires maximum effort in order to get out into the community on a regular basis.     The patient is, or has been, under my care and I have initiated the establishment of the plan of care. This patient will be followed by a physician who will periodically review the plan of care.    35 total minutes spent with patient and SW in coordinating her discharge plan of care.     Electronically signed by: Christiana Kaba NP                                                                         Sincerely,        Christiana Kaba NP

## 2023-10-20 NOTE — PROGRESS NOTES
[facility] GERIATRICS      Code Status:  DNR/DNI  Visit Type: Nursing Home Acute     Facility:   [facility] (U) [[id number]]  PCP:  Yunior Huang  [phone]       Admission Date to our Facility: 10/1/2023   Discharge Date from our Facility: 10/23/2023    Discharge Diagnosis:    Status post total left knee replacement  Acute blood loss anemia  Benign essential hypertension  Atrial fibrillation and flutter (H  Congestive heart failure, unspecified HF chronicity, unspecified heart failure type (H)  CKD (chronic kidney disease) stage 1, GFR 90 ml/min or greater  ABLA (acute blood loss anemia)    History of Present Illness: Zunilda Clark is a 83 year old female with a past medical hsitory for HtN, DVT, CHF, CKD, CVA, PAD, and anemia. She was recently hospitalized after a fall in which he sustained left femoral perioprosthetic fracture.  She underwent an ORIF.  She had post op ABLA and discharge hgb was 8.4.         Skilled Nursing Facility Course: wile at the TCU she did improve her strength and endurance however for safe mobility she still requires a wheelchair.     S/p ORIF:   Pain controlled.  She has only been taking oxycodone at  and states she would like it dc'd.  I did discontinue hydroxyzine also.  Advised to continue to ice thigh often.  She will be transitioning to BA. Follow up with orthopedic as directed.   Edema of LE, declines compression hose. On chronic Warfarin for DVT hx.      Anemia:  MCV elevated, on folic acid.  Would recommend checking folate, B12 and hgb at next PCP appointment. Continue with folic acid.  Last hgb 8.3.  continue with daily ferrous sulftate    HTN:  Stable, continue with home Amlodipine-valsartan and additional amlodipine and metoprolol.     Afib:   Rate controlled, continue with metoprolol, continue with Warfarin.      HFpEF:   Compensated, continue with  torsemide    RA  On methotrexate    Hypothyroidism:  On levothyroxine    CKD:  Last Cr .0.99, monitor          Discharge Plan:  stable to discharge to [facility] with home care services.  Advised to follow up with orthopedics and PCP.     Discharge Medications:   Current Outpatient Medications   Medication Sig Dispense Refill    acetaminophen (TYLENOL) 500 MG tablet Take 1,000 mg by mouth 3 times daily      allopurinol (ZYLOPRIM) 100 MG tablet Take 100 mg by mouth 2 times daily      amLODIPine (NORVASC) 2.5 MG tablet Take 1 tablet (2.5 mg) by mouth daily 90 tablet 3    amLODIPine-valsartan (EXFORGE) 5-160 MG tablet Take 1 tablet by mouth daily 90 tablet 3    amoxicillin (AMOXIL) 500 MG tablet Take 2000mg (4 tablets of 500mg each) approx 30 min to 1 hour prior to any dental procedures 4 tablet 3    calcium citrate (CALCITRATE) 950 MG tablet Take 1 tablet by mouth daily       Dorzolamide HCl-Timolol Mal (COSOPT OP) Apply to eye 2 times daily Place one drop into each eye twice daily      famotidine (PEPCID) 40 MG tablet TAKE 1 TABLET BY MOUTH DAILY 90 tablet 3    ferrous sulfate (FEROSUL) 325 (65 Fe) MG tablet Take 325 mg by mouth daily (with breakfast)      folic acid (FOLVITE) 1 MG tablet Take 1 mg by mouth daily      levothyroxine (SYNTHROID/LEVOTHROID) 112 MCG tablet Take 1 tablet (112 mcg) by mouth daily 100 tablet 3    Lidocaine (LIDOCARE) 4 % Patch Place 1 patch onto the skin every 24 hours To prevent lidocaine toxicity, patient should be patch free for 12 hrs daily.  Apply to left leg      METHOTREXATE SODIUM IJ Inject 0.8 mLs as directed once a week Thursdays      metoprolol tartrate (LOPRESSOR) 50 MG tablet Take 1 tablet (50 mg) by mouth 2 times daily 180 tablet 3    multivitamin w/minerals (THERA-VIT-M) tablet Take 1 tablet by mouth daily Without iron      senna-docusate (SENOKOT-S/PERICOLACE) 8.6-50 MG tablet Take 1-2 tablets by mouth 2 times daily Take while on oral narcotics to prevent or treat constipation.  "(Patient taking differently: Take 1 tablet by mouth 2 times daily Take while on oral narcotics to prevent or treat constipation.) 30 tablet 0    torsemide (DEMADEX) 10 MG tablet Take 10 mg by mouth daily Hold for SBP less than 110      VITAMIN D, CHOLECALCIFEROL, PO Take 1,000 Units by mouth daily      warfarin ANTICOAGULANT (COUMADIN) 2.5 MG tablet Take 2.5 mg by mouth daily Hold for INR more than 3  Recheck INR on October 13         Review of Systems   Patient denies fever, chills, headache, lightheadedness, dizziness, rhinorrhea, cough, congestion, shortness of breath, chest pain, palpitations, abdominal pain, n/v, diarrhea, constipation, change in appetite, change in sleep pattern, dysuria, frequency, burning or pain with urination.  Other than stated in HPI all other review of systems is negative.       Physical Exam  Vital signs:/69   Pulse 63   Temp 97.2  F (36.2  C)   Resp 18   Ht 1.626 m (5' 4\")   Wt 68 kg (150 lb)   LMP  (LMP Unknown)   SpO2 94%   BMI 25.75 kg/m     GENERAL APPEARANCE: Well developed, well nourished, in no acute distress.  HEENT: normocephalic, atraumatic  sclerae anicteric, conjunctivae clear and moist, EOM intact  LUNGS: Lung sounds CTA, no adventitious sounds, respiratory effort normal.  CARD: RRR, S1, S2, without murmurs, gallops, rubs  ABD: Soft, nondistended and nontender with normal bowel sounds.   MSK: Muscle strength and tone were equal bilaterally. Moves all extremities easily and intentionally.   EXTREMITIES: soft nonpitting edema BLE, knee edema without erythema.  No calf tenderness  NEURO: Alert and oriented x 3. Face is symmetric.  SKIN: Inspection of the skin reveals no rashes, ulcerations or petechiae.  PSYCH: euthymic        Labs:    Last Comprehensive Metabolic Panel:  Lab Results   Component Value Date     10/18/2023    POTASSIUM 4.6 10/18/2023    CHLORIDE 107 10/18/2023    CO2 25 10/18/2023    ANIONGAP 9 10/18/2023    GLC 84 10/18/2023    BUN 19.4 " 10/18/2023    CR 0.99 (H) 10/18/2023    GFRESTIMATED 57 (L) 10/18/2023    BRICE 9.1 10/18/2023       Lab Results   Component Value Date    WBC 6.6 10/10/2023    WBC 7.1 06/22/2021     Lab Results   Component Value Date    RBC 2.37 10/10/2023    RBC 3.75 06/22/2021     Lab Results   Component Value Date    HGB 8.3 10/10/2023    HGB 12.5 06/22/2021     Lab Results   Component Value Date    HCT 26.5 10/10/2023    HCT 38.6 06/22/2021     Lab Results   Component Value Date     10/10/2023     06/22/2021     Lab Results   Component Value Date    MCH 35.0 10/10/2023    MCH 33.3 06/22/2021     Lab Results   Component Value Date    MCHC 31.3 10/10/2023    MCHC 32.4 06/22/2021     Lab Results   Component Value Date    RDW 19.2 10/10/2023    RDW 17.0 06/22/2021     Lab Results   Component Value Date     10/10/2023     06/22/2021               MEDICAL EQUIPMENT NEEDS:  Patient has a mobility limitation that significantly impairs her ability to participate in mobility-related ADLS. Patient has generalized weakness, CHF and gait abnormality related to recent ORIF.  This mobility limitation cannot be sufficiently and safely resolved by use of a cane, walker or crutches, as patient cannot walk  or transfer independently.  Patient has the mental and functional capacity to safely use a manual wheelchair.  Her home has the space to maneuver a wheelchair.  The patient's functional mobility deficit can be sufficiently resolved by use of a manual wheelchair.  Patient has not expressed an unwillingness to use the manual wheelchair that is provided in the home. Patient requires an  bilateral footrests, height adjustable arm rests, seat cushion and anti-tippers.  Patient would also greatly benefit from a standard wheelchair to assist in transportation to medical appointments.   I certify that, based on my findings, a standard wheelchair is medically necessary for this patient.  Length of need is lifetime.          DISCHARGE PLAN/FACE TO FACE:  I certify that services are/were furnished while this patient was under the care of a physician and that a physician or an allowed non-physician practitioner (NPP), had a face-to-face encounter that meets the physician face-to-face encounter requirements. The encounter was in whole, or in part, related to the primary reason for home health. The patient is confined to his/her home and needs intermittent skilled nursing, physical therapy, speech-language pathology, or the continued need for occupational therapy. A plan of care has been established by a physician and is periodically reviewed by a physician.    I certify that this patient is under my care and that I, or a nurse practitioner or physician's assistant working with me, had a face-to-face encounter that meets the physician face-to-face encounter requirements with this patient.   Date of Face-to-Face Encounter: 10/20/2023    I certify that, based on my findings, the following services are medically necessary home health services:  PT/OT    My clinical findings support the need for the above skilled services because:  RN for medication education, PT/OT for ongoing strengthening and endurance    This patient is homebound because:  She requires maximum effort in order to get out into the community on a regular basis.     The patient is, or has been, under my care and I have initiated the establishment of the plan of care. This patient will be followed by a physician who will periodically review the plan of care.    35 total minutes spent with patient and SW in coordinating her discharge plan of care.     Electronically signed by: Christiana Kaba NP

## 2023-10-23 ENCOUNTER — TELEPHONE (OUTPATIENT)
Dept: INTERNAL MEDICINE | Facility: CLINIC | Age: 83
End: 2023-10-23
Payer: COMMERCIAL

## 2023-10-23 ENCOUNTER — MEDICAL CORRESPONDENCE (OUTPATIENT)
Dept: HEALTH INFORMATION MANAGEMENT | Facility: CLINIC | Age: 83
End: 2023-10-23

## 2023-10-23 NOTE — TELEPHONE ENCOUNTER
Left message to call [phone]. Per below, advised patient to take 2.5 mg warfarin 10/23/23 if she was not able to return call by 6:00 PM.    Per last TCU note for anticoagulation 10/18/23 Verbal Order/Direction given by Provider:   Warfarin 2.5mg M, Th, Sa and 3mg AOD. Repeat INR Monday 10/23.    Rx for 2.5 mg warfarin was sent to [facility] 10/23/23 by TCU with sig to take 1 tablet daily.    Екатерина Mahan, RN, BSN  Anticoagulation Clinic

## 2023-10-23 NOTE — TELEPHONE ENCOUNTER
Reason for Call:  Other call back    Detailed comments: ANTICOAGULATION: Pt wants to talk with Екатерина in INR. Just got out of rehab, has moved to new place in [place], will be managing her own meds, wants to discuss what she should be doing.      Phone Number Patient can be reached at: Home number on file [phone] (home) or Cell number on file:    Telephone Information:   Mobile [phone]     Best Time: ASAP    Can we leave a detailed message on this number?  Forgot to ask    Call taken on 10/23/2023 at 3:21 PM by Laura Kanavel

## 2023-10-24 ENCOUNTER — TELEPHONE (OUTPATIENT)
Dept: INTERNAL MEDICINE | Facility: CLINIC | Age: 83
End: 2023-10-24

## 2023-10-24 ENCOUNTER — MYC MEDICAL ADVICE (OUTPATIENT)
Dept: ANTICOAGULATION | Facility: CLINIC | Age: 83
End: 2023-10-24

## 2023-10-24 ENCOUNTER — ANTICOAGULATION THERAPY VISIT (OUTPATIENT)
Dept: ANTICOAGULATION | Facility: CLINIC | Age: 83
End: 2023-10-24

## 2023-10-24 ENCOUNTER — MEDICAL CORRESPONDENCE (OUTPATIENT)
Dept: HEALTH INFORMATION MANAGEMENT | Facility: CLINIC | Age: 83
End: 2023-10-24

## 2023-10-24 DIAGNOSIS — I48.91 ATRIAL FIBRILLATION AND FLUTTER (H): ICD-10-CM

## 2023-10-24 DIAGNOSIS — I48.92 ATRIAL FIBRILLATION AND FLUTTER (H): ICD-10-CM

## 2023-10-24 DIAGNOSIS — Z95.3 S/P MITRAL VALVE REPLACEMENT WITH BIOPROSTHETIC VALVE: ICD-10-CM

## 2023-10-24 DIAGNOSIS — Z79.01 LONG TERM CURRENT USE OF ANTICOAGULANTS WITH INR GOAL OF 2.0-3.0: Primary | ICD-10-CM

## 2023-10-24 LAB — INR (EXTERNAL): 4.1 (ref 0.9–1.1)

## 2023-10-24 NOTE — TELEPHONE ENCOUNTER
Called patient. She might have Home Care services. Will be calling PCP to see if he will request.  Patient is requesting to have an INR check. Lab INR appointment is scheduled for 10/25/23.   Екатерина Mahan RN, BSN  Anticoagulation Clinic

## 2023-10-24 NOTE — TELEPHONE ENCOUNTER
Returned call to patient, see 10/23/23 Telephone Encounter for details.  Екатерина Mahan, RN, BSN  Anticoagulation Clinic

## 2023-10-24 NOTE — TELEPHONE ENCOUNTER
Patient is 4 weeks left femur fracture/surgery and is receiving OT and PT at home.  Daughter Tenisha calling, she reports patient is in no condition to be leaving home yet and they want order for home care nurse to come out and draw INR on her.      Tenisha doesn't know the name of the home care company but will call back later today with that information,  MARINA Fountain R.N.

## 2023-10-24 NOTE — TELEPHONE ENCOUNTER
Reason for Call:  INR follow up    Detailed comments: Patient is returning call to Екатерина, please call again    Phone Number Patient can be reached at: Cell number on file:    Telephone Information:   Mobile [phone]       Best Time: any    Can we leave a detailed message on this number? YES    Call taken on 10/24/2023 at 8:51 AM by Betty Granger

## 2023-10-24 NOTE — PROGRESS NOTES
ANTICOAGULATION MANAGEMENT     Zunilda SHAW May 83 year old female is on warfarin with supratherapeutic INR result. (Goal INR 2.0-3.0)    Recent labs: (last 7 days)     10/24/23  1713   INR 4.1*       ASSESSMENT     Source(s): Chart Review and Home Care/Facility Nurse     Warfarin doses taken: Warfarin taken as instructed  Diet: No new diet changes identified  Medication/supplement changes: None noted  New illness, injury, or hospitalization: No  Signs or symptoms of bleeding or clotting: No  Previous result: Subtherapeutic  Additional findings: None       PLAN     Recommended plan for no diet, medication or health factor changes affecting INR     Dosing Instructions: hold dose then continue your current warfarin dose with next INR in 1 week       Summary  As of 10/24/2023      Full warfarin instructions:  10/24: Hold; Otherwise 2.5 mg every Mon, Wed, Fri; 3.75 mg all other days   Next INR check:  10/31/2023               Detailed voice message left for Deanne and home care nurse Karie with dosing instructions and follow up date.     Orders given to  Homecare nurse/facility to recheck  Mychart sent    Education provided:   Please call back if any changes to your diet, medications or how you've been taking warfarin  Contact [phone]  with any changes, questions or concerns.     Plan made per [facility] anticoagulation protocol    Isabel Montes RN  Anticoagulation Clinic  10/24/2023    _______________________________________________________________________     Anticoagulation Episode Summary       Current INR goal:  2.0-3.0   TTR:  83.3% (10.9 mo)   Target end date:  Indefinite   Send INR reminders to:  [facility]    Indications    Long term current use of anticoagulants with INR goal of 2.0-3.0 [Z79.01]  Atrial fibrillation and flutter (H) [I48.91  I48.92]  S/P mitral valve replacement with bioprosthetic valve [Z95.3]             Comments:  27 mm Magna bioprosthetic valve (2014)             Anticoagulation  Care Providers       Provider Role Specialty Phone number    Yunior Huang MD Referring Internal Medicine [phone]

## 2023-10-25 NOTE — TELEPHONE ENCOUNTER
Patient Returning Call    Reason for call:  patient called back and is wanting to talk to the anticoagulation nurse     Information relayed to patient:  I will send a message and ask for a callback     Patient has additional questions:  Yes    What are your questions/concerns:  Patient did not get message to skip warfarin last night (10/24) and is wondering what to do now   Please call patients cell phone ([phone])     Could we send this information to you in [employer] or would you prefer to receive a phone call?:   Patient would prefer a phone call   Okay to leave a detailed message?: Yes at Cell number on file:    Telephone Information:   Mobile [phone]

## 2023-10-25 NOTE — TELEPHONE ENCOUNTER
ANTICOAGULATION MANAGEMENT     Zunilda SHAW May 83 year old female is on warfarin with supratherapeutic INR result. (Goal INR [unfilled])    Recent labs: (last 7 days)     10/24/23  1713   INR 4.1*       ASSESSMENT     Source(s): Chart Review and Patient/Caregiver Call     Warfarin doses taken: Warfarin taken as instructed  Diet: No new diet changes identified  Medication/supplement changes: None noted  New illness, injury, or hospitalization: No  Signs or symptoms of bleeding or clotting: No  Previous result: Subtherapeutic  Additional findings: None       PLAN     Recommended plan for no diet, medication or health factor changes affecting INR     Dosing Instructions:  hold tonights dose as patient already took yesterdays dose  with next INR in 1 week       Summary  As of 10/24/2023      Full warfarin instructions:  10/25: Hold; Otherwise 2.5 mg every Mon, Wed, Fri; 3.75 mg all other days   Next INR check:  10/31/2023               Detailed voice message left for Karie home care nurse with dosing instructions and follow up date.     Orders given to  Homecare nurse/facility to recheck    Education provided:   Contact [phone]  with any changes, questions or concerns.     Plan made per [facility] anticoagulation protocol    Isabel Montes RN  Anticoagulation Clinic  10/25/2023    _______________________________________________________________________     Anticoagulation Episode Summary       Current INR goal:  2.0-3.0   TTR:  83.3% (10.8 mo)   Target end date:  Indefinite   Send INR reminders to:  [facility]    Indications    Long term current use of anticoagulants with INR goal of 2.0-3.0 [Z79.01]  Atrial fibrillation and flutter (H) [I48.91  I48.92]  S/P mitral valve replacement with bioprosthetic valve [Z95.3]             Comments:  27 mm Magna bioprosthetic valve (2014)             Anticoagulation Care Providers       Provider Role Specialty Phone number    Yunior Huang MD Referring Internal Medicine  [phone]

## 2023-10-26 ENCOUNTER — TELEPHONE (OUTPATIENT)
Dept: INTERNAL MEDICINE | Facility: CLINIC | Age: 83
End: 2023-10-26
Payer: COMMERCIAL

## 2023-10-26 NOTE — TELEPHONE ENCOUNTER
Fax received from [facility] for review and signature.  Put in Dr. Huang's in basket.

## 2023-10-26 NOTE — TELEPHONE ENCOUNTER
Attempted to call daughter, Tenisha back (original caller), but number listed for her is invalid/no longer in service. Called and left patient a voice mail message on October 26, 2023 12:35 PM to call back the clinic. Please see if they know name/contact info of home health agency/nurse along with phone number to relay provider okay with them doing INR in home.    Thank you,  Rodolfo Ibarra, Triage RN [facility]  12:35 PM 10/26/2023

## 2023-10-27 NOTE — TELEPHONE ENCOUNTER
Spoke with daughter Tenisha and confirmed that home care is drawing INR in home during visits. Next visit is scheduled for 10/31/23.  No changes at this time and no further questions.     Isabel Panchal, RN, BSN, PHN  Anticoagulation Nurse  [phone]

## 2023-10-30 ENCOUNTER — ANCILLARY PROCEDURE (OUTPATIENT)
Dept: CARDIOLOGY | Facility: CLINIC | Age: 83
End: 2023-10-30
Attending: INTERNAL MEDICINE
Payer: COMMERCIAL

## 2023-10-30 DIAGNOSIS — Z95.0 CARDIAC PACEMAKER IN SITU: ICD-10-CM

## 2023-10-30 LAB
MDC_IDC_LEAD_CONNECTION_STATUS: NORMAL
MDC_IDC_LEAD_CONNECTION_STATUS: NORMAL
MDC_IDC_LEAD_IMPLANT_DT: NORMAL
MDC_IDC_LEAD_IMPLANT_DT: NORMAL
MDC_IDC_LEAD_LOCATION: NORMAL
MDC_IDC_LEAD_LOCATION: NORMAL
MDC_IDC_LEAD_MFG: NORMAL
MDC_IDC_LEAD_MFG: NORMAL
MDC_IDC_LEAD_MODEL: NORMAL
MDC_IDC_LEAD_MODEL: NORMAL
MDC_IDC_LEAD_POLARITY_TYPE: NORMAL
MDC_IDC_LEAD_POLARITY_TYPE: NORMAL
MDC_IDC_LEAD_SERIAL: NORMAL
MDC_IDC_LEAD_SERIAL: NORMAL
MDC_IDC_MSMT_BATTERY_DTM: NORMAL
MDC_IDC_MSMT_BATTERY_REMAINING_LONGEVITY: 105 MO
MDC_IDC_MSMT_BATTERY_RRT_TRIGGER: 2.62
MDC_IDC_MSMT_BATTERY_STATUS: NORMAL
MDC_IDC_MSMT_BATTERY_VOLTAGE: 3 V
MDC_IDC_MSMT_LEADCHNL_RA_IMPEDANCE_VALUE: 285 OHM
MDC_IDC_MSMT_LEADCHNL_RA_IMPEDANCE_VALUE: 342 OHM
MDC_IDC_MSMT_LEADCHNL_RA_PACING_THRESHOLD_AMPLITUDE: 0.75 V
MDC_IDC_MSMT_LEADCHNL_RA_PACING_THRESHOLD_PULSEWIDTH: 0.4 MS
MDC_IDC_MSMT_LEADCHNL_RA_SENSING_INTR_AMPL: 0.62 MV
MDC_IDC_MSMT_LEADCHNL_RA_SENSING_INTR_AMPL: 0.62 MV
MDC_IDC_MSMT_LEADCHNL_RV_IMPEDANCE_VALUE: 323 OHM
MDC_IDC_MSMT_LEADCHNL_RV_IMPEDANCE_VALUE: 361 OHM
MDC_IDC_MSMT_LEADCHNL_RV_PACING_THRESHOLD_AMPLITUDE: 0.88 V
MDC_IDC_MSMT_LEADCHNL_RV_PACING_THRESHOLD_PULSEWIDTH: 0.4 MS
MDC_IDC_MSMT_LEADCHNL_RV_SENSING_INTR_AMPL: 14.12 MV
MDC_IDC_MSMT_LEADCHNL_RV_SENSING_INTR_AMPL: 14.12 MV
MDC_IDC_PG_IMPLANT_DTM: NORMAL
MDC_IDC_PG_MFG: NORMAL
MDC_IDC_PG_MODEL: NORMAL
MDC_IDC_PG_SERIAL: NORMAL
MDC_IDC_PG_TYPE: NORMAL
MDC_IDC_SESS_CLINIC_NAME: NORMAL
MDC_IDC_SESS_DTM: NORMAL
MDC_IDC_SESS_TYPE: NORMAL
MDC_IDC_SET_BRADY_AT_MODE_SWITCH_RATE: 171 {BEATS}/MIN
MDC_IDC_SET_BRADY_HYSTRATE: NORMAL
MDC_IDC_SET_BRADY_LOWRATE: 60 {BEATS}/MIN
MDC_IDC_SET_BRADY_MAX_SENSOR_RATE: 130 {BEATS}/MIN
MDC_IDC_SET_BRADY_MAX_TRACKING_RATE: 130 {BEATS}/MIN
MDC_IDC_SET_BRADY_MODE: NORMAL
MDC_IDC_SET_BRADY_PAV_DELAY_LOW: 180 MS
MDC_IDC_SET_BRADY_SAV_DELAY_LOW: 150 MS
MDC_IDC_SET_LEADCHNL_RA_PACING_AMPLITUDE: 1.5 V
MDC_IDC_SET_LEADCHNL_RA_PACING_ANODE_ELECTRODE_1: NORMAL
MDC_IDC_SET_LEADCHNL_RA_PACING_ANODE_LOCATION_1: NORMAL
MDC_IDC_SET_LEADCHNL_RA_PACING_CAPTURE_MODE: NORMAL
MDC_IDC_SET_LEADCHNL_RA_PACING_CATHODE_ELECTRODE_1: NORMAL
MDC_IDC_SET_LEADCHNL_RA_PACING_CATHODE_LOCATION_1: NORMAL
MDC_IDC_SET_LEADCHNL_RA_PACING_POLARITY: NORMAL
MDC_IDC_SET_LEADCHNL_RA_PACING_PULSEWIDTH: 0.4 MS
MDC_IDC_SET_LEADCHNL_RA_SENSING_ANODE_ELECTRODE_1: NORMAL
MDC_IDC_SET_LEADCHNL_RA_SENSING_ANODE_LOCATION_1: NORMAL
MDC_IDC_SET_LEADCHNL_RA_SENSING_CATHODE_ELECTRODE_1: NORMAL
MDC_IDC_SET_LEADCHNL_RA_SENSING_CATHODE_LOCATION_1: NORMAL
MDC_IDC_SET_LEADCHNL_RA_SENSING_POLARITY: NORMAL
MDC_IDC_SET_LEADCHNL_RA_SENSING_SENSITIVITY: 0.45 MV
MDC_IDC_SET_LEADCHNL_RV_PACING_AMPLITUDE: 2 V
MDC_IDC_SET_LEADCHNL_RV_PACING_ANODE_ELECTRODE_1: NORMAL
MDC_IDC_SET_LEADCHNL_RV_PACING_ANODE_LOCATION_1: NORMAL
MDC_IDC_SET_LEADCHNL_RV_PACING_CAPTURE_MODE: NORMAL
MDC_IDC_SET_LEADCHNL_RV_PACING_CATHODE_ELECTRODE_1: NORMAL
MDC_IDC_SET_LEADCHNL_RV_PACING_CATHODE_LOCATION_1: NORMAL
MDC_IDC_SET_LEADCHNL_RV_PACING_POLARITY: NORMAL
MDC_IDC_SET_LEADCHNL_RV_PACING_PULSEWIDTH: 0.4 MS
MDC_IDC_SET_LEADCHNL_RV_SENSING_ANODE_ELECTRODE_1: NORMAL
MDC_IDC_SET_LEADCHNL_RV_SENSING_ANODE_LOCATION_1: NORMAL
MDC_IDC_SET_LEADCHNL_RV_SENSING_CATHODE_ELECTRODE_1: NORMAL
MDC_IDC_SET_LEADCHNL_RV_SENSING_CATHODE_LOCATION_1: NORMAL
MDC_IDC_SET_LEADCHNL_RV_SENSING_POLARITY: NORMAL
MDC_IDC_SET_LEADCHNL_RV_SENSING_SENSITIVITY: 0.9 MV
MDC_IDC_SET_ZONE_DETECTION_INTERVAL: 350 MS
MDC_IDC_SET_ZONE_DETECTION_INTERVAL: 400 MS
MDC_IDC_SET_ZONE_STATUS: NORMAL
MDC_IDC_SET_ZONE_STATUS: NORMAL
MDC_IDC_SET_ZONE_TYPE: NORMAL
MDC_IDC_SET_ZONE_VENDOR_TYPE: NORMAL
MDC_IDC_STAT_AT_BURDEN_PERCENT: 0 %
MDC_IDC_STAT_AT_DTM_END: NORMAL
MDC_IDC_STAT_AT_DTM_START: NORMAL
MDC_IDC_STAT_BRADY_AP_VP_PERCENT: 89.28 %
MDC_IDC_STAT_BRADY_AP_VS_PERCENT: 1.37 %
MDC_IDC_STAT_BRADY_AS_VP_PERCENT: 5.28 %
MDC_IDC_STAT_BRADY_AS_VS_PERCENT: 4.07 %
MDC_IDC_STAT_BRADY_DTM_END: NORMAL
MDC_IDC_STAT_BRADY_DTM_START: NORMAL
MDC_IDC_STAT_BRADY_RA_PERCENT_PACED: 94.01 %
MDC_IDC_STAT_BRADY_RV_PERCENT_PACED: 94.55 %
MDC_IDC_STAT_EPISODE_RECENT_COUNT: 0
MDC_IDC_STAT_EPISODE_RECENT_COUNT_DTM_END: NORMAL
MDC_IDC_STAT_EPISODE_RECENT_COUNT_DTM_START: NORMAL
MDC_IDC_STAT_EPISODE_TOTAL_COUNT: 0
MDC_IDC_STAT_EPISODE_TOTAL_COUNT: 0
MDC_IDC_STAT_EPISODE_TOTAL_COUNT: 1
MDC_IDC_STAT_EPISODE_TOTAL_COUNT: 7
MDC_IDC_STAT_EPISODE_TOTAL_COUNT: NORMAL
MDC_IDC_STAT_EPISODE_TOTAL_COUNT_DTM_END: NORMAL
MDC_IDC_STAT_EPISODE_TOTAL_COUNT_DTM_START: NORMAL
MDC_IDC_STAT_EPISODE_TYPE: NORMAL
MDC_IDC_STAT_TACHYTHERAPY_RECENT_DTM_END: NORMAL
MDC_IDC_STAT_TACHYTHERAPY_RECENT_DTM_START: NORMAL
MDC_IDC_STAT_TACHYTHERAPY_TOTAL_DTM_END: NORMAL
MDC_IDC_STAT_TACHYTHERAPY_TOTAL_DTM_START: NORMAL

## 2023-10-30 PROCEDURE — 93294 REM INTERROG EVL PM/LDLS PM: CPT | Performed by: INTERNAL MEDICINE

## 2023-10-30 PROCEDURE — 93296 REM INTERROG EVL PM/IDS: CPT | Performed by: INTERNAL MEDICINE

## 2023-10-31 ENCOUNTER — ANTICOAGULATION THERAPY VISIT (OUTPATIENT)
Dept: ANTICOAGULATION | Facility: CLINIC | Age: 83
End: 2023-10-31
Payer: COMMERCIAL

## 2023-10-31 DIAGNOSIS — Z79.01 LONG TERM CURRENT USE OF ANTICOAGULANTS WITH INR GOAL OF 2.0-3.0: Primary | ICD-10-CM

## 2023-10-31 DIAGNOSIS — Z95.3 S/P MITRAL VALVE REPLACEMENT WITH BIOPROSTHETIC VALVE: ICD-10-CM

## 2023-10-31 DIAGNOSIS — I48.91 ATRIAL FIBRILLATION AND FLUTTER (H): ICD-10-CM

## 2023-10-31 DIAGNOSIS — I48.92 ATRIAL FIBRILLATION AND FLUTTER (H): ICD-10-CM

## 2023-10-31 LAB — INR (EXTERNAL): 4.1 (ref 0.9–1.1)

## 2023-10-31 NOTE — PROGRESS NOTES
ANTICOAGULATION MANAGEMENT     Zunilda SHAW May 83 year old female is on warfarin with supratherapeutic INR result. (Goal INR 2.0-3.0)    Recent labs: (last 7 days)     10/31/23  1127   INR 4.1*       ASSESSMENT     Source(s): Chart Review, Patient/Caregiver Call, and Home Care/Facility Nurse     Warfarin doses taken: Warfarin taken as instructed  Diet: Decreased greens/vitamin K in diet; ongoing change-patient in custodial now and does not have her two servings of spinach any more  Medication/supplement changes: None noted  New illness, injury, or hospitalization: No  Signs or symptoms of bleeding or clotting: No  Previous result: Supratherapeutic  Additional findings: None       PLAN     Recommended plan for no diet, medication or health factor changes affecting INR     Dosing Instructions: hold dose then decrease your warfarin dose (11.1% change) with next INR in 1 week       Summary  As of 10/31/2023      Full warfarin instructions:  10/31: Hold; Otherwise 3.75 mg every Sun, Thu; 2.5 mg all other days   Next INR check:  11/7/2023               Telephone call with Francisco home care nurse who verbalizes understanding and agrees to plan and who agrees to plan and repeated back plan correctly    Orders given to  Homecare nurse/facility to recheck    Education provided:   Symptom monitoring: monitoring for bleeding signs and symptoms, when to seek medical attention/emergency care, and if you hit your head or have a bad fall seek emergency care    Plan made per [facility] anticoagulation protocol    Janet Adair, RN  Anticoagulation Clinic  10/31/2023    _______________________________________________________________________     Anticoagulation Episode Summary       Current INR goal:  2.0-3.0   TTR:  81.2% (10.9 mo)   Target end date:  Indefinite   Send INR reminders to:  [facility]    Indications    Long term current use of anticoagulants with INR goal of 2.0-3.0 [Z79.01]  Atrial fibrillation and flutter (H)  [I48.91  I48.92]  S/P mitral valve replacement with bioprosthetic valve [Z95.3]             Comments:  27 mm Magna bioprosthetic valve (2014)             Anticoagulation Care Providers       Provider Role Specialty Phone number    Yunior Huang MD Referring Internal Medicine [phone]

## 2023-11-01 ENCOUNTER — TELEPHONE (OUTPATIENT)
Dept: INTERNAL MEDICINE | Facility: CLINIC | Age: 83
End: 2023-11-01
Payer: COMMERCIAL

## 2023-11-01 ENCOUNTER — PATIENT OUTREACH (OUTPATIENT)
Dept: CARE COORDINATION | Facility: CLINIC | Age: 83
End: 2023-11-01
Payer: COMMERCIAL

## 2023-11-01 NOTE — PROGRESS NOTES
Clinic Care Coordination Contact  [facility]/Voicemail    Clinical Data: Care Coordinator Outreach  Outreach Documentation Number of Outreach Attempt   11/1/2023  10:42 AM 1     Left message on patient's voicemail with call back information and requested return call.    Plan: Care Coordinator will try to reach patient again in 1-2 business days.    AUD[facility]Y Sauceda/[facility]  Social Work Care Coordinator  [facility] [place], [place], and [place]  Phone: [phone]

## 2023-11-01 NOTE — TELEPHONE ENCOUNTER
Veronica from [facility] ([phone]) calls to report a fall 10/31/23 evening with no injury. Patient had no shoes on and fell getting out of bed.  She was found sitting on her bottom with her walker over her.

## 2023-11-01 NOTE — TELEPHONE ENCOUNTER
Called Veronica back and left detailed message on the nursing line regarding provider message below.

## 2023-11-03 ENCOUNTER — PATIENT OUTREACH (OUTPATIENT)
Dept: CARE COORDINATION | Facility: CLINIC | Age: 83
End: 2023-11-03
Payer: COMMERCIAL

## 2023-11-03 NOTE — LETTER
[facility] CARE COORDINATION  [address]  [place] [address]    November 3, 2023    Zunilda SHAW May  [address]   [place] [address]      Dear Deanne,    I am a clinic care coordinator who works with Yunior Huang MD with the [facility]. I wanted to introduce myself and provide you with my contact information for you to be able to call me with any questions or concerns. Below is a description of clinic care coordination and how I can further assist you shall any needs arise.        The clinic care coordination team is made up of a registered nurse, , financial resource worker and community health worker who understand the health care system. The goal of clinic care coordination is to help you manage your health and improve access to the health care system. Our team works alongside your provider to assist you in determining your health and social needs. We can help you obtain health care and community resources, providing you with necessary information and education. We can work with you through any barriers and develop a care plan that helps coordinate and strengthen the communication between you and your care team.  Our services are voluntary and are offered without charge to you personally.    Please feel free to contact me with any questions or concerns regarding care coordination and what we can offer.      We are focused on providing you with the highest-quality healthcare experience possible.    Sincerely,     AUDREY Sauceda/[facility]  Social Work Care Coordinator  [facility], and [place]  Phone: [phone]

## 2023-11-03 NOTE — PROGRESS NOTES
Clinic Care Coordination Contact  [facility]/Voicemail    Clinical Data: Care Coordinator Outreach  Outreach Documentation Number of Outreach Attempt   11/1/2023  10:42 AM 1   11/3/2023  12:16 PM 2     Left message on patient's voicemail with call back information and requested return call.    Plan: Care Coordinator will send care coordination introduction letter with care coordinator contact information and explanation of care coordination services via [employer]. Care Coordinator will do no further outreaches at this time.    AUDREY Sauceda[facility]  Social Work Care Coordinator  [facility], [place], and [place]  Phone: [phone]

## 2023-11-06 DIAGNOSIS — I48.20 CHRONIC ATRIAL FIBRILLATION (H): ICD-10-CM

## 2023-11-06 RX ORDER — METOPROLOL TARTRATE 50 MG
50 TABLET ORAL 2 TIMES DAILY
Qty: 180 TABLET | Refills: 2 | Status: SHIPPED | OUTPATIENT
Start: 2023-11-06 | End: 2024-01-08

## 2023-11-07 ENCOUNTER — ANTICOAGULATION THERAPY VISIT (OUTPATIENT)
Dept: ANTICOAGULATION | Facility: CLINIC | Age: 83
End: 2023-11-07
Payer: COMMERCIAL

## 2023-11-07 DIAGNOSIS — I48.91 ATRIAL FIBRILLATION AND FLUTTER (H): ICD-10-CM

## 2023-11-07 DIAGNOSIS — Z79.01 LONG TERM CURRENT USE OF ANTICOAGULANTS WITH INR GOAL OF 2.0-3.0: Primary | ICD-10-CM

## 2023-11-07 DIAGNOSIS — I48.92 ATRIAL FIBRILLATION AND FLUTTER (H): ICD-10-CM

## 2023-11-07 DIAGNOSIS — Z95.3 S/P MITRAL VALVE REPLACEMENT WITH BIOPROSTHETIC VALVE: ICD-10-CM

## 2023-11-07 LAB — INR (EXTERNAL): 3 (ref 0.9–1.1)

## 2023-11-07 NOTE — PROGRESS NOTES
ANTICOAGULATION MANAGEMENT     Zunilda SHAW May 83 year old female is on warfarin with therapeutic INR result. (Goal INR 2.0-3.0)    Recent labs: (last 7 days)     11/07/23  1048   INR 3.0*       ASSESSMENT     Source(s): Chart Review and Home Care/Facility Nurse     Warfarin doses taken: Warfarin taken as instructed.  Diet: 2 large salads this week. Will try to continue this going forward.  Medication/supplement changes: None noted.  New illness, injury, or hospitalization: Recently out of [facility] 10/23 following L total knee replacement. Now in [place].  Fell on 11/01. Did not hit her head. No residual symptoms.  Signs or symptoms of bleeding or clotting: No.  Previous result: Supratherapeutic.  Additional findings: None.       PLAN     Recommended plan for ongoing change(s) affecting INR     Dosing Instructions: Continue your current warfarin dose with next INR in 1 week       Summary  As of 11/7/2023      Full warfarin instructions:  3.75 mg every Sun, Thu; 2.5 mg all other days   Next INR check:  11/14/2023               Telephone call with [facility] nurse who verbalizes understanding and agrees to plan    Orders given to  Homecare nurse/facility to recheck    Education provided:   Goal range and lab monitoring: goal range and significance of current result    Plan made per [facility] anticoagulation protocol    Leo Santiago RN  Anticoagulation Clinic  11/7/2023    _______________________________________________________________________     Anticoagulation Episode Summary       Current INR goal:  2.0-3.0   TTR:  79.1% (10.9 mo)   Target end date:  Indefinite   Send INR reminders to:  [facility]    Indications    Long term current use of anticoagulants with INR goal of 2.0-3.0 [Z79.01]  Atrial fibrillation and flutter (H) [I48.91  I48.92]  S/P mitral valve replacement with bioprosthetic valve [Z95.3]             Comments:  27 mm Magna bioprosthetic valve (2014)  back to [place] after dc'd from home care              Anticoagulation Care Providers       Provider Role Specialty Phone number    Yunior Huang MD Referring Internal Medicine [phone]

## 2023-11-09 ENCOUNTER — ANTICOAGULATION THERAPY VISIT (OUTPATIENT)
Dept: ANTICOAGULATION | Facility: CLINIC | Age: 83
End: 2023-11-09
Payer: COMMERCIAL

## 2023-11-09 ENCOUNTER — MYC REFILL (OUTPATIENT)
Dept: INTERNAL MEDICINE | Facility: CLINIC | Age: 83
End: 2023-11-09
Payer: COMMERCIAL

## 2023-11-09 DIAGNOSIS — Z95.3 S/P MITRAL VALVE REPLACEMENT WITH BIOPROSTHETIC VALVE: ICD-10-CM

## 2023-11-09 DIAGNOSIS — I48.91 ATRIAL FIBRILLATION AND FLUTTER (H): ICD-10-CM

## 2023-11-09 DIAGNOSIS — Z79.01 LONG TERM CURRENT USE OF ANTICOAGULANTS WITH INR GOAL OF 2.0-3.0: Primary | ICD-10-CM

## 2023-11-09 DIAGNOSIS — I48.92 ATRIAL FIBRILLATION AND FLUTTER (H): ICD-10-CM

## 2023-11-09 LAB — INR (EXTERNAL): 2.9 (ref 0.9–1.1)

## 2023-11-09 NOTE — PROGRESS NOTES
ANTICOAGULATION MANAGEMENT     Zunilda SHAW May 83 year old female is on warfarin with therapeutic INR result. (Goal INR 2.0-3.0)    Recent labs: (last 7 days)     11/07/23  1048   INR 3.0*       ASSESSMENT     Source(s): Chart Review, Patient/Caregiver Call, and Home Care/Facility Nurse     Warfarin doses taken: More warfarin taken than planned which may be affecting INR  Diet:  Continues on a couple salads/week.  Medication/supplement changes: None noted  New illness, injury, or hospitalization: Yes: Cold symptoms for the past week.  Signs or symptoms of bleeding or clotting: No  Previous result: Therapeutic last visit; previously outside of goal range  Additional findings: 22.5mg warfarin in last 7 days.       PLAN     Recommended plan for ongoing change(s) affecting INR     Dosing Instructions: Increase your warfarin dose (12.5% change) with next INR in 1 week. This will match weekly total from last 7 days of 22.5mg. Consider adding in 3.75mg on Wednesdays pending next week's INR.    Summary  As of 11/9/2023      Full warfarin instructions:  2.5 mg every Mon, Wed, Fri; 3.75 mg all other days   Next INR check:  11/15/2023               Telephone call with [facility] nurse who verbalizes understanding and agrees to plan    Orders given to  Homecare nurse/facility to recheck    Also called Deanne directly with dosing    Education provided:   Goal range and lab monitoring: goal range and significance of current result    Plan made with [facility] Pharmacist Janay Santiago, RN  Anticoagulation Clinic  11/9/2023    _______________________________________________________________________     Anticoagulation Episode Summary       Current INR goal:  2.0-3.0   TTR:  79.1% (10.9 mo)   Target end date:  Indefinite   Send INR reminders to:  [facility]    Indications    Long term current use of anticoagulants with INR goal of 2.0-3.0 [Z79.01]  Atrial fibrillation and flutter (H)  [I48.91  I48.92]  S/P mitral valve replacement with bioprosthetic valve [Z95.3]             Comments:  27 mm Magna bioprosthetic valve (2014)  back to [place] after dc'd from home care             Anticoagulation Care Providers       Provider Role Specialty Phone number    Yunior Huang MD Referring Internal Medicine [phone]

## 2023-11-13 NOTE — TELEPHONE ENCOUNTER
Pt sees Rheumatology regularly. Sent pt a message back to follow up with them through another encounter.

## 2023-11-15 ENCOUNTER — ANTICOAGULATION THERAPY VISIT (OUTPATIENT)
Dept: ANTICOAGULATION | Facility: CLINIC | Age: 83
End: 2023-11-15
Payer: COMMERCIAL

## 2023-11-15 ENCOUNTER — TELEPHONE (OUTPATIENT)
Dept: CARDIOLOGY | Facility: CLINIC | Age: 83
End: 2023-11-15
Payer: COMMERCIAL

## 2023-11-15 DIAGNOSIS — Z95.3 S/P MITRAL VALVE REPLACEMENT WITH BIOPROSTHETIC VALVE: ICD-10-CM

## 2023-11-15 DIAGNOSIS — Z79.01 LONG TERM CURRENT USE OF ANTICOAGULANTS WITH INR GOAL OF 2.0-3.0: Primary | ICD-10-CM

## 2023-11-15 DIAGNOSIS — I27.20 PULMONARY HTN (H): ICD-10-CM

## 2023-11-15 DIAGNOSIS — I10 BENIGN ESSENTIAL HYPERTENSION: ICD-10-CM

## 2023-11-15 DIAGNOSIS — I48.92 ATRIAL FIBRILLATION AND FLUTTER (H): ICD-10-CM

## 2023-11-15 DIAGNOSIS — I48.91 ATRIAL FIBRILLATION AND FLUTTER (H): ICD-10-CM

## 2023-11-15 LAB — INR (EXTERNAL): 3.6 (ref 0.9–1.1)

## 2023-11-15 RX ORDER — AMLODIPINE AND VALSARTAN 5; 160 MG/1; MG/1
1 TABLET ORAL DAILY
Qty: 90 TABLET | Refills: 0 | Status: SHIPPED | OUTPATIENT
Start: 2023-11-15 | End: 2024-01-08

## 2023-11-15 RX ORDER — AMLODIPINE BESYLATE 2.5 MG/1
2.5 TABLET ORAL DAILY
Qty: 90 TABLET | Refills: 0 | Status: SHIPPED | OUTPATIENT
Start: 2023-11-15 | End: 2024-01-08

## 2023-11-15 NOTE — TELEPHONE ENCOUNTER
I received an URGENT refill request from [facility] for her Norvasc and Exforge. Our records show she should have enough until 1/6/24. She confirmed she has plenty of these drugs and does not need a refill.

## 2023-11-15 NOTE — PROGRESS NOTES
ANTICOAGULATION MANAGEMENT     Zunilda SHAW May 83 year old female is on warfarin with supratherapeutic INR result. (Goal INR 2.0-3.0)    Recent labs: (last 7 days)     11/15/23  0823   INR 3.6*       ASSESSMENT     Source(s): Chart Review, Patient/Caregiver Call, and Home Care/Facility Nurse     Warfarin doses taken: Warfarin taken as instructed  Diet: Decreased greens/vitamin K in diet; plans to resume previous intake possibly ongoing - unknown when as patient has this change now due to clod/flu feeling with low appetite   Medication/supplement changes:  theraflu, mucinex, tylenol cold and flu, took these over the weekend but has not had any medication the last 2 days  New illness, injury, or hospitalization: Yes: continues to have cold symptoms but has not gotten better over the last couple of days and patient has stopped taking OTC medication   Signs or symptoms of bleeding or clotting: No  Previous result: Therapeutic last visit; previously outside of goal range  Additional findings:  noted dose was increase last week and confirmed dosing taken the last week       PLAN     Recommended plan for temporary change(s) & ongoing affecting INR     Dosing Instructions: hold dose then decrease your warfarin dose (5.6% change) with next INR in 5 days       Summary  As of 11/15/2023      Full warfarin instructions:  11/15: Hold; Otherwise 3.75 mg every Sun, Tue, Thu; 2.5 mg all other days   Next INR check:  11/20/2023               Telephone call with [facility] home care nurse who verbalizes understanding and agrees to plan and who agrees to plan and repeated back plan correctly    Orders given to  Homecare nurse/facility to recheck    Education provided:   Interaction IS NOT anticipated between warfarin and theraflu & mucinex per up to date, however cold symptoms may increase/have affect on INR  Contact [phone]  with any changes, questions or concerns.     Plan made per [facility] anticoagulation protocol    Isabel Montes,  RN  Anticoagulation Clinic  11/15/2023    _______________________________________________________________________     Anticoagulation Episode Summary       Current INR goal:  2.0-3.0   TTR:  77.5% (10.9 mo)   Target end date:  Indefinite   [facility]    Indications    Long term current use of anticoagulants with INR goal of 2.0-3.0 [Z79.01]  Atrial fibrillation and flutter (H) [I48.91  I48.92]  S/P mitral valve replacement with bioprosthetic valve [Z95.3]             Comments:  27 mm Magna bioprosthetic valve (2014)  back to [place] after dc'd from home care             Anticoagulation Care Providers       Provider Role Specialty Phone number    Yunior Huang MD Referring Internal Medicine [phone]

## 2023-11-17 ENCOUNTER — TRANSFERRED RECORDS (OUTPATIENT)
Dept: HEALTH INFORMATION MANAGEMENT | Facility: CLINIC | Age: 83
End: 2023-11-17
Payer: COMMERCIAL

## 2023-11-20 ENCOUNTER — DOCUMENTATION ONLY (OUTPATIENT)
Dept: OTHER | Facility: CLINIC | Age: 83
End: 2023-11-20
Payer: COMMERCIAL

## 2023-11-20 ENCOUNTER — ANTICOAGULATION THERAPY VISIT (OUTPATIENT)
Dept: ANTICOAGULATION | Facility: CLINIC | Age: 83
End: 2023-11-20
Payer: COMMERCIAL

## 2023-11-20 DIAGNOSIS — Z95.3 S/P MITRAL VALVE REPLACEMENT WITH BIOPROSTHETIC VALVE: ICD-10-CM

## 2023-11-20 DIAGNOSIS — I48.92 ATRIAL FIBRILLATION AND FLUTTER (H): ICD-10-CM

## 2023-11-20 DIAGNOSIS — Z79.01 LONG TERM CURRENT USE OF ANTICOAGULANTS WITH INR GOAL OF 2.0-3.0: Primary | ICD-10-CM

## 2023-11-20 DIAGNOSIS — I48.91 ATRIAL FIBRILLATION AND FLUTTER (H): ICD-10-CM

## 2023-11-20 LAB — INR (EXTERNAL): 2.8 (ref 0.9–1.1)

## 2023-11-20 NOTE — PROGRESS NOTES
ANTICOAGULATION MANAGEMENT     Zunilda SHAW May 83 year old female is on warfarin with therapeutic INR result. (Goal INR 2.0-3.0)    Recent labs: (last 7 days)     11/20/23  1700   INR 2.8*       ASSESSMENT     Source(s): Chart Review, Patient/Caregiver Call, and Home Care/Facility Nurse     Warfarin doses taken: Held for INR 3.6  recently which may be affecting INR  Diet: No new diet changes identified- only had one salad this past week  Medication/supplement changes: None noted  New illness, injury, or hospitalization: No  Signs or symptoms of bleeding or clotting: No  Previous result: Therapeutic last 2(+) visits  Additional findings: None       PLAN     Recommended plan for temporary change(s) affecting INR     Dosing Instructions: Continue your current warfarin dose with next INR in 1 week       Summary  As of 11/20/2023      Full warfarin instructions:  3.75 mg every Sun, Tue, Thu; 2.5 mg all other days   Next INR check:  11/27/2023               Telephone call with [facility] nurse and Leonardo verbalizes understanding and agrees to plan and who agrees to plan and repeated back plan correctly    Orders given to  Homecare nurse/facility to recheck    Education provided:   None required    Plan made per [facility] anticoagulation protocol    Maranda Garland RN  Anticoagulation Clinic  11/20/2023    _______________________________________________________________________     Anticoagulation Episode Summary       Current INR goal:  2.0-3.0   TTR:  76.3% (10.9 mo)   Target end date:  Indefinite   Send INR reminders to:  [facility]    Indications    Long term current use of anticoagulants with INR goal of 2.0-3.0 [Z79.01]  Atrial fibrillation and flutter (H) [I48.91  I48.92]  S/P mitral valve replacement with bioprosthetic valve [Z95.3]             Comments:  27 mm Magna bioprosthetic valve (2014)  back to [place] after dc'd from home care             Anticoagulation Care Providers       Provider Role  Specialty Phone number    Yunior Huang MD Referring Internal Medicine [phone]

## 2023-11-26 DIAGNOSIS — I48.91 ATRIAL FIBRILLATION AND FLUTTER (H): ICD-10-CM

## 2023-11-26 DIAGNOSIS — I48.92 ATRIAL FIBRILLATION AND FLUTTER (H): ICD-10-CM

## 2023-11-27 ENCOUNTER — TELEPHONE (OUTPATIENT)
Dept: INTERNAL MEDICINE | Facility: CLINIC | Age: 83
End: 2023-11-27
Payer: COMMERCIAL

## 2023-11-27 DIAGNOSIS — Z53.9 DIAGNOSIS NOT YET DEFINED: Primary | ICD-10-CM

## 2023-11-27 PROCEDURE — G0180 MD CERTIFICATION HHA PATIENT: HCPCS | Performed by: INTERNAL MEDICINE

## 2023-11-27 RX ORDER — WARFARIN SODIUM 2.5 MG/1
TABLET ORAL
Qty: 108 TABLET | Refills: 1 | Status: SHIPPED | OUTPATIENT
Start: 2023-11-27 | End: 2024-02-22

## 2023-11-27 NOTE — PROGRESS NOTES
[facility] RN Navya called requesting to move INR to 11/30 as home care will be in home that day. This is reasonable as last week's INR was in range. Navya will  patient on s/sx of bleeding and when to seek medical attention. Order given to continue on current dose until 11/30.

## 2023-11-27 NOTE — TELEPHONE ENCOUNTER
ANTICOAGULATION MANAGEMENT:  Medication Refill    Anticoagulation Summary  As of 11/20/2023      Warfarin maintenance plan:  3.75 mg (2.5 mg x 1.5) every Sun, Tue, Thu; 2.5 mg (2.5 mg x 1) all other days   Next INR check:  11/30/2023   Target end date:  Indefinite    Indications    Long term current use of anticoagulants with INR goal of 2.0-3.0 [Z79.01]  Atrial fibrillation and flutter (H) [I48.91  I48.92]  S/P mitral valve replacement with bioprosthetic valve [Z95.3]                 Anticoagulation Care Providers       Provider Role Specialty Phone number    Yunior Huang MD Referring Internal Medicine [phone]            Refill Criteria    Visit with referring provider/group: Meets criteria: office visit within referring provider group in the last 1 year on 9/5/23    [facility] referral signed last signed: 10/19/2023; within last year: Yes    Lab monitoring not exceeding 2 weeks overdue: Yes    Zunilda meets all criteria for refill. Rx instructions and quantity supplied updated to match patient's current dosing plan. Warfarin 90 day supply with 1 refill granted per [facility] protocol     Екатерина Mahan RN  Anticoagulation Clinic

## 2023-11-28 ENCOUNTER — TELEPHONE (OUTPATIENT)
Dept: INTERNAL MEDICINE | Facility: CLINIC | Age: 83
End: 2023-11-28
Payer: COMMERCIAL

## 2023-11-28 NOTE — TELEPHONE ENCOUNTER
11/7/23, 11/9/23 and 11/20/23 INR/warfarin orders received via fax. Form in your mailbox to be signed.

## 2023-11-30 ENCOUNTER — ANTICOAGULATION THERAPY VISIT (OUTPATIENT)
Dept: ANTICOAGULATION | Facility: CLINIC | Age: 83
End: 2023-11-30
Payer: COMMERCIAL

## 2023-11-30 DIAGNOSIS — Z79.01 LONG TERM CURRENT USE OF ANTICOAGULANTS WITH INR GOAL OF 2.0-3.0: Primary | ICD-10-CM

## 2023-11-30 DIAGNOSIS — I48.92 ATRIAL FIBRILLATION AND FLUTTER (H): ICD-10-CM

## 2023-11-30 DIAGNOSIS — I48.91 ATRIAL FIBRILLATION AND FLUTTER (H): ICD-10-CM

## 2023-11-30 DIAGNOSIS — Z95.3 S/P MITRAL VALVE REPLACEMENT WITH BIOPROSTHETIC VALVE: ICD-10-CM

## 2023-11-30 LAB — INR (EXTERNAL): 6.6 (ref 0.9–1.1)

## 2023-11-30 NOTE — PROGRESS NOTES
ANTICOAGULATION MANAGEMENT     Zunilda SHAW May 83 year old female is on warfarin with supratherapeutic INR result. (Goal INR 2.0-3.0)    Recent labs: (last 7 days)     11/30/23  1700   INR 6.6*       ASSESSMENT     Source(s): Chart Review and Home Care/Facility Nurse     Warfarin doses taken: Warfarin taken as instructed  Diet: No new diet changes identified  Medication/supplement changes: None noted  New illness, injury, or hospitalization: No. Recently had knee replacement.   Signs or symptoms of bleeding or clotting: No  Previous result: Therapeutic last visit; previously outside of goal range  Additional findings: No reason found for high INR today. Home care took INR twice via home meter and got the same result twice.  Patient has already taken her warfarin today 11/30/23.       PLAN     Recommended plan for no diet, medication or health factor changes affecting INR     Dosing Instructions: hold 1.5 doses then decrease your warfarin dose (12% change) with next INR in 4 days       Summary  As of 11/30/2023      Full warfarin instructions:  11/30: 3.75 mg; 12/1: Hold; 12/2: 1.25 mg; Otherwise 3.75 mg every Mon; 2.5 mg all other days   Next INR check:  12/4/2023               Telephone call with [facility] care nurse who agrees to plan and repeated back plan correctly    Orders given to  Homecare nurse/facility to recheck    Education provided:   Please call back if any changes to your diet, medications or how you've been taking warfarin  Taking warfarin: take warfarin at same time each day; preferably in the evening  Symptom monitoring: monitoring for bleeding signs and symptoms, monitoring for clotting signs and symptoms, monitoring for stroke signs and symptoms, when to seek medical attention/emergency care, and if you hit your head or have a bad fall seek emergency care  Contact [phone]  with any changes, questions or concerns.     Plan made with [facility] Pharmacist Janay Upton,  RN  Anticoagulation Clinic  11/30/2023    _______________________________________________________________________     Anticoagulation Episode Summary       Current INR goal:  2.0-3.0   TTR:  73.4% (10.9 mo)   Target end date:  Indefinite   [facility]:  [facility]    Indications    Long term current use of anticoagulants with INR goal of 2.0-3.0 [Z79.01]  Atrial fibrillation and flutter (H) [I48.91  I48.92]  S/P mitral valve replacement with bioprosthetic valve [Z95.3]             Comments:  27 mm Magna bioprosthetic valve (2014)  back to [place] after dc'd from home care             Anticoagulation Care Providers       Provider Role Specialty Phone number    Yunior Huang MD Referring Internal Medicine [phone]

## 2023-12-01 ENCOUNTER — TELEPHONE (OUTPATIENT)
Dept: INTERNAL MEDICINE | Facility: CLINIC | Age: 83
End: 2023-12-01
Payer: COMMERCIAL

## 2023-12-01 NOTE — TELEPHONE ENCOUNTER
11/30/23 Physician order for skilled nursing 1 time a week for 1 week recieved via fax. Form in your mailbox for signature.

## 2023-12-04 ENCOUNTER — ANTICOAGULATION THERAPY VISIT (OUTPATIENT)
Dept: ANTICOAGULATION | Facility: CLINIC | Age: 83
End: 2023-12-04
Payer: COMMERCIAL

## 2023-12-04 ENCOUNTER — TELEPHONE (OUTPATIENT)
Dept: ANTICOAGULATION | Facility: CLINIC | Age: 83
End: 2023-12-04
Payer: COMMERCIAL

## 2023-12-04 DIAGNOSIS — I48.91 ATRIAL FIBRILLATION AND FLUTTER (H): ICD-10-CM

## 2023-12-04 DIAGNOSIS — I48.92 ATRIAL FIBRILLATION AND FLUTTER (H): ICD-10-CM

## 2023-12-04 DIAGNOSIS — Z79.01 LONG TERM CURRENT USE OF ANTICOAGULANTS WITH INR GOAL OF 2.0-3.0: Primary | ICD-10-CM

## 2023-12-04 DIAGNOSIS — Z95.3 S/P MITRAL VALVE REPLACEMENT WITH BIOPROSTHETIC VALVE: ICD-10-CM

## 2023-12-04 LAB — INR (EXTERNAL): 3.6 (ref 0.9–1.1)

## 2023-12-04 NOTE — PROGRESS NOTES
ANTICOAGULATION MANAGEMENT     Zunilda SHAW May 83 year old female is on warfarin with supratherapeutic INR result. (Goal INR 2.0-3.0)    Recent labs: (last 7 days)     12/04/23  1546   INR 3.6*       ASSESSMENT     Source(s): Chart Review and Home Care/Facility Nurse     Warfarin doses taken: Warfarin taken as instructed  Diet: No new diet changes identified  Medication/supplement changes: None noted  New illness, injury, or hospitalization: No  Signs or symptoms of bleeding or clotting: No  Previous result: Supratherapeutic  Additional findings: None       PLAN     Recommended plan for no diet, medication or health factor changes affecting INR     Dosing Instructions: decrease your warfarin dose (6.7% change) with next INR in 3 days       Summary  As of 12/4/2023      Full warfarin instructions:  2.5 mg every day   Next INR check:  12/7/2023               [facility] who agrees to plan and repeated back plan correctly    Orders given to  Homecare nurse/facility to recheck    Education provided:   None required    Plan made per [facility] anticoagulation protocol    Katalina Restrepo, RN  Anticoagulation Clinic  12/4/2023    _______________________________________________________________________     Anticoagulation Episode Summary       Current INR goal:  2.0-3.0   TTR:  72.2% (10.9 mo)   Target end date:  Indefinite   Send INR reminders to:  [facility]    Indications    Long term current use of anticoagulants with INR goal of 2.0-3.0 [Z79.01]  Atrial fibrillation and flutter (H) [I48.91  I48.92]  S/P mitral valve replacement with bioprosthetic valve [Z95.3]             Comments:  27 mm Magna bioprosthetic valve (2014)  back to [place] after dc'd from home care             Anticoagulation Care Providers       Provider Role Specialty Phone number    Yunior Huang MD Referring Internal Medicine [phone]

## 2023-12-04 NOTE — TELEPHONE ENCOUNTER
Opened phone encounter in error, patient currently on homecare, due for INR today per chart review.    Episode changed to kasota for the interim.    Linette Rocha RN  Anticoagulation Clinic

## 2023-12-05 ENCOUNTER — TELEPHONE (OUTPATIENT)
Dept: INTERNAL MEDICINE | Facility: CLINIC | Age: 83
End: 2023-12-05
Payer: COMMERCIAL

## 2023-12-05 ENCOUNTER — MEDICAL CORRESPONDENCE (OUTPATIENT)
Dept: HEALTH INFORMATION MANAGEMENT | Facility: CLINIC | Age: 83
End: 2023-12-05

## 2023-12-07 ENCOUNTER — ANTICOAGULATION THERAPY VISIT (OUTPATIENT)
Dept: ANTICOAGULATION | Facility: CLINIC | Age: 83
End: 2023-12-07
Payer: COMMERCIAL

## 2023-12-07 DIAGNOSIS — I48.91 ATRIAL FIBRILLATION AND FLUTTER (H): ICD-10-CM

## 2023-12-07 DIAGNOSIS — Z95.3 S/P MITRAL VALVE REPLACEMENT WITH BIOPROSTHETIC VALVE: ICD-10-CM

## 2023-12-07 DIAGNOSIS — I48.92 ATRIAL FIBRILLATION AND FLUTTER (H): ICD-10-CM

## 2023-12-07 DIAGNOSIS — Z79.01 LONG TERM CURRENT USE OF ANTICOAGULANTS WITH INR GOAL OF 2.0-3.0: Primary | ICD-10-CM

## 2023-12-07 LAB — INR (EXTERNAL): 4 (ref 0.9–1.1)

## 2023-12-07 NOTE — PROGRESS NOTES
ANTICOAGULATION MANAGEMENT     Zunilda SHAW May 83 year old female is on warfarin with supratherapeutic INR result. (Goal INR 2.0-3.0)    Recent labs: (last 7 days)     12/07/23  1641   INR 4.0*       ASSESSMENT     Source(s): Chart Review and Home Care/Facility Nurse     Warfarin doses taken: Warfarin taken as instructed  Diet: No new diet changes identified  Medication/supplement changes: None noted  New illness, injury, or hospitalization: No  Signs or symptoms of bleeding or clotting: No  Previous result: Supratherapeutic  Additional findings:  No reason found for elevated INRs.        PLAN     Recommended plan for no diet, medication or health factor changes affecting INR     Dosing Instructions: hold dose then decrease your warfarin dose (7% change) with next INR in 4 days       Summary  As of 12/7/2023      Full warfarin instructions:  12/7: Hold; Otherwise 1.25 mg every Fri; 2.5 mg all other days   Next INR check:  12/11/2023               Telephone call with [facility] care nurse who agrees to plan and repeated back plan correctly    Orders given to  Homecare nurse/facility to recheck    Education provided:   Please call back if any changes to your diet, medications or how you've been taking warfarin    Plan made per [facility] anticoagulation protocol    Emelyn Marcos, RN  Anticoagulation Clinic  12/7/2023    _______________________________________________________________________     Anticoagulation Episode Summary       Current INR goal:  2.0-3.0   TTR:  71.3% (10.9 mo)   Target end date:  Indefinite   [facility]    Indications    Long term current use of anticoagulants with INR goal of 2.0-3.0 [Z79.01]  Atrial fibrillation and flutter (H) [I48.91  I48.92]  S/P mitral valve replacement with bioprosthetic valve [Z95.3]             Comments:  27 mm Magna bioprosthetic valve (2014)  back to [place] after dc'd from home care             Anticoagulation Care Providers       Provider Role  Specialty Phone number    Yunior Huang MD Referring Internal Medicine [phone]

## 2023-12-10 ENCOUNTER — TRANSFERRED RECORDS (OUTPATIENT)
Dept: HEALTH INFORMATION MANAGEMENT | Facility: CLINIC | Age: 83
End: 2023-12-10
Payer: COMMERCIAL

## 2023-12-11 ENCOUNTER — ANTICOAGULATION THERAPY VISIT (OUTPATIENT)
Dept: ANTICOAGULATION | Facility: CLINIC | Age: 83
End: 2023-12-11
Payer: COMMERCIAL

## 2023-12-11 ENCOUNTER — NURSE TRIAGE (OUTPATIENT)
Dept: INTERNAL MEDICINE | Facility: CLINIC | Age: 83
End: 2023-12-11
Payer: COMMERCIAL

## 2023-12-11 DIAGNOSIS — R10.9 FLANK PAIN: Primary | ICD-10-CM

## 2023-12-11 DIAGNOSIS — I48.92 ATRIAL FIBRILLATION AND FLUTTER (H): ICD-10-CM

## 2023-12-11 DIAGNOSIS — N39.0 URINARY TRACT INFECTION WITHOUT HEMATURIA, SITE UNSPECIFIED: ICD-10-CM

## 2023-12-11 DIAGNOSIS — I48.91 ATRIAL FIBRILLATION AND FLUTTER (H): ICD-10-CM

## 2023-12-11 DIAGNOSIS — Z79.01 LONG TERM CURRENT USE OF ANTICOAGULANTS WITH INR GOAL OF 2.0-3.0: Primary | ICD-10-CM

## 2023-12-11 DIAGNOSIS — Z95.3 S/P MITRAL VALVE REPLACEMENT WITH BIOPROSTHETIC VALVE: ICD-10-CM

## 2023-12-11 LAB — INR (EXTERNAL): 2.3 (ref 0.9–1.1)

## 2023-12-11 NOTE — TELEPHONE ENCOUNTER
Nurse Triage SBAR    Is this a 2nd Level Triage? YES, LICENSED PRACTITIONER REVIEW IS REQUIRED    Situation: Daughter reports back pain, daughter requesting urine order be placed so home care can get     Background: Patient is currently at [facility] assisted living facility. Patient went to ortho urgent care yesterday to get xrays of back.     Assessment: Patient reports back pain since 12/4/2023. Daughter reports she doesn't know where pain is located on patient. Patient has declined urinary symptoms such as frequency and urgency to patient's daughter, denies fevers.     Tx: tylenol    Protocol Recommended Disposition:     Recommendation: Routing to primary care provider to advise on UA order. Can fax order to [phone] with attention to LYNN Hunter nurse. Patient has appointment with LYNN today.     Routed to provider    Does the patient meet one of the following criteria for ADS visit consideration? 16+ years old, with an [facility] PCP     TIP  Providers, please consider if this condition is appropriate for management at one of our Acute and Diagnostic Services sites.     If patient is a good candidate, please use dotphrase <dot>triageresponse and select Refer to ADS to document.  Reason for Disposition   Age > 50 and no history of prior similar back pain    Additional Information   Negative: Passed out (i.e., fainted, collapsed and was not responding)   Negative: Shock suspected (e.g., cold/pale/clammy skin, too weak to stand, low BP, rapid pulse)   Negative: Sounds like a life-threatening emergency to the triager   Negative: Major injury to the back (e.g., MVA, fall > 10 feet or 3 meters, penetrating injury, etc.)   Negative: Pain in the upper back over the ribs (rib cage) that radiates (travels) into the chest   Negative: Pain in the upper back over the ribs (rib cage) and worsened by coughing (or clearly increases with breathing)   Negative: Back pain during pregnancy   Negative: SEVERE back pain of sudden onset  "and age > 60 years   Negative: SEVERE abdominal pain (e.g., excruciating)   Negative: Abdominal pain and age > 60 years   Negative: Unable to urinate (or only a few drops) and bladder feels very full   Negative: Loss of bladder or bowel control (urine or bowel incontinence; wetting self, leaking stool) of new-onset   Negative: Numbness (loss of sensation) in groin or rectal area   Negative: Pain radiates into groin, scrotum   Negative: Blood in urine (red, pink, or tea-colored)   Negative: Vomiting and pain over lower ribs of back (i.e., flank - kidney area)   Negative: Weakness of a leg or foot (e.g., unable to bear weight, dragging foot)   Negative: Patient sounds very sick or weak to the triager   Negative: Fever > 100.4 F (38.0 C) and flank pain   Negative: Pain or burning with passing urine (urination)   Negative: SEVERE back pain (e.g., excruciating, unable to do any normal activities) and not improved after pain medicine and CARE ADVICE   Negative: Numbness in an arm or hand (i.e., loss of sensation) and upper back pain   Negative: Numbness in a leg or foot (i.e., loss of sensation)   Negative: High-risk adult (e.g., history of cancer, history of HIV, or history of IV Drug Use)   Negative: Soft tissue infection (e.g., abscess, cellulitis) or other serious infection (e.g., bacteremia) in last 2 weeks   Negative: Painful rash with multiple small blisters grouped together (i.e., dermatomal distribution or 'band' or 'stripe')   Negative: Pain radiates into the thigh or further down the leg, and in both legs    Answer Assessment - Initial Assessment Questions  1. ONSET: \"When did the pain begin?\"       12/4/2023  2. LOCATION: \"Where does it hurt?\" (upper, mid or lower back)      Daughter not sure.   3. SEVERITY: \"How bad is the pain?\"  (e.g., Scale 1-10; mild, moderate, or severe)    - MILD (1-3): Doesn't interfere with normal activities.     - MODERATE (4-7): Interferes with normal activities or awakens from " "sleep.     - SEVERE (8-10): Excruciating pain, unable to do any normal activities.       Daughter not sure.   4. PATTERN: \"Is the pain constant?\" (e.g., yes, no; constant, intermittent)       Constant   5. RADIATION: \"Does the pain shoot into your legs or somewhere else?\"      No.   6. CAUSE:  \"What do you think is causing the back pain?\"       Possible UTI  7. BACK OVERUSE:  \"Any recent lifting of heavy objects, strenuous work or exercise?\"      No.  8. MEDICINES: \"What have you taken so far for the pain?\" (e.g., nothing, acetaminophen, NSAIDS)      Tylenol.   9. NEUROLOGIC SYMPTOMS: \"Do you have any weakness, numbness, or problems with bowel/bladder control?\"      No  10. OTHER SYMPTOMS: \"Do you have any other symptoms?\" (e.g., fever, abdomen pain, burning with urination, blood in urine)        Not sure  11. PREGNANCY: \"Is there any chance you are pregnant?\" \"When was your last menstrual period?\"        N/A    Protocols used: Back Pain-A-[place]    "

## 2023-12-11 NOTE — PROGRESS NOTES
ANTICOAGULATION MANAGEMENT     Zunilda SHAW May 83 year old female is on warfarin with therapeutic INR result. (Goal INR 2.0-3.0)    Recent labs: (last 7 days)     12/11/23  1607   INR 2.3*       ASSESSMENT     Source(s): Chart Review and Home Care/Facility Nurse     Warfarin doses taken: Warfarin taken as instructed  Diet: No new diet changes identified  Medication/supplement changes: None noted  New illness, injury, or hospitalization: No  Signs or symptoms of bleeding or clotting: No  Previous result: Supratherapeutic  Additional findings: None       PLAN     Recommended plan for no diet, medication or health factor changes affecting INR     Dosing Instructions: Continue your current warfarin dose with next INR in 4 days       Summary  As of 12/11/2023      Full warfarin instructions:  1.25 mg every Fri; 2.5 mg all other days   Next INR check:  12/15/2023               Telephone call with Dale SUMMERS home care nurse who verbalizes understanding and agrees to plan    Orders given to  Homecare nurse/facility to recheck    Education provided:   None required    Plan made per [facility] anticoagulation protocol    Katalina Restrepo RN  Anticoagulation Clinic  12/11/2023    _______________________________________________________________________     Anticoagulation Episode Summary       Current INR goal:  2.0-3.0   TTR:  70.6% (10.9 mo)   Target end date:  Indefinite   Send INR reminders to:  RANJIT STEEN    Indications    Long term current use of anticoagulants with INR goal of 2.0-3.0 [Z79.01]  Atrial fibrillation and flutter (H) [I48.91  I48.92]  S/P mitral valve replacement with bioprosthetic valve [Z95.3]             Comments:  27 mm Magna bioprosthetic valve (2014)  back to [facility] after dc'd from home care             Anticoagulation Care Providers       Provider Role Specialty Phone number    Yunior Huang MD Referring Internal Medicine [phone]

## 2023-12-12 NOTE — TELEPHONE ENCOUNTER
Daughter called back, reports INR nurse did not receive order. Daughter is wondering if it can be done at any [place] lab. Advised order is in [facility] and could make appointment at any [place] lab or go to walk in outpatient lab at [facility].     Reg Mahan RN [facility]

## 2023-12-14 ENCOUNTER — TRANSFERRED RECORDS (OUTPATIENT)
Dept: HEALTH INFORMATION MANAGEMENT | Facility: CLINIC | Age: 83
End: 2023-12-14

## 2023-12-14 LAB
ALT SERPL-CCNC: 25 IU/L (ref 5–35)
AST SERPL-CCNC: 38 U/L (ref 5–34)
CREATININE (EXTERNAL): 1.01 MG/DL (ref 0.5–1.3)

## 2023-12-15 ENCOUNTER — TRANSFERRED RECORDS (OUTPATIENT)
Dept: HEALTH INFORMATION MANAGEMENT | Facility: CLINIC | Age: 83
End: 2023-12-15

## 2023-12-15 ENCOUNTER — ANTICOAGULATION THERAPY VISIT (OUTPATIENT)
Dept: ANTICOAGULATION | Facility: CLINIC | Age: 83
End: 2023-12-15

## 2023-12-15 ENCOUNTER — LAB (OUTPATIENT)
Dept: LAB | Facility: CLINIC | Age: 83
End: 2023-12-15
Payer: COMMERCIAL

## 2023-12-15 DIAGNOSIS — Z95.3 S/P MITRAL VALVE REPLACEMENT WITH BIOPROSTHETIC VALVE: ICD-10-CM

## 2023-12-15 DIAGNOSIS — Z79.01 LONG TERM CURRENT USE OF ANTICOAGULANTS WITH INR GOAL OF 2.0-3.0: Primary | ICD-10-CM

## 2023-12-15 DIAGNOSIS — I48.92 ATRIAL FIBRILLATION AND FLUTTER (H): ICD-10-CM

## 2023-12-15 DIAGNOSIS — R10.9 FLANK PAIN: ICD-10-CM

## 2023-12-15 DIAGNOSIS — I48.91 ATRIAL FIBRILLATION AND FLUTTER (H): ICD-10-CM

## 2023-12-15 LAB
ALBUMIN UR-MCNC: ABNORMAL MG/DL
APPEARANCE UR: ABNORMAL
BACTERIA #/AREA URNS HPF: ABNORMAL /HPF
BILIRUB UR QL STRIP: NEGATIVE
COLOR UR AUTO: YELLOW
GLUCOSE UR STRIP-MCNC: NEGATIVE MG/DL
HGB UR QL STRIP: NEGATIVE
INR BLD: 3.1 (ref 0.9–1.1)
KETONES UR STRIP-MCNC: ABNORMAL MG/DL
LEUKOCYTE ESTERASE UR QL STRIP: ABNORMAL
NITRATE UR QL: POSITIVE
PH UR STRIP: 5.5 [PH] (ref 5–7)
RBC #/AREA URNS AUTO: ABNORMAL /HPF
RENAL EPI CELLS #/AREA URNS HPF: ABNORMAL /HPF
SP GR UR STRIP: 1.02 (ref 1–1.03)
SQUAMOUS #/AREA URNS AUTO: ABNORMAL /LPF
UROBILINOGEN UR STRIP-ACNC: 0.2 E.U./DL
WBC #/AREA URNS AUTO: ABNORMAL /HPF
WBC CLUMPS #/AREA URNS HPF: PRESENT /HPF

## 2023-12-15 PROCEDURE — 87186 SC STD MICRODIL/AGAR DIL: CPT

## 2023-12-15 PROCEDURE — 81001 URINALYSIS AUTO W/SCOPE: CPT

## 2023-12-15 PROCEDURE — 87086 URINE CULTURE/COLONY COUNT: CPT

## 2023-12-15 PROCEDURE — 85610 PROTHROMBIN TIME: CPT

## 2023-12-15 PROCEDURE — 36416 COLLJ CAPILLARY BLOOD SPEC: CPT

## 2023-12-15 NOTE — PROGRESS NOTES
ANTICOAGULATION MANAGEMENT     Zunilda SHAW May 83 year old female is on warfarin with supratherapeutic INR result. (Goal INR 2.0-3.0)    Recent labs: (last 7 days)     12/15/23  1541   INR 3.1*       ASSESSMENT     Source(s): Chart Review and Patient/Caregiver Call     Warfarin doses taken: Warfarin taken as instructed  Diet: Decreased greens/vitamin K in diet; ongoing change. She is at [facility] now and eats their meals for breakfast and supper (doesn't eat lunch), so feels her greens intake is less now.   Medication/supplement changes: None noted  New illness, injury, or hospitalization: No, though has had ongoing back pain so they are running a UA/UC (cultures pending). No other symptoms of UTI noted though.  Signs or symptoms of bleeding or clotting: No  Previous result: Therapeutic last visit; previously outside of goal range, had several supratherapeutic INRs with no factors prior to Mondays therapeutic lab with 3 maintenance dose reductions  Additional findings:  discharged from homecare, back to [facility] RN for management       PLAN     Recommended plan for ongoing change(s) affecting INR     Dosing Instructions: decrease your warfarin dose (7.7% change) with next INR in 1 week       Summary  As of 12/15/2023      Full warfarin instructions:  1.25 mg every Mon, Fri; 2.5 mg all other days   Next INR check:  12/22/2023               Telephone call with Deanne who verbalizes understanding and agrees to plan    Lab visit scheduled    Education provided:   Please call back if any changes to your diet, medications or how you've been taking warfarin  Dietary considerations: importance of consistent vitamin K intake  Interaction IS anticipated between warfarin and antibiotics (if prescribed for UTI)  Symptom monitoring: monitoring for bleeding signs and symptoms and when to seek medical attention/emergency care  Importance of notifying anticoagulation clinic for: changes in medications; a  sooner lab recheck maybe needed and diarrhea, nausea/vomiting, reduced intake, cold/flu, and/or infections; a sooner lab recheck maybe needed  Contact [phone]  with any changes, questions or concerns.     Plan made per [facility] anticoagulation protocol    Leilani Nunez RN  Anticoagulation Clinic  12/15/2023    _______________________________________________________________________     Anticoagulation Episode Summary       Current INR goal:  2.0-3.0   TTR:  70.4% (10.9 mo)   Target end date:  Indefinite   [facility]R reminders [facility]:  [facility]    Indications    Long term current use of anticoagulants with INR goal of 2.0-3.0 [Z79.01]  Atrial fibrillation and flutter (H) [I48.91  I48.92]  S/P mitral valve replacement with bioprosthetic valve [Z95.3]             Comments:  27 mm Magna bioprosthetic valve (2014)  back to [facility] after dc'd from home care             Anticoagulation Care Providers       Provider Role Specialty Phone number    Yunior Huang MD Referring Internal Medicine [phone]

## 2023-12-15 NOTE — LETTER
December 19, 2023      Deanne E May  [address]   [place] [address]        Dear ,    We are writing to inform you of your test results.    Your urine shows E. Coli, I have sent a prescription to [facility].     Resulted Orders   UA with Microscopic reflex to Culture - lab collect   Result Value Ref Range    Color Urine Yellow Colorless, Straw, Light Yellow, Yellow    Appearance Urine Slightly Cloudy (A) Clear    Glucose Urine Negative Negative mg/dL    Bilirubin Urine Negative Negative    Ketones Urine Trace (A) Negative mg/dL    Specific Gravity Urine 1.020 1.003 - 1.035    Blood Urine Negative Negative    pH Urine 5.5 5.0 - 7.0    Protein Albumin Urine Trace (A) Negative mg/dL    Urobilinogen Urine 0.2 0.2, 1.0 E.U./dL    Nitrite Urine Positive (A) Negative    Leukocyte Esterase Urine Small (A) Negative   Urine Microscopic Exam   Result Value Ref Range    Bacteria Urine Many (A) None Seen /HPF    RBC Urine 2-5 (A) 0-2 /HPF /HPF    WBC Urine  (A) 0-5 /HPF /HPF    Squamous Epithelials Urine Moderate (A) None Seen /LPF    WBC Clumps Urine Present (A) None Seen /HPF    Renal Tubular Epithelials Urine Few (A) None Seen /HPF   Urine Culture   Result Value Ref Range    Culture >100,000 CFU/mL Escherichia coli (A)     Culture <10,000 CFU/mL Urogenital melody        If you have any questions or concerns, please call the clinic at the number listed above.       Sincerely,      Yunior Huang MD

## 2023-12-16 LAB
BACTERIA UR CULT: ABNORMAL
BACTERIA UR CULT: ABNORMAL

## 2023-12-19 ENCOUNTER — TRANSFERRED RECORDS (OUTPATIENT)
Dept: HEALTH INFORMATION MANAGEMENT | Facility: CLINIC | Age: 83
End: 2023-12-19
Payer: COMMERCIAL

## 2023-12-19 ENCOUNTER — MEDICAL CORRESPONDENCE (OUTPATIENT)
Dept: SCHEDULING | Facility: CLINIC | Age: 83
End: 2023-12-19
Payer: COMMERCIAL

## 2023-12-19 ENCOUNTER — TELEPHONE (OUTPATIENT)
Dept: INTERNAL MEDICINE | Facility: CLINIC | Age: 83
End: 2023-12-19
Payer: COMMERCIAL

## 2023-12-19 DIAGNOSIS — N39.0 URINARY TRACT INFECTION WITHOUT HEMATURIA, SITE UNSPECIFIED: Primary | ICD-10-CM

## 2023-12-19 RX ORDER — CEPHALEXIN 500 MG/1
500 CAPSULE ORAL 2 TIMES DAILY
Qty: 14 CAPSULE | Refills: 0 | Status: SHIPPED | OUTPATIENT
Start: 2023-12-19 | End: 2024-02-22

## 2023-12-19 NOTE — TELEPHONE ENCOUNTER
Called and left patient a voice mail message on December 19, 2023 1:30 PM that prescription was sent. Advised to call back if further questions/concerns.    Thank you,  Rodolfo Ibarra, Triage RN Olivia Clayton  1:30 PM 12/19/2023

## 2023-12-20 ENCOUNTER — DOCUMENTATION ONLY (OUTPATIENT)
Dept: CARDIOLOGY | Facility: CLINIC | Age: 83
End: 2023-12-20
Payer: COMMERCIAL

## 2023-12-20 NOTE — PROGRESS NOTES
Received MRI Cardiology Clearance form from [facility] MRI department.  Form completed, signed by MD, and faxed back to MRI at [phone]. [facility] is compatible.  SHAUNNA SUMMERS

## 2023-12-22 ENCOUNTER — ANTICOAGULATION THERAPY VISIT (OUTPATIENT)
Dept: ANTICOAGULATION | Facility: CLINIC | Age: 83
End: 2023-12-22

## 2023-12-22 ENCOUNTER — LAB (OUTPATIENT)
Dept: LAB | Facility: CLINIC | Age: 83
End: 2023-12-22
Payer: COMMERCIAL

## 2023-12-22 DIAGNOSIS — I48.92 ATRIAL FIBRILLATION AND FLUTTER (H): ICD-10-CM

## 2023-12-22 DIAGNOSIS — Z79.01 LONG TERM CURRENT USE OF ANTICOAGULANTS WITH INR GOAL OF 2.0-3.0: Primary | ICD-10-CM

## 2023-12-22 DIAGNOSIS — I48.91 ATRIAL FIBRILLATION AND FLUTTER (H): ICD-10-CM

## 2023-12-22 DIAGNOSIS — Z95.3 S/P MITRAL VALVE REPLACEMENT WITH BIOPROSTHETIC VALVE: ICD-10-CM

## 2023-12-22 LAB — INR BLD: 1.8 (ref 0.9–1.1)

## 2023-12-22 PROCEDURE — 85610 PROTHROMBIN TIME: CPT

## 2023-12-22 PROCEDURE — 36416 COLLJ CAPILLARY BLOOD SPEC: CPT

## 2023-12-22 NOTE — PROGRESS NOTES
ANTICOAGULATION MANAGEMENT     Zunilda Clark 83 year old female is on warfarin with subtherapeutic INR result. (Goal INR 2.0-3.0)    Recent labs: (last 7 days)     12/22/23  0928   INR 1.8*       ASSESSMENT     Warfarin Lab Questionnaire    Warfarin Doses Last 7 Days      12/22/2023     9:25 AM   Dose in Tablet or Mg   TAB or MG? tablet (tab)     Warfarin dose confirmed with patient and taken as directed        12/22/2023   Warfarin Lab Questionnaire   Missed doses within past 14 days? No   Changes in diet or alcohol within past 14 days? No   Medication changes since last result? Yes   Please list: antibiotics and something else. Keflex x7 days and Augmentin for 5 days. Started these both yesterday.   Injuries or illness since last result? No.  UTI.  Patient was asymptomatic.   New shortness of breath, severe headaches or sudden changes in vision since last result? No   Abnormal bleeding since last result? Yes   If yes, please explain: Nose. Blood on the tissue when blowing her nose. Recommended her to use a humidifier or a saline nasal spray to help moisturize.    Upcoming surgery, procedure? No   Best number to call with results? [phone]     Previous result: Supratherapeutic.  INR decreased significantly after last dosage adjustment was made.  Additional findings: None       PLAN     Recommended plan for temporary change(s) and ongoing change(s) affecting INR     Dosing Instructions: Increase your warfarin dose (8% change) with next INR in 10 days       Summary  As of 12/22/2023      Full warfarin instructions:  1.25 mg every Mon; 2.5 mg all other days   Next INR check:  1/2/2024               Telephone call with Deanne who agrees to plan and repeated back plan correctly    Lab visit scheduled    Education provided:   Please call back if any changes to your diet, medications or how you've been taking warfarin  Goal range and lab monitoring: goal range and significance of current result  Dietary considerations:  importance of consistent vitamin K intake and impact of vitamin K foods on INR  Symptom monitoring: monitoring for bleeding signs and symptoms, monitoring for clotting signs and symptoms, and monitoring for stroke signs and symptoms    Plan made per [facility] anticoagulation protocol    Sonam Teixeira RN  Anticoagulation Clinic  12/22/2023    _______________________________________________________________________     Anticoagulation Episode Summary       Current INR goal:  2.0-3.0   TTR:  69.9% (10.9 mo)   Target end date:  Indefinite   Send INR reminders to:  [facility]    Indications    Long term current use of anticoagulants with INR goal of 2.0-3.0 [Z79.01]  Atrial fibrillation and flutter (H) [I48.91  I48.92]  S/P mitral valve replacement with bioprosthetic valve [Z95.3]             Comments:  27 mm Magna bioprosthetic valve (2014)             Anticoagulation Care Providers       Provider Role Specialty Phone number    Yunior Huang MD Referring Internal Medicine [phone]

## 2023-12-28 ENCOUNTER — TELEPHONE (OUTPATIENT)
Dept: INTERNAL MEDICINE | Facility: CLINIC | Age: 83
End: 2023-12-28
Payer: COMMERCIAL

## 2023-12-28 NOTE — TELEPHONE ENCOUNTER
11/30/23 / 12/4/23 / 12/7/23 / and 12/11/23 warfarin orders received via fax. Form in your mailbox to be signed.

## 2024-01-01 ENCOUNTER — MEDICAL CORRESPONDENCE (OUTPATIENT)
Dept: HEALTH INFORMATION MANAGEMENT | Facility: CLINIC | Age: 84
End: 2024-01-01

## 2024-01-01 NOTE — PATIENT INSTRUCTIONS
July 19, 2021    Thank you for allowing our Cardiology team to participate in your care.     Please note the following changes to your heart treatment plan:     Medication changes:   - none  - monitor weight at home, call our team if weight increases by 2-3 lb in 1 day, or 5 lb in 1 week    Tests to be done:  - TTE (heart ultrasound) in 6 months    Follow up:  - Follow up in 6 months with Zena TEMPLETON and with me in 1 year, or sooner as needed.      Please contact our team at [phone] or [phone] for any questions or concerns.   If you are having a medical emergency, please call 911.       Sincerely,    Noe Rodriguez MD, FACC  Cardiology    [facility] [place]  
Elevated dopplers/respiratory distress

## 2024-01-02 ENCOUNTER — LAB (OUTPATIENT)
Dept: LAB | Facility: CLINIC | Age: 84
End: 2024-01-02
Payer: COMMERCIAL

## 2024-01-02 ENCOUNTER — TELEPHONE (OUTPATIENT)
Dept: INTERNAL MEDICINE | Facility: CLINIC | Age: 84
End: 2024-01-02

## 2024-01-02 ENCOUNTER — TELEPHONE (OUTPATIENT)
Dept: MEDSURG UNIT | Facility: CLINIC | Age: 84
End: 2024-01-02

## 2024-01-02 ENCOUNTER — ANTICOAGULATION THERAPY VISIT (OUTPATIENT)
Dept: ANTICOAGULATION | Facility: CLINIC | Age: 84
End: 2024-01-02

## 2024-01-02 DIAGNOSIS — Z95.3 S/P MITRAL VALVE REPLACEMENT WITH BIOPROSTHETIC VALVE: ICD-10-CM

## 2024-01-02 DIAGNOSIS — I48.91 ATRIAL FIBRILLATION AND FLUTTER (H): ICD-10-CM

## 2024-01-02 DIAGNOSIS — I48.92 ATRIAL FIBRILLATION AND FLUTTER (H): ICD-10-CM

## 2024-01-02 DIAGNOSIS — Z79.01 LONG TERM CURRENT USE OF ANTICOAGULANTS WITH INR GOAL OF 2.0-3.0: Primary | ICD-10-CM

## 2024-01-02 LAB — INR BLD: 2.3 (ref 0.9–1.1)

## 2024-01-02 PROCEDURE — 36416 COLLJ CAPILLARY BLOOD SPEC: CPT

## 2024-01-02 PROCEDURE — 85610 PROTHROMBIN TIME: CPT

## 2024-01-02 NOTE — TELEPHONE ENCOUNTER
Detail Level: Zone
I spoke to patient, see [facility] encounter.  Linette Rocha RN    
Left VM to call [phone] with transfer to [facility] OR [facility]  Linette Rocha RN  Anticoagulation Clinic      
Patient returning Linette's call from INR.  Please call back.  
Tazorac Counseling:  Patient advised that medication is irritating and drying.  Patient may need to apply sparingly and wash off after an hour before eventually leaving it on overnight.  The patient verbalized understanding of the proper use and possible adverse effects of tazorac.  All of the patient's questions and concerns were addressed.
Birth Control Pills Pregnancy And Lactation Text: This medication should be avoided if pregnant and for the first 30 days post-partum.
Use Enhanced Medication Counseling?: No
High Dose Vitamin A Counseling: Side effects reviewed, pt to contact office should one occur.
Benzoyl Peroxide Counseling: Patient counseled that medicine may cause skin irritation and bleach clothing.  In the event of skin irritation, the patient was advised to reduce the amount of the drug applied or use it less frequently.   The patient verbalized understanding of the proper use and possible adverse effects of benzoyl peroxide.  All of the patient's questions and concerns were addressed.
Topical Sulfur Applications Pregnancy And Lactation Text: This medication is Pregnancy Category C and has an unknown safety profile during pregnancy. It is unknown if this topical medication is excreted in breast milk.
Sarecycline Pregnancy And Lactation Text: This medication is Pregnancy Category D and not consider safe during pregnancy. It is also excreted in breast milk.
Azithromycin Pregnancy And Lactation Text: This medication is considered safe during pregnancy and is also secreted in breast milk.
High Dose Vitamin A Pregnancy And Lactation Text: High dose vitamin A therapy is contraindicated during pregnancy and breast feeding.
Tazorac Pregnancy And Lactation Text: This medication is not safe during pregnancy. It is unknown if this medication is excreted in breast milk.
Erythromycin Counseling:  I discussed with the patient the risks of erythromycin including but not limited to GI upset, allergic reaction, drug rash, diarrhea, increase in liver enzymes, and yeast infections.
Aklief counseling:  Patient advised to apply a pea-sized amount only at bedtime and wait 30 minutes after washing their face before applying.  If too drying, patient may add a non-comedogenic moisturizer.  The most commonly reported side effects including irritation, redness, scaling, dryness, stinging, burning, itching, and increased risk of sunburn.  The patient verbalized understanding of the proper use and possible adverse effects of retinoids.  All of the patient's questions and concerns were addressed.
Winlevi Counseling:  I discussed with the patient the risks of topical clascoterone including but not limited to erythema, scaling, itching, and stinging. Patient voiced their understanding.
Dapsone Counseling: I discussed with the patient the risks of dapsone including but not limited to hemolytic anemia, agranulocytosis, rashes, methemoglobinemia, kidney failure, peripheral neuropathy, headaches, GI upset, and liver toxicity.  Patients who start dapsone require monitoring including baseline LFTs and weekly CBCs for the first month, then every month thereafter.  The patient verbalized understanding of the proper use and possible adverse effects of dapsone.  All of the patient's questions and concerns were addressed.
Spironolactone Counseling: Patient advised regarding risks of diarrhea, abdominal pain, hyperkalemia, birth defects (for female patients), liver toxicity and renal toxicity. The patient may need blood work to monitor liver and kidney function and potassium levels while on therapy. The patient verbalized understanding of the proper use and possible adverse effects of spironolactone.  All of the patient's questions and concerns were addressed.
Benzoyl Peroxide Pregnancy And Lactation Text: This medication is Pregnancy Category C. It is unknown if benzoyl peroxide is excreted in breast milk.
Minocycline Counseling: Patient advised regarding possible photosensitivity and discoloration of the teeth, skin, lips, tongue and gums.  Patient instructed to avoid sunlight, if possible.  When exposed to sunlight, patients should wear protective clothing, sunglasses, and sunscreen.  The patient was instructed to call the office immediately if the following severe adverse effects occur:  hearing changes, easy bruising/bleeding, severe headache, or vision changes.  The patient verbalized understanding of the proper use and possible adverse effects of minocycline.  All of the patient's questions and concerns were addressed.
Erythromycin Pregnancy And Lactation Text: This medication is Pregnancy Category B and is considered safe during pregnancy. It is also excreted in breast milk.
Winlevi Pregnancy And Lactation Text: This medication is considered safe during pregnancy and breastfeeding.
Dapsone Pregnancy And Lactation Text: This medication is Pregnancy Category C and is not considered safe during pregnancy or breast feeding.
Aklief Pregnancy And Lactation Text: It is unknown if this medication is safe to use during pregnancy.  It is unknown if this medication is excreted in breast milk.  Breastfeeding women should use the topical cream on the smallest area of the skin for the shortest time needed while breastfeeding.  Do not apply to nipple and areola.
Bactrim Counseling:  I discussed with the patient the risks of sulfa antibiotics including but not limited to GI upset, allergic reaction, drug rash, diarrhea, dizziness, photosensitivity, and yeast infections.  Rarely, more serious reactions can occur including but not limited to aplastic anemia, agranulocytosis, methemoglobinemia, blood dyscrasias, liver or kidney failure, lung infiltrates or desquamative/blistering drug rashes.
Topical Clindamycin Counseling: Patient counseled that this medication may cause skin irritation or allergic reactions.  In the event of skin irritation, the patient was advised to reduce the amount of the drug applied or use it less frequently.   The patient verbalized understanding of the proper use and possible adverse effects of clindamycin.  All of the patient's questions and concerns were addressed.
Topical Retinoid counseling:  Patient advised to apply a pea-sized amount only at bedtime and wait 30 minutes after washing their face before applying.  If too drying, patient may add a non-comedogenic moisturizer. The patient verbalized understanding of the proper use and possible adverse effects of retinoids.  All of the patient's questions and concerns were addressed.
Isotretinoin Counseling: Patient should get monthly blood tests, not donate blood, not drive at night if vision affected, not share medication, and not undergo elective surgery for 6 months after tx completed. Side effects reviewed, pt to contact office should one occur.
Spironolactone Pregnancy And Lactation Text: This medication can cause feminization of the male fetus and should be avoided during pregnancy. The active metabolite is also found in breast milk.
Topical Clindamycin Pregnancy And Lactation Text: This medication is Pregnancy Category B and is considered safe during pregnancy. It is unknown if it is excreted in breast milk.
Doxycycline Counseling:  Patient counseled regarding possible photosensitivity and increased risk for sunburn.  Patient instructed to avoid sunlight, if possible.  When exposed to sunlight, patients should wear protective clothing, sunglasses, and sunscreen.  The patient was instructed to call the office immediately if the following severe adverse effects occur:  hearing changes, easy bruising/bleeding, severe headache, or vision changes.  The patient verbalized understanding of the proper use and possible adverse effects of doxycycline.  All of the patient's questions and concerns were addressed.
Bactrim Pregnancy And Lactation Text: This medication is Pregnancy Category D and is known to cause fetal risk.  It is also excreted in breast milk.
Topical Retinoid Pregnancy And Lactation Text: This medication is Pregnancy Category C. It is unknown if this medication is excreted in breast milk.
Azelaic Acid Counseling: Patient counseled that medicine may cause skin irritation and to avoid applying near the eyes.  In the event of skin irritation, the patient was advised to reduce the amount of the drug applied or use it less frequently.   The patient verbalized understanding of the proper use and possible adverse effects of azelaic acid.  All of the patient's questions and concerns were addressed.
Tetracycline Counseling: Patient counseled regarding possible photosensitivity and increased risk for sunburn.  Patient instructed to avoid sunlight, if possible.  When exposed to sunlight, patients should wear protective clothing, sunglasses, and sunscreen.  The patient was instructed to call the office immediately if the following severe adverse effects occur:  hearing changes, easy bruising/bleeding, severe headache, or vision changes.  The patient verbalized understanding of the proper use and possible adverse effects of tetracycline.  All of the patient's questions and concerns were addressed. Patient understands to avoid pregnancy while on therapy due to potential birth defects.
Birth Control Pills Counseling: Birth Control Pill Counseling: I discussed with the patient the potential side effects of OCPs including but not limited to increased risk of stroke, heart attack, thrombophlebitis, deep venous thrombosis, hepatic adenomas, breast changes, GI upset, headaches, and depression.  The patient verbalized understanding of the proper use and possible adverse effects of OCPs. All of the patient's questions and concerns were addressed.
Topical Sulfur Applications Counseling: Topical Sulfur Counseling: Patient counseled that this medication may cause skin irritation or allergic reactions.  In the event of skin irritation, the patient was advised to reduce the amount of the drug applied or use it less frequently.   The patient verbalized understanding of the proper use and possible adverse effects of topical sulfur application.  All of the patient's questions and concerns were addressed.
Isotretinoin Pregnancy And Lactation Text: This medication is Pregnancy Category X and is considered extremely dangerous during pregnancy. It is unknown if it is excreted in breast milk.
Azelaic Acid Pregnancy And Lactation Text: This medication is considered safe during pregnancy and breast feeding.
Azithromycin Counseling:  I discussed with the patient the risks of azithromycin including but not limited to GI upset, allergic reaction, drug rash, diarrhea, and yeast infections.
Doxycycline Pregnancy And Lactation Text: This medication is Pregnancy Category D and not consider safe during pregnancy. It is also excreted in breast milk but is considered safe for shorter treatment courses.
Sarecycline Counseling: Patient advised regarding possible photosensitivity and discoloration of the teeth, skin, lips, tongue and gums.  Patient instructed to avoid sunlight, if possible.  When exposed to sunlight, patients should wear protective clothing, sunglasses, and sunscreen.  The patient was instructed to call the office immediately if the following severe adverse effects occur:  hearing changes, easy bruising/bleeding, severe headache, or vision changes.  The patient verbalized understanding of the proper use and possible adverse effects of sarecycline.  All of the patient's questions and concerns were addressed.

## 2024-01-02 NOTE — TELEPHONE ENCOUNTER
Patient Returning Call    Reason for call:  anticoagulation     Information relayed to patient:  Message sent to return call     Patient has additional questions:  No      Could we send this information to you in [employer] or would you prefer to receive a phone call?:   Patient would prefer a phone call   Okay to leave a detailed message?: Yes at Cell number on file:    Telephone Information:   Mobile [phone]

## 2024-01-02 NOTE — TELEPHONE ENCOUNTER
I spoke to patient, see [facility] encounter.  Patient reports that the appointment scheduled for 1/9/24 is for an MRI.      Linette Rocha RN

## 2024-01-02 NOTE — PROGRESS NOTES
ANTICOAGULATION MANAGEMENT     Zunilda SHAW May 83 year old female is on warfarin with therapeutic INR result. (Goal INR 2.0-3.0)    Recent labs: (last 7 days)     01/02/24  1126   INR 2.3*       ASSESSMENT     Warfarin Lab Questionnaire    Warfarin Doses Last 7 Days      1/2/2024    11:18 AM   Dose in Tablet or Mg   TAB or MG? tablet (tab)     Pt Rptd Dose SUNDAY MONDAY TUESDAY WED THURS FRIDAY SATURDAY 1/2/2024  11:18 AM 1 0.5 1 1 1 1 1         1/2/2024   Warfarin Lab Questionnaire   Missed doses within past 14 days? No   Changes in diet or alcohol within past 14 days? No   Medication changes since last result? No, Yes, took Augmentin 12/18/23 x 5 days and Keflex 12/19/23 x 7 days-patient reports she finished both antibiotics.   Injuries or illness since last result? No   New shortness of breath, severe headaches or sudden changes in vision since last result? No   Abnormal bleeding since last result? No   Upcoming surgery, procedure? No   Best number to call with results? [phone]     Previous result: Subtherapeutic  Additional findings: Warfarin maintenance dose was increased 8% at last visit         PLAN     Recommended plan for no diet, medication or health factor changes affecting INR     Dosing Instructions: Continue your current warfarin dose with next INR in 3 weeks       Summary  As of 1/2/2024      Full warfarin instructions:  1.25 mg every Mon; 2.5 mg all other days   Next INR check:  1/23/2024               Telephone call with Deanne who verbalizes understanding and agrees to plan    Lab visit scheduled    Education provided:   Contact [phone]  with any changes, questions or concerns.     Plan made per [facility] anticoagulation protocol    Linette Rocha, RN  Anticoagulation Clinic  1/2/2024    _______________________________________________________________________     Anticoagulation Episode Summary       Current INR goal:  2.0-3.0   TTR:  68.6% (10.9 mo)   Target end date:  Indefinite   Send INR  reminders to:  [facility]    Indications    Long term current use of anticoagulants with INR goal of 2.0-3.0 [Z79.01]  Atrial fibrillation and flutter (H) [I48.91  I48.92]  S/P mitral valve replacement with bioprosthetic valve [Z95.3]             Comments:  27 mm Magna bioprosthetic valve (2014)             Anticoagulation Care Providers       Provider Role Specialty Phone number    Yunior Huang MD Referring Internal Medicine [phone]

## 2024-01-05 ENCOUNTER — HOSPITAL ENCOUNTER (OUTPATIENT)
Dept: CARDIOLOGY | Facility: CLINIC | Age: 84
Discharge: HOME | End: 2024-01-05
Admitting: INTERNAL MEDICINE
Payer: COMMERCIAL

## 2024-01-05 DIAGNOSIS — Z95.3 S/P MITRAL VALVE REPLACEMENT WITH BIOPROSTHETIC VALVE: ICD-10-CM

## 2024-01-05 DIAGNOSIS — I06.0 RHEUMATIC AORTIC STENOSIS: ICD-10-CM

## 2024-01-05 LAB — LVEF ECHO: NORMAL

## 2024-01-05 PROCEDURE — 93306 TTE W/DOPPLER COMPLETE: CPT | Mod: 26 | Performed by: INTERNAL MEDICINE

## 2024-01-05 PROCEDURE — 93306 TTE W/DOPPLER COMPLETE: CPT

## 2024-01-07 NOTE — PROGRESS NOTES
"    Cardiology Progress Note    Date of Service: 01/08/24      Reason for visit: Follow up aortic and mitral valve disease, pulmonary hypertension.       Primary cardiologist: Dr. Noe Rodriguez        HPI:  Ms. 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 However, she has chosen to proceed conservatively.      When I met with Deanne in March of 2023, she had suffered a fall and a T12 burst fracture which they were treating conservatively, but breathing was ok. Most recently, she met with Dr. Rodriguez in July 2023 and repeat echo at that time showed findings similar to prior, though aortic stenosis had progressed to mod-severe. As she was feeling well, however, no changes were made.     Today, I'm meeting back with Deanne once again. Since she was here last, she suffered another fall in September and suffered a fractured femur. She underwent repair which sounds to have overall been ok. More recently, she is having back pain again and is being investigated for possibly another spinal fracture so is back to wearing a brace. She is limited in her ambulation because of this. She had another echo last week in preparation for our visit today. EF remains preserved at 60-65%. 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 Her RV is pressures are elevated and RV is mild to moderately dilated, but with normal RV function reported. From a symptom standpoint, she tells me she is actually doing ok. She reports no worsening MORRISON, dizziness, chest pain, or more LE than usual. However, as mentioned, she is not very active.        There were no new labs performed prior to our visit today. Most recent labs reviewed as below.        ASSESSMENT/PLAN:     Multivalvular dysfunction.  --Ms. Clark has a history of Rheumatic heart disease with bioprosthetic mitral valve replacement in 2007, and redo in 2014 with a 27 mm Magna bioprosthetic mitral valve.  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 D/W Dr. Rodriguez; we offered structural clinic for further evaluation. However, patient tells me that she is not interested in any further procedures and would like to continue to proceed conservatively, especially given that she is asymptomatic. Thus, she declines further evaluation fo rnow.   --Clinically, she is well compensated. From a volume standpoint, she has some mild (L>R in the setting of a recent leg fracture) on torsemide 10mg daily. Historically, renal function has been preserved. I kept this unchanged for now.      2. Pulmonary hypertension.              --Group 2 PH in the setting of valvular disease. Overall, pulmonary vasodilators are not felt to be indicated and she has chosen to proceed conservatively with avoiding invasive procedures when possible as mentioned.  Per echo, Her RV is pressures are elevated and RV is mild to moderately dilated, but with normal RV function reported.              3. High degree AV block.              --Post surgery 2007, s/p dual-chamber pacemaker placement.  Last device check Oct 2023 AP 94%,  95%. Battery life 8.7 years.                --She  remains anticoagulated with warfarin, and follows with the INR clinic.      4. Hypertension.              --BP appears to be under good control recently. She is on amlodipine/valsartan, with additional 2.5mg amlodipine, and metoprolol tartrate 50 mg BID, along with torsemide as above. No changes today.         Follow up plan: Return in 6 months to see Dr. Rodriguez to follow up. As she is not interested in intervention, will not repeat cardiac imaging and continue to treat symptomatic/conservatively.       Orders this Visit:  Orders Placed This Encounter   Procedures    Follow-Up with Cardiology     Orders Placed This Encounter   Medications    amLODIPine (NORVASC) 2.5 MG tablet     Sig: Take 1 tablet (2.5 mg) by mouth daily     Dispense:  90 tablet     Refill:  3    amLODIPine-valsartan (EXFORGE) 5-160 MG tablet     Sig: Take 1 tablet by mouth daily     Dispense:  90 tablet     Refill:  3    torsemide (DEMADEX) 10 MG tablet     Sig: Take 1 tablet (10 mg) by mouth daily Hold for SBP less than 110     Dispense:  90 tablet     Refill:  3    metoprolol tartrate (LOPRESSOR) 50 MG tablet     Sig: Take 1 tablet (50 mg) by mouth 2 times daily     Dispense:  180 tablet     Refill:  3     Medications Discontinued During This Encounter   Medication Reason    torsemide (DEMADEX) 10 MG tablet Reorder (No AVS)    amLODIPine (NORVASC) 2.5 MG tablet Reorder (No AVS)    amLODIPine-valsartan (EXFORGE) 5-160 MG tablet Reorder (No AVS)    metoprolol tartrate (LOPRESSOR) 50 MG tablet Reorder (No AVS)           CURRENT MEDICATIONS:  Current Outpatient Medications   Medication Sig Dispense Refill    acetaminophen (TYLENOL) 500 MG tablet Take 1,000 mg by mouth 3 times daily      allopurinol (ZYLOPRIM) 100 MG tablet Take 100 mg by mouth 2 times daily      amLODIPine (NORVASC) 2.5 MG tablet Take 1 tablet (2.5 mg) by mouth daily 90 tablet 3    amLODIPine-valsartan (EXFORGE) 5-160 MG tablet Take 1 tablet by mouth daily 90 tablet 3    amoxicillin  (AMOXIL) 500 MG tablet Take 2000mg (4 tablets of 500mg each) approx 30 min to 1 hour prior to any dental procedures 4 tablet 3    amoxicillin-clavulanate (AUGMENTIN) 875-125 MG tablet Take 1 tablet by mouth 2 times daily 10 tablet 0    calcium citrate (CALCITRATE) 950 MG tablet Take 1 tablet by mouth daily       cephALEXin (KEFLEX) 500 MG capsule Take 1 capsule (500 mg) by mouth 2 times daily 14 capsule 0    Dorzolamide HCl-Timolol Mal (COSOPT OP) Apply to eye 2 times daily Place one drop into each eye twice daily      famotidine (PEPCID) 40 MG tablet TAKE 1 TABLET BY MOUTH DAILY 90 tablet 3    ferrous sulfate (FEROSUL) 325 (65 Fe) MG tablet Take 325 mg by mouth daily (with breakfast)      folic acid (FOLVITE) 1 MG tablet Take 1 mg by mouth daily      levothyroxine (SYNTHROID/LEVOTHROID) 112 MCG tablet Take 1 tablet (112 mcg) by mouth daily 100 tablet 3    Lidocaine (LIDOCARE) 4 % Patch Place 1 patch onto the skin every 24 hours To prevent lidocaine toxicity, patient should be patch free for 12 hrs daily.  Apply to left leg 3 patch 0    lidocaine 5% oint/silver sulfadiazine 1% cm/triamcinolone 0.1% cm (JESUS PASTE) compounded ointment Apply topically 4 times daily as needed (mouth sore) 40 g 0    metoprolol tartrate (LOPRESSOR) 50 MG tablet Take 1 tablet (50 mg) by mouth 2 times daily 180 tablet 3    multivitamin w/minerals (THERA-VIT-M) tablet Take 1 tablet by mouth daily Without iron      senna-docusate (SENOKOT-S/PERICOLACE) 8.6-50 MG tablet Take 1-2 tablets by mouth 2 times daily Take while on oral narcotics to prevent or treat constipation. (Patient taking differently: Take 1 tablet by mouth 2 times daily Take while on oral narcotics to prevent or treat constipation.) 30 tablet 0    torsemide (DEMADEX) 10 MG tablet Take 1 tablet (10 mg) by mouth daily Hold for SBP less than 110 90 tablet 3    VITAMIN D, CHOLECALCIFEROL, PO Take 1,000 Units by mouth daily      warfarin ANTICOAGULANT (COUMADIN) 2.5 MG tablet Take  "3.75 mg (1.5 tablets) by mouth every Sun, Tue, Thu; Take 2.5 mg (1 tablet) all other days or as instructed by the INR Clinic 108 tablet 1    METHOTREXATE SODIUM IJ Inject 0.8 mLs as directed once a week Thursdays           Review of Systems:  POS ROS ARE BOLDED, all other negative.    Cardiovascular: Chest pain, palpitations, orthopnea, LE edema  Resp: Dyspnea on exertion, cough, known chronic lung disease  Hematologic/lymphatic: Current systemic anticoagulation, hx of blood clots, new bleeding concerns.  Neurological: Dizziness, presyncope/syncope.    Physical Exam:  Vitals: /61 (BP Location: Right arm, Patient Position: Sitting, Cuff Size: Adult Regular)   Pulse 63   Ht 1.626 m (5' 4\")   Wt 61.2 kg (135 lb)   LMP  (LMP Unknown)   SpO2 94%   BMI 23.17 kg/m     Wt Readings from Last 4 Encounters:   01/08/24 61.2 kg (135 lb)   10/20/23 68 kg (150 lb)   10/16/23 68.3 kg (150 lb 9.6 oz)   10/08/23 66.4 kg (146 lb 6.4 oz)       GEN:  In general, this is a somewhat frail appearing  female in no acute distress on RA.  Patient in a wheelchair today, unaccompanied.   C/V:  Cardiac/pulm exam somewhat limited due to back brace in place. Regular rate and rhythm, 3/6 JON heard at RSB.   RESP: Respirations are unlabored. No use of accessory muscles. No wheezing or rhonchi upper lobes.  EXTREM: Trace to 1+ L>R PT edema.   NEURO: Alert and oriented, cooperative. Gait not assessed.     Recent Lab Results:    LIPID RESULTS:  Lab Results   Component Value Date    CHOL 183 07/14/2023    CHOL 167 11/17/2020    HDL 47 (L) 07/14/2023    HDL 45 (L) 11/17/2020     (H) 07/14/2023     (H) 11/17/2020    TRIG 79 07/14/2023    TRIG 87 11/17/2020    CHOLHDLRATIO 3.6 11/13/2015       CBC RESULTS:  Lab Results   Component Value Date    WBC 6.6 10/10/2023    WBC 7.1 06/22/2021    RBC 2.37 (L) 10/10/2023    RBC 3.75 (L) 06/22/2021    HGB 8.3 (L) 10/10/2023    HGB 12.5 06/22/2021    HCT 26.5 (L) 10/10/2023    HCT " 38.6 06/22/2021     (H) 10/10/2023     (H) 06/22/2021    MCH 35.0 (H) 10/10/2023    MCH 33.3 (H) 06/22/2021    MCHC 31.3 (L) 10/10/2023    MCHC 32.4 06/22/2021    RDW 19.2 (H) 10/10/2023    RDW 17.0 (H) 06/22/2021     10/10/2023     06/22/2021       BMP RESULTS:  Lab Results   Component Value Date     10/18/2023     06/22/2021    POTASSIUM 4.6 10/18/2023    POTASSIUM 3.7 08/17/2022    POTASSIUM 3.8 06/22/2021    CHLORIDE 107 10/18/2023    CHLORIDE 107 08/17/2022    CHLORIDE 104 06/22/2021    CO2 25 10/18/2023    CO2 26 08/17/2022    CO2 33 (H) 06/22/2021    ANIONGAP 9 10/18/2023    ANIONGAP 7 08/17/2022    ANIONGAP 1 (L) 06/22/2021    GLC 84 10/18/2023     (H) 09/27/2023    GLC 91 08/17/2022    GLC 88 06/22/2021    BUN 19.4 10/18/2023    BUN 21 08/17/2022    BUN 27 06/22/2021    CR 0.99 (H) 10/18/2023    CR 1.11 (H) 06/22/2021    GFRESTIMATED 57 (L) 10/18/2023    GFRESTIMATED 50.9 06/28/2021    GFRESTBLACK 54 (L) 06/22/2021    BRICE 9.1 10/18/2023    BRICE 9.6 06/22/2021        Recent Labs   Lab Test 09/26/23  0934 03/13/23  1110 08/17/22  0947 03/25/22  1557 07/26/18  1035 05/03/17  1230   NTBNPI 1,711  --   --   --   --  4,812*   NTBNP  --  1,680 1,290* 1,754*   < >  --     < > = values in this interval not displayed.         Additional pertinent testing:    Echo 1/5/24  Interpretation Summary     There is a 27 mm Magna bioprosthetic valve in the mitral position.  Severe prosthetic mitral valve stenosis  Severe valvular aortic stenosis.  There is mild (1+) aortic regurgitation.  The visual ejection fraction is 60-65%.  Left ventricular systolic function is normal.  The right ventricle is mild to moderately dilated.  The right ventricular systolic function is normal.  There is moderate (2+) tricuspid regurgitation.  Right ventricular systolic pressure is elevated, consistent with severe  pulmonary hypertension.  The rhythm was atrial fibrillation with paced  rhythm.  Compared to the previous echocardiogram, the mean gradient across the mitral  valve prosthesis has significantly increased at a similar heart rate. The mean  gradient now is 12 mmHg and was 5.6 mmHg on 18 June of 2023.  The degree of aortic stenosis has also progressed. Previously the mean  gradient across the aortic valve was 21.9 mmHg and a calculated aortic valve  area of was 1.0 cmÂ . Now the mean gradient across the aortic valve is 33.7  mmHg and the calculated aortic valve area is 0.87 cmÂ .  The degree of tricuspid regurgitation is similar to previously being moderate.  Pulmonary pressures are lower on this study than previously but still severe.      45 total minutes was spent today including chart review, precharting, history and exam, patient education, post visit documentation, and reviewing studies as outlined above.       Zena Benitez PA-C  [facility]  Pager [phone]

## 2024-01-08 ENCOUNTER — OFFICE VISIT (OUTPATIENT)
Dept: CARDIOLOGY | Facility: CLINIC | Age: 84
End: 2024-01-08
Attending: INTERNAL MEDICINE
Payer: COMMERCIAL

## 2024-01-08 VITALS
OXYGEN SATURATION: 94 % | BODY MASS INDEX: 23.05 KG/M2 | HEART RATE: 63 BPM | HEIGHT: 64 IN | SYSTOLIC BLOOD PRESSURE: 100 MMHG | WEIGHT: 135 LBS | DIASTOLIC BLOOD PRESSURE: 61 MMHG

## 2024-01-08 DIAGNOSIS — I27.20 PULMONARY HTN (H): ICD-10-CM

## 2024-01-08 DIAGNOSIS — I48.20 CHRONIC ATRIAL FIBRILLATION (H): ICD-10-CM

## 2024-01-08 DIAGNOSIS — Z95.3 S/P MITRAL VALVE REPLACEMENT WITH BIOPROSTHETIC VALVE: ICD-10-CM

## 2024-01-08 DIAGNOSIS — I27.20 PULMONARY HYPERTENSION (H): ICD-10-CM

## 2024-01-08 DIAGNOSIS — I06.0 RHEUMATIC AORTIC STENOSIS: ICD-10-CM

## 2024-01-08 DIAGNOSIS — I10 BENIGN ESSENTIAL HYPERTENSION: ICD-10-CM

## 2024-01-08 PROCEDURE — 99215 OFFICE O/P EST HI 40 MIN: CPT | Performed by: PHYSICIAN ASSISTANT

## 2024-01-08 RX ORDER — TORSEMIDE 10 MG/1
10 TABLET ORAL DAILY
Qty: 90 TABLET | Refills: 3 | Status: ON HOLD | OUTPATIENT
Start: 2024-01-08 | End: 2024-02-26

## 2024-01-08 RX ORDER — AMLODIPINE BESYLATE 2.5 MG/1
2.5 TABLET ORAL DAILY
Qty: 90 TABLET | Refills: 3 | Status: ON HOLD | OUTPATIENT
Start: 2024-01-08 | End: 2024-02-26

## 2024-01-08 RX ORDER — METOPROLOL TARTRATE 50 MG
50 TABLET ORAL 2 TIMES DAILY
Qty: 180 TABLET | Refills: 3 | Status: ON HOLD | OUTPATIENT
Start: 2024-01-08 | End: 2024-02-26

## 2024-01-08 RX ORDER — AMLODIPINE AND VALSARTAN 5; 160 MG/1; MG/1
1 TABLET ORAL DAILY
Qty: 90 TABLET | Refills: 3 | Status: ON HOLD | OUTPATIENT
Start: 2024-01-08 | End: 2024-02-26

## 2024-01-08 NOTE — PATIENT INSTRUCTIONS
Thank you for visiting the cardiology clinic today.      Medication changes:    None    Test results:   As discussed, your aortic valve and mitral valve are tighter than they were last summer.    We will not pursue any surgery/procedures for now as discussed in our visit. We will continue to manage things with medications as best we can.     Follow up Plan:  Return in 6 months to see Dr. Rodriguez, reach out sooner with any new concerns.     Please feel free to reach out to our nursing team at [phone] with any questions or concerns.  You can also send us a MyChart and we will get back with you as soon as possible.

## 2024-01-08 NOTE — LETTER
"1/8/2024    Yunior Huang MD  [address] [facility] [address]    RE: Zunilda Clark       Dear Colleague,     I had the pleasure of seeing Zunilda Clark in the [facility].      Cardiology Progress Note    Date of Service: 01/08/24      Reason for visit: Follow up aortic and mitral valve disease, pulmonary hypertension.       Primary cardiologist: Dr. Noe Rodriguez        HPI:  Ms. 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 However, she has chosen to proceed conservatively.      When I met with Deanne in March of 2023, she had suffered a fall and a T12 burst fracture which they were treating conservatively, but breathing was ok. Most recently, she met with Dr. Rodriguez in July 2023 and repeat echo at that time showed findings similar to prior, though aortic stenosis had progressed to mod-severe. As she was feeling well, however, no changes were made.     Today, I'm meeting back with Deanne once again. Since she was here last, she suffered another fall in September and suffered a fractured femur. She underwent repair which sounds to have overall been ok. More recently, she is having back pain again and is being investigated for possibly another spinal fracture so is back to wearing a brace. She is limited in her ambulation because of this. She had another echo last week in " preparation for our visit today. EF remains preserved at 60-65%. 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 Her RV is pressures are elevated and RV is mild to moderately dilated, but with normal RV function reported. From a symptom standpoint, she tells me she is actually doing ok. She reports no worsening MORRISON, dizziness, chest pain, or more LE than usual. However, as mentioned, she is not very active.        There were no new labs performed prior to our visit today. Most recent labs reviewed as below.        ASSESSMENT/PLAN:     Multivalvular dysfunction.  --Ms. Clark has a history of Rheumatic heart disease with bioprosthetic mitral valve replacement in 2007, and redo in 2014 with a 27 mm Magna bioprosthetic mitral valve.  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 D/W Dr. Rodriguez; we offered structural clinic for further evaluation. However, patient tells me that she is not interested in any further procedures and would like to continue to proceed conservatively, especially given that she is asymptomatic. Thus, she declines further evaluation fo rnow.   --Clinically, she is well compensated. From a volume standpoint, she has some mild (L>R in the setting of a recent leg fracture) on torsemide 10mg daily. Historically, renal function has been preserved. I kept this unchanged for now.      2. Pulmonary hypertension.              --Group 2 PH in the setting of valvular disease. Overall, pulmonary vasodilators are not felt to be indicated and she has chosen to proceed conservatively with avoiding invasive procedures when possible as mentioned.  Per echo, Her RV is pressures are elevated and RV is mild to moderately dilated, but with normal RV function reported.               3. High degree AV block.              --Post surgery 2007, s/p dual-chamber pacemaker placement.  Last device check Oct 2023 AP 94%,  95%. Battery life 8.7 years.                --She remains anticoagulated with warfarin, and follows with the INR clinic.      4. Hypertension.              --BP appears to be under good control recently. She is on amlodipine/valsartan, with additional 2.5mg amlodipine, and metoprolol tartrate 50 mg BID, along with torsemide as above. No changes today.         Follow up plan: Return in 6 months to see Dr. Rodriguez to follow up. As she is not interested in intervention, will not repeat cardiac imaging and continue to treat symptomatic/conservatively.       Orders this Visit:  Orders Placed This Encounter   Procedures    Follow-Up with Cardiology     Orders Placed This Encounter   Medications    amLODIPine (NORVASC) 2.5 MG tablet     Sig: Take 1 tablet (2.5 mg) by mouth daily     Dispense:  90 tablet     Refill:  3    amLODIPine-valsartan (EXFORGE) 5-160 MG tablet     Sig: Take 1 tablet by mouth daily     Dispense:  90 tablet     Refill:  3    torsemide (DEMADEX) 10 MG tablet     Sig: Take 1 tablet (10 mg) by mouth daily Hold for SBP less than 110     Dispense:  90 tablet     Refill:  3    metoprolol tartrate (LOPRESSOR) 50 MG tablet     Sig: Take 1 tablet (50 mg) by mouth 2 times daily     Dispense:  180 tablet     Refill:  3     Medications Discontinued During This Encounter   Medication Reason    torsemide (DEMADEX) 10 MG tablet Reorder (No AVS)    amLODIPine (NORVASC) 2.5 MG tablet Reorder (No AVS)    amLODIPine-valsartan (EXFORGE) 5-160 MG tablet Reorder (No AVS)    metoprolol tartrate (LOPRESSOR) 50 MG tablet Reorder (No AVS)           CURRENT MEDICATIONS:  Current Outpatient Medications   Medication Sig Dispense Refill    acetaminophen (TYLENOL) 500 MG tablet Take 1,000 mg by mouth 3 times daily      allopurinol (ZYLOPRIM) 100 MG tablet Take 100 mg by mouth 2 times daily       amLODIPine (NORVASC) 2.5 MG tablet Take 1 tablet (2.5 mg) by mouth daily 90 tablet 3    amLODIPine-valsartan (EXFORGE) 5-160 MG tablet Take 1 tablet by mouth daily 90 tablet 3    amoxicillin (AMOXIL) 500 MG tablet Take 2000mg (4 tablets of 500mg each) approx 30 min to 1 hour prior to any dental procedures 4 tablet 3    amoxicillin-clavulanate (AUGMENTIN) 875-125 MG tablet Take 1 tablet by mouth 2 times daily 10 tablet 0    calcium citrate (CALCITRATE) 950 MG tablet Take 1 tablet by mouth daily       cephALEXin (KEFLEX) 500 MG capsule Take 1 capsule (500 mg) by mouth 2 times daily 14 capsule 0    Dorzolamide HCl-Timolol Mal (COSOPT OP) Apply to eye 2 times daily Place one drop into each eye twice daily      famotidine (PEPCID) 40 MG tablet TAKE 1 TABLET BY MOUTH DAILY 90 tablet 3    ferrous sulfate (FEROSUL) 325 (65 Fe) MG tablet Take 325 mg by mouth daily (with breakfast)      folic acid (FOLVITE) 1 MG tablet Take 1 mg by mouth daily      levothyroxine (SYNTHROID/LEVOTHROID) 112 MCG tablet Take 1 tablet (112 mcg) by mouth daily 100 tablet 3    Lidocaine (LIDOCARE) 4 % Patch Place 1 patch onto the skin every 24 hours To prevent lidocaine toxicity, patient should be patch free for 12 hrs daily.  Apply to left leg 3 patch 0    lidocaine 5% oint/silver sulfadiazine 1% cm/triamcinolone 0.1% cm (JESUS PASTE) compounded ointment Apply topically 4 times daily as needed (mouth sore) 40 g 0    metoprolol tartrate (LOPRESSOR) 50 MG tablet Take 1 tablet (50 mg) by mouth 2 times daily 180 tablet 3    multivitamin w/minerals (THERA-VIT-M) tablet Take 1 tablet by mouth daily Without iron      senna-docusate (SENOKOT-S/PERICOLACE) 8.6-50 MG tablet Take 1-2 tablets by mouth 2 times daily Take while on oral narcotics to prevent or treat constipation. (Patient taking differently: Take 1 tablet by mouth 2 times daily Take while on oral narcotics to prevent or treat constipation.) 30 tablet 0    torsemide (DEMADEX) 10 MG tablet Take  "1 tablet (10 mg) by mouth daily Hold for SBP less than 110 90 tablet 3    VITAMIN D, CHOLECALCIFEROL, PO Take 1,000 Units by mouth daily      warfarin ANTICOAGULANT (COUMADIN) 2.5 MG tablet Take 3.75 mg (1.5 tablets) by mouth every Sun, Tue, Thu; Take 2.5 mg (1 tablet) all other days or as instructed by the INR Clinic 108 tablet 1    METHOTREXATE SODIUM IJ Inject 0.8 mLs as directed once a week Thursdays           Review of Systems:  POS ROS ARE BOLDED, all other negative.    Cardiovascular: Chest pain, palpitations, orthopnea, LE edema  Resp: Dyspnea on exertion, cough, known chronic lung disease  Hematologic/lymphatic: Current systemic anticoagulation, hx of blood clots, new bleeding concerns.  Neurological: Dizziness, presyncope/syncope.    Physical Exam:  Vitals: /61 (BP Location: Right arm, Patient Position: Sitting, Cuff Size: Adult Regular)   Pulse 63   Ht 1.626 m (5' 4\")   Wt 61.2 kg (135 lb)   LMP  (LMP Unknown)   SpO2 94%   BMI 23.17 kg/m     Wt Readings from Last 4 Encounters:   01/08/24 61.2 kg (135 lb)   10/20/23 68 kg (150 lb)   10/16/23 68.3 kg (150 lb 9.6 oz)   10/08/23 66.4 kg (146 lb 6.4 oz)       GEN:  In general, this is a somewhat frail appearing  female in no acute distress on RA.  Patient in a wheelchair today, unaccompanied.   C/V:  Cardiac/pulm exam somewhat limited due to back brace in place. Regular rate and rhythm, 3/6 JON heard at RSB.   RESP: Respirations are unlabored. No use of accessory muscles. No wheezing or rhonchi upper lobes.  EXTREM: Trace to 1+ L>R PT edema.   NEURO: Alert and oriented, cooperative. Gait not assessed.     Recent Lab Results:    LIPID RESULTS:  Lab Results   Component Value Date    CHOL 183 07/14/2023    CHOL 167 11/17/2020    HDL 47 (L) 07/14/2023    HDL 45 (L) 11/17/2020     (H) 07/14/2023     (H) 11/17/2020    TRIG 79 07/14/2023    TRIG 87 11/17/2020    CHOLHDLRATIO 3.6 11/13/2015       CBC RESULTS:  Lab Results   Component " Value Date    WBC 6.6 10/10/2023    WBC 7.1 06/22/2021    RBC 2.37 (L) 10/10/2023    RBC 3.75 (L) 06/22/2021    HGB 8.3 (L) 10/10/2023    HGB 12.5 06/22/2021    HCT 26.5 (L) 10/10/2023    HCT 38.6 06/22/2021     (H) 10/10/2023     (H) 06/22/2021    MCH 35.0 (H) 10/10/2023    MCH 33.3 (H) 06/22/2021    MCHC 31.3 (L) 10/10/2023    MCHC 32.4 06/22/2021    RDW 19.2 (H) 10/10/2023    RDW 17.0 (H) 06/22/2021     10/10/2023     06/22/2021       BMP RESULTS:  Lab Results   Component Value Date     10/18/2023     06/22/2021    POTASSIUM 4.6 10/18/2023    POTASSIUM 3.7 08/17/2022    POTASSIUM 3.8 06/22/2021    CHLORIDE 107 10/18/2023    CHLORIDE 107 08/17/2022    CHLORIDE 104 06/22/2021    CO2 25 10/18/2023    CO2 26 08/17/2022    CO2 33 (H) 06/22/2021    ANIONGAP 9 10/18/2023    ANIONGAP 7 08/17/2022    ANIONGAP 1 (L) 06/22/2021    GLC 84 10/18/2023     (H) 09/27/2023    GLC 91 08/17/2022    GLC 88 06/22/2021    BUN 19.4 10/18/2023    BUN 21 08/17/2022    BUN 27 06/22/2021    CR 0.99 (H) 10/18/2023    CR 1.11 (H) 06/22/2021    GFRESTIMATED 57 (L) 10/18/2023    GFRESTIMATED 50.9 06/28/2021    GFRESTBLACK 54 (L) 06/22/2021    BRICE 9.1 10/18/2023    BRICE 9.6 06/22/2021        Recent Labs   Lab Test 09/26/23  0934 03/13/23  1110 08/17/22  0947 03/25/22  1557 07/26/18  1035 05/03/17  1230   NTBNPI 1,711  --   --   --   --  4,812*   NTBNP  --  1,680 1,290* 1,754*   < >  --     < > = values in this interval not displayed.         Additional pertinent testing:    Echo 1/5/24  Interpretation Summary     There is a 27 mm Magna bioprosthetic valve in the mitral position.  Severe prosthetic mitral valve stenosis  Severe valvular aortic stenosis.  There is mild (1+) aortic regurgitation.  The visual ejection fraction is 60-65%.  Left ventricular systolic function is normal.  The right ventricle is mild to moderately dilated.  The right ventricular systolic function is normal.  There is  moderate (2+) tricuspid regurgitation.  Right ventricular systolic pressure is elevated, consistent with severe  pulmonary hypertension.  The rhythm was atrial fibrillation with paced rhythm.  Compared to the previous echocardiogram, the mean gradient across the mitral  valve prosthesis has significantly increased at a similar heart rate. The mean  gradient now is 12 mmHg and was 5.6 mmHg on 18 June of 2023.  The degree of aortic stenosis has also progressed. Previously the mean  gradient across the aortic valve was 21.9 mmHg and a calculated aortic valve  area of was 1.0 cmÂ . Now the mean gradient across the aortic valve is 33.7  mmHg and the calculated aortic valve area is 0.87 cmÂ .  The degree of tricuspid regurgitation is similar to previously being moderate.  Pulmonary pressures are lower on this study than previously but still severe.      45 total minutes was spent today including chart review, precharting, history and exam, patient education, post visit documentation, and reviewing studies as outlined above.       Zena Benitez PA-C  [facility]  Pager [phone]      Thank you for allowing me to participate in the care of your patient.      Sincerely,     YOKASTA Salcido     [facility]  cc:   Noe Rodriguez MD  [address], [address]  [place] [address]

## 2024-01-09 ENCOUNTER — HOSPITAL ENCOUNTER (OUTPATIENT)
Facility: CLINIC | Age: 84
Discharge: HOME | End: 2024-01-09
Admitting: ORTHOPAEDIC SURGERY
Payer: COMMERCIAL

## 2024-01-09 ENCOUNTER — DOCUMENTATION ONLY (OUTPATIENT)
Dept: ANTICOAGULATION | Facility: CLINIC | Age: 84
End: 2024-01-09

## 2024-01-09 ENCOUNTER — HOSPITAL ENCOUNTER (OUTPATIENT)
Dept: MRI IMAGING | Facility: CLINIC | Age: 84
Discharge: HOME | End: 2024-01-09
Attending: ORTHOPAEDIC SURGERY
Payer: COMMERCIAL

## 2024-01-09 VITALS
RESPIRATION RATE: 16 BRPM | HEART RATE: 84 BPM | OXYGEN SATURATION: 93 % | DIASTOLIC BLOOD PRESSURE: 61 MMHG | SYSTOLIC BLOOD PRESSURE: 119 MMHG

## 2024-01-09 DIAGNOSIS — M54.9 BACK PAIN: ICD-10-CM

## 2024-01-09 PROCEDURE — 999N000154 HC STATISTIC RADIOLOGY XRAY, US, CT, MAR, NM

## 2024-01-09 PROCEDURE — 72146 MRI CHEST SPINE W/O DYE: CPT

## 2024-01-09 ASSESSMENT — ACTIVITIES OF DAILY LIVING (ADL): ADLS_ACUITY_SCORE: 35

## 2024-01-09 NOTE — PROGRESS NOTES
Care Suites Procedure Nursing Note    Patient Information  Name: Zunilda SHAW May  Age: 83 year old    Procedure  Procedure: MRI. Patient has [employer] PPM baseline settings DDDR .  Settings changed to DOO 85 for duration of MRI.   Procedure start time: 0953  Procedure complete time: 1020  Concerns/abnormal assessment: None at this time.  If abnormal assessment, provider notified: N/A  Plan/Other: PPM settings changed back to baseline.  Home per ambulatory.    Ina Garg RN

## 2024-01-09 NOTE — PROGRESS NOTES
"ANTICOAGULATION  MANAGEMENT: Discharge Review    Zunilda Clark chart reviewed for anticoagulation continuity of care    Outpatient surgery/procedure on 1/9/24 for MRI.    Discharge disposition: Home    Results:    No results for input(s): \"INR\", \"[id number]\", \"F2\", \"ALMWH\", \"AAUFH\" in the last 168 hours.  Anticoagulation inpatient management:     not applicable     Anticoagulation discharge instructions:     Warfarin dosing: home regimen continued   Bridging: No   INR goal change: No      Medication changes affecting anticoagulation: No    Additional factors affecting anticoagulation: No     PLAN     No adjustment to anticoagulation plan needed    Patient not contacted    No adjustment to Anticoagulation Calendar was required    Екатерина Mahan RN  "

## 2024-01-23 ENCOUNTER — ANTICOAGULATION THERAPY VISIT (OUTPATIENT)
Dept: ANTICOAGULATION | Facility: CLINIC | Age: 84
End: 2024-01-23

## 2024-01-23 ENCOUNTER — LAB (OUTPATIENT)
Dept: LAB | Facility: CLINIC | Age: 84
End: 2024-01-23
Payer: COMMERCIAL

## 2024-01-23 DIAGNOSIS — I48.92 ATRIAL FIBRILLATION AND FLUTTER (H): ICD-10-CM

## 2024-01-23 DIAGNOSIS — Z95.3 S/P MITRAL VALVE REPLACEMENT WITH BIOPROSTHETIC VALVE: ICD-10-CM

## 2024-01-23 DIAGNOSIS — I48.91 ATRIAL FIBRILLATION AND FLUTTER (H): ICD-10-CM

## 2024-01-23 DIAGNOSIS — Z79.01 LONG TERM CURRENT USE OF ANTICOAGULANTS WITH INR GOAL OF 2.0-3.0: Primary | ICD-10-CM

## 2024-01-23 LAB — INR BLD: 2.2 (ref 0.9–1.1)

## 2024-01-23 PROCEDURE — 85610 PROTHROMBIN TIME: CPT

## 2024-01-23 PROCEDURE — 36416 COLLJ CAPILLARY BLOOD SPEC: CPT

## 2024-01-23 NOTE — PROGRESS NOTES
"ANTICOAGULATION MANAGEMENT     Zunilda SHAW May 83 year old female is on warfarin with therapeutic INR result. (Goal INR 2.0-3.0)    Recent labs: (last 7 days)     01/23/24  0945   INR 2.2*       ASSESSMENT     Source(s): Chart Review and Patient/Caregiver Call     Warfarin doses taken: Warfarin taken as instructed  Diet: No new diet changes identified, patient reports that she has been eating in the dining room for quite some time now and does not have the amount of green veggies like when she was cooking in her own apartment  Medication/supplement changes: None noted  New illness, injury, or hospitalization: Yes: patient reports \"canker sores\" in her mouth for about 4 months, patient advised to contact her PCP, patient has appointment with Ortho 1/24/24 to see if she has another back fracture  Signs or symptoms of bleeding or clotting: No  Previous result: Therapeutic last visit; previously outside of goal range  Additional findings:  Patient had appointment with Cardio 1/8/24, no changes to the patient plan of care       PLAN     Recommended plan for ongoing change(s) affecting INR     Dosing Instructions: Continue your current warfarin dose with next INR in 4 weeks       Summary  As of 1/23/2024      Full warfarin instructions:  1.25 mg every Mon; 2.5 mg all other days   Next INR check:  2/20/2024               Telephone call with Deanne who verbalizes understanding and agrees to plan    Lab visit scheduled    Education provided:   Please call back if any changes to your diet, medications or how you've been taking warfarin  Contact [phone]  with any changes, questions or concerns.     Plan made per [facility] anticoagulation protocol    Екатерина Mahan RN  Anticoagulation Clinic  1/23/2024    _______________________________________________________________________     Anticoagulation Episode Summary       Current INR goal:  2.0-3.0   TTR:  68.6% (10.9 mo)   Target end date:  Indefinite   [facility]  [facility]    Indications    Long term current use of anticoagulants with INR goal of 2.0-3.0 [Z79.01]  Atrial fibrillation and flutter (H) [I48.91  I48.92]  S/P mitral valve replacement with bioprosthetic valve [Z95.3]             Comments:  27 mm Magna bioprosthetic valve (2014)             Anticoagulation Care Providers       Provider Role Specialty Phone number    Yunior Huang MD Referring Internal Medicine [phone]

## 2024-01-24 ENCOUNTER — TRANSFERRED RECORDS (OUTPATIENT)
Dept: HEALTH INFORMATION MANAGEMENT | Facility: CLINIC | Age: 84
End: 2024-01-24
Payer: COMMERCIAL

## 2024-01-25 ENCOUNTER — TELEPHONE (OUTPATIENT)
Dept: CARDIOLOGY | Facility: CLINIC | Age: 84
End: 2024-01-25
Payer: COMMERCIAL

## 2024-01-25 DIAGNOSIS — I44.2 COMPLETE ATRIOVENTRICULAR BLOCK (H): ICD-10-CM

## 2024-01-25 DIAGNOSIS — I48.20 CHRONIC ATRIAL FIBRILLATION (H): Primary | ICD-10-CM

## 2024-01-25 DIAGNOSIS — Z95.0 CARDIAC PACEMAKER IN SITU: ICD-10-CM

## 2024-01-25 NOTE — TELEPHONE ENCOUNTER
[facility] Call Center    Phone Message    May a detailed message be left on voicemail: yes     Reason for Call: Order(s): Other:   Reason for requested: In Clinic Device Check Order  Date needed: ASAP  Provider name: Dr. Mansoor Quezada    Action Taken: Message routed to:  [facility] cardio    Travel Screening: Not Applicable    Thank you!  [facility]

## 2024-01-25 NOTE — TELEPHONE ENCOUNTER
She is not due for in clinic device check til July 2024  Johnny SUMMERS    I actually called her and scheduled her in June in tandem with Dr Rodriguez appointment in June  Johnny SUMMERS

## 2024-02-20 ENCOUNTER — LAB (OUTPATIENT)
Dept: LAB | Facility: CLINIC | Age: 84
End: 2024-02-20
Payer: COMMERCIAL

## 2024-02-20 ENCOUNTER — ANTICOAGULATION THERAPY VISIT (OUTPATIENT)
Dept: ANTICOAGULATION | Facility: CLINIC | Age: 84
End: 2024-02-20

## 2024-02-20 DIAGNOSIS — I48.92 ATRIAL FIBRILLATION AND FLUTTER (H): ICD-10-CM

## 2024-02-20 DIAGNOSIS — Z95.3 S/P MITRAL VALVE REPLACEMENT WITH BIOPROSTHETIC VALVE: ICD-10-CM

## 2024-02-20 DIAGNOSIS — Z79.01 LONG TERM CURRENT USE OF ANTICOAGULANTS WITH INR GOAL OF 2.0-3.0: Primary | ICD-10-CM

## 2024-02-20 DIAGNOSIS — I48.91 ATRIAL FIBRILLATION AND FLUTTER (H): ICD-10-CM

## 2024-02-20 LAB — INR BLD: 1.6 (ref 0.9–1.1)

## 2024-02-20 PROCEDURE — 36416 COLLJ CAPILLARY BLOOD SPEC: CPT

## 2024-02-20 PROCEDURE — 85610 PROTHROMBIN TIME: CPT

## 2024-02-20 NOTE — PROGRESS NOTES
"ANTICOAGULATION MANAGEMENT     Zunilda SHAW May 83 year old female is on warfarin with subtherapeutic INR result. (Goal INR 2.0-3.0)    Recent labs: (last 7 days)     02/20/24  1011   INR 1.6*       ASSESSMENT     Source(s): Chart Review and Patient/Caregiver Call     Warfarin doses taken: Warfarin taken as instructed - Deanne is unsure is she's missed a dose. Says that there is a chance but at the same time does not have any reason to believe she did  Diet: No new diet changes identified - her new normal is less greens than months ago since she's eating more in the dining room at her AL  Medication/supplement changes: None noted  New illness, injury, or hospitalization: No  Signs or symptoms of bleeding or clotting: Reports new baseline shortness of breath and \"whistle\" while breathing. Advised Deanne to inform PCP  Previous result: Therapeutic last 2(+) visits  Additional findings: None       PLAN     Recommended plan for temporary change(s) and ongoing change(s) affecting INR     Dosing Instructions: booster dose then Increase your warfarin dose (8% change) with next INR in 8 days       Summary  As of 2/20/2024      Full warfarin instructions:  2/20: 5 mg; Otherwise 2.5 mg every day   Next INR check:  2/28/2024               Telephone call with Deanne who verbalizes understanding and agrees to plan    Lab visit scheduled    Education provided:   Please call back if any changes to your diet, medications or how you've been taking warfarin  Dietary considerations: impact of vitamin K foods on INR and importance of notifying [facility] to changes in diet    Plan made per [facility] anticoagulation protocol    Pearl Mahan RN  Anticoagulation Clinic  2/20/2024    _______________________________________________________________________     Anticoagulation Episode Summary       Current INR goal:  2.0-3.0   TTR:  64.5% (10.9 mo)   Target end date:  Indefinite   Send INR reminders to:  [facility]    Indications  "   Long term current use of anticoagulants with INR goal of 2.0-3.0 [Z79.01]  Atrial fibrillation and flutter (H) [I48.91  I48.92]  S/P mitral valve replacement with bioprosthetic valve [Z95.3]             Comments:  27 mm Magna bioprosthetic valve (2014)             Anticoagulation Care Providers       Provider Role Specialty Phone number    Yunior Huang MD Referring Internal Medicine [phone]

## 2024-02-22 ENCOUNTER — APPOINTMENT (OUTPATIENT)
Dept: GENERAL RADIOLOGY | Facility: CLINIC | Age: 84
DRG: 291 | End: 2024-02-22
Attending: EMERGENCY MEDICINE
Payer: COMMERCIAL

## 2024-02-22 ENCOUNTER — APPOINTMENT (OUTPATIENT)
Dept: ULTRASOUND IMAGING | Facility: CLINIC | Age: 84
DRG: 291 | End: 2024-02-22
Attending: EMERGENCY MEDICINE
Payer: COMMERCIAL

## 2024-02-22 ENCOUNTER — NURSE TRIAGE (OUTPATIENT)
Dept: INTERNAL MEDICINE | Facility: CLINIC | Age: 84
End: 2024-02-22
Payer: COMMERCIAL

## 2024-02-22 ENCOUNTER — APPOINTMENT (OUTPATIENT)
Dept: CT IMAGING | Facility: CLINIC | Age: 84
DRG: 291 | End: 2024-02-22
Attending: EMERGENCY MEDICINE
Payer: COMMERCIAL

## 2024-02-22 ENCOUNTER — HOSPITAL ENCOUNTER (INPATIENT)
Facility: CLINIC | Age: 84
LOS: 2 days | Discharge: HOME | DRG: 291 | End: 2024-02-26
Attending: EMERGENCY MEDICINE | Admitting: HOSPITALIST
Payer: COMMERCIAL

## 2024-02-22 DIAGNOSIS — R06.02 SHORTNESS OF BREATH: ICD-10-CM

## 2024-02-22 DIAGNOSIS — I48.92 ATRIAL FIBRILLATION AND FLUTTER (H): ICD-10-CM

## 2024-02-22 DIAGNOSIS — R79.1 SUBTHERAPEUTIC INTERNATIONAL NORMALIZED RATIO (INR): ICD-10-CM

## 2024-02-22 DIAGNOSIS — I48.91 ATRIAL FIBRILLATION AND FLUTTER (H): ICD-10-CM

## 2024-02-22 DIAGNOSIS — I50.30 HEART FAILURE WITH PRESERVED EJECTION FRACTION, NYHA CLASS I (H): Primary | ICD-10-CM

## 2024-02-22 DIAGNOSIS — U07.1 COVID-19: ICD-10-CM

## 2024-02-22 DIAGNOSIS — Z87.19 HISTORY OF ORAL LESIONS: ICD-10-CM

## 2024-02-22 LAB
ANION GAP SERPL CALCULATED.3IONS-SCNC: 10 MMOL/L (ref 7–15)
ATRIAL RATE - MUSE: 55 BPM
BASOPHILS # BLD AUTO: 0 10E3/UL (ref 0–0.2)
BASOPHILS NFR BLD AUTO: 1 %
BUN SERPL-MCNC: 29.7 MG/DL (ref 8–23)
CALCIUM SERPL-MCNC: 9.4 MG/DL (ref 8.8–10.2)
CHLORIDE SERPL-SCNC: 104 MMOL/L (ref 98–107)
CREAT SERPL-MCNC: 0.97 MG/DL (ref 0.51–0.95)
D DIMER PPP FEU-MCNC: 1.48 UG/ML FEU (ref 0–0.5)
DEPRECATED HCO3 PLAS-SCNC: 27 MMOL/L (ref 22–29)
DIASTOLIC BLOOD PRESSURE - MUSE: NORMAL MMHG
EGFRCR SERPLBLD CKD-EPI 2021: 58 ML/MIN/1.73M2
EOSINOPHIL # BLD AUTO: 0.1 10E3/UL (ref 0–0.7)
EOSINOPHIL NFR BLD AUTO: 1 %
ERYTHROCYTE [DISTWIDTH] IN BLOOD BY AUTOMATED COUNT: 18.6 % (ref 10–15)
FLUAV RNA SPEC QL NAA+PROBE: NEGATIVE
FLUBV RNA RESP QL NAA+PROBE: NEGATIVE
GLUCOSE SERPL-MCNC: 91 MG/DL (ref 70–99)
HCT VFR BLD AUTO: 34.4 % (ref 35–47)
HGB BLD-MCNC: 11 G/DL (ref 11.7–15.7)
HOLD SPECIMEN: NORMAL
IMM GRANULOCYTES # BLD: 0 10E3/UL
IMM GRANULOCYTES NFR BLD: 1 %
INR PPP: 1.5 (ref 0.85–1.15)
INTERPRETATION ECG - MUSE: NORMAL
LYMPHOCYTES # BLD AUTO: 0.6 10E3/UL (ref 0.8–5.3)
LYMPHOCYTES NFR BLD AUTO: 11 %
MCH RBC QN AUTO: 33.2 PG (ref 26.5–33)
MCHC RBC AUTO-ENTMCNC: 32 G/DL (ref 31.5–36.5)
MCV RBC AUTO: 104 FL (ref 78–100)
MONOCYTES # BLD AUTO: 0.6 10E3/UL (ref 0–1.3)
MONOCYTES NFR BLD AUTO: 10 %
NEUTROPHILS # BLD AUTO: 4.1 10E3/UL (ref 1.6–8.3)
NEUTROPHILS NFR BLD AUTO: 76 %
NRBC # BLD AUTO: 0 10E3/UL
NRBC BLD AUTO-RTO: 1 /100
NT-PROBNP SERPL-MCNC: 4362 PG/ML (ref 0–1800)
P AXIS - MUSE: NORMAL DEGREES
PLATELET # BLD AUTO: 268 10E3/UL (ref 150–450)
POTASSIUM SERPL-SCNC: 4.4 MMOL/L (ref 3.4–5.3)
PR INTERVAL - MUSE: 186 MS
QRS DURATION - MUSE: 154 MS
QT - MUSE: 438 MS
QTC - MUSE: 502 MS
R AXIS - MUSE: 105 DEGREES
RBC # BLD AUTO: 3.31 10E6/UL (ref 3.8–5.2)
RSV RNA SPEC NAA+PROBE: NEGATIVE
SARS-COV-2 RNA RESP QL NAA+PROBE: POSITIVE
SODIUM SERPL-SCNC: 141 MMOL/L (ref 135–145)
SYSTOLIC BLOOD PRESSURE - MUSE: NORMAL MMHG
T AXIS - MUSE: 11 DEGREES
TROPONIN T SERPL HS-MCNC: 20 NG/L
TROPONIN T SERPL HS-MCNC: 21 NG/L
TROPONIN T SERPL HS-MCNC: 24 NG/L
VENTRICULAR RATE- MUSE: 79 BPM
WBC # BLD AUTO: 5.4 10E3/UL (ref 4–11)

## 2024-02-22 PROCEDURE — 99223 1ST HOSP IP/OBS HIGH 75: CPT | Performed by: HOSPITALIST

## 2024-02-22 PROCEDURE — 85379 FIBRIN DEGRADATION QUANT: CPT | Performed by: EMERGENCY MEDICINE

## 2024-02-22 PROCEDURE — 82565 ASSAY OF CREATININE: CPT | Performed by: EMERGENCY MEDICINE

## 2024-02-22 PROCEDURE — 250N000009 HC RX 250: Performed by: HOSPITALIST

## 2024-02-22 PROCEDURE — 999N000157 HC STATISTIC RCP TIME EA 10 MIN

## 2024-02-22 PROCEDURE — 71046 X-RAY EXAM CHEST 2 VIEWS: CPT

## 2024-02-22 PROCEDURE — 99285 EMERGENCY DEPT VISIT HI MDM: CPT | Mod: 25

## 2024-02-22 PROCEDURE — 87637 SARSCOV2&INF A&B&RSV AMP PRB: CPT | Performed by: EMERGENCY MEDICINE

## 2024-02-22 PROCEDURE — 250N000011 HC RX IP 250 OP 636: Performed by: EMERGENCY MEDICINE

## 2024-02-22 PROCEDURE — 36415 COLL VENOUS BLD VENIPUNCTURE: CPT | Performed by: HOSPITALIST

## 2024-02-22 PROCEDURE — 250N000013 HC RX MED GY IP 250 OP 250 PS 637: Performed by: HOSPITALIST

## 2024-02-22 PROCEDURE — 36415 COLL VENOUS BLD VENIPUNCTURE: CPT | Performed by: EMERGENCY MEDICINE

## 2024-02-22 PROCEDURE — 36416 COLLJ CAPILLARY BLOOD SPEC: CPT | Performed by: EMERGENCY MEDICINE

## 2024-02-22 PROCEDURE — 96374 THER/PROPH/DIAG INJ IV PUSH: CPT

## 2024-02-22 PROCEDURE — 82374 ASSAY BLOOD CARBON DIOXIDE: CPT | Performed by: EMERGENCY MEDICINE

## 2024-02-22 PROCEDURE — 85025 COMPLETE CBC W/AUTO DIFF WBC: CPT | Performed by: EMERGENCY MEDICINE

## 2024-02-22 PROCEDURE — 93005 ELECTROCARDIOGRAM TRACING: CPT

## 2024-02-22 PROCEDURE — G0378 HOSPITAL OBSERVATION PER HR: HCPCS

## 2024-02-22 PROCEDURE — 71275 CT ANGIOGRAPHY CHEST: CPT

## 2024-02-22 PROCEDURE — 999N000156 HC STATISTIC RCP CONSULT EA 30 MIN

## 2024-02-22 PROCEDURE — 94640 AIRWAY INHALATION TREATMENT: CPT

## 2024-02-22 PROCEDURE — 93971 EXTREMITY STUDY: CPT | Mod: LT

## 2024-02-22 PROCEDURE — 85610 PROTHROMBIN TIME: CPT | Performed by: EMERGENCY MEDICINE

## 2024-02-22 PROCEDURE — 250N000009 HC RX 250: Performed by: EMERGENCY MEDICINE

## 2024-02-22 PROCEDURE — 83880 ASSAY OF NATRIURETIC PEPTIDE: CPT | Performed by: EMERGENCY MEDICINE

## 2024-02-22 PROCEDURE — 84484 ASSAY OF TROPONIN QUANT: CPT | Performed by: EMERGENCY MEDICINE

## 2024-02-22 PROCEDURE — 84484 ASSAY OF TROPONIN QUANT: CPT | Performed by: HOSPITALIST

## 2024-02-22 RX ORDER — IPRATROPIUM BROMIDE AND ALBUTEROL SULFATE 2.5; .5 MG/3ML; MG/3ML
3 SOLUTION RESPIRATORY (INHALATION) EVERY 4 HOURS PRN
Status: DISCONTINUED | OUTPATIENT
Start: 2024-02-22 | End: 2024-02-26 | Stop reason: HOSPADM

## 2024-02-22 RX ORDER — METOPROLOL TARTRATE 50 MG
50 TABLET ORAL 2 TIMES DAILY
Status: DISCONTINUED | OUTPATIENT
Start: 2024-02-22 | End: 2024-02-23

## 2024-02-22 RX ORDER — ONDANSETRON 4 MG/1
4 TABLET, ORALLY DISINTEGRATING ORAL EVERY 6 HOURS PRN
Status: DISCONTINUED | OUTPATIENT
Start: 2024-02-22 | End: 2024-02-26 | Stop reason: HOSPADM

## 2024-02-22 RX ORDER — IOPAMIDOL 755 MG/ML
500 INJECTION, SOLUTION INTRAVASCULAR ONCE
Status: CANCELLED | OUTPATIENT
Start: 2024-02-22 | End: 2024-02-22

## 2024-02-22 RX ORDER — FUROSEMIDE 10 MG/ML
20 INJECTION INTRAMUSCULAR; INTRAVENOUS EVERY 12 HOURS
Status: DISCONTINUED | OUTPATIENT
Start: 2024-02-23 | End: 2024-02-23

## 2024-02-22 RX ORDER — ACETAMINOPHEN 650 MG/1
650 SUPPOSITORY RECTAL EVERY 4 HOURS PRN
Status: DISCONTINUED | OUTPATIENT
Start: 2024-02-22 | End: 2024-02-26 | Stop reason: HOSPADM

## 2024-02-22 RX ORDER — GUAIFENESIN 200 MG/1
200 TABLET ORAL EVERY 4 HOURS PRN
Status: DISCONTINUED | OUTPATIENT
Start: 2024-02-22 | End: 2024-02-26 | Stop reason: HOSPADM

## 2024-02-22 RX ORDER — LEVOTHYROXINE SODIUM 112 UG/1
112 TABLET ORAL DAILY
Status: DISCONTINUED | OUTPATIENT
Start: 2024-02-23 | End: 2024-02-26 | Stop reason: HOSPADM

## 2024-02-22 RX ORDER — ALLOPURINOL 100 MG/1
100 TABLET ORAL 2 TIMES DAILY
Status: DISCONTINUED | OUTPATIENT
Start: 2024-02-22 | End: 2024-02-26 | Stop reason: HOSPADM

## 2024-02-22 RX ORDER — IOPAMIDOL 755 MG/ML
500 INJECTION, SOLUTION INTRAVASCULAR ONCE
Status: COMPLETED | OUTPATIENT
Start: 2024-02-22 | End: 2024-02-22

## 2024-02-22 RX ORDER — AMOXICILLIN 250 MG
1 CAPSULE ORAL 2 TIMES DAILY PRN
Status: DISCONTINUED | OUTPATIENT
Start: 2024-02-22 | End: 2024-02-26 | Stop reason: HOSPADM

## 2024-02-22 RX ORDER — FUROSEMIDE 10 MG/ML
20 INJECTION INTRAMUSCULAR; INTRAVENOUS ONCE
Status: COMPLETED | OUTPATIENT
Start: 2024-02-22 | End: 2024-02-22

## 2024-02-22 RX ORDER — ACETAMINOPHEN 325 MG/1
650 TABLET ORAL EVERY 4 HOURS PRN
Status: DISCONTINUED | OUTPATIENT
Start: 2024-02-22 | End: 2024-02-26 | Stop reason: HOSPADM

## 2024-02-22 RX ORDER — WARFARIN SODIUM 5 MG/1
5 TABLET ORAL
Status: COMPLETED | OUTPATIENT
Start: 2024-02-22 | End: 2024-02-22

## 2024-02-22 RX ORDER — ONDANSETRON 2 MG/ML
4 INJECTION INTRAMUSCULAR; INTRAVENOUS EVERY 6 HOURS PRN
Status: DISCONTINUED | OUTPATIENT
Start: 2024-02-22 | End: 2024-02-26 | Stop reason: HOSPADM

## 2024-02-22 RX ORDER — AMOXICILLIN 250 MG
2 CAPSULE ORAL 2 TIMES DAILY PRN
Status: DISCONTINUED | OUTPATIENT
Start: 2024-02-22 | End: 2024-02-26 | Stop reason: HOSPADM

## 2024-02-22 RX ORDER — WARFARIN SODIUM 2.5 MG/1
2.5 TABLET ORAL DAILY
Status: ON HOLD | COMMUNITY
End: 2024-02-26

## 2024-02-22 RX ORDER — FOLIC ACID 1 MG/1
1 TABLET ORAL DAILY
Status: DISCONTINUED | OUTPATIENT
Start: 2024-02-22 | End: 2024-02-26 | Stop reason: HOSPADM

## 2024-02-22 RX ORDER — METHOTREXATE 25 MG/ML
0.8 INJECTION, SOLUTION INTRA-ARTERIAL; INTRAMUSCULAR; INTRAVENOUS
COMMUNITY

## 2024-02-22 RX ADMIN — SODIUM CHLORIDE 75 ML: 9 INJECTION, SOLUTION INTRAVENOUS at 12:12

## 2024-02-22 RX ADMIN — FUROSEMIDE 20 MG: 10 INJECTION, SOLUTION INTRAMUSCULAR; INTRAVENOUS at 15:21

## 2024-02-22 RX ADMIN — FOLIC ACID 1 MG: 1 TABLET ORAL at 20:37

## 2024-02-22 RX ADMIN — ALLOPURINOL 100 MG: 100 TABLET ORAL at 20:37

## 2024-02-22 RX ADMIN — IPRATROPIUM BROMIDE AND ALBUTEROL SULFATE 3 ML: .5; 3 SOLUTION RESPIRATORY (INHALATION) at 21:43

## 2024-02-22 RX ADMIN — WARFARIN SODIUM 5 MG: 5 TABLET ORAL at 20:37

## 2024-02-22 RX ADMIN — METOPROLOL TARTRATE 50 MG: 50 TABLET, FILM COATED ORAL at 20:37

## 2024-02-22 RX ADMIN — IOPAMIDOL 54 ML: 755 INJECTION, SOLUTION INTRAVENOUS at 12:11

## 2024-02-22 ASSESSMENT — COLUMBIA-SUICIDE SEVERITY RATING SCALE - C-SSRS
6. HAVE YOU EVER DONE ANYTHING, STARTED TO DO ANYTHING, OR PREPARED TO DO ANYTHING TO END YOUR LIFE?: NO
1. IN THE PAST MONTH, HAVE YOU WISHED YOU WERE DEAD OR WISHED YOU COULD GO TO SLEEP AND NOT WAKE UP?: NO
2. HAVE YOU ACTUALLY HAD ANY THOUGHTS OF KILLING YOURSELF IN THE PAST MONTH?: NO

## 2024-02-22 ASSESSMENT — ACTIVITIES OF DAILY LIVING (ADL)
ADLS_ACUITY_SCORE: 38
ADLS_ACUITY_SCORE: 44
ADLS_ACUITY_SCORE: 33
ADLS_ACUITY_SCORE: 38
ADLS_ACUITY_SCORE: 44
ADLS_ACUITY_SCORE: 44
ADLS_ACUITY_SCORE: 33
ADLS_ACUITY_SCORE: 33
ADLS_ACUITY_SCORE: 38
ADLS_ACUITY_SCORE: 38

## 2024-02-22 NOTE — PHARMACY-ADMISSION MEDICATION HISTORY
Pharmacist Admission Medication History    Admission medication history is complete. The information provided in this note is only as accurate as the sources available at the time of the update.    Information Source(s): Patient and Hospital records via in-person    Pertinent Information: None    Changes made to PTA medication list:  Added: None  Deleted: Dorzolamide-timolol, Ferrous sulfate, Senna  Changed: Warfarin to 2.5mg daily    Allergies reviewed with patient and updates made in EHR: yes    Medication History Completed By: Modesto Mccarty [facility] 2/22/2024 2:53 PM    Prior to Admission medications    Medication Sig Last Dose Taking? Auth Provider Long Term End Date   acetaminophen (TYLENOL) 500 MG tablet Take 1,000 mg by mouth every 8 hours as needed for pain Past Week Yes Reported, Patient No    allopurinol (ZYLOPRIM) 100 MG tablet Take 100 mg by mouth 2 times daily 2/21/2024 Yes Unknown, Entered By History     amLODIPine (NORVASC) 2.5 MG tablet Take 1 tablet (2.5 mg) by mouth daily 2/21/2024 at AM Yes Zena Benitez PA Yes    amLODIPine-valsartan (EXFORGE) 5-160 MG tablet Take 1 tablet by mouth daily 2/21/2024 at AM Yes Zena Benitez PA Yes    amoxicillin (AMOXIL) 500 MG tablet Take 2000mg (4 tablets of 500mg each) approx 30 min to 1 hour prior to any dental procedures Unknown Yes Noe Rodriguez MD     calcium citrate (CALCITRATE) 950 MG tablet Take 1 tablet by mouth daily  2/21/2024 at AM Yes Unknown, Entered By History     famotidine (PEPCID) 40 MG tablet TAKE 1 TABLET BY MOUTH DAILY 2/21/2024 at AM Yes Yunior Huang MD     folic acid (FOLVITE) 1 MG tablet Take 1 mg by mouth daily 2/21/2024 at AM Yes Reported, Patient     levothyroxine (SYNTHROID/LEVOTHROID) 112 MCG tablet Take 1 tablet (112 mcg) by mouth daily 2/21/2024 at AM Yes Yunior Huang MD Yes    Lidocaine (LIDOCARE) 4 % Patch Place 1 patch onto the skin every 24 hours To prevent lidocaine toxicity, patient should be patch free for 12 hrs  daily.  Apply to left leg More than a month Yes Christiana Kaba NP     lidocaine 5% oint/silver sulfadiazine 1% cm/triamcinolone 0.1% cm (JESUS PASTE) compounded ointment Apply topically 4 times daily as needed (mouth sore) More than a month Yes Yunior Huang MD     methotrexate 50 MG/2ML injection Inject 0.8 mLs Subcutaneous every 7 days (Fridays) 2/16/2024 Yes Unknown, Entered By History No    metoprolol tartrate (LOPRESSOR) 50 MG tablet Take 1 tablet (50 mg) by mouth 2 times daily 2/21/2024 at x2 Yes Zena Benitez PA Yes    multivitamin w/minerals (THERA-VIT-M) tablet Take 1 tablet by mouth daily Without iron 2/21/2024 Yes Unknown, Entered By History     torsemide (DEMADEX) 10 MG tablet Take 1 tablet (10 mg) by mouth daily Hold for SBP less than 110 2/21/2024 at AM Yes Zena Benitez PA Yes    VITAMIN D, CHOLECALCIFEROL, PO Take 1,000 Units by mouth daily 2/21/2024 at AM Yes Unknown, Entered By History     warfarin ANTICOAGULANT (COUMADIN) 2.5 MG tablet Take 2.5 mg by mouth daily 2/21/2024 at PM Yes Unknown, Entered By History

## 2024-02-22 NOTE — ED TRIAGE NOTES
Pt c/o SOB, worse when lying flat, for the past couple days. Pacemaker present. Denies chest pain. A&OX4.     Patient would also like to get her mouth sores assessed. Has been present since September.      Triage Assessment (Adult)       Row Name 02/22/24 0944          Triage Assessment    Airway WDL WDL        Respiratory WDL    Respiratory WDL X;rhythm/pattern     Rhythm/Pattern, Respiratory shortness of breath        Skin Circulation/Temperature WDL    Skin Circulation/Temperature WDL WDL        Cardiac WDL    Cardiac WDL WDL        Peripheral/Neurovascular WDL    Peripheral Neurovascular WDL WDL        Cognitive/Neuro/Behavioral WDL    Cognitive/Neuro/Behavioral WDL WDL

## 2024-02-22 NOTE — TELEPHONE ENCOUNTER
"  2nd level triage- no disposition go to ED now .    Situation     Priority call - difficulty breathing and sore in mouth since October     Assessment   \" The last 3 days a little difficulty breathing laying down especially, at first there was a squeaky sound but I don't hear that today\" - no audible wheeze  Patient states SOB is mild when laying down  Mild SOB at rest today- patient taking a breath every 4-5 words   Patient states she is not sure is she is SOB while walking \" I have not been up for awhile\"    Does not have on 02 monitor at home  No pain in lungs/chest when she takes a deep breath  Patient feels like she can take a deep breath  No chest pain, denied feeling like her heart is going fast.   No fever   No dizziness  No headache  Speech is clear, does not sounds confused, answering appropriately.     I have had another issue in my mouth, both of my cheeks there are sores and they bother me all the time, I need to know what it is\"- started on October    Certain things like toast bother it.     HX of afib, htn, PE's- on coumadin.   Patient states she does not have SOB @ baseline  \"Edema in legs every since every since I feel and broke my femur\"- September   Both are a little by the ankle.     Recommendations.   Advised ED now and rationale, patient will have sister take her, if not advised call 911 and if worsening, reviewed additional red flag symptoms.   Patient states understanding and is agreeable to plan  .   Reason for Disposition   Any history of prior \"blood clot\" in leg or lungs   MILD difficulty breathing (e.g., minimal/no SOB at rest, SOB with walking, pulse < 100) of new-onset or worse than normal   MODERATE difficulty breathing (e.g., speaks in phrases, SOB even at rest, pulse 100-120) of new-onset or worse than normal     Denied pulse feeling rapid, states SOB at rest, taking a breath every 4-5 words. Patient states does feel some SOB with talking.    Additional Information   Negative: " SEVERE difficulty breathing (e.g., struggling for each breath, speaks in single words, pulse > 120)   Negative: Breathing stopped and hasn't returned   Negative: Choking on something   Negative: Bluish (or gray) lips or face   Negative: Difficult to awaken or acting confused (e.g., disoriented, slurred speech)   Negative: Passed out (i.e., fainted, collapsed and was not responding)   Negative: Wheezing started suddenly after medicine, an allergic food, or bee sting   Negative: Stridor (harsh sound while breathing in)   Negative: Slow, shallow and weak breathing   Negative: Sounds like a life-threatening emergency to the triager   Negative: Chest pain   Negative: Wheezing (high pitched whistling sound) and previous asthma attacks or use of asthma medicines   Negative: Difficulty breathing and within 14 days of COVID-19 Exposure   Negative: Difficulty breathing and only present when coughing   Negative: Difficulty breathing and only from stuffy nose   Negative: Difficulty breathing and only from stuffy nose or runny nose from common cold   Negative: Oxygen level (e.g., pulse oximetry) 90% or lower   Negative: Wheezing can be heard across the room   Negative: Drooling or spitting out saliva (because can't swallow)   Negative: Illness requiring prolonged bedrest in past month (e.g., immobilization, long hospital stay)   Negative: Hip or leg fracture (broken bone) in past month (or had cast on leg or ankle in past month)   Negative: Major surgery in the past month   Negative: Long-distance travel in past month (e.g., car, bus, train, plane; with trip lasting 6 or more hours)   Negative: Cancer treatment in past six months (or has cancer now)   Negative: Extra heartbeats, irregular heart beating, or heart is beating very fast (i.e., 'palpitations')   Negative: Fever > 103 F (39.4 C)   Negative: Fever > 101 F (38.3 C) and over 60 years of age   Negative: Fever > 100.0 F (37.8 C) and bedridden (e.g., nursing home patient,  stroke, chronic illness, recovering from surgery)   Negative: Fever > 100.0 F (37.8 C) and diabetes mellitus or weak immune system (e.g., HIV positive, cancer chemo, splenectomy, organ transplant, chronic steroids)   Negative: Periods where breathing stops and then resumes normally and bedridden (e.g., nursing home patient, CVA)   Negative: Pregnant or postpartum (from 0 to 6 weeks after delivery)   Negative: Patient sounds very sick or weak to the triager    Protocols used: Breathing Difficulty-A-[place]

## 2024-02-22 NOTE — ED PROVIDER NOTES
"  History     Chief Complaint:  Shortness of Breath       HPI   Zunilda Clark is a 83 year old female with h/o AF on coumadin, CHF on torsemide who presents with SOB and orthopnea. Sx onset a few days ago. NO h/o this that she can recall. NO fever or cough. NO CP. Pt has noticed left leg swelling. Pt had left femur bone surgery 9/23 and has had some lingering swelling in the leg since then.     Review of External Notes:   Hospital admission 10/23.  Past medical history at that time was noted to include bioprosthetic mitral valve x 2, A-fib, sick sinus syndrome status post permanent pacemaker, pulmonary hypertension, chronic congestive heart failure.    Echocardiogram 1/5/2024: Severe valvular aortic stenosis, normal LV systolic function, moderate tricuspid regurgitation      Medications:    allopurinol   amLODIPine   famotidine   levothyroxine   metoprolol tartrate  senna-docusate   torsemide   warfarin     Past Medical History:    Hypothyroidism  Aortic valve insufficiency  Atrial fibrillation  CHF  DVT  GERD  HTN  RA  Seizure  Shingles  Stroke    Past Surgical History:    Left knee arthroplasty  Cataract surgery, bilat  Av node ablation  Hernia repair  EGD  Cholecystectomy  Pacemaker insertion  Mitral valve surgery  Femur surgry     Physical Exam   Patient Vitals for the past 24 hrs:   BP Temp Temp src Pulse Resp SpO2 Height Weight   02/22/24 0943 (!) 148/86 98.6  F (37  C) Temporal 83 16 97 % 1.626 m (5' 4\") 65.4 kg (144 lb 2.9 oz)        Physical Exam  VS: Reviewed per above  HENT: Mucous membranes moist  EYES: sclera anicteric  CV: Rate as noted,  regular rhythm.   RESP: Effort normal. Breath sounds are normal bilaterally.  GI: no tenderness/rebound/guarding, not distended.  NEURO: Alert, moving all extremities  MSK: No deformity of the extremities  SKIN: Warm and dry    Emergency Department Course   ECG  ECG results from 02/22/24   EKG 12 lead     Value    Systolic Blood Pressure     Diastolic Blood Pressure  "    Ventricular Rate 79    Atrial Rate 55    ME Interval 186    QRS Duration 154        QTc 502    P Axis     R AXIS 105    T Axis 11    Interpretation ECG      AV dual-paced rhythm  Abnormal ECG  When compared with ECG of 26-SEP-2023 10:00,  Vent. rate has increased BY  17 BPM       *Note: Due to a large number of results and/or encounters for the requested time period, some results have not been displayed. A complete set of results can be found in Results Review.     Imaging:  CT Chest Pulmonary Embolism w Contrast   Preliminary Result   IMPRESSION:   1.  No evidence for pulmonary embolism.   2.  Bilateral hazy groundglass pulmonary opacities and bibasilar   interstitial prominence. Cardiomegaly. Correlate with CHF and   pulmonary edema. Small bibasilar pleural fluid also noted.      US Lower Extremity Venous Duplex Left   Final Result   IMPRESSION: No evidence of deep venous thrombosis.      ROGERS SULLIVAN MD            SYSTEM ID:  [id number]      Chest XR,  PA & LAT   Final Result   IMPRESSION: Markedly improved interstitial and ground-glass changes   since comparison. Minimal effusion suggested on lateral view. The   cardiac silhouette is not enlarged. Pulmonary vasculature is   unremarkable. A cardiac implantable electronic device is in place with   lead(s) grossly intact. No abandoned leads/wires or other unexpected   metallic foreign bodies demonstrated on the film.      ANNA WILLETT MD            SYSTEM ID:  [id number]             Laboratory:  Labs Ordered and Resulted from Time of ED Arrival to Time of ED Departure   BASIC METABOLIC PANEL - Abnormal       Result Value    Sodium 141      Potassium 4.4      Chloride 104      Carbon Dioxide (CO2) 27      Anion Gap 10      Urea Nitrogen 29.7 (*)     Creatinine 0.97 (*)     GFR Estimate 58 (*)     Calcium 9.4      Glucose 91     TROPONIN T, HIGH SENSITIVITY - Abnormal    Troponin T, High Sensitivity 20 (*)    NT PROBNP INPATIENT - Abnormal    N terminal  Pro BNP Inpatient 4,362 (*)    D DIMER QUANTITATIVE - Abnormal    D-Dimer Quantitative 1.48 (*)    INR - Abnormal    INR 1.50 (*)    INFLUENZA A/B, RSV, & SARS-COV2 PCR - Abnormal    Influenza A PCR Negative      Influenza B PCR Negative      RSV PCR Negative      SARS CoV2 PCR Positive (*)    CBC WITH PLATELETS AND DIFFERENTIAL - Abnormal    WBC Count 5.4      RBC Count 3.31 (*)     Hemoglobin 11.0 (*)     Hematocrit 34.4 (*)      (*)     MCH 33.2 (*)     MCHC 32.0      RDW 18.6 (*)     Platelet Count 268      % Neutrophils 76      % Lymphocytes 11      % Monocytes 10      % Eosinophils 1      % Basophils 1      % Immature Granulocytes 1      NRBCs per 100 WBC 1 (*)     Absolute Neutrophils 4.1      Absolute Lymphocytes 0.6 (*)     Absolute Monocytes 0.6      Absolute Eosinophils 0.1      Absolute Basophils 0.0      Absolute Immature Granulocytes 0.0      Absolute NRBCs 0.0     TROPONIN T, HIGH SENSITIVITY          Emergency Department Course & Assessments:    Interventions:  Medications   iopamidol (ISOVUE-370) solution 500 mL (54 mLs Intravenous $Given 2/22/24 1211)   CT scan flush use (75 mLs As instructed $Given 2/22/24 1212)        Consultations/Discussion of Management or Tests:  ED Course as of 02/22/24 1425   Thu Feb 22, 2024   1425 I spoke to the hospitalist, , who has agreed to admit the patient for continued evaluation and treatment.     Dr Flores radiology- reviewed CT scan      Disposition:  The patient was admitted to the hospital under the care of Dr. Whitman.     Impression & Plan        Medical Decision Making:  Patient presents to the ER for evaluation of a few days of orthopnea and shortness of breath.  She has had chronic left leg swelling since femur surgery last fall.  Vital signs reassuring.  No increased work of breathing.  COVID-19 testing here is positive although she does not have obvious COVID-19 symptoms aside from shortness of breath.  Initial chest x-ray shows improved  interstitial and groundglass changes compared to x-ray from last fall but a follow-up CT scan here, obtained due to subtherapeutic INR and elevated D-dimer, revealed concern for pulmonary edema/CHF.  I spoke with radiology and they felt that CT imaging findings are more likely related to edema than COVID-19.  Her labs do reveal intermediate troponin elevation and nonspecific BNP elevation, higher than previous.  I reviewed the echocardiogram from last month.  There is concern for severe aortic stenosis.  Plan to admit for gentle diuresis and cardiac evaluation.      Diagnosis:    ICD-10-CM    1. COVID-19  U07.1       2. Shortness of breath  R06.02       3. History of oral lesions  Z87.19       4. Subtherapeutic international normalized ratio (INR)  R79.1              2/22/2024   Lavelle Kidd MD Lindenbaum, Elan, MD  02/22/24 1451

## 2024-02-22 NOTE — PROGRESS NOTES
Care Management Follow Up    Length of Stay (days): 0    Expected Discharge Date: 02/23/2024       Additional Information:  Patient has COVID so SW is unable to go into room. SW attempted to call patient's phone and it went to .     Address on file is [facility] [phone]. [facility] requesting a call back for discharge planning.     AUDREY Garcia, LGHOLA  Emergency Room   Please contact the  on the floor in which the patient is staying for any questions or concerns

## 2024-02-22 NOTE — DISCHARGE INSTRUCTIONS

## 2024-02-22 NOTE — H&P
[facility]    History and Physical  Hospitalist       Date of Admission:  2/22/2024    Assessment & Plan   Zunilda Clark is a 83 year old female with a past medical history of rheumatoid arthritis, hypertension, hypothyroidism, bioprosthetic mitral valve replacement x 2, A-fib, sick sinus syndrome status post pacemaker, pulmonary hypertension, CVA on warfarin for anticoagulation who presents with shortness of breath.    #SOB, orthopnea suspect secondary to acute on chronic HFpEF in setting of known severe aortic stenosis, severe mitral stenosis with bioprosthetic MV valve. Severe pulmonary HTN: Patient describes shortness of breath with exertion along with orthopnea for the last 4 to 5 days.  No fevers or chills.  No cough.  No sore throat.  Denies any chest pain.  She has left leg swelling since her surgery in the fall.  Denies any sick contacts.  She lives in independent living.  She ambulates with a walker at baseline.  She knows that she has severe valvular disease of the heart and reiterates to me in front of her sister that she wants no procedures.  She is interested in only medical therapy.   -ED, patient afebrile and nontachycardic.  Normotensive to mildly hypertensive.  Saturating okay on room air.  Her CBC is without leukocytosis.  Her BMP showed creatinine of 0.97.  Her BNP was elevated at 4300.  High-sensitivity troponin mildly elevated at 20.  EKG showed AV paced rhythm without clear ischemic changes.  She underwent CT PE that did not show evidence of PE but bilateral hazy groundglass opacities with by basilar prominence concerning for possible edema.  Her COVID-19 was positive.  Ultrasound of the left lower extremity negative for DVT.  She was given 20 mg of IV Lasix.  -Difficult situation given her severe valvular disease.  Nonetheless given her symptoms we will try to gently diurese with 20 mg of IV Lasix twice daily.  She is on 10 mg of torsemide daily at baseline.  Need to be  cautious given severe aortic stenosis and being volume dependent.  -Given severity of her valvular disease, will consult cardiology for evaluation.    -Monitor on telemetry.  -Holding off on repeating echocardiogram given it was done in January 2024  -We will continue her home beta-blockade but hold other antihypertensives for now.    #COVID-19 infection: CT PE as above seemingly more consistent with volume overload.  No clear bacterial infection.  She is saturating OK on RA.  She is on warfarin for a/c.  -Monitor oxygenation.  Holding off on COVID-19 directed therapies as suspect her presentation is more related to cardiac issues as above.  -Maintain precautions    #History of afib s/p ppm. Coagulopathy secondary to warfarin.  Subtherapeutic on warfarin therapy: EKG shows AV paced rhythm.  No chest pain.  Her INRs have been subtherapeutic seemingly for months.  We will continue warfarin, pharmacy to dose.  Suspect she will need adjustment in her warfarin dosing given subtherapeutic INR values.    #HTN: Metoprolol resumed. Hold amlodipine  #RA: On methotrexate weekly at baseline. Continue daily folic acid.  #History of CVA: Warfarin as above. BP meds as above.   #Hypothyroidism: Continue home LT4    DVT Prophylaxis: Warfarin  Code Status: DNR / DNI. Confirmed on admission and c/w prior admissions.   Dispo: [place] to observation. Telemetry.    Patient's sister at bedside during evaluation.    Yobani Whitman MD    Primary Care Physician   Yunior Huang    Chief Complaint   SOB    History is obtained from the patient, patient's chart and discussed with ER physician    History of Present Illness   Zunilda Clark is a 83 year old female with a past medical history of rheumatoid arthritis, hypertension, hypothyroidism, bioprosthetic mitral valve replacement x 2, A-fib, sick sinus syndrome status post pacemaker, pulmonary hypertension, CVA on warfarin for anticoagulation who presents with shortness of breath.    Patient  describes shortness of breath with exertion along with orthopnea for the last 4 to 5 days.  No fevers or chills.  No cough.  No sore throat.  Denies any chest pain.  She has left leg swelling since her surgery in the fall.  Denies any sick contacts.  She lives in independent living.  She ambulates with a walker at baseline.  She knows that she has severe valvular disease of the heart and reiterates to me in front of her sister that she wants no procedures.  She is interested in only medical therapy.     In the ED, patient afebrile and nontachycardic.  Normotensive to mildly hypertensive.  Saturating okay on room air.  Her CBC is without leukocytosis.  Her BMP showed creatinine of 0.97.  Her BNP was elevated at 4300.  High-sensitivity troponin mildly elevated at 20.  EKG showed AV paced rhythm without clear ischemic changes.  She underwent CT PE that did not show evidence of PE but bilateral hazy groundglass opacities with by basilar prominence concerning for possible edema.  Her COVID-19 was positive.  Ultrasound of the left lower extremity negative for DVT.  She was given 20 mg of IV Lasix.    Past Medical History    I have reviewed this patient's medical history and updated it with pertinent information if needed.   Past Medical History:   Diagnosis Date    Acquired hypothyroidism 11/04/2015    Aortic valve insufficiency     Atrial fibrillation and flutter     CHF (congestive heart failure)     DVT (deep venous thrombosis) 2003    Gastro-oesophageal reflux disease     H/O mitral valve replacement with tissue graft 06/2014    HTN (hypertension)     Other chronic pain     Pulmonary HTN     Rheumatoid arthritis     Seizure 2014    Shingles 08/2018    Stroke 2009    left side mild weakness        Past Surgical History   I have reviewed this patient's surgical history and updated it with pertinent information if needed.  Past Surgical History:   Procedure Laterality Date    APPLY WOUND VAC Left 12/18/2018    Procedure:  Wound vac application;  Surgeon: Pardeep Sheikh MD;  Location: [facility] OR    ARTHROPLASTY KNEE Left 11/12/2018    Procedure: Left total knee arthroplasty using a [employer] knee system;  Surgeon: Pardeep Sheikh MD;  Location: [facility] OR    ARTHROSCOPY KNEE WITH DEBRIDEMENT JOINT, COMBINED Left 12/18/2018    Procedure: Left knee debridement and placement of wound vac;  Surgeon: Pardeep Sheikh MD;  Location: [facility] OR    CATARACT IOL, RT/LT      COLONOSCOPY  03/15/2012    EP PPM GENERATOR CHANGE SINGLE N/A 03/08/2021    Procedure: Permanent Pacemakers  Generator Change Dual Chamber;  Surgeon: Tamiko Cowan MD;  Location:  [facility]    ESOPHAGOSCOPY, GASTROSCOPY, DUODENOSCOPY (EGD), COMBINED N/A 02/19/2020    Procedure: ESOPHAGOGASTRODUODENOSCOPY (EGD);  Surgeon: Michael Mccarthy MD;  Location:  GI    EYE SURGERY  2018    Both eyes/cataract surgery    H ABLATION AV NODE  2011    AFlutter with syncope, PPM to follow    HERNIA REPAIR      3 hernies/right and left & umbilical    IMPLANT PACEMAKER  2011    LAPAROSCOPIC CHOLECYSTECTOMY WITH CHOLANGIOGRAMS N/A 04/29/2017    Procedure: LAPAROSCOPIC CHOLECYSTECTOMY WITH CHOLANGIOGRAMS;  LAPAROSCOPIC CHOLECYSTECTOMY WITH CHOLANGIOGRAMS ;  Surgeon: Angeles Mcgill MD;  Location: [facility] OR    OPEN REDUCTION INTERNAL FIXATION RODDING INTRAMEDULLARY FEMUR Left 9/27/2023    Procedure: Placement of retrograde femoral nail left femur;  Surgeon: Spike Vann MD;  Location: [facility] OR    OPEN REDUCTION INTERNAL FIXATION RODDING INTRAMEDULLARY HUMERUS Right 07/28/2015    Procedure: OPEN REDUCTION INTERNAL FIXATION RODDING INTRAMEDULLARY HUMERUS;  Surgeon: Raymundo Rivera MD;  Location: [facility] OR    REPLACE VALVE MITRAL  2006    Mitral valve replacement with bioprosthetic valve in 2008 for rheumatic disease    SLING BLADDER SUSPENSION WITH FASCIA UMESH         Prior to Admission Medications   Prior to Admission  Medications   Prescriptions Last Dose Informant Patient Reported? Taking?   Dorzolamide HCl-Timolol Mal (COSOPT OP)   Yes No   Sig: Apply to eye 2 times daily Place one drop into each eye twice daily   Lidocaine (LIDOCARE) 4 % Patch   No No   Sig: Place 1 patch onto the skin every 24 hours To prevent lidocaine toxicity, patient should be patch free for 12 hrs daily.  Apply to left leg   METHOTREXATE SODIUM IJ   Yes No   Sig: Inject 0.8 mLs as directed once a week Thursdays   VITAMIN D, CHOLECALCIFEROL, PO   Yes No   Sig: Take 1,000 Units by mouth daily   acetaminophen (TYLENOL) 500 MG tablet   Yes No   Sig: Take 1,000 mg by mouth 3 times daily   allopurinol (ZYLOPRIM) 100 MG tablet   Yes No   Sig: Take 100 mg by mouth 2 times daily   amLODIPine (NORVASC) 2.5 MG tablet   No No   Sig: Take 1 tablet (2.5 mg) by mouth daily   amLODIPine-valsartan (EXFORGE) 5-160 MG tablet   No No   Sig: Take 1 tablet by mouth daily   amoxicillin (AMOXIL) 500 MG tablet   No No   Sig: Take 2000mg (4 tablets of 500mg each) approx 30 min to 1 hour prior to any dental procedures   amoxicillin-clavulanate (AUGMENTIN) 875-125 MG tablet   No No   Sig: Take 1 tablet by mouth 2 times daily   calcium citrate (CALCITRATE) 950 MG tablet   Yes No   Sig: Take 1 tablet by mouth daily    cephALEXin (KEFLEX) 500 MG capsule   No No   Sig: Take 1 capsule (500 mg) by mouth 2 times daily   famotidine (PEPCID) 40 MG tablet   No No   Sig: TAKE 1 TABLET BY MOUTH DAILY   ferrous sulfate (FEROSUL) 325 (65 Fe) MG tablet   Yes No   Sig: Take 325 mg by mouth daily (with breakfast)   folic acid (FOLVITE) 1 MG tablet   Yes No   Sig: Take 1 mg by mouth daily   levothyroxine (SYNTHROID/LEVOTHROID) 112 MCG tablet   No No   Sig: Take 1 tablet (112 mcg) by mouth daily   lidocaine 5% oint/silver sulfadiazine 1% cm/triamcinolone 0.1% cm (JESUS PASTE) compounded ointment   No No   Sig: Apply topically 4 times daily as needed (mouth sore)   metoprolol tartrate (LOPRESSOR) 50  MG tablet   No No   Sig: Take 1 tablet (50 mg) by mouth 2 times daily   multivitamin w/minerals (THERA-VIT-M) tablet   Yes No   Sig: Take 1 tablet by mouth daily Without iron   senna-docusate (SENOKOT-S/PERICOLACE) 8.6-50 MG tablet   No No   Sig: Take 1-2 tablets by mouth 2 times daily Take while on oral narcotics to prevent or treat constipation.   Patient taking differently: Take 1 tablet by mouth 2 times daily Take while on oral narcotics to prevent or treat constipation.   torsemide (DEMADEX) 10 MG tablet   No No   Sig: Take 1 tablet (10 mg) by mouth daily Hold for SBP less than 110   warfarin ANTICOAGULANT (COUMADIN) 2.5 MG tablet   No No   Sig: Take 3.75 mg (1.5 tablets) by mouth every Sun, Tue, Thu; Take 2.5 mg (1 tablet) all other days or as instructed by the INR Clinic      Facility-Administered Medications: None     Allergies   No Known Allergies    Social History   I have reviewed this patient's social history and updated it with pertinent information if needed. Zunilda Clark  reports that she has never smoked. She has never used smokeless tobacco. She reports that she does not drink alcohol and does not use drugs.    Family History   I have reviewed this patient's family history and updated it with pertinent information if needed.   Family History   Problem Relation Age of Onset    Coronary Artery Disease Mother     Coronary Artery Disease Brother     Diabetes Brother     Cancer Brother          at age 52     Other Cancer Brother     Hypertension Sister     Hyperlipidemia Sister        Review of Systems   The 10 point Review of Systems is negative other than noted in the HPI or here.     Physical Exam   Temp: 98.6  F (37  C) Temp src: Temporal BP: (!) 148/86 Pulse: 83   Resp: 16 SpO2: 97 %      Vital Signs with Ranges  Temp:  [98.6  F (37  C)] 98.6  F (37  C)  Pulse:  [83] 83  Resp:  [16] 16  BP: (148)/(86) 148/86  SpO2:  [97 %] 97 %  144 lbs 2.89 oz    Constitutional: Elderly female.  Pleasant.  No  acute distress.  Calm and cooperative  HEENT: Normocephalic, MMM, no elevation of JVD noted  Respiratory: Nl WOB, she has bilateral inspiratory crackles at the bases.  No wheezes.  Cardiovascular: Regular, systolic murmur noted.  GI: BS+, NT, ND, no rebound or guarding  Lymph/Hematologic: No bruising. No cervical LAD  Skin: No rash  Musculoskeletal: Nl Tone, surgical scars of the left leg noted.  Mild swelling of the left leg.  Neurologic: A&Ox3, Answers appropriately. CNII-XII intact. Moves all extremities. No tremor  Psychiatric: Calm    Data   Data reviewed today:  I personally reviewed   Recent Labs   Lab 02/22/24  1026 02/20/24  1011   WBC 5.4  --    HGB 11.0*  --    *  --      --    INR 1.50* 1.6*     --    POTASSIUM 4.4  --    CHLORIDE 104  --    CO2 27  --    BUN 29.7*  --    CR 0.97*  --    ANIONGAP 10  --    BRICE 9.4  --    GLC 91  --        Recent Results (from the past 24 hour(s))   Chest XR,  PA & LAT    Narrative    CHEST TWO VIEWS February 22, 2024 10:53 AM     HISTORY: Orthopnea.    COMPARISON: September 26, 2023.       Impression    IMPRESSION: Markedly improved interstitial and ground-glass changes  since comparison. Minimal effusion suggested on lateral view. The  cardiac silhouette is not enlarged. Pulmonary vasculature is  unremarkable. A cardiac implantable electronic device is in place with  lead(s) grossly intact. No abandoned leads/wires or other unexpected  metallic foreign bodies demonstrated on the film.    ANNA WILLETT MD         SYSTEM ID:  [id number]   US Lower Extremity Venous Duplex Left    Narrative    VENOUS ULTRASOUND LEFT LEG  2/22/2024 12:11 PM     HISTORY: left leg swelling for many months    COMPARISON: None.    FINDINGS:  Examination of the deep veins with graded compression and  color flow Doppler with spectral wave form analysis was performed.   There is no evidence for DVT in the left lower extremity.      Impression    IMPRESSION: No evidence of deep  venous thrombosis.    ROGERS SULLIVAN MD         SYSTEM ID:  [id number]   CT Chest Pulmonary Embolism w Contrast    Narrative    CT CHEST PULMONARY EMBOLISM WITH CONTRAST 2/22/2024 12:26 PM    CLINICAL HISTORY: SOB, elevated d-dimer.    TECHNIQUE: CT angiogram chest during arterial phase injection IV  contrast. 2D and 3D MIP reconstructions were performed by the CT  technologist. Dose reduction techniques were used.     CONTRAST: 75mL Isovue-370    COMPARISON: Chest x-ray 2/22/2024.    FINDINGS:  ANGIOGRAM CHEST: Pulmonary arteries are normal caliber and negative  for pulmonary emboli. Thoracic aorta is negative for dissection.  Borderline cardiomegaly.    LUNGS AND PLEURA: Bilateral mild groundglass opacities noted  diffusely. Basilar atelectasis. Some interstitial prominence mostly  seen in the lower lungs.    MEDIASTINUM/AXILLAE: Left chest pacer. A few mildly enlarged lymph  nodes in the mediastinum. An example at the precarinal position is 11  mm series 6 image 116. There are other examples.    CORONARY ARTERY CALCIFICATION: Severe.    UPPER ABDOMEN: No acute upper abdominal abnormality identified. There  is some reflux of contrast into the hepatic veins.    MUSCULOSKELETAL: T12 prominent compression deformity with sclerosis.  Sternotomy.      Impression    IMPRESSION:  1.  No evidence for pulmonary embolism.  2.  Bilateral hazy groundglass pulmonary opacities and bibasilar  interstitial prominence. Cardiomegaly. Correlate with CHF and  pulmonary edema. Small bibasilar pleural fluid also noted.     TTE (01/2024):  There is a 27 mm Magna bioprosthetic valve in the mitral position.  Severe prosthetic mitral valve stenosis  Severe valvular aortic stenosis.  There is mild (1+) aortic regurgitation.  The visual ejection fraction is 60-65%.  Left ventricular systolic function is normal.  The right ventricle is mild to moderately dilated.  The right ventricular systolic function is normal.  There is moderate (2+)  tricuspid regurgitation.  Right ventricular systolic pressure is elevated, consistent with severe  pulmonary hypertension.  The rhythm was atrial fibrillation with paced rhythm.  Compared to the previous echocardiogram, the mean gradient across the mitral  valve prosthesis has significantly increased at a similar heart rate. The mean  gradient now is 12 mmHg and was 5.6 mmHg on 18 June of 2023.  The degree of aortic stenosis has also progressed. Previously the mean  gradient across the aortic valve was 21.9 mmHg and a calculated aortic valve  area of was 1.0 cmÂ . Now the mean gradient across the aortic valve is 33.7  mmHg and the calculated aortic valve area is 0.87 cmÂ .  The degree of tricuspid regurgitation is similar to previously being moderate.  Pulmonary pressures are lower on this study than previously but still severe.    Clinically Significant Risk Factors Present on Admission               # Drug Induced Coagulation Defect: home medication list includes an anticoagulant medication    # Hypertension: Noted on problem list           # Pacemaker present

## 2024-02-22 NOTE — ED NOTES
"[facility]  ED Nurse Handoff Report    ED Chief complaint: Shortness of Breath  . ED Diagnosis:   Final diagnoses:   COVID-19   Shortness of breath   History of oral lesions   Subtherapeutic international normalized ratio (INR)       Allergies: No Known Allergies    Code Status: DNR/DNI    Activity level - Baseline/Home:  walker.  Activity Level - Current:   standby and walker.   Lift room needed: No.   Bariatric: No   Needed: No   Isolation: No.   Infection: COVID r/o and special precautions.     Respiratory status: Room air    Vital Signs (within 30 minutes):   Vitals:    02/22/24 0943   BP: (!) 148/86   Pulse: 83   Resp: 16   Temp: 98.6  F (37  C)   TempSrc: Temporal   SpO2: 97%   Weight: 65.4 kg (144 lb 2.9 oz)   Height: 1.626 m (5' 4\")       Cardiac Rhythm:  ,      Pain level:    Patient confused: No.   Patient Falls Risk: nonskid shoes/slippers when out of bed, arm band in place, patient and family education, activity supervised, and toileting schedule implemented.   Elimination Status: Has voided     Patient Report - Initial Complaint: SOB.   Focused Assessment:   ED arrival note:   Pt c/o SOB, worse when lying flat, for the past couple days. Pacemaker present. Denies chest pain. A&OX4.      Patient would also like to get her mouth sores assessed. Has been present since September     h/o AF on coumadin, CHF on torsemide who presents with SOB and orthopnea. Sx onset a few days ago. NO h/o this that she can recall. NO fever or cough. NO CP. Pt has noticed left leg swelling. Pt had left femur bone surgery 9/23 and has had some lingering swelling in the leg since then.       Abnormal Results:   Labs Ordered and Resulted from Time of ED Arrival to Time of ED Departure   BASIC METABOLIC PANEL - Abnormal       Result Value    Sodium 141      Potassium 4.4      Chloride 104      Carbon Dioxide (CO2) 27      Anion Gap 10      Urea Nitrogen 29.7 (*)     Creatinine 0.97 (*)     GFR " Estimate 58 (*)     Calcium 9.4      Glucose 91     TROPONIN T, HIGH SENSITIVITY - Abnormal    Troponin T, High Sensitivity 20 (*)    NT PROBNP INPATIENT - Abnormal    N terminal Pro BNP Inpatient 4,362 (*)    D DIMER QUANTITATIVE - Abnormal    D-Dimer Quantitative 1.48 (*)    INR - Abnormal    INR 1.50 (*)    INFLUENZA A/B, RSV, & SARS-COV2 PCR - Abnormal    Influenza A PCR Negative      Influenza B PCR Negative      RSV PCR Negative      SARS CoV2 PCR Positive (*)    CBC WITH PLATELETS AND DIFFERENTIAL - Abnormal    WBC Count 5.4      RBC Count 3.31 (*)     Hemoglobin 11.0 (*)     Hematocrit 34.4 (*)      (*)     MCH 33.2 (*)     MCHC 32.0      RDW 18.6 (*)     Platelet Count 268      % Neutrophils 76      % Lymphocytes 11      % Monocytes 10      % Eosinophils 1      % Basophils 1      % Immature Granulocytes 1      NRBCs per 100 WBC 1 (*)     Absolute Neutrophils 4.1      Absolute Lymphocytes 0.6 (*)     Absolute Monocytes 0.6      Absolute Eosinophils 0.1      Absolute Basophils 0.0      Absolute Immature Granulocytes 0.0      Absolute NRBCs 0.0     TROPONIN T, HIGH SENSITIVITY        CT Chest Pulmonary Embolism w Contrast   Preliminary Result   IMPRESSION:   1.  No evidence for pulmonary embolism.   2.  Bilateral hazy groundglass pulmonary opacities and bibasilar   interstitial prominence. Cardiomegaly. Correlate with CHF and   pulmonary edema. Small bibasilar pleural fluid also noted.      US Lower Extremity Venous Duplex Left   Final Result   IMPRESSION: No evidence of deep venous thrombosis.      ROGERS SULLIVAN MD            SYSTEM ID:  [id number]      Chest XR,  PA & LAT   Final Result   IMPRESSION: Markedly improved interstitial and ground-glass changes   since comparison. Minimal effusion suggested on lateral view. The   cardiac silhouette is not enlarged. Pulmonary vasculature is   unremarkable. A cardiac implantable electronic device is in place with   lead(s) grossly intact. No abandoned  leads/wires or other unexpected   metallic foreign bodies demonstrated on the film.      ANNA WILLETT MD            SYSTEM ID:  [id number]          Treatments provided: See MAR  Family Comments: Sister at bedside  OBS brochure/video discussed/provided to patient:  N/A  ED Medications:   Medications   furosemide (LASIX) injection 20 mg (has no administration in time range)   iopamidol (ISOVUE-370) solution 500 mL (54 mLs Intravenous $Given 2/22/24 1211)   CT scan flush use (75 mLs As instructed $Given 2/22/24 1212)       Drips infusing:  No  For the majority of the shift this patient was Green.   Interventions performed were N/A.    Sepsis treatment initiated: No    Cares/treatment/interventions/medications to be completed following ED care: Lasix and purewick    ED Nurse Name: Era Dodd RN  2:59 PM    RECEIVING UNIT ED HANDOFF REVIEW    Above ED Nurse Handoff Report was reviewed: Yes  Reviewed by: Margie Sutherland RN on February 22, 2024 at 6:27 PM   I Kimberly called the ED to inform them the note was read: Yes

## 2024-02-23 LAB
ALBUMIN SERPL BCG-MCNC: 3.8 G/DL (ref 3.5–5.2)
ALP SERPL-CCNC: 128 U/L (ref 40–150)
ALT SERPL W P-5'-P-CCNC: 19 U/L (ref 0–50)
ANION GAP SERPL CALCULATED.3IONS-SCNC: 12 MMOL/L (ref 7–15)
AST SERPL W P-5'-P-CCNC: 37 U/L (ref 0–45)
BILIRUB DIRECT SERPL-MCNC: 0.22 MG/DL (ref 0–0.3)
BILIRUB SERPL-MCNC: 0.8 MG/DL
BUN SERPL-MCNC: 24.9 MG/DL (ref 8–23)
CALCIUM SERPL-MCNC: 9.4 MG/DL (ref 8.8–10.2)
CHLORIDE SERPL-SCNC: 100 MMOL/L (ref 98–107)
CREAT SERPL-MCNC: 0.93 MG/DL (ref 0.51–0.95)
DEPRECATED HCO3 PLAS-SCNC: 26 MMOL/L (ref 22–29)
EGFRCR SERPLBLD CKD-EPI 2021: 61 ML/MIN/1.73M2
ERYTHROCYTE [DISTWIDTH] IN BLOOD BY AUTOMATED COUNT: 18.9 % (ref 10–15)
GLUCOSE SERPL-MCNC: 86 MG/DL (ref 70–99)
HCT VFR BLD AUTO: 34.2 % (ref 35–47)
HGB BLD-MCNC: 11.1 G/DL (ref 11.7–15.7)
INR PPP: 1.56 (ref 0.85–1.15)
MCH RBC QN AUTO: 33.5 PG (ref 26.5–33)
MCHC RBC AUTO-ENTMCNC: 32.5 G/DL (ref 31.5–36.5)
MCV RBC AUTO: 103 FL (ref 78–100)
PLATELET # BLD AUTO: 214 10E3/UL (ref 150–450)
POTASSIUM SERPL-SCNC: 4.2 MMOL/L (ref 3.4–5.3)
PROT SERPL-MCNC: 7.1 G/DL (ref 6.4–8.3)
RBC # BLD AUTO: 3.31 10E6/UL (ref 3.8–5.2)
SODIUM SERPL-SCNC: 138 MMOL/L (ref 135–145)
WBC # BLD AUTO: 5 10E3/UL (ref 4–11)

## 2024-02-23 PROCEDURE — 36415 COLL VENOUS BLD VENIPUNCTURE: CPT | Performed by: HOSPITALIST

## 2024-02-23 PROCEDURE — 85014 HEMATOCRIT: CPT | Performed by: HOSPITALIST

## 2024-02-23 PROCEDURE — 250N000011 HC RX IP 250 OP 636: Performed by: HOSPITALIST

## 2024-02-23 PROCEDURE — 96401 CHEMO ANTI-NEOPL SQ/IM: CPT

## 2024-02-23 PROCEDURE — 250N000013 HC RX MED GY IP 250 OP 250 PS 637: Performed by: INTERNAL MEDICINE

## 2024-02-23 PROCEDURE — 250N000011 HC RX IP 250 OP 636: Performed by: INTERNAL MEDICINE

## 2024-02-23 PROCEDURE — G0378 HOSPITAL OBSERVATION PER HR: HCPCS

## 2024-02-23 PROCEDURE — 99233 SBSQ HOSP IP/OBS HIGH 50: CPT | Mod: 25 | Performed by: INTERNAL MEDICINE

## 2024-02-23 PROCEDURE — 82040 ASSAY OF SERUM ALBUMIN: CPT | Performed by: INTERNAL MEDICINE

## 2024-02-23 PROCEDURE — 80048 BASIC METABOLIC PNL TOTAL CA: CPT | Performed by: HOSPITALIST

## 2024-02-23 PROCEDURE — 250N000013 HC RX MED GY IP 250 OP 250 PS 637: Performed by: HOSPITALIST

## 2024-02-23 PROCEDURE — 99497 ADVNCD CARE PLAN 30 MIN: CPT | Mod: 25 | Performed by: INTERNAL MEDICINE

## 2024-02-23 PROCEDURE — 96376 TX/PRO/DX INJ SAME DRUG ADON: CPT

## 2024-02-23 PROCEDURE — 99223 1ST HOSP IP/OBS HIGH 75: CPT | Performed by: INTERNAL MEDICINE

## 2024-02-23 PROCEDURE — 85610 PROTHROMBIN TIME: CPT | Performed by: HOSPITALIST

## 2024-02-23 PROCEDURE — 80053 COMPREHEN METABOLIC PANEL: CPT | Performed by: HOSPITALIST

## 2024-02-23 RX ORDER — WARFARIN SODIUM 5 MG/1
5 TABLET ORAL
Status: COMPLETED | OUTPATIENT
Start: 2024-02-23 | End: 2024-02-23

## 2024-02-23 RX ORDER — FUROSEMIDE 10 MG/ML
20 INJECTION INTRAMUSCULAR; INTRAVENOUS ONCE
Status: COMPLETED | OUTPATIENT
Start: 2024-02-23 | End: 2024-02-23

## 2024-02-23 RX ORDER — METHOTREXATE 25 MG/ML
20 INJECTION, SOLUTION INTRA-ARTERIAL; INTRAMUSCULAR; INTRAVENOUS
Status: DISCONTINUED | OUTPATIENT
Start: 2024-02-23 | End: 2024-02-26 | Stop reason: HOSPADM

## 2024-02-23 RX ORDER — FUROSEMIDE 10 MG/ML
40 INJECTION INTRAMUSCULAR; INTRAVENOUS EVERY 12 HOURS
Status: DISCONTINUED | OUTPATIENT
Start: 2024-02-23 | End: 2024-02-25

## 2024-02-23 RX ADMIN — ALLOPURINOL 100 MG: 100 TABLET ORAL at 20:17

## 2024-02-23 RX ADMIN — WARFARIN SODIUM 5 MG: 5 TABLET ORAL at 17:52

## 2024-02-23 RX ADMIN — FUROSEMIDE 40 MG: 10 INJECTION, SOLUTION INTRAMUSCULAR; INTRAVENOUS at 21:15

## 2024-02-23 RX ADMIN — FUROSEMIDE 20 MG: 10 INJECTION, SOLUTION INTRAMUSCULAR; INTRAVENOUS at 12:59

## 2024-02-23 RX ADMIN — METHOTREXATE 20 MG: 25 SOLUTION INTRA-ARTERIAL; INTRAMUSCULAR; INTRATHECAL; INTRAVENOUS at 20:14

## 2024-02-23 RX ADMIN — FOLIC ACID 1 MG: 1 TABLET ORAL at 09:04

## 2024-02-23 RX ADMIN — LEVOTHYROXINE SODIUM 112 MCG: 0.11 TABLET ORAL at 09:04

## 2024-02-23 RX ADMIN — ALLOPURINOL 100 MG: 100 TABLET ORAL at 09:04

## 2024-02-23 RX ADMIN — FUROSEMIDE 20 MG: 10 INJECTION, SOLUTION INTRAMUSCULAR; INTRAVENOUS at 09:06

## 2024-02-23 RX ADMIN — METOPROLOL TARTRATE 75 MG: 50 TABLET, FILM COATED ORAL at 20:17

## 2024-02-23 RX ADMIN — METOPROLOL TARTRATE 50 MG: 50 TABLET, FILM COATED ORAL at 09:05

## 2024-02-23 ASSESSMENT — ACTIVITIES OF DAILY LIVING (ADL)
ADLS_ACUITY_SCORE: 37
ADLS_ACUITY_SCORE: 34
ADLS_ACUITY_SCORE: 37
ADLS_ACUITY_SCORE: 33
ADLS_ACUITY_SCORE: 37
DEPENDENT_IADLS:: CLEANING;COOKING
ADLS_ACUITY_SCORE: 37
ADLS_ACUITY_SCORE: 33
ADLS_ACUITY_SCORE: 34

## 2024-02-23 NOTE — PLAN OF CARE
Pertinent assessments: A&Ox4, forgetful at times. Desats to 88 on room air, placed on 2L NC overnight. All other VSS. Denies pain. Exp wheezing and SOB noted, PRN neb given x1. Incontinent, purewick in place. Up Ax1 with GB & walker.     Major Shift Events: Admit to MS5  Treatment Plan: IV Lasix BID, supplemental oxygen as needed  Bedside Nurse: Elizabet Mitchell RN

## 2024-02-23 NOTE — PLAN OF CARE
Goal Outcome Evaluation:       To Do:  End of Shift Summary  For vital signs and complete assessments, please see documentation flowsheets.     Pertinent assessments:   A&O---  up to commode---sats on room air 87% 2LNC  92-93%--- appetite good--- denies pain and nausea --   Major Shift Events sister at bedside   Treatment Plan:  try weaning O2 for discharge   Bedside Nurse: Marine Walsh RN

## 2024-02-23 NOTE — CONSULTS
Care Management Initial Consult    General Information  Assessment completed with: Patient,    Type of CM/SW Visit: Initial Assessment    Primary Care Provider verified and updated as needed:     Readmission within the last 30 days:        Reason for Consult: discharge planning  Advance Care Planning:            Communication Assessment  Patient's communication style: spoken language (English or Bilingual)    Hearing Difficulty or Deaf: no   Wear Glasses or Blind: yes    Cognitive  Cognitive/Neuro/Behavioral: WDL                      Living Environment:   People in home: alone     Current living Arrangements: apartment      Able to return to prior arrangements: yes       Family/Social Support:  Care provided by:    Provides care for: no one  Marital Status:   Sibling(s)          Description of Support System:           Current Resources:   Patient receiving home care services: No     Community Resources:    Equipment currently used at home: walker, rolling, walker, standard  Supplies currently used at home:      Employment/Financial:  Employment Status:          Financial Concerns:             Does the patient's insurance plan have a 3 day qualifying hospital stay waiver?  Yes     Which insurance plan 3 day waiver is available? Alternative insurance waiver    Will the waiver be used for post-acute placement? No    Lifestyle & Psychosocial Needs:  Social Determinants of Health     Food Insecurity: No Food Insecurity (2/14/2023)    Hunger Vital Sign     Worried About Running Out of Food in the Last Year: Never true     Ran Out of Food in the Last Year: Never true   Depression: Not at risk (1/8/2024)    PHQ-2     PHQ-2 Score: 0   Housing Stability: Low Risk  (2/14/2023)    Housing Stability Vital Sign     Unable to Pay for Housing in the Last Year: No     Number of Places Lived in the Last Year: 1     Unstable Housing in the Last Year: No   Tobacco Use: Low Risk  (1/8/2024)    Patient History     Smoking Tobacco  Use: Never     Smokeless Tobacco Use: Never     Passive Exposure: Not on file   Financial Resource Strain: Low Risk  (2/14/2023)    Overall Financial Resource Strain (CARDIA)     Difficulty of Paying Living Expenses: Not very hard   Alcohol Use: Not on file   Transportation Needs: Not on file   Physical Activity: Not on file   Interpersonal Safety: Not on file   Stress: Not on file   Social Connections: Not on file       Functional Status:  Prior to admission patient needed assistance:   Dependent ADLs:: Ambulation-walker  Dependent IADLs:: Cleaning, Cooking       Mental Health Status:          Chemical Dependency Status:                Values/Beliefs:  Spiritual, Cultural Beliefs, [employer] Practices, Values that affect care:               Care Management Discharge Note    Discharge Date: 02/24/2024       Discharge Disposition: Home    Discharge Services:      Discharge DME:      Discharge Transportation: family or friend will provide    Private pay costs discussed: Not applicable    Education Provided on the Discharge Plan:    Persons Notified of Discharge Plans: patient  Patient/Family in Agreement with the Plan:  yes    Handoff Referral Completed: yes    Additional Information:  Consult placed for discharge planning. Patient with SOB. Acute on chronic heart failure and Covid.  Met with patient at the bedside and introduced CM role. Patient confirms she lives at [facility]. The only services she gets are light housekeeping and meals twice a day. She ambulates with a walker at all times. She no longer drives and her sister is her primary support person as her 4 daughters live out of state.  She does not currently use any home care services but has used them in the past as well as has needed TCU.  Currently she does not feel she has any needs on discharge. CM team will continue to follow for any discharge needs if they should arise.    Irene Fishman RN BSN OCN  Care Coordinator  [employer]  [facility]  [phone]

## 2024-02-23 NOTE — CONSULTS
[facility]    Cardiology Consultation     Date of Admission:  2/22/2024    Assessment & Plan   Zunilda Clark is a 83 year old female who was admitted on 2/22/2024.    Impression:  1.  Acute pulmonary edema secondary to severe mitral prosthetic stenosis with contribution from moderate to severe aortic stenosis.  2.  Moderate to severe native valvular aortic stenosis.  3.  Moderate tricuspid regurgitation.  4.  Severe pulmonary hypertension.  5.  Status post AV node ablation and permanent pacemaker placement with AV sequential pacing.  6.  Past history of paroxysmal atrial fibrillation.  7.  Essential hypertension with blood pressure raised on admission but improving now.    Plan:  1.  Patient has made her wishes very clear to Flynn Benitez, to the hospitalist service here and to me that she does not want any invasive procedures performed for her valvular heart disease.  She does wish conservative therapy.  I did explain to the patient that both her prosthetic mitral valvular stenosis and her aortic stenosis will be progressive and that she should take that into consideration when she is making her decisions about therapies for her heart disease.  2.  Given that the response to her diuretic therapy has been modest and that she clearly is volume overloaded and symptomatic I would increase the furosemide dose to 40 mg twice a day intravenously and check BMPs daily and strict intake and output and daily weights.  3.  I will increase the dose of metoprolol succinate to 75 mg twice a day for better blood pressure control.    We will continue to follow.  High complexity     Momo Beatty MD, FACC, FRCPI    Primary Care Physician   Yunior Huang    Reason for Consult   Reason for consult: I was asked by hospitalist service to evaluate this patient for congestive heart failure..    History of Present Illness   Zunilda Clark is a 83 year old female who presents with increasing shortness of breath  with exertion and also paroxysmal nocturnal dyspnea and orthopnea for the last 3 nights.  This is a patient who is followed by Dr. Noe Rodriguez from our group and also YOKASTA Augustine from the heart failure clinic.  This is a patient with a history of mitral valve replacement twice.  This is a bioprosthetic valve.  There has been progressive stenosis of the bioprosthesis with a mean gradient of 12 mmHg across the mitral valve.  This has been quite a rapid progression from 2023.  She also has a history of progressive aortic stenosis with moderate to severe aortic stenosis on the most recent echocardiogram.  She has severe pulmonary hypertension and moderate tricuspid regurgitation also.  This patient in the past has a history of atrial fibrillation and atrial flutter and underwent an AV node ablation and has a permanent pacemaker in situ.  She is chronically anticoagulated.    With that background in mind the patient is feeling somewhat better with intravenous diuresis although she still appears somewhat short of breath when she is speaking to me.  Her diuresis has been modest with only 700 net out.  She is hemodynamically stable but her blood pressure was on the higher side on admission.  Her ventricular rate is on the slower side which is desirable in patients with severe mitral stenosis.  She is not complaining of any chest pain or chest pressure.  Her renal function is normal.  She is mildly anemic.  Her troponin was borderline raised at 24.  The twelve-lead electrocardiogram shows sequential AV pacing.    Past Medical History   Past Medical History:   Diagnosis Date    Acquired hypothyroidism 11/04/2015    Aortic valve insufficiency     Atrial fibrillation and flutter     CHF (congestive heart failure)     DVT (deep venous thrombosis) 2003    Gastro-oesophageal reflux disease     H/O mitral valve replacement with tissue graft 06/2014    HTN (hypertension)     Other chronic pain     Pulmonary HTN     Rheumatoid  arthritis     Seizure 2014    Shingles 08/2018    Stroke 2009    left side mild weakness          Past Surgical History   Past Surgical History:   Procedure Laterality Date    APPLY WOUND VAC Left 12/18/2018    Procedure: Wound vac application;  Surgeon: Pardeep Sheikh MD;  Location: [facility] OR    ARTHROPLASTY KNEE Left 11/12/2018    Procedure: Left total knee arthroplasty using a [employer] Melissa II knee system;  Surgeon: Pardeep Sheikh MD;  Location: [facility] OR    ARTHROSCOPY KNEE WITH DEBRIDEMENT JOINT, COMBINED Left 12/18/2018    Procedure: Left knee debridement and placement of wound vac;  Surgeon: Pardeep Sheikh MD;  Location: [facility] OR    CATARACT IOL, RT/LT      COLONOSCOPY  03/15/2012    EP PPM GENERATOR CHANGE SINGLE N/A 03/08/2021    Procedure: Permanent Pacemakers  Generator Change Dual Chamber;  Surgeon: Tamiko Cowan MD;  Location:  HEART CARDIAC CATH LAB    ESOPHAGOSCOPY, GASTROSCOPY, DUODENOSCOPY (EGD), COMBINED N/A 02/19/2020    Procedure: ESOPHAGOGASTRODUODENOSCOPY (EGD);  Surgeon: Michael Mccarthy MD;  Location:  GI    EYE SURGERY  2018    Both eyes/cataract surgery    H ABLATION AV NODE  2011    AFlutter with syncope, PPM to follow    HERNIA REPAIR      3 hernies/right and left & umbilical    IMPLANT PACEMAKER  2011    LAPAROSCOPIC CHOLECYSTECTOMY WITH CHOLANGIOGRAMS N/A 04/29/2017    Procedure: LAPAROSCOPIC CHOLECYSTECTOMY WITH CHOLANGIOGRAMS;  LAPAROSCOPIC CHOLECYSTECTOMY WITH CHOLANGIOGRAMS ;  Surgeon: Angeles Mcgill MD;  Location:  OR    OPEN REDUCTION INTERNAL FIXATION RODDING INTRAMEDULLARY FEMUR Left 9/27/2023    Procedure: Placement of retrograde femoral nail left femur;  Surgeon: Spike Vann MD;  Location:  OR    OPEN REDUCTION INTERNAL FIXATION RODDING INTRAMEDULLARY HUMERUS Right 07/28/2015    Procedure: OPEN REDUCTION INTERNAL FIXATION RODDING INTRAMEDULLARY HUMERUS;  Surgeon: Raymundo Rivera MD;  Location:   OR    REPLACE VALVE MITRAL  2006    Mitral valve replacement with bioprosthetic valve in 2008 for rheumatic disease    SLING BLADDER SUSPENSION WITH FASCIA UMESH           Prior to Admission Medications   Prior to Admission Medications   Prescriptions Last Dose Informant Patient Reported? Taking?   Lidocaine (LIDOCARE) 4 % Patch More than a month  No Yes   Sig: Place 1 patch onto the skin every 24 hours To prevent lidocaine toxicity, patient should be patch free for 12 hrs daily.  Apply to left leg   VITAMIN D, CHOLECALCIFEROL, PO 2/21/2024 at AM  Yes Yes   Sig: Take 1,000 Units by mouth daily   acetaminophen (TYLENOL) 500 MG tablet Past Week  Yes Yes   Sig: Take 1,000 mg by mouth every 8 hours as needed for pain   allopurinol (ZYLOPRIM) 100 MG tablet 2/21/2024  Yes Yes   Sig: Take 100 mg by mouth 2 times daily   amLODIPine (NORVASC) 2.5 MG tablet 2/21/2024 at AM  No Yes   Sig: Take 1 tablet (2.5 mg) by mouth daily   amLODIPine-valsartan (EXFORGE) 5-160 MG tablet 2/21/2024 at AM  No Yes   Sig: Take 1 tablet by mouth daily   amoxicillin (AMOXIL) 500 MG tablet Unknown  No Yes   Sig: Take 2000mg (4 tablets of 500mg each) approx 30 min to 1 hour prior to any dental procedures   calcium citrate (CALCITRATE) 950 MG tablet 2/21/2024 at AM  Yes Yes   Sig: Take 1 tablet by mouth daily    famotidine (PEPCID) 40 MG tablet 2/21/2024 at AM  No Yes   Sig: TAKE 1 TABLET BY MOUTH DAILY   folic acid (FOLVITE) 1 MG tablet 2/21/2024 at AM  Yes Yes   Sig: Take 1 mg by mouth daily   levothyroxine (SYNTHROID/LEVOTHROID) 112 MCG tablet 2/21/2024 at AM  No Yes   Sig: Take 1 tablet (112 mcg) by mouth daily   lidocaine 5% oint/silver sulfadiazine 1% cm/triamcinolone 0.1% cm (JESUS PASTE) compounded ointment More than a month  No Yes   Sig: Apply topically 4 times daily as needed (mouth sore)   methotrexate 50 MG/2ML injection 2/16/2024  Yes Yes   Sig: Inject 0.8 mLs Subcutaneous every 7 days (Fridays)   metoprolol tartrate (LOPRESSOR) 50 MG  tablet 2024 at x2  No Yes   Sig: Take 1 tablet (50 mg) by mouth 2 times daily   multivitamin w/minerals (THERA-VIT-M) tablet 2024  Yes Yes   Sig: Take 1 tablet by mouth daily Without iron   torsemide (DEMADEX) 10 MG tablet 2024 at AM  No Yes   Sig: Take 1 tablet (10 mg) by mouth daily Hold for SBP less than 110   warfarin ANTICOAGULANT (COUMADIN) 2.5 MG tablet 2024 at PM  Yes Yes   Sig: Take 2.5 mg by mouth daily      Facility-Administered Medications: None     Current Facility-Administered Medications   Medication Dose Route Frequency    allopurinol  100 mg Oral BID    folic acid  1 mg Oral Daily    furosemide  20 mg Intravenous Q12H    levothyroxine  112 mcg Oral Daily    metoprolol tartrate  50 mg Oral BID    Warfarin Therapy Reminder  1 each Oral See Admin Instructions     Current Facility-Administered Medications   Medication Last Rate    - MEDICATION INSTRUCTIONS -       Allergies   No Known Allergies    Social History    reports that she has never smoked. She has never used smokeless tobacco. She reports that she does not drink alcohol and does not use drugs.      Family History   I have reviewed this patient's family history and updated it with pertinent information if needed.  Family History   Problem Relation Age of Onset    Coronary Artery Disease Mother     Coronary Artery Disease Brother     Diabetes Brother     Cancer Brother          at age 52     Other Cancer Brother     Hypertension Sister     Hyperlipidemia Sister           Review of Systems   A comprehensive review of system was performed and is negative other than that noted in the HPI or here.     Physical Exam   Vital Signs with Ranges  Temp:  [97.6  F (36.4  C)-98.1  F (36.7  C)] 98.1  F (36.7  C)  Pulse:  [63-75] 67  Resp:  [15-20] 18  BP: (127-156)/(54-77) 134/59  SpO2:  [87 %-96 %] 94 %  Wt Readings from Last 4 Encounters:   24 61.2 kg (134 lb 14.7 oz)   24 61.2 kg (135 lb)   10/20/23 68 kg (150 lb)  "  10/16/23 68.3 kg (150 lb 9.6 oz)     I/O last 3 completed shifts:  In: 200 [P.O.:200]  Out: 900 [Urine:900]      Vitals: /59 (BP Location: Right arm)   Pulse 67   Temp 98.1  F (36.7  C) (Oral)   Resp 18   Ht 1.626 m (5' 4\")   Wt 61.2 kg (134 lb 14.7 oz)   LMP  (LMP Unknown)   SpO2 94%   BMI 23.16 kg/m      Physical Exam:   General - Alert and oriented to time place and person in no acute distress  Eyes - No scleral icterus  HEENT - Neck supple, moist mucous membranes  Cardiovascular -heart sounds 1 normal.  Heart sound to softened.  2/6 systolic ejection murmur radiating into both carotid arteries consistent with aortic stenosis.  Rumbling diastolic murmur heard at the apex consistent with mitral stenosis.  Her jugular venous pulse is raised to the upper one third of her neck.  Extremities - There is trace peripheral edema on the right and 1+ peripheral edema on the left with evidence of hyperpigmentation on the left side consistent with chronic venous hypertension.  Respiratory -fine crackles at both bases.  Skin - No pallor or cyanosis  Gastrointestinal - Non tender and non distended without rebound or guarding  Psych - Appropriate affect   Neurological - No gross motor neurological focal deficits    No lab results found in last 7 days.    Invalid input(s): \"TROPONINIES\"    Recent Labs   Lab 02/23/24  0648 02/22/24  1026 02/20/24  1011   WBC 5.0 5.4  --    HGB 11.1* 11.0*  --    * 104*  --     268  --    INR 1.56* 1.50* 1.6*    141  --    POTASSIUM 4.2 4.4  --    CHLORIDE 100 104  --    CO2 26 27  --    BUN 24.9* 29.7*  --    CR 0.93 0.97*  --    GFRESTIMATED 61 58*  --    ANIONGAP 12 10  --    BRICE 9.4 9.4  --    GLC 86 91  --      Recent Labs   Lab Test 07/14/23  1132 05/15/23  1528   CHOL 183 194   HDL 47* 46*   * 120*   TRIG 79 140     Recent Labs   Lab 02/23/24  0648 02/22/24  1026   WBC 5.0 5.4   HGB 11.1* 11.0*   HCT 34.2* 34.4*   * 104*    268     No " "results for input(s): \"PH\", \"PHV\", \"PO2\", \"PO2V\", \"SAT\", \"PCO2\", \"PCO2V\", \"HCO3\", \"HCO3V\" in the last 168 hours.  Recent Labs   Lab 02/22/24  1026   NTBNPI 4,362*     Recent Labs   Lab 02/22/24  1026   DD 1.48*     No results for input(s): \"SED\", \"CRP\" in the last 168 hours.  Recent Labs   Lab 02/23/24  0648 02/22/24  1026    268     No results for input(s): \"TSH\" in the last 168 hours.  No results for input(s): \"COLOR\", \"APPEARANCE\", \"URINEGLC\", \"URINEBILI\", \"URINEKETONE\", \"SG\", \"UBLD\", \"URINEPH\", \"PROTEIN\", \"UROBILINOGEN\", \"NITRITE\", \"LEUKEST\", \"RBCU\", \"WBCU\" in the last 168 hours.    Imaging:  Recent Results (from the past 48 hour(s))   Chest XR,  PA & LAT    Narrative    CHEST TWO VIEWS February 22, 2024 10:53 AM     HISTORY: Orthopnea.    COMPARISON: September 26, 2023.       Impression    IMPRESSION: Markedly improved interstitial and ground-glass changes  since comparison. Minimal effusion suggested on lateral view. The  cardiac silhouette is not enlarged. Pulmonary vasculature is  unremarkable. A cardiac implantable electronic device is in place with  lead(s) grossly intact. No abandoned leads/wires or other unexpected  metallic foreign bodies demonstrated on the film.    ANNA WILLETT MD         SYSTEM ID:  [id number]   US Lower Extremity Venous Duplex Left    Narrative    VENOUS ULTRASOUND LEFT LEG  2/22/2024 12:11 PM     HISTORY: left leg swelling for many months    COMPARISON: None.    FINDINGS:  Examination of the deep veins with graded compression and  color flow Doppler with spectral wave form analysis was performed.   There is no evidence for DVT in the left lower extremity.      Impression    IMPRESSION: No evidence of deep venous thrombosis.    ROGERS SULLIVAN MD         SYSTEM ID:  [id number]   CT Chest Pulmonary Embolism w Contrast    Narrative    CT CHEST PULMONARY EMBOLISM WITH CONTRAST 2/22/2024 12:26 PM    CLINICAL HISTORY: SOB, elevated d-dimer.    TECHNIQUE: CT angiogram chest during " arterial phase injection IV  contrast. 2D and 3D MIP reconstructions were performed by the CT  technologist. Dose reduction techniques were used.     CONTRAST: 75mL [id number]-[id number]    COMPARISON: Chest x-ray 2/22/2024.    FINDINGS:  ANGIOGRAM CHEST: Pulmonary arteries are normal caliber and negative  for pulmonary emboli. Thoracic aorta is negative for dissection.  Borderline cardiomegaly.    LUNGS AND PLEURA: Bilateral mild groundglass opacities noted  diffusely. Basilar atelectasis. Some interstitial prominence mostly  seen in the lower lungs.    MEDIASTINUM/AXILLAE: Left chest pacer. A few mildly enlarged lymph  nodes in the mediastinum. An example at the precarinal position is 11  mm series 6 image 116. There are other examples.    CORONARY ARTERY CALCIFICATION: Severe.    UPPER ABDOMEN: No acute upper abdominal abnormality identified. There  is some reflux of contrast into the hepatic veins.    MUSCULOSKELETAL: T12 prominent compression deformity with sclerosis.  Sternotomy.      Impression    IMPRESSION:  1.  No evidence for pulmonary embolism.  2.  Bilateral hazy groundglass pulmonary opacities and bibasilar  interstitial prominence. Cardiomegaly. Correlate with CHF and  pulmonary edema. Small bibasilar pleural fluid also noted.    TRINIDAD ZURITA MD         SYSTEM ID:  [id number]       Echo:  No results found for this or any previous visit (from the past 4320 hour(s)).    Clinically Significant Risk Factors Present on Admission               # Drug Induced Coagulation Defect: home medication list includes an anticoagulant medication    # Hypertension: Noted on problem list           # Pacemaker present      Cardiac Arrhythmia: Atrial fibrillation: Paroxysmal        Nonspecific volume overload                        Pulmonary Heart Disease (Pulmonary hypertension or Cor pulmonale): Pulmonary Hypertension, unspecified    Chronic Fatigue and Other Debilities: Chronic fatigue, unspecified

## 2024-02-23 NOTE — PHARMACY-ANTICOAGULATION SERVICE
Clinical Pharmacy - Warfarin Dosing Consult     Pharmacy has been consulted to manage this patient s warfarin therapy.  Indication: Atrial Fibrillation;Bioprosthetic Heart Valve  Therapy Goal: INR 2-3  Warfarin Prior to Admission: Yes  Warfarin PTA Regimen: 2.5 mg daily    INR   Date Value Ref Range Status   02/22/2024 1.50 (H) 0.85 - 1.15 Final   02/20/2024 1.6 (H) 0.9 - 1.1 Final       Recommend warfarin 5 mg today.  Pharmacy will monitor Zunilda Clark daily and order warfarin doses to achieve specified goal.      Please contact pharmacy as soon as possible if the warfarin needs to be held for a procedure or if the warfarin goals change.

## 2024-02-23 NOTE — PROGRESS NOTES
"[facility] RCAT     Date:2/22/24    Admission Dx:SOB/COVID    Pulmonary History:Pulmonary HTN    Home Nebulizer/MDI Use:No    Home Oxygen:No    Acuity Level (RCAT flow sheet):4    Aerosol Therapy initiated:Duoneb Q4prn      Pulmonary Hygiene initiated:Good NPC      Volume Expansion initiated:IS      Current Oxygen Requirements:RA  Current SpO2:91%    Re-evaluation date:2/25/24    Patient Education:Education was performed with the patient in regards to indications/benefits and possible side effects of bronchodilators. Will continue to do education with patient.         See \"RT Assessments\" flow sheet for patient assessment scoring and Acuity Level Details.           "

## 2024-02-23 NOTE — PROGRESS NOTES
[facility]    Hospitalist Progress Note  Name: Zunilda Clark    MRN: [id number]  Provider:  Pardeep Alejandre DO  Date of Service: 02/23/2024    Summary of Stay: Zunilda Calrk is a 83 year old female with a history of HFpEF, severe aortic stenosis, severe mitral stenosis with bioprosthetic mitral valve on warfarin, severe pulmonary hypertension, hypertension, rheumatoid arthritis on methotrexate, history of CVA on warfarin, hypothyroidism admitted on 2/22/2024 with shortness of breath.  In the emergency department, the patient was found to have a temperature of 98.6  F, blood pressure 148/86, heart rate 83, respiratory rate 16, SpO2 97% on room air.  Initial lab work showed BUN/creatinine 29.7/0.97, proBNP 4362, high-sensitivity troponin 20, hemoglobin 11.0, , INR 1.50.  Chest x-ray showed markedly improved to interstitial and groundglass changes since comparison with minimal effusion suggested on the lateral view.  CT chest showed bilateral hazy groundglass pulmonary opacity and bibasilar interstitial prominence with cardiomegaly suggestive of pulmonary edema.  Left lower extremity venous duplex Doppler was negative for DVT.  The patient unfortunately tested positive for COVID-19 but was asymptomatic.  The patient was started on IV Lasix and cardiology was consulted to see the patient.  The patient expressed wishes to pursue a more conservative approach to her care wanting to avoid any procedures.    TODAY'S PLAN:  Appreciate Cardiology recommendations.  Pt with acute exacerbation of HFpEF due to underlying valvular issues (severe aortic stenosis, severe mitral stenosis with bioprosthetic mitral valve).  Plan is for IV lasix with dose increase today per Cardiology.  Monitor symptoms, I/O, daily weights, kidney function, etc.  Hopeful for discharge back to the [facility] in the next 1-2 days.  Pt is requiring oxygen with recorded hypoxia to 87% on RA improved to 94% with 2LNC.  Wean O2 as able.   Long discussion with pt regarding goals of care and end of life concerns.  We discussed the possibility of hospice, but I don't believe she is quite there yet but we did discuss an advance directive and POLST, which are both on file.  She was encouraged to speak to her daughters regarding her wishes.  She expressed that she does not want any procedures and believes her life is winding down.      Problem List:   Acute Hypoxic Respiratory Failure  Acute Exacerbation of HFpEF  Severe Pulmonary Hypertension  Severe Aortic Stenosis  Severe Mitral Stenosis with Bioprosthetic Mitral Valve on Warfarin  Subtherapeutic INR  - Appreciate Cardiology recommendations  - IV lasix 40 mg BID  - Strict I/O  - Daily Weights  - Telemetry  - Wean O2 as able  - Appreciate Pharmacy assistance with dosing warfarin    Covid 19 Infection  - Pt appears asymptomatic.  Denies any recent viral symptoms.  No indication for therapy at this time  - Discussed isolation precautions when back at the [facility]    Chronic Medical Problems:  Hypertension  Rheumatoid Arthritis  Hx of CVA on Warfarin  Hypothyroidism    I spent 53 minutes in reviewing this patient's labs, imaging, medications, medical history.  In addition time was spent interviewing the patient, communicating with family, and medical decision making.  25 minutes was also spent discussing goals of care and end of life issues regarding the patient's advanced cardiac disease.    DVT Prophylaxis: Warfarin  Code Status: No CPR- Do NOT Intubate  Diet: Low Saturated Fat Na <2400 mg    Leonard Catheter: Not present  Disposition: Expected discharge in 1-2 days to home. Goals prior to discharge include oxygen requirements improved, symptoms improving.   Family updated today:  Offered to call daughters, pt declined     Interval History   Pt seen and examined.  Pt reports her breathing is better today.  Has been thinking a lot about end of life issues today.    -Data reviewed today: I personally  "reviewed all new labs and imaging results over the last 24 hours.     Physical Exam   Temp: 97.8  F (36.6  C) Temp src: Oral BP: (!) 153/68 Pulse: 71   Resp: 20 SpO2: 93 % O2 Device: Nasal cannula Oxygen Delivery: 2 LPM  Vitals:    02/22/24 0943 02/22/24 1942 02/23/24 0644   Weight: 65.4 kg (144 lb 2.9 oz) 61.2 kg (135 lb) 61.2 kg (134 lb 14.7 oz)     Vital Signs with Ranges  Temp:  [97.6  F (36.4  C)-98.1  F (36.7  C)] 97.8  F (36.6  C)  Pulse:  [63-74] 71  Resp:  [18-20] 20  BP: (127-156)/(54-68) 153/68  SpO2:  [87 %-96 %] 93 %  I/O last 3 completed shifts:  In: 200 [P.O.:200]  Out: 900 [Urine:900]    GENERAL: No apparent distress. Awake, alert, and fully oriented.  HEENT: Normocephalic, atraumatic. Extraocular movements intact.  CARDIOVASCULAR: Regular rate and rhythm without murmurs or rubs. No S3.  PULMONARY: Clear bilaterally.  GASTROINTESTINAL: Soft, non-tender, non-distended. Bowel sounds normoactive.   EXTREMITIES: No cyanosis or clubbing. 1+ edema BLE  NEUROLOGICAL: CN 2-12 grossly intact, no focal neurological deficits.  DERMATOLOGICAL: No rash, ulcer, bruising, nor jaundice.    Medications    - MEDICATION INSTRUCTIONS -        allopurinol  100 mg Oral BID    folic acid  1 mg Oral Daily    furosemide  40 mg Intravenous Q12H    levothyroxine  112 mcg Oral Daily    metoprolol tartrate  75 mg Oral BID    Warfarin Therapy Reminder  1 each Oral See Admin Instructions     Data     Laboratory:  Recent Labs   Lab 02/23/24  0648 02/22/24  1026   WBC 5.0 5.4   HGB 11.1* 11.0*   HCT 34.2* 34.4*   * 104*    268     Recent Labs   Lab 02/23/24  0648 02/22/24  1026    141   POTASSIUM 4.2 4.4   CHLORIDE 100 104   CO2 26 27   ANIONGAP 12 10   GLC 86 91   BUN 24.9* 29.7*   CR 0.93 0.97*   GFRESTIMATED 61 58*   BRICE 9.4 9.4     No results for input(s): \"CULT\" in the last 168 hours.    Imaging:  No results found for this or any previous visit (from the past 24 hour(s)).      DO MANUELA Leyva " Hospitalist  [address] [place] [address]  Securely message with [employer] (more info)  Text page via [facility] Paging/Directory   02/23/2024

## 2024-02-24 LAB — INR PPP: 1.81 (ref 0.85–1.15)

## 2024-02-24 PROCEDURE — 250N000013 HC RX MED GY IP 250 OP 250 PS 637: Performed by: HOSPITALIST

## 2024-02-24 PROCEDURE — 85610 PROTHROMBIN TIME: CPT | Performed by: HOSPITALIST

## 2024-02-24 PROCEDURE — 250N000011 HC RX IP 250 OP 636: Performed by: INTERNAL MEDICINE

## 2024-02-24 PROCEDURE — 36415 COLL VENOUS BLD VENIPUNCTURE: CPT | Performed by: HOSPITALIST

## 2024-02-24 PROCEDURE — 96376 TX/PRO/DX INJ SAME DRUG ADON: CPT

## 2024-02-24 PROCEDURE — 120N000001 HC R&B MED SURG/OB

## 2024-02-24 PROCEDURE — G0378 HOSPITAL OBSERVATION PER HR: HCPCS

## 2024-02-24 PROCEDURE — 250N000013 HC RX MED GY IP 250 OP 250 PS 637: Performed by: INTERNAL MEDICINE

## 2024-02-24 PROCEDURE — 99233 SBSQ HOSP IP/OBS HIGH 50: CPT | Performed by: INTERNAL MEDICINE

## 2024-02-24 RX ORDER — WARFARIN SODIUM 2 MG/1
4 TABLET ORAL
Status: COMPLETED | OUTPATIENT
Start: 2024-02-24 | End: 2024-02-24

## 2024-02-24 RX ADMIN — WARFARIN SODIUM 4 MG: 2 TABLET ORAL at 18:05

## 2024-02-24 RX ADMIN — LEVOTHYROXINE SODIUM 112 MCG: 0.11 TABLET ORAL at 08:36

## 2024-02-24 RX ADMIN — ALLOPURINOL 100 MG: 100 TABLET ORAL at 08:35

## 2024-02-24 RX ADMIN — FUROSEMIDE 40 MG: 10 INJECTION, SOLUTION INTRAMUSCULAR; INTRAVENOUS at 08:36

## 2024-02-24 RX ADMIN — METOPROLOL TARTRATE 75 MG: 50 TABLET, FILM COATED ORAL at 08:34

## 2024-02-24 RX ADMIN — ALLOPURINOL 100 MG: 100 TABLET ORAL at 20:07

## 2024-02-24 RX ADMIN — FOLIC ACID 1 MG: 1 TABLET ORAL at 08:34

## 2024-02-24 RX ADMIN — FUROSEMIDE 40 MG: 10 INJECTION, SOLUTION INTRAMUSCULAR; INTRAVENOUS at 20:07

## 2024-02-24 RX ADMIN — METOPROLOL TARTRATE 75 MG: 50 TABLET, FILM COATED ORAL at 21:14

## 2024-02-24 ASSESSMENT — ACTIVITIES OF DAILY LIVING (ADL)
ADLS_ACUITY_SCORE: 37
ADLS_ACUITY_SCORE: 34
ADLS_ACUITY_SCORE: 34
ADLS_ACUITY_SCORE: 37
ADLS_ACUITY_SCORE: 34
ADLS_ACUITY_SCORE: 37
ADLS_ACUITY_SCORE: 34
ADLS_ACUITY_SCORE: 37
ADLS_ACUITY_SCORE: 34
ADLS_ACUITY_SCORE: 37
ADLS_ACUITY_SCORE: 37
ADLS_ACUITY_SCORE: 34
ADLS_ACUITY_SCORE: 37

## 2024-02-24 NOTE — PLAN OF CARE
Pertinent assessments:  A&O x4. VSS on 2  NC. Denies pain, N/V, SOB. Infrequent cough, LS diminished. Purewick in place. PIV SL. New PIV accessed after other dislodged. Tele running V-paced 60's per tele tech.    Major Shift Events: New PIV access, Methotrexate injection given, 11 second interval of SVT then back to v paced.     Treatment Plan: IV Lasix BID, weaning O2 for discharge     Bedside Nurse: Lorena Lombardo RN         Goal Outcome Evaluation:      Plan of Care Reviewed With: patient    Overall Patient Progress: no changeOverall Patient Progress: no change    Outcome Evaluation: IV Lasix BID, on 2 L NC

## 2024-02-24 NOTE — UTILIZATION REVIEW
Admission Status; Secondary Review Determination         Under the authority of the Utilization Management Committee, the utilization review process indicated a secondary review on the above patient.  The review outcome is based on review of the medical records, discussions with staff, and applying clinical experience noted on the date of the review.        (x)      Inpatient Status Appropriate - This patient's medical care is consistent with medical management for inpatient care and reasonable inpatient medical practice.        RATIONALE FOR DETERMINATION   The patient is a 83-year-old female admitted on 2/22/2024.  Patient came to the ED with shortness of breath.  proBNP on admission was 4362.  Patient has a history of congestive heart failure and severe aortic stenosis with mitral valve stenosis and a bioprosthetic mitral valve on warfarin with severe pulmonary hypertension and rheumatoid arthritis on methotrexate.  Patient does have a history of CVA and she is on warfarin as mentioned above.  Cardiology has been consulted and recommends aggressive diuresis given appearance of pulmonary edema on CT scan of her chest.  Patient is currently receiving 40 mg IV Lasix twice a day.  Assessment from cardiology is that she is still clearly volume overloaded and symptomatic and recommendation for BMPs daily.  Based on ongoing need for additional aggressive diuresis with CHF acute on chronic, recommend switching from observation to inpatient status.  Dr. Jurgen Alejandre will be sent a Vocera note with this recommendation.      The severity of illness, intensity of service provided, expected LOS and risk for adverse outcome make the care complex, high risk and appropriate for hospital admission.        The information on this document is developed by the utilization review team in order for the business office to ensure compliance.  This only denotes the appropriateness of proper admission status and does not reflect the quality  of care rendered.         The definitions of Inpatient Status and Observation Status used in making the determination above are those provided in the CMS Coverage Manual, Chapter 1 and Chapter 6, section 70.4.      Sincerely,     Baldev Burks MD  Physician Advisor  Utilization Review/ Case Management  [facility].

## 2024-02-24 NOTE — PROGRESS NOTES
[facility]    Hospitalist Progress Note  Name: Zunilda Clark    MRN: [id number]  Provider:  Pardeep Alejandre DO  Date of Service: 02/24/2024    Summary of Stay: Zunilda Clark is a 83 year old female with a history of HFpEF, severe aortic stenosis, severe mitral stenosis with bioprosthetic mitral valve on warfarin, severe pulmonary hypertension, hypertension, rheumatoid arthritis on methotrexate, history of CVA on warfarin, hypothyroidism admitted on 2/22/2024 with shortness of breath.  In the emergency department, the patient was found to have a temperature of 98.6  F, blood pressure 148/86, heart rate 83, respiratory rate 16, SpO2 97% on room air.  Initial lab work showed BUN/creatinine 29.7/0.97, proBNP 4362, high-sensitivity troponin 20, hemoglobin 11.0, , INR 1.50.  Chest x-ray showed markedly improved to interstitial and groundglass changes since comparison with minimal effusion suggested on the lateral view.  CT chest showed bilateral hazy groundglass pulmonary opacity and bibasilar interstitial prominence with cardiomegaly suggestive of pulmonary edema.  Left lower extremity venous duplex Doppler was negative for DVT.  The patient unfortunately tested positive for COVID-19 but was asymptomatic.  The patient was started on IV Lasix and cardiology was consulted to see the patient.  The patient expressed wishes to pursue a more conservative approach to her care wanting to avoid any procedures.     TODAY'S PLAN:  Appreciate Cardiology recommendations.  Pt urinating quite a lot but I/O not showing this.  Strict I/O.  Appreciate Nursing assistance with placing a hat in the toilet and monitoring urine output.  Pt encouraged to let staff know when she urinates.  Cr stable.  Pt still hypoxic and requiring oxygen.  Plan to continue diuresis today.  With pt's permission, left voicemail for daughter Roxi.  Other daughter Tenisha did not answer.  Anticipate pt will be hospitalized until Monday  or Tuesday pending improvement in oxygen requirements.  Pt also has covid.  We discussed that regardless of her lack of symptoms, we are treating this as an active covid infection and will require quarantine.  ADDENDUM:  Updated daughter Roxi via phone.  We discussed the valve issues and heart failure as a result.  We discussed that these issues of fluid retention and sob will likely get worse overtime.  We discussed that the patient is not at a point where she needs hospice as she is functional and responding to diuresis.  Anticipate she will need hospice eventually when the valve issues worsen, she no longer responds to diuretics, or her quality of life is so poor she is unable to do the things she enjoys.    Problem List:   Acute Hypoxic Respiratory Failure  Acute Exacerbation of HFpEF  Severe Pulmonary Hypertension  Severe Aortic Stenosis  Severe Mitral Stenosis with Bioprosthetic Mitral Valve on Warfarin  Subtherapeutic INR  - Appreciate Cardiology recommendations - pt declining any procedural intervention for her valvular issues and would be high risk candidate.  Plan for symptomatic management.  - IV lasix 40 mg BID  - Strict I/O  - Daily Weights  - Telemetry  - Wean O2 as able  - Appreciate Pharmacy assistance with dosing warfarin     Covid 19 Infection  - Pt appears asymptomatic.  Denies any recent viral symptoms.  No indication for therapy at this time  - Discussed isolation precautions when back at the [facility]     Chronic Medical Problems:  Hypertension  Rheumatoid Arthritis  Hx of CVA on Warfarin  Hypothyroidism    I spent 46 minutes in reviewing this patient's labs, imaging, medications, medical history.  In addition time was spent interviewing the patient, communicating with family, and medical decision making.     DVT Prophylaxis: Warfarin  Code Status: No CPR- Do NOT Intubate  Diet: Low Saturated Fat Na <2400 mg    Leonard Catheter: Not present  Disposition: Expected discharge in 2-3 days to home.  Goals prior to discharge include oxygen requirements improved.   Family updated today:  Left voicemail for daughter Roxi     Interval History   Pt seen and examined.  Pt reports she feels a bit better today.  Denies any sob when ambulating to and from the bathroom.  Urinating frequently.    -Data reviewed today: I personally reviewed all new labs and imaging results over the last 24 hours.     Physical Exam   Temp: 98.1  F (36.7  C) Temp src: Oral BP: 118/79 Pulse: 72   Resp: 20 SpO2: 91 % O2 Device: Nasal cannula Oxygen Delivery: 2 LPM  Vitals:    02/22/24 1942 02/23/24 0644 02/24/24 0633   Weight: 61.2 kg (135 lb) 61.2 kg (134 lb 14.7 oz) 62.6 kg (138 lb 0.1 oz)     Vital Signs with Ranges  Temp:  [97.8  F (36.6  C)-99.2  F (37.3  C)] 98.1  F (36.7  C)  Pulse:  [65-74] 72  Resp:  [18-20] 20  BP: (109-153)/(50-79) 118/79  SpO2:  [79 %-95 %] 91 %  I/O last 3 completed shifts:  In: -   Out: 700 [Urine:700]    GENERAL: No apparent distress. Awake, alert, and fully oriented.  HEENT: Normocephalic, atraumatic. Extraocular movements intact.  CARDIOVASCULAR: Regular rate and rhythm without murmurs or rubs. No S3.  PULMONARY: Clear bilaterally.  GASTROINTESTINAL: Soft, non-tender, non-distended. Bowel sounds normoactive.   EXTREMITIES: No cyanosis or clubbing. 1+ BLE nonpitting edema  NEUROLOGICAL: CN 2-12 grossly intact, no focal neurological deficits.  DERMATOLOGICAL: No rash, ulcer, bruising, nor jaundice.    Medications    - MEDICATION INSTRUCTIONS -        allopurinol  100 mg Oral BID    folic acid  1 mg Oral Daily    furosemide  40 mg Intravenous Q12H    levothyroxine  112 mcg Oral Daily    methotrexate  20 mg Subcutaneous Q7 Days    metoprolol tartrate  75 mg Oral BID    Warfarin Therapy Reminder  1 each Oral See Admin Instructions     Data     Laboratory:  Recent Labs   Lab 02/23/24  0648 02/22/24  1026   WBC 5.0 5.4   HGB 11.1* 11.0*   HCT 34.2* 34.4*   * 104*    268     Recent Labs   Lab  "02/23/24  0648 02/22/24  1026    141   POTASSIUM 4.2 4.4   CHLORIDE 100 104   CO2 26 27   ANIONGAP 12 10   GLC 86 91   BUN 24.9* 29.7*   CR 0.93 0.97*   GFRESTIMATED 61 58*   BRICE 9.4 9.4     No results for input(s): \"CULT\" in the last 168 hours.    Imaging:  No results found for this or any previous visit (from the past 24 hour(s)).      Pardeep Alejandre DO  [facility] Hospitalist  [address] [place] [address]  Securely message with [employer] (more info)  Text page via [facility] Paging/Directory   02/24/2024   "

## 2024-02-25 LAB
ANION GAP SERPL CALCULATED.3IONS-SCNC: 13 MMOL/L (ref 7–15)
BUN SERPL-MCNC: 36.4 MG/DL (ref 8–23)
CALCIUM SERPL-MCNC: 8.6 MG/DL (ref 8.8–10.2)
CHLORIDE SERPL-SCNC: 96 MMOL/L (ref 98–107)
CREAT SERPL-MCNC: 1.08 MG/DL (ref 0.51–0.95)
DEPRECATED HCO3 PLAS-SCNC: 26 MMOL/L (ref 22–29)
EGFRCR SERPLBLD CKD-EPI 2021: 51 ML/MIN/1.73M2
ERYTHROCYTE [DISTWIDTH] IN BLOOD BY AUTOMATED COUNT: 18.9 % (ref 10–15)
GLUCOSE SERPL-MCNC: 83 MG/DL (ref 70–99)
HCT VFR BLD AUTO: 34 % (ref 35–47)
HGB BLD-MCNC: 11.2 G/DL (ref 11.7–15.7)
INR PPP: 1.63 (ref 0.85–1.15)
MCH RBC QN AUTO: 33.6 PG (ref 26.5–33)
MCHC RBC AUTO-ENTMCNC: 32.9 G/DL (ref 31.5–36.5)
MCV RBC AUTO: 102 FL (ref 78–100)
PLATELET # BLD AUTO: 227 10E3/UL (ref 150–450)
POTASSIUM SERPL-SCNC: 3.8 MMOL/L (ref 3.4–5.3)
RBC # BLD AUTO: 3.33 10E6/UL (ref 3.8–5.2)
SODIUM SERPL-SCNC: 135 MMOL/L (ref 135–145)
WBC # BLD AUTO: 7 10E3/UL (ref 4–11)

## 2024-02-25 PROCEDURE — 99233 SBSQ HOSP IP/OBS HIGH 50: CPT | Performed by: INTERNAL MEDICINE

## 2024-02-25 PROCEDURE — 85610 PROTHROMBIN TIME: CPT | Performed by: HOSPITALIST

## 2024-02-25 PROCEDURE — 80048 BASIC METABOLIC PNL TOTAL CA: CPT | Performed by: INTERNAL MEDICINE

## 2024-02-25 PROCEDURE — 36415 COLL VENOUS BLD VENIPUNCTURE: CPT | Performed by: HOSPITALIST

## 2024-02-25 PROCEDURE — 250N000013 HC RX MED GY IP 250 OP 250 PS 637: Performed by: INTERNAL MEDICINE

## 2024-02-25 PROCEDURE — 250N000013 HC RX MED GY IP 250 OP 250 PS 637: Performed by: HOSPITALIST

## 2024-02-25 PROCEDURE — 250N000011 HC RX IP 250 OP 636: Performed by: INTERNAL MEDICINE

## 2024-02-25 PROCEDURE — 120N000001 HC R&B MED SURG/OB

## 2024-02-25 PROCEDURE — 85027 COMPLETE CBC AUTOMATED: CPT | Performed by: INTERNAL MEDICINE

## 2024-02-25 RX ORDER — FUROSEMIDE 40 MG
40 TABLET ORAL DAILY
Status: DISCONTINUED | OUTPATIENT
Start: 2024-02-26 | End: 2024-02-26 | Stop reason: HOSPADM

## 2024-02-25 RX ORDER — WARFARIN SODIUM 2 MG/1
6 TABLET ORAL
Status: COMPLETED | OUTPATIENT
Start: 2024-02-25 | End: 2024-02-25

## 2024-02-25 RX ADMIN — METOPROLOL TARTRATE 75 MG: 50 TABLET, FILM COATED ORAL at 08:28

## 2024-02-25 RX ADMIN — LEVOTHYROXINE SODIUM 112 MCG: 0.11 TABLET ORAL at 08:29

## 2024-02-25 RX ADMIN — FUROSEMIDE 40 MG: 10 INJECTION, SOLUTION INTRAMUSCULAR; INTRAVENOUS at 08:30

## 2024-02-25 RX ADMIN — ALLOPURINOL 100 MG: 100 TABLET ORAL at 08:29

## 2024-02-25 RX ADMIN — WARFARIN SODIUM 6 MG: 2 TABLET ORAL at 18:04

## 2024-02-25 RX ADMIN — FOLIC ACID 1 MG: 1 TABLET ORAL at 08:29

## 2024-02-25 RX ADMIN — ALLOPURINOL 100 MG: 100 TABLET ORAL at 21:04

## 2024-02-25 ASSESSMENT — ACTIVITIES OF DAILY LIVING (ADL)
ADLS_ACUITY_SCORE: 37

## 2024-02-25 NOTE — PROGRESS NOTES
[facility]    Hospitalist Progress Note  Name: Zunilda Clark    MRN: [id number]  Provider:  Pardeep Alejandre DO  Date of Service: 02/25/2024    Summary of Stay: Zunilda Clark is a 83 year old female with a history of HFpEF, severe aortic stenosis, severe mitral stenosis with bioprosthetic mitral valve on warfarin, severe pulmonary hypertension, hypertension, rheumatoid arthritis on methotrexate, history of CVA on warfarin, hypothyroidism admitted on 2/22/2024 with shortness of breath.  In the emergency department, the patient was found to have a temperature of 98.6  F, blood pressure 148/86, heart rate 83, respiratory rate 16, SpO2 97% on room air.  Initial lab work showed BUN/creatinine 29.7/0.97, proBNP 4362, high-sensitivity troponin 20, hemoglobin 11.0, , INR 1.50.  Chest x-ray showed markedly improved to interstitial and groundglass changes since comparison with minimal effusion suggested on the lateral view.  CT chest showed bilateral hazy groundglass pulmonary opacity and bibasilar interstitial prominence with cardiomegaly suggestive of pulmonary edema.  Left lower extremity venous duplex Doppler was negative for DVT.  The patient unfortunately tested positive for COVID-19 but was asymptomatic.  The patient was started on IV Lasix and cardiology was consulted to see the patient.  The patient expressed wishes to pursue a more conservative approach to her care wanting to avoid any procedures.      TODAY'S PLAN:  Appreciate Cardiology recommendations.  Cr bump up a bit.  Oxygen requirements improved.  Will transition to oral lasix 40 mg daily starting tomorrow.  If Cr stable and oxygen requirements remain stable, anticipate discharge home tomorrow.  Pt reports her sister has an afternoon meeting.  Will plan to see early in the morning with hopes she can be picked up midmorning.  All questions answered.  Left voicemail for daughter Roix.    Problem List:   Acute Hypoxic Respiratory  Failure  Acute Exacerbation of HFpEF  Severe Pulmonary Hypertension  Severe Aortic Stenosis  Severe Mitral Stenosis with Bioprosthetic Mitral Valve on Warfarin  Subtherapeutic INR  - Appreciate Cardiology recommendations - pt declining any procedural intervention for her valvular issues and would be high risk candidate.  Plan for symptomatic management.  - IV lasix 40 mg BID.  Transition to oral lasix starting 2/26  - Strict I/O  - Daily Weights  - Telemetry  - Wean O2 as able  - Appreciate Pharmacy assistance with dosing warfarin     Covid 19 Infection  - Pt appears asymptomatic.  Denies any recent viral symptoms.  No indication for therapy at this time  - Discussed isolation precautions when back at the [facility]     Chronic Medical Problems:  Hypertension  Rheumatoid Arthritis  Hx of CVA on Warfarin  Hypothyroidism    I spent 45 minutes in reviewing this patient's labs, imaging, medications, medical history.  In addition time was spent interviewing the patient, communicating with family, and medical decision making.     DVT Prophylaxis: Pneumatic Compression Devices  Code Status: No CPR- Do NOT Intubate  Diet: Low Saturated Fat Na <2400 mg    Leonard Catheter: Not present  Disposition: Expected discharge tomorrow to the [facility]. Goals prior to discharge include oxygen requirements improved, cr stable.   Family updated today: Yes      Interval History   Pt seen and examined.  Pt reports feeling well today.  Denies any sob when ambulating to and from the bathroom.    -Data reviewed today: I personally reviewed all new labs and imaging results over the last 24 hours.     Physical Exam   Temp: 98.8  F (37.1  C) Temp src: Oral BP: 110/41 Pulse: 63   Resp: 20 SpO2: 91 % O2 Device: None (Room air) Oxygen Delivery: 3 LPM  Vitals:    02/22/24 1942 02/23/24 0644 02/24/24 0633   Weight: 61.2 kg (135 lb) 61.2 kg (134 lb 14.7 oz) 62.6 kg (138 lb 0.1 oz)     Vital Signs with Ranges  Temp:  [98.4  F (36.9  C)-99.3  F (37.4  C)]  "98.8  F (37.1  C)  Pulse:  [61-72] 63  Resp:  [18-20] 20  BP: (101-127)/(41-61) 110/41  SpO2:  [91 %-95 %] 91 %  I/O last 3 completed shifts:  In: 1224 [P.O.:1224]  Out: 1300 [Urine:1300]    GENERAL: No apparent distress. Awake, alert, and fully oriented.  HEENT: Normocephalic, atraumatic. Extraocular movements intact.  CARDIOVASCULAR: Regular rate and rhythm without murmurs or rubs. No S3.  PULMONARY: Clear bilaterally.  GASTROINTESTINAL: Soft, non-tender, non-distended. Bowel sounds normoactive.   EXTREMITIES: No cyanosis or clubbing. No edema.  NEUROLOGICAL: CN 2-12 grossly intact, no focal neurological deficits.  DERMATOLOGICAL: No rash, ulcer, bruising, nor jaundice.    Medications    - MEDICATION INSTRUCTIONS -        allopurinol  100 mg Oral BID    folic acid  1 mg Oral Daily    [START ON 2/26/2024] furosemide  40 mg Oral Daily    levothyroxine  112 mcg Oral Daily    methotrexate  20 mg Subcutaneous Q7 Days    metoprolol tartrate  75 mg Oral BID    warfarin ANTICOAGULANT  6 mg Oral ONCE at 18:00    Warfarin Therapy Reminder  1 each Oral See Admin Instructions     Data     Laboratory:  Recent Labs   Lab 02/25/24  0818 02/23/24  0648 02/22/24  1026   WBC 7.0 5.0 5.4   HGB 11.2* 11.1* 11.0*   HCT 34.0* 34.2* 34.4*   * 103* 104*    214 268     Recent Labs   Lab 02/25/24  0818 02/23/24  0648 02/22/24  1026    138 141   POTASSIUM 3.8 4.2 4.4   CHLORIDE 96* 100 104   CO2 26 26 27   ANIONGAP 13 12 10   GLC 83 86 91   BUN 36.4* 24.9* 29.7*   CR 1.08* 0.93 0.97*   GFRESTIMATED 51* 61 58*   BRICE 8.6* 9.4 9.4     No results for input(s): \"CULT\" in the last 168 hours.    Imaging:  No results found for this or any previous visit (from the past 24 hour(s)).      Pardeep Alejandre DO  [facility] Hospitalist  [address] [place] [address]  Securely message with [employer] (more info)  Text page via [facility] Paging/Directory   02/25/2024   "

## 2024-02-25 NOTE — PLAN OF CARE
Goal Outcome Evaluation:       To Do:  End of Shift Summary  For vital signs and complete assessments, please see documentation flowsheets.     Pertinent assessments: Alert &o--- up in room-- sats on room air 90-93 % when resting with sleeping sats 85 % quickly rebound to 92 % with 1Lnc----- voiding good amounts ---appetite good -- denies pain and nausea   Major Shift Events O2 checks   Treatment Plan: monitor  Bedside Nurse: Marine Walsh RN

## 2024-02-25 NOTE — PLAN OF CARE
Goal Outcome Evaluation:                    To Do:  End of Shift Summary  For vital signs and complete assessments, please see documentation flowsheets.     Pertinent assessments: A&O  ---up in room-- sats 85 room air   O2 2l 94%   using IS well--denies pain and nausea ---   Major Shift Events strict I&O  Treatment Plan:  monitor  Bedside Nurse: Marine Walsh RN

## 2024-02-25 NOTE — PLAN OF CARE
"To Do:  End of Shift Summary  For vital signs and complete assessments, please see documentation flowsheets.     Pertinent assessments: A&Ox4, denies pain/N/V, SOB noted, increased O2 to 3L NC, O2 improved, special precautions maintained,commode at bedside, PW in place overnight, PIV flushed/SL, VSS, AV paced per tele    Major Shift Events none    Treatment Plan:  Wean O2, IV lasix, tele     Bedside Nurse: Caity Barillas RN    /42 (BP Location: Right arm)   Pulse 64   Temp 98.8  F (37.1  C) (Oral)   Resp 18   Ht 1.626 m (5' 4\")   Wt 62.6 kg (138 lb 0.1 oz)   LMP  (LMP Unknown)   SpO2 94%   BMI 23.69 kg/m     "

## 2024-02-26 ENCOUNTER — TELEPHONE (OUTPATIENT)
Dept: ANTICOAGULATION | Facility: CLINIC | Age: 84
End: 2024-02-26
Payer: COMMERCIAL

## 2024-02-26 ENCOUNTER — TELEPHONE (OUTPATIENT)
Dept: INTERNAL MEDICINE | Facility: CLINIC | Age: 84
End: 2024-02-26
Payer: COMMERCIAL

## 2024-02-26 VITALS
DIASTOLIC BLOOD PRESSURE: 50 MMHG | BODY MASS INDEX: 22.88 KG/M2 | WEIGHT: 134 LBS | SYSTOLIC BLOOD PRESSURE: 102 MMHG | HEIGHT: 64 IN | HEART RATE: 63 BPM | OXYGEN SATURATION: 90 % | RESPIRATION RATE: 16 BRPM | TEMPERATURE: 98.4 F

## 2024-02-26 LAB
ANION GAP SERPL CALCULATED.3IONS-SCNC: 9 MMOL/L (ref 7–15)
BUN SERPL-MCNC: 36 MG/DL (ref 8–23)
CALCIUM SERPL-MCNC: 8.6 MG/DL (ref 8.8–10.2)
CHLORIDE SERPL-SCNC: 98 MMOL/L (ref 98–107)
CREAT SERPL-MCNC: 0.93 MG/DL (ref 0.51–0.95)
DEPRECATED HCO3 PLAS-SCNC: 27 MMOL/L (ref 22–29)
EGFRCR SERPLBLD CKD-EPI 2021: 61 ML/MIN/1.73M2
GLUCOSE SERPL-MCNC: 85 MG/DL (ref 70–99)
INR PPP: 1.83 (ref 0.85–1.15)
POTASSIUM SERPL-SCNC: 3.6 MMOL/L (ref 3.4–5.3)
SODIUM SERPL-SCNC: 134 MMOL/L (ref 135–145)

## 2024-02-26 PROCEDURE — 250N000013 HC RX MED GY IP 250 OP 250 PS 637: Performed by: HOSPITALIST

## 2024-02-26 PROCEDURE — 36415 COLL VENOUS BLD VENIPUNCTURE: CPT | Performed by: INTERNAL MEDICINE

## 2024-02-26 PROCEDURE — 250N000013 HC RX MED GY IP 250 OP 250 PS 637: Performed by: INTERNAL MEDICINE

## 2024-02-26 PROCEDURE — 99239 HOSP IP/OBS DSCHRG MGMT >30: CPT | Performed by: INTERNAL MEDICINE

## 2024-02-26 PROCEDURE — 36415 COLL VENOUS BLD VENIPUNCTURE: CPT | Performed by: HOSPITALIST

## 2024-02-26 PROCEDURE — 82374 ASSAY BLOOD CARBON DIOXIDE: CPT | Performed by: INTERNAL MEDICINE

## 2024-02-26 PROCEDURE — 85610 PROTHROMBIN TIME: CPT | Performed by: HOSPITALIST

## 2024-02-26 RX ORDER — WARFARIN SODIUM 2.5 MG/1
2.5 TABLET ORAL DAILY
Qty: 40 TABLET | Refills: 0 | Status: SHIPPED | OUTPATIENT
Start: 2024-02-26 | End: 2024-04-04

## 2024-02-26 RX ORDER — WARFARIN SODIUM 5 MG/1
5 TABLET ORAL
Status: DISCONTINUED | OUTPATIENT
Start: 2024-02-26 | End: 2024-02-26 | Stop reason: HOSPADM

## 2024-02-26 RX ORDER — METOPROLOL TARTRATE 75 MG/1
75 TABLET, FILM COATED ORAL 2 TIMES DAILY
Qty: 60 TABLET | Refills: 0 | Status: SHIPPED | OUTPATIENT
Start: 2024-02-26 | End: 2024-03-13

## 2024-02-26 RX ORDER — FUROSEMIDE 40 MG
40 TABLET ORAL DAILY
Qty: 30 TABLET | Refills: 0 | Status: SHIPPED | OUTPATIENT
Start: 2024-02-26 | End: 2024-03-13

## 2024-02-26 RX ADMIN — ALLOPURINOL 100 MG: 100 TABLET ORAL at 08:20

## 2024-02-26 RX ADMIN — FUROSEMIDE 40 MG: 40 TABLET ORAL at 08:20

## 2024-02-26 RX ADMIN — FOLIC ACID 1 MG: 1 TABLET ORAL at 08:20

## 2024-02-26 RX ADMIN — LEVOTHYROXINE SODIUM 112 MCG: 0.11 TABLET ORAL at 08:20

## 2024-02-26 ASSESSMENT — ACTIVITIES OF DAILY LIVING (ADL)
ADLS_ACUITY_SCORE: 37
ADLS_ACUITY_SCORE: 34
ADLS_ACUITY_SCORE: 37

## 2024-02-26 NOTE — TELEPHONE ENCOUNTER
Call returned to patient, see other 2/26/24 Telephone encounter for details.  Екатерина Mahan RN, BSN  [facility]

## 2024-02-26 NOTE — PLAN OF CARE
A/O. Up with 1, Voiding well. MORRISON noted. 2L o2. Cardiac diet. Cardiology following. VSS. V-paced on tele.     Major Shift Events   Uneventful night, slept well. No significant change in condition.     Treatment Plan:   wean O2 as able. SBA for fall prevention. Cardiac diet & Cards following. Diuretics, monitor I&O.

## 2024-02-26 NOTE — PROGRESS NOTES
[facility]    Hospitalist Progress Note  Name: Zunilda Clark    MRN: [id number]  Provider:  Pardeep Alejandre DO  Date of Service: 02/26/2024    Summary of Stay: Zunilda Clark is a 83 year old female with a history of HFpEF, severe aortic stenosis, severe mitral stenosis with bioprosthetic mitral valve on warfarin, severe pulmonary hypertension, hypertension, rheumatoid arthritis on methotrexate, history of CVA on warfarin, hypothyroidism admitted on 2/22/2024 with shortness of breath.  In the emergency department, the patient was found to have a temperature of 98.6  F, blood pressure 148/86, heart rate 83, respiratory rate 16, SpO2 97% on room air.  Initial lab work showed BUN/creatinine 29.7/0.97, proBNP 4362, high-sensitivity troponin 20, hemoglobin 11.0, , INR 1.50.  Chest x-ray showed markedly improved to interstitial and groundglass changes since comparison with minimal effusion suggested on the lateral view.  CT chest showed bilateral hazy groundglass pulmonary opacity and bibasilar interstitial prominence with cardiomegaly suggestive of pulmonary edema.  Left lower extremity venous duplex Doppler was negative for DVT.  The patient unfortunately tested positive for COVID-19 but was asymptomatic.  The patient was started on IV Lasix and cardiology was consulted to see the patient.  The patient expressed wishes to pursue a more conservative approach to her care wanting to avoid any procedures.   The patient's symptoms improved.  On 2/26, she was transitioned to oral lasix and discharged home.    TODAY'S PLAN:  Plan for home oxygen evaluation this morning.  Await AM Cr.  Plan for discharge home today.  Sister will pick her up.  Meds to [facility] in [place].    Oxygen Documentation  I certify that this patient, Zunilda Clark has been under my care (or a nurse practitioner or physician's assistant working with me). This is the face-to-face encounter for oxygen medical necessity.      At the  time of this encounter, I have reviewed the qualifying testing and have determined that supplemental oxygen is reasonable and necessary and is expected to improve the patient's condition in a home setting.       Patient has continued oxygen desaturation due to Chronic Heart Failure I50.    If portability is ordered, is the patient mobile within the home? yes    Problem List:   Acute Hypoxic Respiratory Failure  Acute Exacerbation of HFpEF  Severe Pulmonary Hypertension  Severe Aortic Stenosis  Severe Mitral Stenosis with Bioprosthetic Mitral Valve on Warfarin  Subtherapeutic INR  - Appreciate Cardiology recommendations - pt declining any procedural intervention for her valvular issues and would be high risk candidate.  Plan for symptomatic management.  - IV lasix 40 mg BID.  Transition to oral lasix starting 2/26  - Strict I/O  - Daily Weights  - Telemetry  - Wean O2 as able  - Appreciate Pharmacy assistance with dosing warfarin     Covid 19 Infection  - Pt appears asymptomatic.  Denies any recent viral symptoms.  No indication for therapy at this time  - Discussed isolation precautions when back at the [facility]     Chronic Medical Problems:  Hypertension  Rheumatoid Arthritis  Hx of CVA on Warfarin  Hypothyroidism    I spent 46 minutes in reviewing this patient's labs, imaging, medications, medical history.  In addition time was spent interviewing the patient, communicating with family, and medical decision making.     DVT Prophylaxis: Warfarin  Code Status: No CPR- Do NOT Intubate  Diet: Low Saturated Fat Na <2400 mg    Leonrad Catheter: Not present  Disposition: Expected discharge today to home. Goals prior to discharge include respiratory symptoms improved.   Family updated today: No     Interval History   Pt seen and examined.  Pt reports feeling dry.  Denies any sob.    -Data reviewed today: I personally reviewed all new labs and imaging results over the last 24 hours.     Physical Exam   Temp: 98.4  F (36.9  " C) Temp src: Oral BP: 102/50 Pulse: 63   Resp: 16 SpO2: 90 % O2 Device: Nasal cannula Oxygen Delivery: 2 LPM  Vitals:    02/23/24 0644 02/24/24 0633 02/26/24 0702   Weight: 61.2 kg (134 lb 14.7 oz) 62.6 kg (138 lb 0.1 oz) 60.8 kg (134 lb)     Vital Signs with Ranges  Temp:  [98.4  F (36.9  C)-99.6  F (37.6  C)] 98.4  F (36.9  C)  Pulse:  [57-73] 63  Resp:  [16-20] 16  BP: ()/(41-57) 102/50  SpO2:  [86 %-93 %] 90 %  I/O last 3 completed shifts:  In: 1050 [P.O.:1050]  Out: 1450 [Urine:1450]    GENERAL: No apparent distress. Awake, alert, and fully oriented.  HEENT: Normocephalic, atraumatic. Extraocular movements intact.  CARDIOVASCULAR: Regular rate and rhythm without murmurs or rubs. No S3.  PULMONARY: Clear bilaterally.  GASTROINTESTINAL: Soft, non-tender, non-distended. Bowel sounds normoactive.   EXTREMITIES: No cyanosis or clubbing. No edema.  NEUROLOGICAL: CN 2-12 grossly intact, no focal neurological deficits.  DERMATOLOGICAL: No rash, ulcer, bruising, nor jaundice.    Medications    - MEDICATION INSTRUCTIONS -        allopurinol  100 mg Oral BID    folic acid  1 mg Oral Daily    furosemide  40 mg Oral Daily    levothyroxine  112 mcg Oral Daily    methotrexate  20 mg Subcutaneous Q7 Days    metoprolol tartrate  75 mg Oral BID    Warfarin Therapy Reminder  1 each Oral See Admin Instructions     Data     Laboratory:  Recent Labs   Lab 02/25/24  0818 02/23/24  0648 02/22/24  1026   WBC 7.0 5.0 5.4   HGB 11.2* 11.1* 11.0*   HCT 34.0* 34.2* 34.4*   * 103* 104*    214 268     Recent Labs   Lab 02/25/24  0818 02/23/24  0648 02/22/24  1026    138 141   POTASSIUM 3.8 4.2 4.4   CHLORIDE 96* 100 104   CO2 26 26 27   ANIONGAP 13 12 10   GLC 83 86 91   BUN 36.4* 24.9* 29.7*   CR 1.08* 0.93 0.97*   GFRESTIMATED 51* 61 58*   BRICE 8.6* 9.4 9.4     No results for input(s): \"CULT\" in the last 168 hours.    Imaging:  No results found for this or any previous visit (from the past 24 hour(s)).      Pardeep" DO Hill  [facility] Hospitalist  [address] [place] [address]  Securely message with [employer] (more info)  Text page via [facility] Paging/Directory   02/26/2024

## 2024-02-26 NOTE — TELEPHONE ENCOUNTER
FYI - Status Update    Who is Calling: patient    Update: Patient was discharged from hospital and was told to call the INR nurse and discuss medication change.  Patient is covid positive    Does caller want a call/response back: Yes     Could we send this information to you in [employer] or would you prefer to receive a phone call?:   Patient would prefer a phone call   Okay to leave a detailed message?: Yes at Cell number on file:    Telephone Information:   Mobile [phone]

## 2024-02-26 NOTE — PHARMACY-ANTICOAGULATION SERVICE
Clinical Pharmacy- Warfarin Discharge Note  This patient is currently on warfarin for the treatment of  afib/h/or CVA .  INR Goal= 2-3  Expected length of therapy lifetime.    Warfarin PTA Regimen: 2.5 mg daily      Anticoagulation Dose History  More data exists         Latest Ref Rng & Units 1/23/2024 2/20/2024 2/22/2024 2/23/2024 2/24/2024 2/25/2024 2/26/2024   Recent Dosing and Labs   warfarin ANTICOAGULANT (COUMADIN) 2 MG tablet - - - - - 4 mg, $Given 6 mg, $Given -   warfarin ANTICOAGULANT (COUMADIN) 5 MG tablet - - - 5 mg, $Given 5 mg, $Given - - -   INR 0.85 - 1.15 2.2  1.6  1.50  1.56  1.81  1.63  1.83        Vitamin K doses administered during the last 7 days: ----  FFP administered during the last 7 days: ---    Reviewed PTA, inpt and discharge meds.  INR subtherapeutic on admission and during hospitalization.  Reviewed outpt AC notes as well, past several weeks, INR has been slightly low and warfarin dose has been slowly increased.  Pt has received approx 5mg warfarin daily while here the past 4 days and INR still low, although starting to increase.  Recommend discharging pt on warfarin 5mg MWF and 2.5mg TTSS. INR Wed.  Recommendation provided to discharging MD, Dr DWAINE Alejandre.

## 2024-02-26 NOTE — PROGRESS NOTES
Patient has been assessed for Home Oxygen needs. Oxygen readings:    *Pulse oximetry (SpO2) = 94% on room air at rest while awake.    *SpO2 improved to 97% on 1liters/minute at rest.    *SpO2 = 86% on room air during activity/with exercise.    *SpO2 improved to 93% on 3liters/minute during activity/with exercise.

## 2024-02-26 NOTE — CONSULTS
Care Management Follow Up    Length of Stay (days): 2    Additional Information:  Patient has a high URR of 20%. Care management team continues to follow.     AUDREY Garcia, SW  Emergency Room   Please contact the SW on the floor in which the patient is staying for any questions or concerns

## 2024-02-26 NOTE — DISCHARGE SUMMARY
Hospitalist Discharge Summary  [facility]    Zunilda Clark MRN# [id number]   YOB: 1940 Age: 83 year old     Date of Admission:  2/22/2024  Date of Discharge:  2/26/2024 11:12 AM  Admitting Physician:  Pardeep Alejandre DO  Discharge Physician:  Pardeep Alejandre DO  Discharging Service:  Hospitalist     Primary Provider: Yunior Huang          Discharge Diagnosis:     Acute Hypoxic Respiratory Failure  Acute Exacerbation of HFpEF  Severe Pulmonary Hypertension  Severe Aortic Stenosis  Severe Mitral Stenosis with Bioprosthetic Mitral Valve on Warfarin  Subtherapeutic INR  - Appreciate Cardiology recommendations - pt declining any procedural intervention for her valvular issues and would be high risk candidate.  Plan for symptomatic management.  - IV lasix 40 mg BID.  Transition to oral lasix starting 2/26  - Strict I/O  - Daily Weights  - Telemetry  - Wean O2 as able  - Appreciate Pharmacy assistance with dosing warfarin     Covid 19 Infection  - Pt appears asymptomatic.  Denies any recent viral symptoms.  No indication for therapy at this time  - Discussed isolation precautions when back at the [facility]     Chronic Medical Problems:  Hypertension  Rheumatoid Arthritis  Hx of CVA on Warfarin  Hypothyroidism             Discharge Disposition:     Discharged to home           Hospital Course:     Zunilda Clark is a 83 year old female with a history of HFpEF, severe aortic stenosis, severe mitral stenosis with bioprosthetic mitral valve on warfarin, severe pulmonary hypertension, hypertension, rheumatoid arthritis on methotrexate, history of CVA on warfarin, hypothyroidism admitted on 2/22/2024 with shortness of breath.  In the emergency department, the patient was found to have a temperature of 98.6  F, blood pressure 148/86, heart rate 83, respiratory rate 16, SpO2 97% on room air.  Initial lab work showed BUN/creatinine 29.7/0.97, proBNP 4362, high-sensitivity troponin 20,  hemoglobin 11.0, , INR 1.50.  Chest x-ray showed markedly improved to interstitial and groundglass changes since comparison with minimal effusion suggested on the lateral view.  CT chest showed bilateral hazy groundglass pulmonary opacity and bibasilar interstitial prominence with cardiomegaly suggestive of pulmonary edema.  Left lower extremity venous duplex Doppler was negative for DVT.  The patient unfortunately tested positive for COVID-19 but was asymptomatic.  The patient was started on IV Lasix and cardiology was consulted to see the patient.  The patient expressed wishes to pursue a more conservative approach to her care wanting to avoid any procedures.   The patient's symptoms improved.  On 2/26, she was transitioned to oral lasix and discharged home.     The patient was seen, examined, and counseled on this day. The hospitalization and plan of care was reviewed with the patient extensively. All questions were addressed and the patient agreed to follow-up as noted above.           Allergies:     No Known Allergies           Discharge Medications:     Discharge Medication List as of 2/26/2024  9:47 AM        START taking these medications    Details   furosemide (LASIX) 40 MG tablet Take 1 tablet (40 mg) by mouth daily, Disp-30 tablet, R-0, E-Prescribe           CONTINUE these medications which have CHANGED    Details   metoprolol tartrate 75 MG TABS Take 75 mg by mouth 2 times daily, Disp-60 tablet, R-0, E-Prescribe      warfarin ANTICOAGULANT (COUMADIN) 2.5 MG tablet Take 1 tablet (2.5 mg) by mouth daily Take 1 tablet (2.5 mg) by mouth Tuesday, Thursday, Saturday, and Sunday.  Take 2 tablets (5 mg) by mouth Monday, Wednesday, Friday., Disp-40 tablet, R-0, E-PrescribeTake 1 tablet (2.5 mg) by mouth Tuesday, Thursday,  Saturday, and Sunday.  Take 2 tablets (5 mg) by mouth Monday, Wednesday, Friday.           CONTINUE these medications which have NOT CHANGED    Details   acetaminophen (TYLENOL) 500 MG  "tablet Take 1,000 mg by mouth every 8 hours as needed for pain, Historical      allopurinol (ZYLOPRIM) 100 MG tablet Take 100 mg by mouth 2 times daily, Historical      amoxicillin (AMOXIL) 500 MG tablet Take 2000mg (4 tablets of 500mg each) approx 30 min to 1 hour prior to any dental procedures, Disp-4 tablet, R-3, E-Prescribe      calcium citrate (CALCITRATE) 950 MG tablet Take 1 tablet by mouth daily , Historical      famotidine (PEPCID) 40 MG tablet TAKE 1 TABLET BY MOUTH DAILY, Disp-90 tablet, R-3, E-PrescribeRequesting 1 year supply      folic acid (FOLVITE) 1 MG tablet Take 1 mg by mouth daily, Historical      levothyroxine (SYNTHROID/LEVOTHROID) 112 MCG tablet Take 1 tablet (112 mcg) by mouth daily, Disp-100 tablet, R-3, E-Prescribe      Lidocaine (LIDOCARE) 4 % Patch Place 1 patch onto the skin every 24 hours To prevent lidocaine toxicity, patient should be patch free for 12 hrs daily.  Apply to left legDisp-3 patch, [id number]      lidocaine 5% oint/silver sulfadiazine 1% cm/triamcinolone 0.1% cm (JESUS PASTE) compounded ointment Apply topically 4 times daily as needed (mouth sore)Disp-40 g, [id number]      methotrexate 50 MG/2ML injection Inject 0.8 mLs Subcutaneous every 7 days (Fridays), Historical      multivitamin w/minerals (THERA-VIT-M) tablet Take 1 tablet by mouth daily Without iron, Historical      VITAMIN D, CHOLECALCIFEROL, PO Take 1,000 Units by mouth daily, Historical           STOP taking these medications       amLODIPine (NORVASC) 2.5 MG tablet Comments:   Reason for Stopping:         amLODIPine-valsartan (EXFORGE) 5-160 MG tablet Comments:   Reason for Stopping:         torsemide (DEMADEX) 10 MG tablet Comments:   Reason for Stopping:                      Condition on Discharge:     Discharge condition: Fair   Discharge vitals: Blood pressure 102/50, pulse 63, temperature 98.4  F (36.9  C), temperature source Oral, resp. rate 16, height 1.626 m (5' 4\"), weight 60.8 kg (134 lb), " SpO2 90%, not currently breastfeeding.   Code status on discharge: DNR / DNI      BASIC PHYSICAL EXAMINATION:  GENERAL: No apparent distress.  CARDIOVASCULAR: Regular rate and rhythm without murmurs.  PULMONARY: Clear to auscultation bilaterally.   GASTROINTESTINAL: Abdomen soft, non-tender.  EXTREMITIES: No edema, pulses intact.  NEUROLOGIC: No focal deficits.            History of Illness:   See detailed admission note for full details.               Procedures excluding imaging which is summarized below:     Please see details in the electronic medical record.           Consultations:     CARE MANAGEMENT / SOCIAL WORK IP CONSULT  PHARMACY TO DOSE WARFARIN  CARDIOLOGY IP CONSULT  CARE MANAGEMENT / SOCIAL WORK IP CONSULT          Significant Results:     Results for orders placed or performed during the hospital encounter of 02/22/24   US Lower Extremity Venous Duplex Left    Narrative    VENOUS ULTRASOUND LEFT LEG  2/22/2024 12:11 PM     HISTORY: left leg swelling for many months    COMPARISON: None.    FINDINGS:  Examination of the deep veins with graded compression and  color flow Doppler with spectral wave form analysis was performed.   There is no evidence for DVT in the left lower extremity.      Impression    IMPRESSION: No evidence of deep venous thrombosis.    ROGERS SULLIVAN MD         SYSTEM ID:  [id number]   Chest XR,  PA & LAT    Narrative    CHEST TWO VIEWS February 22, 2024 10:53 AM     HISTORY: Orthopnea.    COMPARISON: September 26, 2023.       Impression    IMPRESSION: Markedly improved interstitial and ground-glass changes  since comparison. Minimal effusion suggested on lateral view. The  cardiac silhouette is not enlarged. Pulmonary vasculature is  unremarkable. A cardiac implantable electronic device is in place with  lead(s) grossly intact. No abandoned leads/wires or other unexpected  metallic foreign bodies demonstrated on the film.    ANNA WILLETT MD         SYSTEM ID:  [id number]   CT  Chest Pulmonary Embolism w Contrast    Narrative    CT CHEST PULMONARY EMBOLISM WITH CONTRAST 2/22/2024 12:26 PM    CLINICAL HISTORY: SOB, elevated d-dimer.    TECHNIQUE: CT angiogram chest during arterial phase injection IV  contrast. 2D and 3D MIP reconstructions were performed by the CT  technologist. Dose reduction techniques were used.     CONTRAST: 75mL [id number]    COMPARISON: Chest x-ray 2/22/2024.    FINDINGS:  ANGIOGRAM CHEST: Pulmonary arteries are normal caliber and negative  for pulmonary emboli. Thoracic aorta is negative for dissection.  Borderline cardiomegaly.    LUNGS AND PLEURA: Bilateral mild groundglass opacities noted  diffusely. Basilar atelectasis. Some interstitial prominence mostly  seen in the lower lungs.    MEDIASTINUM/AXILLAE: Left chest pacer. A few mildly enlarged lymph  nodes in the mediastinum. An example at the precarinal position is 11  mm series 6 image 116. There are other examples.    CORONARY ARTERY CALCIFICATION: Severe.    UPPER ABDOMEN: No acute upper abdominal abnormality identified. There  is some reflux of contrast into the hepatic veins.    MUSCULOSKELETAL: T12 prominent compression deformity with sclerosis.  Sternotomy.      Impression    IMPRESSION:  1.  No evidence for pulmonary embolism.  2.  Bilateral hazy groundglass pulmonary opacities and bibasilar  interstitial prominence. Cardiomegaly. Correlate with CHF and  pulmonary edema. Small bibasilar pleural fluid also noted.    TRINIDAD ZURITA MD         SYSTEM ID:  [id number]     *Note: Due to a large number of results and/or encounters for the requested time period, some results have not been displayed. A complete set of results can be found in Results Review.       Transthoracic Echocardiogram Results:  No results found for this or any previous visit (from the past 4320 hour(s)).             Pending Results:     Unresulted Labs Ordered in the Past 30 Days of this Admission       No orders found from 1/23/2024 to  2/23/2024.                        Discharge Instructions and Follow-Up:     Discharge instructions and follow-up:   Discharge Procedure Orders   Primary Care - Care Coordination Referral   Standing Status: Future   Referral Priority: Routine: Next available opening Referral Type: Care Coordination   Number of Visits Requested: 1     Reason for your hospital stay   Order Comments: Acute Exacerbation of Heart Failure due to Severe Aortic Valve Stenosis and Severe Mitral Valve Stenosis, Covid 19 Infection     Activity   Order Comments: Your activity upon discharge: activity as tolerated     Order Specific Question Answer Comments   Is discharge order? Yes      Contact provider   Order Comments: Contact your primary care provider if If increased trouble breathing, new arm/leg swelling, dizziness/passing out, falls, bleeding that doesn't stop, or uncontrolled pain.     Discharge - Quarantine/Isolation Instruction   Order Comments: Date of symptom onset:     Date of first positive test:    If you tested positive COVID-19 and show symptoms (fever, cough, body aches or trouble breathing):        Stay home and away from others (self-isolate) until:        At least 10 days have passed since your symptoms started. AND...        You've had no fever-and no medicine that reduces fever for 1 full day (24 hours). AND...         Your other symptoms have resolved (gotten better).  If you tested positive for COVID-19 but don't show symptoms:       Stay home and away from others (self-isolate) until at least 10 days have passed since the date of your first positive COVID-19 test.     Follow-up and recommended labs and tests    Order Comments: Follow up with primary care provider, Yunior Huang, within 7 days for hospital follow- up.  The following labs/tests are recommended: BMP.    Recommend you have your INR checked on Wednesday 2/28.  Due to low INR, your warfarin dosing has changed.  You should take warfarin 2.5 mg (1 tablet)  every Tuesday, Thursday, Saturday, and .  You should take 5 mg (2 tablets) every Monday, Wednesday, and Friday.    Recommend you check your weight every morning. If you notice your weight is increasing then you are likely retaining fluid and should contact your primary care doctor or cardiologist for further directions regarding your lasix dosing as you may benefit from dose adjustment or an extra dose.  Similiarly, if you notice worsening shortness of breath or leg swelling then these are indications that the diuretic therapy alone is not working.     Oxygen Adult/Peds   Order Comments: Oxygen Documentation  I certify that this patient, Zunilda Clark has been under my care (or a nurse practitioner or physician's assistant working with me). This is the face-to-face encounter for oxygen medical necessity.      At the time of this encounter, I have reviewed the qualifying testing and have determined that supplemental oxygen is reasonable and necessary and is expected to improve the patient's condition in a home setting.       Patient has continued oxygen desaturation due to Chronic Heart Failure I50.    If portability is ordered, is the patient mobile within the home? yes     Order Specific Question Answer Comments   Medical Equipment (DME) Supplier: [facility]    PATIENT INSTRUCTIONS: If you did not receive this ordered item today, please contact [facility] for availability ([place] Locations: [phone], [place]: [phone]).    Start Date: 2024    Type: New/Recertification    Oxygen Consult Reqt: Call [facility] before patient leaves at [phone]    Did the patient have SpO2 (sat) testing (only needed for new oxgyen or liter flow changes)? Yes    Length of Need: Lifetime    Frequency of Use: Continuous    Frequency of Use: With Activity    Mode of Delivery - Continuous Nasal Cannula    Liter Flow - Continuous (LPM): 1    Mode of Delivery  - With Activity Nasal Cannula    Liter Flow - With Activity (LPM): 3    Need for Portable Oxygen Equipment: Yes    Evaluate for Conserving Device: Yes    Maintain Sats >= 90%    The face to face evaluation was performed on: 2/26/2024    Peak Flow Meter: Yes      Diet   Order Comments: Follow this diet upon discharge: Orders Placed This Encounter      Low Saturated Fat Na <2400 mg     Order Specific Question Answer Comments   Is discharge order? Yes           Total time spent in face to face contact with the patient and coordinating discharge was:  34 Minutes    Pardeep Alejandre DO  [facility] Hospitalist  [address] [place] [address]  02/26/2024

## 2024-02-26 NOTE — TELEPHONE ENCOUNTER
ANTICOAGULATION  MANAGEMENT: Discharge Review    Zunilda Clark chart reviewed for anticoagulation continuity of care    Hospital Admission on 2/22/24 for shortness of breath.    Discharge disposition: Home    Results:    Recent labs: (last 7 days)     02/20/24  1011 02/22/24  1026 02/23/24  0648 02/24/24  0723 02/25/24  0818 02/26/24  0711   INR 1.6* 1.50* 1.56* 1.81* 1.63* 1.83*     Anticoagulation inpatient management:     more warfarin administered than maintenance regimen     Anticoagulation discharge instructions:     Warfarin dosing: increase dose to Take 1 tablet (2.5 mg) by mouth Tuesday, Thursday,  Saturday, and Sunday.  Take 2 tablets (5 mg) by mouth Monday, Wednesday, Friday. This is a 42.9% increase from patient's prior to admission dosing   Bridging: No   INR goal change: No      Medication changes affecting anticoagulation: Yes - Lasix, per UpToDate -  Loop Diuretics may diminish the anticoagulant effect of Vitamin K Antagonists     Additional factors affecting anticoagulation: Yes: Patient also tested positive for COVID 2/22/24 in the hospital     PLAN     Agree with dosing adjustment on discharge  Recommend to check INR on 2/28/24 , appointment scheduled    Spoke with Deanne    Anticoagulation Calendar updated    Екатерина Mahan RN

## 2024-02-27 ENCOUNTER — PATIENT OUTREACH (OUTPATIENT)
Dept: CARE COORDINATION | Facility: CLINIC | Age: 84
End: 2024-02-27
Payer: COMMERCIAL

## 2024-02-27 NOTE — PROGRESS NOTES
Clinic Care Coordination Contact  [facility]/Voicemail    Clinical Data: Care Coordinator Outreach  Outreach Documentation Number of Outreach Attempt   2/27/2024   9:21 AM 1     Left message on patient's voicemail with call back information and requested return call.    Plan: Care Coordinator will try to reach patient again in 1-2 business days.    AUDREY [facility]ceda[facility]  Social Work Care Coordinator  [facility] [place], [place], and [place]  Phone: [phone]

## 2024-02-28 ENCOUNTER — LAB (OUTPATIENT)
Dept: LAB | Facility: CLINIC | Age: 84
End: 2024-02-28
Payer: COMMERCIAL

## 2024-02-28 ENCOUNTER — ANTICOAGULATION THERAPY VISIT (OUTPATIENT)
Dept: ANTICOAGULATION | Facility: CLINIC | Age: 84
End: 2024-02-28

## 2024-02-28 ENCOUNTER — PATIENT OUTREACH (OUTPATIENT)
Dept: CARE COORDINATION | Facility: CLINIC | Age: 84
End: 2024-02-28

## 2024-02-28 DIAGNOSIS — I48.91 ATRIAL FIBRILLATION AND FLUTTER (H): ICD-10-CM

## 2024-02-28 DIAGNOSIS — I48.92 ATRIAL FIBRILLATION AND FLUTTER (H): ICD-10-CM

## 2024-02-28 DIAGNOSIS — Z79.01 LONG TERM CURRENT USE OF ANTICOAGULANTS WITH INR GOAL OF 2.0-3.0: Primary | ICD-10-CM

## 2024-02-28 DIAGNOSIS — Z95.3 S/P MITRAL VALVE REPLACEMENT WITH BIOPROSTHETIC VALVE: ICD-10-CM

## 2024-02-28 LAB — INR BLD: 3 (ref 0.9–1.1)

## 2024-02-28 PROCEDURE — 36416 COLLJ CAPILLARY BLOOD SPEC: CPT

## 2024-02-28 PROCEDURE — 85610 PROTHROMBIN TIME: CPT

## 2024-02-28 NOTE — PROGRESS NOTES
"Clinic Care Coordination Contact  [facility]: Post-Discharge Note  SITUATION                                                      Admission:    Admission Date: 02/22/24   Reason for Admission: Acute Hypoxic Respiratory Failure  Discharge:   Discharge Date: 02/26/24  Discharge Diagnosis: Acute Hypoxic Respiratory Failure    BACKGROUND                                                      Per hospital discharge summary and inpatient provider notes:  \"Zunilda Clark is a 83 year old female with a history of HFpEF, severe aortic stenosis, severe mitral stenosis with bioprosthetic mitral valve on warfarin, severe pulmonary hypertension, hypertension, rheumatoid arthritis on methotrexate, history of CVA on warfarin, hypothyroidism admitted on 2/22/2024 with shortness of breath.  In the emergency department, the patient was found to have a temperature of 98.6  F, blood pressure 148/86, heart rate 83, respiratory rate 16, SpO2 97% on room air.  Initial lab work showed BUN/creatinine 29.7/0.97, proBNP 4362, high-sensitivity troponin 20, hemoglobin 11.0, , INR 1.50.  Chest x-ray showed markedly improved to interstitial and groundglass changes since comparison with minimal effusion suggested on the lateral view.  CT chest showed bilateral hazy groundglass pulmonary opacity and bibasilar interstitial prominence with cardiomegaly suggestive of pulmonary edema.  Left lower extremity venous duplex Doppler was negative for DVT.  The patient unfortunately tested positive for COVID-19 but was asymptomatic.  The patient was started on IV Lasix and cardiology was consulted to see the patient.  The patient expressed wishes to pursue a more conservative approach to her care wanting to avoid any procedures.   The patient's symptoms improved.  On 2/26, she was transitioned to oral lasix and discharged home.      The patient was seen, examined, and counseled on this day. The hospitalization and plan of care was reviewed with the " "patient extensively. All questions were addressed and the patient agreed to follow-up as noted above.\"    ASSESSMENT      Discharge Assessment  How are you doing now that you are home?:  CC called and spoke with patient. Patient states she is doing well back at home and denied any new concerns or complaints. Patient shared she was discharged with home O2 orders and is working with the DME company to obtain a different portable unit. Patient shared current unit is too difficult to move about with a walker. Per report, DME company coming today 2/28/2024 to update patients system. Patient voiced questions surrounding how to navigate machines. [facility] strongly encouraged patient to discuss DME equipment questions with DME supply company. Patient expressed understanding and agreement with plan.  Reviewed AVS, medications, and upcoming appointments. Patient shared hospital follow-up with PCP isn't until April 2024. [facility] offered to contact [facility] directly to review potential earlier availability. Patient in agreement with plan, however, voiced preference is to meet with PCP not a covering provider. Validated patients request and appointment was rescheduled to 3/13/2024 at 1:10 pm. Patient declined any concerns related to housing, transportation, financial, and/or food insecurities. Patient reports they have all the supports and resources needed at this time and no ongoing CC needs identified.  How are your symptoms? (Red Flag symptoms escalate to triage hotline per guidelines): Improved  Do you feel your condition is stable enough to be safe at home until your provider visit?: Yes  Does the patient have their discharge instructions? : Yes  Does the patient have questions regarding their discharge instructions? : No  Were you started on any new medications or were there changes to any of your previous medications? : Yes  Does the patient have all of their medications?: Yes  Do you have questions regarding any of " your medications? : No  Do you have all of your needed medical supplies or equipment (DME)?  (i.e. oxygen tank, CPAP, cane, etc.): Yes (Patient working with DME company to obtain a light weight portoable O2 option.)  Discharge follow-up appointment scheduled within 14 calendar days? : Yes  Discharge Follow Up Appointment Date: 03/13/24  Discharge Follow Up Appointment Scheduled with?: Primary Care Provider    Care Mgmt General Assessment  Referral  Referral Source: IP Handoff  Health Care Home/Utilization  Preferred Hospital: [facility]  [phone]  Living Situation  Current living arrangement:: I live in a private home  Type of residence:: Independent Senior Living  Resources  Patient receiving home care services:: No  Community Resources: None  Supplies Currently Used at Home: Oxygen Tubing/Supplies  Equipment Currently Used at Home: walker, rolling;walker, standard  Referrals Placed: Housekeeping or Chore Agency  Employment Status: retired  Psychosocial  Mormon or spiritual beliefs that impact treatment:: No  Mental health DX:: No  Mental health management concern (GOAL):: No  Chemical Dependency Status: No Current Concerns  Informal Support system:: Children;Family  Functional Status  Dependent ADLs:: Ambulation-walker  Dependent IADLs:: Cleaning;Cooking;Transportation;Medication Management  Bed or wheelchair confined:: No  Mobility Status: Independent w/Device  Advance Care Plan/Directive  Advanced Care Plans/Directives on file:: Yes  Status of record:: On File and Validated  Type Advanced Care Plans/Directives: Advanced Directive - On File;[facility]    Care Mgmt Encounter Assessment  Preventative Care  Routine Health maintenance Reviewed: Due/Overdue   Health Maintenance Due   Topic Date Due    HF ACTION PLAN  Never done    RSV VACCINE (Pregnancy & 60+) (1 - 1-dose 60+ series) Never done    COVID-19 Vaccine (5 - 2023-24 season) 09/01/2023    MICROALBUMIN  02/01/2024   Clinic  Utilization  Difficulty keeping appointments:: No  Compliance Concerns: No  No-Show Concerns: No  No PCP office visit in Past Year: No  Transportation  Transportation means:: Family;Regular car  Barriers in Communication  Other concerns:: None  How confident are you filling out medical forms by yourself:: Somewhat  Pain  Pain (GOAL):: No  Medication Review  Medication adherence problem (GOAL):: No  Knowledgeable about how to use meds:: Yes  Medication side effects suspected:: No  Diet/Exercise/Sleep  Diet:: Regular  Inadequate nutrition (GOAL):: No  Tube Feeding: No  Inadequate activity/exercise (GOAL):: No  Significant changes in sleep pattern (GOAL): No    PLAN                                                      Outpatient Plan: Patient was provided with SW CC's contact information and encouraged to call with questions, concerns, and/or support needs. SW CC will remain available as needed. No further care coordination outreaches will be made at this time.    Future Appointments   Date Time Provider Department Center   3/4/2024 12:00 AM KRAMER TECH1 SUUMPC [facility] PSA CLIN   3/4/2024  2:30 PM RI LAB RILABR RI   3/13/2024  1:30 PM Yunior Huang MD RIIM RI   6/26/2024  9:45 AM [facility]   6/26/2024 10:50 AM SHERITA NORTH RUXAVICV [facility] PSA CLIN   8/5/2024 11:00 AM Yunior Huang MD RIIM RI     For any urgent concerns, please contact our 24 hour nurse triage line: [phone] ([phone])       Cookie Atkinson, FRANKSW

## 2024-02-28 NOTE — PROGRESS NOTES
ANTICOAGULATION MANAGEMENT     Zunilda SHAW May 83 year old female is on warfarin with therapeutic INR result. (Goal INR 2.0-3.0)    Recent labs: (last 7 days)     02/28/24  0955   INR 3.0*       ASSESSMENT     Source(s): Chart Review and Patient/Caregiver Call     Warfarin doses taken: Warfarin taken as instructed  Diet: No new diet changes identified  Medication/supplement changes: None noted  New illness, injury, or hospitalization: Yes: hospitalized for COVID 2/22-2/26.  She reports that she is starting to feel better now.  Signs or symptoms of bleeding or clotting: Yes: some blood on the tissue when blowing her nose.  Has tried saline nasal spray and Vaseline to help moisturize.  No active nose bleeds.  Previous result: Subtherapeutic.  Warfarin maintenance dose was increased significantly at discharge.  Additional findings: None       PLAN     Recommended plan for temporary change(s) affecting INR.  Maintenance dose was adjusted today due to rapid rise in INR.     Dosing Instructions: decrease your warfarin dose (10% change) with next INR in 5 days       Summary  As of 2/28/2024      Full warfarin instructions:  5 mg every Mon, Fri; 2.5 mg all other days   Next INR check:  3/4/2024               Telephone call with Deanne who agrees to plan and repeated back plan correctly    Lab visit scheduled    Education provided:   Please call back if any changes to your diet, medications or how you've been taking warfarin  Goal range and lab monitoring: goal range and significance of current result  Symptom monitoring: monitoring for bleeding signs and symptoms    Plan made with [facility] Pharmacist Mary Teixeira RN  Anticoagulation Clinic  2/28/2024    _______________________________________________________________________     Anticoagulation Episode Summary       Current INR goal:  2.0-3.0   TTR:  65.1% (10.7 mo)   Target end date:  Indefinite   Send INR reminders to:  [facility]    Indications     Long term current use of anticoagulants with INR goal of 2.0-3.0 [Z79.01]  Atrial fibrillation and flutter (H) [I48.91  I48.92]  S/P mitral valve replacement with bioprosthetic valve [Z95.3]             Comments:  27 mm Magna bioprosthetic valve (2014)             Anticoagulation Care Providers       Provider Role Specialty Phone number    Yunior Huang MD Referring Internal Medicine [phone]

## 2024-02-29 ENCOUNTER — TELEPHONE (OUTPATIENT)
Dept: CARDIOLOGY | Facility: CLINIC | Age: 84
End: 2024-02-29
Payer: COMMERCIAL

## 2024-02-29 DIAGNOSIS — R09.02 HYPOXEMIA: Primary | ICD-10-CM

## 2024-02-29 DIAGNOSIS — I50.30 HEART FAILURE WITH PRESERVED EJECTION FRACTION, NYHA CLASS I (H): Primary | ICD-10-CM

## 2024-02-29 NOTE — TELEPHONE ENCOUNTER
[facility] with increasing shortness of breath with exertion and also paroxysmal nocturnal dyspnea and orthopnea for the last 3 nights. COVID positive.    PMH: mitral valve replacement twice with a bioprosthetic valve-Warfarin. There has been progressive stenosis of the bioprosthesis with a mean gradient of 12 mmHg across the mitral valve. This has been quite a rapid progression from 2023, progressive aortic stenosis with moderate to severe aortic stenosis on the most recent echocardiogram, severe pulmonary hypertension and moderate tricuspid regurgitation, atrial fibrillation and atrial flutter s/p AVNA with  permanent PPM in place, HTN, HLD, RA on Methotrexate, history of CVA, hypothyroidism.    The patient expressed wishes to pursue a more conservative approach to her care wanting to avoid any procedures.     IV Lasix diuresed.    Pt was started on Lasix and home 02. PTA Warfarin continued. Norvasc, Demadex, Exforge were discontinued at time of discharge.    Called patient to discuss any post hospital d/c questions she may have, review medication changes, and confirm f/u appts. Patient denied any questions regarding new medications or changes to PTA medications.     Patient denied any increased SOB, any chest pain, or light headedness. Using home 02.    RN confirmed with patient that she needs to be scheduled for a cardiology ARTHUR OV as ordered by Dr. Daniel Colbert. Dr. Rodriguez's Team RN and scheduling phone numbers provided.    Patient advised to call clinic with any cardiac related questions or concerns prior to this arthur't. Patient verbalized understanding and agreed with plan. NESTOR Tanner RN.

## 2024-02-29 NOTE — TELEPHONE ENCOUNTER
[facility] Call Center    Phone Message    May a detailed message be left on voicemail: yes     Reason for Call: Other: Pt is requesting call back to schedule hospital follow up with Zena Benitez per Dr. Beatty.  [facility] has no availability and no template.     Action Taken: Other: cardio    Travel Screening: Not Applicable  Thank you!  [facility]

## 2024-03-04 ENCOUNTER — LAB (OUTPATIENT)
Dept: LAB | Facility: CLINIC | Age: 84
End: 2024-03-04
Payer: COMMERCIAL

## 2024-03-04 ENCOUNTER — TELEPHONE (OUTPATIENT)
Dept: ANTICOAGULATION | Facility: CLINIC | Age: 84
End: 2024-03-04

## 2024-03-04 ENCOUNTER — NURSE TRIAGE (OUTPATIENT)
Dept: INTERNAL MEDICINE | Facility: CLINIC | Age: 84
End: 2024-03-04

## 2024-03-04 ENCOUNTER — ANCILLARY PROCEDURE (OUTPATIENT)
Dept: CARDIOLOGY | Facility: CLINIC | Age: 84
End: 2024-03-04
Attending: INTERNAL MEDICINE
Payer: COMMERCIAL

## 2024-03-04 ENCOUNTER — ANTICOAGULATION THERAPY VISIT (OUTPATIENT)
Dept: ANTICOAGULATION | Facility: CLINIC | Age: 84
End: 2024-03-04

## 2024-03-04 DIAGNOSIS — Z95.0 CARDIAC PACEMAKER IN SITU: ICD-10-CM

## 2024-03-04 DIAGNOSIS — Z95.3 S/P MITRAL VALVE REPLACEMENT WITH BIOPROSTHETIC VALVE: ICD-10-CM

## 2024-03-04 DIAGNOSIS — I48.91 ATRIAL FIBRILLATION AND FLUTTER (H): ICD-10-CM

## 2024-03-04 DIAGNOSIS — I48.92 ATRIAL FIBRILLATION AND FLUTTER (H): ICD-10-CM

## 2024-03-04 DIAGNOSIS — Z79.01 LONG TERM CURRENT USE OF ANTICOAGULANTS WITH INR GOAL OF 2.0-3.0: Primary | ICD-10-CM

## 2024-03-04 DIAGNOSIS — N18.30 CHRONIC KIDNEY DISEASE, STAGE 3 (H): Primary | ICD-10-CM

## 2024-03-04 LAB
INR BLD: 3.6 (ref 0.9–1.1)
MDC_IDC_EPISODE_DTM: NORMAL
MDC_IDC_EPISODE_DTM: NORMAL
MDC_IDC_EPISODE_DURATION: 1 S
MDC_IDC_EPISODE_DURATION: 2 S
MDC_IDC_EPISODE_ID: NORMAL
MDC_IDC_EPISODE_ID: NORMAL
MDC_IDC_EPISODE_TYPE: NORMAL
MDC_IDC_EPISODE_TYPE: NORMAL
MDC_IDC_LEAD_CONNECTION_STATUS: NORMAL
MDC_IDC_LEAD_CONNECTION_STATUS: NORMAL
MDC_IDC_LEAD_IMPLANT_DT: NORMAL
MDC_IDC_LEAD_IMPLANT_DT: NORMAL
MDC_IDC_LEAD_LOCATION: NORMAL
MDC_IDC_LEAD_LOCATION: NORMAL
MDC_IDC_LEAD_MFG: NORMAL
MDC_IDC_LEAD_MFG: NORMAL
MDC_IDC_LEAD_MODEL: NORMAL
MDC_IDC_LEAD_MODEL: NORMAL
MDC_IDC_LEAD_POLARITY_TYPE: NORMAL
MDC_IDC_LEAD_POLARITY_TYPE: NORMAL
MDC_IDC_LEAD_SERIAL: NORMAL
MDC_IDC_LEAD_SERIAL: NORMAL
MDC_IDC_MSMT_BATTERY_DTM: NORMAL
MDC_IDC_MSMT_BATTERY_REMAINING_LONGEVITY: 101 MO
MDC_IDC_MSMT_BATTERY_RRT_TRIGGER: 2.62
MDC_IDC_MSMT_BATTERY_STATUS: NORMAL
MDC_IDC_MSMT_BATTERY_VOLTAGE: 3 V
MDC_IDC_MSMT_LEADCHNL_RA_IMPEDANCE_VALUE: 285 OHM
MDC_IDC_MSMT_LEADCHNL_RA_IMPEDANCE_VALUE: 361 OHM
MDC_IDC_MSMT_LEADCHNL_RA_PACING_THRESHOLD_AMPLITUDE: 0.62 V
MDC_IDC_MSMT_LEADCHNL_RA_PACING_THRESHOLD_PULSEWIDTH: 0.4 MS
MDC_IDC_MSMT_LEADCHNL_RA_SENSING_INTR_AMPL: 0.62 MV
MDC_IDC_MSMT_LEADCHNL_RA_SENSING_INTR_AMPL: 0.62 MV
MDC_IDC_MSMT_LEADCHNL_RV_IMPEDANCE_VALUE: 342 OHM
MDC_IDC_MSMT_LEADCHNL_RV_IMPEDANCE_VALUE: 380 OHM
MDC_IDC_MSMT_LEADCHNL_RV_PACING_THRESHOLD_AMPLITUDE: 0.88 V
MDC_IDC_MSMT_LEADCHNL_RV_PACING_THRESHOLD_PULSEWIDTH: 0.4 MS
MDC_IDC_MSMT_LEADCHNL_RV_SENSING_INTR_AMPL: 13.25 MV
MDC_IDC_MSMT_LEADCHNL_RV_SENSING_INTR_AMPL: 13.25 MV
MDC_IDC_PG_IMPLANT_DTM: NORMAL
MDC_IDC_PG_MFG: NORMAL
MDC_IDC_PG_MODEL: NORMAL
MDC_IDC_PG_SERIAL: NORMAL
MDC_IDC_PG_TYPE: NORMAL
MDC_IDC_SESS_CLINIC_NAME: NORMAL
MDC_IDC_SESS_DTM: NORMAL
MDC_IDC_SESS_TYPE: NORMAL
MDC_IDC_SET_BRADY_AT_MODE_SWITCH_RATE: 171 {BEATS}/MIN
MDC_IDC_SET_BRADY_HYSTRATE: NORMAL
MDC_IDC_SET_BRADY_LOWRATE: 60 {BEATS}/MIN
MDC_IDC_SET_BRADY_MAX_SENSOR_RATE: 130 {BEATS}/MIN
MDC_IDC_SET_BRADY_MAX_TRACKING_RATE: 130 {BEATS}/MIN
MDC_IDC_SET_BRADY_MODE: NORMAL
MDC_IDC_SET_BRADY_PAV_DELAY_LOW: 180 MS
MDC_IDC_SET_BRADY_SAV_DELAY_LOW: 150 MS
MDC_IDC_SET_LEADCHNL_RA_PACING_AMPLITUDE: 1.5 V
MDC_IDC_SET_LEADCHNL_RA_PACING_ANODE_ELECTRODE_1: NORMAL
MDC_IDC_SET_LEADCHNL_RA_PACING_ANODE_LOCATION_1: NORMAL
MDC_IDC_SET_LEADCHNL_RA_PACING_CAPTURE_MODE: NORMAL
MDC_IDC_SET_LEADCHNL_RA_PACING_CATHODE_ELECTRODE_1: NORMAL
MDC_IDC_SET_LEADCHNL_RA_PACING_CATHODE_LOCATION_1: NORMAL
MDC_IDC_SET_LEADCHNL_RA_PACING_POLARITY: NORMAL
MDC_IDC_SET_LEADCHNL_RA_PACING_PULSEWIDTH: 0.4 MS
MDC_IDC_SET_LEADCHNL_RA_SENSING_ANODE_ELECTRODE_1: NORMAL
MDC_IDC_SET_LEADCHNL_RA_SENSING_ANODE_LOCATION_1: NORMAL
MDC_IDC_SET_LEADCHNL_RA_SENSING_CATHODE_ELECTRODE_1: NORMAL
MDC_IDC_SET_LEADCHNL_RA_SENSING_CATHODE_LOCATION_1: NORMAL
MDC_IDC_SET_LEADCHNL_RA_SENSING_POLARITY: NORMAL
MDC_IDC_SET_LEADCHNL_RA_SENSING_SENSITIVITY: 0.45 MV
MDC_IDC_SET_LEADCHNL_RV_PACING_AMPLITUDE: 2 V
MDC_IDC_SET_LEADCHNL_RV_PACING_ANODE_ELECTRODE_1: NORMAL
MDC_IDC_SET_LEADCHNL_RV_PACING_ANODE_LOCATION_1: NORMAL
MDC_IDC_SET_LEADCHNL_RV_PACING_CAPTURE_MODE: NORMAL
MDC_IDC_SET_LEADCHNL_RV_PACING_CATHODE_ELECTRODE_1: NORMAL
MDC_IDC_SET_LEADCHNL_RV_PACING_CATHODE_LOCATION_1: NORMAL
MDC_IDC_SET_LEADCHNL_RV_PACING_POLARITY: NORMAL
MDC_IDC_SET_LEADCHNL_RV_PACING_PULSEWIDTH: 0.4 MS
MDC_IDC_SET_LEADCHNL_RV_SENSING_ANODE_ELECTRODE_1: NORMAL
MDC_IDC_SET_LEADCHNL_RV_SENSING_ANODE_LOCATION_1: NORMAL
MDC_IDC_SET_LEADCHNL_RV_SENSING_CATHODE_ELECTRODE_1: NORMAL
MDC_IDC_SET_LEADCHNL_RV_SENSING_CATHODE_LOCATION_1: NORMAL
MDC_IDC_SET_LEADCHNL_RV_SENSING_POLARITY: NORMAL
MDC_IDC_SET_LEADCHNL_RV_SENSING_SENSITIVITY: 0.9 MV
MDC_IDC_SET_ZONE_DETECTION_INTERVAL: 350 MS
MDC_IDC_SET_ZONE_DETECTION_INTERVAL: 400 MS
MDC_IDC_SET_ZONE_STATUS: NORMAL
MDC_IDC_SET_ZONE_STATUS: NORMAL
MDC_IDC_SET_ZONE_TYPE: NORMAL
MDC_IDC_SET_ZONE_VENDOR_TYPE: NORMAL
MDC_IDC_STAT_AT_BURDEN_PERCENT: 0 %
MDC_IDC_STAT_AT_DTM_END: NORMAL
MDC_IDC_STAT_AT_DTM_START: NORMAL
MDC_IDC_STAT_BRADY_AP_VP_PERCENT: 92.1 %
MDC_IDC_STAT_BRADY_AP_VS_PERCENT: 1.29 %
MDC_IDC_STAT_BRADY_AS_VP_PERCENT: 2.29 %
MDC_IDC_STAT_BRADY_AS_VS_PERCENT: 4.32 %
MDC_IDC_STAT_BRADY_DTM_END: NORMAL
MDC_IDC_STAT_BRADY_DTM_START: NORMAL
MDC_IDC_STAT_BRADY_RA_PERCENT_PACED: 97.4 %
MDC_IDC_STAT_BRADY_RV_PERCENT_PACED: 94.39 %
MDC_IDC_STAT_EPISODE_RECENT_COUNT: 0
MDC_IDC_STAT_EPISODE_RECENT_COUNT: 2
MDC_IDC_STAT_EPISODE_RECENT_COUNT_DTM_END: NORMAL
MDC_IDC_STAT_EPISODE_RECENT_COUNT_DTM_START: NORMAL
MDC_IDC_STAT_EPISODE_TOTAL_COUNT: 0
MDC_IDC_STAT_EPISODE_TOTAL_COUNT: 0
MDC_IDC_STAT_EPISODE_TOTAL_COUNT: 1
MDC_IDC_STAT_EPISODE_TOTAL_COUNT: 9
MDC_IDC_STAT_EPISODE_TOTAL_COUNT: NORMAL
MDC_IDC_STAT_EPISODE_TOTAL_COUNT_DTM_END: NORMAL
MDC_IDC_STAT_EPISODE_TOTAL_COUNT_DTM_START: NORMAL
MDC_IDC_STAT_EPISODE_TYPE: NORMAL
MDC_IDC_STAT_TACHYTHERAPY_RECENT_DTM_END: NORMAL
MDC_IDC_STAT_TACHYTHERAPY_RECENT_DTM_START: NORMAL
MDC_IDC_STAT_TACHYTHERAPY_TOTAL_DTM_END: NORMAL
MDC_IDC_STAT_TACHYTHERAPY_TOTAL_DTM_START: NORMAL

## 2024-03-04 PROCEDURE — 93294 REM INTERROG EVL PM/LDLS PM: CPT | Performed by: INTERNAL MEDICINE

## 2024-03-04 PROCEDURE — 36416 COLLJ CAPILLARY BLOOD SPEC: CPT

## 2024-03-04 PROCEDURE — 93296 REM INTERROG EVL PM/IDS: CPT | Performed by: INTERNAL MEDICINE

## 2024-03-04 PROCEDURE — 85610 PROTHROMBIN TIME: CPT

## 2024-03-04 NOTE — LETTER
March 11, 2024      Deanne SHAW May  [id number] [address]    [place] [address]        Dear ,    We are writing to inform you of your test results.    Your results are within normal limits.    Resulted Orders   Albumin Random Urine Quantitative with Creat Ratio   Result Value Ref Range    Creatinine Urine mg/dL 122.0 mg/dL      Comment:      The reference ranges have not been established in urine creatinine. The results should be integrated into the clinical context for interpretation.    Albumin Urine mg/L 26.6 mg/L      Comment:      The reference ranges have not been established in urine albumin. The results should be integrated into the clinical context for interpretation.    Albumin Urine mg/g Cr 21.80 0.00 - 25.00 mg/g Cr      Comment:      Microalbuminuria is defined as an albumin:creatinine ratio of 17 to 299 for males and 25 to 299 for females. A ratio of albumin:creatinine of 300 or higher is indicative of overt proteinuria.  Due to biologic variability, positive results should be confirmed by a second, first-morning random or 24-hour timed urine specimen. If there is discrepancy, a third specimen is recommended. When 2 out of 3 results are in the microalbuminuria range, this is evidence for incipient nephropathy and warrants increased efforts at glucose control, blood pressure control, and institution of therapy with an angiotensin-converting-enzyme (ACE) inhibitor (if the patient can tolerate it).         If you have any questions or concerns, please call the clinic at the number listed above.       Sincerely,      Yunior Huang MD

## 2024-03-04 NOTE — TELEPHONE ENCOUNTER
Patient had an IV over a week ago, 3 days ago noticed swelling, bruising, and hard spot at IV site. Patient reports pain at touch but otherwise does not bother her. Site has been improving, patient has been icing  the area. Patient has palpable pulse, denies numbness/tingling in the area. Reviewed reasons to call back, patient and daughter verbalized agreement.   Reason for Disposition   Small area of skin swelling  (no redness or pain) at prior IV site    Additional Information   Negative: SEVERE difficulty breathing (e.g., struggling for each breath, speaks in single words)   Negative: Shock suspected (e.g., cold/pale/clammy skin, too weak to stand, low BP, rapid pulse)   Negative: Cracked or broken central line (e.g., [place], [place], [place]) or PICC Line   Negative: Sounds like a life-threatening emergency to the triager   Negative: IV not running or running slowly   Negative: Chest pain and has central line (e.g., [place], [place], [place]) or PICC line   Negative: Moderate bleeding around IV site  (Exception: Mild bleeding that stops quickly with direct pressure.)   Negative: Difficulty breathing and has central line (e.g., [place], [place], [place]) or PICC line   Negative: Chest or neck swelling and has a central or PICC line  (Exception: Mild puffiness or swelling just around IV site.)   Negative: Arm or leg swelling and has a central or PICC line  (Exception: Mild puffiness or swelling just around IV site.)   Negative: Fever > 100.4 F (38.0 C)   Negative: Patient sounds very sick or weak to the triager   Negative: Central line (e.g., [place], [place], [place]) or PICC line has moved out of position (or can see cuff slipping out of skin; or more line externally exposed than before)   Negative: Pus or cloudy fluid from IV site   Negative: Red streak at IV site and palpable cord   Negative: Red streak at IV site and longer than 1 inch (2.5 cm)   Negative: Skin redness and extends > 1 inch (2.5 cm) from IV site    Negative: Skin swelling at IV site  (Exception: IV recently removed and has small lump or small amount of skin swelling.)   Negative: Raised bruise at IV site and getting larger (or size > 2 inches; 5 cm)   Negative: Mild bleeding at IV site and not stopped after following Care Advice   Negative: IV fluid leaking out of insertion site (skin hole where IV catheter enters skin)   Negative: Pain at IV site (central line, PICC, peripheral) or shooting up arm and IV running slowly   Negative: Dressing needs to be changed (e.g., wet, soiled, loose, or open to air) and unable or unwilling to perform dressing change   Negative: Central line (e.g., [place], [place], [place]) or PICC line comes all the way out   Negative: Caller has URGENT question and triager unable to answer question   Negative: Small area of skin redness at IV site (< 1 inch or 2.5 cm) and no fever, swelling   Negative: Pain at IV site or shooting up arm and NO redness or swelling and IV running normally or has been removed   Negative: Caller has NON-URGENT question and triager unable to answer question    Protocols used: IV Site and Other Symptoms-A-OH

## 2024-03-04 NOTE — PROGRESS NOTES
ANTICOAGULATION MANAGEMENT     Zunilda SHAW May 83 year old female is on warfarin with supratherapeutic INR result. (Goal INR 2.0-3.0)    Recent labs: (last 7 days)     03/04/24  1444   INR 3.6*       ASSESSMENT     Source(s): Chart Review and Patient/Caregiver Call     Warfarin doses taken: Warfarin taken as instructed  Diet: Decreased greens/vitamin K in diet; plans to resume previous intake   Medication/supplement changes:  Reports increased metoprolol dosage and starting lasix - no interaction per Uptodate.  New illness, injury, or hospitalization: Yes: hospitalized 2/22 - 2/26 for Covid. Still has continued congestion and SOB.  Signs or symptoms of bleeding or clotting: Yes - reports slight blood in tissue only when she blows her nose.   Previous result: Therapeutic last visit; previously outside of goal range  Additional findings: 10% dosing decrease advised 5 days ago       PLAN     Recommended plan for temporary change(s) affecting INR     Dosing Instructions: hold dose then continue your current warfarin dose with next INR in 4 days       Summary  As of 3/4/2024      Full warfarin instructions:  3/4: Hold; Otherwise 5 mg every Mon, Fri; 2.5 mg all other days   Next INR check:  3/8/2024               Telephone call with Deanne who verbalizes understanding and agrees to plan    Lab visit scheduled    Education provided:   Please call back if any changes to your diet, medications or how you've been taking warfarin  Importance of notifying anticoagulation clinic for: diarrhea, nausea/vomiting, reduced intake, cold/flu, and/or infections; a sooner lab recheck maybe needed  Covid's influence on INR    Plan made per [facility] anticoagulation protocol    Pearl Mahan RN  Anticoagulation Clinic  3/4/2024    _______________________________________________________________________     Anticoagulation Episode Summary       Current INR goal:  2.0-3.0   TTR:  64.3% (10.7 mo)   Target end date:  Indefinite   Send INR  reminders to:  [facility]    Indications    Long term current use of anticoagulants with INR goal of 2.0-3.0 [Z79.01]  Atrial fibrillation and flutter (H) [I48.91  I48.92]  S/P mitral valve replacement with bioprosthetic valve [Z95.3]             Comments:  27 mm Magna bioprosthetic valve (2014)             Anticoagulation Care Providers       Provider Role Specialty Phone number    Yunior Huang MD Referring Internal Medicine [phone]

## 2024-03-04 NOTE — TELEPHONE ENCOUNTER
General Call    Contacts         Type Contact Phone/Fax    03/04/2024 05:04 PM CST Phone (Incoming) MayDeanne (Self) [phone] (M)     Requesting a call back to discuss more about INR          Reason for Call:  INR    What are your questions or concerns:  Requesting call back from INR MELINA Kang    Date of last appointment with provider: N/A    Could we send this information to you in [facility] or would you prefer to receive a phone call?:   Patient would prefer a phone call   Okay to leave a detailed message?: Yes at Cell number on file:    Telephone Information:   Mobile [phone]

## 2024-03-04 NOTE — TELEPHONE ENCOUNTER
Talked with Deanne, who is a little uncertain if she took her warfarin tonight or not. She believes she didn't, but also wondered what doses to take on Friday. Discussed that she will have inr Friday and we will call to dose after that lab She takes warfarin at night so will not take before lab on Friday

## 2024-03-07 PROCEDURE — 82570 ASSAY OF URINE CREATININE: CPT

## 2024-03-07 PROCEDURE — 82043 UR ALBUMIN QUANTITATIVE: CPT

## 2024-03-08 ENCOUNTER — LAB (OUTPATIENT)
Dept: LAB | Facility: CLINIC | Age: 84
End: 2024-03-08
Payer: COMMERCIAL

## 2024-03-08 ENCOUNTER — ANTICOAGULATION THERAPY VISIT (OUTPATIENT)
Dept: ANTICOAGULATION | Facility: CLINIC | Age: 84
End: 2024-03-08

## 2024-03-08 DIAGNOSIS — Z95.3 S/P MITRAL VALVE REPLACEMENT WITH BIOPROSTHETIC VALVE: ICD-10-CM

## 2024-03-08 DIAGNOSIS — Z79.01 LONG TERM CURRENT USE OF ANTICOAGULANTS WITH INR GOAL OF 2.0-3.0: Primary | ICD-10-CM

## 2024-03-08 DIAGNOSIS — I48.92 ATRIAL FIBRILLATION AND FLUTTER (H): ICD-10-CM

## 2024-03-08 DIAGNOSIS — I48.91 ATRIAL FIBRILLATION AND FLUTTER (H): ICD-10-CM

## 2024-03-08 LAB — INR BLD: 2.8 (ref 0.9–1.1)

## 2024-03-08 PROCEDURE — 85610 PROTHROMBIN TIME: CPT

## 2024-03-08 PROCEDURE — 36416 COLLJ CAPILLARY BLOOD SPEC: CPT

## 2024-03-08 NOTE — PROGRESS NOTES
ANTICOAGULATION MANAGEMENT     Zunilda SHAW May 83 year old female is on warfarin with therapeutic INR result. (Goal INR 2.0-3.0)    Recent labs: (last 7 days)     03/08/24  1033   INR 2.8*       ASSESSMENT     Warfarin Lab Questionnaire    Warfarin Doses Last 7 Days    Pt Rptd Dose CINDY MONDAY TUESDAY WED THURS FRIDAY SATURDAY   3/8/2024  10:31 AM 2.5 0 2.5 2.5 2.5 5 2.5         3/8/2024   Warfarin Lab Questionnaire   Missed doses within past 14 days? No, Yes, intentional hold on 3/4/24 as advised   Changes in diet or alcohol within past 14 days? No   Medication changes since last result? No   Injuries or illness since last result? No   New shortness of breath, severe headaches or sudden changes in vision since last result? No   Abnormal bleeding since last result? No   Upcoming surgery, procedure? No   Best number to call with results? [phone]     Previous result: Supratherapeutic  Additional findings: None       PLAN     Recommended plan for ongoing change(s) affecting INR     Dosing Instructions: decrease your warfarin dose (11% change) with next INR in 5 days       Summary  As of 3/8/2024      Full warfarin instructions:  3.75 mg every Mon, Fri; 2.5 mg all other days   Next INR check:  3/13/2024               Telephone call with Deanne who verbalizes understanding and agrees to plan and who agrees to plan and repeated back plan correctly    Check at provider office visit    Education provided:   Taking warfarin: Importance of taking warfarin as instructed    Plan made with [facility] Pharmacist Mary Rocha, RN  Anticoagulation Clinic  3/8/2024    _______________________________________________________________________     Anticoagulation Episode Summary       Current INR goal:  2.0-3.0   TTR:  63.4% (10.7 mo)   Target end date:  Indefinite   Send INR reminders to:  [facility]    Indications    Long term current use of anticoagulants with INR goal of 2.0-3.0 [Z79.01]  Atrial  fibrillation and flutter (H) [I48.91  I48.92]  S/P mitral valve replacement with bioprosthetic valve [Z95.3]             Comments:  27 mm Magna bioprosthetic valve (2014)             Anticoagulation Care Providers       Provider Role Specialty Phone number    Yunior Huang MD Referring Internal Medicine [phone]

## 2024-03-09 LAB
CREAT UR-MCNC: 122 MG/DL
MICROALBUMIN UR-MCNC: 26.6 MG/L
MICROALBUMIN/CREAT UR: 21.8 MG/G CR (ref 0–25)

## 2024-03-13 ENCOUNTER — ANTICOAGULATION THERAPY VISIT (OUTPATIENT)
Dept: ANTICOAGULATION | Facility: CLINIC | Age: 84
End: 2024-03-13

## 2024-03-13 ENCOUNTER — ANCILLARY PROCEDURE (OUTPATIENT)
Dept: GENERAL RADIOLOGY | Facility: CLINIC | Age: 84
End: 2024-03-13
Attending: INTERNAL MEDICINE
Payer: COMMERCIAL

## 2024-03-13 ENCOUNTER — OFFICE VISIT (OUTPATIENT)
Dept: INTERNAL MEDICINE | Facility: CLINIC | Age: 84
End: 2024-03-13
Payer: COMMERCIAL

## 2024-03-13 VITALS
RESPIRATION RATE: 16 BRPM | OXYGEN SATURATION: 98 % | DIASTOLIC BLOOD PRESSURE: 72 MMHG | HEART RATE: 87 BPM | HEIGHT: 64 IN | SYSTOLIC BLOOD PRESSURE: 140 MMHG | BODY MASS INDEX: 22.71 KG/M2 | WEIGHT: 133 LBS | TEMPERATURE: 98 F

## 2024-03-13 DIAGNOSIS — Z09 HOSPITAL DISCHARGE FOLLOW-UP: ICD-10-CM

## 2024-03-13 DIAGNOSIS — I48.91 ATRIAL FIBRILLATION AND FLUTTER (H): ICD-10-CM

## 2024-03-13 DIAGNOSIS — I48.92 ATRIAL FIBRILLATION AND FLUTTER (H): ICD-10-CM

## 2024-03-13 DIAGNOSIS — I27.20 PULMONARY HYPERTENSION (H): ICD-10-CM

## 2024-03-13 DIAGNOSIS — I50.30 HEART FAILURE WITH PRESERVED EJECTION FRACTION, NYHA CLASS I (H): Primary | ICD-10-CM

## 2024-03-13 DIAGNOSIS — I10 BENIGN ESSENTIAL HYPERTENSION: ICD-10-CM

## 2024-03-13 DIAGNOSIS — Z95.3 S/P MITRAL VALVE REPLACEMENT WITH BIOPROSTHETIC VALVE: ICD-10-CM

## 2024-03-13 DIAGNOSIS — Z79.01 LONG TERM CURRENT USE OF ANTICOAGULANTS WITH INR GOAL OF 2.0-3.0: Primary | ICD-10-CM

## 2024-03-13 LAB
ERYTHROCYTE [DISTWIDTH] IN BLOOD BY AUTOMATED COUNT: 18.7 % (ref 10–15)
HCT VFR BLD AUTO: 35.4 % (ref 35–47)
HGB BLD-MCNC: 11.4 G/DL (ref 11.7–15.7)
INR BLD: 2.2 (ref 0.9–1.1)
MCH RBC QN AUTO: 33.2 PG (ref 26.5–33)
MCHC RBC AUTO-ENTMCNC: 32.2 G/DL (ref 31.5–36.5)
MCV RBC AUTO: 103 FL (ref 78–100)
PLATELET # BLD AUTO: 368 10E3/UL (ref 150–450)
RBC # BLD AUTO: 3.43 10E6/UL (ref 3.8–5.2)
WBC # BLD AUTO: 7.4 10E3/UL (ref 4–11)

## 2024-03-13 PROCEDURE — 85610 PROTHROMBIN TIME: CPT | Performed by: INTERNAL MEDICINE

## 2024-03-13 PROCEDURE — 85027 COMPLETE CBC AUTOMATED: CPT | Performed by: INTERNAL MEDICINE

## 2024-03-13 PROCEDURE — 80048 BASIC METABOLIC PNL TOTAL CA: CPT | Performed by: INTERNAL MEDICINE

## 2024-03-13 PROCEDURE — 99495 TRANSJ CARE MGMT MOD F2F 14D: CPT | Performed by: INTERNAL MEDICINE

## 2024-03-13 PROCEDURE — 36415 COLL VENOUS BLD VENIPUNCTURE: CPT | Performed by: INTERNAL MEDICINE

## 2024-03-13 PROCEDURE — 71046 X-RAY EXAM CHEST 2 VIEWS: CPT | Mod: TC | Performed by: RADIOLOGY

## 2024-03-13 RX ORDER — METOPROLOL TARTRATE 75 MG/1
75 TABLET, FILM COATED ORAL 2 TIMES DAILY
Qty: 180 TABLET | Refills: 1 | Status: SHIPPED | OUTPATIENT
Start: 2024-03-13 | End: 2024-05-21

## 2024-03-13 RX ORDER — RESPIRATORY SYNCYTIAL VIRUS VACCINE 120MCG/0.5
0.5 KIT INTRAMUSCULAR ONCE
Qty: 1 EACH | Refills: 0 | Status: CANCELLED | OUTPATIENT
Start: 2024-03-13 | End: 2024-03-13

## 2024-03-13 RX ORDER — FUROSEMIDE 40 MG
40 TABLET ORAL DAILY
Qty: 90 TABLET | Refills: 1 | Status: SHIPPED | OUTPATIENT
Start: 2024-03-13 | End: 2024-03-18

## 2024-03-13 ASSESSMENT — PATIENT HEALTH QUESTIONNAIRE - PHQ9: SUM OF ALL RESPONSES TO PHQ QUESTIONS 1-9: 1

## 2024-03-13 ASSESSMENT — PAIN SCALES - GENERAL: PAINLEVEL: NO PAIN (0)

## 2024-03-13 NOTE — PROGRESS NOTES
ANTICOAGULATION MANAGEMENT     Zunilda SHAW May 83 year old female is on warfarin with therapeutic INR result. (Goal INR 2.0-3.0)    Recent labs: (last 7 days)     03/13/24  1417   INR 2.2*       ASSESSMENT     Source(s): Chart Review and Patient/Caregiver Call     Warfarin doses taken: Warfarin taken as instructed  Diet: No new diet changes identified  Medication/supplement changes: Patient reports she is taking famotidine, she informed clinic staff today that she is not taking it but she confirmed that she is, she just didn't recognize the drug name at time of office visit.  New illness, injury, or hospitalization: Yes: Covid positive 2/22/24, patient reports she is feeling better but still has a cough and rhinorrhea. Patient reports that doctor listened to her lungs today, no concerning findings.  Signs or symptoms of bleeding or clotting: Yes: patient reports some blood in tissue when she blows her nose but denies active nosebleeds.  Previous result: Therapeutic last visit; previously outside of goal range  Additional findings:  Warfarin maintenance dose was decreased 11% at last visit  Office visit with PCP today for hospital follow up--notes are not complete;  however, patient reports no change in care plan.       PLAN     Recommended plan for temporary change(s) affecting INR     Dosing Instructions: Continue your current warfarin dose with next INR in 2 weeks       Summary  As of 3/13/2024      Full warfarin instructions:  3.75 mg every Mon, Fri; 2.5 mg all other days   Next INR check:  3/27/2024               Telephone call with Deanne who verbalizes understanding and agrees to plan and who agrees to plan and repeated back plan correctly    Lab visit scheduled    Education provided:   Taking warfarin: Importance of taking warfarin as instructed    Plan made per [facility] anticoagulation protocol    Linette Rocha RN  Anticoagulation  Clinic  3/13/2024    _______________________________________________________________________     Anticoagulation Episode Summary       Current INR goal:  2.0-3.0   TTR:  63.4% (10.7 mo)   Target end date:  Indefinite   Send INR reminders to:  [facility]    Indications    Long term current use of anticoagulants with INR goal of 2.0-3.0 [Z79.01]  Atrial fibrillation and flutter (H) [I48.91  I48.92]  S/P mitral valve replacement with bioprosthetic valve [Z95.3]             Comments:  27 mm Magna bioprosthetic valve (2014)             Anticoagulation Care Providers       Provider Role Specialty Phone number    Yunior Huang MD Referring Internal Medicine [phone]

## 2024-03-13 NOTE — LETTER
March 13, 2024      Deanne SHAW May  [address]    [place] [address]        Dear ,    We are writing to inform you of your test results.    Your xray results are normal.    Resulted Orders   XR Chest 2 Views    Narrative    CHEST TWO VIEWS  3/13/2024 2:26 PM     HISTORY: Hypertension.    COMPARISON: 2/22/2024.      Impression    IMPRESSION: Mild interstitial prominence in both lower lungs has  improved slightly. No other significant interval change. Sternotomy  and mitral valve prosthesis. Dual lead cardiac device left chest wall.  Tortuous calcified thoracic aorta. Heart size upper limits of normal.  Pulmonary vascularity is within normal limits. No pleural effusions.  Cholecystectomy.    BALDEMAR YEE MD         SYSTEM ID:  [id number]       If you have any questions or concerns, please call the clinic at the number listed above.       Sincerely,      Yunior Huang MD

## 2024-03-13 NOTE — PROGRESS NOTES
Assessment & Plan     Benign essential hypertension  BP is controlled   Continue treatment   - INR point of care (finger stick)  - Basic metabolic panel  (Ca, Cl, CO2, Creat, Gluc, K, Na, BUN)  - CBC with platelets  - XR Chest 2 Views; Future    Pulmonary hypertension (H)  Assess CXR- no infiltrate, no pleural effusions  Continue oxygen treatment   - XR Chest 2 Views; Future    Atrial fibrillation and flutter (H)  On AC, monitor   - INR point of care (finger stick)    Heart failure with preserved ejection fraction, NYHA class I (H)  Continue BB, diuretic, follow up with cardiology   - Metoprolol Tartrate 75 MG TABS; Take 75 mg by mouth 2 times daily  - furosemide (LASIX) 40 MG tablet; Take 1 tablet (40 mg) by mouth daily    Hospital discharge follow-up  Still feels SOB and weak. Continue O2. Follow up with cardiology  Has AORTIC STENOSIS and mitral stenosis, does not want to do invasive procedures.           MED REC REQUIRED  Post Medication Reconciliation Status: discharge medications reconciled, continue medications without change    See Patient Instructions    Rowdy Alicea is a 83 year old, presenting for the following health issues:  Hospital F/U (FVR 2/22/24-2/266/24 SOB,Covid)      3/13/2024     1:13 PM   Additional Questions   Roomed by Michelle Aguilar   Accompanied by sister Kasie         3/13/2024     1:13 PM   Patient Reported Additional Medications   Patient reports taking the following new medications no     HPI     Patient is seen for a follow up visit.  Recently hospitalized for CHF, related to a fib, aortic and mitral stenoses and pulmonary HTN.   Improved with oxygen, IV diuretics and rate control medications.   Has h/o HTN. on medical treatment. BP has been controlled. No side effects from medications. No CP, HA, dizziness. good compliance with medications and low salt diet.  Has history of atrial fibrillation. On anticoagulation with Coumadin and rate control medications. Asymptonatic -  no chest pains , palpitations,  no side effects from medications.   Follows with cardiology. Advised for possible aortic and mitral valves surgeries. Currently does not want invasive procedures         2/28/2024     2:27 PM   Post Discharge Outreach   Admission Date 2/22/2024   Reason for Admission Acute Hypoxic Respiratory Failure   Discharge Date 2/26/2024   Discharge Diagnosis Acute Hypoxic Respiratory Failure   How are you doing now that you are home? [facility] called and spoke with patient. Patient states she is doing well back at home and denied any new concerns or complaints. Patient shared she was discharged with home O2 orders and is working with the DME company to obtain a different portable unit. Patient shared current unit is too difficult to move about with a walker. Per report, DME company coming today 2/28/2024 to update patients system. Patient voiced questions surrounding how to navigate machines. [facility] strongly encouraged patient to discuss DME equipment questions with DME supply company. Patient expressed understanding and agreement with plan.  Reviewed AVS, medications, and upcoming appointments. Patient shared hospital follow-up with PCP isn't until April 2024. [facility] offered to contact [facility] directly to review potential earlier availability. Patient in agreement with plan, however, voiced preference is to meet with PCP not a covering provider. Validated patients request and appointment was rescheduled to 3/13/2024 at 1:10 pm. Patient declined any concerns related to housing, transportation, financial, and/or food insecurities. Patient reports they have all the supports and resources needed at this time and no ongoing CC needs identified.   How are your symptoms? (Red Flag symptoms escalate to triage hotline per guidelines) Improved   Do you feel your condition is stable enough to be safe at home until your provider visit? Yes   Does the patient have their discharge instructions?  Yes    Does the patient have questions regarding their discharge instructions?  No   Were you started on any new medications or were there changes to any of your previous medications?  Yes   Does the patient have all of their medications? Yes   Do you have questions regarding any of your medications?  No   Do you have all of your needed medical supplies or equipment (DME)?  (i.e. oxygen tank, CPAP, cane, etc.) Yes   Discharge follow-up appointment scheduled within 14 calendar days?  Yes   Discharge Follow Up Appointment Date 3/13/2024   Discharge Follow Up Appointment Scheduled with? Primary Care Provider     Hospital Follow-up Visit:    Hospital/Nursing Home/IP Rehab Facility: [facility]  Date of Admission: 2/22/24  Date of Discharge: 2/26/24  Reason(s) for Admission: SOB, Covid    Was your hospitalization related to COVID-19? YES   How are you feeling today? Better  In the past 24 hours have you had shortness of breath when speaking, walking, or climbing stairs? My breathing issues have stayed the same  Do you have a cough? Yes, I have a severe cough  When is the last time you had a fever greater than 100?   Are you having any other symptoms? Fatigue   Do you have any other stressors you would like to discuss with your provider? No             1/8/2024    10:35 AM   PHQ Assesment Total Score(s)   PHQ-2 Score 0       Was the patient in the ICU or did the patient experience delirium during hospitalization?  No        Problems taking medications regularly:  None  Medication changes since discharge: None  Problems adhering to non-medication therapy:  None    Summary of hospitalization:  [facility] discharge summary reviewed  Diagnostic Tests/Treatments reviewed.  Follow up needed: clinic follow up   Other Healthcare Providers Involved in Patient s Care:         Physical Therapy  Update since discharge: improved.         Plan of care communicated with patient                 Review of  "Systems  Constitutional, HEENT, cardiovascular, pulmonary, GI, , musculoskeletal, neuro, skin, endocrine and psych systems are negative, except as otherwise noted.      Objective    BP (!) 140/72 (BP Location: Left arm, Patient Position: Sitting, Cuff Size: Adult Regular)   Pulse 87   Temp 98  F (36.7  C) (Tympanic)   Resp 16   Ht 1.626 m (5' 4\")   Wt 60.3 kg (133 lb)   LMP  (LMP Unknown)   SpO2 98%   BMI 22.83 kg/m    Body mass index is 22.83 kg/m .  Physical Exam   GENERAL: frail, weak, alert and no distress  NECK: no adenopathy, no asymmetry, masses, or scars  RESP: lungs clear to auscultation - no rales, rhonchi or wheezes, fine base crackles   CV: irregular rate and rhythm, normal S1 S2, no S3 or S4, 3/6 systolic murmur, no click or rub, no peripheral edema  ABDOMEN: soft, nontender, no hepatosplenomegaly, no masses and bowel sounds normal  MS: no gross musculoskeletal defects noted, no edema  Uses a walker, O2 sat off oxygen is 82 %    Lab on 03/08/2024   Component Date Value Ref Range Status    INR 03/08/2024 2.8 (H)  0.9 - 1.1 Final           Signed Electronically by: Yunior Huang MD    "

## 2024-03-14 LAB
ANION GAP SERPL CALCULATED.3IONS-SCNC: 11 MMOL/L (ref 7–15)
BUN SERPL-MCNC: 35.9 MG/DL (ref 8–23)
CALCIUM SERPL-MCNC: 10.1 MG/DL (ref 8.8–10.2)
CHLORIDE SERPL-SCNC: 98 MMOL/L (ref 98–107)
CREAT SERPL-MCNC: 1.07 MG/DL (ref 0.51–0.95)
DEPRECATED HCO3 PLAS-SCNC: 31 MMOL/L (ref 22–29)
EGFRCR SERPLBLD CKD-EPI 2021: 52 ML/MIN/1.73M2
GLUCOSE SERPL-MCNC: 96 MG/DL (ref 70–99)
POTASSIUM SERPL-SCNC: 4.5 MMOL/L (ref 3.4–5.3)
SODIUM SERPL-SCNC: 140 MMOL/L (ref 135–145)

## 2024-03-18 ENCOUNTER — OFFICE VISIT (OUTPATIENT)
Dept: CARDIOLOGY | Facility: CLINIC | Age: 84
End: 2024-03-18
Payer: COMMERCIAL

## 2024-03-18 ENCOUNTER — LAB (OUTPATIENT)
Dept: LAB | Facility: CLINIC | Age: 84
End: 2024-03-18
Payer: COMMERCIAL

## 2024-03-18 VITALS
WEIGHT: 133 LBS | SYSTOLIC BLOOD PRESSURE: 148 MMHG | HEART RATE: 71 BPM | HEIGHT: 64 IN | OXYGEN SATURATION: 100 % | BODY MASS INDEX: 22.71 KG/M2 | DIASTOLIC BLOOD PRESSURE: 70 MMHG

## 2024-03-18 DIAGNOSIS — I50.30 HEART FAILURE WITH PRESERVED EJECTION FRACTION, NYHA CLASS I (H): ICD-10-CM

## 2024-03-18 LAB — NT-PROBNP SERPL-MCNC: 4811 PG/ML (ref 0–1800)

## 2024-03-18 PROCEDURE — 83880 ASSAY OF NATRIURETIC PEPTIDE: CPT | Performed by: PHYSICIAN ASSISTANT

## 2024-03-18 PROCEDURE — 36415 COLL VENOUS BLD VENIPUNCTURE: CPT | Performed by: PHYSICIAN ASSISTANT

## 2024-03-18 PROCEDURE — 99215 OFFICE O/P EST HI 40 MIN: CPT | Performed by: PHYSICIAN ASSISTANT

## 2024-03-18 RX ORDER — TORSEMIDE 20 MG/1
20 TABLET ORAL DAILY
Status: SHIPPED
Start: 2024-03-18 | End: 2024-04-29

## 2024-03-18 RX ORDER — AMLODIPINE BESYLATE 5 MG/1
5 TABLET ORAL DAILY
Status: SHIPPED
Start: 2024-03-18 | End: 2024-04-29

## 2024-03-18 NOTE — LETTER
"3/18/2024    Yunior Huang MD  [address] [facility] [address]    RE: Zunilda Clark       Dear Colleague,     I had the pleasure of seeing Zunilda Clark in the [facility].      Cardiology Progress Note    Date of Service: 03/18/24      Reason for visit: Follow up aortic and mitral valve disease, pulmonary hypertension.       Primary cardiologist: Dr. Noe Rodriguez        HPI:  Ms. 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 However, she has chosen to proceed conservatively.      When I met with Deanne in March of 2023, she had suffered a fall and a T12 burst fracture which they were also treating conservatively, but breathing was ok. When she met with Dr. Rodriguez in July 2023, repeat echo at that time showed findings similar to prior, though aortic stenosis had progressed to mod-severe. As she was feeling well, however, no changes were made. At our most recent visit in January, she had just suffered another fall and fractured her femur and underwent repair which sounds to have gone well. However, she was also having back pain and wearing a brace which was limiting her ambulation. She had another echo prior to that visit; EF remained preserved at 60-65%. 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 Her RV is pressures were elevated and RV is mild to moderately dilated, but with normal RV function reported. From a symptom standpoint, she felt she was doing ok, but as mentioned, was not very active. After d/w with Dr. Rodriguez, we offered a structural clinic evaluation which she politely declined as she was asymptomatic.    Today, I'm seeing Deanne back in clinic for follow up. Since I saw her last, she was hospitalized in mid February for shortness of breath and orthopnea, felt to have an acute HFpEF exacerbation and was noted to be COVID positive. She was diuresed with IV Lasix and diuretics were change to PO lasix 40mg daily. It appears that most of her antihypertensives were held on admission, but she was kept on metoprolol, then prior to discharge, for improved BP control metoprolol was also increased to 75mg BID. Her valvular disease was again discussed but she again deferred further invasive procedures.     She tells me today that she is still having more MORRISON than usual. She was discharged with oxygen and is compliant with this, and notes that when she exerts herself she will desat to mid 80s (though at times has a hard time getting good readings due to cold hands). She is not having any chest pain, palpitations, or dizziness. She does have some mild LE edema but weight is overall stable.       There were no new labs performed prior to our visit today. Most recent labs reviewed as below.        ASSESSMENT/PLAN:     Multivalvular dysfunction.  --Ms. Clark has a history of Rheumatic heart disease with bioprosthetic mitral valve replacement in 2007, and redo in 2014 with a 27 mm Magna bioprosthetic mitral valve.  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 We offered structural clinic for further evaluation but she  continues to politely decline and is not interested in any further invasive procedures. She would like to continue to proceed conservatively.  --As above, recent hospital stay for acute HFpEF exacerbation. Today, she appears still mildly volume up on Lasix 40mg daily. Renal function remains acceptable; will add on an NT-proBNP for trending purposes, and change her back to torsemide, though escalate slightly to 20mg daily. Repeat BMP in ~1 week.   --We also had a discussion today about Palliative Care. We discussed that this is likely to progress which she understands, and that we cannot predict the timing/trajectory. She is agreeable to visit with them; stressed the importance of ensuring advanced directives up to date and discussed how they can assist with symptom management and potentially transition to hospice when appropriate.      2. Pulmonary hypertension.              --Group 2 PH in the setting of valvular disease. Overall, pulmonary vasodilators are not felt to be indicated and she has chosen to proceed conservatively with avoiding invasive procedures as mentioned.  Per echo, Her RV is pressures are elevated and RV is mild to moderately dilated, but with normal RV function reported.              3. High degree AV block.              --Post surgery 2007, s/p dual-chamber pacemaker placement.  Last device check March 2024: AP 97%,  94%. Patient in mode switch <1%. Battery life 8.5 years.               --She remains anticoagulated with warfarin, and follows with the INR clinic. She tells me this has been somewhat labile.     4. Hypertension.              --During her recent stay, her amlodipine and losart/hydrochlorothiazide were discontinued. Metoprolol dose was increased to 75mg BID. Diuretics adjusted as above as well. BP today is elevated; will resume her amlodipine (which she thinks was helpful previously) at 5mg daily and monitor. She does not have a BP cuff at home; I encouraged her to get one, start  taking daily, and keep a log for our next visit along with her weight.         Follow up plan:  Labs in 1 week and see me back in ~2 weeks. For now, keep tentative visit in June to see Dr. Rodriguez as already scheduled. As she is not interested in intervention, will not repeat cardiac imaging and continue to treat symptomatic/conservatively.       Orders this Visit:  Orders Placed This Encounter   Procedures    N terminal pro BNP outpatient    Comprehensive metabolic panel    N terminal pro BNP outpatient    Follow-Up with Cardiology [facility]    Adult Palliative Care  Referral     Orders Placed This Encounter   Medications    amLODIPine (NORVASC) 5 MG tablet     Sig: Take 1 tablet (5 mg) by mouth daily    torsemide (DEMADEX) 20 MG tablet     Sig: Take 1 tablet (20 mg) by mouth daily     Medications Discontinued During This Encounter   Medication Reason    Lidocaine (LIDOCARE) 4 % Patch Therapy completed (No AVS)    lidocaine 5% oint/silver sulfadiazine 1% cm/triamcinolone 0.1% cm (JESUS PASTE) compounded ointment Therapy completed (No AVS)    furosemide (LASIX) 40 MG tablet Stop at Discharge             CURRENT MEDICATIONS:  Current Outpatient Medications   Medication Sig Dispense Refill    acetaminophen (TYLENOL) 500 MG tablet Take 1,000 mg by mouth every 8 hours as needed for pain      allopurinol (ZYLOPRIM) 100 MG tablet Take 100 mg by mouth 2 times daily      amLODIPine (NORVASC) 5 MG tablet Take 1 tablet (5 mg) by mouth daily      amoxicillin (AMOXIL) 500 MG tablet Take 2000mg (4 tablets of 500mg each) approx 30 min to 1 hour prior to any dental procedures 4 tablet 3    calcium citrate (CALCITRATE) 950 MG tablet Take 1 tablet by mouth daily       famotidine (PEPCID) 40 MG tablet TAKE 1 TABLET BY MOUTH DAILY 90 tablet 3    folic acid (FOLVITE) 1 MG tablet Take 1 mg by mouth daily      levothyroxine (SYNTHROID/LEVOTHROID) 112 MCG tablet Take 1 tablet (112 mcg) by mouth daily 100 tablet 3    methotrexate 50 MG/2ML  "injection Inject 0.8 mLs Subcutaneous every 7 days (Fridays)      Metoprolol Tartrate 75 MG TABS Take 75 mg by mouth 2 times daily 180 tablet 1    multivitamin w/minerals (THERA-VIT-M) tablet Take 1 tablet by mouth daily Without iron      torsemide (DEMADEX) 20 MG tablet Take 1 tablet (20 mg) by mouth daily      VITAMIN D, CHOLECALCIFEROL, PO Take 1,000 Units by mouth daily      warfarin ANTICOAGULANT (COUMADIN) 2.5 MG tablet Take 1 tablet (2.5 mg) by mouth daily Take 1 tablet (2.5 mg) by mouth Tuesday, Thursday, Saturday, and Sunday.  Take 2 tablets (5 mg) by mouth Monday, Wednesday, Friday. 40 tablet 0         Review of Systems:  POS ROS ARE BOLDED, all other negative.    Cardiovascular: Chest pain, palpitations, orthopnea, LE edema  Resp: Dyspnea on exertion, cough, known chronic lung disease  Hematologic/lymphatic: Current systemic anticoagulation, hx of blood clots, new bleeding concerns.  Neurological: Dizziness, presyncope/syncope.    Physical Exam:  Vitals: BP (!) 148/70 (BP Location: Right arm, Patient Position: Sitting, Cuff Size: Adult Regular)   Pulse 71   Ht 1.626 m (5' 4\")   Wt 60.3 kg (133 lb)   LMP  (LMP Unknown)   SpO2 100%   BMI 22.83 kg/m     Wt Readings from Last 4 Encounters:   03/18/24 60.3 kg (133 lb)   03/13/24 60.3 kg (133 lb)   02/26/24 60.8 kg (134 lb)   01/08/24 61.2 kg (135 lb)       GEN:  In general, this is a somewhat frail appearing  female in no acute distress on RA.  Patient ambulatory with a walker, accompanied by her sister today.  C/V: RRR, though with 2-3/6 JON heard at both right and left sternal borders.   RESP: Respirations are unlabored. No use of accessory muscles. Few faint crackles in bases. No wheezing or rhonchi.  EXTREM: Trace to 1+ L>R PT edema.   NEURO: Alert and oriented, cooperative. Gait not assessed.     Recent Lab Results:    LIPID RESULTS:  Lab Results   Component Value Date    CHOL 183 07/14/2023    CHOL 167 11/17/2020    HDL 47 (L) 07/14/2023 "    HDL 45 (L) 11/17/2020     (H) 07/14/2023     (H) 11/17/2020    TRIG 79 07/14/2023    TRIG 87 11/17/2020    CHOLHDLRATIO 3.6 11/13/2015       CBC RESULTS:  Lab Results   Component Value Date    WBC 7.4 03/13/2024    WBC 7.1 06/22/2021    RBC 3.43 (L) 03/13/2024    RBC 3.75 (L) 06/22/2021    HGB 11.4 (L) 03/13/2024    HGB 12.5 06/22/2021    HCT 35.4 03/13/2024    HCT 38.6 06/22/2021     (H) 03/13/2024     (H) 06/22/2021    MCH 33.2 (H) 03/13/2024    MCH 33.3 (H) 06/22/2021    MCHC 32.2 03/13/2024    MCHC 32.4 06/22/2021    RDW 18.7 (H) 03/13/2024    RDW 17.0 (H) 06/22/2021     03/13/2024     06/22/2021       BMP RESULTS:  Lab Results   Component Value Date     03/13/2024     06/22/2021    POTASSIUM 4.5 03/13/2024    POTASSIUM 3.7 08/17/2022    POTASSIUM 3.8 06/22/2021    CHLORIDE 98 03/13/2024    CHLORIDE 107 08/17/2022    CHLORIDE 104 06/22/2021    CO2 31 (H) 03/13/2024    CO2 26 08/17/2022    CO2 33 (H) 06/22/2021    ANIONGAP 11 03/13/2024    ANIONGAP 7 08/17/2022    ANIONGAP 1 (L) 06/22/2021    GLC 96 03/13/2024     (H) 09/27/2023    GLC 91 08/17/2022    GLC 88 06/22/2021    BUN 35.9 (H) 03/13/2024    BUN 21 08/17/2022    BUN 27 06/22/2021    CR 1.07 (H) 03/13/2024    CR 1.11 (H) 06/22/2021    GFRESTIMATED 52 (L) 03/13/2024    GFRESTIMATED 50.9 06/28/2021    GFRESTBLACK 54 (L) 06/22/2021    BRICE 10.1 03/13/2024    BRICE 9.6 06/22/2021        Recent Labs   Lab Test 02/22/24  1026 09/26/23  0934 03/13/23  1110 08/17/22  0947 03/25/22  1557 07/26/18  1035 05/03/17  1230   NTBNPI 4,362* 1,711  --   --   --   --  4,812*   NTBNP  --   --  1,680 1,290* 1,754*   < >  --     < > = values in this interval not displayed.         Additional pertinent testing:    Echo 1/5/24  Interpretation Summary     There is a 27 mm Magna bioprosthetic valve in the mitral position.  Severe prosthetic mitral valve stenosis  Severe valvular aortic stenosis.  There is mild (1+) aortic  regurgitation.  The visual ejection fraction is 60-65%.  Left ventricular systolic function is normal.  The right ventricle is mild to moderately dilated.  The right ventricular systolic function is normal.  There is moderate (2+) tricuspid regurgitation.  Right ventricular systolic pressure is elevated, consistent with severe  pulmonary hypertension.  The rhythm was atrial fibrillation with paced rhythm.  Compared to the previous echocardiogram, the mean gradient across the mitral  valve prosthesis has significantly increased at a similar heart rate. The mean  gradient now is 12 mmHg and was 5.6 mmHg on 18 June of 2023.  The degree of aortic stenosis has also progressed. Previously the mean  gradient across the aortic valve was 21.9 mmHg and a calculated aortic valve  area of was 1.0 cmÂ . Now the mean gradient across the aortic valve is 33.7  mmHg and the calculated aortic valve area is 0.87 cmÂ .  The degree of tricuspid regurgitation is similar to previously being moderate.  Pulmonary pressures are lower on this study than previously but still severe.      45 total minutes was spent today including chart review, precharting, history and exam, patient education, post visit documentation, and reviewing studies as outlined above.       Zena Benitez PA-C  [facility]  Pager [phone]      Thank you for allowing me to participate in the care of your patient.      Sincerely,     YOKASTA Salcido     [facility]  cc:   Momo Beatty MD  [address]  [place] [address]

## 2024-03-18 NOTE — PROGRESS NOTES
"    Cardiology Progress Note    Date of Service: 03/18/24      Reason for visit: Follow up aortic and mitral valve disease, pulmonary hypertension.       Primary cardiologist: Dr. Noe Rodriguez        HPI:  Ms. 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 However, she has chosen to proceed conservatively.      When I met with Deanne in March of 2023, she had suffered a fall and a T12 burst fracture which they were also treating conservatively, but breathing was ok. When she met with Dr. Rodriguez in July 2023, repeat echo at that time showed findings similar to prior, though aortic stenosis had progressed to mod-severe. As she was feeling well, however, no changes were made. At our most recent visit in January, she had just suffered another fall and fractured her femur and underwent repair which sounds to have gone well. However, she was also having back pain and wearing a brace which was limiting her ambulation. She had another echo prior to that visit; EF remained preserved at 60-65%. 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 Her RV is pressures were elevated and RV is mild to moderately dilated, but with normal RV function reported. From a symptom standpoint, she felt she was doing ok, but as " mentioned, was not very active. After d/w with Dr. Rodriguez, we offered a structural clinic evaluation which she politely declined as she was asymptomatic.    Today, I'm seeing Deanne back in clinic for follow up. Since I saw her last, she was hospitalized in mid February for shortness of breath and orthopnea, felt to have an acute HFpEF exacerbation and was noted to be COVID positive. She was diuresed with IV Lasix and diuretics were change to PO lasix 40mg daily. It appears that most of her antihypertensives were held on admission, but she was kept on metoprolol, then prior to discharge, for improved BP control metoprolol was also increased to 75mg BID. Her valvular disease was again discussed but she again deferred further invasive procedures.     She tells me today that she is still having more MORRISON than usual. She was discharged with oxygen and is compliant with this, and notes that when she exerts herself she will desat to mid 80s (though at times has a hard time getting good readings due to cold hands). She is not having any chest pain, palpitations, or dizziness. She does have some mild LE edema but weight is overall stable.       There were no new labs performed prior to our visit today. Most recent labs reviewed as below.        ASSESSMENT/PLAN:     Multivalvular dysfunction.  --Ms. Clark has a history of Rheumatic heart disease with bioprosthetic mitral valve replacement in 2007, and redo in 2014 with a 27 mm Magna bioprosthetic mitral valve.  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 We offered structural clinic for further evaluation but she continues to politely decline and is not interested in any further invasive procedures. She would like to continue to proceed conservatively.  --As above, recent hospital stay for acute HFpEF exacerbation.  Today, she appears still mildly volume up on Lasix 40mg daily. Renal function remains acceptable; will add on an NT-proBNP for trending purposes, and change her back to torsemide, though escalate slightly to 20mg daily. Repeat BMP in ~1 week.   --We also had a discussion today about Palliative Care. We discussed that this is likely to progress which she understands, and that we cannot predict the timing/trajectory. She is agreeable to visit with them; stressed the importance of ensuring advanced directives up to date and discussed how they can assist with symptom management and potentially transition to hospice when appropriate.      2. Pulmonary hypertension.              --Group 2 PH in the setting of valvular disease. Overall, pulmonary vasodilators are not felt to be indicated and she has chosen to proceed conservatively with avoiding invasive procedures as mentioned.  Per echo, Her RV is pressures are elevated and RV is mild to moderately dilated, but with normal RV function reported.              3. High degree AV block.              --Post surgery 2007, s/p dual-chamber pacemaker placement.  Last device check March 2024: AP 97%,  94%. Patient in mode switch <1%. Battery life 8.5 years.               --She remains anticoagulated with warfarin, and follows with the INR clinic. She tells me this has been somewhat labile.     4. Hypertension.              --During her recent stay, her amlodipine and losart/hydrochlorothiazide were discontinued. Metoprolol dose was increased to 75mg BID. Diuretics adjusted as above as well. BP today is elevated; will resume her amlodipine (which she thinks was helpful previously) at 5mg daily and monitor. She does not have a BP cuff at home; I encouraged her to get one, start taking daily, and keep a log for our next visit along with her weight.         Follow up plan:  Labs in 1 week and see me back in ~2 weeks. For now, keep tentative visit in June to see Dr. Rodriguez as already  scheduled. As she is not interested in intervention, will not repeat cardiac imaging and continue to treat symptomatic/conservatively.       Orders this Visit:  Orders Placed This Encounter   Procedures    N terminal pro BNP outpatient    Comprehensive metabolic panel    N terminal pro BNP outpatient    Follow-Up with Cardiology [facility]    Adult Palliative Care Pending sale to [facility] Referral     Orders Placed This Encounter   Medications    amLODIPine (NORVASC) 5 MG tablet     Sig: Take 1 tablet (5 mg) by mouth daily    torsemide (DEMADEX) 20 MG tablet     Sig: Take 1 tablet (20 mg) by mouth daily     Medications Discontinued During This Encounter   Medication Reason    Lidocaine (LIDOCARE) 4 % Patch Therapy completed (No AVS)    lidocaine 5% oint/silver sulfadiazine 1% cm/triamcinolone 0.1% cm (JESUS PASTE) compounded ointment Therapy completed (No AVS)    furosemide (LASIX) 40 MG tablet Stop at Discharge             CURRENT MEDICATIONS:  Current Outpatient Medications   Medication Sig Dispense Refill    acetaminophen (TYLENOL) 500 MG tablet Take 1,000 mg by mouth every 8 hours as needed for pain      allopurinol (ZYLOPRIM) 100 MG tablet Take 100 mg by mouth 2 times daily      amLODIPine (NORVASC) 5 MG tablet Take 1 tablet (5 mg) by mouth daily      amoxicillin (AMOXIL) 500 MG tablet Take 2000mg (4 tablets of 500mg each) approx 30 min to 1 hour prior to any dental procedures 4 tablet 3    calcium citrate (CALCITRATE) 950 MG tablet Take 1 tablet by mouth daily       famotidine (PEPCID) 40 MG tablet TAKE 1 TABLET BY MOUTH DAILY 90 tablet 3    folic acid (FOLVITE) 1 MG tablet Take 1 mg by mouth daily      levothyroxine (SYNTHROID/LEVOTHROID) 112 MCG tablet Take 1 tablet (112 mcg) by mouth daily 100 tablet 3    methotrexate 50 MG/2ML injection Inject 0.8 mLs Subcutaneous every 7 days (Fridays)      Metoprolol Tartrate 75 MG TABS Take 75 mg by mouth 2 times daily 180 tablet 1    multivitamin w/minerals (THERA-VIT-M) tablet Take 1  "tablet by mouth daily Without iron      torsemide (DEMADEX) 20 MG tablet Take 1 tablet (20 mg) by mouth daily      VITAMIN D, CHOLECALCIFEROL, PO Take 1,000 Units by mouth daily      warfarin ANTICOAGULANT (COUMADIN) 2.5 MG tablet Take 1 tablet (2.5 mg) by mouth daily Take 1 tablet (2.5 mg) by mouth Tuesday, Thursday, Saturday, and Sunday.  Take 2 tablets (5 mg) by mouth Monday, Wednesday, Friday. 40 tablet 0         Review of Systems:  POS ROS ARE BOLDED, all other negative.    Cardiovascular: Chest pain, palpitations, orthopnea, LE edema  Resp: Dyspnea on exertion, cough, known chronic lung disease  Hematologic/lymphatic: Current systemic anticoagulation, hx of blood clots, new bleeding concerns.  Neurological: Dizziness, presyncope/syncope.    Physical Exam:  Vitals: BP (!) 148/70 (BP Location: Right arm, Patient Position: Sitting, Cuff Size: Adult Regular)   Pulse 71   Ht 1.626 m (5' 4\")   Wt 60.3 kg (133 lb)   LMP  (LMP Unknown)   SpO2 100%   BMI 22.83 kg/m     Wt Readings from Last 4 Encounters:   03/18/24 60.3 kg (133 lb)   03/13/24 60.3 kg (133 lb)   02/26/24 60.8 kg (134 lb)   01/08/24 61.2 kg (135 lb)       GEN:  In general, this is a somewhat frail appearing  female in no acute distress on RA.  Patient ambulatory with a walker, accompanied by her sister today.  C/V: RRR, though with 2-3/6 JON heard at both right and left sternal borders.   RESP: Respirations are unlabored. No use of accessory muscles. Few faint crackles in bases. No wheezing or rhonchi.  EXTREM: Trace to 1+ L>R PT edema.   NEURO: Alert and oriented, cooperative. Gait not assessed.     Recent Lab Results:    LIPID RESULTS:  Lab Results   Component Value Date    CHOL 183 07/14/2023    CHOL 167 11/17/2020    HDL 47 (L) 07/14/2023    HDL 45 (L) 11/17/2020     (H) 07/14/2023     (H) 11/17/2020    TRIG 79 07/14/2023    TRIG 87 11/17/2020    CHOLHDLRATIO 3.6 11/13/2015       CBC RESULTS:  Lab Results   Component " Value Date    WBC 7.4 03/13/2024    WBC 7.1 06/22/2021    RBC 3.43 (L) 03/13/2024    RBC 3.75 (L) 06/22/2021    HGB 11.4 (L) 03/13/2024    HGB 12.5 06/22/2021    HCT 35.4 03/13/2024    HCT 38.6 06/22/2021     (H) 03/13/2024     (H) 06/22/2021    MCH 33.2 (H) 03/13/2024    MCH 33.3 (H) 06/22/2021    MCHC 32.2 03/13/2024    MCHC 32.4 06/22/2021    RDW 18.7 (H) 03/13/2024    RDW 17.0 (H) 06/22/2021     03/13/2024     06/22/2021       BMP RESULTS:  Lab Results   Component Value Date     03/13/2024     06/22/2021    POTASSIUM 4.5 03/13/2024    POTASSIUM 3.7 08/17/2022    POTASSIUM 3.8 06/22/2021    CHLORIDE 98 03/13/2024    CHLORIDE 107 08/17/2022    CHLORIDE 104 06/22/2021    CO2 31 (H) 03/13/2024    CO2 26 08/17/2022    CO2 33 (H) 06/22/2021    ANIONGAP 11 03/13/2024    ANIONGAP 7 08/17/2022    ANIONGAP 1 (L) 06/22/2021    GLC 96 03/13/2024     (H) 09/27/2023    GLC 91 08/17/2022    GLC 88 06/22/2021    BUN 35.9 (H) 03/13/2024    BUN 21 08/17/2022    BUN 27 06/22/2021    CR 1.07 (H) 03/13/2024    CR 1.11 (H) 06/22/2021    GFRESTIMATED 52 (L) 03/13/2024    GFRESTIMATED 50.9 06/28/2021    GFRESTBLACK 54 (L) 06/22/2021    BRICE 10.1 03/13/2024    BRICE 9.6 06/22/2021        Recent Labs   Lab Test 02/22/24  1026 09/26/23  0934 03/13/23  1110 08/17/22  0947 03/25/22  1557 07/26/18  1035 05/03/17  1230   NTBNPI 4,362* 1,711  --   --   --   --  4,812*   NTBNP  --   --  1,680 1,290* 1,754*   < >  --     < > = values in this interval not displayed.         Additional pertinent testing:    Echo 1/5/24  Interpretation Summary     There is a 27 mm Magna bioprosthetic valve in the mitral position.  Severe prosthetic mitral valve stenosis  Severe valvular aortic stenosis.  There is mild (1+) aortic regurgitation.  The visual ejection fraction is 60-65%.  Left ventricular systolic function is normal.  The right ventricle is mild to moderately dilated.  The right ventricular systolic function  is normal.  There is moderate (2+) tricuspid regurgitation.  Right ventricular systolic pressure is elevated, consistent with severe  pulmonary hypertension.  The rhythm was atrial fibrillation with paced rhythm.  Compared to the previous echocardiogram, the mean gradient across the mitral  valve prosthesis has significantly increased at a similar heart rate. The mean  gradient now is 12 mmHg and was 5.6 mmHg on 18 June of 2023.  The degree of aortic stenosis has also progressed. Previously the mean  gradient across the aortic valve was 21.9 mmHg and a calculated aortic valve  area of was 1.0 cmÂ . Now the mean gradient across the aortic valve is 33.7  mmHg and the calculated aortic valve area is 0.87 cmÂ .  The degree of tricuspid regurgitation is similar to previously being moderate.  Pulmonary pressures are lower on this study than previously but still severe.      45 total minutes was spent today including chart review, precharting, history and exam, patient education, post visit documentation, and reviewing studies as outlined above.       Zena Benitez PA-C  [facility]  Pager [phone]

## 2024-03-18 NOTE — PATIENT INSTRUCTIONS
Thank you for visiting the cardiology clinic today.      Medication changes:    STOP the Lasix  START torsemide 20mg daily   START amlodipine 5mg daily     Other information/recommendations:  Keep weighing yourself at home--call if weight trends upward.   Get a blood pressure cuff and check your blood pressure in the morning.   Bring a log of both of these to your next visit.     I will send in a Palliative Care referral as discussed.     Follow up Plan:  Return to see Zena in ~2 weeks with repeat labs.     Please feel free to reach out to our nursing team at [phone] with any questions or concerns.  You can also send us a MyChart and we will get back with you as soon as possible.

## 2024-03-19 ENCOUNTER — TELEPHONE (OUTPATIENT)
Dept: CARDIOLOGY | Facility: CLINIC | Age: 84
End: 2024-03-19
Payer: COMMERCIAL

## 2024-03-19 NOTE — TELEPHONE ENCOUNTER
[facility] Call Center    Phone Message    May a detailed message be left on voicemail: yes     Reason for Call: Other: Patient says she is confused by the chnages that were made to her medications by Zena Schafer, says she needs to know the amounts and how ofte to take amplodphine, Tursomide, please call pt back for further discussion.     Action Taken: Other: cardiology    Travel Screening: Not Applicable    Thank you!  [facility]

## 2024-03-19 NOTE — TELEPHONE ENCOUNTER
Contacted patient to review further. Patient did not answer. Left message for patient to call back.

## 2024-03-20 NOTE — PROGRESS NOTES
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  -Hospitalization code 2/2024 for acute CHF exacerbation as well as COVID.     Reviewed: Yes, some Rx's for oxycodone from last year.    History of Present Illness:  Zunilda Clark is an 83 year old female who is seen for a new consult via Video visit today with Palliative Care.  Her Sister Mary is also on the call and provides additional history.     Reviewed cardiology clinic note from 3/18.  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  She has declined structural clinic referral for further evaluation as she is not interested in further invasive procedures.  They plan to trend her BNP and change her diuretic back to torsemide.  Also discussed referral to palliative care for advanced care planning and goals of care discussions.    Says overall she is feeling okay.  She has adjusted to the changes in her medication regimen, though admits she was a little bit worried about doubling her torsemide dose.  She has a good appetite and eats 2 meals per day.  Feels her weight is generally stable.  Her bowels are moving very well.  She had diarrhea about a year ago, however that has improved.  She sleeps well at night.  She " "had been out walking more prior to her recent hospitalization.    Says the biggest hindrance for her right now is dealing with the portable oxygen.  She is averaging 2 L of nasal cannula at rest and then will turn it up to 3 L when she goes out of her home for activities.  She cannot really feel the difference in her oxygen saturations when they fluctuate.  The tanks are very heavy and hard for her to manage, and they barely last 4 hours.  She is wondering about an oxygen concentrator that would be portable for her to take with her.  She would like to be able to attend [employer] and Sunday school, and she needs to be able to manage her oxygen needs for more than 4 hours in order to do this.    Her relationship with the Lord is the most important thing to her, and being able to be present with her Lutheran family is \"like candy\".  Having the oxygen concentrator would make a world of difference for her.    States that she has pushed through a lot over the last couple of years, including recovery from compression fractures last spring as well as a femur fracture last fall.  She worked hard with physical therapy to get back some strength and stamina, and she has been walking with a walker since then.  She says that in general she has a difficult time making decisions (her sister laughed when she said this).  She has noticed that it does take longer and longer for her to recover from an episode or procedure.  She states \"I would just get better and then something else would happen\".  It is because of this that she has felt less inclined to pursue invasive medical interventions and procedures going forward.  She said it feels different because part of her thinks she should pursue the TAVR procedure, however she does not want to continue spending all of her time \"recovering\" from something.    Her daughters all live out of state, however they very much want to be a part of supporting her.    We reviewed her healthcare " directives as well as her POLST form today.  She confirms she would not want to go through CPR or intubation, however she is open to hospitalization for treatment of reversible conditions right now.     Social History:   2019.  4 daughters, 11 grandchildren, and 5 great-grandchildren.  All of her kids live out of state.  She was 1 of 4 siblings herself.  Social History     Tobacco Use    Smoking status: Never    Smokeless tobacco: Never   Vaping Use    Vaping Use: Never used   Substance Use Topics    Alcohol use: No     Alcohol/week: 0.0 standard drinks of alcohol    Drug use: No     Advanced Care Planning: HCD on file from 2020 naming daughters Alma and Kyara as her HCA's.  POLST form on file from October 2023 stating DNR/DNI-selective treatment.  No artificial nutrition by tube.    Family History- Reviewed in Epic.     Medications- Reviewed in Epic.    Past Medical History- Reviewed in [facility].    Past Surgical History- Reviewed in Epic.    Physical Exam:   Constitutional: Alert, pleasant, no apparent distress. Sitting up in chair.  Eyes: Sclera non-icteric, no eye discharge.  ENT: Nasal cannula in place.  No nasal discharge. Ears grossly normal.  Respiratory: Unlabored respirations. Speaking in full sentences.  Musculoskeletal: Extremities appear normal- no gross deformities noted. No edema noted on upper body.   Skin: No suspicious lesions or rashes on visible skin.  Neurologic: Clear speech, no aphasia. No facial droop.  Psychiatric: Mentation appears normal, appropriate attention. Affect normal/bright. Does not appear anxious or depressed.    Key Data Reviewed:  LABS: 3/13/2024- Cr 1.07, GFR 52. WBC 7.4, Hgb 11.4, Plts 368.  2/23/2024- albumin 3.8, LFTs WNL.    3/18/2024- BNP 4811    IMAGING: CT chest PE 2/22/2024- IMPRESSION:  1.  No evidence for pulmonary embolism.  2.  Bilateral hazy groundglass pulmonary opacities and bibasilar interstitial prominence. Cardiomegaly. Correlate with CHF and  "pulmonary edema. Small bibasilar pleural fluid also noted.    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    -From a symptomatic/logistical perspective, the biggest hindrance to quality of life right now is managing the oxygen tank.  Discussed that our team really does not have experience managing oxygen or ordering concentrators, however I would reach out to cardiology regarding any thoughts they might have about how to pursue a portable oxygen concentrator for her.  This would allow her more easily to leave her home and get to important places in her life, such as [employer].    -We talked about how everybody has different criteria for what they consider acceptable in their lives, and this varies from person to person.  Encouraged her to think about what she considers important and if there are any situations she would deem not acceptable and to use this information to help inform how she makes decisions about her health care, namely what sort of interventions or procedures she would want to go through.      -Encouraged patient and sister to review medical decisions through the lens of what is important to her and what brings meaning and value to her life, and then fit the medical \"stuff\" into that " "framework.   -She has noticed that it does take longer and longer for her to recover from an episode or procedure.  She states \"I would just get better and then something else would happen\".  It is because of this that she has felt less inclined to pursue invasive medical interventions and procedures going forward, including the TAVR.  She does not want to continue spending all of her time \"recovering\" from something.    -Reviewed her healthcare directives.  She confirmed desire for no CPR or intubation, however she is okay with rehospitalization at this point for treatment of reversible conditions.    -We briefly talked about hospice in the future.  Introduced the concept of an informational meeting with hospice, which would be a no obligation/commitment opportunity to ask questions about hospice plan of care.      Follow up: Will follow-up in 6 weeks for an initial check-in and then determine ongoing follow-up after that.      Video-Visit Details  Video Start Time: 8:05 AM  Video End Time: 8:59 AM    Originating Location (pt. Location): Home     Distant Location (provider location):  Offsite- Personal Home      Platform used for Video Visit: Perez     Total time spent on day of encounter is 74 mins, including reviewing record, review of above studies, above visit with patient, symptomatic discussion of cardiac and breathing symptoms, 20 minutes spent exclusively on advanced care planning and goals of care discussion regarding prognosis with ongoing heart failure, extensive conversation about medical decision making process, brief discussion about enrollment in hospice including support and symptom management in the future, conversation about healthcare directive, and code status discussion including reviewing POLST form, and documentation.     Mamie Last DO  Palliative Medicine   [id number]    Some chart documentation performed using Dragon Voice recognition Software. Although reviewed after " completion, some words and grammatical errors may remain.

## 2024-03-20 NOTE — TELEPHONE ENCOUNTER
Called patient. No answer. Left  for callback.     Rosemarie SUMMERS  [facility]

## 2024-03-21 ENCOUNTER — VIRTUAL VISIT (OUTPATIENT)
Dept: ONCOLOGY | Facility: CLINIC | Age: 84
End: 2024-03-21
Attending: PHYSICIAN ASSISTANT
Payer: COMMERCIAL

## 2024-03-21 VITALS — WEIGHT: 135 LBS | BODY MASS INDEX: 23.05 KG/M2 | HEIGHT: 64 IN

## 2024-03-21 DIAGNOSIS — Z71.89 GOALS OF CARE, COUNSELING/DISCUSSION: ICD-10-CM

## 2024-03-21 DIAGNOSIS — I27.20 PULMONARY HYPERTENSION (H): ICD-10-CM

## 2024-03-21 DIAGNOSIS — Z51.5 ENCOUNTER FOR PALLIATIVE CARE: ICD-10-CM

## 2024-03-21 DIAGNOSIS — T82.198S: ICD-10-CM

## 2024-03-21 DIAGNOSIS — I08.0 RHEUMATIC DISORDER OF BOTH MITRAL AND AORTIC VALVES: ICD-10-CM

## 2024-03-21 DIAGNOSIS — I07.1 RHEUMATIC TRICUSPID VALVE REGURGITATION: ICD-10-CM

## 2024-03-21 DIAGNOSIS — Z71.89 ADVANCED CARE PLANNING/COUNSELING DISCUSSION: ICD-10-CM

## 2024-03-21 DIAGNOSIS — I50.30 HEART FAILURE WITH PRESERVED EJECTION FRACTION, NYHA CLASS I (H): Primary | ICD-10-CM

## 2024-03-21 PROCEDURE — 99497 ADVNCD CARE PLAN 30 MIN: CPT | Mod: 25 | Performed by: STUDENT IN AN ORGANIZED HEALTH CARE EDUCATION/TRAINING PROGRAM

## 2024-03-21 PROCEDURE — 99204 OFFICE O/P NEW MOD 45 MIN: CPT | Mod: 25 | Performed by: STUDENT IN AN ORGANIZED HEALTH CARE EDUCATION/TRAINING PROGRAM

## 2024-03-21 ASSESSMENT — PAIN SCALES - GENERAL: PAINLEVEL: NO PAIN (0)

## 2024-03-21 NOTE — NURSING NOTE
Is the patient currently in the state of [place]? YES    Visit mode:VIDEO    If the visit is dropped, the patient can be reconnected by: VIDEO VISIT: Send to e-mail at: [email or web address]    Will anyone else be joining the visit? NO  (If patient encounters technical issues they should call [phone]    How would you like to obtain your AVS? MyChart    Are changes needed to the allergy or medication list? Pt declined med review    Reason for visit: Consult    Sol BOWDEN

## 2024-03-21 NOTE — LETTER
"    3/21/2024         RE: Zunilda Clark  [id number]  [address]  [place] [address]        Dear Colleague,    Thank you for referring your patient, Zunilda Clark, to the [facility] [place]. Please see a copy of my visit note below.    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  -Hospitalization code 2/2024 for acute CHF exacerbation as well as COVID.     Reviewed: Yes, some Rx's for oxycodone from last year.    History of Present Illness:  Zunilda Clark is an 83 year old female who is seen for a new consult via Video visit today with Palliative Care.  Her Sister Mary is also on the call and provides additional history.     Reviewed cardiology clinic note from 3/18.  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  She has declined structural clinic referral for further evaluation as she is not interested in further invasive procedures.  They plan to trend her BNP and change her diuretic back to torsemide.  Also discussed referral to palliative care for advanced care planning and goals of care discussions.    Says overall she is feeling okay.  She has adjusted to the changes in her medication regimen, though admits she was a little bit " "worried about doubling her torsemide dose.  She has a good appetite and eats 2 meals per day.  Feels her weight is generally stable.  Her bowels are moving very well.  She had diarrhea about a year ago, however that has improved.  She sleeps well at night.  She had been out walking more prior to her recent hospitalization.    Says the biggest hindrance for her right now is dealing with the portable oxygen.  She is averaging 2 L of nasal cannula at rest and then will turn it up to 3 L when she goes out of her home for activities.  She cannot really feel the difference in her oxygen saturations when they fluctuate.  The tanks are very heavy and hard for her to manage, and they barely last 4 hours.  She is wondering about an oxygen concentrator that would be portable for her to take with her.  She would like to be able to attend church and Sunday school, and she needs to be able to manage her oxygen needs for more than 4 hours in order to do this.    Her relationship with the Lord is the most important thing to her, and being able to be present with her Rastafari family is \"like candy\".  Having the oxygen concentrator would make a world of difference for her.    States that she has pushed through a lot over the last couple of years, including recovery from compression fractures last spring as well as a femur fracture last fall.  She worked hard with physical therapy to get back some strength and stamina, and she has been walking with a walker since then.  She says that in general she has a difficult time making decisions (her sister laughed when she said this).  She has noticed that it does take longer and longer for her to recover from an episode or procedure.  She states \"I would just get better and then something else would happen\".  It is because of this that she has felt less inclined to pursue invasive medical interventions and procedures going forward.  She said it feels different because part of her thinks she " "should pursue the TAVR procedure, however she does not want to continue spending all of her time \"recovering\" from something.    Her daughters all live out of state, however they very much want to be a part of supporting her.    We reviewed her healthcare directives as well as her POLST form today.  She confirms she would not want to go through CPR or intubation, however she is open to hospitalization for treatment of reversible conditions right now.     Social History:   2019.  4 daughters, 11 grandchildren, and 5 great-grandchildren.  All of her kids live out of state.  She was 1 of 4 siblings herself.  Social History     Tobacco Use     Smoking status: Never     Smokeless tobacco: Never   Vaping Use     Vaping Use: Never used   Substance Use Topics     Alcohol use: No     Alcohol/week: 0.0 standard drinks of alcohol     Drug use: No     Advanced Care Planning: HCD on file from 2020 naming daughters Alma and Kyara as her HCA's.  POLST form on file from October 2023 stating DNR/DNI-selective treatment.  No artificial nutrition by tube.    Family History- Reviewed in Epic.     Medications- Reviewed in Epic.    Past Medical History- Reviewed in [facility].    Past Surgical History- Reviewed in Epic.    Physical Exam:   Constitutional: Alert, pleasant, no apparent distress. Sitting up in chair.  Eyes: Sclera non-icteric, no eye discharge.  ENT: Nasal cannula in place.  No nasal discharge. Ears grossly normal.  Respiratory: Unlabored respirations. Speaking in full sentences.  Musculoskeletal: Extremities appear normal- no gross deformities noted. No edema noted on upper body.   Skin: No suspicious lesions or rashes on visible skin.  Neurologic: Clear speech, no aphasia. No facial droop.  Psychiatric: Mentation appears normal, appropriate attention. Affect normal/bright. Does not appear anxious or depressed.    Key Data Reviewed:  LABS: 3/13/2024- Cr 1.07, GFR 52. WBC 7.4, Hgb 11.4, Plts 368.  2/23/2024- albumin " "3.8, LFTs WNL.    3/18/2024- BNP 4811    IMAGING: CT chest PE 2/22/2024- IMPRESSION:  1.  No evidence for pulmonary embolism.  2.  Bilateral hazy groundglass pulmonary opacities and bibasilar interstitial prominence. Cardiomegaly. Correlate with CHF and pulmonary edema. Small bibasilar pleural fluid also noted.    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    -From a symptomatic/logistical perspective, the biggest hindrance to quality of life right now is managing the oxygen tank.  Discussed that our team really does not have experience managing oxygen or ordering concentrators, however I would reach out to cardiology regarding any thoughts they might have about how to pursue a portable oxygen concentrator for her.  This would allow her more easily to leave her home and get to important places in her life, such as [employer].    -We talked about how everybody has different criteria for what they consider acceptable in their lives, and this varies from person to person.  Encouraged her to think about what she considers important and if there are any situations she would deem not acceptable and to use this information to help inform how she makes decisions about her health care, namely what sort of " "interventions or procedures she would want to go through.      -Encouraged patient and sister to review medical decisions through the lens of what is important to her and what brings meaning and value to her life, and then fit the medical \"stuff\" into that framework.   -She has noticed that it does take longer and longer for her to recover from an episode or procedure.  She states \"I would just get better and then something else would happen\".  It is because of this that she has felt less inclined to pursue invasive medical interventions and procedures going forward, including the TAVR.  She does not want to continue spending all of her time \"recovering\" from something.    -Reviewed her healthcare directives.  She confirmed desire for no CPR or intubation, however she is okay with rehospitalization at this point for treatment of reversible conditions.    -We briefly talked about hospice in the future.  Introduced the concept of an informational meeting with hospice, which would be a no obligation/commitment opportunity to ask questions about hospice plan of care.      Follow up: Will follow-up in 6 weeks for an initial check-in and then determine ongoing follow-up after that.      Video-Visit Details  Video Start Time: 8:05 AM  Video End Time: 8:59 AM    Originating Location (pt. Location): Home     Distant Location (provider location):  Offsite- Personal Home      Platform used for Video Visit: FRESS     Total time spent on day of encounter is 74 mins, including reviewing record, review of above studies, above visit with patient, symptomatic discussion of cardiac and breathing symptoms, 20 minutes spent exclusively on advanced care planning and goals of care discussion regarding prognosis with ongoing heart failure, extensive conversation about medical decision making process, brief discussion about enrollment in hospice including support and symptom management in the future, conversation about healthcare " directive, and code status discussion including reviewing POLST form, and documentation.     Mamie Last DO  Palliative Medicine   [id number]    Some chart documentation performed using Dragon Voice recognition Software. Although reviewed after completion, some words and grammatical errors may remain.      Again, thank you for allowing me to participate in the care of your patient.        Sincerely,        Mamie Last DO

## 2024-03-21 NOTE — TELEPHONE ENCOUNTER
Spoke with patient and advised her the order was signed by Dr Huang for portable oxygen concentrator. Advised patient to call oxygen provider to set up appointment and discuss options.

## 2024-03-21 NOTE — TELEPHONE ENCOUNTER
----- Message from Amna Haile RN sent at 3/21/2024 11:24 AM CDT -----  Regarding: FW: Oxygen concentrator  Hi Dr. Huang and Cookie,    Please see messages below regarding patient's request/concerns about her oxygen concentrator. I am wondering if either of you are able to assist the patient with this request, as we rarely handle oxygen through cardiology.     Thanks so much!    Amna Haile  RN Care Coordinator  [facility]  ----- Message -----  From: Zena Benitez PA  Sent: 3/21/2024  10:39 AM CDT  To: [facility] Nursing Team  Subject: FW: Oxygen concentrator                          Hi Team:    Can we see if we can get Deanne a portable concentrator instead of tanks for her oxygen?    Also, Please reach out to her and let her know we can change her planned Monday labs to Wed to save her a visit (so can get my labs the same time as INR).    Thank you!  Zena        ----- Message -----  From: Mamie Last DO  Sent: 3/21/2024  10:30 AM CDT  To: YOKASTA Salcido  Subject: Oxygen concentrator                              Hi Zena,     I met Deanne and her Sister Mary for our palliative care consult this morning.  Overall, it seems like she is doing fairly well and adjusting to the medication changes that you all made.  She notes she has a lab appointment on Monday, however depending on how the weather looks, they may ask if they could bump those labs to Wednesday when she gets her INR checked.    She brought up that the biggest hindrance to her quality of life right now is the oxygen tank and she was asking about a portable oxygen concentrator.  I know I have had other patients and who reviews these and find them to be helpful.  Based on my understanding, I believe her oxygen needs are currently low enough that a concentrator would work for her.  I just have no experience ordering/managing these for patients and honestly do not even know where to begin.  Is this something someone  from your office could help facilitate for her?    We are planning to have a check-in in about 6 weeks, and then may spread out her follow-up after that.  She may include her daughters and some of our conversations going forward as well.    Thank so much,  Mamie Last, DO

## 2024-03-21 NOTE — TELEPHONE ENCOUNTER
Patient left a voicemail 3-20-24 at 7:27 and 7:33 pm   Verifying her medications metoprolol tartrate 75mg BID   Amlodipine 5mg and Torsemide 20mg.    Returned call today 3-21-24 left detailed message on voicemail that her After Visit Summary also has the instructions.  Call back number if any questions or concerns.  Rika Garcia RN on 3/21/2024 at 9:49 AM

## 2024-03-21 NOTE — PATIENT INSTRUCTIONS
"Recommendations:  - We discussed code status today, which has to do with your thoughts on resuscitation, and we reviewed your POLST form. Based on this conversation, we have that you would not want CPR performed on you if your heart were to stop and you  (DNR). We also discussed that you would be open to hospitalization and medical treatment for reversible conditions but would not want to consider going on a breathing machine/ventilator (do not intubate).  We also discussed that you are not interested in any invasive procedures at this point in time.    -We talked about how everybody has different criteria for what they consider acceptable in their lives, and this varies from person to person. Encouraged you to think about what you consider important and if there are any situations you would deem not acceptable and to use this information to help inform how you make decisions about your health care, namely what sort of interventions or procedures you would want to go through.    -Encouraged you to review medical decisions through the lens of what is important to you and what brings meaning and value to your life, and then fit the medical \"stuff\" into that framework.     -I will send a message to the cardiology team with the questions about the oxygen concentrator.    Follow up: We will plan on May 2 at 12:30 PM.  I have sent a message to our schedulers to formally put this in the system.  If this time no longer works, please let us know.      Reasons to Call    If you are having worsening/uncontrolled symptoms we want you to call!    You or your other physicians make any changes to medications we have prescribed.  -Please call for refills 4-5 days before you will run out of medication.    Important Phone Numbers, including: Refills, scheduling, and general questions     Palliative Care RN: Sonja Olmos - [phone]  *After hours or on weekends. Will connect you with on call MD. [phone]    "

## 2024-03-27 ENCOUNTER — LAB (OUTPATIENT)
Dept: LAB | Facility: CLINIC | Age: 84
End: 2024-03-27
Payer: COMMERCIAL

## 2024-03-27 ENCOUNTER — ANTICOAGULATION THERAPY VISIT (OUTPATIENT)
Dept: ANTICOAGULATION | Facility: CLINIC | Age: 84
End: 2024-03-27

## 2024-03-27 DIAGNOSIS — I48.92 ATRIAL FIBRILLATION AND FLUTTER (H): ICD-10-CM

## 2024-03-27 DIAGNOSIS — Z95.3 S/P MITRAL VALVE REPLACEMENT WITH BIOPROSTHETIC VALVE: ICD-10-CM

## 2024-03-27 DIAGNOSIS — I48.91 ATRIAL FIBRILLATION AND FLUTTER (H): ICD-10-CM

## 2024-03-27 DIAGNOSIS — Z79.01 LONG TERM CURRENT USE OF ANTICOAGULANTS WITH INR GOAL OF 2.0-3.0: Primary | ICD-10-CM

## 2024-03-27 DIAGNOSIS — I50.30 HEART FAILURE WITH PRESERVED EJECTION FRACTION, NYHA CLASS I (H): ICD-10-CM

## 2024-03-27 LAB — INR BLD: 4.1 (ref 0.9–1.1)

## 2024-03-27 PROCEDURE — 85610 PROTHROMBIN TIME: CPT

## 2024-03-27 PROCEDURE — 83880 ASSAY OF NATRIURETIC PEPTIDE: CPT

## 2024-03-27 PROCEDURE — 80053 COMPREHEN METABOLIC PANEL: CPT

## 2024-03-27 PROCEDURE — 36415 COLL VENOUS BLD VENIPUNCTURE: CPT

## 2024-03-27 NOTE — PROGRESS NOTES
ANTICOAGULATION MANAGEMENT     Zunilda SHAW May 83 year old female is on warfarin with supratherapeutic INR result. (Goal INR 2.0-3.0)    Recent labs: (last 7 days)     03/27/24  1257   INR 4.1*       ASSESSMENT     Source(s): Chart Review  Previous INR was Therapeutic last 2(+) visits  Medication, diet, health changes since last INR     Cardiology office visit on 3/18/24--changed lasix back to torsemide; amlodipine resumed  Oncology virtual visit on 3/21/24--no change in care plan, palliative and hospice care options were reviewed.       PLAN     Unable to reach Deanne today.    Left message to hold warfarin tonight. Request call back for assessment.    Follow up required to confirm warfarin dose taken and assess for changes and discuss out of range result     Linette Rocha RN  Anticoagulation Clinic  3/27/2024

## 2024-03-27 NOTE — PROGRESS NOTES
ANTICOAGULATION MANAGEMENT     Zunilda SHAW May 83 year old female is on warfarin with supratherapeutic INR result. (Goal INR 2.0-3.0)    Recent labs: (last 7 days)     03/27/24  1257   INR 4.1*       ASSESSMENT     Source(s): Chart Review and Patient/Caregiver Call     Warfarin doses taken: Took more warfarin than planned, patient was following instructions on warfarin bottle.  Diet: No new diet changes identified  Medication/supplement changes: torsemide prescribed by cardiology on 3/18/24--see chart review notes below.  New illness, injury, or hospitalization: No  Signs or symptoms of bleeding or clotting: No  Previous result: Therapeutic last 2(+) visits  Additional findings: Despite patient taking more warfarin than planned, I still reduced warfarin maintenance dose given change from lasix to torsemide; however, reduced less than protocol, may need further adjustment at next INR visit.       PLAN     Recommended plan for temporary change(s) and ongoing change(s) affecting INR     Dosing Instructions: hold dose then decrease your warfarin dose (6% change) with next INR in 9 days. Patient reports she typically takes warfarin in the evening but for some reason, took after her lab appointment today, patient will hold dose on 3/28/24.    Summary  As of 3/27/2024      Full warfarin instructions:  3/28: Hold; Otherwise 3.75 mg every Mon; 2.5 mg all other days   Next INR check:  4/5/2024               Telephone call with Deanne who verbalizes understanding and agrees to plan and who agrees to plan and repeated back plan correctly    Lab visit scheduled    Education provided:   Taking warfarin: importance of following [facility] instructions vs instructions on the prescription bottle  Interaction IS anticipated between warfarin and torsemide    Plan made per [facility] anticoagulation protocol    Linette Rocha RN  Anticoagulation Clinic  3/27/2024    _______________________________________________________________________      Anticoagulation Episode Summary       Current INR goal:  2.0-3.0   TTR:  62.7% (10.7 mo)   Target end date:  Indefinite   Send INR reminders to:  [facility]    Indications    Long term current use of anticoagulants with INR goal of 2.0-3.0 [Z79.01]  Atrial fibrillation and flutter (H) [I48.91  I48.92]  S/P mitral valve replacement with bioprosthetic valve [Z95.3]             Comments:  27 mm Magna bioprosthetic valve (2014)             Anticoagulation Care Providers       Provider Role Specialty Phone number    Yunior Huang MD Referring Internal Medicine [phone]

## 2024-03-28 ENCOUNTER — TELEPHONE (OUTPATIENT)
Dept: CARDIOLOGY | Facility: CLINIC | Age: 84
End: 2024-03-28
Payer: COMMERCIAL

## 2024-03-28 LAB
ALBUMIN SERPL BCG-MCNC: 4 G/DL (ref 3.5–5.2)
ALP SERPL-CCNC: 120 U/L (ref 40–150)
ALT SERPL W P-5'-P-CCNC: 25 U/L (ref 0–50)
ANION GAP SERPL CALCULATED.3IONS-SCNC: 12 MMOL/L (ref 7–15)
AST SERPL W P-5'-P-CCNC: 49 U/L (ref 0–45)
BILIRUB SERPL-MCNC: 0.7 MG/DL
BUN SERPL-MCNC: 37.7 MG/DL (ref 8–23)
CALCIUM SERPL-MCNC: 9.6 MG/DL (ref 8.8–10.2)
CHLORIDE SERPL-SCNC: 100 MMOL/L (ref 98–107)
CREAT SERPL-MCNC: 1.07 MG/DL (ref 0.51–0.95)
DEPRECATED HCO3 PLAS-SCNC: 29 MMOL/L (ref 22–29)
EGFRCR SERPLBLD CKD-EPI 2021: 52 ML/MIN/1.73M2
GLUCOSE SERPL-MCNC: 91 MG/DL (ref 70–99)
NT-PROBNP SERPL-MCNC: 4055 PG/ML (ref 0–1800)
POTASSIUM SERPL-SCNC: 4.2 MMOL/L (ref 3.4–5.3)
PROT SERPL-MCNC: 7.4 G/DL (ref 6.4–8.3)
SODIUM SERPL-SCNC: 141 MMOL/L (ref 135–145)

## 2024-03-28 NOTE — TELEPHONE ENCOUNTER
Reason for Call:  Other     Detailed comments: Wants a call back from Julia nurse    Phone Number Patient can be reached at: Cell number on file:    Telephone Information:   Mobile [phone]       Best Time: ? Expects to be home most of the day    Can we leave a detailed message on this number? YES    Call taken on 3/28/2024 at 10:21 AM by Jaclyn Parmar

## 2024-03-28 NOTE — TELEPHONE ENCOUNTER
Patient returned call and states she has no swelling and her wt for the last week has ranged from 132.3-133.9.  Last week she received a call and she was having trouble with her phone and did not hear who called?  She thought it was from a Pulmonary clinic but wanted to discuss with Zena at upcoming visit 4-8-24   Reviewed chart and may have been  Palliative Care calling for an appt.  Patient will discuss further with Zena and is interested in a consult.  Rika Garcia RN on 3/28/2024 at 11:28 AM

## 2024-03-28 NOTE — TELEPHONE ENCOUNTER
----- Message from YOKASTA Salcido sent at 3/28/2024  9:18 AM CDT -----  Regarding: labs and update  Please let Deanne know her labs are stable after our change in diuretics.  Can we see how she is doing with swelling/weight to see if we need to make any further adjustments for now?    Thx

## 2024-03-28 NOTE — TELEPHONE ENCOUNTER
Left message to return call patient to check sx and wts.    Rika Garcia RN on 3/28/2024 at 10:21 AM

## 2024-03-28 NOTE — TELEPHONE ENCOUNTER
Call returned to patient. Patient stated she had called back not realizing she was listening to an old message. Patient did speak to Linette 3/27/24 regarding her INR result, see 3/27/24 Anticoagulation encounter for warfarin dosing instruction.  Екатерина Mahan RN, BSN  Anticoagulation Clinic

## 2024-04-04 DIAGNOSIS — I48.91 ATRIAL FIBRILLATION AND FLUTTER (H): ICD-10-CM

## 2024-04-04 DIAGNOSIS — I48.92 ATRIAL FIBRILLATION AND FLUTTER (H): ICD-10-CM

## 2024-04-04 RX ORDER — WARFARIN SODIUM 2.5 MG/1
TABLET ORAL
Qty: 105 TABLET | Refills: 1 | Status: SHIPPED | OUTPATIENT
Start: 2024-04-04 | End: 2024-06-12

## 2024-04-04 NOTE — TELEPHONE ENCOUNTER
ANTICOAGULATION MANAGEMENT:  Medication Refill    Anticoagulation Summary  As of 3/27/2024      Warfarin maintenance plan:  3.75 mg (2.5 mg x 1.5) every Mon; 2.5 mg (2.5 mg x 1) all other days   Next INR check:  4/5/2024   Target end date:  Indefinite    Indications    Long term current use of anticoagulants with INR goal of 2.0-3.0 [Z79.01]  Atrial fibrillation and flutter (H) [I48.91  I48.92]  S/P mitral valve replacement with bioprosthetic valve [Z95.3]                 Anticoagulation Care Providers       Provider Role Specialty Phone number    Yunior Huang MD Referring Internal Medicine [phone]            Refill Criteria    Visit with referring provider/group: Meets criteria: office visit within referring provider group in the last 1 year on 3/13/24    [facility] referral last signed: 10/19/2023; within last year: Yes    Lab monitoring not exceeding 2 weeks overdue: Yes    Zunilda meets all criteria for refill. Rx instructions and quantity supplied updated to match patient's current dosing plan. Warfarin 90 day supply with 1 refill granted per [facility] protocol     Екатерина Mahan RN  Anticoagulation Clinic

## 2024-04-05 ENCOUNTER — LAB (OUTPATIENT)
Dept: LAB | Facility: CLINIC | Age: 84
End: 2024-04-05
Payer: COMMERCIAL

## 2024-04-05 ENCOUNTER — ANTICOAGULATION THERAPY VISIT (OUTPATIENT)
Dept: ANTICOAGULATION | Facility: CLINIC | Age: 84
End: 2024-04-05

## 2024-04-05 DIAGNOSIS — Z95.3 S/P MITRAL VALVE REPLACEMENT WITH BIOPROSTHETIC VALVE: ICD-10-CM

## 2024-04-05 DIAGNOSIS — I48.92 ATRIAL FIBRILLATION AND FLUTTER (H): ICD-10-CM

## 2024-04-05 DIAGNOSIS — Z79.01 LONG TERM CURRENT USE OF ANTICOAGULANTS WITH INR GOAL OF 2.0-3.0: Primary | ICD-10-CM

## 2024-04-05 DIAGNOSIS — I48.91 ATRIAL FIBRILLATION AND FLUTTER (H): ICD-10-CM

## 2024-04-05 LAB — INR BLD: 3.3 (ref 0.9–1.1)

## 2024-04-05 PROCEDURE — 85610 PROTHROMBIN TIME: CPT

## 2024-04-05 PROCEDURE — 36416 COLLJ CAPILLARY BLOOD SPEC: CPT

## 2024-04-05 NOTE — PROGRESS NOTES
ANTICOAGULATION MANAGEMENT     Zunilda SHAW May 83 year old female is on warfarin with supratherapeutic INR result. (Goal INR 2.0-3.0)    Recent labs: (last 7 days)     04/05/24  1043   INR 3.3*       ASSESSMENT     Source(s): Chart Review  Previous INR was Supratherapeutic  Medication, diet, health changes since last INR chart reviewed; none identified         PLAN     Unable to reach Deanne today.    Left message for patient to call INR clinic to discuss result.    Follow up required to confirm warfarin dose taken and assess for changes    Sonam Teixeira RN  Anticoagulation Clinic  4/5/2024

## 2024-04-05 NOTE — PROGRESS NOTES
ANTICOAGULATION MANAGEMENT     Zunilda SHAW May 83 year old female is on warfarin with supratherapeutic INR result. (Goal INR 2.0-3.0)    Recent labs: (last 7 days)     04/05/24  1043   INR 3.3*       ASSESSMENT     Source(s): Chart Review and Patient/Caregiver Call     Warfarin doses taken: Warfarin taken as instructed  Diet: No new diet changes identified  Medication/supplement changes: None noted  New illness, injury, or hospitalization: No  Signs or symptoms of bleeding or clotting: No  Previous result: Supratherapeutic  Additional findings: None       PLAN     Recommended plan for no diet, medication or health factor changes affecting INR     Dosing Instructions: decrease your warfarin dose (6.7% change) with next INR in 10 days       Summary  As of 4/5/2024      Full warfarin instructions:  2.5 mg every day   Next INR check:  4/17/2024               Telephone call with Deanne who agrees to plan and repeated back plan correctly    Lab visit scheduled    Education provided:   Please call back if any changes to your diet, medications or how you've been taking warfarin    Plan made per [facility] anticoagulation protocol    Sonam Teixeira RN  Anticoagulation Clinic  4/5/2024    _______________________________________________________________________     Anticoagulation Episode Summary       Current INR goal:  2.0-3.0   TTR:  62.7% (10.7 mo)   Target end date:  Indefinite   Send INR reminders to:  [facility]    Indications    Long term current use of anticoagulants with INR goal of 2.0-3.0 [Z79.01]  Atrial fibrillation and flutter (H) [I48.91  I48.92]  S/P mitral valve replacement with bioprosthetic valve [Z95.3]             Comments:  27 mm Magna bioprosthetic valve (2014)             Anticoagulation Care Providers       Provider Role Specialty Phone number    Yunior Huang MD Referring Internal Medicine [phone]

## 2024-04-05 NOTE — PROGRESS NOTES
ANTICOAGULATION MANAGEMENT          PLAN     INR has been consistently supra therapeutic.  Would consider lowering warfarin maintenance dose to 2.5 mg daily (6.7% decrease) if patient reports no changes upon assessment.    Unable to reach Deanne stafford today.    Left message to continue current dose of warfarin dose 2.5 mg Fri, Sat and Sunday. Request call back for assessment. [employer] message also sent.    Follow up required to confirm warfarin dose taken and assess for changes    Sonam Teixeira RN  Anticoagulation Clinic  4/5/2024

## 2024-04-08 ENCOUNTER — OFFICE VISIT (OUTPATIENT)
Dept: CARDIOLOGY | Facility: CLINIC | Age: 84
End: 2024-04-08
Payer: COMMERCIAL

## 2024-04-08 VITALS
HEIGHT: 64 IN | SYSTOLIC BLOOD PRESSURE: 130 MMHG | DIASTOLIC BLOOD PRESSURE: 70 MMHG | HEART RATE: 71 BPM | WEIGHT: 133.2 LBS | OXYGEN SATURATION: 100 % | BODY MASS INDEX: 22.74 KG/M2

## 2024-04-08 DIAGNOSIS — I50.30 HEART FAILURE WITH PRESERVED EJECTION FRACTION, NYHA CLASS I (H): ICD-10-CM

## 2024-04-08 PROCEDURE — 99214 OFFICE O/P EST MOD 30 MIN: CPT | Performed by: PHYSICIAN ASSISTANT

## 2024-04-08 NOTE — LETTER
"4/8/2024    Yunior Huang MD  [address] [facility] [address]    RE: Zunilda Clark       Dear Colleague,     I had the pleasure of seeing Zunilda Clark in the [facility].      Cardiology Progress Note    Date of Service: 04/08/24      Reason for visit: Follow up aortic and mitral valve disease, pulmonary hypertension.       Primary cardiologist: Dr. Noe Rodriguez        HPI:  Ms. 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 However, she has chosen to proceed conservatively.      When I met with Deanne in March of 2023, she had suffered a fall and a T12 burst fracture which they were also treating conservatively, but breathing was ok. When she met with Dr. Rodriguez in July 2023, repeat echo at that time showed findings similar to prior, though aortic stenosis had progressed to mod-severe. As she was feeling well, however, no changes were made. At our most recent visit in January, she had just suffered another fall and fractured her femur and underwent repair which sounds to have gone well. However, she was also having back pain and wearing a brace which was limiting her ambulation. She had another echo prior to that visit; EF remained preserved at 60-65%. 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 Her RV is pressures were elevated and RV is mild to moderately dilated, but with normal RV function reported. From a symptom standpoint, she felt she was doing ok, but as mentioned, was not very active. After d/w with Dr. Rodriguez, we offered a structural clinic evaluation which she politely declined as she was asymptomatic.    Deanne was then hospitalized in mid February for shortness of breath and orthopnea, felt to have an acute HFpEF exacerbation and was noted to be COVID positive. She was diuresed with IV Lasix and diuretics were change to PO lasix 40mg daily. It appears that most of her antihypertensives were held on admission, but she was kept on metoprolol, then prior to discharge, for improved BP control metoprolol was also increased to 75mg BID. Her valvular disease was again discussed but she again deferred further invasive procedures. When I met with her in clinic in mid March, she relayed still having more MORRISON than usual and noted desatting to the mid 80s with exertion. We again discussed structural evaluation but continues to request conservative measures and was agreeable to meet with Palliative Care. To help with her symptoms, I changed her diuretic back to torsemide at 20mg daily.    Today, I'm seeing Deanne back in clinic. Overall things sound to be about the same. She is noting a bit more swelling in her legs lately but her home weight is quite stable between 132-133#. Her breathing hasn't worsened, and she denies new dizziness or presyncope.       There were no new labs performed prior to our visit today. Most recent labs reviewed as below.        ASSESSMENT/PLAN:     Multivalvular dysfunction.  --Ms. Clark has a history of Rheumatic heart disease with bioprosthetic mitral valve replacement in 2007, and redo in 2014 with a 27 mm Magna bioprosthetic mitral valve.  We have been following with serial echos but most recent imaging in January showed now severe prosthetic mitral  valve stenosis and severe valvular aortic stenosis both of which have progressed from last summer. We offered structural clinic for further evaluation but she continues to politely decline and is not interested in any further invasive procedures. She would like to continue to proceed conservatively. She is now following with Palliative Care as well.   --She is having a bit more edema and still appears slightly volume up on torsemide 20mg daily. NT-proBNP remains quite elevated and renal function stable. She tells me she is already urinating quite frequently so I did not escalate this today but told her she can take an extra 20mg if needed, with more edema or MORRISON.   --Continue supplemental oxygen as is. Dr. Huang is working to help get her a POC if possible, that she can utilize with her rolling walker.               2. High degree AV block.              --Post surgery 2007, s/p dual-chamber pacemaker placement.  Last device check March 2024: AP 97%,  94%. Patient in mode switch <1%. Battery life 8.5 years.               --She remains anticoagulated with warfarin, and follows with the INR clinic. She tells me this has been somewhat labile.     4. Hypertension.              --During her recent stay, her amlodipine and losart/hydrochlorothiazide were discontinued. Metoprolol dose was increased to 75mg BID. BP per home log continues to fluctuate but appears overall under good control. Will keep amlodpine at 5mg daily for now.     Follow up plan:  Return in June to see Dr. Rodriguez as already scheduled. As she is not interested in intervention, will not repeat cardiac imaging and continue to treat symptomatic/conservatively.       Orders this Visit:  No orders of the defined types were placed in this encounter.    No orders of the defined types were placed in this encounter.    There are no discontinued medications.        CURRENT MEDICATIONS:  Current Outpatient Medications   Medication Sig Dispense Refill     "acetaminophen (TYLENOL) 500 MG tablet Take 1,000 mg by mouth every 8 hours as needed for pain      allopurinol (ZYLOPRIM) 100 MG tablet Take 100 mg by mouth 2 times daily      amLODIPine (NORVASC) 5 MG tablet Take 1 tablet (5 mg) by mouth daily      amoxicillin (AMOXIL) 500 MG tablet Take 2000mg (4 tablets of 500mg each) approx 30 min to 1 hour prior to any dental procedures 4 tablet 3    calcium citrate (CALCITRATE) 950 MG tablet Take 1 tablet by mouth daily       famotidine (PEPCID) 40 MG tablet TAKE 1 TABLET BY MOUTH DAILY 90 tablet 3    folic acid (FOLVITE) 1 MG tablet Take 1 mg by mouth daily      levothyroxine (SYNTHROID/LEVOTHROID) 112 MCG tablet Take 1 tablet (112 mcg) by mouth daily 100 tablet 3    methotrexate 50 MG/2ML injection Inject 0.8 mLs Subcutaneous every 7 days (Fridays)      Metoprolol Tartrate 75 MG TABS Take 75 mg by mouth 2 times daily 180 tablet 1    multivitamin w/minerals (THERA-VIT-M) tablet Take 1 tablet by mouth daily Without iron      torsemide (DEMADEX) 20 MG tablet Take 1 tablet (20 mg) by mouth daily      VITAMIN D, CHOLECALCIFEROL, PO Take 1,000 Units by mouth daily      warfarin ANTICOAGULANT (COUMADIN) 2.5 MG tablet TAKE 1.5 TABLETS BY MOUTH EVERY MONDAY AND TAKE 1 TABLET ALL OTHER DAYS OF THE WEEK OR AS INSTRUCTED BY THE INR CLINIC 105 tablet 1         Review of Systems:  POS ROS ARE BOLDED, all other negative.    Cardiovascular: Chest pain, palpitations, orthopnea, LE edema  Resp: Dyspnea on exertion, cough, known chronic lung disease  Hematologic/lymphatic: Current systemic anticoagulation, hx of blood clots, new bleeding concerns.  Neurological: Dizziness, presyncope/syncope.    Physical Exam:  Vitals: /70 (BP Location: Right arm, Patient Position: Sitting, Cuff Size: Adult Regular)   Pulse 71   Ht 1.626 m (5' 4\")   Wt 60.4 kg (133 lb 3.2 oz)   LMP  (LMP Unknown)   SpO2 100%   BMI 22.86 kg/m     Wt Readings from Last 4 Encounters:   04/08/24 60.4 kg (133 lb 3.2 oz) "   03/21/24 61.2 kg (135 lb)   03/18/24 60.3 kg (133 lb)   03/13/24 60.3 kg (133 lb)       GEN:  In general, this is a somewhat frail appearing  female in no acute distress on RA.  Patient ambulatory with a walker, accompanied by her sister today.  C/V: RRR, though with 2-3/6 JON heard at both right and left sternal borders.   RESP: Respirations are unlabored. No use of accessory muscles. Few faint crackles in bases. No wheezing or rhonchi.  EXTREM: 1+ L>R PT edema.   NEURO: Alert and oriented, cooperative. Gait not assessed.     Recent Lab Results:    LIPID RESULTS:  Lab Results   Component Value Date    CHOL 183 07/14/2023    CHOL 167 11/17/2020    HDL 47 (L) 07/14/2023    HDL 45 (L) 11/17/2020     (H) 07/14/2023     (H) 11/17/2020    TRIG 79 07/14/2023    TRIG 87 11/17/2020    CHOLHDLRATIO 3.6 11/13/2015       CBC RESULTS:  Lab Results   Component Value Date    WBC 7.4 03/13/2024    WBC 7.1 06/22/2021    RBC 3.43 (L) 03/13/2024    RBC 3.75 (L) 06/22/2021    HGB 11.4 (L) 03/13/2024    HGB 12.5 06/22/2021    HCT 35.4 03/13/2024    HCT 38.6 06/22/2021     (H) 03/13/2024     (H) 06/22/2021    MCH 33.2 (H) 03/13/2024    MCH 33.3 (H) 06/22/2021    MCHC 32.2 03/13/2024    MCHC 32.4 06/22/2021    RDW 18.7 (H) 03/13/2024    RDW 17.0 (H) 06/22/2021     03/13/2024     06/22/2021       BMP RESULTS:  Lab Results   Component Value Date     03/27/2024     06/22/2021    POTASSIUM 4.2 03/27/2024    POTASSIUM 3.7 08/17/2022    POTASSIUM 3.8 06/22/2021    CHLORIDE 100 03/27/2024    CHLORIDE 107 08/17/2022    CHLORIDE 104 06/22/2021    CO2 29 03/27/2024    CO2 26 08/17/2022    CO2 33 (H) 06/22/2021    ANIONGAP 12 03/27/2024    ANIONGAP 7 08/17/2022    ANIONGAP 1 (L) 06/22/2021    GLC 91 03/27/2024     (H) 09/27/2023    GLC 91 08/17/2022    GLC 88 06/22/2021    BUN 37.7 (H) 03/27/2024    BUN 21 08/17/2022    BUN 27 06/22/2021    CR 1.07 (H) 03/27/2024    CR 1.11 (H)  06/22/2021    GFRESTIMATED 52 (L) 03/27/2024    GFRESTIMATED 50.9 06/28/2021    GFRESTBLACK 54 (L) 06/22/2021    BRICE 9.6 03/27/2024    BRICE 9.6 06/22/2021        Recent Labs   Lab Test 03/27/24  1257 03/18/24  1204 02/22/24  1026 09/26/23  0934 03/13/23  1110 07/26/18  1035 05/03/17  1230   NTBNPI  --   --  4,362* 1,711  --   --  4,812*   NTBNP 4,055* 4,811*  --   --  1,680   < >  --     < > = values in this interval not displayed.         Additional pertinent testing:    Echo 1/5/24  Interpretation Summary     There is a 27 mm Magna bioprosthetic valve in the mitral position.  Severe prosthetic mitral valve stenosis  Severe valvular aortic stenosis.  There is mild (1+) aortic regurgitation.  The visual ejection fraction is 60-65%.  Left ventricular systolic function is normal.  The right ventricle is mild to moderately dilated.  The right ventricular systolic function is normal.  There is moderate (2+) tricuspid regurgitation.  Right ventricular systolic pressure is elevated, consistent with severe  pulmonary hypertension.  The rhythm was atrial fibrillation with paced rhythm.  Compared to the previous echocardiogram, the mean gradient across the mitral  valve prosthesis has significantly increased at a similar heart rate. The mean  gradient now is 12 mmHg and was 5.6 mmHg on 18 June of 2023.  The degree of aortic stenosis has also progressed. Previously the mean  gradient across the aortic valve was 21.9 mmHg and a calculated aortic valve  area of was 1.0 cmÂ . Now the mean gradient across the aortic valve is 33.7  mmHg and the calculated aortic valve area is 0.87 cmÂ .  The degree of tricuspid regurgitation is similar to previously being moderate.  Pulmonary pressures are lower on this study than previously but still severe.      30 total minutes was spent today including chart review, precharting, history and exam, patient education, post visit documentation, and reviewing studies as outlined above.       Zena  YOLANDE Benitez  [facility]  Pager [phone]      Thank you for allowing me to participate in the care of your patient.      Sincerely,     YOKASTA Salcido     [facility]  cc:   YOKASTA Salcido  [facility]  [address] [place] [id number]

## 2024-04-08 NOTE — PROGRESS NOTES
"    Cardiology Progress Note    Date of Service: 04/08/24      Reason for visit: Follow up aortic and mitral valve disease, pulmonary hypertension.       Primary cardiologist: Dr. Noe Rodriguez        HPI:  Ms. 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 However, she has chosen to proceed conservatively.      When I met with Deanne in March of 2023, she had suffered a fall and a T12 burst fracture which they were also treating conservatively, but breathing was ok. When she met with Dr. Rodriguez in July 2023, repeat echo at that time showed findings similar to prior, though aortic stenosis had progressed to mod-severe. As she was feeling well, however, no changes were made. At our most recent visit in January, she had just suffered another fall and fractured her femur and underwent repair which sounds to have gone well. However, she was also having back pain and wearing a brace which was limiting her ambulation. She had another echo prior to that visit; EF remained preserved at 60-65%. 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 Her RV is pressures were elevated and RV is mild to moderately dilated, but with normal RV function reported. From a symptom standpoint, she felt she was doing ok, but as " mentioned, was not very active. After d/w with Dr. Rodriguez, we offered a structural clinic evaluation which she politely declined as she was asymptomatic.    Deanne was then hospitalized in mid February for shortness of breath and orthopnea, felt to have an acute HFpEF exacerbation and was noted to be COVID positive. She was diuresed with IV Lasix and diuretics were change to PO lasix 40mg daily. It appears that most of her antihypertensives were held on admission, but she was kept on metoprolol, then prior to discharge, for improved BP control metoprolol was also increased to 75mg BID. Her valvular disease was again discussed but she again deferred further invasive procedures. When I met with her in clinic in mid March, she relayed still having more MORRISON than usual and noted desatting to the mid 80s with exertion. We again discussed structural evaluation but continues to request conservative measures and was agreeable to meet with Palliative Care. To help with her symptoms, I changed her diuretic back to torsemide at 20mg daily.    Today, I'm seeing Deanne back in clinic. Overall things sound to be about the same. She is noting a bit more swelling in her legs lately but her home weight is quite stable between 132-133#. Her breathing hasn't worsened, and she denies new dizziness or presyncope.       There were no new labs performed prior to our visit today. Most recent labs reviewed as below.        ASSESSMENT/PLAN:     Multivalvular dysfunction.  --Ms. Clark has a history of Rheumatic heart disease with bioprosthetic mitral valve replacement in 2007, and redo in 2014 with a 27 mm Magna bioprosthetic mitral valve.  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 We offered structural clinic for further evaluation but she continues to politely decline and is not  interested in any further invasive procedures. She would like to continue to proceed conservatively. She is now following with Palliative Care as well.   --She is having a bit more edema and still appears slightly volume up on torsemide 20mg daily. NT-proBNP remains quite elevated and renal function stable. She tells me she is already urinating quite frequently so I did not escalate this today but told her she can take an extra 20mg if needed, with more edema or MORRISON.   --Continue supplemental oxygen as is. Dr. Haung is working to help get her a POC if possible, that she can utilize with her rolling walker.               2. High degree AV block.              --Post surgery 2007, s/p dual-chamber pacemaker placement.  Last device check March 2024: AP 97%,  94%. Patient in mode switch <1%. Battery life 8.5 years.               --She remains anticoagulated with warfarin, and follows with the INR clinic. She tells me this has been somewhat labile.     4. Hypertension.              --During her recent stay, her amlodipine and losart/hydrochlorothiazide were discontinued. Metoprolol dose was increased to 75mg BID. BP per home log continues to fluctuate but appears overall under good control. Will keep amlodpine at 5mg daily for now.     Follow up plan:  Return in June to see Dr. Rodriguez as already scheduled. As she is not interested in intervention, will not repeat cardiac imaging and continue to treat symptomatic/conservatively.       Orders this Visit:  No orders of the defined types were placed in this encounter.    No orders of the defined types were placed in this encounter.    There are no discontinued medications.        CURRENT MEDICATIONS:  Current Outpatient Medications   Medication Sig Dispense Refill    acetaminophen (TYLENOL) 500 MG tablet Take 1,000 mg by mouth every 8 hours as needed for pain      allopurinol (ZYLOPRIM) 100 MG tablet Take 100 mg by mouth 2 times daily      amLODIPine (NORVASC) 5 MG tablet  "Take 1 tablet (5 mg) by mouth daily      amoxicillin (AMOXIL) 500 MG tablet Take 2000mg (4 tablets of 500mg each) approx 30 min to 1 hour prior to any dental procedures 4 tablet 3    calcium citrate (CALCITRATE) 950 MG tablet Take 1 tablet by mouth daily       famotidine (PEPCID) 40 MG tablet TAKE 1 TABLET BY MOUTH DAILY 90 tablet 3    folic acid (FOLVITE) 1 MG tablet Take 1 mg by mouth daily      levothyroxine (SYNTHROID/LEVOTHROID) 112 MCG tablet Take 1 tablet (112 mcg) by mouth daily 100 tablet 3    methotrexate 50 MG/2ML injection Inject 0.8 mLs Subcutaneous every 7 days (Fridays)      Metoprolol Tartrate 75 MG TABS Take 75 mg by mouth 2 times daily 180 tablet 1    multivitamin w/minerals (THERA-VIT-M) tablet Take 1 tablet by mouth daily Without iron      torsemide (DEMADEX) 20 MG tablet Take 1 tablet (20 mg) by mouth daily      VITAMIN D, CHOLECALCIFEROL, PO Take 1,000 Units by mouth daily      warfarin ANTICOAGULANT (COUMADIN) 2.5 MG tablet TAKE 1.5 TABLETS BY MOUTH EVERY MONDAY AND TAKE 1 TABLET ALL OTHER DAYS OF THE WEEK OR AS INSTRUCTED BY THE INR CLINIC 105 tablet 1         Review of Systems:  POS ROS ARE BOLDED, all other negative.    Cardiovascular: Chest pain, palpitations, orthopnea, LE edema  Resp: Dyspnea on exertion, cough, known chronic lung disease  Hematologic/lymphatic: Current systemic anticoagulation, hx of blood clots, new bleeding concerns.  Neurological: Dizziness, presyncope/syncope.    Physical Exam:  Vitals: /70 (BP Location: Right arm, Patient Position: Sitting, Cuff Size: Adult Regular)   Pulse 71   Ht 1.626 m (5' 4\")   Wt 60.4 kg (133 lb 3.2 oz)   LMP  (LMP Unknown)   SpO2 100%   BMI 22.86 kg/m     Wt Readings from Last 4 Encounters:   04/08/24 60.4 kg (133 lb 3.2 oz)   03/21/24 61.2 kg (135 lb)   03/18/24 60.3 kg (133 lb)   03/13/24 60.3 kg (133 lb)       GEN:  In general, this is a somewhat frail appearing  female in no acute distress on RA.  Patient ambulatory " with a walker, accompanied by her sister today.  C/V: RRR, though with 2-3/6 JON heard at both right and left sternal borders.   RESP: Respirations are unlabored. No use of accessory muscles. Few faint crackles in bases. No wheezing or rhonchi.  EXTREM: 1+ L>R PT edema.   NEURO: Alert and oriented, cooperative. Gait not assessed.     Recent Lab Results:    LIPID RESULTS:  Lab Results   Component Value Date    CHOL 183 07/14/2023    CHOL 167 11/17/2020    HDL 47 (L) 07/14/2023    HDL 45 (L) 11/17/2020     (H) 07/14/2023     (H) 11/17/2020    TRIG 79 07/14/2023    TRIG 87 11/17/2020    CHOLHDLRATIO 3.6 11/13/2015       CBC RESULTS:  Lab Results   Component Value Date    WBC 7.4 03/13/2024    WBC 7.1 06/22/2021    RBC 3.43 (L) 03/13/2024    RBC 3.75 (L) 06/22/2021    HGB 11.4 (L) 03/13/2024    HGB 12.5 06/22/2021    HCT 35.4 03/13/2024    HCT 38.6 06/22/2021     (H) 03/13/2024     (H) 06/22/2021    MCH 33.2 (H) 03/13/2024    MCH 33.3 (H) 06/22/2021    MCHC 32.2 03/13/2024    MCHC 32.4 06/22/2021    RDW 18.7 (H) 03/13/2024    RDW 17.0 (H) 06/22/2021     03/13/2024     06/22/2021       BMP RESULTS:  Lab Results   Component Value Date     03/27/2024     06/22/2021    POTASSIUM 4.2 03/27/2024    POTASSIUM 3.7 08/17/2022    POTASSIUM 3.8 06/22/2021    CHLORIDE 100 03/27/2024    CHLORIDE 107 08/17/2022    CHLORIDE 104 06/22/2021    CO2 29 03/27/2024    CO2 26 08/17/2022    CO2 33 (H) 06/22/2021    ANIONGAP 12 03/27/2024    ANIONGAP 7 08/17/2022    ANIONGAP 1 (L) 06/22/2021    GLC 91 03/27/2024     (H) 09/27/2023    GLC 91 08/17/2022    GLC 88 06/22/2021    BUN 37.7 (H) 03/27/2024    BUN 21 08/17/2022    BUN 27 06/22/2021    CR 1.07 (H) 03/27/2024    CR 1.11 (H) 06/22/2021    GFRESTIMATED 52 (L) 03/27/2024    GFRESTIMATED 50.9 06/28/2021    GFRESTBLACK 54 (L) 06/22/2021    BRICE 9.6 03/27/2024    BRICE 9.6 06/22/2021        Recent Labs   Lab Test 03/27/24  1257  03/18/24  1204 02/22/24  1026 09/26/23  0934 03/13/23  1110 07/26/18  1035 05/03/17  1230   NTBNPI  --   --  4,362* 1,711  --   --  4,812*   NTBNP 4,055* 4,811*  --   --  1,680   < >  --     < > = values in this interval not displayed.         Additional pertinent testing:    Echo 1/5/24  Interpretation Summary     There is a 27 mm Magna bioprosthetic valve in the mitral position.  Severe prosthetic mitral valve stenosis  Severe valvular aortic stenosis.  There is mild (1+) aortic regurgitation.  The visual ejection fraction is 60-65%.  Left ventricular systolic function is normal.  The right ventricle is mild to moderately dilated.  The right ventricular systolic function is normal.  There is moderate (2+) tricuspid regurgitation.  Right ventricular systolic pressure is elevated, consistent with severe  pulmonary hypertension.  The rhythm was atrial fibrillation with paced rhythm.  Compared to the previous echocardiogram, the mean gradient across the mitral  valve prosthesis has significantly increased at a similar heart rate. The mean  gradient now is 12 mmHg and was 5.6 mmHg on 18 June of 2023.  The degree of aortic stenosis has also progressed. Previously the mean  gradient across the aortic valve was 21.9 mmHg and a calculated aortic valve  area of was 1.0 cmÂ . Now the mean gradient across the aortic valve is 33.7  mmHg and the calculated aortic valve area is 0.87 cmÂ .  The degree of tricuspid regurgitation is similar to previously being moderate.  Pulmonary pressures are lower on this study than previously but still severe.      30 total minutes was spent today including chart review, precharting, history and exam, patient education, post visit documentation, and reviewing studies as outlined above.       Zena Benitez PA-C  [facility]  Pager [phone]

## 2024-04-08 NOTE — PATIENT INSTRUCTIONS
Thank you for visiting the cardiology clinic today.      Medication changes:    None today, but you can take an extra torsemide if your weight goes up or you notice more swelling.      Follow up Plan:  Return in June to see Dr Rodriguez as planned.     Please feel free to reach out to our nursing team at [phone] with any questions or concerns.  You can also send us a MyChart and we will get back with you as soon as possible.

## 2024-04-10 ENCOUNTER — TELEPHONE (OUTPATIENT)
Dept: INTERNAL MEDICINE | Facility: CLINIC | Age: 84
End: 2024-04-10

## 2024-04-10 NOTE — TELEPHONE ENCOUNTER
Fax received from [employer] 04/10/24 for review and signature.  Put in Dr. Huang's in basket.

## 2024-04-12 ENCOUNTER — MEDICAL CORRESPONDENCE (OUTPATIENT)
Dept: HEALTH INFORMATION MANAGEMENT | Facility: CLINIC | Age: 84
End: 2024-04-12
Payer: COMMERCIAL

## 2024-04-15 NOTE — TELEPHONE ENCOUNTER
I called [employer] because we got another form from them. They told me the missing information and I talked with Dr Huang to get it complete and I faxed it back to them.

## 2024-04-17 ENCOUNTER — ANTICOAGULATION THERAPY VISIT (OUTPATIENT)
Dept: ANTICOAGULATION | Facility: CLINIC | Age: 84
End: 2024-04-17

## 2024-04-17 ENCOUNTER — LAB (OUTPATIENT)
Dept: LAB | Facility: CLINIC | Age: 84
End: 2024-04-17
Payer: COMMERCIAL

## 2024-04-17 DIAGNOSIS — I48.92 ATRIAL FIBRILLATION AND FLUTTER (H): ICD-10-CM

## 2024-04-17 DIAGNOSIS — Z79.01 LONG TERM CURRENT USE OF ANTICOAGULANTS WITH INR GOAL OF 2.0-3.0: Primary | ICD-10-CM

## 2024-04-17 DIAGNOSIS — I48.91 ATRIAL FIBRILLATION AND FLUTTER (H): ICD-10-CM

## 2024-04-17 DIAGNOSIS — Z95.3 S/P MITRAL VALVE REPLACEMENT WITH BIOPROSTHETIC VALVE: ICD-10-CM

## 2024-04-17 LAB — INR BLD: 2.4 (ref 0.9–1.1)

## 2024-04-17 PROCEDURE — 36416 COLLJ CAPILLARY BLOOD SPEC: CPT

## 2024-04-17 PROCEDURE — 85610 PROTHROMBIN TIME: CPT

## 2024-04-17 NOTE — PROGRESS NOTES
ANTICOAGULATION MANAGEMENT     Zunilda SHAW May 83 year old female is on warfarin with therapeutic INR result. (Goal INR 2.0-3.0)    Recent labs: (last 7 days)     04/17/24  1031   INR 2.4*       ASSESSMENT     Warfarin Lab Questionnaire    Warfarin Doses Last 7 Days      4/17/2024    10:29 AM   Dose in Tablet or Mg   TAB or MG? milligram (mg)     Pt Rptd Dose SUNDAY MONDAY TUESDAY WED THURS FRIDAY SATURDAY 4/17/2024  10:29 AM 2.5 2.5 2.5 2.5 2.5 2.5 2.5         4/17/2024   Warfarin Lab Questionnaire   Missed doses within past 14 days? No   Changes in diet or alcohol within past 14 days? No   Medication changes since last result? No   Injuries or illness since last result? No   New shortness of breath, severe headaches or sudden changes in vision since last result? No   Abnormal bleeding since last result? Yes   If yes, please explain: nose bleed could be from O2 patient will try saline nasal spray for moisture   Upcoming surgery, procedure? No   Best number to call with results? [phone]     Previous result: Supratherapeutic  Additional findings:  No changes were made to patient's plan of care at 4/8/24 Cardio visit       PLAN     Recommended plan for no diet, medication or health factor changes affecting INR     Dosing Instructions: Continue your current warfarin dose with next INR in 3 weeks       Summary  As of 4/17/2024      Full warfarin instructions:  2.5 mg every day   Next INR check:  5/8/2024               Telephone call with Deanne who verbalizes understanding and agrees to plan    Lab visit scheduled    Education provided:   Please call back if any changes to your diet, medications or how you've been taking warfarin  Contact [phone]  with any changes, questions or concerns.     Plan made per [facility] anticoagulation protocol    Екатерина Mahan RN  Anticoagulation Clinic  4/17/2024    _______________________________________________________________________     Anticoagulation Episode Summary       Current  INR goal:  2.0-3.0   TTR:  63.9% (10.7 mo)   Target end date:  Indefinite   Send INR reminders to:  [facility]    Indications    Long term current use of anticoagulants with INR goal of 2.0-3.0 [Z79.01]  Atrial fibrillation and flutter (H) [I48.91  I48.92]  S/P mitral valve replacement with bioprosthetic valve [Z95.3]             Comments:  27 mm Magna bioprosthetic valve (2014)             Anticoagulation Care Providers       Provider Role Specialty Phone number    Yunior Huang MD Referring Internal Medicine [phone]

## 2024-04-18 ENCOUNTER — TRANSFERRED RECORDS (OUTPATIENT)
Dept: HEALTH INFORMATION MANAGEMENT | Facility: CLINIC | Age: 84
End: 2024-04-18
Payer: COMMERCIAL

## 2024-04-18 LAB
ALT SERPL-CCNC: 31 IU/L (ref 5–35)
AST SERPL-CCNC: 56 U/L (ref 5–34)
CREATININE (EXTERNAL): 0.93 MG/DL (ref 0.5–1.3)

## 2024-04-29 ENCOUNTER — MYC REFILL (OUTPATIENT)
Dept: CARDIOLOGY | Facility: CLINIC | Age: 84
End: 2024-04-29
Payer: COMMERCIAL

## 2024-04-29 DIAGNOSIS — I50.30 HEART FAILURE WITH PRESERVED EJECTION FRACTION, NYHA CLASS I (H): ICD-10-CM

## 2024-04-29 RX ORDER — AMLODIPINE BESYLATE 5 MG/1
5 TABLET ORAL DAILY
Qty: 90 TABLET | Refills: 3 | Status: SHIPPED | OUTPATIENT
Start: 2024-04-29

## 2024-04-29 RX ORDER — TORSEMIDE 20 MG/1
20 TABLET ORAL DAILY
Qty: 90 TABLET | Refills: 3 | Status: ON HOLD | OUTPATIENT
Start: 2024-04-29 | End: 2024-06-25

## 2024-05-01 NOTE — PROGRESS NOTES
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  -Hospitalization code 2/2024 for acute CHF exacerbation as well as COVID.    Last Palliative care appointment: 3/21/24 with me. Fairly extensive goals of care type conversation.     Reviewed: Yes    Interim History:  Zunilda Clark is an 83 year old female who is seen today for a follow up visit with Palliative Care. Her Sister Mary is also present and provides additional history.      Reviewed cardiology note from 4/8.  Patient was more fluid up with greater swelling in her legs, however due to proficient urination, scheduled dose of diuretics was not increased.  She was encouraged that she could take an additional 20 mg of torsemide daily if needed.  Continues to decline invasive procedures.    Overall she is doing very well. She did get a new portable oxygen concentrator that has a 7 hour battery life when fully charged. Says this has helped improver her mobility. Has been able to get back to [employer].    Doing an exercise class now 3 days per week at her community as well. Able to sit for these and make modifications to the exercises.    Mood is good. Sleep is generally decent, however, her sleep schedule is altered. Generally in bed by 7:30PM but some nights she's tired and \"there's nothing to do\" so she goes to bed by 6:30PM. " Will then wake up around 1:30AM and may be awake for the day.    Wondering about whether or not to do Dexa scan as recommended by Reumatology.    Confirms no desire to pursue invasive procedures such as the TAVR.     Social History:   2019.  4 daughters, 11 grandchildren, and 5 great-grandchildren.  All of her kids live out of state.  She was 1 of 4 siblings herself.   Social History     Tobacco Use    Smoking status: Never    Smokeless tobacco: Never   Vaping Use    Vaping status: Never Used   Substance Use Topics    Alcohol use: No     Alcohol/week: 0.0 standard drinks of alcohol    Drug use: No     Advanced Care Planning: HCD on file from 2020 naming daughters Alma and Kyara as her HCA's.  POLST form on file from October 2023 stating DNR/DNI-selective treatment.  No artificial nutrition by tube.     Family History- Reviewed in Epic.    Medications- Reviewed in Epic.    Past Medical History- Reviewed in [facility].    Past Surgical History- Reviewed in Epic.    Physical Exam:   BP (!) 147/75   Pulse 83   Resp 16   Wt 60.3 kg (133 lb)   LMP  (LMP Unknown)   SpO2 98%   BMI 22.83 kg/m    Constitutional: Alert, pleasant, no apparent distress. Sitting up in chair.  Eyes: Sclera non-icteric, no eye discharge.  ENT: No nasal discharge. Ears grossly normal.  Respiratory: Nasal cannula in place. Unlabored respirations. Speaking in full sentences.  Musculoskeletal: Extremities appear normal- no gross deformities noted. No edema noted on upper body.   Skin: No suspicious lesions or rashes on visible skin.  Neurologic: Clear speech, no aphasia. No facial droop.  Psychiatric: Mentation appears normal, appropriate attention. Affect normal/bright. Does not appear anxious or depressed.    Key Data Reviewed:  LABS: 3/27/2024- Cr 1.07, GFR 52, Albumin 4, LFTs with mildly elevated AST.  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 She can still opt to decline medical intervention for bone strengthening if she wishes.  -Suggested bumping her bedtime later (pushing it back in small increments, such as 10mins every 1-2 weeks) to get her on a more consistent and \"normal\" sleep schedule.  -Continue exercise classes. Once she feels comfortable, can try standing for part of one of the days.      Follow up: 6 months         Total time spent on day of encounter is 36 mins, including reviewing record, review of above studies, above visit with patient (FTF 12:47PM-1:13PM), symptomatic discussion of sleep and deconditioning, including medication adjustments/prescription management, and documentation.     Mamie Last, DO  Palliative Medicine   AMCOM ID [id number]    Some chart documentation performed using Dragon Voice recognition Software. Although reviewed after completion, some words and grammatical errors may remain.    "

## 2024-05-02 ENCOUNTER — OFFICE VISIT (OUTPATIENT)
Dept: ONCOLOGY | Facility: CLINIC | Age: 84
End: 2024-05-02
Attending: STUDENT IN AN ORGANIZED HEALTH CARE EDUCATION/TRAINING PROGRAM
Payer: COMMERCIAL

## 2024-05-02 VITALS
SYSTOLIC BLOOD PRESSURE: 147 MMHG | OXYGEN SATURATION: 98 % | DIASTOLIC BLOOD PRESSURE: 75 MMHG | HEART RATE: 83 BPM | BODY MASS INDEX: 22.83 KG/M2 | RESPIRATION RATE: 16 BRPM | WEIGHT: 133 LBS

## 2024-05-02 DIAGNOSIS — I27.20 PULMONARY HYPERTENSION (H): ICD-10-CM

## 2024-05-02 DIAGNOSIS — Z71.89 GOALS OF CARE, COUNSELING/DISCUSSION: ICD-10-CM

## 2024-05-02 DIAGNOSIS — I08.0 RHEUMATIC DISORDER OF BOTH MITRAL AND AORTIC VALVES: ICD-10-CM

## 2024-05-02 DIAGNOSIS — I50.30 HEART FAILURE WITH PRESERVED EJECTION FRACTION, NYHA CLASS I (H): Primary | ICD-10-CM

## 2024-05-02 DIAGNOSIS — Z51.5 ENCOUNTER FOR PALLIATIVE CARE: ICD-10-CM

## 2024-05-02 DIAGNOSIS — T82.198S: ICD-10-CM

## 2024-05-02 PROCEDURE — G0463 HOSPITAL OUTPT CLINIC VISIT: HCPCS | Performed by: STUDENT IN AN ORGANIZED HEALTH CARE EDUCATION/TRAINING PROGRAM

## 2024-05-02 PROCEDURE — 99214 OFFICE O/P EST MOD 30 MIN: CPT | Performed by: STUDENT IN AN ORGANIZED HEALTH CARE EDUCATION/TRAINING PROGRAM

## 2024-05-02 ASSESSMENT — PAIN SCALES - GENERAL: PAINLEVEL: NO PAIN (0)

## 2024-05-02 NOTE — PATIENT INSTRUCTIONS
Recommendations:  -We discussed that since the burden of doing the Dexa scan is low, and it can provide up to date information about your overall risks for a fracture in the future, having the scan done this year likely makes sense.  -Try bumping your bedtime later (pushing it back in small increments, such as 10mins every 1-2 weeks).  -Please let me know if there are any big changes in your health before we chat again.    Follow up: 6 months      Reasons to Call    If you are having worsening/uncontrolled symptoms we want you to call!    You or your other physicians make any changes to medications we have prescribed.  -Please call for refills 4-5 days before you will run out of medication.    Important Phone Numbers, including: Refills, scheduling, and general questions     Palliative Care RN: Sonja Olmos - [phone]  *After hours or on weekends. Will connect you with on call MD. [phone]

## 2024-05-02 NOTE — LETTER
"    5/2/2024         RE: Zunilda Clark  [address]  [address]  [facility] [address]        Dear Colleague,    Thank you for referring your patient, Zunilda Clark, to the [facility] [place]. Please see a copy of my visit note below.    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  -Hospitalization code 2/2024 for acute CHF exacerbation as well as COVID.    Last Palliative care appointment: 3/21/24 with me. Fairly extensive goals of care type conversation.     Reviewed: Yes    Interim History:  Zunilda Clark is an 83 year old female who is seen today for a follow up visit with Palliative Care. Her Sister Mary is also present and provides additional history.      Reviewed cardiology note from 4/8.  Patient was more fluid up with greater swelling in her legs, however due to proficient urination, scheduled dose of diuretics was not increased.  She was encouraged that she could take an additional 20 mg of torsemide daily if needed.  Continues to decline invasive procedures.    Overall she is doing very well. She did get a new portable oxygen concentrator that has a 7 hour battery life when fully charged. Says this has helped improver her mobility. Has been able to get back to [employer].    Doing an exercise class now 3 days per week at her community as well. Able " "to sit for these and make modifications to the exercises.    Mood is good. Sleep is generally decent, however, her sleep schedule is altered. Generally in bed by 7:30PM but some nights she's tired and \"there's nothing to do\" so she goes to bed by 6:30PM. Will then wake up around 1:30AM and may be awake for the day.    Wondering about whether or not to do Dexa scan as recommended by Reumatology.    Confirms no desire to pursue invasive procedures such as the TAVR.     Social History:   2019.  4 daughters, 11 grandchildren, and 5 great-grandchildren.  All of her kids live out of state.  She was 1 of 4 siblings herself.   Social History     Tobacco Use     Smoking status: Never     Smokeless tobacco: Never   Vaping Use     Vaping status: Never Used   Substance Use Topics     Alcohol use: No     Alcohol/week: 0.0 standard drinks of alcohol     Drug use: No     Advanced Care Planning: HCD on file from 2020 naming daughters Alma and Kyara as her HCA's.  POLST form on file from October 2023 stating DNR/DNI-selective treatment.  No artificial nutrition by tube.     Family History- Reviewed in Epic.    Medications- Reviewed in Epic.    Past Medical History- Reviewed in uBank.    Past Surgical History- Reviewed in Epic.    Physical Exam:   BP (!) 147/75   Pulse 83   Resp 16   Wt 60.3 kg (133 lb)   LMP  (LMP Unknown)   SpO2 98%   BMI 22.83 kg/m    Constitutional: Alert, pleasant, no apparent distress. Sitting up in chair.  Eyes: Sclera non-icteric, no eye discharge.  ENT: No nasal discharge. Ears grossly normal.  Respiratory: Nasal cannula in place. Unlabored respirations. Speaking in full sentences.  Musculoskeletal: Extremities appear normal- no gross deformities noted. No edema noted on upper body.   Skin: No suspicious lesions or rashes on visible skin.  Neurologic: Clear speech, no aphasia. No facial droop.  Psychiatric: Mentation appears normal, appropriate attention. Affect normal/bright. Does not " "appear anxious or depressed.    Key Data Reviewed:  LABS: 3/27/2024- Cr 1.07, GFR 52, Albumin 4, LFTs with mildly elevated AST.  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 She can still opt to decline medical intervention for bone strengthening if she wishes.  -Suggested bumping her bedtime later (pushing it back in small increments, such as 10mins every 1-2 weeks) to get her on a more consistent and \"normal\" sleep schedule.  -Continue exercise classes. Once she feels comfortable, can try standing for part of one of the days.      Follow up: 6 months         Total time spent on day of encounter is 36 mins, including reviewing record, review of above studies, above visit with patient (FTF 12:47PM-1:13PM), symptomatic discussion of sleep and deconditioning, including medication adjustments/prescription management, and documentation.     Mamie Last DO  Palliative Medicine   AMCOM ID [id number]    Some chart documentation performed using Dragon Voice recognition Software. Although reviewed after completion, some words and grammatical errors may remain.      Again, thank you for allowing me to participate in the care of your patient.        Sincerely,        Mamie Last, DO  "

## 2024-05-09 ENCOUNTER — LAB (OUTPATIENT)
Dept: LAB | Facility: CLINIC | Age: 84
End: 2024-05-09
Payer: COMMERCIAL

## 2024-05-09 ENCOUNTER — ANTICOAGULATION THERAPY VISIT (OUTPATIENT)
Dept: ANTICOAGULATION | Facility: CLINIC | Age: 84
End: 2024-05-09

## 2024-05-09 DIAGNOSIS — Z95.3 S/P MITRAL VALVE REPLACEMENT WITH BIOPROSTHETIC VALVE: ICD-10-CM

## 2024-05-09 DIAGNOSIS — I48.91 ATRIAL FIBRILLATION AND FLUTTER (H): ICD-10-CM

## 2024-05-09 DIAGNOSIS — I48.92 ATRIAL FIBRILLATION AND FLUTTER (H): ICD-10-CM

## 2024-05-09 DIAGNOSIS — Z79.01 LONG TERM CURRENT USE OF ANTICOAGULANTS WITH INR GOAL OF 2.0-3.0: Primary | ICD-10-CM

## 2024-05-09 LAB — INR BLD: 2.8 (ref 0.9–1.1)

## 2024-05-09 PROCEDURE — 85610 PROTHROMBIN TIME: CPT

## 2024-05-09 PROCEDURE — 36416 COLLJ CAPILLARY BLOOD SPEC: CPT

## 2024-05-09 NOTE — PROGRESS NOTES
"ANTICOAGULATION MANAGEMENT     Zunilda SHAW May 83 year old female is on warfarin with therapeutic INR result. (Goal INR 2.0-3.0)    Recent labs: (last 7 days)     05/09/24  1013   INR 2.8*       ASSESSMENT     Warfarin Lab Questionnaire    Warfarin Doses Last 7 Days      5/9/2024    10:10 AM   Dose in Tablet or Mg   TAB or MG? milligram (mg)     Pt Rptd Dose SUNDAY MONDAY TUESDAY WED THURS FRIDAY SATURDAY 5/9/2024  10:10 AM 2.5 2.5 2.5 2.5 2.5 2.5 2.5         5/9/2024   Warfarin Lab Questionnaire   Missed doses within past 14 days? No   Changes in diet or alcohol within past 14 days? No   Medication changes since last result? No   Injuries or illness since last result? No   New shortness of breath, severe headaches or sudden changes in vision since last result? No   Abnormal bleeding since last result? Yes   If yes, please explain: nose bleeds.  Nose bleeds daily.  Lasts 1-2 minutes.  States that she \"cleans her nose out\" and this is what is causing it. She has not tried using the saline nasal spray as recommended previously, but did pick some up today and plans to try to use this. Advised her to follow up with PCP.   Upcoming surgery, procedure? No   Best number to call with results? [phone]     Previous result: Therapeutic last visit; previously outside of goal range  Additional findings: None       PLAN     Recommended plan for no diet, medication or health factor changes affecting INR     Dosing Instructions: Continue your current warfarin dose with next INR in 4 weeks       Summary  As of 5/9/2024      Full warfarin instructions:  2.5 mg every day   Next INR check:  6/6/2024               Telephone call with Deanne who agrees to plan and repeated back plan correctly    Lab visit scheduled    Education provided:   Please call back if any changes to your diet, medications or how you've been taking warfarin  Goal range and lab monitoring: goal range and significance of current result  Symptom monitoring: " monitoring for bleeding signs and symptoms and when to seek medical attention/emergency care    Plan made per [facility] anticoagulation protocol    Sonam Teixeira RN  Anticoagulation Clinic  5/9/2024    _______________________________________________________________________     Anticoagulation Episode Summary       Current INR goal:  2.0-3.0   TTR:  64.7% (10.7 mo)   Target end date:  Indefinite   Send INR reminders to:  [facility]    Indications    Long term current use of anticoagulants with INR goal of 2.0-3.0 [Z79.01]  Atrial fibrillation and flutter (H) [I48.91  I48.92]  S/P mitral valve replacement with bioprosthetic valve [Z95.3]             Comments:  27 mm Magna bioprosthetic valve (2014)             Anticoagulation Care Providers       Provider Role Specialty Phone number    Yunior Huang MD Referring Internal Medicine [phone]

## 2024-05-21 DIAGNOSIS — I50.30 HEART FAILURE WITH PRESERVED EJECTION FRACTION, NYHA CLASS I (H): ICD-10-CM

## 2024-05-21 RX ORDER — METOPROLOL TARTRATE 75 MG/1
1 TABLET, FILM COATED ORAL 2 TIMES DAILY
Qty: 180 TABLET | Refills: 0 | Status: SHIPPED | OUTPATIENT
Start: 2024-05-21 | End: 2024-07-01

## 2024-06-06 ENCOUNTER — LAB (OUTPATIENT)
Dept: LAB | Facility: CLINIC | Age: 84
End: 2024-06-06
Payer: COMMERCIAL

## 2024-06-06 ENCOUNTER — ANTICOAGULATION THERAPY VISIT (OUTPATIENT)
Dept: ANTICOAGULATION | Facility: CLINIC | Age: 84
End: 2024-06-06

## 2024-06-06 DIAGNOSIS — Z95.3 S/P MITRAL VALVE REPLACEMENT WITH BIOPROSTHETIC VALVE: ICD-10-CM

## 2024-06-06 DIAGNOSIS — I48.92 ATRIAL FIBRILLATION AND FLUTTER (H): ICD-10-CM

## 2024-06-06 DIAGNOSIS — I48.91 ATRIAL FIBRILLATION AND FLUTTER (H): ICD-10-CM

## 2024-06-06 DIAGNOSIS — Z79.01 LONG TERM CURRENT USE OF ANTICOAGULANTS WITH INR GOAL OF 2.0-3.0: Primary | ICD-10-CM

## 2024-06-06 LAB — INR BLD: 1.7 (ref 0.9–1.1)

## 2024-06-06 PROCEDURE — 36416 COLLJ CAPILLARY BLOOD SPEC: CPT

## 2024-06-06 PROCEDURE — 85610 PROTHROMBIN TIME: CPT

## 2024-06-06 NOTE — PROGRESS NOTES
ANTICOAGULATION MANAGEMENT     Zunilda Clark 83 year old female is on warfarin with subtherapeutic INR result. (Goal INR 2.0-3.0)    Recent labs: (last 7 days)     06/06/24  1013   INR 1.7*       ASSESSMENT     Warfarin Lab Questionnaire    Warfarin Doses Last 7 Days      6/6/2024    10:10 AM   Dose in Tablet or Mg   TAB or MG? milligram (mg)     Pt Rptd Dose SUNDAY MONDAY TUESDAY WED THURS FRIDAY SATURDAY 6/6/2024  10:10 AM 2.5 2.5 2.5 2.5 2.5 2.5 2.5         6/6/2024   Warfarin Lab Questionnaire   Missed doses within past 14 days? No patient reports she does not think she missed any warfarin doses   Changes in diet or alcohol within past 14 days? No, patient reports she will have a spinach salad today as that is what is being served in her dining room, patient reports she has not had many greens since she is no longer cooking for herself, will a warfarin boost dose today since INR is low and patient already knows she will have spinach today   Medication changes since last result? No   Injuries or illness since last result? No   New shortness of breath, severe headaches or sudden changes in vision since last result? No   Abnormal bleeding since last result? Yes   If yes, please explain: nose bleed from oxygen, patient reports she is now using saline nasal spray and this is helping, also reports has used Vaseline a couple times, encouraged to talk to PCP 6/12/24, may need a different type of nasal cannula    Upcoming surgery, procedure? No   Best number to call with results? [phone]     Previous result: Therapeutic last 2(+) visits  Additional findings: None       PLAN     Recommended plan for no diet, medication or health factor changes affecting INR     Dosing Instructions: booster dose then continue your current warfarin dose with next INR in 1 week       Summary  As of 6/6/2024      Full warfarin instructions:  6/6: 3.75 mg; Otherwise 2.5 mg every day   Next INR check:  6/12/2024               Telephone call  with Deanne who verbalizes understanding and agrees to plan    Check at provider office visit 6/12/24    Education provided:   Please call back if any changes to your diet, medications or how you've been taking warfarin  Contact [phone]  with any changes, questions or concerns.     Plan made per [facility] anticoagulation protocol    Екатерина Mahan RN  Anticoagulation Clinic  6/6/2024    _______________________________________________________________________     Anticoagulation Episode Summary       Current INR goal:  2.0-3.0   TTR:  62.4% (10.7 mo)   Target end date:  Indefinite   Send INR reminders to:  [facility]    Indications    Long term current use of anticoagulants with INR goal of 2.0-3.0 [Z79.01]  Atrial fibrillation and flutter (H) [I48.91  I48.92]  S/P mitral valve replacement with bioprosthetic valve [Z95.3]             Comments:  27 mm Magna bioprosthetic valve (2014)             Anticoagulation Care Providers       Provider Role Specialty Phone number    Yunior Huang MD Referring Internal Medicine [phone]

## 2024-06-11 DIAGNOSIS — Z79.899 MEDICATION MANAGEMENT: Primary | ICD-10-CM

## 2024-06-11 NOTE — PROGRESS NOTES
Spoke with patient regarding her medications and she is interested in meeting with [facility] pharmacist as she has been confused by recent medication changes and working with multiple doctors/prescribers. MTM referral ordered.       Sonja Winters, PharmD, [facility]  Population Health Pharmacist  [phone]

## 2024-06-12 ENCOUNTER — ANTICOAGULATION THERAPY VISIT (OUTPATIENT)
Dept: ANTICOAGULATION | Facility: CLINIC | Age: 84
End: 2024-06-12

## 2024-06-12 ENCOUNTER — OFFICE VISIT (OUTPATIENT)
Dept: INTERNAL MEDICINE | Facility: CLINIC | Age: 84
End: 2024-06-12
Payer: COMMERCIAL

## 2024-06-12 VITALS
DIASTOLIC BLOOD PRESSURE: 69 MMHG | OXYGEN SATURATION: 98 % | RESPIRATION RATE: 18 BRPM | SYSTOLIC BLOOD PRESSURE: 123 MMHG | HEIGHT: 64 IN | HEART RATE: 66 BPM | TEMPERATURE: 97.5 F | BODY MASS INDEX: 23.17 KG/M2 | WEIGHT: 135.7 LBS

## 2024-06-12 DIAGNOSIS — M10.9 GOUTY ARTHROPATHY: Primary | ICD-10-CM

## 2024-06-12 DIAGNOSIS — I48.92 ATRIAL FIBRILLATION AND FLUTTER (H): ICD-10-CM

## 2024-06-12 DIAGNOSIS — N18.31 STAGE 3A CHRONIC KIDNEY DISEASE (H): ICD-10-CM

## 2024-06-12 DIAGNOSIS — I48.91 ATRIAL FIBRILLATION AND FLUTTER (H): ICD-10-CM

## 2024-06-12 DIAGNOSIS — Z79.01 LONG TERM CURRENT USE OF ANTICOAGULANTS WITH INR GOAL OF 2.0-3.0: Primary | ICD-10-CM

## 2024-06-12 DIAGNOSIS — I27.20 PULMONARY HYPERTENSION (H): ICD-10-CM

## 2024-06-12 DIAGNOSIS — I10 ESSENTIAL HYPERTENSION: ICD-10-CM

## 2024-06-12 DIAGNOSIS — M06.9 RHEUMATOID ARTHRITIS, INVOLVING UNSPECIFIED SITE, UNSPECIFIED WHETHER RHEUMATOID FACTOR PRESENT (H): ICD-10-CM

## 2024-06-12 DIAGNOSIS — R19.7 DIARRHEA, UNSPECIFIED TYPE: ICD-10-CM

## 2024-06-12 DIAGNOSIS — Z95.3 S/P MITRAL VALVE REPLACEMENT WITH BIOPROSTHETIC VALVE: ICD-10-CM

## 2024-06-12 LAB
HGB BLD-MCNC: 10.9 G/DL (ref 11.7–15.7)
INR BLD: 2.2 (ref 0.9–1.1)

## 2024-06-12 PROCEDURE — 85018 HEMOGLOBIN: CPT | Performed by: INTERNAL MEDICINE

## 2024-06-12 PROCEDURE — 36415 COLL VENOUS BLD VENIPUNCTURE: CPT | Performed by: INTERNAL MEDICINE

## 2024-06-12 PROCEDURE — 80048 BASIC METABOLIC PNL TOTAL CA: CPT | Performed by: INTERNAL MEDICINE

## 2024-06-12 PROCEDURE — 85610 PROTHROMBIN TIME: CPT | Performed by: INTERNAL MEDICINE

## 2024-06-12 PROCEDURE — 99214 OFFICE O/P EST MOD 30 MIN: CPT | Performed by: INTERNAL MEDICINE

## 2024-06-12 RX ORDER — FAMOTIDINE 40 MG/1
40 TABLET, FILM COATED ORAL DAILY
Qty: 90 TABLET | Refills: 3 | Status: SHIPPED | OUTPATIENT
Start: 2024-06-12 | End: 2024-07-08

## 2024-06-12 RX ORDER — WARFARIN SODIUM 2.5 MG/1
TABLET ORAL
Qty: 105 TABLET | Refills: 3 | Status: ON HOLD | OUTPATIENT
Start: 2024-06-12 | End: 2024-06-25

## 2024-06-12 RX ORDER — ALLOPURINOL 100 MG/1
100 TABLET ORAL 2 TIMES DAILY
Qty: 180 TABLET | Refills: 3 | Status: SHIPPED | OUTPATIENT
Start: 2024-06-12

## 2024-06-12 RX ORDER — RESPIRATORY SYNCYTIAL VIRUS VACCINE 120MCG/0.5
0.5 KIT INTRAMUSCULAR ONCE
Qty: 1 EACH | Refills: 0 | Status: CANCELLED | OUTPATIENT
Start: 2024-06-12 | End: 2024-06-12

## 2024-06-12 ASSESSMENT — PAIN SCALES - GENERAL: PAINLEVEL: MODERATE PAIN (4)

## 2024-06-12 NOTE — PROGRESS NOTES
Assessment & Plan     Atrial fibrillation and flutter (H)  Rate controlled, on AC , no symptoms   - warfarin ANTICOAGULANT (COUMADIN) 2.5 MG tablet; TAKE 1.5 TABLETS BY MOUTH EVERY MONDAY AND TAKE 1 TABLET ALL OTHER DAYS OF THE WEEK OR AS INSTRUCTED BY THE INR CLINIC  - Basic metabolic panel  (Ca, Cl, CO2, Creat, Gluc, K, Na, BUN)  - INR point of care (finger stick)  - Hemoglobin    Diarrhea, unspecified type    - famotidine (PEPCID) 40 MG tablet; Take 1 tablet (40 mg) by mouth daily    Gouty arthropathy  No flare up of gout, on preventive treatment   - allopurinol (ZYLOPRIM) 100 MG tablet; Take 1 tablet (100 mg) by mouth 2 times daily    Pulmonary hypertension (H)  On oxygen, diuretic, follow up with cardiology     Essential hypertension  Controlled on treatment     Rheumatoid arthritis, involving unspecified site, unspecified whether rheumatoid factor present (H)  On methotrexate injections, follow up with rheumatology     Stage 3a chronic kidney disease (H)  Monitor renal function , on diuretics.             See Patient Instructions    Rowdy Alicea is a 83 year old, presenting for the following health issues:  RECHECK        6/12/2024    10:13 AM   Additional Questions   Roomed by Rosa DUFF   Accompanied by sister, Kasie DUGGAN       Hypertension Follow-up  Has h/o HTN. on medical treatment. BP has been controlled. No side effects from medications. No CP, HA, dizziness. good compliance with medications and low salt diet.    Do you check your blood pressure regularly outside of the clinic? Yes   Are you following a low salt diet? Yes  Are your blood pressures ever more than 140 on the top number (systolic) OR more   than 90 on the bottom number (diastolic), for example 140/90? Yes  How many servings of fruits and vegetables do you eat daily?  2-3  On average, how many sweetened beverages do you drink each day (Examples: soda, juice, sweet tea, etc.  Do NOT count diet or artificially sweetened  "beverages)?   0  How many days per week do you exercise enough to make your heart beat faster? 3 or less  How many minutes a day do you exercise enough to make your heart beat faster? 30 - 60  How many days per week do you miss taking your medication? 0    Has history of atrial fibrillation. On anticoagulation with Coumadin and rate control medications. Asymptonatic - no chest pains , palpitations,  no side effects from medications.  Has h/o CRF. Monitoring BP, BG, medications, avoiding OTC NSAIDs. Needs periodic recheck of kidney function.  Has h/o pulmonary HTN, on oxygen , uses all the time. Has  SOB on exertion, fatigue.   Has RA. On Methotrexate injections. Symptoms are controlled.       Review of Systems  Constitutional, HEENT, cardiovascular, pulmonary, gi and gu systems are negative, except as otherwise noted.      Objective    /69 (BP Location: Right arm, Cuff Size: Adult Regular)   Pulse 66   Temp 97.5  F (36.4  C) (Tympanic)   Resp 18   Ht 1.626 m (5' 4\")   Wt 61.6 kg (135 lb 11.2 oz)   LMP  (LMP Unknown)   SpO2 98%   BMI 23.29 kg/m    Body mass index is 23.29 kg/m .  Physical Exam   GENERAL: alert and no distress, frail   EYES: Eyes grossly normal to inspection, PERRL and conjunctivae and sclerae normal  HENT: ear canals and TM's normal, nose and mouth without ulcers or lesions  NECK: no adenopathy, no asymmetry, masses, or scars  RESP: lungs clear to auscultation - no rales, rhonchi or wheezes  CV: irregular rate and rhythm, normal S1 S2, no S3 or S4, 4/6 systolic murmur,no click or rub  ABDOMEN: soft, nontender, no hepatosplenomegaly, no masses and bowel sounds normal  MS: no gross musculoskeletal defects noted,2+ LE edema    Lab on 06/06/2024   Component Date Value Ref Range Status    INR 06/06/2024 1.7 (H)  0.9 - 1.1 Final           Signed Electronically by: Yunior Huang MD    "

## 2024-06-12 NOTE — PROGRESS NOTES
ANTICOAGULATION MANAGEMENT     Zunilda SHAW May 83 year old female is on warfarin with therapeutic INR result. (Goal INR 2.0-3.0)    Recent labs: (last 7 days)     06/12/24  1103   INR 2.2*       ASSESSMENT     Source(s): Chart Review and Patient/Caregiver Call     Warfarin doses taken: Warfarin taken as instructed, patient confirmed taking warfarin boost dose 6/6/24  Diet: No new diet changes identified  Medication/supplement changes: None noted  New illness, injury, or hospitalization: No  Signs or symptoms of bleeding or clotting: No  Previous result: Subtherapeutic  Additional findings: None       PLAN     Recommended plan for temporary change(s) affecting INR     Dosing Instructions: Continue your current warfarin dose with next INR in 2 weeks       Summary  As of 6/12/2024      Full warfarin instructions:  2.5 mg every day   Next INR check:  6/26/2024               Telephone call with Deanne who agrees to plan and repeated back plan correctly    Lab visit scheduled    Education provided:   Please call back if any changes to your diet, medications or how you've been taking warfarin  Contact [phone]  with any changes, questions or concerns.     Plan made per [facility] anticoagulation protocol    Екатерина Mahan RN  Anticoagulation Clinic  6/12/2024    _______________________________________________________________________     Anticoagulation Episode Summary       Current INR goal:  2.0-3.0   TTR:  61.2% (10.7 mo)   Target end date:  Indefinite   Send INR reminders to:  [facility]    Indications    Long term current use of anticoagulants with INR goal of 2.0-3.0 [Z79.01]  Atrial fibrillation and flutter (H) [I48.91  I48.92]  S/P mitral valve replacement with bioprosthetic valve [Z95.3]             Comments:  27 mm Magna bioprosthetic valve (2014)             Anticoagulation Care Providers       Provider Role Specialty Phone number    Yunior Huang MD Referring Internal Medicine [phone]

## 2024-06-13 ENCOUNTER — ANCILLARY PROCEDURE (OUTPATIENT)
Dept: BONE DENSITY | Facility: CLINIC | Age: 84
End: 2024-06-13
Payer: COMMERCIAL

## 2024-06-13 DIAGNOSIS — M81.0 SENILE OSTEOPOROSIS: ICD-10-CM

## 2024-06-13 LAB
ANION GAP SERPL CALCULATED.3IONS-SCNC: 11 MMOL/L (ref 7–15)
BUN SERPL-MCNC: 28.4 MG/DL (ref 8–23)
CALCIUM SERPL-MCNC: 9.6 MG/DL (ref 8.8–10.2)
CHLORIDE SERPL-SCNC: 98 MMOL/L (ref 98–107)
CREAT SERPL-MCNC: 1.07 MG/DL (ref 0.51–0.95)
DEPRECATED HCO3 PLAS-SCNC: 33 MMOL/L (ref 22–29)
EGFRCR SERPLBLD CKD-EPI 2021: 52 ML/MIN/1.73M2
GLUCOSE SERPL-MCNC: 86 MG/DL (ref 70–99)
POTASSIUM SERPL-SCNC: 3.5 MMOL/L (ref 3.4–5.3)
SODIUM SERPL-SCNC: 142 MMOL/L (ref 135–145)

## 2024-06-13 PROCEDURE — 77081 DXA BONE DENSITY APPENDICULR: CPT | Mod: TC | Performed by: PHYSICIAN ASSISTANT

## 2024-06-13 PROCEDURE — 77080 DXA BONE DENSITY AXIAL: CPT | Mod: TC | Performed by: PHYSICIAN ASSISTANT

## 2024-06-24 ENCOUNTER — APPOINTMENT (OUTPATIENT)
Dept: CT IMAGING | Facility: CLINIC | Age: 84
DRG: 291 | End: 2024-06-24
Attending: EMERGENCY MEDICINE
Payer: COMMERCIAL

## 2024-06-24 ENCOUNTER — HOSPITAL ENCOUNTER (INPATIENT)
Facility: CLINIC | Age: 84
LOS: 5 days | Discharge: HOME HEALTH | DRG: 291 | End: 2024-06-29
Attending: EMERGENCY MEDICINE | Admitting: INTERNAL MEDICINE
Payer: COMMERCIAL

## 2024-06-24 ENCOUNTER — APPOINTMENT (OUTPATIENT)
Dept: GENERAL RADIOLOGY | Facility: CLINIC | Age: 84
DRG: 291 | End: 2024-06-24
Attending: EMERGENCY MEDICINE
Payer: COMMERCIAL

## 2024-06-24 DIAGNOSIS — E03.9 ACQUIRED HYPOTHYROIDISM: ICD-10-CM

## 2024-06-24 DIAGNOSIS — R06.02 SOB (SHORTNESS OF BREATH): ICD-10-CM

## 2024-06-24 DIAGNOSIS — R79.89 ELEVATED TROPONIN: ICD-10-CM

## 2024-06-24 DIAGNOSIS — R79.89 ELEVATED BRAIN NATRIURETIC PEPTIDE (BNP) LEVEL: ICD-10-CM

## 2024-06-24 DIAGNOSIS — I27.20 PULMONARY HYPERTENSION (H): ICD-10-CM

## 2024-06-24 DIAGNOSIS — I50.9 ACUTE ON CHRONIC CONGESTIVE HEART FAILURE, UNSPECIFIED HEART FAILURE TYPE (H): ICD-10-CM

## 2024-06-24 DIAGNOSIS — I10 ESSENTIAL HYPERTENSION: Primary | ICD-10-CM

## 2024-06-24 DIAGNOSIS — Z99.81 OXYGEN DEPENDENT: ICD-10-CM

## 2024-06-24 DIAGNOSIS — Z95.0 CARDIAC PACEMAKER IN SITU: ICD-10-CM

## 2024-06-24 DIAGNOSIS — R52 PAIN: ICD-10-CM

## 2024-06-24 DIAGNOSIS — R06.02 SHORTNESS OF BREATH: ICD-10-CM

## 2024-06-24 DIAGNOSIS — Z79.01 LONG TERM CURRENT USE OF ANTICOAGULANTS WITH INR GOAL OF 2.0-3.0: ICD-10-CM

## 2024-06-24 DIAGNOSIS — I10 BENIGN ESSENTIAL HYPERTENSION: ICD-10-CM

## 2024-06-24 LAB
ANION GAP SERPL CALCULATED.3IONS-SCNC: 11 MMOL/L (ref 7–15)
BASE EXCESS BLDV CALC-SCNC: 8.3 MMOL/L (ref -3–3)
BASOPHILS # BLD AUTO: 0 10E3/UL (ref 0–0.2)
BASOPHILS NFR BLD AUTO: 1 %
BUN SERPL-MCNC: 29.5 MG/DL (ref 8–23)
CALCIUM SERPL-MCNC: 9 MG/DL (ref 8.8–10.2)
CHLORIDE SERPL-SCNC: 100 MMOL/L (ref 98–107)
CREAT SERPL-MCNC: 1.07 MG/DL (ref 0.51–0.95)
DEPRECATED HCO3 PLAS-SCNC: 29 MMOL/L (ref 22–29)
EGFRCR SERPLBLD CKD-EPI 2021: 52 ML/MIN/1.73M2
EOSINOPHIL # BLD AUTO: 0.2 10E3/UL (ref 0–0.7)
EOSINOPHIL NFR BLD AUTO: 2 %
ERYTHROCYTE [DISTWIDTH] IN BLOOD BY AUTOMATED COUNT: 18.1 % (ref 10–15)
GLUCOSE SERPL-MCNC: 97 MG/DL (ref 70–99)
HCO3 BLDV-SCNC: 34 MMOL/L (ref 21–28)
HCT VFR BLD AUTO: 29.9 % (ref 35–47)
HGB BLD-MCNC: 9.8 G/DL (ref 11.7–15.7)
IMM GRANULOCYTES # BLD: 0 10E3/UL
IMM GRANULOCYTES NFR BLD: 1 %
INR PPP: 1.68 (ref 0.85–1.15)
LYMPHOCYTES # BLD AUTO: 0.9 10E3/UL (ref 0.8–5.3)
LYMPHOCYTES NFR BLD AUTO: 14 %
MCH RBC QN AUTO: 35.6 PG (ref 26.5–33)
MCHC RBC AUTO-ENTMCNC: 32.8 G/DL (ref 31.5–36.5)
MCV RBC AUTO: 109 FL (ref 78–100)
MONOCYTES # BLD AUTO: 0.2 10E3/UL (ref 0–1.3)
MONOCYTES NFR BLD AUTO: 4 %
NEUTROPHILS # BLD AUTO: 5.1 10E3/UL (ref 1.6–8.3)
NEUTROPHILS NFR BLD AUTO: 79 %
NRBC # BLD AUTO: 0 10E3/UL
NRBC BLD AUTO-RTO: 0 /100
NT-PROBNP SERPL-MCNC: 5079 PG/ML (ref 0–1800)
O2/TOTAL GAS SETTING VFR VENT: 2 %
OXYHGB MFR BLDV: 50 % (ref 70–75)
PCO2 BLDV: 54 MM HG (ref 40–50)
PH BLDV: 7.41 [PH] (ref 7.32–7.43)
PLATELET # BLD AUTO: 256 10E3/UL (ref 150–450)
PO2 BLDV: 29 MM HG (ref 25–47)
POTASSIUM SERPL-SCNC: 4 MMOL/L (ref 3.4–5.3)
PROCALCITONIN SERPL IA-MCNC: 0.12 NG/ML
RBC # BLD AUTO: 2.75 10E6/UL (ref 3.8–5.2)
SAO2 % BLDV: 50.3 % (ref 70–75)
SODIUM SERPL-SCNC: 140 MMOL/L (ref 135–145)
TROPONIN T SERPL HS-MCNC: 26 NG/L
WBC # BLD AUTO: 6.4 10E3/UL (ref 4–11)

## 2024-06-24 PROCEDURE — 82805 BLOOD GASES W/O2 SATURATION: CPT | Performed by: EMERGENCY MEDICINE

## 2024-06-24 PROCEDURE — 99223 1ST HOSP IP/OBS HIGH 75: CPT | Performed by: INTERNAL MEDICINE

## 2024-06-24 PROCEDURE — 80048 BASIC METABOLIC PNL TOTAL CA: CPT | Performed by: EMERGENCY MEDICINE

## 2024-06-24 PROCEDURE — 71046 X-RAY EXAM CHEST 2 VIEWS: CPT

## 2024-06-24 PROCEDURE — 250N000011 HC RX IP 250 OP 636: Performed by: EMERGENCY MEDICINE

## 2024-06-24 PROCEDURE — 84145 PROCALCITONIN (PCT): CPT | Performed by: EMERGENCY MEDICINE

## 2024-06-24 PROCEDURE — 250N000009 HC RX 250: Performed by: EMERGENCY MEDICINE

## 2024-06-24 PROCEDURE — 85610 PROTHROMBIN TIME: CPT | Performed by: EMERGENCY MEDICINE

## 2024-06-24 PROCEDURE — 99285 EMERGENCY DEPT VISIT HI MDM: CPT | Mod: 25

## 2024-06-24 PROCEDURE — 93005 ELECTROCARDIOGRAM TRACING: CPT

## 2024-06-24 PROCEDURE — 71275 CT ANGIOGRAPHY CHEST: CPT

## 2024-06-24 PROCEDURE — 85025 COMPLETE CBC W/AUTO DIFF WBC: CPT | Performed by: EMERGENCY MEDICINE

## 2024-06-24 PROCEDURE — 120N000001 HC R&B MED SURG/OB

## 2024-06-24 PROCEDURE — 83880 ASSAY OF NATRIURETIC PEPTIDE: CPT | Performed by: EMERGENCY MEDICINE

## 2024-06-24 PROCEDURE — 36415 COLL VENOUS BLD VENIPUNCTURE: CPT | Performed by: EMERGENCY MEDICINE

## 2024-06-24 PROCEDURE — 84484 ASSAY OF TROPONIN QUANT: CPT | Performed by: EMERGENCY MEDICINE

## 2024-06-24 RX ORDER — IOPAMIDOL 755 MG/ML
500 INJECTION, SOLUTION INTRAVASCULAR ONCE
Status: COMPLETED | OUTPATIENT
Start: 2024-06-24 | End: 2024-06-24

## 2024-06-24 RX ORDER — FUROSEMIDE 10 MG/ML
40 INJECTION INTRAMUSCULAR; INTRAVENOUS ONCE
Status: COMPLETED | OUTPATIENT
Start: 2024-06-24 | End: 2024-06-25

## 2024-06-24 RX ADMIN — SODIUM CHLORIDE 84 ML: 9 INJECTION, SOLUTION INTRAVENOUS at 23:17

## 2024-06-24 RX ADMIN — IOPAMIDOL 62 ML: 755 INJECTION, SOLUTION INTRAVENOUS at 23:17

## 2024-06-24 ASSESSMENT — COLUMBIA-SUICIDE SEVERITY RATING SCALE - C-SSRS
1. IN THE PAST MONTH, HAVE YOU WISHED YOU WERE DEAD OR WISHED YOU COULD GO TO SLEEP AND NOT WAKE UP?: NO
6. HAVE YOU EVER DONE ANYTHING, STARTED TO DO ANYTHING, OR PREPARED TO DO ANYTHING TO END YOUR LIFE?: NO
2. HAVE YOU ACTUALLY HAD ANY THOUGHTS OF KILLING YOURSELF IN THE PAST MONTH?: NO

## 2024-06-24 ASSESSMENT — ACTIVITIES OF DAILY LIVING (ADL)
ADLS_ACUITY_SCORE: 38
ADLS_ACUITY_SCORE: 38

## 2024-06-25 ENCOUNTER — APPOINTMENT (OUTPATIENT)
Dept: CARDIOLOGY | Facility: CLINIC | Age: 84
DRG: 291 | End: 2024-06-25
Attending: INTERNAL MEDICINE
Payer: COMMERCIAL

## 2024-06-25 ENCOUNTER — APPOINTMENT (OUTPATIENT)
Dept: OCCUPATIONAL THERAPY | Facility: CLINIC | Age: 84
DRG: 291 | End: 2024-06-25
Attending: INTERNAL MEDICINE
Payer: COMMERCIAL

## 2024-06-25 LAB
ANION GAP SERPL CALCULATED.3IONS-SCNC: 11 MMOL/L (ref 7–15)
ATRIAL RATE - MUSE: 65 BPM
BUN SERPL-MCNC: 26.6 MG/DL (ref 8–23)
CALCIUM SERPL-MCNC: 8.7 MG/DL (ref 8.8–10.2)
CHLORIDE SERPL-SCNC: 100 MMOL/L (ref 98–107)
CREAT SERPL-MCNC: 0.92 MG/DL (ref 0.51–0.95)
DEPRECATED HCO3 PLAS-SCNC: 28 MMOL/L (ref 22–29)
DIASTOLIC BLOOD PRESSURE - MUSE: NORMAL MMHG
EGFRCR SERPLBLD CKD-EPI 2021: 62 ML/MIN/1.73M2
ERYTHROCYTE [DISTWIDTH] IN BLOOD BY AUTOMATED COUNT: 18.1 % (ref 10–15)
GLUCOSE SERPL-MCNC: 84 MG/DL (ref 70–99)
HCT VFR BLD AUTO: 31.2 % (ref 35–47)
HGB BLD-MCNC: 9.8 G/DL (ref 11.7–15.7)
INR PPP: 1.67 (ref 0.85–1.15)
INR PPP: 1.72 (ref 0.85–1.15)
INTERPRETATION ECG - MUSE: NORMAL
LVEF ECHO: NORMAL
MAGNESIUM SERPL-MCNC: 2.2 MG/DL (ref 1.7–2.3)
MAGNESIUM SERPL-MCNC: 2.2 MG/DL (ref 1.7–2.3)
MCH RBC QN AUTO: 34.1 PG (ref 26.5–33)
MCHC RBC AUTO-ENTMCNC: 31.4 G/DL (ref 31.5–36.5)
MCV RBC AUTO: 109 FL (ref 78–100)
P AXIS - MUSE: NORMAL DEGREES
PLATELET # BLD AUTO: 250 10E3/UL (ref 150–450)
POTASSIUM SERPL-SCNC: 3.6 MMOL/L (ref 3.4–5.3)
POTASSIUM SERPL-SCNC: 3.7 MMOL/L (ref 3.4–5.3)
PR INTERVAL - MUSE: 178 MS
QRS DURATION - MUSE: 156 MS
QT - MUSE: 498 MS
QTC - MUSE: 517 MS
R AXIS - MUSE: 110 DEGREES
RBC # BLD AUTO: 2.87 10E6/UL (ref 3.8–5.2)
SODIUM SERPL-SCNC: 139 MMOL/L (ref 135–145)
SYSTOLIC BLOOD PRESSURE - MUSE: NORMAL MMHG
T AXIS - MUSE: -31 DEGREES
TROPONIN T SERPL HS-MCNC: 22 NG/L
TROPONIN T SERPL HS-MCNC: 24 NG/L
VENTRICULAR RATE- MUSE: 65 BPM
WBC # BLD AUTO: 5.9 10E3/UL (ref 4–11)

## 2024-06-25 PROCEDURE — 99223 1ST HOSP IP/OBS HIGH 75: CPT | Mod: 25 | Performed by: INTERNAL MEDICINE

## 2024-06-25 PROCEDURE — 250N000011 HC RX IP 250 OP 636: Mod: JZ | Performed by: EMERGENCY MEDICINE

## 2024-06-25 PROCEDURE — 83735 ASSAY OF MAGNESIUM: CPT | Performed by: HOSPITALIST

## 2024-06-25 PROCEDURE — 97535 SELF CARE MNGMENT TRAINING: CPT | Mod: GO

## 2024-06-25 PROCEDURE — 84484 ASSAY OF TROPONIN QUANT: CPT | Performed by: INTERNAL MEDICINE

## 2024-06-25 PROCEDURE — 99233 SBSQ HOSP IP/OBS HIGH 50: CPT | Performed by: HOSPITALIST

## 2024-06-25 PROCEDURE — 85610 PROTHROMBIN TIME: CPT | Performed by: INTERNAL MEDICINE

## 2024-06-25 PROCEDURE — 120N000001 HC R&B MED SURG/OB

## 2024-06-25 PROCEDURE — 80048 BASIC METABOLIC PNL TOTAL CA: CPT | Performed by: INTERNAL MEDICINE

## 2024-06-25 PROCEDURE — 36415 COLL VENOUS BLD VENIPUNCTURE: CPT | Performed by: INTERNAL MEDICINE

## 2024-06-25 PROCEDURE — 93306 TTE W/DOPPLER COMPLETE: CPT | Mod: 26 | Performed by: INTERNAL MEDICINE

## 2024-06-25 PROCEDURE — 250N000011 HC RX IP 250 OP 636: Mod: JZ | Performed by: INTERNAL MEDICINE

## 2024-06-25 PROCEDURE — 85027 COMPLETE CBC AUTOMATED: CPT | Performed by: INTERNAL MEDICINE

## 2024-06-25 PROCEDURE — 93306 TTE W/DOPPLER COMPLETE: CPT

## 2024-06-25 PROCEDURE — 84132 ASSAY OF SERUM POTASSIUM: CPT | Performed by: INTERNAL MEDICINE

## 2024-06-25 PROCEDURE — 97530 THERAPEUTIC ACTIVITIES: CPT | Mod: GO

## 2024-06-25 PROCEDURE — 36415 COLL VENOUS BLD VENIPUNCTURE: CPT | Performed by: HOSPITALIST

## 2024-06-25 PROCEDURE — 250N000013 HC RX MED GY IP 250 OP 250 PS 637: Performed by: INTERNAL MEDICINE

## 2024-06-25 PROCEDURE — 97165 OT EVAL LOW COMPLEX 30 MIN: CPT | Mod: GO

## 2024-06-25 RX ORDER — LATANOPROST 50 UG/ML
1 SOLUTION/ DROPS OPHTHALMIC EVERY EVENING
COMMUNITY

## 2024-06-25 RX ORDER — WARFARIN SODIUM 2.5 MG/1
2.5 TABLET ORAL ONCE
Status: COMPLETED | OUTPATIENT
Start: 2024-06-25 | End: 2024-06-25

## 2024-06-25 RX ORDER — AMOXICILLIN 250 MG
2 CAPSULE ORAL 2 TIMES DAILY PRN
Status: DISCONTINUED | OUTPATIENT
Start: 2024-06-25 | End: 2024-06-29 | Stop reason: HOSPADM

## 2024-06-25 RX ORDER — IBUPROFEN 200 MG
950 CAPSULE ORAL DAILY
Status: DISCONTINUED | OUTPATIENT
Start: 2024-06-25 | End: 2024-06-29 | Stop reason: HOSPADM

## 2024-06-25 RX ORDER — FUROSEMIDE 10 MG/ML
40 INJECTION INTRAMUSCULAR; INTRAVENOUS
Status: DISCONTINUED | OUTPATIENT
Start: 2024-06-25 | End: 2024-06-26

## 2024-06-25 RX ORDER — LEVOTHYROXINE SODIUM 112 UG/1
112 TABLET ORAL DAILY
Status: DISCONTINUED | OUTPATIENT
Start: 2024-06-25 | End: 2024-06-29 | Stop reason: HOSPADM

## 2024-06-25 RX ORDER — TORSEMIDE 20 MG/1
20 TABLET ORAL 2 TIMES DAILY
Status: ON HOLD | COMMUNITY
End: 2024-06-29

## 2024-06-25 RX ORDER — ACETAMINOPHEN 500 MG
1000 TABLET ORAL EVERY 8 HOURS PRN
Status: DISCONTINUED | OUTPATIENT
Start: 2024-06-25 | End: 2024-06-29 | Stop reason: HOSPADM

## 2024-06-25 RX ORDER — CALCIUM CARBONATE 500 MG/1
1000 TABLET, CHEWABLE ORAL 4 TIMES DAILY PRN
Status: DISCONTINUED | OUTPATIENT
Start: 2024-06-25 | End: 2024-06-29 | Stop reason: HOSPADM

## 2024-06-25 RX ORDER — METOPROLOL TARTRATE 25 MG/1
75 TABLET, FILM COATED ORAL 2 TIMES DAILY
Status: DISCONTINUED | OUTPATIENT
Start: 2024-06-25 | End: 2024-06-28

## 2024-06-25 RX ORDER — WARFARIN SODIUM 2.5 MG/1
2.5 TABLET ORAL DAILY
COMMUNITY

## 2024-06-25 RX ORDER — AMOXICILLIN 250 MG
1 CAPSULE ORAL 2 TIMES DAILY PRN
Status: DISCONTINUED | OUTPATIENT
Start: 2024-06-25 | End: 2024-06-29 | Stop reason: HOSPADM

## 2024-06-25 RX ORDER — FOLIC ACID 1 MG/1
1 TABLET ORAL DAILY
Status: DISCONTINUED | OUTPATIENT
Start: 2024-06-25 | End: 2024-06-29 | Stop reason: HOSPADM

## 2024-06-25 RX ORDER — WARFARIN SODIUM 4 MG/1
4 TABLET ORAL
Status: COMPLETED | OUTPATIENT
Start: 2024-06-25 | End: 2024-06-25

## 2024-06-25 RX ORDER — AMLODIPINE BESYLATE 5 MG/1
5 TABLET ORAL DAILY
Status: DISCONTINUED | OUTPATIENT
Start: 2024-06-25 | End: 2024-06-29 | Stop reason: HOSPADM

## 2024-06-25 RX ORDER — LIDOCAINE 40 MG/G
CREAM TOPICAL
Status: DISCONTINUED | OUTPATIENT
Start: 2024-06-25 | End: 2024-06-29 | Stop reason: HOSPADM

## 2024-06-25 RX ORDER — ALLOPURINOL 100 MG/1
100 TABLET ORAL 2 TIMES DAILY
Status: DISCONTINUED | OUTPATIENT
Start: 2024-06-25 | End: 2024-06-29 | Stop reason: HOSPADM

## 2024-06-25 RX ORDER — FAMOTIDINE 20 MG/1
20 TABLET, FILM COATED ORAL DAILY
Status: DISCONTINUED | OUTPATIENT
Start: 2024-06-25 | End: 2024-06-29 | Stop reason: HOSPADM

## 2024-06-25 RX ADMIN — Medication 950 MG: at 09:56

## 2024-06-25 RX ADMIN — METOPROLOL TARTRATE 75 MG: 25 TABLET, FILM COATED ORAL at 21:13

## 2024-06-25 RX ADMIN — FUROSEMIDE 40 MG: 10 INJECTION, SOLUTION INTRAMUSCULAR; INTRAVENOUS at 08:48

## 2024-06-25 RX ADMIN — WARFARIN SODIUM 2.5 MG: 2.5 TABLET ORAL at 01:09

## 2024-06-25 RX ADMIN — AMLODIPINE BESYLATE 5 MG: 5 TABLET ORAL at 08:49

## 2024-06-25 RX ADMIN — LEVOTHYROXINE SODIUM 112 MCG: 0.11 TABLET ORAL at 08:49

## 2024-06-25 RX ADMIN — FOLIC ACID 1 MG: 1 TABLET ORAL at 08:49

## 2024-06-25 RX ADMIN — FAMOTIDINE 20 MG: 20 TABLET ORAL at 09:56

## 2024-06-25 RX ADMIN — FUROSEMIDE 40 MG: 10 INJECTION, SOLUTION INTRAMUSCULAR; INTRAVENOUS at 01:06

## 2024-06-25 RX ADMIN — METOPROLOL TARTRATE 75 MG: 25 TABLET, FILM COATED ORAL at 08:45

## 2024-06-25 RX ADMIN — ALLOPURINOL 100 MG: 100 TABLET ORAL at 21:13

## 2024-06-25 RX ADMIN — FUROSEMIDE 40 MG: 10 INJECTION, SOLUTION INTRAMUSCULAR; INTRAVENOUS at 14:17

## 2024-06-25 RX ADMIN — WARFARIN SODIUM 4 MG: 4 TABLET ORAL at 18:29

## 2024-06-25 RX ADMIN — ALLOPURINOL 100 MG: 100 TABLET ORAL at 08:49

## 2024-06-25 ASSESSMENT — ACTIVITIES OF DAILY LIVING (ADL)
ADLS_ACUITY_SCORE: 33
ADLS_ACUITY_SCORE: 36
ADLS_ACUITY_SCORE: 33
ADLS_ACUITY_SCORE: 33
ADLS_ACUITY_SCORE: 27
ADLS_ACUITY_SCORE: 38
ADLS_ACUITY_SCORE: 27
ADLS_ACUITY_SCORE: 36
ADLS_ACUITY_SCORE: 36
ADLS_ACUITY_SCORE: 33
ADLS_ACUITY_SCORE: 36
ADLS_ACUITY_SCORE: 33
ADLS_ACUITY_SCORE: 38
ADLS_ACUITY_SCORE: 36
ADLS_ACUITY_SCORE: 27
ADLS_ACUITY_SCORE: 38
ADLS_ACUITY_SCORE: 36
ADLS_ACUITY_SCORE: 38
ADLS_ACUITY_SCORE: 33
ADLS_ACUITY_SCORE: 38
ADLS_ACUITY_SCORE: 27
ADLS_ACUITY_SCORE: 38
ADLS_ACUITY_SCORE: 36

## 2024-06-25 NOTE — PLAN OF CARE
"Oriented x  4 . VSS  Denied pain . Lower extremities edema, course crackles on the posterior  lung fields.   Goal Outcome Evaluation:      Plan of Care Reviewed With: patient    Overall Patient Progress: no changeOverall Patient Progress: no change    Outcome Evaluation: VSS Oxygen delivery 2 LMP sat in the 90's . K 3.6 , Mag 2.2 .     Problem: Adult Inpatient Plan of Care  Goal: Plan of Care Review  Description: The Plan of Care Review/Shift note should be completed every shift.  The Outcome Evaluation is a brief statement about your assessment that the patient is improving, declining, or no change.  This information will be displayed automatically on your shift  note.  Outcome: Progressing  Flowsheets (Taken 6/25/2024 0717)  Outcome Evaluation: O2 NC  Plan of Care Reviewed With: patient  Overall Patient Progress: no change  Goal: Patient-Specific Goal (Individualized)  Description: You can add care plan individualizations to a care plan. Examples of Individualization might be:  \"Parent requests to be called daily at 9am for status\", \"I have a hard time hearing out of my right ear\", or \"Do not touch me to wake me up as it startles  me\".  Outcome: Progressing  Goal: Readiness for Transition of Care  Outcome: Progressing     Problem: Heart Failure  Goal: Optimal Coping  Outcome: Progressing  Goal: Optimal Cardiac Output  Outcome: Progressing  Goal: Stable Heart Rate and Rhythm  Outcome: Progressing  Goal: Optimal Functional Ability  Outcome: Progressing  Intervention: Optimize Functional Ability  Recent Flowsheet Documentation  Taken 6/25/2024 0035 by Eusebio Ceron, RN  Activity Management: activity adjusted per tolerance  Goal: Fluid and Electrolyte Balance  Outcome: Progressing  Goal: Improved Oral Intake  Outcome: Progressing  Goal: Effective Oxygenation and Ventilation  Outcome: Progressing  Intervention: Promote Airway Secretion Clearance  Recent Flowsheet Documentation  Taken 6/25/2024 0035 by Eusebio Ceron " MIKE, RN  Activity Management: activity adjusted per tolerance  Goal: Effective Breathing Pattern During Sleep  Outcome: Progressing  Intervention: Monitor and Manage Obstructive Sleep Apnea  Recent Flowsheet Documentation  Taken 6/25/2024 0035 by Eusebio Ceron, RN  Medication Review/Management: medications reviewed

## 2024-06-25 NOTE — CONSULTS
[facility]    Cardiology Consultation     Date of Admission:  6/24/2024    Assessment & Plan   Zunilda Clark is a 83 year old female who was admitted on 6/24/2024.    Impression:  1.  Recurrent pulmonary edema secondary to severe bioprosthetic mitral valve stenosis and severe aortic stenosis.  Pulm edema has improved with intravenous diuresis.  Patient is an outpatient palliative care.  2.  Chronic atrial fibrillation/ atrial flutter status post AV node ablation and permanent pacemaker placement with chronic warfarin therapy.  3.  Severe aortic stenosis  4.  Severe bioprosthetic mitral valve stenosis  5.  Moderate tricuspid regurgitation in January.    Plan:  1.  Continue with intravenous furosemide over the next 24 hours.  2.  Awaiting the results of today's echocardiogram to assess further the degree of mitral stenosis, aortic stenosis, pulmonary pressures and tricuspid regurgitation.  3.  Daily BMPs and strict intake and output and daily weights.  4.  This is a terminal illness and the patient is correctly in palliative care.    Thanks for the consult      Momo Beatty MD, FACC, FRCPI    Primary Care Physician   Yunior Huang    Reason for Consult   Reason for consult: I was asked by hospitalist service to evaluate this patient for congestive heart failure.    History of Present Illness   Zunilda Clark is a 83 year old female who presents with increasing shortness of breath.  Yesterday morning she awoke finding difficulty to catch her breath even at rest.  She was also complaining of orthopnea.  She had noticed however over the previous week that she had increasing ankle edema.  This patient is followed by Dr. Rodriguez and YOKASTA Augustine in heart failure clinic.  I saw this patient in February of this year when she had a similar presentation.  Most recent echocardiogram shows that she has severe aortic stenosis and she has severe bioprosthetic mitral stenosis.  The patient is in  palliative care.  She is not interested in any interventions for aortic stenosis nor the severe prosthetic mitral valve stenosis.  She reiterates that sentiment today.  She was administered intravenous furosemide 40 mg twice a day and is significantly better this morning but far less shortness of breath.  She has had a good diuresis with over 1.5 L net out.  The weights are unreliable most likely going from 138 pounds to 140 pounds despite the fact that she has had a significant diuresis.  The patient has a flat troponin almost certainly not due to ischemia with no chest discomfort.  This is probably due to heart failure.  The patient was taking 20 mg of torsemide prior to admission.  This patient has had a prior AV node ablation with permanent pacemaker implantation and has a history of atrial fibrillation and atrial flutter.  She is chronically anticoagulated with warfarin.    Past Medical History   Past Medical History:   Diagnosis Date    Acquired hypothyroidism 11/04/2015    Aortic valve insufficiency     Atrial fibrillation and flutter     CHF (congestive heart failure)     DVT (deep venous thrombosis) 2003    Gastro-oesophageal reflux disease     H/O mitral valve replacement with tissue graft 06/2014    HTN (hypertension)     Other chronic pain     Pulmonary HTN     Rheumatoid arthritis     Seizure 2014    Shingles 08/2018    Stroke 2009    left side mild weakness          Past Surgical History   Past Surgical History:   Procedure Laterality Date    APPLY WOUND VAC Left 12/18/2018    Procedure: Wound vac application;  Surgeon: Pardeep Sheikh MD;  Location: [facility] OR    ARTHROPLASTY KNEE Left 11/12/2018    Procedure: Left total knee arthroplasty using a [employer] knee system;  Surgeon: Pardeep Sheikh MD;  Location: [facility] OR    ARTHROSCOPY KNEE WITH DEBRIDEMENT JOINT, COMBINED Left 12/18/2018    Procedure: Left knee debridement and placement of wound vac;  Surgeon: Aguilar  Pardeep Duran MD;  Location: [facility] OR    CATARACT IOL, RT/LT      COLONOSCOPY  03/15/2012    EP PPM GENERATOR CHANGE SINGLE N/A 03/08/2021    Procedure: Permanent Pacemakers  Generator Change Dual Chamber;  Surgeon: Tamiko Cowan MD;  Location:  [facility]ATH [facility]    ESOPHAGOSCOPY, GASTROSCOPY, DUODENOSCOPY (EGD), COMBINED N/A 02/19/2020    Procedure: ESOPHAGOGASTRODUODENOSCOPY (EGD);  Surgeon: Michael Mccarthy MD;  Location:  GI    EYE SURGERY  2018    Both eyes/cataract surgery    H ABLATION AV NODE  2011    AFlutter with syncope, PPM to follow    HERNIA REPAIR      3 hernies/right and left & umbilical    IMPLANT PACEMAKER  2011    LAPAROSCOPIC CHOLECYSTECTOMY WITH CHOLANGIOGRAMS N/A 04/29/2017    Procedure: LAPAROSCOPIC CHOLECYSTECTOMY WITH CHOLANGIOGRAMS;  LAPAROSCOPIC CHOLECYSTECTOMY WITH CHOLANGIOGRAMS ;  Surgeon: Angeles Mcgill MD;  Location: [facility] OR    OPEN REDUCTION INTERNAL FIXATION RODDING INTRAMEDULLARY FEMUR Left 9/27/2023    Procedure: Placement of retrograde femoral nail left femur;  Surgeon: Spike Vann MD;  Location: [facility] OR    OPEN REDUCTION INTERNAL FIXATION RODDING INTRAMEDULLARY HUMERUS Right 07/28/2015    Procedure: OPEN REDUCTION INTERNAL FIXATION RODDING INTRAMEDULLARY HUMERUS;  Surgeon: Raymnudo Rivera MD;  Location: [facility] OR    REPLACE VALVE MITRAL  2006    Mitral valve replacement with bioprosthetic valve in 2008 for rheumatic disease    SLING BLADDER SUSPENSION WITH FASCIA UMESH           Prior to Admission Medications   Prior to Admission Medications   Prescriptions Last Dose Informant Patient Reported? Taking?   Metoprolol Tartrate 75 MG TABS 6/24/2024 at am  No Yes   Sig: TAKE 1 TABLET BY MOUTH TWICE  DAILY   VITAMIN D, CHOLECALCIFEROL, PO 6/24/2024  Yes Yes   Sig: Take 1,000 Units by mouth daily   acetaminophen (TYLENOL) 500 MG tablet Past Week  Yes Yes   Sig: Take 1,000 mg by mouth every 8 hours as needed for pain   allopurinol (ZYLOPRIM) 100 MG  tablet 6/24/2024 at am  No Yes   Sig: Take 1 tablet (100 mg) by mouth 2 times daily   amLODIPine (NORVASC) 5 MG tablet 6/24/2024 at am  No Yes   Sig: Take 1 tablet (5 mg) by mouth daily   amoxicillin (AMOXIL) 500 MG tablet not recent  No Yes   Sig: Take 2000mg (4 tablets of 500mg each) approx 30 min to 1 hour prior to any dental procedures   calcium citrate (CALCITRATE) 950 MG tablet 6/24/2024 at am  Yes Yes   Sig: Take 1 tablet by mouth daily    famotidine (PEPCID) 40 MG tablet 6/24/2024 at am  No Yes   Sig: Take 1 tablet (40 mg) by mouth daily   folic acid (FOLVITE) 1 MG tablet 6/23/2024  Yes Yes   Sig: Take 1 mg by mouth daily   latanoprost (XALATAN) 0.005 % ophthalmic solution 6/23/2024  Yes Yes   Sig: Place 1 drop into both eyes every evening   levothyroxine (SYNTHROID/LEVOTHROID) 112 MCG tablet 6/24/2024 at am  No Yes   Sig: Take 1 tablet (112 mcg) by mouth daily   methotrexate 50 MG/2ML injection 6/21/2024  Yes Yes   Sig: Inject 0.8 mLs Subcutaneous every 7 days (Fridays)   multivitamin w/minerals (THERA-VIT-M) tablet 6/23/2024  Yes Yes   Sig: Take 1 tablet by mouth daily Without iron   torsemide (DEMADEX) 20 MG tablet 6/24/2024 at am  Yes Yes   Sig: Take 20 mg by mouth 2 times daily   warfarin ANTICOAGULANT (COUMADIN) 2.5 MG tablet 6/23/2024 at pm  Yes Yes   Sig: Take 2.5 mg by mouth daily      Facility-Administered Medications: None     Current Facility-Administered Medications   Medication Dose Route Frequency Provider Last Rate Last Admin    acetaminophen (TYLENOL) tablet 1,000 mg  1,000 mg Oral Q8H PRN Nydia Carver MD        allopurinol (ZYLOPRIM) tablet 100 mg  100 mg Oral BID Nydia Carver MD   100 mg at 06/25/24 0849    amLODIPine (NORVASC) tablet 5 mg  5 mg Oral Daily Nydia Carver MD   5 mg at 06/25/24 0849    calcium carbonate (TUMS) chewable tablet 1,000 mg  1,000 mg Oral 4x Daily PRN Nydia Carver MD        calcium citrate (CITRACAL) tablet 950 mg  950 mg Oral  Daily Nydia Carver MD        Continuing ACE inhibitor/ARB/ARNI from home medication list OR ACE inhibitor/ARB/ARNI order already placed during this visit   Does not apply DOES NOT GO TO Nydia Carcamo MD        Continuing beta blocker from home medication list OR beta blocker order already placed during this visit   Does not apply DOES NOT GO TO Nydia Carcamo MD        famotidine (PEPCID) tablet 20 mg  20 mg Oral Daily Nydia Carver MD        folic acid (FOLVITE) tablet 1 mg  1 mg Oral Daily Nydia Carver MD   1 mg at 06/25/24 0849    furosemide (LASIX) injection 40 mg  40 mg Intravenous bid 08 & 14 Nydia Carver MD   40 mg at 06/25/24 0848    levothyroxine (SYNTHROID/LEVOTHROID) tablet 112 mcg  112 mcg Oral Daily Nydia Carver MD   112 mcg at 06/25/24 0849    lidocaine (LMX4) cream   Topical Q1H PRN Nydia Carver MD        lidocaine 1 % 0.1-1 mL  0.1-1 mL Other Q1H PRN Nydia Carver MD        metoprolol tartrate (LOPRESSOR) tablet 75 mg  75 mg Oral BID Nydia Carver MD   75 mg at 06/25/24 0845    Patient is already receiving anticoagulation with heparin, enoxaparin (LOVENOX), warfarin (COUMADIN)  or other anticoagulant medication   Does not apply Continuous PRN Nydia Carver MD        senna-docusate (SENOKOT-S/PERICOLACE) 8.6-50 MG per tablet 1 tablet  1 tablet Oral BID PRN Nydia Carver MD        Or    senna-docusate (SENOKOT-S/PERICOLACE) 8.6-50 MG per tablet 2 tablet  2 tablet Oral BID PRN Nydia Carver MD        sodium chloride (PF) 0.9% PF flush 3 mL  3 mL Intracatheter Q8H Nydia Carver MD   3 mL at 06/25/24 0849    sodium chloride (PF) 0.9% PF flush 3 mL  3 mL Intracatheter q1 min prn Nydia Carver MD        Warfarin Dose Required Daily - Pharmacist Managed  1 each Oral See Admin Instructions Nydia Carver MD         Current Facility-Administered Medications   Medication  Dose Route Frequency Provider Last Rate Last Admin    acetaminophen (TYLENOL) tablet 1,000 mg  1,000 mg Oral Q8H PRN Nydia Carver MD        allopurinol (ZYLOPRIM) tablet 100 mg  100 mg Oral BID Nydia Carver MD   100 mg at 06/25/24 0849    amLODIPine (NORVASC) tablet 5 mg  5 mg Oral Daily Nydia Carver MD   5 mg at 06/25/24 0849    calcium carbonate (TUMS) chewable tablet 1,000 mg  1,000 mg Oral 4x Daily PRN Nydia Carver MD        calcium citrate (CITRACAL) tablet 950 mg  950 mg Oral Daily Nydia Carver MD        Continuing ACE inhibitor/ARB/ARNI from home medication list OR ACE inhibitor/ARB/ARNI order already placed during this visit   Does not apply DOES NOT GO TO Nydia Carcamo MD        Continuing beta blocker from home medication list OR beta blocker order already placed during this visit   Does not apply DOES NOT GO TO Nydia Carcamo MD        famotidine (PEPCID) tablet 20 mg  20 mg Oral Daily Nydia Carver MD        folic acid (FOLVITE) tablet 1 mg  1 mg Oral Daily Nydia Carver MD   1 mg at 06/25/24 0849    furosemide (LASIX) injection 40 mg  40 mg Intravenous bid 08 & 14 Nydia Carver MD   40 mg at 06/25/24 0848    levothyroxine (SYNTHROID/LEVOTHROID) tablet 112 mcg  112 mcg Oral Daily Nydia Carver MD   112 mcg at 06/25/24 0849    lidocaine (LMX4) cream   Topical Q1H PRN Nydia Carver MD        lidocaine 1 % 0.1-1 mL  0.1-1 mL Other Q1H PRN Nydia Carver MD        metoprolol tartrate (LOPRESSOR) tablet 75 mg  75 mg Oral BID Nydia Carver MD   75 mg at 06/25/24 0845    Patient is already receiving anticoagulation with heparin, enoxaparin (LOVENOX), warfarin (COUMADIN)  or other anticoagulant medication   Does not apply Continuous PRN Nydia Carver MD        senna-docusate (SENOKOT-S/PERICOLACE) 8.6-50 MG per tablet 1 tablet  1 tablet Oral BID PRN Nydia Carver MD        Or     senna-docusate (SENOKOT-S/PERICOLACE) 8.6-50 MG per tablet 2 tablet  2 tablet Oral BID PRN Nydia Carver MD        sodium chloride (PF) 0.9% PF flush 3 mL  3 mL Intracatheter Q8H Nydia Carver MD   3 mL at 24 0849    sodium chloride (PF) 0.9% PF flush 3 mL  3 mL Intracatheter q1 min prn Nydia Carver MD        Warfarin Dose Required Daily - Pharmacist Managed  1 each Oral See Admin Instructions Nydia Carver MD         Allergies   No Known Allergies    Social History    reports that she has never smoked. She has never used smokeless tobacco. She reports that she does not drink alcohol and does not use drugs.      Family History   I have reviewed this patient's family history and updated it with pertinent information if needed.  Family History   Problem Relation Age of Onset    Coronary Artery Disease Mother     Coronary Artery Disease Brother     Diabetes Brother     Cancer Brother          at age 52     Other Cancer Brother     Hypertension Sister     Hyperlipidemia Sister           Review of Systems   A comprehensive review of system was performed and is negative other than that noted in the HPI or here.     Physical Exam   Vital Signs with Ranges  Temp:  [97.3  F (36.3  C)-97.8  F (36.6  C)] 97.6  F (36.4  C)  Pulse:  [59-75] 75  Resp:  [16-35] 18  BP: (122-151)/() 146/67  SpO2:  [94 %-100 %] 96 %  Wt Readings from Last 4 Encounters:   24 63.7 kg (140 lb 6.9 oz)   24 61.6 kg (135 lb 11.2 oz)   24 60.3 kg (133 lb)   24 60.4 kg (133 lb 3.2 oz)     No intake/output data recorded.      Vitals: BP (!) 146/67   Pulse 75   Temp 97.6  F (36.4  C) (Oral)   Resp 18   Wt 63.7 kg (140 lb 6.9 oz)   LMP  (LMP Unknown)   SpO2 96%   BMI 24.11 kg/m      Physical Exam:   General - Alert and oriented to time place and person in no acute distress  Eyes - No scleral icterus  HEENT - Neck supple, moist mucous membranes  Cardiovascular -heart sounds 1 is  "normal and heart sound 2 is virtually an audible.  She has a 3/6 systolic ejection murmur consistent with aortic stenosis.  She also has a 1/6 diastolic rumble at the apex consistent with mitral stenosis.  Jugular venous pulse does not appear to be raised and carotid examination reveals the transmitted murmur of aortic stenosis.  Extremities - There is 1-2+ lower extremity edema  Respiratory -crackles at both bases.  Skin - No pallor or cyanosis  Gastrointestinal - Non tender and non distended without rebound or guarding  Psych - Appropriate affect   Neurological - No gross motor neurological focal deficits      Recent Labs   Lab 06/25/24  0541 06/25/24  0037 06/24/24 2134   WBC 5.9  --  6.4   HGB 9.8*  --  9.8*   *  --  109*     --  256   INR  --  1.72* 1.68*     --  140   POTASSIUM 3.6 3.7 4.0   CHLORIDE 100  --  100   CO2 28  --  29   BUN 26.6*  --  29.5*   CR 0.92  --  1.07*   GFRESTIMATED 62  --  52*   ANIONGAP 11  --  11   BRICE 8.7*  --  9.0   GLC 84  --  97     Recent Labs   Lab Test 07/14/23  1132 05/15/23  1528   CHOL 183 194   HDL 47* 46*   * 120*   TRIG 79 140     Recent Labs   Lab 06/25/24  0541 06/24/24 2134   WBC 5.9 6.4   HGB 9.8* 9.8*   HCT 31.2* 29.9*   * 109*    256     Recent Labs   Lab 06/24/24 2134   PHV 7.41   PO2V 29   PCO2V 54*   HCO3V 34*     Recent Labs   Lab 06/24/24  2134   NTBNPI 5,079*     No results for input(s): \"DD\" in the last 168 hours.  No results for input(s): \"SED\", \"CRP\" in the last 168 hours.  Recent Labs   Lab 06/25/24  0541 06/24/24  2134    256     No results for input(s): \"TSH\" in the last 168 hours.  No results for input(s): \"COLOR\", \"APPEARANCE\", \"URINEGLC\", \"URINEBILI\", \"URINEKETONE\", \"SG\", \"UBLD\", \"URINEPH\", \"PROTEIN\", \"UROBILINOGEN\", \"NITRITE\", \"LEUKEST\", \"RBCU\", \"WBCU\" in the last 168 hours.    Imaging:  Recent Results (from the past 48 hour(s))   XR Chest 2 Views    Narrative    EXAM: XR CHEST 2 VIEWS  LOCATION: [facility]  DATE: 6/24/2024    INDICATION: SOB  COMPARISON: None.      Impression    IMPRESSION: Patchy opacities within the right middle and lower lung zones and the left lower lung zone with differential diagnosis includes infectious or inflammatory process. Median sternotomy wires are stable in appearance. Stable cardiac size. Small   left pleural effusion. No pneumothorax.   CT Chest Pulmonary Embolism w Contrast    Narrative    EXAM: CT CHEST PULMONARY EMBOLISM W CONTRAST  LOCATION: [facility]  DATE: 6/24/2024    INDICATION: dyspnea hx of multiple valve surgeries and DVT PE in the past sub therapeutic INR  COMPARISON: Chest radiographs today. CTA chest 2/22/2024.  TECHNIQUE: CT chest pulmonary angiogram during arterial phase injection of IV contrast. Multiplanar reformats and MIP reconstructions were performed. Dose reduction techniques were used.   CONTRAST: 62mL Isovue 370    FINDINGS:  ANGIOGRAM CHEST: No pulmonary embolism. No aortic aneurysm. Contrast timing precludes assessment for aortic dissection. Reflux of contrast into the inferior vena cava is evidence for right heart dysfunction.    LUNGS AND PLEURA: Moderate bilateral pleural effusions. Hazy groundglass alveolar opacity and interstitial thickening largely symmetrically distributed throughout the aerated portions of both lungs. Dependent and basilar atelectasis in both lungs.   Additional streaky atelectasis in both upper lobes. Multiple tiny benign calcified granulomata in the right lower lobe.    MEDIASTINUM/AXILLAE: Small calcified right hilar lymph nodes related to benign granulomatous disease. Left subclavian pacemaker/defibrillator with leads at the right atrium and right ventricle. Poststernotomy and cardiac valve repair. Cardiomegaly with   biatrial enlargement.    CORONARY ARTERY CALCIFICATION: Moderate.    UPPER ABDOMEN: Multiple tiny benign calcified granulomata in the liver and  spleen.    MUSCULOSKELETAL: Degenerative changes of the spine. Right humeral intramedullary lizz incompletely imaged. Chronic severe compression deformity of T12 vertebral body with mild retropulsion of bone causing mild osseous canal stenosis similar to previous.   Mild chronic compression deformity of T5.      Impression    IMPRESSION:  1.  No pulmonary embolus.  2.  Pulmonary edema with moderate bilateral pleural effusions.  3.  Reflux of intravenous contrast into the inferior vena cava consistent with right heart dysfunction.  4.  Poststernotomy and cardiac valve repair.  5.  Cardiomegaly with biatrial enlargement.  6.  Coronary artery calcification.       Echo:  No results found for this or any previous visit (from the past 4320 hour(s)).    Clinically Significant Risk Factors Present on Admission               # Drug Induced Coagulation Defect: home medication list includes an anticoagulant medication    # Hypertension: Noted on problem list  # Acute heart failure with preserved ejection fraction: heart failure noted on problem list, last echo with EF >50%, and receiving IV diuretics   # Anemia: based on hgb <11                # Pacemaker present      Cardiac Arrhythmia: Atrial fibrillation: Paroxysmal ;aortic stenosis; prosthetic mitral valve with severe prosthetic mitral stenosis      Fluid overload, unspecified                        Pulmonary Heart Disease (Pulmonary hypertension or Cor pulmonale): Pulmonary Hypertension, unspecified    Chronic Fatigue and Other Debilities: Chronic fatigue, unspecified

## 2024-06-25 NOTE — PROGRESS NOTES
[facility]    Hospitalist Progress Note      Assessment & Plan   Zunilda Clark is a 83 year old female w rheumatoid arthritis, HTN, DVT, hypothyroidism, pulmonary htn with multivalvular, severe heart disease who presents with SOB.    Pt follow longitudinally with Dr. Rodriguez from cards team.  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  Cardiology has had ongoing discussion with her and she has opted for conservative management with symptom management.  She has established with palliative care as well.    #Acute Hypoxic Respiratory Failure with hypoxia secondary to severe mitral stenosis and severe aortic stenosis. Acute on chronic HFpEF.  Severe Pulmonary Hypertension.  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  She woke up on AM of admission with SOB even at rest.  She describes orthopnea and PND.  Increased LE edema over last week.  She is in palliative care and has declined any sort of intervention for severe valvular disease and is established with palliative care.  -In ER, pt afebrile and HDS but needing 2L to maintain sats.  CT PE showed pulmonary edema with evidence of right heart dysfunction.  BNP elevated at 5k.  Troponins mildly elevated but flat on recheck.   -Cards consulted.  Appreciate recs.  -Continue IV diuresis for another day.  -TTE in process.  -She is already established with palliative care as she has terminal illness related to severity of her heart disease.  She is willing to be hospitalized for symptom management, IV diuresis, etc but does not want escalation of cares (ICU stays, invasive procedures, etc).    -Continue metoprolol and amlodipine.       #History of afib s/p ppm. Coagulopathy secondary to warfarin.  Subtherapeutic on warfarin  "therapy:   -EKG shows AV paced rhythm.  No chest pain.    -Her INRs have been subtherapeutic seemingly for months.    -We will continue warfarin, pharmacy to dose.       #HTN: Metoprolol, amlodipine and lasix as above  #RA: On methotrexate weekly at baseline. Continue daily folic acid.  #History of CVA: Warfarin as above. BP meds as above.   #Hypothyroidism: Continue home LT4     DVT Prophylaxis: Warfarin  Code Status: DNR / DNI. Confirmed on admission and c/w prior admissions.   Dispo: Likely in 1-2 days pending response to diuresis.  She lives in independent living.      Discussed with patient's daughter, Alma, by phone.     Yobani Whitman MD    Interval History   Assumed care. No events. Patient notes she feels \"much better.\" Breathing is easier. No chest pain. No n/v. No AP this AM. No n/v.     -Data reviewed today: I reviewed all new labs and imaging results over the last 24 hours. I personally reviewed     Physical Exam   Temp: 97.6  F (36.4  C) Temp src: Oral BP: (!) 146/67 Pulse: 75   Resp: 18 SpO2: 96 % O2 Device: Nasal cannula Oxygen Delivery: 2 LPM  Vitals:    06/24/24 2128 06/25/24 0028   Weight: 62.6 kg (138 lb) 63.7 kg (140 lb 6.9 oz)     Vital Signs with Ranges  Temp:  [97.3  F (36.3  C)-97.8  F (36.6  C)] 97.6  F (36.4  C)  Pulse:  [59-75] 75  Resp:  [16-35] 18  BP: (122-151)/() 146/67  SpO2:  [94 %-100 %] 96 %  No intake/output data recorded.    Constitutional: Chronically deconditioned. NC in place. NAD  HEENT: Normocephalic. MMM, No elevation of JVD at 90 degrees  Respiratory: Nl WOB, Somewhat diminsihed at bases bilaterally with inspiratory cracikles.   Cardiovascular: Regular, Systolic and diastolic murmur noted.   GI: BS+, NT, ND  Skin/Integumen: WWP, no rash. Moderate bilateral edema noted.   Neuro: CNII-XII intact. Moves all extremities. No tremor. A&Ox3.    Medications   Current Facility-Administered Medications   Medication Dose Route Frequency Provider Last Rate Last Admin    " Continuing ACE inhibitor/ARB/ARNI from home medication list OR ACE inhibitor/ARB/ARNI order already placed during this visit   Does not apply DOES NOT GO TO Nydia Carcamo MD        Continuing beta blocker from home medication list OR beta blocker order already placed during this visit   Does not apply DOES NOT GO TO Nydia Carcamo MD        Patient is already receiving anticoagulation with heparin, enoxaparin (LOVENOX), warfarin (COUMADIN)  or other anticoagulant medication   Does not apply Continuous PRN Nydia Carver MD         Current Facility-Administered Medications   Medication Dose Route Frequency Provider Last Rate Last Admin    allopurinol (ZYLOPRIM) tablet 100 mg  100 mg Oral BID Nydia Carver MD   100 mg at 06/25/24 0849    amLODIPine (NORVASC) tablet 5 mg  5 mg Oral Daily Nydia Carver MD   5 mg at 06/25/24 0849    calcium citrate (CITRACAL) tablet 950 mg  950 mg Oral Daily Nydia Carver MD   950 mg at 06/25/24 0956    famotidine (PEPCID) tablet 20 mg  20 mg Oral Daily Nydia Carver MD   20 mg at 06/25/24 0956    folic acid (FOLVITE) tablet 1 mg  1 mg Oral Daily Nydia Carver MD   1 mg at 06/25/24 0849    furosemide (LASIX) injection 40 mg  40 mg Intravenous bid 08 & 14 Nydia Carver MD   40 mg at 06/25/24 0848    levothyroxine (SYNTHROID/LEVOTHROID) tablet 112 mcg  112 mcg Oral Daily Nydia Carver MD   112 mcg at 06/25/24 0849    metoprolol tartrate (LOPRESSOR) tablet 75 mg  75 mg Oral BID Nydia Carver MD   75 mg at 06/25/24 0845    sodium chloride (PF) 0.9% PF flush 3 mL  3 mL Intracatheter Q8H Nydia Carver MD   3 mL at 06/25/24 0849    Warfarin Dose Required Daily - Pharmacist Managed  1 each Oral See Admin Instructions Nydia Carver MD           Data   Recent Labs   Lab 06/25/24  0541 06/25/24  0037 06/24/24  2134   WBC 5.9  --  6.4   HGB 9.8*  --  9.8*   *  --  109*     --   256   INR  --  1.72* 1.68*     --  140   POTASSIUM 3.6 3.7 4.0   CHLORIDE 100  --  100   CO2 28  --  29   BUN 26.6*  --  29.5*   CR 0.92  --  1.07*   ANIONGAP 11  --  11   BRICE 8.7*  --  9.0   GLC 84  --  97       Recent Results (from the past 24 hour(s))   XR Chest 2 Views    Narrative    EXAM: XR CHEST 2 VIEWS  LOCATION: [facility]  DATE: 6/24/2024    INDICATION: SOB  COMPARISON: None.      Impression    IMPRESSION: Patchy opacities within the right middle and lower lung zones and the left lower lung zone with differential diagnosis includes infectious or inflammatory process. Median sternotomy wires are stable in appearance. Stable cardiac size. Small   left pleural effusion. No pneumothorax.   CT Chest Pulmonary Embolism w Contrast    Narrative    EXAM: CT CHEST PULMONARY EMBOLISM W CONTRAST  LOCATION: [facility]  DATE: 6/24/2024    INDICATION: dyspnea hx of multiple valve surgeries and DVT PE in the past sub therapeutic INR  COMPARISON: Chest radiographs today. CTA chest 2/22/2024.  TECHNIQUE: CT chest pulmonary angiogram during arterial phase injection of IV contrast. Multiplanar reformats and MIP reconstructions were performed. Dose reduction techniques were used.   CONTRAST: 62mL Isovue 370    FINDINGS:  ANGIOGRAM CHEST: No pulmonary embolism. No aortic aneurysm. Contrast timing precludes assessment for aortic dissection. Reflux of contrast into the inferior vena cava is evidence for right heart dysfunction.    LUNGS AND PLEURA: Moderate bilateral pleural effusions. Hazy groundglass alveolar opacity and interstitial thickening largely symmetrically distributed throughout the aerated portions of both lungs. Dependent and basilar atelectasis in both lungs.   Additional streaky atelectasis in both upper lobes. Multiple tiny benign calcified granulomata in the right lower lobe.    MEDIASTINUM/AXILLAE: Small calcified right hilar lymph nodes related to benign  granulomatous disease. Left subclavian pacemaker/defibrillator with leads at the right atrium and right ventricle. Poststernotomy and cardiac valve repair. Cardiomegaly with   biatrial enlargement.    CORONARY ARTERY CALCIFICATION: Moderate.    UPPER ABDOMEN: Multiple tiny benign calcified granulomata in the liver and spleen.    MUSCULOSKELETAL: Degenerative changes of the spine. Right humeral intramedullary lizz incompletely imaged. Chronic severe compression deformity of T12 vertebral body with mild retropulsion of bone causing mild osseous canal stenosis similar to previous.   Mild chronic compression deformity of T5.      Impression    IMPRESSION:  1.  No pulmonary embolus.  2.  Pulmonary edema with moderate bilateral pleural effusions.  3.  Reflux of intravenous contrast into the inferior vena cava consistent with right heart dysfunction.  4.  Poststernotomy and cardiac valve repair.  5.  Cardiomegaly with biatrial enlargement.  6.  Coronary artery calcification.

## 2024-06-25 NOTE — PLAN OF CARE
"Tele:!00% AV-paced, long QTc in the AM of 0.50 but decreased in the PM to 0.43, HR low 60s    Significant shift events: IV lasix for diuresis w/noted improvement in lung sounds, edema, go UOP. Daughter from [facility] here visiting this afternoon.    Physical assessment WDL w/exceptions as documented on Adult PCS.  Refer to VS, I/O, Adult PCS for full assessment & shift details.  Continue w/current POC & update PRN.  Hourly safety checks completed per unit routine. Call light, oral fluids, & personal items maintained within pt's reach throughout shift.  Disposition plan: remain in hospital anth 2 days prior to returning to PTA living.     JUAN CARLOS PerryN, RN  [facility]  Medical/Telemetry/IMC      Goal Outcome Evaluation:      Plan of Care Reviewed With: patient, child    Overall Patient Progress: improvingOverall Patient Progress: improving    Outcome Evaluation: Crackles LLL, RML, RLL, titrating O2 to maintain sats >90%, decrease edema throughout shift, monitoring BP, free from injury.      Problem: Adult Inpatient Plan of Care  Goal: Plan of Care Review  Description: The Plan of Care Review/Shift note should be completed every shift.  The Outcome Evaluation is a brief statement about your assessment that the patient is improving, declining, or no change.  This information will be displayed automatically on your shift  note.  Outcome: Progressing  Flowsheets (Taken 6/25/2024 8593)  Outcome Evaluation: Crackles LLL, RML, RLL, titrating O2 to maintain sats >90%, decrease edema throughout shift, monitoring BP, free from injury.  Plan of Care Reviewed With:   patient   child  Overall Patient Progress: improving  Goal: Patient-Specific Goal (Individualized)  Description: You can add care plan individualizations to a care plan. Examples of Individualization might be:  \"Parent requests to be called daily at 9am for status\", \"I have a hard time hearing out of my right ear\", or \"Do not touch " "me to wake me up as it startles  me\".  Outcome: Progressing  Goal: Readiness for Transition of Care  Outcome: Progressing     Problem: Heart Failure  Goal: Optimal Coping  Outcome: Progressing  Goal: Optimal Cardiac Output  Outcome: Progressing  Goal: Stable Heart Rate and Rhythm  Outcome: Progressing  Goal: Optimal Functional Ability  Outcome: Progressing  Intervention: Optimize Functional Ability  Recent Flowsheet Documentation  Taken 6/25/2024 0850 by Nicole Leon RN  Self-Care Promotion: independence encouraged  Goal: Fluid and Electrolyte Balance  Outcome: Progressing  Intervention: Monitor and Manage Fluid and Electrolyte Balance  Recent Flowsheet Documentation  Taken 6/25/2024 0850 by Nicole Leon RN  Fluid/Electrolyte Management: (Trending K & Mg, treat per protocols) other (see comments)  Goal: Improved Oral Intake  Outcome: Progressing  Goal: Effective Oxygenation and Ventilation  Outcome: Progressing  Intervention: Optimize Oxygenation and Ventilation  Recent Flowsheet Documentation  Taken 6/25/2024 0850 by Nicole Leon RN  Head of Bed (HOB) Positioning: HOB at 20 degrees  Goal: Effective Breathing Pattern During Sleep  Outcome: Progressing  Intervention: Monitor and Manage Obstructive Sleep Apnea  Recent Flowsheet Documentation  Taken 6/25/2024 0850 by Nicole Leon RN  Medication Review/Management:   medications reviewed   high-risk medications identified     Problem: Comorbidity Management  Goal: Blood Pressure in Desired Range  Outcome: Progressing  Intervention: Maintain Blood Pressure Management  Recent Flowsheet Documentation  Taken 6/25/2024 0850 by Nicole Leon RN  Medication Review/Management:   medications reviewed   high-risk medications identified     Problem: Fall Injury Risk  Goal: Absence of Fall and Fall-Related Injury  Outcome: Progressing  Intervention: Identify and Manage Contributors  Recent Flowsheet Documentation  Taken 6/25/2024 0850 by Nicole Leon, " RN  Self-Care Promotion: independence encouraged  Medication Review/Management:   medications reviewed   high-risk medications identified  Intervention: Promote Injury-Free Environment  Recent Flowsheet Documentation  Taken 6/25/2024 9605 by Nicole Leon, RN  Safety Promotion/Fall Prevention:   activity supervised   clutter free environment maintained   lighting adjusted   mobility aid in reach   nonskid shoes/slippers when out of bed   patient and family education   safety round/check completed

## 2024-06-25 NOTE — PROGRESS NOTES
06/25/24 1042   Appointment Info   Signing Clinician's Name / Credentials (OT) Alessandro Rodriguez OTR/L   Living Environment   People in Home facility resident;alone   Current Living Arrangements independent living facility   Home Accessibility no concerns   Self-Care   Usual Activity Tolerance moderate   Current Activity Tolerance fair   Equipment Currently Used at Home walker, rolling   Fall history within last six months yes   Number of times patient has fallen within last six months 1   Activity/Exercise/Self-Care Comment The patient is baseline independent with I/ADL except does not drive and has meals provided by facility. Has assist from sister for transportation.   General Information   Onset of Illness/Injury or Date of Surgery 06/24/24   Referring Physician Nydia Carver MD   Patient/Family Therapy Goal Statement (OT) To go home   Additional Occupational Profile Info/Pertinent History of Current Problem Zunilda Clark is a 83 year old female w rheumatoid arthritis, HTN, DVT, hypothyroidism, pulmonary htn with multivalvular, severe heart disease who presents with SOB.   Existing Precautions/Restrictions oxygen therapy device and L/min  (3 LPM)   Cognitive Status Examination   Follows Commands follows one-step commands;over 90% accuracy   Cognitive Status Comments Pt is oriented to self, name of hospital, reason for stay, state, and month. States year is 2004 and unable to recall city name.   Visual Perception   Visual Impairment/Limitations WFL;corrective lenses full-time   Sensory   Sensory Quick Adds sensation intact   Pain Assessment   Patient Currently in Pain No   Range of Motion Comprehensive   Comment, General Range of Motion B UE ROM WFL   Strength Comprehensive (MMT)   Comment, General Manual Muscle Testing (MMT) Assessment Strength functional for ADL   Transfers   Transfers toilet transfer;sit-stand transfer;shower transfer   Sit-Stand Transfer   Sit-Stand [place] (Transfers)  supervision   Shower Transfer   [place] (Shower Transfer) supervision   Toilet Transfer   [place] (Toilet Transfer) supervision   Balance   Balance Comments Benefits from WW   Clinical Impression   Criteria for Skilled Therapeutic Interventions Met (OT) Yes, treatment indicated   OT Diagnosis Decreased I/ADL independence   OT Problem List-Impairments impacting ADL problems related to;activity tolerance impaired;mobility   Assessment of Occupational Performance 1-3 Performance Deficits   Identified Performance Deficits functional mobility, home mgmt, liesure, and other taxing IADL   Planned Therapy Interventions (OT) ADL retraining;IADL retraining;home program guidelines;progressive activity/exercise   Clinical Decision Making Complexity (OT) problem focused assessment/low complexity   Risk & Benefits of therapy have been explained evaluation/treatment results reviewed;care plan/treatment goals reviewed;risks/benefits reviewed;current/potential barriers reviewed;participants voiced agreement with care plan;participants included;patient   OT Total Evaluation Time   OT Eval, Low Complexity Minutes (20360) 10   OT Goals   Therapy Frequency (OT) Daily   OT Predicted Duration/Target Date for Goal Attainment 07/02/24   OT Goals Hygiene/Grooming;Upper Body Dressing;Lower Body Dressing;Toilet Transfer/Toileting   OT: Hygiene/Grooming modified independent   OT: Upper Body Dressing Modified independent   OT: Lower Body Dressing Modified independent   OT: Toilet Transfer/Toileting Modified independent   OT: Understanding of cardiac education to maximize quality of life, condition management, and health outcomes Patient;Goal Met   OT: Perform aerobic activity with stable cardiovascular response 5 minutes;ambulation   OT: Functional/aerobic ambulation tolerance with stable cardiovascular response in order to return to home and community environment Modified independent;200 feet;Rolling walker   Interventions  "  Interventions Quick Adds Therapeutic Activity   Self-Care/Home Management   Self-Care/Home Mgmt/ADL, Compensatory, Meal Prep Minutes (95179) 15   Symptoms Noted During/After Treatment (Meal Preparation/Planning Training) fatigue;shortness of breath   Treatment Detail/Skilled Intervention Provided CHF education and \"Self help guide for patients with heart failure\". Reviewed stop light tool, low NA diet, daily weights, signs and symptoms. Instructed pt to completed ADL transfers, ambulating w/ FWW and SBA in room and in/out of bathroom. SBA toilet transfer and toileting. Minimal VCs for thoroughness of cares. spO2 checked and stable after activity on 3 L.   Therapeutic Activities   Therapeutic Activity Minutes (28026) 10   Symptoms noted during/after treatment fatigue;shortness of breath   Treatment Detail/Skilled Intervention Engaged pt in functional ambulation to progress acitivty tolerance for ADL and mobility to and from dining art at home. Time to manage and monitor vitals. spO2 95% on 3 L pre activity and 92 % on 3 L post activity. pt on 3 L throughout 4 min walk. Ambulates w/ SBA and use of FWW. VCs for pace. Positioned pt w/ LEs elevated, alarm on, and all needs in reach.   OT Discharge Planning   OT Plan Activity tolerance / ambulate, monitor GOC, med mgmt?   OT Discharge Recommendation (DC Rec) home with assist   OT Rationale for DC Rec The patient presents slightly below baseline, limited by decreased activity tolerance. Anticipate that that with medical managment and IP OT, pt will return to baseline and be appropriate for return to ILF. Recommend assist from family as needed for taxing IADL.   Total Session Time   Timed Code Treatment Minutes 25   Total Session Time (sum of timed and untimed services) 35     "

## 2024-06-25 NOTE — PHARMACY-ANTICOAGULATION SERVICE
Clinical Pharmacy - Warfarin Dosing Consult     Pharmacy has been consulted to manage this patient s warfarin therapy.  Indication: Atrial Fibrillation;Bioprosthetic Heart Valve  Therapy Goal: INR 2-3  Warfarin Prior to Admission: Yes  Warfarin PTA Regimen: 3.75 mg Mondays, 2.5 mg AOD  Dose Comments: 2.5 mg Monday 6/24 PTA (even though cardiology had recommended 5 mg)    INR   Date Value Ref Range Status   06/24/2024 1.68 (H) 0.85 - 1.15 Final   06/12/2024 2.2 (H) 0.9 - 1.1 Final       Recommend warfarin 2.5 mg tonight on admission to total 5 mg for this evening.  Pharmacy will monitor Zunilda Clark daily and order warfarin doses to achieve specified goal.      Please contact pharmacy as soon as possible if the warfarin needs to be held for a procedure or if the warfarin goals change.

## 2024-06-25 NOTE — H&P
[facility]    Hospitalist History and Physical    Name: Zunilda Clark    MRN: [id number]  YOB: 1940    Age: 83 year old  Date of Admission:  6/24/2024  Date of Service (when I saw the patient): 06/24/24    Assessment & Plan   Zunilda Clark is a 83 year old female is a 83 year old female with a past medical history of rheumatoid arthritis, hypertension, hypothyroidism, bioprosthetic mitral valve replacement x 2, A-fib, sick sinus syndrome status post pacemaker, pulmonary hypertension, CVA on warfarin for anticoagulation who presents with shortness of breath.     Evaluation in the emergency room shows elevated BNP, increasing O2 needs.  CT chest PE was performed in ED was negative for pulmonary embolism.      Acute Hypoxic Respiratory Failure with hypoxia  Acute Exacerbation of HFpEF  Severe Pulmonary Hypertension  Severe Aortic Stenosis  Rheumatic heart disease, history of bioprosthetic mitral valve replacement in 2007, redo in 2014 with a 27 mm Magna bioprosthetic mitral valve.  Moderate TR.   Severe Mitral Stenosis with Bioprosthetic Mitral Valve on Warfarin  Subtherapeutic INR  -Recent evaluation by cardiology patient declined any procedure to rule interventions.  -Goal is for symptomatic management  -Bruce patient  has CHF exacerbation, orthopnea tachypnea PND worsening leg edema  -Cardiac echo in a.m.  -Not on ACE inhibitor prior to admission followed by cardiology.  Likely secondary to preserved EF and normal left ventricular function  -Continue prior to admission beta-blocker  -IV Lasix 40 mg every 12 prior to admission on torsemide  -Daily weights  -I's and O's  -Wean oxygen as able  -Pharmacy to dose warfarin  -Consult cardiology patient is followed in cardiology clinic.      History of afib s/p ppm. Coagulopathy secondary to warfarin.  Subtherapeutic on warfarin therapy:   -EKG shows AV paced rhythm.  No chest pain.    -Her INRs have been subtherapeutic seemingly for  months.    -We will continue warfarin, pharmacy to dose.         HTN:   -Metoprolol resumed. Hold amlodipine    RA:  - On methotrexate weekly at baseline. Continue daily folic acid.    History of CVA:  - Warfarin as above. BP meds as above.     Hypothyroidism:   -Continue home LT4     DVT Prophylaxis: Warfarin  Code Status: DNR / DNI. Confirmed on admission and c/w prior admissions.     Clinically Significant Risk Factors Present on Admission               # Drug Induced Coagulation Defect: home medication list includes an anticoagulant medication    # Hypertension: Noted on problem list  # Chronic heart failure with preserved ejection fraction: heart failure noted on problem list and last echo with EF >50%   # Anemia: based on hgb <11                # Pacemaker present       Disposition: Expected discharge in 2 to 3 days after optimizing medical management and symptom control for CHF    Primary Care Physician   Yunior Huang    Chief Complaint   Worsening shortness of breath 3 to 4 days    History is obtained from the patient    History of Present Illness   Zunilda Clark is a 83 year old female is a 83 year old female with a past medical history of rheumatoid arthritis, hypertension, hypothyroidism, bioprosthetic mitral valve replacement x 2, A-fib, sick sinus syndrome status post pacemaker, pulmonary hypertension, CVA on warfarin for anticoagulation who presents with shortness of breath.  She complains of increasing leg edema, orthopnea PND.  Denies any chest pain pressure heaviness or tightness.  She has been taking her meds denies any significant change in diet.  Complains of generalized malaise and weakness.  Is compliant with her treatment.  More than 10 point review of system was carried out was otherwise negative.    Evaluation in the emergency room shows elevated BNP, increasing O2 needs.  CT chest PE was performed in ED was negative for pulmonary embolism.  She was treated with IV Lasix and admitted  for further evaluation and treatment.    Past Medical History    Past Medical History:   Diagnosis Date    Acquired hypothyroidism 11/04/2015    Aortic valve insufficiency     Atrial fibrillation and flutter     CHF (congestive heart failure)     DVT (deep venous thrombosis) 2003    Gastro-oesophageal reflux disease     H/O mitral valve replacement with tissue graft 06/2014    HTN (hypertension)     Other chronic pain     Pulmonary HTN     Rheumatoid arthritis     Seizure 2014    Shingles 08/2018    Stroke 2009    left side mild weakness          Past Surgical History   Past Surgical History:   Procedure Laterality Date    APPLY WOUND VAC Left 12/18/2018    Procedure: Wound vac application;  Surgeon: Pardeep Sheikh MD;  Location: [facility] OR    ARTHROPLASTY KNEE Left 11/12/2018    Procedure: Left total knee arthroplasty using a [employer] Melissa II knee system;  Surgeon: Pardeep Sheikh MD;  Location: [facility] OR    ARTHROSCOPY KNEE WITH DEBRIDEMENT JOINT, COMBINED Left 12/18/2018    Procedure: Left knee debridement and placement of wound vac;  Surgeon: Pardeep Sheikh MD;  Location: [facility] OR    CATARACT IOL, RT/LT      COLONOSCOPY  03/15/2012    EP PPM GENERATOR CHANGE SINGLE N/A 03/08/2021    Procedure: Permanent Pacemakers  Generator Change Dual Chamber;  Surgeon: Tamiko Cowan MD;  Location: [facility]ARD[facility]AC CATH LAB    ESOPHAGOSCOPY, GASTROSCOPY, DUODENOSCOPY (EGD), COMBINED N/A 02/19/2020    Procedure: ESOPHAGOGASTRODUODENOSCOPY (EGD);  Surgeon: Michael Mccarthy MD;  Location:  GI    EYE SURGERY  2018    Both eyes/cataract surgery    H ABLATION AV NODE  2011    AFlutter with syncope, PPM to follow    HERNIA REPAIR      3 hernies/right and left & umbilical    IMPLANT PACEMAKER  2011    LAPAROSCOPIC CHOLECYSTECTOMY WITH CHOLANGIOGRAMS N/A 04/29/2017    Procedure: LAPAROSCOPIC CHOLECYSTECTOMY WITH CHOLANGIOGRAMS;  LAPAROSCOPIC CHOLECYSTECTOMY WITH CHOLANGIOGRAMS ;   Surgeon: Angeles Mcgill MD;  Location: [facility] OR    OPEN REDUCTION INTERNAL FIXATION RODDING INTRAMEDULLARY FEMUR Left 9/27/2023    Procedure: Placement of retrograde femoral nail left femur;  Surgeon: Spike Vann MD;  Location: [facility] OR    OPEN REDUCTION INTERNAL FIXATION RODDING INTRAMEDULLARY HUMERUS Right 07/28/2015    Procedure: OPEN REDUCTION INTERNAL FIXATION RODDING INTRAMEDULLARY HUMERUS;  Surgeon: Raymundo Rivera MD;  Location: [facility] OR    REPLACE VALVE MITRAL  2006    Mitral valve replacement with bioprosthetic valve in 2008 for rheumatic disease    SLING BLADDER SUSPENSION WITH FASCIA UMESH         Prior to Admission Medications   Prior to Admission Medications   Prescriptions Last Dose Informant Patient Reported? Taking?   Metoprolol Tartrate 75 MG TABS   No No   Sig: TAKE 1 TABLET BY MOUTH TWICE  DAILY   VITAMIN D, CHOLECALCIFEROL, PO   Yes No   Sig: Take 1,000 Units by mouth daily   acetaminophen (TYLENOL) 500 MG tablet   Yes No   Sig: Take 1,000 mg by mouth every 8 hours as needed for pain   allopurinol (ZYLOPRIM) 100 MG tablet   No No   Sig: Take 1 tablet (100 mg) by mouth 2 times daily   amLODIPine (NORVASC) 5 MG tablet   No No   Sig: Take 1 tablet (5 mg) by mouth daily   amoxicillin (AMOXIL) 500 MG tablet   No No   Sig: Take 2000mg (4 tablets of 500mg each) approx 30 min to 1 hour prior to any dental procedures   calcium citrate (CALCITRATE) 950 MG tablet   Yes No   Sig: Take 1 tablet by mouth daily    famotidine (PEPCID) 40 MG tablet   No No   Sig: Take 1 tablet (40 mg) by mouth daily   folic acid (FOLVITE) 1 MG tablet   Yes No   Sig: Take 1 mg by mouth daily   levothyroxine (SYNTHROID/LEVOTHROID) 112 MCG tablet   No No   Sig: Take 1 tablet (112 mcg) by mouth daily   methotrexate 50 MG/2ML injection   Yes No   Sig: Inject 0.8 mLs Subcutaneous every 7 days (Fridays)   multivitamin w/minerals (THERA-VIT-M) tablet   Yes No   Sig: Take 1 tablet by mouth daily Without iron   torsemide  (DEMADEX) 20 MG tablet   No No   Sig: Take 1 tablet (20 mg) by mouth daily   warfarin ANTICOAGULANT (COUMADIN) 2.5 MG tablet   No No   Sig: TAKE 1.5 TABLETS BY MOUTH EVERY MONDAY AND TAKE 1 TABLET ALL OTHER DAYS OF THE WEEK OR AS INSTRUCTED BY THE INR CLINIC      Facility-Administered Medications: None     Allergies   No Known Allergies    Social History   Social History     Tobacco Use    Smoking status: Never    Smokeless tobacco: Never   Substance Use Topics    Alcohol use: No     Alcohol/week: 0.0 standard drinks of alcohol     Social History     Social History Narrative    Not on file         Family History   I have reviewed this patient's family history and updated it with pertinent information if needed.   Family History   Problem Relation Age of Onset    Coronary Artery Disease Mother     Coronary Artery Disease Brother     Diabetes Brother     Cancer Brother          at age 52     Other Cancer Brother     Hypertension Sister     Hyperlipidemia Sister          Review of Systems   A Comprehensive greater than 10 system review of systems was carried out.  Pertinent positives and negatives are noted above.  Otherwise negative for contributory information.    Physical Exam   Temp: 97.3  F (36.3  C) Temp src: Oral BP: 135/74 Pulse: 60   Resp: 26 SpO2: 94 % O2 Device: Nasal cannula Oxygen Delivery: 2 LPM  Vital Signs with Ranges  Temp:  [97.3  F (36.3  C)] 97.3  F (36.3  C)  Pulse:  [60] 60  Resp:  [26] 26  BP: (135)/(74) 135/74  SpO2:  [94 %] 94 %  138 lbs 0 oz    GEN:  Alert, oriented x 3, tachypneic short of breath with minimal activity and could not complete full sentences without being short of breath.  Using accessory muscles.    HEENT:  Normocephalic/atraumatic, no scleral icterus, no nasal discharge, mouth dry, JVD elevated  CV:  Regular rate and rhythm, no murmur or JVD.  S1 + S2 noted, no S3 or S4.  LUNGS: Decreased breath sounds bases bibasilar crackles.  Symmetric chest rise on inhalation  "noted.  ABD:  Active bowel sounds, soft, non-tender/non-distended.  No rebound/guarding/rigidity.  EXT: +2 edema bilaterally no cyanosis.  No joint synovitis noted.  SKIN:  Dry to touch, no exanthems noted in the visualized areas.  NEURO:  Symmetric muscle strength No new focal deficits appreciated.    Data   Data reviewed today:  I personally reviewed the EKG tracing showing paced rhythm .    Recent Labs   Lab 06/24/24 2134   PHV 7.41   PO2V 29   PCO2V 54*   HCO3V 34*     Recent Labs   Lab 06/24/24 2134   WBC 6.4   HGB 9.8*   HCT 29.9*   *        Recent Labs   Lab 06/24/24 2134      POTASSIUM 4.0   CHLORIDE 100   CO2 29   ANIONGAP 11   GLC 97   BUN 29.5*   CR 1.07*   GFRESTIMATED 52*   BRICE 9.0     7-Day Micro Results       No results found for the last 168 hours.          No results for input(s): \"SED\", \"CRP\" in the last 168 hours.  GFR Estimate   Date Value Ref Range Status   06/24/2024 52 (L) >60 mL/min/1.73m2 Final     Comment:     eGFR calculated using 2021 CKD-EPI equation.   06/12/2024 52 (L) >60 mL/min/1.73m2 Final   03/27/2024 52 (L) >60 mL/min/1.73m2 Final   06/28/2021 50.9 ml/min/1.73m2 Final   06/22/2021 47 (L) >60 mL/min/[1.73_m2] Final     Comment:     Non  GFR Calc  Starting 12/18/2018, serum creatinine based estimated GFR (eGFR) will be   calculated using the Chronic Kidney Disease Epidemiology Collaboration   (CKD-EPI) equation.     03/08/2021 61 >60 mL/min/[1.73_m2] Final     Comment:     Non  GFR Calc  Starting 12/18/2018, serum creatinine based estimated GFR (eGFR) will be   calculated using the Chronic Kidney Disease Epidemiology Collaboration   (CKD-EPI) equation.       GFR Estimate If Black   Date Value Ref Range Status   06/22/2021 54 (L) >60 mL/min/[1.73_m2] Final     Comment:      GFR Calc  Starting 12/18/2018, serum creatinine based estimated GFR (eGFR) will be   calculated using the Chronic Kidney Disease Epidemiology " "Collaboration   (CKD-EPI) equation.     03/08/2021 71 >60 mL/min/[1.73_m2] Final     Comment:      GFR Calc  Starting 12/18/2018, serum creatinine based estimated GFR (eGFR) will be   calculated using the Chronic Kidney Disease Epidemiology Collaboration   (CKD-EPI) equation.     03/05/2021 46 (L) >60 mL/min/[1.73_m2] Final     Comment:      GFR Calc  Starting 12/18/2018, serum creatinine based estimated GFR (eGFR) will be   calculated using the Chronic Kidney Disease Epidemiology Collaboration   (CKD-EPI) equation.       Recent Labs   Lab 06/24/24 2134   GLC 97     Recent Labs   Lab 06/24/24 2134   HGB 9.8*     No results for input(s): \"AST\", \"ALT\", \"GGT\", \"ALKPHOS\", \"BILITOTAL\", \"BILICONJ\", \"BILIDIRECT\", \"JUSTINO\" in the last 168 hours.    Invalid input(s): \"BILIRUBININDIRECT\"  Recent Labs   Lab 06/24/24 2134   INR 1.68*     No results for input(s): \"LACT\" in the last 168 hours.  No results for input(s): \"LIPASE\" in the last 168 hours.  Recent Labs   Lab 06/24/24 2134   BUN 29.5*   CR 1.07*     No results for input(s): \"TSH\" in the last 168 hours.  No results for input(s): \"TROPONIN\", \"TROPI\", \"TROPR\", \"TROPONINIS\" in the last 168 hours.    Invalid input(s): \"TROPT\", \"TROP\", \"TROPONINIES\", \"TNIH\"  No results for input(s): \"COLOR\", \"APPEARANCE\", \"URINEGLC\", \"URINEBILI\", \"URINEKETONE\", \"SG\", \"UBLD\", \"URINEPH\", \"PROTEIN\", \"UROBILINOGEN\", \"NITRITE\", \"LEUKEST\", \"RBCU\", \"WBCU\" in the last 168 hours.    Recent Results (from the past 24 hour(s))   XR Chest 2 Views    Narrative    EXAM: XR CHEST 2 VIEWS  LOCATION: [facility]  DATE: 6/24/2024    INDICATION: SOB  COMPARISON: None.      Impression    IMPRESSION: Patchy opacities within the right middle and lower lung zones and the left lower lung zone with differential diagnosis includes infectious or inflammatory process. Median sternotomy wires are stable in appearance. Stable cardiac size. Small   left pleural " effusion. No pneumothorax.

## 2024-06-25 NOTE — CONSULTS
Care Management Initial Consult    General Information  Assessment completed with: Patient, Children,    Type of CM/SW Visit: Initial Assessment    Primary Care Provider verified and updated as needed: Yes   Readmission within the last 30 days: no previous admission in last 30 days      Reason for Consult: care coordination/care conference, discharge planning    Communication Assessment  Patient's communication style: spoken language (English or Bilingual)    Hearing Difficulty or Deaf: no   Wear Glasses or Blind: yes    Cognitive  Cognitive/Neuro/Behavioral: .WDL except                      Living Environment:   People in home: alone     Current living Arrangements: independent living facility      Able to return to prior arrangements: yes       Family/Social Support:  Care provided by: self  Provides care for: no one  Marital Status:   Children, Sibling(s)          Description of Support System: Supportive, Involved    Support Assessment: Adequate family and caregiver support    Current Resources:   Patient receiving home care services: No     Community Resources: None  Equipment currently used at home: walker, rolling  Supplies currently used at home: Oxygen Tubing/Supplies    Lifestyle & Psychosocial Needs:  Social Determinants of Health     Food Insecurity: Low Risk  (2/28/2024)    Food Insecurity     Within the past 12 months, did you worry that your food would run out before you got money to buy more?: No     Within the past 12 months, did the food you bought just not last and you didn t have money to get more?: No   Depression: Not at risk (3/13/2024)    PHQ-2     PHQ-2 Score: 0   Housing Stability: Low Risk  (2/28/2024)    Housing Stability     Do you have housing? : Yes     Are you worried about losing your housing?: No   Tobacco Use: Low Risk  (6/12/2024)    Patient History     Smoking Tobacco Use: Never     Smokeless Tobacco Use: Never     Passive Exposure: Not on file   Financial Resource Strain:  Low Risk  (2/28/2024)    Financial Resource Strain     Within the past 12 months, have you or your family members you live with been unable to get utilities (heat, electricity) when it was really needed?: No   Alcohol Use: Not on file   Transportation Needs: Low Risk  (2/28/2024)    Transportation Needs     Within the past 12 months, has lack of transportation kept you from medical appointments, getting your medicines, non-medical meetings or appointments, work, or from getting things that you need?: No   Physical Activity: Not on file   Interpersonal Safety: Not on file   Stress: Not on file   Social Connections: Not on file   Health Literacy: Not on file       Functional Status:  Prior to admission patient needed assistance:   Dependent ADLs:: Ambulation-walker  Dependent IADLs:: Transportation, Cleaning, Cooking, Meal Preparation, Laundry  Assesssment of Functional Status: Not at baseline with ADL Functioning    Discharge Date: 06/27/2024     Discharge Disposition: Home    Discharge Services: None    Discharge DME: None    Discharge Transportation: family or friend will provide    Patient/family educated on Medicare website which has current facility and service quality ratings: no    Education Provided on the Discharge Plan: Yes    Patient/Family in Agreement with the Plan: yes    Handoff Referral Completed: No    Additional Information:  CM consulted for discharge planning and elevated risk score. Patient was admitted with CHF exacerbation and URR 20%. She has been assessed by Occupational Therapy with recommendations for home with assist. Met with patient and daughter Roxi at bedside to discuss home services and discharge planning. They verified that patient lives at the [facility]. She does not currently get any services from them. She does get breakfast and dinner at the [place]. She uses home oxygen at 3L continuous. She manages her own medications and ADLs. She has someone that  comes in to do laundry and light housekeeping. Daughter states all of patient's children live out of town but patient's sister is in town and is amazing and helps patient with transportation, appts and anything else she needs. They do not feel patient will have additional needs on discharge. Family will transport home on discharge. No CM needs identified.     Discussed CHF plan of care with patient. She states she has a scale at home and had been really good about weighing herself daily but didn't like doing it so quit. Explained the importance of weighing herself and monitoring for signs of CHF exacerbation (swelling, shortness of breath). We discussed weight gain of 2# in a day or 5# in a week as a sign of exacerbation. She understands the importance and plans to start weighing herself daily again. She feels much better and is hoping to go home soon as she has plans for the weekend and the 4th of July. She follows closely with her providers: Cardiology at [facility] and PCP at [facility]. She follows monthly with her PCP.       No further needs.             Dayana Guzman RN BSN CM  Inpatient Care Coordination  [facility]  [phone]

## 2024-06-25 NOTE — ED PROVIDER NOTES
Emergency Department Note      History of Present Illness     Chief Complaint  Shortness of Breath    HPI  Zunilda Clark is a 83 year old female, on Warfarin, with history of atrial fibrillation, CHF, DVT, CVA, stage 3 CKD, hypertension, and hypothyroidism on 3 L supplemental oxygen at baseline who presents via EMS for evaluation of shortness of breath. The patient reports that she has had increased work of breathing all day today prompting her to increased her oxygen to 4 L NC and to call EMS this evening. States that she is unsure if she has been taking her medications as prescribed and notes that there were some changes recently. She endorses lower extremity edema and had been wearing compression stockings two weeks ago but not this week because they needed to be cleaned. She denies new fever, cough, and chest pain.    Independent Historian  None    Review of External Notes  I reviewed 5/2/24 palliative care progress note.  I reviewed 6/12/24 internal medicine note.   Past Medical History   Medical History and Problem List  Hypothyroidism   Atrial fibrillation  CHF  DVT  GERD  Hypertension  Rheumatoid arthritis  Shingles  Seizures  CVA  Complete AV block  Stage 3 CKD  Blood loss anemia  Aortic regurgitation  Gouty arthritis  Osteoarthritis  Osteopenia    Medications  Allopurinol  Amlodipine  Famotidine  Levothyroxine  Metoprolol  Torsemide  Warfarin  Methotrexate     Surgical History   Wound vac application  Arthroplasty knee, left  Cataract, bilateral  Pacemaker placement  Atrial flutter ablation  Laparoscopic cholecystectomy   Hernia repair, multiple  ORIF femur, left  ORIF humerus, right  Mitral valve replacement  Bladder sling    Physical Exam   Patient Vitals for the past 24 hrs:   BP Temp Temp src Pulse Resp SpO2 Weight   06/24/24 2300 136/65 -- -- 60 21 96 % --   06/24/24 2245 (!) 151/103 -- -- -- -- -- --   06/24/24 2230 (!) 129/119 -- -- 59 16 96 % --   06/24/24 2215 (!) 122/100 -- -- 60 19 95 % --    06/24/24 2200 -- -- -- 59 (!) 35 96 % --   06/24/24 2145 -- -- -- 60 30 100 % --   06/24/24 2132 -- 97.3  F (36.3  C) Oral -- -- -- --   06/24/24 2128 135/74 -- -- 60 26 94 % 62.6 kg (138 lb)     Physical Exam  Constitutional: Patient is well appearing. No distress.  Wearing chronic O2  Head: Atraumatic.  Eyes: Conjunctivae  are normal. No scleral icterus.  Neck: Normal range of motion. Neck supple.   Cardiovascular: Normal rate, regular rhythm, normal heart sounds and intact distal perfusion.   Pulmonary/Chest: Decreased breath sounds in the lower lobes. No respiratory distress.  Abdominal: Soft. Bowel sounds are normal. No distension. No tenderness. No rebound or guarding.   Musculoskeletal: Normal range of motion. No tenderness. Pitting edema in lower extremities bilaterally.   Neurological: Alert and orientated to person, place, and time. No observable focal neuro deficit  Skin: Warm and dry. No rash noted. Not diaphoretic.     Diagnostics   Lab Results   Labs Ordered and Resulted from Time of ED Arrival to Time of ED Departure   INR - Abnormal       Result Value    INR 1.68 (*)    BASIC METABOLIC PANEL - Abnormal    Sodium 140      Potassium 4.0      Chloride 100      Carbon Dioxide (CO2) 29      Anion Gap 11      Urea Nitrogen 29.5 (*)     Creatinine 1.07 (*)     GFR Estimate 52 (*)     Calcium 9.0      Glucose 97     TROPONIN T, HIGH SENSITIVITY - Abnormal    Troponin T, High Sensitivity 26 (*)    NT PROBNP INPATIENT - Abnormal    N terminal Pro BNP Inpatient 5,079 (*)    BLOOD GAS VENOUS - Abnormal    pH Venous 7.41      pCO2 Venous 54 (*)     pO2 Venous 29      Bicarbonate Venous 34 (*)     Base Excess/Deficit Venous 8.3 (*)     FIO2 2      Oxyhemoglobin Venous 50 (*)     O2 Sat, Venous 50.3 (*)    CBC WITH PLATELETS AND DIFFERENTIAL - Abnormal    WBC Count 6.4      RBC Count 2.75 (*)     Hemoglobin 9.8 (*)     Hematocrit 29.9 (*)      (*)     MCH 35.6 (*)     MCHC 32.8      RDW 18.1 (*)      Platelet Count 256      % Neutrophils 79      % Lymphocytes 14      % Monocytes 4      % Eosinophils 2      % Basophils 1      % Immature Granulocytes 1      NRBCs per 100 WBC 0      Absolute Neutrophils 5.1      Absolute Lymphocytes 0.9      Absolute Monocytes 0.2      Absolute Eosinophils 0.2      Absolute Basophils 0.0      Absolute Immature Granulocytes 0.0      Absolute NRBCs 0.0     PROCALCITONIN - Normal    Procalcitonin 0.12         Imaging  CT Chest Pulmonary Embolism w Contrast   Final Result   IMPRESSION:   1.  No pulmonary embolus.   2.  Pulmonary edema with moderate bilateral pleural effusions.   3.  Reflux of intravenous contrast into the inferior vena cava consistent with right heart dysfunction.   4.  Poststernotomy and cardiac valve repair.   5.  Cardiomegaly with biatrial enlargement.   6.  Coronary artery calcification.      XR Chest 2 Views   Final Result   IMPRESSION: Patchy opacities within the right middle and lower lung zones and the left lower lung zone with differential diagnosis includes infectious or inflammatory process. Median sternotomy wires are stable in appearance. Stable cardiac size. Small    left pleural effusion. No pneumothorax.      Echocardiogram Complete    (Results Pending)       EKG   ECG taken at 2137, ECG read at 2156  AV dual paced rhythm   Rate 65 bpm. MO interval 178 ms. QRS duration 156 ms. QT/QTc 498/517 ms. P-R-T axes * 110 -31.    Independent Interpretation  I reviewed chest XR which shows increased pulmonary vasculature, left lower pleural effusion, wires and pacemaker intact, cardiomegaly, and right humeral lizz.   I reviewed CT PE which shows bilateral pleural effusion. No large PE.     ED Course    Medications Administered  Medications   furosemide (LASIX) injection 40 mg (has no administration in time range)   acetaminophen (TYLENOL) tablet 1,000 mg (has no administration in time range)   allopurinol (ZYLOPRIM) tablet 100 mg (has no administration in time  range)   amLODIPine (NORVASC) tablet 5 mg (has no administration in time range)   calcium citrate (CITRACAL) tablet 950 mg (has no administration in time range)   famotidine (PEPCID) tablet 40 mg (has no administration in time range)   folic acid (FOLVITE) tablet 1 mg (has no administration in time range)   levothyroxine (SYNTHROID/LEVOTHROID) tablet 112 mcg (has no administration in time range)   Metoprolol Tartrate TABS 1 tablet (has no administration in time range)   warfarin ANTICOAGULANT (COUMADIN) tablet 5 mg (has no administration in time range)   Warfarin Dose Required Daily - Pharmacist Managed (has no administration in time range)   lidocaine 1 % 0.1-1 mL (has no administration in time range)   lidocaine (LMX4) cream (has no administration in time range)   sodium chloride (PF) 0.9% PF flush 3 mL (has no administration in time range)   sodium chloride (PF) 0.9% PF flush 3 mL (has no administration in time range)   senna-docusate (SENOKOT-S/PERICOLACE) 8.6-50 MG per tablet 1 tablet (has no administration in time range)     Or   senna-docusate (SENOKOT-S/PERICOLACE) 8.6-50 MG per tablet 2 tablet (has no administration in time range)   calcium carbonate (TUMS) chewable tablet 1,000 mg (has no administration in time range)   Patient is already receiving anticoagulation with heparin, enoxaparin (LOVENOX), warfarin (COUMADIN)  or other anticoagulant medication (has no administration in time range)   furosemide (LASIX) injection 40 mg (has no administration in time range)   Continuing beta blocker from home medication list OR beta blocker order already placed during this visit (has no administration in time range)   Continuing ACE inhibitor/ARB/ARNI from home medication list OR ACE inhibitor/ARB/ARNI order already placed during this visit (has no administration in time range)   iopamidol (ISOVUE-370) solution 500 mL (62 mLs Intravenous $Given 6/24/24 2317)   CT scan flush (84 mLs Intravenous $Given 6/24/24 2317)        Procedures  Procedures     Discussion of Management  Admitting HospitalistMehul    Social Determinants of Health adding to complexity of care  None    ED Course  ED Course as of 06/25/24 0038   Mon Jun 24, 2024 2132 I obtained history and examined the patient as noted above.    2255 I rechecked and updated the patient.    2309 I spoke with Dr. Carver from the hospitalist service regarding admission.     Medical Decision Making / Diagnosis   CMS Diagnoses: None    MIPS  CT for PE was ordered because the patient is high risk for pulmonary embolism.    SHEILA Clark is a 83 year old female with a PMH of atrial fibrillation, CHF, hypertension, and stage 3 CKD on baseline supplemental oxygen who presents for evaluation of shortness of breath.  I considered a broad differential of their dyspnea including CHF exacerbation, PE, dissection, hemothorax, pleural effusion, pneumonia, acute coronary syndrome, reactive airway disease, bronchitis, upper airway obstruction, foreign body, etc.  Given the history and exam plus laboratory findings I feel congestive heart failure is the most likely etiology based on large workup as above.  No cough fever or elev WBC and normal procalc.    The patient received IV diuretics in ED and felt improved; will start I/O's here in ED.  Will admit at this time for further cares.  On oxygen.      I would rule them out for ACS in the hospital given their presentation but this seems classic CHF.      Disposition  The patient was admitted to the hospital.     ICD-10 Codes:    ICD-10-CM    1. Elevated brain natriuretic peptide (BNP) level  R79.89       2. Elevated troponin  R79.89       3. Acute on chronic congestive heart failure, unspecified heart failure type (H)  I50.9       4. SOB (shortness of breath)  R06.02       5. Oxygen dependent  Z99.81            Scribe Disclosure:  Emmie HUNT, am serving as a scribe at 9:32 PM on 6/24/2024 to document services personally  performed by Moiz Thapa MD based on my observations and the provider's statements to me.        Moiz Thapa MD  06/25/24 0053

## 2024-06-25 NOTE — ED TRIAGE NOTES
BIBA from assisted living. Pt woke up with SOB since AM, feels like having fluid in her lung. Significant cardiac hx with pacemaker. BL lung sound crackle. Normally on 3L O2 at home, self adjusted to 4L today. Pt does have some abd retraction, does not appears in severe distress.      Triage Assessment (Adult)       Row Name 06/24/24 7431          Triage Assessment    Airway WDL WDL        Respiratory WDL    Respiratory WDL X;rhythm/pattern     Rhythm/Pattern, Respiratory shortness of breath;labored        Skin Circulation/Temperature WDL    Skin Circulation/Temperature WDL WDL        Cardiac WDL    Cardiac WDL WDL        Peripheral/Neurovascular WDL    Peripheral Neurovascular WDL WDL        Cognitive/Neuro/Behavioral WDL    Cognitive/Neuro/Behavioral WDL WDL

## 2024-06-25 NOTE — ED NOTES
[facility]  ED Nurse Handoff Report    ED Chief complaint: Shortness of Breath  . ED Diagnosis:   Final diagnoses:   Elevated brain natriuretic peptide (BNP) level   Elevated troponin   Acute on chronic congestive heart failure, unspecified heart failure type (H)   SOB (shortness of breath)   Oxygen dependent       Allergies: No Known Allergies    Code Status:DNR/DNI  Activity level - Baseline/Home:  walker.  Activity Level - Current:   assist of 1 and walker.   Lift room needed: No.   Bariatric: No   Needed: No   Isolation: No.   Infection: Not Applicable.     Respiratory status: Nasal cannula    Vital Signs (within 30 minutes):   Vitals:    06/24/24 2215 06/24/24 2230 06/24/24 2245 06/24/24 2300   BP: (!) 122/100 (!) 129/119 (!) 151/103 136/65   Pulse: 60 59  60   Resp: 19 16  21   Temp:       TempSrc:       SpO2: 95% 96%  96%   Weight:           Cardiac Rhythm:  ,      Pain level:    Patient confused: No.   Patient Falls Risk: arm band in place, activity supervised, mobility aid in reach, and room door open.   Elimination Status: Unable to void     Patient Report - Initial Complaint: SOB since AM .   Focused Assessment: Zunilda Clark is a 83 year old female, on Warfarin, with history of atrial fibrillation, CHF, DVT, CVA, stage 3 CKD, hypertension, and hypothyroidism on 3 L supplemental oxygen at baseline who presents via EMS for evaluation of shortness of breath. The patient reports that she has had increased work of breathing all day today prompting her to increased her oxygen to 4 L NC and to call EMS this evening. States that she is unsure if she has been taking her medications as prescribed and notes that there were some changes recently. She endorses lower extremity edema and had been wearing compression stockings two weeks ago but not this week because they needed to be cleaned. She denies new fever, cough, and chest pain.        Abnormal Results:   Labs Ordered and  Resulted from Time of ED Arrival to Time of ED Departure   INR - Abnormal       Result Value    INR 1.68 (*)    BASIC METABOLIC PANEL - Abnormal    Sodium 140      Potassium 4.0      Chloride 100      Carbon Dioxide (CO2) 29      Anion Gap 11      Urea Nitrogen 29.5 (*)     Creatinine 1.07 (*)     GFR Estimate 52 (*)     Calcium 9.0      Glucose 97     TROPONIN T, HIGH SENSITIVITY - Abnormal    Troponin T, High Sensitivity 26 (*)    NT PROBNP INPATIENT - Abnormal    N terminal Pro BNP Inpatient 5,079 (*)    BLOOD GAS VENOUS - Abnormal    pH Venous 7.41      pCO2 Venous 54 (*)     pO2 Venous 29      Bicarbonate Venous 34 (*)     Base Excess/Deficit Venous 8.3 (*)     FIO2 2      Oxyhemoglobin Venous 50 (*)     O2 Sat, Venous 50.3 (*)    CBC WITH PLATELETS AND DIFFERENTIAL - Abnormal    WBC Count 6.4      RBC Count 2.75 (*)     Hemoglobin 9.8 (*)     Hematocrit 29.9 (*)      (*)     MCH 35.6 (*)     MCHC 32.8      RDW 18.1 (*)     Platelet Count 256      % Neutrophils 79      % Lymphocytes 14      % Monocytes 4      % Eosinophils 2      % Basophils 1      % Immature Granulocytes 1      NRBCs per 100 WBC 0      Absolute Neutrophils 5.1      Absolute Lymphocytes 0.9      Absolute Monocytes 0.2      Absolute Eosinophils 0.2      Absolute Basophils 0.0      Absolute Immature Granulocytes 0.0      Absolute NRBCs 0.0     PROCALCITONIN        XR Chest 2 Views   Final Result   IMPRESSION: Patchy opacities within the right middle and lower lung zones and the left lower lung zone with differential diagnosis includes infectious or inflammatory process. Median sternotomy wires are stable in appearance. Stable cardiac size. Small    left pleural effusion. No pneumothorax.      CT Chest Pulmonary Embolism w Contrast    (Results Pending)       Treatments provided: See MAR   Family Comments: Daughter   OBS brochure/video discussed/provided to patient:  N/A  ED Medications:   Medications   furosemide (LASIX) injection 40 mg  (has no administration in time range)   iopamidol (ISOVUE-370) solution 500 mL (62 mLs Intravenous $Given 6/24/24 2317)   CT scan flush (84 mLs Intravenous $Given 6/24/24 2317)       Drips infusing:  No  For the majority of the shift this patient was Green.   Interventions performed were NA .    Sepsis treatment initiated: No    Cares/treatment/interventions/medications to be completed following ED care: NA    ED Nurse Name: Hayley Davis RN  11:39 PM

## 2024-06-26 ENCOUNTER — APPOINTMENT (OUTPATIENT)
Dept: OCCUPATIONAL THERAPY | Facility: CLINIC | Age: 84
DRG: 291 | End: 2024-06-26
Payer: COMMERCIAL

## 2024-06-26 LAB
ANION GAP SERPL CALCULATED.3IONS-SCNC: 11 MMOL/L (ref 7–15)
BUN SERPL-MCNC: 24 MG/DL (ref 8–23)
CALCIUM SERPL-MCNC: 8.6 MG/DL (ref 8.8–10.2)
CHLORIDE SERPL-SCNC: 101 MMOL/L (ref 98–107)
CREAT SERPL-MCNC: 0.86 MG/DL (ref 0.51–0.95)
DEPRECATED HCO3 PLAS-SCNC: 28 MMOL/L (ref 22–29)
EGFRCR SERPLBLD CKD-EPI 2021: 67 ML/MIN/1.73M2
GLUCOSE SERPL-MCNC: 86 MG/DL (ref 70–99)
HOLD SPECIMEN: NORMAL
INR PPP: 1.98 (ref 0.85–1.15)
MAGNESIUM SERPL-MCNC: 2.2 MG/DL (ref 1.7–2.3)
POTASSIUM SERPL-SCNC: 3.5 MMOL/L (ref 3.4–5.3)
SODIUM SERPL-SCNC: 140 MMOL/L (ref 135–145)

## 2024-06-26 PROCEDURE — 97535 SELF CARE MNGMENT TRAINING: CPT | Mod: GO | Performed by: REHABILITATION PRACTITIONER

## 2024-06-26 PROCEDURE — 99233 SBSQ HOSP IP/OBS HIGH 50: CPT | Performed by: HOSPITALIST

## 2024-06-26 PROCEDURE — 97530 THERAPEUTIC ACTIVITIES: CPT | Mod: GO | Performed by: REHABILITATION PRACTITIONER

## 2024-06-26 PROCEDURE — 82374 ASSAY BLOOD CARBON DIOXIDE: CPT | Performed by: HOSPITALIST

## 2024-06-26 PROCEDURE — 85610 PROTHROMBIN TIME: CPT | Performed by: INTERNAL MEDICINE

## 2024-06-26 PROCEDURE — 250N000011 HC RX IP 250 OP 636: Mod: JZ | Performed by: INTERNAL MEDICINE

## 2024-06-26 PROCEDURE — 36415 COLL VENOUS BLD VENIPUNCTURE: CPT | Performed by: INTERNAL MEDICINE

## 2024-06-26 PROCEDURE — 250N000013 HC RX MED GY IP 250 OP 250 PS 637: Performed by: HOSPITALIST

## 2024-06-26 PROCEDURE — 250N000013 HC RX MED GY IP 250 OP 250 PS 637: Performed by: INTERNAL MEDICINE

## 2024-06-26 PROCEDURE — 99233 SBSQ HOSP IP/OBS HIGH 50: CPT | Performed by: INTERNAL MEDICINE

## 2024-06-26 PROCEDURE — 82565 ASSAY OF CREATININE: CPT | Performed by: HOSPITALIST

## 2024-06-26 PROCEDURE — 120N000001 HC R&B MED SURG/OB

## 2024-06-26 PROCEDURE — 83735 ASSAY OF MAGNESIUM: CPT | Performed by: HOSPITALIST

## 2024-06-26 RX ORDER — WARFARIN SODIUM 2.5 MG/1
2.5 TABLET ORAL
Status: COMPLETED | OUTPATIENT
Start: 2024-06-26 | End: 2024-06-26

## 2024-06-26 RX ORDER — FUROSEMIDE 10 MG/ML
40 INJECTION INTRAMUSCULAR; INTRAVENOUS EVERY 8 HOURS
Status: DISCONTINUED | OUTPATIENT
Start: 2024-06-26 | End: 2024-06-28

## 2024-06-26 RX ADMIN — METOPROLOL TARTRATE 75 MG: 25 TABLET, FILM COATED ORAL at 22:03

## 2024-06-26 RX ADMIN — AMLODIPINE BESYLATE 5 MG: 5 TABLET ORAL at 08:12

## 2024-06-26 RX ADMIN — ALLOPURINOL 100 MG: 100 TABLET ORAL at 22:03

## 2024-06-26 RX ADMIN — FUROSEMIDE 40 MG: 10 INJECTION, SOLUTION INTRAMUSCULAR; INTRAVENOUS at 15:59

## 2024-06-26 RX ADMIN — FAMOTIDINE 20 MG: 20 TABLET ORAL at 08:12

## 2024-06-26 RX ADMIN — ALLOPURINOL 100 MG: 100 TABLET ORAL at 08:12

## 2024-06-26 RX ADMIN — LEVOTHYROXINE SODIUM 112 MCG: 0.11 TABLET ORAL at 08:12

## 2024-06-26 RX ADMIN — FUROSEMIDE 40 MG: 10 INJECTION, SOLUTION INTRAMUSCULAR; INTRAVENOUS at 08:13

## 2024-06-26 RX ADMIN — FOLIC ACID 1 MG: 1 TABLET ORAL at 08:12

## 2024-06-26 RX ADMIN — METOPROLOL TARTRATE 75 MG: 25 TABLET, FILM COATED ORAL at 08:12

## 2024-06-26 RX ADMIN — Medication 950 MG: at 08:12

## 2024-06-26 RX ADMIN — WARFARIN SODIUM 2.5 MG: 2.5 TABLET ORAL at 17:19

## 2024-06-26 ASSESSMENT — ACTIVITIES OF DAILY LIVING (ADL)
ADLS_ACUITY_SCORE: 36
ADLS_ACUITY_SCORE: 32
ADLS_ACUITY_SCORE: 31
ADLS_ACUITY_SCORE: 31
ADLS_ACUITY_SCORE: 36
ADLS_ACUITY_SCORE: 32
ADLS_ACUITY_SCORE: 31
ADLS_ACUITY_SCORE: 32
ADLS_ACUITY_SCORE: 31
ADLS_ACUITY_SCORE: 32
ADLS_ACUITY_SCORE: 31
ADLS_ACUITY_SCORE: 36
ADLS_ACUITY_SCORE: 31
ADLS_ACUITY_SCORE: 32
ADLS_ACUITY_SCORE: 36
ADLS_ACUITY_SCORE: 32
ADLS_ACUITY_SCORE: 31
ADLS_ACUITY_SCORE: 32

## 2024-06-26 NOTE — PROGRESS NOTES
[facility] Cardiology Progress Note            Interval History:   Severe aortic stenosis.  Severe prosthetic mitral valve stenosis.  Recurrent congestive heart failure.  Is having palliative care.  Followed in core clinic with Flynn Benitez.  Diuresing but patient does feel more short of breath this morning.  Diuresis is modest with only 608 cc net out.              Medications:     Current Facility-Administered Medications   Medication Dose Route Frequency Provider Last Rate Last Admin    allopurinol (ZYLOPRIM) tablet 100 mg  100 mg Oral BID Nydia Carver MD   100 mg at 06/26/24 0812    amLODIPine (NORVASC) tablet 5 mg  5 mg Oral Daily Nydia Carver MD   5 mg at 06/26/24 0812    calcium citrate (CITRACAL) tablet 950 mg  950 mg Oral Daily Nydia Carver MD   950 mg at 06/26/24 0812    famotidine (PEPCID) tablet 20 mg  20 mg Oral Daily Nydia Carver MD   20 mg at 06/26/24 0812    folic acid (FOLVITE) tablet 1 mg  1 mg Oral Daily Nydia Carver MD   1 mg at 06/26/24 0812    furosemide (LASIX) injection 40 mg  40 mg Intravenous Q8H Momo Beatty MD        levothyroxine (SYNTHROID/LEVOTHROID) tablet 112 mcg  112 mcg Oral Daily Nydia Carver MD   112 mcg at 06/26/24 0812    metoprolol tartrate (LOPRESSOR) tablet 75 mg  75 mg Oral BID Nydia Carver MD   75 mg at 06/26/24 0812    sodium chloride (PF) 0.9% PF flush 3 mL  3 mL Intracatheter Q8H Nydia Carver MD   3 mL at 06/26/24 0815    Warfarin Dose Required Daily - Pharmacist Managed  1 each Oral See Admin Instructions Nydia Carver MD                   Physical Exam:   Blood pressure 132/52, pulse 73, temperature 97.6  F (36.4  C), temperature source Oral, resp. rate 16, weight 61.6 kg (135 lb 12.8 oz), SpO2 97%, not currently breastfeeding.  Rhythm: Atrial and ventricular paced.   Constitutional:   awake, alert, cooperative, no apparent distress, and appears stated age     Neck:    jugular venous distention present 3 cm above sternal notch and no carotid bruits     Lungs:   no increased work of breathing, crackles at both bases right greater than left.     Cardiovascular:   regular rate and rhythm, murmurs include systolic murmur III/VI located at right upper sternal border with radiation to the carotids, and trace pretibial edema.            Data:        Lab Results   Component Value Date     06/26/2024     06/25/2024     06/24/2024     06/22/2021     03/08/2021     03/05/2021    Lab Results   Component Value Date    CHLORIDE 101 06/26/2024    CHLORIDE 100 06/25/2024    CHLORIDE 100 06/24/2024    CHLORIDE 107 08/17/2022    CHLORIDE 106 01/11/2022    CHLORIDE 105 07/19/2021    CHLORIDE 104 06/22/2021    CHLORIDE 107 03/08/2021    CHLORIDE 105 03/05/2021    Lab Results   Component Value Date    BUN 24.0 06/26/2024    BUN 26.6 06/25/2024    BUN 29.5 06/24/2024    BUN 21 08/17/2022    BUN 25 01/11/2022    BUN 29 07/19/2021    BUN 27 06/22/2021    BUN 23 03/08/2021    BUN 26 03/05/2021      Lab Results   Component Value Date    POTASSIUM 3.5 06/26/2024    POTASSIUM 3.6 06/25/2024    POTASSIUM 3.7 06/25/2024    POTASSIUM 3.7 08/17/2022    POTASSIUM 3.7 01/11/2022    POTASSIUM 3.7 07/19/2021    POTASSIUM 3.8 06/22/2021    POTASSIUM 4.0 03/08/2021    POTASSIUM 4.3 03/05/2021    Lab Results   Component Value Date    CO2 28 06/26/2024    CO2 28 06/25/2024    CO2 29 06/24/2024    CO2 26 08/17/2022    CO2 29 01/11/2022    CO2 31 07/19/2021    CO2 33 06/22/2021    CO2 30 03/08/2021    CO2 29 03/05/2021    Lab Results   Component Value Date    CR 0.86 06/26/2024    CR 0.92 06/25/2024    CR 1.07 06/24/2024    CR 1.11 06/22/2021    CR 0.90 03/08/2021    CR 1.27 03/05/2021               I have reviewed recent imaging studies.         Assessment and Plan:   Assessment:   Problem List  Acute on chronic diastolic congestive heart failure secondary to severe prosthetic mitral  valve stenosis and severe aortic stenosis.  Still fluid overloaded and more symptomatic this morning.  Severe aortic stenosis on echocardiography yesterday.  Severe prosthetic valve mitral stenosis.  Moderate tricuspid regurgitation.  Severe pulmonary hypertension.    * No resolved hospital problems. *       Plan:   Increase IV furosemide to 40 mg 3 times a day  Strict intake and output.  This condition is essentially a terminal condition.  Patient does not want intervention to the aortic valve or the mitral valve.  Patient is on palliative care.       Attestation:  I have reviewed today's vital signs, notes, medications, labs and imaging.  Care coordination / counseling time: 30 minutes  Total time: 40 minutes       Momo Beatty MD, FACC, FRCPI , 6/26/2024  8:19 AM

## 2024-06-26 NOTE — PLAN OF CARE
"Goal Outcome Evaluation:      Plan of Care Reviewed With: patient    Overall Patient Progress: improvingOverall Patient Progress: improving    Outcome Evaluation: Cards following, IV lasix increased to TID, SOB worse today per pt, remains on 3L via nc, +1 BLE edema    /52 (BP Location: Right arm)   Pulse 73   Temp 97.6  F (36.4  C) (Oral)   Resp 16   Wt 61.6 kg (135 lb 12.8 oz)   LMP  (LMP Unknown)   SpO2 97%   BMI 23.31 kg/m      On 3L via NC (baseline per report).     Pertinent Assessments: A/ox4 but forgetful. SOB/MORRISON worse today per pt report. LS dim throughout. IS reviewed and proper technique demonstrated. Permanent pacemaker. Tele AV paced. BLE +1 edema. Voiding via BR, but incontinence at times. Strict I/Os as able. Up with 1A gb/w. Denies pain.   Major Shift Events: IV lasix increased from BID to TID.   Treatment Plan: Cards following. FR. CM following for discharge assist. OT following.                                             Problem: Adult Inpatient Plan of Care  Goal: Plan of Care Review  Description: The Plan of Care Review/Shift note should be completed every shift.  The Outcome Evaluation is a brief statement about your assessment that the patient is improving, declining, or no change.  This information will be displayed automatically on your shift  note.  Outcome: Progressing  Flowsheets (Taken 6/26/2024 0919)  Outcome Evaluation: Cards following, IV lasix increased to TID, SOB worse today per pt, remains on 3L via nc, +1 BLE edema  Plan of Care Reviewed With: patient  Overall Patient Progress: improving  Goal: Patient-Specific Goal (Individualized)  Description: You can add care plan individualizations to a care plan. Examples of Individualization might be:  \"Parent requests to be called daily at 9am for status\", \"I have a hard time hearing out of my right ear\", or \"Do not touch me to wake me up as it startles  me\".  Outcome: Progressing  Goal: Absence of Hospital-Acquired Illness " or Injury  Intervention: Identify and Manage Fall Risk  Recent Flowsheet Documentation  Taken 6/26/2024 0811 by Emmie Sandoval RN  Safety Promotion/Fall Prevention:   activity supervised   clutter free environment maintained   nonskid shoes/slippers when out of bed   patient and family education   room organization consistent   safety round/check completed   mobility aid in reach  Intervention: Prevent Skin Injury  Recent Flowsheet Documentation  Taken 6/26/2024 0811 by Emmie Sandoval RN  Body Position: position changed independently  Intervention: Prevent Infection  Recent Flowsheet Documentation  Taken 6/26/2024 0811 by Emmie Sandoval RN  Infection Prevention:   single patient room provided   rest/sleep promoted  Goal: Readiness for Transition of Care  Outcome: Progressing     Problem: Heart Failure  Goal: Optimal Coping  Outcome: Progressing  Goal: Optimal Cardiac Output  Outcome: Progressing  Goal: Stable Heart Rate and Rhythm  Outcome: Progressing  Goal: Optimal Functional Ability  Outcome: Progressing  Intervention: Optimize Functional Ability  Recent Flowsheet Documentation  Taken 6/26/2024 0911 by Emmie Sandoval RN  Activity Management: ambulated to bathroom  Taken 6/26/2024 0811 by Emmie Sandoval RN  Activity Management: activity adjusted per tolerance  Goal: Fluid and Electrolyte Balance  Outcome: Progressing  Goal: Improved Oral Intake  Outcome: Progressing  Goal: Effective Oxygenation and Ventilation  Outcome: Progressing  Intervention: Promote Airway Secretion Clearance  Recent Flowsheet Documentation  Taken 6/26/2024 0911 by Emmie Sandoval RN  Activity Management: ambulated to bathroom  Taken 6/26/2024 0811 by Emmie Sandoval RN  Cough And Deep Breathing: done independently per patient  Activity Management: activity adjusted per tolerance  Goal: Effective Breathing Pattern During Sleep  Outcome: Progressing  Intervention: Monitor and Manage Obstructive  Sleep Apnea  Recent Flowsheet Documentation  Taken 6/26/2024 0811 by Emmie Sandoval RN  Medication Review/Management: medications reviewed     Problem: Comorbidity Management  Goal: Blood Pressure in Desired Range  Outcome: Progressing  Intervention: Maintain Blood Pressure Management  Recent Flowsheet Documentation  Taken 6/26/2024 0811 by Emmie Sandoval RN  Medication Review/Management: medications reviewed  Goal: Maintenance of Heart Failure Symptom Control  Outcome: Progressing  Intervention: Maintain Heart Failure Management  Recent Flowsheet Documentation  Taken 6/26/2024 0811 by Emmie Sandoval RN  Medication Review/Management: medications reviewed     Problem: Fall Injury Risk  Goal: Absence of Fall and Fall-Related Injury  Outcome: Progressing  Intervention: Identify and Manage Contributors  Recent Flowsheet Documentation  Taken 6/26/2024 0811 by Emmie Sandoval RN  Medication Review/Management: medications reviewed  Intervention: Promote Injury-Free Environment  Recent Flowsheet Documentation  Taken 6/26/2024 0811 by Emmie Sandoval RN  Safety Promotion/Fall Prevention:   activity supervised   clutter free environment maintained   nonskid shoes/slippers when out of bed   patient and family education   room organization consistent   safety round/check completed   mobility aid in reach

## 2024-06-26 NOTE — PROGRESS NOTES
"SPIRITUAL HEALTH SERVICES - Progress Note  RH Med/Surg 3    Referral Source: Patient responded \"Yes\" to the question in the nursing assessment, \"Do you have any spiritual or Confucianist beliefs that will affect your care?\"     Pt Deanne's daughter Alma was present.  Oriented them to [facility].  Deanne reported that one of her pastors from [facility] will visit her this afternoon.  She explained that she identifies as [employer] but likes the services at [facility].  Deanne shared that she appreciates spiritual support through prayer and reading of scripture and expressed a desire for follow-up  support tomorrow.    Plan: Will inform the Thursday [facility] team of pt's request for follow-up spiritual support tomorrow 6/27/2024.    Moy Lara M.Div., [facility]  Staff     [facility] available 24/7 for emergent requests/referrals, either by paging the on-call  or by entering an ASAP/STAT consult in [facility], which will also page the on-call .   "

## 2024-06-26 NOTE — PLAN OF CARE
Pt is alert and oriented. Pt denies any pain. Dyspnea on exertion and pt on 3LNC.    Tele: 100% A-V paced with PVC. Ongoing monitoring.    Plan: Cardio following. Discharge TBD.    Heart Failure Care Map  GOALS TO BE MET BEFORE DISCHARGE:    1. Decrease congestion and/or edema with diuretic therapy to achieve near optimal volume status.     Dyspnea improved: No, further care required to meet this goal. Please explain Pt is dyspneic on exertion.   Edema improved: No, further care required to meet this goal. Please explain 2+ edema        Last 24 hour I/O:   Intake/Output Summary (Last 24 hours) at 6/26/2024 0501  Last data filed at 6/26/2024 0444  Gross per 24 hour   Intake 1467 ml   Output 2050 ml   Net -583 ml           Net I/O and Weights since admission:   05/27 1500 - 06/26 1459  In: 1467 [P.O.:1455; I.V.:12]  Out: 2075 [Urine:2075]  Net: -608     Vitals:    06/24/24 2128 06/25/24 0028 06/26/24 0602   Weight: 62.6 kg (138 lb) 63.7 kg (140 lb 6.9 oz) 61.6 kg (135 lb 12.8 oz)       2.  O2 sats > 90% on room air, or at prior home O2 therapy level.      Able to wean O2 this shift to keep sats above 90%?: Yes, satisfactory for discharge. O2 at 3LNC which is pt's baseline.   Does patient use Home O2? Yes-  2-4L.          Current oxygenation status:   SpO2: 97 %     O2 Device: Nasal cannula, Oxygen Delivery: 3 LPM    3.  Tolerates ambulation and mobility near baseline.     Ambulation: No, further care required to meet this goal. Please explain . Pt id not oob yet.   Times patient ambulated with staff this shift: 0    Please review the Heart Failure Care Map for additional HF goal outcomes.    Jus Worrell RN  6/26/2024      /52 (BP Location: Right arm)   Pulse 56   Temp 97.6  F (36.4  C) (Oral)   Resp 16   Wt 61.6 kg (135 lb 12.8 oz)   LMP  (LMP Unknown)   SpO2 97%   BMI 23.31 kg/m       Problem: Adult Inpatient Plan of Care  Goal: Plan of Care Review  Description: The Plan of Care Review/Shift note  "should be completed every shift.  The Outcome Evaluation is a brief statement about your assessment that the patient is improving, declining, or no change.  This information will be displayed automatically on your shift  note.  Outcome: Progressing  Flowsheets (Taken 6/26/2024 0428)  Outcome Evaluation: Dyspnea on exertion, pt on 3LNC.  Plan of Care Reviewed With: patient  Overall Patient Progress: no change  Goal: Patient-Specific Goal (Individualized)  Description: You can add care plan individualizations to a care plan. Examples of Individualization might be:  \"Parent requests to be called daily at 9am for status\", \"I have a hard time hearing out of my right ear\", or \"Do not touch me to wake me up as it startles  me\".  Outcome: Progressing  Goal: Readiness for Transition of Care  Outcome: Progressing     Problem: Heart Failure  Goal: Optimal Coping  Outcome: Progressing  Goal: Optimal Cardiac Output  Outcome: Progressing  Goal: Stable Heart Rate and Rhythm  Outcome: Progressing  Goal: Optimal Functional Ability  Outcome: Progressing  Goal: Fluid and Electrolyte Balance  Outcome: Progressing  Goal: Improved Oral Intake  Outcome: Progressing  Goal: Effective Oxygenation and Ventilation  Outcome: Progressing  Intervention: Optimize Oxygenation and Ventilation  Recent Flowsheet Documentation  Taken 6/26/2024 0014 by Jus Worrell RN  Head of Bed (HOB) Positioning: HOB at 20-30 degrees  Goal: Effective Breathing Pattern During Sleep  Outcome: Progressing  Intervention: Monitor and Manage Obstructive Sleep Apnea  Recent Flowsheet Documentation  Taken 6/26/2024 0014 by Jus Worrell RN  Medication Review/Management: medications reviewed     Problem: Comorbidity Management  Goal: Blood Pressure in Desired Range  Outcome: Progressing  Intervention: Maintain Blood Pressure Management  Recent Flowsheet Documentation  Taken 6/26/2024 0014 by Jus Worrell RN  Medication Review/Management: medications reviewed   "   Problem: Fall Injury Risk  Goal: Absence of Fall and Fall-Related Injury  Outcome: Progressing  Intervention: Identify and Manage Contributors  Recent Flowsheet Documentation  Taken 6/26/2024 0014 by Jus Worrell RN  Medication Review/Management: medications reviewed  Intervention: Promote Injury-Free Environment  Recent Flowsheet Documentation  Taken 6/26/2024 0300 by Jus Worrell RN  Safety Promotion/Fall Prevention: safety round/check completed  Taken 6/26/2024 0200 by Jus Worrell RN  Safety Promotion/Fall Prevention: safety round/check completed  Taken 6/26/2024 0100 by Jus Worrell RN  Safety Promotion/Fall Prevention: safety round/check completed  Taken 6/26/2024 0014 by Jus Worrell RN  Safety Promotion/Fall Prevention:   safety round/check completed   patient and family education   nonskid shoes/slippers when out of bed   lighting adjusted   assistive device/personal items within reach   activity supervised  Taken 6/25/2024 2300 by Jus Worrell RN  Safety Promotion/Fall Prevention: safety round/check completed   Goal Outcome Evaluation:      Plan of Care Reviewed With: patient    Overall Patient Progress: no changeOverall Patient Progress: no change    Outcome Evaluation: Dyspnea on exertion, pt on 3LNC.

## 2024-06-26 NOTE — PROGRESS NOTES
[facility]    Hospitalist Progress Note      Assessment & Plan   Zunilda Clark is a 83 year old female w rheumatoid arthritis, HTN, DVT, hypothyroidism, pulmonary htn with multivalvular, severe heart disease who presents with SOB.     Pt follow longitudinally with Dr. Rodriguez from cards team.  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  Cardiology has had ongoing discussion with her and she has opted for conservative management with symptom management.  She has established with palliative care as well.     #Acute Hypoxic Respiratory Failure with hypoxia secondary to severe mitral stenosis and severe aortic stenosis. Acute on chronic HFpEF.  Severe Pulmonary Hypertension.  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  She woke up on AM of admission with SOB even at rest.  She describes orthopnea and PND.  Increased LE edema over last week.  She is in palliative care and has declined any sort of intervention for severe valvular disease and is established with palliative care.  -In ER, pt afebrile and HDS but needing 2L to maintain sats.  CT PE showed pulmonary edema with evidence of right heart dysfunction.  BNP elevated at 5k.  Troponins mildly elevated but flat on recheck.   -Cards consulted.  Appreciate recs.  -Increased IV diuresis to 40 mg TID on 6/26.    -TTE shows known severe stenosis of mitral valve, aortic valve with severe pulmonary HTN.  Moderate tricuspid regurg also noted.  No significant change compared to prior stud.    -She is already established with palliative care as she has terminal illness related to severity of her heart disease.  She is willing to be hospitalized for symptom management, IV diuresis, etc but does not want escalation of cares (ICU  stays, invasive procedures, etc).    -Continue metoprolol and amlodipine.       #History of afib s/p ppm. Coagulopathy secondary to warfarin.  Subtherapeutic on warfarin therapy:   -EKG shows AV paced rhythm.  No chest pain.    -Her INRs have been subtherapeutic seemingly for months.    -We will continue warfarin, pharmacy to dose.       #HTN: Metoprolol, amlodipine and lasix as above  #RA: On methotrexate weekly at baseline. Continue daily folic acid.  #History of CVA: Warfarin as above. BP meds as above.   #Hypothyroidism: Continue home LT4     DVT Prophylaxis: Warfarin  Code Status: DNR / DNI. Confirmed on admission and c/w prior admissions.   Dispo: At least another day for IV diuresis.  Pending cards eval on 6/27.  Seen by OT and OK for discharge back to independent living facility once medically ready.     Daughter updated at bedside on 6/26.     Yobani Whitman MD    Interval History   Feels like she backslid a bit overnight. Feels SOB this AM. No pain. Feels nose is dry and having some slight bleeding.  No AP, n/v.      -Data reviewed today: I reviewed all new labs and imaging results over the last 24 hours. I personally reviewed     Physical Exam   Temp: 97.6  F (36.4  C) Temp src: Oral BP: 132/52 Pulse: 73   Resp: 16 SpO2: 97 % O2 Device: Nasal cannula Oxygen Delivery: 3 LPM  Vitals:    06/24/24 2128 06/25/24 0028 06/26/24 0602   Weight: 62.6 kg (138 lb) 63.7 kg (140 lb 6.9 oz) 61.6 kg (135 lb 12.8 oz)     Vital Signs with Ranges  Temp:  [97.6  F (36.4  C)-97.7  F (36.5  C)] 97.6  F (36.4  C)  Pulse:  [56-73] 73  Resp:  [16-18] 16  BP: (121-132)/(52-67) 132/52  SpO2:  [90 %-97 %] 97 %  I/O last 3 completed shifts:  In: 1467 [P.O.:1455; I.V.:12]  Out: 2075 [Urine:2075]    Constitutional: Chronically deconditioned. NC in place. NAD  HEENT: Normocephalic. MMM, No elevation of JVD at 90 degrees  Respiratory: Nl WOB, Somewhat diminsihed at bases bilaterally with inspiratory cracikles.   Cardiovascular: Regular,  Systolic and diastolic murmur noted.   GI: BS+, NT, ND  Skin/Integumen: WWP, no rash. Moderate bilateral edema noted.   Neuro: CNII-XII intact. Moves all extremities. No tremor. A&Ox3.    Medications   Current Facility-Administered Medications   Medication Dose Route Frequency Provider Last Rate Last Admin    Continuing ACE inhibitor/ARB/ARNI from home medication list OR ACE inhibitor/ARB/ARNI order already placed during this visit   Does not apply DOES NOT GO TO Nydia Carcamo MD        Continuing beta blocker from home medication list OR beta blocker order already placed during this visit   Does not apply DOES NOT GO TO Nydia Carcamo MD        Patient is already receiving anticoagulation with heparin, enoxaparin (LOVENOX), warfarin (COUMADIN)  or other anticoagulant medication   Does not apply Continuous PRN Nydia Carver MD         Current Facility-Administered Medications   Medication Dose Route Frequency Provider Last Rate Last Admin    allopurinol (ZYLOPRIM) tablet 100 mg  100 mg Oral BID Nydia Carver MD   100 mg at 06/26/24 0812    amLODIPine (NORVASC) tablet 5 mg  5 mg Oral Daily Nydia Carver MD   5 mg at 06/26/24 0812    calcium citrate (CITRACAL) tablet 950 mg  950 mg Oral Daily Nydia Carver MD   950 mg at 06/26/24 0812    famotidine (PEPCID) tablet 20 mg  20 mg Oral Daily Nydia Carver MD   20 mg at 06/26/24 0812    folic acid (FOLVITE) tablet 1 mg  1 mg Oral Daily Nydia Carver MD   1 mg at 06/26/24 0812    furosemide (LASIX) injection 40 mg  40 mg Intravenous Q8H Momo Beatty MD        levothyroxine (SYNTHROID/LEVOTHROID) tablet 112 mcg  112 mcg Oral Daily Nydia Carver MD   112 mcg at 06/26/24 0812    metoprolol tartrate (LOPRESSOR) tablet 75 mg  75 mg Oral BID Nydia Carver MD   75 mg at 06/26/24 0812    sodium chloride (PF) 0.9% PF flush 3 mL  3 mL Intracatheter Q8H Nydia Carver MD   3 mL  at 06/26/24 0815    Warfarin Dose Required Daily - Pharmacist Managed  1 each Oral See Admin Instructions Nydia Carver MD           Data   Recent Labs   Lab 06/26/24  0708 06/25/24  1121 06/25/24  0541 06/25/24  0037 06/24/24  2134   WBC  --   --  5.9  --  6.4   HGB  --   --  9.8*  --  9.8*   MCV  --   --  109*  --  109*   PLT  --   --  250  --  256   INR 1.98* 1.67*  --  1.72* 1.68*     --  139  --  140   POTASSIUM 3.5  --  3.6 3.7 4.0   CHLORIDE 101  --  100  --  100   CO2 28  --  28  --  29   BUN 24.0*  --  26.6*  --  29.5*   CR 0.86  --  0.92  --  1.07*   ANIONGAP 11  --  11  --  11   BRICE 8.6*  --  8.7*  --  9.0   GLC 86  --  84  --  97       No results found for this or any previous visit (from the past 24 hour(s)).

## 2024-06-26 NOTE — PLAN OF CARE
"Care from [id number]  Inpatient Progress Note - MS3  For vital signs and complete assessments, please see documentation flowsheets.     ORIENTATION: Aox4.  VSS.  PAIN: Denies pain, CP, SOB, n/v.  O2: 3L NC.  TELE: 100% AV paced.  GI/: Purewick.  SKIN: +2 edema BLE. Heels elevated.  ACTIVITY: A1 GB, walker.  DIET: 2g Na diet, no caffeine, 2000mL fluid restriction maintained.  LINES/DRAINS: R AC SL.  PROTOCOLS: K, Mg protocols in for AM.  PLAN: OT, cards following. Plan to go back to independent living pending diuresing.    Goal Outcome Evaluation:      Plan of Care Reviewed With: patient, child    Overall Patient Progress: improvingOverall Patient Progress: improving    Outcome Evaluation: On 3L NC, this is the patient's baseline, maintaining sats >90%, denies pain, CP, SOB.    Problem: Adult Inpatient Plan of Care  Goal: Plan of Care Review  Description: The Plan of Care Review/Shift note should be completed every shift.  The Outcome Evaluation is a brief statement about your assessment that the patient is improving, declining, or no change.  This information will be displayed automatically on your shift  note.  Outcome: Progressing  Flowsheets (Taken 6/25/2024 2303)  Outcome Evaluation: On 3L NC, this is the patient's baseline, maintaining sats >90%, denies pain, CP, SOB.  Plan of Care Reviewed With:   patient   child  Overall Patient Progress: improving  Goal: Patient-Specific Goal (Individualized)  Description: You can add care plan individualizations to a care plan. Examples of Individualization might be:  \"Parent requests to be called daily at 9am for status\", \"I have a hard time hearing out of my right ear\", or \"Do not touch me to wake me up as it startles  me\".  Outcome: Progressing  Goal: Absence of Hospital-Acquired Illness or Injury  Intervention: Identify and Manage Fall Risk  Recent Flowsheet Documentation  Taken 6/25/2024 2110 by Velia Quezada RN  Safety Promotion/Fall Prevention:   safety " round/check completed   supervised activity   room organization consistent   room near nurse's station   patient and family education   nonskid shoes/slippers when out of bed   mobility aid in reach   lighting adjusted   increase visualization of patient   increased rounding and observation   clutter free environment maintained   assistive device/personal items within reach   activity supervised  Intervention: Prevent Skin Injury  Recent Flowsheet Documentation  Taken 6/25/2024 2110 by Velia Quezada, RN  Body Position: position changed independently  Intervention: Prevent Infection  Recent Flowsheet Documentation  Taken 6/25/2024 2110 by Velia Quezada, RN  Infection Prevention:   hand hygiene promoted   rest/sleep promoted   single patient room provided  Goal: Readiness for Transition of Care  Outcome: Progressing

## 2024-06-27 ENCOUNTER — APPOINTMENT (OUTPATIENT)
Dept: OCCUPATIONAL THERAPY | Facility: CLINIC | Age: 84
DRG: 291 | End: 2024-06-27
Payer: COMMERCIAL

## 2024-06-27 LAB
ANION GAP SERPL CALCULATED.3IONS-SCNC: 10 MMOL/L (ref 7–15)
BUN SERPL-MCNC: 23.9 MG/DL (ref 8–23)
CALCIUM SERPL-MCNC: 8.8 MG/DL (ref 8.8–10.2)
CHLORIDE SERPL-SCNC: 102 MMOL/L (ref 98–107)
CREAT SERPL-MCNC: 0.8 MG/DL (ref 0.51–0.95)
DEPRECATED HCO3 PLAS-SCNC: 28 MMOL/L (ref 22–29)
EGFRCR SERPLBLD CKD-EPI 2021: 73 ML/MIN/1.73M2
GLUCOSE SERPL-MCNC: 84 MG/DL (ref 70–99)
HOLD SPECIMEN: NORMAL
INR PPP: 2.14 (ref 0.85–1.15)
MAGNESIUM SERPL-MCNC: 2 MG/DL (ref 1.7–2.3)
POTASSIUM SERPL-SCNC: 3.3 MMOL/L (ref 3.4–5.3)
POTASSIUM SERPL-SCNC: 3.6 MMOL/L (ref 3.4–5.3)
SODIUM SERPL-SCNC: 140 MMOL/L (ref 135–145)

## 2024-06-27 PROCEDURE — 250N000013 HC RX MED GY IP 250 OP 250 PS 637: Performed by: HOSPITALIST

## 2024-06-27 PROCEDURE — 99233 SBSQ HOSP IP/OBS HIGH 50: CPT | Mod: FS | Performed by: NURSE PRACTITIONER

## 2024-06-27 PROCEDURE — 120N000001 HC R&B MED SURG/OB

## 2024-06-27 PROCEDURE — 83735 ASSAY OF MAGNESIUM: CPT | Performed by: HOSPITALIST

## 2024-06-27 PROCEDURE — 99233 SBSQ HOSP IP/OBS HIGH 50: CPT | Performed by: HOSPITALIST

## 2024-06-27 PROCEDURE — 84132 ASSAY OF SERUM POTASSIUM: CPT | Performed by: HOSPITALIST

## 2024-06-27 PROCEDURE — 250N000013 HC RX MED GY IP 250 OP 250 PS 637: Performed by: INTERNAL MEDICINE

## 2024-06-27 PROCEDURE — 36415 COLL VENOUS BLD VENIPUNCTURE: CPT | Performed by: INTERNAL MEDICINE

## 2024-06-27 PROCEDURE — 99418 PROLNG IP/OBS E/M EA 15 MIN: CPT | Performed by: NURSE PRACTITIONER

## 2024-06-27 PROCEDURE — 99223 1ST HOSP IP/OBS HIGH 75: CPT | Performed by: NURSE PRACTITIONER

## 2024-06-27 PROCEDURE — 85610 PROTHROMBIN TIME: CPT | Performed by: INTERNAL MEDICINE

## 2024-06-27 PROCEDURE — 36415 COLL VENOUS BLD VENIPUNCTURE: CPT | Performed by: HOSPITALIST

## 2024-06-27 PROCEDURE — 250N000011 HC RX IP 250 OP 636: Performed by: INTERNAL MEDICINE

## 2024-06-27 PROCEDURE — 80048 BASIC METABOLIC PNL TOTAL CA: CPT | Performed by: HOSPITALIST

## 2024-06-27 RX ORDER — POTASSIUM CHLORIDE 1500 MG/1
40 TABLET, EXTENDED RELEASE ORAL ONCE
Status: COMPLETED | OUTPATIENT
Start: 2024-06-27 | End: 2024-06-27

## 2024-06-27 RX ORDER — WARFARIN SODIUM 2.5 MG/1
2.5 TABLET ORAL
Status: COMPLETED | OUTPATIENT
Start: 2024-06-27 | End: 2024-06-27

## 2024-06-27 RX ADMIN — LEVOTHYROXINE SODIUM 112 MCG: 0.11 TABLET ORAL at 08:26

## 2024-06-27 RX ADMIN — FOLIC ACID 1 MG: 1 TABLET ORAL at 08:26

## 2024-06-27 RX ADMIN — AMLODIPINE BESYLATE 5 MG: 5 TABLET ORAL at 08:26

## 2024-06-27 RX ADMIN — METOPROLOL TARTRATE 75 MG: 25 TABLET, FILM COATED ORAL at 20:41

## 2024-06-27 RX ADMIN — FUROSEMIDE 40 MG: 10 INJECTION, SOLUTION INTRAMUSCULAR; INTRAVENOUS at 08:31

## 2024-06-27 RX ADMIN — ALLOPURINOL 100 MG: 100 TABLET ORAL at 08:26

## 2024-06-27 RX ADMIN — WARFARIN SODIUM 2.5 MG: 2.5 TABLET ORAL at 17:17

## 2024-06-27 RX ADMIN — FUROSEMIDE 40 MG: 10 INJECTION, SOLUTION INTRAMUSCULAR; INTRAVENOUS at 00:00

## 2024-06-27 RX ADMIN — POTASSIUM CHLORIDE 40 MEQ: 1500 TABLET, EXTENDED RELEASE ORAL at 08:26

## 2024-06-27 RX ADMIN — FAMOTIDINE 20 MG: 20 TABLET ORAL at 08:26

## 2024-06-27 RX ADMIN — FUROSEMIDE 40 MG: 10 INJECTION, SOLUTION INTRAMUSCULAR; INTRAVENOUS at 17:17

## 2024-06-27 RX ADMIN — METOPROLOL TARTRATE 75 MG: 25 TABLET, FILM COATED ORAL at 08:26

## 2024-06-27 RX ADMIN — Medication 950 MG: at 08:26

## 2024-06-27 RX ADMIN — ALLOPURINOL 100 MG: 100 TABLET ORAL at 20:41

## 2024-06-27 ASSESSMENT — ACTIVITIES OF DAILY LIVING (ADL)
ADLS_ACUITY_SCORE: 35
ADLS_ACUITY_SCORE: 31
ADLS_ACUITY_SCORE: 35
ADLS_ACUITY_SCORE: 31
ADLS_ACUITY_SCORE: 35
ADLS_ACUITY_SCORE: 31
ADLS_ACUITY_SCORE: 35
ADLS_ACUITY_SCORE: 31
ADLS_ACUITY_SCORE: 35
ADLS_ACUITY_SCORE: 31

## 2024-06-27 NOTE — PROGRESS NOTES
"[facility]  Cardiology Progress Note    Date of Service (when I saw the patient): 06/27/2024     Assessment & Plan   Zunilda Clark is a 83 year old female who was admitted on 6/24/2024.     1.  : Acute on chronic diastolic heart failure   - NT pro BNP 5,079 on admission   - Echocardiogram showed normal EF 55-60%, severe aortic stenosis, severe mitral stenosis, moderate TR, moderate to severely dilated RV, severe biatrial enlargement.   - IV lasix 40 mg every 8 hrs, net neg 1 L overnight, down 3 kg. Wt 132 lb ( dry weight 133 lb).     2.  : Severe aortic stenosis   - Mean gradient 33 mmHg, SILVESTRE 0.67 cm2  - Declines intervention     3.  : Mitral valve stenosis - h/o 27 mm Magna bioprosthetic valve  - Mean mitral valve gradient 15 mmHg  4.  : Moderate Tricuspid Regurgitation/ Severe pulmonary hypertension ( >55 mmHg)  5.  : Atrial fibrillation  - PTA  metoprolol   - PTA  Warfarin   - INR 2.14   6.  :  Pacemaker   7.  :  Hypertension   8.  :  CVA    Plan  Breathing better today, down 3 lbs overnight, below dry wt 132 lbs.  Fine crackles to LLL/ 1+ right LE edema. Continue with IV lasix today, likely transition to PO tomorrow.   Strict I/O's, daily weights, Low sodium diet.   A-fib, rate controlled. Continue metoprolol and Warfarin. INR therapeutic. Keep K>4.0 and Mag > 2.0. replace per protocol.   Reviewed valvular disease w/ patient, likely contributing to CHF exacerbation. Patient considering palliative, wishes to discuss with daughter and make a decision.     MANJIT Gonsalez CNP  Text Page  (M-F, 7:30 am - 4:00 pm)    Interval History   Up in chair. Breathing better today with increased diuretics. Fine crackles to LLL and RLE edema. A-fib controlled. K low, replaced per protocol. Patient is considering \" stopping all of this\", she will discuss with daughter.       Physical Exam   Temp: 98.3  F (36.8  C) Temp src: Oral BP: 124/58 Pulse: 61   Resp: 18 SpO2: 97 % O2 Device: Nasal cannula " Oxygen Delivery: 3 LPM  Vitals:    06/25/24 0028 06/26/24 0602 06/27/24 0606   Weight: 63.7 kg (140 lb 6.9 oz) 61.6 kg (135 lb 12.8 oz) 60.1 kg (132 lb 9.6 oz)     Vital Signs with Ranges  Temp:  [97.8  F (36.6  C)-98.3  F (36.8  C)] 98.3  F (36.8  C)  Pulse:  [61-66] 61  Resp:  [16-18] 18  BP: (124-146)/(58-70) 124/58  SpO2:  [90 %-100 %] 97 %  I/O last 3 completed shifts:  In: 760 [P.O.:760]  Out: 1800 [Urine:1800]    GEN:  In general, this is a frail thin female in no acute distress on 3L/NC.    HEENT:  Pupils equal, round.   NECK: Supple, no masses appreciated. Elevated JVD   C/V:  Regular rate and rhythm ( paced), systolic murmur, no rub or gallop. No S3 or RV heave.   RESP: Respirations are unlabored. Fine crackles to LLL  GI: Abdomen soft, nontender, nondistended.   EXTREM: 1+ R LE edema.   NEURO: Alert and oriented, cooperative.   PSYCH: Normal affect.  SKIN: Warm and dry. No rashes or petechiae appreciated.       Medications   Current Facility-Administered Medications   Medication Dose Route Frequency Provider Last Rate Last Admin    Continuing ACE inhibitor/ARB/ARNI from home medication list OR ACE inhibitor/ARB/ARNI order already placed during this visit   Does not apply DOES NOT GO TO Nydia Carcamo MD        Continuing beta blocker from home medication list OR beta blocker order already placed during this visit   Does not apply DOES NOT GO TO Nydia Carcamo MD        Patient is already receiving anticoagulation with heparin, enoxaparin (LOVENOX), warfarin (COUMADIN)  or other anticoagulant medication   Does not apply Continuous PRN Nydia Carver MD         Current Facility-Administered Medications   Medication Dose Route Frequency Provider Last Rate Last Admin    allopurinol (ZYLOPRIM) tablet 100 mg  100 mg Oral BID Nydia Craver MD   100 mg at 06/27/24 0826    amLODIPine (NORVASC) tablet 5 mg  5 mg Oral Daily Nydia Carver MD   5 mg at 06/27/24 0805     calcium citrate (CITRACAL) tablet 950 mg  950 mg Oral Daily Nydia Carver MD   950 mg at 06/27/24 0826    famotidine (PEPCID) tablet 20 mg  20 mg Oral Daily Nydia Carver MD   20 mg at 06/27/24 0826    folic acid (FOLVITE) tablet 1 mg  1 mg Oral Daily Nydia Carver MD   1 mg at 06/27/24 0826    furosemide (LASIX) injection 40 mg  40 mg Intravenous Q8H Momo Beatty MD   40 mg at 06/27/24 0831    levothyroxine (SYNTHROID/LEVOTHROID) tablet 112 mcg  112 mcg Oral Daily Nydia Carver MD   112 mcg at 06/27/24 0826    metoprolol tartrate (LOPRESSOR) tablet 75 mg  75 mg Oral BID Nydia Carver MD   75 mg at 06/27/24 0826    sodium chloride (PF) 0.9% PF flush 3 mL  3 mL Intracatheter Q8H Nydia Carver MD   3 mL at 06/27/24 0831    Warfarin Dose Required Daily - Pharmacist Managed  1 each Oral See Admin Instructions Nydia Carver MD           Data   Reviewed       I spent > 20 minutes face-to-face and/or coordinating care. Over 50% of our time on the unit was spent counseling the patient and/or coordinating care        MANJIT Gonsalez CNP 6/27/2024

## 2024-06-27 NOTE — PLAN OF CARE
"Pt A&Ox4. A1 GB/W. Denies pain. Lung sounds crackles and dim. MORRISON. Denies SOB while sitting. On 3L NC - baseline for patient. Bilat LE edema. INR 2.14 - 2.5 mg coumadin today. IV lasix q8h, likely transition to oral lasix tomorrow. K replaced and recheck in AM. FR 2000ml.  Low fat, low NA diet. Plan is for patient to discharge back to assisted living when medically ready. Palliative consulted. Cardiology and OT following.         Goal Outcome Evaluation:      Plan of Care Reviewed With: patient    Overall Patient Progress: no changeOverall Patient Progress: no change    Outcome Evaluation: 3L O2. denies SOB. IV lasix, plan to switch to oral tomorrow.      Problem: Adult Inpatient Plan of Care  Goal: Plan of Care Review  Description: The Plan of Care Review/Shift note should be completed every shift.  The Outcome Evaluation is a brief statement about your assessment that the patient is improving, declining, or no change.  This information will be displayed automatically on your shift  note.  Outcome: Progressing  Flowsheets (Taken 6/27/2024 1248)  Outcome Evaluation: 3L O2. denies SOB. IV lasix, plan to switch to oral tomorrow.  Plan of Care Reviewed With: patient  Overall Patient Progress: no change  Goal: Patient-Specific Goal (Individualized)  Description: You can add care plan individualizations to a care plan. Examples of Individualization might be:  \"Parent requests to be called daily at 9am for status\", \"I have a hard time hearing out of my right ear\", or \"Do not touch me to wake me up as it startles  me\".  Outcome: Progressing  Goal: Absence of Hospital-Acquired Illness or Injury  Intervention: Identify and Manage Fall Risk  Recent Flowsheet Documentation  Taken 6/27/2024 0818 by Candace Allen, RN  Safety Promotion/Fall Prevention: safety round/check completed  Intervention: Prevent Skin Injury  Recent Flowsheet Documentation  Taken 6/27/2024 0818 by Candace Allen, RN  Body Position: weight " shifting  Intervention: Prevent and Manage VTE (Venous Thromboembolism) Risk  Recent Flowsheet Documentation  Taken 6/27/2024 0818 by Candace Allen, RN  VTE Prevention/Management: SCDs (sequential compression devices) off  Goal: Readiness for Transition of Care  Outcome: Progressing

## 2024-06-27 NOTE — PLAN OF CARE
"AO4. 3L NC. VSS. Purwick only at bedtime. IV lasix BID. A1 with gb/walker.     Goal Outcome Evaluation:      Plan of Care Reviewed With: patient    Overall Patient Progress: no changeOverall Patient Progress: no change    Outcome Evaluation: VSS. 3L O2 NC. Denies SOB. Lasix given at bedtime. Purwick used onlt ay bedtime.      Problem: Adult Inpatient Plan of Care  Goal: Plan of Care Review  Description: The Plan of Care Review/Shift note should be completed every shift.  The Outcome Evaluation is a brief statement about your assessment that the patient is improving, declining, or no change.  This information will be displayed automatically on your shift  note.  Outcome: Progressing  Flowsheets (Taken 6/27/2024 0801)  Outcome Evaluation: VSS. 3L O2 NC. Denies SOB. Lasix given at bedtime. Purwick used onlt ay bedtime.  Plan of Care Reviewed With: patient  Overall Patient Progress: no change  Goal: Patient-Specific Goal (Individualized)  Description: You can add care plan individualizations to a care plan. Examples of Individualization might be:  \"Parent requests to be called daily at 9am for status\", \"I have a hard time hearing out of my right ear\", or \"Do not touch me to wake me up as it startles  me\".  Outcome: Progressing  Goal: Readiness for Transition of Care  Outcome: Progressing     Problem: Heart Failure  Goal: Optimal Coping  Outcome: Progressing  Goal: Optimal Cardiac Output  Outcome: Progressing  Goal: Stable Heart Rate and Rhythm  Outcome: Progressing  Goal: Optimal Functional Ability  Outcome: Progressing  Intervention: Optimize Functional Ability  Recent Flowsheet Documentation  Taken 6/27/2024 0147 by Flip Arechiga, RN  Activity Management:   activity adjusted per tolerance   activity encouraged  Goal: Fluid and Electrolyte Balance  Outcome: Progressing  Goal: Improved Oral Intake  Outcome: Progressing  Goal: Effective Oxygenation and Ventilation  Outcome: Progressing  Intervention: Promote Airway " Secretion Clearance  Recent Flowsheet Documentation  Taken 6/27/2024 0147 by Flip Arechiga RN  Cough And Deep Breathing: done independently per patient  Activity Management:   activity adjusted per tolerance   activity encouraged  Intervention: Optimize Oxygenation and Ventilation  Recent Flowsheet Documentation  Taken 6/27/2024 0147 by Flip Arechiga RN  Head of Bed (HOB) Positioning: HOB at 20-30 degrees  Goal: Effective Breathing Pattern During Sleep  Outcome: Progressing  Intervention: Monitor and Manage Obstructive Sleep Apnea  Recent Flowsheet Documentation  Taken 6/27/2024 0147 by Flip Arechiga RN  Medication Review/Management: medications reviewed     Problem: Comorbidity Management  Goal: Blood Pressure in Desired Range  Outcome: Progressing  Intervention: Maintain Blood Pressure Management  Recent Flowsheet Documentation  Taken 6/27/2024 0147 by Flip Arechiga RN  Medication Review/Management: medications reviewed  Goal: Maintenance of Heart Failure Symptom Control  Outcome: Progressing  Intervention: Maintain Heart Failure Management  Recent Flowsheet Documentation  Taken 6/27/2024 0147 by Flip Arechiga RN  Medication Review/Management: medications reviewed     Problem: Fall Injury Risk  Goal: Absence of Fall and Fall-Related Injury  Outcome: Progressing  Intervention: Identify and Manage Contributors  Recent Flowsheet Documentation  Taken 6/27/2024 0147 by Flip Arechiga RN  Medication Review/Management: medications reviewed  Intervention: Promote Injury-Free Environment  Recent Flowsheet Documentation  Taken 6/27/2024 0147 by Flip Arechiga RN  Safety Promotion/Fall Prevention:   activity supervised   assistive device/personal items within reach   clutter free environment maintained   lighting adjusted   mobility aid in reach   safety round/check completed

## 2024-06-27 NOTE — CONSULTS
"SPIRITUAL HEALTH SERVICES - Consult Note  RH MS 3  Referral Source/Reason for Visit: [facility] Consult    Summary and Recommendations -  Pt Deanne reported multiple hospitalizations and spoke about when she should transition to comfort care.  Deanne requested prayers for guidance and for family, as well as a reading of Psalm 23, which she reads \"every day\".    Plan: I and other chaplains remain available on request.    Fran Trujillo    Intern     [facility] routine referrals?*[phone]  [facility] available  for emergent requests/referrals, either by paging the on-call  or by entering an ASAP/STAT consult in [facility], which will also page the on-call .      Assessment    Saw pt Zunilda SHAW May per [facility] consult.  Daughter was present during visit.    Patient/Family Understanding of Illness and Goals of Care - Pt Deanne reports being here for difficulty breathing related to \"congestive heart failure\"; she reports improvement but speculated that she may be approaching a time when she wants to cease hospitalizations and transition to comfort care.    Distress and Loss -   Deanne reported distress at frequent hospitalizations.  She related her   \"five years\" ago and that her \"purpose\" was \"working as a team\" with him and that she has struggled with meaning since he .    Strengths, Coping, and Resources -   Deanne identified her four daughters as a source of strength and support.  Members of her [employer] have also visited and provided her support.  She identified God as \"my strength\" and \"my help\".    Meaning, Beliefs, and Spirituality -   Deanne named God as important to her life.    She prays frequently and reads the Bible  She welcomed a reading of Psalm 23 and prayer for \"guidance\" and for her family.  "

## 2024-06-27 NOTE — CONSULTS
Palliative Care Consultation Note  [facility]      Patient: Zunilda Clark  Date of Admission:  6/24/2024    Requesting Clinician / Team: hospitalist  Reason for consult: Goals of care  Decisional support  Patient and family support       Recommendations & Counseling     GOALS OF CARE:   Restorative with limits   We discussed further hospitalizations, at this time patient is ok with another hospital stay  She is not ready for hospice today but knows that this is an option for her.  She would like to have a hospice informational setup for once she is home    ADVANCE CARE PLANNING:  Patient has an advance directive dated 1/24/2020.  Primary Health Care Agent Alma Figueroa.  Alternate(s) Kyara Mayorga.   There is a POLST form on file, this was reviewed and current.  Code status: No CPR- Do NOT Intubate    MEDICAL MANAGEMENT:   #Dyspnea  Oxygen  Continue to monitor respiratory status closely    #General Weakness/Debility   Appreciate the input of therapies for discharge recommendations    PSYCHOSOCIAL/SPIRITUAL SUPPORT:  Family   [employer] community: [employer]     Palliative Care will continue to follow. Thank you for the consult and allowing us to aid in the care of Zunilda Clark.    These recommendations have been discussed with medical team.    Victoria Del Castillo NP  [facility], Palliative Care  Securely message with the [employer] Web Console (learn more here) or  Text page via [facility] Paging/Directory         Assessment      Zunilda Clark is a 83 year old female with a past medical history of rheumatoid arthritis, hypertension, hypothyroidism, bioprosthetic mitral valve replacement x 2, A-fib, sick sinus syndrome status post pacemaker, pulmonary hypertension, CVA on warfarin for anticoagulation  who presented on 6/24 with shortness of breath.      Today, the patient was seen for:  Goals of care    History of Present Illness   Met with patient and daughter Roxi.   I introduced our role as an  extra layer of support and how we help patients and families dealing with serious, potentially life-limiting illnesses. I explained the composition of the palliative care team.  Palliative care helps patients and families navigate their care while focusing on the whole person; providing emotional, social and spiritual support  Palliative care often assists with symptom management, information sharing about what to expect from the illness, available treatment options and what effect those options may have on the disease course, and provide effective communication and caring support.    Prognosis, Goals, & Planning:   Functional Status just prior to this current hospitalization:  Requires assistance with making bed and doing laundry    Prognosis, Goals, and/or Advance Care Planning:  Discussed what continuing restorative/life-prolonging care entails, including continued (re)admissions to the hospital, continuing with preventative and primary care, seeing disease/organ specific specialty consultations for medical treatments in hopes to prolong life for as long as possible.      Code Status was addressed today:   yes        Patient has decision-making capacity today for complex decisions: Intact          Coping, Meaning, & Spirituality:   Mood, coping, and/or meaning in the context of serious illness were addressed today: Yes    Social:   Living situation:lives alone    Medications:  Reviewed this patient's medication profile and medications from this hospitalization. Notable medications: none   [place] Board of Pharmacy Data Base Reviewed: Yes:   reviewed - controlled substances reflected in medication list.    ROS:  Comprehensive ROS is reviewed and is negative except as here & per HPI:     Physical Exam   Vital Signs with Ranges  Temp:  [97.8  F (36.6  C)-98.3  F (36.8  C)] 98.3  F (36.8  C)  Pulse:  [61-66] 61  Resp:  [16-18] 18  BP: (124-146)/(58-70) 124/58  SpO2:  [90 %-100 %] 97 %  Wt Readings from Last 10  Encounters:   06/27/24 60.1 kg (132 lb 9.6 oz)   06/12/24 61.6 kg (135 lb 11.2 oz)   05/02/24 60.3 kg (133 lb)   04/08/24 60.4 kg (133 lb 3.2 oz)   03/21/24 61.2 kg (135 lb)   03/18/24 60.3 kg (133 lb)   03/13/24 60.3 kg (133 lb)   02/26/24 60.8 kg (134 lb)   01/08/24 61.2 kg (135 lb)   10/20/23 68 kg (150 lb)     132 lbs 9.6 oz    PHYSICAL EXAM:  Constitutional: alert and no distress   Cardiovascular: negative  Respiratory: positive findings: dyspnea with talking  Psychiatric: mentation appears normal and affect normal/bright  Abdomen: Abdomen soft, non-tender. BS normal. No masses, organomegaly  Pain: No s/s of pain    Data reviewed:  Results for orders placed or performed during the hospital encounter of 06/24/24 (from the past 24 hour(s))   INR   Result Value Ref Range    INR 2.14 (H) 0.85 - 1.15   Magnesium   Result Value Ref Range    Magnesium 2.0 1.7 - 2.3 mg/dL   Basic metabolic panel   Result Value Ref Range    Sodium 140 135 - 145 mmol/L    Potassium 3.3 (L) 3.4 - 5.3 mmol/L    Chloride 102 98 - 107 mmol/L    Carbon Dioxide (CO2) 28 22 - 29 mmol/L    Anion Gap 10 7 - 15 mmol/L    Urea Nitrogen 23.9 (H) 8.0 - 23.0 mg/dL    Creatinine 0.80 0.51 - 0.95 mg/dL    GFR Estimate 73 >60 mL/min/1.73m2    Calcium 8.8 8.8 - 10.2 mg/dL    Glucose 84 70 - 99 mg/dL     *Note: Due to a large number of results and/or encounters for the requested time period, some results have not been displayed. A complete set of results can be found in Results Review.       Medical Decision Making       95 MINUTES SPENT BY ME on the date of service doing chart review, history, exam, documentation & further activities per the note.

## 2024-06-27 NOTE — PROGRESS NOTES
[facility]    Medicine Progress Note - Hospitalist Service    Date of Admission:  6/24/2024    Assessment & Plan    Zunilda Clark is a 83 year old female w rheumatoid arthritis, HTN, DVT, hypothyroidism, pulmonary htn with multivalvular, severe heart disease who presents with SOB.     Pt follow longitudinally with Dr. Rodriguez from cards team.  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  Cardiology has had ongoing discussion with her and she has opted for conservative management with symptom management.  She has established with palliative care as well.     Acute on chronic hypoxic Respiratory Failure   severe mitral and aortic stenosis w/hx valve repair  Acute on chronic HFpEF  Severe Pulmonary Hypertension  -Pt presented with increased SOB, LE edema over last week.  She is in palliative care and has declined any sort of intervention for severe valvular disease and is established with palliative care.  -In ER, pt afebrile and HDS but needing 2L to maintain sats.  CT PE showed pulmonary edema with evidence of right heart dysfunction.  BNP elevated at 5k.  Troponins mildly elevated but flat on recheck.   -Cards consulted.  Appreciate recs.  -Increased IV diuresis to 40 mg TID on 6/26.    -TTE shows known severe stenosis of mitral valve, aortic valve with severe pulmonary HTN.  Moderate tricuspid regurg also noted.  No significant change compared to prior stud.    -down about 1.5L to date despite aggressive diuresis, monitor overnight with possible transition back to oral diuretics in am  -seen by palliative care, not ready for hospice yet  -2L fluid restriction, salt restrict  -currently on 3L NC which is baseline, still occasionally symptomatic     History of afib s/p ppm  Coagulopathy secondary to  warfarin  Subtherapeutic on warfarin therapy:   -EKG shows AV paced rhythm.  No chest pain.    -Her INRs have been subtherapeutic seemingly for months.    -We will continue warfarin, pharmacy to dose.       HTN: Metoprolol, amlodipine and lasix as above  RA: On methotrexate weekly at baseline. Continue daily folic acid.  History of CVA: Warfarin as above. BP meds as above.   Hypothyroidism: Continue home LT4     Diet: Fluid restriction 2000 ML FLUID (and additional linked orders)  Combination Diet Low Saturated Fat Na <2400mg Diet    DVT Prophylaxis: Warfarin  Leonard Catheter: Not present  Lines: None     Cardiac Monitoring: ACTIVE order. Indication: Acute decompensated heart failure (48 hours)  Code Status: No CPR- Do NOT Intubate        Disposition Plan     Medically Ready for Discharge: anticipate 1-2 days         Moy Robin DO  Hospitalist Service  [facility]  Securely message with [employer] (more info)  Text page via [employer] Paging/Directory   ______________________________________________________________________    Interval History   No overnight events, remains on 3L NC and less sob this morning. Diuresis has been slow but down 1.5L so far. Long discussion on goals of care, does not want valves replaced but is unsure of hospice at this time    Physical Exam   Vital Signs: Temp: 98.3  F (36.8  C) Temp src: Oral BP: 124/58 Pulse: 61   Resp: 18 SpO2: 97 % O2 Device: Nasal cannula Oxygen Delivery: 3 LPM  Weight: 132 lbs 9.6 oz    Constitutional: awake, alert, and cooperative  Eyes: pupils equal, round and reactive to light and conjunctiva normal  ENT: normocephalic, without obvious abnormality, atraumatic  Respiratory: bilateral ronchi worse at bases, no wheezing, on oxygen  Cardiovascular: regular rate and rhythm, murmur noted, minimal LE edema   GI: normal bowel sounds, soft, and non-distended  Skin: no bruising or bleeding  Neurologic: alert, interactive, no focal deficits      45  MINUTES SPENT BY ME on the date of service doing chart review, history, exam, documentation & further activities per the note.      Data   ------------------------- PAST 24 HR DATA REVIEWED -----------------------------------------------    I have personally reviewed the following data over the past 24 hrs:    N/A  \   N/A   / N/A     140 102 23.9 (H) /  84   3.6 28 0.80 \     INR:  2.14 (H) PTT:  N/A   D-dimer:  N/A Fibrinogen:  N/A       Imaging results reviewed over the past 24 hrs:   No results found for this or any previous visit (from the past 24 hour(s)).

## 2024-06-28 ENCOUNTER — APPOINTMENT (OUTPATIENT)
Dept: OCCUPATIONAL THERAPY | Facility: CLINIC | Age: 84
DRG: 291 | End: 2024-06-28
Payer: COMMERCIAL

## 2024-06-28 LAB
ANION GAP SERPL CALCULATED.3IONS-SCNC: 10 MMOL/L (ref 7–15)
BUN SERPL-MCNC: 24.6 MG/DL (ref 8–23)
CALCIUM SERPL-MCNC: 9.3 MG/DL (ref 8.8–10.2)
CHLORIDE SERPL-SCNC: 98 MMOL/L (ref 98–107)
CREAT SERPL-MCNC: 0.93 MG/DL (ref 0.51–0.95)
DEPRECATED HCO3 PLAS-SCNC: 32 MMOL/L (ref 22–29)
EGFRCR SERPLBLD CKD-EPI 2021: 61 ML/MIN/1.73M2
GLUCOSE SERPL-MCNC: 86 MG/DL (ref 70–99)
HOLD SPECIMEN: NORMAL
INR PPP: 1.97 (ref 0.85–1.15)
MAGNESIUM SERPL-MCNC: 2.1 MG/DL (ref 1.7–2.3)
POTASSIUM SERPL-SCNC: 4.3 MMOL/L (ref 3.4–5.3)
SODIUM SERPL-SCNC: 140 MMOL/L (ref 135–145)

## 2024-06-28 PROCEDURE — 250N000011 HC RX IP 250 OP 636: Performed by: INTERNAL MEDICINE

## 2024-06-28 PROCEDURE — 82374 ASSAY BLOOD CARBON DIOXIDE: CPT | Performed by: INTERNAL MEDICINE

## 2024-06-28 PROCEDURE — 97530 THERAPEUTIC ACTIVITIES: CPT | Mod: GO

## 2024-06-28 PROCEDURE — 250N000013 HC RX MED GY IP 250 OP 250 PS 637: Performed by: INTERNAL MEDICINE

## 2024-06-28 PROCEDURE — 97110 THERAPEUTIC EXERCISES: CPT | Mod: GO

## 2024-06-28 PROCEDURE — 85610 PROTHROMBIN TIME: CPT | Performed by: INTERNAL MEDICINE

## 2024-06-28 PROCEDURE — 99233 SBSQ HOSP IP/OBS HIGH 50: CPT | Performed by: HOSPITALIST

## 2024-06-28 PROCEDURE — 99232 SBSQ HOSP IP/OBS MODERATE 35: CPT | Performed by: INTERNAL MEDICINE

## 2024-06-28 PROCEDURE — 250N000013 HC RX MED GY IP 250 OP 250 PS 637: Performed by: HOSPITALIST

## 2024-06-28 PROCEDURE — 83735 ASSAY OF MAGNESIUM: CPT | Performed by: HOSPITALIST

## 2024-06-28 PROCEDURE — 36415 COLL VENOUS BLD VENIPUNCTURE: CPT | Performed by: INTERNAL MEDICINE

## 2024-06-28 PROCEDURE — 120N000001 HC R&B MED SURG/OB

## 2024-06-28 RX ORDER — TORSEMIDE 20 MG/1
40 TABLET ORAL DAILY
Status: DISCONTINUED | OUTPATIENT
Start: 2024-06-28 | End: 2024-06-29 | Stop reason: HOSPADM

## 2024-06-28 RX ORDER — WARFARIN SODIUM 4 MG/1
4 TABLET ORAL
Status: COMPLETED | OUTPATIENT
Start: 2024-06-28 | End: 2024-06-28

## 2024-06-28 RX ORDER — METOPROLOL SUCCINATE 100 MG/1
100 TABLET, EXTENDED RELEASE ORAL 2 TIMES DAILY
Status: DISCONTINUED | OUTPATIENT
Start: 2024-06-28 | End: 2024-06-29 | Stop reason: HOSPADM

## 2024-06-28 RX ADMIN — ALLOPURINOL 100 MG: 100 TABLET ORAL at 20:17

## 2024-06-28 RX ADMIN — TORSEMIDE 40 MG: 20 TABLET ORAL at 10:02

## 2024-06-28 RX ADMIN — FAMOTIDINE 20 MG: 20 TABLET ORAL at 10:03

## 2024-06-28 RX ADMIN — METOPROLOL SUCCINATE 100 MG: 100 TABLET, EXTENDED RELEASE ORAL at 20:17

## 2024-06-28 RX ADMIN — LEVOTHYROXINE SODIUM 112 MCG: 0.11 TABLET ORAL at 10:02

## 2024-06-28 RX ADMIN — METOPROLOL SUCCINATE 100 MG: 100 TABLET, EXTENDED RELEASE ORAL at 10:02

## 2024-06-28 RX ADMIN — Medication 950 MG: at 10:03

## 2024-06-28 RX ADMIN — FOLIC ACID 1 MG: 1 TABLET ORAL at 10:03

## 2024-06-28 RX ADMIN — FUROSEMIDE 40 MG: 10 INJECTION, SOLUTION INTRAMUSCULAR; INTRAVENOUS at 00:21

## 2024-06-28 RX ADMIN — WARFARIN SODIUM 4 MG: 4 TABLET ORAL at 18:29

## 2024-06-28 RX ADMIN — ALLOPURINOL 100 MG: 100 TABLET ORAL at 10:03

## 2024-06-28 RX ADMIN — AMLODIPINE BESYLATE 5 MG: 5 TABLET ORAL at 10:03

## 2024-06-28 ASSESSMENT — ACTIVITIES OF DAILY LIVING (ADL)
ADLS_ACUITY_SCORE: 31
ADLS_ACUITY_SCORE: 35
ADLS_ACUITY_SCORE: 31

## 2024-06-28 NOTE — PLAN OF CARE
"Pt A&O x4. Denies pain. Tele: AV paced w/ prolonged QT/QTc. On warfarin. K, mag re-check AM. Will continue w/ POC.     Problem: Adult Inpatient Plan of Care  Goal: Plan of Care Review  Description: The Plan of Care Review/Shift note should be completed every shift.  The Outcome Evaluation is a brief statement about your assessment that the patient is improving, declining, or no change.  This information will be displayed automatically on your shift  note.  Outcome: Progressing  Flowsheets (Taken 6/28/2024 0612)  Outcome Evaluation: Denies SOB. 3L NC. Tolertaing IV lasix well.  Plan of Care Reviewed With: patient  Overall Patient Progress: no change  Goal: Patient-Specific Goal (Individualized)  Description: You can add care plan individualizations to a care plan. Examples of Individualization might be:  \"Parent requests to be called daily at 9am for status\", \"I have a hard time hearing out of my right ear\", or \"Do not touch me to wake me up as it startles  me\".  Outcome: Progressing  Goal: Readiness for Transition of Care  Outcome: Progressing     Problem: Heart Failure  Goal: Optimal Coping  Outcome: Progressing  Goal: Optimal Cardiac Output  Outcome: Progressing  Goal: Stable Heart Rate and Rhythm  Outcome: Progressing  Goal: Optimal Functional Ability  Outcome: Progressing  Goal: Fluid and Electrolyte Balance  Outcome: Progressing  Goal: Improved Oral Intake  Outcome: Progressing  Goal: Effective Oxygenation and Ventilation  Outcome: Progressing  Intervention: Promote Airway Secretion Clearance  Recent Flowsheet Documentation  Taken 6/27/2024 2046 by Ada Melgar, RN  Cough And Deep Breathing: done independently per patient  Intervention: Optimize Oxygenation and Ventilation  Recent Flowsheet Documentation  Taken 6/27/2024 2046 by Ada Melgar, RN  Head of Bed (HOB) Positioning: HOB at 30-45 degrees  Goal: Effective Breathing Pattern During Sleep  Outcome: Progressing  Intervention: Monitor and Manage Obstructive " Sleep Apnea  Recent Flowsheet Documentation  Taken 6/27/2024 2046 by Ada Melgar RN  Medication Review/Management: medications reviewed     Problem: Comorbidity Management  Goal: Blood Pressure in Desired Range  Outcome: Progressing  Intervention: Maintain Blood Pressure Management  Recent Flowsheet Documentation  Taken 6/27/2024 2046 by Ada Melgar RN  Medication Review/Management: medications reviewed  Goal: Maintenance of Heart Failure Symptom Control  Outcome: Progressing  Intervention: Maintain Heart Failure Management  Recent Flowsheet Documentation  Taken 6/27/2024 2046 by Ada Melgar RN  Medication Review/Management: medications reviewed     Problem: Fall Injury Risk  Goal: Absence of Fall and Fall-Related Injury  Outcome: Progressing  Intervention: Identify and Manage Contributors  Recent Flowsheet Documentation  Taken 6/27/2024 2046 by Ada Melgar RN  Medication Review/Management: medications reviewed  Intervention: Promote Injury-Free Environment  Recent Flowsheet Documentation  Taken 6/27/2024 2046 by Ada Melgar RN  Safety Promotion/Fall Prevention:   safety round/check completed   clutter free environment maintained   nonskid shoes/slippers when out of bed   Goal Outcome Evaluation:      Plan of Care Reviewed With: patient    Overall Patient Progress: no changeOverall Patient Progress: no change    Outcome Evaluation: Denies SOB. On 3L NC. Tolertaing IV lasix well.

## 2024-06-28 NOTE — PROGRESS NOTES
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  Responded well to diuretic therapy.  Breathing has improved.  Down 10 pounds from admission.  2.5 L net out.  Probably at dry weight today as BUN is slightly higher than yesterday as is the creatinine.  Blood pressure is borderline which is undesirable in a patient with diastolic heart failure and severe aortic stenosis.              Medications:     Current Facility-Administered Medications   Medication Dose Route Frequency Provider Last Rate Last Admin    allopurinol (ZYLOPRIM) tablet 100 mg  100 mg Oral BID Nydia Carver MD   100 mg at 06/27/24 2041    amLODIPine (NORVASC) tablet 5 mg  5 mg Oral Daily Nydia Carver MD   5 mg at 06/27/24 0826    calcium citrate (CITRACAL) tablet 950 mg  950 mg Oral Daily Nydia Carver MD   950 mg at 06/27/24 0826    famotidine (PEPCID) tablet 20 mg  20 mg Oral Daily Nydia Carver MD   20 mg at 06/27/24 0826    folic acid (FOLVITE) tablet 1 mg  1 mg Oral Daily Nydia Carver MD   1 mg at 06/27/24 0826    levothyroxine (SYNTHROID/LEVOTHROID) tablet 112 mcg  112 mcg Oral Daily Nydia Carver MD   112 mcg at 06/27/24 0826    metoprolol succinate ER (TOPROL XL) 24 hr tablet 100 mg  100 mg Oral BID Momo Beatty MD        metoprolol tartrate (LOPRESSOR) tablet 75 mg  75 mg Oral BID Nydia Carver MD   75 mg at 06/27/24 2041    sodium chloride (PF) 0.9% PF flush 3 mL  3 mL Intracatheter Q8H Nydia Carver MD   3 mL at 06/28/24 0022    torsemide (DEMADEX) tablet 40 mg  40 mg Oral Daily Momo Beatty MD        Warfarin Dose Required Daily - Pharmacist Managed  1 each Oral See Admin Instructions Nydia Carver MD                   Physical Exam:   Blood pressure (!) 141/63, pulse 67,  temperature 97.8  F (36.6  C), temperature source Oral, resp. rate 18, weight 59.1 kg (130 lb 6.4 oz), SpO2 97%, not currently breastfeeding.  Rhythm: AV paced   Constitutional:   awake, alert, cooperative, no apparent distress, and appears stated age     Neck:   jugular venous distention present 3 cm above sternal notch and no carotid bruits     Lungs:   crackles right base     Cardiovascular:   regular rate and rhythm, normal S1 and loud S2, S4 present., and murmurs include systolic murmur III/VI located at right upper sternal border with radiation to the carotids            Data:        Lab Results   Component Value Date     06/28/2024     06/27/2024     06/26/2024     06/22/2021     03/08/2021     03/05/2021    Lab Results   Component Value Date    CHLORIDE 98 06/28/2024    CHLORIDE 102 06/27/2024    CHLORIDE 101 06/26/2024    CHLORIDE 107 08/17/2022    CHLORIDE 106 01/11/2022    CHLORIDE 105 07/19/2021    CHLORIDE 104 06/22/2021    CHLORIDE 107 03/08/2021    CHLORIDE 105 03/05/2021    Lab Results   Component Value Date    BUN 24.6 06/28/2024    BUN 23.9 06/27/2024    BUN 24.0 06/26/2024    BUN 21 08/17/2022    BUN 25 01/11/2022    BUN 29 07/19/2021    BUN 27 06/22/2021    BUN 23 03/08/2021    BUN 26 03/05/2021      Lab Results   Component Value Date    POTASSIUM 4.3 06/28/2024    POTASSIUM 3.6 06/27/2024    POTASSIUM 3.3 06/27/2024    POTASSIUM 3.7 08/17/2022    POTASSIUM 3.7 01/11/2022    POTASSIUM 3.7 07/19/2021    POTASSIUM 3.8 06/22/2021    POTASSIUM 4.0 03/08/2021    POTASSIUM 4.3 03/05/2021    Lab Results   Component Value Date    CO2 32 06/28/2024    CO2 28 06/27/2024    CO2 28 06/26/2024    CO2 26 08/17/2022    CO2 29 01/11/2022    CO2 31 07/19/2021    CO2 33 06/22/2021    CO2 30 03/08/2021    CO2 29 03/05/2021    Lab Results   Component Value Date    CR 0.93 06/28/2024    CR 0.80 06/27/2024    CR 0.86 06/26/2024    CR 1.11 06/22/2021    CR 0.90 03/08/2021    CR 1.27  03/05/2021               I have reviewed recent imaging studies.         Assessment and Plan:   Assessment:   Problem List  1.  Acute on chronic diastolic congestive heart failure secondary to valve abnormalities.  Has responded well to diuresis and symptomatically much better.  2.  Severe prosthetic mitral valve stenosis.  3.  Severe native aortic valve stenosis.  4.  Borderline hypertension      * No resolved hospital problems. *       Plan:   Switch IV Lasix to torsemide 40 mg/day orally.  Was on 20 mg/day when she was admitted.  Switch metoprolol to tartrate to metoprolol succinate 100 mg twice a day.  Check BMP tomorrow.  Suspect that she may be able to go home tomorrow morning if all is going well with the medication changes.         Attestation:  I have reviewed today's vital signs, notes, medications, labs and imaging.  Care coordination / counseling time: 25 minutes  Total time: 40 minutes         Momo Beatty MD, MD 6/28/2024  9:38 AM

## 2024-06-28 NOTE — UTILIZATION REVIEW
"  Admission Status; Secondary Review Determination         Under the authority of the Utilization Management Committee, the utilization review process indicated a secondary review on the above patient.  The review outcome is based on review of the medical records, discussions with staff, and applying clinical experience noted on the date of the review.        (xxx)      Inpatient Status Appropriate - This patient's medical care is consistent with medical management for inpatient care and reasonable inpatient medical practice.      () Observation Status Appropriate - This patient does not meet hospital inpatient criteria and is placed in observation status. If this patient's primary payer is Medicare and was admitted as an inpatient, Condition Code 44 should be used and patient status changed to \"observation\".   () Admission Status NOT Appropriate - This patient's medical care is not consistent with medical management for Inpatient or Observation Status.          RATIONALE FOR DETERMINATION     Insurance Denial    Zunilda Clark is an 83 year old female w rheumatoid arthritis, HTN, DVT, hypothyroidism, pulmonary htn with multivalvular, severe heart disease who was admitted on 6/24/2024 with SOB.  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  Cardiology has had ongoing discussion with her and she has opted for conservative management with symptom management.  She has established with palliative care.      In ER, CT PE showed pulmonary edema with evidence of right heart dysfunction. BNP elevated at 5k. Troponins mildly elevated but flat on recheck.  She was admitted for close clinical monitoring, IV diuretic (bid ->tid), supplemental oxygen, cardiology consultation, and further evaluation.  She has a baseline oxygen requirement of 2 LPM " however, here she has required 3 LPM to maintain sats.  She has required a 4 night hospitalization thus far.  IP status is appropriate.      The severity of illness, intensity of service provided, expected LOS and risk for adverse outcome make the care complex, high risk and appropriate for hospital admission.        The information on this document is developed by the utilization review team in order for the business office to ensure compliance.  This only denotes the appropriateness of proper admission status and does not reflect the quality of care rendered.         The definitions of Inpatient Status and Observation Status used in making the determination above are those provided in the CMS Coverage Manual, Chapter 1 and Chapter 6, section 70.4.      Sincerely,     Christie Daniel MD  Physician Advisor   Utilization Review/ Case Management  [facility].

## 2024-06-28 NOTE — PROGRESS NOTES
[facility]    Medicine Progress Note - Hospitalist Service    Date of Admission:  6/24/2024    Assessment & Plan   Zunilda Clark is a 83 year old female w rheumatoid arthritis, HTN, DVT, hypothyroidism, pulmonary htn with multivalvular, severe heart disease who presents with SOB.     Pt follow longitudinally with Dr. Rodriguez from cards team.  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  Cardiology has had ongoing discussion with her and she has opted for conservative management with symptom management.  She has established with palliative care as well.     Acute on chronic hypoxic Respiratory Failure   severe mitral and aortic stenosis w/hx valve repair  Acute on chronic HFpEF  Severe Pulmonary Hypertension  -Pt presented with increased SOB, LE edema over last week.  She is in palliative care and has declined any sort of intervention for severe valvular disease and is established with palliative care.  -In ER, pt afebrile and HDS but needing 2L to maintain sats.  CT PE showed pulmonary edema with evidence of right heart dysfunction.  BNP elevated at 5k.  Troponins mildly elevated but flat on recheck.   -Cards consulted.  Appreciate recs.  -Increased IV diuresis to 40 mg TID on 6/26.    -TTE shows known severe stenosis of mitral valve, aortic valve with severe pulmonary HTN.  Moderate tricuspid regurg also noted.  No significant change compared to prior stud.    -seen by palliative care, not ready for hospice yet but will cont to follow as outpt  -2L fluid restriction, salt restrict. Currently on 3L NC which is baseline  -down 2.5L of fluid, cardiology adjusting diuretics back to torsemide today  -plan to discharge back to independent living tomorrow     History of afib s/p ppm  Coagulopathy secondary to  warfarin  Subtherapeutic on warfarin therapy:   -EKG shows AV paced rhythm.  No chest pain.    -Her INRs have been subtherapeutic seemingly for months.    -We will continue warfarin, pharmacy to dose.       HTN: Metoprolol, amlodipine and lasix as above  RA: On methotrexate weekly at baseline. Continue daily folic acid.  History of CVA: Warfarin as above. BP meds as above.   Hypothyroidism: Continue home LT4        Diet: Fluid restriction 2000 ML FLUID (and additional linked orders)  Combination Diet Low Saturated Fat Na <2400mg Diet    DVT Prophylaxis: Warfarin  Leonard Catheter: Not present  Lines: None     Cardiac Monitoring: ACTIVE order. Indication: Acute decompensated heart failure (48 hours)  Code Status: No CPR- Do NOT Intubate        Disposition Plan     Medically Ready for Discharge: Anticipated Tomorrow         Moy Robin DO  Hospitalist Service  [facility]  Securely message with [employer] (more info)  Text page via [employer] Paging/Directory   ______________________________________________________________________    Interval History   No overnight evemts, breathing better and on baseline oxygen. Diuretics adjusted by cardiology. Plan to go back to her independent living tomorrow    Physical Exam   Vital Signs: Temp: 97.8  F (36.6  C) Temp src: Oral BP: 125/61 Pulse: 63   Resp: 18 SpO2: 97 % O2 Device: Nasal cannula Oxygen Delivery: 3 LPM  Weight: 130 lbs 6.4 oz  Constitutional: awake, alert, and cooperative  Eyes: pupils equal, round and reactive to light and conjunctiva normal  ENT: normocephalic, without obvious abnormality, atraumatic  Respiratory: bilateral ronchi worse at bases, no wheezing, on oxygen  Cardiovascular: regular rate and rhythm, murmur noted, minimal LE edema   GI: normal bowel sounds, soft, and non-distended  Skin: no bruising or bleeding  Neurologic: alert, interactive, no focal deficits    45 MINUTES SPENT BY ME on the date of service doing chart review, history,  exam, documentation & further activities per the note.      Data   ------------------------- PAST 24 HR DATA REVIEWED -----------------------------------------------    I have personally reviewed the following data over the past 24 hrs:    N/A  \   N/A   / N/A     140 98 24.6 (H) /  86   4.3 32 (H) 0.93 \     INR:  1.97 (H) PTT:  N/A   D-dimer:  N/A Fibrinogen:  N/A       Imaging results reviewed over the past 24 hrs:   No results found for this or any previous visit (from the past 24 hour(s)).

## 2024-06-28 NOTE — PLAN OF CARE
"  Pt A&Ox4. A1 GB/W. Denies pain. Lung sounds crackles and dim. Denies MORRISON/SOB. On 3L NC - baseline for patient. On tele - 100% AV paced. On warfarin - INR 1.97. 4 mg coumadin tonight. bleeding and fall precautions maintained. Trace Bilat LE edema. IV lasix discontinued, oral torsemide started. Metoprolol dose increased 100mg BID. K and Mag protocols - recheck in AM. FR 2000ml.  Low fat, low NA diet. Plan is for patient to discharge back to assisted living when medically ready. Cardiology and OT following. Possible discharge home tomorrow.           Goal Outcome Evaluation:      Plan of Care Reviewed With: patient, family    Overall Patient Progress: improvingOverall Patient Progress: improving    Outcome Evaluation: denies SOB/MORRISON. 3L NC. switched oral torsemide.      Problem: Adult Inpatient Plan of Care  Goal: Plan of Care Review  Description: The Plan of Care Review/Shift note should be completed every shift.  The Outcome Evaluation is a brief statement about your assessment that the patient is improving, declining, or no change.  This information will be displayed automatically on your shift  note.  Outcome: Progressing  Flowsheets (Taken 6/28/2024 1031)  Outcome Evaluation: denies SOB/MORRISON. 3L NC. switched oral torsemide.  Plan of Care Reviewed With:   patient   family  Overall Patient Progress: improving  Goal: Patient-Specific Goal (Individualized)  Description: You can add care plan individualizations to a care plan. Examples of Individualization might be:  \"Parent requests to be called daily at 9am for status\", \"I have a hard time hearing out of my right ear\", or \"Do not touch me to wake me up as it startles  me\".  Outcome: Progressing  Goal: Absence of Hospital-Acquired Illness or Injury  Intervention: Identify and Manage Fall Risk  Recent Flowsheet Documentation  Taken 6/28/2024 1006 by Candace Allen, RN  Safety Promotion/Fall Prevention: safety round/check completed  Taken 6/28/2024 1001 by Alejandro, " MELINA Maria  Safety Promotion/Fall Prevention: safety round/check completed  Taken 6/28/2024 0742 by Candace Allen RN  Safety Promotion/Fall Prevention: safety round/check completed  Intervention: Prevent Skin Injury  Recent Flowsheet Documentation  Taken 6/28/2024 1006 by Candace Allen RN  Body Position: weight shifting  Taken 6/28/2024 1000 by Candace Allen RN  Body Position: weight shifting  Intervention: Prevent and Manage VTE (Venous Thromboembolism) Risk  Recent Flowsheet Documentation  Taken 6/28/2024 1006 by Candace Allen RN  VTE Prevention/Management: SCDs off (sequential compression devices)  Goal: Readiness for Transition of Care  Outcome: Progressing

## 2024-06-29 ENCOUNTER — APPOINTMENT (OUTPATIENT)
Dept: OCCUPATIONAL THERAPY | Facility: CLINIC | Age: 84
DRG: 291 | End: 2024-06-29
Payer: COMMERCIAL

## 2024-06-29 VITALS
TEMPERATURE: 98 F | RESPIRATION RATE: 18 BRPM | BODY MASS INDEX: 22.4 KG/M2 | DIASTOLIC BLOOD PRESSURE: 50 MMHG | HEART RATE: 64 BPM | SYSTOLIC BLOOD PRESSURE: 117 MMHG | WEIGHT: 130.5 LBS | OXYGEN SATURATION: 98 %

## 2024-06-29 LAB
ANION GAP SERPL CALCULATED.3IONS-SCNC: 9 MMOL/L (ref 7–15)
BUN SERPL-MCNC: 28.4 MG/DL (ref 8–23)
CALCIUM SERPL-MCNC: 9.6 MG/DL (ref 8.8–10.2)
CHLORIDE SERPL-SCNC: 100 MMOL/L (ref 98–107)
CREAT SERPL-MCNC: 0.97 MG/DL (ref 0.51–0.95)
DEPRECATED HCO3 PLAS-SCNC: 31 MMOL/L (ref 22–29)
EGFRCR SERPLBLD CKD-EPI 2021: 58 ML/MIN/1.73M2
GLUCOSE SERPL-MCNC: 87 MG/DL (ref 70–99)
INR PPP: 2.22 (ref 0.85–1.15)
MAGNESIUM SERPL-MCNC: 2 MG/DL (ref 1.7–2.3)
POTASSIUM SERPL-SCNC: 3.8 MMOL/L (ref 3.4–5.3)
SODIUM SERPL-SCNC: 140 MMOL/L (ref 135–145)

## 2024-06-29 PROCEDURE — 83735 ASSAY OF MAGNESIUM: CPT | Performed by: HOSPITALIST

## 2024-06-29 PROCEDURE — 250N000013 HC RX MED GY IP 250 OP 250 PS 637: Performed by: INTERNAL MEDICINE

## 2024-06-29 PROCEDURE — 99239 HOSP IP/OBS DSCHRG MGMT >30: CPT | Performed by: HOSPITALIST

## 2024-06-29 PROCEDURE — 36415 COLL VENOUS BLD VENIPUNCTURE: CPT | Performed by: INTERNAL MEDICINE

## 2024-06-29 PROCEDURE — 97535 SELF CARE MNGMENT TRAINING: CPT | Mod: GO | Performed by: OCCUPATIONAL THERAPIST

## 2024-06-29 PROCEDURE — 85610 PROTHROMBIN TIME: CPT | Performed by: INTERNAL MEDICINE

## 2024-06-29 PROCEDURE — 80048 BASIC METABOLIC PNL TOTAL CA: CPT | Performed by: INTERNAL MEDICINE

## 2024-06-29 RX ADMIN — FOLIC ACID 1 MG: 1 TABLET ORAL at 08:36

## 2024-06-29 RX ADMIN — FAMOTIDINE 20 MG: 20 TABLET ORAL at 08:35

## 2024-06-29 RX ADMIN — Medication 950 MG: at 08:35

## 2024-06-29 RX ADMIN — TORSEMIDE 40 MG: 20 TABLET ORAL at 08:36

## 2024-06-29 RX ADMIN — AMLODIPINE BESYLATE 5 MG: 5 TABLET ORAL at 08:35

## 2024-06-29 RX ADMIN — LEVOTHYROXINE SODIUM 112 MCG: 0.11 TABLET ORAL at 08:36

## 2024-06-29 RX ADMIN — ALLOPURINOL 100 MG: 100 TABLET ORAL at 08:35

## 2024-06-29 RX ADMIN — METOPROLOL SUCCINATE 100 MG: 100 TABLET, EXTENDED RELEASE ORAL at 08:36

## 2024-06-29 ASSESSMENT — ACTIVITIES OF DAILY LIVING (ADL)
ADLS_ACUITY_SCORE: 31

## 2024-06-29 NOTE — PROGRESS NOTES
Pt A&Ox4. A1 GB/W. Denies pain. Lung sounds crackles and dim. Denies MORRISON/SOB. On 3L NC - baseline for patient. On tele - 100% AV paced. On warfarin - INR 2.22. bleeding and fall precautions maintained. Trace Bilat LE edema. On oral torsemide. Metoprolol BID. 2000ml.  Low fat, low NA diet. Cardiology and OT following. Pt discharged home today. Daughter providing transport. Discharge instructions and questions answered. Patient discharged with belongings, discharge medication and instructions.

## 2024-06-29 NOTE — PHARMACY-ANTICOAGULATION SERVICE
Clinical Pharmacy- Warfarin Discharge Note  This patient is currently on warfarin for the treatment of  Atrial Fibrillation;Bioprosthetic Heart Valve .  INR Goal= 2-3.    Warfarin PTA Regimen: 2.5 mg daily per patient    Anticoagulation Dose History  More data exists         Latest Ref Rng & Units 6/12/2024 6/24/2024 6/25/2024 6/26/2024 6/27/2024 6/28/2024 6/29/2024   Recent Dosing and Labs   warfarin ANTICOAGULANT (COUMADIN) 2.5 MG tablet - - - 2.5 mg, $Given 2.5 mg, $Given 2.5 mg, $Given - -   warfarin ANTICOAGULANT (COUMADIN) 4 MG tablet - - - 4 mg, $Given - - 4 mg, $Given -   INR 0.85 - 1.15 2.2  1.68  1.67  1.72  1.98  2.14  1.97  2.22       Details          Multiple values from one day are sorted in reverse-chronological order               Vitamin K doses administered during the last 7 days: None   FFP administered during the last 7 days: None     Agree with discharging the patient on PTA warfarin regimen of 2.5 mg daily.    The patient should have an INR checked  within 1 week or as directed by [facility] .    Nicole Jain RPH

## 2024-06-29 NOTE — PLAN OF CARE
"VSS, sussy lee, SB/A1 walker gaitbelt, 2000ml flid restriction (consumed 1000/2000ml).  AO x4, 3L NC, tele 100% AV paced, continent of bowel and bladder. K and Mg AM draws, INR 1.97 received 4mg coumadin per Rx dosing, cardiology and OT worked with pt today.   Plan to discharge back to assisted living with palliative/hospice tomorrow.      Goal Outcome Evaluation:    Plan of Care Reviewed With: patient, child  Overall Patient Progress: improvingOverall Patient Progress: improving  Outcome Evaluation: 3L NC per baseline, denies SOB/MORRISON, eager for discharge    Problem: Adult Inpatient Plan of Care  Goal: Plan of Care Review  Description: The Plan of Care Review/Shift note should be completed every shift.  The Outcome Evaluation is a brief statement about your assessment that the patient is improving, declining, or no change.  This information will be displayed automatically on your shift  note.  Outcome: Progressing  Flowsheets (Taken 6/28/2024 2051)  Outcome Evaluation: 3L NC per baseline, denies SOB/MORRISON, eager for discharge  Plan of Care Reviewed With:   patient   child  Overall Patient Progress: improving  Goal: Patient-Specific Goal (Individualized)  Description: You can add care plan individualizations to a care plan. Examples of Individualization might be:  \"Parent requests to be called daily at 9am for status\", \"I have a hard time hearing out of my right ear\", or \"Do not touch me to wake me up as it startles  me\".  Outcome: Progressing  Goal: Readiness for Transition of Care  Outcome: Progressing     Problem: Heart Failure  Goal: Optimal Coping  Outcome: Progressing  Goal: Optimal Cardiac Output  Outcome: Progressing  Goal: Stable Heart Rate and Rhythm  Outcome: Progressing  Goal: Optimal Functional Ability  Outcome: Progressing  Goal: Fluid and Electrolyte Balance  Outcome: Progressing  Goal: Improved Oral Intake  Outcome: Progressing  Goal: Effective Oxygenation and Ventilation  Outcome: Progressing  Goal: " Effective Breathing Pattern During Sleep  Outcome: Progressing     Problem: Comorbidity Management  Goal: Blood Pressure in Desired Range  Outcome: Progressing  Goal: Maintenance of Heart Failure Symptom Control  Outcome: Progressing     Problem: Fall Injury Risk  Goal: Absence of Fall and Fall-Related Injury  Outcome: Progressing  Intervention: Promote Injury-Free Environment  Recent Flowsheet Documentation  Taken 6/28/2024 1604 by Gia Gudino, RN  Safety Promotion/Fall Prevention:   safety round/check completed   supervised activity

## 2024-06-29 NOTE — DISCHARGE SUMMARY
[facility] [place]  Hospitalist Discharge Summary      Date of Admission:  6/24/2024  Date of Discharge:  6/29/2024 11:56 AM  Discharging Provider: Moy Robin DO  Discharge Service: Hospitalist Service    Discharge Diagnoses   Acute on chronic hypoxic Respiratory Failure   severe mitral and aortic stenosis w/hx valve repair  Acute on chronic HFpEF  Severe Pulmonary Hypertension  History of afib s/p ppm  Coagulopathy secondary to warfarin  Subtherapeutic on warfarin therapy  HX HTN, RA on methotrexate, CVA, hypothyroidism      Follow-ups Needed After Discharge   Follow-up Appointments     Follow-up and recommended labs and tests       Follow up with primary care provider, Yunior Huang, within 7 days   for hospital follow- up.  No follow up labs or test are needed.    Follow up with cardiology team in 2-4 weeks or as directed          Discharge Disposition   Discharge back to independent living with [facility]  Condition at discharge: Stable    Hospital Course   Zunilda Clark is a 83 year old female w rheumatoid arthritis, HTN, DVT, hypothyroidism, pulmonary htn with multivalvular, severe heart disease who presents with SOB.     Pt follow longitudinally with Dr. Rodriguez from cards team.  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  Cardiology has had ongoing discussion with her and she has opted for conservative management with symptom management.  She has established with palliative care as well.     Acute on chronic hypoxic Respiratory Failure   severe mitral and aortic stenosis w/hx valve repair  Acute on chronic HFpEF  Severe Pulmonary Hypertension  -Pt presented with increased SOB, LE edema over last week.  She is in palliative care and has declined any sort of intervention for severe valvular disease and is established  with palliative care.  -In ER, pt afebrile and HDS but needing 2L to maintain sats.  CT PE showed pulmonary edema with evidence of right heart dysfunction.  BNP elevated at 5k.  Troponins mildly elevated but flat on recheck.   -Cards consulted.  Appreciate recs.  -Increased IV diuresis to 40 mg TID on 6/26.    -TTE shows known severe stenosis of mitral valve, aortic valve with severe pulmonary HTN.  Moderate tricuspid regurg also noted.  No significant change compared to prior stud.    -seen by palliative care, not ready for hospice yet but palliative will cont to follow as outpt  -2L fluid restriction, 2g salt restrict  -currently on 3L NC which is baseline and symptomatically improved  -down 2.5L of fluid this admission, cardiology increased torsemide from 20 to 40mg daily and will cont on discharge  -discharge back to independent living w/[facility]     History of afib s/p ppm  Coagulopathy secondary to warfarin  Subtherapeutic on warfarin therapy:   -EKG shows AV paced rhythm.  No chest pain.    -Her INRs have been subtherapeutic seemingly for months.    -cont coumadin     HTN: Metoprolol, amlodipine and lasix as above  RA: On methotrexate weekly at baseline. Continue daily folic acid.  History of CVA: Warfarin as above. BP meds as above.   Hypothyroidism: Continue home LT4    Consultations This Hospital Stay   PHARMACY TO DOSE WARFARIN  OCCUPATIONAL THERAPY ADULT IP CONSULT  CARDIOLOGY IP CONSULT  CARE MANAGEMENT / SOCIAL WORK IP CONSULT  SPIRITUAL HEALTH SERVICES IP CONSULT  PALLIATIVE CARE ADULT IP CONSULT    Code Status   No CPR- Do NOT Intubate    Time Spent on this Encounter   I, Moy Robin DO, personally saw the patient today and spent greater than 30 minutes discharging this patient.       Moy Robin DO  [place] [id number] [facility]  [address] [place] [address]  Phone: [phone]  Fax:  [phone]  ______________________________________________________________________    Physical Exam   Vital Signs: Temp: 98  F (36.7  C) Temp src: Oral BP: 117/50 Pulse: 64   Resp: 18 SpO2: 98 % O2 Device: None (Room air) Oxygen Delivery: 3 LPM  Weight: 130 lbs 8 oz  Face to face completed day of discharge       Primary Care Physician   Yunior Huang    Discharge Orders      Medication Therapy Management Referral      Primary Care - Care Coordination Referral      Home Care Referral      Reason for your hospital stay    Admitted for CHF exacerbation, increased home torsemide to 40mg daily. Cont follow up with cardiology and palliative care as outpt     Follow-up and recommended labs and tests     Follow up with primary care provider, Yunior Huang, within 7 days for hospital follow- up.  No follow up labs or test are needed.    Follow up with cardiology team in 2-4 weeks or as directed     Activity    Your activity upon discharge: activity as tolerated     Oxygen Adult/Peds    Oxygen Documentation  I certify that this patient, Zunilda Clark has been under my care (or a nurse practitioner or physican's assistant working with me). This is the face-to-face encounter for oxygen medical necessity.      At the time of this encounter, I have reviewed the qualifying testing and have determined that supplemental oxygen is reasonable and necessary and is expected to improve the patient's condition in a home setting.         Patient has continued oxygen desaturation due to Chronic Heart Failure I50.    If portability is ordered, is the patient mobile within the home? yes    Was this visit performed as a telehealth visit: No     Diet    Follow this diet upon discharge: Orders Placed This Encounter      Fluid restriction 2000 ML FLUID      Combination Diet Low Saturated Fat Na <2400mg Diet       Significant Results and Procedures   Most Recent 3 CBC's:  Recent Labs   Lab Test 06/25/24  0541 06/24/24  4745  06/12/24  1103 03/13/24  1417   WBC 5.9 6.4  --  7.4   HGB 9.8* 9.8* 10.9* 11.4*   * 109*  --  103*    256  --  368     Most Recent 3 BMP's:  Recent Labs   Lab Test 06/29/24  0645 06/28/24  0808 06/27/24  1222 06/27/24  0542    140  --  140   POTASSIUM 3.8 4.3 3.6 3.3*   CHLORIDE 100 98  --  102   CO2 31* 32*  --  28   BUN 28.4* 24.6*  --  23.9*   CR 0.97* 0.93  --  0.80   ANIONGAP 9 10  --  10   BRICE 9.6 9.3  --  8.8   GLC 87 86  --  84     Most Recent 2 LFT's:  Recent Labs   Lab Test 03/27/24  1257 02/23/24  0648   AST 49* 37   ALT 25 19   ALKPHOS 120 128   BILITOTAL 0.7 0.8     Most Recent 3 INR's:  Recent Labs   Lab Test 06/29/24  0645 06/28/24  0808 06/27/24  0542   INR 2.22* 1.97* 2.14*     Most Recent 3 Hemoglobins:  Recent Labs   Lab Test 06/25/24  0541 06/24/24  2134 06/12/24  1103   HGB 9.8* 9.8* 10.9*     Most Recent 3 Troponin's:  Recent Labs   Lab Test 05/03/17  1230   TROPI <0.015  The 99th percentile for upper reference range is 0.045 ug/L.  Troponin values in   the range of 0.045 - 0.120 ug/L may be associated with risks of adverse   clinical events.       Most Recent 3 BNP's:  Recent Labs   Lab Test 06/24/24  2134 03/27/24  1257 03/18/24  1204 02/22/24  1026 09/26/23  0934 03/13/23  1110   NTBNPI 5,079*  --   --  4,362* 1,711  --    NTBNP  --  4,055* 4,811*  --   --  1,680     Most Recent D-dimer:  Recent Labs   Lab Test 02/22/24  1026   DD 1.48*   ,   Results for orders placed or performed during the hospital encounter of 06/24/24   XR Chest 2 Views    Narrative    EXAM: XR CHEST 2 VIEWS  LOCATION: [facility]  DATE: 6/24/2024    INDICATION: SOB  COMPARISON: None.      Impression    IMPRESSION: Patchy opacities within the right middle and lower lung zones and the left lower lung zone with differential diagnosis includes infectious or inflammatory process. Median sternotomy wires are stable in appearance. Stable cardiac size. Small   left pleural effusion. No  pneumothorax.   CT Chest Pulmonary Embolism w Contrast    Narrative    EXAM: CT CHEST PULMONARY EMBOLISM W CONTRAST  LOCATION: [facility]  DATE: 6/24/2024    INDICATION: dyspnea hx of multiple valve surgeries and DVT PE in the past sub therapeutic INR  COMPARISON: Chest radiographs today. CTA chest 2/22/2024.  TECHNIQUE: CT chest pulmonary angiogram during arterial phase injection of IV contrast. Multiplanar reformats and MIP reconstructions were performed. Dose reduction techniques were used.   CONTRAST: 62mL Isovue 370    FINDINGS:  ANGIOGRAM CHEST: No pulmonary embolism. No aortic aneurysm. Contrast timing precludes assessment for aortic dissection. Reflux of contrast into the inferior vena cava is evidence for right heart dysfunction.    LUNGS AND PLEURA: Moderate bilateral pleural effusions. Hazy groundglass alveolar opacity and interstitial thickening largely symmetrically distributed throughout the aerated portions of both lungs. Dependent and basilar atelectasis in both lungs.   Additional streaky atelectasis in both upper lobes. Multiple tiny benign calcified granulomata in the right lower lobe.    MEDIASTINUM/AXILLAE: Small calcified right hilar lymph nodes related to benign granulomatous disease. Left subclavian pacemaker/defibrillator with leads at the right atrium and right ventricle. Poststernotomy and cardiac valve repair. Cardiomegaly with   biatrial enlargement.    CORONARY ARTERY CALCIFICATION: Moderate.    UPPER ABDOMEN: Multiple tiny benign calcified granulomata in the liver and spleen.    MUSCULOSKELETAL: Degenerative changes of the spine. Right humeral intramedullary lizz incompletely imaged. Chronic severe compression deformity of T12 vertebral body with mild retropulsion of bone causing mild osseous canal stenosis similar to previous.   Mild chronic compression deformity of T5.      Impression    IMPRESSION:  1.  No pulmonary embolus.  2.  Pulmonary edema with moderate  bilateral pleural effusions.  3.  Reflux of intravenous contrast into the inferior vena cava consistent with right heart dysfunction.  4.  Poststernotomy and cardiac valve repair.  5.  Cardiomegaly with biatrial enlargement.  6.  Coronary artery calcification.   Echocardiogram Complete     Value    LVEF  55-60%    [facility]    [id number]  [id number]  [id number]  [id number]^BEVERLEY^NICOLASA^PAULIE     [facility]  Echocardiography Laboratory  [address] [place] [address]     Name: NADIYA LEWIS  MRN: [id number]  : 1940  Study Date: 2024 08:43 AM  Age: 83 yrs  Gender: Female  Patient Location: [facility]  Reason For Study: Heart Failure  Ordering Physician: NICOLASA LOWERY  Referring Physician: Yunior Huang  Performed By: Ellie Colbert RDCS     BSA: 1.7 m2  Height: 64 in  Weight: 138 lb  HR: 66  BP: 146/67 mmHg  ______________________________________________________________________________  Procedure  Complete Portable Echo Adult.  ______________________________________________________________________________  Interpretation Summary     There is a 27 mm Magna bioprosthetic valve in the mitral position.  The mean mitral valve gradient is 15mmHg consistent with severe stenosis.  Severe valvular aortic stenosis.  There is mild (1+) aortic regurgitation.  There is moderate (2+) tricuspid regurgitation.  Severe (>55mmHg) pulmonary hypertension is present.  There is a catheter/pacemaker lead seen in the right ventricle.  The right ventricle is moderate to severely dilated.  The right ventricular systolic function is mildly reduced.  There is mod-severe biatrial enlargement.  The visual ejection fraction is 55-60%.  Compared to prior study, there is no significant change.  ______________________________________________________________________________  Left Ventricle  The left ventricular cavity is small. There is moderate concentric left  ventricular hypertrophy. The visual ejection fraction  is 55-60%.     Right Ventricle  There is a catheter/pacemaker lead seen in the right ventricle. The right  ventricle is moderate to severely dilated. The right ventricular systolic  function is mildly reduced.     Atria  There is mod-severe biatrial enlargement.     Mitral Valve  The mean mitral valve gradient is 15mmHg. There is a bioprosthetic mitral  valve.     Tricuspid Valve  Severe (>55mmHg) pulmonary hypertension is present. There is moderate (2+)  tricuspid regurgitation.     Aortic Valve  There is mild (1+) aortic regurgitation. Severe valvular aortic stenosis.     Pulmonic Valve  Normal pulmonic valve. There is trace pulmonic valvular regurgitation.     Vessels  The aortic root is normal size. Normal size ascending aorta. Dilation of the  inferior vena cava is present with abnormal respiratory variation in diameter.     Pericardium  There is no pericardial effusion.     Rhythm  The rhythm was paced.  ______________________________________________________________________________  MMode/2D Measurements & Calculations  IVSd: 1.3 cm     LVIDd: 3.6 cm  LVIDs: 2.3 cm  LVPWd: 1.5 cm  IVC diam: 2.8 cm  FS: 35.9 %  LV mass(C)d: 182.6 grams  LV mass(C)dI: 109.3 grams/m2  Ao root diam: 3.6 cm  asc Aorta Diam: 3.5 cm  LVOT diam: 2.0 cm  LVOT area: 3.1 cm2  Ao root diam index Ht(cm/m): 2.2  Ao root diam index BSA (cm/m2): 2.1  Asc Ao diam index BSA (cm/m2): 2.1  Asc Ao diam index Ht(cm/m): 2.1  LA Volume (BP): 76.6 ml     LA Volume Index (BP): 45.9 ml/m2  RV Base: 5.5 cm  RWT: 0.84  TAPSE: 1.7 cm     Doppler Measurements & Calculations  MV E max sandra: 272.1 cm/sec  MV max P.2 mmHg  MV mean PG: 15.2 mmHg  MV V2 VTI: 96.5 cm  MVA(VTI): 0.69 cm2  MV P1/2t max sandra: 296.6 cm/sec  MV P1/2t: 162.9 msec  MVA(P1/2t): 1.4 cm2  MV dec slope: 533.1 cm/sec2  MV dec time: 0.50 sec  Ao V2 max: 382.0 cm/sec  Ao max P.0 mmHg  Ao V2 mean: 269.0 cm/sec  Ao mean P.0 mmHg  Ao V2 VTI: 99.2 cm  SILVESTRE(I,D): 0.67 cm2  SILVESTRE(V,D):  0.75 cm2  AI P1/2t: 491.2 msec  LV V1 max PG: 3.2 mmHg  LV V1 max: 92.0 cm/sec  LV V1 VTI: 21.3 cm  SV(LVOT): 66.7 ml  SI(LVOT): 39.9 ml/m2  TV V2 max: 120.3 cm/sec  TV max P.0 mmHg  TV mean PG: 3.1 mmHg  TV V2 VTI: 32.7 cm  PA acc time: 0.08 sec     TR max moises: 441.1 cm/sec  TR max P.8 mmHg  AV Moises Ratio (DI): 0.24  SILVESTRE Index (cm2/m2): 0.40  RV S Moises: 8.2 cm/sec     ______________________________________________________________________________  Report approved by: Bishnu Matt 2024 11:06 AM           *Note: Due to a large number of results and/or encounters for the requested time period, some results have not been displayed. A complete set of results can be found in Results Review.       Discharge Medications   Discharge Medication List as of 2024 11:21 AM        CONTINUE these medications which have CHANGED    Details   Torsemide 40 MG TABS Take 40 mg by mouth daily, Disp-60 tablet, R-0, E-Prescribe           CONTINUE these medications which have NOT CHANGED    Details   acetaminophen (TYLENOL) 500 MG tablet Take 1,000 mg by mouth every 8 hours as needed for pain, Historical      allopurinol (ZYLOPRIM) 100 MG tablet Take 1 tablet (100 mg) by mouth 2 times daily, Disp-180 tablet, R-3, E-Prescribe      amLODIPine (NORVASC) 5 MG tablet Take 1 tablet (5 mg) by mouth daily, Disp-90 tablet, R-3, E-Prescribe      amoxicillin (AMOXIL) 500 MG tablet Take 2000mg (4 tablets of 500mg each) approx 30 min to 1 hour prior to any dental procedures, Disp-4 tablet, R-3, E-Prescribe      calcium citrate (CALCITRATE) 950 MG tablet Take 1 tablet by mouth daily , Historical      famotidine (PEPCID) 40 MG tablet Take 1 tablet (40 mg) by mouth daily, Disp-90 tablet, R-3, E-Prescribe      folic acid (FOLVITE) 1 MG tablet Take 1 mg by mouth daily, Historical      latanoprost (XALATAN) 0.005 % ophthalmic solution Place 1 drop into both eyes every evening, Historical      levothyroxine (SYNTHROID/LEVOTHROID) 112 MCG  tablet Take 1 tablet (112 mcg) by mouth daily, Disp-100 tablet, R-3, E-Prescribe      methotrexate 50 MG/2ML injection Inject 0.8 mLs Subcutaneous every 7 days (Fridays), Historical      Metoprolol Tartrate 75 MG TABS TAKE 1 TABLET BY MOUTH TWICE  DAILY, Disp-180 tablet, R-0, E-PrescribePlease send a replace/new response with 100-Day Supply if appropriate to maximize member benefit. Requesting 1 year supply.      multivitamin w/minerals (THERA-VIT-M) tablet Take 1 tablet by mouth daily Without iron, Historical      VITAMIN D, CHOLECALCIFEROL, PO Take 1,000 Units by mouth daily, Historical      warfarin ANTICOAGULANT (COUMADIN) 2.5 MG tablet Take 2.5 mg by mouth daily, Historical           Allergies   No Known Allergies

## 2024-06-29 NOTE — PLAN OF CARE
VSS on 3L NC. A&Ox4. Denies pain. Denies SOB. K and Mg protocol. 2000 ml FR. Tele: 100% AV paced. On warfarin- bleeding precautions. SBA GB walker. Possible discharge today back to independent living.    Heart Failure Care Map  GOALS TO BE MET BEFORE DISCHARGE:    1. Decrease congestion and/or edema with diuretic therapy to achieve near optimal volume status.     Dyspnea improved: Yes, satisfactory for discharge.   Edema improved: No, further care required to meet this goal. Please explain trace edema        Last 24 hour I/O:   Intake/Output Summary (Last 24 hours) at 6/29/2024 0432  Last data filed at 6/29/2024 0315  Gross per 24 hour   Intake 660 ml   Output 1100 ml   Net -440 ml           Net I/O and Weights since admission:   05/30 0700 - 06/29 0659  In: 3787 [P.O.:3775; I.V.:12]  Out: 6475 [Urine:6475]  Net: -2688     Vitals:    06/24/24 2128 06/25/24 0028 06/26/24 0602 06/27/24 0606   Weight: 62.6 kg (138 lb) 63.7 kg (140 lb 6.9 oz) 61.6 kg (135 lb 12.8 oz) 60.1 kg (132 lb 9.6 oz)    06/28/24 0438   Weight: 59.1 kg (130 lb 6.4 oz)       2.  O2 sats > 90% on room air, or at prior home O2 therapy level.      Able to wean O2 this shift to keep sats above 90%?: Yes, satisfactory for discharge.   Does patient use Home O2? Yes-  3L          Current oxygenation status:   SpO2: 99 %     O2 Device: Nasal cannula with humidification, Oxygen Delivery: 3 LPM    3.  Tolerates ambulation and mobility near baseline.     Ambulation: Yes, satisfactory for discharge.   Times patient ambulated with staff this shift: 1    Please review the Heart Failure Care Map for additional HF goal outcomes.    Velia Meeks RN  6/29/2024      Goal Outcome Evaluation:      Plan of Care Reviewed With: patient    Overall Patient Progress: improving    Outcome Evaluation: On 3L NC- baseline. Denies SOB.+1 edema. Tele: 100% AV paced. BP stable.    Problem: Adult Inpatient Plan of Care  Goal: Plan of Care Review  Description: The Plan of Care  "Review/Shift note should be completed every shift.  The Outcome Evaluation is a brief statement about your assessment that the patient is improving, declining, or no change.  This information will be displayed automatically on your shift  note.  Outcome: Progressing  Flowsheets (Taken 6/29/2024 0421)  Outcome Evaluation: On 3L NC- baseline. Denies SOB.+1 edema. Tele: 100% AV paced. BP stable.  Plan of Care Reviewed With: patient  Overall Patient Progress: improving  Goal: Patient-Specific Goal (Individualized)  Description: You can add care plan individualizations to a care plan. Examples of Individualization might be:  \"Parent requests to be called daily at 9am for status\", \"I have a hard time hearing out of my right ear\", or \"Do not touch me to wake me up as it startles  me\".  Outcome: Progressing  Goal: Readiness for Transition of Care  Outcome: Progressing     Problem: Heart Failure  Goal: Optimal Coping  Outcome: Progressing  Goal: Optimal Cardiac Output  Outcome: Progressing  Goal: Stable Heart Rate and Rhythm  Outcome: Progressing  Goal: Optimal Functional Ability  Outcome: Progressing  Goal: Fluid and Electrolyte Balance  Outcome: Progressing  Goal: Improved Oral Intake  Outcome: Progressing  Goal: Effective Oxygenation and Ventilation  Outcome: Progressing  Intervention: Promote Airway Secretion Clearance  Recent Flowsheet Documentation  Taken 6/28/2024 2324 by Velia Meeks RN  Cough And Deep Breathing: done independently per patient  Intervention: Optimize Oxygenation and Ventilation  Recent Flowsheet Documentation  Taken 6/28/2024 2324 by Velia Meeks RN  Head of Bed (HOB) Positioning: HOB at 20-30 degrees  Goal: Effective Breathing Pattern During Sleep  Outcome: Progressing  Intervention: Monitor and Manage Obstructive Sleep Apnea  Recent Flowsheet Documentation  Taken 6/28/2024 2324 by Velia Meeks RN  Medication Review/Management: medications reviewed     Problem: Comorbidity " Management  Goal: Blood Pressure in Desired Range  Outcome: Progressing  Intervention: Maintain Blood Pressure Management  Recent Flowsheet Documentation  Taken 6/28/2024 2324 by Velia Meeks RN  Medication Review/Management: medications reviewed  Goal: Maintenance of Heart Failure Symptom Control  Outcome: Progressing  Intervention: Maintain Heart Failure Management  Recent Flowsheet Documentation  Taken 6/28/2024 2324 by Velia Meeks RN  Medication Review/Management: medications reviewed     Problem: Fall Injury Risk  Goal: Absence of Fall and Fall-Related Injury  Outcome: Progressing  Intervention: Identify and Manage Contributors  Recent Flowsheet Documentation  Taken 6/28/2024 2324 by Velia Meeks RN  Medication Review/Management: medications reviewed  Intervention: Promote Injury-Free Environment  Recent Flowsheet Documentation  Taken 6/28/2024 2324 by Velia Meeks RN  Safety Promotion/Fall Prevention:   assistive device/personal items within reach   clutter free environment maintained   increased rounding and observation   increase visualization of patient   lighting adjusted   mobility aid in reach   nonskid shoes/slippers when out of bed   patient and family education   room organization consistent   safety round/check completed   treat underlying cause

## 2024-06-29 NOTE — PROGRESS NOTES
Care Management Discharge Note    Discharge Date: 06/29/2024     Discharge Disposition: Home, Home Care  Discharge Services: Home Care  Discharge DME: None  Discharge Transportation: family or friend will provide    Education Provided on the Discharge Plan: Yes  Persons Notified of Discharge Plans: patient, dtr, bedside RN, provider  Patient/Family in Agreement with the Plan: yes      Additional Information:  Per hospitalist patient could benefit from home care services for discharge, she was agreeable to HH PT/OT and has no agency preference. She has used home care services before but cannot recall the name of the agency. CM sent referral.     Patient will be discharging home w assist from family for taxing IADLs to her ILF. Dtr will provide discharge transport and bring portable oxygen, patient uses home oxygen at baseline.     Addendum 1147: [facility] accepted for HH PT/OT, they were updated patient is discharging today and orders were sent.     Evon Thompson RN, BSN  Inpatient Care Coordination  [facility]  [phone]

## 2024-06-29 NOTE — PLAN OF CARE
Occupational Therapy Discharge Summary    Reason for therapy discharge:    All goals and outcomes met, no further needs identified.    Progress towards therapy goal(s). See goals on Care Plan in [facility] electronic health record for goal details.  Goals met    Therapy recommendation(s):    The patient presents slightly below baseline, limited by decreased activity tolerance. Pt near baseline and appropriate for return to ILF. Recommend assist from family as needed for taxing IADL.

## 2024-07-01 ENCOUNTER — TELEPHONE (OUTPATIENT)
Dept: INTERNAL MEDICINE | Facility: CLINIC | Age: 84
End: 2024-07-01
Payer: COMMERCIAL

## 2024-07-01 ENCOUNTER — TELEPHONE (OUTPATIENT)
Dept: ANTICOAGULATION | Facility: CLINIC | Age: 84
End: 2024-07-01
Payer: COMMERCIAL

## 2024-07-01 ENCOUNTER — PATIENT OUTREACH (OUTPATIENT)
Dept: CARE COORDINATION | Facility: CLINIC | Age: 84
End: 2024-07-01
Payer: COMMERCIAL

## 2024-07-01 DIAGNOSIS — Z95.3 S/P MITRAL VALVE REPLACEMENT WITH BIOPROSTHETIC VALVE: ICD-10-CM

## 2024-07-01 DIAGNOSIS — I48.91 ATRIAL FIBRILLATION AND FLUTTER (H): ICD-10-CM

## 2024-07-01 DIAGNOSIS — Z79.01 LONG TERM CURRENT USE OF ANTICOAGULANTS WITH INR GOAL OF 2.0-3.0: Primary | ICD-10-CM

## 2024-07-01 DIAGNOSIS — I48.92 ATRIAL FIBRILLATION AND FLUTTER (H): ICD-10-CM

## 2024-07-01 DIAGNOSIS — I50.30 HEART FAILURE WITH PRESERVED EJECTION FRACTION, NYHA CLASS I (H): ICD-10-CM

## 2024-07-01 RX ORDER — METOPROLOL TARTRATE 75 MG/1
1 TABLET, FILM COATED ORAL 2 TIMES DAILY
Qty: 180 TABLET | Refills: 0 | Status: SHIPPED | OUTPATIENT
Start: 2024-07-01

## 2024-07-01 NOTE — LETTER
[facility] COORDINATION  [address]  [facility] [address]    July 1, 2024    Zunilda E May  [address]    [facility] [address]      Dear Zunilda,    I am a clinic care coordinator who works with Yunior Huang MD with the [facility]. I wanted to introduce myself and provide you with my contact information for you to be able to call me with any questions or concerns. I wanted to thank you for spending the time to talk with me.  Below is a description of clinic care coordination and how I can further assist you.       The clinic care coordination team is made up of a registered nurse, , financial resource worker and community health worker who understand the health care system. The goal of clinic care coordination is to help you manage your health and improve access to the health care system. Our team works alongside your provider to assist you in determining your health and social needs. We can help you obtain health care and community resources, providing you with necessary information and education. We can work with you through any barriers and develop a care plan that helps coordinate and strengthen the communication between you and your care team.  Our services are voluntary and are offered without charge to you personally.    Please feel free to contact me with any questions or concerns regarding care coordination and what we can offer.      We are focused on providing you with the highest-quality healthcare experience possible.    Sincerely,     Bertha Chávez [facility]  Primary Care Social Work Care Coordinator  [facility], & [place]   PH: [phone]

## 2024-07-01 NOTE — PROGRESS NOTES
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    S/P mitral valve replacement with bioprosthetic valve    Pulmonary hypertension (H)    S/P total knee arthroplasty    Wound dehiscence    Pacemaker twiddler's syndrome, initial encounter    Complete atrioventricular block (H)    Chronic kidney disease, stage 3 (H)    Closed displaced comminuted fracture of shaft of left femur, initial encounter (H)    Pain    ABLA (acute blood loss anemia)    Aortic regurgitation    Cerebral vascular accident (H)    Chronic gouty arthritis    Gastroesophageal reflux disease    Generalized osteoarthritis    Gouty arthropathy    Osteoarthrosis    Osteopenia    Postmenopausal osteoporosis    Shortness of breath    Subtherapeutic international normalized ratio (INR)    History of oral lesions    COVID-19    SOB (shortness of breath)    Oxygen dependent    Elevated troponin    Elevated brain natriuretic peptide (BNP) level    Acute on chronic congestive heart failure, unspecified heart failure type (H)       Current Outpatient Medications:     acetaminophen (TYLENOL) 500 MG tablet, Take 1,000 mg by mouth every 8 hours as needed for pain, Disp: , Rfl:     allopurinol (ZYLOPRIM) 100 MG tablet, Take 1 tablet (100 mg) by mouth 2 times daily, Disp: 180 tablet, Rfl: 3    amLODIPine (NORVASC) 5 MG tablet, Take 1 tablet (5 mg) by mouth daily, Disp: 90 tablet, Rfl: 3    amoxicillin (AMOXIL) 500 MG tablet,  Take 2000mg (4 tablets of 500mg each) approx 30 min to 1 hour prior to any dental procedures, Disp: 4 tablet, Rfl: 3    calcium citrate (CALCITRATE) 950 MG tablet, Take 1 tablet by mouth daily , Disp: , Rfl:     famotidine (PEPCID) 40 MG tablet, Take 1 tablet (40 mg) by mouth daily, Disp: 90 tablet, Rfl: 3    folic acid (FOLVITE) 1 MG tablet, Take 1 mg by mouth daily, Disp: , Rfl:     latanoprost (XALATAN) 0.005 % ophthalmic solution, Place 1 drop into both eyes every evening, Disp: , Rfl:     levothyroxine (SYNTHROID/LEVOTHROID) 112 MCG tablet, Take 1 tablet (112 mcg) by mouth daily, Disp: 100 tablet, Rfl: 3    methotrexate 50 MG/2ML injection, Inject 0.8 mLs Subcutaneous every 7 days (Fridays), Disp: , Rfl:     Metoprolol Tartrate 75 MG TABS, TAKE 1 TABLET BY MOUTH TWICE  DAILY, Disp: 180 tablet, Rfl: 0    multivitamin w/minerals (THERA-VIT-M) tablet, Take 1 tablet by mouth daily Without iron, Disp: , Rfl:     Torsemide 40 MG TABS, Take 40 mg by mouth daily, Disp: 60 tablet, Rfl: 0    VITAMIN D, CHOLECALCIFEROL, PO, Take 1,000 Units by mouth daily, Disp: , Rfl:     warfarin ANTICOAGULANT (COUMADIN) 2.5 MG tablet, Take 2.5 mg by mouth daily, Disp: , Rfl:     No Known Allergies    Most Recent Admission Date: 6/24/2024   Most Recent Admission Diagnosis: SOB (shortness of breath) - R06.02  Oxygen dependent - Z99.81  Elevated troponin - R79.89  Elevated brain natriuretic peptide (BNP) level - R79.89  Acute on chronic congestive heart failure, unspecified heart failure type (H) - I50.9     Most Recent Discharge Date: 6/29/2024   Most Recent Discharge Diagnosis: Elevated brain natriuretic peptide (BNP) level - R79.89  Elevated troponin - R79.89  Acute on chronic congestive heart failure, unspecified heart failure type (H) - I50.9  SOB (shortness of breath) - R06.02  Oxygen dependent - Z99.81  Essential hypertension - I10  Benign essential hypertension - I10  Acquired hypothyroidism - E03.9  Pulmonary hypertension (H) -  "I27.20  Shortness of breath - R06.02  Pain - R52  Long term current use of anticoagulants with INR goal of 2.0-3.0 - Z79.01  Cardiac pacemaker in situ - Z95.0     Transitions of Care Assessment    Discharge Assessment  How are you doing now that you are home?: \"Sleepy, but besides that doing well\". Pt denied concerns with level of sleepiness.  How are your symptoms? (Red Flag symptoms escalate to triage hotline per guidelines): Improved  Does the patient have their discharge instructions? : Yes  Does the patient have questions regarding their discharge instructions? : No  Were you started on any new medications or were there changes to any of your previous medications? : No  Does the patient have all of their medications?: Yes  Do you have questions regarding any of your medications? : No  Do you have all of your needed medical supplies or equipment (DME)?  (i.e. oxygen tank, CPAP, cane, etc.): No - What equipment or supplies are needed?    Post-op (Clinicians Only)  Did the patient have surgery or a procedure: No  Fever: No  Chills: No  Eating & Drinking: eating and drinking without complaints/concerns  PO Intake: low fat diet;other (Low salt diet)  Bowel Function: normal  Date of last BM: 07/01/24  Urinary Status: voiding without complaint/concerns    Care Management       Care Mgmt General Assessment  Referral  Referral Source: IP Report  Health Care Home/Utilization  Preferred Hospital: [facility]  [phone]  Living Situation  Current living arrangement:: I live alone  Type of residence:: Independent Senior Living  Resources  Patient receiving home care services:: No  Community Resources: None;Home Care ([facility] PT/OT)  Supplies Currently Used at Home: Oxygen Tubing/Supplies  Equipment Currently Used at Home: walker, rolling  Employment Status: retired  Psychosocial  Chemical Dependency Status: No Current Concerns  Informal Support system:: Children;Family  Functional " Status  Dependent ADLs:: Ambulation-walker  Dependent IADLs:: Transportation;Cleaning;Cooking;Meal Preparation;Laundry  Advance Care Plan/Directive  Advanced Care Plans/Directives on file:: Yes  Status of record:: On File and Validated  Type Advanced Care Plans/Directives: Advanced Directive - On File;POLST and     Spoke with pt. Pt denied any concerns or questions at this time. Confirmed that [facility] has been in contact with her and planning to come out for HC PT/OT and pt has a follow-up apt with PCP in a few days. Pt open to receiving  CC intro letter via [employer] and letter sent. No other needs identified at this time.     Follow up Plan     Discharge Follow-Up  Discharge follow up appointment scheduled in alignment with recommended follow up timeframe or Transitions of Risk Category? (Low = within 30 days; Moderate= within 14 days; High= within 7 days): Yes  Discharge Follow Up Appointment Date: 07/05/24  Discharge Follow Up Appointment Scheduled with?: Primary Care Provider    Future Appointments   Date Time Provider Department Center   7/5/2024 11:30 AM Yunior Huang MD [facility]   [facility]24 10[facility]   7/22/2024  1:00 PM Zena Benitez PA [facility]   8/5/2024 11:00 AM Yunior Huang MD [facility]   9/13/2024  2:00 PM Yunior Huang MD [facility]   10/7/2024 12:00 AM KRAMER TECH1 [facility] PSA CLIN   10/21/2024  9:45 AM Noe Rodriguez MD [facility] CL[facility]   10/21/2024 10:30 AM KRAMER DCRN [facility] PSA CLIN   11/7/2024 10:40 AM Mamie Last DO [facility] MEGHAN JJ     Outpatient Plan as outlined on AVS reviewed with patient.    For any urgent concerns, please contact our 24 hour nurse triage line: [phone] ([phone])       Bertha Chávez, [facility]

## 2024-07-01 NOTE — TELEPHONE ENCOUNTER
ANTICOAGULATION  MANAGEMENT: Discharge Review    Zunilda Clark chart reviewed for anticoagulation continuity of care    Hospital Admission on 6/24-6/29/24 for acute on chronic hypoxic respiratory failure.    Discharge disposition: Home with Home Care    Results:    Recent labs: (last 7 days)     06/24/24  2134 06/25/24  0037 06/25/24  1121 06/26/24  0708 06/27/24  0542 06/28/24  0808 06/29/24  0645   INR 1.68* 1.72* 1.67* 1.98* 2.14* 1.97* 2.22*     Anticoagulation inpatient management:     anticoagulation calendar updated with inpatient dosing    Anticoagulation discharge instructions:     Warfarin dosing: home regimen continued   Bridging: No   INR goal change: No      Medication changes affecting anticoagulation: Yes, torsemide increased from 20 to 40mg daily    Additional factors affecting anticoagulation: Yes: subtherapeutic INR while inpatient     PLAN     Recommend to check INR on 07/05 or 07/08/24    Left a detailed message for Deanne    Anticoagulation Calendar updated    Linette Rocha RN

## 2024-07-01 NOTE — TELEPHONE ENCOUNTER
Rajiv physical therapist with [facility] calls for verbal order      Home physical therapy   2x3wk  1x6wk    Rajiv can be reached at  [phone]

## 2024-07-02 ENCOUNTER — TELEPHONE (OUTPATIENT)
Dept: CARDIOLOGY | Facility: CLINIC | Age: 84
End: 2024-07-02
Payer: COMMERCIAL

## 2024-07-02 NOTE — TELEPHONE ENCOUNTER
Patient was admitted to [facility] on 6/24/24 with increased SOB. Recurrent pulmonary edema secondary to severe bioprosthetic mitral valve stenosis and severe aortic stenosis. The patient is in palliative care. She is not interested in any interventions for aortic stenosis nor the severe prosthetic mitral valve stenosis.     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 Pt has opted for conservative management with symptom management-palliative care.    6/25/24: Echo showed EF of 55-60%, severe aortic stenosis, severe mitral stenosis, moderate TR, moderate to severely dilated RV, severe biatrial enlargement.     IV Lasix diuresed 10 lbs.    PTA Demadex dosage was increased.    Called patient to discuss any post hospital d/c questions she may have, review medication changes, and confirm f/u appts. Patient denied any questions regarding new medications or changes to PTA medications.     Patient denied any SOB, chest pain, or light headedness.    RN confirmed with patient that she is scheduled for an OV on 7/22/24 at 1255 with ARTHUR Zena Benitez at our [place] Office. [place] Team RN phone number provided.    Discharged to home with [facility] services.    Patient advised to call clinic with any cardiac related questions or concerns prior to this arthur't. Patient verbalized understanding and agreed with plan. NESTOR Tanner RN.

## 2024-07-05 ENCOUNTER — VIRTUAL VISIT (OUTPATIENT)
Dept: INTERNAL MEDICINE | Facility: CLINIC | Age: 84
End: 2024-07-05
Payer: COMMERCIAL

## 2024-07-05 DIAGNOSIS — I10 BENIGN ESSENTIAL HYPERTENSION: ICD-10-CM

## 2024-07-05 DIAGNOSIS — Z09 HOSPITAL DISCHARGE FOLLOW-UP: Primary | ICD-10-CM

## 2024-07-05 DIAGNOSIS — I48.91 ATRIAL FIBRILLATION AND FLUTTER (H): ICD-10-CM

## 2024-07-05 DIAGNOSIS — I50.32 CHRONIC DIASTOLIC CONGESTIVE HEART FAILURE (H): ICD-10-CM

## 2024-07-05 DIAGNOSIS — I27.20 PULMONARY HYPERTENSION (H): ICD-10-CM

## 2024-07-05 DIAGNOSIS — I48.92 ATRIAL FIBRILLATION AND FLUTTER (H): ICD-10-CM

## 2024-07-05 DIAGNOSIS — M06.9 RHEUMATOID ARTHRITIS, INVOLVING UNSPECIFIED SITE, UNSPECIFIED WHETHER RHEUMATOID FACTOR PRESENT (H): ICD-10-CM

## 2024-07-05 PROCEDURE — 99442 PR PHYSICIAN TELEPHONE EVALUATION 11-20 MIN: CPT | Mod: 93 | Performed by: INTERNAL MEDICINE

## 2024-07-05 NOTE — PROGRESS NOTES
Deanne is a 84 year old who is being evaluated via a billable telephone visit.    What phone number would you like to be contacted at? [phone]  How would you like to obtain your AVS? Estefani  Originating Location (pt. Location): Home    Distant Location (provider location):  On-site    Assessment & Plan     Hospital discharge follow-up  Continue treatment   Monitor BP, weight, heart rate   Interested in hospice, I don't think that her prognosis is less than 6 months, will reassess on follow up.     Pulmonary hypertension (H)  On oxygen, continue treatment     Benign essential hypertension  Controlled     Atrial fibrillation and flutter (H)  On AC and rate control medications     Rheumatoid arthritis, involving unspecified site, unspecified whether rheumatoid factor present (H)  On treatment, controlled     Chronic diastolic congestive heart failure (H)  Has increased SOB, watch low salt diet, follow up with cardiology  Increase Demadex to 40 mg bid for 2 days         MED REC REQUIRED  Post Medication Reconciliation Status: discharge medications reconciled, continue medications without change    See Patient Instructions    Subjective   Deanne is a 84 year old, presenting for the following health issues:  Hospital F/U    [facility]        Hospital Follow-up Visit:    Hospital/Nursing Home/IP Rehab Facility: [facility]  Date of Admission: 6/24/24  Date of Discharge: 6/29/24  Reason(s) for Admission: Acute on chronic hypoxic Respiratory Failure   Was the patient in the ICU or did the patient experience delirium during hospitalization?  No  Do you have any other stressors you would like to discuss with your provider? No    Problems taking medications regularly:  None  Medication changes since discharge: None  Problems adhering to non-medication therapy:  None    Summary of hospitalization:  [facility] discharge summary reviewed  Diagnostic Tests/Treatments reviewed.  Follow up needed:  clinic follow up   Other Healthcare Providers Involved in Patient s Care:         Specialist appointment - cardiology   Update since discharge: improved.         Plan of care communicated with patient and family           Patient is seen for a follow up visit.  Recently hospitalized for CHF exacerbation, increased SOB.   Has h/o pulmonary HTN, on O2, diastolic CHF, related to AFIb, frailty status.   Improved with in hospital treatment.   Watches weight, has gained 3 lbs for the past 4-5 days. Trying to keep low salt diet.   Has SOB on exertion. No legs edema.   Has h/o RA, on treatment.   Has history of atrial fibrillation. On anticoagulation with Coumadin and rate control medications. Asymptonatic - no chest pains , palpitations,  no side effects from medications.          Review of Systems  Constitutional, HEENT, cardiovascular, pulmonary, gi and gu systems are negative, except as otherwise noted.      Objective           Vitals:  No vitals were obtained today due to virtual visit.    Physical Exam   General: Alert and no distress //Respiratory: No audible wheeze, cough, or shortness of breath // Psychiatric:  Appropriate affect, tone, and pace of words      Admission on 06/24/2024, Discharged on 06/29/2024   Component Date Value Ref Range Status    INR 06/24/2024 1.68 (H)  0.85 - 1.15 Final    Sodium 06/24/2024 140  135 - 145 mmol/L Final    Reference intervals for this test were updated on 09/26/2023 to more accurately reflect our healthy population. There may be differences in the flagging of prior results with similar values performed with this method. Interpretation of those prior results can be made in the context of the updated reference intervals.     Potassium 06/24/2024 4.0  3.4 - 5.3 mmol/L Final    Chloride 06/24/2024 100  98 - 107 mmol/L Final    Carbon Dioxide (CO2) 06/24/2024 29  22 - 29 mmol/L Final    Anion Gap 06/24/2024 11  7 - 15 mmol/L Final    Urea Nitrogen 06/24/2024 29.5 (H)  8.0 - 23.0  mg/dL Final    Creatinine 06/24/2024 1.07 (H)  0.51 - 0.95 mg/dL Final    GFR Estimate 06/24/2024 52 (L)  >60 mL/min/1.73m2 Final    eGFR calculated using 2021 CKD-EPI equation.    Calcium 06/24/2024 9.0  8.8 - 10.2 mg/dL Final    Glucose 06/24/2024 97  70 - 99 mg/dL Final    Troponin T, High Sensitivity 06/24/2024 26 (H)  <=14 ng/L Final    Either a High Sensitivity Troponin T baseline (0 hours) value = 100 ng/L, or an increase in High Sensitivity Troponin T = 7 ng/L at 2 hours compared to 0 hours (2-0 hours), suggests myocardial injury, and urgent clinical attention is required.    If the 2-0 hours increase is <7 ng/L, a High Sensitivity Troponin T result above gender-specific reference ranges warrants further evaluation.   Recommendations for further evaluation include correlation with clinical decision-making tool (e.g., HEART), a 3rd High Sensitivity Troponin T test 2 hours after the 2nd (a 20% change from baseline would represent concern), admission for observation, close PCC/cardiology follow-up, or urgent outpatient provocative testing.    N terminal Pro BNP Inpatient 06/24/2024 5,079 (H)  0 - 1,800 pg/mL Final    Reference range shown and results flagged as abnormal are suggested inpatient cut points for confirming diagnosis if CHF in an acute setting. Establishing a baseline value for each individual patient is useful for follow-up. An inpatient or emergency department NT-proPBNP <300 pg/mL effectively rules out acute CHF, with 99% negative predictive value.    The outpatient non-acute reference range for ruling out CHF is:  0-125 pg/mL (age 18 to less than 75)  0-450 pg/mL (age 75 yrs and older)     pH Venous 06/24/2024 7.41  7.32 - 7.43 Final    pCO2 Venous 06/24/2024 54 (H)  40 - 50 mm Hg Final    pO2 Venous 06/24/2024 29  25 - 47 mm Hg Final    Bicarbonate Venous 06/24/2024 34 (H)  21 - 28 mmol/L Final    Base Excess/Deficit Venous 06/24/2024 8.3 (H)  -3.0 - 3.0 mmol/L Final    FIO2 06/24/2024 2    Final    L    Oxyhemoglobin Venous 06/24/2024 50 (L)  70 - 75 % Final    O2 Sat, Venous 06/24/2024 50.3 (L)  70.0 - 75.0 % Final    Ventricular Rate 06/24/2024 65  BPM Final    Atrial Rate 06/24/2024 65  BPM Final    TN Interval 06/24/2024 178  ms Final    QRS Duration 06/24/2024 156  ms Final    QT 06/24/2024 498  ms Final    QTc 06/24/2024 517  ms Final    R AXIS 06/24/2024 110  degrees Final    T Axis 06/24/2024 -31  degrees Final    Interpretation ECG 06/24/2024    Final                    Value:AV dual-paced rhythm  Abnormal ECG  When compared with ECG of 22-FEB-2024 09:35,  Vent. rate has decreased BY  14 BPM  Confirmed by - EMERGENCY ROOM, PHYSICIAN ([id number]),  DIONNE SALAMANCA ([id number]) on 6/25/2024 6:43:45 AM      WBC Count 06/24/2024 6.4  4.0 - 11.0 10e3/uL Final    RBC Count 06/24/2024 2.75 (L)  3.80 - 5.20 10e6/uL Final    Hemoglobin 06/24/2024 9.8 (L)  11.7 - 15.7 g/dL Final    Hematocrit 06/24/2024 29.9 (L)  35.0 - 47.0 % Final    MCV 06/24/2024 109 (H)  78 - 100 fL Final    MCH 06/24/2024 35.6 (H)  26.5 - 33.0 pg Final    MCHC 06/24/2024 32.8  31.5 - 36.5 g/dL Final    RDW 06/24/2024 18.1 (H)  10.0 - 15.0 % Final    Platelet Count 06/24/2024 256  150 - 450 10e3/uL Final    % Neutrophils 06/24/2024 79  % Final    % Lymphocytes 06/24/2024 14  % Final    % Monocytes 06/24/2024 4  % Final    % Eosinophils 06/24/2024 2  % Final    % Basophils 06/24/2024 1  % Final    % Immature Granulocytes 06/24/2024 1  % Final    NRBCs per 100 WBC 06/24/2024 0  <1 /100 Final    Absolute Neutrophils 06/24/2024 5.1  1.6 - 8.3 10e3/uL Final    Absolute Lymphocytes 06/24/2024 0.9  0.8 - 5.3 10e3/uL Final    Absolute Monocytes 06/24/2024 0.2  0.0 - 1.3 10e3/uL Final    Absolute Eosinophils 06/24/2024 0.2  0.0 - 0.7 10e3/uL Final    Absolute Basophils 06/24/2024 0.0  0.0 - 0.2 10e3/uL Final    Absolute Immature Granulocytes 06/24/2024 0.0  <=0.4 10e3/uL Final    Absolute NRBCs 06/24/2024 0.0  10e3/uL Final    Procalcitonin  06/24/2024 0.12  <0.50 ng/mL Final    Comment: Interpretation and Recommendations     <0.5 ng/mL:   Systemic bacterial infection unlikely. Local bacterial infection is possible.     0.5-1.99 ng/mL:   Systemic bacterial infection possible, but various other conditions are known to induce PCT as well.     >=2.00 ng/mL:   Systemic bacterial infection likely, unless other causes are known.     Decision to start antibiotics should not be based on procalcitonin level alone. See Procalcitonin Guidance document for more details. [email or web address]    Factors that may affect PCT levels (not all-inclusive):     - Increased PCT level           Severe trauma/burns           Invasive surgery           Cooling therapy after cardiac arrest/surgery           Treatment with agents which stimulate cytokines           Acute kidney injury           Chronic kidney disease and end stage renal disease           Acute graft vs host disease           Non-specific shock                            causing decreased organ perfusion and/or infarction       - Normal or unchanged PCT level           Early in infections (if low and infection is suspected, repeating in 6-12 hours is recommended)           Chronic infections (endocarditis, osteomyelitis, prosthetic device/graft infections)           Localized infections (cellulitis, wound infections, intra-abdominal abscess)     Note: PCT has not been extensively studied in pregnancy/breastfeeding, pediatrics, severe immunosuppression, and cystic fibrosis.    INR 06/25/2024 1.72 (H)  0.85 - 1.15 Final    Sodium 06/25/2024 139  135 - 145 mmol/L Final    Reference intervals for this test were updated on 09/26/2023 to more accurately reflect our healthy population. There may be differences in the flagging of prior results with similar values performed with this method. Interpretation of those prior results can be made in the  context of the updated reference intervals.     Potassium 06/25/2024 3.6  3.4 - 5.3 mmol/L Final    Chloride 06/25/2024 100  98 - 107 mmol/L Final    Carbon Dioxide (CO2) 06/25/2024 28  22 - 29 mmol/L Final    Anion Gap 06/25/2024 11  7 - 15 mmol/L Final    Urea Nitrogen 06/25/2024 26.6 (H)  8.0 - 23.0 mg/dL Final    Creatinine 06/25/2024 0.92  0.51 - 0.95 mg/dL Final    GFR Estimate 06/25/2024 62  >60 mL/min/1.73m2 Final    eGFR calculated using 2021 CKD-EPI equation.    Calcium 06/25/2024 8.7 (L)  8.8 - 10.2 mg/dL Final    Glucose 06/25/2024 84  70 - 99 mg/dL Final    WBC Count 06/25/2024 5.9  4.0 - 11.0 10e3/uL Final    RBC Count 06/25/2024 2.87 (L)  3.80 - 5.20 10e6/uL Final    Hemoglobin 06/25/2024 9.8 (L)  11.7 - 15.7 g/dL Final    Hematocrit 06/25/2024 31.2 (L)  35.0 - 47.0 % Final    MCV 06/25/2024 109 (H)  78 - 100 fL Final    MCH 06/25/2024 34.1 (H)  26.5 - 33.0 pg Final    MCHC 06/25/2024 31.4 (L)  31.5 - 36.5 g/dL Final    RDW 06/25/2024 18.1 (H)  10.0 - 15.0 % Final    Platelet Count 06/25/2024 250  150 - 450 10e3/uL Final    LVEF  06/25/2024 55-60%   Final    Troponin T, High Sensitivity 06/25/2024 24 (H)  <=14 ng/L Final    Either a High Sensitivity Troponin T baseline (0 hours) value = 100 ng/L, or an increase in High Sensitivity Troponin T = 7 ng/L at 2 hours compared to 0 hours (2-0 hours), suggests myocardial injury, and urgent clinical attention is required.    If the 2-0 hours increase is <7 ng/L, a High Sensitivity Troponin T result above gender-specific reference ranges warrants further evaluation.   Recommendations for further evaluation include correlation with clinical decision-making tool (e.g., HEART), a 3rd High Sensitivity Troponin T test 2 hours after the 2nd (a 20% change from baseline would represent concern), admission for observation, close PCC/cardiology follow-up, or urgent outpatient provocative testing.    Troponin T, High Sensitivity 06/25/2024 22 (H)  <=14 ng/L Final     Either a High Sensitivity Troponin T baseline (0 hours) value = 100 ng/L, or an increase in High Sensitivity Troponin T = 7 ng/L at 2 hours compared to 0 hours (2-0 hours), suggests myocardial injury, and urgent clinical attention is required.    If the 2-0 hours increase is <7 ng/L, a High Sensitivity Troponin T result above gender-specific reference ranges warrants further evaluation.   Recommendations for further evaluation include correlation with clinical decision-making tool (e.g., HEART), a 3rd High Sensitivity Troponin T test 2 hours after the 2nd (a 20% change from baseline would represent concern), admission for observation, close PCC/cardiology follow-up, or urgent outpatient provocative testing.    Potassium 06/25/2024 3.7  3.4 - 5.3 mmol/L Final    Magnesium 06/25/2024 2.2  1.7 - 2.3 mg/dL Final    Magnesium 06/25/2024 2.2  1.7 - 2.3 mg/dL Final    INR 06/25/2024 1.67 (H)  0.85 - 1.15 Final    INR 06/26/2024 1.98 (H)  0.85 - 1.15 Final    Sodium 06/26/2024 140  135 - 145 mmol/L Final    Reference intervals for this test were updated on 09/26/2023 to more accurately reflect our healthy population. There may be differences in the flagging of prior results with similar values performed with this method. Interpretation of those prior results can be made in the context of the updated reference intervals.     Potassium 06/26/2024 3.5  3.4 - 5.3 mmol/L Final    Chloride 06/26/2024 101  98 - 107 mmol/L Final    Carbon Dioxide (CO2) 06/26/2024 28  22 - 29 mmol/L Final    Anion Gap 06/26/2024 11  7 - 15 mmol/L Final    Urea Nitrogen 06/26/2024 24.0 (H)  8.0 - 23.0 mg/dL Final    Creatinine 06/26/2024 0.86  0.51 - 0.95 mg/dL Final    GFR Estimate 06/26/2024 67  >60 mL/min/1.73m2 Final    eGFR calculated using 2021 CKD-EPI equation.    Calcium 06/26/2024 8.6 (L)  8.8 - 10.2 mg/dL Final    Glucose 06/26/2024 86  70 - 99 mg/dL Final    Magnesium 06/26/2024 2.2  1.7 - 2.3 mg/dL Final    Hold Specimen 06/26/2024 [facility]    Final    INR 06/27/2024 2.14 (H)  0.85 - 1.15 Final    Magnesium 06/27/2024 2.0  1.7 - 2.3 mg/dL Final    Sodium 06/27/2024 140  135 - 145 mmol/L Final    Potassium 06/27/2024 3.3 (L)  3.4 - 5.3 mmol/L Final    Chloride 06/27/2024 102  98 - 107 mmol/L Final    Carbon Dioxide (CO2) 06/27/2024 28  22 - 29 mmol/L Final    Anion Gap 06/27/2024 10  7 - 15 mmol/L Final    Urea Nitrogen 06/27/2024 23.9 (H)  8.0 - 23.0 mg/dL Final    Creatinine 06/27/2024 0.80  0.51 - 0.95 mg/dL Final    GFR Estimate 06/27/2024 73  >60 mL/min/1.73m2 Final    eGFR calculated using 2021 CKD-EPI equation.    Calcium 06/27/2024 8.8  8.8 - 10.2 mg/dL Final    Glucose 06/27/2024 84  70 - 99 mg/dL Final    Potassium 06/27/2024 3.6  3.4 - 5.3 mmol/L Final    Hold Specimen 06/27/2024 [facility]   Final    INR 06/28/2024 1.97 (H)  0.85 - 1.15 Final    Sodium 06/28/2024 140  135 - 145 mmol/L Final    Potassium 06/28/2024 4.3  3.4 - 5.3 mmol/L Final    Chloride 06/28/2024 98  98 - 107 mmol/L Final    Carbon Dioxide (CO2) 06/28/2024 32 (H)  22 - 29 mmol/L Final    Anion Gap 06/28/2024 10  7 - 15 mmol/L Final    Urea Nitrogen 06/28/2024 24.6 (H)  8.0 - 23.0 mg/dL Final    Creatinine 06/28/2024 0.93  0.51 - 0.95 mg/dL Final    GFR Estimate 06/28/2024 61  >60 mL/min/1.73m2 Final    eGFR calculated using 2021 CKD-EPI equation.    Calcium 06/28/2024 9.3  8.8 - 10.2 mg/dL Final    Glucose 06/28/2024 86  70 - 99 mg/dL Final    Magnesium 06/28/2024 2.1  1.7 - 2.3 mg/dL Final    Hold Specimen 06/28/2024 [facility]   Final    INR 06/29/2024 2.22 (H)  0.85 - 1.15 Final    Sodium 06/29/2024 140  135 - 145 mmol/L Final    Potassium 06/29/2024 3.8  3.4 - 5.3 mmol/L Final    Chloride 06/29/2024 100  98 - 107 mmol/L Final    Carbon Dioxide (CO2) 06/29/2024 31 (H)  22 - 29 mmol/L Final    Anion Gap 06/29/2024 9  7 - 15 mmol/L Final    Urea Nitrogen 06/29/2024 28.4 (H)  8.0 - 23.0 mg/dL Final    Creatinine 06/29/2024 0.97 (H)  0.51 - 0.95 mg/dL Final    GFR Estimate 06/29/2024 58 (L)   >60 mL/min/1.73m2 Final    eGFR calculated using 2021 CKD-EPI equation.    Calcium 06/29/2024 9.6  8.8 - 10.2 mg/dL Final    Glucose 06/29/2024 87  70 - 99 mg/dL Final    Magnesium 06/29/2024 2.0  1.7 - 2.3 mg/dL Final         Phone call duration: 14 minutes  Signed Electronically by: Yunior Huang MD

## 2024-07-07 DIAGNOSIS — R19.7 DIARRHEA, UNSPECIFIED TYPE: ICD-10-CM

## 2024-07-08 ENCOUNTER — ANTICOAGULATION THERAPY VISIT (OUTPATIENT)
Dept: ANTICOAGULATION | Facility: CLINIC | Age: 84
End: 2024-07-08

## 2024-07-08 ENCOUNTER — TELEPHONE (OUTPATIENT)
Dept: INTERNAL MEDICINE | Facility: CLINIC | Age: 84
End: 2024-07-08

## 2024-07-08 ENCOUNTER — TELEPHONE (OUTPATIENT)
Dept: ANTICOAGULATION | Facility: CLINIC | Age: 84
End: 2024-07-08

## 2024-07-08 ENCOUNTER — LAB (OUTPATIENT)
Dept: LAB | Facility: CLINIC | Age: 84
End: 2024-07-08
Payer: COMMERCIAL

## 2024-07-08 DIAGNOSIS — I48.92 ATRIAL FIBRILLATION AND FLUTTER (H): ICD-10-CM

## 2024-07-08 DIAGNOSIS — I48.91 ATRIAL FIBRILLATION AND FLUTTER (H): ICD-10-CM

## 2024-07-08 DIAGNOSIS — I50.9 ACUTE ON CHRONIC CONGESTIVE HEART FAILURE, UNSPECIFIED HEART FAILURE TYPE (H): Primary | ICD-10-CM

## 2024-07-08 DIAGNOSIS — Z95.3 S/P MITRAL VALVE REPLACEMENT WITH BIOPROSTHETIC VALVE: ICD-10-CM

## 2024-07-08 DIAGNOSIS — Z53.9 DIAGNOSIS NOT YET DEFINED: Primary | ICD-10-CM

## 2024-07-08 DIAGNOSIS — Z79.01 LONG TERM CURRENT USE OF ANTICOAGULANTS WITH INR GOAL OF 2.0-3.0: Primary | ICD-10-CM

## 2024-07-08 LAB — INR BLD: 1.8 (ref 0.9–1.1)

## 2024-07-08 PROCEDURE — G0180 MD CERTIFICATION HHA PATIENT: HCPCS | Performed by: INTERNAL MEDICINE

## 2024-07-08 PROCEDURE — 80061 LIPID PANEL: CPT

## 2024-07-08 PROCEDURE — 36415 COLL VENOUS BLD VENIPUNCTURE: CPT

## 2024-07-08 PROCEDURE — 85610 PROTHROMBIN TIME: CPT

## 2024-07-08 RX ORDER — FAMOTIDINE 40 MG/1
40 TABLET, FILM COATED ORAL DAILY
Qty: 90 TABLET | Refills: 2 | Status: SHIPPED | OUTPATIENT
Start: 2024-07-08

## 2024-07-08 RX ORDER — FAMOTIDINE 40 MG/1
40 TABLET, FILM COATED ORAL DAILY
Qty: 100 TABLET | Refills: 2 | OUTPATIENT
Start: 2024-07-08

## 2024-07-08 NOTE — TELEPHONE ENCOUNTER
General Call      Reason for Call:  RETURN CALL TO INR NURSE KIANA    What are your questions or concerns:  PT HAS FOLLOW UP QUESTIONS ABOUT INR RESULTS    Date of last appointment with provider: 7/8/24    Could we send this information to you in [employer] or would you prefer to receive a phone call?:   Patient would prefer a phone call   Okay to leave a detailed message?: Yes at Cell number on file:    Telephone Information:   Mobile [phone]

## 2024-07-08 NOTE — PROGRESS NOTES
ANTICOAGULATION MANAGEMENT     Zunilda SHAW May 84 year old female is on warfarin with subtherapeutic INR result. (Goal INR 2.0-3.0)    Recent labs: (last 7 days)     07/08/24  1356   INR 1.8*       ASSESSMENT     Source(s): Chart Review and Patient/Caregiver Call     Warfarin doses taken: Warfarin taken as instructed, patient states she does not think she missed any warfarin doses, more warfarin was given during hospitalization but would not be affecting INR today.  Diet: No new diet changes identified  Medication/supplement changes:  Torsemide dose increased on 6/29/24 Per UpToDate: Torsemide may increase the serum concentration of Warfarin  New illness, injury, or hospitalization: Yes: Patient in the hospital 6/24/24 - 6/29/24 for acute on chronic hypoxic respiratory failure   Signs or symptoms of bleeding or clotting: Yes: patient reports dry blood in her nose, she thinks it is from her oxygen  Previous result:  Last INR prior to hospital admission was therapeutic, INR during hospitalization was mainly subtherapeutic until day of discharge on 6/29/24, then it was therapeutic  Additional findings:  Currently has home care for PT/OT only       PLAN     Recommended plan for no diet, medication or health factor changes affecting INR     Dosing Instructions: Increase your warfarin dose (7.3% change) with next INR in 1-2 weeks       Summary  As of 7/8/2024      Full warfarin instructions:  3.75 mg every Mon; 2.5 mg all other days   Next INR check:  7/17/2024               Telephone call with Deanne who agrees to plan and repeated back plan correctly    Lab visit scheduled    Education provided: Please call back if any changes to your diet, medications or how you've been taking warfarin  Interaction IS anticipated between warfarin and torsemide  Contact [phone] with any changes, questions or concerns.     Plan made per [facility] anticoagulation protocol    Екатерина Mahan RN  Anticoagulation  Clinic  7/8/2024    _______________________________________________________________________     Anticoagulation Episode Summary       Current INR goal:  2.0-3.0   TTR:  57.1% (10.3 mo)   Target end date:  Indefinite   Send INR reminders to:  [facility]    Indications    Long term current use of anticoagulants with INR goal of 2.0-3.0 [Z79.01]  Atrial fibrillation and flutter (H) [I48.91  I48.92]  S/P mitral valve replacement with bioprosthetic valve [Z95.3]             Comments:  27 mm Magna bioprosthetic valve (2014)             Anticoagulation Care Providers       Provider Role Specialty Phone number    Yunior Huang MD Referring Internal Medicine [phone]

## 2024-07-08 NOTE — TELEPHONE ENCOUNTER
See 7/8/2024 Anticoagulation encounter for warfarin dosing instructions.  Екатерина Mahan, RN, BSN  Anticoagulation Clinic

## 2024-07-08 NOTE — TELEPHONE ENCOUNTER
Call returned to patient. Patient had a question if she should increase her greens and how much vitamin K were in cherry tomatoes. Patient advised she should not increase her green veggies since her INR was 1.8 today. Patient was advised that 1 cup of cherry tomatoes were considered to be low in Vitamin K.   Екатерина Mahan RN, BSN  Anticoagulation Clinic

## 2024-07-08 NOTE — TELEPHONE ENCOUNTER
General Call    Contacts       Contact Date/Time Type Contact Phone/Fax    07/08/2024 04:09 PM CDT Phone (Incoming) Deanne Clark (Self) [phone] (M)          Reason for Call:  Anticopaulino    What are your questions or concerns:  Patient requested call back from INR nurse regarding her results today.    Date of last appointment with provider: 7/8/24    Could we send this information to you in [facility] or would you prefer to receive a phone call?:   Patient would prefer a phone call   Okay to leave a detailed message?: Yes at Home number on file [phone] (home)

## 2024-07-09 LAB
CHOLEST SERPL-MCNC: 147 MG/DL
FASTING STATUS PATIENT QL REPORTED: NO
HDLC SERPL-MCNC: 42 MG/DL
LDLC SERPL CALC-MCNC: 86 MG/DL
NONHDLC SERPL-MCNC: 105 MG/DL
TRIGL SERPL-MCNC: 97 MG/DL

## 2024-07-16 NOTE — PROGRESS NOTES
Medication Therapy Management (MTM) Encounter    ASSESSMENT:                            Medication Adherence/Access: See below for considerations    Hypertension/HFpEF/Afib:  Patient is meeting blood pressure goal of < 140/90mmHg.  Patient's med list had dosing for amlodipine and torsemide incorrect, but bottles she had with her that she claims to follow had correct dosing. Updated her list and recommended she confirm when she gets home that she is taking these correctly in her pill box.    GERD:   Stable.    Hypothyroidism:   Stable. Last TSH is within normal limits.     Supplements:   Stable.    Gout:   Stable.    Rheumatoid Arthritis:  Stable. Continue following with rheumatologist.    PLAN:                            Change your amlodipine back to just 5 mg once daily, instead of twice daily if you have been taking it this way  Make sure you are taking the torsemide 40 mg a day in the morning.    Follow-up: Return in about 26 weeks (around 1/15/2025) for Medication Therapy Management, In-Clinic Visit at 10AM.    SUBJECTIVE/OBJECTIVE:                          Deanne Clark is a 84 year old female seen for a transitions of care visit. She was discharged from [facility] on 6/29/24 for respiratory failure and HFpEF. Patient was accompanied by sister Agata.     Reason for visit: med review.    Allergies/ADRs: Reviewed in chart  Past Medical History: Reviewed in chart  Tobacco: She reports that she has never smoked. She has never used smokeless tobacco.  Alcohol: not currently using    Medication Adherence/Access: Patient uses pill box(es).  Per patient, misses medication 0 times per week. Occasionally at night on a Sunday when her schedule is a little different.  Medication barriers: none.     Hypertension /HFpEF/Afib:  Amlodipine 5 mg twice daily - been taking it twice a day, got the directions confused.  Metoprolol tartrate 75 mg twice daily   Torsemide 40 mg daily - med list says 40 mg twice daily, but believes she has  been following the bottle that said 40 mg daily and med list was left over from it saying 20 mg twice daily. Per PCP note, had temporary increase in torsemide to 40 mg twice daily for just two days due to swelling   Warfarin 3.75 mg every Mon; 2.5 mg all other days - dosed by Anticoag, next INR check today  Patient reports no current medication side effects. Does get frequent nosebleeds, but thinks this is more due to being on oxygen. Reports the nosebleeds do clot after a few minutes.  Patient self monitors blood pressure.  Home BP monitoring , none recently since it has been good typically .    Checks her weight every day to confirm stable and not retaining fluid. Reports weight has been good lately.     BP Readings from Last 3 Encounters:   06/29/24 117/50   06/12/24 123/69   05/02/24 (!) 147/75     Potassium   Date Value Ref Range Status   06/29/2024 3.8 3.4 - 5.3 mmol/L Final   08/17/2022 3.7 3.4 - 5.3 mmol/L Final   06/22/2021 3.8 3.4 - 5.3 mmol/L Final     Lab Results   Component Value Date    INR 1.8 07/08/2024    INR 2.22 06/29/2024    INR 1.97 06/28/2024    INR 2.14 06/27/2024    INR 1.98 06/26/2024    INR 1.67 06/25/2024       GERD    Famotidine 40 mg daily as needed - hasn't needed  No concerns with heartburn at this time.       Hypothyroidism   Levothyroxine 112 mcg daily.   Patient is having the following symptoms: none.      TSH   Date Value Ref Range Status   07/14/2023 2.30 0.30 - 4.20 uIU/mL Final   03/28/2022 2.33 0.40 - 4.00 mU/L Final   06/22/2021 1.45 0.40 - 4.00 mU/L Final       Supplements   Calcium citrate daily  Multivitamin daily  Vitamin D daily  No reported issues at this time.        Gout:   Allopurinol 100 mg twice daily  No concerns with side effects.  Reports no flares since she started allopurinol, working well.    Uric Acid   Date Value Ref Range Status   07/09/2019 5.8 2.6 - 6.0 mg/dL Final     Rheumatoid Arthritis:  Methotrexate 20 mg (0.8 ml) injection weekly - Friday  Folic  acid 1 mg daily  Acetaminophen as needed - usually needs this about once a week  Injection is given to her by her sister, can't give it to herself.  Reports arthritis has been a little worse lately. Reports the weather really affects her arthritis. Normally doesn't have much for pain, this Sunday was abnormal.  Follows with rheumatologist, next visit is first week of August.    Today's Vitals: LMP  (LMP Unknown)   ----------------  Post Discharge Medication Reconciliation Status: discharge medications reconciled and changed, per note/orders.    I spent 45 minutes with this patient today. All changes were made via collaborative practice agreement with Yunior Huang MD. A copy of the visit note was provided to the patient's provider(s).    A summary of these recommendations was given to the patient.    Jessica Peterson, PharmD  Medication Therapy Management Pharmacist  Voicemail: [phone]           Medication Therapy Recommendations  Acute on chronic congestive heart failure, unspecified heart failure type (H)    Current Medication: Torsemide 40 MG TABS   Rationale: Does not understand instructions - Adherence - Adherence   Recommendation: Provide Education   Status: Patient Agreed - Adherence/Education

## 2024-07-17 ENCOUNTER — OFFICE VISIT (OUTPATIENT)
Dept: PHARMACY | Facility: CLINIC | Age: 84
End: 2024-07-17
Payer: COMMERCIAL

## 2024-07-17 ENCOUNTER — ANTICOAGULATION THERAPY VISIT (OUTPATIENT)
Dept: ANTICOAGULATION | Facility: CLINIC | Age: 84
End: 2024-07-17

## 2024-07-17 ENCOUNTER — LAB (OUTPATIENT)
Dept: LAB | Facility: CLINIC | Age: 84
End: 2024-07-17
Payer: COMMERCIAL

## 2024-07-17 DIAGNOSIS — I48.91 ATRIAL FIBRILLATION AND FLUTTER (H): ICD-10-CM

## 2024-07-17 DIAGNOSIS — E03.9 ACQUIRED HYPOTHYROIDISM: ICD-10-CM

## 2024-07-17 DIAGNOSIS — M06.9 RHEUMATOID ARTHRITIS, INVOLVING UNSPECIFIED SITE, UNSPECIFIED WHETHER RHEUMATOID FACTOR PRESENT (H): ICD-10-CM

## 2024-07-17 DIAGNOSIS — Z78.9 TAKES DIETARY SUPPLEMENTS: ICD-10-CM

## 2024-07-17 DIAGNOSIS — I10 BENIGN ESSENTIAL HYPERTENSION: Primary | ICD-10-CM

## 2024-07-17 DIAGNOSIS — I48.92 ATRIAL FIBRILLATION AND FLUTTER (H): ICD-10-CM

## 2024-07-17 DIAGNOSIS — K21.9 GASTROESOPHAGEAL REFLUX DISEASE, UNSPECIFIED WHETHER ESOPHAGITIS PRESENT: ICD-10-CM

## 2024-07-17 DIAGNOSIS — I50.9 ACUTE ON CHRONIC CONGESTIVE HEART FAILURE, UNSPECIFIED HEART FAILURE TYPE (H): ICD-10-CM

## 2024-07-17 DIAGNOSIS — Z95.3 S/P MITRAL VALVE REPLACEMENT WITH BIOPROSTHETIC VALVE: ICD-10-CM

## 2024-07-17 DIAGNOSIS — Z79.01 LONG TERM CURRENT USE OF ANTICOAGULANTS WITH INR GOAL OF 2.0-3.0: Primary | ICD-10-CM

## 2024-07-17 DIAGNOSIS — M10.9 GOUTY ARTHROPATHY: ICD-10-CM

## 2024-07-17 LAB — INR BLD: 1.9 (ref 0.9–1.1)

## 2024-07-17 PROCEDURE — 99207 PR NO CHARGE LOS: CPT | Performed by: PHARMACIST

## 2024-07-17 PROCEDURE — 85610 PROTHROMBIN TIME: CPT

## 2024-07-17 PROCEDURE — 36416 COLLJ CAPILLARY BLOOD SPEC: CPT

## 2024-07-17 RX ORDER — LEVOTHYROXINE SODIUM 112 UG/1
112 TABLET ORAL DAILY
Qty: 100 TABLET | Refills: 3 | Status: SHIPPED | OUTPATIENT
Start: 2024-07-17 | End: 2024-09-06

## 2024-07-17 NOTE — PATIENT INSTRUCTIONS
"Recommendations from today's MTM visit:                                                    MTM (medication therapy management) is a service provided by a clinical pharmacist designed to help you get the most of out of your medicines.   Today we reviewed what your medicines are for, how to know if they are working, that your medicines are safe and how to make your medicine regimen as easy as possible.      Change your amlodipine back to just 5 mg once daily, instead of twice daily if you have been taking it this way  Make sure you are taking the torsemide 40 mg a day in the morning.    Follow-up: Return in about 26 weeks (around 1/15/2025) for Medication Therapy Management, In-Clinic Visit at 10AM.    It was great speaking with you today.  I value your experience and would be very thankful for your time in providing feedback in our clinic survey. In the next few days, you may receive an email or text message from [employer] with a link to a survey related to your  clinical pharmacist.\"     To schedule another MTM appointment, please call the clinic directly or you may call the MTM scheduling line at [phone] or toll-free at [phone].     My Clinical Pharmacist's contact information:                                                      Please feel free to contact me with any questions or concerns you have.      Jessica Peterson, Jak  Medication Therapy Management Pharmacist  Voicemail: [phone]     "

## 2024-07-17 NOTE — PROGRESS NOTES
ANTICOAGULATION MANAGEMENT     Zunilda SHAW May 84 year old female is on warfarin with subtherapeutic INR result. (Goal INR 2.0-3.0)    Recent labs: (last 7 days)     07/17/24  1047   INR 1.9*       ASSESSMENT     Source(s): Chart Review and Patient/Caregiver Call     Warfarin doses taken: Warfarin taken as instructed  Diet: No new diet changes identified  Medication/supplement changes: None noted  New illness, injury, or hospitalization: No  Signs or symptoms of bleeding or clotting: No  Previous result: Subtherapeutic  Additional findings: None       PLAN     Recommended plan for no diet, medication or health factor changes affecting INR     Dosing Instructions: Continue your current warfarin dose with next INR in 1-2 weeks       Summary  As of 7/17/2024      Full warfarin instructions:  3.75 mg every Mon, Fri; 2.5 mg all other days   Next INR check:  7/30/2024               Telephone call with Deanne who agrees to plan and repeated back plan correctly    Lab visit scheduled    Education provided: Please call back if any changes to your diet, medications or how you've been taking warfarin  Contact [phone] with any changes, questions or concerns.     Plan made per [facility] anticoagulation protocol    Екатерина Mahan RN  Anticoagulation Clinic  7/17/2024    _______________________________________________________________________     Anticoagulation Episode Summary       Current INR goal:  2.0-3.0   TTR:  54.2% (10.3 mo)   Target end date:  Indefinite   Send INR reminders to:  [facility]    Indications    Long term current use of anticoagulants with INR goal of 2.0-3.0 [Z79.01]  Atrial fibrillation and flutter (H) [I48.91  I48.92]  S/P mitral valve replacement with bioprosthetic valve [Z95.3]             Comments:  27 mm Magna bioprosthetic valve (2014)             Anticoagulation Care Providers       Provider Role Specialty Phone number    Yunior Huang MD Referring Internal Medicine [phone]

## 2024-07-22 ENCOUNTER — OFFICE VISIT (OUTPATIENT)
Dept: CARDIOLOGY | Facility: CLINIC | Age: 84
End: 2024-07-22
Payer: COMMERCIAL

## 2024-07-22 VITALS
WEIGHT: 136.7 LBS | HEIGHT: 64 IN | BODY MASS INDEX: 23.34 KG/M2 | OXYGEN SATURATION: 98 % | HEART RATE: 84 BPM | SYSTOLIC BLOOD PRESSURE: 131 MMHG | DIASTOLIC BLOOD PRESSURE: 69 MMHG

## 2024-07-22 DIAGNOSIS — I27.20 PULMONARY HYPERTENSION (H): ICD-10-CM

## 2024-07-22 DIAGNOSIS — Z95.3 S/P MITRAL VALVE REPLACEMENT WITH BIOPROSTHETIC VALVE: ICD-10-CM

## 2024-07-22 DIAGNOSIS — I50.30 HEART FAILURE WITH PRESERVED EJECTION FRACTION, NYHA CLASS I (H): Primary | ICD-10-CM

## 2024-07-22 DIAGNOSIS — I06.0 RHEUMATIC AORTIC STENOSIS: ICD-10-CM

## 2024-07-22 PROCEDURE — 99215 OFFICE O/P EST HI 40 MIN: CPT | Performed by: PHYSICIAN ASSISTANT

## 2024-07-22 NOTE — LETTER
"7/22/2024    Yunior Huang MD  [address] [facility] [address]    RE: Zunilda Clark       Dear Colleague,     I had the pleasure of seeing Zunilda Clark in the [facility].      Cardiology Progress Note    Date of Service: 07/22/24      Reason for visit: Follow up aortic and mitral valve disease, pulmonary hypertension.       Primary cardiologist: Dr. Noe Rodriguez        HPI:  Ms. 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 However, she has chosen to proceed conservatively.      When I met with Deanne in March of 2023, she had suffered a fall and a T12 burst fracture which they were also treating conservatively, but breathing was ok. When she met with Dr. Rodriguez in July 2023, repeat echo at that time showed findings similar to prior, though aortic stenosis had progressed to mod-severe. As she was feeling well, however, no changes were made. At our most recent visit in January, she had just suffered another fall and fractured her femur and underwent repair which sounds to have gone well. However, she was also having back pain and wearing a brace which was limiting her ambulation. She had another echo prior to that visit; EF remained preserved at 60-65%. 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 Her RV is pressures were elevated and RV is mild to moderately dilated, but with normal RV function reported. From a symptom standpoint, she felt she was doing ok, but as mentioned, was not very active. After d/w with Dr. Rodriguez, we offered a structural clinic evaluation which she politely declined as she was asymptomatic.    Deanne was then hospitalized in mid February for shortness of breath and orthopnea, felt to have an acute HFpEF exacerbation and was noted to be COVID positive. She was diuresed with IV Lasix and diuretics were change to PO lasix 40mg daily. It appears that most of her antihypertensives were held on admission, but she was kept on metoprolol, then prior to discharge, for improved BP control metoprolol was also increased to 75mg BID. Her valvular disease was again discussed but she again deferred further invasive procedures. When I met with her in clinic in mid March, she relayed still having more MORRISON than usual and noted desatting to the mid 80s with exertion. We again discussed structural evaluation but continues to request conservative measures and was agreeable to meet with Palliative Care. To help with her symptoms, I changed her diuretic back to torsemide at 20mg daily. When I saw her lat in early April, she was noting a bit more swelling but breathing hadn't worsened. As she was already urinating frequently she was hesitant to increase diuretics so we opted to continue to monitor. She was agreeable to establish with Palliative Care.    More recently, Deanne was hospitalized again in late June with increased SOB and edema. CT showed pulmonary edema. She was given IV diuresis and repeat echo showed known severe stenosis of mitral valve, aortic valve with severe pulmonary HTN.  Overall this was not significantly changed from prior. She was not yet ready for hospice but wanted to continue to follow with Palliative. She was diuresed 2.5L and weight on dischare was  130.8#. Home torsemide was increased from 20mg to 40mg daily.     Today, I'm seeing Deanne back in clinic to follow up. Her home weight has been slowly increasing since discharge from 131# up to 136# today. She is noting more swelling in her legs again as well. Fortunately, her breathing has not worsened much. She tells me she is still urinating a lot on the diuretics but his is becoming difficult because she soaks through depends and often has to change her clothes; this is makes it difficult to leave her home. She denies any chest pain, palpitations, or dizziness/presyncope.        There were no new labs performed prior to our visit today. Most recent labs reviewed as below.        ASSESSMENT/PLAN:     Multivalvular dysfunction.  --Ms. Clark has a history of Rheumatic heart disease with bioprosthetic mitral valve replacement in 2007, and redo in 2014 with a 27 mm Magna bioprosthetic mitral valve.  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 We offered structural clinic for further evaluation but she continues to politely decline and is not interested in any further invasive procedures. She would like to continue to proceed conservatively. She is now following with Palliative Care and tells me she is now interested in Hospice. I have messaged her PCP as a courtesy to let his team know.   --Weight is slowly going up since being out of the hospital. She is having more edema as well. She is on 40mg daily of torsemide but continues to endorse that incontinence has been an issue as she soaks through depends and has to change her clothes at times. She is nervous about leaving her home often because of this. We will continue to do our best to keep her fluid off; today, I gave her the following instructions:  --> If your home weight is 133 lbs or LESS, take just one 40mg torsemide that morning.  -->If your home weight  is 134 lbs or MORE take 1.5 tablets (60mg of torsemide) that morning.               2. High degree AV block.              --Post surgery 2007, s/p dual-chamber pacemaker placement.  Last device check March 2024: AP 97%,  94%. Patient in mode switch <1%. Battery life 8.5 years.               --She remains anticoagulated with warfarin, and follows with the INR clinic. She sounds to be somewhat labile.     Follow up plan:  Return in 1 month to see me back in clinic with labs, unless she decides to enroll in hospice. Currently, she also has an appt in November to see Dr. Rodriguez. As she is not interested in intervention, will not repeat cardiac imaging and continue to treat symptomatic/conservatively.       Orders this Visit:  Orders Placed This Encounter   Procedures    Basic metabolic panel    N terminal pro BNP outpatient    Follow-Up with Cardiology [facility]     No orders of the defined types were placed in this encounter.    There are no discontinued medications.        CURRENT MEDICATIONS:  Current Outpatient Medications   Medication Sig Dispense Refill    acetaminophen (TYLENOL) 500 MG tablet Take 1,000 mg by mouth every 8 hours as needed for pain      allopurinol (ZYLOPRIM) 100 MG tablet Take 1 tablet (100 mg) by mouth 2 times daily 180 tablet 3    amLODIPine (NORVASC) 5 MG tablet Take 1 tablet (5 mg) by mouth daily 90 tablet 3    calcium citrate (CALCITRATE) 950 MG tablet Take 1 tablet by mouth daily       famotidine (PEPCID) 40 MG tablet Take 1 tablet (40 mg) by mouth daily 90 tablet 2    folic acid (FOLVITE) 1 MG tablet Take 1 mg by mouth daily      latanoprost (XALATAN) 0.005 % ophthalmic solution Place 1 drop into both eyes every evening      levothyroxine (SYNTHROID/LEVOTHROID) 112 MCG tablet Take 1 tablet (112 mcg) by mouth daily 100 tablet 3    methotrexate 50 MG/2ML injection Inject 0.8 mLs Subcutaneous every 7 days (Fridays)      Metoprolol Tartrate 75 MG TABS Take 1 tablet by mouth 2 times daily 180 tablet 0  "   multivitamin w/minerals (THERA-VIT-M) tablet Take 1 tablet by mouth daily Without iron      Torsemide 40 MG TABS Take 40 mg by mouth daily 60 tablet 0    VITAMIN D, CHOLECALCIFEROL, PO Take 1,000 Units by mouth daily      warfarin ANTICOAGULANT (COUMADIN) 2.5 MG tablet Take 2.5 mg by mouth daily         Review of Systems:  POS ROS ARE BOLDED, all other negative.    Cardiovascular: Chest pain, palpitations, orthopnea, LE edema  Resp: Dyspnea on exertion, cough, known chronic lung disease  Hematologic/lymphatic: Current systemic anticoagulation, hx of blood clots, new bleeding concerns.  Neurological: Dizziness, presyncope/syncope.    Physical Exam:  Vitals: /69 (BP Location: Right arm, Patient Position: Sitting, Cuff Size: Adult Regular)   Pulse 84   Ht 1.626 m (5' 4\")   Wt 62 kg (136 lb 11.2 oz)   LMP  (LMP Unknown)   SpO2 98%   BMI 23.46 kg/m     Wt Readings from Last 4 Encounters:   07/22/24 62 kg (136 lb 11.2 oz)   06/29/24 59.2 kg (130 lb 8 oz)   06/12/24 61.6 kg (135 lb 11.2 oz)   05/02/24 60.3 kg (133 lb)       GEN:  In general, this is a somewhat frail appearing  female in no acute distress on RA.  Patient ambulatory with a walker, accompanied by her sister today.  C/V: RRR, though with 2-3/6 JON heard at both right and left sternal borders.   RESP: Respirations are unlabored. No use of accessory muscles. Few faint crackles in bases. No wheezing or rhonchi.  EXTREM: 1-2+ L>R PT edema.   NEURO: Alert and oriented, cooperative. Gait not assessed.     Recent Lab Results:    LIPID RESULTS:  Lab Results   Component Value Date    CHOL 147 07/08/2024    CHOL 167 11/17/2020    HDL 42 (L) 07/08/2024    HDL 45 (L) 11/17/2020    LDL 86 07/08/2024     (H) 11/17/2020    TRIG 97 07/08/2024    TRIG 87 11/17/2020    CHOLHDLRATIO 3.6 11/13/2015       CBC RESULTS:  Lab Results   Component Value Date    WBC 5.9 06/25/2024    WBC 7.1 06/22/2021    RBC 2.87 (L) 06/25/2024    RBC 3.75 (L) 06/22/2021 "    HGB 9.8 (L) 06/25/2024    HGB 12.5 06/22/2021    HCT 31.2 (L) 06/25/2024    HCT 38.6 06/22/2021     (H) 06/25/2024     (H) 06/22/2021    MCH 34.1 (H) 06/25/2024    MCH 33.3 (H) 06/22/2021    MCHC 31.4 (L) 06/25/2024    MCHC 32.4 06/22/2021    RDW 18.1 (H) 06/25/2024    RDW 17.0 (H) 06/22/2021     06/25/2024     06/22/2021       BMP RESULTS:  Lab Results   Component Value Date     06/29/2024     06/22/2021    POTASSIUM 3.8 06/29/2024    POTASSIUM 3.7 08/17/2022    POTASSIUM 3.8 06/22/2021    CHLORIDE 100 06/29/2024    CHLORIDE 107 08/17/2022    CHLORIDE 104 06/22/2021    CO2 31 (H) 06/29/2024    CO2 26 08/17/2022    CO2 33 (H) 06/22/2021    ANIONGAP 9 06/29/2024    ANIONGAP 7 08/17/2022    ANIONGAP 1 (L) 06/22/2021    GLC 87 06/29/2024     (H) 09/27/2023    GLC 91 08/17/2022    GLC 88 06/22/2021    BUN 28.4 (H) 06/29/2024    BUN 21 08/17/2022    BUN 27 06/22/2021    CR 0.97 (H) 06/29/2024    CR 1.11 (H) 06/22/2021    GFRESTIMATED 58 (L) 06/29/2024    GFRESTIMATED 50.9 06/28/2021    GFRESTBLACK 54 (L) 06/22/2021    BRICE 9.6 06/29/2024    BRICE 9.6 06/22/2021        Recent Labs   Lab Test 06/24/24  2134 03/27/24  1257 03/18/24  1204 02/22/24  1026 09/26/23  0934 03/13/23  1110   NTBNPI 5,079*  --   --  4,362* 1,711  --    NTBNP  --  4,055* 4,811*  --   --  1,680       Additional pertinent testing:    Echo 1/5/24  Interpretation Summary     There is a 27 mm Magna bioprosthetic valve in the mitral position.  Severe prosthetic mitral valve stenosis  Severe valvular aortic stenosis.  There is mild (1+) aortic regurgitation.  The visual ejection fraction is 60-65%.  Left ventricular systolic function is normal.  The right ventricle is mild to moderately dilated.  The right ventricular systolic function is normal.  There is moderate (2+) tricuspid regurgitation.  Right ventricular systolic pressure is elevated, consistent with severe  pulmonary hypertension.  The rhythm was  atrial fibrillation with paced rhythm.  Compared to the previous echocardiogram, the mean gradient across the mitral  valve prosthesis has significantly increased at a similar heart rate. The mean  gradient now is 12 mmHg and was 5.6 mmHg on 18 June of 2023.  The degree of aortic stenosis has also progressed. Previously the mean  gradient across the aortic valve was 21.9 mmHg and a calculated aortic valve  area of was 1.0 cmÂ . Now the mean gradient across the aortic valve is 33.7  mmHg and the calculated aortic valve area is 0.87 cmÂ .  The degree of tricuspid regurgitation is similar to previously being moderate.  Pulmonary pressures are lower on this study than previously but still severe.      40 total minutes was spent today including chart review, precharting, history and exam, patient education, post visit documentation, and reviewing studies as outlined above.       Zena Benitez PA-C  [facility]  Pager [phone]      Thank you for allowing me to participate in the care of your patient.      Sincerely,     YOKASTA Salcido     [facility]  cc:   Noe Rodriguez MD  [address], [address]  [place] [address]

## 2024-07-22 NOTE — PROGRESS NOTES
"    Cardiology Progress Note    Date of Service: 07/22/24      Reason for visit: Follow up aortic and mitral valve disease, pulmonary hypertension.       Primary cardiologist: Dr. Noe Rodriguez        HPI:  Ms. 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 However, she has chosen to proceed conservatively.      When I met with Deanne in March of 2023, she had suffered a fall and a T12 burst fracture which they were also treating conservatively, but breathing was ok. When she met with Dr. Rodriguez in July 2023, repeat echo at that time showed findings similar to prior, though aortic stenosis had progressed to mod-severe. As she was feeling well, however, no changes were made. At our most recent visit in January, she had just suffered another fall and fractured her femur and underwent repair which sounds to have gone well. However, she was also having back pain and wearing a brace which was limiting her ambulation. She had another echo prior to that visit; EF remained preserved at 60-65%. 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 Her RV is pressures were elevated and RV is mild to moderately dilated, but with normal RV function reported. From a symptom standpoint, she felt she was doing ok, but as " mentioned, was not very active. After d/w with Dr. Rodriguez, we offered a structural clinic evaluation which she politely declined as she was asymptomatic.    Deanne was then hospitalized in mid February for shortness of breath and orthopnea, felt to have an acute HFpEF exacerbation and was noted to be COVID positive. She was diuresed with IV Lasix and diuretics were change to PO lasix 40mg daily. It appears that most of her antihypertensives were held on admission, but she was kept on metoprolol, then prior to discharge, for improved BP control metoprolol was also increased to 75mg BID. Her valvular disease was again discussed but she again deferred further invasive procedures. When I met with her in clinic in mid March, she relayed still having more MORRISON than usual and noted desatting to the mid 80s with exertion. We again discussed structural evaluation but continues to request conservative measures and was agreeable to meet with Palliative Care. To help with her symptoms, I changed her diuretic back to torsemide at 20mg daily. When I saw her lat in early April, she was noting a bit more swelling but breathing hadn't worsened. As she was already urinating frequently she was hesitant to increase diuretics so we opted to continue to monitor. She was agreeable to establish with Palliative Care.    More recently, Deanne was hospitalized again in late June with increased SOB and edema. CT showed pulmonary edema. She was given IV diuresis and repeat echo showed known severe stenosis of mitral valve, aortic valve with severe pulmonary HTN.  Overall this was not significantly changed from prior. She was not yet ready for hospice but wanted to continue to follow with Palliative. She was diuresed 2.5L and weight on dischare was 130.8#. Home torsemide was increased from 20mg to 40mg daily.     Today, I'm seeing Deanne back in clinic to follow up. Her home weight has been slowly increasing since discharge from 131# up to 136#  today. She is noting more swelling in her legs again as well. Fortunately, her breathing has not worsened much. She tells me she is still urinating a lot on the diuretics but his is becoming difficult because she soaks through depends and often has to change her clothes; this is makes it difficult to leave her home. She denies any chest pain, palpitations, or dizziness/presyncope.        There were no new labs performed prior to our visit today. Most recent labs reviewed as below.        ASSESSMENT/PLAN:     Multivalvular dysfunction.  --Ms. Clark has a history of Rheumatic heart disease with bioprosthetic mitral valve replacement in 2007, and redo in 2014 with a 27 mm Magna bioprosthetic mitral valve.  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 We offered structural clinic for further evaluation but she continues to politely decline and is not interested in any further invasive procedures. She would like to continue to proceed conservatively. She is now following with Palliative Care and tells me she is now interested in Hospice. I have messaged her PCP as a courtesy to let his team know.   --Weight is slowly going up since being out of the hospital. She is having more edema as well. She is on 40mg daily of torsemide but continues to endorse that incontinence has been an issue as she soaks through depends and has to change her clothes at times. She is nervous about leaving her home often because of this. We will continue to do our best to keep her fluid off; today, I gave her the following instructions:  --> If your home weight is 133 lbs or LESS, take just one 40mg torsemide that morning.  -->If your home weight is 134 lbs or MORE take 1.5 tablets (60mg of torsemide) that morning.               2. High degree AV block.              --Post surgery 2007, s/p dual-chamber pacemaker placement.  Last device check  March 2024: AP 97%,  94%. Patient in mode switch <1%. Battery life 8.5 years.               --She remains anticoagulated with warfarin, and follows with the INR clinic. She sounds to be somewhat labile.     Follow up plan:  Return in 1 month to see me back in clinic with labs, unless she decides to enroll in hospice. Currently, she also has an appt in November to see Dr. Rodriguez. As she is not interested in intervention, will not repeat cardiac imaging and continue to treat symptomatic/conservatively.       Orders this Visit:  Orders Placed This Encounter   Procedures    Basic metabolic panel    N terminal pro BNP outpatient    Follow-Up with Cardiology [facility]     No orders of the defined types were placed in this encounter.    There are no discontinued medications.        CURRENT MEDICATIONS:  Current Outpatient Medications   Medication Sig Dispense Refill    acetaminophen (TYLENOL) 500 MG tablet Take 1,000 mg by mouth every 8 hours as needed for pain      allopurinol (ZYLOPRIM) 100 MG tablet Take 1 tablet (100 mg) by mouth 2 times daily 180 tablet 3    amLODIPine (NORVASC) 5 MG tablet Take 1 tablet (5 mg) by mouth daily 90 tablet 3    calcium citrate (CALCITRATE) 950 MG tablet Take 1 tablet by mouth daily       famotidine (PEPCID) 40 MG tablet Take 1 tablet (40 mg) by mouth daily 90 tablet 2    folic acid (FOLVITE) 1 MG tablet Take 1 mg by mouth daily      latanoprost (XALATAN) 0.005 % ophthalmic solution Place 1 drop into both eyes every evening      levothyroxine (SYNTHROID/LEVOTHROID) 112 MCG tablet Take 1 tablet (112 mcg) by mouth daily 100 tablet 3    methotrexate 50 MG/2ML injection Inject 0.8 mLs Subcutaneous every 7 days (Fridays)      Metoprolol Tartrate 75 MG TABS Take 1 tablet by mouth 2 times daily 180 tablet 0    multivitamin w/minerals (THERA-VIT-M) tablet Take 1 tablet by mouth daily Without iron      Torsemide 40 MG TABS Take 40 mg by mouth daily 60 tablet 0    VITAMIN D, CHOLECALCIFEROL, PO Take 1,000  "Units by mouth daily      warfarin ANTICOAGULANT (COUMADIN) 2.5 MG tablet Take 2.5 mg by mouth daily         Review of Systems:  POS ROS ARE BOLDED, all other negative.    Cardiovascular: Chest pain, palpitations, orthopnea, LE edema  Resp: Dyspnea on exertion, cough, known chronic lung disease  Hematologic/lymphatic: Current systemic anticoagulation, hx of blood clots, new bleeding concerns.  Neurological: Dizziness, presyncope/syncope.    Physical Exam:  Vitals: /69 (BP Location: Right arm, Patient Position: Sitting, Cuff Size: Adult Regular)   Pulse 84   Ht 1.626 m (5' 4\")   Wt 62 kg (136 lb 11.2 oz)   LMP  (LMP Unknown)   SpO2 98%   BMI 23.46 kg/m     Wt Readings from Last 4 Encounters:   07/22/24 62 kg (136 lb 11.2 oz)   06/29/24 59.2 kg (130 lb 8 oz)   06/12/24 61.6 kg (135 lb 11.2 oz)   05/02/24 60.3 kg (133 lb)       GEN:  In general, this is a somewhat frail appearing  female in no acute distress on RA.  Patient ambulatory with a walker, accompanied by her sister today.  C/V: RRR, though with 2-3/6 JON heard at both right and left sternal borders.   RESP: Respirations are unlabored. No use of accessory muscles. Few faint crackles in bases. No wheezing or rhonchi.  EXTREM: 1-2+ L>R PT edema.   NEURO: Alert and oriented, cooperative. Gait not assessed.     Recent Lab Results:    LIPID RESULTS:  Lab Results   Component Value Date    CHOL 147 07/08/2024    CHOL 167 11/17/2020    HDL 42 (L) 07/08/2024    HDL 45 (L) 11/17/2020    LDL 86 07/08/2024     (H) 11/17/2020    TRIG 97 07/08/2024    TRIG 87 11/17/2020    CHOLHDLRATIO 3.6 11/13/2015       CBC RESULTS:  Lab Results   Component Value Date    WBC 5.9 06/25/2024    WBC 7.1 06/22/2021    RBC 2.87 (L) 06/25/2024    RBC 3.75 (L) 06/22/2021    HGB 9.8 (L) 06/25/2024    HGB 12.5 06/22/2021    HCT 31.2 (L) 06/25/2024    HCT 38.6 06/22/2021     (H) 06/25/2024     (H) 06/22/2021    MCH 34.1 (H) 06/25/2024    MCH 33.3 (H) " 06/22/2021    MCHC 31.4 (L) 06/25/2024    MCHC 32.4 06/22/2021    RDW 18.1 (H) 06/25/2024    RDW 17.0 (H) 06/22/2021     06/25/2024     06/22/2021       BMP RESULTS:  Lab Results   Component Value Date     06/29/2024     06/22/2021    POTASSIUM 3.8 06/29/2024    POTASSIUM 3.7 08/17/2022    POTASSIUM 3.8 06/22/2021    CHLORIDE 100 06/29/2024    CHLORIDE 107 08/17/2022    CHLORIDE 104 06/22/2021    CO2 31 (H) 06/29/2024    CO2 26 08/17/2022    CO2 33 (H) 06/22/2021    ANIONGAP 9 06/29/2024    ANIONGAP 7 08/17/2022    ANIONGAP 1 (L) 06/22/2021    GLC 87 06/29/2024     (H) 09/27/2023    GLC 91 08/17/2022    GLC 88 06/22/2021    BUN 28.4 (H) 06/29/2024    BUN 21 08/17/2022    BUN 27 06/22/2021    CR 0.97 (H) 06/29/2024    CR 1.11 (H) 06/22/2021    GFRESTIMATED 58 (L) 06/29/2024    GFRESTIMATED 50.9 06/28/2021    GFRESTBLACK 54 (L) 06/22/2021    BRICE 9.6 06/29/2024    BRICE 9.6 06/22/2021        Recent Labs   Lab Test 06/24/24  2134 03/27/24  1257 03/18/24  1204 02/22/24  1026 09/26/23  0934 03/13/23  1110   NTBNPI 5,079*  --   --  4,362* 1,711  --    NTBNP  --  4,055* 4,811*  --   --  1,680       Additional pertinent testing:    Echo 1/5/24  Interpretation Summary     There is a 27 mm Magna bioprosthetic valve in the mitral position.  Severe prosthetic mitral valve stenosis  Severe valvular aortic stenosis.  There is mild (1+) aortic regurgitation.  The visual ejection fraction is 60-65%.  Left ventricular systolic function is normal.  The right ventricle is mild to moderately dilated.  The right ventricular systolic function is normal.  There is moderate (2+) tricuspid regurgitation.  Right ventricular systolic pressure is elevated, consistent with severe  pulmonary hypertension.  The rhythm was atrial fibrillation with paced rhythm.  Compared to the previous echocardiogram, the mean gradient across the mitral  valve prosthesis has significantly increased at a similar heart rate. The  mean  gradient now is 12 mmHg and was 5.6 mmHg on 18 June of 2023.  The degree of aortic stenosis has also progressed. Previously the mean  gradient across the aortic valve was 21.9 mmHg and a calculated aortic valve  area of was 1.0 cmÂ . Now the mean gradient across the aortic valve is 33.7  mmHg and the calculated aortic valve area is 0.87 cmÂ .  The degree of tricuspid regurgitation is similar to previously being moderate.  Pulmonary pressures are lower on this study than previously but still severe.      40 total minutes was spent today including chart review, precharting, history and exam, patient education, post visit documentation, and reviewing studies as outlined above.       Zena Benitez PA-C  [facility]  Pager [phone]

## 2024-07-30 ENCOUNTER — LAB (OUTPATIENT)
Dept: LAB | Facility: CLINIC | Age: 84
End: 2024-07-30
Payer: COMMERCIAL

## 2024-07-30 ENCOUNTER — ANTICOAGULATION THERAPY VISIT (OUTPATIENT)
Dept: ANTICOAGULATION | Facility: CLINIC | Age: 84
End: 2024-07-30

## 2024-07-30 DIAGNOSIS — Z95.3 S/P MITRAL VALVE REPLACEMENT WITH BIOPROSTHETIC VALVE: ICD-10-CM

## 2024-07-30 DIAGNOSIS — I48.91 ATRIAL FIBRILLATION AND FLUTTER (H): ICD-10-CM

## 2024-07-30 DIAGNOSIS — I48.92 ATRIAL FIBRILLATION AND FLUTTER (H): ICD-10-CM

## 2024-07-30 DIAGNOSIS — Z79.01 LONG TERM CURRENT USE OF ANTICOAGULANTS WITH INR GOAL OF 2.0-3.0: Primary | ICD-10-CM

## 2024-07-30 LAB — INR BLD: 2.1 (ref 0.9–1.1)

## 2024-07-30 PROCEDURE — 85610 PROTHROMBIN TIME: CPT

## 2024-07-30 PROCEDURE — 36416 COLLJ CAPILLARY BLOOD SPEC: CPT

## 2024-07-30 NOTE — PROGRESS NOTES
ANTICOAGULATION MANAGEMENT     Zunilda SHAW May 84 year old female is on warfarin with therapeutic INR result. (Goal INR 2.0-3.0)    Recent labs: (last 7 days)     07/30/24  1324   INR 2.1*       ASSESSMENT     Source(s): Chart Review and Patient/Caregiver Call     Warfarin doses taken: Warfarin taken as instructed  Diet: Rarely has greens but reports a couple servings the last week - eats in a cafeteria at her assisted living  Medication/supplement changes: None noted  New illness, injury, or hospitalization: No  Signs or symptoms of bleeding or clotting: Recent nosebleed. Vasoline is effective for her.   Previous result: Subtherapeutic  Additional findings: None       PLAN     Recommended plan for no diet, medication or health factor changes affecting INR     Dosing Instructions: Continue your current warfarin dose with next INR in 2-3 weeks       Summary  As of 7/30/2024      Full warfarin instructions:  3.75 mg every Mon, Fri; 2.5 mg all other days   Next INR check:  8/20/2024               Telephone call with Deanne who verbalizes understanding and agrees to plan    Lab visit scheduled    Education provided: Please call back if any changes to your diet, medications or how you've been taking warfarin  Dietary considerations: importance of notifying [facility] to changes in diet    Plan made per [facility] anticoagulation protocol    Pearl Mahan RN  Anticoagulation Clinic  7/30/2024    _______________________________________________________________________     Anticoagulation Episode Summary       Current INR goal:  2.0-3.0   TTR:  52.1% (10.3 mo)   Target end date:  Indefinite   Send INR reminders to:  [facility]    Indications    Long term current use of anticoagulants with INR goal of 2.0-3.0 [Z79.01]  Atrial fibrillation and flutter (H) [I48.91  I48.92]  S/P mitral valve replacement with bioprosthetic valve [Z95.3]             Comments:  27 mm Magna bioprosthetic valve (2014)              Anticoagulation Care Providers       Provider Role Specialty Phone number    Yunior Huang MD Referring Internal Medicine [phone]

## 2024-08-01 ENCOUNTER — TRANSFERRED RECORDS (OUTPATIENT)
Dept: HEALTH INFORMATION MANAGEMENT | Facility: CLINIC | Age: 84
End: 2024-08-01
Payer: COMMERCIAL

## 2024-08-01 LAB — ALT SERPL-CCNC: 25 IU/L (ref 6–32)

## 2024-08-02 ENCOUNTER — TELEPHONE (OUTPATIENT)
Dept: INTERNAL MEDICINE | Facility: CLINIC | Age: 84
End: 2024-08-02
Payer: COMMERCIAL

## 2024-08-02 NOTE — TELEPHONE ENCOUNTER
Forms/Letter Request    Type of form/letter: discontinue from OT 7/31/2024      Do we have the form/letter: Yes: placed in provider mailbox for signature    Who is the form from? [facility] # [id number]    Where did/will the form come from? form was faxed in    When is form/letter needed by: 5-7    How would you like the form/letter returned: Fax : [phone]    Patient Notified form requests are processed in 5-7 business days:Yes    Could we send this information to you in [employer] or would you prefer to receive a phone call?:   Patient would prefer a phone call   Okay to leave a detailed message?: Yes at Other phone number:  [phone]

## 2024-08-05 ENCOUNTER — OFFICE VISIT (OUTPATIENT)
Dept: INTERNAL MEDICINE | Facility: CLINIC | Age: 84
End: 2024-08-05
Payer: COMMERCIAL

## 2024-08-05 ENCOUNTER — MEDICAL CORRESPONDENCE (OUTPATIENT)
Dept: HEALTH INFORMATION MANAGEMENT | Facility: CLINIC | Age: 84
End: 2024-08-05

## 2024-08-05 DIAGNOSIS — I10 BENIGN ESSENTIAL HYPERTENSION: ICD-10-CM

## 2024-08-05 DIAGNOSIS — I48.91 ATRIAL FIBRILLATION AND FLUTTER (H): ICD-10-CM

## 2024-08-05 DIAGNOSIS — Z00.00 ENCOUNTER FOR PREVENTATIVE ADULT HEALTH CARE EXAMINATION: Primary | ICD-10-CM

## 2024-08-05 DIAGNOSIS — Z29.11 NEED FOR VACCINATION AGAINST RESPIRATORY SYNCYTIAL VIRUS: ICD-10-CM

## 2024-08-05 DIAGNOSIS — M06.9 RHEUMATOID ARTHRITIS, INVOLVING UNSPECIFIED SITE, UNSPECIFIED WHETHER RHEUMATOID FACTOR PRESENT (H): ICD-10-CM

## 2024-08-05 DIAGNOSIS — D64.9 ANEMIA, UNSPECIFIED TYPE: ICD-10-CM

## 2024-08-05 DIAGNOSIS — I27.20 PULMONARY HYPERTENSION (H): ICD-10-CM

## 2024-08-05 DIAGNOSIS — E03.9 ACQUIRED HYPOTHYROIDISM: ICD-10-CM

## 2024-08-05 DIAGNOSIS — I48.92 ATRIAL FIBRILLATION AND FLUTTER (H): ICD-10-CM

## 2024-08-05 LAB
ERYTHROCYTE [DISTWIDTH] IN BLOOD BY AUTOMATED COUNT: 19.2 % (ref 10–15)
HCT VFR BLD AUTO: 32.7 % (ref 35–47)
HGB BLD-MCNC: 10.7 G/DL (ref 11.7–15.7)
MCH RBC QN AUTO: 35.1 PG (ref 26.5–33)
MCHC RBC AUTO-ENTMCNC: 32.7 G/DL (ref 31.5–36.5)
MCV RBC AUTO: 107 FL (ref 78–100)
PLATELET # BLD AUTO: 335 10E3/UL (ref 150–450)
RBC # BLD AUTO: 3.05 10E6/UL (ref 3.8–5.2)
RETICS # AUTO: 0.09 10E6/UL (ref 0.03–0.1)
RETICS/RBC NFR AUTO: 2.8 % (ref 0.5–2)
WBC # BLD AUTO: 6.9 10E3/UL (ref 4–11)

## 2024-08-05 PROCEDURE — 36415 COLL VENOUS BLD VENIPUNCTURE: CPT | Performed by: INTERNAL MEDICINE

## 2024-08-05 PROCEDURE — 85027 COMPLETE CBC AUTOMATED: CPT | Performed by: INTERNAL MEDICINE

## 2024-08-05 PROCEDURE — 85045 AUTOMATED RETICULOCYTE COUNT: CPT | Performed by: INTERNAL MEDICINE

## 2024-08-05 PROCEDURE — 83615 LACTATE (LD) (LDH) ENZYME: CPT | Performed by: INTERNAL MEDICINE

## 2024-08-05 PROCEDURE — 82607 VITAMIN B-12: CPT | Performed by: INTERNAL MEDICINE

## 2024-08-05 PROCEDURE — 84443 ASSAY THYROID STIM HORMONE: CPT | Performed by: INTERNAL MEDICINE

## 2024-08-05 PROCEDURE — 83540 ASSAY OF IRON: CPT | Performed by: INTERNAL MEDICINE

## 2024-08-05 PROCEDURE — 83550 IRON BINDING TEST: CPT | Performed by: INTERNAL MEDICINE

## 2024-08-05 PROCEDURE — 82746 ASSAY OF FOLIC ACID SERUM: CPT | Performed by: INTERNAL MEDICINE

## 2024-08-05 PROCEDURE — 84439 ASSAY OF FREE THYROXINE: CPT | Performed by: INTERNAL MEDICINE

## 2024-08-05 PROCEDURE — G0439 PPPS, SUBSEQ VISIT: HCPCS | Performed by: INTERNAL MEDICINE

## 2024-08-05 PROCEDURE — 99214 OFFICE O/P EST MOD 30 MIN: CPT | Mod: 25 | Performed by: INTERNAL MEDICINE

## 2024-08-05 SDOH — HEALTH STABILITY: PHYSICAL HEALTH: ON AVERAGE, HOW MANY MINUTES DO YOU ENGAGE IN EXERCISE AT THIS LEVEL?: 30 MIN

## 2024-08-05 SDOH — HEALTH STABILITY: PHYSICAL HEALTH: ON AVERAGE, HOW MANY DAYS PER WEEK DO YOU ENGAGE IN MODERATE TO STRENUOUS EXERCISE (LIKE A BRISK WALK)?: 3 DAYS

## 2024-08-05 ASSESSMENT — SOCIAL DETERMINANTS OF HEALTH (SDOH): HOW OFTEN DO YOU GET TOGETHER WITH FRIENDS OR RELATIVES?: MORE THAN THREE TIMES A WEEK

## 2024-08-05 ASSESSMENT — PAIN SCALES - GENERAL: PAINLEVEL: MODERATE PAIN (5)

## 2024-08-05 NOTE — PATIENT INSTRUCTIONS
Recommend to start Fosamax for treatment of osteoporosis  Will make a referral to lung specialist to assess oxygen needs and treatment of increased lung pressure

## 2024-08-05 NOTE — PROGRESS NOTES
Preventive Care Visit  [facility]  Yunior Huang MD, Internal Medicine  Aug 5, 2024      Assessment & Plan       Encounter for preventative adult health care examination  advised regular aerobic activity, low cholesterol, low salt diet, wearing seat belt,  self examinations, sunscreen protection.Obtain screening cholesterol, immunizations reviewed.      Benign essential hypertension  Controlled BP on treatment    Acquired hypothyroidism  Assess thyroid function  Continue treatment   - TSH with free T4 reflex  - T4 free  - OFFICE/OUTPT VISIT,EST,LEVL III    Anemia, unspecified type  Assess lab work , consider hematology assessment   - CBC with platelets  - Iron and iron binding capacity  - Vitamin B12  - Folate  - Lactate Dehydrogenase  - Reticulocyte count  - OFFICE/OUTPT VISIT,EST,LEVL III    Pulmonary hypertension (H)  On oxygen treatment, refer to pulmonary   - Adult Pulmonary Medicine  Referral; Future  - OFFICE/OUTPT VISIT,EST,LEVL III    Atrial fibrillation and flutter (H)  Rate controlled, on AC  - OFFICE/OUTPT VISIT,EST,LEVL III    Rheumatoid arthritis, involving unspecified site, unspecified whether rheumatoid factor present (H)  On treatment, controlled symptoms     Patient has been advised of split billing requirements and indicates understanding: Yes        Counseling  Appropriate preventive services were addressed with this patient via screening, questionnaire, or discussion as appropriate for fall prevention, nutrition, physical activity, Tobacco-use cessation, weight loss and cognition.  Checklist reviewing preventive services available has been given to the patient.  Reviewed patient's diet, addressing concerns and/or questions.   She is at risk for lack of exercise and has been provided with information to increase physical activity for the benefit of her well-being.   Discussed possible causes of fatigue. Updated plan of care.  Patient reported difficulty  with activities of daily living were addressed today.Addressed any concerns about safety while driving.  The patient was provided with written information regarding signs of hearing loss.   Information on urinary incontinence and treatment options given to patient.       See Patient Instructions    Rowdy Alicea is a 84 year old, presenting for the following:  Wellness Visit        8/5/2024    10:45 AM   Additional Questions   Roomed by Rosa DUFF   Accompanied by daughter, Roxi         Health Care Directive  Patient has a Health Care Directive on file  Advance care planning document is on file and is current.    HPI    Patient has history of CHF, related to chronic a fib, pulmonary HTN, on oxygen treatment.   Has SOB on exertion. No orthopnea, edema, CP. On AC and rate control medications.   Has h/o HTN. on medical treatment. BP has been controlled. No side effects from medications. No CP, HA, dizziness. good compliance with medications and low salt diet.  Has history of RA, on Methotrexate, follows with rheumatology.   Concern for weakness, impaired ambulation, uses a walker.   Has chronic anemia, no bleeding with bowel movements, no hematuria.             8/5/2024   General Health   How would you rate your overall physical health? (!) FAIR   Feel stress (tense, anxious, or unable to sleep) To some extent      (!) STRESS CONCERN      8/5/2024   Nutrition   Diet: Low salt    Low fat/cholesterol       Multiple values from one day are sorted in reverse-chronological order         8/5/2024   Exercise   Days per week of moderate/strenous exercise 3 days   Average minutes spent exercising at this level 30 min            8/5/2024   Social Factors   Frequency of gathering with friends or relatives More than three times a week   Worry food won't last until get money to buy more No   Food not last or not have enough money for food? No   Do you have housing? (Housing is defined as stable permanent housing and does not  include staying ouside in a car, in a tent, in an abandoned building, in an overnight shelter, or couch-surfing.) Yes   Are you worried about losing your housing? No   Lack of transportation? No   Unable to get utilities (heat,electricity)? No            8/5/2024   Fall Risk   Fallen 2 or more times in the past year? Yes    Yes   Trouble with walking or balance? Yes    Yes   Reason Gait Speed Test Not Completed Unable to complete test, patient reported symptoms       Multiple values from one day are sorted in reverse-chronological order          8/5/2024   Activities of Daily Living- Home Safety   Needs help with the following daily activites Transportation    Shopping    Preparing meals    Housework   Safety concerns in the home None of the above       Multiple values from one day are sorted in reverse-chronological order         8/5/2024   Dental   Dentist two times every year? Yes            8/5/2024   Hearing Screening   Hearing concerns? (!) TROUBLE FOLLOWING DIALOGUE IN THE THEATHER.            8/5/2024   Driving Risk Screening   Patient/family members have concerns about driving (!) YES             8/5/2024   General Alertness/Fatigue Screening   Have you been more tired than usual lately? (!) YES            8/5/2024   Urinary Incontinence Screening   Bothered by leaking urine in past 6 months Yes               Today's PHQ-2 Score:       8/5/2024    10:41 AM   PHQ-2 ( 1999 [employer])   Q1: Little interest or pleasure in doing things 1   Q2: Feeling down, depressed or hopeless 0   PHQ-2 Score 1   Q1: Little interest or pleasure in doing things Several days   Q2: Feeling down, depressed or hopeless Not at all   PHQ-2 Score 1           8/5/2024   Substance Use   Alcohol more than 3/day or more than 7/wk Not Applicable   Do you have a current opioid prescription? No   How severe/bad is pain from 1 to 10? 5/10   Do you use any other substances recreationally? No        Social History     Tobacco Use    Smoking  status: Never    Smokeless tobacco: Never   Vaping Use    Vaping status: Never Used   Substance Use Topics    Alcohol use: No     Alcohol/week: 0.0 standard drinks of alcohol    Drug use: No          Mammogram Screening - Mammography discussed and declined              Reviewed and updated as needed this visit by Provider                    Lab work is in process  Labs reviewed in EPIC  Current providers sharing in care for this patient include:  Patient Care Team:  Yunior Huang MD as PCP - General (Internal Medicine)  Jaycee Cervantes MD as MD (Rheumatology)  Yunior Huang MD as Assigned PCP  Wilfredo Cooper Do, DO Ho, Aaron K, MD as MD (Cardiovascular Disease)  Noe Rodriguez MD as MD (Cardiovascular Disease)  Zena Benitez PA as Assigned Heart and Vascular Provider  Mamie Last DO as MD (Hospice And Palliative Care)  Mamie Last DO as Assigned Palliative Care Provider  Jessica Peterson RPH as Pharmacist (Pharmacist)  Jessica Peterson RPH as Assigned [place] Pharmacist    The following health maintenance items are reviewed in Epic and correct as of today:  Health Maintenance   Topic Date Due    HF ACTION PLAN  Never done    RSV VACCINE (Pregnancy & 60+) (1 - 1-dose 60+ series) Never done    COVID-19 Vaccine (5 - 2023-24 season) 09/01/2023    MEDICARE ANNUAL WELLNESS VISIT  07/14/2024    INFLUENZA VACCINE (1) 09/01/2024    BMP  12/29/2024    MICROALBUMIN  03/07/2025    ALT  04/18/2025    CBC  06/25/2025    HEMOGLOBIN  06/25/2025    ANNUAL REVIEW OF HM ORDERS  07/05/2025    LIPID  07/08/2025    FALL RISK ASSESSMENT  08/05/2025    COLORECTAL CANCER SCREENING  09/25/2026    DTAP/TDAP/TD IMMUNIZATION (3 - Td or Tdap) 05/04/2028    ADVANCE CARE PLANNING  03/21/2029    DEXA  06/13/2039    TSH W/FREE T4 REFLEX  Completed    PHQ-2 (once per calendar year)  Completed    Pneumococcal Vaccine: 65+ Years  Completed    URINALYSIS  Completed    ZOSTER IMMUNIZATION  Completed    IPV  "IMMUNIZATION  Aged Out    HPV IMMUNIZATION  Aged Out    MENINGITIS IMMUNIZATION  Aged Out    RSV MONOCLONAL ANTIBODY  Aged Out         Review of Systems  Constitutional, HEENT, cardiovascular, pulmonary, GI, , musculoskeletal, neuro, skin, endocrine and psych systems are negative, except as otherwise noted.     Objective    Exam  /78 (BP Location: Right arm, Cuff Size: Adult Regular)   Pulse 70   Temp 97.4  F (36.3  C) (Tympanic)   Resp 20   Ht 1.626 m (5' 4\")   Wt 62.8 kg (138 lb 6.4 oz)   LMP  (LMP Unknown)   BMI 23.76 kg/m     Estimated body mass index is 23.76 kg/m  as calculated from the following:    Height as of this encounter: 1.626 m (5' 4\").    Weight as of this encounter: 62.8 kg (138 lb 6.4 oz).    Physical Exam  GENERAL: elderly, frail, alert and no distress  EYES: Eyes grossly normal to inspection, PERRL and conjunctivae and sclerae normal  HENT: ear canals and TM's normal, nose and mouth without ulcers or lesions  NECK: no adenopathy, no asymmetry, masses, or scars  RESP: lungs clear to auscultation - no rales, rhonchi or wheezes, mild crackles in bases   CV: irregular rate and rhythm, normal S1 S2, no S3 or S4, 3/6 systolic murmur, no click or rub, trace LE peripheral edema  ABDOMEN: soft, nontender, no hepatosplenomegaly, no masses and bowel sounds normal  MS: no gross musculoskeletal defects noted  SKIN: no suspicious lesions or rashes  NEURO: generalized weakness, mentation intact and speech normal, unstable gait, uses a walker   PSYCH: mentation appears normal, affect normal/bright         8/5/2024   Mini Cog   Clock Draw Score 2 Normal   3 Item Recall 3 objects recalled   Mini Cog Total Score 5                 Signed Electronically by: Yunior Huang MD    "

## 2024-08-05 NOTE — LETTER
August 6, 2024      Deanne SHAW May  [address]    [place] [address]        Dear MsMeirMay,    We are writing to inform you of your test results.    Mild anemia. Recommend assessment with hematology.   Rest of the results are acceptable.     Resulted Orders   TSH with free T4 reflex   Result Value Ref Range    TSH 12.10 (H) 0.30 - 4.20 uIU/mL   CBC with platelets   Result Value Ref Range    WBC Count 6.9 4.0 - 11.0 10e3/uL    RBC Count 3.05 (L) 3.80 - 5.20 10e6/uL    Hemoglobin 10.7 (L) 11.7 - 15.7 g/dL    Hematocrit 32.7 (L) 35.0 - 47.0 %     (H) 78 - 100 fL    MCH 35.1 (H) 26.5 - 33.0 pg    MCHC 32.7 31.5 - 36.5 g/dL    RDW 19.2 (H) 10.0 - 15.0 %    Platelet Count 335 150 - 450 10e3/uL   Iron and iron binding capacity   Result Value Ref Range    Iron 54 37 - 145 ug/dL    Iron Binding Capacity 278 240 - 430 ug/dL    Iron Sat Index 19 15 - 46 %   Vitamin B12   Result Value Ref Range    Vitamin B12 854 232 - 1,245 pg/mL   Folate   Result Value Ref Range    Folic Acid >40.0 (H) 4.6 - 34.8 ng/mL   Lactate Dehydrogenase   Result Value Ref Range    Lactate Dehydrogenase 683 (H) 0 - 250 U/L   Reticulocyte count   Result Value Ref Range    % Reticulocyte 2.8 (H) 0.5 - 2.0 %    Absolute Reticulocyte 0.087 0.025 - 0.095 10e6/uL   T4 free   Result Value Ref Range    Free T4 1.38 0.90 - 1.70 ng/dL       If you have any questions or concerns, please call the clinic at the number listed above.       Sincerely,      Yunior Huang MD

## 2024-08-06 VITALS
WEIGHT: 138.4 LBS | RESPIRATION RATE: 20 BRPM | HEIGHT: 64 IN | TEMPERATURE: 97.4 F | DIASTOLIC BLOOD PRESSURE: 78 MMHG | HEART RATE: 70 BPM | SYSTOLIC BLOOD PRESSURE: 132 MMHG | BODY MASS INDEX: 23.63 KG/M2 | OXYGEN SATURATION: 96 %

## 2024-08-06 LAB
FOLATE SERPL-MCNC: >40 NG/ML (ref 4.6–34.8)
IRON BINDING CAPACITY (ROCHE): 278 UG/DL (ref 240–430)
IRON SATN MFR SERPL: 19 % (ref 15–46)
IRON SERPL-MCNC: 54 UG/DL (ref 37–145)
LDH SERPL L TO P-CCNC: 683 U/L (ref 0–250)
T4 FREE SERPL-MCNC: 1.38 NG/DL (ref 0.9–1.7)
TSH SERPL DL<=0.005 MIU/L-ACNC: 12.1 UIU/ML (ref 0.3–4.2)
VIT B12 SERPL-MCNC: 854 PG/ML (ref 232–1245)

## 2024-08-19 ENCOUNTER — OFFICE VISIT (OUTPATIENT)
Dept: CARDIOLOGY | Facility: CLINIC | Age: 84
End: 2024-08-19
Attending: PHYSICIAN ASSISTANT
Payer: COMMERCIAL

## 2024-08-19 ENCOUNTER — LAB (OUTPATIENT)
Dept: LAB | Facility: CLINIC | Age: 84
End: 2024-08-19
Payer: COMMERCIAL

## 2024-08-19 VITALS
HEART RATE: 80 BPM | WEIGHT: 139.2 LBS | HEIGHT: 64 IN | SYSTOLIC BLOOD PRESSURE: 134 MMHG | OXYGEN SATURATION: 99 % | BODY MASS INDEX: 23.76 KG/M2 | DIASTOLIC BLOOD PRESSURE: 72 MMHG

## 2024-08-19 DIAGNOSIS — I50.30 HEART FAILURE WITH PRESERVED EJECTION FRACTION, NYHA CLASS I (H): ICD-10-CM

## 2024-08-19 DIAGNOSIS — Z95.3 S/P MITRAL VALVE REPLACEMENT WITH BIOPROSTHETIC VALVE: ICD-10-CM

## 2024-08-19 DIAGNOSIS — I27.20 PULMONARY HYPERTENSION (H): ICD-10-CM

## 2024-08-19 LAB
ANION GAP SERPL CALCULATED.3IONS-SCNC: 15 MMOL/L (ref 7–15)
BUN SERPL-MCNC: 36.3 MG/DL (ref 8–23)
CALCIUM SERPL-MCNC: 9.8 MG/DL (ref 8.8–10.4)
CHLORIDE SERPL-SCNC: 96 MMOL/L (ref 98–107)
CREAT SERPL-MCNC: 1.06 MG/DL (ref 0.51–0.95)
EGFRCR SERPLBLD CKD-EPI 2021: 52 ML/MIN/1.73M2
GLUCOSE SERPL-MCNC: 106 MG/DL (ref 70–99)
HCO3 SERPL-SCNC: 27 MMOL/L (ref 22–29)
NT-PROBNP SERPL-MCNC: 8228 PG/ML (ref 0–1800)
POTASSIUM SERPL-SCNC: 4 MMOL/L (ref 3.4–5.3)
SODIUM SERPL-SCNC: 138 MMOL/L (ref 135–145)

## 2024-08-19 PROCEDURE — 36415 COLL VENOUS BLD VENIPUNCTURE: CPT | Performed by: PHYSICIAN ASSISTANT

## 2024-08-19 PROCEDURE — 80048 BASIC METABOLIC PNL TOTAL CA: CPT | Performed by: PHYSICIAN ASSISTANT

## 2024-08-19 PROCEDURE — 99213 OFFICE O/P EST LOW 20 MIN: CPT | Performed by: PHYSICIAN ASSISTANT

## 2024-08-19 PROCEDURE — 83880 ASSAY OF NATRIURETIC PEPTIDE: CPT | Performed by: PHYSICIAN ASSISTANT

## 2024-08-19 NOTE — LETTER
"8/19/2024    Yunior Huang MD  [address] [facility] [address]    RE: Zunilda Clark       Dear Colleague,     I had the pleasure of seeing Zunilda Clark in the [facility].      Cardiology Progress Note    Date of Service: 08/19/24      Reason for visit: Follow up aortic and mitral valve disease, pulmonary hypertension.       Primary cardiologist: Dr. Noe Rodriguez        HPI:  Ms. 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 However, she has chosen to proceed conservatively.      When I met with Deanne in March of 2023, she had suffered a fall and a T12 burst fracture which they were also treating conservatively, but breathing was ok. When she met with Dr. Rodriguez in July 2023, repeat echo at that time showed findings similar to prior, though aortic stenosis had progressed to mod-severe. As she was feeling well, however, no changes were made. At our most recent visit in January, she had just suffered another fall and fractured her femur and underwent repair which sounds to have gone well. However, she was also having back pain and wearing a brace which was limiting her ambulation. She had another echo prior to that visit; EF remained preserved at 60-65%. 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 Her RV is pressures were elevated and RV is mild to moderately dilated, but with normal RV function reported. From a symptom standpoint, she felt she was doing ok, but as mentioned, was not very active. After d/w with Dr. Rodriguez, we offered a structural clinic evaluation which she politely declined as she was asymptomatic.    Deanne was then hospitalized in mid February for shortness of breath and orthopnea, felt to have an acute HFpEF exacerbation and was noted to be COVID positive. She was diuresed with IV Lasix and diuretics were change to PO lasix 40mg daily. It appears that most of her antihypertensives were held on admission, but she was kept on metoprolol, then prior to discharge, for improved BP control metoprolol was also increased to 75mg BID. Her valvular disease was again discussed but she again deferred further invasive procedures. When I met with her in clinic in mid March, she relayed still having more MORRISON than usual and noted desatting to the mid 80s with exertion. We again discussed structural evaluation but continues to request conservative measures and was agreeable to meet with Palliative Care. To help with her symptoms, I changed her diuretic back to torsemide at 20mg daily. When I saw her lat in early April, she was noting a bit more swelling but breathing hadn't worsened. As she was already urinating frequently she was hesitant to increase diuretics so we opted to continue to monitor. She was agreeable to establish with Palliative Care.    More recently, Deanne was hospitalized again in late June with increased SOB and edema. CT showed pulmonary edema. She was given IV diuresis and repeat echo showed known severe stenosis of mitral valve, aortic valve with severe pulmonary HTN.  Overall this was not significantly changed from prior. She was not yet ready for hospice but wanted to continue to follow with Palliative. She was diuresed 2.5L and weight on dischare was  "130.8#. Home torsemide was increased from 20mg to 40mg daily.  When I met with her in clinic in late July, weight had been slowly increasing since discharge from 131# up to 136#. She was noting more swelling in her legs again as well. Fortunately, her breathing has not worsened much. She tells me she is still urinating a lot on the diuretics but his is becoming difficult because she soaks through depends and often has to change her clothes; this is makes it difficult to leave her home. We discussed doing our best to keep fluid off with a \"sliding scale\" type diuretic regimen (Wt 133# or less 40mg torsemide, 134# or more, 60mg torsemide).    Today, we are following back up in clinic. She tells me that she continues to slowly worsen. She tried taking the 3 tablets of torsemide for a while but incontinence became intolerable so she went back to 2. Even with this she continues to have issues and soak through her depends. She is starting to become a bit more winded with walking, and thinks the swelling is slowly worsening as well. She denies any new chest pain, palpitations, or dizziness/presyncope.    Labs performed prior to our visit today were reviewed as below.      ASSESSMENT/PLAN:     Multivalvular dysfunction.  --Ms. Clark has a history of Rheumatic heart disease with bioprosthetic mitral valve replacement in 2007, and redo in 2014 with a 27 mm Magna bioprosthetic mitral valve.  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 We offered structural clinic for further evaluation but she continues to politely decline and is not interested in any further invasive procedures. --She would like to continue to proceed conservatively. She is following with Palliative Care and tells me she is now interested in Hospice. I have messaged her palliative and her PCP as a courtesy per patient request.   --Weight is slowly going " "up since being out of the hospital. She is having more edema as well. We tried to do a \"sliding scale\" diuretic regimen based on her weight, but she is unable to tolerate higher doses of diuretics as above, due to significant incontinence.               2. High degree AV block.              --Post surgery 2007, s/p dual-chamber pacemaker placement.  Last device check March 2024: AP 97%,  94%. Patient in mode switch <1%. Battery life 8.5 years.               --She remains anticoagulated with warfarin, and follows with the INR clinic.      Follow up plan:  She has an appt in November to see Dr. Rodriguez. As she is not interested in intervention, will not repeat cardiac imaging and continue to treat symptomatic/conservatively.       Orders this Visit:  No orders of the defined types were placed in this encounter.    No orders of the defined types were placed in this encounter.    There are no discontinued medications.        CURRENT MEDICATIONS:  Current Outpatient Medications   Medication Sig Dispense Refill     acetaminophen (TYLENOL) 500 MG tablet Take 1,000 mg by mouth every 8 hours as needed for pain       allopurinol (ZYLOPRIM) 100 MG tablet Take 1 tablet (100 mg) by mouth 2 times daily 180 tablet 3     amLODIPine (NORVASC) 5 MG tablet Take 1 tablet (5 mg) by mouth daily 90 tablet 3     calcium citrate (CALCITRATE) 950 MG tablet Take 1 tablet by mouth daily        famotidine (PEPCID) 40 MG tablet Take 1 tablet (40 mg) by mouth daily 90 tablet 2     folic acid (FOLVITE) 1 MG tablet Take 1 mg by mouth daily       latanoprost (XALATAN) 0.005 % ophthalmic solution Place 1 drop into both eyes every evening       levothyroxine (SYNTHROID/LEVOTHROID) 112 MCG tablet Take 1 tablet (112 mcg) by mouth daily 100 tablet 3     methotrexate 50 MG/2ML injection Inject 0.8 mLs Subcutaneous every 7 days (Fridays)       Metoprolol Tartrate 75 MG TABS Take 1 tablet by mouth 2 times daily 180 tablet 0     multivitamin w/minerals " "(THERA-VIT-M) tablet Take 1 tablet by mouth daily Without iron       Torsemide 40 MG TABS Take 40 mg by mouth daily 60 tablet 0     VITAMIN D, CHOLECALCIFEROL, PO Take 1,000 Units by mouth daily       warfarin ANTICOAGULANT (COUMADIN) 2.5 MG tablet Take 2.5 mg by mouth daily         Review of Systems:  POS ROS ARE BOLDED, all other negative.    Cardiovascular: Chest pain, palpitations, orthopnea, LE edema  Resp: Dyspnea on exertion, cough, known chronic lung disease  Hematologic/lymphatic: Current systemic anticoagulation, hx of blood clots, new bleeding concerns.  Neurological: Dizziness, presyncope/syncope.    Physical Exam:  Vitals: /72 (BP Location: Right arm, Patient Position: Sitting, Cuff Size: Adult Regular)   Pulse 80   Ht 1.626 m (5' 4\")   Wt 63.1 kg (139 lb 3.2 oz)   LMP  (LMP Unknown)   SpO2 99%   BMI 23.89 kg/m     Wt Readings from Last 4 Encounters:   08/19/24 63.1 kg (139 lb 3.2 oz)   08/05/24 62.8 kg (138 lb 6.4 oz)   07/22/24 62 kg (136 lb 11.2 oz)   06/29/24 59.2 kg (130 lb 8 oz)       GEN:  In general, this is a somewhat frail appearing  female in no acute distress on RA.  Patient ambulatory with a walker, accompanied by her sister today.  C/V: RRR, though with 2-3/6 JON heard at both right and left sternal borders.   RESP: Respirations are unlabored. No use of accessory muscles. Few faint crackles in bases. No wheezing or rhonchi.  EXTREM: 1-2+ L>R PT edema.   NEURO: Alert and oriented, cooperative. Gait not assessed.     Recent Lab Results:    LIPID RESULTS:  Lab Results   Component Value Date    CHOL 147 07/08/2024    CHOL 167 11/17/2020    HDL 42 (L) 07/08/2024    HDL 45 (L) 11/17/2020    LDL 86 07/08/2024     (H) 11/17/2020    TRIG 97 07/08/2024    TRIG 87 11/17/2020    CHOLHDLRATIO 3.6 11/13/2015       CBC RESULTS:  Lab Results   Component Value Date    WBC 6.9 08/05/2024    WBC 7.1 06/22/2021    RBC 3.05 (L) 08/05/2024    RBC 3.75 (L) 06/22/2021    HGB 10.7 (L) " 08/05/2024    HGB 12.5 06/22/2021    HCT 32.7 (L) 08/05/2024    HCT 38.6 06/22/2021     (H) 08/05/2024     (H) 06/22/2021    MCH 35.1 (H) 08/05/2024    MCH 33.3 (H) 06/22/2021    MCHC 32.7 08/05/2024    MCHC 32.4 06/22/2021    RDW 19.2 (H) 08/05/2024    RDW 17.0 (H) 06/22/2021     08/05/2024     06/22/2021       BMP RESULTS:  Lab Results   Component Value Date     08/19/2024     06/22/2021    POTASSIUM 4.0 08/19/2024    POTASSIUM 3.7 08/17/2022    POTASSIUM 3.8 06/22/2021    CHLORIDE 96 (L) 08/19/2024    CHLORIDE 107 08/17/2022    CHLORIDE 104 06/22/2021    CO2 27 08/19/2024    CO2 26 08/17/2022    CO2 33 (H) 06/22/2021    ANIONGAP 15 08/19/2024    ANIONGAP 7 08/17/2022    ANIONGAP 1 (L) 06/22/2021     (H) 08/19/2024     (H) 09/27/2023    GLC 91 08/17/2022    GLC 88 06/22/2021    BUN 36.3 (H) 08/19/2024    BUN 21 08/17/2022    BUN 27 06/22/2021    CR 1.06 (H) 08/19/2024    CR 1.11 (H) 06/22/2021    GFRESTIMATED 52 (L) 08/19/2024    GFRESTIMATED 50.9 06/28/2021    GFRESTBLACK 54 (L) 06/22/2021    BRICE 9.8 08/19/2024    BRICE 9.6 06/22/2021        Recent Labs   Lab Test 08/19/24  1425 06/24/24  2134 03/27/24  1257 03/18/24  1204 02/22/24  1026 09/26/23  0934   NTBNPI  --  5,079*  --   --  4,362* 1,711   NTBNP 8,228*  --  4,055* 4,811*  --   --        Additional pertinent testing:    Echo 1/5/24  Interpretation Summary     There is a 27 mm Magna bioprosthetic valve in the mitral position.  Severe prosthetic mitral valve stenosis  Severe valvular aortic stenosis.  There is mild (1+) aortic regurgitation.  The visual ejection fraction is 60-65%.  Left ventricular systolic function is normal.  The right ventricle is mild to moderately dilated.  The right ventricular systolic function is normal.  There is moderate (2+) tricuspid regurgitation.  Right ventricular systolic pressure is elevated, consistent with severe  pulmonary hypertension.  The rhythm was atrial  fibrillation with paced rhythm.  Compared to the previous echocardiogram, the mean gradient across the mitral  valve prosthesis has significantly increased at a similar heart rate. The mean  gradient now is 12 mmHg and was 5.6 mmHg on 18 June of 2023.  The degree of aortic stenosis has also progressed. Previously the mean  gradient across the aortic valve was 21.9 mmHg and a calculated aortic valve  area of was 1.0 cmÂ . Now the mean gradient across the aortic valve is 33.7  mmHg and the calculated aortic valve area is 0.87 cmÂ .  The degree of tricuspid regurgitation is similar to previously being moderate.  Pulmonary pressures are lower on this study than previously but still severe.      20 total minutes was spent today including chart review, precharting, history and exam, patient education, post visit documentation, and reviewing studies as outlined above.       Zena Benitez PA-C  [facility]  Pager [phone]      Thank you for allowing me to participate in the care of your patient.      Sincerely,     YOKASTA Salcido     [facility]  cc:   YOKASTA Salcido  [facility]  [address] [place] [id number]

## 2024-08-19 NOTE — PROGRESS NOTES
"    Cardiology Progress Note    Date of Service: 08/19/24      Reason for visit: Follow up aortic and mitral valve disease, pulmonary hypertension.       Primary cardiologist: Dr. Noe Rodriguez        HPI:  Ms. 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 However, she has chosen to proceed conservatively.      When I met with Deanne in March of 2023, she had suffered a fall and a T12 burst fracture which they were also treating conservatively, but breathing was ok. When she met with Dr. Rodriguez in July 2023, repeat echo at that time showed findings similar to prior, though aortic stenosis had progressed to mod-severe. As she was feeling well, however, no changes were made. At our most recent visit in January, she had just suffered another fall and fractured her femur and underwent repair which sounds to have gone well. However, she was also having back pain and wearing a brace which was limiting her ambulation. She had another echo prior to that visit; EF remained preserved at 60-65%. 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 Her RV is pressures were elevated and RV is mild to moderately dilated, but with normal RV function reported. From a symptom standpoint, she felt she was doing ok, but as " mentioned, was not very active. After d/w with Dr. Rodriguez, we offered a structural clinic evaluation which she politely declined as she was asymptomatic.    Deanne was then hospitalized in mid February for shortness of breath and orthopnea, felt to have an acute HFpEF exacerbation and was noted to be COVID positive. She was diuresed with IV Lasix and diuretics were change to PO lasix 40mg daily. It appears that most of her antihypertensives were held on admission, but she was kept on metoprolol, then prior to discharge, for improved BP control metoprolol was also increased to 75mg BID. Her valvular disease was again discussed but she again deferred further invasive procedures. When I met with her in clinic in mid March, she relayed still having more MORRISON than usual and noted desatting to the mid 80s with exertion. We again discussed structural evaluation but continues to request conservative measures and was agreeable to meet with Palliative Care. To help with her symptoms, I changed her diuretic back to torsemide at 20mg daily. When I saw her lat in early April, she was noting a bit more swelling but breathing hadn't worsened. As she was already urinating frequently she was hesitant to increase diuretics so we opted to continue to monitor. She was agreeable to establish with Palliative Care.    More recently, Deanne was hospitalized again in late June with increased SOB and edema. CT showed pulmonary edema. She was given IV diuresis and repeat echo showed known severe stenosis of mitral valve, aortic valve with severe pulmonary HTN.  Overall this was not significantly changed from prior. She was not yet ready for hospice but wanted to continue to follow with Palliative. She was diuresed 2.5L and weight on dischare was 130.8#. Home torsemide was increased from 20mg to 40mg daily.  When I met with her in clinic in late July, weight had been slowly increasing since discharge from 131# up to 136#. She was noting more  "swelling in her legs again as well. Fortunately, her breathing has not worsened much. She tells me she is still urinating a lot on the diuretics but his is becoming difficult because she soaks through depends and often has to change her clothes; this is makes it difficult to leave her home. We discussed doing our best to keep fluid off with a \"sliding scale\" type diuretic regimen (Wt 133# or less 40mg torsemide, 134# or more, 60mg torsemide).    Today, we are following back up in clinic. She tells me that she continues to slowly worsen. She tried taking the 3 tablets of torsemide for a while but incontinence became intolerable so she went back to 2. Even with this she continues to have issues and soak through her depends. She is starting to become a bit more winded with walking, and thinks the swelling is slowly worsening as well. She denies any new chest pain, palpitations, or dizziness/presyncope.    Labs performed prior to our visit today were reviewed as below.      ASSESSMENT/PLAN:     Multivalvular dysfunction.  --Ms. Clark has a history of Rheumatic heart disease with bioprosthetic mitral valve replacement in 2007, and redo in 2014 with a 27 mm Magna bioprosthetic mitral valve.  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 We offered structural clinic for further evaluation but she continues to politely decline and is not interested in any further invasive procedures. --She would like to continue to proceed conservatively. She is following with Palliative Care and tells me she is now interested in Hospice. I have messaged her palliative and her PCP as a courtesy per patient request.   --Weight is slowly going up since being out of the hospital. She is having more edema as well. We tried to do a \"sliding scale\" diuretic regimen based on her weight, but she is unable to tolerate higher doses of diuretics as " above, due to significant incontinence.               2. High degree AV block.              --Post surgery 2007, s/p dual-chamber pacemaker placement.  Last device check March 2024: AP 97%,  94%. Patient in mode switch <1%. Battery life 8.5 years.               --She remains anticoagulated with warfarin, and follows with the INR clinic.      Follow up plan:  She has an appt in November to see Dr. Rodriguez. As she is not interested in intervention, will not repeat cardiac imaging and continue to treat symptomatic/conservatively.       Orders this Visit:  No orders of the defined types were placed in this encounter.    No orders of the defined types were placed in this encounter.    There are no discontinued medications.        CURRENT MEDICATIONS:  Current Outpatient Medications   Medication Sig Dispense Refill    acetaminophen (TYLENOL) 500 MG tablet Take 1,000 mg by mouth every 8 hours as needed for pain      allopurinol (ZYLOPRIM) 100 MG tablet Take 1 tablet (100 mg) by mouth 2 times daily 180 tablet 3    amLODIPine (NORVASC) 5 MG tablet Take 1 tablet (5 mg) by mouth daily 90 tablet 3    calcium citrate (CALCITRATE) 950 MG tablet Take 1 tablet by mouth daily       famotidine (PEPCID) 40 MG tablet Take 1 tablet (40 mg) by mouth daily 90 tablet 2    folic acid (FOLVITE) 1 MG tablet Take 1 mg by mouth daily      latanoprost (XALATAN) 0.005 % ophthalmic solution Place 1 drop into both eyes every evening      levothyroxine (SYNTHROID/LEVOTHROID) 112 MCG tablet Take 1 tablet (112 mcg) by mouth daily 100 tablet 3    methotrexate 50 MG/2ML injection Inject 0.8 mLs Subcutaneous every 7 days (Fridays)      Metoprolol Tartrate 75 MG TABS Take 1 tablet by mouth 2 times daily 180 tablet 0    multivitamin w/minerals (THERA-VIT-M) tablet Take 1 tablet by mouth daily Without iron      Torsemide 40 MG TABS Take 40 mg by mouth daily 60 tablet 0    VITAMIN D, CHOLECALCIFEROL, PO Take 1,000 Units by mouth daily      warfarin  "ANTICOAGULANT (COUMADIN) 2.5 MG tablet Take 2.5 mg by mouth daily         Review of Systems:  POS ROS ARE BOLDED, all other negative.    Cardiovascular: Chest pain, palpitations, orthopnea, LE edema  Resp: Dyspnea on exertion, cough, known chronic lung disease  Hematologic/lymphatic: Current systemic anticoagulation, hx of blood clots, new bleeding concerns.  Neurological: Dizziness, presyncope/syncope.    Physical Exam:  Vitals: /72 (BP Location: Right arm, Patient Position: Sitting, Cuff Size: Adult Regular)   Pulse 80   Ht 1.626 m (5' 4\")   Wt 63.1 kg (139 lb 3.2 oz)   LMP  (LMP Unknown)   SpO2 99%   BMI 23.89 kg/m     Wt Readings from Last 4 Encounters:   08/19/24 63.1 kg (139 lb 3.2 oz)   08/05/24 62.8 kg (138 lb 6.4 oz)   07/22/24 62 kg (136 lb 11.2 oz)   06/29/24 59.2 kg (130 lb 8 oz)       GEN:  In general, this is a somewhat frail appearing  female in no acute distress on RA.  Patient ambulatory with a walker, accompanied by her sister today.  C/V: RRR, though with 2-3/6 JON heard at both right and left sternal borders.   RESP: Respirations are unlabored. No use of accessory muscles. Few faint crackles in bases. No wheezing or rhonchi.  EXTREM: 1-2+ L>R PT edema.   NEURO: Alert and oriented, cooperative. Gait not assessed.     Recent Lab Results:    LIPID RESULTS:  Lab Results   Component Value Date    CHOL 147 07/08/2024    CHOL 167 11/17/2020    HDL 42 (L) 07/08/2024    HDL 45 (L) 11/17/2020    LDL 86 07/08/2024     (H) 11/17/2020    TRIG 97 07/08/2024    TRIG 87 11/17/2020    CHOLHDLRATIO 3.6 11/13/2015       CBC RESULTS:  Lab Results   Component Value Date    WBC 6.9 08/05/2024    WBC 7.1 06/22/2021    RBC 3.05 (L) 08/05/2024    RBC 3.75 (L) 06/22/2021    HGB 10.7 (L) 08/05/2024    HGB 12.5 06/22/2021    HCT 32.7 (L) 08/05/2024    HCT 38.6 06/22/2021     (H) 08/05/2024     (H) 06/22/2021    MCH 35.1 (H) 08/05/2024    MCH 33.3 (H) 06/22/2021    MCHC 32.7 " 08/05/2024    MCHC 32.4 06/22/2021    RDW 19.2 (H) 08/05/2024    RDW 17.0 (H) 06/22/2021     08/05/2024     06/22/2021       BMP RESULTS:  Lab Results   Component Value Date     08/19/2024     06/22/2021    POTASSIUM 4.0 08/19/2024    POTASSIUM 3.7 08/17/2022    POTASSIUM 3.8 06/22/2021    CHLORIDE 96 (L) 08/19/2024    CHLORIDE 107 08/17/2022    CHLORIDE 104 06/22/2021    CO2 27 08/19/2024    CO2 26 08/17/2022    CO2 33 (H) 06/22/2021    ANIONGAP 15 08/19/2024    ANIONGAP 7 08/17/2022    ANIONGAP 1 (L) 06/22/2021     (H) 08/19/2024     (H) 09/27/2023    GLC 91 08/17/2022    GLC 88 06/22/2021    BUN 36.3 (H) 08/19/2024    BUN 21 08/17/2022    BUN 27 06/22/2021    CR 1.06 (H) 08/19/2024    CR 1.11 (H) 06/22/2021    GFRESTIMATED 52 (L) 08/19/2024    GFRESTIMATED 50.9 06/28/2021    GFRESTBLACK 54 (L) 06/22/2021    BRICE 9.8 08/19/2024    BRICE 9.6 06/22/2021        Recent Labs   Lab Test 08/19/24  1425 06/24/24  2134 03/27/24  1257 03/18/24  1204 02/22/24  1026 09/26/23  0934   NTBNPI  --  5,079*  --   --  4,362* 1,711   NTBNP 8,228*  --  4,055* 4,811*  --   --        Additional pertinent testing:    Echo 1/5/24  Interpretation Summary     There is a 27 mm Magna bioprosthetic valve in the mitral position.  Severe prosthetic mitral valve stenosis  Severe valvular aortic stenosis.  There is mild (1+) aortic regurgitation.  The visual ejection fraction is 60-65%.  Left ventricular systolic function is normal.  The right ventricle is mild to moderately dilated.  The right ventricular systolic function is normal.  There is moderate (2+) tricuspid regurgitation.  Right ventricular systolic pressure is elevated, consistent with severe  pulmonary hypertension.  The rhythm was atrial fibrillation with paced rhythm.  Compared to the previous echocardiogram, the mean gradient across the mitral  valve prosthesis has significantly increased at a similar heart rate. The mean  gradient now is 12  mmHg and was 5.6 mmHg on 18 June of 2023.  The degree of aortic stenosis has also progressed. Previously the mean  gradient across the aortic valve was 21.9 mmHg and a calculated aortic valve  area of was 1.0 cmÂ . Now the mean gradient across the aortic valve is 33.7  mmHg and the calculated aortic valve area is 0.87 cmÂ .  The degree of tricuspid regurgitation is similar to previously being moderate.  Pulmonary pressures are lower on this study than previously but still severe.      20 total minutes was spent today including chart review, precharting, history and exam, patient education, post visit documentation, and reviewing studies as outlined above.       Zena Benitez PA-C  [facility]  Pager [phone]

## 2024-08-19 NOTE — PATIENT INSTRUCTIONS
Thank you for visiting the cardiology clinic today.      Medication changes:    None     Other information/recommendations:  I will touch base with Dr. Huang and your palliative team to see if we can help with things at home.       Follow up Plan:  Return to see Dr. Rodriguez in November as already scheduled.     Please feel free to reach out to our nursing team at [phone] with any questions or concerns.  You can also send us a MyChart and we will get back with you as soon as possible.

## 2024-08-20 ENCOUNTER — LAB (OUTPATIENT)
Dept: LAB | Facility: CLINIC | Age: 84
End: 2024-08-20
Payer: COMMERCIAL

## 2024-08-20 ENCOUNTER — MYC MEDICAL ADVICE (OUTPATIENT)
Dept: ANTICOAGULATION | Facility: CLINIC | Age: 84
End: 2024-08-20

## 2024-08-20 ENCOUNTER — ANTICOAGULATION THERAPY VISIT (OUTPATIENT)
Dept: ANTICOAGULATION | Facility: CLINIC | Age: 84
End: 2024-08-20

## 2024-08-20 DIAGNOSIS — Z79.01 LONG TERM CURRENT USE OF ANTICOAGULANTS WITH INR GOAL OF 2.0-3.0: Primary | ICD-10-CM

## 2024-08-20 DIAGNOSIS — Z95.3 S/P MITRAL VALVE REPLACEMENT WITH BIOPROSTHETIC VALVE: ICD-10-CM

## 2024-08-20 DIAGNOSIS — I48.92 ATRIAL FIBRILLATION AND FLUTTER (H): ICD-10-CM

## 2024-08-20 DIAGNOSIS — I48.91 ATRIAL FIBRILLATION AND FLUTTER (H): ICD-10-CM

## 2024-08-20 LAB — INR BLD: 2.2 (ref 0.9–1.1)

## 2024-08-20 PROCEDURE — 85610 PROTHROMBIN TIME: CPT

## 2024-08-20 NOTE — TELEPHONE ENCOUNTER
I would agree with hospice if patient does not wish invasive treatment.   I can refer her. Should discuss with her first. Can do a VV.

## 2024-08-20 NOTE — PROGRESS NOTES
ANTICOAGULATION MANAGEMENT     Zunilda SHAW May 84 year old female is on warfarin with therapeutic INR result. (Goal INR 2.0-3.0)    Recent labs: (last 7 days)     08/20/24  1259   INR 2.2*       ASSESSMENT     Source(s): Chart Review  Previous INR was Therapeutic last visit; previously outside of goal range  Medication, diet, health changes since last INR chart reviewed; none identified         PLAN     Recommended plan for no diet, medication or health factor changes affecting INR     Dosing Instructions: Continue your current warfarin dose with next INR in 4 weeks       Summary  As of 8/20/2024      Full warfarin instructions:  3.75 mg every Mon, Fri; 2.5 mg all other days   Next INR check:  9/17/2024               Detailed voice message left for Deanne with dosing instructions and follow up date.     Contact [phone] to schedule and with any changes, questions or concerns.     Education provided: Please call back if any changes to your diet, medications or how you've been taking warfarin    Plan made per [facility] anticoagulation protocol    Sonam Teixeira RN  Anticoagulation Clinic  8/20/2024    _______________________________________________________________________     Anticoagulation Episode Summary       Current INR goal:  2.0-3.0   TTR:  52.1% (10.3 mo)   Target end date:  Indefinite   Send INR reminders to:  [facility]    Indications    Long term current use of anticoagulants with INR goal of 2.0-3.0 [Z79.01]  Atrial fibrillation and flutter (H) [I48.91  I48.92]  S/P mitral valve replacement with bioprosthetic valve [Z95.3]             Comments:  27 mm Magna bioprosthetic valve (2014)             Anticoagulation Care Providers       Provider Role Specialty Phone number    Yunior Huang MD Referring Internal Medicine [phone]             [employer]

## 2024-08-20 NOTE — TELEPHONE ENCOUNTER
----- Message from Noe Rodriguez sent at 8/20/2024  8:50 AM CDT -----  Regarding: RE: Further functional decline  Thanks Zena, I agree Palliative Care/Hospice would be appropriate if in keeping with her goals of care  ----- Message -----  From: Zena Benitez PA  Sent: 8/19/2024   3:43 PM CDT  To: Yunior Huang MD; Noe Rodriguez MD; #  Subject: Further functional decline                       I just wanted to reach out and let everyone know that Deanne is continuing to slowly worsen.   From a cardiac standpoint, she continues to retain more fluid but is not able to go up on diuretics without making her daily life difficult. She is afraid to leave her home due to incontinence and in clinic today she had an accident as well and soaked through her depends, even on lower dose diuretics.    She voiced that she would like to discuss hospice which from a cardiac standpoint appears appropriate as there is little left for us to offer her (and she does not wish to pursue any invasive treatments) without making her daily life worse. I didn't know if a [facility] would be an option for at home for palliative purposes along with home nursing?    I told her I would reach out to everyone to get their thoughts about this.    Thanks,  Zena Benitez PA-C

## 2024-08-21 ENCOUNTER — TELEPHONE (OUTPATIENT)
Dept: CARDIOLOGY | Facility: CLINIC | Age: 84
End: 2024-08-21
Payer: COMMERCIAL

## 2024-08-28 ENCOUNTER — NURSE TRIAGE (OUTPATIENT)
Dept: INTERNAL MEDICINE | Facility: CLINIC | Age: 84
End: 2024-08-28
Payer: COMMERCIAL

## 2024-08-28 ENCOUNTER — MEDICAL CORRESPONDENCE (OUTPATIENT)
Dept: HEALTH INFORMATION MANAGEMENT | Facility: CLINIC | Age: 84
End: 2024-08-28
Payer: COMMERCIAL

## 2024-08-28 NOTE — TELEPHONE ENCOUNTER
Nurse Triage SBAR    Is this a 2nd Level Triage? NO    Situation: Reports sores in her mouth for months     Background:  patient wear oxygen     Assessment: Patient reports sores in her mouth for months maybe even a year. Does not know how many. Not actively bleeding.     Protocol Recommended Disposition:   No disposition on file.    Recommendation:   Appointments in Next Year      Aug 29, 2024 11:00 AM  (Arrive by 10:40 AM)  Provider Visit with Rory Calixto MD  [facility] ([facility] ) [phone]       Does the patient meet one of the following criteria for ADS visit consideration? 16+ years old, with an [facility] PCP     TIP  Providers, please consider if this condition is appropriate for management at one of our Acute and Diagnostic Services sites.     If patient is a good candidate, please use dotphrase <dot>triageresponse and select Refer to ADS to document.  Reason for Disposition   Patient wants to be seen    Additional Information   Negative: SEVERE difficulty breathing (e.g., struggling for each breath, speaks in single words, stridor)   Negative: Sounds like a life-threatening emergency to the triager   Negative: Drooling or spitting out saliva (because can't swallow) and new-onset   Negative: Bleeding from mouth and won't stop after 10 minutes of direct pressure   Negative: Electrical burn of mouth   Negative: Chemical burn of mouth   Negative: Difficulty breathing and not severe   Negative: Patient sounds very sick or weak to the triager   Negative: Bloody crusts on lips or sores in mouth AND a rash anywhere else on body (back, chest, face, palms, soles)   Negative: Gum bleeding and taking Coumadin (warfarin) or other strong blood thinner, or known bleeding disorder (e.g., thrombocytopenia)   Negative: Dry mouth and urinating more frequently than usual (i.e., frequency)   Negative: Dry mouth and drinking more liquids than usual (thirsty) and  present more than 1 day (24 hours)   Negative: Dry mouth and new-onset and unexplained  (Exceptions: Chronic symptom or dry mouth from mild dehydration.)   Negative: Weak immune system (e.g., HIV positive, cancer chemo, splenectomy, organ transplant, chronic steroids)   Negative: Receiving chemotherapy or radiation therapy   Negative: White patches that stick to tongue or inner cheek, which can be wiped off    Protocols used: Mouth Symptoms-A-OH

## 2024-08-29 ENCOUNTER — OFFICE VISIT (OUTPATIENT)
Dept: INTERNAL MEDICINE | Facility: CLINIC | Age: 84
End: 2024-08-29
Payer: COMMERCIAL

## 2024-08-29 VITALS
RESPIRATION RATE: 16 BRPM | TEMPERATURE: 97.3 F | SYSTOLIC BLOOD PRESSURE: 128 MMHG | WEIGHT: 138 LBS | BODY MASS INDEX: 23.56 KG/M2 | OXYGEN SATURATION: 92 % | HEART RATE: 72 BPM | DIASTOLIC BLOOD PRESSURE: 72 MMHG | HEIGHT: 64 IN

## 2024-08-29 DIAGNOSIS — K12.0 RECURRENT APHTHOUS ULCER: Primary | ICD-10-CM

## 2024-08-29 PROBLEM — R06.02 SHORTNESS OF BREATH: Status: RESOLVED | Noted: 2024-02-22 | Resolved: 2024-08-29

## 2024-08-29 PROCEDURE — 99213 OFFICE O/P EST LOW 20 MIN: CPT | Performed by: INTERNAL MEDICINE

## 2024-08-29 RX ORDER — DEXAMETHASONE 0.5 MG/5ML
ELIXIR ORAL
Qty: 237 ML | Refills: 0 | Status: SHIPPED | OUTPATIENT
Start: 2024-08-29 | End: 2024-09-04

## 2024-08-29 NOTE — PROGRESS NOTES
Assessment & Plan     Recurrent aphthous ulcer  At this time, patient has noted to have 5 aphthous ulcers in her oropharynx.  We did spend some time discussing potential etiologies of these lesions including viral infections, medication side effects, and autoimmune disorders.  Patient did agree to proceed with a trial of dexamethasone 0.5 mg per 5 mL with instructions to perform swish and spit with 5 mL 3-4 times per day to see if we can help resolve these lesions.  Side effects of dexamethasone use were reviewed.  Will monitor how she responds to this intervention.  - dexAMETHasone (DECADRON) 0.5 MG/5ML elixir; Swish and spit 5 mL three to four times daily. Keep the medication in the mouth for five minutes prior to spitting it out. Do not rinse afterward and avoid eating or drinking for 30 minutes.            See Patient Instructions    Rowdy Alicea is a 84 year old, presenting for the following health issues:  sores in mouth    Patient is an 84-year-old  female with complex past medical history who presents to the clinic with concerns about mouth sores.  Patient states that for the past 2 years she has had recurrent sores that will develop in her mouth spontaneously.  She states that these lesions can be painful, particular when she is eating certain types of salty or spicy foods.  Patient has been performing hydrogen peroxide rinses in her mouth.  She does feel that this may provide her with a small amount of improvement, but the lesions have never fully resolved.  She has had no recent changes to her medication regimen.  Patient does have a history of rheumatoid arthritis, and she is on methotrexate.  She states that she has lesions nowhere else on her body.  Patient has not noted any skin changes.         Review of Systems  CONSTITUTIONAL: NEGATIVE for fever, chills, change in weight  INTEGUMENTARY/SKIN: NEGATIVE for worrisome rashes, moles or lesions  ENT/MOUTH: NEGATIVE for ear, mouth and  "throat problems  RESP: NEGATIVE for significant cough or SOB  CV: NEGATIVE for chest pain, palpitations or peripheral edema  GI: NEGATIVE for nausea, abdominal pain, heartburn, or change in bowel habits  MUSCULOSKELETAL: NEGATIVE for significant arthralgias or myalgia      Objective    /72   Pulse 72   Temp 97.3  F (36.3  C)   Resp 16   Ht 1.626 m (5' 4\")   Wt 62.6 kg (138 lb)   LMP  (LMP Unknown)   SpO2 92%   Breastfeeding No   BMI 23.69 kg/m    Body mass index is 23.69 kg/m .  Physical Exam  Vitals reviewed.   HENT:      Head: Normocephalic and atraumatic.      Mouth/Throat:      Mouth: Mucous membranes are moist.      Comments: Patient was noted to have 5 oval-shaped ulcerations located on her buccal mucosa and hard palate.  They did appear to have a small amount of yellowish exudate.  These lesions were approximately half a centimeter in diameter.  Eyes:      Extraocular Movements: Extraocular movements intact.      Conjunctiva/sclera: Conjunctivae normal.      Pupils: Pupils are equal, round, and reactive to light.   Cardiovascular:      Rate and Rhythm: Normal rate and regular rhythm.      Pulses: Normal pulses.      Heart sounds: Normal heart sounds.   Pulmonary:      Effort: Pulmonary effort is normal.      Breath sounds: Normal breath sounds.   Skin:     General: Skin is warm and dry.   Neurological:      Mental Status: She is alert.        Signed Electronically by: Rory Calixto MD    "

## 2024-08-29 NOTE — PATIENT INSTRUCTIONS
Will proceed with trial of dexamethasone elixir for swish and spit treatment of recurrent aphthous ulcers.

## 2024-09-03 ENCOUNTER — TELEPHONE (OUTPATIENT)
Dept: INTERNAL MEDICINE | Facility: CLINIC | Age: 84
End: 2024-09-03
Payer: COMMERCIAL

## 2024-09-04 ENCOUNTER — MEDICAL CORRESPONDENCE (OUTPATIENT)
Dept: HEALTH INFORMATION MANAGEMENT | Facility: CLINIC | Age: 84
End: 2024-09-04

## 2024-09-04 DIAGNOSIS — E03.9 ACQUIRED HYPOTHYROIDISM: ICD-10-CM

## 2024-09-04 DIAGNOSIS — K12.0 RECURRENT APHTHOUS ULCER: ICD-10-CM

## 2024-09-04 RX ORDER — DEXAMETHASONE 0.5 MG/5ML
ELIXIR ORAL
Qty: 237 ML | Refills: 0 | Status: SHIPPED | OUTPATIENT
Start: 2024-09-04

## 2024-09-04 NOTE — TELEPHONE ENCOUNTER
Patient is asking if she should continue to use the mouth wash until the ulcers resolve? If so she needs a refill.      Disp Refills Start End MIRTHA   dexAMETHasone (DECADRON) 0.5 MG/5ML elixir 237 mL 0 8/29/2024 -- No   Sig: Swish and spit 5 mL three to four times daily. Keep the medication in the mouth for five minutes prior to spitting it out. Do not rinse afterward and avoid eating or drinking for 30 minutes.       She states the ulcers have improved but are not 100% gone     She would like  a phone call back   [phone]  Can leave a detailed voicemail.      LOV 8/29/24 with Dr. Calixto- routing

## 2024-09-06 RX ORDER — LEVOTHYROXINE SODIUM 112 UG/1
112 TABLET ORAL DAILY
Qty: 100 TABLET | Refills: 2 | Status: SHIPPED | OUTPATIENT
Start: 2024-09-06

## 2024-09-10 ENCOUNTER — NURSE TRIAGE (OUTPATIENT)
Dept: INTERNAL MEDICINE | Facility: CLINIC | Age: 84
End: 2024-09-10

## 2024-09-10 DIAGNOSIS — M54.50 ACUTE LOW BACK PAIN, UNSPECIFIED BACK PAIN LATERALITY, UNSPECIFIED WHETHER SCIATICA PRESENT: Primary | ICD-10-CM

## 2024-09-10 NOTE — TELEPHONE ENCOUNTER
Patient called to follow-up on this message. Patient is asking if she can get an order for a urine test today. Patient has arranged for a ride to take her for the test today, as she is unable to drive and doesn't have a car.

## 2024-09-10 NOTE — TELEPHONE ENCOUNTER
Call to pt and advised. She agrees and will see if she can get a ride to the Lab.   Scheduled for 5 pm.

## 2024-09-10 NOTE — TELEPHONE ENCOUNTER
Nurse Triage SBAR    Is this a 2nd Level Triage? YES, LICENSED PRACTITIONER REVIEW IS REQUIRED    Situation: patient reports that she is having back pain     Background: back pain historically has been related to UTI or chronic pain from a fall     Assessment: Patient reports intermittent lower left back pain. 6/10 pain. Does endorse some burning with urination. Denies fever. Pain is relieved by pain medication.     Protocol Recommended Disposition:   Go To Office Now    Recommendation: recommended UC, patient reports that she does not have a ride. Discussed possible evisit for UTI assessment. Routing to provider as FYI, she has appointment on Friday.     Appointments in Next Year      Sep 13, 2024 2:00 PM  (Arrive by 1:40 PM)  Provider Visit with Yunior Huang MD  [facility] ([facility] ) [phone]       Routed to provider    Does the patient meet one of the following criteria for ADS visit consideration? 16+ years old, with an [facility] PCP     TIP  Providers, please consider if this condition is appropriate for management at one of our Acute and Diagnostic Services sites.     If patient is a good candidate, please use dotphrase <dot>triageresponse and select Refer to ADS to document.    Reason for Disposition   Pain or burning with passing urine (urination)    Additional Information   Negative: Passed out (i.e., fainted, collapsed and was not responding)   Negative: Shock suspected (e.g., cold/pale/clammy skin, too weak to stand, low BP, rapid pulse)   Negative: Sounds like a life-threatening emergency to the triager   Negative: SEVERE back pain of sudden onset and age > 60 years   Negative: SEVERE abdominal pain (e.g., excruciating)   Negative: Abdominal pain and age > 60 years   Negative: Unable to urinate (or only a few drops) and bladder feels very full   Negative: Loss of bladder or bowel control (urine or bowel incontinence; wetting self, leaking  stool) of new-onset   Negative: Numbness (loss of sensation) in groin or rectal area   Negative: Pain radiates into groin, scrotum   Negative: Blood in urine (red, pink, or tea-colored)   Negative: Vomiting and pain over lower ribs of back (i.e., flank - kidney area)   Negative: Weakness of a leg or foot (e.g., unable to bear weight, dragging foot)   Negative: Patient sounds very sick or weak to the triager   Negative: Fever > 100.4 F (38.0 C) and flank pain    Protocols used: Back Pain-A-[place]

## 2024-09-11 ENCOUNTER — LAB (OUTPATIENT)
Dept: LAB | Facility: CLINIC | Age: 84
End: 2024-09-11
Payer: COMMERCIAL

## 2024-09-11 DIAGNOSIS — M54.50 ACUTE LOW BACK PAIN, UNSPECIFIED BACK PAIN LATERALITY, UNSPECIFIED WHETHER SCIATICA PRESENT: ICD-10-CM

## 2024-09-11 LAB
ALBUMIN UR-MCNC: 30 MG/DL
APPEARANCE UR: CLEAR
BACTERIA #/AREA URNS HPF: ABNORMAL /HPF
BILIRUB UR QL STRIP: NEGATIVE
COLOR UR AUTO: YELLOW
GLUCOSE UR STRIP-MCNC: NEGATIVE MG/DL
HGB UR QL STRIP: NEGATIVE
KETONES UR STRIP-MCNC: NEGATIVE MG/DL
LEUKOCYTE ESTERASE UR QL STRIP: ABNORMAL
NITRATE UR QL: NEGATIVE
PH UR STRIP: 5.5 [PH] (ref 5–7)
RBC #/AREA URNS AUTO: ABNORMAL /HPF
SP GR UR STRIP: 1.01 (ref 1–1.03)
SQUAMOUS #/AREA URNS AUTO: ABNORMAL /LPF
TRANS CELLS #/AREA URNS HPF: ABNORMAL /HPF
UROBILINOGEN UR STRIP-ACNC: 0.2 E.U./DL
WBC #/AREA URNS AUTO: ABNORMAL /HPF
WBC CLUMPS #/AREA URNS HPF: PRESENT /HPF

## 2024-09-11 PROCEDURE — 87186 SC STD MICRODIL/AGAR DIL: CPT

## 2024-09-11 PROCEDURE — 87086 URINE CULTURE/COLONY COUNT: CPT | Performed by: INTERNAL MEDICINE

## 2024-09-11 PROCEDURE — 87088 URINE BACTERIA CULTURE: CPT | Performed by: INTERNAL MEDICINE

## 2024-09-11 PROCEDURE — 81001 URINALYSIS AUTO W/SCOPE: CPT

## 2024-09-12 DIAGNOSIS — N39.0 URINARY TRACT INFECTION WITHOUT HEMATURIA, SITE UNSPECIFIED: Primary | ICD-10-CM

## 2024-09-12 LAB — BACTERIA UR CULT: ABNORMAL

## 2024-09-12 RX ORDER — SULFAMETHOXAZOLE/TRIMETHOPRIM 800-160 MG
1 TABLET ORAL 2 TIMES DAILY
Qty: 14 TABLET | Refills: 0 | Status: SHIPPED | OUTPATIENT
Start: 2024-09-12

## 2024-09-12 NOTE — TELEPHONE ENCOUNTER
Spoke with daughter Alejandra and she states that her mother has a PCP appt tomorrow and unfortunately the daughters do not live in [place].  An Aunt will be accompany patient at the visit 9-13-24.  According to daughter her Mom thought she was in Palliative Care and it was was not helpful. Has many reservations about end of life care.  Reviewed chart and on 7-1-24 a  reviewed Home Care and PT and OT after a hospitalization. Recommend they bring all her questions and concerns to the visit and possibly can be in on the visit via phone. Rika Garcia RN on 9/12/2024 at 3:20 PM

## 2024-09-13 ENCOUNTER — TELEPHONE (OUTPATIENT)
Dept: ANTICOAGULATION | Facility: CLINIC | Age: 84
End: 2024-09-13

## 2024-09-13 ENCOUNTER — OFFICE VISIT (OUTPATIENT)
Dept: INTERNAL MEDICINE | Facility: CLINIC | Age: 84
End: 2024-09-13
Payer: COMMERCIAL

## 2024-09-13 VITALS
HEART RATE: 78 BPM | RESPIRATION RATE: 20 BRPM | WEIGHT: 136.2 LBS | OXYGEN SATURATION: 92 % | BODY MASS INDEX: 23.25 KG/M2 | SYSTOLIC BLOOD PRESSURE: 137 MMHG | TEMPERATURE: 98.2 F | HEIGHT: 64 IN | DIASTOLIC BLOOD PRESSURE: 79 MMHG

## 2024-09-13 DIAGNOSIS — Z95.3 S/P MITRAL VALVE REPLACEMENT WITH BIOPROSTHETIC VALVE: ICD-10-CM

## 2024-09-13 DIAGNOSIS — I48.91 ATRIAL FIBRILLATION AND FLUTTER (H): ICD-10-CM

## 2024-09-13 DIAGNOSIS — I50.9 ACUTE ON CHRONIC CONGESTIVE HEART FAILURE, UNSPECIFIED HEART FAILURE TYPE (H): ICD-10-CM

## 2024-09-13 DIAGNOSIS — I27.20 PULMONARY HYPERTENSION (H): ICD-10-CM

## 2024-09-13 DIAGNOSIS — Z79.01 LONG TERM CURRENT USE OF ANTICOAGULANTS WITH INR GOAL OF 2.0-3.0: Primary | ICD-10-CM

## 2024-09-13 DIAGNOSIS — I10 ESSENTIAL HYPERTENSION: ICD-10-CM

## 2024-09-13 DIAGNOSIS — M54.50 LEFT-SIDED LOW BACK PAIN WITHOUT SCIATICA, UNSPECIFIED CHRONICITY: Primary | ICD-10-CM

## 2024-09-13 DIAGNOSIS — I48.92 ATRIAL FIBRILLATION AND FLUTTER (H): ICD-10-CM

## 2024-09-13 DIAGNOSIS — Z99.81 OXYGEN DEPENDENT: ICD-10-CM

## 2024-09-13 PROCEDURE — 99214 OFFICE O/P EST MOD 30 MIN: CPT | Performed by: INTERNAL MEDICINE

## 2024-09-13 RX ORDER — LIDOCAINE 50 MG/G
1 PATCH TOPICAL EVERY 24 HOURS
Qty: 5 PATCH | Refills: 0 | Status: SHIPPED | OUTPATIENT
Start: 2024-09-13

## 2024-09-13 ASSESSMENT — PAIN SCALES - GENERAL: PAINLEVEL: SEVERE PAIN (6)

## 2024-09-13 NOTE — PROGRESS NOTES
Assessment & Plan     Left-sided low back pain without sciatica, unspecified chronicity  Trial of topical lidocaine patch  Narcotics are making her confused, try to avoid  Take Tylenol scheduled   NSAIDs not recommended with current AC treatment   - lidocaine (LIDODERM) 5 % patch; Place 1 patch over 12 hours onto the skin every 24 hours. To prevent lidocaine toxicity, patient should be patch free for 12 hrs daily.    Acute on chronic congestive heart failure, unspecified heart failure type (H)  Decrease diuretic dose. Monitor. No clinical worsening , chronic symptoms   - Torsemide 40 MG TABS; Take 20 mg by mouth daily.    Pulmonary hypertension (H)  Has been declining gradually, oxygen dependent, frequent ER visits, interested in hospice care.   - Hospice Referral; Future  - Hospice Referral; Future    Oxygen dependent  Continue treatment     Essential hypertension  Controlled HTN     Atrial fibrillation and flutter (H)  Rate controlled , on AC             See Patient Instructions    Rowdy Alicea is a 84 year old, presenting for the following health issues:  RECHECK        9/13/2024     1:45 PM   Additional Questions   Roomed by Rosa DUFF   Accompanied by sisterjamie     HPI       Patient is here with her sister.   Feels weak, increased back pain. Recent UA shows UTI, she is on treatment.   Back pain appears to be mechanical, with standing, walking. Uses a walker.   Has h/o pulmonary HTN, on oxygen. Feels SOB on exertion. Very weak, no energy.   Has been feeling dizzy, episodes of confusion and disorientation.   Has history of atrial fibrillation. On anticoagulation with Coumadin and rate control medications. Asymptonatic - no chest pains , palpitations,  no side effects from medications.      Hypertension Follow-up    Do you check your blood pressure regularly outside of the clinic? Yes   Are you following a low salt diet? Yes  Are your blood pressures ever more than 140 on the top number (systolic) OR  "more   than 90 on the bottom number (diastolic), for example 140/90? No  How many servings of fruits and vegetables do you eat daily?  0-1  On average, how many sweetened beverages do you drink each day (Examples: soda, juice, sweet tea, etc.  Do NOT count diet or artificially sweetened beverages)?   1  How many days per week do you exercise enough to make your heart beat faster? 3 or less  How many minutes a day do you exercise enough to make your heart beat faster? 9 or less  How many days per week do you miss taking your medication? 0        Review of Systems  Constitutional, HEENT, cardiovascular, pulmonary, GI, , musculoskeletal, neuro, skin, endocrine and psych systems are negative, except as otherwise noted.      Objective    /79 (BP Location: Left arm, Cuff Size: Adult Regular)   Pulse 78   Temp 98.2  F (36.8  C) (Tympanic)   Resp 20   Ht 1.626 m (5' 4\")   Wt 61.8 kg (136 lb 3.2 oz)   LMP  (LMP Unknown)   SpO2 92%   BMI 23.38 kg/m    Body mass index is 23.38 kg/m .  Physical Exam   GENERAL: alert and no distress, frail elderly   NECK: no adenopathy, no asymmetry, masses, or scars  RESP: lungs clear to auscultation - no rales, rhonchi or wheezes, diminished breaths sounds in bases, mild crackles   CV: irregular rate and rhythm, normal S1 S2, no S3 or S4, 3/6 systolic murmur, no click or rub, no peripheral edema  ABDOMEN: soft, nontender, no hepatosplenomegaly, no masses and bowel sounds normal  MS: no gross musculoskeletal defects noted, no edema, generalized weakness, unstable gait. Pain in the LS spine paravertebral right.     Lab on 09/11/2024   Component Date Value Ref Range Status    Color Urine 09/11/2024 Yellow  Colorless, Straw, Light Yellow, Yellow Final    Appearance Urine 09/11/2024 Clear  Clear Final    Glucose Urine 09/11/2024 Negative  Negative mg/dL Final    Bilirubin Urine 09/11/2024 Negative  Negative Final    Ketones Urine 09/11/2024 Negative  Negative mg/dL Final    Specific " Gravity Urine 09/11/2024 1.015  1.003 - 1.035 Final    Blood Urine 09/11/2024 Negative  Negative Final    pH Urine 09/11/2024 5.5  5.0 - 7.0 Final    Protein Albumin Urine 09/11/2024 30 (A)  Negative mg/dL Final    Urobilinogen Urine 09/11/2024 0.2  0.2, 1.0 E.U./dL Final    Nitrite Urine 09/11/2024 Negative  Negative Final    Leukocyte Esterase Urine 09/11/2024 Small (A)  Negative Final    Bacteria Urine 09/11/2024 Many (A)  None Seen /HPF Final    RBC Urine 09/11/2024 0-2  0-2 /HPF /HPF Final    WBC Urine 09/11/2024 25-50 (A)  0-5 /HPF /HPF Final    Squamous Epithelials Urine 09/11/2024 Few (A)  None Seen /LPF Final    WBC Clumps Urine 09/11/2024 Present (A)  None Seen /HPF Final    Transitional Epithelials Urine 09/11/2024 Few (A)  None Seen /HPF Final    Culture 09/11/2024 >100,000 CFU/mL Klebsiella pneumoniae (A)   Final           Signed Electronically by: Yunior Huang MD

## 2024-09-13 NOTE — TELEPHONE ENCOUNTER
"ANTICOAGULATION  MANAGEMENT     Interacting Medication Review    Interacting medication(s): Sulfamethoxazole-Trimethoprim (Bactrim) with warfarin.    Duration: 7 days (9/13/24 to 9/20/24) - patient has yet to p/u rx yet, but plans to start today    Indication: UTI    New medication?: Yes, interaction may increase INR and risk of bleeding. With sulfamethoxazole-Trimethoprim, ACC protocol Appendix G recommends empiric reduction of warfarin dose in therapeutic/supratherapeutic patients.        PLAN     Temporarily adjust warfarin dose during interaction (18.8% change) with next INR in 3 days        Summary  As of 9/13/2024      Full warfarin instructions:  9/13: 1.25 mg; 9/16: 2.5 mg; Otherwise 3.75 mg every Mon, Fri; 2.5 mg all other days   Next INR check:  9/16/2024               Telephone call with Deanne who agrees to plan and repeated back plan correctly    Daily doses modified on anticoagulation calendar for interaction <= 7 days    ACC priority set/moved to \"high\" for new major drug interaction    Plan made per [facility] anticoagulation protocol    Lennie Coronado RN  9/13/2024  Anticoagulation Clinic  [place] for routing messages: kasey [facility] patient phone line: [phone]    "

## 2024-09-16 ENCOUNTER — LAB (OUTPATIENT)
Dept: LAB | Facility: CLINIC | Age: 84
End: 2024-09-16
Payer: COMMERCIAL

## 2024-09-16 ENCOUNTER — DOCUMENTATION ONLY (OUTPATIENT)
Dept: ANTICOAGULATION | Facility: CLINIC | Age: 84
End: 2024-09-16

## 2024-09-16 ENCOUNTER — ANTICOAGULATION THERAPY VISIT (OUTPATIENT)
Dept: ANTICOAGULATION | Facility: CLINIC | Age: 84
End: 2024-09-16

## 2024-09-16 DIAGNOSIS — I48.92 ATRIAL FIBRILLATION AND FLUTTER (H): ICD-10-CM

## 2024-09-16 DIAGNOSIS — I48.91 ATRIAL FIBRILLATION AND FLUTTER (H): ICD-10-CM

## 2024-09-16 DIAGNOSIS — I48.91 ATRIAL FIBRILLATION AND FLUTTER (H): Primary | ICD-10-CM

## 2024-09-16 DIAGNOSIS — Z79.01 LONG TERM CURRENT USE OF ANTICOAGULANTS WITH INR GOAL OF 2.0-3.0: Primary | ICD-10-CM

## 2024-09-16 DIAGNOSIS — Z95.3 S/P MITRAL VALVE REPLACEMENT WITH BIOPROSTHETIC VALVE: ICD-10-CM

## 2024-09-16 DIAGNOSIS — I48.92 ATRIAL FIBRILLATION AND FLUTTER (H): Primary | ICD-10-CM

## 2024-09-16 LAB — INR BLD: 3.8 (ref 0.9–1.1)

## 2024-09-16 PROCEDURE — 85610 PROTHROMBIN TIME: CPT

## 2024-09-16 PROCEDURE — 36416 COLLJ CAPILLARY BLOOD SPEC: CPT

## 2024-09-16 NOTE — PROGRESS NOTES
"ANTICOAGULATION MANAGEMENT     Zunilda SHAW May 84 year old female is on warfarin with supratherapeutic INR result. (Goal INR 2.0-3.0)    Recent labs: (last 7 days)     09/16/24  0944   INR 3.8*       ASSESSMENT     Source(s): Chart Review and Patient/Caregiver Call     Warfarin doses taken: Less warfarin taken than planned which may be affecting INR  Diet: No new diet changes identified  Medication/supplement changes:  Bactrim 7 day course (dates: 9/12-9/19/24) which may be increasing INR today.   Torsemide dose discussed at 9/13/24 office visit--provider notes have not been completed. Patient reports that she was advised to take 40 mg when she is at home and 20 mg when she is away from home.  New illness, injury, or hospitalization: Yes: currently being treated for UTI, patient reports she still has a \"backache\", has not gotten total resolution of symptoms.  Signs or symptoms of bleeding or clotting: No  Previous result: Therapeutic last 2(+) visits  Additional findings: None       PLAN     Recommended plan for temporary change(s) and ongoing change(s) affecting INR     Dosing Instructions: hold dose then continue your current warfarin dose with next INR in 3 days       Summary  As of 9/16/2024      Full warfarin instructions:  9/16: Hold; Otherwise 3.75 mg every Mon, Fri; 2.5 mg all other days   Next INR check:  9/19/2024               Telephone call with Deanne who verbalizes understanding and agrees to plan and who agrees to plan and repeated back plan correctly    Lab visit scheduled    Education provided: Interaction IS anticipated between warfarin and bactrim     Plan made per [facility] anticoagulation protocol    Linette Rocha RN  9/16/2024  Anticoagulation Clinic  [place] for routing messages: kasey [facility] patient phone line: [phone]        _______________________________________________________________________     Anticoagulation Episode Summary       Current INR goal:  2.0-3.0   TTR:  " 52.4% (10.3 mo)   Target end date:  Indefinite   Send INR reminders to:  Counts include 234 beds at the [facility]    Indications    Long term current use of anticoagulants with INR goal of 2.0-3.0 [Z79.01]  Atrial fibrillation and flutter (H) [I48.91  I48.92]  S/P mitral valve replacement with bioprosthetic valve [Z95.3]             Comments:  27 mm Magna bioprosthetic valve (2014)             Anticoagulation Care Providers       Provider Role Specialty Phone number    Yunior Huang MD Referring Internal Medicine [phone]

## 2024-09-16 NOTE — PROGRESS NOTES
ANTICOAGULATION CLINIC REFERRAL RENEWAL REQUEST       An annual renewal order is required for all patients referred to [facility].?  Please review and sign the pended referral order for Zunilda COLIN Clark.       ANTICOAGULATION SUMMARY      Warfarin indication(s)   Atrial Fibrillation and bioprosthetic mitral valve    Mechanical heart valve present?  NO       Current goal range   INR: 2.0-3.0     Goal appropriate for indication? Goal INR 2-3, standard for indication(s) above     Time in Therapeutic Range (TTR)  (Goal > 60%) 52%       Office visit with referring provider's group within last year yes on 09/13/2024       Linette Rocha RN  [facility]

## 2024-09-17 DIAGNOSIS — R19.7 DIARRHEA, UNSPECIFIED TYPE: ICD-10-CM

## 2024-09-17 RX ORDER — FAMOTIDINE 40 MG/1
40 TABLET, FILM COATED ORAL DAILY
Qty: 90 TABLET | Refills: 0 | OUTPATIENT
Start: 2024-09-17

## 2024-09-19 ENCOUNTER — TELEPHONE (OUTPATIENT)
Dept: INTERNAL MEDICINE | Facility: CLINIC | Age: 84
End: 2024-09-19

## 2024-09-19 ENCOUNTER — MEDICAL CORRESPONDENCE (OUTPATIENT)
Dept: HEALTH INFORMATION MANAGEMENT | Facility: CLINIC | Age: 84
End: 2024-09-19

## 2024-09-19 NOTE — TELEPHONE ENCOUNTER
Patient calling.  She will be finishing bactrim today.  She is very fatigued and is not able to come for an INR check today.  She has transportation tomorrow and would prefer to come then. Assisted to reschedule appointment.

## 2024-09-19 NOTE — TELEPHONE ENCOUNTER
General Call      Reason for Call:  returning call    What are your questions or concerns:  patient returning call to INR nurse    Date of last appointment with provider: 9-16-24    Could we send this information to you in [employer] or would you prefer to receive a phone call?:   Patient would prefer a phone call   Okay to leave a detailed message?: Yes at Home number on file [phone] (home)

## 2024-09-20 ENCOUNTER — LAB (OUTPATIENT)
Dept: LAB | Facility: CLINIC | Age: 84
End: 2024-09-20
Payer: COMMERCIAL

## 2024-09-20 ENCOUNTER — ANTICOAGULATION THERAPY VISIT (OUTPATIENT)
Dept: ANTICOAGULATION | Facility: CLINIC | Age: 84
End: 2024-09-20

## 2024-09-20 DIAGNOSIS — I48.92 ATRIAL FIBRILLATION AND FLUTTER (H): ICD-10-CM

## 2024-09-20 DIAGNOSIS — Z95.3 S/P MITRAL VALVE REPLACEMENT WITH BIOPROSTHETIC VALVE: ICD-10-CM

## 2024-09-20 DIAGNOSIS — I48.91 ATRIAL FIBRILLATION AND FLUTTER (H): ICD-10-CM

## 2024-09-20 DIAGNOSIS — Z79.01 LONG TERM CURRENT USE OF ANTICOAGULANTS WITH INR GOAL OF 2.0-3.0: Primary | ICD-10-CM

## 2024-09-20 LAB — INR BLD: 3.6 (ref 0.9–1.1)

## 2024-09-20 PROCEDURE — 85610 PROTHROMBIN TIME: CPT

## 2024-09-20 PROCEDURE — 36416 COLLJ CAPILLARY BLOOD SPEC: CPT

## 2024-09-23 ENCOUNTER — TELEPHONE (OUTPATIENT)
Dept: CARDIOLOGY | Facility: CLINIC | Age: 84
End: 2024-09-23
Payer: COMMERCIAL

## 2024-09-23 NOTE — TELEPHONE ENCOUNTER
"Zunilda called and left message requesting to cancel any further device checks as she is now in hospice and \"can't have any appointments to do with the heart or I can't be on hospice\".     Returned call to pt to inform her that I received her message but encouraged to leave her monitor plugged in for alerts. Pt is dependent with underlying being sinus asystole with CHB and no JE at VVI 30.     Cancelled future device checks as per requested. Pt will be following with [facility] out of [facility]. Their number is [phone]. MELINA Callahan  "

## 2024-10-01 ENCOUNTER — TELEPHONE (OUTPATIENT)
Dept: INTERNAL MEDICINE | Facility: CLINIC | Age: 84
End: 2024-10-01
Payer: COMMERCIAL

## 2024-10-01 NOTE — TELEPHONE ENCOUNTER
Verbal certification of terminal illness form received via fax. Form in your mailbox to be signed.

## 2024-10-03 ENCOUNTER — TELEPHONE (OUTPATIENT)
Dept: INTERNAL MEDICINE | Facility: CLINIC | Age: 84
End: 2024-10-03
Payer: COMMERCIAL

## 2024-10-03 ENCOUNTER — TELEPHONE (OUTPATIENT)
Dept: CARDIOLOGY | Facility: CLINIC | Age: 84
End: 2024-10-03
Payer: COMMERCIAL

## 2024-10-03 NOTE — TELEPHONE ENCOUNTER
Spoke with patient, she is in hospice care and declined future scheduling. 10/3/24 enriqueta Ferrer  Complex   [facility]

## 2024-10-11 ENCOUNTER — TELEPHONE (OUTPATIENT)
Dept: ANTICOAGULATION | Facility: CLINIC | Age: 84
End: 2024-10-11
Payer: COMMERCIAL

## 2024-10-11 ENCOUNTER — TELEPHONE (OUTPATIENT)
Dept: INTERNAL MEDICINE | Facility: CLINIC | Age: 84
End: 2024-10-11
Payer: COMMERCIAL

## 2024-10-11 NOTE — TELEPHONE ENCOUNTER
Sean from [employer] calling and will be faxing paperwork over regarding getting a portable oxygen tank for patient.      Please watch out for paperwork.

## 2024-10-11 NOTE — TELEPHONE ENCOUNTER
ANTICOAGULATION     Zunilda Clark is overdue for an INR check. Patient was due for INR 10/4/24    Left message for patient to call and schedule lab appointment as soon as possible. If returning call, please schedule.     Upon chart review, patient has canceled all heart device checks as she has started hospice. Will try to reach out to patient again in a few days to see if she has stopped warfarin or if hospice is checking her INR's    Екатерина Mahan RN  10/11/2024  Anticoagulation Clinic  [facility] for routing messages: kasey [facility] patient phone line: [phone]

## 2024-10-14 ENCOUNTER — TELEPHONE (OUTPATIENT)
Dept: INTERNAL MEDICINE | Facility: CLINIC | Age: 84
End: 2024-10-14
Payer: COMMERCIAL

## 2024-10-14 DIAGNOSIS — I48.92 ATRIAL FIBRILLATION AND FLUTTER (H): ICD-10-CM

## 2024-10-14 DIAGNOSIS — Z79.01 LONG TERM CURRENT USE OF ANTICOAGULANTS WITH INR GOAL OF 2.0-3.0: Primary | ICD-10-CM

## 2024-10-14 DIAGNOSIS — Z95.3 S/P MITRAL VALVE REPLACEMENT WITH BIOPROSTHETIC VALVE: ICD-10-CM

## 2024-10-14 DIAGNOSIS — I48.91 ATRIAL FIBRILLATION AND FLUTTER (H): ICD-10-CM

## 2024-10-14 NOTE — TELEPHONE ENCOUNTER
General Call      Reason for Call:  Daughter called and is letting the team know that patient was taken into Hospice over the weekend and will not be doing warfarin     What are your questions or concerns:  no longer doing warfarin    Date of last appointment with provider: na    Could we send this information to you in [employer] [facility] [employer] [employer] or would you prefer to receive a phone call?:   Patient would prefer a phone call   Okay to leave a detailed message?: No at Other phone number:  [phone]

## 2024-10-14 NOTE — TELEPHONE ENCOUNTER
Spoke to daughterTenisha and confirmed message below. Warfarin has been stopped while Zunilda is on hospice.     ANTICOAGULATION  MANAGEMENT    Zunilda Clark is being discharged from the [facility] ([facility]).    Reason for discharge:  now on hospice, warfarin use was not advised     Anticoagulation episode resolved, ACC referral closed, and Standing order discontinued    If patient needs warfarin management in the future, please send a new referral    Pearl Mahan RN

## 2024-10-25 ENCOUNTER — TELEPHONE (OUTPATIENT)
Dept: INTERNAL MEDICINE | Facility: CLINIC | Age: 84
End: 2024-10-25
Payer: COMMERCIAL

## 2024-10-25 NOTE — TELEPHONE ENCOUNTER
Forms/Letter Request    Type of form/letter:  needs a written approval with instructions on how to administer the medications.  Patient will be staying at [facility].    Have you been seen for this request: N/A    Do we have the form/letter: No    When is form/letter needed by: today if possible     How would you like the form/letter returned: Fax [phone]    Patient Notified form requests are processed in 3-5 business days:Yes    Could we send this information to you in [employer] or would you prefer to receive a phone call?:   call  Okay to leave a detailed message?: Yes at Other phone number:  [phone]

## 2024-10-25 NOTE — TELEPHONE ENCOUNTER
Tenisha called us back. She said her mom is in assisted living and they are going to start to manage and administer her medication. Tenisha confirmed her mom is on hospice and the only 3 medications she is taking are dexamethasone elixir, levothyroxine and torsemide.  I left a voicemail for the nurse at [facility] to see if they would have an ITZEL to send over to us. We also will need to update our medication list and I would guess we will need the medication list signed by Dr Huang.

## 2024-10-25 NOTE — TELEPHONE ENCOUNTER
We received and order from [facility] for this via fax. Form in your mailbox to be signed.  They need this today yet if possible.

## 2024-11-17 ENCOUNTER — MYC MEDICAL ADVICE (OUTPATIENT)
Dept: CARDIOLOGY | Facility: CLINIC | Age: 84
End: 2024-11-17
Payer: COMMERCIAL

## 2025-05-15 NOTE — PATIENT INSTRUCTIONS
Orders:    CBC and Auto Differential; Future    Comprehensive Metabolic Panel; Future    Lipid Panel; Future     Thank you for visiting the cardiology clinic today.      Medication changes:    If your home weight is 133 lbs or LESS, take just one 40mg torsemide that morning.  If your home weight is 134 lbs or MORE take 1.5 tablets (60mg of torsemide) that morning.     Other information/recommendations:  Use compression socks when you can during the day, and elevate your feet in the evenings.     I will reach out to Dr. Huang regarding our conversation today.    Follow up Plan:  Return in 1 month to see Zena in clinic with repeat labs.     Please feel free to reach out to our nursing team at [phone] with any questions or concerns.  You can also send us a MyChart and we will get back with you as soon as possible.

## (undated) DEVICE — GLOVE PROTEXIS POWDER FREE 6.5 ORTHOPEDIC 2D73ET65

## (undated) DEVICE — SUCTION MANIFOLD NEPTUNE 2 SYS 4 PORT 0702-020-000

## (undated) DEVICE — SUCTION IRR STRYKERFLOW II W/TIP 250-070-520

## (undated) DEVICE — GLOVE PROTEXIS BLUE W/NEU-THERA 8.0  2D73EB80

## (undated) DEVICE — ESU PENCIL W/HOLSTER E2350H

## (undated) DEVICE — BONE CEMENT MIXEVAC III HI VAC KIT  0206-015-000

## (undated) DEVICE — SUTURE MONOCRYL+ 2-0 CT-1 36" UNDYED MCP945H

## (undated) DEVICE — ESU GROUND PAD ADULT W/CORD E7507

## (undated) DEVICE — LINEN FULL SHEET 5511

## (undated) DEVICE — DRAPE C-ARM 60X42" 1013

## (undated) DEVICE — DRSG GAUZE 4X4" TRAY

## (undated) DEVICE — PAD FOAM TRIANGLE KNEE 193-P

## (undated) DEVICE — GLOVE PROTEXIS POWDER FREE SMT 7.0  2D72PT70X

## (undated) DEVICE — DRAPE STERI U 1015

## (undated) DEVICE — SOL NACL 0.9% IRRIG 3000ML BAG 2B7477

## (undated) DEVICE — DRSG AQUACEL AG 3.5X9.75" HYDROFIBER 412011

## (undated) DEVICE — SOL ADH LIQUID BENZOIN SWAB 0.6ML C1544

## (undated) DEVICE — DRAPE X-RAY TUBE 00-901169-01-OEC

## (undated) DEVICE — GLOVE PROTEXIS BLUE W/NEU-THERA 7.0  2D73EB70

## (undated) DEVICE — CLIP APPLIER ENDO 05MM MED/LG 176630

## (undated) DEVICE — SUCTION CANISTER MEDIVAC LINER 3000ML W/LID 65651-530

## (undated) DEVICE — BAG CLEAR TRASH 1.3M 39X33" P4040C

## (undated) DEVICE — GLOVE PROTEXIS POWDER FREE 7.0 ORTHOPEDIC 2D73ET70

## (undated) DEVICE — GLOVE BIOGEL PI MICRO INDICATOR UNDERGLOVE SZ 8.5 48985

## (undated) DEVICE — CANISTER WOUND VAC W/GEL 500ML M8275063/5

## (undated) DEVICE — PREP CHLORAPREP 26ML TINTED ORANGE  260815

## (undated) DEVICE — SET HANDPIECE INTERPULSE W/COAXIAL FAN SPRAY TIP 0210118000

## (undated) DEVICE — LINEN DRAPE 54X72" 5467

## (undated) DEVICE — SYR 10ML SLIP TIP W/O NDL 303134

## (undated) DEVICE — GLOVE PROTEXIS POWDER FREE 7.5 ORTHOPEDIC 2D73ET75

## (undated) DEVICE — LINEN ORTHO ACL PACK 5447

## (undated) DEVICE — CAST PADDING 6" STERILE 9046S

## (undated) DEVICE — ENDO CANNULA 05MM VERSAONE UNIVERSAL UNVCA5STF

## (undated) DEVICE — HOOD FLYTE W/PEELAWAY 408-800-100

## (undated) DEVICE — BLADE SHAVER ARTHRO 4.2MM CUDA C9254

## (undated) DEVICE — SOL NACL 0.9% INJ 250ML BAG 2B1322Q

## (undated) DEVICE — SOL WATER IRRIG 1000ML BOTTLE 2F7114

## (undated) DEVICE — SU ETHILON 3-0 PS-2 18" 1669H

## (undated) DEVICE — DRAIN JACKSON PRATT 15FR ROUND SIL LF JP-2229

## (undated) DEVICE — SYR BULB IRRIG 50ML LATEX FREE 0035280

## (undated) DEVICE — GOWN IMPERVIOUS SPECIALTY XLG/XLONG 32474

## (undated) DEVICE — SU VICRYL 0 CT-2 CR 8X18" J727D

## (undated) DEVICE — DRAIN HEMOVAC RESERVOIR KIT 10FR 1/8" MED 00-2550-002-10

## (undated) DEVICE — SURGICEL HEMOSTAT 3X4" NUKNIT 1943

## (undated) DEVICE — SYR 30ML LL W/O NDL 302832

## (undated) DEVICE — BLADE SAW SAGITTAL STRK 19.5X90X1.27MM 2108-109-000S11

## (undated) DEVICE — CONNECTOR STOPCOCK 3 WAY MALE LL HI-FLO MX9311L

## (undated) DEVICE — CLEANSER WOUND IRRISEPT 0.05% CHG IRRISEPT-403

## (undated) DEVICE — DECANTER BAG 2002S

## (undated) DEVICE — PACK TOTAL KNEE BOXED LATEX FREE PO15TKFCT

## (undated) DEVICE — GLOVE BIOGEL PI SZ 8.5 40885

## (undated) DEVICE — SU ETHILON 2-0 PS 18" 585H

## (undated) DEVICE — ESU ELEC BLADE 2.75" COATED/INSULATED E1455

## (undated) DEVICE — GLOVE PROTEXIS POWDER FREE SMT 8.0  2D72PT80X

## (undated) DEVICE — BLADE SAW SAGITTAL STRK 25X75X0.89MM SYS 6 6125-089-075

## (undated) DEVICE — DRILL BIT QUICK COUPLING 3 FLUTE 4.2MMX145MM NDL  POINT

## (undated) DEVICE — SU VICRYL 4-0 PS-2 18" UND J496H

## (undated) DEVICE — DRSG GAUZE 4X4" 8044

## (undated) DEVICE — SU VICRYL 2-0 CT-1 27" UND J259H

## (undated) DEVICE — DRAIN JACKSON PRATT RESERVOIR 100ML SU130-1305

## (undated) DEVICE — ESU CORD MONOPOLAR 10'  E0510

## (undated) DEVICE — WIRE GUIDE 3.2X400MM  357.399

## (undated) DEVICE — DRAPE LEGGINGS 8421

## (undated) DEVICE — CATH TRAY FOLEY SURESTEP 16FR DRAIN BAG STATOCK A899916

## (undated) DEVICE — LINEN HALF SHEET 5512

## (undated) DEVICE — DRILL BIT QUICK COUPLING 3 FLUTE 4.2MMX330/100MM CALIBRATE

## (undated) DEVICE — ENDO TROCAR 05MM VERSAONE BLADELESS W/STD FIX CAN NONB5STF

## (undated) DEVICE — GOWN XLG DISP 9545

## (undated) DEVICE — BNDG ELASTIC 6" DBL LENGTH UNSTERILE 6611-16

## (undated) DEVICE — DRSG ADAPTIC 3X8" 6113

## (undated) DEVICE — SPONGE LAP 18X18" X8435

## (undated) DEVICE — DRAPE C-ARMOR 5 SIDED 5523

## (undated) DEVICE — DEFIB PRO-PADZ LVP LQD GEL ADULT 8900-2105-01

## (undated) DEVICE — ENDO BITE BLOCK ADULT OLYMPUS LATEX FREE MAJ-1632

## (undated) DEVICE — TOURNIQUET CUFF 30" REPRO BLUE 60-7070-105

## (undated) DEVICE — DRSG WOUND VAC SPONGE MED BLACK M8275052/5

## (undated) DEVICE — GLOVE PROTEXIS POWDER FREE SMT 6.5  2D72PT65X

## (undated) DEVICE — SUTURE VICRYL+ 0 CT-1 18" DYED VIO VCP740D

## (undated) DEVICE — DRSG AQUACEL AG HYDROFIBER  3.5X10" 422605

## (undated) DEVICE — ROD SYN REAMER BALL TIP 2.5X950MM  351.706S

## (undated) DEVICE — PACK ARTHROSCOPY KNEE

## (undated) DEVICE — CATH CHOLANGIOGRAM KUMAR CC-019

## (undated) DEVICE — Device

## (undated) DEVICE — SYR 50ML LL W/O NDL 309653

## (undated) DEVICE — SU VICRYL+ 1 MO-4 18" DYED VCP702D

## (undated) DEVICE — DRSG WOUND VAC INSTILL PAD

## (undated) DEVICE — ANTIFOG SOLUTION W/FOAM PAD CF-1001

## (undated) DEVICE — LINEN POUCH DBL 5427

## (undated) DEVICE — SU VICRYL 0 CT-1 36" J346H

## (undated) DEVICE — BLADE CLIPPER SGL USE 9680

## (undated) DEVICE — PACK PCMKR PERM SRG PROC LF SAN32PC573

## (undated) DEVICE — NDL BLUNT 18GA 1.5" FILTER 305211

## (undated) DEVICE — SYR 20ML LL W/O NDL 302830

## (undated) DEVICE — SUCTION CANISTER STRAW 65652-008

## (undated) DEVICE — KIT ENDO TURNOVER/PROCEDURE W/CLEAN A SCOPE LINERS 103888

## (undated) DEVICE — ENDO TROCAR BLUNT 100MM W/THRD ANCHOR BLUNTPORT BPT12STS

## (undated) DEVICE — KIT WRENCH 5873W

## (undated) DEVICE — ENDO POUCH GOLD 10MM ECATCH 173050G

## (undated) DEVICE — DRSG TEGADERM 4X4 3/4" 1626W

## (undated) DEVICE — RAD RX ISOVUE 300 (50ML) 61% IOPAMIDOL CHARGE PER ML

## (undated) DEVICE — GLOVE PROTEXIS BLUE W/NEU-THERA 7.5  2D73EB75

## (undated) DEVICE — LINEN TOWEL PACK X5 5464

## (undated) DEVICE — SU VICRYL 2-0 CT-1 36" UND J945H

## (undated) DEVICE — CAST PADDING 6" UNSTERILE 9046

## (undated) RX ORDER — FENTANYL CITRATE 50 UG/ML
INJECTION, SOLUTION INTRAMUSCULAR; INTRAVENOUS
Status: DISPENSED
Start: 2018-11-12

## (undated) RX ORDER — ETOMIDATE 2 MG/ML
INJECTION INTRAVENOUS
Status: DISPENSED
Start: 2017-04-29

## (undated) RX ORDER — FENTANYL CITRATE 50 UG/ML
INJECTION, SOLUTION INTRAMUSCULAR; INTRAVENOUS
Status: DISPENSED
Start: 2023-09-27

## (undated) RX ORDER — VANCOMYCIN HYDROCHLORIDE 1 G/20ML
INJECTION, POWDER, LYOPHILIZED, FOR SOLUTION INTRAVENOUS
Status: DISPENSED
Start: 2023-09-27

## (undated) RX ORDER — ONDANSETRON 2 MG/ML
INJECTION INTRAMUSCULAR; INTRAVENOUS
Status: DISPENSED
Start: 2017-04-29

## (undated) RX ORDER — DEXAMETHASONE SODIUM PHOSPHATE 4 MG/ML
INJECTION, SOLUTION INTRA-ARTICULAR; INTRALESIONAL; INTRAMUSCULAR; INTRAVENOUS; SOFT TISSUE
Status: DISPENSED
Start: 2017-04-29

## (undated) RX ORDER — PROPOFOL 10 MG/ML
INJECTION, EMULSION INTRAVENOUS
Status: DISPENSED
Start: 2018-12-18

## (undated) RX ORDER — ACETAMINOPHEN 325 MG/1
TABLET ORAL
Status: DISPENSED
Start: 2018-12-18

## (undated) RX ORDER — FENTANYL CITRATE 50 UG/ML
INJECTION, SOLUTION INTRAMUSCULAR; INTRAVENOUS
Status: DISPENSED
Start: 2018-12-18

## (undated) RX ORDER — PROPOFOL 10 MG/ML
INJECTION, EMULSION INTRAVENOUS
Status: DISPENSED
Start: 2023-09-27

## (undated) RX ORDER — GLYCOPYRROLATE 0.2 MG/ML
INJECTION INTRAMUSCULAR; INTRAVENOUS
Status: DISPENSED
Start: 2018-12-18

## (undated) RX ORDER — ONDANSETRON 2 MG/ML
INJECTION INTRAMUSCULAR; INTRAVENOUS
Status: DISPENSED
Start: 2018-12-18

## (undated) RX ORDER — CEFAZOLIN SODIUM/WATER 2 G/20 ML
SYRINGE (ML) INTRAVENOUS
Status: DISPENSED
Start: 2023-09-27

## (undated) RX ORDER — ACETAMINOPHEN 10 MG/ML
INJECTION, SOLUTION INTRAVENOUS
Status: DISPENSED
Start: 2017-04-29

## (undated) RX ORDER — NEOSTIGMINE METHYLSULFATE 1 MG/ML
VIAL (ML) INJECTION
Status: DISPENSED
Start: 2018-12-18

## (undated) RX ORDER — CEFAZOLIN SODIUM 2 G/100ML
INJECTION, SOLUTION INTRAVENOUS
Status: DISPENSED
Start: 2021-03-08

## (undated) RX ORDER — PANTOPRAZOLE SODIUM 40 MG/1
TABLET, DELAYED RELEASE ORAL
Status: DISPENSED
Start: 2018-09-05

## (undated) RX ORDER — ONDANSETRON 2 MG/ML
INJECTION INTRAMUSCULAR; INTRAVENOUS
Status: DISPENSED
Start: 2020-02-19

## (undated) RX ORDER — BUPIVACAINE HYDROCHLORIDE 2.5 MG/ML
INJECTION, SOLUTION EPIDURAL; INFILTRATION; INTRACAUDAL
Status: DISPENSED
Start: 2017-04-29

## (undated) RX ORDER — FENTANYL CITRATE 50 UG/ML
INJECTION, SOLUTION INTRAMUSCULAR; INTRAVENOUS
Status: DISPENSED
Start: 2020-02-19

## (undated) RX ORDER — PROPOFOL 10 MG/ML
INJECTION, EMULSION INTRAVENOUS
Status: DISPENSED
Start: 2017-04-29

## (undated) RX ORDER — FENTANYL CITRATE 50 UG/ML
INJECTION, SOLUTION INTRAMUSCULAR; INTRAVENOUS
Status: DISPENSED
Start: 2021-03-08

## (undated) RX ORDER — KETAMINE HCL IN 0.9 % NACL 50 MG/5 ML
SYRINGE (ML) INTRAVENOUS
Status: DISPENSED
Start: 2018-12-18

## (undated) RX ORDER — PROPOFOL 10 MG/ML
INJECTION, EMULSION INTRAVENOUS
Status: DISPENSED
Start: 2018-11-12

## (undated) RX ORDER — LIDOCAINE HYDROCHLORIDE 10 MG/ML
INJECTION, SOLUTION EPIDURAL; INFILTRATION; INTRACAUDAL; PERINEURAL
Status: DISPENSED
Start: 2021-03-08

## (undated) RX ORDER — KETAMINE HCL IN 0.9 % NACL 50 MG/5 ML
SYRINGE (ML) INTRAVENOUS
Status: DISPENSED
Start: 2018-11-12

## (undated) RX ORDER — CEFAZOLIN SODIUM 1 G/3ML
INJECTION, POWDER, FOR SOLUTION INTRAMUSCULAR; INTRAVENOUS
Status: DISPENSED
Start: 2018-12-18

## (undated) RX ORDER — GLYCOPYRROLATE 0.2 MG/ML
INJECTION INTRAMUSCULAR; INTRAVENOUS
Status: DISPENSED
Start: 2018-11-12

## (undated) RX ORDER — LIDOCAINE HYDROCHLORIDE 10 MG/ML
INJECTION, SOLUTION EPIDURAL; INFILTRATION; INTRACAUDAL; PERINEURAL
Status: DISPENSED
Start: 2017-04-29

## (undated) RX ORDER — ACETAMINOPHEN 500 MG
TABLET ORAL
Status: DISPENSED
Start: 2018-11-12

## (undated) RX ORDER — CEFAZOLIN SODIUM 2 G/100ML
INJECTION, SOLUTION INTRAVENOUS
Status: DISPENSED
Start: 2018-09-05

## (undated) RX ORDER — ONDANSETRON 2 MG/ML
INJECTION INTRAMUSCULAR; INTRAVENOUS
Status: DISPENSED
Start: 2023-09-27

## (undated) RX ORDER — FENTANYL CITRATE 50 UG/ML
INJECTION, SOLUTION INTRAMUSCULAR; INTRAVENOUS
Status: DISPENSED
Start: 2017-04-29

## (undated) RX ORDER — HYDROMORPHONE HYDROCHLORIDE 1 MG/ML
INJECTION, SOLUTION INTRAMUSCULAR; INTRAVENOUS; SUBCUTANEOUS
Status: DISPENSED
Start: 2017-04-29

## (undated) RX ORDER — ONDANSETRON 2 MG/ML
INJECTION INTRAMUSCULAR; INTRAVENOUS
Status: DISPENSED
Start: 2018-11-12

## (undated) RX ORDER — CEFAZOLIN SODIUM 2 G/100ML
INJECTION, SOLUTION INTRAVENOUS
Status: DISPENSED
Start: 2018-11-12

## (undated) RX ORDER — BUPIVACAINE HYDROCHLORIDE 2.5 MG/ML
INJECTION, SOLUTION EPIDURAL; INFILTRATION; INTRACAUDAL
Status: DISPENSED
Start: 2021-03-08

## (undated) RX ORDER — PANTOPRAZOLE SODIUM 40 MG/1
TABLET, DELAYED RELEASE ORAL
Status: DISPENSED
Start: 2018-11-12

## (undated) RX ORDER — CELECOXIB 200 MG/1
CAPSULE ORAL
Status: DISPENSED
Start: 2018-09-05

## (undated) RX ORDER — GLYCOPYRROLATE 0.2 MG/ML
INJECTION INTRAMUSCULAR; INTRAVENOUS
Status: DISPENSED
Start: 2017-04-29

## (undated) RX ORDER — DEXAMETHASONE SODIUM PHOSPHATE 4 MG/ML
INJECTION, SOLUTION INTRA-ARTICULAR; INTRALESIONAL; INTRAMUSCULAR; INTRAVENOUS; SOFT TISSUE
Status: DISPENSED
Start: 2018-11-12

## (undated) RX ORDER — CEFAZOLIN SODIUM 2 G/100ML
INJECTION, SOLUTION INTRAVENOUS
Status: DISPENSED
Start: 2018-12-18

## (undated) RX ORDER — TRANEXAMIC ACID 10 MG/ML
INJECTION, SOLUTION INTRAVENOUS
Status: DISPENSED
Start: 2023-09-27

## (undated) RX ORDER — LIDOCAINE HYDROCHLORIDE 10 MG/ML
INJECTION, SOLUTION EPIDURAL; INFILTRATION; INTRACAUDAL; PERINEURAL
Status: DISPENSED
Start: 2018-11-12

## (undated) RX ORDER — NEOSTIGMINE METHYLSULFATE 1 MG/ML
VIAL (ML) INJECTION
Status: DISPENSED
Start: 2018-11-12